# Patient Record
Sex: FEMALE | Race: WHITE | NOT HISPANIC OR LATINO
[De-identification: names, ages, dates, MRNs, and addresses within clinical notes are randomized per-mention and may not be internally consistent; named-entity substitution may affect disease eponyms.]

---

## 2016-08-09 RX ORDER — ACETAMINOPHEN 500 MG
2 TABLET ORAL
Qty: 0 | Refills: 0 | COMMUNITY
Start: 2016-08-09

## 2016-08-21 RX ORDER — DOCUSATE SODIUM 100 MG
1 CAPSULE ORAL
Qty: 0 | Refills: 0 | COMMUNITY
Start: 2016-08-21

## 2017-01-19 ENCOUNTER — APPOINTMENT (OUTPATIENT)
Dept: UROLOGY | Facility: CLINIC | Age: 82
End: 2017-01-19

## 2017-01-19 VITALS — DIASTOLIC BLOOD PRESSURE: 72 MMHG | SYSTOLIC BLOOD PRESSURE: 140 MMHG

## 2017-01-22 LAB
APPEARANCE: CLEAR
BACTERIA UR CULT: NORMAL
BACTERIA: NEGATIVE
BILIRUBIN URINE: NEGATIVE
BLOOD URINE: NEGATIVE
COLOR: YELLOW
GLUCOSE QUALITATIVE U: NORMAL MG/DL
HYALINE CASTS: 0 /LPF
KETONES URINE: NEGATIVE
LEUKOCYTE ESTERASE URINE: NEGATIVE
MICROSCOPIC-UA: NORMAL
NITRITE URINE: NEGATIVE
PH URINE: 6
PROTEIN URINE: NEGATIVE MG/DL
RED BLOOD CELLS URINE: 4 /HPF
SPECIFIC GRAVITY URINE: 1.01
SQUAMOUS EPITHELIAL CELLS: 2 /HPF
UROBILINOGEN URINE: NORMAL MG/DL
WHITE BLOOD CELLS URINE: 1 /HPF

## 2017-01-24 LAB — CORE LAB FLUID CYTOLOGY: NORMAL

## 2017-02-16 ENCOUNTER — OTHER (OUTPATIENT)
Age: 82
End: 2017-02-16

## 2017-02-17 ENCOUNTER — APPOINTMENT (OUTPATIENT)
Dept: OBGYN | Facility: CLINIC | Age: 82
End: 2017-02-17

## 2017-02-17 VITALS
DIASTOLIC BLOOD PRESSURE: 70 MMHG | BODY MASS INDEX: 21.99 KG/M2 | HEIGHT: 65 IN | WEIGHT: 132 LBS | SYSTOLIC BLOOD PRESSURE: 110 MMHG

## 2017-02-21 LAB
APPEARANCE: CLEAR
BACTERIA UR CULT: ABNORMAL
BILIRUBIN URINE: NEGATIVE
BLOOD URINE: NEGATIVE
CANDIDA VAG CYTO: NOT DETECTED
COLOR: YELLOW
CORE LAB FLUID CYTOLOGY: NORMAL
G VAGINALIS+PREV SP MTYP VAG QL MICRO: NOT DETECTED
GLUCOSE QUALITATIVE U: NORMAL MG/DL
KETONES URINE: NEGATIVE
LEUKOCYTE ESTERASE URINE: NEGATIVE
NITRITE URINE: NEGATIVE
PH URINE: 7.5
PROTEIN URINE: NEGATIVE MG/DL
SPECIFIC GRAVITY URINE: 1.01
T VAGINALIS VAG QL WET PREP: NOT DETECTED
UROBILINOGEN URINE: NORMAL MG/DL

## 2017-02-22 ENCOUNTER — RESULT REVIEW (OUTPATIENT)
Age: 82
End: 2017-02-22

## 2017-03-01 ENCOUNTER — APPOINTMENT (OUTPATIENT)
Dept: UROGYNECOLOGY | Facility: CLINIC | Age: 82
End: 2017-03-01

## 2017-03-23 ENCOUNTER — APPOINTMENT (OUTPATIENT)
Dept: UROLOGY | Facility: CLINIC | Age: 82
End: 2017-03-23

## 2017-03-23 VITALS — HEART RATE: 60 BPM | DIASTOLIC BLOOD PRESSURE: 91 MMHG | SYSTOLIC BLOOD PRESSURE: 180 MMHG | RESPIRATION RATE: 16 BRPM

## 2017-03-24 LAB
APPEARANCE: CLEAR
BACTERIA: NEGATIVE
BILIRUBIN URINE: NEGATIVE
BLOOD URINE: NEGATIVE
COLOR: YELLOW
GLUCOSE QUALITATIVE U: NORMAL MG/DL
KETONES URINE: NEGATIVE
LEUKOCYTE ESTERASE URINE: NEGATIVE
MICROSCOPIC-UA: NORMAL
NITRITE URINE: NEGATIVE
PH URINE: 7.5
PROTEIN URINE: NEGATIVE MG/DL
RED BLOOD CELLS URINE: 1 /HPF
SPECIFIC GRAVITY URINE: 1.01
SQUAMOUS EPITHELIAL CELLS: 0 /HPF
UROBILINOGEN URINE: NORMAL MG/DL
WHITE BLOOD CELLS URINE: 0 /HPF

## 2017-03-26 LAB — BACTERIA UR CULT: NORMAL

## 2017-03-28 LAB — CORE LAB FLUID CYTOLOGY: NORMAL

## 2017-04-09 ENCOUNTER — INPATIENT (INPATIENT)
Facility: HOSPITAL | Age: 82
LOS: 3 days | Discharge: HOME HEALTH | End: 2017-04-13
Attending: SURGERY | Admitting: SURGERY
Payer: MEDICARE

## 2017-04-09 VITALS — HEIGHT: 64 IN | WEIGHT: 125 LBS

## 2017-04-09 DIAGNOSIS — E89.0 POSTPROCEDURAL HYPOTHYROIDISM: Chronic | ICD-10-CM

## 2017-04-09 DIAGNOSIS — Z98.89 OTHER SPECIFIED POSTPROCEDURAL STATES: Chronic | ICD-10-CM

## 2017-04-09 DIAGNOSIS — K56.69 OTHER INTESTINAL OBSTRUCTION: ICD-10-CM

## 2017-04-09 LAB
ALBUMIN SERPL ELPH-MCNC: 4.3 G/DL — SIGNIFICANT CHANGE UP (ref 3.3–5)
ALP SERPL-CCNC: 61 U/L — SIGNIFICANT CHANGE UP (ref 40–120)
ALT FLD-CCNC: 19 U/L — SIGNIFICANT CHANGE UP (ref 12–78)
ANION GAP SERPL CALC-SCNC: 10 MMOL/L — SIGNIFICANT CHANGE UP (ref 5–17)
APPEARANCE UR: CLEAR — SIGNIFICANT CHANGE UP
APTT BLD: 38.3 SEC — HIGH (ref 27.5–37.4)
AST SERPL-CCNC: 25 U/L — SIGNIFICANT CHANGE UP (ref 15–37)
BACTERIA # UR AUTO: (no result)
BASOPHILS # BLD AUTO: 0 K/UL — SIGNIFICANT CHANGE UP (ref 0–0.2)
BASOPHILS NFR BLD AUTO: 0.5 % — SIGNIFICANT CHANGE UP (ref 0–2)
BILIRUB SERPL-MCNC: 0.7 MG/DL — SIGNIFICANT CHANGE UP (ref 0.2–1.2)
BILIRUB UR-MCNC: NEGATIVE — SIGNIFICANT CHANGE UP
BLD GP AB SCN SERPL QL: SIGNIFICANT CHANGE UP
BUN SERPL-MCNC: 15 MG/DL — SIGNIFICANT CHANGE UP (ref 7–23)
CALCIUM SERPL-MCNC: 9.7 MG/DL — SIGNIFICANT CHANGE UP (ref 8.5–10.1)
CHLORIDE SERPL-SCNC: 97 MMOL/L — SIGNIFICANT CHANGE UP (ref 96–108)
CO2 SERPL-SCNC: 27 MMOL/L — SIGNIFICANT CHANGE UP (ref 22–31)
COLOR SPEC: YELLOW — SIGNIFICANT CHANGE UP
CREAT SERPL-MCNC: 0.9 MG/DL — SIGNIFICANT CHANGE UP (ref 0.5–1.3)
DIFF PNL FLD: NEGATIVE — SIGNIFICANT CHANGE UP
EOSINOPHIL # BLD AUTO: 0 K/UL — SIGNIFICANT CHANGE UP (ref 0–0.5)
EOSINOPHIL NFR BLD AUTO: 0.1 % — SIGNIFICANT CHANGE UP (ref 0–6)
EPI CELLS # UR: SIGNIFICANT CHANGE UP
GLUCOSE SERPL-MCNC: 116 MG/DL — HIGH (ref 70–99)
GLUCOSE UR QL: NEGATIVE MG/DL — SIGNIFICANT CHANGE UP
HCT VFR BLD CALC: 45.6 % — HIGH (ref 34.5–45)
HGB BLD-MCNC: 15.1 G/DL — SIGNIFICANT CHANGE UP (ref 11.5–15.5)
INR BLD: 2.48 RATIO — HIGH (ref 0.88–1.16)
KETONES UR-MCNC: NEGATIVE — SIGNIFICANT CHANGE UP
LACTATE SERPL-SCNC: 1.6 MMOL/L — SIGNIFICANT CHANGE UP (ref 0.7–2)
LEUKOCYTE ESTERASE UR-ACNC: (no result)
LIDOCAIN IGE QN: 170 U/L — SIGNIFICANT CHANGE UP (ref 73–393)
LYMPHOCYTES # BLD AUTO: 0.6 K/UL — LOW (ref 1–3.3)
LYMPHOCYTES # BLD AUTO: 9.5 % — LOW (ref 13–44)
MANUAL DIF COMMENT BLD-IMP: SIGNIFICANT CHANGE UP
MCHC RBC-ENTMCNC: 31.7 PG — SIGNIFICANT CHANGE UP (ref 27–34)
MCHC RBC-ENTMCNC: 33.1 GM/DL — SIGNIFICANT CHANGE UP (ref 32–36)
MCV RBC AUTO: 95.9 FL — SIGNIFICANT CHANGE UP (ref 80–100)
MONOCYTES # BLD AUTO: 1.1 K/UL — HIGH (ref 0–0.9)
MONOCYTES NFR BLD AUTO: 17 % — HIGH (ref 2–14)
NEUTROPHILS # BLD AUTO: 4.7 K/UL — SIGNIFICANT CHANGE UP (ref 1.8–7.4)
NEUTROPHILS NFR BLD AUTO: 72.8 % — SIGNIFICANT CHANGE UP (ref 43–77)
NITRITE UR-MCNC: NEGATIVE — SIGNIFICANT CHANGE UP
PH UR: 7 — SIGNIFICANT CHANGE UP (ref 4.8–8)
PLAT MORPH BLD: NORMAL — SIGNIFICANT CHANGE UP
PLATELET # BLD AUTO: 116 K/UL — LOW (ref 150–400)
POTASSIUM SERPL-MCNC: 4.4 MMOL/L — SIGNIFICANT CHANGE UP (ref 3.5–5.3)
POTASSIUM SERPL-SCNC: 4.4 MMOL/L — SIGNIFICANT CHANGE UP (ref 3.5–5.3)
PROT SERPL-MCNC: 7.6 GM/DL — SIGNIFICANT CHANGE UP (ref 6–8.3)
PROT UR-MCNC: NEGATIVE MG/DL — SIGNIFICANT CHANGE UP
PROTHROM AB SERPL-ACNC: 27.3 SEC — HIGH (ref 9.8–12.7)
RBC # BLD: 4.76 M/UL — SIGNIFICANT CHANGE UP (ref 3.8–5.2)
RBC # FLD: 12.1 % — SIGNIFICANT CHANGE UP (ref 10.3–14.5)
RBC BLD AUTO: NORMAL — SIGNIFICANT CHANGE UP
RBC CASTS # UR COMP ASSIST: SIGNIFICANT CHANGE UP /HPF (ref 0–4)
SODIUM SERPL-SCNC: 134 MMOL/L — LOW (ref 135–145)
SP GR SPEC: 1 — LOW (ref 1.01–1.02)
TYPE + AB SCN PNL BLD: SIGNIFICANT CHANGE UP
UROBILINOGEN FLD QL: NEGATIVE MG/DL — SIGNIFICANT CHANGE UP
WBC # BLD: 6.4 K/UL — SIGNIFICANT CHANGE UP (ref 3.8–10.5)
WBC # FLD AUTO: 6.4 K/UL — SIGNIFICANT CHANGE UP (ref 3.8–10.5)
WBC UR QL: SIGNIFICANT CHANGE UP

## 2017-04-09 PROCEDURE — 99285 EMERGENCY DEPT VISIT HI MDM: CPT

## 2017-04-09 PROCEDURE — 93010 ELECTROCARDIOGRAM REPORT: CPT

## 2017-04-09 PROCEDURE — 74177 CT ABD & PELVIS W/CONTRAST: CPT | Mod: 26

## 2017-04-09 PROCEDURE — 74176 CT ABD & PELVIS W/O CONTRAST: CPT | Mod: 26,59

## 2017-04-09 RX ORDER — ONDANSETRON 8 MG/1
4 TABLET, FILM COATED ORAL ONCE
Qty: 0 | Refills: 0 | Status: COMPLETED | OUTPATIENT
Start: 2017-04-09 | End: 2017-04-09

## 2017-04-09 RX ORDER — ONDANSETRON 8 MG/1
4 TABLET, FILM COATED ORAL EVERY 6 HOURS
Qty: 0 | Refills: 0 | Status: DISCONTINUED | OUTPATIENT
Start: 2017-04-09 | End: 2017-04-10

## 2017-04-09 RX ORDER — MORPHINE SULFATE 50 MG/1
2 CAPSULE, EXTENDED RELEASE ORAL ONCE
Qty: 0 | Refills: 0 | Status: DISCONTINUED | OUTPATIENT
Start: 2017-04-09 | End: 2017-04-09

## 2017-04-09 RX ORDER — SODIUM CHLORIDE 9 MG/ML
1000 INJECTION INTRAMUSCULAR; INTRAVENOUS; SUBCUTANEOUS
Qty: 0 | Refills: 0 | Status: DISCONTINUED | OUTPATIENT
Start: 2017-04-09 | End: 2017-04-11

## 2017-04-09 RX ORDER — SPIRONOLACTONE 25 MG/1
25 TABLET, FILM COATED ORAL DAILY
Qty: 0 | Refills: 0 | Status: DISCONTINUED | OUTPATIENT
Start: 2017-04-09 | End: 2017-04-10

## 2017-04-09 RX ORDER — SODIUM CHLORIDE 9 MG/ML
3 INJECTION INTRAMUSCULAR; INTRAVENOUS; SUBCUTANEOUS ONCE
Qty: 0 | Refills: 0 | Status: COMPLETED | OUTPATIENT
Start: 2017-04-09 | End: 2017-04-09

## 2017-04-09 RX ORDER — MORPHINE SULFATE 50 MG/1
4 CAPSULE, EXTENDED RELEASE ORAL ONCE
Qty: 0 | Refills: 0 | Status: DISCONTINUED | OUTPATIENT
Start: 2017-04-09 | End: 2017-04-09

## 2017-04-09 RX ORDER — DOCUSATE SODIUM 100 MG
100 CAPSULE ORAL THREE TIMES A DAY
Qty: 0 | Refills: 0 | Status: DISCONTINUED | OUTPATIENT
Start: 2017-04-09 | End: 2017-04-10

## 2017-04-09 RX ORDER — SODIUM CHLORIDE 9 MG/ML
1000 INJECTION INTRAMUSCULAR; INTRAVENOUS; SUBCUTANEOUS ONCE
Qty: 0 | Refills: 0 | Status: COMPLETED | OUTPATIENT
Start: 2017-04-09 | End: 2017-04-09

## 2017-04-09 RX ORDER — ATENOLOL 25 MG/1
50 TABLET ORAL DAILY
Qty: 0 | Refills: 0 | Status: DISCONTINUED | OUTPATIENT
Start: 2017-04-09 | End: 2017-04-10

## 2017-04-09 RX ADMIN — SODIUM CHLORIDE 1000 MILLILITER(S): 9 INJECTION INTRAMUSCULAR; INTRAVENOUS; SUBCUTANEOUS at 16:48

## 2017-04-09 RX ADMIN — MORPHINE SULFATE 2 MILLIGRAM(S): 50 CAPSULE, EXTENDED RELEASE ORAL at 18:43

## 2017-04-09 RX ADMIN — ONDANSETRON 4 MILLIGRAM(S): 8 TABLET, FILM COATED ORAL at 16:50

## 2017-04-09 RX ADMIN — MORPHINE SULFATE 2 MILLIGRAM(S): 50 CAPSULE, EXTENDED RELEASE ORAL at 22:40

## 2017-04-09 RX ADMIN — SODIUM CHLORIDE 3 MILLILITER(S): 9 INJECTION INTRAMUSCULAR; INTRAVENOUS; SUBCUTANEOUS at 16:50

## 2017-04-09 NOTE — ED PROVIDER NOTE - CHPI ED SYMPTOMS NEG
no hematuria/no diarrhea/no burning urination/no fever/no chills/no blood in stool/no abdominal distension

## 2017-04-09 NOTE — ED PROVIDER NOTE - PMH
Acute cystitis without hematuria  septic  4/2016  Essential hypertension    [place] (hard of hearing), bilateral    Lymphedema of both lower extremities    Monoclonal gammopathies    Paroxysmal atrial fibrillation    Spinal stenosis    Spondyloarthritis    Vaginal prolapse    Venous insufficiency

## 2017-04-09 NOTE — ED STATDOCS - NS ED MD SCRIBE ATTENDING SCRIBE SECTIONS
HISTORY OF PRESENT ILLNESS/HIV/PROGRESS NOTE/DISPOSITION/REVIEW OF SYSTEMS/RESULTS/CONSULTATIONS/SHIFT CHANGE/INTAKE ASSESSMENT/SCREENINGS/VITAL SIGNS( Pullset)/PHYSICAL EXAM/PAST MEDICAL/SURGICAL/SOCIAL HISTORY

## 2017-04-09 NOTE — ED STATDOCS - GASTROINTESTINAL, MLM
abdomen soft, non-tender, and non-distended. Bowel sounds present. abdomen soft, epigastric and periumbilical tenderness, and non-distended. Bowel sounds present.

## 2017-04-09 NOTE — H&P ADULT - NSHPPHYSICALEXAM_GEN_ALL_CORE
CONSTITUTIONAL: NAD, well-groomed, well-developed  HEAD:  Atraumatic, Normocephalic  EYES: EOMI, PERRLA, conjunctiva and sclera clear  ENMT: No tonsillar erythema, Moist mucous membranes, +hearing aides  NECK: Supple, well healed thyroidectomy incision  NERVOUS SYSTEM:  Alert & Oriented X3,   CHEST/LUNG: CTA bilat  HEART: Irreg Irreg  ABDOMEN: Soft, non tender, no rebound or guarding, decreased BS  EXTREMITIES:  1+ peripheral edema bialt  LYMPH: No lymphadenopathy noted  SKIN: No rashes or lesions

## 2017-04-09 NOTE — ED STATDOCS - OBJECTIVE STATEMENT
86 y/o F PMHx HTN, Afib takes coumadin, presents to the ED c/o abd pain. The pt provides that she has cramping pain in the periumbilical region associated with 4 episodes of vomiting and nausea last night. The pt had more nausea today morning. No h/o diarrhea, constipation, fever, chills, dizziness, headache, cp, cough, sob, or urinary incontinence. Dr. Cottrell spinal sx. PMD Dr. Ramos

## 2017-04-09 NOTE — ED STATDOCS - CHPI ED SYMPTOM NEG
no diarrhea/no blood in stool/no dysuria/no palpitations/no burning urination/no fever/no abdominal distention/no hematuria

## 2017-04-09 NOTE — ED PROVIDER NOTE - OBJECTIVE STATEMENT
84 y/o F with PMHx of hernia, paroxysmal Afib, lymphedema, HTN, spinal stenosis, vaginal prolapse, venous insufficiency presents to ED for abd pain since and N/V since last night. Pt denies hemoptysis and BRBPR. No HA, CP, SOB, fever, diarrhea, chills, cough, dysuria.

## 2017-04-09 NOTE — ED STATDOCS - PROGRESS NOTE DETAILS
patient seen and evaluated, PSHx of repair of prolapsed bladder in 8/2016 by Dr. Valdez at [facility], N/V and abdominal pain, patient unable to sit in chair secondary to back pain and nausea, care to be transferred to main ED for management, case d/w Dr. Nguyen who will follow up on patient care -Yoan Mclaughlin PA-C

## 2017-04-09 NOTE — H&P ADULT - PROBLEM SELECTOR PLAN 1
-Admit to Dr. Agosto  -Follow up non contrast CT abd to evaluate for movement of contrast into colon.  Hold on ngt for now  -Anti-emetics  -Pain control  -Hold anticoagulation    Above D/W Dr. Agosto who viewed abdominal CT.

## 2017-04-09 NOTE — H&P ADULT - NSHPLABSRESULTS_GEN_ALL_CORE
134<L>  |  97  |  15  ----------------------------<  116<H>  4.4   |  27  |  0.90    Ca    9.7      2017 16:39    TPro  7.6  /  Alb  4.3  /  TBili  0.7  /  DBili  x   /  AST  25  /  ALT  19  /  AlkPhos  61                            15.1   6.4   )-----------( 116      ( 2017 16:39 )             45.6         LIVER FUNCTIONS - ( 2017 16:39 )  Alb: 4.3 g/dL / Pro: 7.6 gm/dL / ALK PHOS: 61 U/L / ALT: 19 U/L / AST: 25 U/L / GGT: x           PT/INR - ( 2017 16:39 )   PT: 27.3 sec;   INR: 2.48 ratio         PTT - ( 2017 16:39 )  PTT:38.3 sec  Urinalysis Basic - ( 2017 18:05 )    Color: Yellow / Appearance: Clear / S.005 / pH: x  Gluc: x / Ketone: Negative  / Bili: Negative / Urobili: Negative mg/dL   Blood: x / Protein: Negative mg/dL / Nitrite: Negative   Leuk Esterase: Trace / RBC: 0-2 /HPF / WBC 0-2   Sq Epi: x / Non Sq Epi: Few / Bacteria: Few

## 2017-04-09 NOTE — H&P ADULT - PMH
Acute cystitis without hematuria  septic  4/2016  Essential hypertension    [place] (hard of hearing), bilateral    Lymphedema of both lower extremities    Monoclonal gammopathies    Paroxysmal atrial fibrillation    Spinal stenosis    Spondyloarthritis    Vaginal prolapse    Venous insufficiency

## 2017-04-09 NOTE — ED STATDOCS - PMH
Acute cystitis without hematuria  septic  4/2016  Essential hypertension    [place] (hard of hearing), bilateral    Lymphedema of both lower extremities    Monoclonal gammopathies    Paroxysmal atrial fibrillation    Spinal stenosis    Spondyloarthritis    Vaginal prolapse    Venous insufficiency

## 2017-04-09 NOTE — ED STATDOCS - DETAILS:
I, Medhat Payton DO, performed the initial face to face bedside interview with this patient regarding history of present illness and determined that the patient should be seen in the main ED.  The history, was documented by the scribe in my presence and I attest to the accuracy of the documentation. I, Medhat Payton DO, performed the initial face to face bedside interview with this patient regarding history of present illness and determined that the patient should be seen in the main ED.  The history, was documented by the scribe in my presence and I attest to the accuracy of the documentation.  I personally saw the patient with the PA, and completed the key components of the history and physical exam. I then discussed the management plan with the PA.

## 2017-04-09 NOTE — ED PROVIDER NOTE - NS ED MD SCRIBE ATTENDING SCRIBE SECTIONS
HISTORY OF PRESENT ILLNESS/PAST MEDICAL/SURGICAL/SOCIAL HISTORY/PHYSICAL EXAM/DISPOSITION/REVIEW OF SYSTEMS

## 2017-04-09 NOTE — ED ADULT NURSE NOTE - PMH
Acute cystitis without hematuria  septic  4/2016  Essential hypertension    [place] (hard of hearing), bilateral    Lymphedema of both lower extremities    Monoclonal gammopathies    Paroxysmal atrial fibrillation    Spinal stenosis    Spondyloarthritis    Vaginal prolapse    Venous insufficiency

## 2017-04-09 NOTE — H&P ADULT - HISTORY OF PRESENT ILLNESS
Pt is an 86 y/o F with PMHx of hernia, paroxysmal Afib, lymphedema, HTN, spinal stenosis, vaginal prolapse, venous insufficiency presents to ED with abd pain since and N/V since last night. Pt states the pain is generalized.  Did have a small bowel movement this morning.  Not passing gas.  Pt denies hemoptysis and BRBPR. No HA, CP, SOB, fever, diarrhea, chills, cough, dysuria.

## 2017-04-09 NOTE — H&P ADULT - ASSESSMENT
86 yo female with SBO, ? closed loop or not.  No peritoneal signs on exam.  Will repeat non contrast CT in 2 hours and evaluate for contrast moving into the colon.

## 2017-04-09 NOTE — ED ADULT NURSE NOTE - CHPI ED SYMPTOMS NEG
no dysuria/no burning urination/no abdominal distension/no blood in stool/no hematuria/no chills/no fever/no diarrhea

## 2017-04-09 NOTE — ED ADULT NURSE REASSESSMENT NOTE - COMFORT CARE
assisted to bathroom/vitals done. no other assistance needed/darkened lights/side rails up/assisted to bedside commode

## 2017-04-10 ENCOUNTER — APPOINTMENT (OUTPATIENT)
Dept: OBGYN | Facility: CLINIC | Age: 82
End: 2017-04-10

## 2017-04-10 LAB
ANION GAP SERPL CALC-SCNC: 10 MMOL/L — SIGNIFICANT CHANGE UP (ref 5–17)
BUN SERPL-MCNC: 11 MG/DL — SIGNIFICANT CHANGE UP (ref 7–23)
CALCIUM SERPL-MCNC: 9 MG/DL — SIGNIFICANT CHANGE UP (ref 8.5–10.1)
CHLORIDE SERPL-SCNC: 104 MMOL/L — SIGNIFICANT CHANGE UP (ref 96–108)
CO2 SERPL-SCNC: 25 MMOL/L — SIGNIFICANT CHANGE UP (ref 22–31)
CREAT SERPL-MCNC: 0.78 MG/DL — SIGNIFICANT CHANGE UP (ref 0.5–1.3)
GLUCOSE SERPL-MCNC: 110 MG/DL — HIGH (ref 70–99)
HCT VFR BLD CALC: 42.5 % — SIGNIFICANT CHANGE UP (ref 34.5–45)
HGB BLD-MCNC: 14 G/DL — SIGNIFICANT CHANGE UP (ref 11.5–15.5)
MCHC RBC-ENTMCNC: 31.9 PG — SIGNIFICANT CHANGE UP (ref 27–34)
MCHC RBC-ENTMCNC: 32.9 GM/DL — SIGNIFICANT CHANGE UP (ref 32–36)
MCV RBC AUTO: 96.9 FL — SIGNIFICANT CHANGE UP (ref 80–100)
PLATELET # BLD AUTO: 114 K/UL — LOW (ref 150–400)
POTASSIUM SERPL-MCNC: 3.8 MMOL/L — SIGNIFICANT CHANGE UP (ref 3.5–5.3)
POTASSIUM SERPL-SCNC: 3.8 MMOL/L — SIGNIFICANT CHANGE UP (ref 3.5–5.3)
RBC # BLD: 4.39 M/UL — SIGNIFICANT CHANGE UP (ref 3.8–5.2)
RBC # FLD: 12 % — SIGNIFICANT CHANGE UP (ref 10.3–14.5)
SODIUM SERPL-SCNC: 139 MMOL/L — SIGNIFICANT CHANGE UP (ref 135–145)
WBC # BLD: 5.8 K/UL — SIGNIFICANT CHANGE UP (ref 3.8–10.5)
WBC # FLD AUTO: 5.8 K/UL — SIGNIFICANT CHANGE UP (ref 3.8–10.5)

## 2017-04-10 PROCEDURE — 74000: CPT | Mod: 26

## 2017-04-10 RX ORDER — ATENOLOL 25 MG/1
25 TABLET ORAL DAILY
Qty: 0 | Refills: 0 | Status: DISCONTINUED | OUTPATIENT
Start: 2017-04-10 | End: 2017-04-13

## 2017-04-10 RX ORDER — ALPRAZOLAM 0.25 MG
0.5 TABLET ORAL EVERY 12 HOURS
Qty: 0 | Refills: 0 | Status: DISCONTINUED | OUTPATIENT
Start: 2017-04-10 | End: 2017-04-10

## 2017-04-10 RX ORDER — PANTOPRAZOLE SODIUM 20 MG/1
40 TABLET, DELAYED RELEASE ORAL DAILY
Qty: 0 | Refills: 0 | Status: DISCONTINUED | OUTPATIENT
Start: 2017-04-10 | End: 2017-04-12

## 2017-04-10 RX ORDER — PROCHLORPERAZINE MALEATE 5 MG
10 TABLET ORAL EVERY 6 HOURS
Qty: 0 | Refills: 0 | Status: DISCONTINUED | OUTPATIENT
Start: 2017-04-10 | End: 2017-04-10

## 2017-04-10 RX ORDER — CHOLECALCIFEROL (VITAMIN D3) 125 MCG
2000 CAPSULE ORAL DAILY
Qty: 0 | Refills: 0 | Status: DISCONTINUED | OUTPATIENT
Start: 2017-04-10 | End: 2017-04-13

## 2017-04-10 RX ORDER — ALPRAZOLAM 0.25 MG
0.12 TABLET ORAL
Qty: 0 | Refills: 0 | Status: DISCONTINUED | OUTPATIENT
Start: 2017-04-10 | End: 2017-04-13

## 2017-04-10 RX ADMIN — ONDANSETRON 4 MILLIGRAM(S): 8 TABLET, FILM COATED ORAL at 09:02

## 2017-04-10 RX ADMIN — ATENOLOL 25 MILLIGRAM(S): 25 TABLET ORAL at 09:01

## 2017-04-10 RX ADMIN — Medication 100 MILLIGRAM(S): at 09:02

## 2017-04-10 RX ADMIN — Medication 100 MILLIGRAM(S): at 21:29

## 2017-04-10 RX ADMIN — SODIUM CHLORIDE 125 MILLILITER(S): 9 INJECTION INTRAMUSCULAR; INTRAVENOUS; SUBCUTANEOUS at 00:16

## 2017-04-10 RX ADMIN — SPIRONOLACTONE 25 MILLIGRAM(S): 25 TABLET, FILM COATED ORAL at 09:08

## 2017-04-10 RX ADMIN — PANTOPRAZOLE SODIUM 40 MILLIGRAM(S): 20 TABLET, DELAYED RELEASE ORAL at 15:38

## 2017-04-10 RX ADMIN — Medication 0.12 MILLIGRAM(S): at 09:01

## 2017-04-10 RX ADMIN — Medication 10 MILLIGRAM(S): at 16:56

## 2017-04-10 RX ADMIN — Medication 100 MILLIGRAM(S): at 15:39

## 2017-04-10 NOTE — PATIENT PROFILE ADULT. - ABILITY TO HEAR (WITH HEARING AID OR HEARING APPLIANCE IF NORMALLY USED):
Mildly to Moderately Impaired: difficulty hearing in some environments or speaker may need to increase volume or speak distinctly/Sam[employer] hearing aids

## 2017-04-10 NOTE — CONSULT NOTE ADULT - SUBJECTIVE AND OBJECTIVE BOX
CC- Abdominal pain, nausea, vomitting  HPI- 84 y/o F with PMHx Atrial fibrillation on coumadin, Mitral valve prolapse, mitral regurgitation, HTN, chronic venous insufficiency, chronic LE edema, ostoporosis, vertebral fractures, spinal stenosis,  kyphoplasty, Hyponatermia, uterine prolapse, vasovagal syncope, hypothyroidism, ESBL uti, anxiety, glaucoma, right inguinal hernia, left rotator cuff tear, monoclonal gammoopathy, chronic anemia, who presented to ED with abd pain, nausea vomiting since saturday evening. In ED, underwent CT a/p which was concerning for closed loop small bowel obstruction. She was admitted to surgical service. Kept NPO and given ivf. Hospitalist service consulted for medical comanagement. Patient seen and examined with daughter at bedside and second daughter on the telephone. Mild mid abdominal pain, no radiation. no n/v at present. Had some dry heaving this am. No chest pain/sob. No recent stress test. No h/o CAD. Had prior surgery with anesthesia and reports no complications other than confusion/agitation post procedure.   PMH- HTN, Afib, osteoporosis, mitral valve prolapse    PSH- lung biopsy  knee replacement  lap santo  thyroglossal cyst removal   Transvaginal procedure for prolapse    Home meds:   coumadin 3mg 6 days a week, 4mg on mondays  xanax 0.125mg bid prn  atenolol 25mg daily  aldactone 25mg 5 days a week (not on wed/sat)  omeprazole 20mg prn  vitamin d 2000 daily    ROS- no chest pain,SOB. No fever.  PE-   Vital Signs Last 24 Hrs  T(C): 36.7, Max: 36.8 (04-10 @ 05:57)  T(F): 98, Max: 98.2 (04-10 @ 05:57)  HR: 87 (61 - 87)  BP: 143/59 (143/59 - 167/77)  RR: 18 (16 - 18)  SpO2: 98% (97% - 99%)      PHYSICAL EXAM:    Constitutional: NAD, awake and alert, well-developed. Appears comfortable  HEENT: PERRLA, EOMI,   Neck: Soft and supple, No LAD, No JVD  Respiratory: Breath sounds are clear bilaterally  Cardiovascular: S1 and S2 +, irregularly irregular.   Gastrointestinal:Soft, mildly distended, non tender, no rebound/guarding or rigidity, hypoactive bowel sounds  Extremities: b/l LE edema  Neurological: A/O x 3, no focal deficits. A bit confused at times  Musculoskeletal: 5/5 strength b/l upper and lower extremities  Skin: No rashes

## 2017-04-10 NOTE — CONSULT NOTE ADULT - SUBJECTIVE AND OBJECTIVE BOX
Chief Complaint: Abdominal pain, nausea, vomiting.     HPI:     86 y/o F with PMHx Atrial fibrillation on coumadin, Mitral valve prolapse, mitral regurgitation, HTN, chronic venous insufficiency, chronic LE edema, ostoporosis, vertebral fractures, spinal stenosis,  kyphoplasty, Hyponatermia, uterine prolapse, vasovagal syncope, hypothyroidism, ESBL uti, anxiety, glaucoma, right inguinal hernia, left rotator cuff tear, monoclonal gammoopathy, chronic anemia, who presented to ED with abd pain, nausea vomiting since saturday evening. In ED, underwent CT a/p which was concerning for closed loop small bowel obstruction. She was admitted to surgical service. Kept NPO and given ivf. Hospitalist service consulted for medical comanagement. Patient seen and examined with daughter at bedside and second daughter on the telephone. Mild mid abdominal pain, no radiation. no n/v at present. Had some dry heaving this am. No chest pain/sob. No recent stress test. No h/o CAD. Had prior surgery with anesthesia and reports no complications other than confusion/agitation post procedure.     REVIEW OF SYSTEMS: All 10 systems reviewed and is as per hpi otherwise negative.     Vital Signs Last 24 Hrs  T(C): 36.7, Max: 36.8 (04-10 @ 05:57)  T(F): 98, Max: 98.2 (04-10 @ 05:57)  HR: 87 (61 - 87)  BP: 143/59 (143/59 - 167/77)  RR: 18 (16 - 18)  SpO2: 98% (97% - 99%)    I&O's Summary      PHYSICAL EXAM:    Constitutional: NAD, awake and alert, well-developed  HEENT: PERRLA, EOMI,   Neck: Soft and supple, No LAD, No JVD  Respiratory: Breath sounds are clear bilaterally  Cardiovascular: S1 and S2 +, irregularly irregular.   Gastrointestinal:Soft, mildly distended, tenderness around umbilical area, no rebound/guarding or rigidity, hypoactive bowel sounds  Extremities: b/l LE edema  Neurological: A/O x 3, no focal deficits  Musculoskeletal: 5/5 strength b/l upper and lower extremities  Skin: No rashes    Medications:  MEDICATIONS  (STANDING):  sodium chloride 0.9%. 1000milliLiter(s) IV Continuous <Continuous>  spironolactone 25milliGRAM(s) Oral daily  docusate sodium 100milliGRAM(s) Oral three times a day  ATENolol  Tablet 25milliGRAM(s) Oral daily  pantoprazole  Injectable 40milliGRAM(s) IV Push daily      Labs: All Labs Reviewed:                        14.0   5.8   )-----------( 114      ( 10 Apr 2017 07:34 )             42.5     04-10    139  |  104  |  11  ----------------------------<  110<H>  3.8   |  25  |  0.78    Ca    9.0      10 Apr 2017 07:34    TPro  7.6  /  Alb  4.3  /  TBili  0.7  /  DBili  x   /  AST  25  /  ALT  19  /  AlkPhos  61  04-09    PT/INR - ( 09 Apr 2017 16:39 )   PT: 27.3 sec;   INR: 2.48 ratio         PTT - ( 09 Apr 2017 16:39 )  PTT:38.3 sec Chief Complaint: Abdominal pain, nausea, vomiting.     HPI:     84 y/o F with PMHx Atrial fibrillation on coumadin, Mitral valve prolapse, mitral regurgitation, HTN, chronic venous insufficiency, chronic LE edema, ostoporosis, vertebral fractures, spinal stenosis,  kyphoplasty, Hyponatermia, uterine prolapse, vasovagal syncope, hypothyroidism, ESBL uti, anxiety, glaucoma, right inguinal hernia, left rotator cuff tear, monoclonal gammoopathy, chronic anemia, who presented to ED with abd pain, nausea vomiting since saturday evening. In ED, underwent CT a/p which was concerning for closed loop small bowel obstruction. She was admitted to surgical service. Kept NPO and given ivf. Hospitalist service consulted for medical comanagement. Patient seen and examined with daughter at bedside and second daughter on the telephone. Mild mid abdominal pain, no radiation. no n/v at present. Had some dry heaving this am. No chest pain/sob. No recent stress test. No h/o CAD. Had prior surgery with anesthesia and reports no complications other than confusion/agitation post procedure.     REVIEW OF SYSTEMS: All 10 systems reviewed and is as per hpi otherwise negative.     PMH: as above    PSH:lung biopsy  knee replacement  lap santo  thyroglossal cyst removal     Family history: Father had HTN, mother lived till 100    Social: no tobacco use, no significant ETOH    Vital Signs Last 24 Hrs  T(C): 36.7, Max: 36.8 (04-10 @ 05:57)  T(F): 98, Max: 98.2 (04-10 @ 05:57)  HR: 87 (61 - 87)  BP: 143/59 (143/59 - 167/77)  RR: 18 (16 - 18)  SpO2: 98% (97% - 99%)    I&O's Summary      PHYSICAL EXAM:    Constitutional: NAD, awake and alert, well-developed  HEENT: PERRLA, EOMI,   Neck: Soft and supple, No LAD, No JVD  Respiratory: Breath sounds are clear bilaterally  Cardiovascular: S1 and S2 +, irregularly irregular.   Gastrointestinal:Soft, mildly distended, tenderness around umbilical area, no rebound/guarding or rigidity, hypoactive bowel sounds  Extremities: b/l LE edema  Neurological: A/O x 3, no focal deficits  Musculoskeletal: 5/5 strength b/l upper and lower extremities  Skin: No rashes    Medications:  MEDICATIONS  (STANDING):  sodium chloride 0.9%. 1000milliLiter(s) IV Continuous <Continuous>  spironolactone 25milliGRAM(s) Oral daily  docusate sodium 100milliGRAM(s) Oral three times a day  ATENolol  Tablet 25milliGRAM(s) Oral daily  pantoprazole  Injectable 40milliGRAM(s) IV Push daily      Labs: All Labs Reviewed:                        14.0   5.8   )-----------( 114      ( 10 Apr 2017 07:34 )             42.5     04-10    139  |  104  |  11  ----------------------------<  110<H>  3.8   |  25  |  0.78    Ca    9.0      10 Apr 2017 07:34    TPro  7.6  /  Alb  4.3  /  TBili  0.7  /  DBili  x   /  AST  25  /  ALT  19  /  AlkPhos  61  04-09    PT/INR - ( 09 Apr 2017 16:39 )   PT: 27.3 sec;   INR: 2.48 ratio         PTT - ( 09 Apr 2017 16:39 )  PTT:38.3 sec Chief Complaint: Abdominal pain, nausea, vomiting.     HPI:     84 y/o F with PMHx Atrial fibrillation on coumadin, Mitral valve prolapse, mitral regurgitation, HTN, chronic venous insufficiency, chronic LE edema, ostoporosis, vertebral fractures, spinal stenosis,  kyphoplasty, Hyponatermia, uterine prolapse, vasovagal syncope, hypothyroidism, ESBL uti, anxiety, glaucoma, right inguinal hernia, left rotator cuff tear, monoclonal gammoopathy, chronic anemia, who presented to ED with abd pain, nausea vomiting since saturday evening. In ED, underwent CT a/p which was concerning for closed loop small bowel obstruction. She was admitted to surgical service. Kept NPO and given ivf. Hospitalist service consulted for medical comanagement. Patient seen and examined with daughter at bedside and second daughter on the telephone. Mild mid abdominal pain, no radiation. no n/v at present. Had some dry heaving this am. No chest pain/sob. No recent stress test. No h/o CAD. Had prior surgery with anesthesia and reports no complications other than confusion/agitation post procedure.     REVIEW OF SYSTEMS: All 10 systems reviewed and is as per hpi otherwise negative.     PMH: as above    PSH:lung biopsy  knee replacement  lap santo  thyroglossal cyst removal     Family history: Father had HTN, mother lived till [facility] [place]    Social: no tobacco use, no significant ETOH    Home meds:   coumadin 3mg 6 days a week, 4mg on mondays  xanax 0.125mg bid prn  atenolol 25mg daily  aldactone 25mg 5 days a week (not on wed/sat)  omeprazole 20mg prn  vitamin d 2000 daily    Vital Signs Last 24 Hrs  T(C): 36.7, Max: 36.8 (04-10 @ 05:57)  T(F): 98, Max: 98.2 (04-10 @ 05:57)  HR: 87 (61 - 87)  BP: 143/59 (143/59 - 167/77)  RR: 18 (16 - 18)  SpO2: 98% (97% - 99%)    I&O's Summary      PHYSICAL EXAM:    Constitutional: NAD, awake and alert, well-developed  HEENT: PERRLA, EOMI,   Neck: Soft and supple, No LAD, No JVD  Respiratory: Breath sounds are clear bilaterally  Cardiovascular: S1 and S2 +, irregularly irregular.   Gastrointestinal:Soft, mildly distended, tenderness around umbilical area, no rebound/guarding or rigidity, hypoactive bowel sounds  Extremities: b/l LE edema  Neurological: A/O x 3, no focal deficits  Musculoskeletal: 5/5 strength b/l upper and lower extremities  Skin: No rashes    Medications:  MEDICATIONS  (STANDING):  sodium chloride 0.9%. 1000milliLiter(s) IV Continuous <Continuous>  spironolactone 25milliGRAM(s) Oral daily  docusate sodium 100milliGRAM(s) Oral three times a day  ATENolol  Tablet 25milliGRAM(s) Oral daily  pantoprazole  Injectable 40milliGRAM(s) IV Push daily      Labs: All Labs Reviewed:                        14.0   5.8   )-----------( 114      ( 10 Apr 2017 07:34 )             42.5     04-10    139  |  104  |  11  ----------------------------<  110<H>  3.8   |  25  |  0.78    Ca    9.0      10 Apr 2017 07:34    TPro  7.6  /  Alb  4.3  /  TBili  0.7  /  DBili  x   /  AST  25  /  ALT  19  /  AlkPhos  61  04-09    PT/INR - ( 09 Apr 2017 16:39 )   PT: 27.3 sec;   INR: 2.48 ratio         PTT - ( 09 Apr 2017 16:39 )  PTT:38.3 sec

## 2017-04-10 NOTE — CONSULT NOTE ADULT - ASSESSMENT
Partial SBO. On X Ray today contrast appears to have progressed into distal colon. Abdominal exam otherwise benign. Plan- continue NPO. Serial abdominal exams. Repeat X Rays in am.

## 2017-04-10 NOTE — CONSULT NOTE ADULT - ASSESSMENT
86 y/o F with PMHx Atrial fibrillation on coumadin, Mitral valve prolapse, mitral regurgitation, HTN, chronic venous insufficiency, chronic LE edema, ostoporosis, vertebral fractures, spinal stenosis,  kyphoplasty, Hyponatremia, uterine prolapse, vasovagal syncope, hypothyroidism, ESBL uti, anxiety, glaucoma, right inguinal hernia, left rotator cuff tear, monoclonal gammopathy, chronic anemia, who presented to ED with abd pain, nausea vomiting since saturday evening. In ED, underwent CT a/p which was concerning for closed loop small bowel obstruction.    1. SBO:  -NPO, IVF  -Pain control  -mx per surgical service.    2. Atrial fib/ flutter:  -continue atenolol  -coumadin on hold for possible surgery    3. HTN:  -relatively stable.  -continue bb  -hold aldactone while on ivf    4. Chronic LE edema:  -hold aldactone while npo and on ivf    5. Hypothyroidism:  -continue synthroid    6. Anxiety:  -continue xanax    7. Vitamin D deficiency:  -resume vit d    8. DVT px    thank you for this consultation, will follow with you.

## 2017-04-11 LAB
ANION GAP SERPL CALC-SCNC: 8 MMOL/L — SIGNIFICANT CHANGE UP (ref 5–17)
APTT BLD: 39.3 SEC — HIGH (ref 27.5–37.4)
BASOPHILS # BLD AUTO: 0.1 K/UL — SIGNIFICANT CHANGE UP (ref 0–0.2)
BASOPHILS NFR BLD AUTO: 1.1 % — SIGNIFICANT CHANGE UP (ref 0–2)
BUN SERPL-MCNC: 18 MG/DL — SIGNIFICANT CHANGE UP (ref 7–23)
CALCIUM SERPL-MCNC: 8.5 MG/DL — SIGNIFICANT CHANGE UP (ref 8.5–10.1)
CHLORIDE SERPL-SCNC: 109 MMOL/L — HIGH (ref 96–108)
CO2 SERPL-SCNC: 22 MMOL/L — SIGNIFICANT CHANGE UP (ref 22–31)
CREAT SERPL-MCNC: 0.7 MG/DL — SIGNIFICANT CHANGE UP (ref 0.5–1.3)
EOSINOPHIL # BLD AUTO: 0 K/UL — SIGNIFICANT CHANGE UP (ref 0–0.5)
EOSINOPHIL NFR BLD AUTO: 0.9 % — SIGNIFICANT CHANGE UP (ref 0–6)
GLUCOSE SERPL-MCNC: 84 MG/DL — SIGNIFICANT CHANGE UP (ref 70–99)
HCT VFR BLD CALC: 37.2 % — SIGNIFICANT CHANGE UP (ref 34.5–45)
HGB BLD-MCNC: 12.7 G/DL — SIGNIFICANT CHANGE UP (ref 11.5–15.5)
INR BLD: 3.69 RATIO — HIGH (ref 0.88–1.16)
LYMPHOCYTES # BLD AUTO: 0.6 K/UL — LOW (ref 1–3.3)
LYMPHOCYTES # BLD AUTO: 11.6 % — LOW (ref 13–44)
MAGNESIUM SERPL-MCNC: 2.1 MG/DL — SIGNIFICANT CHANGE UP (ref 1.8–2.4)
MCHC RBC-ENTMCNC: 32.9 PG — SIGNIFICANT CHANGE UP (ref 27–34)
MCHC RBC-ENTMCNC: 34.2 GM/DL — SIGNIFICANT CHANGE UP (ref 32–36)
MCV RBC AUTO: 96.2 FL — SIGNIFICANT CHANGE UP (ref 80–100)
MONOCYTES # BLD AUTO: 1.3 K/UL — HIGH (ref 0–0.9)
MONOCYTES NFR BLD AUTO: 24.9 % — HIGH (ref 2–14)
NEUTROPHILS # BLD AUTO: 3.2 K/UL — SIGNIFICANT CHANGE UP (ref 1.8–7.4)
NEUTROPHILS NFR BLD AUTO: 61.5 % — SIGNIFICANT CHANGE UP (ref 43–77)
PHOSPHATE SERPL-MCNC: 2.7 MG/DL — SIGNIFICANT CHANGE UP (ref 2.5–4.5)
PLATELET # BLD AUTO: 108 K/UL — LOW (ref 150–400)
POTASSIUM SERPL-MCNC: 3.9 MMOL/L — SIGNIFICANT CHANGE UP (ref 3.5–5.3)
POTASSIUM SERPL-SCNC: 3.9 MMOL/L — SIGNIFICANT CHANGE UP (ref 3.5–5.3)
PROTHROM AB SERPL-ACNC: 40.9 SEC — HIGH (ref 9.8–12.7)
RBC # BLD: 3.87 M/UL — SIGNIFICANT CHANGE UP (ref 3.8–5.2)
RBC # FLD: 12 % — SIGNIFICANT CHANGE UP (ref 10.3–14.5)
SODIUM SERPL-SCNC: 139 MMOL/L — SIGNIFICANT CHANGE UP (ref 135–145)
WBC # BLD: 5.2 K/UL — SIGNIFICANT CHANGE UP (ref 3.8–10.5)
WBC # FLD AUTO: 5.2 K/UL — SIGNIFICANT CHANGE UP (ref 3.8–10.5)

## 2017-04-11 PROCEDURE — 74000: CPT | Mod: 26

## 2017-04-11 RX ORDER — SODIUM CHLORIDE 9 MG/ML
1000 INJECTION INTRAMUSCULAR; INTRAVENOUS; SUBCUTANEOUS
Qty: 0 | Refills: 0 | Status: DISCONTINUED | OUTPATIENT
Start: 2017-04-11 | End: 2017-04-12

## 2017-04-11 RX ORDER — WARFARIN SODIUM 2.5 MG/1
4 TABLET ORAL
Qty: 0 | Refills: 0 | COMMUNITY

## 2017-04-11 RX ADMIN — SODIUM CHLORIDE 125 MILLILITER(S): 9 INJECTION INTRAMUSCULAR; INTRAVENOUS; SUBCUTANEOUS at 09:49

## 2017-04-11 RX ADMIN — SODIUM CHLORIDE 125 MILLILITER(S): 9 INJECTION INTRAMUSCULAR; INTRAVENOUS; SUBCUTANEOUS at 17:56

## 2017-04-11 RX ADMIN — ATENOLOL 25 MILLIGRAM(S): 25 TABLET ORAL at 07:00

## 2017-04-11 RX ADMIN — PANTOPRAZOLE SODIUM 40 MILLIGRAM(S): 20 TABLET, DELAYED RELEASE ORAL at 13:59

## 2017-04-11 NOTE — CONSULT NOTE ADULT - SUBJECTIVE AND OBJECTIVE BOX
Patient is a 85y old  Female who presents with a chief complaint of abdominal pain (2017 22:35)      HPI:  Pt is an 84 y/o F with PMHx of hernia, paroxysmal Afib, lymphedema, HTN, spinal stenosis, vaginal prolapse, venous insufficiency presents to ED with abd pain since and N/V since last night. Pt states the pain is generalized.  Did have a small bowel movement this morning.  Not passing gas.  Pt denies hemoptysis and BRBPR. No HA, CP, SOB, fever, diarrhea, chills, cough, dysuria. (2017 22:35) In the hospital she has been NPO with CT scanson admissiion were  consistent with small bowel obstruction.  Today  the patient did pass gas and the abdominal pain has resolved and the abdominal xray does not show SBO.      PAST MEDICAL & SURGICAL HISTORY:  [place] (hard of hearing), bilateral  Vaginal prolapse  Spondyloarthritis  Spinal stenosis  Essential hypertension  Acute cystitis without hematuria: septic  2016  Paroxysmal atrial fibrillation  Monoclonal gammopathies  Venous insufficiency  Lymphedema of both lower extremities  History of vaginal surgery  [place] (hard of hearing)  S/P thyroidectomy: partial     S/P kyphoplasty:         MEDICATIONS  (STANDING):  sodium chloride 0.9%. 1000milliLiter(s) IV Continuous <Continuous>  ATENolol  Tablet 25milliGRAM(s) Oral daily  pantoprazole  Injectable 40milliGRAM(s) IV Push daily  cholecalciferol 2000Unit(s) Oral daily    MEDICATIONS  (PRN):  ALPRAZolam 0.125milliGRAM(s) Oral two times a day PRN anxiety      FAMILY HISTORY:  No pertinent family history in first degree relatives      SOCIAL HISTORY:  Tobacco-   none        Alcohol-      no        Illicit drugs-      no        Occupation-    retired          Marital  status-  REVIEW OF SYSTEM:  Pertinent items are noted in HPI.    Vital Signs Last 24 Hrs  T(C): 36.8, Max: 37.1 (- @ 06:57)  T(F): 98.2, Max: 98.7 ( @ 06:57)  HR: 67 (67 - 70)  BP: 152/64 (149/56 - 152/64)  BP(mean): --  RR: 17 (17 - 18)  SpO2: 98% (97% - 98%)    I&O's Summary    I & Os for current day (as of 2017 17:55)  =============================================  IN: 0 ml / OUT: 1 ml / NET: -1 ml    PHYSICAL EXAM  General Appearance: comfortable  HEENT: PERRL, conjunctiva clear, EOM's intact, non injected pharynx, no exudate, TM   normal  Neck: Supple, , no adenopathy, thyroid: not enlarged, no carotid bruit or JVD  Back: Symmetric, no  tenderness.  Lungs: Clear to auscultation bilaterally  Heart: Regular rate and rhythm, S1, S2 normal,2/6 FARZANEH LSB, 1/6 diastolic blow LSB  Abdomen: Soft, non-tender, bowel sounds hypoactive , no hepatosplenomegaly. Soft ventral hernia.  Extremities: tracer edema both legs  Skin: Skin color, texture normal, no rashes   Neurologic: Alert and oriented X3 , cranial nerves intact, sensory and motor normal,        INTERPRETATION OF TELEMETRY:    ECG:atrial fibrillation, VR 61 BPM, LVH.        LABS:                          12.7   5.2   )-----------( 108      ( 2017 08:50 )             37.2     04-11    139  |  109<H>  |  18  ----------------------------<  84  3.9   |  22  |  0.70    Ca    8.5      2017 08:50  Phos  2.7     04-11  Mg     2.1     04-11              PT/INR - ( 2017 08:50 )   PT: 40.9 sec;   INR: 3.69 ratio         PTT - ( 2017 08:50 )  PTT:39.3 sec  Urinalysis Basic - ( 2017 18:05 )    Color: Yellow / Appearance: Clear / S.005 / pH: x  Gluc: x / Ketone: Negative  / Bili: Negative / Urobili: Negative mg/dL   Blood: x / Protein: Negative mg/dL / Nitrite: Negative   Leuk Esterase: Trace / RBC: 0-2 /HPF / WBC 0-2   Sq Epi: x / Non Sq Epi: Few / Bacteria: Few            RADIOLOGY & ADDITIONAL STUDIES:    IMPRESSION:  1.SBO. Clinically improved.  2.Atrial fibrillation, persistent. Stable.  3.Hypertension. Stable.  4.Moderate MR and TR and mild AR. Stable.   5.Chronic thrombocytopenia.    PLAN:  Agree with conservative management at present she  appears to be improving. If she worsens then surgery would be indicated and there would be no cardiac contraindication.  Warfarin on hold in case surgery is necessary. Continue atenolol for rate control. Will follow.

## 2017-04-11 NOTE — PROGRESS NOTE ADULT - ASSESSMENT
Partial SBO. Today's X Ray shows no evidence of obstruction, with contrast in colon. Continue conservative management. Trial clears. Dr Agosto to continue care as I will be away remainder of week.

## 2017-04-11 NOTE — PROGRESS NOTE ADULT - ASSESSMENT
84 y/o F with PMHx Atrial fibrillation on coumadin, Mitral valve prolapse, mitral regurgitation, HTN, chronic venous insufficiency, chronic LE edema, ostoporosis, vertebral fractures, spinal stenosis,  kyphoplasty, Hyponatremia, uterine prolapse, vasovagal syncope, hypothyroidism, ESBL uti, anxiety, glaucoma, right inguinal hernia, left rotator cuff tear, monoclonal gammopathy, chronic anemia, who presented to ED with abd pain, nausea vomiting since saturday evening. In ED, underwent CT a/p which was concerning for closed loop small bowel obstruction.    1. SBO:  -seems to have improved  -on clears  -decrease ivf  -mx per surgical service.    2. Atrial fib/ flutter:  -continue atenolol  -coumadin on hold for now, inr supratherapeutic today    3. HTN:  -relatively stable.  -continue bb  -hold aldactone while on ivf    4. Chronic LE edema:  -hold aldactone while npo and on ivf    5. Hypothyroidism:  -continue synthroid    6. Anxiety:  -continue xanax    7. Vitamin D deficiency:  -continue supplementation    8. DVT px

## 2017-04-11 NOTE — PROGRESS NOTE ADULT - SUBJECTIVE AND OBJECTIVE BOX
Pt seen and examined. Tolerating clears. +flatus. No bm yet.     Review of system- All 10 systems reviewed and is as per HPI otherwise negative.           T(C): 36.8, Max: 37.1 (- @ 06:57)  HR: 67 (67 - 70)  BP: 152/64 (149/56 - 152/64)  RR: 17 (17 - 18)  SpO2: 98% (97% - 98%)      PHYSICAL EXAM:    GENERAL: Comfortable, no acute distress  HEAD:  Atraumatic, Normocephalic  EYES: EOMI, PERRLA  HEENT: Moist mucous membranes  NECK: Supple, No JVD  NERVOUS SYSTEM:  Alert & Oriented X3, Motor Strength 5/5 B/L upper and lower extremities  CHEST/LUNG: Clear to auscultation bilaterally  HEART: S1, S2+, Regular rate and rhythm;   ABDOMEN: Soft, distended, non tender, hypoactive bowel sounds  GENITOURINARY- Voiding, no palpable bladder  EXTREMITIES:  No clubbing, cyanosis, or edema  MUSCULOSKELTAL- No muscle tenderness, No joint tenderness  SKIN-no rash        LABS:                        12.7   5.2   )-----------( 108      ( 2017 08:50 )             37.2     04-11    139  |  109<H>  |  18  ----------------------------<  84  3.9   |  22  |  0.70    Ca    8.5      2017 08:50  Phos  2.7     04-11  Mg     2.1     04-11      PT/INR - ( 2017 08:50 )   PT: 40.9 sec;   INR: 3.69 ratio         PTT - ( 2017 08:50 )  PTT:39.3 sec  Urinalysis Basic - ( 2017 18:05 )    Color: Yellow / Appearance: Clear / S.005 / pH: x  Gluc: x / Ketone: Negative  / Bili: Negative / Urobili: Negative mg/dL   Blood: x / Protein: Negative mg/dL / Nitrite: Negative   Leuk Esterase: Trace / RBC: 0-2 /HPF / WBC 0-2   Sq Epi: x / Non Sq Epi: Few / Bacteria: Few          MEDS  sodium chloride 0.9%. 1000milliLiter(s) IV Continuous <Continuous>  ATENolol  Tablet 25milliGRAM(s) Oral daily  ALPRAZolam 0.125milliGRAM(s) Oral two times a day PRN  pantoprazole  Injectable 40milliGRAM(s) IV Push daily  cholecalciferol 2000Unit(s) Oral daily

## 2017-04-11 NOTE — PROGRESS NOTE ADULT - SUBJECTIVE AND OBJECTIVE BOX
CLAUDIO [id number]      sodium chloride 0.9%. 1000milliLiter(s) IV Continuous <Continuous>  ATENolol  Tablet 25milliGRAM(s) Oral daily  ALPRAZolam 0.125milliGRAM(s) Oral two times a day PRN  pantoprazole  Injectable 40milliGRAM(s) IV Push daily  cholecalciferol 2000Unit(s) Oral daily        Physical Exam  T(C): 37.1, Max: 37.1 (04-10 @ 17:06)  HR: 70 (64 - 70)  BP: 149/56 (147/69 - 149/56)  RR: 18 (18 - 18)  SpO2: 97% (95% - 97%)  Wt(kg): --  Constitutional  Feels well, passed some flatus  Lungs-  clear  Cor-RRR    Abd-distended but soft, +BS non tender      Ext-  no edema

## 2017-04-12 DIAGNOSIS — K56.60 UNSPECIFIED INTESTINAL OBSTRUCTION: ICD-10-CM

## 2017-04-12 LAB
ANION GAP SERPL CALC-SCNC: 8 MMOL/L — SIGNIFICANT CHANGE UP (ref 5–17)
BASOPHILS # BLD AUTO: 0.1 K/UL — SIGNIFICANT CHANGE UP (ref 0–0.2)
BUN SERPL-MCNC: 13 MG/DL — SIGNIFICANT CHANGE UP (ref 7–23)
CALCIUM SERPL-MCNC: 8 MG/DL — LOW (ref 8.5–10.1)
CHLORIDE SERPL-SCNC: 108 MMOL/L — SIGNIFICANT CHANGE UP (ref 96–108)
CO2 SERPL-SCNC: 24 MMOL/L — SIGNIFICANT CHANGE UP (ref 22–31)
CREAT SERPL-MCNC: 0.67 MG/DL — SIGNIFICANT CHANGE UP (ref 0.5–1.3)
EOSINOPHIL # BLD AUTO: 0 K/UL — SIGNIFICANT CHANGE UP (ref 0–0.5)
GLUCOSE SERPL-MCNC: 87 MG/DL — SIGNIFICANT CHANGE UP (ref 70–99)
HCT VFR BLD CALC: 34.6 % — SIGNIFICANT CHANGE UP (ref 34.5–45)
HGB BLD-MCNC: 11.4 G/DL — LOW (ref 11.5–15.5)
INR BLD: 4.19 RATIO — HIGH (ref 0.88–1.16)
LYMPHOCYTES # BLD AUTO: 0.5 K/UL — LOW (ref 1–3.3)
LYMPHOCYTES # BLD AUTO: 17 % — SIGNIFICANT CHANGE UP (ref 13–44)
MAGNESIUM SERPL-MCNC: 1.9 MG/DL — SIGNIFICANT CHANGE UP (ref 1.8–2.4)
MANUAL DIF COMMENT BLD-IMP: SIGNIFICANT CHANGE UP
MCHC RBC-ENTMCNC: 32.2 PG — SIGNIFICANT CHANGE UP (ref 27–34)
MCHC RBC-ENTMCNC: 32.9 GM/DL — SIGNIFICANT CHANGE UP (ref 32–36)
MCV RBC AUTO: 97.8 FL — SIGNIFICANT CHANGE UP (ref 80–100)
MONOCYTES # BLD AUTO: 1.3 K/UL — HIGH (ref 0–0.9)
MONOCYTES NFR BLD AUTO: 26 % — HIGH (ref 2–14)
NEUTROPHILS # BLD AUTO: 1.8 K/UL — SIGNIFICANT CHANGE UP (ref 1.8–7.4)
NEUTROPHILS NFR BLD AUTO: 57 % — SIGNIFICANT CHANGE UP (ref 43–77)
PHOSPHATE SERPL-MCNC: 1.9 MG/DL — LOW (ref 2.5–4.5)
PLAT MORPH BLD: NORMAL — SIGNIFICANT CHANGE UP
PLATELET # BLD AUTO: 91 K/UL — LOW (ref 150–400)
POTASSIUM SERPL-MCNC: 3.4 MMOL/L — LOW (ref 3.5–5.3)
POTASSIUM SERPL-SCNC: 3.4 MMOL/L — LOW (ref 3.5–5.3)
PROTHROM AB SERPL-ACNC: 46.6 SEC — HIGH (ref 9.8–12.7)
RBC # BLD: 3.54 M/UL — LOW (ref 3.8–5.2)
RBC # FLD: 12.1 % — SIGNIFICANT CHANGE UP (ref 10.3–14.5)
RBC BLD AUTO: NORMAL — SIGNIFICANT CHANGE UP
SODIUM SERPL-SCNC: 140 MMOL/L — SIGNIFICANT CHANGE UP (ref 135–145)
VIT D25+D1,25 OH+D1,25 PNL SERPL-MCNC: 68 PG/ML — SIGNIFICANT CHANGE UP (ref 19.9–79.3)
WBC # BLD: 3.7 K/UL — LOW (ref 3.8–10.5)
WBC # FLD AUTO: 3.7 K/UL — LOW (ref 3.8–10.5)

## 2017-04-12 RX ORDER — POTASSIUM PHOSPHATE, MONOBASIC POTASSIUM PHOSPHATE, DIBASIC 236; 224 MG/ML; MG/ML
15 INJECTION, SOLUTION INTRAVENOUS ONCE
Qty: 0 | Refills: 0 | Status: COMPLETED | OUTPATIENT
Start: 2017-04-12 | End: 2017-04-12

## 2017-04-12 RX ORDER — PANTOPRAZOLE SODIUM 20 MG/1
40 TABLET, DELAYED RELEASE ORAL
Qty: 0 | Refills: 0 | Status: DISCONTINUED | OUTPATIENT
Start: 2017-04-12 | End: 2017-04-13

## 2017-04-12 RX ORDER — POTASSIUM CHLORIDE 20 MEQ
20 PACKET (EA) ORAL ONCE
Qty: 0 | Refills: 0 | Status: COMPLETED | OUTPATIENT
Start: 2017-04-12 | End: 2017-04-12

## 2017-04-12 RX ORDER — ACETAMINOPHEN 500 MG
500 TABLET ORAL ONCE
Qty: 0 | Refills: 0 | Status: COMPLETED | OUTPATIENT
Start: 2017-04-12 | End: 2017-04-12

## 2017-04-12 RX ADMIN — Medication 2000 UNIT(S): at 12:57

## 2017-04-12 RX ADMIN — Medication 20 MILLIEQUIVALENT(S): at 18:03

## 2017-04-12 RX ADMIN — ATENOLOL 25 MILLIGRAM(S): 25 TABLET ORAL at 05:49

## 2017-04-12 RX ADMIN — POTASSIUM PHOSPHATE, MONOBASIC POTASSIUM PHOSPHATE, DIBASIC 62.5 MILLIMOLE(S): 236; 224 INJECTION, SOLUTION INTRAVENOUS at 18:04

## 2017-04-12 RX ADMIN — PANTOPRAZOLE SODIUM 40 MILLIGRAM(S): 20 TABLET, DELAYED RELEASE ORAL at 12:57

## 2017-04-12 RX ADMIN — Medication 0.12 MILLIGRAM(S): at 00:31

## 2017-04-12 RX ADMIN — Medication 0.12 MILLIGRAM(S): at 23:01

## 2017-04-12 RX ADMIN — SODIUM CHLORIDE 100 MILLILITER(S): 9 INJECTION INTRAMUSCULAR; INTRAVENOUS; SUBCUTANEOUS at 05:49

## 2017-04-12 RX ADMIN — Medication 500 MILLIGRAM(S): at 23:11

## 2017-04-12 NOTE — PROGRESS NOTE ADULT - ASSESSMENT
84 y/o F with PMHx Atrial fibrillation on coumadin, Mitral valve prolapse, mitral regurgitation, HTN, chronic venous insufficiency, chronic LE edema, ostoporosis, vertebral fractures, spinal stenosis,  kyphoplasty, Hyponatremia, uterine prolapse, vasovagal syncope, hypothyroidism, ESBL uti, anxiety, glaucoma, right inguinal hernia, left rotator cuff tear, monoclonal gammopathy, chronic anemia, who presented to ED with abd pain, nausea vomiting since saturday evening. In ED, underwent CT a/p which was concerning for closed loop small bowel obstruction.    1. SBO:  -improving  -diet being advanced  -ivf dc'd.  -mx per surgical service.    2. Atrial fib/ flutter:  -continue atenolol  -coumadin on hold for now, inr supratherapeutic today  -no s/s of bleeding    3. HTN:  -relatively stable.  -continue bb  -hold aldactone for today    4. Chronic LE edema:  -hold aldactone today    5. Hypothyroidism:  -continue synthroid    6. Anxiety:  -continue xanax    7. Vitamin D deficiency:  -continue supplementation    8. DVT px

## 2017-04-12 NOTE — PROGRESS NOTE ADULT - SUBJECTIVE AND OBJECTIVE BOX
The patient is doing well without complaints.  Tolerating clears    Vital Signs Last 24 Hrs  T(C): 36.3, Max: 36.8 (04-11 @ 17:50)  T(F): 97.4, Max: 98.2 (04-11 @ 17:50)  HR: 63 (63 - 67)  BP: 156/73 (152/64 - 156/73)  BP(mean): --  RR: 17 (17 - 17)  SpO2: 98% (98% - 98%)    PHYSICAL EXAM:  General: NAD.  HEENT: no JVD, no jaundice.  LUNGS: CTAB.  Heart: S1 S2 RRR  Abd: soft nt/nd   Wounds: clean dry and intact                          11.4   3.7   )-----------( 91       ( 12 Apr 2017 06:37 )             34.6       04-12    140  |  108  |  13  ----------------------------<  87  3.4<L>   |  24  |  0.67    Ca    8.0<L>      12 Apr 2017 06:37  Phos  1.9     04-12  Mg     1.9     04-12        PT/INR - ( 12 Apr 2017 06:37 )   PT: 46.6 sec;   INR: 4.19 ratio         PTT - ( 11 Apr 2017 08:50 )  PTT:39.3 sec

## 2017-04-12 NOTE — PROGRESS NOTE ADULT - SUBJECTIVE AND OBJECTIVE BOX
Patient is a 85y old  Female who presents with a chief complaint of abdominal pain (09 Apr 2017 22:35)      Followup HPI:   4/11 the patient did pass gas and the abdominal pain has resolved and the abdominal xray does not show SBO.    4/12- Tolerating liquid diet. Had small bowel movement this morning. No abdominal pain.    PAST MEDICAL & SURGICAL HISTORY:  [place] (hard of hearing), bilateral  Vaginal prolapse  Spondyloarthritis  Spinal stenosis  Essential hypertension  Acute cystitis without hematuria: septic  4/2016  Paroxysmal atrial fibrillation  Monoclonal gammopathies  Venous insufficiency  Lymphedema of both lower extremities  History of vaginal surgery  [place] (hard of hearing)  S/P thyroidectomy: partial   1975  S/P kyphoplasty: 2014      Review of symptoms:  Negative except for as noted in today's HPI.      MEDICATIONS  (STANDING):  ATENolol  Tablet 25milliGRAM(s) Oral daily  pantoprazole  Injectable 40milliGRAM(s) IV Push daily  cholecalciferol 2000Unit(s) Oral daily  sodium chloride 0.9%. 1000milliLiter(s) IV Continuous <Continuous>    MEDICATIONS  (PRN):  ALPRAZolam 0.125milliGRAM(s) Oral two times a day PRN anxiety          Vital Signs Last 24 Hrs  T(C): 36.3, Max: 36.8 (04-11 @ 17:50)  T(F): 97.4, Max: 98.2 (04-11 @ 17:50)  HR: 63 (63 - 67)  BP: 156/73 (152/64 - 156/73)  BP(mean): --  RR: 17 (17 - 17)  SpO2: 98% (98% - 98%)    I&O's Summary    I & Os for current day (as of 12 Apr 2017 09:30)  =============================================  IN: 253 ml / OUT: 1 ml / NET: 252 ml      PHYSICAL EXAM  General Appearance:comfortable  HEENT:   Neck:   Lungs: clear  Heart: 2/6 FARZANEH LSB, 1/6 diastolic blow LSB  Abdomen: soft and nontender  Extremities: trace edema both legs  Neurologic: normal      INTERPRETATION OF TELEMETRY:    ECG:    LABS:                          11.4   3.7   )-----------( x        ( 12 Apr 2017 06:37 )             34.6     04-12    140  |  108  |  13  ----------------------------<  87  3.4<L>   |  24  |  0.67    Ca    8.0<L>      12 Apr 2017 06:37  Phos  1.9     04-12  Mg     1.9     04-12              PT/INR - ( 12 Apr 2017 06:37 )   PT: 46.6 sec;   INR: 4.19 ratio         PTT - ( 11 Apr 2017 08:50 )  PTT:39.3 sec          RADIOLOGY & ADDITIONAL STUDIES:    IMPRESSION:  1.SBO. Clinically improved.  2.Atrial fibrillation, persistent. Stable. Over anticoagulated due to nutritional depletion from NPO status.  3.Hypertension. Stable.  4.Moderate MR and TR and mild AR. Stable.   5.Chronic thrombocytopenia.    PLAN:  Agree with conservative management at present she  appears to be improving. If she worsens then surgery would be indicated and there would be no cardiac contraindication.  Warfarin on hold , daily INR is being monitored.  Continue atenolol for rate control. Will follow.

## 2017-04-12 NOTE — PHYSICAL THERAPY INITIAL EVALUATION ADULT - PERTINENT HX OF CURRENT PROBLEM, REHAB EVAL
Pt admitted to  secondary to abd pain. CT abd/pelvis: findings concerning for closed loop bowel obstruction.

## 2017-04-12 NOTE — PROGRESS NOTE ADULT - SUBJECTIVE AND OBJECTIVE BOX
Pt seen and examined earlier today. Was doing better. Tolerating clears. Was about to start advanced diet as she had bm overnight.   No abdominal pain, n/v.     Review of system- All 10 systems reviewed and is as per HPI otherwise negative.           T(C): 36.3, Max: 36.8 (04-11 @ 17:50)  HR: 63 (63 - 67)  BP: 156/73 (152/64 - 156/73)  RR: 17 (17 - 17)  SpO2: 98% (98% - 98%)    PHYSICAL EXAM:    GENERAL: Comfortable, no acute distress  HEAD:  Atraumatic, Normocephalic  EYES: EOMI, PERRLA  HEENT: Moist mucous membranes  NECK: Supple, No JVD  NERVOUS SYSTEM:  Alert & Oriented X3, Motor Strength 5/5 B/L upper and lower extremities  CHEST/LUNG: Clear to auscultation bilaterally  HEART: S1, S2+, Regular rate and rhythm  ABDOMEN: Soft, mildly distended, bs+  GENITOURINARY- Voiding, no palpable bladder  EXTREMITIES:  b/l lower extremity edema  MUSCULOSKELTAL- No muscle tenderness, No joint tenderness  SKIN-no rash        LABS:                        11.4   3.7   )-----------( 91       ( 12 Apr 2017 06:37 )             34.6     04-12    140  |  108  |  13  ----------------------------<  87  3.4<L>   |  24  |  0.67    Ca    8.0<L>      12 Apr 2017 06:37  Phos  1.9     04-12  Mg     1.9     04-12      PT/INR - ( 12 Apr 2017 06:37 )   PT: 46.6 sec;   INR: 4.19 ratio         PTT - ( 11 Apr 2017 08:50 )  PTT:39.3 sec        MEDS  ATENolol  Tablet 25milliGRAM(s) Oral daily  ALPRAZolam 0.125milliGRAM(s) Oral two times a day PRN  pantoprazole  Injectable 40milliGRAM(s) IV Push daily  cholecalciferol 2000Unit(s) Oral daily  potassium phosphate IVPB 15milliMole(s) IV Intermittent once  potassium chloride    Tablet ER 20milliEquivalent(s) Oral once

## 2017-04-13 ENCOUNTER — TRANSCRIPTION ENCOUNTER (OUTPATIENT)
Age: 82
End: 2017-04-13

## 2017-04-13 VITALS
OXYGEN SATURATION: 99 % | HEART RATE: 52 BPM | DIASTOLIC BLOOD PRESSURE: 67 MMHG | SYSTOLIC BLOOD PRESSURE: 150 MMHG | TEMPERATURE: 99 F | RESPIRATION RATE: 16 BRPM

## 2017-04-13 LAB
ANION GAP SERPL CALC-SCNC: 9 MMOL/L — SIGNIFICANT CHANGE UP (ref 5–17)
BUN SERPL-MCNC: 8 MG/DL — SIGNIFICANT CHANGE UP (ref 7–23)
CALCIUM SERPL-MCNC: 8.5 MG/DL — SIGNIFICANT CHANGE UP (ref 8.5–10.1)
CHLORIDE SERPL-SCNC: 107 MMOL/L — SIGNIFICANT CHANGE UP (ref 96–108)
CO2 SERPL-SCNC: 25 MMOL/L — SIGNIFICANT CHANGE UP (ref 22–31)
CREAT SERPL-MCNC: 0.64 MG/DL — SIGNIFICANT CHANGE UP (ref 0.5–1.3)
GLUCOSE SERPL-MCNC: 84 MG/DL — SIGNIFICANT CHANGE UP (ref 70–99)
INR BLD: 2.37 RATIO — HIGH (ref 0.88–1.16)
MAGNESIUM SERPL-MCNC: 1.9 MG/DL — SIGNIFICANT CHANGE UP (ref 1.8–2.4)
PHOSPHATE SERPL-MCNC: 2.5 MG/DL — SIGNIFICANT CHANGE UP (ref 2.5–4.5)
POTASSIUM SERPL-MCNC: 3.5 MMOL/L — SIGNIFICANT CHANGE UP (ref 3.5–5.3)
POTASSIUM SERPL-SCNC: 3.5 MMOL/L — SIGNIFICANT CHANGE UP (ref 3.5–5.3)
PROTHROM AB SERPL-ACNC: 26 SEC — HIGH (ref 9.8–12.7)
SODIUM SERPL-SCNC: 141 MMOL/L — SIGNIFICANT CHANGE UP (ref 135–145)

## 2017-04-13 RX ORDER — WARFARIN SODIUM 2.5 MG/1
3 TABLET ORAL
Qty: 0 | Refills: 0 | COMMUNITY

## 2017-04-13 RX ORDER — WARFARIN SODIUM 2.5 MG/1
4 TABLET ORAL
Qty: 0 | Refills: 0 | COMMUNITY

## 2017-04-13 RX ADMIN — ATENOLOL 25 MILLIGRAM(S): 25 TABLET ORAL at 06:09

## 2017-04-13 RX ADMIN — PANTOPRAZOLE SODIUM 40 MILLIGRAM(S): 20 TABLET, DELAYED RELEASE ORAL at 06:09

## 2017-04-13 RX ADMIN — Medication 2000 UNIT(S): at 11:53

## 2017-04-13 NOTE — DISCHARGE NOTE ADULT - CARE PLAN
Principal Discharge DX:	Intestinal obstruction, unspecified type  Goal:	recover from SBO  Instructions for follow-up, activity and diet:	follow up with primary care physician, and urogynocologist

## 2017-04-13 NOTE — DISCHARGE NOTE ADULT - ABILITY TO HEAR (WITH HEARING AID OR HEARING APPLIANCE IF NORMALLY USED):
Sammy hearing aids/Mildly to Moderately Impaired: difficulty hearing in some environments or speaker may need to increase volume or speak distinctly

## 2017-04-13 NOTE — PROGRESS NOTE ADULT - SUBJECTIVE AND OBJECTIVE BOX
The patient is doing well without complaints.  Tolerating regular diet.  Having BMs    Vital Signs Last 24 Hrs  T(C): 37.2, Max: 37.2 (04-13 @ 12:26)  T(F): 98.9, Max: 98.9 (04-13 @ 12:26)  HR: 52 (52 - 69)  BP: 150/67 (150/67 - 169/75)  BP(mean): --  RR: 16 (16 - 18)  SpO2: 99% (99% - 100%)    PHYSICAL EXAM:  General: NAD.  HEENT: no JVD, no jaundice.  LUNGS: CTAB.  Heart: S1 S2 RRR  Abd: soft nt/nd   Wounds: clean dry and intact                          11.4   3.7   )-----------( 91       ( 12 Apr 2017 06:37 )             34.6       04-13    141  |  107  |  8   ----------------------------<  84  3.5   |  25  |  0.64    Ca    8.5      13 Apr 2017 06:37  Phos  2.5     04-13  Mg     1.9     04-13        PT/INR - ( 13 Apr 2017 06:37 )   PT: 26.0 sec;   INR: 2.37 ratio

## 2017-04-13 NOTE — PROGRESS NOTE ADULT - SUBJECTIVE AND OBJECTIVE BOX
Patient is a 85y old  Female who presents with a chief complaint of abdominal pain (09 Apr 2017 22:35)      Followup HPI:   4/11 the patient did pass gas and the abdominal pain has resolved and the abdominal xray does not show SBO.    4/12- Tolerating liquid diet. Had small bowel movement this morning. No abdominal pain.  4/13- No complaints. Tolerating solid foods. Having normal bowel movements.    PAST MEDICAL & SURGICAL HISTORY:  [place] (hard of hearing), bilateral  Vaginal prolapse  Spondyloarthritis  Spinal stenosis  Essential hypertension  Acute cystitis without hematuria: septic  4/2016  Paroxysmal atrial fibrillation  Monoclonal gammopathies  Venous insufficiency  Lymphedema of both lower extremities  History of vaginal surgery  [place] (hard of hearing)  S/P thyroidectomy: partial   1975  S/P kyphoplasty: 2014      Review of symptoms:  Negative except for as noted in today's HPI.      MEDICATIONS  (STANDING):  ATENolol  Tablet 25milliGRAM(s) Oral daily  cholecalciferol 2000Unit(s) Oral daily  pantoprazole    Tablet 40milliGRAM(s) Oral before breakfast    MEDICATIONS  (PRN):  ALPRAZolam 0.125milliGRAM(s) Oral two times a day PRN anxiety          Vital Signs Last 24 Hrs  T(C): 36.7, Max: 36.7 (04-12 @ 22:27)  T(F): 98.1, Max: 98.1 (04-12 @ 22:27)  HR: 63 (63 - 69)  BP: 157/63 (157/63 - 169/75)  BP(mean): --  RR: 16 (16 - 18)  SpO2: 100% (100% - 100%)    I&O's Summary    I & Os for current day (as of 13 Apr 2017 09:43)  =============================================  IN: 722 ml / OUT: 0 ml / NET: 722 ml      PHYSICAL EXAM  General Appearance:  HEENT:   Neck:   Lungs: clear  Heart: 2/6 FARZANEH and 1/6 diastolic blow LSB  Abdomen: soft and nontender  Extremities: trace edema both legs  Neurologic: normal      INTERPRETATION OF TELEMETRY:    ECG:    LABS:                          11.4   3.7   )-----------( 91       ( 12 Apr 2017 06:37 )             34.6     04-13    141  |  107  |  8   ----------------------------<  84  3.5   |  25  |  0.64    Ca    8.5      13 Apr 2017 06:37  Phos  2.5     04-13  Mg     1.9     04-13              PT/INR - ( 13 Apr 2017 06:37 )   PT: 26.0 sec;   INR: 2.37 ratio                   RADIOLOGY & ADDITIONAL STUDIES:    IMPRESSION:  1.SBO. Clinically resolved.   2.Atrial fibrillation, persistent. Stable. INR now therapeutic.  3.Hypertension. Stable.  4.Moderate MR and TR and mild AR. Stable.   5.Chronic thrombocytopenia.    PLAN: Agree with discharge    Continue atenolol for rate control. Suggest resume warfarin at 1 mg qd and spironolactone 25 mg every Monday, Wed and Friday at discharge. I will followup INR as an outpatient in 1 week.

## 2017-04-13 NOTE — DISCHARGE NOTE ADULT - MEDICATION SUMMARY - MEDICATIONS TO TAKE
I will START or STAY ON the medications listed below when I get home from the hospital:    spironolactone 25 mg oral tablet  -- 1 tab(s) by mouth once a day  -- Indication: For INTESTINAL OBSTRUCTION    acetaminophen 500 mg oral tablet  -- 2 tab(s) by mouth 2 times a day, As Needed -Mild Pain (1 - 3)  -- Indication: For INTESTINAL OBSTRUCTION    calcium carbonate 500 mg (200 mg elemental calcium) oral tablet, chewable  -- 1 tab(s) by mouth 2 times a day (with meals)  -- Indication: For INTESTINAL OBSTRUCTION    Xanax 0.25 mg oral tablet  -- 1 tab(s) by mouth 3 times a day, As Needed  -- Indication: For INTESTINAL OBSTRUCTION    atenolol 25 mg oral tablet  -- 1 tab(s) by mouth once a day  -- Indication: For INTESTINAL OBSTRUCTION    docusate sodium 100 mg oral capsule  -- 1 cap(s) by mouth once a day (at bedtime), As Needed  -- Indication: For INTESTINAL OBSTRUCTION    Bacid (LAC) oral tablet  -- 1 tab(s) by mouth once a day  -- Indication: For INTESTINAL OBSTRUCTION    PriLOSEC 20 mg oral delayed release capsule  -- 1 cap(s) by mouth once a day, As Needed  -- for indigestion  -- Indication: For INTESTINAL OBSTRUCTION    Estrace Vaginal 0.1 mg/g vaginal cream  -- Apply 2 to 3 times per week  -- Indication: For INTESTINAL OBSTRUCTION    multivitamin  -- 1 tab(s) by mouth once a day  -- Indication: For INTESTINAL OBSTRUCTION    Vitamin D3 2000 intl units oral tablet  -- 1 tab(s) by mouth once a day  -- Indication: For INTESTINAL OBSTRUCTION

## 2017-04-13 NOTE — DISCHARGE NOTE ADULT - PATIENT PORTAL LINK FT
“You can access the [facility] Patient Portal, offered by [facility], by registering with the following website: [email or web address]”

## 2017-04-18 DIAGNOSIS — I10 ESSENTIAL (PRIMARY) HYPERTENSION: ICD-10-CM

## 2017-04-18 DIAGNOSIS — I48.91 UNSPECIFIED ATRIAL FIBRILLATION: ICD-10-CM

## 2017-04-18 DIAGNOSIS — D69.6 THROMBOCYTOPENIA, UNSPECIFIED: ICD-10-CM

## 2017-04-18 DIAGNOSIS — K56.60 UNSPECIFIED INTESTINAL OBSTRUCTION: ICD-10-CM

## 2017-04-18 DIAGNOSIS — R10.9 UNSPECIFIED ABDOMINAL PAIN: ICD-10-CM

## 2017-04-18 DIAGNOSIS — F41.9 ANXIETY DISORDER, UNSPECIFIED: ICD-10-CM

## 2017-04-18 DIAGNOSIS — I87.2 VENOUS INSUFFICIENCY (CHRONIC) (PERIPHERAL): ICD-10-CM

## 2017-04-18 DIAGNOSIS — E55.9 VITAMIN D DEFICIENCY, UNSPECIFIED: ICD-10-CM

## 2017-04-18 DIAGNOSIS — E03.9 HYPOTHYROIDISM, UNSPECIFIED: ICD-10-CM

## 2017-04-18 DIAGNOSIS — I34.0 NONRHEUMATIC MITRAL (VALVE) INSUFFICIENCY: ICD-10-CM

## 2017-04-19 DIAGNOSIS — I48.1 PERSISTENT ATRIAL FIBRILLATION: ICD-10-CM

## 2017-05-01 ENCOUNTER — APPOINTMENT (OUTPATIENT)
Dept: OBGYN | Facility: CLINIC | Age: 82
End: 2017-05-01

## 2017-05-10 ENCOUNTER — APPOINTMENT (OUTPATIENT)
Dept: UROGYNECOLOGY | Facility: CLINIC | Age: 82
End: 2017-05-10

## 2017-05-10 DIAGNOSIS — Z87.42 PERSONAL HISTORY OF OTHER DISEASES OF THE FEMALE GENITAL TRACT: ICD-10-CM

## 2017-05-10 DIAGNOSIS — Z87.19 PERSONAL HISTORY OF OTHER DISEASES OF THE DIGESTIVE SYSTEM: ICD-10-CM

## 2017-05-10 DIAGNOSIS — Z98.890 OTHER SPECIFIED POSTPROCEDURAL STATES: ICD-10-CM

## 2017-05-10 DIAGNOSIS — Z87.440 PERSONAL HISTORY OF URINARY (TRACT) INFECTIONS: ICD-10-CM

## 2017-05-10 DIAGNOSIS — N81.10 CYSTOCELE, UNSPECIFIED: ICD-10-CM

## 2017-05-10 DIAGNOSIS — N89.8 OTHER SPECIFIED NONINFLAMMATORY DISORDERS OF VAGINA: ICD-10-CM

## 2017-05-10 DIAGNOSIS — N36.8 OTHER SPECIFIED DISORDERS OF URETHRA: ICD-10-CM

## 2017-05-10 DIAGNOSIS — R30.0 DYSURIA: ICD-10-CM

## 2017-05-10 DIAGNOSIS — N81.9 FEMALE GENITAL PROLAPSE, UNSPECIFIED: ICD-10-CM

## 2017-05-10 DIAGNOSIS — R39.198 OTHER DIFFICULTIES WITH MICTURITION: ICD-10-CM

## 2017-05-10 DIAGNOSIS — B37.3 CANDIDIASIS OF VULVA AND VAGINA: ICD-10-CM

## 2017-05-10 DIAGNOSIS — Z12.4 ENCOUNTER FOR SCREENING FOR MALIGNANT NEOPLASM OF CERVIX: ICD-10-CM

## 2017-05-10 DIAGNOSIS — N81.4 UTEROVAGINAL PROLAPSE, UNSPECIFIED: ICD-10-CM

## 2017-05-10 DIAGNOSIS — T83.9XXA UNSPECIFIED COMPLICATION OF GENITOURINARY PROSTHETIC DEVICE, IMPLANT AND GRAFT, INITIAL ENCOUNTER: ICD-10-CM

## 2017-05-26 ENCOUNTER — APPOINTMENT (OUTPATIENT)
Dept: ENDOCRINOLOGY | Facility: CLINIC | Age: 82
End: 2017-05-26

## 2017-05-30 ENCOUNTER — APPOINTMENT (OUTPATIENT)
Dept: UROLOGY | Facility: CLINIC | Age: 82
End: 2017-05-30

## 2017-06-02 LAB
APPEARANCE: CLEAR
BACTERIA UR CULT: NORMAL
BACTERIA: NEGATIVE
BILIRUBIN URINE: NEGATIVE
BLOOD URINE: NEGATIVE
COLOR: YELLOW
CORE LAB FLUID CYTOLOGY: NORMAL
GLUCOSE QUALITATIVE U: NORMAL MG/DL
KETONES URINE: NEGATIVE
LEUKOCYTE ESTERASE URINE: NEGATIVE
MICROSCOPIC-UA: NORMAL
NITRITE URINE: NEGATIVE
PH URINE: 6.5
PROTEIN URINE: NEGATIVE MG/DL
RED BLOOD CELLS URINE: 2 /HPF
SPECIFIC GRAVITY URINE: 1.01
SQUAMOUS EPITHELIAL CELLS: 0 /HPF
UROBILINOGEN URINE: NORMAL MG/DL
WHITE BLOOD CELLS URINE: 1 /HPF

## 2017-08-14 ENCOUNTER — APPOINTMENT (OUTPATIENT)
Dept: UROLOGY | Facility: CLINIC | Age: 82
End: 2017-08-14

## 2017-08-14 ENCOUNTER — APPOINTMENT (OUTPATIENT)
Dept: OBGYN | Facility: CLINIC | Age: 82
End: 2017-08-14
Payer: MEDICARE

## 2017-08-14 VITALS
HEIGHT: 65 IN | DIASTOLIC BLOOD PRESSURE: 77 MMHG | HEART RATE: 62 BPM | WEIGHT: 128 LBS | BODY MASS INDEX: 21.33 KG/M2 | SYSTOLIC BLOOD PRESSURE: 161 MMHG

## 2017-08-14 DIAGNOSIS — Z85.6 PERSONAL HISTORY OF LEUKEMIA: ICD-10-CM

## 2017-08-14 DIAGNOSIS — N36.2 URETHRAL CARUNCLE: ICD-10-CM

## 2017-08-14 PROCEDURE — 99213 OFFICE O/P EST LOW 20 MIN: CPT

## 2017-08-16 LAB — BACTERIA UR CULT: NORMAL

## 2017-08-17 ENCOUNTER — OTHER (OUTPATIENT)
Age: 82
End: 2017-08-17

## 2017-08-17 LAB — CORE LAB FLUID CYTOLOGY: NORMAL

## 2017-08-30 ENCOUNTER — APPOINTMENT (OUTPATIENT)
Dept: UROLOGY | Facility: CLINIC | Age: 82
End: 2017-08-30
Payer: MEDICARE

## 2017-08-30 VITALS
HEART RATE: 67 BPM | SYSTOLIC BLOOD PRESSURE: 169 MMHG | DIASTOLIC BLOOD PRESSURE: 79 MMHG | TEMPERATURE: 97.7 F | RESPIRATION RATE: 16 BRPM

## 2017-08-30 PROCEDURE — 99214 OFFICE O/P EST MOD 30 MIN: CPT

## 2017-09-01 ENCOUNTER — APPOINTMENT (OUTPATIENT)
Dept: OBGYN | Facility: CLINIC | Age: 82
End: 2017-09-01

## 2017-09-03 LAB
APPEARANCE: CLEAR
BACTERIA UR CULT: NORMAL
BACTERIA: NEGATIVE
BILIRUBIN URINE: NEGATIVE
BLOOD URINE: NEGATIVE
COLOR: YELLOW
GLUCOSE QUALITATIVE U: NORMAL MG/DL
KETONES URINE: NEGATIVE
LEUKOCYTE ESTERASE URINE: NEGATIVE
MICROSCOPIC-UA: NORMAL
NITRITE URINE: NEGATIVE
PH URINE: 7
PROTEIN URINE: NEGATIVE MG/DL
RED BLOOD CELLS URINE: 2 /HPF
SPECIFIC GRAVITY URINE: 1.01
SQUAMOUS EPITHELIAL CELLS: 0 /HPF
UROBILINOGEN URINE: NORMAL MG/DL
WHITE BLOOD CELLS URINE: 0 /HPF

## 2017-09-05 LAB — CORE LAB FLUID CYTOLOGY: NORMAL

## 2017-12-06 ENCOUNTER — APPOINTMENT (OUTPATIENT)
Dept: UROLOGY | Facility: CLINIC | Age: 82
End: 2017-12-06
Payer: MEDICARE

## 2017-12-06 PROCEDURE — 99214 OFFICE O/P EST MOD 30 MIN: CPT

## 2017-12-07 LAB
APPEARANCE: ABNORMAL
BACTERIA: ABNORMAL
BILIRUBIN URINE: NEGATIVE
BLOOD URINE: NEGATIVE
COLOR: YELLOW
GLUCOSE QUALITATIVE U: NEGATIVE MG/DL
HYALINE CASTS: 0 /LPF
KETONES URINE: NEGATIVE
LEUKOCYTE ESTERASE URINE: ABNORMAL
MICROSCOPIC-UA: NORMAL
NITRITE URINE: POSITIVE
PH URINE: 7.5
PROTEIN URINE: ABNORMAL MG/DL
RED BLOOD CELLS URINE: 1 /HPF
SPECIFIC GRAVITY URINE: 1.02
SQUAMOUS EPITHELIAL CELLS: 4 /HPF
UROBILINOGEN URINE: NEGATIVE MG/DL
WHITE BLOOD CELLS URINE: 9 /HPF

## 2017-12-08 LAB — CORE LAB FLUID CYTOLOGY: NORMAL

## 2017-12-09 ENCOUNTER — EMERGENCY (EMERGENCY)
Facility: HOSPITAL | Age: 82
LOS: 1 days | Discharge: HOME | End: 2017-12-09
Attending: EMERGENCY MEDICINE | Admitting: EMERGENCY MEDICINE
Payer: MEDICARE

## 2017-12-09 VITALS
HEIGHT: 64 IN | OXYGEN SATURATION: 95 % | HEART RATE: 74 BPM | SYSTOLIC BLOOD PRESSURE: 183 MMHG | DIASTOLIC BLOOD PRESSURE: 105 MMHG | TEMPERATURE: 98 F | WEIGHT: 128.09 LBS | RESPIRATION RATE: 16 BRPM

## 2017-12-09 DIAGNOSIS — E89.0 POSTPROCEDURAL HYPOTHYROIDISM: Chronic | ICD-10-CM

## 2017-12-09 DIAGNOSIS — Z98.89 OTHER SPECIFIED POSTPROCEDURAL STATES: Chronic | ICD-10-CM

## 2017-12-09 PROCEDURE — 99284 EMERGENCY DEPT VISIT MOD MDM: CPT

## 2017-12-09 NOTE — ED ADULT NURSE NOTE - PMH
Acute cystitis without hematuria  septic  4/2016  Essential hypertension    [place] (hard of hearing), bilateral    Lymphedema of both lower extremities    Monoclonal gammopathies    Paroxysmal atrial fibrillation    Spinal stenosis    Spondyloarthritis    Vaginal prolapse    Venous insufficiency

## 2017-12-09 NOTE — ED ADULT NURSE NOTE - OBJECTIVE STATEMENT
mechanical fall at home, missed the chair trying to sit back into it and fell to floor onto buttocks. did not hit head. c/o back and bilateral leg pain.

## 2017-12-09 NOTE — ED ADULT TRIAGE NOTE - CHIEF COMPLAINT QUOTE
patient missed the chair and fell to the ground, c/o pain to both legs, and lower back. denies hitting head

## 2017-12-10 ENCOUNTER — INPATIENT (INPATIENT)
Facility: HOSPITAL | Age: 82
LOS: 4 days | Discharge: NURSING, REHAB OR LONG-TERM CARE | End: 2017-12-15
Payer: MEDICARE

## 2017-12-10 VITALS
HEART RATE: 55 BPM | RESPIRATION RATE: 18 BRPM | SYSTOLIC BLOOD PRESSURE: 162 MMHG | DIASTOLIC BLOOD PRESSURE: 64 MMHG | WEIGHT: 160.06 LBS | OXYGEN SATURATION: 98 % | HEIGHT: 68 IN

## 2017-12-10 DIAGNOSIS — E89.0 POSTPROCEDURAL HYPOTHYROIDISM: Chronic | ICD-10-CM

## 2017-12-10 DIAGNOSIS — Z98.89 OTHER SPECIFIED POSTPROCEDURAL STATES: Chronic | ICD-10-CM

## 2017-12-10 LAB
ALBUMIN SERPL ELPH-MCNC: 3.9 G/DL — SIGNIFICANT CHANGE UP (ref 3.3–5)
ALP SERPL-CCNC: 64 U/L — SIGNIFICANT CHANGE UP (ref 40–120)
ALT FLD-CCNC: 30 U/L — SIGNIFICANT CHANGE UP (ref 12–78)
ANION GAP SERPL CALC-SCNC: 8 MMOL/L — SIGNIFICANT CHANGE UP (ref 5–17)
APTT BLD: 30.1 SEC — SIGNIFICANT CHANGE UP (ref 27.5–37.4)
AST SERPL-CCNC: 48 U/L — HIGH (ref 15–37)
BASOPHILS # BLD AUTO: 0 K/UL — SIGNIFICANT CHANGE UP (ref 0–0.2)
BILIRUB SERPL-MCNC: 0.7 MG/DL — SIGNIFICANT CHANGE UP (ref 0.2–1.2)
BUN SERPL-MCNC: 20 MG/DL — SIGNIFICANT CHANGE UP (ref 7–23)
CALCIUM SERPL-MCNC: 9.2 MG/DL — SIGNIFICANT CHANGE UP (ref 8.5–10.1)
CHLORIDE SERPL-SCNC: 102 MMOL/L — SIGNIFICANT CHANGE UP (ref 96–108)
CO2 SERPL-SCNC: 27 MMOL/L — SIGNIFICANT CHANGE UP (ref 22–31)
CREAT SERPL-MCNC: 1.03 MG/DL — SIGNIFICANT CHANGE UP (ref 0.5–1.3)
EOSINOPHIL # BLD AUTO: 0 K/UL — SIGNIFICANT CHANGE UP (ref 0–0.5)
GLUCOSE SERPL-MCNC: 153 MG/DL — HIGH (ref 70–99)
HCT VFR BLD CALC: 37.3 % — SIGNIFICANT CHANGE UP (ref 34.5–45)
HGB BLD-MCNC: 12.2 G/DL — SIGNIFICANT CHANGE UP (ref 11.5–15.5)
INR BLD: 2.61 RATIO — HIGH (ref 0.88–1.16)
LYMPHOCYTES # BLD AUTO: 0.5 K/UL — LOW (ref 1–3.3)
LYMPHOCYTES # BLD AUTO: 8 % — LOW (ref 13–44)
MCHC RBC-ENTMCNC: 31.4 PG — SIGNIFICANT CHANGE UP (ref 27–34)
MCHC RBC-ENTMCNC: 32.7 GM/DL — SIGNIFICANT CHANGE UP (ref 32–36)
MCV RBC AUTO: 96 FL — SIGNIFICANT CHANGE UP (ref 80–100)
MONOCYTES # BLD AUTO: 2.1 K/UL — HIGH (ref 0–0.9)
MONOCYTES NFR BLD AUTO: 29 % — HIGH (ref 2–14)
NEUTROPHILS # BLD AUTO: 5.8 K/UL — SIGNIFICANT CHANGE UP (ref 1.8–7.4)
NEUTROPHILS NFR BLD AUTO: 63 % — SIGNIFICANT CHANGE UP (ref 43–77)
NT-PROBNP SERPL-SCNC: 6510 PG/ML — HIGH (ref 0–450)
PLATELET # BLD AUTO: 77 K/UL — LOW (ref 150–400)
POTASSIUM SERPL-MCNC: 4.4 MMOL/L — SIGNIFICANT CHANGE UP (ref 3.5–5.3)
POTASSIUM SERPL-SCNC: 4.4 MMOL/L — SIGNIFICANT CHANGE UP (ref 3.5–5.3)
PROT SERPL-MCNC: 7.3 GM/DL — SIGNIFICANT CHANGE UP (ref 6–8.3)
PROTHROM AB SERPL-ACNC: 28.8 SEC — HIGH (ref 9.8–12.7)
RBC # BLD: 3.88 M/UL — SIGNIFICANT CHANGE UP (ref 3.8–5.2)
RBC # FLD: 12.6 % — SIGNIFICANT CHANGE UP (ref 10.3–14.5)
SODIUM SERPL-SCNC: 137 MMOL/L — SIGNIFICANT CHANGE UP (ref 135–145)
TROPONIN I SERPL-MCNC: <0.015 NG/ML — SIGNIFICANT CHANGE UP (ref 0.01–0.04)
WBC # BLD: 8.4 K/UL — SIGNIFICANT CHANGE UP (ref 3.8–10.5)
WBC # FLD AUTO: 8.4 K/UL — SIGNIFICANT CHANGE UP (ref 3.8–10.5)

## 2017-12-10 PROCEDURE — 73130 X-RAY EXAM OF HAND: CPT | Mod: 26,LT

## 2017-12-10 PROCEDURE — 72125 CT NECK SPINE W/O DYE: CPT | Mod: 26

## 2017-12-10 PROCEDURE — 99285 EMERGENCY DEPT VISIT HI MDM: CPT

## 2017-12-10 PROCEDURE — 72220 X-RAY EXAM SACRUM TAILBONE: CPT | Mod: 26

## 2017-12-10 PROCEDURE — 73562 X-RAY EXAM OF KNEE 3: CPT | Mod: 26,50

## 2017-12-10 PROCEDURE — 73562 X-RAY EXAM OF KNEE 3: CPT

## 2017-12-10 PROCEDURE — 72220 X-RAY EXAM SACRUM TAILBONE: CPT

## 2017-12-10 PROCEDURE — 99284 EMERGENCY DEPT VISIT MOD MDM: CPT | Mod: 25

## 2017-12-10 PROCEDURE — 73562 X-RAY EXAM OF KNEE 3: CPT | Mod: 26,77,LT

## 2017-12-10 PROCEDURE — 73502 X-RAY EXAM HIP UNI 2-3 VIEWS: CPT | Mod: 26,LT

## 2017-12-10 PROCEDURE — 74176 CT ABD & PELVIS W/O CONTRAST: CPT | Mod: 26

## 2017-12-10 PROCEDURE — 73502 X-RAY EXAM HIP UNI 2-3 VIEWS: CPT

## 2017-12-10 PROCEDURE — 70450 CT HEAD/BRAIN W/O DYE: CPT | Mod: 26

## 2017-12-10 PROCEDURE — 71250 CT THORAX DX C-: CPT | Mod: 26

## 2017-12-10 RX ORDER — CALCIUM CARBONATE 500(1250)
1 TABLET ORAL
Qty: 0 | Refills: 0 | Status: DISCONTINUED | OUTPATIENT
Start: 2017-12-10 | End: 2017-12-15

## 2017-12-10 RX ORDER — ATENOLOL 25 MG/1
25 TABLET ORAL DAILY
Qty: 0 | Refills: 0 | Status: DISCONTINUED | OUTPATIENT
Start: 2017-12-10 | End: 2017-12-10

## 2017-12-10 RX ORDER — LACTOBACILLUS ACIDOPHILUS 100MM CELL
1 CAPSULE ORAL DAILY
Qty: 0 | Refills: 0 | Status: DISCONTINUED | OUTPATIENT
Start: 2017-12-10 | End: 2017-12-15

## 2017-12-10 RX ORDER — ACETAMINOPHEN 500 MG
975 TABLET ORAL ONCE
Qty: 0 | Refills: 0 | Status: COMPLETED | OUTPATIENT
Start: 2017-12-10 | End: 2017-12-10

## 2017-12-10 RX ORDER — ACETAMINOPHEN 500 MG
650 TABLET ORAL ONCE
Qty: 0 | Refills: 0 | Status: DISCONTINUED | OUTPATIENT
Start: 2017-12-10 | End: 2017-12-13

## 2017-12-10 RX ORDER — WARFARIN SODIUM 2.5 MG/1
3 TABLET ORAL DAILY
Qty: 0 | Refills: 0 | Status: COMPLETED | OUTPATIENT
Start: 2017-12-10 | End: 2017-12-13

## 2017-12-10 RX ORDER — SERTRALINE 25 MG/1
50 TABLET, FILM COATED ORAL DAILY
Qty: 0 | Refills: 0 | Status: DISCONTINUED | OUTPATIENT
Start: 2017-12-10 | End: 2017-12-15

## 2017-12-10 RX ORDER — DOCUSATE SODIUM 100 MG
100 CAPSULE ORAL THREE TIMES A DAY
Qty: 0 | Refills: 0 | Status: DISCONTINUED | OUTPATIENT
Start: 2017-12-10 | End: 2017-12-13

## 2017-12-10 RX ORDER — ACETAMINOPHEN 500 MG
650 TABLET ORAL EVERY 6 HOURS
Qty: 0 | Refills: 0 | Status: DISCONTINUED | OUTPATIENT
Start: 2017-12-10 | End: 2017-12-13

## 2017-12-10 RX ORDER — CHOLECALCIFEROL (VITAMIN D3) 125 MCG
1000 CAPSULE ORAL DAILY
Qty: 0 | Refills: 0 | Status: DISCONTINUED | OUTPATIENT
Start: 2017-12-10 | End: 2017-12-15

## 2017-12-10 RX ORDER — CEPHALEXIN 500 MG
500 CAPSULE ORAL EVERY 6 HOURS
Qty: 0 | Refills: 0 | Status: DISCONTINUED | OUTPATIENT
Start: 2017-12-10 | End: 2017-12-10

## 2017-12-10 RX ORDER — WARFARIN SODIUM 2.5 MG/1
0 TABLET ORAL
Qty: 0 | Refills: 0 | COMMUNITY

## 2017-12-10 RX ORDER — TRAMADOL HYDROCHLORIDE 50 MG/1
25 TABLET ORAL EVERY 8 HOURS
Qty: 0 | Refills: 0 | Status: DISCONTINUED | OUTPATIENT
Start: 2017-12-10 | End: 2017-12-15

## 2017-12-10 RX ORDER — TRAMADOL HYDROCHLORIDE 50 MG/1
0.5 TABLET ORAL
Qty: 15 | Refills: 0 | OUTPATIENT
Start: 2017-12-10

## 2017-12-10 RX ORDER — ATENOLOL 25 MG/1
12.5 TABLET ORAL DAILY
Qty: 0 | Refills: 0 | Status: DISCONTINUED | OUTPATIENT
Start: 2017-12-10 | End: 2017-12-15

## 2017-12-10 RX ORDER — LIDOCAINE 4 G/100G
1 CREAM TOPICAL DAILY
Qty: 0 | Refills: 0 | Status: DISCONTINUED | OUTPATIENT
Start: 2017-12-10 | End: 2017-12-15

## 2017-12-10 RX ORDER — SPIRONOLACTONE 25 MG/1
25 TABLET, FILM COATED ORAL DAILY
Qty: 0 | Refills: 0 | Status: DISCONTINUED | OUTPATIENT
Start: 2017-12-10 | End: 2017-12-15

## 2017-12-10 RX ORDER — PANTOPRAZOLE SODIUM 20 MG/1
40 TABLET, DELAYED RELEASE ORAL
Qty: 0 | Refills: 0 | Status: DISCONTINUED | OUTPATIENT
Start: 2017-12-10 | End: 2017-12-15

## 2017-12-10 RX ADMIN — WARFARIN SODIUM 3 MILLIGRAM(S): 2.5 TABLET ORAL at 20:52

## 2017-12-10 RX ADMIN — Medication 1 TABLET(S): at 16:57

## 2017-12-10 RX ADMIN — Medication 650 MILLIGRAM(S): at 20:54

## 2017-12-10 RX ADMIN — ATENOLOL 12.5 MILLIGRAM(S): 25 TABLET ORAL at 16:54

## 2017-12-10 RX ADMIN — Medication 975 MILLIGRAM(S): at 11:03

## 2017-12-10 RX ADMIN — Medication 650 MILLIGRAM(S): at 21:00

## 2017-12-10 NOTE — ED PROVIDER NOTE - MUSCULOSKELETAL, MLM
Spine appears kyphotic range of motion is limited at the left hip and the knees are very arthritic the lle has a contusion below the knee not tender

## 2017-12-10 NOTE — ED PROVIDER NOTE - OBJECTIVE STATEMENT
86F with pmh of afib on coumadin, spinal stenosis, HTN, seen at [facility] overnight s/p fall yesterday evening, with xray hip, L-spine, L knee performed, dx with compression fracture of L-spine, presents s/p episode of near syncope. Pt was urinating and having bowel movement, felt nauseous, dizzy, diaphoretic, felt close to losing consciousness. No fall. Daughter is unsure whether she lost consciousness. complaining of continued L knee pain. denies cp, sob. palpitations. 86F with pmh of afib on coumadin, spinal stenosis, HTN, CLL, seen at [facility] overnight s/p fall yesterday evening, with xray hip, L-spine, L knee performed, dx with compression fracture of L-spine, presents s/p episode of near syncope. Pt was urinating and having bowel movement, felt nauseous, dizzy, diaphoretic, felt close to losing consciousness. No fall. Daughter is unsure whether she lost consciousness. complaining of continued L knee pain. denies cp, sob. palpitations. 86F with pmh of afib on coumadin, spinal stenosis, HTN, CLL, seen at [facility] overnight s/p fall yesterday evening, with xray hip, L-spine, L knee performed, dx with compression fracture of L-spine, presents s/p episode of near syncope. Pt was urinating and having bowel movement, felt nauseous, dizzy, diaphoretic, felt close to losing consciousness. No fall. Daughter is unsure whether she lost consciousness. complaining of continued L knee pain. denies cp, sob. palpitations.    cards/pmd: Rachel 86F with pmh of afib on coumadin, spinal stenosis, HTN, CLL, seen at [facility] overnight s/p fall yesterday evening, with xray hip, L-spine, L knee performed, dx with compression fracture of L-spine, presents s/p episode of near syncope. Pt was urinating and having bowel movement, felt nauseous, dizzy, diaphoretic, felt close to losing consciousness. No fall. Daughter is unsure whether she lost consciousness. complaining of continued L knee pain. denies cp, sob. palpitations.    cards/pmd: DAVID Vides

## 2017-12-10 NOTE — ED PROVIDER NOTE - PMH
Acute cystitis without hematuria  septic  4/2016  Essential hypertension    [place] (hard of hearing), bilateral    Lymphedema of both lower extremities    Monoclonal gammopathies    Paroxysmal atrial fibrillation    Spinal stenosis    Spondyloarthritis    Vaginal prolapse    Venous insufficiency

## 2017-12-10 NOTE — H&P ADULT - PMH
Acute cystitis without hematuria  septic  4/2016  Essential hypertension    [place] (hard of hearing), bilateral    Lymphedema of both lower extremities    Monoclonal gammopathies    Paroxysmal atrial fibrillation    Spinal stenosis    Spondyloarthritis    Vaginal prolapse    Venous insufficiency

## 2017-12-10 NOTE — ED PROVIDER NOTE - MUSCULOSKELETAL, MLM
No midline C, T, L ttp, no stepoff or deformity. 5/5 strength bilat UE, with no bony or joint ttp. 5/5 strength dorsiflexion/plantarflexion bilat LE. +Edema bilat LE. 5/5 hip flexion RLE, 4/5 hip flexion LLE limited 2/2 pain, with bilat diffuse knee ttp, without crepitus or deformity, with full ROM

## 2017-12-10 NOTE — H&P ADULT - NSHPLABSRESULTS_GEN_ALL_CORE
T(C): 36.9 (12-10-17 @ 06:58), Max: 36.9 (12-10-17 @ 06:58)  HR: 72 (12-10-17 @ 06:58) (55 - 74)  BP: 177/89 (12-10-17 @ 06:58) (162/64 - 183/105)  RR: 19 (12-10-17 @ 06:58) (16 - 19)  SpO2: 100% (12-10-17 @ 06:58) (95% - 100%)  Wt(kg): --    CARDIAC MARKERS ( 10 Dec 2017 06:08 )  <0.015 ng/mL / x     / x     / x     / x                                12.2   8.4   )-----------( 77       ( 10 Dec 2017 06:08 )             37.3     10 Dec 2017 06:08    137    |  102    |  20     ----------------------------<  153    4.4     |  27     |  1.03     Ca    9.2        10 Dec 2017 06:08    TPro  7.3    /  Alb  3.9    /  TBili  0.7    /  DBili  x      /  AST  48     /  ALT  30     /  AlkPhos  64     10 Dec 2017 06:08    PT/INR - ( 10 Dec 2017 06:08 )   PT: 28.8 sec;   INR: 2.61 ratio         PTT - ( 10 Dec 2017 06:08 )  PTT:30.1 sec  CAPILLARY BLOOD GLUCOSE        LIVER FUNCTIONS - ( 10 Dec 2017 06:08 )  Alb: 3.9 g/dL / Pro: 7.3 gm/dL / ALK PHOS: 64 U/L / ALT: 30 U/L / AST: 48 U/L / GGT: x             EKG: AFib w/PVCs, HR 63bpm

## 2017-12-10 NOTE — ED PROVIDER NOTE - CARE PLAN
Principal Discharge DX:	Fall Principal Discharge DX:	Fall  Secondary Diagnosis:	Compression fracture of lumbar spine, non-traumatic, initial encounter

## 2017-12-10 NOTE — H&P ADULT - NSHPREVIEWOFSYSTEMS_GEN_ALL_CORE
REVIEW OF SYSTEMS:    CONSTITUTIONAL: No weakness, fevers or chills  EYES/ENT: No visual changes;  No vertigo or throat pain   NECK: No pain or stiffness  RESPIRATORY: No cough, wheezing, hemoptysis; No shortness of breath  CARDIOVASCULAR: No chest pain or palpitations  GASTROINTESTINAL: No abdominal or epigastric pain. No nausea, vomiting, or hematemesis; No diarrhea or constipation. No melena or hematochezia.  GENITOURINARY: No dysuria, frequency or hematuria  NEUROLOGICAL: No numbness or weakness  MUSCULOSKELETAL: back pain and left knee/shin pain  SKIN: (+) bruising to left shin and right forearm. No itching, burning, rashes, or lesions   All other review of systems is negative unless indicated above

## 2017-12-10 NOTE — ED PROVIDER NOTE - NEUROLOGICAL, MLM
CN II-XII intact. Visual fields intact. EOMI, PERRLA. Facial sensation equal bilat. Smile and eye closure equal bilat. Hearing intact bilat. Palate elevation equal, tongue protrusion midline. Lateral head rotation equal bilat. Sensation intact bilat LE.

## 2017-12-10 NOTE — ED PROVIDER NOTE - PROGRESS NOTE DETAILS
on review of results and re eval of pt pt is more comfortable but had difficulty getting on the commode with co knee pain and the knee is arthritic will xray to ro acute fx knee no Fx

## 2017-12-10 NOTE — ED ADULT NURSE NOTE - OBJECTIVE STATEMENT
pt c/o syncope on toilet. pt did not fall. pt did not hit head. Pt d/c'd one hour ago from [place] s/p fall eariler last night. pt denies head trauma at that time as well. pt c/o hip and back pain.

## 2017-12-10 NOTE — ED PROVIDER NOTE - CONSTITUTIONAL, MLM
normal... Well appearing, well nourished, awake, alert, oriented to person, place, time/situation and in no apparent distress. tends to ramble in history and has a little difficulty with word finding no acute per daughter

## 2017-12-10 NOTE — ED PROVIDER NOTE - NEUROLOGICAL, MLM
Alert and oriented, no focal deficits, no motor or sensory deficits. [place] otherwise not focal

## 2017-12-10 NOTE — ED PROVIDER NOTE - CARDIAC, MLM
Irreg irreg  Heart sounds S1, S2.  No murmurs, rubs or gallops. 2+ rad pulses bilat, 1+ DP pulses bilat LE

## 2017-12-10 NOTE — H&P ADULT - ASSESSMENT
86F with near-syncope secondary to pain, likely vasovagal after fall earlier in the night.      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 due to give until 12/19 per records      *DVT Px:  -on coumadin with therapeutic INR 86F with near-syncope secondary to pain, likely vasovagal after fall earlier in the night.      *Vasovagal near syncope secondary to pain  -admit  -orthostatic vs  -cardiology eval  -pain control with tylenol and low dose ultram for more severe pain  -trops (-)  -EKG nml      *Fall  -fall px  -pain meds prn  -PT eval      *CLL:  -stable, outpt f/u      *Back pain, Spinal Stenosis and Chronic Compression fx of T &L spines  -prn pain meds  -PT eval  -fall px      *AFib on Coumadin  -rate controlled, c/w atenolol  -c/w coumadin, INR therapeutic      *Chronic Lymphedema:  -c/w diuretics      *UTI  -pt on ceftin, changed to keflex as inpatient. due to give until 12/19 per records      *DVT Px:  -on coumadin with therapeutic INR 86F with near-syncope secondary to pain, likely vasovagal after fall earlier in the night.      *Vasovagal near syncope secondary to pain  -admit  -orthostatic vs  -cardiology eval  -pain control with tylenol and low dose ultram for more severe pain  -trops (-)  -EKG nml      *Fall  -fall px  -pain meds prn  -PT eval      *CLL:  -stable, outpt f/u      *Back pain, Spinal Stenosis and Chronic Compression fx of T &L spines  -prn pain meds  -PT eval  -fall px      *AFib on Coumadin  -rate controlled, c/w atenolol  -c/w coumadin, INR therapeutic      *Chronic Lymphedema:  -c/w diuretics        *DVT Px:  -on coumadin with therapeutic INR

## 2017-12-10 NOTE — ED PROVIDER NOTE - OBJECTIVE STATEMENT
Pt is a 85 yo f who has hx of afib htn lymphedema of legs, venous insufficiency, djd of spine and arthritis of joints sp kyphoplasty sp bladder lift no allergies she also has anxiety and dementia. tonight was being helped to the table for some dessert about 3 hours ago. her new home aide pulled chair for her to sit at, rather than move the table which she is used to.  pt fell to ground in seated position co pain in coccyx area  she also has bruises to r wrist and left leg  no head injury no loc no cp no sob no other complaints  bib ems for eval, data per daughter and patient.

## 2017-12-10 NOTE — PATIENT PROFILE ADULT. - PMH
Acute cystitis without hematuria  septic  4/2016  Essential hypertension    [place] (hard of hearing), bilateral    Lymphedema of both lower extremities    Monoclonal gammopathies    Paroxysmal atrial fibrillation    Spinal stenosis    Spondyloarthritis    Vaginal prolapse    Venous insufficiency

## 2017-12-10 NOTE — ED ADULT NURSE REASSESSMENT NOTE - NS ED NURSE REASSESS COMMENT FT1
pt received awake alert and oriented x3. pt assisted to commode w/ 2 assist w/o complications. pt c/o pain to lower leg upon weight bearing. pt awaiting xray. will cont to monitor.

## 2017-12-10 NOTE — H&P ADULT - HISTORY OF PRESENT ILLNESS
86F with hx of spinal stenosis, HTN, Chronic compression fx of spine, CLL, presents after near syncope at home. patient had fallen out of a chair at the dinner table. No Head strike, No LOC. Aide assisted her up, patient was take to [facility], treated and dc'd home. Upon walking in the door patient had been complaining of back pain and some mild dizziness. Family took her to the bathroom upon entering the house and while on the toilet, felt more dizzy and the became pale and started to feel faint. Daughter called EMS and patient brought to ED. Patient has had vasovagal episodes in the past- this felt similar to patient/appeared similar to family. Currently patient is c/o LLE pain, right forearm pain.

## 2017-12-10 NOTE — ED ADULT NURSE REASSESSMENT NOTE - NS ED NURSE REASSESS COMMENT FT1
pt c/o pain to leg and head. pt medicated per emar. pt to be admitted for further eval. pt family updated on POC. pending dispo. will cont to monitor.

## 2017-12-10 NOTE — H&P ADULT - NSHPPHYSICALEXAM_GEN_ALL_CORE
PHYSICAL EXAM:    Constitutional: Frail, NAD, awake and alert, well-developed  HEENT: PERR, EOMI, Normal Hearing, MMM  Neck: Soft and supple, No LAD, No JVD  Respiratory: Breath sounds are clear bilaterally, No wheezing, rales or rhonchi  Cardiovascular: Irregularly irregular, regular rate and rhythm, no Murmurs, gallops or rubs  Gastrointestinal: Bowel Sounds present, soft, nontender, nondistended, no guarding, no rebound  Extremities: No peripheral edema  Vascular: 2+ peripheral pulses  Neurological: A/O x 3, no focal deficits  Musculoskeletal: 5/5 strength b/l upper and lower extremities. Pain to LLE and right forearm  Skin: No rashes. (+) ecchymosis and swelling to left shin and right forearm.

## 2017-12-10 NOTE — ED PROVIDER NOTE - MEDICAL DECISION MAKING DETAILS
86F with CLL, a-fib on coumadin, presents with near syncopal episode, s/p fall yesterday. seen at [facility], with compression fractures noted to L spine. pt does have hx of vasovagal syncope, possible vasovagal episode, also consider cardiac etiology. without neuro deficit or headache, lower suspicion of bleed, but is on coumadin with recent fall, check ct head. ct c-spine eval for fracture, ct chest, a/p eval for fracture, eval L-spine. xray bilat knee, L hand eval for fracture. labs eval for anemia, electrolyte abnormality. check Vonnie. EKG shows a-fib with rate in 60's. plan for admission for near syncopal episode. - Frederick Perez MD

## 2017-12-11 LAB — BACTERIA UR CULT: ABNORMAL

## 2017-12-11 PROCEDURE — 73590 X-RAY EXAM OF LOWER LEG: CPT | Mod: 26,LT

## 2017-12-11 PROCEDURE — 93010 ELECTROCARDIOGRAM REPORT: CPT

## 2017-12-11 RX ORDER — CEFUROXIME AXETIL 250 MG
250 TABLET ORAL EVERY 12 HOURS
Qty: 0 | Refills: 0 | Status: DISCONTINUED | OUTPATIENT
Start: 2017-12-11 | End: 2017-12-15

## 2017-12-11 RX ADMIN — Medication 1 TABLET(S): at 10:40

## 2017-12-11 RX ADMIN — Medication 1 TABLET(S): at 10:41

## 2017-12-11 RX ADMIN — LIDOCAINE 1 PATCH: 4 CREAM TOPICAL at 10:42

## 2017-12-11 RX ADMIN — LIDOCAINE 1 PATCH: 4 CREAM TOPICAL at 22:28

## 2017-12-11 RX ADMIN — Medication 1000 UNIT(S): at 10:40

## 2017-12-11 RX ADMIN — SERTRALINE 50 MILLIGRAM(S): 25 TABLET, FILM COATED ORAL at 10:01

## 2017-12-11 RX ADMIN — SPIRONOLACTONE 25 MILLIGRAM(S): 25 TABLET, FILM COATED ORAL at 05:40

## 2017-12-11 RX ADMIN — Medication 1 TABLET(S): at 05:40

## 2017-12-11 RX ADMIN — Medication 650 MILLIGRAM(S): at 18:58

## 2017-12-11 RX ADMIN — Medication 1 TABLET(S): at 18:59

## 2017-12-11 RX ADMIN — ATENOLOL 12.5 MILLIGRAM(S): 25 TABLET ORAL at 05:39

## 2017-12-11 RX ADMIN — PANTOPRAZOLE SODIUM 40 MILLIGRAM(S): 20 TABLET, DELAYED RELEASE ORAL at 05:40

## 2017-12-11 RX ADMIN — Medication 250 MILLIGRAM(S): at 18:59

## 2017-12-11 NOTE — PROGRESS NOTE ADULT - SUBJECTIVE AND OBJECTIVE BOX
c/c: near syncope      HPI:  86F with hx of spinal stenosis, HTN, Chronic compression fx of spine, CLL, presents after near syncope at home. patient had fallen out of a chair at the dinner table. No Head strike, No LOC. Aide assisted her up, patient was take to [facility], treated and dc'd home. Upon walking in the door patient had been complaining of back pain and some mild dizziness. Family took her to the bathroom upon entering the house and while on the toilet, felt more dizzy and the became pale and started to feel faint. Daughter called EMS and patient brought to ED. Patient has had vasovagal episodes in the past- this felt similar to patient/appeared similar to family. In ED, pt c/o LLE pain, right forearm pain.    12/11; pt seen and examined this am with aide at bedside. Was somewhat confused but c/o lle pain. Developing hematoma+    Review of system- All 10 systems reviewed and is as per HPI otherwise negative.       Vital Signs Last 24 Hrs  T(C): 37.1 (11 Dec 2017 11:47), Max: 37.2 (11 Dec 2017 05:42)  T(F): 98.7 (11 Dec 2017 11:47), Max: 99 (11 Dec 2017 05:42)  HR: 70 (11 Dec 2017 11:47) (70 - 83)  BP: 102/52 (11 Dec 2017 11:47) (102/52 - 158/67)  BP(mean): 70 (11 Dec 2017 11:47) (70 - 70)  RR: 16 (11 Dec 2017 11:47) (16 - 16)  SpO2: 98% (11 Dec 2017 11:47) (96% - 98%)  PHYSICAL EXAM:    GENERAL: Comfortable, no acute distress  HEAD:  Atraumatic, Normocephalic  EYES: EOMI, PERRLA  HEENT: Moist mucous membranes  NECK: Supple, No JVD  NERVOUS SYSTEM:  confused, non focal.   CHEST/LUNG: Clear to auscultation bilaterally  HEART: Regular rate and rhythm  ABDOMEN: Soft, Nontender, Nondistended; Bowel sounds present  GENITOURINARY- Voiding, no palpable bladder  EXTREMITIES:  No clubbing, cyanosis. +LLE edema  +lle hematoma over shin  MUSCULOSKELETAL-tenderness LLE anterior tibial area.   SKIN-no rash        LABS:                        10.9   7.8   )-----------( 74       ( 11 Dec 2017 05:38 )             33.6     12-11    137  |  104  |  15  ----------------------------<  94  3.5   |  26  |  0.69    Ca    8.8      11 Dec 2017 05:38    TPro  7.3  /  Alb  3.9  /  TBili  0.7  /  DBili  x   /  AST  48<H>  /  ALT  30  /  AlkPhos  64  12-10    PT/INR - ( 11 Dec 2017 05:38 )   PT: 33.8 sec;   INR: 3.06 ratio         PTT - ( 10 Dec 2017 06:08 )  PTT:30.1 sec      MEDS  acetaminophen   Tablet. 650 milliGRAM(s) Oral every 6 hours PRN  ATENolol  Tablet 12.5 milliGRAM(s) Oral daily  calcium carbonate 500 mG (Tums) Chewable 1 Tablet(s) Chew two times a day  cholecalciferol 1000 Unit(s) Oral daily  docusate sodium 100 milliGRAM(s) Oral three times a day PRN  lactobacillus acidophilus 1 Tablet(s) Oral daily  lidocaine   Patch 1 Patch Transdermal daily  multivitamin 1 Tablet(s) Oral daily  pantoprazole    Tablet 40 milliGRAM(s) Oral before breakfast  sertraline 50 milliGRAM(s) Oral daily  spironolactone 25 milliGRAM(s) Oral daily  traMADol 25 milliGRAM(s) Oral every 8 hours PRN  warfarin 3 milliGRAM(s) Oral daily        ASSESSMENT AND PLAN:  86F, PMH AS ABOVE A/W:    1. Likely vasovagal episode:  -cardio eval appreciated  -pain control    2. Fall/LLE pain/brusing/small hematoma:  -check xrays to r/o fx.    3. CLL:  -stable, outpt f/u      4. Back pain, Spinal Stenosis and Chronic Compression fx of T &L spines  -prn pain meds  -PT eval  -fall px      5. AFib on Coumadin  -rate controlled, c/w atenolol  -dose coumadin based on inr    6. Chronic LE edema:  -on diuretics    7. DVT px

## 2017-12-11 NOTE — PHYSICAL THERAPY INITIAL EVALUATION ADULT - GENERAL OBSERVATIONS, REHAB EVAL
Patient received out of bed in chair on 2N, HA at bedside.  Lidocaine patch on L knee. +OA changes in L>R knees, + lymphedema in L>R LEs.  +raised, ecchymotic area over volar L 4,5 MTPs.

## 2017-12-11 NOTE — PHYSICAL THERAPY INITIAL EVALUATION ADULT - PERTINENT HX OF CURRENT PROBLEM, REHAB EVAL
85 yo F after near syncope at home. patient had fallen out of a chair at the dinner table. Aide assisted her up, patient was take to [facility], treated and dc'd home. Upon walking in the door patient had been complaining of back pain and some mild dizziness. Family took her to the bathroom upon entering the house and while on the toilet, felt more dizzy and the became pale and started to feel faint. XRays (-) for fx at L hand, knee. CT Abd (-)

## 2017-12-11 NOTE — PHYSICAL THERAPY INITIAL EVALUATION ADULT - MODALITIES TREATMENT COMMENTS
Patient returned to chair with HHA at bedside.  B LEs elevated on stool with pillow.  call bell in reach.

## 2017-12-11 NOTE — PHYSICAL THERAPY INITIAL EVALUATION ADULT - ACTIVE RANGE OF MOTION EXAMINATION, REHAB EVAL
bilateral upper extremity Active ROM was WFL (within functional limits)/Right LE Active ROM was WFL (within functional limits)

## 2017-12-12 LAB
ANION GAP SERPL CALC-SCNC: 5 MMOL/L — SIGNIFICANT CHANGE UP (ref 5–17)
BASOPHILS # BLD AUTO: 0.1 K/UL — SIGNIFICANT CHANGE UP (ref 0–0.2)
BASOPHILS NFR BLD AUTO: 1.3 % — SIGNIFICANT CHANGE UP (ref 0–2)
BUN SERPL-MCNC: 16 MG/DL — SIGNIFICANT CHANGE UP (ref 7–23)
CALCIUM SERPL-MCNC: 8.5 MG/DL — SIGNIFICANT CHANGE UP (ref 8.5–10.1)
CHLORIDE SERPL-SCNC: 103 MMOL/L — SIGNIFICANT CHANGE UP (ref 96–108)
CO2 SERPL-SCNC: 28 MMOL/L — SIGNIFICANT CHANGE UP (ref 22–31)
CREAT SERPL-MCNC: 0.77 MG/DL — SIGNIFICANT CHANGE UP (ref 0.5–1.3)
EOSINOPHIL # BLD AUTO: 0.1 K/UL — SIGNIFICANT CHANGE UP (ref 0–0.5)
EOSINOPHIL NFR BLD AUTO: 0.9 % — SIGNIFICANT CHANGE UP (ref 0–6)
GLUCOSE SERPL-MCNC: 94 MG/DL — SIGNIFICANT CHANGE UP (ref 70–99)
HCT VFR BLD CALC: 28 % — LOW (ref 34.5–45)
HCT VFR BLD CALC: 29.4 % — LOW (ref 34.5–45)
HGB BLD-MCNC: 9.4 G/DL — LOW (ref 11.5–15.5)
HGB BLD-MCNC: 9.7 G/DL — LOW (ref 11.5–15.5)
INR BLD: 2.34 RATIO — HIGH (ref 0.88–1.16)
LYMPHOCYTES # BLD AUTO: 0.6 K/UL — LOW (ref 1–3.3)
LYMPHOCYTES # BLD AUTO: 9.8 % — LOW (ref 13–44)
MAGNESIUM SERPL-MCNC: 2.1 MG/DL — SIGNIFICANT CHANGE UP (ref 1.6–2.6)
MCHC RBC-ENTMCNC: 32.1 PG — SIGNIFICANT CHANGE UP (ref 27–34)
MCHC RBC-ENTMCNC: 33.5 GM/DL — SIGNIFICANT CHANGE UP (ref 32–36)
MCV RBC AUTO: 95.9 FL — SIGNIFICANT CHANGE UP (ref 80–100)
MONOCYTES # BLD AUTO: 2.6 K/UL — HIGH (ref 0–0.9)
MONOCYTES NFR BLD AUTO: 40.1 % — HIGH (ref 2–14)
NEUTROPHILS # BLD AUTO: 3.1 K/UL — SIGNIFICANT CHANGE UP (ref 1.8–7.4)
NEUTROPHILS NFR BLD AUTO: 47.8 % — SIGNIFICANT CHANGE UP (ref 43–77)
PHOSPHATE SERPL-MCNC: 2.2 MG/DL — LOW (ref 2.5–4.5)
PLATELET # BLD AUTO: 67 K/UL — LOW (ref 150–400)
POTASSIUM SERPL-MCNC: 3.7 MMOL/L — SIGNIFICANT CHANGE UP (ref 3.5–5.3)
POTASSIUM SERPL-SCNC: 3.7 MMOL/L — SIGNIFICANT CHANGE UP (ref 3.5–5.3)
PROTHROM AB SERPL-ACNC: 25.7 SEC — HIGH (ref 9.8–12.7)
RBC # BLD: 2.92 M/UL — LOW (ref 3.8–5.2)
RBC # FLD: 13.3 % — SIGNIFICANT CHANGE UP (ref 10.3–14.5)
SODIUM SERPL-SCNC: 136 MMOL/L — SIGNIFICANT CHANGE UP (ref 135–145)
WBC # BLD: 6.5 K/UL — SIGNIFICANT CHANGE UP (ref 3.8–10.5)
WBC # FLD AUTO: 6.5 K/UL — SIGNIFICANT CHANGE UP (ref 3.8–10.5)

## 2017-12-12 PROCEDURE — 93971 EXTREMITY STUDY: CPT | Mod: 26,LT

## 2017-12-12 RX ORDER — LACTOBACILLUS ACIDOPHILUS 100MM CELL
1 CAPSULE ORAL
Qty: 0 | Refills: 0 | Status: DISCONTINUED | OUTPATIENT
Start: 2017-12-12 | End: 2017-12-15

## 2017-12-12 RX ORDER — SODIUM,POTASSIUM PHOSPHATES 278-250MG
1 POWDER IN PACKET (EA) ORAL ONCE
Qty: 0 | Refills: 0 | Status: COMPLETED | OUTPATIENT
Start: 2017-12-12 | End: 2017-12-12

## 2017-12-12 RX ADMIN — Medication 650 MILLIGRAM(S): at 23:53

## 2017-12-12 RX ADMIN — Medication 1 TABLET(S): at 13:00

## 2017-12-12 RX ADMIN — Medication 1 TABLET(S): at 18:16

## 2017-12-12 RX ADMIN — LIDOCAINE 1 PATCH: 4 CREAM TOPICAL at 23:28

## 2017-12-12 RX ADMIN — Medication 250 MILLIGRAM(S): at 18:16

## 2017-12-12 RX ADMIN — Medication 250 MILLIGRAM(S): at 06:37

## 2017-12-12 RX ADMIN — SERTRALINE 50 MILLIGRAM(S): 25 TABLET, FILM COATED ORAL at 13:00

## 2017-12-12 RX ADMIN — Medication 1 TABLET(S): at 06:39

## 2017-12-12 RX ADMIN — Medication 1000 UNIT(S): at 13:00

## 2017-12-12 RX ADMIN — SPIRONOLACTONE 25 MILLIGRAM(S): 25 TABLET, FILM COATED ORAL at 06:37

## 2017-12-12 RX ADMIN — Medication 1 TABLET(S): at 18:17

## 2017-12-12 RX ADMIN — ATENOLOL 12.5 MILLIGRAM(S): 25 TABLET ORAL at 06:37

## 2017-12-12 RX ADMIN — Medication 650 MILLIGRAM(S): at 06:40

## 2017-12-12 RX ADMIN — PANTOPRAZOLE SODIUM 40 MILLIGRAM(S): 20 TABLET, DELAYED RELEASE ORAL at 06:37

## 2017-12-12 RX ADMIN — LIDOCAINE 1 PATCH: 4 CREAM TOPICAL at 13:00

## 2017-12-12 RX ADMIN — Medication 1 PACKET(S): at 18:20

## 2017-12-12 NOTE — PROGRESS NOTE ADULT - SUBJECTIVE AND OBJECTIVE BOX
Patient is a 86y old  Female who presents with a chief complaint of vasovagal near syncope (10 Dec 2017 12:25)      HPI:  86F with hx of spinal stenosis, HTN, Chronic compression fx of spine, CLL, presents after near syncope at home. patient had fallen out of a chair at the dinner table. No Head strike, No LOC. Aide assisted her up, patient was take to [facility], treated and dc'd home. Upon walking in the door patient had been complaining of back pain and some mild dizziness. Family took her to the bathroom upon entering the house and while on the toilet, felt more dizzy and the became pale and started to feel faint. Daughter called EMS and patient brought to ED. Patient has had vasovagal episodes in the past- this felt similar to patient/appeared similar to family. Currently patient is c/o LLE pain, right forearm pain. (10 Dec 2017 12:25)      PAST MEDICAL & SURGICAL HISTORY:  [place] (hard of hearing), bilateral  Vaginal prolapse  Spondyloarthritis  Spinal stenosis  Essential hypertension  Acute cystitis without hematuria: septic  4/2016  Paroxysmal atrial fibrillation  Monoclonal gammopathies  Venous insufficiency  Lymphedema of both lower extremities  History of vaginal surgery  S/P thyroidectomy: partial   1975  S/P kyphoplasty: 2014        MEDICATIONS  (STANDING):  ATENolol  Tablet 12.5 milliGRAM(s) Oral daily  calcium carbonate 500 mG (Tums) Chewable 1 Tablet(s) Chew two times a day  cefuroxime   Tablet 250 milliGRAM(s) Oral every 12 hours  cholecalciferol 1000 Unit(s) Oral daily  lactobacillus acidophilus 1 Tablet(s) Oral daily  lidocaine   Patch 1 Patch Transdermal daily  multivitamin 1 Tablet(s) Oral daily  pantoprazole    Tablet 40 milliGRAM(s) Oral before breakfast  sertraline 50 milliGRAM(s) Oral daily  spironolactone 25 milliGRAM(s) Oral daily  warfarin 3 milliGRAM(s) Oral daily    MEDICATIONS  (PRN):  acetaminophen   Tablet. 650 milliGRAM(s) Oral every 6 hours PRN Mild Pain (1 - 3)  docusate sodium 100 milliGRAM(s) Oral three times a day PRN Constipation  traMADol 25 milliGRAM(s) Oral every 8 hours PRN Moderate Pain (4 - 6)          Vital Signs Last 24 Hrs  T(C): 36.3 (12 Dec 2017 05:55), Max: 37.1 (11 Dec 2017 11:47)  T(F): 97.3 (12 Dec 2017 05:55), Max: 98.7 (11 Dec 2017 11:47)  HR: 73 (12 Dec 2017 05:55) (70 - 73)  BP: 132/48 (12 Dec 2017 05:55) (102/52 - 132/48)  BP(mean): 70 (11 Dec 2017 11:47) (70 - 70)  RR: 16 (12 Dec 2017 05:55) (16 - 16)  SpO2: 99% (12 Dec 2017 05:55) (98% - 99%)    I&O's Summary      PHYSICAL EXAM  General Appearance:  HEENT:   Neck:   Back:   Lungs: clear  Heart: 2/6 FARZANEH base  Abdomen: soft  Extremities: 2-3+ edema LL ext with ecchymosis  Skin:   Neurologic:       INTERPRETATION OF TELEMETRY:    ECG:        LABS:                          9.4    6.5   )-----------( 67       ( 12 Dec 2017 05:50 )             28.0     12-12    136  |  103  |  16  ----------------------------<  94  3.7   |  28  |  0.77    Ca    8.5      12 Dec 2017 05:50  Phos  2.2     12-12  Mg     2.1     12-12            Pro BNP  6510 12-10 @ 06:08  D Dimer  -- 12-10 @ 06:08    PT/INR - ( 11 Dec 2017 05:38 )   PT: 33.8 sec;   INR: 3.06 ratio                 IMPRESSION:  1.SBO. Clinically improved.  2.Atrial fibrillation, persistent. Stable.  3.Hypertension. Stable.  4.Moderate MR and TR and mild AR. Stable.   5.Chronic thrombocytopenia.  6. anemia- new since admission  7.  Increasing edema LL ext - r/o DVT  PLAN: suggest repeat CBC 12/13.  venous duplex Doppler of LL ext to r/o DVT  Agree with conservative management at present she  appears to be improving. If she worsens then surgery would be indicated and there would be no cardiac contraindication.  Warfarin on hold in case surgery is necessary. Continue atenolol for rate control.

## 2017-12-12 NOTE — CONSULT NOTE ADULT - SUBJECTIVE AND OBJECTIVE BOX
Patient is a 86y old  Female who presents with a chief complaint of vasovagal near syncope (10 Dec 2017 12:25)      HPI:  86F with hx of spinal stenosis, HTN, Chronic compression fx of spine, CLL, presents after near syncope at home. patient had fallen out of a chair at the dinner table. No Head strike, No LOC. Aide assisted her up, patient was take to [facility], treated and dc'd home. Upon walking in the door patient had been complaining of back pain and some mild dizziness. Family took her to the bathroom upon entering the house and while on the toilet, felt more dizzy and the became pale and started to feel faint. Daughter called EMS and patient brought to ED. Patient has had vasovagal episodes in the past- this felt similar to patient/appeared similar to family. Currently patient is c/o LLE pain, right forearm pain. (10 Dec 2017 12:25)    12/11 Seen with aide at bedside. no definite loc. patient offers no complaints today except pain/ brusing of legs and unsteady on feet.  PAST MEDICAL & SURGICAL HISTORY:  [place] (hard of hearing), bilateral  Vaginal prolapse  Spondyloarthritis  Spinal stenosis  Essential hypertension  Acute cystitis without hematuria: septic  4/2016  Paroxysmal atrial fibrillation  Monoclonal gammopathies  Venous insufficiency  Lymphedema of both lower extremities  History of vaginal surgery  S/P thyroidectomy: partial   1975  S/P kyphoplasty: 2014      PREVIOUS DIAGNOSTIC TESTING:      ECHO  FINDINGS:    STRESS  FINDINGS:    CATHETERIZATION  FINDINGS:    MEDICATIONS  (STANDING):  ATENolol  Tablet 12.5 milliGRAM(s) Oral daily  calcium carbonate 500 mG (Tums) Chewable 1 Tablet(s) Chew two times a day  cholecalciferol 1000 Unit(s) Oral daily  lactobacillus acidophilus 1 Tablet(s) Oral daily  lidocaine   Patch 1 Patch Transdermal daily  multivitamin 1 Tablet(s) Oral daily  pantoprazole    Tablet 40 milliGRAM(s) Oral before breakfast  sertraline 50 milliGRAM(s) Oral daily  spironolactone 25 milliGRAM(s) Oral daily  warfarin 3 milliGRAM(s) Oral daily    MEDICATIONS  (PRN):  acetaminophen   Tablet. 650 milliGRAM(s) Oral every 6 hours PRN Mild Pain (1 - 3)  docusate sodium 100 milliGRAM(s) Oral three times a day PRN Constipation  traMADol 25 milliGRAM(s) Oral every 8 hours PRN Moderate Pain (4 - 6)      FAMILY HISTORY:  No pertinent family history in first degree relatives      SOCIAL HISTORY:  ***    REVIEW OF SYSTEM:  Pertinent items are noted in HPI.  Constitutional  Eyes:    Ears, nose, mouth,   throat, and face:    Neck:   Respiratory:   and wheezing  Cardiovascular:    Gastrointestinal:  Genitourinary:     Hematologic/lymphatic:   Musculoskeletal:   Neurological:   Behavioral/Psych:        Vital Signs Last 24 Hrs  T(C): 37.2 (11 Dec 2017 05:42), Max: 37.2 (11 Dec 2017 05:42)  T(F): 99 (11 Dec 2017 05:42), Max: 99 (11 Dec 2017 05:42)  HR: 83 (11 Dec 2017 05:42) (71 - 83)  BP: 144/61 (11 Dec 2017 05:42) (121/53 - 170/81)  BP(mean): --  RR: 16 (11 Dec 2017 05:42) (16 - 18)  SpO2: 98% (11 Dec 2017 05:42) (96% - 100%)    I&O's Summary    10 Dec 2017 07:01  -  11 Dec 2017 07:00  --------------------------------------------------------  IN: 0 mL / OUT: 1 mL / NET: -1 mL      PHYSICAL EXAM  General Appearance: cooperative, no acute distress,   HEENT: PERRL, conjunctiva clear, EOM's intact    Neck: Supple, , no adenopathy, thyroid: not enlarged, no carotid bruit or JVD  Back: Symmetric, no  tenderness,no soft tissue tenderness  Lungs: Clear to auscultation bilaterally,no adventitious breath sounds, normal   expiratory phase  Heart: IRegular rate and rhythm, S1, S2 normal,  murmur, 2/6 LLSB  Abdomen: Soft, non-tender, bowel sounds active , no hepatosplenomegaly  Extremities: no cyanosis or edema, ecchymoses right wrist and le right more than left  Skin: Skin color, texture normal, no rashes   Neurologic: Alert.         INTERPRETATION OF TELEMETRY:    ECG:        LABS:                          10.9   7.8   )-----------( 74       ( 11 Dec 2017 05:38 )             33.6     12-11    137  |  104  |  15  ----------------------------<  94  3.5   |  26  |  0.69    Ca    8.8      11 Dec 2017 05:38    TPro  7.3  /  Alb  3.9  /  TBili  0.7  /  DBili  x   /  AST  48<H>  /  ALT  30  /  AlkPhos  64  12-10    CARDIAC MARKERS ( 10 Dec 2017 06:08 )  <0.015 ng/mL / x     / x     / x     / x            Pro BNP  6510 12-10 @ 06:08  D Dimer  -- 12-10 @ 06:08    PT/INR - ( 11 Dec 2017 05:38 )   PT: 33.8 sec;   INR: 3.06 ratio         PTT - ( 10 Dec 2017 06:08 )  PTT:30.1 sec          RADIOLOGY & ADDITIONAL STUDIES:    IMPRESSION:    PLAN:
asked to evaluate 86 F with left calf pain and swelling s/p fall on 12/10/17. Pt states she was home with her health aid and fell off chair. was taken to [facility] and sent home. Family brought patient to [place] ED for feelings of dizziness and back pain/ LLE pain. X rays Left knee, tibia: + OA, neg for fracture. Ambulates with walker at baseline. denies head strike or LOC. orthopedic consult requested for left calf hematoma found during doppler of LLE, per daughter request.      PMH: afib on coumadin, HTN, chronic Lumbar compression Fx, spinal stenosis, CLL, hearing loss , OA   NKDA  Doppler LLE: neg for DVT, 6.9cm calf hematoma    INR 2.34    PE Left lower extremity   skin intact  + edema and swelling   compartments soft and compressible   ROM knee 0-90  FROM ankle and toes  mild tenderness along calf  + ecchymosis   + EHL FHL GS TA   SILT   DP intact   no pain with log roll or axial load

## 2017-12-12 NOTE — PROVIDER CONTACT NOTE (CHANGE IN STATUS NOTIFICATION) - ASSESSMENT
Pt and daughter both anxious, but pt refuses tylenol at this time and states no pain to this or any other area.

## 2017-12-12 NOTE — PROVIDER CONTACT NOTE (CHANGE IN STATUS NOTIFICATION) - SITUATION
Pt's daughter called this RN in to see a change in Pt's skin. Raised, dark area on pt's Right shin noted not there on previous assessment.

## 2017-12-12 NOTE — PROGRESS NOTE ADULT - SUBJECTIVE AND OBJECTIVE BOX
Called by RN as pt with new hematoma RLE. Pt seen and examined. Daughter at bedside. Aide at bedside as well.   Pt states she was being assisted out of bed, and though her leg was held too tightly and subsequently she developed a new hematoma RLE.   She has some pain to the area. No dizziness/lightheadeness.   LLE examined, pt with new hematoma over Left shin.   Advised elevation, avoid contact to affected area.  Pain control  D/w Daughter at length.   Will repeat h/h in am.

## 2017-12-12 NOTE — PROGRESS NOTE ADULT - SUBJECTIVE AND OBJECTIVE BOX
c/c: near syncope      HPI:  86F with hx of spinal stenosis, HTN, Chronic compression fx of spine, CLL, presents after near syncope at home. patient had fallen out of a chair at the dinner table. No Head strike, No LOC. Aide assisted her up, patient was take to [facility], treated and dc'd home. Upon walking in the door patient had been complaining of back pain and some mild dizziness. Family took her to the bathroom upon entering the house and while on the toilet, felt more dizzy and the became pale and started to feel faint. Daughter called EMS and patient brought to ED. Patient has had vasovagal episodes in the past- this felt similar to patient/appeared similar to family. In ED, pt c/o LLE pain, right forearm pain.  Imaging negative for fractures. Hospital course notable for worsening swelling of LLE. Doppler neg for DVT. +left calf hematoma.    12/12: pt seen and examined this am. [place] ok. c/o left Lower extremity pain. Spoke with daughter over telephone while at bedside.    Review of system- All 10 systems reviewed and is as per HPI otherwise negative.     Vital Signs Last 24 Hrs  T(C): 36.4 (12 Dec 2017 11:34), Max: 36.4 (12 Dec 2017 11:34)  T(F): 97.6 (12 Dec 2017 11:34), Max: 97.6 (12 Dec 2017 11:34)  HR: 66 (12 Dec 2017 11:34) (66 - 73)  BP: 110/52 (12 Dec 2017 11:34) (110/52 - 132/48)  BP(mean): 64 (12 Dec 2017 11:34) (64 - 64)  RR: 18 (12 Dec 2017 11:34) (16 - 18)  SpO2: 100% (12 Dec 2017 11:34) (99% - 100%)    PHYSICAL EXAM:    GENERAL: Comfortable, no acute distress  HEAD:  Atraumatic, Normocephalic  EYES: EOMI, PERRLA  HEENT: Moist mucous membranes  NECK: Supple, No JVD  NERVOUS SYSTEM:  confused, non focal.   CHEST/LUNG: Clear to auscultation bilaterally  HEART: Regular rate and rhythm  ABDOMEN: Soft, Nontender, Nondistended; Bowel sounds present  GENITOURINARY- Voiding, no palpable bladder  EXTREMITIES:  No clubbing, cyanosis. +LLE edema  +lle hematoma  MUSCULOSKELETAL-tenderness over distal LLE  SKIN-no rash    LABS:                        9.7    x     )-----------( x        ( 12 Dec 2017 13:53 )             29.4     12-12    136  |  103  |  16  ----------------------------<  94  3.7   |  28  |  0.77    Ca    8.5      12 Dec 2017 05:50  Phos  2.2     12-12  Mg     2.1     12-12      PT/INR - ( 12 Dec 2017 13:53 )   PT: 25.7 sec;   INR: 2.34 ratio      MEDS  acetaminophen   Tablet. 650 milliGRAM(s) Oral every 6 hours PRN  ATENolol  Tablet 12.5 milliGRAM(s) Oral daily  calcium carbonate 500 mG (Tums) Chewable 1 Tablet(s) Chew two times a day  cholecalciferol 1000 Unit(s) Oral daily  docusate sodium 100 milliGRAM(s) Oral three times a day PRN  lactobacillus acidophilus 1 Tablet(s) Oral daily  lidocaine   Patch 1 Patch Transdermal daily  multivitamin 1 Tablet(s) Oral daily  pantoprazole    Tablet 40 milliGRAM(s) Oral before breakfast  sertraline 50 milliGRAM(s) Oral daily  spironolactone 25 milliGRAM(s) Oral daily  traMADol 25 milliGRAM(s) Oral every 8 hours PRN  warfarin 3 milliGRAM(s) Oral daily        ASSESSMENT AND PLAN:  86F, PMH AS ABOVE A/W:    1. Likely vasovagal episode:  -cardio eval appreciated  -pain control    2. Fall/LLE pain/brusing/hematoma:  -h/h stable from this am.   -tib/fib xrays neg for fracture.  -physical therapy  -ortho consult per daughter's request.     3. CLL:  -stable, outpt f/u      4. Back pain, Spinal Stenosis and Chronic Compression fx of T &L spines  -prn pain meds  -physical th erapy  -fall px      5. AFib on Coumadin  -rate controlled, c/w atenolol  -dose coumadin based on inr    6. Chronic LE edema:  -on diuretics    7. DVT px

## 2017-12-13 LAB
ANION GAP SERPL CALC-SCNC: 6 MMOL/L — SIGNIFICANT CHANGE UP (ref 5–17)
ANISOCYTOSIS BLD QL: SLIGHT — SIGNIFICANT CHANGE UP
BASO STIPL BLD QL SMEAR: SLIGHT — SIGNIFICANT CHANGE UP
BASOPHILS # BLD AUTO: 0.1 K/UL — SIGNIFICANT CHANGE UP (ref 0–0.2)
BASOPHILS NFR BLD AUTO: 1.1 % — SIGNIFICANT CHANGE UP (ref 0–2)
BUN SERPL-MCNC: 16 MG/DL — SIGNIFICANT CHANGE UP (ref 7–23)
CALCIUM SERPL-MCNC: 8.1 MG/DL — LOW (ref 8.5–10.1)
CHLORIDE SERPL-SCNC: 105 MMOL/L — SIGNIFICANT CHANGE UP (ref 96–108)
CO2 SERPL-SCNC: 27 MMOL/L — SIGNIFICANT CHANGE UP (ref 22–31)
CREAT SERPL-MCNC: 0.67 MG/DL — SIGNIFICANT CHANGE UP (ref 0.5–1.3)
DACRYOCYTES BLD QL SMEAR: SLIGHT — SIGNIFICANT CHANGE UP
ELLIPTOCYTES BLD QL SMEAR: SLIGHT — SIGNIFICANT CHANGE UP
EOSINOPHIL # BLD AUTO: 0 K/UL — SIGNIFICANT CHANGE UP (ref 0–0.5)
EOSINOPHIL NFR BLD AUTO: 0.8 % — SIGNIFICANT CHANGE UP (ref 0–6)
GIANT PLATELETS BLD QL SMEAR: PRESENT — SIGNIFICANT CHANGE UP
GLUCOSE SERPL-MCNC: 92 MG/DL — SIGNIFICANT CHANGE UP (ref 70–99)
HCT VFR BLD CALC: 26.3 % — LOW (ref 34.5–45)
HCT VFR BLD CALC: 28.5 % — LOW (ref 34.5–45)
HGB BLD-MCNC: 8.9 G/DL — LOW (ref 11.5–15.5)
HGB BLD-MCNC: 9.4 G/DL — LOW (ref 11.5–15.5)
INR BLD: 1.88 RATIO — HIGH (ref 0.88–1.16)
INR BLD: 2.04 RATIO — HIGH (ref 0.88–1.16)
LG PLATELETS BLD QL AUTO: SLIGHT — SIGNIFICANT CHANGE UP
LYMPHOCYTES # BLD AUTO: 0.6 K/UL — LOW (ref 1–3.3)
LYMPHOCYTES # BLD AUTO: 9.6 % — LOW (ref 13–44)
MAGNESIUM SERPL-MCNC: 2 MG/DL — SIGNIFICANT CHANGE UP (ref 1.6–2.6)
MANUAL DIF COMMENT BLD-IMP: SIGNIFICANT CHANGE UP
MCHC RBC-ENTMCNC: 32.3 PG — SIGNIFICANT CHANGE UP (ref 27–34)
MCHC RBC-ENTMCNC: 33.7 GM/DL — SIGNIFICANT CHANGE UP (ref 32–36)
MCV RBC AUTO: 95.7 FL — SIGNIFICANT CHANGE UP (ref 80–100)
MONOCYTES # BLD AUTO: 2 K/UL — HIGH (ref 0–0.9)
MONOCYTES NFR BLD AUTO: 33.2 % — HIGH (ref 2–14)
NEUTROPHILS # BLD AUTO: 3.3 K/UL — SIGNIFICANT CHANGE UP (ref 1.8–7.4)
NEUTROPHILS NFR BLD AUTO: 55.4 % — SIGNIFICANT CHANGE UP (ref 43–77)
OVALOCYTES BLD QL SMEAR: SLIGHT — SIGNIFICANT CHANGE UP
PHOSPHATE SERPL-MCNC: 2.1 MG/DL — LOW (ref 2.5–4.5)
PLAT MORPH BLD: NORMAL — SIGNIFICANT CHANGE UP
PLATELET # BLD AUTO: 72 K/UL — LOW (ref 150–400)
POIKILOCYTOSIS BLD QL AUTO: SLIGHT — SIGNIFICANT CHANGE UP
POLYCHROMASIA BLD QL SMEAR: SLIGHT — SIGNIFICANT CHANGE UP
POTASSIUM SERPL-MCNC: 3.7 MMOL/L — SIGNIFICANT CHANGE UP (ref 3.5–5.3)
POTASSIUM SERPL-SCNC: 3.7 MMOL/L — SIGNIFICANT CHANGE UP (ref 3.5–5.3)
PROTHROM AB SERPL-ACNC: 20.6 SEC — HIGH (ref 9.8–12.7)
PROTHROM AB SERPL-ACNC: 22.4 SEC — HIGH (ref 9.8–12.7)
RBC # BLD: 2.74 M/UL — LOW (ref 3.8–5.2)
RBC # FLD: 12.9 % — SIGNIFICANT CHANGE UP (ref 10.3–14.5)
RBC BLD AUTO: (no result)
SODIUM SERPL-SCNC: 138 MMOL/L — SIGNIFICANT CHANGE UP (ref 135–145)
WBC # BLD: 5.9 K/UL — SIGNIFICANT CHANGE UP (ref 3.8–10.5)
WBC # FLD AUTO: 5.9 K/UL — SIGNIFICANT CHANGE UP (ref 3.8–10.5)

## 2017-12-13 RX ORDER — DOCUSATE SODIUM 100 MG
100 CAPSULE ORAL THREE TIMES A DAY
Qty: 0 | Refills: 0 | Status: DISCONTINUED | OUTPATIENT
Start: 2017-12-13 | End: 2017-12-15

## 2017-12-13 RX ORDER — POLYETHYLENE GLYCOL 3350 17 G/17G
17 POWDER, FOR SOLUTION ORAL DAILY
Qty: 0 | Refills: 0 | Status: DISCONTINUED | OUTPATIENT
Start: 2017-12-13 | End: 2017-12-15

## 2017-12-13 RX ORDER — WARFARIN SODIUM 2.5 MG/1
3 TABLET ORAL ONCE
Qty: 0 | Refills: 0 | Status: COMPLETED | OUTPATIENT
Start: 2017-12-13 | End: 2017-12-13

## 2017-12-13 RX ORDER — ACETAMINOPHEN 500 MG
1000 TABLET ORAL
Qty: 0 | Refills: 0 | Status: DISCONTINUED | OUTPATIENT
Start: 2017-12-13 | End: 2017-12-15

## 2017-12-13 RX ADMIN — Medication 1 TABLET(S): at 06:50

## 2017-12-13 RX ADMIN — Medication 1 TABLET(S): at 13:37

## 2017-12-13 RX ADMIN — Medication 1 TABLET(S): at 09:25

## 2017-12-13 RX ADMIN — Medication 100 MILLIGRAM(S): at 21:04

## 2017-12-13 RX ADMIN — LIDOCAINE 1 PATCH: 4 CREAM TOPICAL at 13:38

## 2017-12-13 RX ADMIN — ATENOLOL 12.5 MILLIGRAM(S): 25 TABLET ORAL at 06:49

## 2017-12-13 RX ADMIN — Medication 1 TABLET(S): at 19:34

## 2017-12-13 RX ADMIN — Medication 100 MILLIGRAM(S): at 13:40

## 2017-12-13 RX ADMIN — PANTOPRAZOLE SODIUM 40 MILLIGRAM(S): 20 TABLET, DELAYED RELEASE ORAL at 06:49

## 2017-12-13 RX ADMIN — SPIRONOLACTONE 25 MILLIGRAM(S): 25 TABLET, FILM COATED ORAL at 06:49

## 2017-12-13 RX ADMIN — SERTRALINE 50 MILLIGRAM(S): 25 TABLET, FILM COATED ORAL at 13:39

## 2017-12-13 RX ADMIN — Medication 1000 MILLIGRAM(S): at 21:07

## 2017-12-13 RX ADMIN — POLYETHYLENE GLYCOL 3350 17 GRAM(S): 17 POWDER, FOR SOLUTION ORAL at 13:38

## 2017-12-13 RX ADMIN — WARFARIN SODIUM 3 MILLIGRAM(S): 2.5 TABLET ORAL at 01:03

## 2017-12-13 RX ADMIN — Medication 1000 UNIT(S): at 13:37

## 2017-12-13 RX ADMIN — Medication 250 MILLIGRAM(S): at 06:49

## 2017-12-13 RX ADMIN — Medication 250 MILLIGRAM(S): at 19:34

## 2017-12-13 NOTE — PROVIDER CONTACT NOTE (CHANGE IN STATUS NOTIFICATION) - ACTION/TREATMENT ORDERED:
ok to give coumadin 3mg as ordered
Dr Lozano in to see pt, and recommends frequent monitoring, but suggests no ice or further treatment unless the site changes in appearance. Nursing supervision in to see pt as well.

## 2017-12-13 NOTE — PROGRESS NOTE ADULT - ASSESSMENT
1. near syncope, likely vaso vagal.  2. Afib. permanent, rate acceptable  3. Cll    Plan: cont warfarin, atenolol, aldactone.  cardiac status is stable

## 2017-12-13 NOTE — PROGRESS NOTE ADULT - SUBJECTIVE AND OBJECTIVE BOX
Patient is a 86y old  Female who presents with a chief complaint of vasovagal near syncope (10 Dec 2017 12:25)      HPI:  86F with hx of spinal stenosis, HTN, Chronic compression fx of spine, CLL, presents after near syncope at home. patient had fallen out of a chair at the dinner table. No Head strike, No LOC. Aide assisted her up, patient was take to [facility], treated and dc'd home. Upon walking in the door patient had been complaining of back pain and some mild dizziness. Family took her to the bathroom upon entering the house and while on the toilet, felt more dizzy and the became pale and started to feel faint. Daughter called EMS and patient brought to ED. Patient has had vasovagal episodes in the past- this felt similar to patient/appeared similar to family. Currently patient is c/o LLE pain, right forearm pain. (10 Dec 2017 12:25)  12/11 Seen with aide at bedside. no definite loc. patient offers no complaints today except pain/  12/13 no new complaints today  PAST MEDICAL & SURGICAL HISTORY:  [place] (hard of hearing), bilateral  Vaginal prolapse  Spondyloarthritis  Spinal stenosis  Essential hypertension  Acute cystitis without hematuria: septic  4/2016  Paroxysmal atrial fibrillation  Monoclonal gammopathies  Venous insufficiency  Lymphedema of both lower extremities  History of vaginal surgery  S/P thyroidectomy: partial   1975  S/P kyphoplasty: 2014        MEDICATIONS  (STANDING):  ATENolol  Tablet 12.5 milliGRAM(s) Oral daily  calcium carbonate 500 mG (Tums) Chewable 1 Tablet(s) Chew two times a day  cefuroxime   Tablet 250 milliGRAM(s) Oral every 12 hours  cholecalciferol 1000 Unit(s) Oral daily  lactobacillus acidophilus 1 Tablet(s) Oral daily  lactobacillus acidophilus 1 Tablet(s) Oral two times a day with meals  lidocaine   Patch 1 Patch Transdermal daily  multivitamin 1 Tablet(s) Oral daily  pantoprazole    Tablet 40 milliGRAM(s) Oral before breakfast  sertraline 50 milliGRAM(s) Oral daily  spironolactone 25 milliGRAM(s) Oral daily    MEDICATIONS  (PRN):  acetaminophen   Tablet. 650 milliGRAM(s) Oral every 6 hours PRN Mild Pain (1 - 3)  docusate sodium 100 milliGRAM(s) Oral three times a day PRN Constipation  traMADol 25 milliGRAM(s) Oral every 8 hours PRN Moderate Pain (4 - 6)          Vital Signs Last 24 Hrs  T(C): 36.9 (13 Dec 2017 05:48), Max: 37.1 (12 Dec 2017 17:56)  T(F): 98.5 (13 Dec 2017 05:48), Max: 98.8 (12 Dec 2017 17:56)  HR: 81 (13 Dec 2017 05:48) (66 - 81)  BP: 133/56 (13 Dec 2017 05:48) (110/52 - 133/56)  BP(mean): 64 (12 Dec 2017 11:34) (64 - 64)  RR: 18 (13 Dec 2017 05:48) (18 - 18)  SpO2: 97% (13 Dec 2017 05:48) (97% - 100%)    I&O's Summary    12 Dec 2017 07:01  -  13 Dec 2017 07:00  --------------------------------------------------------  IN: 0 mL / OUT: 150 mL / NET: -150 mL        PHYSICAL EXAM  General Appearance: NAD  HEENT: NCAT  Neck:   Back:   Lungs: clear  Heart: IRR S1s2 syst m  Abdomen:   Extremities: ecchymoses bilat with swelling of lle  Skin:   Neurologic: alert and oriented      INTERPRETATION OF TELEMETRY:    ECG:        LABS:                          8.9    5.9   )-----------( 72       ( 13 Dec 2017 06:48 )             26.3     12-13    138  |  105  |  16  ----------------------------<  92  3.7   |  27  |  0.67    Ca    8.1<L>      13 Dec 2017 06:48  Phos  2.1     12-13  Mg     2.0     12-13            Pro BNP  6510 12-10 @ 06:08  D Dimer  -- 12-10 @ 06:08    PT/INR - ( 13 Dec 2017 06:48 )   PT: 20.6 sec;   INR: 1.88 ratio                   RADIOLOGY & ADDITIONAL STUDIES:

## 2017-12-13 NOTE — PROGRESS NOTE ADULT - SUBJECTIVE AND OBJECTIVE BOX
c/c: near syncope      HPI:  86F with hx of spinal stenosis, HTN, Chronic compression fx of spine, CLL, presents after near syncope at home. patient had fallen out of a chair at the dinner table. No Head strike, No LOC. Aide assisted her up, patient was take to [facility], treated and dc'd home. Upon walking in the door patient had been complaining of back pain and some mild dizziness. Family took her to the bathroom upon entering the house and while on the toilet, felt more dizzy and the became pale and started to feel faint. Daughter called EMS and patient brought to ED. Patient has had vasovagal episodes in the past- this felt similar to patient/appeared similar to family. In ED, pt c/o LLE pain, right forearm pain.  Imaging negative for fractures. Hospital course notable for worsening swelling of LLE. Doppler neg for DVT. +left calf hematoma. Also subsequently developed Right LE hematoma/blood blister.    12/13: pt seen and examined this am. Was feeling ok. No pain at rest. Hadn't gotten up to ambulate yet. No sob/chest pain.     Review of system- All 10 systems reviewed and is as per HPI otherwise negative.   Vital Signs Last 24 Hrs  T(C): 36.7 (13 Dec 2017 11:19), Max: 37.1 (12 Dec 2017 17:56)  T(F): 98.1 (13 Dec 2017 11:19), Max: 98.8 (12 Dec 2017 17:56)  HR: 72 (13 Dec 2017 11:19) (72 - 81)  BP: 126/60 (13 Dec 2017 11:19) (124/55 - 133/56)  RR: 16 (13 Dec 2017 11:19) (16 - 18)  SpO2: 98% (13 Dec 2017 11:19) (97% - 98%)  PHYSICAL EXAM:    GENERAL: Comfortable, no acute distress  HEAD:  Atraumatic, Normocephalic  EYES: EOMI, PERRLA  HEENT: Moist mucous membranes  NECK: Supple, No JVD  NERVOUS SYSTEM:  confused, non focal.   CHEST/LUNG: Clear to auscultation bilaterally  HEART: Regular rate and rhythm  ABDOMEN: Soft, Nontender, Nondistended; Bowel sounds present  GENITOURINARY- Voiding, no palpable bladder  EXTREMITIES:  No clubbing, cyanosis. +LLE edema  +lle hematoma, Small area over right anterior tibial area with hematoma/overlying blood blister with improvement from yesterday  MUSCULOSKELETAL-b/l LE tenderness.   SKIN-thin/fragile skin    LABS:                        8.9    5.9   )-----------( 72       ( 13 Dec 2017 06:48 )             26.3     12-13    138  |  105  |  16  ----------------------------<  92  3.7   |  27  |  0.67    Ca    8.1<L>      13 Dec 2017 06:48  Phos  2.1     12-13  Mg     2.0     12-13      PT/INR - ( 13 Dec 2017 06:48 )   PT: 20.6 sec;   INR: 1.88 ratio       MEDS  acetaminophen   Tablet. 650 milliGRAM(s) Oral every 6 hours PRN  ATENolol  Tablet 12.5 milliGRAM(s) Oral daily  calcium carbonate 500 mG (Tums) Chewable 1 Tablet(s) Chew two times a day  cholecalciferol 1000 Unit(s) Oral daily  docusate sodium 100 milliGRAM(s) Oral three times a day PRN  lactobacillus acidophilus 1 Tablet(s) Oral daily  lidocaine   Patch 1 Patch Transdermal daily  multivitamin 1 Tablet(s) Oral daily  pantoprazole    Tablet 40 milliGRAM(s) Oral before breakfast  sertraline 50 milliGRAM(s) Oral daily  spironolactone 25 milliGRAM(s) Oral daily  traMADol 25 milliGRAM(s) Oral every 8 hours PRN  warfarin 3 milliGRAM(s) Oral daily        ASSESSMENT AND PLAN:  86F, PMH AS ABOVE A/W:    1. Likely vasovagal episode:  -cardio eval appreciated  -pain control    2. Fall/LLE pain/hematoma subsequent RLE hematoma:  -repeat h/h today  -monitor  -if significant drop, hold coumadin  -seen by ortho  -daughter insisting on vascular consultation with Dr. Topete despite reassurance.  -tib/fib xrays neg for fracture.  -physical therapy      3. CLL:  -stable, outpt f/u      4. Back pain, Spinal Stenosis and Chronic Compression fx of T &L spines  -prn pain meds  -physical therapy  -fall px    5. AFib on Coumadin  -rate controlled, c/w atenolol  -dose coumadin based on inr    6. Chronic LE edema:  -on diuretics    7. DVT px

## 2017-12-13 NOTE — PROVIDER CONTACT NOTE (MEDICATION) - SITUATION
Pt on coumadin for afib, Dr. Lozano states in note pt to take coumadin daily, No order scheduled at this time. INR=1.88 last dose given 12/13/2017 at approx 0100.

## 2017-12-14 LAB
ANION GAP SERPL CALC-SCNC: 4 MMOL/L — LOW (ref 5–17)
BASOPHILS # BLD AUTO: 0.1 K/UL — SIGNIFICANT CHANGE UP (ref 0–0.2)
BUN SERPL-MCNC: 15 MG/DL — SIGNIFICANT CHANGE UP (ref 7–23)
CALCIUM SERPL-MCNC: 8.5 MG/DL — SIGNIFICANT CHANGE UP (ref 8.5–10.1)
CHLORIDE SERPL-SCNC: 104 MMOL/L — SIGNIFICANT CHANGE UP (ref 96–108)
CO2 SERPL-SCNC: 30 MMOL/L — SIGNIFICANT CHANGE UP (ref 22–31)
CREAT SERPL-MCNC: 0.73 MG/DL — SIGNIFICANT CHANGE UP (ref 0.5–1.3)
EOSINOPHIL # BLD AUTO: 0.1 K/UL — SIGNIFICANT CHANGE UP (ref 0–0.5)
EOSINOPHIL NFR BLD AUTO: 1 % — SIGNIFICANT CHANGE UP (ref 0–6)
GIANT PLATELETS BLD QL SMEAR: PRESENT — SIGNIFICANT CHANGE UP
GLUCOSE SERPL-MCNC: 91 MG/DL — SIGNIFICANT CHANGE UP (ref 70–99)
HCT VFR BLD CALC: 27.4 % — LOW (ref 34.5–45)
HGB BLD-MCNC: 9.1 G/DL — LOW (ref 11.5–15.5)
INR BLD: 1.73 RATIO — HIGH (ref 0.88–1.16)
LYMPHOCYTES # BLD AUTO: 0.6 K/UL — LOW (ref 1–3.3)
LYMPHOCYTES # BLD AUTO: 3 % — LOW (ref 13–44)
MAGNESIUM SERPL-MCNC: 2 MG/DL — SIGNIFICANT CHANGE UP (ref 1.6–2.6)
MANUAL DIF COMMENT BLD-IMP: SIGNIFICANT CHANGE UP
MCHC RBC-ENTMCNC: 31.8 PG — SIGNIFICANT CHANGE UP (ref 27–34)
MCHC RBC-ENTMCNC: 33 GM/DL — SIGNIFICANT CHANGE UP (ref 32–36)
MCV RBC AUTO: 96.3 FL — SIGNIFICANT CHANGE UP (ref 80–100)
MONOCYTES # BLD AUTO: 2.7 K/UL — HIGH (ref 0–0.9)
MONOCYTES NFR BLD AUTO: 43 % — HIGH (ref 2–14)
NEUTROPHILS # BLD AUTO: 4 K/UL — SIGNIFICANT CHANGE UP (ref 1.8–7.4)
NEUTROPHILS NFR BLD AUTO: 53 % — SIGNIFICANT CHANGE UP (ref 43–77)
PHOSPHATE SERPL-MCNC: 1.9 MG/DL — LOW (ref 2.5–4.5)
PLAT MORPH BLD: NORMAL — SIGNIFICANT CHANGE UP
PLATELET # BLD AUTO: 74 K/UL — LOW (ref 150–400)
POTASSIUM SERPL-MCNC: 4.4 MMOL/L — SIGNIFICANT CHANGE UP (ref 3.5–5.3)
POTASSIUM SERPL-SCNC: 4.4 MMOL/L — SIGNIFICANT CHANGE UP (ref 3.5–5.3)
PROTHROM AB SERPL-ACNC: 18.9 SEC — HIGH (ref 9.8–12.7)
RBC # BLD: 2.84 M/UL — LOW (ref 3.8–5.2)
RBC # FLD: 13 % — SIGNIFICANT CHANGE UP (ref 10.3–14.5)
RBC BLD AUTO: SIGNIFICANT CHANGE UP
SODIUM SERPL-SCNC: 138 MMOL/L — SIGNIFICANT CHANGE UP (ref 135–145)
WBC # BLD: 7.4 K/UL — SIGNIFICANT CHANGE UP (ref 3.8–10.5)
WBC # FLD AUTO: 7.4 K/UL — SIGNIFICANT CHANGE UP (ref 3.8–10.5)

## 2017-12-14 RX ORDER — SODIUM,POTASSIUM PHOSPHATES 278-250MG
2 POWDER IN PACKET (EA) ORAL ONCE
Qty: 0 | Refills: 0 | Status: COMPLETED | OUTPATIENT
Start: 2017-12-14 | End: 2017-12-14

## 2017-12-14 RX ORDER — LACTULOSE 10 G/15ML
20 SOLUTION ORAL
Qty: 0 | Refills: 0 | Status: DISCONTINUED | OUTPATIENT
Start: 2017-12-14 | End: 2017-12-15

## 2017-12-14 RX ORDER — WARFARIN SODIUM 2.5 MG/1
3 TABLET ORAL DAILY
Qty: 0 | Refills: 0 | Status: DISCONTINUED | OUTPATIENT
Start: 2017-12-14 | End: 2017-12-15

## 2017-12-14 RX ORDER — SENNA PLUS 8.6 MG/1
2 TABLET ORAL AT BEDTIME
Qty: 0 | Refills: 0 | Status: DISCONTINUED | OUTPATIENT
Start: 2017-12-14 | End: 2017-12-15

## 2017-12-14 RX ADMIN — Medication 1000 MILLIGRAM(S): at 06:19

## 2017-12-14 RX ADMIN — POLYETHYLENE GLYCOL 3350 17 GRAM(S): 17 POWDER, FOR SOLUTION ORAL at 11:51

## 2017-12-14 RX ADMIN — PANTOPRAZOLE SODIUM 40 MILLIGRAM(S): 20 TABLET, DELAYED RELEASE ORAL at 06:18

## 2017-12-14 RX ADMIN — WARFARIN SODIUM 3 MILLIGRAM(S): 2.5 TABLET ORAL at 21:18

## 2017-12-14 RX ADMIN — Medication 1 TABLET(S): at 17:33

## 2017-12-14 RX ADMIN — Medication 1 TABLET(S): at 11:51

## 2017-12-14 RX ADMIN — LIDOCAINE 1 PATCH: 4 CREAM TOPICAL at 00:55

## 2017-12-14 RX ADMIN — Medication 100 MILLIGRAM(S): at 21:18

## 2017-12-14 RX ADMIN — Medication 250 MILLIGRAM(S): at 06:18

## 2017-12-14 RX ADMIN — ATENOLOL 12.5 MILLIGRAM(S): 25 TABLET ORAL at 06:18

## 2017-12-14 RX ADMIN — LIDOCAINE 1 PATCH: 4 CREAM TOPICAL at 23:43

## 2017-12-14 RX ADMIN — Medication 1000 UNIT(S): at 11:51

## 2017-12-14 RX ADMIN — WARFARIN SODIUM 3 MILLIGRAM(S): 2.5 TABLET ORAL at 00:23

## 2017-12-14 RX ADMIN — Medication 1000 MILLIGRAM(S): at 23:43

## 2017-12-14 RX ADMIN — Medication 2 PACKET(S): at 11:51

## 2017-12-14 RX ADMIN — Medication 1 TABLET(S): at 06:18

## 2017-12-14 RX ADMIN — Medication 100 MILLIGRAM(S): at 13:16

## 2017-12-14 RX ADMIN — SENNA PLUS 2 TABLET(S): 8.6 TABLET ORAL at 21:18

## 2017-12-14 RX ADMIN — Medication 250 MILLIGRAM(S): at 17:33

## 2017-12-14 RX ADMIN — Medication 1000 MILLIGRAM(S): at 23:04

## 2017-12-14 RX ADMIN — Medication 1 TABLET(S): at 08:48

## 2017-12-14 RX ADMIN — SERTRALINE 50 MILLIGRAM(S): 25 TABLET, FILM COATED ORAL at 11:51

## 2017-12-14 RX ADMIN — SPIRONOLACTONE 25 MILLIGRAM(S): 25 TABLET, FILM COATED ORAL at 06:18

## 2017-12-14 RX ADMIN — LIDOCAINE 1 PATCH: 4 CREAM TOPICAL at 11:51

## 2017-12-14 RX ADMIN — Medication 100 MILLIGRAM(S): at 06:18

## 2017-12-14 NOTE — PROGRESS NOTE ADULT - SUBJECTIVE AND OBJECTIVE BOX
c/c: near syncope      HPI:  86F with hx of spinal stenosis, HTN, Chronic compression fx of spine, CLL, presents after near syncope at home. patient had fallen out of a chair at the dinner table. No Head strike, No LOC. Aide assisted her up, patient was take to [facility], treated and dc'd home. Upon walking in the door patient had been complaining of back pain and some mild dizziness. Family took her to the bathroom upon entering the house and while on the toilet, felt more dizzy and the became pale and started to feel faint. Daughter called EMS and patient brought to ED. Patient has had vasovagal episodes in the past- this felt similar to patient/appeared similar to family. In ED, pt c/o LLE pain, right forearm pain.  Imaging negative for fractures. Hospital course notable for worsening swelling of LLE. Doppler neg for DVT. +left calf hematoma. Also subsequently developed Right LE hematoma/blood blister.    12/14: pt seen and examined. Doing well. Able to ambulate a few steps with physical therapy. NO sob/chest pain.     Review of system- All 10 systems reviewed and is as per HPI otherwise negative.     Vital Signs Last 24 Hrs  T(C): 36.6 (14 Dec 2017 05:30), Max: 37.2 (13 Dec 2017 17:10)  T(F): 97.9 (14 Dec 2017 05:30), Max: 98.9 (13 Dec 2017 17:10)  HR: 73 (14 Dec 2017 11:20) (67 - 75)  BP: 105/57 (14 Dec 2017 11:20) (105/57 - 136/53)  RR: 16 (14 Dec 2017 11:20) (16 - 16)  SpO2: 98% (14 Dec 2017 11:20) (98% - 100%)  PHYSICAL EXAM:    GENERAL: Comfortable, no acute distress  HEAD:  Atraumatic, Normocephalic  EYES: EOMI, PERRLA  HEENT: Moist mucous membranes  NECK: Supple, No JVD  NERVOUS SYSTEM:  confused, non focal.   CHEST/LUNG: Clear to auscultation bilaterally  HEART: Regular rate and rhythm  ABDOMEN: Soft, Nontender, Nondistended; Bowel sounds present  GENITOURINARY- Voiding, no palpable bladder  EXTREMITIES:  No clubbing, cyanosis. +LLE edema  +lle hematoma, Small area over right anterior tibial area with hematoma/overlying blood blister with improvement   MUSCULOSKELETAL-b/l LE tenderness.   SKIN-thin/fragile skin  LABS:                        9.1    7.4   )-----------( 74       ( 14 Dec 2017 06:12 )             27.4     12-14    138  |  104  |  15  ----------------------------<  91  4.4   |  30  |  0.73    Ca    8.5      14 Dec 2017 06:12  Phos  1.9     12-14  Mg     2.0     12-14      PT/INR - ( 14 Dec 2017 06:12 )   PT: 18.9 sec;   INR: 1.73 ratio       MEDICATIONS  (STANDING):  ATENolol  Tablet 12.5 milliGRAM(s) Oral daily  calcium carbonate 500 mG (Tums) Chewable 1 Tablet(s) Chew two times a day  cefuroxime   Tablet 250 milliGRAM(s) Oral every 12 hours  cholecalciferol 1000 Unit(s) Oral daily  docusate sodium 100 milliGRAM(s) Oral three times a day  lactobacillus acidophilus 1 Tablet(s) Oral daily  lactobacillus acidophilus 1 Tablet(s) Oral two times a day with meals  lidocaine   Patch 1 Patch Transdermal daily  multivitamin 1 Tablet(s) Oral daily  pantoprazole    Tablet 40 milliGRAM(s) Oral before breakfast  polyethylene glycol 3350 17 Gram(s) Oral daily  sertraline 50 milliGRAM(s) Oral daily  spironolactone 25 milliGRAM(s) Oral daily  warfarin 3 milliGRAM(s) Oral daily    MEDICATIONS  (PRN):  acetaminophen   Tablet. 1000 milliGRAM(s) Oral two times a day PRN Moderate Pain (4 - 6)  traMADol 25 milliGRAM(s) Oral every 8 hours PRN Moderate Pain (4 - 6)  ASSESSMENT AND PLAN:  86F, PMH AS ABOVE A/W:    1. Likely vasovagal episode:  -cardio eval appreciated  -pain control    2. Fall/LLE pain/hematoma subsequent RLE hematoma:  -no s/o active bleeding, h/h relatively stable.   -seen by ortho  -daughter insisting on vascular consultation with Dr. Topete despite reassurance.  -tib/fib xrays neg for fracture.  -physical therapy      3. CLL:  -stable, outpt f/u      4. Back pain, Spinal Stenosis and Chronic Compression fx of T &L spines  -prn pain meds  -physical therapy  -fall px    5. AFib on Coumadin  -rate controlled, c/w atenolol  -dose coumadin based on inr    6. Chronic LE edema:  -on diuretics    7. UTI:  -outpt cx with proteus  -continue ceftin    8. DVT px    dispo: dc planning to rehab tomorrow if h/h stable.  will d/w daughter

## 2017-12-14 NOTE — PROGRESS NOTE ADULT - SUBJECTIVE AND OBJECTIVE BOX
Patient is a 86y old  Female who presents with a chief complaint of vasovagal near syncope (10 Dec 2017 12:25)  86F with hx of spinal stenosis, HTN, Chronic compression fx of spine, CLL, presents after near syncope at home. patient had fallen out of a chair at the dinner table. No Head strike, No LOC. Aide assisted her up, patient was take to [facility], treated and dc'd home. Upon walking in the door patient had been complaining of back pain and some mild dizziness. Family took her to the bathroom upon entering the house and while on the toilet, felt more dizzy and the became pale and started to feel faint. Daughter called EMS and patient brought to ED. Patient has had vasovagal episodes in the past- this felt similar to patient/appeared similar to family. Currently patient is c/o LLE pain, right forearm pain. (10 Dec 2017 12:25)    Followup HPI:  12/11 Seen with aide at bedside. no definite loc. patient offers no complaints today except pain/  12/13 no new complaints today  12/14- No new complaints. Continued pain left knee and calf.      PAST MEDICAL & SURGICAL HISTORY:  [place] (hard of hearing), bilateral  Vaginal prolapse  Spondyloarthritis  Spinal stenosis  Essential hypertension  Acute cystitis without hematuria: septic  4/2016  Paroxysmal atrial fibrillation  Monoclonal gammopathies  Venous insufficiency  Lymphedema of both lower extremities  History of vaginal surgery  S/P thyroidectomy: partial   1975  S/P kyphoplasty: 2014      Review of symptoms:  Negative except for as noted in today's HPI.      MEDICATIONS  (STANDING):  ATENolol  Tablet 12.5 milliGRAM(s) Oral daily  calcium carbonate 500 mG (Tums) Chewable 1 Tablet(s) Chew two times a day  cefuroxime   Tablet 250 milliGRAM(s) Oral every 12 hours  cholecalciferol 1000 Unit(s) Oral daily  docusate sodium 100 milliGRAM(s) Oral three times a day  lactobacillus acidophilus 1 Tablet(s) Oral daily  lactobacillus acidophilus 1 Tablet(s) Oral two times a day with meals  lidocaine   Patch 1 Patch Transdermal daily  multivitamin 1 Tablet(s) Oral daily  pantoprazole    Tablet 40 milliGRAM(s) Oral before breakfast  polyethylene glycol 3350 17 Gram(s) Oral daily  sertraline 50 milliGRAM(s) Oral daily  spironolactone 25 milliGRAM(s) Oral daily    MEDICATIONS  (PRN):  acetaminophen   Tablet. 1000 milliGRAM(s) Oral two times a day PRN Moderate Pain (4 - 6)  traMADol 25 milliGRAM(s) Oral every 8 hours PRN Moderate Pain (4 - 6)          Vital Signs Last 24 Hrs  T(C): 36.6 (14 Dec 2017 05:30), Max: 37.2 (13 Dec 2017 17:10)  T(F): 97.9 (14 Dec 2017 05:30), Max: 98.9 (13 Dec 2017 17:10)  HR: 74 (14 Dec 2017 05:30) (67 - 75)  BP: 136/53 (14 Dec 2017 05:30) (126/48 - 136/53)  BP(mean): --  RR: 16 (14 Dec 2017 05:30) (16 - 16)  SpO2: 99% (14 Dec 2017 05:30) (98% - 100%)    I&O's Summary      PHYSICAL EXAM  General Appearance: alert and no distress  HEENT:   Neck:   Lungs: clear  Heart: S1 and S2 normal. 1/6 FARZANEH LSB, 1/6 diastolic blow LSB  Abdomen: soft and nontender  Extremities: left knee and calf swollen with ecchymotic calf and pretibial area. Right leg 1+  edema with localized ecchymotic area pretibially.  Neurologic: no focal abnormality      INTERPRETATION OF TELEMETRY:    ECG:    LABS:                          9.1    7.4   )-----------( 74       ( 14 Dec 2017 06:12 )             27.4     12-14    138  |  104  |  15  ----------------------------<  91  4.4   |  30  |  0.73    Ca    8.5      14 Dec 2017 06:12  Phos  1.9     12-14  Mg     2.0     12-14            Pro BNP  6510 12-10 @ 06:08  D Dimer  -- 12-10 @ 06:08    PT/INR - ( 14 Dec 2017 06:12 )   PT: 18.9 sec;   INR: 1.73 ratio                   RADIOLOGY & ADDITIONAL STUDIES:    IMPRESSION:  1. s/p vasovagal event.  2. Afib. permanent, rate acceptable  3. Cll  4. Chronic venous insufficiency.  5.Mild aortic regurgitation, chronic and stable.   6.Hematoma left knee and pretibial area due to trauma while on  warfarin.  7. Proteus UTI  8.Hypertensiion. Stable.   9. Depressive disorder. Stable.   PLAN:  Continue atenolol, spironolactone, sertraline, antibiotics. Suggest warfarin to continue with goal INR 1.7-2.0 because of the hematoma.

## 2017-12-15 ENCOUNTER — TRANSCRIPTION ENCOUNTER (OUTPATIENT)
Age: 82
End: 2017-12-15

## 2017-12-15 VITALS
HEART RATE: 68 BPM | DIASTOLIC BLOOD PRESSURE: 46 MMHG | TEMPERATURE: 98 F | RESPIRATION RATE: 16 BRPM | OXYGEN SATURATION: 99 % | SYSTOLIC BLOOD PRESSURE: 101 MMHG

## 2017-12-15 LAB
BASOPHILS # BLD AUTO: 0.1 K/UL — SIGNIFICANT CHANGE UP (ref 0–0.2)
EOSINOPHIL # BLD AUTO: 0 K/UL — SIGNIFICANT CHANGE UP (ref 0–0.5)
HCT VFR BLD CALC: 26.7 % — LOW (ref 34.5–45)
HGB BLD-MCNC: 8.8 G/DL — LOW (ref 11.5–15.5)
INR BLD: 1.75 RATIO — HIGH (ref 0.88–1.16)
LYMPHOCYTES # BLD AUTO: 0.6 K/UL — LOW (ref 1–3.3)
LYMPHOCYTES # BLD AUTO: 6 % — LOW (ref 13–44)
MANUAL DIF COMMENT BLD-IMP: SIGNIFICANT CHANGE UP
MCHC RBC-ENTMCNC: 31.8 PG — SIGNIFICANT CHANGE UP (ref 27–34)
MCHC RBC-ENTMCNC: 33 GM/DL — SIGNIFICANT CHANGE UP (ref 32–36)
MCV RBC AUTO: 96.3 FL — SIGNIFICANT CHANGE UP (ref 80–100)
MONOCYTES # BLD AUTO: 2.6 K/UL — HIGH (ref 0–0.9)
MONOCYTES NFR BLD AUTO: 25 % — HIGH (ref 2–14)
NEUTROPHILS # BLD AUTO: 3.8 K/UL — SIGNIFICANT CHANGE UP (ref 1.8–7.4)
NEUTROPHILS NFR BLD AUTO: 69 % — SIGNIFICANT CHANGE UP (ref 43–77)
PLAT MORPH BLD: NORMAL — SIGNIFICANT CHANGE UP
PLATELET # BLD AUTO: 84 K/UL — LOW (ref 150–400)
PROTHROM AB SERPL-ACNC: 19.1 SEC — HIGH (ref 9.8–12.7)
RBC # BLD: 2.77 M/UL — LOW (ref 3.8–5.2)
RBC # FLD: 12.7 % — SIGNIFICANT CHANGE UP (ref 10.3–14.5)
RBC BLD AUTO: SIGNIFICANT CHANGE UP
WBC # BLD: 7 K/UL — SIGNIFICANT CHANGE UP (ref 3.8–10.5)
WBC # FLD AUTO: 7 K/UL — SIGNIFICANT CHANGE UP (ref 3.8–10.5)

## 2017-12-15 RX ORDER — POLYETHYLENE GLYCOL 3350 17 G/17G
17 POWDER, FOR SOLUTION ORAL
Qty: 0 | Refills: 0 | DISCHARGE
Start: 2017-12-15

## 2017-12-15 RX ORDER — TRAMADOL HYDROCHLORIDE 50 MG/1
0.5 TABLET ORAL
Qty: 0 | Refills: 0 | COMMUNITY
Start: 2017-12-15

## 2017-12-15 RX ORDER — DOCUSATE SODIUM 100 MG
1 CAPSULE ORAL
Qty: 0 | Refills: 0 | COMMUNITY
Start: 2017-12-15

## 2017-12-15 RX ORDER — SENNA PLUS 8.6 MG/1
2 TABLET ORAL
Qty: 0 | Refills: 0 | DISCHARGE
Start: 2017-12-15

## 2017-12-15 RX ORDER — SENNA PLUS 8.6 MG/1
2 TABLET ORAL
Qty: 0 | Refills: 0 | COMMUNITY
Start: 2017-12-15

## 2017-12-15 RX ORDER — ATENOLOL 25 MG/1
12.5 TABLET ORAL
Qty: 0 | Refills: 0 | DISCHARGE
Start: 2017-12-15

## 2017-12-15 RX ORDER — LACTULOSE 10 G/15ML
30 SOLUTION ORAL
Qty: 0 | Refills: 0 | DISCHARGE
Start: 2017-12-15

## 2017-12-15 RX ORDER — LACTOBACILLUS ACIDOPHILUS 100MM CELL
1 CAPSULE ORAL
Qty: 0 | Refills: 0 | COMMUNITY

## 2017-12-15 RX ORDER — CEFUROXIME AXETIL 250 MG
1 TABLET ORAL
Qty: 0 | Refills: 0 | COMMUNITY
Start: 2017-12-15

## 2017-12-15 RX ORDER — ATENOLOL 25 MG/1
1 TABLET ORAL
Qty: 0 | Refills: 0 | COMMUNITY

## 2017-12-15 RX ORDER — ACETAMINOPHEN 500 MG
2 TABLET ORAL
Qty: 0 | Refills: 0 | COMMUNITY
Start: 2017-12-15

## 2017-12-15 RX ORDER — LACTOBACILLUS ACIDOPHILUS 100MM CELL
1 CAPSULE ORAL
Qty: 0 | Refills: 0 | DISCHARGE
Start: 2017-12-15

## 2017-12-15 RX ORDER — LIDOCAINE 4 G/100G
1 CREAM TOPICAL
Qty: 0 | Refills: 0 | COMMUNITY
Start: 2017-12-15

## 2017-12-15 RX ORDER — WARFARIN SODIUM 2.5 MG/1
1 TABLET ORAL
Qty: 0 | Refills: 0 | COMMUNITY

## 2017-12-15 RX ADMIN — Medication 1 TABLET(S): at 12:10

## 2017-12-15 RX ADMIN — Medication 1000 UNIT(S): at 12:10

## 2017-12-15 RX ADMIN — Medication 250 MILLIGRAM(S): at 05:22

## 2017-12-15 RX ADMIN — ATENOLOL 12.5 MILLIGRAM(S): 25 TABLET ORAL at 05:22

## 2017-12-15 RX ADMIN — Medication 1 TABLET(S): at 05:22

## 2017-12-15 RX ADMIN — Medication 1 TABLET(S): at 08:22

## 2017-12-15 RX ADMIN — Medication 1000 MILLIGRAM(S): at 09:48

## 2017-12-15 RX ADMIN — SPIRONOLACTONE 25 MILLIGRAM(S): 25 TABLET, FILM COATED ORAL at 05:23

## 2017-12-15 RX ADMIN — SERTRALINE 50 MILLIGRAM(S): 25 TABLET, FILM COATED ORAL at 12:10

## 2017-12-15 RX ADMIN — LIDOCAINE 1 PATCH: 4 CREAM TOPICAL at 12:10

## 2017-12-15 RX ADMIN — PANTOPRAZOLE SODIUM 40 MILLIGRAM(S): 20 TABLET, DELAYED RELEASE ORAL at 05:22

## 2017-12-15 NOTE — DISCHARGE NOTE ADULT - ADDITIONAL INSTRUCTIONS
1. Check PT, INR in 2 days, adjust coumadin dose to keep INR 1.7-2 given hematoma L calf.   2. Once LE hematomas improve, can resume use of compression stockings.   3. Please be extremely careful with helping patient move given skin fragility

## 2017-12-15 NOTE — DISCHARGE NOTE ADULT - PATIENT PORTAL LINK FT
“You can access the [facility] Patient Portal, offered by [facility], by registering with the following website: [email or web address]”

## 2017-12-15 NOTE — DISCHARGE NOTE ADULT - CARE PROVIDER_API CALL
Tony Vides), Cardiovascular Disease; Internal Medicine  [address] [place] [address]  Phone: [phone]  Fax: [phone]

## 2017-12-15 NOTE — DISCHARGE NOTE ADULT - MEDICATION SUMMARY - MEDICATIONS TO TAKE
I will START or STAY ON the medications listed below when I get home from the hospital:    spironolactone 25 mg oral tablet  -- 1 tab(s) by mouth once a day  -- Indication: For home med    acetaminophen 500 mg oral tablet  -- 2 tab(s) by mouth 2 times a day, As needed, Moderate Pain (4 - 6)  -- Indication: For home med    traMADol 50 mg oral tablet  -- 0.5 tab(s) by mouth every 8 hours, As needed, Moderate Pain (4 - 6)  -- Indication: For For pain    calcium carbonate 500 mg (200 mg elemental calcium) oral tablet, chewable  -- 1 tab(s) by mouth 2 times a day (with meals)  -- Indication: For home med    Coumadin 3 mg oral tablet  -- 1 tab(s) by mouth once a day. Keep INR 1.7-2 until hematoma improved  -- Indication: For home med    sertraline 50 mg oral tablet  -- 1 tab(s) by mouth once a day  -- Indication: For home med    atenolol  -- 12.5 milligram(s) by mouth once a day  -- Indication: For home med    cefuroxime 250 mg oral tablet  -- 1 tab(s) by mouth every 12 hours. Stop after 12/21/17   -- Indication: For UTI    lidocaine 5% topical film  -- Apply on skin to affected area once a day to lower back at site of pain as needed for pain  -- Indication: For pain    Slow Fe (as elemental iron) 45 mg oral tablet, extended release  -- 1 tab(s) by mouth once a day  -- Indication: For anemia    senna oral tablet  -- 2 tab(s) by mouth once a day (at bedtime)  -- Indication: For constipation    docusate sodium 100 mg oral capsule  -- 1 cap(s) by mouth 3 times a day  -- Indication: For constipation    lactulose 10 g/15 mL oral syrup  -- 30 milliliter(s) by mouth 2 times a day, As needed, constipation  -- Indication: For constipation    polyethylene glycol 3350 oral powder for reconstitution  -- 17 gram(s) by mouth once a day  -- Indication: For constipation    lactobacillus acidophilus oral capsule  -- 1 cap(s) by mouth 2 times a day  -- Indication: For home med    PriLOSEC 20 mg oral delayed release capsule  -- 1 cap(s) by mouth once a day, As Needed  -- for indigestion  -- Indication: For home med    Estrace Vaginal 0.1 mg/g vaginal cream  -- Apply 2 to 3 times per week  -- Indication: For home med    multivitamin  -- 1 tab(s) by mouth once a day  -- Indication: For home med    Vitamin D3 2000 intl units oral tablet  -- 1 tab(s) by mouth once a day  -- Indication: For home med

## 2017-12-15 NOTE — PROGRESS NOTE ADULT - SUBJECTIVE AND OBJECTIVE BOX
Patient is a 86y old  Female who presents with a chief complaint of vasovagal near syncope (10 Dec 2017 12:25)      HPI:  86F with hx of spinal stenosis, HTN, Chronic compression fx of spine, CLL, presents after near syncope at home. patient had fallen out of a chair at the dinner table. No Head strike, No LOC. Aide assisted her up, patient was take to [facility], treated and dc'd home. Upon walking in the door patient had been complaining of back pain and some mild dizziness. Family took her to the bathroom upon entering the house and while on the toilet, felt more dizzy and the became pale and started to feel faint. Daughter called EMS and patient brought to ED. Patient has had vasovagal episodes in the past- this felt similar to patient/appeared similar to family. Currently patient is c/o LLE pain, right forearm pain. (10 Dec 2017 12:25)  12/15- did not ambulate on 12/14    PAST MEDICAL & SURGICAL HISTORY:  [place] (hard of hearing), bilateral  Vaginal prolapse  Spondyloarthritis  Spinal stenosis  Essential hypertension  Acute cystitis without hematuria: septic  4/2016  Paroxysmal atrial fibrillation  Monoclonal gammopathies  Venous insufficiency  Lymphedema of both lower extremities  History of vaginal surgery  S/P thyroidectomy: partial   1975  S/P kyphoplasty: 2014        MEDICATIONS  (STANDING):  ATENolol  Tablet 12.5 milliGRAM(s) Oral daily  calcium carbonate 500 mG (Tums) Chewable 1 Tablet(s) Chew two times a day  cefuroxime   Tablet 250 milliGRAM(s) Oral every 12 hours  cholecalciferol 1000 Unit(s) Oral daily  docusate sodium 100 milliGRAM(s) Oral three times a day  lactobacillus acidophilus 1 Tablet(s) Oral two times a day with meals  lidocaine   Patch 1 Patch Transdermal daily  multivitamin 1 Tablet(s) Oral daily  pantoprazole    Tablet 40 milliGRAM(s) Oral before breakfast  polyethylene glycol 3350 17 Gram(s) Oral daily  senna 2 Tablet(s) Oral at bedtime  sertraline 50 milliGRAM(s) Oral daily  spironolactone 25 milliGRAM(s) Oral daily  warfarin 3 milliGRAM(s) Oral daily    MEDICATIONS  (PRN):  acetaminophen   Tablet. 1000 milliGRAM(s) Oral two times a day PRN Moderate Pain (4 - 6)  lactulose Syrup 20 Gram(s) Oral two times a day PRN constipation  traMADol 25 milliGRAM(s) Oral every 8 hours PRN Moderate Pain (4 - 6)          Vital Signs Last 24 Hrs  T(C): 36.3 (15 Dec 2017 05:10), Max: 36.7 (14 Dec 2017 17:43)  T(F): 97.3 (15 Dec 2017 05:10), Max: 98.1 (14 Dec 2017 17:43)  HR: 65 (15 Dec 2017 05:10) (65 - 77)  BP: 137/62 (15 Dec 2017 05:10) (105/57 - 151/67)  BP(mean): --  RR: 16 (15 Dec 2017 05:10) (16 - 16)  SpO2: 99% (15 Dec 2017 05:10) (98% - 100%)    I&O's Summary      PHYSICAL EXAM  General Appearance: in no distress  HEENT:   Neck:   Back:   Lungs: clear  Heart: 2/6 syst murmur LLSB  Abdomen:   Extremities: 2= left leg edema with extensive hematoma 1+ right leg edema  Skin:   Neurologic:       INTERPRETATION OF TELEMETRY:    ECG:        LABS:                          8.8    7.0   )-----------( x        ( 15 Dec 2017 05:56 )             26.7     12-14    138  |  104  |  15  ----------------------------<  91  4.4   |  30  |  0.73    Ca    8.5      14 Dec 2017 06:12  Phos  1.9     12-14  Mg     2.0     12-14            Pro BNP  6510 12-10 @ 06:08  D Dimer  -- 12-10 @ 06:08    PT/INR - ( 15 Dec 2017 05:56 )   PT: 19.1 sec;   INR: 1.75 ratio         IMPRESSION:  1. s/p vasovagal event.  2. Afib. permanent, rate acceptable  3. Cll  4. Chronic venous insufficiency.  5.Mild aortic regurgitation, chronic and stable.   6.Hematoma left knee and pretibial area due to trauma while on  warfarin. with stbale anemia  7. Proteus UTI  8.Hypertensiion. Stable.   9. Depressive disorder. Stable.   PLAN:  Continue atenolol, spironolactone, sertraline, antibiotics, . Continue warfarin with goal INR 1.7-2.0 because of the hematoma.

## 2017-12-15 NOTE — DISCHARGE NOTE ADULT - HOSPITAL COURSE
86F with hx of spinal stenosis, HTN, Chronic compression fx of spine, CLL, presents after near syncope at home. patient had fallen out of a chair at the dinner table. No Head strike, No LOC. Aide assisted her up, patient was take to [facility], treated and dc'd home. Upon walking in the door patient had been complaining of back pain and some mild dizziness. Family took her to the bathroom upon entering the house and while on the toilet, felt more dizzy and the became pale and started to feel faint. Daughter called EMS and patient brought to ED. Patient has had vasovagal episodes in the past- this felt similar to patient/appeared similar to family. In ED, pt c/o LLE pain, right forearm pain.  Imaging negative for fractures. It was felt she likely had a vasovagal episode related to pain. She was seen by cardiology/her pcp while here and it was noted she had outpt urine cx + for proteus and she was put on ceftin.  Hospital course notable for worsening swelling of LLE. Doppler neg for DVT. +left calf hema. Also subsequently developed Right LE hematoma/blood blister. Her h/h remained relatively stable thereafter. She did not require prbcs. She was seen by physical therapy and is recommended dc to rehab.   Of note it is recommended her goal INR be 1.7-2 at this time until her hematoma has improved.     Vital Signs Last 24 Hrs  T(C): 36.6 (15 Dec 2017 11:18), Max: 36.7 (14 Dec 2017 17:43)  T(F): 97.8 (15 Dec 2017 11:18), Max: 98.1 (14 Dec 2017 17:43)  HR: 68 (15 Dec 2017 11:18) (65 - 77)  BP: 101/46 (15 Dec 2017 11:18) (101/46 - 151/67)  RR: 16 (15 Dec 2017 11:18) (16 - 16)  SpO2: 99% (15 Dec 2017 11:18) (99% - 100%)    PHYSICAL EXAM:    GENERAL:Elderly female , sitting in chair, comfortable, no acute distress   HEAD:  Normocephalic, atraumatic  EYES: EOMI, PERRLA  HEENT: Moist mucous membranes  NECK: Supple, No JVD  NERVOUS SYSTEM:  Alert & Oriented X3, non focal.   CHEST/LUNG: Clear to auscultation bilaterally  HEART: s1, s2+ Regular rate and rhythm  ABDOMEN: Soft, Nontender, Nondistended, Bowel sounds present  GENITOURINARY: Voiding, no palpable bladder  EXTREMITIES:   No clubbing, cyanosis. ++LLE edema/ecchymosis. RLE with smaller hematoma with blood blister over anterior tibial area.   MUSCULOSKELETAL- b/l LE tenderness.   SKIN-no rash, multiple scattered ecchymosis    LABS:                        8.8    7.0   )-----------( 84       ( 15 Dec 2017 05:56 )             26.7     12-14    138  |  104  |  15  ----------------------------<  91  4.4   |  30  |  0.73    Ca    8.5      14 Dec 2017 06:12  Phos  1.9     12-14  Mg     2.0     12-14  PT/INR - ( 15 Dec 2017 05:56 )   PT: 19.1 sec;   INR: 1.75 ratio       Xray Tibia + Fibula 2 Views, Left (12.11.17 @ 15:07)   IMPRESSION :   No fracture or dislocation. Moderate soft tissue swelling.    Degenerative changes of the knee. Small joint effusion.     Xray Hand 3 Views, Left (12.10.17 @ 08:25) >  Impression:  No fracture    Xray Knee 3 Views, Bilateral (12.10.17 @ 08:25) >  Impression:  No fracture    US Duplex Venous Lower Ext Ltd, Left (12.12.17 @ 09:14) >    IMPRESSION: Normal lower extremity venous Doppler of the femoral and   popliteal veins of the left leg to the level of the proximal posterior   tibial vein.  6.9 cm left calf hematoma.    CT Abdomen and Pelvis No Cont (12.10.17 @ 08:11)       1.   Chest:   interstitial scarring in the RIGHT lower lobe.       Cardiomegaly with an enlarged LEFT atrium.      2.   Abdomen and pelvis: No organ injury.   No acute fracture.      FINAL DIAGNOSIS:    1. Likely vasovagal episode:    2. Fall/LLE pain/hematoma subsequent RLE hematoma:  3. CLL  4. Back pain, Spinal Stenosis and Chronic Compression fx of T &L spines  5. AFib on Coumadin  6. Chronic LE edema:  7. UTI    time taken in preparation for dc 75 min  plan d/w patient/daughter constantin at length/Dr. Vides-PCP.

## 2017-12-15 NOTE — PROGRESS NOTE ADULT - SUBJECTIVE AND OBJECTIVE BOX
Dx: LEFT LE hematoma  S: Still has pain over bruising site. Not worse, maybe a little better  O: LLE Ecchymosis appears to be resolving somewhat as turning more yellow proximally. Compartment remain soft. Palpable pulse DP.                          8.8    7.0   )-----------( 84       ( 15 Dec 2017 05:56 )             26.7     14 Dec 2017 06:12    138    |  104    |  15     ----------------------------<  91     4.4     |  30     |  0.73     Ca    8.5        14 Dec 2017 06:12  Phos  1.9       14 Dec 2017 06:12  Mg     2.0       14 Dec 2017 06:12      PT/INR - ( 15 Dec 2017 05:56 )   PT: 19.1 sec;   INR: 1.75 ratio       Vital Signs Last 24 Hrs  T(C): 36.6 (12-15-17 @ 11:18), Max: 36.7 (12-14-17 @ 17:43)  T(F): 97.8 (12-15-17 @ 11:18), Max: 98.1 (12-14-17 @ 17:43)  HR: 68 (12-15-17 @ 11:18) (65 - 77)  BP: 101/46 (12-15-17 @ 11:18) (101/46 - 151/67)  BP(mean): --  RR: 16 (12-15-17 @ 11:18) (16 - 16)  SpO2: 99% (12-15-17 @ 11:18) (99% - 100%)      A/P: 86y Female with LEft LE hematoma  Resolving, will take time. Pt reassured. WBAT with PT assist.  Elevation LLE above heart when in bed  encouraged ankle exercises AROM  DVT PE ppx  Can FU with Dr. Kitchen PRN as outpt  Orthopedically stable for DC  Discussed with Dr. Kitchen Dx: LEFT LE hematoma  S: Still has pain over bruising site. Not worse, maybe a little better  O: LLE Ecchymosis appears to be resolving somewhat as turning more yellow proximally. Compartment remain soft. Palpable pulse DP. No calf TTP.                          8.8    7.0   )-----------( 84       ( 15 Dec 2017 05:56 )             26.7     14 Dec 2017 06:12    138    |  104    |  15     ----------------------------<  91     4.4     |  30     |  0.73     Ca    8.5        14 Dec 2017 06:12  Phos  1.9       14 Dec 2017 06:12  Mg     2.0       14 Dec 2017 06:12      PT/INR - ( 15 Dec 2017 05:56 )   PT: 19.1 sec;   INR: 1.75 ratio       Vital Signs Last 24 Hrs  T(C): 36.6 (12-15-17 @ 11:18), Max: 36.7 (12-14-17 @ 17:43)  T(F): 97.8 (12-15-17 @ 11:18), Max: 98.1 (12-14-17 @ 17:43)  HR: 68 (12-15-17 @ 11:18) (65 - 77)  BP: 101/46 (12-15-17 @ 11:18) (101/46 - 151/67)  BP(mean): --  RR: 16 (12-15-17 @ 11:18) (16 - 16)  SpO2: 99% (12-15-17 @ 11:18) (99% - 100%)      A/P: 86y Female with LEft LE hematoma  Resolving, will take time. Pt reassured. WBAT with PT assist.  Elevation LLE above heart when in bed  encouraged ankle exercises AROM  DVT PE ppx  Can FU with Dr. Kitchen PRN as outpt  Orthopedically stable for DC  Discussed with Dr. Kitchen

## 2017-12-15 NOTE — PROGRESS NOTE ADULT - PROVIDER SPECIALTY LIST ADULT
Cardiology
Hospitalist
Orthopedics
Hospitalist

## 2017-12-15 NOTE — DISCHARGE NOTE ADULT - CARE PLAN
Principal Discharge DX:	Near syncope  Goal:	prevent recurrence  Instructions for follow-up, activity and diet:	stay hydrated/pain control.

## 2017-12-20 DIAGNOSIS — S80.12XA CONTUSION OF LEFT LOWER LEG, INITIAL ENCOUNTER: ICD-10-CM

## 2017-12-20 DIAGNOSIS — C91.10 CHRONIC LYMPHOCYTIC LEUKEMIA OF B-CELL TYPE NOT HAVING ACHIEVED REMISSION: ICD-10-CM

## 2017-12-20 DIAGNOSIS — S50.11XA CONTUSION OF RIGHT FOREARM, INITIAL ENCOUNTER: ICD-10-CM

## 2017-12-20 DIAGNOSIS — I48.0 PAROXYSMAL ATRIAL FIBRILLATION: ICD-10-CM

## 2017-12-20 DIAGNOSIS — I10 ESSENTIAL (PRIMARY) HYPERTENSION: ICD-10-CM

## 2017-12-20 DIAGNOSIS — B96.4 PROTEUS (MIRABILIS) (MORGANII) AS THE CAUSE OF DISEASES CLASSIFIED ELSEWHERE: ICD-10-CM
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
## 2017-12-20 DIAGNOSIS — M84.40XA PATHOLOGICAL FRACTURE, UNSPECIFIED SITE, INITIAL ENCOUNTER FOR FRACTURE: ICD-10-CM
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
## 2017-12-20 DIAGNOSIS — M48.00 SPINAL STENOSIS, SITE UNSPECIFIED: ICD-10-CM
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
## 2017-12-20 DIAGNOSIS — R55 SYNCOPE AND COLLAPSE: ICD-10-CM
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
## 2017-12-20 DIAGNOSIS — H91.93 UNSPECIFIED HEARING LOSS, BILATERAL: ICD-10-CM

## 2018-01-12 ENCOUNTER — INPATIENT (INPATIENT)
Facility: HOSPITAL | Age: 83
LOS: 12 days | Discharge: NURSING, REHAB OR LONG-TERM CARE | End: 2018-01-25
Attending: INTERNAL MEDICINE | Admitting: INTERNAL MEDICINE
Payer: MEDICARE

## 2018-01-12 VITALS
OXYGEN SATURATION: 100 % | HEIGHT: 64 IN | TEMPERATURE: 99 F | RESPIRATION RATE: 16 BRPM | SYSTOLIC BLOOD PRESSURE: 191 MMHG | WEIGHT: 145.06 LBS | DIASTOLIC BLOOD PRESSURE: 76 MMHG | HEART RATE: 73 BPM

## 2018-01-12 DIAGNOSIS — E89.0 POSTPROCEDURAL HYPOTHYROIDISM: Chronic | ICD-10-CM

## 2018-01-12 DIAGNOSIS — Z98.89 OTHER SPECIFIED POSTPROCEDURAL STATES: Chronic | ICD-10-CM

## 2018-01-12 PROCEDURE — 99285 EMERGENCY DEPT VISIT HI MDM: CPT

## 2018-01-12 RX ORDER — SODIUM CHLORIDE 9 MG/ML
3 INJECTION INTRAMUSCULAR; INTRAVENOUS; SUBCUTANEOUS ONCE
Qty: 0 | Refills: 0 | Status: COMPLETED | OUTPATIENT
Start: 2018-01-12 | End: 2018-01-12

## 2018-01-12 RX ORDER — MORPHINE SULFATE 50 MG/1
2 CAPSULE, EXTENDED RELEASE ORAL ONCE
Qty: 0 | Refills: 0 | Status: DISCONTINUED | OUTPATIENT
Start: 2018-01-12 | End: 2018-01-12

## 2018-01-12 RX ADMIN — MORPHINE SULFATE 2 MILLIGRAM(S): 50 CAPSULE, EXTENDED RELEASE ORAL at 23:31

## 2018-01-12 RX ADMIN — SODIUM CHLORIDE 3 MILLILITER(S): 9 INJECTION INTRAMUSCULAR; INTRAVENOUS; SUBCUTANEOUS at 23:31

## 2018-01-12 NOTE — ED PROVIDER NOTE - OBJECTIVE STATEMENT
85 y/o female in ED c/o LLE pain x 1 hr s/p slip and fall at home.  as per family, pt just returned from rehab today.  family states tonight was attempting to help pt from bed to the commode when her leg gave way and she twisted herself onto the floor.  family denies any LOC, HA, head trauma, neck pain, cp, sob, n/v/d/abd pain.  no meds taken for pain

## 2018-01-12 NOTE — ED PROVIDER NOTE - PMH
Acute cystitis without hematuria  septic  4/2016  Essential hypertension    [place] (hard of hearing), bilateral    Lymphedema of both lower extremities    Monoclonal gammopathies    Paroxysmal atrial fibrillation    Spinal stenosis    Spondyloarthritis    Vaginal prolapse    Venous insufficiency

## 2018-01-12 NOTE — ED ADULT TRIAGE NOTE - CHIEF COMPLAINT QUOTE
Pt BIBEMS after falling out of bed while attempting to use commode. C/o right femur pain possible dislocation

## 2018-01-12 NOTE — ED PROVIDER NOTE - PROGRESS NOTE DETAILS
case d/w ortho resident and will evaluate pt pt with left mid femur fx evaluated by ortho and recommend admit to hospitalist and they will consult.  case d/w [place] and will admit.  request CT head secondary to pt with fall on anticoagulants.

## 2018-01-13 LAB
ALBUMIN SERPL ELPH-MCNC: 3.2 G/DL — LOW (ref 3.3–5)
ALBUMIN SERPL ELPH-MCNC: 3.6 G/DL — SIGNIFICANT CHANGE UP (ref 3.3–5)
ALP SERPL-CCNC: 119 U/L — SIGNIFICANT CHANGE UP (ref 40–120)
ALP SERPL-CCNC: 96 U/L — SIGNIFICANT CHANGE UP (ref 40–120)
ALT FLD-CCNC: 17 U/L — SIGNIFICANT CHANGE UP (ref 12–78)
ALT FLD-CCNC: 21 U/L — SIGNIFICANT CHANGE UP (ref 12–78)
ANION GAP SERPL CALC-SCNC: 6 MMOL/L — SIGNIFICANT CHANGE UP (ref 5–17)
ANION GAP SERPL CALC-SCNC: 6 MMOL/L — SIGNIFICANT CHANGE UP (ref 5–17)
APTT BLD: 39.9 SEC — HIGH (ref 27.5–37.4)
AST SERPL-CCNC: 17 U/L — SIGNIFICANT CHANGE UP (ref 15–37)
AST SERPL-CCNC: 21 U/L — SIGNIFICANT CHANGE UP (ref 15–37)
BASOPHILS # BLD AUTO: 0 K/UL — SIGNIFICANT CHANGE UP (ref 0–0.2)
BASOPHILS # BLD AUTO: 0.1 K/UL — SIGNIFICANT CHANGE UP (ref 0–0.2)
BASOPHILS NFR BLD AUTO: 0.7 % — SIGNIFICANT CHANGE UP (ref 0–2)
BASOPHILS NFR BLD AUTO: 1.1 % — SIGNIFICANT CHANGE UP (ref 0–2)
BILIRUB SERPL-MCNC: 0.5 MG/DL — SIGNIFICANT CHANGE UP (ref 0.2–1.2)
BILIRUB SERPL-MCNC: 0.6 MG/DL — SIGNIFICANT CHANGE UP (ref 0.2–1.2)
BLD GP AB SCN SERPL QL: SIGNIFICANT CHANGE UP
BUN SERPL-MCNC: 10 MG/DL — SIGNIFICANT CHANGE UP (ref 7–23)
BUN SERPL-MCNC: 13 MG/DL — SIGNIFICANT CHANGE UP (ref 7–23)
CALCIUM SERPL-MCNC: 8.3 MG/DL — LOW (ref 8.5–10.1)
CALCIUM SERPL-MCNC: 9.2 MG/DL — SIGNIFICANT CHANGE UP (ref 8.5–10.1)
CHLORIDE SERPL-SCNC: 101 MMOL/L — SIGNIFICANT CHANGE UP (ref 96–108)
CHLORIDE SERPL-SCNC: 98 MMOL/L — SIGNIFICANT CHANGE UP (ref 96–108)
CO2 SERPL-SCNC: 28 MMOL/L — SIGNIFICANT CHANGE UP (ref 22–31)
CO2 SERPL-SCNC: 28 MMOL/L — SIGNIFICANT CHANGE UP (ref 22–31)
CREAT SERPL-MCNC: 0.56 MG/DL — SIGNIFICANT CHANGE UP (ref 0.5–1.3)
CREAT SERPL-MCNC: 0.65 MG/DL — SIGNIFICANT CHANGE UP (ref 0.5–1.3)
EOSINOPHIL # BLD AUTO: 0 K/UL — SIGNIFICANT CHANGE UP (ref 0–0.5)
EOSINOPHIL # BLD AUTO: 0 K/UL — SIGNIFICANT CHANGE UP (ref 0–0.5)
EOSINOPHIL NFR BLD AUTO: 0.1 % — SIGNIFICANT CHANGE UP (ref 0–6)
EOSINOPHIL NFR BLD AUTO: 0.6 % — SIGNIFICANT CHANGE UP (ref 0–6)
GLUCOSE SERPL-MCNC: 115 MG/DL — HIGH (ref 70–99)
GLUCOSE SERPL-MCNC: 94 MG/DL — SIGNIFICANT CHANGE UP (ref 70–99)
HCT VFR BLD CALC: 29.2 % — LOW (ref 34.5–45)
HCT VFR BLD CALC: 29.6 % — LOW (ref 34.5–45)
HCT VFR BLD CALC: 30.8 % — LOW (ref 34.5–45)
HCT VFR BLD CALC: 35 % — SIGNIFICANT CHANGE UP (ref 34.5–45)
HGB BLD-MCNC: 11.2 G/DL — LOW (ref 11.5–15.5)
HGB BLD-MCNC: 9.4 G/DL — LOW (ref 11.5–15.5)
HGB BLD-MCNC: 9.5 G/DL — LOW (ref 11.5–15.5)
HGB BLD-MCNC: 9.5 G/DL — LOW (ref 11.5–15.5)
INR BLD: 3.34 RATIO — HIGH (ref 0.88–1.16)
INR BLD: 3.55 RATIO — HIGH (ref 0.88–1.16)
LACTATE SERPL-SCNC: 0.8 MMOL/L — SIGNIFICANT CHANGE UP (ref 0.7–2)
LYMPHOCYTES # BLD AUTO: 0.4 K/UL — LOW (ref 1–3.3)
LYMPHOCYTES # BLD AUTO: 0.8 K/UL — LOW (ref 1–3.3)
LYMPHOCYTES # BLD AUTO: 11.8 % — LOW (ref 13–44)
LYMPHOCYTES # BLD AUTO: 6 % — LOW (ref 13–44)
MAGNESIUM SERPL-MCNC: 2 MG/DL — SIGNIFICANT CHANGE UP (ref 1.6–2.6)
MANUAL DIF COMMENT BLD-IMP: SIGNIFICANT CHANGE UP
MCHC RBC-ENTMCNC: 30.8 PG — SIGNIFICANT CHANGE UP (ref 27–34)
MCHC RBC-ENTMCNC: 31.1 PG — SIGNIFICANT CHANGE UP (ref 27–34)
MCHC RBC-ENTMCNC: 32 GM/DL — SIGNIFICANT CHANGE UP (ref 32–36)
MCHC RBC-ENTMCNC: 32.1 GM/DL — SIGNIFICANT CHANGE UP (ref 32–36)
MCV RBC AUTO: 96 FL — SIGNIFICANT CHANGE UP (ref 80–100)
MCV RBC AUTO: 97 FL — SIGNIFICANT CHANGE UP (ref 80–100)
MONOCYTES # BLD AUTO: 1.4 K/UL — HIGH (ref 0–0.9)
MONOCYTES # BLD AUTO: 1.4 K/UL — HIGH (ref 0–0.9)
MONOCYTES NFR BLD AUTO: 20.4 % — HIGH (ref 2–14)
MONOCYTES NFR BLD AUTO: 21.7 % — HIGH (ref 2–14)
NEUTROPHILS # BLD AUTO: 4.2 K/UL — SIGNIFICANT CHANGE UP (ref 1.8–7.4)
NEUTROPHILS # BLD AUTO: 5.2 K/UL — SIGNIFICANT CHANGE UP (ref 1.8–7.4)
NEUTROPHILS NFR BLD AUTO: 64.9 % — SIGNIFICANT CHANGE UP (ref 43–77)
NEUTROPHILS NFR BLD AUTO: 72.8 % — SIGNIFICANT CHANGE UP (ref 43–77)
PHOSPHATE SERPL-MCNC: 2.4 MG/DL — LOW (ref 2.5–4.5)
PLAT MORPH BLD: NORMAL — SIGNIFICANT CHANGE UP
PLATELET # BLD AUTO: 166 K/UL — SIGNIFICANT CHANGE UP (ref 150–400)
PLATELET # BLD AUTO: 215 K/UL — SIGNIFICANT CHANGE UP (ref 150–400)
POTASSIUM SERPL-MCNC: 4.2 MMOL/L — SIGNIFICANT CHANGE UP (ref 3.5–5.3)
POTASSIUM SERPL-MCNC: 4.4 MMOL/L — SIGNIFICANT CHANGE UP (ref 3.5–5.3)
POTASSIUM SERPL-SCNC: 4.2 MMOL/L — SIGNIFICANT CHANGE UP (ref 3.5–5.3)
POTASSIUM SERPL-SCNC: 4.4 MMOL/L — SIGNIFICANT CHANGE UP (ref 3.5–5.3)
PROT SERPL-MCNC: 6 GM/DL — SIGNIFICANT CHANGE UP (ref 6–8.3)
PROT SERPL-MCNC: 7.2 GM/DL — SIGNIFICANT CHANGE UP (ref 6–8.3)
PROTHROM AB SERPL-ACNC: 37 SEC — HIGH (ref 9.8–12.7)
PROTHROM AB SERPL-ACNC: 39.4 SEC — HIGH (ref 9.8–12.7)
RBC # BLD: 3.05 M/UL — LOW (ref 3.8–5.2)
RBC # BLD: 3.64 M/UL — LOW (ref 3.8–5.2)
RBC # FLD: 13.5 % — SIGNIFICANT CHANGE UP (ref 10.3–14.5)
RBC # FLD: 13.9 % — SIGNIFICANT CHANGE UP (ref 10.3–14.5)
RBC BLD AUTO: SIGNIFICANT CHANGE UP
SODIUM SERPL-SCNC: 132 MMOL/L — LOW (ref 135–145)
SODIUM SERPL-SCNC: 135 MMOL/L — SIGNIFICANT CHANGE UP (ref 135–145)
TYPE + AB SCN PNL BLD: SIGNIFICANT CHANGE UP
WBC # BLD: 6.5 K/UL — SIGNIFICANT CHANGE UP (ref 3.8–10.5)
WBC # BLD: 7.1 K/UL — SIGNIFICANT CHANGE UP (ref 3.8–10.5)
WBC # FLD AUTO: 6.5 K/UL — SIGNIFICANT CHANGE UP (ref 3.8–10.5)
WBC # FLD AUTO: 7.1 K/UL — SIGNIFICANT CHANGE UP (ref 3.8–10.5)

## 2018-01-13 PROCEDURE — 73560 X-RAY EXAM OF KNEE 1 OR 2: CPT | Mod: 26,LT

## 2018-01-13 PROCEDURE — 71045 X-RAY EXAM CHEST 1 VIEW: CPT | Mod: 26

## 2018-01-13 PROCEDURE — 93010 ELECTROCARDIOGRAM REPORT: CPT

## 2018-01-13 PROCEDURE — 73590 X-RAY EXAM OF LOWER LEG: CPT | Mod: 26,LT

## 2018-01-13 PROCEDURE — 73552 X-RAY EXAM OF FEMUR 2/>: CPT | Mod: 26

## 2018-01-13 PROCEDURE — 93970 EXTREMITY STUDY: CPT | Mod: 26

## 2018-01-13 PROCEDURE — 73502 X-RAY EXAM HIP UNI 2-3 VIEWS: CPT | Mod: 26

## 2018-01-13 PROCEDURE — 70450 CT HEAD/BRAIN W/O DYE: CPT | Mod: 26

## 2018-01-13 RX ORDER — PHYTONADIONE (VIT K1) 5 MG
5 TABLET ORAL ONCE
Qty: 0 | Refills: 0 | Status: DISCONTINUED | OUTPATIENT
Start: 2018-01-13 | End: 2018-01-13

## 2018-01-13 RX ORDER — MORPHINE SULFATE 50 MG/1
4 CAPSULE, EXTENDED RELEASE ORAL ONCE
Qty: 0 | Refills: 0 | Status: DISCONTINUED | OUTPATIENT
Start: 2018-01-13 | End: 2018-01-13

## 2018-01-13 RX ORDER — CALCIUM CARBONATE 500(1250)
1 TABLET ORAL DAILY
Qty: 0 | Refills: 0 | Status: DISCONTINUED | OUTPATIENT
Start: 2018-01-13 | End: 2018-01-14

## 2018-01-13 RX ORDER — DOCUSATE SODIUM 100 MG
100 CAPSULE ORAL THREE TIMES A DAY
Qty: 0 | Refills: 0 | Status: DISCONTINUED | OUTPATIENT
Start: 2018-01-13 | End: 2018-01-15

## 2018-01-13 RX ORDER — PANTOPRAZOLE SODIUM 20 MG/1
40 TABLET, DELAYED RELEASE ORAL
Qty: 0 | Refills: 0 | Status: DISCONTINUED | OUTPATIENT
Start: 2018-01-13 | End: 2018-01-15

## 2018-01-13 RX ORDER — CHOLECALCIFEROL (VITAMIN D3) 125 MCG
2000 CAPSULE ORAL DAILY
Qty: 0 | Refills: 0 | Status: DISCONTINUED | OUTPATIENT
Start: 2018-01-13 | End: 2018-01-15

## 2018-01-13 RX ORDER — PHYTONADIONE (VIT K1) 5 MG
5 TABLET ORAL ONCE
Qty: 0 | Refills: 0 | Status: COMPLETED | OUTPATIENT
Start: 2018-01-13 | End: 2018-01-13

## 2018-01-13 RX ORDER — MORPHINE SULFATE 50 MG/1
2 CAPSULE, EXTENDED RELEASE ORAL EVERY 4 HOURS
Qty: 0 | Refills: 0 | Status: DISCONTINUED | OUTPATIENT
Start: 2018-01-13 | End: 2018-01-15

## 2018-01-13 RX ORDER — SERTRALINE 25 MG/1
50 TABLET, FILM COATED ORAL DAILY
Qty: 0 | Refills: 0 | Status: DISCONTINUED | OUTPATIENT
Start: 2018-01-13 | End: 2018-01-15

## 2018-01-13 RX ORDER — FERROUS SULFATE 325(65) MG
325 TABLET ORAL DAILY
Qty: 0 | Refills: 0 | Status: DISCONTINUED | OUTPATIENT
Start: 2018-01-13 | End: 2018-01-15

## 2018-01-13 RX ORDER — SODIUM CHLORIDE 9 MG/ML
1000 INJECTION INTRAMUSCULAR; INTRAVENOUS; SUBCUTANEOUS
Qty: 0 | Refills: 0 | Status: DISCONTINUED | OUTPATIENT
Start: 2018-01-13 | End: 2018-01-14

## 2018-01-13 RX ORDER — DOCUSATE SODIUM 100 MG
100 CAPSULE ORAL THREE TIMES A DAY
Qty: 0 | Refills: 0 | Status: DISCONTINUED | OUTPATIENT
Start: 2018-01-13 | End: 2018-01-13

## 2018-01-13 RX ORDER — ATENOLOL 25 MG/1
25 TABLET ORAL DAILY
Qty: 0 | Refills: 0 | Status: DISCONTINUED | OUTPATIENT
Start: 2018-01-13 | End: 2018-01-15

## 2018-01-13 RX ORDER — METOPROLOL TARTRATE 50 MG
5 TABLET ORAL ONCE
Qty: 0 | Refills: 0 | Status: COMPLETED | OUTPATIENT
Start: 2018-01-13 | End: 2018-01-13

## 2018-01-13 RX ADMIN — Medication 100 MILLIGRAM(S): at 21:26

## 2018-01-13 RX ADMIN — Medication 1 TABLET(S): at 11:44

## 2018-01-13 RX ADMIN — Medication 325 MILLIGRAM(S): at 11:44

## 2018-01-13 RX ADMIN — SERTRALINE 50 MILLIGRAM(S): 25 TABLET, FILM COATED ORAL at 11:44

## 2018-01-13 RX ADMIN — SODIUM CHLORIDE 50 MILLILITER(S): 9 INJECTION INTRAMUSCULAR; INTRAVENOUS; SUBCUTANEOUS at 09:29

## 2018-01-13 RX ADMIN — Medication 5 MILLIGRAM(S): at 01:18

## 2018-01-13 RX ADMIN — MORPHINE SULFATE 4 MILLIGRAM(S): 50 CAPSULE, EXTENDED RELEASE ORAL at 01:30

## 2018-01-13 RX ADMIN — MORPHINE SULFATE 2 MILLIGRAM(S): 50 CAPSULE, EXTENDED RELEASE ORAL at 12:25

## 2018-01-13 RX ADMIN — Medication 2000 UNIT(S): at 11:44

## 2018-01-13 RX ADMIN — ATENOLOL 25 MILLIGRAM(S): 25 TABLET ORAL at 07:00

## 2018-01-13 RX ADMIN — MORPHINE SULFATE 2 MILLIGRAM(S): 50 CAPSULE, EXTENDED RELEASE ORAL at 00:15

## 2018-01-13 RX ADMIN — MORPHINE SULFATE 2 MILLIGRAM(S): 50 CAPSULE, EXTENDED RELEASE ORAL at 12:04

## 2018-01-13 RX ADMIN — MORPHINE SULFATE 2 MILLIGRAM(S): 50 CAPSULE, EXTENDED RELEASE ORAL at 21:26

## 2018-01-13 RX ADMIN — MORPHINE SULFATE 4 MILLIGRAM(S): 50 CAPSULE, EXTENDED RELEASE ORAL at 00:42

## 2018-01-13 RX ADMIN — PANTOPRAZOLE SODIUM 40 MILLIGRAM(S): 20 TABLET, DELAYED RELEASE ORAL at 07:00

## 2018-01-13 RX ADMIN — Medication 5 MILLIGRAM(S): at 06:59

## 2018-01-13 RX ADMIN — MORPHINE SULFATE 2 MILLIGRAM(S): 50 CAPSULE, EXTENDED RELEASE ORAL at 06:22

## 2018-01-13 NOTE — ED ADULT NURSE REASSESSMENT NOTE - NS ED NURSE REASSESS COMMENT FT1
Patient care received from LULU GARZA. Patient A&Ox3, resting comfortably in bed. Denies pain/discomfort at this time. LLE shorter than RLE, externally rotated. Swelling to L foot, distal pulses weak, palpable. RLE wound dressing CDI. IVF infusing as prescribed, NPO. Safety & comfort measures in place, will continue to monitor.
Patient remains as previously assessed. Resting comfortably in bed, denies pain/discomfort at rest, pain to LLE with movement; refusing pain meds at this time. Safety & comfort measures in place, daughter at bedside. Will continue to monitor.
pt medicated as per MAR. awaiting ortho consult. pt and family updated on plan of care. safety maintained, will continue to monitor.
VSS, no change. awaiting CT results, bed/orders. daughter at bedside. safety maintained, will continue to monitor.
family and pt updated on plan of care. VSS. answered questions and concerns to family. medicated as per MAR. safety maintained, will continue to monitor.

## 2018-01-13 NOTE — ED ADULT NURSE NOTE - OBJECTIVE STATEMENT
pt was discharged from rehab earlier today. today as per daughter, pt's leg gave out while going to the bathroom and guided her to the floor. denies hitting head, LOC. deformity noted to L leg.

## 2018-01-13 NOTE — CONSULT NOTE ADULT - SUBJECTIVE AND OBJECTIVE BOX
HPI    86F complains of left thigh pain for 1 day status post mechanical fall. Patient denies numbness, paresthesias, headstrike, loss of consciousness, or any other signs/symptoms at this time. Patient is a community ambulator with a walker at baseline.    Allergies: NKDA  PMH: CLL, HTN, Cystitis, Hard of hearing, BL lymphedema LE, Paroxysmal afib (coumadin), Spinal stenosis / spondylolisthesis, vaginal prolapse  PSH: vaginal prolapse surgery, kyphoplasty, thyroidectomy  Social: Denies tobacco use  FH: Noncontributory  Imaging: XR left femur - femoral shaft fracture    ROS: Negative for all systems except as noted above in HPI    Physical exam  VS: Afebrile, vital signs stable  Gen: NAD  left LE: Skin intact. No erythema/ecchymosis/warmth. No TTP bony prominences at ankle/foot.+EHL/FHL/TA/GS. SILT L3-S1, +DP pulse, capillary refill brisk. Compartments soft and nontender. Diffuse lyphedema lower extremity. dysvascular changes distal LE.  Secondary survey: No TTP bony prominences with full painless AROM at baseline per patient. SILT. Capillary refill brisk. Able to SLR with contralateral leg. No TTP axial spine. Sanguinous blister right shin.      86F with left femoral shaft fracture    Pending medical optimization, plan for OR - IMN vs ORIF  Vitamin K if tolerated  Appreciate recommendations regarding possible Kcentra  NWB affected extremity  Pain control  Family reports wishes to discuss attending that they will request  Family wishes to decide possible surgical options after discussion with PCP  Will discuss with attending and advise if change HPI    86F complains of left thigh pain for 1 day status post mechanical fall. Patient denies numbness, paresthesias, headstrike, loss of consciousness, or any other signs/symptoms at this time. Patient is a community ambulator with a walker at baseline.    Allergies: NKDA  PMH: CLL, HTN, Cystitis, Hard of hearing, BL lymphedema LE, Paroxysmal afib (coumadin), Spinal stenosis / spondylolisthesis, vaginal prolapse  PSH: vaginal prolapse surgery, kyphoplasty, thyroidectomy  Social: Denies tobacco use  FH: Noncontributory  Imaging: XR left femur - femoral shaft fracture    ROS: Negative for all systems except as noted above in HPI    Physical exam  VS: Afebrile, vital signs stable  Gen: NAD  left LE: Skin intact. No erythema/ecchymosis/warmth. No TTP bony prominences at ankle/foot.+EHL/FHL/TA/GS. SILT L3-S1, +DP pulse, capillary refill brisk. Compartments soft and nontender. Diffuse lyphedema lower extremity. dysvascular changes distal LE.  Secondary survey: No TTP bony prominences with full painless AROM at baseline per patient. SILT. Capillary refill brisk. Able to SLR with contralateral leg. No TTP axial spine. Sanguinous blister right shin.      86F with left femoral shaft fracture    Pending medical optimization, plan for OR on 1/16/18 - IMN  Vitamin K if tolerated  Appreciate recommendations regarding possible Kcentra  NWB affected extremity  Pain control  Discussed with Dr Kitchen, who agrees with above

## 2018-01-13 NOTE — PATIENT PROFILE ADULT. - PMH
Acute cystitis without hematuria  septic  4/2016  Essential hypertension    [place] (hard of hearing), bilateral    Lymphedema of both lower extremities    Monoclonal gammopathies    Paroxysmal atrial fibrillation    Spinal stenosis    Spondyloarthritis    Vaginal prolapse    Venous insufficiency

## 2018-01-13 NOTE — H&P ADULT - ASSESSMENT
87 yo F with PMH of spinal stenosis, HTN, chronic compression fracture of spine, CLL, HTN, lymphadema, monoclonal gammopathy, paroxysmal a-fib, vaginal prolapse, venous insufficiency, p/w mechanical fall    *L femoral shaft fracture s/p mechanical fall  -Patient has supratherapeutic INR with a-fib, will give vitamin K to initiate reversal of INR. Will need to repeat INR after vitamin K.  -Family is requesting evaluation by patient's cardiology prior to surgery  -Ortho consult  -NPO  -IVF  -Will hold diuretics while NPO  -Pain control    *B/L LE edema   -U/S to r/o DVT  -Patient is on aldactone  -Also has a RLE wound - wound consult     *Anemia  -Trend H/H -> if stable during hospitalization -> outpatient work up    *Hyponatremia  -IVF and trend Na for improvement     *Spinal stenosis / HTN / monoclonal gammopathy  -Outpatient management     *DVT ppx  -Supratherapeutic INR 85 yo F with PMH of spinal stenosis, HTN, chronic compression fracture of spine, CLL, HTN, lymphadema, monoclonal gammopathy, paroxysmal a-fib, vaginal prolapse, venous insufficiency, p/w mechanical fall    *L femoral shaft fracture s/p mechanical fall  -Patient has supratherapeutic INR with a-fib, will give vitamin K to initiate reversal of INR. Will need to repeat INR after vitamin K.  -Family is requesting evaluation by patient's cardiologist prior to surgery  -Ortho consult  -NPO  -IVF  -Will hold diuretics while NPO  -Pain control    *LE edema + ulcers  -U/S to r/o DVT  -Patient is on aldactone for possible edema?  -Wound consult    *Anemia  -Trend H/H -> if stable during hospitalization -> outpatient work up    *Hyponatremia  -IVF and trend Na for improvement     *Spinal stenosis / HTN / monoclonal gammopathy  -Outpatient management     *DVT ppx  -Supratherapeutic INR

## 2018-01-13 NOTE — CONSULT NOTE ADULT - SUBJECTIVE AND OBJECTIVE BOX
Patient is a 86y old  Female who presents with a chief complaint of fall (13 Jan 2018 05:14)      HPI:  1/13/2018- The patient is seen in consultation for preop cardiac evaluation. She sustained a left mid femur fracture after being discharged from rehab yesterday. 1 month ago she was admitted with vasovagal syncope and had a left knee hematoma.  The patient has no other complaints at present. She denies chest pain, dyspnea, palpitation. She is treated for HBP, long term persistent atrial fibrillation, chronic venous insufficiency. She has mild aortic regurgitation and normal LV systolic function. There is no history of heart failure.    85 yo F with PMH of spinal stenosis, HTN, chronic compression fracture of spine, CLL, HTN, lymphedema monoclonal gammopathy, paroxysmal a-fib, vaginal prolapse, venous insufficiency, p/w mechanical fall. Patient was discharged from rehab yesterday, and was at home for 2 hours when she fell. She had a mechanical fall when transferring from bed to commode. Found to have L femoral shaft fracture. Denies h/o prolonged intubation or complications with anesthesia previously.     PSH: History of vaginal surgery, S/P kyphoplasty  2014, S/P thyroidectomy  partial   1975    Family Hx: sister - cva / cancer, father - cva    Social hx: former smoker, denies etoh / drugs, lives at home (13 Jan 2018 05:14)    Allergies and Intolerances:        Allergies:  	No Known Allergies: Home Medications:   * Patient Currently Takes Medications as of 13-Jan-2018 00:36 documented in Structured Notes  · 	lactobacillus acidophilus oral capsule: 1 cap(s) orally 2 times a day  · 	polyethylene glycol 3350 oral powder for reconstitution: 17 gram(s) orally once a day  · 	lactulose 10 g/15 mL oral syrup: 30 milliliter(s) orally 2 times a day, As needed, constipation  · 	docusate sodium 100 mg oral capsule: 1 cap(s) orally 3 times a day  · 	senna oral tablet: 2 tab(s) orally once a day (at bedtime)  · 	lidocaine 5% topical film: Apply topically to affected area once a day to lower back at site of pain as needed for pain    · 	atenolol: 12.5 milligram(s) orally once a day  · 	traMADol 50 mg oral tablet: 0.5 tab(s) orally every 8 hours, As needed, Moderate Pain (4 - 6)  · 	acetaminophen 500 mg oral tablet: 2 tab(s) orally 2 times a day, As needed, Moderate Pain (4 - 6)  · 	spironolactone 25 mg oral tablet: 1 tab(s) orally once a day  · 	Vitamin D3 2000 intl units oral tablet: 1 tab(s) orally once a day  · 	calcium carbonate 500 mg (200 mg elemental calcium) oral tablet, chewable: 1 tab(s) orally 2 times a day (with meals)  · 	multivitamin: 1 tab(s) orally once a day  · 	Estrace Vaginal 0.1 mg/g vaginal cream: Apply 2 to 3 times per week  · 	PriLOSEC 20 mg oral delayed release capsule: 1 cap(s) orally once a day, As Needed  · 	sertraline 50 mg oral tablet: 1 tab(s) orally once a day  · 	Coumadin 3 mg oral tablet: 1 tab(s) orally once a day. Keep INR 1.7-2 until hematoma improved  · 	Slow Fe (as elemental iron) 45 mg oral tablet, extended release: 1 tab(s) orally once a day      PAST MEDICAL & SURGICAL HISTORY:  [place] (hard of hearing), bilateral  Vaginal prolapse  Spondyloarthritis  Spinal stenosis  Essential hypertension  vasovagal syncope  CLL  Acute cystitis without hematuria: septic  4/2016  Paroxysmal atrial fibrillation- long term persistent  Monoclonal gammopathies  Venous insufficiency  Lymphedema of both lower extremities  depressive disorder, chronic anxiety  mild dementia    History of vaginal surgery  S/P thyroidectomy: partial   1977  S/P kyphoplasty: 2013  repair of uterovaginal prolapse 8/2016        MEDICATIONS  (STANDING):  ATENolol  Tablet 25 milliGRAM(s) Oral daily  calcium carbonate 500 mG (Tums) Chewable 1 Tablet(s) Chew daily  cholecalciferol Oral Tab/Cap - Peds 2000 Unit(s) Oral daily  ferrous    sulfate 325 milliGRAM(s) Oral daily  multivitamin Oral Tab/Cap - Peds 1 Tablet(s) Oral daily  pantoprazole    Tablet 40 milliGRAM(s) Oral before breakfast  sertraline 50 milliGRAM(s) Oral daily  sodium chloride 0.9%. 1000 milliLiter(s) (50 mL/Hr) IV Continuous <Continuous>    MEDICATIONS  (PRN):  docusate sodium 100 milliGRAM(s) Oral three times a day PRN Constipation  morphine  - Injectable 2 milliGRAM(s) IV Push every 4 hours PRN Moderate Pain (4 - 6)      FAMILY HISTORY:  No pertinent family history in first degree relatives        REVIEW OF SYSTEM:  Pertinent items are noted in HPI.    Vital Signs Last 24 Hrs  T(C): 36.8 (13 Jan 2018 12:02), Max: 37.2 (12 Jan 2018 23:10)  T(F): 98.2 (13 Jan 2018 12:02), Max: 99 (12 Jan 2018 23:10)  HR: 72 (13 Jan 2018 12:02) (61 - 82)  BP: 131/64 (13 Jan 2018 12:02) (116/91 - 214/82)  BP(mean): --  RR: 16 (13 Jan 2018 12:02) (14 - 16)  SpO2: 98% (13 Jan 2018 12:02) (98% - 100%)    I&O's Summary    PHYSICAL EXAM  General Appearance:  chronically ill, alert and no distress  HEENT: PERRL, conjunctiva clear, EOM's intact, non injected pharynx, no exudate, TM   normal  Neck: Supple, , no adenopathy, thyroid: not enlarged, no carotid bruit or JVD  Back: Symmetric, no  tenderness,no soft tissue tenderness  Lungs: Clear to auscultation bilaterally,no adventitious breath sounds, normal   expiratory phase  Heart: Regular rate and rhythm, S1, S2 normal,1/6 systolic murmur LSB  Abdomen: Soft, non-tender, bowel sounds active , no hepatosplenomegaly  Extremities: left leg in immobilizer .Left left short and externally rotated. Right leg 3 cm diameter blood blister pretibially with venous stasis changes and trace edema.  Skin: Skin color, texture normal, no rashes   Neurologic: Alert and oriented X3 , cranial nerves intact, sensory and motor normal,        INTERPRETATION OF TELEMETRY:    ECG: atrial fibrillation,  BPM, possible anterior infarct age undetermined, LVH with repolarization abnormality , no change.        LABS:                          9.5    x     )-----------( x        ( 13 Jan 2018 13:06 )             29.2     01-13    135  |  101  |  10  ----------------------------<  94  4.4   |  28  |  0.56    Ca    8.3<L>      13 Jan 2018 10:03  Phos  2.4     01-13  Mg     2.0     01-13    TPro  6.0  /  Alb  3.2<L>  /  TBili  0.6  /  DBili  x   /  AST  17  /  ALT  17  /  AlkPhos  96  01-13            PT/INR - ( 13 Jan 2018 10:03 )   PT: 39.4 sec;   INR: 3.55 ratio         PTT - ( 12 Jan 2018 23:24 )  PTT:39.9 sec          RADIOLOGY & ADDITIONAL STUDIES:    IMPRESSION:  1.Left mid femur fracture.  2.Atrial fibrillation, long term persistent. Stable.   3.Hypertension, stable.   4.Depressive disorder.  5.Chronic venous insufficiency  6.Anemia  7.History of CLL.    PLAN:  The patient is clinically stable for surgery when the INR is acceptable to the surgeon. Continue atenolol. Hold spironolactone and observe BP. Hold warfarin pending surgery. Continue sertraline.

## 2018-01-14 LAB
ANION GAP SERPL CALC-SCNC: 4 MMOL/L — LOW (ref 5–17)
BUN SERPL-MCNC: 11 MG/DL — SIGNIFICANT CHANGE UP (ref 7–23)
CALCIUM SERPL-MCNC: 8.2 MG/DL — LOW (ref 8.5–10.1)
CHLORIDE SERPL-SCNC: 99 MMOL/L — SIGNIFICANT CHANGE UP (ref 96–108)
CO2 SERPL-SCNC: 28 MMOL/L — SIGNIFICANT CHANGE UP (ref 22–31)
CREAT SERPL-MCNC: 0.58 MG/DL — SIGNIFICANT CHANGE UP (ref 0.5–1.3)
GLUCOSE SERPL-MCNC: 89 MG/DL — SIGNIFICANT CHANGE UP (ref 70–99)
HCT VFR BLD CALC: 30.3 % — LOW (ref 34.5–45)
HGB BLD-MCNC: 9.3 G/DL — LOW (ref 11.5–15.5)
INR BLD: 1.46 RATIO — HIGH (ref 0.88–1.16)
MCHC RBC-ENTMCNC: 30.2 PG — SIGNIFICANT CHANGE UP (ref 27–34)
MCHC RBC-ENTMCNC: 30.7 GM/DL — LOW (ref 32–36)
MCV RBC AUTO: 98.4 FL — SIGNIFICANT CHANGE UP (ref 80–100)
PLATELET # BLD AUTO: 175 K/UL — SIGNIFICANT CHANGE UP (ref 150–400)
POTASSIUM SERPL-MCNC: 4.2 MMOL/L — SIGNIFICANT CHANGE UP (ref 3.5–5.3)
POTASSIUM SERPL-SCNC: 4.2 MMOL/L — SIGNIFICANT CHANGE UP (ref 3.5–5.3)
PROTHROM AB SERPL-ACNC: 15.9 SEC — HIGH (ref 9.8–12.7)
RBC # BLD: 3.08 M/UL — LOW (ref 3.8–5.2)
RBC # FLD: 13.6 % — SIGNIFICANT CHANGE UP (ref 10.3–14.5)
SODIUM SERPL-SCNC: 131 MMOL/L — LOW (ref 135–145)
WBC # BLD: 6.5 K/UL — SIGNIFICANT CHANGE UP (ref 3.8–10.5)
WBC # FLD AUTO: 6.5 K/UL — SIGNIFICANT CHANGE UP (ref 3.8–10.5)

## 2018-01-14 PROCEDURE — 93010 ELECTROCARDIOGRAM REPORT: CPT

## 2018-01-14 RX ORDER — SODIUM CHLORIDE 9 MG/ML
1000 INJECTION INTRAMUSCULAR; INTRAVENOUS; SUBCUTANEOUS
Qty: 0 | Refills: 0 | Status: DISCONTINUED | OUTPATIENT
Start: 2018-01-14 | End: 2018-01-14

## 2018-01-14 RX ORDER — OXYCODONE HYDROCHLORIDE 5 MG/1
5 TABLET ORAL EVERY 6 HOURS
Qty: 0 | Refills: 0 | Status: DISCONTINUED | OUTPATIENT
Start: 2018-01-14 | End: 2018-01-15

## 2018-01-14 RX ORDER — SODIUM CHLORIDE 9 MG/ML
1000 INJECTION INTRAMUSCULAR; INTRAVENOUS; SUBCUTANEOUS
Qty: 0 | Refills: 0 | Status: DISCONTINUED | OUTPATIENT
Start: 2018-01-14 | End: 2018-01-15

## 2018-01-14 RX ORDER — SENNA PLUS 8.6 MG/1
1 TABLET ORAL AT BEDTIME
Qty: 0 | Refills: 0 | Status: DISCONTINUED | OUTPATIENT
Start: 2018-01-14 | End: 2018-01-15

## 2018-01-14 RX ORDER — CALCIUM CARBONATE 500(1250)
1 TABLET ORAL DAILY
Qty: 0 | Refills: 0 | Status: DISCONTINUED | OUTPATIENT
Start: 2018-01-14 | End: 2018-01-15

## 2018-01-14 RX ORDER — SODIUM CHLORIDE 9 MG/ML
1000 INJECTION INTRAMUSCULAR; INTRAVENOUS; SUBCUTANEOUS
Qty: 0 | Refills: 0 | Status: DISCONTINUED | OUTPATIENT
Start: 2018-01-15 | End: 2018-01-15

## 2018-01-14 RX ORDER — HEPARIN SODIUM 5000 [USP'U]/ML
5000 INJECTION INTRAVENOUS; SUBCUTANEOUS EVERY 8 HOURS
Qty: 0 | Refills: 0 | Status: COMPLETED | OUTPATIENT
Start: 2018-01-14 | End: 2018-01-14

## 2018-01-14 RX ORDER — LIDOCAINE 4 G/100G
1 CREAM TOPICAL DAILY
Qty: 0 | Refills: 0 | Status: DISCONTINUED | OUTPATIENT
Start: 2018-01-14 | End: 2018-01-15

## 2018-01-14 RX ORDER — POLYETHYLENE GLYCOL 3350 17 G/17G
17 POWDER, FOR SOLUTION ORAL DAILY
Qty: 0 | Refills: 0 | Status: DISCONTINUED | OUTPATIENT
Start: 2018-01-14 | End: 2018-01-15

## 2018-01-14 RX ADMIN — Medication 1 TABLET(S): at 11:24

## 2018-01-14 RX ADMIN — Medication 2000 UNIT(S): at 11:24

## 2018-01-14 RX ADMIN — ATENOLOL 25 MILLIGRAM(S): 25 TABLET ORAL at 05:25

## 2018-01-14 RX ADMIN — OXYCODONE HYDROCHLORIDE 5 MILLIGRAM(S): 5 TABLET ORAL at 21:17

## 2018-01-14 RX ADMIN — MORPHINE SULFATE 2 MILLIGRAM(S): 50 CAPSULE, EXTENDED RELEASE ORAL at 03:44

## 2018-01-14 RX ADMIN — MORPHINE SULFATE 2 MILLIGRAM(S): 50 CAPSULE, EXTENDED RELEASE ORAL at 16:19

## 2018-01-14 RX ADMIN — HEPARIN SODIUM 5000 UNIT(S): 5000 INJECTION INTRAVENOUS; SUBCUTANEOUS at 21:17

## 2018-01-14 RX ADMIN — PANTOPRAZOLE SODIUM 40 MILLIGRAM(S): 20 TABLET, DELAYED RELEASE ORAL at 06:08

## 2018-01-14 RX ADMIN — LIDOCAINE 1 PATCH: 4 CREAM TOPICAL at 21:17

## 2018-01-14 RX ADMIN — MORPHINE SULFATE 2 MILLIGRAM(S): 50 CAPSULE, EXTENDED RELEASE ORAL at 11:18

## 2018-01-14 RX ADMIN — SERTRALINE 50 MILLIGRAM(S): 25 TABLET, FILM COATED ORAL at 11:24

## 2018-01-14 RX ADMIN — Medication 1 TABLET(S): at 21:16

## 2018-01-14 RX ADMIN — POLYETHYLENE GLYCOL 3350 17 GRAM(S): 17 POWDER, FOR SOLUTION ORAL at 21:17

## 2018-01-14 RX ADMIN — Medication 100 MILLIGRAM(S): at 21:17

## 2018-01-14 NOTE — PROGRESS NOTE ADULT - SUBJECTIVE AND OBJECTIVE BOX
Patient is a 86y old  Female who presents with a chief complaint of fall (13 Jan 2018 05:14)    HPI:  1/13/2018- The patient is seen in consultation for preop cardiac evaluation. She sustained a left mid femur fracture after being discharged from rehab yesterday. 1 month ago she was admitted with vasovagal syncope and had a left knee hematoma.  The patient has no other complaints at present. She denies chest pain, dyspnea, palpitation. She is treated for HBP, long term persistent atrial fibrillation, chronic venous insufficiency. She has mild aortic regurgitation and normal LV systolic function. There is no history of heart failure.    Followup HPI:  1/14- No complaints. Denies chest pain or dyspnea. Left leg pain controlled.     PAST MEDICAL & SURGICAL HISTORY:    [place] (hard of hearing), bilateral  Vaginal prolapse  Spondyloarthritis  Spinal stenosis  Essential hypertension  vasovagal syncope  CLL  Acute cystitis without hematuria: septic  4/2016  Paroxysmal atrial fibrillation- long term persistent  Monoclonal gammopathies  Venous insufficiency  Lymphedema of both lower extremities  depressive disorder, chronic anxiety  mild dementia      S/P thyroidectomy: partial   1977  S/P kyphoplasty: 2013  repair of uterovaginal prolapse 8/2016    Review of symptoms:  Negative except for as noted in today's HPI.      MEDICATIONS  (STANDING):  ATENolol  Tablet 25 milliGRAM(s) Oral daily  calcium carbonate 500 mG (Tums) Chewable 1 Tablet(s) Chew daily  cholecalciferol Oral Tab/Cap - Peds 2000 Unit(s) Oral daily  ferrous    sulfate 325 milliGRAM(s) Oral daily  multivitamin Oral Tab/Cap - Peds 1 Tablet(s) Oral daily  pantoprazole    Tablet 40 milliGRAM(s) Oral before breakfast  sertraline 50 milliGRAM(s) Oral daily  sodium chloride 0.9%. 1000 milliLiter(s) (50 mL/Hr) IV Continuous <Continuous>    MEDICATIONS  (PRN):  docusate sodium 100 milliGRAM(s) Oral three times a day PRN Constipation  morphine  - Injectable 2 milliGRAM(s) IV Push every 4 hours PRN Moderate Pain (4 - 6)          Vital Signs Last 24 Hrs  T(C): 36.2 (14 Jan 2018 05:24), Max: 37.4 (13 Jan 2018 22:06)  T(F): 97.2 (14 Jan 2018 05:24), Max: 99.4 (13 Jan 2018 22:06)  HR: 71 (14 Jan 2018 05:24) (62 - 72)  BP: 141/64 (14 Jan 2018 05:24) (120/47 - 141/64)  BP(mean): --  RR: 16 (14 Jan 2018 05:24) (16 - 17)  SpO2: 98% (14 Jan 2018 05:24) (97% - 100%)    I&O's Summary      PHYSICAL EXAM  General Appearance: comfortable.  HEENT:   Neck:   Lungs: clear  Heart: 1/6 systolic murmur LSB  Abdomen: soft and nontender  Extremities: immobilizer left leg, trace edema right leg. Bandaged right pretibial area.   Neurologic:       INTERPRETATION OF TELEMETRY:    ECG:    LABS:                          9.3    6.5   )-----------( 175      ( 14 Jan 2018 07:56 )             30.3     01-14    131<L>  |  99  |  11  ----------------------------<  89  4.2   |  28  |  0.58    Ca    8.2<L>      14 Jan 2018 07:56  Phos  2.4     01-13  Mg     2.0     01-13    TPro  6.0  /  Alb  3.2<L>  /  TBili  0.6  /  DBili  x   /  AST  17  /  ALT  17  /  AlkPhos  96  01-13            PT/INR - ( 14 Jan 2018 07:56 )   PT: 15.9 sec;   INR: 1.46 ratio         PTT - ( 12 Jan 2018 23:24 )  PTT:39.9 sec          RADIOLOGY & ADDITIONAL STUDIES:    IMPRESSION:  1.Left mid femur fracture.  2.Atrial fibrillation, long term persistent. Stable.   3.Hypertension, stable.   4.Depressive disorder.  5.Chronic venous insufficiency  6.Anemia  7.History of CLL.    PLAN:  The patient is clinically stable for surgery when the INR is acceptable to the surgeon. Continue atenolol. Hold spironolactone and observe BP. Hold warfarin pending surgery. Continue sertraline.

## 2018-01-14 NOTE — PROGRESS NOTE ADULT - SUBJECTIVE AND OBJECTIVE BOX
CHIEF COMPLAINT: fall, femur fracture    SUBJECTIVE: L leg pain, OR tomorrow. No other issues.    REVIEW OF SYSTEMS:  All other review of systems is negative unless indicated above    Vital Signs Last 24 Hrs  T(C): 36.8 (14 Jan 2018 11:09), Max: 37.4 (13 Jan 2018 22:06)  T(F): 98.2 (14 Jan 2018 11:09), Max: 99.4 (13 Jan 2018 22:06)  HR: 64 (14 Jan 2018 17:00) (64 - 73)  BP: 127/48 (14 Jan 2018 17:00) (117/46 - 145/54)  BP(mean): --  RR: 16 (14 Jan 2018 05:24) (16 - 17)  SpO2: 98% (14 Jan 2018 05:24) (97% - 98%)     Physical Exam:  · Constitutional	Well-developed, well nourished	  · Eyes	EOMI; PERRL; no drainage or redness	  · ENMT	No oral lesions; no gross abnormalities	  · Respiratory	Breath Sounds equal & clear to percussion & auscultation, no accessory muscle use	  · Cardiovascular	Regular rate & rhythm, normal S1, S2; no murmurs, gallops or rubs; no S3, S4	  · Gastrointestinal	Soft, non-tender, no hepatosplenomegaly, normal bowel sounds	  · Extremities	detailed exam	  · Extremities Details	no clubbing; no cyanosis; pedal edema	  · Pedal Edema Severity	3+	  · Extremities Comments	+RLE wound	  · Neurological	Alert & oriented; no sensory, motor or coordination deficits, normal reflexes	  · Psychiatric	Affect and characteristics of appearance, verbalizations, behaviors are appropriate	    MEDICATIONS:  MEDICATIONS  (STANDING):  ATENolol  Tablet 25 milliGRAM(s) Oral daily  calcium carbonate 1250 mG (OsCal) 1 Tablet(s) Oral daily  cholecalciferol Oral Tab/Cap - Peds 2000 Unit(s) Oral daily  ferrous    sulfate 325 milliGRAM(s) Oral daily  heparin  Injectable 5000 Unit(s) SubCutaneous every 8 hours  lidocaine   Patch 1 Patch Transdermal daily  multivitamin Oral Tab/Cap - Peds 1 Tablet(s) Oral daily  pantoprazole    Tablet 40 milliGRAM(s) Oral before breakfast  polyethylene glycol 3350 17 Gram(s) Oral daily  sertraline 50 milliGRAM(s) Oral daily  sodium chloride 0.9%. 1000 milliLiter(s) (75 mL/Hr) IV Continuous <Continuous>    LABS: All Labs Reviewed:                        9.3    6.5   )-----------( 175      ( 14 Jan 2018 07:56 )             30.3     131<L>  |  99  |  11  ----------------------------<  89  4.2   |  28  |  0.58    Ca    8.2<L>      14 Jan 2018 07:56  Phos  2.4     01-13  Mg     2.0     01-13    TPro  6.0  /  Alb  3.2<L>  /  TBili  0.6  /  DBili  x   /  AST  17  /  ALT  17  /  AlkPhos  96  01-13    PT/INR - ( 14 Jan 2018 07:56 )   PT: 15.9 sec;   INR: 1.46 ratio    PTT - ( 12 Jan 2018 23:24 )  PTT:39.9 sec

## 2018-01-14 NOTE — PROGRESS NOTE ADULT - SUBJECTIVE AND OBJECTIVE BOX
Patient seen and examined. Pain controlled. No acute events overnight. Denies cp/sob.    HEALTH ISSUES - PROBLEM Dx:        MEDICATIONS  (STANDING):  ATENolol  Tablet 25 milliGRAM(s) Oral daily  calcium carbonate 500 mG (Tums) Chewable 1 Tablet(s) Chew daily  cholecalciferol Oral Tab/Cap - Peds 2000 Unit(s) Oral daily  ferrous    sulfate 325 milliGRAM(s) Oral daily  multivitamin Oral Tab/Cap - Peds 1 Tablet(s) Oral daily  pantoprazole    Tablet 40 milliGRAM(s) Oral before breakfast  sertraline 50 milliGRAM(s) Oral daily  sodium chloride 0.9%. 1000 milliLiter(s) IV Continuous <Continuous>    Allergies    No Known Allergies    Intolerances                            9.4    x     )-----------( x        ( 13 Jan 2018 22:19 )             30.8     13 Jan 2018 10:03    135    |  101    |  10     ----------------------------<  94     4.4     |  28     |  0.56     Ca    8.3        13 Jan 2018 10:03  Phos  2.4       13 Jan 2018 10:03  Mg     2.0       13 Jan 2018 10:03    TPro  6.0    /  Alb  3.2    /  TBili  0.6    /  DBili  x      /  AST  17     /  ALT  17     /  AlkPhos  96     13 Jan 2018 10:03    PT/INR - ( 13 Jan 2018 10:03 )   PT: 39.4 sec;   INR: 3.55 ratio         PTT - ( 12 Jan 2018 23:24 )  PTT:39.9 sec  Vital Signs Last 24 Hrs  T(C): 36.2 (01-14-18 @ 05:24), Max: 37.4 (01-13-18 @ 22:06)  T(F): 97.2 (01-14-18 @ 05:24), Max: 99.4 (01-13-18 @ 22:06)  HR: 71 (01-14-18 @ 05:24) (61 - 72)  BP: 141/64 (01-14-18 @ 05:24) (120/47 - 141/64)  BP(mean): --  RR: 16 (01-14-18 @ 05:24) (14 - 17)  SpO2: 98% (01-14-18 @ 05:24) (97% - 100%)    Physical Exam  Gen: NAD  LLE:   KI in place  short, ER  +ehl/fhl/ta/gs function  L3-S1 silt  extremity warm well perfused  No calf ttp  Compartments soft  BLLE edema present    A/P: 86y Female w L Femoral Shaft Fracture  Pain control  hold coumadin  NWB LLE, bedrest, KI  FU labs  Ice/elevate  Medical management/clearance appreciated  Incentive spirometry  replete pRBCS as needed  ivf  plan for OR tomorrow 1/15  npo except meds after midnight  hold all anticoagulation  monitor INR, will give vitamin K/FFP or KCentra prior to OR, will discuss options with medical team

## 2018-01-14 NOTE — PROGRESS NOTE ADULT - ASSESSMENT
87 yo F with PMH of spinal stenosis, HTN, chronic compression fracture of spine, CLL, HTN, lymphadema, monoclonal gammopathy, paroxysmal a-fib, vaginal prolapse, venous insufficiency, p/w mechanical fall with L fem shaft fracture planned for OR 1/15.    *L femoral shaft fracture s/p mechanical fall  -Got vit K with reversal of INR in anticipation of orthopedic intervention  -Pt has been cleared for sugery by cardiology. No medical contraindication to proceed with planned orthopedic intervention.  -Ortho consult appreciated  -NPO after MN except meds  -IVF  -Will hold diuretics while NPO  -Pain control    *LE edema + ulcers  -U/S neg for DVT  -aldactone held  -Cover wound with dry dressing     *Anemia- stable  - outpatient work up    *Hyponatremia  -IVF and trend Na for improvement     *Spinal stenosis / HTN / monoclonal gammopathy  -Outpatient management     *DVT ppx- resume after surgery

## 2018-01-15 LAB
ANION GAP SERPL CALC-SCNC: 6 MMOL/L — SIGNIFICANT CHANGE UP (ref 5–17)
APPEARANCE UR: CLEAR — SIGNIFICANT CHANGE UP
APTT BLD: 27.6 SEC — SIGNIFICANT CHANGE UP (ref 27.5–37.4)
BACTERIA # UR AUTO: (no result)
BILIRUB UR-MCNC: NEGATIVE — SIGNIFICANT CHANGE UP
BLD GP AB SCN SERPL QL: SIGNIFICANT CHANGE UP
BUN SERPL-MCNC: 10 MG/DL — SIGNIFICANT CHANGE UP (ref 7–23)
CALCIUM SERPL-MCNC: 8.6 MG/DL — SIGNIFICANT CHANGE UP (ref 8.5–10.1)
CHLORIDE SERPL-SCNC: 98 MMOL/L — SIGNIFICANT CHANGE UP (ref 96–108)
CO2 SERPL-SCNC: 28 MMOL/L — SIGNIFICANT CHANGE UP (ref 22–31)
COLOR SPEC: YELLOW — SIGNIFICANT CHANGE UP
CREAT SERPL-MCNC: 0.53 MG/DL — SIGNIFICANT CHANGE UP (ref 0.5–1.3)
DIFF PNL FLD: NEGATIVE — SIGNIFICANT CHANGE UP
EPI CELLS # UR: (no result)
GLUCOSE SERPL-MCNC: 92 MG/DL — SIGNIFICANT CHANGE UP (ref 70–99)
GLUCOSE UR QL: NEGATIVE MG/DL — SIGNIFICANT CHANGE UP
HCT VFR BLD CALC: 31.2 % — LOW (ref 34.5–45)
HGB BLD-MCNC: 9.8 G/DL — LOW (ref 11.5–15.5)
INR BLD: 1.24 RATIO — HIGH (ref 0.88–1.16)
KETONES UR-MCNC: NEGATIVE — SIGNIFICANT CHANGE UP
LEUKOCYTE ESTERASE UR-ACNC: (no result)
MCHC RBC-ENTMCNC: 31.1 PG — SIGNIFICANT CHANGE UP (ref 27–34)
MCHC RBC-ENTMCNC: 31.5 GM/DL — LOW (ref 32–36)
MCV RBC AUTO: 98.5 FL — SIGNIFICANT CHANGE UP (ref 80–100)
NITRITE UR-MCNC: NEGATIVE — SIGNIFICANT CHANGE UP
PH UR: 7 — SIGNIFICANT CHANGE UP (ref 5–8)
PLATELET # BLD AUTO: 154 K/UL — SIGNIFICANT CHANGE UP (ref 150–400)
POTASSIUM SERPL-MCNC: 4.2 MMOL/L — SIGNIFICANT CHANGE UP (ref 3.5–5.3)
POTASSIUM SERPL-SCNC: 4.2 MMOL/L — SIGNIFICANT CHANGE UP (ref 3.5–5.3)
PROT UR-MCNC: 15 MG/DL
PROTHROM AB SERPL-ACNC: 13.5 SEC — HIGH (ref 9.8–12.7)
RBC # BLD: 3.17 M/UL — LOW (ref 3.8–5.2)
RBC # FLD: 13.6 % — SIGNIFICANT CHANGE UP (ref 10.3–14.5)
RBC CASTS # UR COMP ASSIST: SIGNIFICANT CHANGE UP /HPF (ref 0–4)
SODIUM SERPL-SCNC: 132 MMOL/L — LOW (ref 135–145)
SP GR SPEC: 1.01 — SIGNIFICANT CHANGE UP (ref 1.01–1.02)
TYPE + AB SCN PNL BLD: SIGNIFICANT CHANGE UP
UROBILINOGEN FLD QL: 1 MG/DL
WBC # BLD: 8.8 K/UL — SIGNIFICANT CHANGE UP (ref 3.8–10.5)
WBC # FLD AUTO: 8.8 K/UL — SIGNIFICANT CHANGE UP (ref 3.8–10.5)
WBC UR QL: SIGNIFICANT CHANGE UP

## 2018-01-15 RX ORDER — OXYCODONE HYDROCHLORIDE 5 MG/1
10 TABLET ORAL EVERY 4 HOURS
Qty: 0 | Refills: 0 | Status: DISCONTINUED | OUTPATIENT
Start: 2018-01-16 | End: 2018-01-18

## 2018-01-15 RX ORDER — FENTANYL CITRATE 50 UG/ML
50 INJECTION INTRAVENOUS
Qty: 0 | Refills: 0 | Status: DISCONTINUED | OUTPATIENT
Start: 2018-01-15 | End: 2018-01-16

## 2018-01-15 RX ORDER — ENOXAPARIN SODIUM 100 MG/ML
40 INJECTION SUBCUTANEOUS EVERY 24 HOURS
Qty: 0 | Refills: 0 | Status: DISCONTINUED | OUTPATIENT
Start: 2018-01-16 | End: 2018-01-22

## 2018-01-15 RX ORDER — HYDROMORPHONE HYDROCHLORIDE 2 MG/ML
1 INJECTION INTRAMUSCULAR; INTRAVENOUS; SUBCUTANEOUS EVERY 4 HOURS
Qty: 0 | Refills: 0 | Status: DISCONTINUED | OUTPATIENT
Start: 2018-01-16 | End: 2018-01-21

## 2018-01-15 RX ORDER — FENTANYL CITRATE 50 UG/ML
25 INJECTION INTRAVENOUS
Qty: 0 | Refills: 0 | Status: DISCONTINUED | OUTPATIENT
Start: 2018-01-15 | End: 2018-01-16

## 2018-01-15 RX ORDER — ACETAMINOPHEN 500 MG
1000 TABLET ORAL ONCE
Qty: 0 | Refills: 0 | Status: COMPLETED | OUTPATIENT
Start: 2018-01-15 | End: 2018-01-15

## 2018-01-15 RX ORDER — ONDANSETRON 8 MG/1
4 TABLET, FILM COATED ORAL EVERY 4 HOURS
Qty: 0 | Refills: 0 | Status: DISCONTINUED | OUTPATIENT
Start: 2018-01-15 | End: 2018-01-16

## 2018-01-15 RX ORDER — SODIUM CHLORIDE 9 MG/ML
1000 INJECTION, SOLUTION INTRAVENOUS
Qty: 0 | Refills: 0 | Status: DISCONTINUED | OUTPATIENT
Start: 2018-01-15 | End: 2018-01-16

## 2018-01-15 RX ORDER — PROCHLORPERAZINE MALEATE 5 MG
5 TABLET ORAL ONCE
Qty: 0 | Refills: 0 | Status: COMPLETED | OUTPATIENT
Start: 2018-01-15 | End: 2018-01-15

## 2018-01-15 RX ORDER — ACETAMINOPHEN 500 MG
650 TABLET ORAL EVERY 6 HOURS
Qty: 0 | Refills: 0 | Status: DISCONTINUED | OUTPATIENT
Start: 2018-01-16 | End: 2018-01-25

## 2018-01-15 RX ORDER — SODIUM CHLORIDE 9 MG/ML
1000 INJECTION, SOLUTION INTRAVENOUS
Qty: 0 | Refills: 0 | Status: DISCONTINUED | OUTPATIENT
Start: 2018-01-16 | End: 2018-01-17

## 2018-01-15 RX ORDER — CEFAZOLIN SODIUM 1 G
2000 VIAL (EA) INJECTION EVERY 8 HOURS
Qty: 0 | Refills: 0 | Status: COMPLETED | OUTPATIENT
Start: 2018-01-16 | End: 2018-01-16

## 2018-01-15 RX ORDER — DIPHENHYDRAMINE HCL 50 MG
25 CAPSULE ORAL AT BEDTIME
Qty: 0 | Refills: 0 | Status: DISCONTINUED | OUTPATIENT
Start: 2018-01-16 | End: 2018-01-25

## 2018-01-15 RX ORDER — OXYCODONE HYDROCHLORIDE 5 MG/1
5 TABLET ORAL EVERY 4 HOURS
Qty: 0 | Refills: 0 | Status: DISCONTINUED | OUTPATIENT
Start: 2018-01-16 | End: 2018-01-18

## 2018-01-15 RX ORDER — DOCUSATE SODIUM 100 MG
100 CAPSULE ORAL THREE TIMES A DAY
Qty: 0 | Refills: 0 | Status: DISCONTINUED | OUTPATIENT
Start: 2018-01-16 | End: 2018-01-25

## 2018-01-15 RX ADMIN — LIDOCAINE 1 PATCH: 4 CREAM TOPICAL at 11:50

## 2018-01-15 RX ADMIN — OXYCODONE HYDROCHLORIDE 5 MILLIGRAM(S): 5 TABLET ORAL at 06:02

## 2018-01-15 RX ADMIN — SODIUM CHLORIDE 75 MILLILITER(S): 9 INJECTION INTRAMUSCULAR; INTRAVENOUS; SUBCUTANEOUS at 07:17

## 2018-01-15 RX ADMIN — PANTOPRAZOLE SODIUM 40 MILLIGRAM(S): 20 TABLET, DELAYED RELEASE ORAL at 06:02

## 2018-01-15 RX ADMIN — ATENOLOL 25 MILLIGRAM(S): 25 TABLET ORAL at 05:01

## 2018-01-15 RX ADMIN — LIDOCAINE 1 PATCH: 4 CREAM TOPICAL at 11:49

## 2018-01-15 RX ADMIN — ONDANSETRON 4 MILLIGRAM(S): 8 TABLET, FILM COATED ORAL at 23:03

## 2018-01-15 RX ADMIN — FENTANYL CITRATE 50 MICROGRAM(S): 50 INJECTION INTRAVENOUS at 22:45

## 2018-01-15 RX ADMIN — Medication 400 MILLIGRAM(S): at 23:02

## 2018-01-15 RX ADMIN — Medication 5 MILLIGRAM(S): at 23:51

## 2018-01-15 NOTE — PROGRESS NOTE ADULT - SUBJECTIVE AND OBJECTIVE BOX
CHIEF COMPLAINT: fall, femur fracture    SUBJECTIVE: L leg pain, OR tonight. No other medical issues.    REVIEW OF SYSTEMS: All other review of systems is negative unless indicated above    Vital Signs Last 24 Hrs  T(C): 37.2 (15 Pancho 2018 16:45), Max: 37.4 (14 Jan 2018 20:55)  T(F): 99 (15 Pancho 2018 16:45), Max: 99.3 (14 Jan 2018 20:55)  HR: 76 (15 Pancho 2018 16:45) (66 - 76)  BP: 127/50 (15 Pancho 2018 16:45) (119/56 - 142/62)  BP(mean): --  RR: 18 (15 Pancho 2018 16:45) (18 - 19)  SpO2: 98% (15 Pancho 2018 16:45) (98% - 99%)     Physical Exam:  · Constitutional	Well-developed, well nourished	  · Eyes	EOMI; PERRL; no drainage or redness	  · ENMT	No oral lesions; no gross abnormalities	  · Respiratory	Breath Sounds equal & clear to percussion & auscultation, no accessory muscle use	  · Cardiovascular	Regular rate & rhythm, normal S1, S2; no murmurs, gallops or rubs; no S3, S4	  · Gastrointestinal	Soft, non-tender, no hepatosplenomegaly, normal bowel sounds	  · Extremities	detailed exam	  · Extremities Details	no clubbing; no cyanosis; pedal edema	  · Pedal Edema Severity	3+	  · Extremities Comments	+RLE wound	  · Neurological	Alert & oriented; no sensory, motor or coordination deficits, normal reflexes	  · Psychiatric	Affect and characteristics of appearance, verbalizations, behaviors are appropriate	    MEDICATIONS:  MEDICATIONS  (STANDING):  ATENolol  Tablet 25 milliGRAM(s) Oral daily  calcium carbonate 1250 mG (OsCal) 1 Tablet(s) Oral daily  cholecalciferol Oral Tab/Cap - Peds 2000 Unit(s) Oral daily  ferrous    sulfate 325 milliGRAM(s) Oral daily  lidocaine   Patch 1 Patch Transdermal daily  multivitamin Oral Tab/Cap - Peds 1 Tablet(s) Oral daily  pantoprazole    Tablet 40 milliGRAM(s) Oral before breakfast  polyethylene glycol 3350 17 Gram(s) Oral daily  sertraline 50 milliGRAM(s) Oral daily  sodium chloride 0.9%. 1000 milliLiter(s) (75 mL/Hr) IV Continuous <Continuous>  sodium chloride 0.9%. 1000 milliLiter(s) (75 mL/Hr) IV Continuous <Continuous>    LABS: All Labs Reviewed:                        9.8    8.8   )-----------( 154      ( 15 Pancho 2018 04:45 )             31.2     132<L>  |  98  |  10  ----------------------------<  92  4.2   |  28  |  0.53    Ca    8.6      15 Pancho 2018 04:45    PT/INR - ( 15 Pancho 2018 04:45 )   PT: 13.5 sec;   INR: 1.24 ratio    PTT - ( 15 Pancho 2018 04:45 )  PTT:27.6 sec

## 2018-01-15 NOTE — PROGRESS NOTE ADULT - SUBJECTIVE AND OBJECTIVE BOX
Patient is a 86y old  Female who presents with a chief complaint of fall (13 Jan 2018 05:14)    HPI:  1/13/2018- The patient is seen in consultation for preop cardiac evaluation. She sustained a left mid femur fracture after being discharged from rehab yesterday. 1 month ago she was admitted with vasovagal syncope and had a left knee hematoma.  The patient has no other complaints at present. She denies chest pain, dyspnea, palpitation. She is treated for HBP, long term persistent atrial fibrillation, chronic venous insufficiency. She has mild aortic regurgitation and normal LV systolic function. There is no history of heart failure.      Followup HPI:  1/14- No complaints. Denies chest pain or dyspnea. Left leg pain controlled.  1/15- no complaints      PAST MEDICAL & SURGICAL HISTORY:  [place] ([place] of hearing), bilateral  Vaginal prolapse  Spondyloarthritis  Spinal stenosis  Essential hypertension  vasovagal syncope  CLL  Acute cystitis without hematuria: septic  4/2016  Paroxysmal atrial fibrillation- long term persistent  Monoclonal gammopathies  Venous insufficiency  Lymphedema of both lower extremities  depressive disorder, chronic anxiety  mild dementia      S/P thyroidectomy: partial   1977  S/P kyphoplasty: 2013  repair of uterovaginal prolapse 8/2016  Review of symptoms:  Negative except for as noted in today's HPI.      MEDICATIONS  (STANDING):  ATENolol  Tablet 25 milliGRAM(s) Oral daily  calcium carbonate 1250 mG (OsCal) 1 Tablet(s) Oral daily  cholecalciferol Oral Tab/Cap - Peds 2000 Unit(s) Oral daily  ferrous    sulfate 325 milliGRAM(s) Oral daily  lidocaine   Patch 1 Patch Transdermal daily  multivitamin Oral Tab/Cap - Peds 1 Tablet(s) Oral daily  pantoprazole    Tablet 40 milliGRAM(s) Oral before breakfast  polyethylene glycol 3350 17 Gram(s) Oral daily  sertraline 50 milliGRAM(s) Oral daily  sodium chloride 0.9%. 1000 milliLiter(s) (75 mL/Hr) IV Continuous <Continuous>  sodium chloride 0.9%. 1000 milliLiter(s) (75 mL/Hr) IV Continuous <Continuous>    MEDICATIONS  (PRN):  docusate sodium 100 milliGRAM(s) Oral three times a day PRN Constipation  morphine  - Injectable 2 milliGRAM(s) IV Push every 4 hours PRN Moderate Pain (4 - 6)  oxyCODONE    IR 5 milliGRAM(s) Oral every 6 hours PRN Moderate Pain (4 - 6)  senna 1 Tablet(s) Oral at bedtime PRN Constipation          Vital Signs Last 24 Hrs  T(C): 37.1 (15 Pancho 2018 04:51), Max: 37.4 (14 Jan 2018 20:55)  T(F): 98.7 (15 Pancho 2018 04:51), Max: 99.3 (14 Jan 2018 20:55)  HR: 72 (15 Pancho 2018 04:51) (64 - 73)  BP: 135/56 (15 Pancho 2018 04:51) (117/46 - 145/54)  BP(mean): --  RR: 19 (15 Pancho 2018 04:51) (18 - 19)  SpO2: 99% (15 Pancho 2018 04:51) (98% - 99%)    I&O's Summary      PHYSICAL EXAM  General Appearance: comfortable  HEENT:   Neck:   Lungs: clear  Heart: 1/6 systolic murmur LSB  Abdomen: nontender  Extremities: 1+ edema both feet. Immobilizer left leg.  Neurologic: no focal abnormality      INTERPRETATION OF TELEMETRY:    ECG:    LABS:                          9.8    8.8   )-----------( 154      ( 15 Pancho 2018 04:45 )             31.2     01-15    132<L>  |  98  |  10  ----------------------------<  92  4.2   |  28  |  0.53    Ca    8.6      15 Pancho 2018 04:45  Phos  2.4     01-13  Mg     2.0     01-13    TPro  6.0  /  Alb  3.2<L>  /  TBili  0.6  /  DBili  x   /  AST  17  /  ALT  17  /  AlkPhos  96  01-13            PT/INR - ( 15 Pancho 2018 04:45 )   PT: 13.5 sec;   INR: 1.24 ratio         PTT - ( 15 Pancho 2018 04:45 )  PTT:27.6 sec          RADIOLOGY & ADDITIONAL STUDIES:    IMPRESSION:  1.Left mid femur fracture.  2.Atrial fibrillation, long term persistent. Stable.   3.Hypertension, stable.   4.Depressive disorder.  5.Chronic venous insufficiency  6.Anemia  7.History of CLL  PLAN:  Stable for the OR today. Continue atenolol. Restart anticoagulation post operatively. Continue sertraline.

## 2018-01-15 NOTE — PROGRESS NOTE ADULT - SUBJECTIVE AND OBJECTIVE BOX
Patient seen and examined. Pain controlled.    Physical exam  VS: Afebrile, vital signs stable  Gen: NAD  Left LE: Knee immobilizer intact. +EHL/FHL/TA/GSC. SILT L3-S1. +Dorsalis pedis, capillary refill brisk. Compartments soft and nontender.        86F with left femoral shaft fracture    OR today - left femur IMN - scheduled for after 1600  NWB affected extremity  Pain control  NPO except meds  Hold chemical anticoagulation  IV fluids while NPO  Will discuss with attending and advise if change

## 2018-01-15 NOTE — BRIEF OPERATIVE NOTE - OPERATION/FINDINGS
closed left femur fracture requiring fixation with cerclage cables and antegrade intramedullary nail. no complications.

## 2018-01-15 NOTE — PROGRESS NOTE ADULT - ASSESSMENT
85 yo F with PMH of spinal stenosis, HTN, chronic compression fracture of spine, CLL, HTN, lymphedema, monoclonal gammopathy, paroxysmal a-fib, vaginal prolapse, venous insufficiency, p/w mechanical fall with L fem shaft fracture planned for OR tonight    *L femoral shaft fracture s/p mechanical fall  -Got vit K with reversal of INR in anticipation of orthopedic intervention  -Pt has been cleared for sugery by cardiology. No medical contraindication to proceed with planned orthopedic intervention.  -Ortho consult appreciated  -IVF  -Will hold diuretics while NPO  -Pain control    *LE edema + ulcers  -U/S neg for DVT  -aldactone held  -Cover wound with dry dressing     *Anemia- stable  - outpatient work up    *Hyponatremia  -IVF and trend Na for improvement     *Spinal stenosis / HTN / monoclonal gammopathy  -Outpatient management     *DVT ppx- resume after surgery

## 2018-01-15 NOTE — BRIEF OPERATIVE NOTE - PROCEDURE
<<-----Click on this checkbox to enter Procedure Antegrade intramedullary nailing for fracture of femoral shaft  01/15/2018    Active  [id number]

## 2018-01-16 LAB
ANION GAP SERPL CALC-SCNC: 7 MMOL/L — SIGNIFICANT CHANGE UP (ref 5–17)
ANION GAP SERPL CALC-SCNC: 7 MMOL/L — SIGNIFICANT CHANGE UP (ref 5–17)
ANISOCYTOSIS BLD QL: SLIGHT — SIGNIFICANT CHANGE UP
BASOPHILS # BLD AUTO: 0.2 K/UL — SIGNIFICANT CHANGE UP (ref 0–0.2)
BASOPHILS NFR BLD AUTO: 1.3 % — SIGNIFICANT CHANGE UP (ref 0–2)
BUN SERPL-MCNC: 13 MG/DL — SIGNIFICANT CHANGE UP (ref 7–23)
BUN SERPL-MCNC: 17 MG/DL — SIGNIFICANT CHANGE UP (ref 7–23)
CALCIUM SERPL-MCNC: 7.2 MG/DL — LOW (ref 8.5–10.1)
CALCIUM SERPL-MCNC: 7.3 MG/DL — LOW (ref 8.5–10.1)
CHLORIDE SERPL-SCNC: 102 MMOL/L — SIGNIFICANT CHANGE UP (ref 96–108)
CHLORIDE SERPL-SCNC: 103 MMOL/L — SIGNIFICANT CHANGE UP (ref 96–108)
CO2 SERPL-SCNC: 24 MMOL/L — SIGNIFICANT CHANGE UP (ref 22–31)
CO2 SERPL-SCNC: 24 MMOL/L — SIGNIFICANT CHANGE UP (ref 22–31)
CREAT SERPL-MCNC: 0.5 MG/DL — SIGNIFICANT CHANGE UP (ref 0.5–1.3)
CREAT SERPL-MCNC: 0.7 MG/DL — SIGNIFICANT CHANGE UP (ref 0.5–1.3)
EOSINOPHIL # BLD AUTO: 0 K/UL — SIGNIFICANT CHANGE UP (ref 0–0.5)
EOSINOPHIL NFR BLD AUTO: 0 % — SIGNIFICANT CHANGE UP (ref 0–6)
GLUCOSE SERPL-MCNC: 142 MG/DL — HIGH (ref 70–99)
GLUCOSE SERPL-MCNC: 145 MG/DL — HIGH (ref 70–99)
HCT VFR BLD CALC: 26.6 % — LOW (ref 34.5–45)
HCT VFR BLD CALC: 29.8 % — LOW (ref 34.5–45)
HGB BLD-MCNC: 8.7 G/DL — LOW (ref 11.5–15.5)
HGB BLD-MCNC: 9.5 G/DL — LOW (ref 11.5–15.5)
INR BLD: 1.49 RATIO — HIGH (ref 0.88–1.16)
LYMPHOCYTES # BLD AUTO: 0.3 K/UL — LOW (ref 1–3.3)
LYMPHOCYTES # BLD AUTO: 1.9 % — LOW (ref 13–44)
MANUAL DIF COMMENT BLD-IMP: SIGNIFICANT CHANGE UP
MCHC RBC-ENTMCNC: 30.5 PG — SIGNIFICANT CHANGE UP (ref 27–34)
MCHC RBC-ENTMCNC: 31 PG — SIGNIFICANT CHANGE UP (ref 27–34)
MCHC RBC-ENTMCNC: 32 GM/DL — SIGNIFICANT CHANGE UP (ref 32–36)
MCHC RBC-ENTMCNC: 32.6 GM/DL — SIGNIFICANT CHANGE UP (ref 32–36)
MCV RBC AUTO: 94.9 FL — SIGNIFICANT CHANGE UP (ref 80–100)
MCV RBC AUTO: 95.5 FL — SIGNIFICANT CHANGE UP (ref 80–100)
MONOCYTES # BLD AUTO: 3.4 K/UL — HIGH (ref 0–0.9)
MONOCYTES NFR BLD AUTO: 23 % — HIGH (ref 2–14)
NEUTROPHILS # BLD AUTO: 10.8 K/UL — HIGH (ref 1.8–7.4)
NEUTROPHILS NFR BLD AUTO: 73.8 % — SIGNIFICANT CHANGE UP (ref 43–77)
PLAT MORPH BLD: NORMAL — SIGNIFICANT CHANGE UP
PLATELET # BLD AUTO: 129 K/UL — LOW (ref 150–400)
PLATELET # BLD AUTO: 131 K/UL — LOW (ref 150–400)
POIKILOCYTOSIS BLD QL AUTO: SLIGHT — SIGNIFICANT CHANGE UP
POTASSIUM SERPL-MCNC: 4.4 MMOL/L — SIGNIFICANT CHANGE UP (ref 3.5–5.3)
POTASSIUM SERPL-MCNC: 4.5 MMOL/L — SIGNIFICANT CHANGE UP (ref 3.5–5.3)
POTASSIUM SERPL-SCNC: 4.4 MMOL/L — SIGNIFICANT CHANGE UP (ref 3.5–5.3)
POTASSIUM SERPL-SCNC: 4.5 MMOL/L — SIGNIFICANT CHANGE UP (ref 3.5–5.3)
PROTHROM AB SERPL-ACNC: 16.2 SEC — HIGH (ref 9.8–12.7)
RBC # BLD: 2.81 M/UL — LOW (ref 3.8–5.2)
RBC # BLD: 3.13 M/UL — LOW (ref 3.8–5.2)
RBC # FLD: 14.2 % — SIGNIFICANT CHANGE UP (ref 10.3–14.5)
RBC # FLD: 14.9 % — HIGH (ref 10.3–14.5)
RBC BLD AUTO: SIGNIFICANT CHANGE UP
SCHISTOCYTES BLD QL AUTO: SLIGHT — SIGNIFICANT CHANGE UP
SODIUM SERPL-SCNC: 133 MMOL/L — LOW (ref 135–145)
SODIUM SERPL-SCNC: 134 MMOL/L — LOW (ref 135–145)
WBC # BLD: 14.7 K/UL — HIGH (ref 3.8–10.5)
WBC # BLD: 16 K/UL — HIGH (ref 3.8–10.5)
WBC # FLD AUTO: 14.7 K/UL — HIGH (ref 3.8–10.5)
WBC # FLD AUTO: 16 K/UL — HIGH (ref 3.8–10.5)

## 2018-01-16 PROCEDURE — 99223 1ST HOSP IP/OBS HIGH 75: CPT

## 2018-01-16 RX ORDER — ACETAMINOPHEN 500 MG
1000 TABLET ORAL ONCE
Qty: 0 | Refills: 0 | Status: COMPLETED | OUTPATIENT
Start: 2018-01-16 | End: 2018-01-17

## 2018-01-16 RX ORDER — KETOROLAC TROMETHAMINE 30 MG/ML
15 SYRINGE (ML) INJECTION ONCE
Qty: 0 | Refills: 0 | Status: DISCONTINUED | OUTPATIENT
Start: 2018-01-16 | End: 2018-01-16

## 2018-01-16 RX ORDER — SODIUM CHLORIDE 9 MG/ML
500 INJECTION INTRAMUSCULAR; INTRAVENOUS; SUBCUTANEOUS ONCE
Qty: 0 | Refills: 0 | Status: COMPLETED | OUTPATIENT
Start: 2018-01-16 | End: 2018-01-16

## 2018-01-16 RX ORDER — POLYETHYLENE GLYCOL 3350 17 G/17G
17 POWDER, FOR SOLUTION ORAL DAILY
Qty: 0 | Refills: 0 | Status: DISCONTINUED | OUTPATIENT
Start: 2018-01-16 | End: 2018-01-25

## 2018-01-16 RX ORDER — ATENOLOL 25 MG/1
12.5 TABLET ORAL
Qty: 0 | Refills: 0 | Status: DISCONTINUED | OUTPATIENT
Start: 2018-01-17 | End: 2018-01-25

## 2018-01-16 RX ORDER — LIDOCAINE 4 G/100G
1 CREAM TOPICAL DAILY
Qty: 0 | Refills: 0 | Status: DISCONTINUED | OUTPATIENT
Start: 2018-01-16 | End: 2018-01-25

## 2018-01-16 RX ORDER — WARFARIN SODIUM 2.5 MG/1
3 TABLET ORAL DAILY
Qty: 0 | Refills: 0 | Status: DISCONTINUED | OUTPATIENT
Start: 2018-01-16 | End: 2018-01-18

## 2018-01-16 RX ORDER — METOCLOPRAMIDE HCL 10 MG
5 TABLET ORAL ONCE
Qty: 0 | Refills: 0 | Status: COMPLETED | OUTPATIENT
Start: 2018-01-16 | End: 2018-01-16

## 2018-01-16 RX ORDER — ATENOLOL 25 MG/1
25 TABLET ORAL
Qty: 0 | Refills: 0 | Status: DISCONTINUED | OUTPATIENT
Start: 2018-01-16 | End: 2018-01-16

## 2018-01-16 RX ORDER — DIAZEPAM 5 MG
5 TABLET ORAL ONCE
Qty: 0 | Refills: 0 | Status: DISCONTINUED | OUTPATIENT
Start: 2018-01-16 | End: 2018-01-16

## 2018-01-16 RX ORDER — SODIUM CHLORIDE 9 MG/ML
1000 INJECTION, SOLUTION INTRAVENOUS ONCE
Qty: 0 | Refills: 0 | Status: COMPLETED | OUTPATIENT
Start: 2018-01-16 | End: 2018-01-16

## 2018-01-16 RX ORDER — SERTRALINE 25 MG/1
50 TABLET, FILM COATED ORAL DAILY
Qty: 0 | Refills: 0 | Status: DISCONTINUED | OUTPATIENT
Start: 2018-01-16 | End: 2018-01-25

## 2018-01-16 RX ADMIN — Medication 5 MILLIGRAM(S): at 03:46

## 2018-01-16 RX ADMIN — SODIUM CHLORIDE 75 MILLILITER(S): 9 INJECTION, SOLUTION INTRAVENOUS at 04:07

## 2018-01-16 RX ADMIN — SODIUM CHLORIDE 75 MILLILITER(S): 9 INJECTION, SOLUTION INTRAVENOUS at 12:22

## 2018-01-16 RX ADMIN — LIDOCAINE 1 PATCH: 4 CREAM TOPICAL at 17:51

## 2018-01-16 RX ADMIN — SODIUM CHLORIDE 1000 MILLILITER(S): 9 INJECTION, SOLUTION INTRAVENOUS at 03:33

## 2018-01-16 RX ADMIN — Medication 650 MILLIGRAM(S): at 06:30

## 2018-01-16 RX ADMIN — ENOXAPARIN SODIUM 40 MILLIGRAM(S): 100 INJECTION SUBCUTANEOUS at 06:30

## 2018-01-16 RX ADMIN — SODIUM CHLORIDE 500 MILLILITER(S): 9 INJECTION INTRAMUSCULAR; INTRAVENOUS; SUBCUTANEOUS at 12:02

## 2018-01-16 RX ADMIN — Medication 200 MILLIGRAM(S): at 06:38

## 2018-01-16 RX ADMIN — Medication 200 MILLIGRAM(S): at 13:15

## 2018-01-16 RX ADMIN — SODIUM CHLORIDE 75 MILLILITER(S): 9 INJECTION, SOLUTION INTRAVENOUS at 00:18

## 2018-01-16 RX ADMIN — Medication 15 MILLIGRAM(S): at 13:15

## 2018-01-16 RX ADMIN — Medication 100 MILLIGRAM(S): at 22:57

## 2018-01-16 RX ADMIN — Medication 5 MILLIGRAM(S): at 22:57

## 2018-01-16 RX ADMIN — SERTRALINE 50 MILLIGRAM(S): 25 TABLET, FILM COATED ORAL at 12:22

## 2018-01-16 RX ADMIN — WARFARIN SODIUM 3 MILLIGRAM(S): 2.5 TABLET ORAL at 22:57

## 2018-01-16 RX ADMIN — Medication 15 MILLIGRAM(S): at 03:46

## 2018-01-16 RX ADMIN — Medication 650 MILLIGRAM(S): at 19:02

## 2018-01-16 RX ADMIN — Medication 100 MILLIGRAM(S): at 13:16

## 2018-01-16 RX ADMIN — POLYETHYLENE GLYCOL 3350 17 GRAM(S): 17 POWDER, FOR SOLUTION ORAL at 17:47

## 2018-01-16 NOTE — PHYSICAL THERAPY INITIAL EVALUATION ADULT - ACTIVE RANGE OF MOTION EXAMINATION, REHAB EVAL
bilateral lower extremity Active ROM was WNL (within normal limits)/Right LE Active ROM was WFL (within functional limits)/LLE ankle tested actively WFL. Left LLE in KI

## 2018-01-16 NOTE — CONSULT NOTE ADULT - SUBJECTIVE AND OBJECTIVE BOX
HPI:  87 yo F with PMH of spinal stenosis, HTN, chronic compression fracture of spine, CLL, HTN, lymphedema monoclonal gammopathy, paroxysmal a-fib, vaginal prolapse, venous insufficiency, p/w mechanical fall. Patient was discharged from rehab yesterday, and was at home for 2 hours when she fell. She had a mechanical fall when transferring from bed to commode. Found to have L femoral shaft fracture. Denies h/o prolonged intubation or complications with anesthesia previously.     PSH: History of vaginal surgery, S/P kyphoplasty  2014, S/P thyroidectomy  partial   1975    Family Hx: sister - cva / cancer, father - cva    Social hx: former smoker, denies etoh / drugs, lives at home (13 Jan 2018 05:14)      Patient is a 86y old  Female who presents with a chief complaint of fall (13 Jan 2018 05:14)  s/p fx left Femur  s/p IMN on 1-16-18    Consulted by Dr. Magno Kitchen for VTE prophylaxis, risk stratification, and anticoagulation management.    PAST MEDICAL & SURGICAL HISTORY:  [place] (hard of hearing), bilateral  Vaginal prolapse  Spondyloarthritis  Spinal stenosis  Essential hypertension  Acute cystitis without hematuria: septic  4/2016  Paroxysmal atrial fibrillation  Monoclonal gammopathies  Venous insufficiency  Lymphedema of both lower extremities  History of vaginal surgery  S/P thyroidectomy: partial   1975  S/P kyphoplasty: 2014      [place] VTE Risk Score:10    [id number] Score: 5    IMPROVE Bleeding Risk Score: 3.5    Falls Risk:   High (x )  Mod (  )  Low (  )    EBL:  250 ml  crcl: 59.93  1-16-18 Pt seen at bedside wit daughter present. concerned about her mother's low B/P, RN called hospitalised to speak with her.  Discussed her anticoagulation coumadin over lapping with Lovenox and this daughter called her sister in order to get pt's normal coumadin dose.  The daughter informed me she used to take 3mg 4 times a week and 4 mg the other days before she became ill and was in a rehab facility.  Questions answered Benefits and risks discussed will reinforce as needed.     Vital Signs Last 24 Hrs  T(C): 36.2 (16 Jan 2018 11:37), Max: 37.2 (15 Pancho 2018 16:45)  T(F): 97.2 (16 Jan 2018 11:37), Max: 99 (15 Pancho 2018 16:45)  HR: 79 (16 Jan 2018 11:37) (68 - 110)  BP: 85/39 (16 Jan 2018 11:37) (82/40 - 142/62)  BP(mean): --  RR: 17 (16 Jan 2018 11:37) (13 - 18)  SpO2: 100% (16 Jan 2018 11:37) (98% - 100%)  FAMILY HISTORY:  No pertinent family history in first degree relatives    Denies any personal or familial history of clotting or bleeding disorders.    Allergies    No Known Allergies    Intolerances        REVIEW OF SYSTEMS    (  )Fever	     (  )Constipation	(  )SOB				(  )Headache	(  )Dysuria  (  )Chills	     (  )Melena	(  )Dyspnea present on exertion	                    (  )Dizziness                    (  )Polyuria  (  )Nausea	     (  )Hematochezia	(  )Cough			                    (  )Syncope   	(  )Hematuria  (  )Vomiting    (  )Chest Pain	(  )Wheezing			(  )Weakness  (  )Diarrhea     (  )Palpitations	(  )Anorexia			(  )Myalgia    All other review of systems negative: Yes      PHYSICAL EXAM:    Constitutional: Appears Well    Neurological: A& O x 3    Skin: Warm    Respiratory and Thorax: normal effort; Breath sounds: normal; No rales/wheezing/rhonchi  	  Cardiovascular: S1, S2, regular, NMBR	    Gastrointestinal: BS + x 4Q, nontender	    Genitourinary:  Bladder nondistended, nontender    Musculoskeletal:   General Right:   no muscle/joint tenderness,   normal tone, no joint swelling,   ROM: limited/full	    General Left:   no muscle/joint tenderness,   normal tone, no joint swelling,   ROM: limited/full    Hip: Left: Dressing CDI; long leg immobilizer noted      Lower extrems:   Right: no calf tenderness              negative christopher's sign               + pedal pulses    Left:   no calf tenderness              negative christopher's sign               + pedal pulses              + edema noted 2+ with ecchymosis noted to distal le.                          8.7    16.0  )-----------( 129      ( 16 Jan 2018 10:14 )             26.6       01-16    133<L>  |  102  |  17  ----------------------------<  142<H>  4.4   |  24  |  0.70    Ca    7.3<L>      16 Jan 2018 07:03        PT/INR - ( 15 Pancho 2018 04:45 )   PT: 13.5 sec;   INR: 1.24 ratio         PTT - ( 15 Pancho 2018 04:45 )  PTT:27.6 sec				    MEDICATIONS  (STANDING):  ceFAZolin   IVPB 2000 milliGRAM(s) IV Intermittent every 8 hours  docusate sodium 100 milliGRAM(s) Oral three times a day  enoxaparin Injectable 40 milliGRAM(s) SubCutaneous every 24 hours  ketorolac   Injectable 15 milliGRAM(s) IV Push once  lactated ringers. 1000 milliLiter(s) IV Continuous <Continuous>  sertraline 50 milliGRAM(s) Oral daily  sodium chloride 0.9% Bolus 500 milliLiter(s) IV Bolus once  warfarin 3 milliGRAM(s) Oral daily          DVT Prophylaxis:  LMWH                   ( x )  Heparin SQ           (  )  Coumadin             ( x )  Xarelto                  (  )  Eliquis                   (  )  Venodynes           (x )  Ambulation          ( x )  UFH                       (  )  Contraindicated  (  )

## 2018-01-16 NOTE — CONSULT NOTE ADULT - ASSESSMENT
This is a 86 year old female s/p mechanical fall when transferring from bed to commode. Found to have L femoral shaft fracture. Pt s/p Antegrade intramedullary nailing for fracture of femoral shaft on 1-15018.  Pt has hx of A. Fib on coumadin.  [id number] Score: 5 Pt has high thrombosis risk and require anticoagulation treatment dose.    Plan discussed with pt's daughter's and pt.     Plan:  Coumadin 3 mg PO daily  adjust dose per INR  Lovenox 40mg sq daily til inr is 2.0 or greater.  Daily PT/INR (pending)  Daily CBC/BMP (h/h noted 6.7/26.2)  Enc ambulation  Venodynes  :thanks for consult will f/u

## 2018-01-16 NOTE — PHYSICAL THERAPY INITIAL EVALUATION ADULT - GENERAL OBSERVATIONS, REHAB EVAL
Pt. received in bed w/ KI on LLE, lethargic. Pt.'s daughter in room. Pt. received pain medication prior to session.

## 2018-01-16 NOTE — PROGRESS NOTE ADULT - SUBJECTIVE AND OBJECTIVE BOX
Patient is a 86y old  Female who presents with a chief complaint of fall (2018 05:14)  2018- The patient is seen in consultation for preop cardiac evaluation. She sustained a left mid femur fracture after being discharged from rehab yesterday. 1 month ago she was admitted with vasovagal syncope and had a left knee hematoma.  The patient has no other complaints at present. She denies chest pain, dyspnea, palpitation. She is treated for HBP, long term persistent atrial fibrillation, chronic venous insufficiency. She has mild aortic regurgitation and normal LV systolic function. There is no history of heart failure.    Followup HPI:  - No complaints. Denies chest pain or dyspnea. Left leg pain controlled.  1/15- no complaints  - s/p lM nail left femur shaft yesterday. Received 2 units PRBCs. Received IV fluid bolus for mild hypotension post op. Pain control is satisfactory. No chest pain or dyspnea.      PAST MEDICAL & SURGICAL HISTORY:  [place] (hard of hearing), bilateral  Vaginal prolapse  Spondyloarthritis  Spinal stenosis  Essential hypertension  vasovagal syncope  CLL  Acute cystitis without hematuria: septic  2016  Paroxysmal atrial fibrillation- long term persistent  Monoclonal gammopathies  Venous insufficiency  Lymphedema of both lower extremities  depressive disorder, chronic anxiety  mild dementia      S/P thyroidectomy: partial     S/P kyphoplasty:   repair of uterovaginal prolapse 2016  Review of symptoms:  Negative except for as noted in today's HPI.    Review of symptoms:  Negative except for as noted in today's HPI.      MEDICATIONS  (STANDING):  ceFAZolin   IVPB 2000 milliGRAM(s) IV Intermittent every 8 hours  docusate sodium 100 milliGRAM(s) Oral three times a day  enoxaparin Injectable 40 milliGRAM(s) SubCutaneous every 24 hours  lactated ringers. 1000 milliLiter(s) (75 mL/Hr) IV Continuous <Continuous>    MEDICATIONS  (PRN):  acetaminophen   Tablet 650 milliGRAM(s) Oral every 6 hours PRN For Temp greater than 38 C (100.4 F)  diphenhydrAMINE   Capsule 25 milliGRAM(s) Oral at bedtime PRN Insomnia  HYDROmorphone  Injectable 1 milliGRAM(s) IV Push every 4 hours PRN Severe Pain (7 - 10)  oxyCODONE    IR 10 milliGRAM(s) Oral every 4 hours PRN Moderate Pain  oxyCODONE    IR 5 milliGRAM(s) Oral every 4 hours PRN Mild Pain          Vital Signs Last 24 Hrs  T(C): 36.3 (2018 04:49), Max: 37.2 (15 Pancho 2018 11:38)  T(F): 97.3 (2018 04:49), Max: 99 (15 Pancho 2018 11:38)  HR: 86 (2018 06:20) (68 - 110)  BP: 98/49 (2018 06:20) (82/40 - 142/62)  BP(mean): --  RR: 17 (2018 06:20) (13 - 18)  SpO2: 100% (2018 06:20) (98% - 100%)    I&O's Summary    15 Pancho 2018 07:01  -  2018 07:00  --------------------------------------------------------  IN: 5217 mL / OUT: 402 mL / NET: 4815 mL        PHYSICAL EXAM  General Appearance: alert  HEENT:   Neck:   Lungs: clear  Heart: 1/6 systolic murmur LSB  Abdomen: nontender  Extremities: no edema right leg. 1+ edema left foot. Immobilizer left leg.  Neurologic: no focal abnormality      INTERPRETATION OF TELEMETRY:    ECG:    LABS:                          9.5    14.7  )-----------( 131      ( 15 Pancho 2018 23:32 )             29.8     01-16    133<L>  |  102  |  17  ----------------------------<  142<H>  4.4   |  24  |  0.70    Ca    7.3<L>      2018 07:03              PT/INR - ( 15 Pancho 2018 04:45 )   PT: 13.5 sec;   INR: 1.24 ratio         PTT - ( 15 Pancho 2018 04:45 )  PTT:27.6 sec  Urinalysis Basic - ( 15 Pancho 2018 17:45 )    Color: Yellow / Appearance: Clear / S.010 / pH: x  Gluc: x / Ketone: Negative  / Bili: Negative / Urobili: 1 mg/dL   Blood: x / Protein: 15 mg/dL / Nitrite: Negative   Leuk Esterase: Trace / RBC: 0-2 /HPF / WBC 3-5   Sq Epi: x / Non Sq Epi: Many / Bacteria: Moderate            RADIOLOGY & ADDITIONAL STUDIES:    IMPRESSION:  1s/p IM nail for left midshaft  femur fracture.  2.Atrial fibrillation, long term persistent. Stable.   3.Hypertension. BP has been mildly low but currently stable after fluid bolus and PRBC transfusions.   4.Depressive disorder.  5.Chronic venous insufficiency  6.Anemia    PLAN:  Anticoagulation consultation to manage the post op anticoagulation. Follow H and H and transfuse as needed. Continue atenolol for heart rate control at 12.5 mg bid. Continue sertraline.

## 2018-01-16 NOTE — PHYSICAL THERAPY INITIAL EVALUATION ADULT - PERTINENT HX OF CURRENT PROBLEM, REHAB EVAL
Pt. presents to  s/p mechanical fall. Pt. was d/c from rehab and was home for 2 hours when pt. fell while transferring from bed to commode. In ED, pt. found to have L femoral shaft fracture.

## 2018-01-16 NOTE — PROGRESS NOTE ADULT - SUBJECTIVE AND OBJECTIVE BOX
CHIEF COMPLAINT: fall, femur fracture    SUBJECTIVE: POD 1. Pt lethargic. BP slightly low. Was unable to void.  + constipation.    REVIEW OF SYSTEMS: All other review of systems is negative unless indicated above    Vital Signs Last 24 Hrs  T(C): 36.6 (16 Jan 2018 16:55), Max: 36.6 (15 Pancho 2018 23:45)  T(F): 97.9 (16 Jan 2018 16:55), Max: 97.9 (15 Pancho 2018 23:45)  HR: 81 (16 Jan 2018 16:55) (68 - 110)  BP: 109/70 (16 Jan 2018 16:55) (82/40 - 142/52)  BP(mean): --  RR: 17 (16 Jan 2018 16:55) (13 - 17)  SpO2: 100% (16 Jan 2018 16:55) (100% - 100%)    PHYSICAL EXAM:  Constitutional: frail, lethargic appearing elderly female in NAD.  HEENT: EOMI, Normal Hearing, MMM  Neck: Soft and supple, No JVD  Respiratory: Breath sounds are clear bilaterally, No wheezing, rales or rhonchi  Cardiovascular: S1 and S2, regular rate and rhythm, no Murmurs, gallops or rubs  Gastrointestinal: +BS, nd, nt  Extremities: LLE dressing CDI. Left foot edemtous.   Vascular: 2+ peripheral pulses  Neurological: A/O x 3, no focal deficits  Musculoskeletal: 5/5 strength b/l upper and lower extremities  Skin: No rashes    MEDICATIONS:  MEDICATIONS  (STANDING):  docusate sodium 100 milliGRAM(s) Oral three times a day  enoxaparin Injectable 40 milliGRAM(s) SubCutaneous every 24 hours  lactated ringers. 1000 milliLiter(s) (75 mL/Hr) IV Continuous <Continuous>  lidocaine   Patch 1 Patch Transdermal daily  polyethylene glycol 3350 17 Gram(s) Oral daily  sertraline 50 milliGRAM(s) Oral daily  warfarin 3 milliGRAM(s) Oral daily    LABS: All Labs Reviewed:                        8.7    16.0  )-----------( 129      ( 16 Jan 2018 10:14 )             26.6     133<L>  |  102  |  17  ----------------------------<  142<H>  4.4   |  24  |  0.70    Ca    7.3<L>      16 Jan 2018 07:03    PT/INR - ( 16 Jan 2018 12:12 )   PT: 16.2 sec;   INR: 1.49 ratio    PTT - ( 15 Pancho 2018 04:45 )  PTT:27.6 sec

## 2018-01-16 NOTE — PHYSICAL THERAPY INITIAL EVALUATION ADULT - ADDITIONAL COMMENTS
Pt. reports residing in one level house w/ one "little" step to enter house. Pt. reports has 24/7 supervision from daughter and HHA. At baseline pt. ambulates w/ RW/rollator? Pt. reports pt. was using rollator when transferring to commode.

## 2018-01-16 NOTE — PROGRESS NOTE ADULT - ASSESSMENT
87 yo F with PMH of spinal stenosis, HTN, chronic compression fracture of spine, CLL, HTN, lymphedema, monoclonal gammopathy, paroxysmal a-fib, vaginal prolapse, venous insufficiency, p/w mechanical fall with L fem shaft fracture planned for OR tonight    *L femoral shaft fracture s/p mechanical fall s/p L Femur IMN 1/15.   - Ortho consult appreciated  - Pain control  - NWB LLE  - pain control  - PT  - ACS for DVT ppx appreicated    # Urinary retention  - straight cath prn. if persistent will sales.  - pain control    # Hypotension  - BB w parameter  - s/p IVF boluses with minimal increase in BP  - monitor  - getting PRBC    # LE edema + ulcers  -U/S neg for DVT  -aldactone held  -Cover wound with dry dressing     # Chronic Anemia  # Post op anemia  - transfuse prn    # Hyponatremia  -IVF and trend Na for improvement     # Spinal stenosis / HTN / monoclonal gammopathy  -Outpatient management     *DVT ppx

## 2018-01-16 NOTE — PROGRESS NOTE ADULT - SUBJECTIVE AND OBJECTIVE BOX
Pt S/E at bedside, tolerated procedure well, pain controlled - given 1L LR bolus overnight with toradol and reglan    Vital Signs Last 24 Hrs  T(C): 36.3 (16 Jan 2018 04:49), Max: 37.2 (15 Pancho 2018 11:38)  T(F): 97.3 (16 Jan 2018 04:49), Max: 99 (15 Pancho 2018 11:38)  HR: 72 (16 Jan 2018 04:49) (68 - 110)  BP: 102/48 (16 Jan 2018 04:49) (82/40 - 142/62)  BP(mean): --  RR: 16 (16 Jan 2018 04:49) (13 - 18)  SpO2: 100% (16 Jan 2018 04:49) (98% - 100%)    Gen: NAD    Left Lower Extremity:  Dressing clean dry intact  +EHL/FHL/TA/GS  SILT L3-S1  +DP/PT Pulses  Compartments soft  No calf TTP B/L

## 2018-01-16 NOTE — PROVIDER CONTACT NOTE (OTHER) - ACTION/TREATMENT ORDERED:
Informed Dr Mickey Piña pt's bp 82/40.  Received orders for 1L LR, 5mg Reglan IVP, 15 mg Toradol.  Will continue to monitor pt.

## 2018-01-16 NOTE — PROGRESS NOTE ADULT - ASSESSMENT
A/P: 86F s/p L femur IMN POD 1  Analgesia  DVT ppx - per AC team  NWB LLE  PT  FU Labs  Continue medical management  DC planning

## 2018-01-17 ENCOUNTER — TRANSCRIPTION ENCOUNTER (OUTPATIENT)
Age: 83
End: 2018-01-17

## 2018-01-17 LAB
ANION GAP SERPL CALC-SCNC: 7 MMOL/L — SIGNIFICANT CHANGE UP (ref 5–17)
BUN SERPL-MCNC: 15 MG/DL — SIGNIFICANT CHANGE UP (ref 7–23)
CALCIUM SERPL-MCNC: 7.4 MG/DL — LOW (ref 8.5–10.1)
CHLORIDE SERPL-SCNC: 102 MMOL/L — SIGNIFICANT CHANGE UP (ref 96–108)
CO2 SERPL-SCNC: 26 MMOL/L — SIGNIFICANT CHANGE UP (ref 22–31)
CREAT SERPL-MCNC: 0.5 MG/DL — SIGNIFICANT CHANGE UP (ref 0.5–1.3)
FERRITIN SERPL-MCNC: 276 NG/ML — HIGH (ref 15–150)
GLUCOSE SERPL-MCNC: 97 MG/DL — SIGNIFICANT CHANGE UP (ref 70–99)
HCT VFR BLD CALC: 24.2 % — LOW (ref 34.5–45)
HGB BLD-MCNC: 8.1 G/DL — LOW (ref 11.5–15.5)
INR BLD: 1.51 RATIO — HIGH (ref 0.88–1.16)
IRON SATN MFR SERPL: 16 UG/DL — LOW (ref 30–160)
IRON SATN MFR SERPL: 8 % — LOW (ref 14–50)
MCHC RBC-ENTMCNC: 31.4 PG — SIGNIFICANT CHANGE UP (ref 27–34)
MCHC RBC-ENTMCNC: 33.4 GM/DL — SIGNIFICANT CHANGE UP (ref 32–36)
MCV RBC AUTO: 94.2 FL — SIGNIFICANT CHANGE UP (ref 80–100)
PLATELET # BLD AUTO: 115 K/UL — LOW (ref 150–400)
POTASSIUM SERPL-MCNC: 4.3 MMOL/L — SIGNIFICANT CHANGE UP (ref 3.5–5.3)
POTASSIUM SERPL-SCNC: 4.3 MMOL/L — SIGNIFICANT CHANGE UP (ref 3.5–5.3)
PROTHROM AB SERPL-ACNC: 16.5 SEC — HIGH (ref 9.8–12.7)
RBC # BLD: 2.57 M/UL — LOW (ref 3.8–5.2)
RBC # FLD: 14.8 % — HIGH (ref 10.3–14.5)
SODIUM SERPL-SCNC: 135 MMOL/L — SIGNIFICANT CHANGE UP (ref 135–145)
TIBC SERPL-MCNC: 192 UG/DL — LOW (ref 220–430)
UIBC SERPL-MCNC: 176 UG/DL — SIGNIFICANT CHANGE UP (ref 110–370)
WBC # BLD: 12 K/UL — HIGH (ref 3.8–10.5)
WBC # FLD AUTO: 12 K/UL — HIGH (ref 3.8–10.5)

## 2018-01-17 PROCEDURE — 99233 SBSQ HOSP IP/OBS HIGH 50: CPT

## 2018-01-17 RX ORDER — LACTULOSE 10 G/15ML
10 SOLUTION ORAL EVERY 12 HOURS
Qty: 0 | Refills: 0 | Status: DISCONTINUED | OUTPATIENT
Start: 2018-01-17 | End: 2018-01-18

## 2018-01-17 RX ADMIN — Medication 400 MILLIGRAM(S): at 05:47

## 2018-01-17 RX ADMIN — POLYETHYLENE GLYCOL 3350 17 GRAM(S): 17 POWDER, FOR SOLUTION ORAL at 11:11

## 2018-01-17 RX ADMIN — ENOXAPARIN SODIUM 40 MILLIGRAM(S): 100 INJECTION SUBCUTANEOUS at 05:48

## 2018-01-17 RX ADMIN — LIDOCAINE 1 PATCH: 4 CREAM TOPICAL at 05:51

## 2018-01-17 RX ADMIN — Medication 100 MILLIGRAM(S): at 14:11

## 2018-01-17 RX ADMIN — ATENOLOL 12.5 MILLIGRAM(S): 25 TABLET ORAL at 17:56

## 2018-01-17 RX ADMIN — Medication 100 MILLIGRAM(S): at 05:48

## 2018-01-17 RX ADMIN — WARFARIN SODIUM 3 MILLIGRAM(S): 2.5 TABLET ORAL at 21:31

## 2018-01-17 RX ADMIN — SERTRALINE 50 MILLIGRAM(S): 25 TABLET, FILM COATED ORAL at 11:12

## 2018-01-17 RX ADMIN — LACTULOSE 10 GRAM(S): 10 SOLUTION ORAL at 21:43

## 2018-01-17 RX ADMIN — Medication 100 MILLIGRAM(S): at 21:31

## 2018-01-17 RX ADMIN — OXYCODONE HYDROCHLORIDE 5 MILLIGRAM(S): 5 TABLET ORAL at 14:10

## 2018-01-17 RX ADMIN — LIDOCAINE 1 PATCH: 4 CREAM TOPICAL at 00:01

## 2018-01-17 RX ADMIN — SODIUM CHLORIDE 75 MILLILITER(S): 9 INJECTION, SOLUTION INTRAVENOUS at 05:49

## 2018-01-17 RX ADMIN — LIDOCAINE 1 PATCH: 4 CREAM TOPICAL at 11:12

## 2018-01-17 NOTE — PROGRESS NOTE ADULT - SUBJECTIVE AND OBJECTIVE BOX
Pt S/E at bedside, no acute events overnight, pain controlled    Vital Signs Last 24 Hrs  T(C): 36.8 (17 Jan 2018 04:56), Max: 37.3 (16 Jan 2018 20:20)  T(F): 98.3 (17 Jan 2018 04:56), Max: 99.1 (16 Jan 2018 20:20)  HR: 85 (17 Jan 2018 04:56) (71 - 86)  BP: 120/50 (17 Jan 2018 04:56) (85/39 - 120/50)  BP(mean): --  RR: 16 (17 Jan 2018 04:56) (16 - 17)  SpO2: 96% (17 Jan 2018 04:56) (96% - 100%)    Gen: NAD    Left Lower Extremity:  Dressing clean dry intact  +EHL/FHL/TA/GS  SILT L3-S1  +DP/PT Pulses  Compartments soft  No calf TTP B/L

## 2018-01-17 NOTE — ADVANCED PRACTICE NURSE CONSULT - RECOMMEDATIONS
1) Turn and position every 2 hours  2) Elevate heels off mattress  3) Change dressing to RLE every 5 days with foam dressing

## 2018-01-17 NOTE — DISCHARGE NOTE ADULT - CARE PROVIDER_API CALL
Magno Kitchen (MD), Orthopaedic Surgery  [address] [place] [address]  Phone: [phone]  Fax: [phone] Magno Kitchen), Orthopaedic Surgery  [address] [place] [address]  Phone: [phone]  Fax: [phone]    Tony Vides), Cardiovascular Disease; Internal Medicine  [address] [place] [address]  Phone: [phone]  Fax: [phone]

## 2018-01-17 NOTE — DISCHARGE NOTE ADULT - CARE PLAN
Principal Discharge DX:	Closed displaced spiral fracture of shaft of left femur, initial encounter  Goal:	Return to baseline ADLs  Assessment and plan of treatment:	1.	Pain Control  2.	Non-weight bearing left lower extremity, with assistive devices as needed  3.	DVT Prophylaxis  4.	PT as needed  5.	Follow up with Dr. Kitchen as Outpatient in 10-14 Days after Discharge from the Hospital or Rehab. Call Office For Appointment.  6.	Staples/Sutures to be removed Post-Op Day 14, and repeat x-rays  7.	Ice/Elevate affected area as needed  8.	Keep Dressing/Splint clean and dry Principal Discharge DX:	Closed displaced spiral fracture of shaft of left femur, initial encounter  Goal:	Return to baseline ADLs  Assessment and plan of treatment:	1.	Pain Control  2.	Non-weight bearing left lower extremity, with assistive devices as needed. No range of motion at knee, with knee immobilizer.  3.	DVT Prophylaxis  4.	PT as needed  5.	Follow up with Dr. Kitchen as Outpatient in 10-14 Days after Discharge from the Hospital or Rehab. Call Office For Appointment.  6.	Staples/Sutures to be removed Post-Op Day 14, and repeat x-rays  7.	Ice/Elevate affected area as needed  8.	Keep Dressing/Splint clean and dry Principal Discharge DX:	Closed displaced spiral fracture of shaft of left femur, initial encounter  Goal:	Return to baseline ADLs  Assessment and plan of treatment:	IM Nail DC Instructions:    1.	Resume previous diet, regular or diabetic as appropriate  2.	Non weight bearing LEFT Lower extremity, can be OOB to chair with assistance  3.	Continue DVT/PE Prophylaxis. Pt on Coumadin. See Med Rec for Duration and dose.  4.	PT daily OOB to chair  5.	Follow up with Orthopedic Surgeon Dr. DAVISON in about 14 Days. Call Office For Appointment.  6.	Staples to be removed by RN Post-Op Day 14 (1/29/18), provided wound is healed, no open areas or copious drainage.  7.	Ice the hip as much as possible  8.	Keep Aquacel bandage on. Change only if wet or soaked underneath or soiled, using Tegaderm/Paper tape and dry gauze.  9.     OK to shower with Aquacel beige bandage, otherwise sponge bathe only. Will need assistance to shower.

## 2018-01-17 NOTE — PROGRESS NOTE ADULT - ASSESSMENT
87 yo F with PMH of spinal stenosis, HTN, chronic compression fracture of spine, CLL, HTN, lymphedema, monoclonal gammopathy, paroxysmal a-fib, vaginal prolapse, venous insufficiency, p/w mechanical fall with L fem shaft fracture s/p IMN     *L femoral shaft fracture s/p mechanical fall s/p L Femur IMN 1/15.   - Ortho consult appreciated  - Pain control  - NWB LLE  - pain control  - PT  - ACS for DVT ppx appreicated    # Acute on chronic anemia. Acute blood loss anemia 2/2 post op. hematoma?  - monitor HH  - transfuse prn    # Urinary retention  - straight cath prn.   - pain control    # Hypotension- resolved  - BB w parameter  - monitor    # LE edema + ulcers  -U/S neg for DVT  -aldactone held  -wound care recs aopprecioated    # Hyponatremia  -IVF and trend Na for improvement     # Spinal stenosis / HTN / monoclonal gammopathy  -Outpatient management     *DVT ppx

## 2018-01-17 NOTE — ADVANCED PRACTICE NURSE CONSULT - ASSESSMENT
This is an 86 year old female that was admitted to the hospital on 1/12/2018 for fall.  PMH- spinal stenosis, HTN, chronic compression fracture of spine, CLL, HTN, lymphedema, monoclonal gammopathy, paroxysmal a-fib, vaginal prolapse, venous insufficiency.    Requested by MD to assess patient's right shin blood blister.    Patient presents resting in bed on her backside with both heels elevated off mattress.  Right shin noted to have a dry, intact, blood blister. Unable to express any drainage. Periwound intact. No odor. Extremity warm to touch. Skin prep applied to wound for protection. Foam dressing placed as padding.    Patient to work with PT after assessment completed.

## 2018-01-17 NOTE — PROGRESS NOTE ADULT - ASSESSMENT
A/P: 86F s/p L femur IMN POD 2  Analgesia  DVT ppx - per AC team  NWB LLE  PT  FU Labs  Continue medical management  DC planning

## 2018-01-17 NOTE — DISCHARGE NOTE ADULT - HOSPITAL COURSE
PCP- DR Vides  CC- Patient is a 86y old  Female who presents with a chief complaint of fall (17 Jan 2018 06:41)  HPI:  85 yo F with PMH of spinal stenosis, HTN, chronic compression fracture of spine, CLL, HTN, lymphedema monoclonal gammopathy, paroxysmal a-fib, vaginal prolapse, venous insufficiency, p/w mechanical fall. Patient was discharged from rehab yesterday, and was at home for 2 hours when she fell. She had a mechanical fall when transferring from bed to [facility]. Found to have L femoral shaft fracture. Denies h/o prolonged intubation or complications with anesthesia previously.   PSH: History of vaginal surgery, S/P kyphoplasty  2014, S/P thyroidectomy  partial   1975  Family Hx: sister - cva / cancer, father - cva  Social hx: former smoker, denies etoh / drugs, lives at home (13 Jan 2018 05:14)    Hospital course- underwent LT femur IMN, postop got PRBC transfusion. Stable for transfer to [facility]  Review of system- All 10 systems reviewed and is as per HPI otherwise negative.     T(C): 36.7 (01-21-18 @ 21:00), Max: 36.7 (01-21-18 @ 21:00)  HR: 63 (01-21-18 @ 21:00) (57 - 63)  BP: 119/46 (01-21-18 @ 21:00) (105/49 - 119/46)  RR: 16 (01-21-18 @ 21:00) (16 - 16)  SpO2: 98% (01-21-18 @ 21:00) (98% - 99%)  Wt(kg): --  LABS:                        9.5    5.6   )-----------( 176      ( 22 Jan 2018 07:20 )             30.2     137  |  104  |  11  ----------------------------<  85  4.0   |  26  |  0.41<L>  Ca    7.9<L>      22 Jan 2018 07:20  PT/INR - ( 22 Jan 2018 07:20 )   PT: 25.1 sec;   INR: 2.29 ratio      PHYSICAL EXAM:  GENERAL: NAD, well-groomed, well-developed  HEAD:  Atraumatic, Normocephalic  EYES: EOMI, PERRLA, conjunctiva and sclera clear  HEENT: Moist mucous membranes  NECK: Supple, No JVD  NERVOUS SYSTEM:  Alert & Oriented X3, Motor Strength 5/5 B/L upper and lower extremities; DTRs 2+ intact and symmetric  CHEST/LUNG: Clear to auscultation bilaterally; No rales, rhonchi, wheezing, or rubs  HEART: Regular rate and rhythm; No murmurs, rubs, or gallops  ABDOMEN: Soft, Nontender, Nondistended; Bowel sounds present  GENITOURINARY- Voiding, no palpable bladder  EXTREMITIES:  2+ Peripheral Pulses, No clubbing, cyanosis, or edema  MUSCULOSKELTAL- LLE in immobilizer  SKIN-no rash, no lesion  CNS- alert, oriented X3, non focal     Assessment/Plan  #S/p mechanical fall with LT femur fracture s/p IMN  Weight bearing per ortho. Stable for ADRIÁN  #Anemia acute blood loss from fracture-  stable for discharge  #Hypotension resolved  #Dispo- ADRIÁN today PCP- DR Vides  CC- Patient is a 86y old  Female who presents with a chief complaint of fall (17 Jan 2018 06:41)  HPI:  85 yo F with PMH of spinal stenosis, HTN, chronic compression fracture of spine, CLL, HTN, lymphedema monoclonal gammopathy, paroxysmal a-fib, vaginal prolapse, venous insufficiency, p/w mechanical fall. Patient was discharged from rehab yesterday, and was at home for 2 hours when she fell. She had a mechanical fall when transferring from bed to [facility]. Found to have L femoral shaft fracture. Denies h/o prolonged intubation or complications with anesthesia previously.   PSH: History of vaginal surgery, S/P kyphoplasty  2014, S/P thyroidectomy  partial   1975  Family Hx: sister - cva / cancer, father - cva  Social hx: former smoker, denies etoh / drugs, lives at home (13 Jan 2018 05:14)    Hospital course- underwent LT femur IMN, postop got PRBC transfusion. Stable for transfer to [facility]  Review of system- All 10 systems reviewed and is as per HPI otherwise negative.     T(C): 36.7 (01-21-18 @ 21:00), Max: 36.7 (01-21-18 @ 21:00)  HR: 63 (01-21-18 @ 21:00) (57 - 63)  BP: 119/46 (01-21-18 @ 21:00) (105/49 - 119/46)  RR: 16 (01-21-18 @ 21:00) (16 - 16)  SpO2: 98% (01-21-18 @ 21:00) (98% - 99%)  Wt(kg): --  LABS:                        9.5    5.6   )-----------( 176      ( 22 Jan 2018 07:20 )             30.2     137  |  104  |  11  ----------------------------<  85  4.0   |  26  |  0.41<L>  Ca    7.9<L>      22 Jan 2018 07:20  PT/INR - ( 22 Jan 2018 07:20 )   PT: 25.1 sec;   INR: 2.29 ratio      PHYSICAL EXAM:  GENERAL: NAD, well-groomed, well-developed  HEAD:  Atraumatic, Normocephalic  EYES: EOMI, PERRLA, conjunctiva and sclera clear  HEENT: Moist mucous membranes  NECK: Supple, No JVD  NERVOUS SYSTEM:  Alert & Oriented X3, Motor Strength 5/5 B/L upper and lower extremities; DTRs 2+ intact and symmetric  CHEST/LUNG: Clear to auscultation bilaterally; No rales, rhonchi, wheezing, or rubs  HEART: Regular rate and rhythm; No murmurs, rubs, or gallops  ABDOMEN: Soft, Nontender, Nondistended; Bowel sounds present  GENITOURINARY- Voiding, no palpable bladder  EXTREMITIES:  2+ Peripheral Pulses, No clubbing, cyanosis, or edema  MUSCULOSKELTAL- LLE in immobilizer  SKIN-no rash, no lesion  CNS- alert, oriented X3, non focal     Assessment/Plan  #S/p mechanical fall with LT femur fracture s/p IMN  Weight bearing per ortho. Stable for ADRIÁN  #Anemia acute blood loss from fracture- HH stable for discharge  #Hypotension resolved  #Dispo- ADRIÁN today    Orthopedic Summary  H&P:  Pt is a 86y Female PAST MEDICAL & SURGICAL HISTORY:  [place] (hard of hearing), bilateral  Vaginal prolapse  Spondyloarthritis  Spinal stenosis  Essential hypertension  Acute cystitis without hematuria: septic  4/2016  Paroxysmal atrial fibrillation  Monoclonal gammopathies  Venous insufficiency  Lymphedema of both lower extremities  History of vaginal surgery  S/P thyroidectomy: partial   1975  S/P kyphoplasty: 2014        Now s/p Left femur IM Nail for distal 3rd fracture. Pt is afebrile with stable vital signs. Pain is controlled. Exam reveals intact EHL FHL TA GS, +DP. Dressing is clean and dry with a New Aquacel bandage and Knee immobilizer on.    PT/INR - ( 25 Jan 2018 06:57 )   PT: 27.6 sec;   INR: 2.51 ratio      Hospital Course:  Patient presented to [facility] ED after a fall, found to have an midshaft femur fracture. Pt was medically cleared prior to surgery. Prophylactic antibiotics were started before the procedure and continued for 24 hours. They were admitted after surgery to the orthopedic floor.  Post op required 4unit PRBC transfusion for acute blood loss anemia.  There were no complications during the hospital stay. All home medications were continued.    Routine consults were obtained from the Anticoagulation Team for DVT/PE prophylaxis, from Physical Therapy for daily PT, and followed by Medicine for Co-management. Patient was placed on Coumadin  for anticoagulation.  Pertinent home medications were continued.  Daily labs were followed.      A new Aquacel dressing was applied POD3 and prior to discharge POD10. The pt will likely need DC to Rehab for ongoing PT once evaluated and ok per Medicine.   The orthopedic Attending is aware and agrees. See medical DC summary above.

## 2018-01-17 NOTE — DISCHARGE NOTE ADULT - MEDICATION SUMMARY - MEDICATIONS TO TAKE
I will START or STAY ON the medications listed below when I get home from the hospital:    acetaminophen 325 mg oral tablet  -- 2 tab(s) by mouth every 6 hours, As needed, For Temp greater than 38 C (100.4 F) and/or mild pain  -- Indication: For prn for pain    oxyCODONE 5 mg oral tablet  -- 1 tab(s) by mouth every 4 hours, As needed, Severe Pain (7 - 10)  -- Indication: For prn for pain    oxyCODONE  -- 2.5 milligram(s) by mouth every 4 hours, As Needed for moderate pain  -- Indication: For prn for pain    calcium carbonate 500 mg (200 mg elemental calcium) oral tablet, chewable  -- 1 tab(s) by mouth 2 times a day (with meals)  -- Indication: For supplement    warfarin 4 mg oral tablet  -- 1 tab(s) by mouth once a day (at bedtime). Adjust the dose to keep INR 2.0-3.0  -- Indication: For anticoagulation    sertraline 50 mg oral tablet  -- 1 tab(s) by mouth once a day  -- Indication: For depression    ALPRAZolam  -- 0.125 milligram(s) by mouth every 8 hours, As Needed for anxiety  -- Indication: For prn for anxiety    atenolol  -- 12.5 milligram(s) by mouth once a day  -- Indication: For HTN    lidocaine 5% topical film  -- Apply on skin to affected area once a day  -- Indication: For pain    Slow Fe (as elemental iron) 45 mg oral tablet, extended release  -- 1 tab(s) by mouth once a day  -- Indication: For supplement    lactulose 10 g/15 mL oral syrup  -- 30 milliliter(s) by mouth 2 times a day, As needed, constipation  -- Indication: For bowel regimen    polyethylene glycol 3350 oral powder for reconstitution  -- 17 gram(s) by mouth once a day  -- Indication: For bowel regimen    bisacodyl 10 mg rectal suppository  -- 1 suppository(ies) rectally once a day, As needed, Constipation  -- Indication: For bowel regimen    docusate sodium 100 mg oral capsule  -- 1 cap(s) by mouth 3 times a day  -- Indication: For bowel regimen    senna oral tablet  -- 2 tab(s) by mouth once a day (at bedtime), As Needed for constipation  -- Indication: For bowel regimen    lactobacillus acidophilus oral capsule  -- 1 cap(s) by mouth 2 times a day  -- Indication: For probiotic    PriLOSEC 20 mg oral delayed release capsule  -- 1 cap(s) by mouth once a day, As Needed  -- for indigestion  -- Indication: For GI proph    Multiple Vitamins oral tablet  -- 1 tab(s) by mouth once a day  -- Indication: For vitamin    cholecalciferol oral tablet  -- 2000 unit(s) by mouth once a day  -- Indication: For vitamin I will START or STAY ON the medications listed below when I get home from the hospital:    acetaminophen 325 mg oral tablet  -- 2 tab(s) by mouth every 6 hours, As needed, For Temp greater than 38 C (100.4 F) and/or mild pain  -- Indication: For prn for pain    oxyCODONE 5 mg oral tablet  -- 1 tab(s) by mouth every 4 hours, As needed, Severe Pain (7 - 10)  -- Indication: For prn for pain    oxyCODONE  -- 2.5 milligram(s) by mouth every 4 hours, As Needed for moderate pain  -- Indication: For prn for pain    calcium carbonate 500 mg (200 mg elemental calcium) oral tablet, chewable  -- 1 tab(s) by mouth 2 times a day (with meals)  -- Indication: For supplement    warfarin 3 mg oral tablet  -- 1 tab(s) by mouth once a day; Keep INR between 2-3  -- Indication: For AFIB    sertraline 50 mg oral tablet  -- 1 tab(s) by mouth once a day  -- Indication: For depression    ALPRAZolam  -- 0.125 milligram(s) by mouth every 8 hours, As Needed for anxiety  -- Indication: For prn for anxiety    atenolol  -- 12.5 milligram(s) by mouth once a day  -- Indication: For HTN    lidocaine 5% topical film  -- Apply on skin to affected area once a day  -- Indication: For pain    Slow Fe (as elemental iron) 45 mg oral tablet, extended release  -- 1 tab(s) by mouth once a day  -- Indication: For supplement    lactulose 10 g/15 mL oral syrup  -- 30 milliliter(s) by mouth 2 times a day, As needed, constipation  -- Indication: For bowel regimen    polyethylene glycol 3350 oral powder for reconstitution  -- 17 gram(s) by mouth once a day  -- Indication: For bowel regimen    bisacodyl 10 mg rectal suppository  -- 1 suppository(ies) rectally once a day, As needed, Constipation  -- Indication: For bowel regimen    docusate sodium 100 mg oral capsule  -- 1 cap(s) by mouth 3 times a day  -- Indication: For bowel regimen    senna oral tablet  -- 2 tab(s) by mouth once a day (at bedtime), As Needed for constipation  -- Indication: For bowel regimen    lactobacillus acidophilus oral capsule  -- 1 cap(s) by mouth 2 times a day  -- Indication: For probiotic    PriLOSEC 20 mg oral delayed release capsule  -- 1 cap(s) by mouth once a day, As Needed  -- for indigestion  -- Indication: For GI proph    Multiple Vitamins oral tablet  -- 1 tab(s) by mouth once a day  -- Indication: For vitamin    cholecalciferol oral tablet  -- 2000 unit(s) by mouth once a day  -- Indication: For vitamin

## 2018-01-17 NOTE — PROGRESS NOTE ADULT - SUBJECTIVE AND OBJECTIVE BOX
Patient is a 86y old  Female who presents with a chief complaint of fall (2018 06:41)    2018- The patient is seen in consultation for preop cardiac evaluation. She sustained a left mid femur fracture after being discharged from rehab yesterday. 1 month ago she was admitted with vasovagal syncope and had a left knee hematoma.  The patient has no other complaints at present. She denies chest pain, dyspnea, palpitation. She is treated for HBP, long term persistent atrial fibrillation, chronic venous insufficiency. She has mild aortic regurgitation and normal LV systolic function. There is no history of heart failure.    Followup HPI:  - No complaints. Denies chest pain or dyspnea. Left leg pain controlled.  1/15- no complaints  - s/p lM nail left femur shaft yesterday. Received 2 units PRBCs. Received IV fluid bolus for mild hypotension post op. Pain control is satisfactory. No chest pain or dyspnea.  - No complaints. Received a 3rd unit PRBCs on . Has been straight cathed x 2 for urinary retention. Denies chest pain or dyspnea.       PAST MEDICAL & SURGICAL HISTORY:  [place] (hard of hearing), bilateral  Vaginal prolapse  Spondyloarthritis  Spinal stenosis  Essential hypertension  vasovagal syncope  CLL  Acute cystitis without hematuria: septic  2016  Paroxysmal atrial fibrillation- long term persistent  Monoclonal gammopathies  Venous insufficiency  Lymphedema of both lower extremities  depressive disorder, chronic anxiety  mild dementia    History of vaginal surgery  S/P thyroidectomy: partial   1977  S/P kyphoplasty:   repair of uterovaginal prolapse 2016    Review of symptoms:  Negative except for as noted in today's HPI.      MEDICATIONS  (STANDING):  ATENolol  Tablet 12.5 milliGRAM(s) Oral two times a day  docusate sodium 100 milliGRAM(s) Oral three times a day  enoxaparin Injectable 40 milliGRAM(s) SubCutaneous every 24 hours  lactated ringers. 1000 milliLiter(s) (75 mL/Hr) IV Continuous <Continuous>  lidocaine   Patch 1 Patch Transdermal daily  polyethylene glycol 3350 17 Gram(s) Oral daily  sertraline 50 milliGRAM(s) Oral daily  warfarin 3 milliGRAM(s) Oral daily    MEDICATIONS  (PRN):  acetaminophen   Tablet 650 milliGRAM(s) Oral every 6 hours PRN For Temp greater than 38 C (100.4 F)  diphenhydrAMINE   Capsule 25 milliGRAM(s) Oral at bedtime PRN Insomnia  HYDROmorphone  Injectable 1 milliGRAM(s) IV Push every 4 hours PRN Severe Pain (7 - 10)  oxyCODONE    IR 10 milliGRAM(s) Oral every 4 hours PRN Moderate Pain  oxyCODONE    IR 5 milliGRAM(s) Oral every 4 hours PRN Mild Pain          Vital Signs Last 24 Hrs  T(C): 36.8 (2018 04:56), Max: 37.3 (2018 20:20)  T(F): 98.3 (2018 04:56), Max: 99.1 (2018 20:20)  HR: 85 (2018 04:56) (71 - 86)  BP: 120/50 (2018 04:56) (85/39 - 120/50)  BP(mean): --  RR: 16 (2018 04:56) (16 - 17)  SpO2: 96% (2018 04:56) (96% - 100%)    I&O's Summary    2018 07:01  -  2018 07:00  --------------------------------------------------------  IN: 1880 mL / OUT: 1050 mL / NET: 830 mL        PHYSICAL EXAM  General Appearance: alert  HEENT:   Neck:   Lungs: clear  Heart: 1/6 systolic murmur LSB  Abdomen: soft and nontender. No bladder distension.  Extremities: 1+ edema left foot.  Neurologic: no focal abnormality.      INTERPRETATION OF TELEMETRY:    ECG:    LABS:                          8.7    16.0  )-----------( 129      ( 2018 10:14 )             26.6     01-16    133<L>  |  102  |  17  ----------------------------<  142<H>  4.4   |  24  |  0.70    Ca    7.3<L>      2018 07:03              PT/INR - ( 2018 12:12 )   PT: 16.2 sec;   INR: 1.49 ratio           Urinalysis Basic - ( 15 Pancho 2018 17:45 )    Color: Yellow / Appearance: Clear / S.010 / pH: x  Gluc: x / Ketone: Negative  / Bili: Negative / Urobili: 1 mg/dL   Blood: x / Protein: 15 mg/dL / Nitrite: Negative   Leuk Esterase: Trace / RBC: 0-2 /HPF / WBC 3-5   Sq Epi: x / Non Sq Epi: Many / Bacteria: Moderate            RADIOLOGY & ADDITIONAL STUDIES:    IMPRESSION:  1. s/p IM nail for left midshaft  femur fracture.  2.Atrial fibrillation, long term persistent. Stable.   3.Hypertension. BP is stable. .   4.Depressive disorder.  5.Chronic venous insufficiency  6.Anemia  7.Post op urinary retention  PLAN:  Agree with Lovenox with transition to warfarin. Continue atenolol, sertraline. Follow hemoglobin and hematocrit and transfuse as needed. Straight cath as needed. Suggest urology consultation before inserting a Reaves since this patient has a prior history of recurrent UTIs prior to repair of uterine-vaginal prolapse.

## 2018-01-17 NOTE — DISCHARGE NOTE ADULT - PATIENT PORTAL LINK FT
“You can access the [facility] Patient Portal, offered by [facility], by registering with the following website: [email or web address]”

## 2018-01-17 NOTE — DISCHARGE NOTE ADULT - PLAN OF CARE
Return to baseline ADLs 1.	Pain Control  2.	Non-weight bearing left lower extremity, with assistive devices as needed  3.	DVT Prophylaxis  4.	PT as needed  5.	Follow up with Dr. Kitchen as Outpatient in 10-14 Days after Discharge from the Hospital or Rehab. Call Office For Appointment.  6.	Staples/Sutures to be removed Post-Op Day 14, and repeat x-rays  7.	Ice/Elevate affected area as needed  8.	Keep Dressing/Splint clean and dry 1.	Pain Control  2.	Non-weight bearing left lower extremity, with assistive devices as needed. No range of motion at knee, with knee immobilizer.  3.	DVT Prophylaxis  4.	PT as needed  5.	Follow up with Dr. Kitchen as Outpatient in 10-14 Days after Discharge from the Hospital or Rehab. Call Office For Appointment.  6.	Staples/Sutures to be removed Post-Op Day 14, and repeat x-rays  7.	Ice/Elevate affected area as needed  8.	Keep Dressing/Splint clean and dry IM Nail DC Instructions:    1.	Resume previous diet, regular or diabetic as appropriate  2.	Non weight bearing LEFT Lower extremity, can be OOB to chair with assistance  3.	Continue DVT/PE Prophylaxis. Pt on Coumadin. See Med Rec for Duration and dose.  4.	PT daily OOB to chair  5.	Follow up with Orthopedic Surgeon Dr. DAVISON in about 14 Days. Call Office For Appointment.  6.	Staples to be removed by RN Post-Op Day 14 (1/29/18), provided wound is healed, no open areas or copious drainage.  7.	Ice the hip as much as possible  8.	Keep Aquacel bandage on. Change only if wet or soaked underneath or soiled, using Tegaderm/Paper tape and dry gauze.  9.     OK to shower with Aquacel beige bandage, otherwise sponge bathe only. Will need assistance to shower.

## 2018-01-17 NOTE — PROGRESS NOTE ADULT - SUBJECTIVE AND OBJECTIVE BOX
HPI:  85 yo F with PMH of spinal stenosis, HTN, chronic compression fracture of spine, CLL, HTN, lymphedema monoclonal gammopathy, paroxysmal a-fib, vaginal prolapse, venous insufficiency, p/w mechanical fall. Patient was discharged from rehab yesterday, and was at home for 2 hours when she fell. She had a mechanical fall when transferring from bed to commode. Found to have L femoral shaft fracture. Denies h/o prolonged intubation or complications with anesthesia previously.     PSH: History of vaginal surgery, S/P kyphoplasty  2014, S/P thyroidectomy  partial   1975    Family Hx: sister - cva / cancer, father - cva    Social hx: former smoker, denies etoh / drugs, lives at home (13 Jan 2018 05:14)      Patient is a 86y old  Female who presents with a chief complaint of fall (13 Jan 2018 05:14)  s/p fx left Femur  s/p IMN on 1-16-18    Consulted by Dr. Magno Kitchen for VTE prophylaxis, risk stratification, and anticoagulation management.    PAST MEDICAL & SURGICAL HISTORY:  [place] (hard of hearing), bilateral  Vaginal prolapse  Spondyloarthritis  Spinal stenosis  Essential hypertension  Acute cystitis without hematuria: septic  4/2016  Paroxysmal atrial fibrillation  Monoclonal gammopathies  Venous insufficiency  Lymphedema of both lower extremities  History of vaginal surgery  S/P thyroidectomy: partial   1975  S/P kyphoplasty: 2014      [place] VTE Risk Score:10    [id number] Score: 5    IMPROVE Bleeding Risk Score: 3.5    Falls Risk:   High (x )  Mod (  )  Low (  )    EBL:  250 ml  crcl: 59.93  1-16-18 Pt seen at bedside wit daughter present. concerned about her mother's low B/P, RN called hospitalised to speak with her.  Discussed her anticoagulation coumadin over lapping with Lovenox and this daughter called her sister in order to get pt's normal coumadin dose.  The daughter informed me she used to take 3mg 4 times a week and 4 mg the other days before she became ill and was in a rehab facility.  Questions answered Benefits and risks discussed will reinforce as needed.   1/17: seen at bedside with dtr present, questions answered                     FAMILY HISTORY:  No pertinent family history in first degree relatives    Denies any personal or familial history of clotting or bleeding disorders.    Allergies    No Known Allergies    Intolerances        REVIEW OF SYSTEMS    (  )Fever	     (  )Constipation	(  )SOB				(  )Headache	(  )Dysuria  (  )Chills	     (  )Melena	(  )Dyspnea present on exertion	                    (  )Dizziness                    (  )Polyuria  (  )Nausea	     (  )Hematochezia	(  )Cough			                    (  )Syncope   	(  )Hematuria  (  )Vomiting    (  )Chest Pain	(  )Wheezing			(  )Weakness  (  )Diarrhea     (  )Palpitations	(  )Anorexia			(  )Myalgia    All other review of systems negative: Yes      PHYSICAL EXAM:    Constitutional: Appears Well    Neurological: A& O x 3    Skin: Warm    Respiratory and Thorax: normal effort; Breath sounds: normal; No rales/wheezing/rhonchi  	  Cardiovascular: S1, S2, regular, NMBR	    Gastrointestinal: BS + x 4Q, nontender	    Genitourinary:  Bladder nondistended, nontender    Musculoskeletal:   General Right:   no muscle/joint tenderness,   normal tone, no joint swelling,   ROM: limited	    General Left:   + muscle/joint tenderness,   normal tone, no joint swelling,   ROM: limited    Hip: Left: Dressing CDI; long leg immobilizer noted      Lower extrems:   Right: no calf tenderness              negative christopher's sign               + pedal pulses    Left:   no calf tenderness              negative christopher's sign               + pedal pulses              + edema noted 2+ with ecchymosis noted to distal le.                          8.1    12.0  )-----------( 115      ( 17 Jan 2018 07:22 )             24.2       01-17    135  |  102  |  15  ----------------------------<  97  4.3   |  26  |  0.50    Ca    7.4<L>      17 Jan 2018 07:22                                       8.7    16.0  )-----------( 129      ( 16 Jan 2018 10:14 )             26.6       01-16    133<L>  |  102  |  17  ----------------------------<  142<H>  4.4   |  24  |  0.70    Ca    7.3<L>      16 Jan 2018 07:03        PT/INR - ( 17 Jan 2018 07:22 )   PT: 16.5 sec;   INR: 1.51 ratio    PT/INR - ( 15 Pancho 2018 04:45 )   PT: 13.5 sec;   INR: 1.24 ratio         PTT - ( 15 Pancho 2018 04:45 )  PTT:27.6 sec				    MEDICATIONS  (STANDING):  ceFAZolin   IVPB 2000 milliGRAM(s) IV Intermittent every 8 hours  docusate sodium 100 milliGRAM(s) Oral three times a day  enoxaparin Injectable 40 milliGRAM(s) SubCutaneous every 24 hours  ketorolac   Injectable 15 milliGRAM(s) IV Push once  lactated ringers. 1000 milliLiter(s) IV Continuous <Continuous>  sertraline 50 milliGRAM(s) Oral daily  sodium chloride 0.9% Bolus 500 milliLiter(s) IV Bolus once  warfarin 3 milliGRAM(s) Oral daily      Vital Signs Last 24 Hrs  T(C): 37.4 (01-17-18 @ 14:08), Max: 37.4 (01-17-18 @ 14:08)  T(F): 99.3 (01-17-18 @ 14:08), Max: 99.3 (01-17-18 @ 14:08)  HR: 81 (01-17-18 @ 14:08) (78 - 92)  BP: 113/58 (01-17-18 @ 14:08) (93/39 - 124/53)  BP(mean): --  RR: 18 (01-17-18 @ 14:08) (16 - 18)  SpO2: 100% (01-17-18 @ 14:08) (96% - 100%)        DVT Prophylaxis:  LMWH                   ( x )  Heparin SQ           (  )  Coumadin             ( x )  Xarelto                  (  )  Eliquis                   (  )  Venodynes           (x )  Ambulation          ( x )  UFH                       (  )  Contraindicated  (  )

## 2018-01-17 NOTE — DISCHARGE NOTE ADULT - MEDICATION SUMMARY - MEDICATIONS TO CHANGE
I will SWITCH the dose or number of times a day I take the medications listed below when I get home from the hospital:    Coumadin 3 mg oral tablet  -- 1 tab(s) by mouth once a day. Keep INR 1.7-2 until hematoma improved

## 2018-01-17 NOTE — PROGRESS NOTE ADULT - ASSESSMENT
This is a 86 year old female s/p [facility] Found to have L femoral shaft fracture. Pt s/p Antegrade intramedullary nailing for fracture of femoral shaft on 1-15-18.  Pt has hx of A. Fib on coumadin.  [id number] Score: 5 Pt has high thrombosis risk and require anticoagulation treatment dose.    Plan discussed with pt's daughter's and pt.     Plan:  cont Coumadin 3 mg PO daily  adjust dose per INR  cont Lovenox 40mg sq daily til inr is 2.0 or greater.  Daily PT/INR (pending)  Daily CBC/BMP (h/h noted 6.7/26.2)  Enc ambulation  Venodynes

## 2018-01-17 NOTE — DISCHARGE NOTE ADULT - MEDICATION SUMMARY - MEDICATIONS TO STOP TAKING
I will STOP taking the medications listed below when I get home from the hospital:    spironolactone 25 mg oral tablet  -- 1 tab(s) by mouth once a day    Estrace Vaginal 0.1 mg/g vaginal cream  -- Apply 2 to 3 times per week    traMADol 50 mg oral tablet  -- 0.5 tab(s) by mouth every 8 hours, As needed, Moderate Pain (4 - 6)    cefuroxime 250 mg oral tablet  -- 1 tab(s) by mouth every 12 hours. Stop after 12/21/17
impairments found

## 2018-01-17 NOTE — PROGRESS NOTE ADULT - SUBJECTIVE AND OBJECTIVE BOX
CHIEF COMPLAINT: fall, femur fracture    SUBJECTIVE: POD 2. Pt awake alert. still no BM. no void, getting straigt cathed.    REVIEW OF SYSTEMS: All other review of systems is negative unless indicated above    Vital Signs Last 24 Hrs  T(C): 37.4 (17 Jan 2018 14:08), Max: 37.4 (17 Jan 2018 14:08)  T(F): 99.3 (17 Jan 2018 14:08), Max: 99.3 (17 Jan 2018 14:08)  HR: 81 (17 Jan 2018 14:08) (78 - 92)  BP: 113/58 (17 Jan 2018 14:08) (112/49 - 124/53)  BP(mean): --  RR: 18 (17 Jan 2018 14:08) (16 - 18)  SpO2: 100% (17 Jan 2018 14:08) (96% - 100%)    PHYSICAL EXAM:  Constitutional: frail, lethargic appearing elderly female in NAD.  HEENT: EOMI, Normal Hearing, MMM  Neck: Soft and supple, No JVD  Respiratory: Breath sounds are clear bilaterally, No wheezing, rales or rhonchi  Cardiovascular: S1 and S2, regular rate and rhythm, no Murmurs, gallops or rubs  Gastrointestinal: +BS, nd, nt  Extremities: LLE dressing CDI. Left foot edemtous.   Vascular: 2+ peripheral pulses  Neurological: A/O x 3, no focal deficits  Musculoskeletal: 5/5 strength b/l upper and lower extremities  Skin: No rashes    MEDICATIONS:  MEDICATIONS  (STANDING):  ATENolol  Tablet 12.5 milliGRAM(s) Oral two times a day  docusate sodium 100 milliGRAM(s) Oral three times a day  enoxaparin Injectable 40 milliGRAM(s) SubCutaneous every 24 hours  lidocaine   Patch 1 Patch Transdermal daily  polyethylene glycol 3350 17 Gram(s) Oral daily  sertraline 50 milliGRAM(s) Oral daily  warfarin 3 milliGRAM(s) Oral daily    LABS: All Labs Reviewed:                        8.1    12.0  )-----------( 115      ( 17 Jan 2018 07:22 )             24.2     135  |  102  |  15  ----------------------------<  97  4.3   |  26  |  0.50    Ca    7.4<L>      17 Jan 2018 07:22    PT/INR - ( 17 Jan 2018 07:22 )   PT: 16.5 sec;   INR: 1.51 ratio

## 2018-01-18 LAB
ANION GAP SERPL CALC-SCNC: 6 MMOL/L — SIGNIFICANT CHANGE UP (ref 5–17)
BUN SERPL-MCNC: 12 MG/DL — SIGNIFICANT CHANGE UP (ref 7–23)
CALCIUM SERPL-MCNC: 7.9 MG/DL — LOW (ref 8.5–10.1)
CHLORIDE SERPL-SCNC: 100 MMOL/L — SIGNIFICANT CHANGE UP (ref 96–108)
CO2 SERPL-SCNC: 26 MMOL/L — SIGNIFICANT CHANGE UP (ref 22–31)
CREAT SERPL-MCNC: 0.41 MG/DL — LOW (ref 0.5–1.3)
GLUCOSE SERPL-MCNC: 104 MG/DL — HIGH (ref 70–99)
HCT VFR BLD CALC: 27.3 % — LOW (ref 34.5–45)
HGB BLD-MCNC: 9.2 G/DL — LOW (ref 11.5–15.5)
INR BLD: 1.33 RATIO — HIGH (ref 0.88–1.16)
MCHC RBC-ENTMCNC: 31.9 PG — SIGNIFICANT CHANGE UP (ref 27–34)
MCHC RBC-ENTMCNC: 33.6 GM/DL — SIGNIFICANT CHANGE UP (ref 32–36)
MCV RBC AUTO: 94.8 FL — SIGNIFICANT CHANGE UP (ref 80–100)
PLATELET # BLD AUTO: 108 K/UL — LOW (ref 150–400)
POTASSIUM SERPL-MCNC: 4.4 MMOL/L — SIGNIFICANT CHANGE UP (ref 3.5–5.3)
POTASSIUM SERPL-SCNC: 4.4 MMOL/L — SIGNIFICANT CHANGE UP (ref 3.5–5.3)
PROTHROM AB SERPL-ACNC: 14.4 SEC — HIGH (ref 9.8–12.7)
RBC # BLD: 2.88 M/UL — LOW (ref 3.8–5.2)
RBC # FLD: 14.6 % — HIGH (ref 10.3–14.5)
SODIUM SERPL-SCNC: 132 MMOL/L — LOW (ref 135–145)
WBC # BLD: 13.7 K/UL — HIGH (ref 3.8–10.5)
WBC # FLD AUTO: 13.7 K/UL — HIGH (ref 3.8–10.5)

## 2018-01-18 PROCEDURE — 99233 SBSQ HOSP IP/OBS HIGH 50: CPT

## 2018-01-18 RX ORDER — WARFARIN SODIUM 2.5 MG/1
5 TABLET ORAL DAILY
Qty: 0 | Refills: 0 | Status: DISCONTINUED | OUTPATIENT
Start: 2018-01-18 | End: 2018-01-20

## 2018-01-18 RX ORDER — ONDANSETRON 8 MG/1
4 TABLET, FILM COATED ORAL EVERY 8 HOURS
Qty: 0 | Refills: 0 | Status: COMPLETED | OUTPATIENT
Start: 2018-01-18 | End: 2018-01-18

## 2018-01-18 RX ORDER — OXYCODONE HYDROCHLORIDE 5 MG/1
5 TABLET ORAL EVERY 6 HOURS
Qty: 0 | Refills: 0 | Status: DISCONTINUED | OUTPATIENT
Start: 2018-01-18 | End: 2018-01-21

## 2018-01-18 RX ADMIN — SERTRALINE 50 MILLIGRAM(S): 25 TABLET, FILM COATED ORAL at 12:44

## 2018-01-18 RX ADMIN — POLYETHYLENE GLYCOL 3350 17 GRAM(S): 17 POWDER, FOR SOLUTION ORAL at 12:44

## 2018-01-18 RX ADMIN — ONDANSETRON 4 MILLIGRAM(S): 8 TABLET, FILM COATED ORAL at 11:50

## 2018-01-18 RX ADMIN — OXYCODONE HYDROCHLORIDE 5 MILLIGRAM(S): 5 TABLET ORAL at 22:09

## 2018-01-18 RX ADMIN — ATENOLOL 12.5 MILLIGRAM(S): 25 TABLET ORAL at 19:43

## 2018-01-18 RX ADMIN — Medication 10 MILLIGRAM(S): at 19:58

## 2018-01-18 RX ADMIN — Medication 100 MILLIGRAM(S): at 21:40

## 2018-01-18 RX ADMIN — Medication 100 MILLIGRAM(S): at 06:16

## 2018-01-18 RX ADMIN — OXYCODONE HYDROCHLORIDE 5 MILLIGRAM(S): 5 TABLET ORAL at 21:39

## 2018-01-18 RX ADMIN — ATENOLOL 12.5 MILLIGRAM(S): 25 TABLET ORAL at 06:16

## 2018-01-18 RX ADMIN — ENOXAPARIN SODIUM 40 MILLIGRAM(S): 100 INJECTION SUBCUTANEOUS at 06:16

## 2018-01-18 RX ADMIN — WARFARIN SODIUM 5 MILLIGRAM(S): 2.5 TABLET ORAL at 21:40

## 2018-01-18 RX ADMIN — Medication 100 MILLIGRAM(S): at 16:27

## 2018-01-18 RX ADMIN — LIDOCAINE 1 PATCH: 4 CREAM TOPICAL at 16:09

## 2018-01-18 NOTE — PROGRESS NOTE ADULT - SUBJECTIVE AND OBJECTIVE BOX
CHIEF COMPLAINT: fall, femur fracture    SUBJECTIVE: voided. still no BM. +flatus.    REVIEW OF SYSTEMS: All other review of systems is negative unless indicated above    Vital Signs Last 24 Hrs  T(C): 36.7 (18 Jan 2018 11:23), Max: 37.6 (17 Jan 2018 21:24)  T(F): 98 (18 Jan 2018 11:23), Max: 99.7 (17 Jan 2018 21:24)  HR: 89 (18 Jan 2018 11:23) (72 - 89)  BP: 140/58 (18 Jan 2018 11:23) (113/46 - 140/58)  BP(mean): --  RR: 16 (18 Jan 2018 11:23) (16 - 17)  SpO2: 99% (18 Jan 2018 11:23) (98% - 99%)    PHYSICAL EXAM:  Constitutional: frail, lethargic appearing elderly female in NAD.  HEENT: EOMI, Normal Hearing, MMM  Neck: Soft and supple, No JVD  Respiratory: Breath sounds are clear bilaterally, No wheezing, rales or rhonchi  Cardiovascular: S1 and S2, regular rate and rhythm, no Murmurs, gallops or rubs  Gastrointestinal: +BS, nd, nt  Extremities: LLE dressing CDI. Left foot edemtous.   Vascular: 2+ peripheral pulses  Neurological: A/O x 3, no focal deficits  Musculoskeletal: 5/5 strength b/l upper and lower extremities  Skin: No rashes    MEDICATIONS:  MEDICATIONS  (STANDING):  ATENolol  Tablet 12.5 milliGRAM(s) Oral two times a day  docusate sodium 100 milliGRAM(s) Oral three times a day  enoxaparin Injectable 40 milliGRAM(s) SubCutaneous every 24 hours  lidocaine   Patch 1 Patch Transdermal daily  polyethylene glycol 3350 17 Gram(s) Oral daily  sertraline 50 milliGRAM(s) Oral daily  warfarin 5 milliGRAM(s) Oral daily    LABS: All Labs Reviewed:                        9.2    13.7  )-----------( 108      ( 18 Jan 2018 06:31 )             27.3     132<L>  |  100  |  12  ----------------------------<  104<H>  4.4   |  26  |  0.41<L>    Ca    7.9<L>      18 Jan 2018 06:31    PT/INR - ( 18 Jan 2018 06:31 )   PT: 14.4 sec;   INR: 1.33 ratio

## 2018-01-18 NOTE — PROGRESS NOTE ADULT - ASSESSMENT
85 yo F with PMH of spinal stenosis, HTN, chronic compression fracture of spine, CLL, HTN, lymphedema, monoclonal gammopathy, paroxysmal a-fib, vaginal prolapse, venous insufficiency, p/w mechanical fall with L fem shaft fracture s/p IMN     *L femoral shaft fracture s/p mechanical fall s/p L Femur IMN 1/15.   - Ortho consult appreciated  - Pain control  - NWB LLE  - pain control  - PT  - ACS for DVT ppx appreicated    # Acute on chronic anemia. Acute blood loss anemia 2/2 post op.   - HH stable today. Repeat in AM.  - transfuse prn    # Urinary retention- resolved  - straight cath prn.   - pain control    # Constipation  - colace, senna, miralax. suppository. if no BM tomorrow will try enema.  - XR abd in AM.    # Hypotension- resolved  - BB w parameter  - monitor    # LE edema + ulcers  -U/S neg for DVT  -aldactone held  -wound care recs aopprecioated    # Hyponatremia  - monitor    # Spinal stenosis / HTN / monoclonal gammopathy  -Outpatient management     *DVT ppx

## 2018-01-18 NOTE — PROGRESS NOTE ADULT - SUBJECTIVE AND OBJECTIVE BOX
Patient is a 86y old  Female who presents with a chief complaint of fall (17 Jan 2018 06:41)    1/13/2018- The patient is seen in consultation for preop cardiac evaluation. She sustained a left mid femur fracture after being discharged from rehab yesterday. 1 month ago she was admitted with vasovagal syncope and had a left knee hematoma.  The patient has no other complaints at present. She denies chest pain, dyspnea, palpitation. She is treated for HBP, long term persistent atrial fibrillation, chronic venous insufficiency. She has mild aortic regurgitation and normal LV systolic function. There is no history of heart failure.  Followup HPI:  1/14- No complaints. Denies chest pain or dyspnea. Left leg pain controlled.  1/15- no complaints  1/16- s/p lM nail left femur shaft yesterday. Received 2 units PRBCs. Received IV fluid bolus for mild hypotension post op. Pain control is satisfactory. No chest pain or dyspnea.  1/17- No complaints. Received a 3rd unit PRBCs on 1/16. Has been straight cathed x 2 for urinary retention. Denies chest pain or dyspnea.   1/18- No complaints. Is voiding.    PAST MEDICAL & SURGICAL HISTORY:  [place] (hard of hearing), bilateral  Vaginal prolapse  Spondyloarthritis  Spinal stenosis  Essential hypertension  vasovagal syncope  CLL  Acute cystitis without hematuria: septic  4/2016  Paroxysmal atrial fibrillation- long term persistent  Monoclonal gammopathies  Venous insufficiency  Lymphedema of both lower extremities  depressive disorder, chronic anxiety  mild dementia    History of vaginal surgery  S/P thyroidectomy: partial   1977  S/P kyphoplasty: 2013  repair of uterovaginal prolapse 8/2016    Review of symptoms:  Negative except for as noted in today's HPI.      MEDICATIONS  (STANDING):  ATENolol  Tablet 12.5 milliGRAM(s) Oral two times a day  docusate sodium 100 milliGRAM(s) Oral three times a day  enoxaparin Injectable 40 milliGRAM(s) SubCutaneous every 24 hours  lidocaine   Patch 1 Patch Transdermal daily  polyethylene glycol 3350 17 Gram(s) Oral daily  sertraline 50 milliGRAM(s) Oral daily  warfarin 3 milliGRAM(s) Oral daily    MEDICATIONS  (PRN):  acetaminophen   Tablet 650 milliGRAM(s) Oral every 6 hours PRN For Temp greater than 38 C (100.4 F)  diphenhydrAMINE   Capsule 25 milliGRAM(s) Oral at bedtime PRN Insomnia  HYDROmorphone  Injectable 1 milliGRAM(s) IV Push every 4 hours PRN Severe Pain (7 - 10)  lactulose Syrup 10 Gram(s) Oral every 12 hours PRN constipation  oxyCODONE    IR 10 milliGRAM(s) Oral every 4 hours PRN Moderate Pain  oxyCODONE    IR 5 milliGRAM(s) Oral every 4 hours PRN Mild Pain          Vital Signs Last 24 Hrs  T(C): 36.8 (18 Jan 2018 05:35), Max: 37.6 (17 Jan 2018 21:24)  T(F): 98.3 (18 Jan 2018 05:35), Max: 99.7 (17 Jan 2018 21:24)  HR: 72 (18 Jan 2018 05:35) (72 - 95)  BP: 125/49 (18 Jan 2018 05:35) (112/49 - 141/55)  BP(mean): --  RR: 16 (18 Jan 2018 05:35) (16 - 18)  SpO2: 98% (18 Jan 2018 05:35) (97% - 100%)    I&O's Summary    17 Jan 2018 07:01  -  18 Jan 2018 07:00  --------------------------------------------------------  IN: 250 mL / OUT: 0 mL / NET: 250 mL        PHYSICAL EXAM  General Appearance: comfortable  HEENT:   Neck:   Lungs: clear  Heart: 1/6 systolic murmur LSB  Abdomen: soft and nontender  Extremities: 1+ edema right leg. immobilizer left leg  Neurologic: no focal abnormality      INTERPRETATION OF TELEMETRY:    ECG:    LABS:                          8.1    12.0  )-----------( 115      ( 17 Jan 2018 07:22 )             24.2     01-17    135  |  102  |  15  ----------------------------<  97  4.3   |  26  |  0.50    Ca    7.4<L>      17 Jan 2018 07:22              PT/INR - ( 17 Jan 2018 07:22 )   PT: 16.5 sec;   INR: 1.51 ratio                   RADIOLOGY & ADDITIONAL STUDIES:    IMPRESSION:  1. s/p IM nail for left midshaft  femur fracture.  2.Atrial fibrillation, long term persistent. Stable.   3.Hypertension. BP is stable. .   4.Depressive disorder.  5.Chronic venous insufficiency  6.Anemia    PLAN:  Agree with Lovenox with transition to warfarin. Continue atenolol, sertraline. Follow hemoglobin and hematocrit and transfuse as needed. Hold spironolactone until she becomes hypertensive.

## 2018-01-18 NOTE — PROGRESS NOTE ADULT - ASSESSMENT
This is a 86 year old female [facility] Found to have L femoral shaft fracture. Pt s/p Antegrade intramedullary nailing for fracture of femoral shaft on 1-15-18.  Pt has hx of A. Fib on coumadin.  [id number] Score: 5 Pt has high thrombosis risk and require anticoagulation treatment dose.    Plan discussed with pt's daughter's and pt.     Plan:  INc Coumadin 5 mg PO daily  adjust dose per INR  cont Lovenox 40mg sq daily til inr is 2.0 or greater.  Daily PT/INR (pending)  Daily CBC/BMP (h/h noted 6.7/26.2)  Enc ambulation  Venodynes

## 2018-01-18 NOTE — PROGRESS NOTE ADULT - SUBJECTIVE AND OBJECTIVE BOX
HPI:  87 yo F with PMH of spinal stenosis, HTN, chronic compression fracture of spine, CLL, HTN, lymphedema monoclonal gammopathy, paroxysmal a-fib, vaginal prolapse, venous insufficiency, p/w mechanical fall. Patient was discharged from rehab yesterday, and was at home for 2 hours when she fell. She had a mechanical fall when transferring from bed to commode. Found to have L femoral shaft fracture. Denies h/o prolonged intubation or complications with anesthesia previously.     PSH: History of vaginal surgery, S/P kyphoplasty  2014, S/P thyroidectomy  partial   1975    Family Hx: sister - cva / cancer, father - cva    Social hx: former smoker, denies etoh / drugs, lives at home (13 Jan 2018 05:14)      Patient is a 86y old  Female who presents with a chief complaint of fall (13 Jan 2018 05:14)  s/p fx left Femur  s/p IMN on 1-16-18    Consulted by Dr. Magno Kitchen for VTE prophylaxis, risk stratification, and anticoagulation management.    PAST MEDICAL & SURGICAL HISTORY:  [place] (hard of hearing), bilateral  Vaginal prolapse  Spondyloarthritis  Spinal stenosis  Essential hypertension  Acute cystitis without hematuria: septic  4/2016  Paroxysmal atrial fibrillation  Monoclonal gammopathies  Venous insufficiency  Lymphedema of both lower extremities  History of vaginal surgery  S/P thyroidectomy: partial   1975  S/P kyphoplasty: 2014      [place] VTE Risk Score:10    [id number] Score: 5    IMPROVE Bleeding Risk Score: 3.5    Falls Risk:   High (x )  Mod (  )  Low (  )    EBL:  250 ml  crcl: 59.93  1-16-18 Pt seen at bedside wit daughter present. concerned about her mother's low B/P, RN called hospitalised to speak with her.  Discussed her anticoagulation coumadin over lapping with Lovenox and this daughter called her sister in order to get pt's normal coumadin dose.  The daughter informed me she used to take 3mg 4 times a week and 4 mg the other days before she became ill and was in a rehab facility.  Questions answered Benefits and risks discussed will reinforce as needed.   1/17: seen at bedside with dtr present, questions answered  1/18/19: seen at bedside, discussed with dtr anticoagulation, dtr is very anxious, concerned about INR being so low, reassured dtr that pt is on Lovenox as well                     FAMILY HISTORY:  No pertinent family history in first degree relatives    Denies any personal or familial history of clotting or bleeding disorders.    Allergies    No Known Allergies    Intolerances        REVIEW OF SYSTEMS    (  )Fever	     (  )Constipation	(  )SOB				(  )Headache	(  )Dysuria  (  )Chills	     (  )Melena	(  )Dyspnea present on exertion	                    (  )Dizziness                    (  )Polyuria  (  )Nausea	     (  )Hematochezia	(  )Cough			                    (  )Syncope   	(  )Hematuria  (  )Vomiting    (  )Chest Pain	(  )Wheezing			(  )Weakness  (  )Diarrhea     (  )Palpitations	(  )Anorexia			(  )Myalgia    All other review of systems negative: Yes      PHYSICAL EXAM:    Constitutional: Appears Well    Neurological: A& O x 3    Skin: Warm    Respiratory and Thorax: normal effort; Breath sounds: normal; No rales/wheezing/rhonchi  	  Cardiovascular: S1, S2, regular, NMBR	    Gastrointestinal: BS + x 4Q, nontender	    Genitourinary:  Bladder nondistended, nontender    Musculoskeletal:   General Right:   no muscle/joint tenderness,   normal tone, no joint swelling,   ROM: limited	    General Left:   + muscle/joint tenderness,   normal tone, no joint swelling,   ROM: limited    Hip: Left: Dressing CDI; long leg immobilizer noted      Lower extrems:   Right: no calf tenderness              negative christopher's sign               + pedal pulses    Left:   no calf tenderness              negative christopher's sign               + pedal pulses              + edema noted 2+ with ecchymosis noted to distal le.                        9.2    13.7  )-----------( 108      ( 18 Jan 2018 06:31 )             27.3       01-18    132<L>  |  100  |  12  ----------------------------<  104<H>  4.4   |  26  |  0.41<L>    Ca    7.9<L>      18 Jan 2018 06:31                              8.1    12.0  )-----------( 115      ( 17 Jan 2018 07:22 )             24.2       01-17    135  |  102  |  15  ----------------------------<  97  4.3   |  26  |  0.50    Ca    7.4<L>      17 Jan 2018 07:22                                       8.7    16.0  )-----------( 129      ( 16 Jan 2018 10:14 )             26.6       01-16    133<L>  |  102  |  17  ----------------------------<  142<H>  4.4   |  24  |  0.70    Ca    7.3<L>      16 Jan 2018 07:03        PT/INR - ( 18 Jan 2018 06:31 )   PT: 14.4 sec;   INR: 1.33 ratio           PT/INR - ( 17 Jan 2018 07:22 )   PT: 16.5 sec;   INR: 1.51 ratio    PT/INR - ( 15 Pancho 2018 04:45 )   PT: 13.5 sec;   INR: 1.24 ratio         PTT - ( 15 Pancho 2018 04:45 )  PTT:27.6 sec				    MEDICATIONS  (STANDING):  ATENolol  Tablet 12.5 milliGRAM(s) Oral two times a day  docusate sodium 100 milliGRAM(s) Oral three times a day  enoxaparin Injectable 40 milliGRAM(s) SubCutaneous every 24 hours  lidocaine   Patch 1 Patch Transdermal daily  polyethylene glycol 3350 17 Gram(s) Oral daily  sertraline 50 milliGRAM(s) Oral daily  warfarin 5 milliGRAM(s) Oral daily    Vital Signs Last 24 Hrs  T(C): 36.7 (01-18-18 @ 11:23), Max: 37.6 (01-17-18 @ 21:24)  T(F): 98 (01-18-18 @ 11:23), Max: 99.7 (01-17-18 @ 21:24)  HR: 89 (01-18-18 @ 11:23) (72 - 95)  BP: 140/58 (01-18-18 @ 11:23) (113/46 - 141/55)  BP(mean): --  RR: 16 (01-18-18 @ 11:23) (16 - 17)  SpO2: 99% (01-18-18 @ 11:23) (98% - 99%)      DVT Prophylaxis:  LMWH                   ( x )  Heparin SQ           (  )  Coumadin             ( x )  Xarelto                  (  )  Eliquis                   (  )  Venodynes           (x )  Ambulation          ( x )  UFH                       (  )  Contraindicated  (  )

## 2018-01-19 LAB
HCT VFR BLD CALC: 27.9 % — LOW (ref 34.5–45)
HGB BLD-MCNC: 9 G/DL — LOW (ref 11.5–15.5)
INR BLD: 1.45 RATIO — HIGH (ref 0.88–1.16)
MCHC RBC-ENTMCNC: 30.8 PG — SIGNIFICANT CHANGE UP (ref 27–34)
MCHC RBC-ENTMCNC: 32.1 GM/DL — SIGNIFICANT CHANGE UP (ref 32–36)
MCV RBC AUTO: 96 FL — SIGNIFICANT CHANGE UP (ref 80–100)
PLATELET # BLD AUTO: 140 K/UL — LOW (ref 150–400)
PROTHROM AB SERPL-ACNC: 15.8 SEC — HIGH (ref 9.8–12.7)
RBC # BLD: 2.9 M/UL — LOW (ref 3.8–5.2)
RBC # FLD: 14.7 % — HIGH (ref 10.3–14.5)
WBC # BLD: 9.6 K/UL — SIGNIFICANT CHANGE UP (ref 3.8–10.5)
WBC # FLD AUTO: 9.6 K/UL — SIGNIFICANT CHANGE UP (ref 3.8–10.5)

## 2018-01-19 PROCEDURE — 99233 SBSQ HOSP IP/OBS HIGH 50: CPT

## 2018-01-19 PROCEDURE — 74019 RADEX ABDOMEN 2 VIEWS: CPT | Mod: 26

## 2018-01-19 RX ORDER — CHOLECALCIFEROL (VITAMIN D3) 125 MCG
2000 CAPSULE ORAL DAILY
Qty: 0 | Refills: 0 | Status: DISCONTINUED | OUTPATIENT
Start: 2018-01-19 | End: 2018-01-25

## 2018-01-19 RX ADMIN — POLYETHYLENE GLYCOL 3350 17 GRAM(S): 17 POWDER, FOR SOLUTION ORAL at 11:15

## 2018-01-19 RX ADMIN — WARFARIN SODIUM 5 MILLIGRAM(S): 2.5 TABLET ORAL at 21:16

## 2018-01-19 RX ADMIN — LIDOCAINE 1 PATCH: 4 CREAM TOPICAL at 04:27

## 2018-01-19 RX ADMIN — ENOXAPARIN SODIUM 40 MILLIGRAM(S): 100 INJECTION SUBCUTANEOUS at 05:57

## 2018-01-19 RX ADMIN — ATENOLOL 12.5 MILLIGRAM(S): 25 TABLET ORAL at 18:12

## 2018-01-19 RX ADMIN — ATENOLOL 12.5 MILLIGRAM(S): 25 TABLET ORAL at 05:57

## 2018-01-19 RX ADMIN — SERTRALINE 50 MILLIGRAM(S): 25 TABLET, FILM COATED ORAL at 11:15

## 2018-01-19 RX ADMIN — LIDOCAINE 1 PATCH: 4 CREAM TOPICAL at 11:14

## 2018-01-19 RX ADMIN — Medication 100 MILLIGRAM(S): at 21:16

## 2018-01-19 RX ADMIN — LIDOCAINE 1 PATCH: 4 CREAM TOPICAL at 23:00

## 2018-01-19 RX ADMIN — Medication 100 MILLIGRAM(S): at 05:57

## 2018-01-19 NOTE — PROGRESS NOTE ADULT - ASSESSMENT
This is a 86 year old female [facility] Found to have L femoral shaft fracture. Pt s/p Antegrade intramedullary nailing for fracture of femoral shaft on 1-15-18.  Pt has hx of A. Fib on coumadin.  [id number] Score: 5 Pt has high thrombosis risk and require anticoagulation treatment dose.    Plan discussed with pt's daughter's and pt.     Plan:  cont Coumadin 5 mg PO daily  adjust dose per INR  cont Lovenox 40mg sq daily til inr is 2.0 or greater.  Daily PT/INR (pending)  Daily CBC/BMP  Enc ambulation  Venodynes

## 2018-01-19 NOTE — PROGRESS NOTE ADULT - ASSESSMENT
85 yo F with PMH of spinal stenosis, HTN, chronic compression fracture of spine, CLL, HTN, lymphedema, monoclonal gammopathy, paroxysmal a-fib, vaginal prolapse, venous insufficiency, p/w mechanical fall with L fem shaft fracture s/p IMN     *L femoral shaft fracture s/p mechanical fall s/p L Femur IMN 1/15.   - Ortho consult appreciated  - Pain control  - NWB LLE  - pain control  - PT  - ACS for DVT ppx appreicated    # Acute on chronic anemia. Acute blood loss anemia 2/2 post op.   - HH stable. Repeat in AM.  - transfuse prn    # Urinary retention- resolved  - straight cath prn.   - pain control    # Constipation  - colace, senna, miralax. suppository.  - XR abd no obstruction    # Hypotension- resolved  - BB w parameter  - monitor    # LE edema + ulcers  -U/S neg for DVT  -aldactone held  -wound care recs aopprecioated    # Hyponatremia  - monitor    # Spinal stenosis / HTN / monoclonal gammopathy  -Outpatient management     *DVT ppx    Dc planning 85 yo F with PMH of spinal stenosis, HTN, chronic compression fracture of spine, CLL, HTN, lymphedema, monoclonal gammopathy, paroxysmal a-fib, vaginal prolapse, venous insufficiency, p/w mechanical fall with L fem shaft fracture s/p IMN     *L femoral shaft fracture s/p mechanical fall s/p L Femur IMN 1/15.   - Ortho consult appreciated  - Pain control  - NWB LLE  - pain control  - Non-weight bearing left lower extremity, with assistive devices as needed. No range of motion at knee, with knee immobilizer.  - Follow up with Dr. Kitchen as Outpatient in 10-14 Days after Discharge from the Hospital or Rehab. Call Office For Appointment.  - Staples/Sutures to be removed Post-Op Day 14, and repeat x-rays  - Keep Dressing/Splint clean and dry.  - PT  - ACS for DVT ppx appreicated    # Acute on chronic anemia. Acute blood loss anemia 2/2 post op.   - HH stable. Repeat in AM.  - transfuse prn    # Urinary retention- resolved  - straight cath prn.   - pain control    # Constipation  - colace, senna, miralax. suppository.  - XR abd no obstruction    # Hypotension- resolved  - BB w parameter  - monitor    # LE edema + ulcers  -U/S neg for DVT  -aldactone held  -wound care recs aopprecioated    # Hyponatremia  - monitor    # Spinal stenosis / HTN / monoclonal gammopathy  -Outpatient management     *DVT ppx    Dc planning

## 2018-01-19 NOTE — PROGRESS NOTE ADULT - SUBJECTIVE AND OBJECTIVE BOX
HPI:  85 yo F with PMH of spinal stenosis, HTN, chronic compression fracture of spine, CLL, HTN, lymphedema monoclonal gammopathy, paroxysmal a-fib, vaginal prolapse, venous insufficiency, p/w mechanical fall. Patient was discharged from rehab yesterday, and was at home for 2 hours when she fell. She had a mechanical fall when transferring from bed to commode. Found to have L femoral shaft fracture. Denies h/o prolonged intubation or complications with anesthesia previously.     PSH: History of vaginal surgery, S/P kyphoplasty  2014, S/P thyroidectomy  partial   1975    Family Hx: sister - cva / cancer, father - cva    Social hx: former smoker, denies etoh / drugs, lives at home (13 Jan 2018 05:14)      Patient is a 86y old  Female who presents with a chief complaint of fall (13 Jan 2018 05:14)  s/p fx left Femur  s/p IMN on 1-16-18    Consulted by Dr. Magno Kitchen for VTE prophylaxis, risk stratification, and anticoagulation management.    PAST MEDICAL & SURGICAL HISTORY:  [place] (hard of hearing), bilateral  Vaginal prolapse  Spondyloarthritis  Spinal stenosis  Essential hypertension  Acute cystitis without hematuria: septic  4/2016  Paroxysmal atrial fibrillation  Monoclonal gammopathies  Venous insufficiency  Lymphedema of both lower extremities  History of vaginal surgery  S/P thyroidectomy: partial   1975  S/P kyphoplasty: 2014      [place]ose VTE Risk Score:10    [id number] Score: 5    IMPROVE Bleeding Risk Score: 3.5    Falls Risk:   High (x )  Mod (  )  Low (  )    EBL:  250 ml  crcl: 59.93  1-16-18 Pt seen at bedside wit daughter present. concerned about her mother's low B/P, RN called hospitalised to speak with her.  Discussed her anticoagulation coumadin over lapping with Lovenox and this daughter called her sister in order to get pt's normal coumadin dose.  The daughter informed me she used to take 3mg 4 times a week and 4 mg the other days before she became ill and was in a rehab facility.  Questions answered Benefits and risks discussed will reinforce as needed.   1/17: seen at bedside with dtr present, questions answered  1/18/19: seen at bedside, discussed with dtr anticoagulation, dtr is very anxious, concerned about INR being so low, reassured dtr that pt is on Lovenox as well  1/19/19; seen at bedside, awaiting rehab                     FAMILY HISTORY:  No pertinent family history in first degree relatives    Denies any personal or familial history of clotting or bleeding disorders.    Allergies    No Known Allergies    Intolerances        REVIEW OF SYSTEMS    (  )Fever	     (  )Constipation	(  )SOB				(  )Headache	(  )Dysuria  (  )Chills	     (  )Melena	(  )Dyspnea present on exertion	                    (  )Dizziness                    (  )Polyuria  (  )Nausea	     (  )Hematochezia	(  )Cough			                    (  )Syncope   	(  )Hematuria  (  )Vomiting    (  )Chest Pain	(  )Wheezing			(  )Weakness  (  )Diarrhea     (  )Palpitations	(  )Anorexia			(  )Myalgia    All other review of systems negative: Yes      PHYSICAL EXAM:    Constitutional: Appears Well    Neurological: A& O x 3    Skin: Warm    Respiratory and Thorax: normal effort; Breath sounds: normal; No rales/wheezing/rhonchi  	  Cardiovascular: S1, S2, regular, NMBR	    Gastrointestinal: BS + x 4Q, nontender	    Genitourinary:  Bladder nondistended, nontender    Musculoskeletal:   General Right:   no muscle/joint tenderness,   normal tone, no joint swelling,   ROM: limited	    General Left:   + muscle/joint tenderness,   normal tone, no joint swelling,   ROM: limited    Hip: Left: Dressing CDI; long leg immobilizer noted      Lower extrems:   Right: no calf tenderness              negative christopher's sign               + pedal pulses    Left:   no calf tenderness              negative christopher's sign               + pedal pulses              + edema noted 2+ with ecchymosis noted to distal le.                          9.0    9.6   )-----------( 140      ( 19 Jan 2018 07:45 )             27.9       01-18    132<L>  |  100  |  12  ----------------------------<  104<H>  4.4   |  26  |  0.41<L>    Ca    7.9<L>      18 Jan 2018 06:31                              9.2    13.7  )-----------( 108      ( 18 Jan 2018 06:31 )             27.3       01-18    132<L>  |  100  |  12  ----------------------------<  104<H>  4.4   |  26  |  0.41<L>    Ca    7.9<L>      18 Jan 2018 06:31                              8.1    12.0  )-----------( 115      ( 17 Jan 2018 07:22 )             24.2       01-17    135  |  102  |  15  ----------------------------<  97  4.3   |  26  |  0.50    Ca    7.4<L>      17 Jan 2018 07:22                                       8.7    16.0  )-----------( 129      ( 16 Jan 2018 10:14 )             26.6       01-16    133<L>  |  102  |  17  ----------------------------<  142<H>  4.4   |  24  |  0.70    Ca    7.3<L>      16 Jan 2018 07:03        P  PT/INR - ( 19 Jan 2018 07:45 )   PT: 15.8 sec;   INR: 1.45 ratio       T/INR - ( 18 Jan 2018 06:31 )   PT: 14.4 sec;   INR: 1.33 ratio       PT/INR - ( 17 Jan 2018 07:22 )   PT: 16.5 sec;   INR: 1.51 ratio    PT/INR - ( 15 Pancho 2018 04:45 )   PT: 13.5 sec;   INR: 1.24 ratio         PTT - ( 15 Pancho 2018 04:45 )  PTT:27.6 sec				  MEDICATIONS  (STANDING):  ATENolol  Tablet 12.5 milliGRAM(s) Oral two times a day  docusate sodium 100 milliGRAM(s) Oral three times a day  enoxaparin Injectable 40 milliGRAM(s) SubCutaneous every 24 hours  lidocaine   Patch 1 Patch Transdermal daily  polyethylene glycol 3350 17 Gram(s) Oral daily  sertraline 50 milliGRAM(s) Oral daily  warfarin 5 milliGRAM(s) Oral daily      Vital Signs Last 24 Hrs  T(C): 37.1 (01-19-18 @ 11:36), Max: 37.5 (01-18-18 @ 20:36)  T(F): 98.8 (01-19-18 @ 11:36), Max: 99.5 (01-18-18 @ 20:36)  HR: 66 (01-19-18 @ 11:36) (66 - 91)  BP: 132/57 (01-19-18 @ 11:36) (110/42 - 139/56)  BP(mean): --  RR: 16 (01-19-18 @ 11:36) (16 - 19)  SpO2: 99% (01-19-18 @ 11:36) (96% - 99%)    DVT Prophylaxis:  LMWH                   ( x )  Heparin SQ           (  )  Coumadin             ( x )  Xarelto                  (  )  Eliquis                   (  )  Venodynes           (x )  Ambulation          ( x )  UFH                       (  )  Contraindicated  (  )

## 2018-01-19 NOTE — PROGRESS NOTE ADULT - SUBJECTIVE AND OBJECTIVE BOX
Patient is a 86y old  Female who presents with a chief complaint of fall (17 Jan 2018 06:41)      HPI:  85 yo F with PMH of spinal stenosis, HTN, chronic compression fracture of spine, CLL, HTN, lymphedema monoclonal gammopathy, paroxysmal a-fib, vaginal prolapse, venous insufficiency, p/w mechanical fall. Patient was discharged from rehab yesterday, and was at home for 2 hours when she fell. She had a mechanical fall when transferring from bed to commode. Found to have L femoral shaft fracture. Denies h/o prolonged intubation or complications with anesthesia previously.     PSH: History of vaginal surgery, S/P kyphoplasty  2014, S/P thyroidectomy  partial   1975    Family Hx: sister - cva / cancer, father - cva    Social hx: former smoker, denies etoh / drugs, lives at home (13 Jan 2018 05:14)  1/19- no new complaints    PAST MEDICAL & SURGICAL HISTORY:  [place] (hard of hearing), bilateral  Vaginal prolapse  Spondyloarthritis  Spinal stenosis  Essential hypertension  Acute cystitis without hematuria: septic  4/2016  Paroxysmal atrial fibrillation  Monoclonal gammopathies  Venous insufficiency  Lymphedema of both lower extremities  History of vaginal surgery  S/P thyroidectomy: partial   1975  S/P kyphoplasty: 2014        MEDICATIONS  (STANDING):  ATENolol  Tablet 12.5 milliGRAM(s) Oral two times a day  docusate sodium 100 milliGRAM(s) Oral three times a day  enoxaparin Injectable 40 milliGRAM(s) SubCutaneous every 24 hours  lidocaine   Patch 1 Patch Transdermal daily  polyethylene glycol 3350 17 Gram(s) Oral daily  sertraline 50 milliGRAM(s) Oral daily  warfarin 5 milliGRAM(s) Oral daily    MEDICATIONS  (PRN):  acetaminophen   Tablet 650 milliGRAM(s) Oral every 6 hours PRN For Temp greater than 38 C (100.4 F)  bisacodyl Suppository 10 milliGRAM(s) Rectal daily PRN Constipation  diphenhydrAMINE   Capsule 25 milliGRAM(s) Oral at bedtime PRN Insomnia  HYDROmorphone  Injectable 1 milliGRAM(s) IV Push every 4 hours PRN Severe Pain (7 - 10)  oxyCODONE    IR 5 milliGRAM(s) Oral every 6 hours PRN Moderate Pain (4 - 6)          Vital Signs Last 24 Hrs  T(C): 36.8 (19 Jan 2018 05:54), Max: 37.5 (18 Jan 2018 20:36)  T(F): 98.2 (19 Jan 2018 05:54), Max: 99.5 (18 Jan 2018 20:36)  HR: 70 (19 Jan 2018 05:54) (70 - 91)  BP: 110/42 (19 Jan 2018 05:54) (110/42 - 140/58)  BP(mean): --  RR: 16 (19 Jan 2018 05:54) (16 - 19)  SpO2: 99% (19 Jan 2018 05:54) (96% - 99%)    I&O's Summary        PHYSICAL EXAM  General Appearance: comfortable  HEENT:   Neck:   Lungs: clear  Heart: 1/6 systolic murmur LSB  Abdomen: soft and nontender  Extremities: 1+ edema right leg. immobilizer left leg  Neurologic: no focal abnormality    18 Jan 2018 07:01  -  19 Jan 2018 07:00  --------------------------------------------------------  IN: 60 mL / OUT: 200 mL / NET: -140 mL      INTERPRETATION OF TELEMETRY:    ECG:        LABS:                          9.2    13.7  )-----------( 108      ( 18 Jan 2018 06:31 )             27.3     01-18    132<L>  |  100  |  12  ----------------------------<  104<H>  4.4   |  26  |  0.41<L>    Ca    7.9<L>      18 Jan 2018 06:31              PT/INR - ( 18 Jan 2018 06:31 )   PT: 14.4 sec;   INR: 1.33 ratio             IMPRESSION:  1. s/p IM nail for left midshaft  femur fracture.  2.Atrial fibrillation, long term persistent. Stable.   3.Hypertension. BP is stable. .   4.Depressive disorder.  5.Chronic venous insufficiency  6.Anemia  7. constipation  PLAN:dayAgree with Lovenox with transition to warfarin. Continue atenolol, sertraline. Follow hemoglobin and hematocrit and transfuse as needed. Hold spironolactone until she becomes hypertensive.  For abdomial film tot

## 2018-01-19 NOTE — PROGRESS NOTE ADULT - SUBJECTIVE AND OBJECTIVE BOX
CHIEF COMPLAINT: fall, femur fracture    SUBJECTIVE: pts daughters very concerned about multiple issues, nervous about pt being discharge due to numerous concerns.    REVIEW OF SYSTEMS: All other review of systems is negative unless indicated above    Vital Signs Last 24 Hrs  T(C): 37.1 (19 Jan 2018 11:36), Max: 37.5 (18 Jan 2018 20:36)  T(F): 98.8 (19 Jan 2018 11:36), Max: 99.5 (18 Jan 2018 20:36)  HR: 66 (19 Jan 2018 11:36) (66 - 91)  BP: 132/57 (19 Jan 2018 11:36) (110/42 - 139/56)  BP(mean): --  RR: 16 (19 Jan 2018 11:36) (16 - 19)  SpO2: 99% (19 Jan 2018 11:36) (96% - 99%)    PHYSICAL EXAM:  Constitutional: frail, lethargic appearing elderly female in NAD.  HEENT: EOMI, Normal Hearing, MMM  Neck: Soft and supple, No JVD  Respiratory: Breath sounds are clear bilaterally, No wheezing, rales or rhonchi  Cardiovascular: S1 and S2, regular rate and rhythm, no Murmurs, gallops or rubs  Gastrointestinal: +BS, nd, nt  Extremities: LLE dressing CDI. Left foot edemtous.   Vascular: 2+ peripheral pulses  Neurological: A/O x 3, no focal deficits  Musculoskeletal: 5/5 strength b/l upper and lower extremities  Skin: No rashes    MEDICATIONS:  MEDICATIONS  (STANDING):  ATENolol  Tablet 12.5 milliGRAM(s) Oral two times a day  cholecalciferol 2000 Unit(s) Oral daily  docusate sodium 100 milliGRAM(s) Oral three times a day  enoxaparin Injectable 40 milliGRAM(s) SubCutaneous every 24 hours  lidocaine   Patch 1 Patch Transdermal daily  multivitamin 1 Tablet(s) Oral daily  polyethylene glycol 3350 17 Gram(s) Oral daily  sertraline 50 milliGRAM(s) Oral daily  warfarin 5 milliGRAM(s) Oral daily    LABS: All Labs Reviewed:                        9.0    9.6   )-----------( 140      ( 19 Jan 2018 07:45 )             27.9     132<L>  |  100  |  12  ----------------------------<  104<H>  4.4   |  26  |  0.41<L>    Ca    7.9<L>      18 Jan 2018 06:31    PT/INR - ( 19 Jan 2018 07:45 )   PT: 15.8 sec;   INR: 1.45 ratio

## 2018-01-19 NOTE — PROGRESS NOTE ADULT - ASSESSMENT
A/P: 86F s/p L femur IMN POD 3  Analgesia  DVT ppx - per AC team  NWB LLE  PT  FU Labs  Continue medical management  DC planning

## 2018-01-19 NOTE — PROGRESS NOTE ADULT - SUBJECTIVE AND OBJECTIVE BOX
Patient seen and examined. Pain controlled.    Physical exam  VS: Afebrile, vital signs stable  Gen: NAD  Left LE: Dressing clean, dry, and intact. +EHL/FHL/TA/GSC. SILT L3-S1. +Capillary refill brisk. Compartments soft and nontender.    86F s/p left femur IMN POD# 4    NWB left LE in knee immobilizer  Pain control  DVT prophylaxis  PT  Ortho stable for discharge

## 2018-01-20 LAB
HCT VFR BLD CALC: 28.6 % — LOW (ref 34.5–45)
HGB BLD-MCNC: 9.2 G/DL — LOW (ref 11.5–15.5)
INR BLD: 1.92 RATIO — HIGH (ref 0.88–1.16)
MCHC RBC-ENTMCNC: 31.1 PG — SIGNIFICANT CHANGE UP (ref 27–34)
MCHC RBC-ENTMCNC: 32.2 GM/DL — SIGNIFICANT CHANGE UP (ref 32–36)
MCV RBC AUTO: 96.5 FL — SIGNIFICANT CHANGE UP (ref 80–100)
PLATELET # BLD AUTO: 135 K/UL — LOW (ref 150–400)
PROTHROM AB SERPL-ACNC: 21 SEC — HIGH (ref 9.8–12.7)
RBC # BLD: 2.96 M/UL — LOW (ref 3.8–5.2)
RBC # FLD: 14.2 % — SIGNIFICANT CHANGE UP (ref 10.3–14.5)
WBC # BLD: 7 K/UL — SIGNIFICANT CHANGE UP (ref 3.8–10.5)
WBC # FLD AUTO: 7 K/UL — SIGNIFICANT CHANGE UP (ref 3.8–10.5)

## 2018-01-20 PROCEDURE — 99233 SBSQ HOSP IP/OBS HIGH 50: CPT

## 2018-01-20 RX ORDER — WARFARIN SODIUM 2.5 MG/1
3 TABLET ORAL DAILY
Qty: 0 | Refills: 0 | Status: DISCONTINUED | OUTPATIENT
Start: 2018-01-20 | End: 2018-01-21

## 2018-01-20 RX ADMIN — OXYCODONE HYDROCHLORIDE 5 MILLIGRAM(S): 5 TABLET ORAL at 21:39

## 2018-01-20 RX ADMIN — Medication 2000 UNIT(S): at 11:55

## 2018-01-20 RX ADMIN — OXYCODONE HYDROCHLORIDE 5 MILLIGRAM(S): 5 TABLET ORAL at 22:30

## 2018-01-20 RX ADMIN — ENOXAPARIN SODIUM 40 MILLIGRAM(S): 100 INJECTION SUBCUTANEOUS at 05:43

## 2018-01-20 RX ADMIN — Medication 1 TABLET(S): at 11:55

## 2018-01-20 RX ADMIN — Medication 100 MILLIGRAM(S): at 05:42

## 2018-01-20 RX ADMIN — WARFARIN SODIUM 3 MILLIGRAM(S): 2.5 TABLET ORAL at 21:39

## 2018-01-20 RX ADMIN — LIDOCAINE 1 PATCH: 4 CREAM TOPICAL at 21:38

## 2018-01-20 RX ADMIN — ATENOLOL 12.5 MILLIGRAM(S): 25 TABLET ORAL at 05:43

## 2018-01-20 RX ADMIN — SERTRALINE 50 MILLIGRAM(S): 25 TABLET, FILM COATED ORAL at 11:54

## 2018-01-20 RX ADMIN — LIDOCAINE 1 PATCH: 4 CREAM TOPICAL at 11:55

## 2018-01-20 RX ADMIN — ATENOLOL 12.5 MILLIGRAM(S): 25 TABLET ORAL at 18:39

## 2018-01-20 NOTE — PROGRESS NOTE ADULT - SUBJECTIVE AND OBJECTIVE BOX
HPI:  85 yo F with PMH of spinal stenosis, HTN, chronic compression fracture of spine, CLL, HTN, lymphedema monoclonal gammopathy, paroxysmal a-fib, vaginal prolapse, venous insufficiency, p/w mechanical fall. Patient was discharged from rehab yesterday, and was at home for 2 hours when she fell. She had a mechanical fall when transferring from bed to commode. Found to have L femoral shaft fracture. Denies h/o prolonged intubation or complications with anesthesia previously.     PSH: History of vaginal surgery, S/P kyphoplasty  2014, S/P thyroidectomy  partial   1975    Family Hx: sister - cva / cancer, father - cva    Social hx: former smoker, denies etoh / drugs, lives at home (13 Jan 2018 05:14)      Patient is a 86y old  Female who presents with a chief complaint of fall (13 Jan 2018 05:14)  s/p fx left Femur  s/p IMN on 1-16-18    Consulted by Dr. Magno Kitchen for VTE prophylaxis, risk stratification, and anticoagulation management.    PAST MEDICAL & SURGICAL HISTORY:  [place] (hard of hearing), bilateral  Vaginal prolapse  Spondyloarthritis  Spinal stenosis  Essential hypertension  Acute cystitis without hematuria: septic  4/2016  Paroxysmal atrial fibrillation  Monoclonal gammopathies  Venous insufficiency  Lymphedema of both lower extremities  History of vaginal surgery  S/P thyroidectomy: partial   1975  S/P kyphoplasty: 2014      [place]ose VTE Risk Score:10    [id number] Score: 5    IMPROVE Bleeding Risk Score: 3.5    Falls Risk:   High (x )  Mod (  )  Low (  )    EBL:  250 ml  crcl: 59.93  1-16-18 Pt seen at bedside wit daughter present. concerned about her mother's low B/P, RN called hospitalised to speak with her.  Discussed her anticoagulation coumadin over lapping with Lovenox and this daughter called her sister in order to get pt's normal coumadin dose.  The daughter informed me she used to take 3mg 4 times a week and 4 mg the other days before she became ill and was in a rehab facility.  Questions answered Benefits and risks discussed will reinforce as needed.   1/17: seen at bedside with dtr present, questions answered  1/18/19: seen at bedside, discussed with dtr anticoagulation, dtr is very anxious, concerned about INR being so low, reassured dtr that pt is on Lovenox as well  1/19/19; seen at bedside, awaiting rehab  1/20/19: seen at bedside no concerns                     FAMILY HISTORY:  No pertinent family history in first degree relatives    Denies any personal or familial history of clotting or bleeding disorders.    Allergies    No Known Allergies    Intolerances        REVIEW OF SYSTEMS    (  )Fever	     (  )Constipation	(  )SOB				(  )Headache	(  )Dysuria  (  )Chills	     (  )Melena	(  )Dyspnea present on exertion	                    (  )Dizziness                    (  )Polyuria  (  )Nausea	     (  )Hematochezia	(  )Cough			                    (  )Syncope   	(  )Hematuria  (  )Vomiting    (  )Chest Pain	(  )Wheezing			(  )Weakness  (  )Diarrhea     (  )Palpitations	(  )Anorexia			(  )Myalgia    All other review of systems negative: Yes      PHYSICAL EXAM:    Constitutional: Appears Well    Neurological: A& O x 3    Skin: Warm    Respiratory and Thorax: normal effort; Breath sounds: normal; No rales/wheezing/rhonchi  	  Cardiovascular: S1, S2, regular, NMBR	    Gastrointestinal: BS + x 4Q, nontender	    Genitourinary:  Bladder nondistended, nontender    Musculoskeletal:   General Right:   no muscle/joint tenderness,   normal tone, no joint swelling,   ROM: limited	    General Left:   + muscle/joint tenderness,   normal tone, no joint swelling,   ROM: limited    Hip: Left: Dressing CDI; long leg immobilizer noted      Lower extrems:   Right: no calf tenderness              negative christopher's sign               + pedal pulses    Left:   no calf tenderness              negative christopher's sign               + pedal pulses              + edema noted 2+ with ecchymosis noted to distal le.                        9.2    7.0   )-----------( 135      ( 20 Jan 2018 06:16 )             28.6                                   9.0    9.6   )-----------( 140      ( 19 Jan 2018 07:45 )             27.9       01-18    132<L>  |  100  |  12  ----------------------------<  104<H>  4.4   |  26  |  0.41<L>    Ca    7.9<L>      18 Jan 2018 06:31                              9.2    13.7  )-----------( 108      ( 18 Jan 2018 06:31 )             27.3       01-18    132<L>  |  100  |  12  ----------------------------<  104<H>  4.4   |  26  |  0.41<L>    Ca    7.9<L>      18 Jan 2018 06:31                              8.1    12.0  )-----------( 115      ( 17 Jan 2018 07:22 )             24.2       01-17    135  |  102  |  15  ----------------------------<  97  4.3   |  26  |  0.50    Ca    7.4<L>      17 Jan 2018 07:22                                       8.7    16.0  )-----------( 129      ( 16 Jan 2018 10:14 )             26.6       01-16    133<L>  |  102  |  17  ----------------------------<  142<H>  4.4   |  24  |  0.70    Ca    7.3<L>      16 Jan 2018 07:03              PT/INR - ( 20 Jan 2018 06:16 )   PT: 21.0 sec;   INR: 1.92 ratio           PT/INR - ( 19 Jan 2018 07:45 )   PT: 15.8 sec;   INR: 1.45 ratio       T/INR - ( 18 Jan 2018 06:31 )   PT: 14.4 sec;   INR: 1.33 ratio       PT/INR - ( 17 Jan 2018 07:22 )   PT: 16.5 sec;   INR: 1.51 ratio    PT/INR - ( 15 Pancho 2018 04:45 )   PT: 13.5 sec;   INR: 1.24 ratio         MEDICATIONS  (STANDING):  ATENolol  Tablet 12.5 milliGRAM(s) Oral two times a day  cholecalciferol 2000 Unit(s) Oral daily  docusate sodium 100 milliGRAM(s) Oral three times a day  enoxaparin Injectable 40 milliGRAM(s) SubCutaneous every 24 hours  lidocaine   Patch 1 Patch Transdermal daily  multivitamin 1 Tablet(s) Oral daily  polyethylene glycol 3350 17 Gram(s) Oral daily  sertraline 50 milliGRAM(s) Oral daily  warfarin 3 milliGRAM(s) Oral daily    Vital Signs Last 24 Hrs  T(C): 37 (01-20-18 @ 11:52), Max: 37 (01-20-18 @ 11:52)  T(F): 98.6 (01-20-18 @ 11:52), Max: 98.6 (01-20-18 @ 11:52)  HR: 69 (01-20-18 @ 11:52) (68 - 77)  BP: 109/59 (01-20-18 @ 11:52) (109/59 - 124/68)  BP(mean): --  RR: 17 (01-20-18 @ 11:52) (17 - 18)  SpO2: 99% (01-20-18 @ 11:52) (98% - 99%)      DVT Prophylaxis:  LMWH                   ( x )  Heparin SQ           (  )  Coumadin             ( x )  Xarelto                  (  )  Eliquis                   (  )  Venodynes           (x )  Ambulation          ( x )  UFH                       (  )  Contraindicated  (  )

## 2018-01-20 NOTE — PROGRESS NOTE ADULT - SUBJECTIVE AND OBJECTIVE BOX
CHIEF COMPLAINT: fall, femur fracture    SUBJECTIVE: pts is in the chair, talking on the phone, comfortable    REVIEW OF SYSTEMS: All other review of systems is negative unless indicated above    Vital Signs Last 24 Hrs  T(C): 37 (20 Jan 2018 11:52), Max: 37 (20 Jan 2018 11:52)  T(F): 98.6 (20 Jan 2018 11:52), Max: 98.6 (20 Jan 2018 11:52)  HR: 69 (20 Jan 2018 11:52) (68 - 77)  BP: 109/59 (20 Jan 2018 11:52) (109/59 - 124/68)  BP(mean): --  RR: 17 (20 Jan 2018 11:52) (17 - 18)  SpO2: 99% (20 Jan 2018 11:52) (98% - 99%)    PHYSICAL EXAM:  Constitutional: frail, lethargic appearing elderly female in NAD.  HEENT: EOMI, Normal Hearing, MMM  Neck: Soft and supple, No JVD  Respiratory: Breath sounds are clear bilaterally, No wheezing, rales or rhonchi  Cardiovascular: S1 and S2, regular rate and rhythm, no Murmurs, gallops or rubs  Gastrointestinal: +BS, nd, nt  Extremities: LLE in immobilizer, Left foot edematous.   Vascular: 2+ peripheral pulses  Neurological: A/O x 3, no focal deficits  Musculoskeletal: 5/5 strength b/l upper and lower extremities  Skin: No rashes    MEDICATIONS:  MEDICATIONS  (STANDING):  ATENolol  Tablet 12.5 milliGRAM(s) Oral two times a day  cholecalciferol 2000 Unit(s) Oral daily  docusate sodium 100 milliGRAM(s) Oral three times a day  enoxaparin Injectable 40 milliGRAM(s) SubCutaneous every 24 hours  lidocaine   Patch 1 Patch Transdermal daily  multivitamin 1 Tablet(s) Oral daily  polyethylene glycol 3350 17 Gram(s) Oral daily  sertraline 50 milliGRAM(s) Oral daily  warfarin 5 milliGRAM(s) Oral daily    LABS:                        9.0    9.6   )-----------( 140      ( 19 Jan 2018 07:45 )             27.9     132<L>  |  100  |  12  ----------------------------<  104<H>  4.4   |  26  |  0.41<L>    Ca    7.9<L>      18 Jan 2018 06:31    PT/INR - ( 19 Jan 2018 07:45 )   PT: 15.8 sec;   INR: 1.45 ratio

## 2018-01-20 NOTE — PROGRESS NOTE ADULT - ASSESSMENT
87 yo F with PMH of spinal stenosis, HTN, chronic compression fracture of spine, CLL, HTN, lymphedema, monoclonal gammopathy, paroxysmal a-fib, vaginal prolapse, venous insufficiency, p/w mechanical fall with L fem shaft fracture s/p IMN     *L femoral shaft fracture s/p mechanical fall s/p L Femur IMN 1/15.   - Ortho consult appreciated  - Pain control  - PT/NWB LLE  - pain control  - for [facility] likely on Monday    # Acute on chronic anemia. Acute blood loss anemia 2/2 post op.   - HH stable.    # Urinary retention- resolved  - straight cath prn.   - pain control    # Constipation  - colace, senna, miralax. suppository.  - XR abd no obstruction    # Hypotension- resolved  - BB w parameter  - monitor    # LE edema + ulcers  -U/S neg for DVT  -aldactone held  -wound care recs aopprecioated    # Hyponatremia  - monitor    # Spinal stenosis / HTN / monoclonal gammopathy  -Outpatient management     *Dispo- to [facility] on Monday if bed is available

## 2018-01-20 NOTE — PROGRESS NOTE ADULT - SUBJECTIVE AND OBJECTIVE BOX
Patient is a 86y old  Female who presents with a chief complaint of fall (17 Jan 2018 06:41)      HPI:  85 yo F with PMH of spinal stenosis, HTN, chronic compression fracture of spine, CLL, HTN, lymphedema monoclonal gammopathy, paroxysmal a-fib, vaginal prolapse, venous insufficiency, p/w mechanical fall. Patient was discharged from rehab yesterday, and was at home for 2 hours when she fell. She had a mechanical fall when transferring from bed to commode. Found to have L femoral shaft fracture. Denies h/o prolonged intubation or complications with anesthesia previously.     PSH: History of vaginal surgery, S/P kyphoplasty  2014, S/P thyroidectomy  partial   1975    Family Hx: sister - cva / cancer, father - cva    Social hx: former smoker, denies etoh / drugs, lives at home (13 Jan 2018 05:14)      PAST MEDICAL & SURGICAL HISTORY:  [place] (hard of hearing), bilateral  Vaginal prolapse  Spondyloarthritis  Spinal stenosis  Essential hypertension  Acute cystitis without hematuria: septic  4/2016  Paroxysmal atrial fibrillation  Monoclonal gammopathies  Venous insufficiency  Lymphedema of both lower extremities  History of vaginal surgery  S/P thyroidectomy: partial   1975  S/P kyphoplasty: 2014        MEDICATIONS  (STANDING):  ATENolol  Tablet 12.5 milliGRAM(s) Oral two times a day  cholecalciferol 2000 Unit(s) Oral daily  docusate sodium 100 milliGRAM(s) Oral three times a day  enoxaparin Injectable 40 milliGRAM(s) SubCutaneous every 24 hours  lidocaine   Patch 1 Patch Transdermal daily  multivitamin 1 Tablet(s) Oral daily  polyethylene glycol 3350 17 Gram(s) Oral daily  sertraline 50 milliGRAM(s) Oral daily  warfarin 5 milliGRAM(s) Oral daily    MEDICATIONS  (PRN):  acetaminophen   Tablet 650 milliGRAM(s) Oral every 6 hours PRN For Temp greater than 38 C (100.4 F)  bisacodyl Suppository 10 milliGRAM(s) Rectal daily PRN Constipation  diphenhydrAMINE   Capsule 25 milliGRAM(s) Oral at bedtime PRN Insomnia  HYDROmorphone  Injectable 1 milliGRAM(s) IV Push every 4 hours PRN Severe Pain (7 - 10)  oxyCODONE    IR 5 milliGRAM(s) Oral every 6 hours PRN Moderate Pain (4 - 6)          Vital Signs Last 24 Hrs  T(C): 36.3 (20 Jan 2018 05:41), Max: 37.1 (19 Jan 2018 11:36)  T(F): 97.4 (20 Jan 2018 05:41), Max: 98.8 (19 Jan 2018 11:36)  HR: 68 (20 Jan 2018 05:41) (66 - 77)  BP: 115/62 (20 Jan 2018 05:41) (115/62 - 132/57)  BP(mean): --  RR: 18 (20 Jan 2018 05:41) (16 - 18)  SpO2: 98% (20 Jan 2018 05:41) (98% - 99%)    I&O's Summary    18 Jan 2018 07:01  -  19 Jan 2018 07:00  --------------------------------------------------------  IN: 60 mL / OUT: 200 mL / NET: -140 mL    19 Jan 2018 07:01  -  20 Jan 2018 06:51  --------------------------------------------------------  IN: 300 mL / OUT: 250 mL / NET: 50 mL        PHYSICAL EXAM  General Appearance:comfortable  HEENT:   Neck:   Back:   Lungs: clear   Heart: 2/6 syst murmur LLSB  Abdomen:   Extremities: 1+ edema right leg  Skin:   Neurologic:       INTERPRETATION OF TELEMETRY:    ECG:        LABS:                          9.0    9.6   )-----------( 140      ( 19 Jan 2018 07:45 )             27.9                   PT/INR - ( 19 Jan 2018 07:45 )   PT: 15.8 sec;   INR: 1.45 ratio                 IMPRESSION:  1. s/p IM nail for left midshaft  femur fracture.  2.Atrial fibrillation, long term persistent. Stable.   3.Hypertension. BP is stable. .   4.Depressive disorder.  5.Chronic venous insufficiency  6.Anemia  7. constipation  PLAN:dayAgree with Lovenox with transition to warfarin. Continue atenolol, sertraline. Follow hemoglobin and hematocrit and transfuse as needed. Hold spironolactone until she becomes hypertensive.   [place] with d/c planning

## 2018-01-20 NOTE — PROGRESS NOTE ADULT - ASSESSMENT
This is a 86 year old female [facility] Found to have [facility] femoral shaft fracture. Pt s/p Antegrade intramedullary nailing for fracture of femoral shaft on 1-15-18.  Pt has hx of A. Fib on coumadin.  [id number] Score: 5 Pt has high thrombosis risk and require anticoagulation treatment dose.    Plan discussed with pt's daughter's and pt.     Plan:  INR  1.45----->1.92m dec Coumadin to 3mg PO daily  adjust dose per INR  cont Lovenox 40mg sq daily til inr is 2.0 or greater.  Daily PT/INR (pending)  Daily CBC/BMP  Enc ambulation  Venodynes

## 2018-01-21 LAB
INR BLD: 1.94 RATIO — HIGH (ref 0.88–1.16)
PROTHROM AB SERPL-ACNC: 21.2 SEC — HIGH (ref 9.8–12.7)

## 2018-01-21 RX ORDER — OXYCODONE HYDROCHLORIDE 5 MG/1
5 TABLET ORAL EVERY 4 HOURS
Qty: 0 | Refills: 0 | Status: DISCONTINUED | OUTPATIENT
Start: 2018-01-21 | End: 2018-01-25

## 2018-01-21 RX ORDER — WARFARIN SODIUM 2.5 MG/1
4 TABLET ORAL DAILY
Qty: 0 | Refills: 0 | Status: COMPLETED | OUTPATIENT
Start: 2018-01-21 | End: 2018-01-23

## 2018-01-21 RX ORDER — ALPRAZOLAM 0.25 MG
0.12 TABLET ORAL EVERY 8 HOURS
Qty: 0 | Refills: 0 | Status: DISCONTINUED | OUTPATIENT
Start: 2018-01-21 | End: 2018-01-25

## 2018-01-21 RX ORDER — OXYCODONE HYDROCHLORIDE 5 MG/1
2.5 TABLET ORAL EVERY 4 HOURS
Qty: 0 | Refills: 0 | Status: DISCONTINUED | OUTPATIENT
Start: 2018-01-21 | End: 2018-01-25

## 2018-01-21 RX ADMIN — Medication 1 TABLET(S): at 15:33

## 2018-01-21 RX ADMIN — ATENOLOL 12.5 MILLIGRAM(S): 25 TABLET ORAL at 05:31

## 2018-01-21 RX ADMIN — SERTRALINE 50 MILLIGRAM(S): 25 TABLET, FILM COATED ORAL at 15:34

## 2018-01-21 RX ADMIN — Medication 2000 UNIT(S): at 15:34

## 2018-01-21 RX ADMIN — WARFARIN SODIUM 4 MILLIGRAM(S): 2.5 TABLET ORAL at 21:11

## 2018-01-21 RX ADMIN — OXYCODONE HYDROCHLORIDE 5 MILLIGRAM(S): 5 TABLET ORAL at 21:11

## 2018-01-21 RX ADMIN — ATENOLOL 12.5 MILLIGRAM(S): 25 TABLET ORAL at 18:53

## 2018-01-21 RX ADMIN — ENOXAPARIN SODIUM 40 MILLIGRAM(S): 100 INJECTION SUBCUTANEOUS at 05:31

## 2018-01-21 RX ADMIN — LIDOCAINE 1 PATCH: 4 CREAM TOPICAL at 12:07

## 2018-01-21 NOTE — PROGRESS NOTE ADULT - SUBJECTIVE AND OBJECTIVE BOX
Patient is a 86y old  Female who presents with a chief complaint of fall (17 Jan 2018 06:41)      HPI:  87 yo F with PMH of spinal stenosis, HTN, chronic compression fracture of spine, CLL, HTN, lymphedema monoclonal gammopathy, paroxysmal a-fib, vaginal prolapse, venous insufficiency, p/w mechanical fall. Patient was discharged from rehab yesterday, and was at home for 2 hours when she fell. She had a mechanical fall when transferring from bed to commode. Found to have L femoral shaft fracture. Denies h/o prolonged intubation or complications with anesthesia previously.     PSH: History of vaginal surgery, S/P kyphoplasty  2014, S/P thyroidectomy  partial   1975    Family Hx: sister - cva / cancer, father - cva    Social hx: former smoker, denies etoh / drugs, lives at home (13 Jan 2018 05:14)      PAST MEDICAL & SURGICAL HISTORY:  [place] (hard of hearing), bilateral  Vaginal prolapse  Spondyloarthritis  Spinal stenosis  Essential hypertension  Acute cystitis without hematuria: septic  4/2016  Paroxysmal atrial fibrillation  Monoclonal gammopathies  Venous insufficiency  Lymphedema of both lower extremities  History of vaginal surgery  S/P thyroidectomy: partial   1975  S/P kyphoplasty: 2014        MEDICATIONS  (STANDING):  ATENolol  Tablet 12.5 milliGRAM(s) Oral two times a day  cholecalciferol 2000 Unit(s) Oral daily  docusate sodium 100 milliGRAM(s) Oral three times a day  enoxaparin Injectable 40 milliGRAM(s) SubCutaneous every 24 hours  lidocaine   Patch 1 Patch Transdermal daily  multivitamin 1 Tablet(s) Oral daily  polyethylene glycol 3350 17 Gram(s) Oral daily  sertraline 50 milliGRAM(s) Oral daily  warfarin 3 milliGRAM(s) Oral daily    MEDICATIONS  (PRN):  acetaminophen   Tablet 650 milliGRAM(s) Oral every 6 hours PRN For Temp greater than 38 C (100.4 F)  bisacodyl Suppository 10 milliGRAM(s) Rectal daily PRN Constipation  diphenhydrAMINE   Capsule 25 milliGRAM(s) Oral at bedtime PRN Insomnia  HYDROmorphone  Injectable 1 milliGRAM(s) IV Push every 4 hours PRN Severe Pain (7 - 10)  oxyCODONE    IR 5 milliGRAM(s) Oral every 6 hours PRN Moderate Pain (4 - 6)          Vital Signs Last 24 Hrs  T(C): 36.4 (21 Jan 2018 05:13), Max: 37 (20 Jan 2018 11:52)  T(F): 97.5 (21 Jan 2018 05:13), Max: 98.6 (20 Jan 2018 11:52)  HR: 64 (21 Jan 2018 05:13) (64 - 72)  BP: 120/65 (21 Jan 2018 05:13) (109/59 - 120/65)  BP(mean): --  RR: 16 (21 Jan 2018 05:13) (16 - 17)  SpO2: 100% (21 Jan 2018 05:13) (99% - 100%)    I&O's Summary      PHYSICAL EXAM  General Appearance:comfortable  HEENT:   Neck:   Back:   Lungs: clear  Heart: 2/6 syst mumur LLSB  Abdomen: soft   Extremities: 2+ edema  Skin:   Neurologic:       INTERPRETATION OF TELEMETRY:        IMPRESSION:  1. s/p IM nail for left midshaft  femur fracture.  2.Atrial fibrillation, long term persistent. Stable.   3.Hypertension. BP is stable. .   4.Depressive disorder.  5.Chronic venous insufficiency  6.Anemia  7. constipation  PLAN:dayAgree with Lovenox with transition to warfarin. Continue atenolol, sertraline. Follow hemoglobin and hematocrit and transfuse as needed. Hold spironolactone until she becomes hypertensive.   Agre with d/c planning

## 2018-01-21 NOTE — PROGRESS NOTE ADULT - ASSESSMENT
85 yo F with PMH of spinal stenosis, HTN, chronic compression fracture of spine, CLL, HTN, lymphedema, monoclonal gammopathy, paroxysmal a-fib, vaginal prolapse, venous insufficiency, p/w mechanical fall with L fem shaft fracture s/p IMN     *L femoral shaft fracture s/p mechanical fall s/p L Femur IMN 1/15.   - Ortho consult appreciated  - Pain control  - PT/NWB LLE  - pain control  - for [facility] likely tomorrow    # Acute on chronic anemia. Acute blood loss anemia 2/2 post op.   - HH stable.    # Urinary retention- resolved  - straight cath prn.   - pain control    # Constipation  - colace, senna, miralax. suppository.  - XR abd no obstruction    #Hypotension- resolved  - BB w parameter  - monitor    #LE edema + ulcers  -U/S neg for DVT  -wound care recs aopprecioated    #Hyponatremia  - monitor    #Spinal stenosis / HTN / monoclonal gammopathy  -Outpatient management     #Dispo- to [facility] in am

## 2018-01-21 NOTE — PROGRESS NOTE ADULT - SUBJECTIVE AND OBJECTIVE BOX
CHIEF COMPLAINT: fall, femur fracture    SUBJECTIVE: pts is in the chair, talking on the phone, comfortable    REVIEW OF SYSTEMS: All other review of systems is negative unless indicated above    Vital Signs Last 24 Hrs  T(C): 36.4 (21 Jan 2018 12:00), Max: 36.4 (21 Jan 2018 05:13)  T(F): 97.5 (21 Jan 2018 12:00), Max: 97.5 (21 Jan 2018 05:13)  HR: 57 (21 Jan 2018 12:00) (57 - 72)  BP: 105/49 (21 Jan 2018 12:00) (105/49 - 120/65)  BP(mean): --  RR: 16 (21 Jan 2018 12:00) (16 - 16)  SpO2: 99% (21 Jan 2018 12:00) (99% - 100%)    PHYSICAL EXAM:  Constitutional: frail, lethargic appearing elderly female in NAD.  HEENT: EOMI, Normal Hearing, MMM  Neck: Soft and supple, No JVD  Respiratory: Breath sounds are clear bilaterally, No wheezing, rales or rhonchi  Cardiovascular: S1 and S2, regular rate and rhythm, no Murmurs, gallops or rubs  Gastrointestinal: +BS, nd, nt  Extremities: LLE in immobilizer, Left foot edematous.   Vascular: 2+ peripheral pulses  Neurological: A/O x 3, no focal deficits  Musculoskeletal: 5/5 strength b/l upper and lower extremities  Skin: No rashes    MEDICATIONS  (STANDING):  ATENolol  Tablet 12.5 milliGRAM(s) Oral two times a day  cholecalciferol 2000 Unit(s) Oral daily  docusate sodium 100 milliGRAM(s) Oral three times a day  enoxaparin Injectable 40 milliGRAM(s) SubCutaneous every 24 hours  lidocaine   Patch 1 Patch Transdermal daily  multivitamin 1 Tablet(s) Oral daily  polyethylene glycol 3350 17 Gram(s) Oral daily  sertraline 50 milliGRAM(s) Oral daily  warfarin 5 milliGRAM(s) Oral daily    LABS:                        9.0    9.6   )-----------( 140      ( 19 Jan 2018 07:45 )             27.9     132<L>  |  100  |  12  ----------------------------<  104<H>  4.4   |  26  |  0.41<L>    Ca    7.9<L>      18 Jan 2018 06:31    PT/INR - ( 19 Jan 2018 07:45 )   PT: 15.8 sec;   INR: 1.45 ratio

## 2018-01-22 LAB
ANION GAP SERPL CALC-SCNC: 7 MMOL/L — SIGNIFICANT CHANGE UP (ref 5–17)
BUN SERPL-MCNC: 11 MG/DL — SIGNIFICANT CHANGE UP (ref 7–23)
CALCIUM SERPL-MCNC: 7.9 MG/DL — LOW (ref 8.5–10.1)
CHLORIDE SERPL-SCNC: 104 MMOL/L — SIGNIFICANT CHANGE UP (ref 96–108)
CO2 SERPL-SCNC: 26 MMOL/L — SIGNIFICANT CHANGE UP (ref 22–31)
CREAT SERPL-MCNC: 0.41 MG/DL — LOW (ref 0.5–1.3)
GLUCOSE SERPL-MCNC: 85 MG/DL — SIGNIFICANT CHANGE UP (ref 70–99)
HCT VFR BLD CALC: 30.2 % — LOW (ref 34.5–45)
HGB BLD-MCNC: 9.5 G/DL — LOW (ref 11.5–15.5)
INR BLD: 2.29 RATIO — HIGH (ref 0.88–1.16)
MCHC RBC-ENTMCNC: 30.4 PG — SIGNIFICANT CHANGE UP (ref 27–34)
MCHC RBC-ENTMCNC: 31.4 GM/DL — LOW (ref 32–36)
MCV RBC AUTO: 97 FL — SIGNIFICANT CHANGE UP (ref 80–100)
PLATELET # BLD AUTO: 176 K/UL — SIGNIFICANT CHANGE UP (ref 150–400)
POTASSIUM SERPL-MCNC: 4 MMOL/L — SIGNIFICANT CHANGE UP (ref 3.5–5.3)
POTASSIUM SERPL-SCNC: 4 MMOL/L — SIGNIFICANT CHANGE UP (ref 3.5–5.3)
PROTHROM AB SERPL-ACNC: 25.1 SEC — HIGH (ref 9.8–12.7)
RBC # BLD: 3.11 M/UL — LOW (ref 3.8–5.2)
RBC # FLD: 14.4 % — SIGNIFICANT CHANGE UP (ref 10.3–14.5)
SODIUM SERPL-SCNC: 137 MMOL/L — SIGNIFICANT CHANGE UP (ref 135–145)
WBC # BLD: 5.6 K/UL — SIGNIFICANT CHANGE UP (ref 3.8–10.5)
WBC # FLD AUTO: 5.6 K/UL — SIGNIFICANT CHANGE UP (ref 3.8–10.5)

## 2018-01-22 PROCEDURE — 99233 SBSQ HOSP IP/OBS HIGH 50: CPT

## 2018-01-22 RX ORDER — SERTRALINE 25 MG/1
1 TABLET, FILM COATED ORAL
Qty: 0 | Refills: 0 | DISCHARGE
Start: 2018-01-22

## 2018-01-22 RX ORDER — OXYCODONE HYDROCHLORIDE 5 MG/1
1 TABLET ORAL
Qty: 0 | Refills: 0 | DISCHARGE
Start: 2018-01-22

## 2018-01-22 RX ORDER — ESTRADIOL 4 UG/1
0 INSERT VAGINAL
Qty: 0 | Refills: 0 | COMMUNITY

## 2018-01-22 RX ORDER — CHOLECALCIFEROL (VITAMIN D3) 125 MCG
1 CAPSULE ORAL
Qty: 0 | Refills: 0 | COMMUNITY

## 2018-01-22 RX ORDER — SERTRALINE 25 MG/1
1 TABLET, FILM COATED ORAL
Qty: 0 | Refills: 0 | COMMUNITY

## 2018-01-22 RX ORDER — WARFARIN SODIUM 2.5 MG/1
1 TABLET ORAL
Qty: 0 | Refills: 0 | COMMUNITY

## 2018-01-22 RX ORDER — DOCUSATE SODIUM 100 MG
1 CAPSULE ORAL
Qty: 0 | Refills: 0 | DISCHARGE
Start: 2018-01-22

## 2018-01-22 RX ORDER — WARFARIN SODIUM 2.5 MG/1
1 TABLET ORAL
Qty: 0 | Refills: 0 | COMMUNITY
Start: 2018-01-22

## 2018-01-22 RX ORDER — LIDOCAINE 4 G/100G
1 CREAM TOPICAL
Qty: 0 | Refills: 0 | DISCHARGE
Start: 2018-01-22

## 2018-01-22 RX ORDER — ALPRAZOLAM 0.25 MG
0.12 TABLET ORAL
Qty: 0 | Refills: 0 | DISCHARGE
Start: 2018-01-22

## 2018-01-22 RX ORDER — ACETAMINOPHEN 500 MG
2 TABLET ORAL
Qty: 0 | Refills: 0 | DISCHARGE
Start: 2018-01-22

## 2018-01-22 RX ORDER — CHOLECALCIFEROL (VITAMIN D3) 125 MCG
2000 CAPSULE ORAL
Qty: 0 | Refills: 0 | DISCHARGE
Start: 2018-01-22

## 2018-01-22 RX ORDER — OXYCODONE HYDROCHLORIDE 5 MG/1
2.5 TABLET ORAL
Qty: 0 | Refills: 0 | DISCHARGE
Start: 2018-01-22

## 2018-01-22 RX ADMIN — ATENOLOL 12.5 MILLIGRAM(S): 25 TABLET ORAL at 17:57

## 2018-01-22 RX ADMIN — Medication 100 MILLIGRAM(S): at 13:58

## 2018-01-22 RX ADMIN — LIDOCAINE 1 PATCH: 4 CREAM TOPICAL at 01:08

## 2018-01-22 RX ADMIN — OXYCODONE HYDROCHLORIDE 5 MILLIGRAM(S): 5 TABLET ORAL at 21:45

## 2018-01-22 RX ADMIN — LIDOCAINE 1 PATCH: 4 CREAM TOPICAL at 11:22

## 2018-01-22 RX ADMIN — ATENOLOL 12.5 MILLIGRAM(S): 25 TABLET ORAL at 05:00

## 2018-01-22 RX ADMIN — ENOXAPARIN SODIUM 40 MILLIGRAM(S): 100 INJECTION SUBCUTANEOUS at 06:22

## 2018-01-22 RX ADMIN — Medication 2000 UNIT(S): at 11:22

## 2018-01-22 RX ADMIN — WARFARIN SODIUM 4 MILLIGRAM(S): 2.5 TABLET ORAL at 21:45

## 2018-01-22 RX ADMIN — Medication 30 MILLILITER(S): at 17:57

## 2018-01-22 RX ADMIN — Medication 1 TABLET(S): at 11:23

## 2018-01-22 RX ADMIN — Medication 100 MILLIGRAM(S): at 21:45

## 2018-01-22 RX ADMIN — OXYCODONE HYDROCHLORIDE 5 MILLIGRAM(S): 5 TABLET ORAL at 06:07

## 2018-01-22 RX ADMIN — SERTRALINE 50 MILLIGRAM(S): 25 TABLET, FILM COATED ORAL at 11:23

## 2018-01-22 NOTE — PROGRESS NOTE ADULT - SUBJECTIVE AND OBJECTIVE BOX
HPI:  85 yo F with PMH of spinal stenosis, HTN, chronic compression fracture of spine, CLL, HTN, lymphedema monoclonal gammopathy, paroxysmal a-fib, vaginal prolapse, venous insufficiency, p/w mechanical fall. Patient was discharged from rehab yesterday, and was at home for 2 hours when she fell. She had a mechanical fall when transferring from bed to commode. Found to have L femoral shaft fracture. Denies h/o prolonged intubation or complications with anesthesia previously.     PSH: History of vaginal surgery, S/P kyphoplasty  2014, S/P thyroidectomy  partial   1975    Family Hx: sister - cva / cancer, father - cva    Social hx: former smoker, denies etoh / drugs, lives at home (13 Jan 2018 05:14)      Patient is a 86y old  Female who presents with a chief complaint of fall (13 Jan 2018 05:14)  s/p fx left Femur  s/p IMN on 1-16-18    Consulted by Dr. Magno Kitchen for VTE prophylaxis, risk stratification, and anticoagulation management.    PAST MEDICAL & SURGICAL HISTORY:  [place] (hard of hearing), bilateral  Vaginal prolapse  Spondyloarthritis  Spinal stenosis  Essential hypertension  Acute cystitis without hematuria: septic  4/2016  Paroxysmal atrial fibrillation  Monoclonal gammopathies  Venous insufficiency  Lymphedema of both lower extremities  History of vaginal surgery  S/P thyroidectomy: partial   1975  S/P kyphoplasty: 2014      [place] VTE Risk Score:10    [id number] Score: 5    IMPROVE Bleeding Risk Score: 3.5    Falls Risk:   High (x )  Mod (  )  Low (  )    EBL:  250 ml  crcl: 59.93  1-16-18 Pt seen at bedside wit daughter present. concerned about her mother's low B/P, RN called hospitalised to speak with her.  Discussed her anticoagulation coumadin over lapping with Lovenox and this daughter called her sister in order to get pt's normal coumadin dose.  The daughter informed me she used to take 3mg 4 times a week and 4 mg the other days before she became ill and was in a rehab facility.  Questions answered Benefits and risks discussed will reinforce as needed.   1/17: seen at bedside with dtr present, questions answered  1/18/19: seen at bedside, discussed with dtr anticoagulation, dtr is very anxious, concerned about INR being so low, reassured dtr that pt is on Lovenox as well  1/19/19; seen at bedside, awaiting rehab  1/20/19: seen at bedside no concerns  1-22-18 pt seen at bedside on 5S physical therapy with pt getting pt oob.  Discussed her anticoagulation with coumadin.  Pt awaiting transfer to rehab.                      FAMILY HISTORY:  No pertinent family history in first degree relatives    Denies any personal or familial history of clotting or bleeding disorders.    Allergies    No Known Allergies    Intolerances        REVIEW OF SYSTEMS    (  )Fever	     (  )Constipation	(  )SOB				(  )Headache	(  )Dysuria  (  )Chills	     (  )Melena	(  )Dyspnea present on exertion	                    (  )Dizziness                    (  )Polyuria  (  )Nausea	     (  )Hematochezia	(  )Cough			                    (  )Syncope   	(  )Hematuria  (  )Vomiting    (  )Chest Pain	(  )Wheezing			(  )Weakness  (  )Diarrhea     (  )Palpitations	(  )Anorexia			(  )Myalgia    All other review of systems negative: Yes      PHYSICAL EXAM:    Constitutional: Appears Well    Neurological: A& O x 3    Skin: Warm    Respiratory and Thorax: normal effort; Breath sounds: normal; No rales/wheezing/rhonchi  	  Cardiovascular: S1, S2, regular, NMBR	    Gastrointestinal: BS + x 4Q, nontender	    Genitourinary:  Bladder nondistended, nontender    Musculoskeletal:   General Right:   no muscle/joint tenderness,   normal tone, no joint swelling,   ROM: limited	    General Left:   + muscle/joint tenderness,   normal tone, no joint swelling,   ROM: limited    Hip: Left: Dressing CDI; long leg immobilizer noted                            9.5    5.6   )-----------( 176      ( 22 Jan 2018 07:20 )             30.2       01-22    137  |  104  |  11  ----------------------------<  85  4.0   |  26  |  0.41<L>    Ca    7.9<L>      22 Jan 2018 07:20         Lower extrems:   Right: no calf tenderness              negative christopher's sign               + pedal pulses    Left:   no calf tenderness              negative christopher's sign               + pedal pulses              + edema noted 2+ with ecchymosis noted to distal le.                        9.2    7.0   )-----------( 135      ( 20 Jan 2018 06:16 )             28.6                               9.0    9.6   )-----------( 140      ( 19 Jan 2018 07:45 )             27.9       01-18    132<L>  |  100  |  12  ----------------------------<  104<H>  4.4   |  26  |  0.41<L>    Ca    7.9<L>      18 Jan 2018 06:31                              9.2    13.7  )-----------( 108      ( 18 Jan 2018 06:31 )             27.3       01-18    132<L>  |  100  |  12  ----------------------------<  104<H>  4.4   |  26  |  0.41<L>    Ca    7.9<L>      18 Jan 2018 06:31                              8.1    12.0  )-----------( 115      ( 17 Jan 2018 07:22 )             24.2       01-17    135  |  102  |  15  ----------------------------<  97  4.3   |  26  |  0.50    Ca    7.4<L>      17 Jan 2018 07:22    PT/INR - ( 22 Jan 2018 07:20 )   PT: 25.1 sec;   INR: 2.29 ratio     PT/INR - ( 20 Jan 2018 06:16 )   PT: 21.0 sec;   INR: 1.92 ratio      PT/INR - ( 19 Jan 2018 07:45 )   PT: 15.8 sec;   INR: 1.45 ratio       T/INR - ( 18 Jan 2018 06:31 )   PT: 14.4 sec;   INR: 1.33 ratio       PT/INR - ( 17 Jan 2018 07:22 )   PT: 16.5 sec;   INR: 1.51 ratio    PT/INR - ( 15 Pancho 2018 04:45 )   PT: 13.5 sec;   INR: 1.24 ratio        Vital Signs Last 24 Hrs  T(C): 36.7 (01-21-18 @ 21:00), Max: 36.7 (01-21-18 @ 21:00)  T(F): 98 (01-21-18 @ 21:00), Max: 98 (01-21-18 @ 21:00)  HR: 63 (01-21-18 @ 21:00) (57 - 63)  BP: 119/46 (01-21-18 @ 21:00) (105/49 - 119/46)  BP(mean): --  RR: 16 (01-21-18 @ 21:00) (16 - 16)  SpO2: 98% (01-21-18 @ 21:00) (98% - 99%)    DVT Prophylaxis:  LMWH                   ( x )  Heparin SQ           (  )  Coumadin             ( x )  Xarelto                  (  )  Eliquis                   (  )  Venodynes           (x )  Ambulation          ( x )  UFH                       (  )  Contraindicated  (  )

## 2018-01-22 NOTE — PROGRESS NOTE ADULT - SUBJECTIVE AND OBJECTIVE BOX
Patient is a 86y old  Female who presents with a chief complaint of fall (17 Jan 2018 06:41)      HPI:  85 yo F with PMH of spinal stenosis, HTN, chronic compression fracture of spine, CLL, HTN, lymphedema monoclonal gammopathy, paroxysmal a-fib, vaginal prolapse, venous insufficiency, p/w mechanical fall. Patient was discharged from rehab yesterday, and was at home for 2 hours when she fell. She had a mechanical fall when transferring from bed to commode. Found to have L femoral shaft fracture. Denies h/o prolonged intubation or complications with anesthesia previously.   1/22- no new complaints  PSH: History of vaginal surgery, S/P kyphoplasty  2014, S/P thyroidectomy  partial   1975    Family Hx: sister - cva / cancer, father - cva    Social hx: former smoker, denies etoh / drugs, lives at home (13 Jan 2018 05:14)      PAST MEDICAL & SURGICAL HISTORY:  [place] (hard of hearing), bilateral  Vaginal prolapse  Spondyloarthritis  Spinal stenosis  Essential hypertension  Acute cystitis without hematuria: septic  4/2016  Paroxysmal atrial fibrillation  Monoclonal gammopathies  Venous insufficiency  Lymphedema of both lower extremities  History of vaginal surgery  S/P thyroidectomy: partial   1975  S/P kyphoplasty: 2014        MEDICATIONS  (STANDING):  ATENolol  Tablet 12.5 milliGRAM(s) Oral two times a day  cholecalciferol 2000 Unit(s) Oral daily  docusate sodium 100 milliGRAM(s) Oral three times a day  enoxaparin Injectable 40 milliGRAM(s) SubCutaneous every 24 hours  lidocaine   Patch 1 Patch Transdermal daily  multivitamin 1 Tablet(s) Oral daily  polyethylene glycol 3350 17 Gram(s) Oral daily  sertraline 50 milliGRAM(s) Oral daily  warfarin 4 milliGRAM(s) Oral daily    MEDICATIONS  (PRN):  acetaminophen   Tablet 650 milliGRAM(s) Oral every 6 hours PRN For Temp greater than 38 C (100.4 F)  ALPRAZolam 0.125 milliGRAM(s) Oral every 8 hours PRN anxiety  bisacodyl Suppository 10 milliGRAM(s) Rectal daily PRN Constipation  diphenhydrAMINE   Capsule 25 milliGRAM(s) Oral at bedtime PRN Insomnia  oxyCODONE    IR 2.5 milliGRAM(s) Oral every 4 hours PRN Moderate Pain (4 - 6)  oxyCODONE    IR 5 milliGRAM(s) Oral every 4 hours PRN Severe Pain (7 - 10)          Vital Signs Last 24 Hrs  T(C): 36.7 (21 Jan 2018 21:00), Max: 36.7 (21 Jan 2018 21:00)  T(F): 98 (21 Jan 2018 21:00), Max: 98 (21 Jan 2018 21:00)  HR: 63 (21 Jan 2018 21:00) (57 - 63)  BP: 119/46 (21 Jan 2018 21:00) (105/49 - 119/46)  BP(mean): --  RR: 16 (21 Jan 2018 21:00) (16 - 16)  SpO2: 98% (21 Jan 2018 21:00) (98% - 99%)    I&O's Summary      PHYSICAL EXAM  General Appearance:comfortable  HEENT:   Neck:   Back:   Lungs: clear  Heart: 2/6 syst mumur LLSB  Abdomen: soft   Extremities: 2+ edema  Skin:   Neurologic:     INTERPRETATION OF TELEMETRY:    ECG:        LABS:                    PT/INR - ( 21 Jan 2018 07:37 )   PT: 21.2 sec;   INR: 1.94 ratio               IMPRESSION:  1. s/p IM nail for left midshaft  femur fracture.  2.Atrial fibrillation, long term persistent. Stable.   3.Hypertension. BP is stable. .   4.Depressive disorder.  5.Chronic venous insufficiency  6.Anemia  7. constipation  PLAN:dayAgree with Lovenox with transition to warfarin. Continue atenolol, sertraline. Follow hemoglobin and hematocrit and transfuse as needed. Hold spironolactone until she becomes hypertensive.   Agre with d/c planning

## 2018-01-22 NOTE — PROGRESS NOTE ADULT - SUBJECTIVE AND OBJECTIVE BOX
CHIEF COMPLAINT: fall, femur fracture    1/22/18-     REVIEW OF SYSTEMS: All other review of systems is negative unless indicated above    Vital Signs Last 24 Hrs  T(C): 36.7 (21 Jan 2018 21:00), Max: 36.7 (21 Jan 2018 21:00)  T(F): 98 (21 Jan 2018 21:00), Max: 98 (21 Jan 2018 21:00)  HR: 63 (21 Jan 2018 21:00) (57 - 63)  BP: 119/46 (21 Jan 2018 21:00) (105/49 - 119/46)  BP(mean): --  RR: 16 (21 Jan 2018 21:00) (16 - 16)  SpO2: 98% (21 Jan 2018 21:00) (98% - 99%)    PHYSICAL EXAM:  Constitutional: frail, lethargic appearing elderly female in NAD.  HEENT: EOMI, Normal Hearing, MMM  Neck: Soft and supple, No JVD  Respiratory: Breath sounds are clear bilaterally, No wheezing, rales or rhonchi  Cardiovascular: S1 and S2, regular rate and rhythm, no Murmurs, gallops or rubs  Gastrointestinal: +BS, nd, nt  Extremities: LLE in immobilizer, Left foot edematous.   Vascular: 2+ peripheral pulses  Neurological: A/O x 3, no focal deficits  Skin: No rashes    MEDICATIONS  (STANDING):  ATENolol  Tablet 12.5 milliGRAM(s) Oral two times a day  cholecalciferol 2000 Unit(s) Oral daily  docusate sodium 100 milliGRAM(s) Oral three times a day  enoxaparin Injectable 40 milliGRAM(s) SubCutaneous every 24 hours  lidocaine   Patch 1 Patch Transdermal daily  multivitamin 1 Tablet(s) Oral daily  polyethylene glycol 3350 17 Gram(s) Oral daily  sertraline 50 milliGRAM(s) Oral daily  warfarin 5 milliGRAM(s) Oral daily    LABS:                        9.5    5.6   )-----------( 176      ( 22 Jan 2018 07:20 )             30.2     22 Jan 2018 07:20    137    |  104    |  11     ----------------------------<  85     4.0     |  26     |  0.41     Ca    7.9        22 Jan 2018 07:20  PT/INR - ( 22 Jan 2018 07:20 )   PT: 25.1 sec;   INR: 2.29 ratio      Assessment/Plan:  87 yo F with PMH of spinal stenosis, HTN, chronic compression fracture of spine, CLL, HTN, lymphedema, monoclonal gammopathy, paroxysmal a-fib, vaginal prolapse, venous insufficiency, p/w mechanical fall with L fem shaft fracture s/p IMN     *L femoral shaft fracture s/p mechanical fall s/p L Femur IMN 1/15.   - Ortho f/u appreciated  - Pain control  - PT/NWB LLE  - pain control  - stable for discharge to [facility]    # Acute on chronic anemia. Acute blood loss anemia 2/2 post op.   - HH stable for discharge    # Urinary retention- resolved  - straight cath prn.   - pain control    # Constipation  - colace, senna, miralax. suppository.  - XR abd no obstruction    #Hypotension- resolved  - BB w parameter  - monitor    #LE edema + ulcers  -U/S neg for DVT  -wound care recs appreciated    #Hyponatremia  - monitor    #Spinal stenosis / HTN / monoclonal gammopathy  -Outpatient management     #Dispo- for [facility] today CHIEF COMPLAINT: fall, femur fracture    1/22/18- doing good, no new events overnight    REVIEW OF SYSTEMS: All other review of systems is negative unless indicated above    Vital Signs Last 24 Hrs  T(C): 36.7 (21 Jan 2018 21:00), Max: 36.7 (21 Jan 2018 21:00)  T(F): 98 (21 Jan 2018 21:00), Max: 98 (21 Jan 2018 21:00)  HR: 63 (21 Jan 2018 21:00) (57 - 63)  BP: 119/46 (21 Jan 2018 21:00) (105/49 - 119/46)  BP(mean): --  RR: 16 (21 Jan 2018 21:00) (16 - 16)  SpO2: 98% (21 Jan 2018 21:00) (98% - 99%)    PHYSICAL EXAM:  Constitutional: frail, lethargic appearing elderly female in NAD.  HEENT: EOMI, Normal Hearing, MMM  Neck: Soft and supple, No JVD  Respiratory: Breath sounds are clear bilaterally, No wheezing, rales or rhonchi  Cardiovascular: S1 and S2, regular rate and rhythm, no Murmurs, gallops or rubs  Gastrointestinal: +BS, nd, nt  Extremities: LLE in immobilizer, Left foot edematous.   Vascular: 2+ peripheral pulses  Neurological: A/O x 3, no focal deficits  Skin: No rashes    MEDICATIONS  (STANDING):  ATENolol  Tablet 12.5 milliGRAM(s) Oral two times a day  cholecalciferol 2000 Unit(s) Oral daily  docusate sodium 100 milliGRAM(s) Oral three times a day  enoxaparin Injectable 40 milliGRAM(s) SubCutaneous every 24 hours  lidocaine   Patch 1 Patch Transdermal daily  multivitamin 1 Tablet(s) Oral daily  polyethylene glycol 3350 17 Gram(s) Oral daily  sertraline 50 milliGRAM(s) Oral daily  warfarin 5 milliGRAM(s) Oral daily    LABS:                        9.5    5.6   )-----------( 176      ( 22 Jan 2018 07:20 )             30.2     22 Jan 2018 07:20    137    |  104    |  11     ----------------------------<  85     4.0     |  26     |  0.41     Ca    7.9        22 Jan 2018 07:20  PT/INR - ( 22 Jan 2018 07:20 )   PT: 25.1 sec;   INR: 2.29 ratio      Assessment/Plan:  87 yo F with PMH of spinal stenosis, HTN, chronic compression fracture of spine, CLL, HTN, lymphedema, monoclonal gammopathy, paroxysmal a-fib, vaginal prolapse, venous insufficiency, p/w mechanical fall with L fem shaft fracture s/p IMN     *L femoral shaft fracture s/p mechanical fall s/p L Femur IMN 1/15.   - Ortho f/u appreciated  - Pain control  - PT/NWB LLE  - pain control  - stable for discharge to [facility]    # Acute on chronic anemia. Acute blood loss anemia 2/2 post op.   - HH stable for discharge    # Urinary retention- resolved  - straight cath prn.   - pain control    # Constipation  - colace, senna, miralax. suppository.  - XR abd no obstruction    #Hypotension- resolved  - BB w parameter  - monitor    #LE edema + ulcers  -U/S neg for DVT  -wound care recs appreciated    #Hyponatremia  - monitor    #Spinal stenosis / HTN / monoclonal gammopathy  -Outpatient management     #Dispo- for [facility] today. Medically stable

## 2018-01-22 NOTE — PROGRESS NOTE ADULT - ASSESSMENT
This is a 86 year old female s/p [facility] Found to have [facility] femoral [facility]. Pt s/p Antegrade intramedullary nailing for fracture of femoral shaft on 1-15-18.  Pt has hx of A. Fib on coumadin.  [id number] Score: 5 Pt has high thrombosis risk and require anticoagulation treatment dose.    Plan discussed with pt's daughter's and pt.     Plan:  INR  2.29 CONT  Coumadin to 4mg PO daily  adjust dose per INR  DC Lovenox .  Daily PT/INR  Daily CBC/BMP  Enc ambulation  Venodynes

## 2018-01-23 LAB
INR BLD: 2.42 RATIO — HIGH (ref 0.88–1.16)
PROTHROM AB SERPL-ACNC: 26.6 SEC — HIGH (ref 9.8–12.7)

## 2018-01-23 PROCEDURE — 99233 SBSQ HOSP IP/OBS HIGH 50: CPT

## 2018-01-23 RX ADMIN — Medication 100 MILLIGRAM(S): at 21:37

## 2018-01-23 RX ADMIN — OXYCODONE HYDROCHLORIDE 2.5 MILLIGRAM(S): 5 TABLET ORAL at 21:37

## 2018-01-23 RX ADMIN — SERTRALINE 50 MILLIGRAM(S): 25 TABLET, FILM COATED ORAL at 11:42

## 2018-01-23 RX ADMIN — Medication 2000 UNIT(S): at 11:40

## 2018-01-23 RX ADMIN — ATENOLOL 12.5 MILLIGRAM(S): 25 TABLET ORAL at 17:47

## 2018-01-23 RX ADMIN — WARFARIN SODIUM 4 MILLIGRAM(S): 2.5 TABLET ORAL at 21:37

## 2018-01-23 RX ADMIN — ATENOLOL 12.5 MILLIGRAM(S): 25 TABLET ORAL at 05:56

## 2018-01-23 RX ADMIN — Medication 100 MILLIGRAM(S): at 13:06

## 2018-01-23 RX ADMIN — LIDOCAINE 1 PATCH: 4 CREAM TOPICAL at 11:40

## 2018-01-23 RX ADMIN — OXYCODONE HYDROCHLORIDE 5 MILLIGRAM(S): 5 TABLET ORAL at 11:43

## 2018-01-23 RX ADMIN — Medication 100 MILLIGRAM(S): at 05:56

## 2018-01-23 RX ADMIN — POLYETHYLENE GLYCOL 3350 17 GRAM(S): 17 POWDER, FOR SOLUTION ORAL at 11:40

## 2018-01-23 RX ADMIN — Medication 1 TABLET(S): at 11:40

## 2018-01-23 NOTE — PROGRESS NOTE ADULT - SUBJECTIVE AND OBJECTIVE BOX
Patient is a 86y old  Female who presents with a chief complaint of fall (17 Jan 2018 06:41)      HPI:  87 yo F with PMH of spinal stenosis, HTN, chronic compression fracture of spine, CLL, HTN, lymphedema monoclonal gammopathy, paroxysmal a-fib, vaginal prolapse, venous insufficiency, p/w mechanical fall. Patient was discharged from rehab yesterday, and was at home for 2 hours when she fell. She had a mechanical fall when transferring from bed to commode. Found to have L femoral shaft fracture. Denies h/o prolonged intubation or complications with anesthesia previously.   1/23- no new complaints  PSH: History of vaginal surgery, S/P kyphoplasty  2014, S/P thyroidectomy  partial   1975    Family Hx: sister - cva / cancer, father - cva    Social hx: former smoker, denies etoh / drugs, lives at home (13 Jan 2018 05:14)      PAST MEDICAL & SURGICAL HISTORY:  [place] [employer] (hard of hearing), bilateral  Vaginal prolapse  Spondyloarthritis  Spinal stenosis  Essential hypertension  Acute cystitis without hematuria: septic  4/2016  Paroxysmal atrial fibrillation  Monoclonal gammopathies  Venous insufficiency  Lymphedema of both lower extremities  History of vaginal surgery  S/P thyroidectomy: partial   1975  S/P kyphoplasty: 2014        MEDICATIONS  (STANDING):  ATENolol  Tablet 12.5 milliGRAM(s) Oral two times a day  cholecalciferol 2000 Unit(s) Oral daily  docusate sodium 100 milliGRAM(s) Oral three times a day  lidocaine   Patch 1 Patch Transdermal daily  multivitamin 1 Tablet(s) Oral daily  polyethylene glycol 3350 17 Gram(s) Oral daily  sertraline 50 milliGRAM(s) Oral daily  warfarin 4 milliGRAM(s) Oral daily    MEDICATIONS  (PRN):  acetaminophen   Tablet 650 milliGRAM(s) Oral every 6 hours PRN For Temp greater than 38 C (100.4 F)  ALPRAZolam 0.125 milliGRAM(s) Oral every 8 hours PRN anxiety  bisacodyl Suppository 10 milliGRAM(s) Rectal daily PRN Constipation  diphenhydrAMINE   Capsule 25 milliGRAM(s) Oral at bedtime PRN Insomnia  oxyCODONE    IR 2.5 milliGRAM(s) Oral every 4 hours PRN Moderate Pain (4 - 6)  oxyCODONE    IR 5 milliGRAM(s) Oral every 4 hours PRN Severe Pain (7 - 10)          Vital Signs Last 24 Hrs  T(C): 36.4 (23 Jan 2018 05:52), Max: 37.2 (22 Jan 2018 21:34)  T(F): 97.6 (23 Jan 2018 05:52), Max: 98.9 (22 Jan 2018 21:34)  HR: 65 (23 Jan 2018 05:52) (58 - 71)  BP: 143/55 (23 Jan 2018 05:52) (127/64 - 143/55)  BP(mean): --  RR: 18 (23 Jan 2018 05:52) (17 - 18)  SpO2: 100% (23 Jan 2018 05:52) (58% - 100%)    I&O's Summary    22 Jan 2018 07:01  -  23 Jan 2018 07:00  --------------------------------------------------------  IN: 340 mL / OUT: 400 mL / NET: -60 mL      PHYSICAL EXAM  General Appearance:comfortable  HEENT:   Neck:   Back:   Lungs: clear  Heart: 2/6 syst mumur LLSB  Abdomen: soft   Extremities: 2+ edema  Skin:   Neurologic:     INTERPRETATION OF TELEMETRY:    ECG:        LABS:                          9.5    5.6   )-----------( 176      ( 22 Jan 2018 07:20 )             30.2     01-22    137  |  104  |  11  ----------------------------<  85  4.0   |  26  |  0.41<L>    Ca    7.9<L>      22 Jan 2018 07:20              PT/INR - ( 22 Jan 2018 07:20 )   PT: 25.1 sec;   INR: 2.29 ratio             IMPRESSION:  1. s/p IM nail for left midshaft  femur fracture.  2.Atrial fibrillation, long term persistent. Stable.   3.Hypertension. BP is stable. .   4.Depressive disorder.  5.Chronic venous insufficiency  6.Anemia  7. constipation  PLAN: Continue  warfarin ,atenolol, sertraline. Follow hemoglobin and hematocrit and transfuse as needed  Agree  with d/c planning          Electronic Signatures:

## 2018-01-23 NOTE — PROGRESS NOTE ADULT - SUBJECTIVE AND OBJECTIVE BOX
HPI:  87 yo F with PMH of spinal stenosis, HTN, chronic compression fracture of spine, CLL, HTN, lymphedema monoclonal gammopathy, paroxysmal a-fib, vaginal prolapse, venous insufficiency, p/w mechanical fall. Patient was discharged from rehab yesterday, and was at home for 2 hours when she fell. She had a mechanical fall when transferring from bed to commode. Found to have L femoral shaft fracture. Denies h/o prolonged intubation or complications with anesthesia previously.     PSH: History of vaginal surgery, S/P kyphoplasty  2014, S/P thyroidectomy  partial   1975    Family Hx: sister - cva / cancer, father - cva    Social hx: former smoker, denies etoh / drugs, lives at home (13 Jan 2018 05:14)      Patient is a 86y old  Female who presents with a chief complaint of fall (13 Jan 2018 05:14)  s/p fx left Femur  s/p IMN on 1-16-18    Consulted by Dr. Magno Kitchen for VTE prophylaxis, risk stratification, and anticoagulation management.    PAST MEDICAL & SURGICAL HISTORY:  [place] (hard of hearing), bilateral  Vaginal prolapse  Spondyloarthritis  Spinal stenosis  Essential hypertension  Acute cystitis without hematuria: septic  4/2016  Paroxysmal atrial fibrillation  Monoclonal gammopathies  Venous insufficiency  Lymphedema of both lower extremities  History of vaginal surgery  S/P thyroidectomy: partial   1975  S/P kyphoplasty: 2014      [place] VTE Risk Score:10    [id number] Score: 5    IMPROVE Bleeding Risk Score: 3.5    Falls Risk:   High (x )  Mod (  )  Low (  )    EBL:  250 ml  crcl: 59.93  1-16-18 Pt seen at bedside wit daughter present. concerned about her mother's low B/P, RN called hospitalised to speak with her.  Discussed her anticoagulation coumadin over lapping with Lovenox and this daughter called her sister in order to get pt's normal coumadin dose.  The daughter informed me she used to take 3mg 4 times a week and 4 mg the other days before she became ill and was in a rehab facility.  Questions answered Benefits and risks discussed will reinforce as needed.   1/17: seen at bedside with dtr present, questions answered  1/18/19: seen at bedside, discussed with dtr anticoagulation, dtr is very anxious, concerned about INR being so low, reassured dtr that pt is on Lovenox as well  1/19/19; seen at bedside, awaiting rehab  1/20/19: seen at bedside no concerns  1-22-18 pt seen at bedside on 5S physical therapy with pt getting pt oob.  Discussed her anticoagulation with coumadin.  Pt awaiting transfer to rehab.   1-23-18 pt seen at bedside oob in chair.  No changes.                   FAMILY HISTORY:  No pertinent family history in first degree relatives    Denies any personal or familial history of clotting or bleeding disorders.    Allergies    No Known Allergies    Intolerances        REVIEW OF SYSTEMS    (  )Fever	     (  )Constipation	(  )SOB				(  )Headache	(  )Dysuria  (  )Chills	     (  )Melena	(  )Dyspnea present on exertion	                    (  )Dizziness                    (  )Polyuria  (  )Nausea	     (  )Hematochezia	(  )Cough			                    (  )Syncope   	(  )Hematuria  (  )Vomiting    (  )Chest Pain	(  )Wheezing			(  )Weakness  (  )Diarrhea     (  )Palpitations	(  )Anorexia			(  )Myalgia    All other review of systems negative: Yes      PHYSICAL EXAM:    Constitutional: Appears Well    Neurological: A& O x 3    Skin: Warm    Respiratory and Thorax: normal effort; Breath sounds: normal; No rales/wheezing/rhonchi  	  Cardiovascular: S1, S2, regular, NMBR	    Gastrointestinal: BS + x 4Q, nontender	    Genitourinary:  Bladder nondistended, nontender    Musculoskeletal:   General Right:   no muscle/joint tenderness,   normal tone, no joint swelling,   ROM: limited	    General Left:   + muscle/joint tenderness,   normal tone, no joint swelling,   ROM: limited    Hip: Left: Dressing CDI; long leg immobilizer noted  Lower extrems:   Right: no calf tenderness              negative christopher's sign               + pedal pulses    Left:   no calf tenderness              negative christopher's sign               + pedal pulses              + edema noted 2+ with ecchymosis noted to distal le.                          9.5    5.6   )-----------( 176      ( 22 Jan 2018 07:20 )             30.2       01-22    137  |  104  |  11  ----------------------------<  85  4.0   |  26  |  0.41<L>    Ca    7.9<L>      22 Jan 2018 07:20                        9.5    5.6   )-----------( 176      ( 22 Jan 2018 07:20 )             30.2       01-22    137  |  104  |  11  ----------------------------<  85  4.0   |  26  |  0.41<L>    Ca    7.9<L>      22 Jan 2018 07:20                               9.2    7.0   )-----------( 135      ( 20 Jan 2018 06:16 )             28.6                PT/INR - ( 23 Jan 2018 09:16 )   PT: 26.6 sec;   INR: 2.42 ratio   PT/INR - ( 22 Jan 2018 07:20 )   PT: 25.1 sec;   INR: 2.29 ratio     PT/INR - ( 20 Jan 2018 06:16 )   PT: 21.0 sec;   INR: 1.92 ratio      PT/INR - ( 19 Jan 2018 07:45 )   PT: 15.8 sec;   INR: 1.45 ratio       T/INR - ( 18 Jan 2018 06:31 )   PT: 14.4 sec;   INR: 1.33 ratio       PT/INR - ( 17 Jan 2018 07:22 )   PT: 16.5 sec;   INR: 1.51 ratio    PT/INR - ( 15 Pancho 2018 04:45 )   PT: 13.5 sec;   INR: 1.24 ratio        Vital Signs Last 24 Hrs  T(C): 36.4 (01-23-18 @ 05:52), Max: 37.2 (01-22-18 @ 21:34)  T(F): 97.6 (01-23-18 @ 05:52), Max: 98.9 (01-22-18 @ 21:34)  HR: 65 (01-23-18 @ 05:52) (58 - 71)  BP: 143/55 (01-23-18 @ 05:52) (127/64 - 143/55)  BP(mean): --  RR: 18 (01-23-18 @ 05:52) (17 - 18)  SpO2: 100% (01-23-18 @ 05:52) (58% - 100%)    DVT Prophylaxis:  LMWH                   (  )  Heparin SQ           (  )  Coumadin             ( x )  Xarelto                  (  )  Eliquis                   (  )  Venodynes           (x )  Ambulation          ( x )  UFH                       (  )  Contraindicated  (  )

## 2018-01-23 NOTE — PROGRESS NOTE ADULT - SUBJECTIVE AND OBJECTIVE BOX
CHIEF COMPLAINT: fall, femur fracture    1/22/18- doing good, no new events overnight  1/23/18- family appealed the discharge and waiting for decision    REVIEW OF SYSTEMS: All other review of systems is negative unless indicated above    Vital Signs Last 24 Hrs  T(C): 36.6 (23 Jan 2018 11:43), Max: 37.2 (22 Jan 2018 21:34)  T(F): 97.8 (23 Jan 2018 11:43), Max: 98.9 (22 Jan 2018 21:34)  HR: 66 (23 Jan 2018 11:43) (63 - 71)  BP: 129/59 (23 Jan 2018 11:43) (127/64 - 143/55)  BP(mean): --  RR: 18 (23 Jan 2018 11:43) (17 - 18)  SpO2: 100% (23 Jan 2018 11:43) (98% - 100%)    PHYSICAL EXAM:  Constitutional: frail, lethargic appearing elderly female in NAD.  HEENT: EOMI, Normal Hearing, MMM  Neck: Soft and supple, No JVD  Respiratory: Breath sounds are clear bilaterally, No wheezing, rales or rhonchi  Cardiovascular: S1 and S2, regular rate and rhythm, no Murmurs, gallops or rubs  Gastrointestinal: +BS, nd, nt  Extremities: LLE in immobilizer, Left foot edematous.   Vascular: 2+ peripheral pulses  Neurological: A/O x 3, no focal deficits  Skin: No rashes    MEDICATIONS  (STANDING):  ATENolol  Tablet 12.5 milliGRAM(s) Oral two times a day  cholecalciferol 2000 Unit(s) Oral daily  docusate sodium 100 milliGRAM(s) Oral three times a day  enoxaparin Injectable 40 milliGRAM(s) SubCutaneous every 24 hours  lidocaine   Patch 1 Patch Transdermal daily  multivitamin 1 Tablet(s) Oral daily  polyethylene glycol 3350 17 Gram(s) Oral daily  sertraline 50 milliGRAM(s) Oral daily  warfarin 5 milliGRAM(s) Oral daily    LABS:                                9.5    5.6   )-----------( 176      ( 22 Jan 2018 07:20 )             30.2     22 Jan 2018 07:20    137    |  104    |  11     ----------------------------<  85     4.0     |  26     |  0.41   Ca    7.9        22 Jan 2018 07:20  PT/INR - ( 23 Jan 2018 09:16 )   PT: 26.6 sec;   INR: 2.42 ratio      Assessment/Plan:  87 yo F with PMH of spinal stenosis, HTN, chronic compression fracture of spine, CLL, HTN, lymphedema, monoclonal gammopathy, paroxysmal a-fib, vaginal prolapse, venous insufficiency, p/w mechanical fall with L fem shaft fracture s/p IMN     *L femoral shaft fracture s/p mechanical fall s/p L Femur IMN 1/15.   - Ortho f/u appreciated  - Pain control  - PT/NWB LLE  - pain control  - stable for discharge to [facility]    # Acute on chronic anemia. Acute blood loss anemia 2/2 post op.   - HH stable for discharge    # Urinary retention- resolved  - straight cath prn.   - pain control    # Constipation  - colace, senna, miralax. suppository.  - XR abd no obstruction    #Hypotension- resolved  - BB w parameter  - monitor    #LE edema + ulcers  -U/S neg for DVT  -wound care recs appreciated    #Hyponatremia  - monitor    #Spinal stenosis / HTN / monoclonal gammopathy  -Outpatient management     #Dispo- medically stable for discharge. Waiting for appeal decision. Otherwise can go to [facility]

## 2018-01-23 NOTE — PROGRESS NOTE ADULT - ASSESSMENT
This is a 86 year old female s[facility]p [facility] Found to have L femoral shaft fracture. Pt s/p Antegrade intramedullary nailing for fracture of femoral shaft on 1-15-18.  Pt has hx of A. Fib on coumadin.  [id number] Score: 5 Pt has high thrombosis risk and require anticoagulation treatment dose.    Plan discussed with pt's daughter's and pt.     Plan:  cont  Coumadin to 4mg PO daily  adjust dose per INR  Daily PT/INR  Daily CBC/BMP  Enc ambulation  Venodynes  will cont to f/u

## 2018-01-24 LAB
INR BLD: 2.63 RATIO — HIGH (ref 0.88–1.16)
PROTHROM AB SERPL-ACNC: 29 SEC — HIGH (ref 9.8–12.7)

## 2018-01-24 PROCEDURE — 99233 SBSQ HOSP IP/OBS HIGH 50: CPT

## 2018-01-24 RX ORDER — WARFARIN SODIUM 2.5 MG/1
3 TABLET ORAL DAILY
Qty: 0 | Refills: 0 | Status: DISCONTINUED | OUTPATIENT
Start: 2018-01-24 | End: 2018-01-25

## 2018-01-24 RX ADMIN — ATENOLOL 12.5 MILLIGRAM(S): 25 TABLET ORAL at 05:16

## 2018-01-24 RX ADMIN — Medication 100 MILLIGRAM(S): at 05:16

## 2018-01-24 RX ADMIN — ATENOLOL 12.5 MILLIGRAM(S): 25 TABLET ORAL at 20:25

## 2018-01-24 RX ADMIN — LIDOCAINE 1 PATCH: 4 CREAM TOPICAL at 15:36

## 2018-01-24 RX ADMIN — WARFARIN SODIUM 3 MILLIGRAM(S): 2.5 TABLET ORAL at 21:24

## 2018-01-24 RX ADMIN — Medication 100 MILLIGRAM(S): at 15:40

## 2018-01-24 RX ADMIN — POLYETHYLENE GLYCOL 3350 17 GRAM(S): 17 POWDER, FOR SOLUTION ORAL at 15:39

## 2018-01-24 RX ADMIN — SERTRALINE 50 MILLIGRAM(S): 25 TABLET, FILM COATED ORAL at 15:36

## 2018-01-24 RX ADMIN — Medication 1 TABLET(S): at 15:40

## 2018-01-24 RX ADMIN — Medication 2000 UNIT(S): at 15:40

## 2018-01-24 NOTE — PROGRESS NOTE ADULT - SUBJECTIVE AND OBJECTIVE BOX
HPI:  87 yo F with PMH of spinal stenosis, HTN, chronic compression fracture of spine, CLL, HTN, lymphedema monoclonal gammopathy, paroxysmal a-fib, vaginal prolapse, venous insufficiency, p/w mechanical fall. Patient was discharged from rehab yesterday, and was at home for 2 hours when she fell. She had a mechanical fall when transferring from bed to commode. Found to have L femoral shaft fracture. Denies h/o prolonged intubation or complications with anesthesia previously.     PSH: History of vaginal surgery, S/P kyphoplasty  2014, S/P thyroidectomy  partial   1975    Family Hx: sister - cva / cancer, father - cva    Social hx: former smoker, denies etoh / drugs, lives at home (13 Jan 2018 05:14)      Patient is a 86y old  Female who presents with a chief complaint of fall (13 Jan 2018 05:14)  s/p fx left Femur  s/p IMN on 1-16-18    Consulted by Dr. Magno Kitchen for VTE prophylaxis, risk stratification, and anticoagulation management.    PAST MEDICAL & SURGICAL HISTORY:  [place] (hard of hearing), bilateral  Vaginal prolapse  Spondyloarthritis  Spinal stenosis  Essential hypertension  Acute cystitis without hematuria: septic  4/2016  Paroxysmal atrial fibrillation  Monoclonal gammopathies  Venous insufficiency  Lymphedema of both lower extremities  History of vaginal surgery  S/P thyroidectomy: partial   1975  S/P kyphoplasty: 2014      [place] VTE Risk Score:10    [id number] Score: 5    IMPROVE Bleeding Risk Score: 3.5    Falls Risk:   High (x )  Mod (  )  Low (  )    EBL:  250 ml  crcl: 59.93  1-16-18 Pt seen at bedside wit daughter present. concerned about her mother's low B/P, RN called hospitalised to speak with her.  Discussed her anticoagulation coumadin over lapping with Lovenox and this daughter called her sister in order to get pt's normal coumadin dose.  The daughter informed me she used to take 3mg 4 times a week and 4 mg the other days before she became ill and was in a rehab facility.  Questions answered Benefits and risks discussed will reinforce as needed.   1/17: seen at bedside with dtr present, questions answered  1/18/19: seen at bedside, discussed with dtr anticoagulation, dtr is very anxious, concerned about INR being so low, reassured dtr that pt is on Lovenox as well  1/19/19; seen at bedside, awaiting rehab  1/20/19: seen at bedside no concerns  1-22-18 pt seen at bedside on 5S physical therapy with pt getting pt oob.  Discussed her anticoagulation with coumadin.  Pt awaiting transfer to rehab.   1-23-18 pt seen at bedside oob in chair.  No changes.  1-24-18 Pt seen at bedsides.  no changes awaiting transfer to rehab.    FAMILY HISTORY:  No pertinent family history in first degree relatives    Denies any personal or familial history of clotting or bleeding disorders.    Allergies    No Known Allergies    Intolerances        REVIEW OF SYSTEMS    (  )Fever	     (  )Constipation	(  )SOB				(  )Headache	(  )Dysuria  (  )Chills	     (  )Melena	(  )Dyspnea present on exertion	                    (  )Dizziness                    (  )Polyuria  (  )Nausea	     (  )Hematochezia	(  )Cough			                    (  )Syncope   	(  )Hematuria  (  )Vomiting    (  )Chest Pain	(  )Wheezing			(  )Weakness  (  )Diarrhea     (  )Palpitations	(  )Anorexia			(  )Myalgia    All other review of systems negative: Yes      PHYSICAL EXAM:    Constitutional: Appears Well    Neurological: A& O x 3    Skin: Warm    Respiratory and Thorax: normal effort; Breath sounds: normal; No rales/wheezing/rhonchi  	  Cardiovascular: S1, S2, regular, NMBR	    Gastrointestinal: BS + x 4Q, nontender	    Genitourinary:  Bladder nondistended, nontender    Musculoskeletal:   General Right:   no muscle/joint tenderness,   normal tone, no joint swelling,   ROM: limited	    General Left:   + muscle/joint tenderness,   normal tone, no joint swelling,   ROM: limited    Hip: Left: Dressing CDI; long leg immobilizer noted  Lower extrems:   Right: no calf tenderness              negative christopher's sign               + pedal pulses    Left:   no calf tenderness              negative christopher's sign               + pedal pulses              + edema noted 2+ with ecchymosis noted to distal le.                          9.5    5.6   )-----------( 176      ( 22 Jan 2018 07:20 )             30.2       01-22    137  |  104  |  11  ----------------------------<  85  4.0   |  26  |  0.41<L>    Ca    7.9<L>      22 Jan 2018 07:20                        9.5    5.6   )-----------( 176      ( 22 Jan 2018 07:20 )             30.2       01-22    137  |  104  |  11  ----------------------------<  85  4.0   |  26  |  0.41<L>    Ca    7.9<L>      22 Jan 2018 07:20                               9.2    7.0   )-----------( 135      ( 20 Jan 2018 06:16 )             28.6        PT/INR - ( 24 Jan 2018 07:30 )   PT: 29.0 sec;   INR: 2.63 ratio       PT/INR - ( 23 Jan 2018 09:16 )   PT: 26.6 sec;   INR: 2.42 ratio   PT/INR - ( 22 Jan 2018 07:20 )   PT: 25.1 sec;   INR: 2.29 ratio     PT/INR - ( 20 Jan 2018 06:16 )   PT: 21.0 sec;   INR: 1.92 ratio      PT/INR - ( 19 Jan 2018 07:45 )   PT: 15.8 sec;   INR: 1.45 ratio       T/INR - ( 18 Jan 2018 06:31 )   PT: 14.4 sec;   INR: 1.33 ratio       PT/INR - ( 17 Jan 2018 07:22 )   PT: 16.5 sec;   INR: 1.51 ratio    PT/INR - ( 15 Pancho 2018 04:45 )   PT: 13.5 sec;   INR: 1.24 ratio        Vital Signs Last 24 Hrs  T(C): 36.4 (01-24-18 @ 05:24), Max: 36.9 (01-23-18 @ 21:32)  T(F): 97.5 (01-24-18 @ 05:24), Max: 98.5 (01-23-18 @ 21:32)  HR: 62 (01-24-18 @ 05:24) (62 - 63)  BP: 129/62 (01-24-18 @ 05:24) (129/62 - 130/73)  BP(mean): --  RR: 17 (01-24-18 @ 05:24) (17 - 18)  SpO2: 99% (01-24-18 @ 05:24) (99% - 99%)  DVT Prophylaxis:  LMWH                   (  )  Heparin SQ           (  )  Coumadin             ( x )  Xarelto                  (  )  Eliquis                   (  )  Venodynes           (x )  Ambulation          ( x )  UFH                       (  )  Contraindicated  (  )

## 2018-01-24 NOTE — PROGRESS NOTE ADULT - ASSESSMENT
This is a 86 year old female [facility]. [facility] to [facility]. Pt s/p Antegrade intramedullary nailing for fracture of femoral shaft on 1-15-18.  Pt has hx of A. Fib on coumadin.  [id number] Score: 5 Pt has high thrombosis risk and require anticoagulation treatment dose.    Plan discussed with pt's daughter's and pt.     Plan:  decrease Coumadin to3mg PO daily  adjust dose per INR  Daily PT/INR  Daily CBC/BMP  Enc ambulation  Venodynes  will cont to f/u

## 2018-01-24 NOTE — PROGRESS NOTE ADULT - SUBJECTIVE AND OBJECTIVE BOX
Patient is a 86y old  Female who presents with a chief complaint of fall (17 Jan 2018 06:41)      HPI:  85 yo F with PMH of spinal stenosis, HTN, chronic compression fracture of spine, CLL, HTN, lymphedema monoclonal gammopathy, paroxysmal a-fib, vaginal prolapse, venous insufficiency, p/w mechanical fall. Patient was discharged from rehab yesterday, and was at home for 2 hours when she fell. She had a mechanical fall when transferring from bed to commode. Found to have L femoral shaft fracture. Denies h/o prolonged intubation or complications with anesthesia previously.   1/24 oob tochair  PSH: History of vaginal surgery, S/P kyphoplasty  2014, S/P thyroidectomy  partial   1975    Family Hx: sister - cva / cancer, father - cva    Social hx: former smoker, denies etoh / drugs, lives at home (13 Jan 2018 05:14)      PAST MEDICAL & SURGICAL HISTORY:  [place] (hard of hearing), bilateral  Vaginal prolapse  Spondyloarthritis  Spinal stenosis  Essential hypertension  Acute cystitis without hematuria: septic  4/2016  Paroxysmal atrial fibrillation  Monoclonal gammopathies  Venous insufficiency  Lymphedema of both lower extremities  History of vaginal surgery  S/P thyroidectomy: partial   1975  S/P kyphoplasty: 2014        MEDICATIONS  (STANDING):  ATENolol  Tablet 12.5 milliGRAM(s) Oral two times a day  cholecalciferol 2000 Unit(s) Oral daily  docusate sodium 100 milliGRAM(s) Oral three times a day  lidocaine   Patch 1 Patch Transdermal daily  multivitamin 1 Tablet(s) Oral daily  polyethylene glycol 3350 17 Gram(s) Oral daily  sertraline 50 milliGRAM(s) Oral daily  warfarin 3 milliGRAM(s) Oral daily    MEDICATIONS  (PRN):  acetaminophen   Tablet 650 milliGRAM(s) Oral every 6 hours PRN For Temp greater than 38 C (100.4 F)  ALPRAZolam 0.125 milliGRAM(s) Oral every 8 hours PRN anxiety  bisacodyl Suppository 10 milliGRAM(s) Rectal daily PRN Constipation  diphenhydrAMINE   Capsule 25 milliGRAM(s) Oral at bedtime PRN Insomnia  oxyCODONE    IR 2.5 milliGRAM(s) Oral every 4 hours PRN Moderate Pain (4 - 6)  oxyCODONE    IR 5 milliGRAM(s) Oral every 4 hours PRN Severe Pain (7 - 10)          Vital Signs Last 24 Hrs  T(C): 36.4 (24 Jan 2018 05:24), Max: 36.9 (23 Jan 2018 21:32)  T(F): 97.5 (24 Jan 2018 05:24), Max: 98.5 (23 Jan 2018 21:32)  HR: 62 (24 Jan 2018 05:24) (62 - 66)  BP: 129/62 (24 Jan 2018 05:24) (129/59 - 130/73)  BP(mean): --  RR: 17 (24 Jan 2018 05:24) (17 - 18)  SpO2: 99% (24 Jan 2018 05:24) (99% - 100%)    I&O's Summary    23 Jan 2018 07:01  -  24 Jan 2018 07:00  --------------------------------------------------------  IN: 450 mL / OUT: 0 mL / NET: 450 mL        PHYSICAL EXAM  General Appearance:comfortable  HEENT:   Neck:   Back:   Lungs: clear  Heart: 2/6 syst mumur LLSB  Abdomen: soft   Extremities: 2+ edema  Skin:   Neurologic:         INTERPRETATION OF TELEMETRY:    ECG:        LABS:                    PT/INR - ( 24 Jan 2018 07:30 )   PT: 29.0 sec;   INR: 2.63 ratio                   RADIOLOGY & ADDITIONAL STUDIES:

## 2018-01-24 NOTE — PROGRESS NOTE ADULT - ASSESSMENT
1. s/p IM nail for left midshaft  femur fracture.  2.Atrial fibrillation, long term persistent. Stable.   3.Hypertension. BP is stable. .   4.Depressive disorder.  5.Chronic venous insufficiency  6.Anemia  7. constipation  PLAN: Continue  warfarin ,atenolol, sertraline. Follow hemoglobin and hematocrit and transfuse as needed  Agree  with d/c planning

## 2018-01-24 NOTE — PROGRESS NOTE ADULT - SUBJECTIVE AND OBJECTIVE BOX
CHIEF COMPLAINT: fall, femur fracture    1/22/18- doing good, no new events overnight  1/23/18- family appealed the discharge and waiting for decision  1/24/18- no new events, awaiting for DC disposition    REVIEW OF SYSTEMS: All other review of systems is negative unless indicated above    Vital Signs Last 24 Hrs  T(C): 36.7 (24 Jan 2018 11:15), Max: 36.9 (23 Jan 2018 21:32)  T(F): 98 (24 Jan 2018 11:15), Max: 98.5 (23 Jan 2018 21:32)  HR: 72 (24 Jan 2018 11:15) (62 - 72)  BP: 107/55 (24 Jan 2018 11:15) (107/55 - 130/73)  BP(mean): --  RR: 16 (24 Jan 2018 11:15) (16 - 18)  SpO2: 99% (24 Jan 2018 11:15) (99% - 99%)    PHYSICAL EXAM:  Constitutional: frail appearing elderly female in NAD.  HEENT: EOMI, Normal Hearing, MMM  Neck: Soft and supple, No JVD  Respiratory: Breath sounds are clear bilaterally, No wheezing, rales or rhonchi  Cardiovascular: S1 and S2, regular rate and rhythm, no Murmurs, gallops or rubs  Gastrointestinal: +BS, nd, nt  Extremities: LLE in immobilizer, Left foot edematous.   Vascular: 2+ peripheral pulses  Neurological: A/O x 3, no focal deficits  Skin: No rashes    MEDICATIONS  (STANDING):  ATENolol  Tablet 12.5 milliGRAM(s) Oral two times a day  cholecalciferol 2000 Unit(s) Oral daily  docusate sodium 100 milliGRAM(s) Oral three times a day  enoxaparin Injectable 40 milliGRAM(s) SubCutaneous every 24 hours  lidocaine   Patch 1 Patch Transdermal daily  multivitamin 1 Tablet(s) Oral daily  polyethylene glycol 3350 17 Gram(s) Oral daily  sertraline 50 milliGRAM(s) Oral daily  warfarin 5 milliGRAM(s) Oral daily    LABS:    PT/INR - ( 24 Jan 2018 07:30 )   PT: 29.0 sec;   INR: 2.63 ratio      Assessment/Plan:  85 yo F with PMH of spinal stenosis, HTN, chronic compression fracture of spine, CLL, HTN, lymphedema, monoclonal gammopathy, paroxysmal a-fib, vaginal prolapse, venous insufficiency, p/w mechanical fall with L fem shaft fracture s/p IMN     *L femoral shaft fracture s/p mechanical fall s/p L Femur IMN 1/15.   - Ortho f/u appreciated  - Pain control  - PT/NWB LLE  - pain control  - stable for discharge to [facility]    # Acute on chronic anemia. Acute blood loss anemia 2/2 post op.   - HH stable for discharge    # Urinary retention- resolved  - straight cath prn.   - pain control    # Constipation  - colace, senna, miralax. suppository.  - XR abd no obstruction    #Hypotension- resolved  - BB w parameter  - monitor    #LE edema + ulcers  -U/S neg for DVT  -wound care recs appreciated    #Hyponatremia  - monitor    #Spinal stenosis / HTN / monoclonal gammopathy  -Outpatient management     #Dispo- medically stable for discharge. Daughter lost her appeal, but now not responding to phone calls to cooperate with discharge transition/disposition.  Medically stable for [facility]

## 2018-01-25 VITALS
RESPIRATION RATE: 17 BRPM | SYSTOLIC BLOOD PRESSURE: 131 MMHG | DIASTOLIC BLOOD PRESSURE: 62 MMHG | HEART RATE: 57 BPM | TEMPERATURE: 98 F | OXYGEN SATURATION: 99 %

## 2018-01-25 LAB
INR BLD: 2.51 RATIO — HIGH (ref 0.88–1.16)
PROTHROM AB SERPL-ACNC: 27.6 SEC — HIGH (ref 9.8–12.7)

## 2018-01-25 PROCEDURE — 99233 SBSQ HOSP IP/OBS HIGH 50: CPT

## 2018-01-25 PROCEDURE — 73551 X-RAY EXAM OF FEMUR 1: CPT | Mod: 26

## 2018-01-25 RX ORDER — WARFARIN SODIUM 2.5 MG/1
1 TABLET ORAL
Qty: 0 | Refills: 0 | DISCHARGE
Start: 2018-01-25

## 2018-01-25 RX ADMIN — ATENOLOL 12.5 MILLIGRAM(S): 25 TABLET ORAL at 05:44

## 2018-01-25 RX ADMIN — Medication 1 TABLET(S): at 12:24

## 2018-01-25 RX ADMIN — Medication 2000 UNIT(S): at 12:24

## 2018-01-25 RX ADMIN — SERTRALINE 50 MILLIGRAM(S): 25 TABLET, FILM COATED ORAL at 12:24

## 2018-01-25 RX ADMIN — Medication 100 MILLIGRAM(S): at 05:44

## 2018-01-25 RX ADMIN — LIDOCAINE 1 PATCH: 4 CREAM TOPICAL at 12:24

## 2018-01-25 RX ADMIN — LIDOCAINE 1 PATCH: 4 CREAM TOPICAL at 03:00

## 2018-01-25 NOTE — PROGRESS NOTE ADULT - ASSESSMENT
This is a 86 year old female [facility]. [facility]. Pt s/p Antegrade intramedullary nailing for fracture of femoral shaft on 1-15-18.  Pt has hx of A. Fib on coumadin.  [id number] Score: 5 Pt has high thrombosis risk and require anticoagulation treatment dose.    Plan discussed with pt's daughter's and pt.     Plan:   Coumadin to3mg PO daily  adjust dose per INR  Daily PT/INR  Daily CBC/BMP  Enc ambulation  Venodynes  will cont to f/u  for transfer to rehab today

## 2018-01-25 NOTE — PROGRESS NOTE ADULT - SUBJECTIVE AND OBJECTIVE BOX
Patient is a 86y old  Female who presents with a chief complaint of fall (17 Jan 2018 06:41)  HPI:  85 yo F with PMH of spinal stenosis, HTN, chronic compression fracture of spine, CLL, HTN, lymphedema monoclonal gammopathy, paroxysmal a-fib, vaginal prolapse, venous insufficiency, p/w mechanical fall. Patient was discharged from rehab yesterday, and was at home for 2 hours when she fell. She had a mechanical fall when transferring from bed to commode. Found to have L femoral shaft fracture. Denies h/o prolonged intubation or complications with anesthesia previously.     Followup HPI:    1/24 oob to chair  1/26- no complaints    PAST MEDICAL & SURGICAL HISTORY:  [place] (hard of hearing), bilateral  Vaginal prolapse  Spondyloarthritis  Spinal stenosis  Essential hypertension  Acute cystitis without hematuria: septic  4/2016  Paroxysmal atrial fibrillation  Monoclonal gammopathies  Venous insufficiency  Lymphedema of both lower extremities  History of vaginal surgery  S/P thyroidectomy: partial   1975  S/P kyphoplasty: 2014      Review of symptoms:  Negative except for as noted in today's HPI.      MEDICATIONS  (STANDING):  ATENolol  Tablet 12.5 milliGRAM(s) Oral two times a day  cholecalciferol 2000 Unit(s) Oral daily  docusate sodium 100 milliGRAM(s) Oral three times a day  lidocaine   Patch 1 Patch Transdermal daily  multivitamin 1 Tablet(s) Oral daily  polyethylene glycol 3350 17 Gram(s) Oral daily  sertraline 50 milliGRAM(s) Oral daily  warfarin 3 milliGRAM(s) Oral daily    MEDICATIONS  (PRN):  acetaminophen   Tablet 650 milliGRAM(s) Oral every 6 hours PRN For Temp greater than 38 C (100.4 F)  ALPRAZolam 0.125 milliGRAM(s) Oral every 8 hours PRN anxiety  bisacodyl Suppository 10 milliGRAM(s) Rectal daily PRN Constipation  diphenhydrAMINE   Capsule 25 milliGRAM(s) Oral at bedtime PRN Insomnia  oxyCODONE    IR 2.5 milliGRAM(s) Oral every 4 hours PRN Moderate Pain (4 - 6)  oxyCODONE    IR 5 milliGRAM(s) Oral every 4 hours PRN Severe Pain (7 - 10)          Vital Signs Last 24 Hrs  T(C): 36.8 (25 Jan 2018 05:57), Max: 37.1 (24 Jan 2018 21:24)  T(F): 98.2 (25 Jan 2018 05:57), Max: 98.7 (24 Jan 2018 21:24)  HR: 60 (25 Jan 2018 05:57) (60 - 72)  BP: 129/56 (25 Jan 2018 05:57) (107/55 - 136/63)  BP(mean): --  RR: 18 (25 Jan 2018 05:57) (16 - 18)  SpO2: 99% (25 Jan 2018 05:57) (99% - 99%)    I&O's Summary      PHYSICAL EXAM  General Appearance: comfortable  HEENT:   Neck:   Lungs: clear  Heart: 2/6 systolic murmur LLSB  Abdomen: nontender  Extremities: immobilizer left leg. No edema.  Neurologic:       INTERPRETATION OF TELEMETRY:    ECG:    LABS:                    PT/INR - ( 25 Jan 2018 06:57 )   PT: 27.6 sec;   INR: 2.51 ratio                   RADIOLOGY & ADDITIONAL STUDIES:    IMPRESSION:  1. s/p IM nail for left midshaft  femur fracture.  2.Atrial fibrillation, long term persistent. Stable.   3.Hypertension. BP is stable. .   4.Depressive disorder.  5.Chronic venous insufficiency  6.Anemia. Stable.  7. Constipation  PLAN:  Continue  warfarin ,atenolol, Miralax, sertraline.  Agree  with d/c planning

## 2018-01-25 NOTE — PROGRESS NOTE ADULT - SUBJECTIVE AND OBJECTIVE BOX
HOSPITALIST PROGRESS NOTE:  SUBJECTIVE:    PCP: Peewee	    Chief Complaint: Patient is a 86y old  Female who presents with a chief complaint of fall (17 Jan 2018 06:41)      HPI:  87 yo F with PMH of spinal stenosis, HTN, chronic compression fracture of spine, CLL, HTN, lymphedema monoclonal gammopathy, paroxysmal a-fib, vaginal prolapse, venous insufficiency, p/w mechanical fall. Patient was discharged from rehab yesterday, and was at home for 2 hours when she fell. She had a mechanical fall when transferring from bed to commode. Found to have L femoral shaft fracture. Denies h/o prolonged intubation or complications with anesthesia previously.   PSH: History of vaginal surgery, S/P kyphoplasty  2014, S/P thyroidectomy  partial   1975  Family Hx: sister - cva / cancer, father - cva  Social hx: former smoker, denies etoh / drugs, lives at home (13 Jan 2018 05:14)  1/22/18- doing good, no new events overnight  1/23/18- family appealed the discharge and waiting for decision  1/24/18- no new events, awaiting for DC disposition    1/25:     Allergies:  No Known Allergies    REVIEW OF SYSTEMS:  See HPI. All other review of systems is negative unless indicated above.     OBJECTIVE  Physical Exam:  Vital Signs:    Vital Signs Last 24 Hrs  T(C): 36.8 (25 Jan 2018 05:57), Max: 37.1 (24 Jan 2018 21:24)  T(F): 98.2 (25 Jan 2018 05:57), Max: 98.7 (24 Jan 2018 21:24)  HR: 60 (25 Jan 2018 05:57) (60 - 72)  BP: 129/56 (25 Jan 2018 05:57) (107/55 - 136/63)  BP(mean): --  RR: 18 (25 Jan 2018 05:57) (16 - 18)  SpO2: 99% (25 Jan 2018 05:57) (99% - 99%)  I&O's Summary    Constitutional: frail appearing elderly female in NAD.  HEENT: EOMI, Normal Hearing, MMM  Neck: Soft and supple, No JVD  Respiratory: Breath sounds are clear bilaterally, No wheezing, rales or rhonchi  Cardiovascular: S1 and S2, regular rate and rhythm, no Murmurs, gallops or rubs  Gastrointestinal: +BS, nd, nt  Extremities: LLE in immobilizer, Left foot edematous.   Vascular: 2+ peripheral pulses  Neurological: A/O x 3, no focal deficits  Skin: No rashes    MEDICATIONS  (STANDING):  ATENolol  Tablet 12.5 milliGRAM(s) Oral two times a day  cholecalciferol 2000 Unit(s) Oral daily  docusate sodium 100 milliGRAM(s) Oral three times a day  lidocaine   Patch 1 Patch Transdermal daily  multivitamin 1 Tablet(s) Oral daily  polyethylene glycol 3350 17 Gram(s) Oral daily  sertraline 50 milliGRAM(s) Oral daily  warfarin 3 milliGRAM(s) Oral daily    LABS: All Labs Reviewed:  PT/INR - ( 25 Jan 2018 06:57 )   PT: 27.6 sec;   INR: 2.51 ratio      RADIOLOGY/EKG:      < from: Xray Abdomen 2 Views (01.19.18 @ 09:49) >    IMPRESSION: No bowel obstruction    < end of copied text >    < from: Xray Knee 1 or 2 Views, Left (01.13.18 @ 09:22) >    Impression:    Persistent displaced overlapping mid left femur fracture with placement   of an external fixation device    < end of copied text >    < from: Xray Chest 1 View AP- PORTABLE-Urgent (01.13.18 @ 01:49) >    Impression:    Cardiomegaly    No significant interval change as compared to  4/25/2016    < end of copied text > HOSPITALIST PROGRESS NOTE:  SUBJECTIVE:    PCP: Peewee	    Chief Complaint: Patient is a 86y old  Female who presents with a chief complaint of fall (17 Jan 2018 06:41)      HPI:  87 yo F with PMH of spinal stenosis, HTN, chronic compression fracture of spine, CLL, HTN, lymphedema monoclonal gammopathy, paroxysmal a-fib, vaginal prolapse, venous insufficiency, p/w mechanical fall. Patient was discharged from rehab yesterday, and was at home for 2 hours when she fell. She had a mechanical fall when transferring from bed to commode. Found to have L femoral shaft fracture. Denies h/o prolonged intubation or complications with anesthesia previously.   PSH: History of vaginal surgery, S/P kyphoplasty  2014, S/P thyroidectomy  partial   1975  Family Hx: sister - cva / cancer, father - cva  Social hx: former smoker, denies etoh / drugs, lives at home (13 Jan 2018 05:14)  1/22/18- doing good, no new events overnight  1/23/18- family appealed the discharge and waiting for decision  1/24/18- no new events, awaiting for DC disposition    1/25: Above Reviewed. Patient seen by ortho this morning.  Xray was done; Discussed with ortho resident who reviewed the Xrays and patient is cleared to go to rehab. There are no new changes.  patient has no complaints.     Allergies:  No Known Allergies    REVIEW OF SYSTEMS:  See HPI. All other review of systems is negative unless indicated above.     OBJECTIVE  Physical Exam:  Vital Signs Last 24 Hrs  T(C): 36.8 (25 Jan 2018 05:57), Max: 37.1 (24 Jan 2018 21:24)  T(F): 98.2 (25 Jan 2018 05:57), Max: 98.7 (24 Jan 2018 21:24)  HR: 60 (25 Jan 2018 05:57) (60 - 71)  BP: 129/56 (25 Jan 2018 05:57) (126/60 - 136/63)  BP(mean): --  RR: 18 (25 Jan 2018 05:57) (18 - 18)  SpO2: 99% (25 Jan 2018 05:57) (99% - 99%)    I&O's Summary    Constitutional: frail appearing elderly female in NAD.  HEENT: EOMI, Normal Hearing, MMM  Neck: Soft and supple, No JVD  Respiratory: Breath sounds are clear bilaterally, No wheezing, rales or rhonchi  Cardiovascular: S1 and S2, regular rate and rhythm, no Murmurs, gallops or rubs  Gastrointestinal: +BS, nd, nt  Extremities: LLE in immobilizer, Left foot edematous.   Vascular: 2+ peripheral pulses  Neurological: A/O x 3, no focal deficits  Skin: No rashes    MEDICATIONS  (STANDING):  ATENolol  Tablet 12.5 milliGRAM(s) Oral two times a day  cholecalciferol 2000 Unit(s) Oral daily  docusate sodium 100 milliGRAM(s) Oral three times a day  lidocaine   Patch 1 Patch Transdermal daily  multivitamin 1 Tablet(s) Oral daily  polyethylene glycol 3350 17 Gram(s) Oral daily  sertraline 50 milliGRAM(s) Oral daily  warfarin 3 milliGRAM(s) Oral daily    LABS: All Labs Reviewed:  PT/INR - ( 25 Jan 2018 06:57 )   PT: 27.6 sec;   INR: 2.51 ratio      RADIOLOGY/EKG:      < from: Xray Abdomen 2 Views (01.19.18 @ 09:49) >    IMPRESSION: No bowel obstruction    < end of copied text >    < from: Xray Knee 1 or 2 Views, Left (01.13.18 @ 09:22) >    Impression:    Persistent displaced overlapping mid left femur fracture with placement   of an external fixation device    < end of copied text >    < from: Xray Chest 1 View AP- PORTABLE-Urgent (01.13.18 @ 01:49) >    Impression:    Cardiomegaly    No significant interval change as compared to  4/25/2016    < end of copied text >

## 2018-01-25 NOTE — PROGRESS NOTE ADULT - SUBJECTIVE AND OBJECTIVE BOX
HPI:  87 yo F with PMH of spinal stenosis, HTN, chronic compression fracture of spine, CLL, HTN, lymphedema monoclonal gammopathy, paroxysmal a-fib, vaginal prolapse, venous insufficiency, p/w mechanical fall. Patient was discharged from rehab yesterday, and was at home for 2 hours when she fell. She had a mechanical fall when transferring from bed to commode. Found to have L femoral shaft fracture. Denies h/o prolonged intubation or complications with anesthesia previously.     PSH: History of vaginal surgery, S/P kyphoplasty  2014, S/P thyroidectomy  partial   1975    Family Hx: sister - cva / cancer, father - cva    Social hx: former smoker, denies etoh / drugs, lives at home (13 Jan 2018 05:14)      Patient is a 86y old  Female who presents with a chief complaint of fall (13 Jan 2018 05:14)  s/p fx left Femur  s/p IMN on 1-16-18    Consulted by Dr. Magno Kitchen for VTE prophylaxis, risk stratification, and anticoagulation management.    PAST MEDICAL & SURGICAL HISTORY:  [place] ([place] of hearing), bilateral  Vaginal prolapse  Spondyloarthritis  Spinal stenosis  Essential hypertension  Acute cystitis without hematuria: septic  4/2016  Paroxysmal atrial fibrillation  Monoclonal gammopathies  Venous insufficiency  Lymphedema of both lower extremities  History of vaginal surgery  S/P thyroidectomy: partial   1975  S/P kyphoplasty: 2014      [place] VTE Risk Score:10    [id number] Score: 5    IMPROVE Bleeding Risk Score: 3.5    Falls Risk:   High (x )  Mod (  )  Low (  )    EBL:  250 ml  crcl: 59.93  1-16-18 Pt seen at bedside wit daughter present. concerned about her mother's low B/P, RN called hospitalised to speak with her.  Discussed her anticoagulation coumadin over lapping with Lovenox and this daughter called her sister in order to get pt's normal coumadin dose.  The daughter informed me she used to take 3mg 4 times a week and 4 mg the other days before she became ill and was in a rehab facility.  Questions answered Benefits and risks discussed will reinforce as needed.   1/17: seen at bedside with dtr present, questions answered  1/18/19: seen at bedside, discussed with dtr anticoagulation, dtr is very anxious, concerned about INR being so low, reassured dtr that pt is on Lovenox as well  1/19/19; seen at bedside, awaiting rehab  1/20/19: seen at bedside no concerns  1-22-18 pt seen at bedside on 5S physical therapy with pt getting pt oob.  Discussed her anticoagulation with coumadin.  Pt awaiting transfer to rehab.   1-23-18 pt seen at bedside oob in chair.  No changes.  1-24-18 Pt seen at bedsides.  no changes awaiting transfer to rehab.  1-25-18 Pt seen at bedside no changes going to rehab today.    FAMILY HISTORY:  No pertinent family history in first degree relatives    Denies any personal or familial history of clotting or bleeding disorders.    Allergies    No Known Allergies    Intolerances        REVIEW OF SYSTEMS    (  )Fever	     (  )Constipation	(  )SOB				(  )Headache	(  )Dysuria  (  )Chills	     (  )Melena	(  )Dyspnea present on exertion	                    (  )Dizziness                    (  )Polyuria  (  )Nausea	     (  )Hematochezia	(  )Cough			                    (  )Syncope   	(  )Hematuria  (  )Vomiting    (  )Chest Pain	(  )Wheezing			(  )Weakness  (  )Diarrhea     (  )Palpitations	(  )Anorexia			(  )Myalgia    All other review of systems negative: Yes      PHYSICAL EXAM:    Constitutional: Appears Well    Neurological: A& O x 3    Skin: Warm    Respiratory and Thorax: normal effort; Breath sounds: normal; No rales/wheezing/rhonchi  	  Cardiovascular: S1, S2, regular, NMBR	    Gastrointestinal: BS + x 4Q, nontender	    Genitourinary:  Bladder nondistended, nontender    Musculoskeletal:   General Right:   no muscle/joint tenderness,   normal tone, no joint swelling,   ROM: limited	    General Left:   + muscle/joint tenderness,   normal tone, no joint swelling,   ROM: limited    Hip: Left: Dressing CDI; long leg immobilizer noted  Lower extrems:   Right: no calf tenderness              negative christopher's sign               + pedal pulses    Left:   no calf tenderness              negative christopher's sign               + pedal pulses              + edema noted 2+ with ecchymosis noted to distal le.                          9.5    5.6   )-----------( 176      ( 22 Jan 2018 07:20 )             30.2       01-22    137  |  104  |  11  ----------------------------<  85  4.0   |  26  |  0.41<L>    Ca    7.9<L>      22 Jan 2018 07:20                        9.5    5.6   )-----------( 176      ( 22 Jan 2018 07:20 )             30.2       01-22    137  |  104  |  11  ----------------------------<  85  4.0   |  26  |  0.41<L>    Ca    7.9<L>      22 Jan 2018 07:20                               9.2    7.0   )-----------( 135      ( 20 Jan 2018 06:16 )             28.6      PT/INR - ( 25 Jan 2018 06:57 )   PT: 27.6 sec;   INR: 2.51 ratio    PT/INR - ( 24 Jan 2018 07:30 )   PT: 29.0 sec;   INR: 2.63 ratio       PT/INR - ( 23 Jan 2018 09:16 )   PT: 26.6 sec;   INR: 2.42 ratio   PT/INR - ( 22 Jan 2018 07:20 )   PT: 25.1 sec;   INR: 2.29 ratio     PT/INR - ( 20 Jan 2018 06:16 )   PT: 21.0 sec;   INR: 1.92 ratio      PT/INR - ( 19 Jan 2018 07:45 )   PT: 15.8 sec;   INR: 1.45 ratio       T/INR - ( 18 Jan 2018 06:31 )   PT: 14.4 sec;   INR: 1.33 ratio       PT/INR - ( 17 Jan 2018 07:22 )   PT: 16.5 sec;   INR: 1.51 ratio    PT/INR - ( 15 Pancho 2018 04:45 )   PT: 13.5 sec;   INR: 1.24 ratio        Vital Signs Last 24 Hrs  T(C): 36.7 (01-25-18 @ 11:11), Max: 37.1 (01-24-18 @ 21:24)  T(F): 98.1 (01-25-18 @ 11:11), Max: 98.7 (01-24-18 @ 21:24)  HR: 57 (01-25-18 @ 11:11) (57 - 71)  BP: 131/62 (01-25-18 @ 11:11) (126/60 - 136/63)  BP(mean): --  RR: 17 (01-25-18 @ 11:11) (17 - 18)  SpO2: 99% (01-25-18 @ 11:11) (99% - 99%)DVT Prophylaxis:  LMWH                   (  )  Heparin SQ           (  )  Coumadin             ( x )  Xarelto                  (  )  Eliquis                   (  )  Venodynes           (x )  Ambulation          ( x )  UFH                       (  )  Contraindicated  (  )

## 2018-01-25 NOTE — PROGRESS NOTE ADULT - PROVIDER SPECIALTY LIST ADULT
Anticoag Management
Cardiology
Hospitalist
Orthopedics
Anticoag Management
Cardiology
Cardiology
Hospitalist
Anticoag Management
Anticoag Management
Hospitalist

## 2018-01-25 NOTE — PROGRESS NOTE ADULT - SUBJECTIVE AND OBJECTIVE BOX
Pt seen and examined by Dr. Kitchen at bedside this AM, note on behalf of Dr. Kitchen who also reviewed her new xrays from this AM.    Orthopedics     POD 10 s/p Left femur IM Nail w cerclage cables x2 for distal 3rd fracture      Vital Signs Last 24 Hrs  T(C): 36.8 (01-25-18 @ 05:57), Max: 37.1 (01-24-18 @ 21:24)  T(F): 98.2 (01-25-18 @ 05:57), Max: 98.7 (01-24-18 @ 21:24)  HR: 60 (01-25-18 @ 05:57) (60 - 71)  BP: 129/56 (01-25-18 @ 05:57) (126/60 - 136/63)  BP(mean): --  RR: 18 (01-25-18 @ 05:57) (18 - 18)  SpO2: 99% (01-25-18 @ 05:57) (99% - 99%)    PT/INR - ( 25 Jan 2018 06:57 )   PT: 27.6 sec;   INR: 2.51 ratio        Exam:  NAD   wound without erythema dressing changed.   +EHL FHL TA GS  SILT toes 1-5  +DP  Calf Soft NT    A/P:  POD 10 s/p Left femur IM Nail w cerclage cables x2 for distal 3rd fracture  -Analgesia  -DVT PE ppx  -OOB PT   -NWB LLE with KI in place. OK to remove for daily skin check and sponge bath   -Dressing changed with sterile gloves to new Aquacel this AM by resident with Dr. Kitchen  -DC planning, ok from ortho perspective, as discussed with Dr. Kitchen

## 2018-01-30 DIAGNOSIS — S72.342A DISPLACED SPIRAL FRACTURE OF SHAFT OF LEFT FEMUR, INITIAL ENCOUNTER FOR CLOSED FRACTURE: ICD-10-CM

## 2018-01-30 DIAGNOSIS — E87.1 HYPO-OSMOLALITY AND HYPONATREMIA: ICD-10-CM
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
## 2018-01-30 DIAGNOSIS — K59.00 CONSTIPATION, UNSPECIFIED: ICD-10-CM

## 2018-01-30 DIAGNOSIS — Z79.01 LONG TERM (CURRENT) USE OF ANTICOAGULANTS: ICD-10-CM

## 2018-01-30 DIAGNOSIS — R33.9 RETENTION OF URINE, UNSPECIFIED: ICD-10-CM

## 2018-01-30 DIAGNOSIS — I10 ESSENTIAL (PRIMARY) HYPERTENSION: ICD-10-CM

## 2018-01-30 DIAGNOSIS — D62 ACUTE POSTHEMORRHAGIC ANEMIA: ICD-10-CM

## 2018-01-30 DIAGNOSIS — I95.9 HYPOTENSION, UNSPECIFIED: ICD-10-CM

## 2018-01-30 DIAGNOSIS — I87.2 VENOUS INSUFFICIENCY (CHRONIC) (PERIPHERAL): ICD-10-CM

## 2018-01-30 DIAGNOSIS — D47.2 MONOCLONAL GAMMOPATHY: ICD-10-CM

## 2018-01-30 DIAGNOSIS — Z87.891 PERSONAL HISTORY OF NICOTINE DEPENDENCE: ICD-10-CM

## 2018-01-30 DIAGNOSIS — C91.10 CHRONIC LYMPHOCYTIC LEUKEMIA OF B-CELL TYPE NOT HAVING ACHIEVED REMISSION: ICD-10-CM

## 2018-01-30 DIAGNOSIS — F32.9 MAJOR DEPRESSIVE DISORDER, SINGLE EPISODE, UNSPECIFIED: ICD-10-CM

## 2018-01-30 DIAGNOSIS — Z79.899 OTHER LONG TERM (CURRENT) DRUG THERAPY: ICD-10-CM

## 2018-01-30 DIAGNOSIS — M48.00 SPINAL STENOSIS, SITE UNSPECIFIED: ICD-10-CM

## 2018-01-30 DIAGNOSIS — R60.0 LOCALIZED EDEMA: ICD-10-CM
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
## 2018-01-30 DIAGNOSIS — I48.0 PAROXYSMAL ATRIAL FIBRILLATION: ICD-10-CM

## 2018-04-03 ENCOUNTER — OUTPATIENT (OUTPATIENT)
Dept: OUTPATIENT SERVICES | Facility: HOSPITAL | Age: 83
LOS: 1 days | End: 2018-04-03
Payer: MEDICARE

## 2018-04-03 DIAGNOSIS — Z98.89 OTHER SPECIFIED POSTPROCEDURAL STATES: Chronic | ICD-10-CM

## 2018-04-03 DIAGNOSIS — M79.662 PAIN IN LEFT LOWER LEG: ICD-10-CM

## 2018-04-03 DIAGNOSIS — E89.0 POSTPROCEDURAL HYPOTHYROIDISM: Chronic | ICD-10-CM

## 2018-04-03 PROCEDURE — 73700 CT LOWER EXTREMITY W/O DYE: CPT | Mod: 26,LT

## 2018-04-03 PROCEDURE — 73700 CT LOWER EXTREMITY W/O DYE: CPT

## 2018-06-01 ENCOUNTER — CLINICAL ADVICE (OUTPATIENT)
Age: 83
End: 2018-06-01

## 2018-06-18 ENCOUNTER — CHART COPY (OUTPATIENT)
Age: 83
End: 2018-06-18

## 2018-06-25 ENCOUNTER — APPOINTMENT (OUTPATIENT)
Dept: UROLOGY | Facility: CLINIC | Age: 83
End: 2018-06-25

## 2018-07-03 ENCOUNTER — OUTPATIENT (OUTPATIENT)
Dept: OUTPATIENT SERVICES | Facility: HOSPITAL | Age: 83
LOS: 1 days | End: 2018-07-03
Payer: MEDICARE

## 2018-07-03 ENCOUNTER — APPOINTMENT (OUTPATIENT)
Dept: UROLOGY | Facility: CLINIC | Age: 83
End: 2018-07-03
Payer: MEDICARE

## 2018-07-03 DIAGNOSIS — Z98.89 OTHER SPECIFIED POSTPROCEDURAL STATES: Chronic | ICD-10-CM

## 2018-07-03 DIAGNOSIS — E89.0 POSTPROCEDURAL HYPOTHYROIDISM: Chronic | ICD-10-CM

## 2018-07-03 PROCEDURE — 51701 INSERT BLADDER CATHETER: CPT

## 2018-07-03 PROCEDURE — 99214 OFFICE O/P EST MOD 30 MIN: CPT | Mod: 25

## 2018-07-04 LAB
APPEARANCE: CLEAR
BACTERIA: NEGATIVE
BILIRUBIN URINE: NEGATIVE
BLOOD URINE: NEGATIVE
COLOR: YELLOW
GLUCOSE QUALITATIVE U: NEGATIVE MG/DL
HYALINE CASTS: 1 /LPF
KETONES URINE: NEGATIVE
LEUKOCYTE ESTERASE URINE: NEGATIVE
MICROSCOPIC-UA: NORMAL
NITRITE URINE: NEGATIVE
PH URINE: 7.5
PROTEIN URINE: NEGATIVE MG/DL
RED BLOOD CELLS URINE: 3 /HPF
SPECIFIC GRAVITY URINE: 1.01
SQUAMOUS EPITHELIAL CELLS: 0 /HPF
UROBILINOGEN URINE: NEGATIVE MG/DL
WHITE BLOOD CELLS URINE: 0 /HPF

## 2018-07-06 LAB
BACTERIA UR CULT: NORMAL
CORE LAB FLUID CYTOLOGY: NORMAL

## 2018-07-09 DIAGNOSIS — R35.0 FREQUENCY OF MICTURITION: ICD-10-CM

## 2018-07-09 DIAGNOSIS — R33.9 RETENTION OF URINE, UNSPECIFIED: ICD-10-CM

## 2018-07-09 DIAGNOSIS — N95.2 POSTMENOPAUSAL ATROPHIC VAGINITIS: ICD-10-CM

## 2018-07-09 DIAGNOSIS — R31.29 OTHER MICROSCOPIC HEMATURIA: ICD-10-CM

## 2018-07-09 DIAGNOSIS — K58.9 IRRITABLE BOWEL SYNDROME WITHOUT DIARRHEA: ICD-10-CM

## 2018-07-09 DIAGNOSIS — D47.2 MONOCLONAL GAMMOPATHY: ICD-10-CM

## 2018-07-09 DIAGNOSIS — N90.9 NONINFLAMMATORY DISORDER OF VULVA AND PERINEUM, UNSPECIFIED: ICD-10-CM

## 2018-07-09 DIAGNOSIS — N39.0 URINARY TRACT INFECTION, SITE NOT SPECIFIED: ICD-10-CM

## 2018-07-09 DIAGNOSIS — B36.9 SUPERFICIAL MYCOSIS, UNSPECIFIED: ICD-10-CM

## 2018-07-16 ENCOUNTER — OUTPATIENT (OUTPATIENT)
Dept: OUTPATIENT SERVICES | Facility: HOSPITAL | Age: 83
LOS: 1 days | End: 2018-07-16
Payer: MEDICARE

## 2018-07-16 ENCOUNTER — APPOINTMENT (OUTPATIENT)
Dept: UROLOGY | Facility: CLINIC | Age: 83
End: 2018-07-16
Payer: MEDICARE

## 2018-07-16 DIAGNOSIS — Z98.89 OTHER SPECIFIED POSTPROCEDURAL STATES: Chronic | ICD-10-CM

## 2018-07-16 DIAGNOSIS — E89.0 POSTPROCEDURAL HYPOTHYROIDISM: Chronic | ICD-10-CM

## 2018-07-16 PROCEDURE — 51701 INSERT BLADDER CATHETER: CPT

## 2018-07-16 PROCEDURE — 99214 OFFICE O/P EST MOD 30 MIN: CPT | Mod: 25

## 2018-07-17 LAB
APPEARANCE: CLEAR
BACTERIA: NEGATIVE
BILIRUBIN URINE: NEGATIVE
BLOOD URINE: NEGATIVE
COLOR: YELLOW
CORE LAB FLUID CYTOLOGY: NORMAL
GLUCOSE QUALITATIVE U: NEGATIVE MG/DL
KETONES URINE: NEGATIVE
LEUKOCYTE ESTERASE URINE: NEGATIVE
MICROSCOPIC-UA: NORMAL
NITRITE URINE: NEGATIVE
PH URINE: 6.5
PROTEIN URINE: NEGATIVE MG/DL
RED BLOOD CELLS URINE: 3 /HPF
SPECIFIC GRAVITY URINE: 1.01
SQUAMOUS EPITHELIAL CELLS: 0 /HPF
UROBILINOGEN URINE: NEGATIVE MG/DL
WHITE BLOOD CELLS URINE: 0 /HPF

## 2018-07-20 LAB — BACTERIA UR CULT: NORMAL

## 2018-07-21 DIAGNOSIS — D47.2 MONOCLONAL GAMMOPATHY: ICD-10-CM

## 2018-07-21 DIAGNOSIS — R35.0 FREQUENCY OF MICTURITION: ICD-10-CM

## 2018-07-21 DIAGNOSIS — R33.9 RETENTION OF URINE, UNSPECIFIED: ICD-10-CM

## 2018-07-21 DIAGNOSIS — R31.29 OTHER MICROSCOPIC HEMATURIA: ICD-10-CM

## 2018-07-21 DIAGNOSIS — R35.1 NOCTURIA: ICD-10-CM

## 2018-08-30 ENCOUNTER — OUTPATIENT (OUTPATIENT)
Dept: OUTPATIENT SERVICES | Facility: HOSPITAL | Age: 83
LOS: 1 days | End: 2018-08-30
Payer: MEDICARE

## 2018-08-30 ENCOUNTER — APPOINTMENT (OUTPATIENT)
Dept: UROLOGY | Facility: CLINIC | Age: 83
End: 2018-08-30
Payer: MEDICARE

## 2018-08-30 VITALS
HEART RATE: 56 BPM | DIASTOLIC BLOOD PRESSURE: 74 MMHG | TEMPERATURE: 98.1 F | RESPIRATION RATE: 16 BRPM | SYSTOLIC BLOOD PRESSURE: 145 MMHG

## 2018-08-30 DIAGNOSIS — E89.0 POSTPROCEDURAL HYPOTHYROIDISM: Chronic | ICD-10-CM

## 2018-08-30 DIAGNOSIS — Z98.89 OTHER SPECIFIED POSTPROCEDURAL STATES: Chronic | ICD-10-CM

## 2018-08-30 PROCEDURE — 51701 INSERT BLADDER CATHETER: CPT

## 2018-08-30 PROCEDURE — 99214 OFFICE O/P EST MOD 30 MIN: CPT | Mod: 25

## 2018-08-30 PROCEDURE — 88112 CYTOPATH CELL ENHANCE TECH: CPT | Mod: 26

## 2018-09-04 DIAGNOSIS — N39.0 URINARY TRACT INFECTION, SITE NOT SPECIFIED: ICD-10-CM

## 2018-09-04 DIAGNOSIS — G62.9 POLYNEUROPATHY, UNSPECIFIED: ICD-10-CM

## 2018-09-04 DIAGNOSIS — R31.29 OTHER MICROSCOPIC HEMATURIA: ICD-10-CM

## 2018-09-08 LAB
APPEARANCE: CLEAR
BACTERIA UR CULT: NORMAL
BACTERIA: NEGATIVE
BILIRUBIN URINE: NEGATIVE
BLOOD URINE: NEGATIVE
COLOR: YELLOW
CORE LAB FLUID CYTOLOGY: NORMAL
GLUCOSE QUALITATIVE U: NEGATIVE MG/DL
HYALINE CASTS: 2 /LPF
KETONES URINE: NEGATIVE
LEUKOCYTE ESTERASE URINE: NEGATIVE
MICROSCOPIC-UA: NORMAL
NITRITE URINE: NEGATIVE
PH URINE: 6.5
PROTEIN URINE: 100 MG/DL
RED BLOOD CELLS URINE: 4 /HPF
SPECIFIC GRAVITY URINE: 1.02
SQUAMOUS EPITHELIAL CELLS: 2 /HPF
UROBILINOGEN URINE: NEGATIVE MG/DL
WHITE BLOOD CELLS URINE: 2 /HPF

## 2018-09-12 NOTE — DISCHARGE NOTE ADULT - NS MD DC FALL RISK RISK
Plan: Pt declines treatment at this time.
Detail Level: Generalized
For information on Fall & Injury Prevention, visit [email or web address]

## 2018-09-17 ENCOUNTER — APPOINTMENT (OUTPATIENT)
Dept: OBGYN | Facility: CLINIC | Age: 83
End: 2018-09-17
Payer: MEDICARE

## 2018-09-17 VITALS
WEIGHT: 125 LBS | BODY MASS INDEX: 20.83 KG/M2 | HEIGHT: 65 IN | HEART RATE: 58 BPM | SYSTOLIC BLOOD PRESSURE: 166 MMHG | DIASTOLIC BLOOD PRESSURE: 80 MMHG

## 2018-09-17 PROCEDURE — 99214 OFFICE O/P EST MOD 30 MIN: CPT | Mod: 25

## 2018-09-17 PROCEDURE — 51701 INSERT BLADDER CATHETER: CPT

## 2018-09-18 LAB
APPEARANCE: CLEAR
BACTERIA: NEGATIVE
BILIRUBIN URINE: NEGATIVE
BLOOD URINE: NEGATIVE
COLOR: YELLOW
GLUCOSE QUALITATIVE U: NEGATIVE MG/DL
HYALINE CASTS: 7 /LPF
KETONES URINE: NEGATIVE
LEUKOCYTE ESTERASE URINE: NEGATIVE
MICROSCOPIC-UA: NORMAL
NITRITE URINE: NEGATIVE
PH URINE: 6.5
PROTEIN URINE: 100 MG/DL
RED BLOOD CELLS URINE: 1 /HPF
SPECIFIC GRAVITY URINE: 1.02
SQUAMOUS EPITHELIAL CELLS: 2 /HPF
UROBILINOGEN URINE: NEGATIVE MG/DL
WHITE BLOOD CELLS URINE: 1 /HPF

## 2018-09-20 LAB
BACTERIA UR CULT: NORMAL
CORE LAB FLUID CYTOLOGY: NORMAL

## 2018-09-25 ENCOUNTER — OTHER (OUTPATIENT)
Age: 83
End: 2018-09-25

## 2019-02-13 ENCOUNTER — APPOINTMENT (OUTPATIENT)
Dept: COLORECTAL SURGERY | Facility: CLINIC | Age: 84
End: 2019-02-13
Payer: MEDICARE

## 2019-02-13 VITALS
SYSTOLIC BLOOD PRESSURE: 179 MMHG | OXYGEN SATURATION: 99 % | DIASTOLIC BLOOD PRESSURE: 77 MMHG | WEIGHT: 125 LBS | HEART RATE: 67 BPM | BODY MASS INDEX: 20.83 KG/M2 | RESPIRATION RATE: 16 BRPM | HEIGHT: 65 IN

## 2019-02-13 DIAGNOSIS — K64.4 RESIDUAL HEMORRHOIDAL SKIN TAGS: ICD-10-CM

## 2019-02-13 PROCEDURE — 99203 OFFICE O/P NEW LOW 30 MIN: CPT | Mod: 25

## 2019-02-13 PROCEDURE — 46600 DIAGNOSTIC ANOSCOPY SPX: CPT

## 2019-02-13 RX ORDER — SERTRALINE HYDROCHLORIDE 50 MG/1
50 TABLET, FILM COATED ORAL
Qty: 30 | Refills: 0 | Status: DISCONTINUED | COMMUNITY
Start: 2017-06-12 | End: 2019-02-13

## 2019-02-13 RX ORDER — SERTRALINE 25 MG/1
25 TABLET, FILM COATED ORAL
Qty: 30 | Refills: 0 | Status: DISCONTINUED | COMMUNITY
Start: 2017-05-22 | End: 2019-02-13

## 2019-02-13 RX ORDER — CHLORHEXIDINE GLUCONATE, 0.12% ORAL RINSE 1.2 MG/ML
0.12 SOLUTION DENTAL
Qty: 473 | Refills: 0 | Status: DISCONTINUED | COMMUNITY
Start: 2017-08-03 | End: 2019-02-13

## 2019-02-13 RX ORDER — CEFADROXIL 500 MG/1
500 CAPSULE ORAL TWICE DAILY
Qty: 4 | Refills: 0 | Status: DISCONTINUED | COMMUNITY
Start: 2018-09-17 | End: 2019-02-13

## 2019-02-13 RX ORDER — ESCITALOPRAM OXALATE 5 MG/1
5 TABLET ORAL
Qty: 30 | Refills: 0 | Status: DISCONTINUED | COMMUNITY
Start: 2017-02-24 | End: 2019-02-13

## 2019-02-13 RX ORDER — NYSTATIN 100000 [USP'U]/G
100000 CREAM TOPICAL
Qty: 120 | Refills: 0 | Status: DISCONTINUED | COMMUNITY
Start: 2017-02-13 | End: 2019-02-13

## 2019-02-13 RX ORDER — CEFADROXIL 500 MG/1
500 CAPSULE ORAL TWICE DAILY
Qty: 4 | Refills: 0 | Status: DISCONTINUED | COMMUNITY
Start: 2017-08-14 | End: 2019-02-13

## 2019-02-13 RX ORDER — OMEPRAZOLE 20 MG/1
20 CAPSULE, DELAYED RELEASE ORAL
Qty: 90 | Refills: 0 | Status: DISCONTINUED | COMMUNITY
Start: 2016-07-25 | End: 2019-02-13

## 2019-02-13 RX ORDER — ESCITALOPRAM OXALATE 10 MG/1
10 TABLET ORAL
Qty: 30 | Refills: 0 | Status: DISCONTINUED | COMMUNITY
Start: 2017-03-21 | End: 2019-02-13

## 2019-02-13 RX ORDER — AMOXICILLIN 500 MG/1
500 CAPSULE ORAL
Qty: 24 | Refills: 0 | Status: DISCONTINUED | COMMUNITY
Start: 2017-07-17 | End: 2019-02-13

## 2019-02-13 RX ORDER — CEFADROXIL 500 MG/1
500 CAPSULE ORAL TWICE DAILY
Qty: 20 | Refills: 0 | Status: DISCONTINUED | COMMUNITY
Start: 2017-12-09 | End: 2019-02-13

## 2019-02-13 NOTE — REASON FOR VISIT
[Consultation] : a consultation visit [Other: _____] : [unfilled] [FreeTextEntry1] : patient intake forms reviewed

## 2019-02-13 NOTE — REVIEW OF SYSTEMS
[As Noted in HPI] : as noted in HPI [Joint Pain] : joint pain [Anxiety] : anxiety [Depression] : depression [Negative] : Heme/Lymph [FreeTextEntry3] : wears glasses

## 2019-02-13 NOTE — ASSESSMENT
[FreeTextEntry1] : skin lesion possibly eczema or psoriasis vs fungal\par \par will start with calmoseptine and hydrocortisone\par daily fiber supplement\par stool for O&P and cdiff\par f/u if not improved

## 2019-02-13 NOTE — CONSULT LETTER
[Dear  ___] : Dear  [unfilled], [Consult Letter:] : I had the pleasure of evaluating your patient, [unfilled]. [Please see my note below.] : Please see my note below. [Consult Closing:] : Thank you very much for allowing me to participate in the care of this patient.  If you have any questions, please do not hesitate to contact me. [Sincerely,] : Sincerely, [FreeTextEntry3] : Gonzalo Salazar MD, FACS, FASCRS\par Colorectal Surgery\par [facility]\par Assistant Professor of Surgery [employer] at [facility]\par [address] [place] [address], [address]\[place] [address]\par Tel: [phone] \par Cell: [phone] \par Email: [email or web address]\par

## 2019-02-13 NOTE — HISTORY OF PRESENT ILLNESS
[FreeTextEntry1] : 86yo female presents with c/o burning irritation with BM 2/2 a difficult BM.  Pt daughter sees a reddened area/ring at the anal verge that has increased in size. She also reports that the patient's hairdresser has told her that she might have eczema or some skin condition because she has been noticing red patches on her scalp.  Pt has a BM almost daily, occasional episodes of constipation, which patient will use Miralax.  Pt most recent colonoscopy >5 years ago.  (?hx of polyps).

## 2019-02-13 NOTE — PHYSICAL EXAM
[Normal Breath Sounds] : Normal breath sounds [Normal Heart Sounds] : normal heart sounds [Normal Rate and Rhythm] : normal rate and rhythm [Alert] : alert [Oriented to Person] : oriented to person [Oriented to Place] : oriented to place [Oriented to Time] : oriented to time [Calm] : calm [Normal] : was normal [None] : there was no rectal mass  [Fistula] : no fistulas [Wart] : no warts [Ulcer ___ cm] : no ulcers [de-identified] : soft, NT/ND/ +BS [de-identified] : circumferential erythematous plaquelike lesion at the anal verge extending out about 2cm, small external hemorrhoids and several tiny skin tags [de-identified] : anoscopy reveals small internal hemorrhoids with a small amount of internal prolapse [de-identified] : elderly female [de-identified] : NC/AT [de-identified] : +ROM/SAGAR, uses wheelchair and walker for ambulation [de-identified] : intact

## 2019-04-24 ENCOUNTER — OTHER (OUTPATIENT)
Age: 84
End: 2019-04-24

## 2019-05-22 NOTE — CONSULT NOTE ADULT - ASSESSMENT
1. near syncope, likely vaso vagal.  2. Afib. permanent, rate acceptable  3. Cll    Plan: cont warfarin, atenolol, aldactone  PT eval
86F with left calf hematoma s/p fall on coumadin    P;  no acute orthopedic intervention   observation   pain control   cold compress  elevation   WBAT   discussed with DR Kitchen who is aware and agrees with plan
All other review of systems negative, except as noted in HPI

## 2019-06-04 ENCOUNTER — APPOINTMENT (OUTPATIENT)
Dept: UROLOGY | Facility: CLINIC | Age: 84
End: 2019-06-04
Payer: MEDICARE

## 2019-06-04 ENCOUNTER — OUTPATIENT (OUTPATIENT)
Dept: OUTPATIENT SERVICES | Facility: HOSPITAL | Age: 84
LOS: 1 days | End: 2019-06-04
Payer: MEDICARE

## 2019-06-04 DIAGNOSIS — Z86.19 PERSONAL HISTORY OF OTHER INFECTIOUS AND PARASITIC DISEASES: ICD-10-CM

## 2019-06-04 DIAGNOSIS — Z98.89 OTHER SPECIFIED POSTPROCEDURAL STATES: Chronic | ICD-10-CM

## 2019-06-04 DIAGNOSIS — E89.0 POSTPROCEDURAL HYPOTHYROIDISM: Chronic | ICD-10-CM

## 2019-06-04 PROCEDURE — 51701 INSERT BLADDER CATHETER: CPT

## 2019-06-04 PROCEDURE — 99214 OFFICE O/P EST MOD 30 MIN: CPT | Mod: 25

## 2019-06-04 NOTE — ASSESSMENT
[FreeTextEntry1] : Ms. Venegas is a 87 year old woman here for a follow up of nocturia, microscopic hematuria, urethral caruncle.Patient believes she makes less urine in 24-hour period than previously.  Her age says that one given a diuretic.  Shea good quantity of urine.  The patient says that she generally makes it to the bathroom in time, though she must tarry which takes the diuretic. The patient complained about a foul order from the urine. The patient does not have dysuria.  There is no gross hematuria.  The patient does not push or strain to urinate so sometimes she has to increase her abdominal pressure to defecate. Patient uses a walker.  The patient also stated that she is having right lower abdominal pain and discomfort.  He corresponds to a bulge of increasing size in the right lower quadrant.  The patient has bilateral hearing aids and wears eye glasses.\par 7/3/18: The patient returned today and reported she has nocturia and wakes up every 2 hours to urinate. She broke her femur and a tibial fracture. \par 7/16/18: The patient returned and reported she has the urge to urinate but ends up having a bowel movement instead. Daughter noted bowels are large. Patient noted she has a hard time urinating. Wakes up 3-4 times during the night to urinate, noted it is sometimes just "drops of urine." Patient is more disorientated and confused, I advised her to consult with a neurologist. \par 8/30/18: The patient returned today and reported she is feeling adequate. Patient denies dysuria, gross hematuria, urgency, or incontinence. Wakes up 2-3 times during the night to urinate. \par \par 6/4/19: Patient presents today for a follow up. Today she notes that her urination is "okay". She does not leak during the day but still wears depends since she is still concerned about incontinence. At night, she wakes up when she has to urinate but due to mobility issues is not always able to make it to bathroom. Diarrhea is reported on occasion. Mirtazapine has been initiated by another provider and it is reported that is has decreased her nocturia from 4x a night to once a night. \par \par The patient produced a catheterized urine sample which will be sent for urinalysis, urine cytology, and urine culture.\par \par Upon completion of a physical exam it is determined that vaginitis is present. Therefore Fluconazole will be prescribed at this time. I will provide her with 1 500 mg tablet that will be taken one time only due to her use of Warfarin. \par \par It is advised that she switch to cotton underwear during the day and if leakage is still reported then she can switch back to wearing the depends.  \par \par The patient is to follow up in 3 weeks or sooner if clinically indicated.

## 2019-06-04 NOTE — HISTORY OF PRESENT ILLNESS
[FreeTextEntry1] : Ms. Venegas is a 87 year old woman here for a follow up of nocturia, microscopic hematuria, urethral caruncle.Patient believes she makes less urine in 24-hour period than previously.  Her age says that one given a diuretic.  Shea good quantity of urine.  The patient says that she generally makes it to the bathroom in time, though she must tarry which takes the diuretic. The patient complained about a foul order from the urine. The patient does not have dysuria.  There is no gross hematuria.  The patient does not push or strain to urinate so sometimes she has to increase her abdominal pressure to defecate. Patient uses a walker.  The patient also stated that she is having right lower abdominal pain and discomfort.  He corresponds to a bulge of increasing size in the right lower quadrant.  The patient has bilateral hearing aids and wears eye glasses.\par 7/3/18: The patient returned today and reported she has nocturia and wakes up every 2 hours to urinate. She broke her femur and a tibial fracture. \par 7/16/18: The patient returned and reported she has the urge to urinate but ends up having a bowel movement instead. Daughter noted bowels are large. Patient noted she has a hard time urinating. Wakes up 3-4 times during the night to urinate, noted it is sometimes just "drops of urine." Patient is more disorientated and confused, I advised her to consult with a neurologist. \par 8/30/18: The patient returned today and reported she is feeling adequate. Patient denies dysuria, gross hematuria, urgency, or incontinence. Wakes up 2-3 times during the night to urinate. \par \par 6/4/19: Patient presents today for a follow up. Today she notes that her urination is "okay". She does not leak during the day but still wears depends since she is still concerned about incontinence. At night, she wakes up when she has to urinate but due to mobility issues is not always able to make it to bathroom. Diarrhea is reported on occasion. Mirtazapine has been initiated by another provider and it is reported that is has decreased her nocturia from 4x a night to once a night.

## 2019-06-04 NOTE — PHYSICAL EXAM
[General Appearance - Well Developed] : well developed [General Appearance - Well Nourished] : well nourished [Normal Appearance] : normal appearance [Well Groomed] : well groomed [General Appearance - In No Acute Distress] : no acute distress [Abdomen Soft] : soft [Abdomen Tenderness] : non-tender [Costovertebral Angle Tenderness] : no ~M costovertebral angle tenderness [Skin Color & Pigmentation] : normal skin color and pigmentation [] : no respiratory distress [Exaggerated Use Of Accessory Muscles For Inspiration] : no accessory muscle use [Respiration, Rhythm And Depth] : normal respiratory rhythm and effort [Oriented To Time, Place, And Person] : oriented to person, place, and time [Affect] : the affect was normal [Mood] : the mood was normal [Not Anxious] : not anxious [No Palpable Adenopathy] : no palpable adenopathy [Supraclavicular Lymph Nodes Enlarged Bilaterally] : supraclavicular [Cervical Lymph Nodes Enlarged Posterior Bilaterally] : posterior cervical [Cervical Lymph Nodes Enlarged Anterior Bilaterally] : anterior cervical [FreeTextEntry1] : No submandibular gland tenderness.\par

## 2019-06-04 NOTE — REVIEW OF SYSTEMS
[Loss Of Hearing] : hearing loss [Eyesight Problems] : eyesight problems [Chest Pain] : chest pain [Joint Pain] : joint pain [Constipation] : constipation [Anxiety] : anxiety [Easy Bleeding] : a tendency for easy bleeding [Feeling Poorly] : not feeling poorly [Palpitations] : no palpitations [Cough] : no cough [Dysuria] : no dysuria [Convulsions] : no convulsions [Hot Flashes] : no hot flashes

## 2019-06-06 LAB — BACTERIA UR CULT: NORMAL

## 2019-06-09 LAB
APPEARANCE: CLEAR
BACTERIA: NEGATIVE
BILIRUBIN URINE: NEGATIVE
BLOOD URINE: NEGATIVE
COLOR: COLORLESS
GLUCOSE QUALITATIVE U: NEGATIVE
HYALINE CASTS: 1 /LPF
KETONES URINE: NEGATIVE
LEUKOCYTE ESTERASE URINE: NEGATIVE
MICROSCOPIC-UA: NORMAL
NITRITE URINE: NEGATIVE
PH URINE: 7
PROTEIN URINE: NEGATIVE
RED BLOOD CELLS URINE: 0 /HPF
SPECIFIC GRAVITY URINE: 1.03
SQUAMOUS EPITHELIAL CELLS: 0 /HPF
URINE CYTOLOGY: NORMAL
UROBILINOGEN URINE: NORMAL
WHITE BLOOD CELLS URINE: 0 /HPF

## 2019-06-10 DIAGNOSIS — N39.0 URINARY TRACT INFECTION, SITE NOT SPECIFIED: ICD-10-CM

## 2019-06-10 DIAGNOSIS — B37.3 CANDIDIASIS OF VULVA AND VAGINA: ICD-10-CM

## 2019-06-18 ENCOUNTER — CHART COPY (OUTPATIENT)
Age: 84
End: 2019-06-18

## 2019-06-28 ENCOUNTER — APPOINTMENT (OUTPATIENT)
Dept: OBGYN | Facility: CLINIC | Age: 84
End: 2019-06-28

## 2019-08-02 ENCOUNTER — APPOINTMENT (OUTPATIENT)
Dept: OBGYN | Facility: CLINIC | Age: 84
End: 2019-08-02
Payer: MEDICARE

## 2019-08-02 ENCOUNTER — ASOB RESULT (OUTPATIENT)
Age: 84
End: 2019-08-02

## 2019-08-02 PROCEDURE — 76856 US EXAM PELVIC COMPLETE: CPT

## 2019-08-02 PROCEDURE — 99214 OFFICE O/P EST MOD 30 MIN: CPT

## 2019-08-02 NOTE — PHYSICAL EXAM
[No Acute Distress] : in no acute distress [Well Nourished] : ~L well nourished [Well developed] : well developed [Good Hygeine] : demonstrates good hygeine [Normal Memory] : ~T memory was ~L unimpaired [Oriented x3] : oriented to person, place, and time [Normal Mood/Affect] : mood and affect are normal [Normal Lung Sounds] : the lungs were clear to auscultation [Rate & Rhythm Regular] : ~T heart rate and rhythm were normal [Respirations regular] : ~T respiratory rate was regular [Supple] : ~T the neck demonstrated no ~M decrease in suppleness [No Edema] : ~T edema was not present [Thyroid Normal] : the thyroid ~T showed no abnormalities [Symmetrical] : the neck was ~L symmetrical [Warm and Dry] : was warm and dry to touch [Turgor Normal] : skin turgor ~T was normal [Normal Gait] : gait was normal [Normal Strength/Tone] : muscle strength and tone were normal [Vulvar Atrophy] : vulvar atrophy [Labia Majora] : were normal [Labia Minora] : were normal [Bartholin's Gland] : both Bartholin's glands were normal  [Normal Appearance] : general appearance was normal [Atrophy] : atrophy [3] : 3 [] : I [Uterine Adnexae] : were not tender and not enlarged [None] : no [Normal] : was normal [Normal rectal exam] : was normal [Mass] : no breast mass [Tender] : no tenderness [Nipple Discharge] : no nipple discharge [Mass (___ Cm)] : no ~M [unfilled] abdominal mass was palpated [Tenderness] : ~T no ~M abdominal tenderness observed [H/Smegaly] : no hepatosplenomegaly [Axillary LAD] : no adenopathy was noted in axillary nodes [Supraclavicular LAD] : no adenopathy noted in supraclavicular lymph nodes [Inguinal LAD] : no adenopathy was noted in the inguinal lymph nodes [Rash/Lesion] : no rash or lesion was noted [No Joint Swelling] : there was swelling of the joints [No Clubbing, Cyanosis] : there was clubbing and cyanosis of the fingernails [Estrogen Effect] : no estrogen effect was observed

## 2019-08-02 NOTE — PROCEDURE
[Urinary Tract Infection] : a urinary tract infection [Straight Catheterization] : insertion of a straight catheter [Patient] : the patient [Other: ___] : ~LAKESHIA mg [None] : there were no complications with the catheter insertion [Clear] : clear [Culture] : culture [Cytology] : cytology [Urinalysis] : urinalysis [No Complications] : no complications [Tolerated Well] : the patient tolerated the procedure well [Post procedure instructions and information given] : Post procedure instructions and information were given and reviewed with patient. [0] : 0 [FreeTextEntry1] : Patient tolerated the catheterization well and was immediately given a dose of ampicillin she reports no allergies

## 2019-08-02 NOTE — REVIEW OF SYSTEMS
[WT Loss ___] : recent [unfilled] ~Ulb weight loss [Eyesight Problems] : eyesight problems [Abdominal Pain] : abdominal pain [Shortness Of Breath] : shortness of breath [Loss Of Hearing] : hearing loss [Joint Stiffness] : joint stiffness [Limb Pain] : limb pain [All Other ROS] : all other reviewed systems are negative [Easy Bruising] : a tendency for easy bruising

## 2019-08-02 NOTE — CHIEF COMPLAINT
[Questionnaire Received] : Patient questionnaire received [Recurrent Urinary Infections] : recurrent urinary infections [Other ___] : [unfilled]

## 2019-08-02 NOTE — COUNSELING
[FreeTextEntry1] : 1-The patient will see me back again In 3-4 mos or as clinically indicated-sees dr sexton prn-for uti sx-pvr 162 cc's; timed void and sit longer when voids; may offer PTNS if nocturia bothers her\par 2-Urethral caruncle much improved and Dr. Sexton follows her for this\par 3-vulvar irritation: No evidence of a yeast infection at this point over the skin is fairly thin she will use triple paste bid and lotrisone to intertrigonal folds qhs until I see her next to rx the candidal vulvitis.\par 4-all other health maintenance is up to date Repeat mammogram and breast sono due to mastodynia 2018 and repeat pelvic sonogram transabdominally-normal today-no mass felt on exam (ovaries nv but no masses and uterus normal)\par 5-All questions answered to pt and daughter's apparent satisfaction\par 6-unable to collect urine by msu; may try cath at next visit or w/Dr. Sexton; no ssx uti right now\par 7-bone density every 2 years or so\par 8-referral to a neurologist made at the daughter is request for cognitive testing; she is still seeking neurologist whom pt na daughter likes-Dr. Leandro Lu geriatric neurologist-may follow w/him\par 9- regarding her back pain she was referred to the chief of PM and R. Dr. Branden Pizarro10-pap na due to lefort procedure-healed well \par 10-mammo ordered (daughter will consider) rx given\par \par

## 2019-08-02 NOTE — DISCUSSION/SUMMARY
[Reviewed Radiology Report(s)] : Radiology reports were reviewed. [Reviewed Clinical Lab Test(s)] : Results of clinical tests were reviewed. [Discuss Tests w/Referring Providers] : Results of labs/radiology studies and the treatment recommendations were discussed with performing/referring physician.

## 2019-08-02 NOTE — HISTORY OF PRESENT ILLNESS
[Cystocele (Obstetric)] : frequent [Uterine Prolapse] : none [Vaginal Wall Prolapse] : rare [Rectal Prolapse] : rare [Urge Incontinence Of Urine] : none [Stress Incontinence] : none [Unable To Restrain Bowel Movement] : none [Urinary Frequency More Than Twice At Night (Nocturia)] : none [Urinary Stream Starts And Stops] : none [Feelings Of Urinary Urgency] : none [Incomplete Emptying Of Bladder] : none [Urinary Frequency] : none [Pain During Urination (Dysuria)] : none [Hematuria] : none [Urinary Tract Infection] : none [Constipation Obstructed Defecation] : none [Incomplete Emptying Of Stool] : none [Stool Visible Blood] : none [Pelvic Pain] : none [Diarrhea] : none [Vaginal Pain] : none [Rectal Pain] : none [Bladder Pain] : none [Vulvar Pain] : none [Back Pain] : none [Sexual Dysfunction, NOS] : none [] : none [FreeTextEntry1] : has seen Dr. Sexton and latest exam nl\par Dr. Sanchez did urodynamics yesterday in the did not reveal any stress or urge incontinence\par All options were discussed with the patient now by tiesha Arriaga/brianne garibay and deepa (and harsha)\par Post void residual was 300 and she was catheterized today the urine appeared cloudy and she's been placed on Levaquin for 10 days and then will follow with trimethoprim 100 mg at night and she will think about whether or not she wants to consider surgery the hide a way garment was demonstrated to the patient to be worn either over under the depends-\par Renal and pelvic sonogram are normal and Pap was done today the pelvic sono was done today did not reveal any pelvic masses\par

## 2019-09-26 ENCOUNTER — INPATIENT (INPATIENT)
Facility: HOSPITAL | Age: 84
LOS: 0 days | Discharge: HOME | DRG: 312 | End: 2019-09-27
Attending: HOSPITALIST | Admitting: HOSPITALIST
Payer: COMMERCIAL

## 2019-09-26 VITALS
SYSTOLIC BLOOD PRESSURE: 133 MMHG | DIASTOLIC BLOOD PRESSURE: 85 MMHG | RESPIRATION RATE: 18 BRPM | HEIGHT: 67 IN | WEIGHT: 145.06 LBS | OXYGEN SATURATION: 96 % | HEART RATE: 66 BPM

## 2019-09-26 DIAGNOSIS — Z98.89 OTHER SPECIFIED POSTPROCEDURAL STATES: Chronic | ICD-10-CM

## 2019-09-26 DIAGNOSIS — R55 SYNCOPE AND COLLAPSE: ICD-10-CM

## 2019-09-26 DIAGNOSIS — Z29.9 ENCOUNTER FOR PROPHYLACTIC MEASURES, UNSPECIFIED: ICD-10-CM

## 2019-09-26 DIAGNOSIS — E89.0 POSTPROCEDURAL HYPOTHYROIDISM: Chronic | ICD-10-CM

## 2019-09-26 DIAGNOSIS — K62.89 OTHER SPECIFIED DISEASES OF ANUS AND RECTUM: ICD-10-CM

## 2019-09-26 DIAGNOSIS — M48.00 SPINAL STENOSIS, SITE UNSPECIFIED: ICD-10-CM

## 2019-09-26 DIAGNOSIS — I10 ESSENTIAL (PRIMARY) HYPERTENSION: ICD-10-CM

## 2019-09-26 DIAGNOSIS — N30.00 ACUTE CYSTITIS WITHOUT HEMATURIA: ICD-10-CM

## 2019-09-26 DIAGNOSIS — L08.9 LOCAL INFECTION OF THE SKIN AND SUBCUTANEOUS TISSUE, UNSPECIFIED: ICD-10-CM

## 2019-09-26 DIAGNOSIS — I48.0 PAROXYSMAL ATRIAL FIBRILLATION: ICD-10-CM

## 2019-09-26 DIAGNOSIS — C91.90 LYMPHOID LEUKEMIA, UNSPECIFIED NOT HAVING ACHIEVED REMISSION: ICD-10-CM

## 2019-09-26 LAB
ALBUMIN SERPL ELPH-MCNC: 4.2 G/DL — SIGNIFICANT CHANGE UP (ref 3.3–5)
ALP SERPL-CCNC: 71 U/L — SIGNIFICANT CHANGE UP (ref 40–120)
ALT FLD-CCNC: 22 U/L — SIGNIFICANT CHANGE UP (ref 12–78)
ANION GAP SERPL CALC-SCNC: 11 MMOL/L — SIGNIFICANT CHANGE UP (ref 5–17)
APPEARANCE UR: CLEAR — SIGNIFICANT CHANGE UP
APTT BLD: 28.8 SEC — SIGNIFICANT CHANGE UP (ref 28.5–37)
AST SERPL-CCNC: 24 U/L — SIGNIFICANT CHANGE UP (ref 15–37)
BASOPHILS # BLD AUTO: 0.19 K/UL — SIGNIFICANT CHANGE UP (ref 0–0.2)
BASOPHILS NFR BLD AUTO: 1 % — SIGNIFICANT CHANGE UP (ref 0–2)
BILIRUB SERPL-MCNC: 0.4 MG/DL — SIGNIFICANT CHANGE UP (ref 0.2–1.2)
BILIRUB UR-MCNC: NEGATIVE — SIGNIFICANT CHANGE UP
BUN SERPL-MCNC: 26 MG/DL — HIGH (ref 7–23)
CALCIUM SERPL-MCNC: 9.4 MG/DL — SIGNIFICANT CHANGE UP (ref 8.5–10.1)
CHLORIDE SERPL-SCNC: 99 MMOL/L — SIGNIFICANT CHANGE UP (ref 96–108)
CO2 SERPL-SCNC: 23 MMOL/L — SIGNIFICANT CHANGE UP (ref 22–31)
COLOR SPEC: YELLOW — SIGNIFICANT CHANGE UP
CREAT SERPL-MCNC: 0.96 MG/DL — SIGNIFICANT CHANGE UP (ref 0.5–1.3)
CULTURE RESULTS: NO GROWTH — SIGNIFICANT CHANGE UP
DIFF PNL FLD: NEGATIVE — SIGNIFICANT CHANGE UP
EOSINOPHIL # BLD AUTO: 0 K/UL — SIGNIFICANT CHANGE UP (ref 0–0.5)
EOSINOPHIL NFR BLD AUTO: 0 % — SIGNIFICANT CHANGE UP (ref 0–6)
GLUCOSE SERPL-MCNC: 138 MG/DL — HIGH (ref 70–99)
GLUCOSE UR QL: NEGATIVE — SIGNIFICANT CHANGE UP
HCT VFR BLD CALC: 43.8 % — SIGNIFICANT CHANGE UP (ref 34.5–45)
HGB BLD-MCNC: 14.6 G/DL — SIGNIFICANT CHANGE UP (ref 11.5–15.5)
INR BLD: 2.25 RATIO — HIGH (ref 0.88–1.16)
KETONES UR-MCNC: NEGATIVE — SIGNIFICANT CHANGE UP
LACTATE SERPL-SCNC: 2.1 MMOL/L — HIGH (ref 0.7–2)
LEUKOCYTE ESTERASE UR-ACNC: ABNORMAL
LIDOCAIN IGE QN: 235 U/L — SIGNIFICANT CHANGE UP (ref 73–393)
LYMPHOCYTES # BLD AUTO: 1.53 K/UL — SIGNIFICANT CHANGE UP (ref 1–3.3)
LYMPHOCYTES # BLD AUTO: 8 % — LOW (ref 13–44)
MAGNESIUM SERPL-MCNC: 2 MG/DL — SIGNIFICANT CHANGE UP (ref 1.6–2.6)
MCHC RBC-ENTMCNC: 31.8 PG — SIGNIFICANT CHANGE UP (ref 27–34)
MCHC RBC-ENTMCNC: 33.3 GM/DL — SIGNIFICANT CHANGE UP (ref 32–36)
MCV RBC AUTO: 95.4 FL — SIGNIFICANT CHANGE UP (ref 80–100)
MONOCYTES # BLD AUTO: 4.6 K/UL — HIGH (ref 0–0.9)
MONOCYTES NFR BLD AUTO: 24 % — HIGH (ref 2–14)
NEUTROPHILS # BLD AUTO: 12.85 K/UL — HIGH (ref 1.8–7.4)
NEUTROPHILS NFR BLD AUTO: 61 % — SIGNIFICANT CHANGE UP (ref 43–77)
NITRITE UR-MCNC: NEGATIVE — SIGNIFICANT CHANGE UP
NRBC # BLD: SIGNIFICANT CHANGE UP /100 WBCS (ref 0–0)
PH UR: 7 — SIGNIFICANT CHANGE UP (ref 5–8)
PLATELET # BLD AUTO: 96 K/UL — LOW (ref 150–400)
POTASSIUM SERPL-MCNC: 4.3 MMOL/L — SIGNIFICANT CHANGE UP (ref 3.5–5.3)
POTASSIUM SERPL-SCNC: 4.3 MMOL/L — SIGNIFICANT CHANGE UP (ref 3.5–5.3)
PROT SERPL-MCNC: 7.4 G/DL — SIGNIFICANT CHANGE UP (ref 6–8.3)
PROT UR-MCNC: 25 MG/DL
PROTHROM AB SERPL-ACNC: 26.3 SEC — HIGH (ref 10–12.9)
RBC # BLD: 4.59 M/UL — SIGNIFICANT CHANGE UP (ref 3.8–5.2)
RBC # FLD: 12.7 % — SIGNIFICANT CHANGE UP (ref 10.3–14.5)
SODIUM SERPL-SCNC: 133 MMOL/L — LOW (ref 135–145)
SP GR SPEC: 1 — LOW (ref 1.01–1.02)
SPECIMEN SOURCE: SIGNIFICANT CHANGE UP
TROPONIN I SERPL-MCNC: <.015 NG/ML — SIGNIFICANT CHANGE UP (ref 0.01–0.04)
UROBILINOGEN FLD QL: NEGATIVE — SIGNIFICANT CHANGE UP
WBC # BLD: 19.18 K/UL — HIGH (ref 3.8–10.5)
WBC # FLD AUTO: 19.18 K/UL — HIGH (ref 3.8–10.5)

## 2019-09-26 PROCEDURE — 74177 CT ABD & PELVIS W/CONTRAST: CPT | Mod: 26

## 2019-09-26 PROCEDURE — 93010 ELECTROCARDIOGRAM REPORT: CPT

## 2019-09-26 PROCEDURE — 70450 CT HEAD/BRAIN W/O DYE: CPT | Mod: 26

## 2019-09-26 PROCEDURE — 71045 X-RAY EXAM CHEST 1 VIEW: CPT | Mod: 26

## 2019-09-26 PROCEDURE — 99223 1ST HOSP IP/OBS HIGH 75: CPT

## 2019-09-26 PROCEDURE — 99223 1ST HOSP IP/OBS HIGH 75: CPT | Mod: AI

## 2019-09-26 PROCEDURE — 99281 EMR DPT VST MAYX REQ PHY/QHP: CPT

## 2019-09-26 RX ORDER — MESALAMINE 400 MG
1000 TABLET, DELAYED RELEASE (ENTERIC COATED) ORAL AT BEDTIME
Refills: 0 | Status: DISCONTINUED | OUTPATIENT
Start: 2019-09-26 | End: 2019-09-27

## 2019-09-26 RX ORDER — WARFARIN SODIUM 2.5 MG/1
3 TABLET ORAL ONCE
Refills: 0 | Status: DISCONTINUED | OUTPATIENT
Start: 2019-09-26 | End: 2019-09-26

## 2019-09-26 RX ORDER — LOSARTAN POTASSIUM 100 MG/1
25 TABLET, FILM COATED ORAL DAILY
Refills: 0 | Status: DISCONTINUED | OUTPATIENT
Start: 2019-09-26 | End: 2019-09-27

## 2019-09-26 RX ORDER — CIPROFLOXACIN LACTATE 400MG/40ML
200 VIAL (ML) INTRAVENOUS EVERY 12 HOURS
Refills: 0 | Status: DISCONTINUED | OUTPATIENT
Start: 2019-09-26 | End: 2019-09-26

## 2019-09-26 RX ORDER — FERROUS SULFATE 325(65) MG
1 TABLET ORAL
Qty: 0 | Refills: 0 | DISCHARGE

## 2019-09-26 RX ORDER — CALCIUM CARBONATE 500(1250)
1 TABLET ORAL
Qty: 0 | Refills: 0 | DISCHARGE

## 2019-09-26 RX ORDER — CIPROFLOXACIN LACTATE 400MG/40ML
400 VIAL (ML) INTRAVENOUS ONCE
Refills: 0 | Status: COMPLETED | OUTPATIENT
Start: 2019-09-26 | End: 2019-09-26

## 2019-09-26 RX ORDER — METRONIDAZOLE 500 MG
500 TABLET ORAL ONCE
Refills: 0 | Status: COMPLETED | OUTPATIENT
Start: 2019-09-26 | End: 2019-09-26

## 2019-09-26 RX ORDER — SODIUM CHLORIDE 9 MG/ML
1000 INJECTION INTRAMUSCULAR; INTRAVENOUS; SUBCUTANEOUS
Refills: 0 | Status: DISCONTINUED | OUTPATIENT
Start: 2019-09-26 | End: 2019-09-26

## 2019-09-26 RX ORDER — MIRTAZAPINE 45 MG/1
15 TABLET, ORALLY DISINTEGRATING ORAL AT BEDTIME
Refills: 0 | Status: DISCONTINUED | OUTPATIENT
Start: 2019-09-26 | End: 2019-09-27

## 2019-09-26 RX ORDER — METOPROLOL TARTRATE 50 MG
25 TABLET ORAL DAILY
Refills: 0 | Status: DISCONTINUED | OUTPATIENT
Start: 2019-09-26 | End: 2019-09-27

## 2019-09-26 RX ORDER — ONDANSETRON 8 MG/1
4 TABLET, FILM COATED ORAL ONCE
Refills: 0 | Status: COMPLETED | OUTPATIENT
Start: 2019-09-26 | End: 2019-09-26

## 2019-09-26 RX ORDER — METRONIDAZOLE 500 MG
500 TABLET ORAL EVERY 8 HOURS
Refills: 0 | Status: DISCONTINUED | OUTPATIENT
Start: 2019-09-26 | End: 2019-09-26

## 2019-09-26 RX ORDER — ACETAMINOPHEN 500 MG
650 TABLET ORAL EVERY 6 HOURS
Refills: 0 | Status: DISCONTINUED | OUTPATIENT
Start: 2019-09-26 | End: 2019-09-27

## 2019-09-26 RX ORDER — OMEPRAZOLE 10 MG/1
1 CAPSULE, DELAYED RELEASE ORAL
Qty: 0 | Refills: 0 | DISCHARGE

## 2019-09-26 RX ORDER — WARFARIN SODIUM 2.5 MG/1
2.5 TABLET ORAL ONCE
Refills: 0 | Status: COMPLETED | OUTPATIENT
Start: 2019-09-26 | End: 2019-09-26

## 2019-09-26 RX ORDER — CIPROFLOXACIN LACTATE 400MG/40ML
250 VIAL (ML) INTRAVENOUS
Refills: 0 | Status: DISCONTINUED | OUTPATIENT
Start: 2019-09-26 | End: 2019-09-26

## 2019-09-26 RX ORDER — SPIRONOLACTONE 25 MG/1
25 TABLET, FILM COATED ORAL DAILY
Refills: 0 | Status: DISCONTINUED | OUTPATIENT
Start: 2019-09-26 | End: 2019-09-26

## 2019-09-26 RX ADMIN — Medication 200 MILLIGRAM(S): at 08:41

## 2019-09-26 RX ADMIN — SODIUM CHLORIDE 100 MILLILITER(S): 9 INJECTION INTRAMUSCULAR; INTRAVENOUS; SUBCUTANEOUS at 05:55

## 2019-09-26 RX ADMIN — Medication 25 MILLIGRAM(S): at 09:54

## 2019-09-26 RX ADMIN — LOSARTAN POTASSIUM 25 MILLIGRAM(S): 100 TABLET, FILM COATED ORAL at 09:54

## 2019-09-26 RX ADMIN — MIRTAZAPINE 15 MILLIGRAM(S): 45 TABLET, ORALLY DISINTEGRATING ORAL at 21:17

## 2019-09-26 RX ADMIN — Medication 100 MILLIGRAM(S): at 09:41

## 2019-09-26 RX ADMIN — WARFARIN SODIUM 2.5 MILLIGRAM(S): 2.5 TABLET ORAL at 21:17

## 2019-09-26 RX ADMIN — ONDANSETRON 4 MILLIGRAM(S): 8 TABLET, FILM COATED ORAL at 05:56

## 2019-09-26 NOTE — H&P ADULT - PROBLEM SELECTOR PLAN 4
Continue Metoprolol, Losartan, Aldactone, home dose and frequency Continue Metoprolol, Losartan,  home dose and frequency  Hold Aldactone for now on setting of syncopal episode

## 2019-09-26 NOTE — CONSULT NOTE ADULT - ASSESSMENT
88 y.o. female with multiple comorbidities and chronic venous htn. is s/p removal of her left 1st toe nail. She has no significant PAD in either leg. She has adequate arterial flow in both legs to allow regrowth of her toe nail. No vascular interventions are needed. I ENCOURAGE HER TO ELEVATE HER LEGS WHEN IN BED AND USE BOOTS ON BOTH FEET TO PREVENT DECUBITI. The patient and her daughter were given informed consent.

## 2019-09-26 NOTE — ED ADULT NURSE NOTE - PMH
Acute cystitis without hematuria  septic  4/2016  Essential hypertension    [place] (hard of hearing), bilateral    Hypertension    Lymphedema of both lower extremities    Monoclonal gammopathies    Paroxysmal atrial fibrillation    Spinal stenosis    Spondyloarthritis    Vaginal prolapse    Venous insufficiency

## 2019-09-26 NOTE — CONSULT NOTE ADULT - PROBLEM SELECTOR RECOMMENDATION 9
suspect stercoral ulceration  doubt infectious given normal bm  doubt mass given no rectal bleed and normal caliber stool  would advise 2 week course of mesalamine supp for sterocoral ulceration and then re-evaluate  colonoscopy discussed to r/o malignancy; however would favor conservative management first given cardiac co-morbidities   d/w daughter at bedside and second daughter over phone  diet as tolerated

## 2019-09-26 NOTE — ED ADULT NURSE REASSESSMENT NOTE - NS ED NURSE REASSESS COMMENT FT1
pt   placed in hospital bed  and positioned to offload pressure from effected site.  Pt tolerated cl liguids well daughter at bedside

## 2019-09-26 NOTE — PHYSICAL THERAPY INITIAL EVALUATION ADULT - ACTIVE RANGE OF MOTION EXAMINATION, REHAB EVAL
Right UE Active ROM was WFL (within functional limits)/Left UE Active ROM was WFL (within functional limits)/Right LE Active ROM was WFL (within functional limits)/Left LE Active ROM was WFL (within functional limits) well nourished

## 2019-09-26 NOTE — PATIENT PROFILE ADULT - ABILITY TO HEAR (WITH HEARING AID OR HEARING APPLIANCE IF NORMALLY USED):
wearing hearing aids bilateral/Mildly to Moderately Impaired: difficulty hearing in some environments or speaker may need to increase volume or speak distinctly

## 2019-09-26 NOTE — H&P ADULT - HISTORY OF PRESENT ILLNESS
89 y/o F PMH of Afib ( On Coumadin), HTN, Depression, IBS, CLL, brought in s/p syncopal episode. Per daughter at bedside, patient has h/o vasovagal syncopal episodes after bowel movement. Per HHA , who was with the patient, patient used bedside commode early this am around 4:30 am and during that slummed over and passed out, no fall or head trauma or tongue biting. Per daughter Tabatha , patient has h/o IBS so she has normal bowel movement alternating with diarrhea. Patient back to her baseline in ER, had normal solid BM per ER doctor. Denies chest pain, palpitation, dysuria, frequency, palpitation, sob. Patient states that she feels fine and not happy to stay in the hospital. 87 y/o F PMH of Afib ( On Coumadin), HTN, Depression, IBS, CLL, brought in s/p syncopal episode. Per daughter at bedside, patient has h/o vasovagal syncopal episodes after bowel movement. Per HHA , who was with the patient, patient used bedside commode early this am around 4:30 am and during that slumped over and passed out, no fall or head trauma or tongue biting. Per daughter Tabatha , patient has h/o IBS so she has normal bowel movement alternating with diarrhea. Patient back to her baseline in ER, had normal solid BM per ER doctor. Denies chest pain, palpitation, dysuria, frequency, palpitation, sob. Denies nausea at present.  Patient states that she feels fine and not happy to stay in the hospital. 87 y/o F PMH of Afib ( On Coumadin), HTN, Depression, IBS, CLL, brought in s/p syncopal episode. Per daughter at bedside, patient has h/o vasovagal syncopal episodes after bowel movement. Per HHA , who was with the patient, patient used bedside commode early this am around 4:30 am and during that patient looked like she  passed out, no fall or head trauma or tongue biting. Per daughter Tabatha , patient has h/o IBS so she has normal bowel movement alternating with diarrhea. Patient back to her baseline in ER, had normal solid BM per ER doctor. Denies chest pain, palpitation, dysuria, frequency, palpitation, sob. Denies nausea at present.  Patient states that she feels fine and not happy to stay in the hospital. 89 y/o F PMH of Afib ( On Coumadin), HTN, Depression, IBS, CLL,, PVD, vasovagal syncope,  brought in s/p syncopal episode. Per daughter at bedside, patient has h/o vasovagal syncopal episodes after bowel movement. Per HHA , who was with the patient, patient used bedside commode early this am around 4:30 am and during that patient looked like she  passed out, no fall or head trauma or tongue biting. Per daughter Tabatha , patient has h/o IBS so she has normal bowel movement alternating with diarrhea. Patient back to her baseline in ER, had normal solid BM per ER doctor. Denies chest pain, palpitation, dysuria, frequency, palpitation, sob. Denies nausea at present.  Patient states that she feels fine and not happy to stay in the hospital.

## 2019-09-26 NOTE — CONSULT NOTE ADULT - SUBJECTIVE AND OBJECTIVE BOX
CHARTING IN PROGRESS    Patient is a 88y old  Female who presents with a chief complaint of Syncopal episode (26 Sep 2019 09:23)      HPI:  87 y/o F PMH of Afib ( On Coumadin), HTN, Depression, IBS, CLL, brought in s/p syncopal episode. Per daughter at bedside, patient has h/o vasovagal syncopal episodes after bowel movement. Per HHA , who was with the patient, patient used bedside commode early this am around 4:30 am and during that patient looked like she  passed out, no fall or head trauma or tongue biting. Per daughter Tabatha , patient has h/o IBS so she has normal bowel movement alternating with diarrhea. Patient back to her baseline in ER, had normal solid BM per ER doctor. Denies chest pain, palpitation, dysuria, frequency, palpitation, sob. Denies nausea at present.  Patient states that she feels fine and not happy to stay in the hospital. (26 Sep 2019 09:23)    Interval History: Patient seen and examined at bedside with multiple family members present. Patient denies any chest pain or SOB at this time. Patient is currently in Afib with HR in the 70s. However, family is concerned as patient's HR increases to 100-200 every few minutes for 10-15sec before improving back to baseline. Patient reports she does not feel any palpitations, chest pain, or SOB during these episodes. Patient follows with cardiologist Dr. Vides who she last saw 1 month prior for a routine visit. Patient has had echocardiograms in the past but not recently. At home, patient ambulates with a walker but is mostly sedentary.      PAST MEDICAL & SURGICAL HISTORY:  Hypertension  [place] (hard of hearing), bilateral  Vaginal prolapse  Spondyloarthritis  Spinal stenosis  Essential hypertension  Acute cystitis without hematuria: septic  2016  Paroxysmal atrial fibrillation  Monoclonal gammopathies  Venous insufficiency  Lymphedema of both lower extremities  History of vaginal surgery  S/P thyroidectomy: partial     S/P kyphoplasty:        MEDICATIONS  (STANDING):  ciprofloxacin   IVPB 200 milliGRAM(s) IV Intermittent every 12 hours  losartan 25 milliGRAM(s) Oral daily  metoprolol succinate ER 25 milliGRAM(s) Oral daily  metroNIDAZOLE  IVPB 500 milliGRAM(s) IV Intermittent every 8 hours  mirtazapine 15 milliGRAM(s) Oral at bedtime  spironolactone 25 milliGRAM(s) Oral daily  warfarin 2.5 milliGRAM(s) Oral once    MEDICATIONS  (PRN):  acetaminophen   Tablet .. 650 milliGRAM(s) Oral every 6 hours PRN Temp greater or equal to 38C (100.4F), Mild Pain (1 - 3)      FAMILY HISTORY:  No pertinent family history in first degree relatives    Denies Family history of CAD or early MI      Constitutional: denies fever, chills  HEENT: denies blurry vision, difficulty hearing  Respiratory: denies SOB, KLEIN, cough  Cardiovascular: denies CP, palpitations, orthopnea, PND, LE edema  Gastrointestinal: denies nausea, vomiting, abdominal pain  Genitourinary: denies urinary changes  Skin: Denies rashes, itching  Neurologic: denies headache, weakness, dizziness  Hematology/Oncology: denies bleeding, easy bruising  ROS negative except as noted above      SOCIAL HISTORY:  No tobacco, Alcohol or Ddrug use. Lives at home. Ambulates with walker. 24HHA    Vital Signs Last 24 Hrs  T(C): 36.7 (26 Sep 2019 08:18), Max: 36.7 (26 Sep 2019 08:18)  T(F): 98 (26 Sep 2019 08:18), Max: 98 (26 Sep 2019 08:18)  HR: 77 (26 Sep 2019 08:18) (66 - 77)  BP: 142/70 (26 Sep 2019 08:18) (133/85 - 142/70)  BP(mean): --  RR: 14 (26 Sep 2019 08:18) (14 - 18)  SpO2: 98% (26 Sep 2019 08:18) (96% - 98%)    Physical Exam:  General: Well developed, well nourished, NAD  HEENT: NCAT, PERRLA, EOMI bl, moist mucous membranes   Neck: Supple, nontender, no mass  Neurology: A&Ox3, nonfocal, sensation intact   Respiratory: CTA B/L, No W/R/R  CV: RRR, +S1/S2, no murmurs, rubs or gallops  Abdominal: Soft, NT, ND +BSx4, no palpable masses  Extremities: No C/C/E, + peripheral pulses  MSK: Normal ROM, no joint erythema or warmth, no joint swelling   Heme: No obvious ecchymosis or petechiae   Skin: warm, dry, normal color      ECG:    I&O's Detail      LABS:                        14.6   19.18 )-----------( 96       ( 26 Sep 2019 05:27 )             43.8         133<L>  |  99  |  26<H>  ----------------------------<  138<H>  4.3   |  23  |  0.96    Ca    9.4      26 Sep 2019 05:27  Mg     2.0         TPro  7.4  /  Alb  4.2  /  TBili  0.4  /  DBili  x   /  AST  24  /  ALT  22  /  AlkPhos  71      CARDIAC MARKERS ( 26 Sep 2019 05:27 )  <.015 ng/mL / x     / x     / x     / x          PT/INR - ( 26 Sep 2019 06:26 )   PT: 26.3 sec;   INR: 2.25 ratio         PTT - ( 26 Sep 2019 06:26 )  PTT:28.8 sec  Urinalysis Basic - ( 26 Sep 2019 07:32 )    Color: Yellow / Appearance: Clear / S.005 / pH: x  Gluc: x / Ketone: Negative  / Bili: Negative / Urobili: Negative   Blood: x / Protein: 25 mg/dL / Nitrite: Negative   Leuk Esterase: Moderate / RBC: 0-2 /HPF / WBC 0-2   Sq Epi: x / Non Sq Epi: Occasional / Bacteria: x      I&O's Summary    BNP  RADIOLOGY & ADDITIONAL STUDIES: CHARTING IN PROGRESS    Patient is a 88y old  Female who presents with a chief complaint of Syncopal episode (26 Sep 2019 09:23)      HPI:  87 y/o F PMH of Afib ( On Coumadin), HTN, Depression, IBS, CLL, brought in s/p syncopal episode. Per daughter at bedside, patient has h/o vasovagal syncopal episodes after bowel movement. Per HHA , who was with the patient, patient used bedside commode early this am around 4:30 am and during that patient looked like she  passed out, no fall or head trauma or tongue biting. Per daughter Tabatha , patient has h/o IBS so she has normal bowel movement alternating with diarrhea. Patient back to her baseline in ER, had normal solid BM per ER doctor. Denies chest pain, palpitation, dysuria, frequency, palpitation, sob. Denies nausea at present.  Patient states that she feels fine and not happy to stay in the hospital. (26 Sep 2019 09:23)    Interval History: Patient seen and examined at bedside with multiple family members present. Patient denies any chest pain or SOB at this time. Patient is currently in Afib with HR in the 70s. However, family is concerned as patient's HR increases to 100-200 every few minutes for 10-15sec before improving back to baseline. Patient reports she does not feel any palpitations, chest pain, or SOB during these episodes. Patient follows with cardiologist Dr. Vides who she last saw 1 month prior for a routine visit. Patient has had echocardiograms in the past but not recently. At home, patient ambulates with a walker but is mostly sedentary.      PAST MEDICAL & SURGICAL HISTORY:  Hypertension  [place] (hard of hearing), bilateral  Vaginal prolapse  Spondyloarthritis  Spinal stenosis  Essential hypertension  Acute cystitis without hematuria: septic  2016  Paroxysmal atrial fibrillation  Monoclonal gammopathies  Venous insufficiency  Lymphedema of both lower extremities  History of vaginal surgery  S/P thyroidectomy: partial     S/P kyphoplasty:        MEDICATIONS  (STANDING):  ciprofloxacin   IVPB 200 milliGRAM(s) IV Intermittent every 12 hours  losartan 25 milliGRAM(s) Oral daily  metoprolol succinate ER 25 milliGRAM(s) Oral daily  metroNIDAZOLE  IVPB 500 milliGRAM(s) IV Intermittent every 8 hours  mirtazapine 15 milliGRAM(s) Oral at bedtime  spironolactone 25 milliGRAM(s) Oral daily  warfarin 2.5 milliGRAM(s) Oral once    MEDICATIONS  (PRN):  acetaminophen   Tablet .. 650 milliGRAM(s) Oral every 6 hours PRN Temp greater or equal to 38C (100.4F), Mild Pain (1 - 3)      FAMILY HISTORY:  No pertinent family history in first degree relatives    Denies Family history of CAD or early MI      Constitutional: denies fever, chills  HEENT: denies blurry vision, difficulty hearing  Respiratory: denies SOB, KLEIN, cough  Cardiovascular: denies CP, palpitations, orthopnea, PND, LE edema  Gastrointestinal: denies nausea, vomiting, abdominal pain  Genitourinary: denies urinary changes  Skin: Denies rashes, itching  Neurologic: denies headache, weakness, dizziness  Hematology/Oncology: denies bleeding, easy bruising  ROS negative except as noted above      SOCIAL HISTORY:  No tobacco, Alcohol or Ddrug use. Lives at home. Ambulates with walker. 24HHA    Vital Signs Last 24 Hrs  T(C): 36.7 (26 Sep 2019 08:18), Max: 36.7 (26 Sep 2019 08:18)  T(F): 98 (26 Sep 2019 08:18), Max: 98 (26 Sep 2019 08:18)  HR: 77 (26 Sep 2019 08:18) (66 - 77)  BP: 142/70 (26 Sep 2019 08:18) (133/85 - 142/70)  BP(mean): --  RR: 14 (26 Sep 2019 08:18) (14 - 18)  SpO2: 98% (26 Sep 2019 08:18) (96% - 98%)    Physical Exam:  General: Well developed, well nourished, NAD  HEENT: NCAT, EOMI bl, moist mucous membranes   Neurology: A&Ox3, nonfocal, sensation intact   Respiratory: CTA B/L, No W/R/R  CV: RRR, +S1/S2, + systolic murmur at LSB  Abdominal: Soft, NT, ND +BSx4, no palpable masses  Extremities: No C/C/E, + peripheral pulses        ECG:    I&O's Detail      LABS:                        14.6   19.18 )-----------( 96       ( 26 Sep 2019 05:27 )             43.8     09-26    133<L>  |  99  |  26<H>  ----------------------------<  138<H>  4.3   |  23  |  0.96    Ca    9.4      26 Sep 2019 05:27  Mg     2.0         TPro  7.4  /  Alb  4.2  /  TBili  0.4  /  DBili  x   /  AST  24  /  ALT  22  /  AlkPhos  71      CARDIAC MARKERS ( 26 Sep 2019 05:27 )  <.015 ng/mL / x     / x     / x     / x          PT/INR - ( 26 Sep 2019 06:26 )   PT: 26.3 sec;   INR: 2.25 ratio         PTT - ( 26 Sep 2019 06:26 )  PTT:28.8 sec  Urinalysis Basic - ( 26 Sep 2019 07:32 )    Color: Yellow / Appearance: Clear / S.005 / pH: x  Gluc: x / Ketone: Negative  / Bili: Negative / Urobili: Negative   Blood: x / Protein: 25 mg/dL / Nitrite: Negative   Leuk Esterase: Moderate / RBC: 0-2 /HPF / WBC 0-2   Sq Epi: x / Non Sq Epi: Occasional / Bacteria: x      I&O's Summary    BNP  RADIOLOGY & ADDITIONAL STUDIES: Patient is a 88y old  Female who presents with a chief complaint of Syncopal episode (26 Sep 2019 09:23)      HPI:  89 y/o F PMH of Afib ( On Coumadin), HTN, Depression, IBS, CLL, brought in s/p syncopal episode. Per daughter at bedside, patient has h/o vasovagal syncopal episodes after bowel movement. Per HHA , who was with the patient, patient used bedside commode early this am around 4:30 am and during that patient looked like she  passed out, no fall or head trauma or tongue biting. Per daughter Tabatha , patient has h/o IBS so she has normal bowel movement alternating with diarrhea. Patient back to her baseline in ER, had normal solid BM per ER doctor. Denies chest pain, palpitation, dysuria, frequency, palpitation, sob. Denies nausea at present.  Patient states that she feels fine and not happy to stay in the hospital. (26 Sep 2019 09:23)    Interval History: Patient seen and examined at bedside with multiple family members present. Patient denies any chest pain or SOB at this time. Patient is currently in Afib with HR in the 70s. However, family is concerned as patient's HR increases to 100-200 every few minutes for 10-15sec before improving back to baseline. Patient reports she does not feel any palpitations, chest pain, or SOB during these episodes. Patient follows with cardiologist Dr. Vides who she last saw 1 month prior for a routine visit. Patient has had echocardiograms in the past but not recently. At home, patient ambulates with a walker but is mostly sedentary.      PAST MEDICAL & SURGICAL HISTORY:  Hypertension  [place] (hard of hearing), bilateral  Vaginal prolapse  Spondyloarthritis  Spinal stenosis  Essential hypertension  Acute cystitis without hematuria: septic  2016  Paroxysmal atrial fibrillation  Monoclonal gammopathies  Venous insufficiency  Lymphedema of both lower extremities  History of vaginal surgery  S/P thyroidectomy: partial     S/P kyphoplasty:        MEDICATIONS  (STANDING):  ciprofloxacin   IVPB 200 milliGRAM(s) IV Intermittent every 12 hours  losartan 25 milliGRAM(s) Oral daily  metoprolol succinate ER 25 milliGRAM(s) Oral daily  metroNIDAZOLE  IVPB 500 milliGRAM(s) IV Intermittent every 8 hours  mirtazapine 15 milliGRAM(s) Oral at bedtime  spironolactone 25 milliGRAM(s) Oral daily  warfarin 2.5 milliGRAM(s) Oral once    MEDICATIONS  (PRN):  acetaminophen   Tablet .. 650 milliGRAM(s) Oral every 6 hours PRN Temp greater or equal to 38C (100.4F), Mild Pain (1 - 3)      FAMILY HISTORY:  No pertinent family history in first degree relatives    Denies Family history of CAD or early MI      Constitutional: denies fever, chills  HEENT: denies blurry vision, difficulty hearing  Respiratory: denies SOB, KLEIN, cough  Cardiovascular: denies CP, palpitations, orthopnea, PND, LE edema  Gastrointestinal: denies nausea, vomiting, abdominal pain  ROS negative except as noted above      SOCIAL HISTORY:  No tobacco, Alcohol or Ddrug use. Lives at home. Ambulates with walker. 24HHA    Vital Signs Last 24 Hrs  T(C): 36.7 (26 Sep 2019 08:18), Max: 36.7 (26 Sep 2019 08:18)  T(F): 98 (26 Sep 2019 08:18), Max: 98 (26 Sep 2019 08:18)  HR: 77 (26 Sep 2019 08:18) (66 - 77)  BP: 142/70 (26 Sep 2019 08:18) (133/85 - 142/70)  BP(mean): --  RR: 14 (26 Sep 2019 08:18) (14 - 18)  SpO2: 98% (26 Sep 2019 08:18) (96% - 98%)    Physical Exam:  General: Well developed, well nourished, NAD  HEENT: NCAT, EOMI bl, moist mucous membranes   Neurology: A&Ox3, nonfocal, sensation intact   Respiratory: CTA B/L, No W/R/R  CV: RRR, +S1/S2, no murmurs, rubs, gallops  Abdominal: Soft, NT, ND, no palpable masses  Extremities: No C/C/E, + peripheral pulses    ECG: Afib at 74bpm    I&O's Detail      LABS:                        14.6   19.18 )-----------( 96       ( 26 Sep 2019 05:27 )             43.8     09-    133<L>  |  99  |  26<H>  ----------------------------<  138<H>  4.3   |  23  |  0.96    Ca    9.4      26 Sep 2019 05:27  Mg     2.0         TPro  7.4  /  Alb  4.2  /  TBili  0.4  /  DBili  x   /  AST  24  /  ALT  22  /  AlkPhos  71      CARDIAC MARKERS ( 26 Sep 2019 05:27 )  <.015 ng/mL / x     / x     / x     / x          PT/INR - ( 26 Sep 2019 06:26 )   PT: 26.3 sec;   INR: 2.25 ratio         PTT - ( 26 Sep 2019 06:26 )  PTT:28.8 sec  Urinalysis Basic - ( 26 Sep 2019 07:32 )    Color: Yellow / Appearance: Clear / S.005 / pH: x  Gluc: x / Ketone: Negative  / Bili: Negative / Urobili: Negative   Blood: x / Protein: 25 mg/dL / Nitrite: Negative   Leuk Esterase: Moderate / RBC: 0-2 /HPF / WBC 0-2   Sq Epi: x / Non Sq Epi: Occasional / Bacteria: x      I&O's Summary    BNP  RADIOLOGY & ADDITIONAL STUDIES:

## 2019-09-26 NOTE — ED ADULT NURSE REASSESSMENT NOTE - NS ED NURSE REASSESS COMMENT FT1
ED MD Esparza at the bedside speaking with daughter regarding plan of care.  Daughter made aware of patient's elevated WBC and lactate level; terms were explained in layman terms and daughter verbalizes understanding.

## 2019-09-26 NOTE — CONSULT NOTE ADULT - ASSESSMENT
87 y/o F PMH of Afib ( On Coumadin), HTN, Depression, IBS, CLL, brought in s/p syncopal episode. Per daughter at bedside, patient has h/o vasovagal syncopal episodes after bowel movement. Cardio consulted for Afib with possible RVR on tele strip.    Afib  - Rate controlled in the 70s  - On coumadin for AC, last INR was 2.25  - Consulted for HR of 100-200 on tele monitor. However, tele monitor appears to be picking up artifact and showing falsely elevated HR  - Continue to monitor on tele monitor   - Continue home metoprolol ER 25mg QD for rate control  - Continue coumadin and monitor INR daily    HTN  - BPs elevated with SBPs 140-170s, which is acceptable in the setting of infection, stress, and hospitalization  - Continue home metoprolol, losartan, aldactone    Syncope  - Likely vasovagal as patient was having a BM at the time of the episode.   - Patient also with history of vasovagal syncope.    - Other cardiovascular workup will depend on clinical course.  - All other workup per primary team.  - Will continue to follow. 87 y/o F PMH of Afib ( On Coumadin), HTN, Depression, IBS, CLL, brought in s/p syncopal episode. Per daughter at bedside, patient has h/o vasovagal syncopal episodes after bowel movement. Cardio consulted for Afib with possible RVR on tele strip.    Afib  - Rate controlled in the 70s  - On coumadin for AC, last INR was 2.25  - Consulted for HR of 100-200 on tele monitor. However, tele monitor appears to be picking up artifact and showing falsely elevated HR  - Continue to monitor on tele monitor   - Continue home metoprolol ER 25mg QD for rate control  - Continue coumadin and monitor INR daily    HTN  - BPs elevated with SBPs 140-170s, which is acceptable in the setting of infection, stress, and hospitalization  - Continue home metoprolol, losartan, aldactone    Syncope  - Likely vasovagal as patient was having a BM at the time of the episode.   - Patient also with history of vasovagal syncope.      - Called outpt cardiologist Dr. Tony Vides for recent BW done in January 2019 and echo April 2019. Awaiting results by fax.   - Other cardiovascular workup will depend on clinical course.  - All other workup per primary team.  - Will continue to follow. 87 y/o F PMH of Afib ( On Coumadin), HTN, Depression, IBS, CLL, brought in s/p syncopal episode. Per daughter at bedside, patient has h/o vasovagal syncopal episodes after bowel movement. Cardio consulted for Afib with possible RVR on tele strip.    Afib  - Rate controlled in the 70s  - On coumadin for AC, last INR was 2.25  - Consulted for HR of 100-200 on tele monitor. However, tele monitor appears to be picking up artifact and showing falsely elevated HR  - Continue to monitor on tele monitor   - Continue home metoprolol ER 25mg QD for rate control  - Continue coumadin and monitor INR daily    HTN  - BPs elevated with SBPs 140-170s, which is acceptable in the setting of infection, stress, and hospitalization  - Continue home metoprolol, losartan, aldactone    Syncope  - Likely vasovagal as patient was having a BM at the time of the episode.   - Patient also with history of vasovagal syncope.      - Called outpt cardiologist Dr. Tony Vides for recent BW done in 9/5/19. WBC was 3.91. Lab results placed in paper chart.   - Still awaiting Echo results from April 2019.   - Other cardiovascular workup will depend on clinical course.  - All other workup per primary team.  - Will continue to follow. 87 y/o F PMH of Afib ( On Coumadin), HTN, Depression, IBS, CLL, brought in s/p syncopal episode. Per daughter at bedside, patient has h/o vasovagal syncopal episodes after bowel movement. Cardio consulted for Afib with possible RVR on tele strip.    Afib  - Rate controlled in the 70s  - On coumadin for AC, last INR was 2.25  - Consulted for HR of 100-200 on tele monitor. However, tele monitor appears to be picking up artifact and showing falsely elevated HR  - Continue to monitor on tele monitor   - Continue home metoprolol ER 25mg QD for rate control  - Continue coumadin and monitor INR daily    HTN  - BPs elevated with SBPs 140-170s, which is acceptable in the setting of infection, stress, and hospitalization  - Continue home metoprolol, losartan  - can hold diuretics temporarily    Syncope  - Likely vasovagal as patient was having a BM at the time of the episode.   - Patient also with history of vasovagal syncope.  - check orthostatics  - monitor on telemetry      - Called outpt cardiologist Dr. Tony Vides for recent BW done in 9/5/19. WBC was 3.91. Lab results placed in paper chart.   - Still awaiting Echo results from April 2019.   - Other cardiovascular workup will depend on clinical course.  - All other workup per primary team.  - Will continue to follow.

## 2019-09-26 NOTE — CONSULT NOTE ADULT - SUBJECTIVE AND OBJECTIVE BOX
[facility]  Tone Guerrero PA-C  [address] [place] [address] [phone]      Chief Complaint:  Patient is a 88y old  Female who presents with a chief complaint of Syncopal episode (26 Sep 2019 10:42)      HPI: 88F with history as listed who presents with possible vasovagal syncope during a BM  she lost control of her bowels, aide at bedside states she had liquid stool, however thereafter in ED had a formed brown stool  denies any rectal bleeding recently  having normal caliber stool  does endorse sense of incomplete evacuation  no recent colonoscopy     Allergies:  No Known Allergies      Medications:  acetaminophen   Tablet .. 650 milliGRAM(s) Oral every 6 hours PRN  ciprofloxacin   IVPB 200 milliGRAM(s) IV Intermittent every 12 hours  losartan 25 milliGRAM(s) Oral daily  metoprolol succinate ER 25 milliGRAM(s) Oral daily  metroNIDAZOLE  IVPB 500 milliGRAM(s) IV Intermittent every 8 hours  mirtazapine 15 milliGRAM(s) Oral at bedtime  spironolactone 25 milliGRAM(s) Oral daily  warfarin 2.5 milliGRAM(s) Oral once      PMHX/PSHX:  Hypertension  [place] (hard of hearing), bilateral  Vaginal prolapse  Spondyloarthritis  Spinal stenosis  Essential hypertension  Acute cystitis without hematuria  Paroxysmal atrial fibrillation  Monoclonal gammopathies  Venous insufficiency  Lymphedema of both lower extremities  History of vaginal surgery  [place] (hard of hearing)  S/P thyroidectomy  S/P kyphoplasty      Family history:  No pertinent family history in first degree relatives      Social History: no toxic habits     ROS:     General:  No wt loss, fevers, chills, night sweats, fatigue,   Eyes:  Good vision, no reported pain  ENT:  No sore throat, pain, runny nose, dysphagia  CV:  No pain, palpitations, hypo/hypertension  Resp:  No dyspnea, cough, tachypnea, wheezing  GI:  No pain, No nausea, No vomiting, + diarrhea, No constipation, No weight loss, No fever, No pruritis, No rectal bleeding, No tarry stools, No dysphagia,  :  No pain, bleeding, incontinence, nocturia  Muscle:  No pain, weakness  Neuro:  No weakness, tingling, memory problems  Psych:  No fatigue, insomnia, mood problems, depression  Endocrine:  No polyuria, polydipsia, cold/heat intolerance  Heme:  No petechiae, ecchymosis, easy bruisability  Skin:  No rash, tattoos, scars, edema      PHYSICAL EXAM:   Vital Signs:  Vital Signs Last 24 Hrs  T(C): 36.7 (26 Sep 2019 08:18), Max: 36.7 (26 Sep 2019 08:18)  T(F): 98 (26 Sep 2019 08:18), Max: 98 (26 Sep 2019 08:18)  HR: 77 (26 Sep 2019 08:18) (66 - 77)  BP: 142/70 (26 Sep 2019 08:18) (133/85 - 142/70)  BP(mean): --  RR: 14 (26 Sep 2019 08:18) (14 - 18)  SpO2: 98% (26 Sep 2019 08:18) (96% - 98%)  Daily Height in cm: 170.18 (26 Sep 2019 04:59)    Daily     GENERAL:  Appears stated age, well-groomed, well-nourished, no distress  HEENT:  NC/AT,  conjunctivae clear and pink, no thyromegaly, nodules, adenopathy, no JVD, sclera -anicteric  CHEST:  Full & symmetric excursion, no increased effort, breath sounds clear  HEART:  Regular rhythm, S1, S2, no murmur/rub/S3/S4, no abdominal bruit, no edema  ABDOMEN:  Soft, non-tender, non-distended, normoactive bowel sounds,  no masses ,no hepato-splenomegaly, no signs of chronic liver disease  EXTEREMITIES:  no cyanosis,clubbing or edema  SKIN:  No rash/erythema/ecchymoses/petechiae/wounds/abscess/warm/dry  NEURO:  Alert, oriented, no asterixis, no tremor, no encephalopathy    LABS:                        14.6   19.18 )-----------( 96       ( 26 Sep 2019 05:27 )             43.8     -    133<L>  |  99  |  26<H>  ----------------------------<  138<H>  4.3   |  23  |  0.96    Ca    9.4      26 Sep 2019 05:27  Mg     2.0         TPro  7.4  /  Alb  4.2  /  TBili  0.4  /  DBili  x   /  AST  24  /  ALT  22  /  AlkPhos  71      LIVER FUNCTIONS - ( 26 Sep 2019 05:27 )  Alb: 4.2 g/dL / Pro: 7.4 g/dL / ALK PHOS: 71 U/L / ALT: 22 U/L / AST: 24 U/L / GGT: x           PT/INR - ( 26 Sep 2019 06:26 )   PT: 26.3 sec;   INR: 2.25 ratio         PTT - ( 26 Sep 2019 06:26 )  PTT:28.8 sec  Urinalysis Basic - ( 26 Sep 2019 07:32 )    Color: Yellow / Appearance: Clear / S.005 / pH: x  Gluc: x / Ketone: Negative  / Bili: Negative / Urobili: Negative   Blood: x / Protein: 25 mg/dL / Nitrite: Negative   Leuk Esterase: Moderate / RBC: 0-2 /HPF / WBC 0-2   Sq Epi: x / Non Sq Epi: Occasional / Bacteria: x      Amylase Serum--      Lipase [id number]       Ammonia--      Imaging:

## 2019-09-26 NOTE — ED PROVIDER NOTE - PMH
Acute cystitis without hematuria  septic  4/2016  Essential hypertension    [place] (hard of hearing), bilateral    Hypertension    Lymphedema of both lower extremities    Monoclonal gammopathies    Paroxysmal atrial fibrillation    Spinal stenosis    Spondyloarthritis    Vaginal prolapse    Venous insufficiency

## 2019-09-26 NOTE — H&P ADULT - NSICDXPASTSURGICALHX_GEN_ALL_CORE_FT
PAST SURGICAL HISTORY:  History of vaginal surgery     S/P kyphoplasty 2014    S/P thyroidectomy partial   1975

## 2019-09-26 NOTE — H&P ADULT - NSHPPHYSICALEXAM_GEN_ALL_CORE
GEN: NAD, Awake, Alert   HEENT: PERRLA, NCAT  Heart: S1S2+, Irregular   Lungs: Clear bilat, no wheezing   Abdomen: Soft, +bs, non tender   Extremities: No clubbing, cyanosis, + venous stasis changes bilat LE   Neuro: Awake, alert, no gross focal motor, sensory deficits   Skin: Warm, dry GEN: NAD, Awake, Alert   HEENT: PERRLA, NCAT  Heart: S1S2+, Irregular   Lungs: Clear bilat, no wheezing   Abdomen: Soft, +bs, non tender   Extremities: No clubbing, cyanosis, + venous stasis changes bilat LE , refusing to have sock removed since feels cold, so unable to examined maryanne feet   Neuro: Awake, alert, no gross focal motor, sensory deficits   Skin: Warm, dry

## 2019-09-26 NOTE — ED ADULT NURSE NOTE - ED STAT RN HANDOFF DETAILS
Report given to LULU April in [place].  Patient is pending CT read and urine specimen sent to the lab as ordered.

## 2019-09-26 NOTE — H&P ADULT - ATTENDING COMMENTS
Plan d/w both daughter, one at bedside and other on phone. All questions answered at length and plan discussed with patient and her daughters with her permission. Plan d/w both daughter, one at bedside and other on phone. All questions answered at length and plan discussed with patient and her daughters with her permission. Palliative care consult for FRANCIE.

## 2019-09-26 NOTE — H&P ADULT - NSICDXPASTMEDICALHX_GEN_ALL_CORE_FT
PAST MEDICAL HISTORY:  Acute cystitis without hematuria septic  4/2016    Essential hypertension     [place] (hard of hearing), bilateral     Hypertension     Lymphedema of both lower extremities     Monoclonal gammopathies     Paroxysmal atrial fibrillation     Spinal stenosis     Spondyloarthritis     Vaginal prolapse     Venous insufficiency

## 2019-09-26 NOTE — H&P ADULT - PROBLEM SELECTOR PLAN 5
Continue Metoprolol ER 25 mg po daily with hold parameters  Coumadin 2.5 mg X1  Check INR  D/w Cardio Dr. Disla, since daughter at bedside concerned that heart rate at times high here in the hospital. Heart rate at present 74. No indication for IV Metoprolol at present. Will monitor and patient to receive her Metoprolol 25mg ER.   Tele Monitor

## 2019-09-26 NOTE — CONSULT NOTE ADULT - ASSESSMENT
Total nail avulsion of the left hallux    Plan  Pt seen and evaluated  All findings discussed with Dr. Lai  Band aid removed and left hallux nail examined  Left hallux nail bed applied betadine, DSD to the toe  Pt can apply some silver alginate and band aid to be changed daily at home when DC  Rx z-flex boot to offload heels while in house  No need for Abx treatment from podiatry standpoint  Rec vascular consult for Non palpable pulses  Pod will f/u while the pt is in house

## 2019-09-26 NOTE — ED ADULT NURSE NOTE - NSIMPLEMENTINTERV_GEN_ALL_ED
Implemented All Fall with Harm Risk Interventions:  [place] to call system. Call bell, personal items and telephone within reach. Instruct patient to call for assistance. Room bathroom lighting operational. Non-slip footwear when patient is off stretcher. Physically safe environment: no spills, clutter or unnecessary equipment. Stretcher in lowest position, wheels locked, appropriate side rails in place. Provide visual cue, wrist band, yellow gown, etc. Monitor gait and stability. Monitor for mental status changes and reorient to person, place, and time. Review medications for side effects contributing to fall risk. Reinforce activity limits and safety measures with patient and family. Provide visual clues: red socks.

## 2019-09-26 NOTE — CONSULT NOTE ADULT - SUBJECTIVE AND OBJECTIVE BOX
History of Present Illness:  88y.o.  Female with a history of venous htn was admitted today with diarrhea and a syncopal episode. The patient had her left 1st. toe nail removed about 2 weeks ago by her podiatrist . She has no pedal rest pain or claudication. She ambulates short distances with a walker. I was requested to evaluate her LE circulation. She has chronic edema of both ankles and chronic stasis skin changes of both ankles.    PAST MEDICAL & SURGICAL HISTORY:  Hypertension  [place] (hard of hearing), bilateral  Vaginal prolapse  Spondyloarthritis  Spinal stenosis  Essential hypertension  Acute cystitis without hematuria: septic  4/2016  Paroxysmal atrial fibrillation  Monoclonal gammopathies  Venous insufficiency  Lymphedema of both lower extremities  History of vaginal surgery  S/P thyroidectomy: partial   1975  S/P kyphoplasty: 2014      Allergies    No Known Allergies    Intolerances        MEDICATIONS  (STANDING):  losartan 25 milliGRAM(s) Oral daily  mesalamine Suppository 1000 milliGRAM(s) Rectal at bedtime  metoprolol succinate ER 25 milliGRAM(s) Oral daily  mirtazapine 15 milliGRAM(s) Oral at bedtime  warfarin 2.5 milliGRAM(s) Oral once    MEDICATIONS  (PRN):  acetaminophen   Tablet .. 650 milliGRAM(s) Oral every 6 hours PRN Temp greater or equal to 38C (100.4F), Mild Pain (1 - 3)      Social History:  Smoking History:denies  Alcohol Use: denies    REVIEW OF SYSTEMS:  CONSTITUTIONAL: No fevers or chills  EYES/ENT: No visual changes;  No vertigo or throat pain   NECK: No pain or stiffness  RESPIRATORY: No cough, wheezing, hemoptysis; No shortness of breath  CARDIOVASCULAR: No chest pain or palpitations.++ syncopal episode  GASTROINTESTINAL: No abdominal or epigastric pain. No vomiting, or hematemesis; ++ diarrhea.  no  constipation. No melena or hematochezia.  GENITOURINARY: No dysuria, frequency or hematuria  NEUROLOGICAL: No numbness or weakness  SKIN: No itching, burning, rashes, or lesions   Vascular:  No lower extremity claudication, pedal rest pain or digital ulcers.++ s/p removal of left 1st toe nail  All other review of systems is negative unless indicated above.    PHYSICAL EXAM:  General:  On exam, the patient is alert nontoxic appearing Female in no acute distress.  Vital Signs Last 24 Hrs  T(C): 36.7 (26 Sep 2019 12:03), Max: 36.7 (26 Sep 2019 08:18)  T(F): 98 (26 Sep 2019 12:03), Max: 98 (26 Sep 2019 08:18)  HR: 74 (26 Sep 2019 12:03) (66 - 77)  BP: 136/71 (26 Sep 2019 12:03) (133/85 - 142/70)  BP(mean): --  RR: 13 (26 Sep 2019 12:03) (13 - 18)  SpO2: 98% (26 Sep 2019 12:03) (96% - 98%)    Neck:  4+/4+ bilateral carotid pulses; no carotid bruit, no palpable cervical masses.  Heart:  irregular, no murmurs, rubs or gallops.    Lungs:  decreased BS at both bases   Chest:  No chest wall deformities. Symmetrical chest expansion.   Abdomen: Soft and nontender.  No palpable masses.  No rebound, guarding or rigidity.  Extremities: Both  feet are warm, pink with normal capillary refill times.  There are no digital ulcers or heel decubiti.  The calf and thigh muscles are soft and nontender.  There are no palpable cords or limb cellulitis.  Maxine's sign  is negative bilaterally.  There is no lower extremity  cyanosis, or rubor. There is 1+ edema of both ankles and chronic hyperpigmentation changes with incompetent bilateral ankle  veins c/w chronic venous htn.  On examination of the peripheral pulses:  Left leg femoral pulse is  4/4  , popliteal pulse is 4/4   ,PT Pulse is 4/4   , DP Pulse is 4/4   Right leg femoral pulse is 4/4    ,popliteal pulse is  4/4  , PT Pulse is 4/4   , DP Pulse is 4/4   Neurological:  There are no motor or sensory deficits in either lower extremity.                          14.6   19.18 )-----------( 96       ( 26 Sep 2019 05:27 )             43.8     09-26    133<L>  |  99  |  26<H>  ----------------------------<  138<H>  4.3   |  23  |  0.96    Ca    9.4      26 Sep 2019 05:27  Mg     2.0     09-26    TPro  7.4  /  Alb  4.2  /  TBili  0.4  /  DBili  x   /  AST  24  /  ALT  22  /  AlkPhos  71  09-26    CARDIAC MARKERS ( 26 Sep 2019 05:27 )  <.015 ng/mL / x     / x     / x     / x          PT/INR - ( 26 Sep 2019 06:26 )   PT: 26.3 sec;   INR: 2.25 ratio         PTT - ( 26 Sep 2019 06:26 )  PTT:28.8 sec    Radiology:

## 2019-09-26 NOTE — H&P ADULT - PROBLEM SELECTOR PLAN 9
IMPROVE VTE Individual Risk Assessment          RISK                                                          Points  [  ] Previous VTE                                                3  [  ] Thrombophilia                                             2  [  ] Lower limb paralysis                                   2        (unable to hold up >15 seconds)    [  ] Current Cancer                                             2         (within 6 months)  [ x ] Immobilization > 24 hrs                              1  [  ] ICU/CCU stay > 24 hours                             1  [x  ] Age > 60                                                         1    IMPROVE VTE Score: 2    On Coumadin.

## 2019-09-26 NOTE — GOALS OF CARE CONVERSATION - ADVANCED CARE PLANNING - CONVERSATION DETAILS
Spoke with pt she states her dtrs are her proxies.Informed pt what issues to discuss with her dtrs regarding resuscitation.Pt doesn't know what she wants.Will follow

## 2019-09-26 NOTE — PATIENT PROFILE ADULT - BRADEN FRICTION AND SHEAR
(2) potential problem
decreased ability to use arms for pushing/pulling/decreased ability to use legs for bridging/pushing

## 2019-09-26 NOTE — ED PROVIDER NOTE - CARE PLAN
Principal Discharge DX:	Vasovagal near syncope  Secondary Diagnosis:	Proctitis  Secondary Diagnosis:	UTI (urinary tract infection)

## 2019-09-26 NOTE — PROGRESS NOTE ADULT - SUBJECTIVE AND OBJECTIVE BOX
For episode of loss of consciousness and the prodrome of feeling lightheaded, looking pale sweaty, this is most likely vasovagal syncopal event. episode of stiffening could be from syncope with stiffening,  There is no clear history to suggest underlying seizure activity and there are no signs on examination to suggest a new cerebrovascular accident has ensued.  tele eval   monitor sbp   spoke to family

## 2019-09-26 NOTE — H&P ADULT - PROBLEM SELECTOR PLAN 1
Admit to Tele  CT head negative for acute findings  Avoid narcotics  Neuro checks  Fall precautions  Neuro and cardio consults.  TTE if not done before. Admit to Tele  CT head negative for acute findings  Avoid narcotics  Neuro checks  Fall precautions  Neuro and cardio consults.  TTE if not done before.  Check Orthostatic vitals

## 2019-09-26 NOTE — CONSULT NOTE ADULT - CONSULT REQUESTED DATE/TIME
We want to give the best care possible. If you receive a [employer] survey from our office, please take the time to fill out the survey and return it in the envelope provided. Your feedback helps us know how we are doing and we really appreciate it.Thank you.    
26-Sep-2019
26-Sep-2019 16:26
26-Sep-2019 10:33
26-Sep-2019 11:01

## 2019-09-26 NOTE — H&P ADULT - PROBLEM SELECTOR PLAN 2
On CT abdomen/ pelvis  Had normal bowel movement in ER  Received Cipro  and Flagyl in ER  Has h/o IBS, so gets intermittent diarrhea   GI consult Dr. Fontaine called. On CT abdomen/ pelvis  Had normal bowel movement in ER  Received Cipro  and Flagyl in ER  Has h/o IBS, so gets intermittent diarrhea   GI consult Dr. Fontaine called.  Monitor Off antibiotics  Procalcitonin is negative

## 2019-09-26 NOTE — ED ADULT NURSE REASSESSMENT NOTE - NS ED NURSE REASSESS COMMENT FT1
Comfort care measures performed, patient cleaned and dried and boosted up in bed.  Patient rectal temperature performed, heath hugger placed on patient.  Patient states nausea has improved, IV fluids still in progress as ordered.  Patient is pending CT. Comfort care measures performed, patient cleaned and dried and boosted up in bed.  Dried formed stool noted to rectal area.  Patient rectal temperature performed, heath hugger placed on patient.  Patient states nausea has improved, IV fluids still in progress as ordered.  Patient is pending CT.

## 2019-09-26 NOTE — ED PROVIDER NOTE - NS ED ROS FT
no weight change, no fever or chills  no recent travel, no recent abox, no sick contacts  no recent change in medications  no rash, no bruises  no visual changes no eye discharge  no cough cold or congestion,   no sob, no chest pain  follows with cardiology  no stress test, no cath  no orthopnea, no pnd  no abd pain, no n/v/d  no endoscopy, no colonoscopy  no hematuria, no change in urinary habits  no joint pain, no deformity  no headache, no paresthesia no weight change, no fever or chills  no recent travel, no recent abox, no sick contacts  no recent change in medications  no rash, no bruises  no visual changes no eye discharge  no cough cold or congestion,   no sob, no chest pain  follows with cardiology  h/o cath  no abd pain, no n/v/d, +c  no endoscopy, + colonoscopy  no hematuria, no change in urinary habits  no joint pain, no deformity  no headache, no paresthesia

## 2019-09-26 NOTE — CONSULT NOTE ADULT - SUBJECTIVE AND OBJECTIVE BOX
87 y/o F PMH of Afib ( On Coumadin), HTN, Depression, IBS, CLL, brought in s/p syncopal episode. Per daughter at bedside, patient has h/o vasovagal syncopal episodes after bowel movement. Per HHA , who was with the patient, patient used bedside commode early this am around 4:30 am and during that patient looked like she  passed out, no fall or head trauma or tongue biting. Per daughter Tabatha , patient has h/o IBS so she has normal bowel movement alternating with diarrhea. Patient back to her baseline in ER, had normal solid BM per ER doctor. Pt was consulted to podiatry for left hallux wound that upon inspection was the result of a total nail avulsion 2 weeks ago by outside podiatrist who regularly visits her home.  He is in no pain to the toe today,  Denies chest pain, palpitation, dysuria, frequency, palpitation, sob, n/v/fever and appears to be in NAD at this time      PAST MEDICAL & SURGICAL HISTORY:  Hypertension  [place] (hard of hearing), bilateral  Vaginal prolapse  Spondyloarthritis  Spinal stenosis  Essential hypertension  Acute cystitis without hematuria: septic  4/2016  Paroxysmal atrial fibrillation  Monoclonal gammopathies  Venous insufficiency  Lymphedema of both lower extremities  History of vaginal surgery  S/P thyroidectomy: partial   1975  S/P kyphoplasty: 2014      REVIEW OF SYSTEMS      General:	AAO x 3    Skin/Breast:  Skin is well hydrated and supple    Respiratory and Thorax:  No labored breathing noted, breath sounds symmetric  	  Cardiovascular:	Non palpable cool LE b/l    Musculoskeletal:	  No pain to the LE at this time    Neurological:	Reduced protective sensation, pt neuropathic    Psychiatric:	Normal affect, hard of hearing    Allergic/Immunologic:	NKDA    ICU Vital Signs Last 24 Hrs  T(C): 36.7 (26 Sep 2019 08:18), Max: 36.7 (26 Sep 2019 08:18)  T(F): 98 (26 Sep 2019 08:18), Max: 98 (26 Sep 2019 08:18)  HR: 77 (26 Sep 2019 08:18) (66 - 77)  BP: 142/70 (26 Sep 2019 08:18) (133/85 - 142/70)  BP(mean): --  ABP: --  ABP(mean): --  RR: 14 (26 Sep 2019 08:18) (14 - 18)  SpO2: 98% (26 Sep 2019 08:18) (96% - 98%)                          14.6   19.18 )-----------( 96       ( 26 Sep 2019 05:27 )             43.8   09-26    133<L>  |  99  |  26<H>  ----------------------------<  138<H>  4.3   |  23  |  0.96    Ca    9.4      26 Sep 2019 05:27  Mg     2.0     09-26    TPro  7.4  /  Alb  4.2  /  TBili  0.4  /  DBili  x   /  AST  24  /  ALT  22  /  AlkPhos  71  09-26    Objective   Vasc: DP and PT pulses are non palpable.  CFT is greater than 3 seconds.  TG is warm to cool at the tips of the digits.  Hair growth noted on the feet.  No edema noted on the LE  Neuro: Epicritic and protective sensation is redcued at the level of the digits  Derm: Nails are hypertrophic and distrophic, noted left hallux nail removed from previous procedure, no active signs of infection with nail bed, left hallux. No drainage, no erythema, no malodor, no proximal streaking noted at this time. Skin is well hydrated.    M/s: ROM for AJ, STJ, MTJ are normal and without pain or crepitus noted.  Muscle strength is 5/5 for all the same muscle groups.  No monse prominences noted.  Hammer toe deformity, reducable noted to digits 2-4 b/l. No PTP noted on any region of the foot.

## 2019-09-26 NOTE — ED PROVIDER NOTE - OBJECTIVE STATEMENT
88y odl with complaits of being unresponsive , was sitting on toilet bowel at that time, has a nursing aid, history from aid and daughter they do not klet pateint talk, pateint recalls vents, speaks softly, and is [place]  daughter is very anxious, pateint with previosu h/o ibs, vasovagal response, no complaints of nausea,   no chest pain

## 2019-09-26 NOTE — H&P ADULT - PROBLEM SELECTOR PLAN 3
On CT Shows Cystitis  ON cipro  Check Procalcitonin  F/u cultures  Asymptomatic On CT Shows Cystitis  Asymptomatic  F/u cultures  Monitor off antibiotics

## 2019-09-26 NOTE — ED ADULT NURSE NOTE - OBJECTIVE STATEMENT
Patient with history of CLL (no current tx), atrial fibrillation   BIBA from home with c/o AMS and diarrhea per aid who is also at bedside.  Patient Patient with history of CLL (no current tx), atrial fibrillation (on warfarin), vasal vagal and [place] BIBA from home with c/o AMS and diarrhea per aid who is also at bedside.  Patient's aid states patient was on the toilet where she "started acting weird" and confused, had episode of vomiting and diarrhea.  Patient arrives A&O x3, no facial droop or slurring of words noted.  Daughter at bedside states patient is dehydrated, however, aid states there has been no decrease in PO intake in the past few days; +med compliance.  Last INR checked last week.  No fevers, chills, weakness, sick contacts, recent travel, numbness, tingling, CP, SOB, HA, lightheadedness or dizziness.  Triage RN unable to obtain oral temperature.

## 2019-09-27 ENCOUNTER — TRANSCRIPTION ENCOUNTER (OUTPATIENT)
Age: 84
End: 2019-09-27

## 2019-09-27 VITALS
OXYGEN SATURATION: 95 % | HEART RATE: 67 BPM | DIASTOLIC BLOOD PRESSURE: 77 MMHG | SYSTOLIC BLOOD PRESSURE: 133 MMHG | RESPIRATION RATE: 17 BRPM | TEMPERATURE: 98 F

## 2019-09-27 LAB
ANION GAP SERPL CALC-SCNC: 5 MMOL/L — SIGNIFICANT CHANGE UP (ref 5–17)
APTT BLD: 35.4 SEC — SIGNIFICANT CHANGE UP (ref 28.5–37)
BUN SERPL-MCNC: 12 MG/DL — SIGNIFICANT CHANGE UP (ref 7–23)
CALCIUM SERPL-MCNC: 8.5 MG/DL — SIGNIFICANT CHANGE UP (ref 8.5–10.1)
CHLORIDE SERPL-SCNC: 105 MMOL/L — SIGNIFICANT CHANGE UP (ref 96–108)
CO2 SERPL-SCNC: 28 MMOL/L — SIGNIFICANT CHANGE UP (ref 22–31)
CREAT SERPL-MCNC: 0.74 MG/DL — SIGNIFICANT CHANGE UP (ref 0.5–1.3)
GLUCOSE SERPL-MCNC: 89 MG/DL — SIGNIFICANT CHANGE UP (ref 70–99)
HCT VFR BLD CALC: 39.4 % — SIGNIFICANT CHANGE UP (ref 34.5–45)
HGB BLD-MCNC: 13.1 G/DL — SIGNIFICANT CHANGE UP (ref 11.5–15.5)
INR BLD: 2.74 RATIO — HIGH (ref 0.88–1.16)
MCHC RBC-ENTMCNC: 32 PG — SIGNIFICANT CHANGE UP (ref 27–34)
MCHC RBC-ENTMCNC: 33.2 GM/DL — SIGNIFICANT CHANGE UP (ref 32–36)
MCV RBC AUTO: 96.3 FL — SIGNIFICANT CHANGE UP (ref 80–100)
NRBC # BLD: 0 /100 WBCS — SIGNIFICANT CHANGE UP (ref 0–0)
PLATELET # BLD AUTO: 89 K/UL — LOW (ref 150–400)
POTASSIUM SERPL-MCNC: 4.1 MMOL/L — SIGNIFICANT CHANGE UP (ref 3.5–5.3)
POTASSIUM SERPL-SCNC: 4.1 MMOL/L — SIGNIFICANT CHANGE UP (ref 3.5–5.3)
PROTHROM AB SERPL-ACNC: 32 SEC — HIGH (ref 10–12.9)
RBC # BLD: 4.09 M/UL — SIGNIFICANT CHANGE UP (ref 3.8–5.2)
RBC # FLD: 12.8 % — SIGNIFICANT CHANGE UP (ref 10.3–14.5)
SODIUM SERPL-SCNC: 138 MMOL/L — SIGNIFICANT CHANGE UP (ref 135–145)
WBC # BLD: 4.17 K/UL — SIGNIFICANT CHANGE UP (ref 3.8–10.5)
WBC # FLD AUTO: 4.17 K/UL — SIGNIFICANT CHANGE UP (ref 3.8–10.5)

## 2019-09-27 PROCEDURE — 80053 COMPREHEN METABOLIC PANEL: CPT

## 2019-09-27 PROCEDURE — 87040 BLOOD CULTURE FOR BACTERIA: CPT

## 2019-09-27 PROCEDURE — 93005 ELECTROCARDIOGRAM TRACING: CPT

## 2019-09-27 PROCEDURE — 83605 ASSAY OF LACTIC ACID: CPT

## 2019-09-27 PROCEDURE — 87086 URINE CULTURE/COLONY COUNT: CPT

## 2019-09-27 PROCEDURE — 85027 COMPLETE CBC AUTOMATED: CPT

## 2019-09-27 PROCEDURE — 97162 PT EVAL MOD COMPLEX 30 MIN: CPT

## 2019-09-27 PROCEDURE — 83690 ASSAY OF LIPASE: CPT

## 2019-09-27 PROCEDURE — 85610 PROTHROMBIN TIME: CPT

## 2019-09-27 PROCEDURE — 99285 EMERGENCY DEPT VISIT HI MDM: CPT | Mod: 25

## 2019-09-27 PROCEDURE — 85730 THROMBOPLASTIN TIME PARTIAL: CPT

## 2019-09-27 PROCEDURE — 96374 THER/PROPH/DIAG INJ IV PUSH: CPT

## 2019-09-27 PROCEDURE — 99232 SBSQ HOSP IP/OBS MODERATE 35: CPT

## 2019-09-27 PROCEDURE — 80048 BASIC METABOLIC PNL TOTAL CA: CPT

## 2019-09-27 PROCEDURE — 84145 PROCALCITONIN (PCT): CPT

## 2019-09-27 PROCEDURE — 74177 CT ABD & PELVIS W/CONTRAST: CPT

## 2019-09-27 PROCEDURE — 84484 ASSAY OF TROPONIN QUANT: CPT

## 2019-09-27 PROCEDURE — 81001 URINALYSIS AUTO W/SCOPE: CPT

## 2019-09-27 PROCEDURE — 36415 COLL VENOUS BLD VENIPUNCTURE: CPT

## 2019-09-27 PROCEDURE — 99239 HOSP IP/OBS DSCHRG MGMT >30: CPT

## 2019-09-27 PROCEDURE — 71045 X-RAY EXAM CHEST 1 VIEW: CPT

## 2019-09-27 PROCEDURE — 83735 ASSAY OF MAGNESIUM: CPT

## 2019-09-27 PROCEDURE — 70450 CT HEAD/BRAIN W/O DYE: CPT

## 2019-09-27 RX ADMIN — LOSARTAN POTASSIUM 25 MILLIGRAM(S): 100 TABLET, FILM COATED ORAL at 05:56

## 2019-09-27 RX ADMIN — Medication 25 MILLIGRAM(S): at 05:56

## 2019-09-27 NOTE — PROGRESS NOTE ADULT - ATTENDING COMMENTS
Advanced care planning was discussed with patient and family.  Advanced care planning forms were reviewed and discussed.  Risks, benefits and alternatives of gastroenterologic procedures were discussed in detail and all questions were answered.    30 minutes spent.
Chart reviewed    Patient seen and examined    Agree with plan as outlined above

## 2019-09-27 NOTE — PHYSICAL THERAPY INITIAL EVALUATION ADULT - ORIENTATION, REHAB EVAL
06/06/18 1241   Rehab Time/Intention   Evaluation Not Performed other (see comments)  (Pt continues to have low and dropping platelets (28). Defer OT at this time. Will f/u as appropriate. )   Rehab Treatment   Discipline occupational therapist      oriented to person, place, time and situation

## 2019-09-27 NOTE — DIETITIAN INITIAL EVALUATION ADULT. - PROBLEM SELECTOR PLAN 2
On CT abdomen/ pelvis  Had normal bowel movement in ER  Received Cipro  and Flagyl in ER  Has h/o IBS, so gets intermittent diarrhea   GI consult Dr. Fontaine called.  Monitor Off antibiotics  Procalcitonin is negative

## 2019-09-27 NOTE — DISCHARGE NOTE NURSING/CASE MANAGEMENT/SOCIAL WORK - PATIENT PORTAL LINK FT
You can access the FollowMyHealth Patient Portal offered by [facility] by registering at the following website: [email or web address]. By joining [employer]’s FollowMyHealth portal, you will also be able to view your health information using other applications (apps) compatible with our system.

## 2019-09-27 NOTE — PHYSICAL THERAPY INITIAL EVALUATION ADULT - PERTINENT HX OF CURRENT PROBLEM, REHAB EVAL
89 y/o F brought in s/p syncopal episode. Per HHA patient on commode when pt appeared to pass out, no fall or head trauma or tongue biting. CT head negative for acute findings. Had normal bowel movement in ER. CT Shows Cystitis

## 2019-09-27 NOTE — PROGRESS NOTE ADULT - SUBJECTIVE AND OBJECTIVE BOX
Neurology follow up note    CLAUDIO [id number]      Interval History:    Patient feels ok no new complaints seen with AID    MEDICATIONS    acetaminophen   Tablet .. 650 milliGRAM(s) Oral every 6 hours PRN  losartan 25 milliGRAM(s) Oral daily  mesalamine Suppository 1000 milliGRAM(s) Rectal at bedtime  metoprolol succinate ER 25 milliGRAM(s) Oral daily  mirtazapine 15 milliGRAM(s) Oral at bedtime      Allergies    No Known Allergies    Intolerances            Vital Signs Last 24 Hrs  T(C): 36.6 (27 Sep 2019 11:25), Max: 37 (27 Sep 2019 04:40)  T(F): 97.8 (27 Sep 2019 11:25), Max: 98.6 (27 Sep 2019 04:40)  HR: 54 (27 Sep 2019 08:31) (54 - 97)  BP: 129/78 (27 Sep 2019 08:31) (94/60 - 146/74)  BP(mean): --  RR: 18 (27 Sep 2019 08:31) (14 - 18)  SpO2: 98% (27 Sep 2019 08:31) (95% - 99%)    REVIEW OF SYSTEMS:  Constitutional:  No fever, chills, or night sweats.  Head:  No headaches.  Eyes:  No double vision or blurry vision.  Ears:  Hard of hearing.  Neck:  No neck pain.  Respiratory:  No shortness of breath.  Cardiovascular:  No chest pain.  Abdomen:  No nausea, vomiting, or abdominal pain.  Extremities/Neurological:  Occasional numbness and tingling in her legs, but does have a history of spinal disc disease.  Musculoskeletal:  Occasional joint pain.  General:  Positive history of seeing flashing lights in the past, but has a history of ocular migraine.  No history upon awakening of tongue bite marks or loss of urine.  No weird smell or abnormal taste in her mouth.    PHYSICAL EXAMINATION:  HEENT:  Normocephalic, atraumatic.  Eyes:  No scleral icterus.  Ears:  Hearing bilaterally intact.  NECK:  Supple.  RESPIRATORY:  Good air entry bilaterally.  CARDIOVASCULAR:  S1 and S2 heard.  ABDOMEN:  Soft and nontender.  EXTREMITIES:  No clubbing or cyanosis were noted.      NEUROLOGIC:  The patient is awake, alert, and oriented x3.  Extraocular movements were intact.  Pupils were equal, round, and reactive bilaterally 3 mm to 2 mm.  Speech was fluent.  Smile was symmetric.  Motor:  Bilateral upper and lower were 4+/5.  Sensory:  Bilateral upper and lower intact to light touch.  No drift.  Finger-to-nose within normal limits.  Gait was able to with walker       LABS:  CBC Full  -  ( 27 Sep 2019 07:03 )  WBC Count : 4.17 K/uL  RBC Count : 4.09 M/uL  Hemoglobin : 13.1 g/dL  Hematocrit : 39.4 %  Platelet Count - Automated : 89 K/uL  Mean Cell Volume : 96.3 fl  Mean Cell Hemoglobin : 32.0 pg  Mean Cell Hemoglobin Concentration : 33.2 gm/dL  Auto Neutrophil # : x  Auto Lymphocyte # : x  Auto Monocyte # : x  Auto Eosinophil # : x  Auto Basophil # : x  Auto Neutrophil % : x  Auto Lymphocyte % : x  Auto Monocyte % : x  Auto Eosinophil % : x  Auto Basophil % : x    Urinalysis Basic - ( 26 Sep 2019 07:32 )    Color: Yellow / Appearance: Clear / S.005 / pH: x  Gluc: x / Ketone: Negative  / Bili: Negative / Urobili: Negative   Blood: x / Protein: 25 mg/dL / Nitrite: Negative   Leuk Esterase: Moderate / RBC: 0-2 /HPF / WBC 0-2   Sq Epi: x / Non Sq Epi: Occasional / Bacteria: x          138  |  105  |  12  ----------------------------<  89  4.1   |  28  |  0.74    Ca    8.5      27 Sep 2019 07:03  Mg     2.0     -    TPro  7.4  /  Alb  4.2  /  TBili  0.4  /  DBili  x   /  AST  24  /  ALT  22  /  AlkPhos  71  -    Hemoglobin A1C:     LIVER FUNCTIONS - ( 26 Sep 2019 05:27 )  Alb: 4.2 g/dL / Pro: 7.4 g/dL / ALK PHOS: 71 U/L / ALT: 22 U/L / AST: 24 U/L / GGT: x           Vitamin B12   PT/INR - ( 27 Sep 2019 07:03 )   PT: 32.0 sec;   INR: 2.74 ratio         PTT - ( 27 Sep 2019 07:03 )  PTT:35.4 sec      RADIOLOGY      ANALYSIS AND PLAN:  This is an 88-year-old with an episode of unresponsiveness and history of atrial fibrillation.  1.	For episode of unresponsiveness with shaking, suspect most likely this was a syncopal event secondary to cerebral hypoperfusion, lack of blood flow can cause tremors.  Doubt that this was a true seizure event.  2.	I would recommend telemetry evaluation.  3.	Monitor systolic blood pressure.  4.	For atrial fibrillation, continue anticoagulation with a goal INR between 2 and 3.  5.	For history of neuropathy, most likely secondary to spinal disc disease  6.	For history of CLL, the patient does have elevated white blood cell upon initial presentation, workup for infectious type process as needed.  7.	Fall precautions.  8.	Physical Therapy evaluation.  9.	Spoke with the daughter at the bedside.  Her name is Louann, her telephone number is [phone]. [phone]  10.	Greater than 45 minutes of time was spent with the patient, plan of care reviewing data, speaking to the family and multidisciplinary healthcare team.

## 2019-09-27 NOTE — DISCHARGE NOTE PROVIDER - PROVIDER TOKENS
PROVIDER:[TOKEN:[[id number]]] PROVIDER:[TOKEN:[[id number]]],PROVIDER:[TOKEN:[[id number]]],PROVIDER:[TOKEN:[[id number]]]

## 2019-09-27 NOTE — DIETITIAN INITIAL EVALUATION ADULT. - PROBLEM SELECTOR PLAN 4
Continue Metoprolol, Losartan,  home dose and frequency  Hold Aldactone for now on setting of syncopal episode

## 2019-09-27 NOTE — DISCHARGE NOTE PROVIDER - NSDCFUSCHEDAPPT_GEN_ALL_CORE_FT
CLAUDIO MCDOWELL ; 11/04/2019 ; [facility] [facility] CLAUDIO MCDOWELL ; 11/04/2019 ; [facility] CLAUDIO MCDOWELL ; 11/04/2019 ; [facility] [address] [facility] CLAUDIO MCDOWELL ; 11/04/2019 ; [facility] [address] [facility]

## 2019-09-27 NOTE — DISCHARGE NOTE PROVIDER - NSDCCPCAREPLAN_GEN_ALL_CORE_FT
PRINCIPAL DISCHARGE DIAGNOSIS  Diagnosis: Vasovagal near syncope  Assessment and Plan of Treatment: You had an episode of dizziness likely caused by decreased blood pressure after having a bowel movement which is common.  Please take precaution when getting up from a seated or lying down position before standing up to allow adeuqate blood flow to brain.  Follow up with your PCP/ cardiologist within 1 week of discharge.   -Please follow up with Dr. Rousseau, neurologist, for further evaluation      SECONDARY DISCHARGE DIAGNOSES  Diagnosis: Proctitis  Assessment and Plan of Treatment: Some inflammation of the colon was seen on imaging.  Please follow up with Dr. Noriega outpatient for a colonoscopy.

## 2019-09-27 NOTE — DIETITIAN INITIAL EVALUATION ADULT. - OTHER INFO
patient and family state patient for discharge today. states patient with fairly good PO intake . states turkey salty for lunch ordered something else. no problems chewing swallowing . low Na diet followed at home. history of IBS with proctitis and vasovagal episode. no food allergies. drinks ensure at home.

## 2019-09-27 NOTE — DISCHARGE NOTE PROVIDER - CARE PROVIDERS DIRECT ADDRESSES
[email or web address],DirectAddress_Unknown,DirectAddress_Unknown

## 2019-09-27 NOTE — PROGRESS NOTE ADULT - SUBJECTIVE AND OBJECTIVE BOX
[facility] -- Brandi Zuniga, Maite, Laila, Blake Kent Savella  Office # [phone]    Follow Up:  Syncopal episode/ ? Afib with RVR      HPI:  89 y/o F PMH of Afib ( On Coumadin), HTN, Depression, IBS, CLL,, PVD, vasovagal syncope,  brought in s/p syncopal episode. Per daughter at bedside, patient has h/o vasovagal syncopal episodes after bowel movement. Per HHA , who was with the patient, patient used bedside commode early this am around 4:30 am and during that patient looked like she  passed out, no fall or head trauma or tongue biting. Per daughter Tabatha , patient has h/o IBS so she has normal bowel movement alternating with diarrhea. Patient back to her baseline in ER, had normal solid BM per ER doctor. Denies chest pain, palpitation, dysuria, frequency, palpitation, sob. Denies nausea at present.  Patient states that she feels fine and not happy to stay in the hospital. (26 Sep 2019 09:23)      Subjective/Observations:     Pt. seen examined and evaluated. Pt. resting comfortably in bed in NAD, with no respiratory distress, no chest pain, dyspnea, palpitations, PND or orthopnea     REVIEW OF SYSTEMS: All other review of systems is negative unless indicated above    PAST MEDICAL & SURGICAL HISTORY:  Hypertension  [place] (hard of hearing), bilateral  Vaginal prolapse  Spondyloarthritis  Spinal stenosis  Essential hypertension  Acute cystitis without hematuria: septic  4/2016  Paroxysmal atrial fibrillation  Monoclonal gammopathies  Venous insufficiency  Lymphedema of both lower extremities  History of vaginal surgery  S/P thyroidectomy: partial   1975  S/P kyphoplasty: 2014      MEDICATIONS  (STANDING):  losartan 25 milliGRAM(s) Oral daily  mesalamine Suppository 1000 milliGRAM(s) Rectal at bedtime  metoprolol succinate ER 25 milliGRAM(s) Oral daily  mirtazapine 15 milliGRAM(s) Oral at bedtime    MEDICATIONS  (PRN):  acetaminophen   Tablet .. 650 milliGRAM(s) Oral every 6 hours PRN Temp greater or equal to 38C (100.4F), Mild Pain (1 - 3)      Allergies    No Known Allergies    Intolerances        Vital Signs Last 24 Hrs  T(C): 36.5 (27 Sep 2019 08:31), Max: 37 (27 Sep 2019 04:40)  T(F): 97.7 (27 Sep 2019 08:31), Max: 98.6 (27 Sep 2019 04:40)  HR: 54 (27 Sep 2019 08:31) (54 - 97)  BP: 129/78 (27 Sep 2019 08:31) (94/60 - 146/74)  BP(mean): --  RR: 18 (27 Sep 2019 08:31) (13 - 18)  SpO2: 98% (27 Sep 2019 08:31) (95% - 99%)    I&O's Summary    26 Sep 2019 07:01  -  27 Sep 2019 07:00  --------------------------------------------------------  IN: 0 mL / OUT: 750 mL / NET: -750 mL          PHYSICAL EXAM:    Constitutional: NAD, awake and alert, well-developed  HEENT: Moist Mucous Membranes, Anicteric  Pulmonary: Non-labored, breath sounds are clear bilaterally, No wheezing, rales or rhonchi  Cardiovascular: Regular, S1 and S2, No murmurs, rubs, gallops or clicks  Gastrointestinal: Bowel Sounds present, soft, nontender.   Lymph: No peripheral edema. No lymphadenopathy.  Skin: No visible rashes or ulcers.  Psych:  Mood & affect appropriate    LABS: All Labs Reviewed:                        13.1   4.17  )-----------( 89       ( 27 Sep 2019 07:03 )             39.4                    27 Sep 2019 07:03    138    |  105    |  12     ----------------------------<  89     4.1     |  28     |  0.74   Ca    9.4        26 Sep 2019 05:27  Mg     2.0       26 Sep 2019 05:27    TPro  7.4    /  Alb  4.2    /  TBili  0.4    /  DBili  x      /  AST  24     /  ALT  22     /  AlkPhos  71     26 Sep 2019 05:27         PTT - ( 27 Sep 2019 07:03 )  PTT:35.4 sec  CARDIAC MARKERS ( 26 Sep 2019 05:27 )  <.015 ng/mL / x     / x     / x     / x            Interpretation of Telemetry: Overnight on telemetry afib 60's        ECHO:         Imaging: [facility] -- Brandi Zuniga, Maite, Laila, Blake Kent Savella  Office # [phone]    Follow Up:  Syncopal episode/ ? Afib with RVR      HPI:  89 y/o F PMH of Afib ( On Coumadin), HTN, Depression, IBS, CLL,, PVD, vasovagal syncope,  brought in s/p syncopal episode. Per daughter at bedside, patient has h/o vasovagal syncopal episodes after bowel movement. Per HHA , who was with the patient, patient used bedside commode early this am around 4:30 am and during that patient looked like she  passed out, no fall or head trauma or tongue biting. Per daughter Tabatha , patient has h/o IBS so she has normal bowel movement alternating with diarrhea. Patient back to her baseline in ER, had normal solid BM per ER doctor. Denies chest pain, palpitation, dysuria, frequency, palpitation, sob. Denies nausea at present.  Patient states that she feels fine and not happy to stay in the hospital. (26 Sep 2019 09:23)      Subjective/Observations:     Pt. seen examined and evaluated. Pt. resting comfortably in bed in NAD, with no respiratory distress, no chest pain, dyspnea, palpitations, PND or orthopnea     REVIEW OF SYSTEMS: All other review of systems is negative unless indicated above    PAST MEDICAL & SURGICAL HISTORY:  Hypertension  [place] (hard of hearing), bilateral  Vaginal prolapse  Spondyloarthritis  Spinal stenosis  Essential hypertension  Acute cystitis without hematuria: septic  4/2016  Paroxysmal atrial fibrillation  Monoclonal gammopathies  Venous insufficiency  Lymphedema of both lower extremities  History of vaginal surgery  S/P thyroidectomy: partial   1975  S/P kyphoplasty: 2014      MEDICATIONS  (STANDING):  losartan 25 milliGRAM(s) Oral daily  mesalamine Suppository 1000 milliGRAM(s) Rectal at bedtime  metoprolol succinate ER 25 milliGRAM(s) Oral daily  mirtazapine 15 milliGRAM(s) Oral at bedtime    MEDICATIONS  (PRN):  acetaminophen   Tablet .. 650 milliGRAM(s) Oral every 6 hours PRN Temp greater or equal to 38C (100.4F), Mild Pain (1 - 3)      Allergies: No Known Allergies    Intolerances        Vital Signs Last 24 Hrs  T(C): 36.5 (27 Sep 2019 08:31), Max: 37 (27 Sep 2019 04:40)  T(F): 97.7 (27 Sep 2019 08:31), Max: 98.6 (27 Sep 2019 04:40)  HR: 54 (27 Sep 2019 08:31) (54 - 97)  BP: 129/78 (27 Sep 2019 08:31) (94/60 - 146/74)  BP(mean): --  RR: 18 (27 Sep 2019 08:31) (13 - 18)  SpO2: 98% (27 Sep 2019 08:31) (95% - 99%)    I&O's Summary    26 Sep 2019 07:01  -  27 Sep 2019 07:00  --------------------------------------------------------  IN: 0 mL / OUT: 750 mL / NET: -750 mL          PHYSICAL EXAM:    Constitutional: NAD, awake and alert, well-developed  HEENT: Moist Mucous Membranes, Anicteric  Pulmonary: Non-labored, breath sounds are clear bilaterally, No wheezing, rales or rhonchi  Cardiovascular: Regular, S1 and S2, No murmurs, rubs, gallops or clicks  Gastrointestinal: Bowel Sounds present, soft, nontender.   Lymph: No peripheral edema. No lymphadenopathy.  Skin: No visible rashes or ulcers.  Psych:  Mood & affect appropriate    LABS: All Labs Reviewed:                        13.1   4.17  )-----------( 89       ( 27 Sep 2019 07:03 )             39.4                    27 Sep 2019 07:03    138    |  105    |  12     ----------------------------<  89     4.1     |  28     |  0.74   Ca    9.4        26 Sep 2019 05:27  Mg     2.0       26 Sep 2019 05:27    TPro  7.4    /  Alb  4.2    /  TBili  0.4    /  DBili  x      /  AST  24     /  ALT  22     /  AlkPhos  71     26 Sep 2019 05:27         PTT - ( 27 Sep 2019 07:03 )  PTT:35.4 sec  CARDIAC MARKERS ( 26 Sep 2019 05:27 )  <.015 ng/mL / x     / x     / x     / x            Interpretation of Telemetry: Overnight on telemetry afib 60's        ECHO:         Imaging:

## 2019-09-27 NOTE — PROGRESS NOTE ADULT - ASSESSMENT
87 y/o F PMH of Afib ( On Coumadin), HTN, Depression, IBS, CLL, brought in s/p syncopal episode. Per daughter at bedside, patient has h/o vasovagal syncopal episodes after bowel movement. Cardio consulted for Afib with possible RVR on tele strip. Consulted for HR of 100-200 on tele monitor. However, tele monitor appears to be picking up artifact and showing falsely elevated HR      Afib  - Rate controlled in the 70s  - On coumadin for AC, last INR was 2.25  - Continue to monitor on tele monitor   - Continue home metoprolol ER 25mg QD for rate control  - Continue coumadin and monitor INR daily    HTN  - SBP  DBP 60-78   - Continue home metoprolol, losartan  - Can hold diuretics temporarily    Syncope  - Likely vasovagal as patient was having a BM at the time of the episode.   - Patient also with history of vasovagal syncope.  - check orthostatics  - monitor on telemetry      - Called outpt cardiologist Dr. Tony Vides for recent BW done in 9/5/19. WBC was 3.91. Lab results placed in paper chart.   - Still awaiting Echo results from April 2019.   - Other cardiovascular workup will depend on clinical course.  - All other workup per primary team.  - Will continue to follow.    Rosalva Farias DNP, ANP-c  Cardiology NP

## 2019-09-27 NOTE — PHYSICAL THERAPY INITIAL EVALUATION ADULT - ADDITIONAL COMMENTS
pt lives in a condo w/ no stairs to negotiate.  Pt is a community ambulator.  Pt has a home health aide and daughter to assist her at all times.

## 2019-09-27 NOTE — PROGRESS NOTE ADULT - SUBJECTIVE AND OBJECTIVE BOX
INTERVAL HPI/OVERNIGHT EVENTS:  pt seen and examined, daughters present  denies n/v/abd pain  no bms  tolerating po  leukocytosis resolved  no acute gi issues per overnight rn, refusing canasa, had 'bad experience w enemas' in past    MEDICATIONS  (STANDING):  losartan 25 milliGRAM(s) Oral daily  mesalamine Suppository 1000 milliGRAM(s) Rectal at bedtime  metoprolol succinate ER 25 milliGRAM(s) Oral daily  mirtazapine 15 milliGRAM(s) Oral at bedtime    MEDICATIONS  (PRN):  acetaminophen   Tablet .. 650 milliGRAM(s) Oral every 6 hours PRN Temp greater or equal to 38C (100.4F), Mild Pain (1 - 3)      Allergies    No Known Allergies    Intolerances        Review of Systems:    General:  No wt loss, fevers, chills, night sweats, fatigue   Eyes:  Good vision, no reported pain  ENT:  No sore throat, pain, runny nose, dysphagia  CV:  No pain, palpitations, hypo/hypertension  Resp:  No dyspnea, cough, tachypnea, wheezing  GI:  No pain, No nausea, No vomiting, No diarrhea, No constipation, No weight loss, No fever, No pruritis, No rectal bleeding, No melena, No dysphagia  :  No pain, bleeding, incontinence, nocturia  Muscle:  No pain, weakness  Neuro:  No weakness, tingling, memory problems  Psych:  No fatigue, insomnia, mood problems, depression  Endocrine:  No polyuria, polydypsia, cold/heat intolerance  Heme:  No petechiae, ecchymosis, easy bruisability  Skin:  No rash, tattoos, scars, edema      Vital Signs Last 24 Hrs  T(C): 36.5 (27 Sep 2019 08:31), Max: 37 (27 Sep 2019 04:40)  T(F): 97.7 (27 Sep 2019 08:31), Max: 98.6 (27 Sep 2019 04:40)  HR: 54 (27 Sep 2019 08:31) (54 - 97)  BP: 129/78 (27 Sep 2019 08:31) (94/60 - 146/74)  BP(mean): --  RR: 18 (27 Sep 2019 08:31) (13 - 18)  SpO2: 98% (27 Sep 2019 08:31) (95% - 99%)    PHYSICAL EXAM:    Constitutional: NAD  HEENT: ncat  Neck: No LAD  Respiratory: dec bs  Cardiovascular: S1 and S2, RRR  Gastrointestinal: soft nt mild dt  Extremities: No peripheral edema  Vascular: 2+ peripheral pulses  Neurological: awake alert responds appropriately  Skin: No rashes      LABS:                        13.1   4.17  )-----------( 89       ( 27 Sep 2019 07:03 )             39.4         138  |  105  |  12  ----------------------------<  89  4.1   |  28  |  0.74    Ca    8.5      27 Sep 2019 07:03  Mg     2.0         TPro  7.4  /  Alb  4.2  /  TBili  0.4  /  DBili  x   /  AST  24  /  ALT  22  /  AlkPhos  71      PT/INR - ( 27 Sep 2019 07:03 )   PT: 32.0 sec;   INR: 2.74 ratio         PTT - ( 27 Sep 2019 07:03 )  PTT:35.4 sec  Urinalysis Basic - ( 26 Sep 2019 07:32 )    Color: Yellow / Appearance: Clear / S.005 / pH: x  Gluc: x / Ketone: Negative  / Bili: Negative / Urobili: Negative   Blood: x / Protein: 25 mg/dL / Nitrite: Negative   Leuk Esterase: Moderate / RBC: 0-2 /HPF / WBC 0-2   Sq Epi: x / Non Sq Epi: Occasional / Bacteria: x        RADIOLOGY & ADDITIONAL TESTS:

## 2019-09-27 NOTE — DISCHARGE NOTE PROVIDER - HOSPITAL COURSE
FROM ADMISSION H+P:     HPI:    87 y/o F PMH of Afib ( On Coumadin), HTN, Depression, IBS, CLL,, PVD, vasovagal syncope,  brought in s/p syncopal episode. Per daughter at bedside, patient has h/o vasovagal syncopal episodes after bowel movement. Per HHA , who was with the patient, patient used bedside commode early this am around 4:30 am and during that patient looked like she  passed out, no fall or head trauma or tongue biting. Per daughter Tabatha , patient has h/o IBS so she has normal bowel movement alternating with diarrhea. Patient back to her baseline in ER, had normal solid BM per ER doctor. Denies chest pain, palpitation, dysuria, frequency, palpitation, sob. Denies nausea at present.  Patient states that she feels fine and not happy to stay in the hospital. (26 Sep 2019 09:23)        ---    HOSPITAL COURSE:     Patient admitted for syncope likely vasovagal as occurred during BM, and proctitis.  For vasovagal syncope, patient was seen by cardio (Dr. Disla), orthostatic negative.  Also seen by neuro, and likely had vasovagal episode from cerebral hypoperfusion.  Patient dizziness resolved during the hospitalization.  For proctitis, GI (Dr. Noriega) was consulted and recommended patient take mesalamine suppository for 2 weeks, however patient refuses suppository, but patient to have outpatient colonoscopy.  Patient was also seen by podiatry for L toe wound, which was removed and vascular (Dr. Munoz) for evaluation of LE circulation, no vascular intervention needed, just needs to elevated legs.  Patient seen and examined on day of discharge, medically optimized to be discharged home with follow up outpatient, especially for outpatient colonoscopy.        ---    CONSULTANTS:     Cardio- Dr. Disla    Neuro- Dr. Aguilar    Podiatry- Dr. Lai    Vascular-Dr. Munoz    GI- Dr. Noriega        ---    Vital Signs Last 24 Hrs    T(C): 36.6 (27 Sep 2019 11:25), Max: 37 (27 Sep 2019 04:40)    T(F): 97.8 (27 Sep 2019 11:25), Max: 98.6 (27 Sep 2019 04:40)    HR: 74 (27 Sep 2019 13:37) (54 - 97)    BP: 141/81 (27 Sep 2019 13:37) (94/60 - 146/74)    RR: 18 (27 Sep 2019 08:31) (14 - 18)    SpO2: 98% (27 Sep 2019 08:31) (95% - 99%)        Physical Examination:    GEN: NAD, Awake, Alert     HEENT: PERRLA, NCAT    Heart: S1S2+, Irregular     Lungs: Clear bilat, no wheezing     Abdomen: Soft, +bs, non tender     Extremities: No clubbing, cyanosis, + venous stasis changes bilat LE, refusing to have sock removed since feels cold, so unable to examined her feet     Neuro: Awake, alert, no gross focal motor, sensory deficits     Skin: Warm, dry FROM ADMISSION H+P:     HPI:    87 y/o F PMH of Afib ( On Coumadin), HTN, Depression, IBS, CLL,, PVD, vasovagal syncope,  brought in s/p syncopal episode. Per daughter at bedside, patient has h/o vasovagal syncopal episodes after bowel movement. Per HHA , who was with the patient, patient used bedside commode early this am around 4:30 am and during that patient looked like she  passed out, no fall or head trauma or tongue biting. Per daughter Tabatha , patient has h/o IBS so she has normal bowel movement alternating with diarrhea. Patient back to her baseline in ER, had normal solid BM per ER doctor. Denies chest pain, palpitation, dysuria, frequency, palpitation, sob. Denies nausea at present.  Patient states that she feels fine and not happy to stay in the hospital. (26 Sep 2019 09:23)        ---    HOSPITAL COURSE:     Patient admitted for syncope likely vasovagal as occurred during BM, and proctitis.  For vasovagal syncope, patient was seen by cardio (Dr. Disla), orthostatic negative.  Also seen by neuro, and likely had vasovagal episode from cerebral hypoperfusion.  Patient dizziness resolved during the hospitalization.  Patient seen by PT and required no PT needs.  For proctitis, GI (Dr. Noriega) was consulted and recommended patient take mesalamine suppository for 2 weeks, however patient refuses suppository, but patient to have outpatient colonoscopy.  Patient was also seen by podiatry for L toe wound, which was removed and vascular (Dr. Munoz) for evaluation of LE circulation, no vascular intervention needed, just needs to elevated legs.  Patient seen and examined on day of discharge, medically optimized to be discharged home with follow up outpatient, especially for outpatient colonoscopy.        ---    CONSULTANTS:     Cardio- Dr. Disla    Neuro- Dr. Aguilar    Podiatry- Dr. Lai    Vascular-Dr. Munoz    GI- Dr. Noriega        ---    Vital Signs Last 24 Hrs    T(C): 36.6 (27 Sep 2019 11:25), Max: 37 (27 Sep 2019 04:40)    T(F): 97.8 (27 Sep 2019 11:25), Max: 98.6 (27 Sep 2019 04:40)    HR: 74 (27 Sep 2019 13:37) (54 - 97)    BP: 141/81 (27 Sep 2019 13:37) (94/60 - 146/74)    RR: 18 (27 Sep 2019 08:31) (14 - 18)    SpO2: 98% (27 Sep 2019 08:31) (95% - 99%)        Physical Examination:    GEN: NAD, Awake, Alert     HEENT: PERRLA, NCAT    Heart: S1S2+, Irregular     Lungs: Clear bilat, no wheezing     Abdomen: Soft, +bs, non tender     Extremities: No clubbing, cyanosis, + venous stasis changes bilat LE, refusing to have sock removed since feels cold, so unable to examined her feet     Neuro: Awake, alert, no gross focal motor, sensory deficits     Skin: Warm, dry FROM ADMISSION H+P:     HPI:    89 y/o F PMH of Afib ( On Coumadin), HTN, Depression, IBS, CLL,, PVD, vasovagal syncope,  brought in s/p syncopal episode. Per daughter at bedside, patient has h/o vasovagal syncopal episodes after bowel movement. Per HHA , who was with the patient, patient used bedside commode early this am around 4:30 am and during that patient looked like she  passed out, no fall or head trauma or tongue biting. Per daughter Tabatha , patient has h/o IBS so she has normal bowel movement alternating with diarrhea. Patient back to her baseline in ER, had normal solid BM per ER doctor. Denies chest pain, palpitation, dysuria, frequency, palpitation, sob. Denies nausea at present.  Patient states that she feels fine and not happy to stay in the hospital. (26 Sep 2019 09:23)        ---    HOSPITAL COURSE:     Patient admitted for syncope likely vasovagal as occurred during BM, and proctitis.  For vasovagal syncope, patient was seen by cardio (Dr. Disla), orthostatic negative.  Also seen by neuro, and likely had vasovagal episode from cerebral hypoperfusion.  Patient dizziness resolved during the hospitalization.  Patient seen by PT and required no PT needs.  For proctitis, GI (Dr. Noriega) was consulted and recommended patient take mesalamine suppository for 2 weeks, however patient refuses suppository, but patient to have outpatient colonoscopy.  Patient was also seen by podiatry for L toe wound, which was removed and vascular (Dr. Munoz) for evaluation of LE circulation, no vascular intervention needed, just needs to elevated legs.  Patient seen and examined on day of discharge, medically optimized to be discharged home with follow up outpatient, especially for outpatient colonoscopy.        ---    CONSULTANTS:     Cardio- Dr. Disla    Neuro- Dr. Aguilar    Podiatry- Dr. Lai    Vascular-Dr. Munoz    GI- Dr. Noriega        ---    Vital Signs Last 24 Hrs    T(C): 36.6 (27 Sep 2019 11:25), Max: 37 (27 Sep 2019 04:40)    T(F): 97.8 (27 Sep 2019 11:25), Max: 98.6 (27 Sep 2019 04:40)    HR: 74 (27 Sep 2019 13:37) (54 - 97)    BP: 141/81 (27 Sep 2019 13:37) (94/60 - 146/74)    RR: 18 (27 Sep 2019 08:31) (14 - 18)    SpO2: 98% (27 Sep 2019 08:31) (95% - 99%)        Physical Examination:    GEN: NAD, Awake, Alert     HEENT: PERRLA, NCAT    Heart: S1S2+, Irregular     Lungs: Clear bilat, no wheezing     Abdomen: Soft, +bs, non tender     Extremities: No clubbing, cyanosis, + venous stasis changes bilat LE, refusing to have sock removed since feels cold, so unable to examined her feet     Neuro: Awake, alert, no gross focal motor, sensory deficits     Skin: Warm, dry        Time spent: 40 minutes

## 2019-09-27 NOTE — PROGRESS NOTE ADULT - PROBLEM SELECTOR PLAN 1
suspect stercoral ulceration  doubt infectious given normal bm; resolved leukocytosis  doubt mass given no rectal bleed and normal caliber stool  diet as tolerated  monitor gi function  rec 2 week course of mesalamine supp for stercoral ulceration and then re-evaluate; however pt defers use of suppository, discussed at bedside at length w pt/dtrs, questions answered  colonoscopy discussed to r/o malignancy; however would favor conservative management given above and cardiac co-morbidities, can f/u w gi as op for further management

## 2019-09-27 NOTE — DIETITIAN INITIAL EVALUATION ADULT. - PROBLEM SELECTOR PLAN 1
Admit to Tele  CT head negative for acute findings  Avoid narcotics  Neuro checks  Fall precautions  Neuro and cardio consults.  TTE if not done before.  Check Orthostatic vitals

## 2019-09-27 NOTE — DISCHARGE NOTE PROVIDER - CARE PROVIDER_API CALL
Guadalupe Rousseau)  Neurology  [address] [place] [address]  Phone: [phone]  Fax: [phone]  Follow Up Time: Guadalupe Rousseau)  Neurology  [address] [place] [address]  Phone: [phone]  Fax: [phone]  Follow Up Time:     Tony Vides)  Cardiovascular Disease; Internal Medicine  [address] [place] [address]  Phone: [phone]  Fax: [phone]  Follow Up Time:     Willie Noriega ()  Gastroenterology; Internal Medicine  [address] [place] [address]  Phone: [phone]  Fax: [phone]  Follow Up Time:

## 2019-10-01 LAB
CULTURE RESULTS: SIGNIFICANT CHANGE UP
CULTURE RESULTS: SIGNIFICANT CHANGE UP
SPECIMEN SOURCE: SIGNIFICANT CHANGE UP
SPECIMEN SOURCE: SIGNIFICANT CHANGE UP

## 2019-11-11 ENCOUNTER — APPOINTMENT (OUTPATIENT)
Dept: OBGYN | Facility: CLINIC | Age: 84
End: 2019-11-11
Payer: MEDICARE

## 2019-11-11 VITALS — DIASTOLIC BLOOD PRESSURE: 100 MMHG | HEIGHT: 65 IN | SYSTOLIC BLOOD PRESSURE: 157 MMHG | HEART RATE: 80 BPM

## 2019-11-11 VITALS — SYSTOLIC BLOOD PRESSURE: 134 MMHG | DIASTOLIC BLOOD PRESSURE: 76 MMHG

## 2019-11-11 PROCEDURE — 99214 OFFICE O/P EST MOD 30 MIN: CPT

## 2019-11-11 NOTE — PHYSICAL EXAM
[No Acute Distress] : in no acute distress [Well developed] : well developed [Well Nourished] : ~L well nourished [Good Hygeine] : demonstrates good hygeine [Oriented x3] : oriented to person, place, and time [Normal Memory] : ~T memory was ~L unimpaired [Normal Mood/Affect] : mood and affect are normal [Respirations regular] : ~T respiratory rate was regular [Normal Lung Sounds] : the lungs were clear to auscultation [Rate & Rhythm Regular] : ~T heart rate and rhythm were normal [No Edema] : ~T edema was not present [Thyroid Normal] : the thyroid ~T showed no abnormalities [Supple] : ~T the neck demonstrated no ~M decrease in suppleness [Symmetrical] : the neck was ~L symmetrical [Warm and Dry] : was warm and dry to touch [Turgor Normal] : skin turgor ~T was normal [Normal Gait] : gait was normal [Normal Strength/Tone] : muscle strength and tone were normal [Vulvar Atrophy] : vulvar atrophy [Labia Minora] : were normal [Labia Majora] : were normal [Bartholin's Gland] : both Bartholin's glands were normal  [Normal Appearance] : general appearance was normal [Atrophy] : atrophy [3] : 3 [] : I [Uterine Adnexae] : were not tender and not enlarged [Normal rectal exam] : was normal [None] : no [Normal] : was normal [Mass] : no breast mass [Tender] : no tenderness [Nipple Discharge] : no nipple discharge [Mass (___ Cm)] : no ~M [unfilled] abdominal mass was palpated [Tenderness] : ~T no ~M abdominal tenderness observed [H/Smegaly] : no hepatosplenomegaly [Supraclavicular LAD] : no adenopathy noted in supraclavicular lymph nodes [Axillary LAD] : no adenopathy was noted in axillary nodes [Inguinal LAD] : no adenopathy was noted in the inguinal lymph nodes [Rash/Lesion] : no rash or lesion was noted [No Joint Swelling] : there was swelling of the joints [No Clubbing, Cyanosis] : there was clubbing and cyanosis of the fingernails [Vulvitis] : vulvitis [Labia Majora Erythema] : erythema [Estrogen Effect] : no estrogen effect was observed

## 2019-11-11 NOTE — PROCEDURE
[Straight Catheterization] : insertion of a straight catheter [Urinary Tract Infection] : a urinary tract infection [Patient] : the patient [Other: ___] : ~LAKESHIA mg [None] : there were no complications with the catheter insertion [Clear] : clear [Cytology] : cytology [Culture] : culture [No Complications] : no complications [Tolerated Well] : the patient tolerated the procedure well [Urinalysis] : urinalysis [0] : 0 [Post procedure instructions and information given] : Post procedure instructions and information were given and reviewed with patient. [FreeTextEntry1] : Patient tolerated the catheterization well and was immediately given a dose of ampicillin she reports no allergies

## 2019-11-11 NOTE — HISTORY OF PRESENT ILLNESS
[Uterine Prolapse] : none [Cystocele (Obstetric)] : frequent [Vaginal Wall Prolapse] : rare [Rectal Prolapse] : rare [Urge Incontinence Of Urine] : none [Stress Incontinence] : none [Unable To Restrain Bowel Movement] : none [Incomplete Emptying Of Bladder] : none [Urinary Stream Starts And Stops] : none [Urinary Frequency More Than Twice At Night (Nocturia)] : none [Urinary Frequency] : none [Feelings Of Urinary Urgency] : none [Urinary Tract Infection] : none [Hematuria] : none [Pain During Urination (Dysuria)] : none [Constipation Obstructed Defecation] : none [Incomplete Emptying Of Stool] : none [Stool Visible Blood] : none [Diarrhea] : none [Pelvic Pain] : none [Vaginal Pain] : none [Vulvar Pain] : none [Bladder Pain] : none [Back Pain] : none [Rectal Pain] : none [Sexual Dysfunction, NOS] : none [] : none [FreeTextEntry1] : has seen Dr. Sxeton and latest exam nl\par Dr. Sanchez did urodynamics yesterday in the did not reveal any stress or urge incontinence\par All options were discussed with the patient now by tiesha Arriaga/brianne garibay and deepa (and harsha)\par Post void residual was 300 and she was catheterized today the urine appeared cloudy and she's been placed on Levaquin for 10 days and then will follow with trimethoprim 100 mg at night and she will think about whether or not she wants to consider surgery the hide a way garment was demonstrated to the patient to be worn either over under the depends-\par Renal and pelvic sonogram are normal and Pap was done today the pelvic sono was done today did not reveal any pelvic masses\par

## 2019-11-11 NOTE — REVIEW OF SYSTEMS
[WT Loss ___] : recent [unfilled] ~Ulb weight loss [Eyesight Problems] : eyesight problems [Loss Of Hearing] : hearing loss [Shortness Of Breath] : shortness of breath [Abdominal Pain] : abdominal pain [Joint Stiffness] : joint stiffness [Limb Pain] : limb pain [All Other ROS] : all other reviewed systems are negative [Easy Bruising] : a tendency for easy bruising

## 2019-11-11 NOTE — DISCUSSION/SUMMARY
[Reviewed Clinical Lab Test(s)] : Results of clinical tests were reviewed. [Reviewed Radiology Report(s)] : Radiology reports were reviewed. [Discuss Tests w/Referring Providers] : Results of labs/radiology studies and the treatment recommendations were discussed with performing/referring physician.

## 2019-11-12 PROBLEM — I10 ESSENTIAL (PRIMARY) HYPERTENSION: Chronic | Status: ACTIVE | Noted: 2019-09-26

## 2019-12-02 NOTE — ED ADULT NURSE NOTE - EENT WDL
. Eyes with no visual disturbances.  Ears clean and dry and no hearing difficulties. Nose with pink mucosa and no drainage.  Mouth mucous membranes moist and pink.  No tenderness or swelling to throat or neck.

## 2020-03-09 ENCOUNTER — APPOINTMENT (OUTPATIENT)
Dept: OBGYN | Facility: CLINIC | Age: 85
End: 2020-03-09
Payer: MEDICARE

## 2020-03-09 PROCEDURE — 51703 INSERT BLADDER CATH COMPLEX: CPT

## 2020-03-09 PROCEDURE — 99214 OFFICE O/P EST MOD 30 MIN: CPT | Mod: 25

## 2020-03-09 NOTE — REVIEW OF SYSTEMS
[WT Loss ___] : recent [unfilled] ~Ulb weight loss [Eyesight Problems] : eyesight problems [Loss Of Hearing] : hearing loss [Shortness Of Breath] : shortness of breath [Abdominal Pain] : abdominal pain [Joint Stiffness] : joint stiffness [Limb Pain] : limb pain [Easy Bruising] : a tendency for easy bruising [All Other ROS] : all other reviewed systems are negative

## 2020-03-09 NOTE — HISTORY OF PRESENT ILLNESS
[Cystocele (Obstetric)] : frequent [Uterine Prolapse] : none [Vaginal Wall Prolapse] : rare [Rectal Prolapse] : rare [Stress Incontinence] : none [Urge Incontinence Of Urine] : none [Unable To Restrain Bowel Movement] : none [Urinary Frequency More Than Twice At Night (Nocturia)] : none [Urinary Stream Starts And Stops] : none [Incomplete Emptying Of Bladder] : none [Urinary Frequency] : none [Feelings Of Urinary Urgency] : none [Pain During Urination (Dysuria)] : none [Urinary Tract Infection] : none [Hematuria] : none [Constipation Obstructed Defecation] : none [Incomplete Emptying Of Stool] : none [Stool Visible Blood] : none [Diarrhea] : none [Pelvic Pain] : none [Vaginal Pain] : none [Vulvar Pain] : none [Bladder Pain] : none [Rectal Pain] : none [Back Pain] : none [Sexual Dysfunction, NOS] : none [] : none [FreeTextEntry1] : has seen Dr. Sexton and latest exam nl\par Dr. Sanchez did urodynamics yesterday in the did not reveal any stress or urge incontinence\par All options were discussed with the patient now by tiesha Arriaga/brianne garibay and deepa (and harsah)\par Post void residual was 300 and she was catheterized today the urine appeared cloudy and she's been placed on Levaquin for 10 days and then will follow with trimethoprim 100 mg at night and she will think about whether or not she wants to consider surgery the hide a way garment was demonstrated to the patient to be worn either over under the depends-\par Renal and pelvic sonogram are normal and Pap was done today the pelvic sono was done today did not reveal any pelvic masses\par

## 2020-03-09 NOTE — PHYSICAL EXAM
[No Acute Distress] : in no acute distress [Well developed] : well developed [Well Nourished] : ~L well nourished [Good Hygeine] : demonstrates good hygeine [Oriented x3] : oriented to person, place, and time [Normal Memory] : ~T memory was ~L unimpaired [Normal Mood/Affect] : mood and affect are normal [Normal Lung Sounds] : the lungs were clear to auscultation [Respirations regular] : ~T respiratory rate was regular [Rate & Rhythm Regular] : ~T heart rate and rhythm were normal [No Edema] : ~T edema was not present [Supple] : ~T the neck demonstrated no ~M decrease in suppleness [Thyroid Normal] : the thyroid ~T showed no abnormalities [Symmetrical] : the neck was ~L symmetrical [Warm and Dry] : was warm and dry to touch [Turgor Normal] : skin turgor ~T was normal [Normal Gait] : gait was normal [Normal Strength/Tone] : muscle strength and tone were normal [Vulvitis] : vulvitis [Vulvar Atrophy] : vulvar atrophy [Labia Majora] : were normal [Labia Majora Erythema] : erythema [Labia Minora] : were normal [Bartholin's Gland] : both Bartholin's glands were normal  [Normal Appearance] : general appearance was normal [Atrophy] : atrophy [3] : 3 [] : I [Uterine Adnexae] : were not tender and not enlarged [Normal rectal exam] : was normal [Normal] : was normal [None] : no [Mass] : no breast mass [Tender] : no tenderness [Nipple Discharge] : no nipple discharge [Mass (___ Cm)] : no ~M [unfilled] abdominal mass was palpated [Tenderness] : ~T no ~M abdominal tenderness observed [H/Smegaly] : no hepatosplenomegaly [Supraclavicular LAD] : no adenopathy noted in supraclavicular lymph nodes [Axillary LAD] : no adenopathy was noted in axillary nodes [Inguinal LAD] : no adenopathy was noted in the inguinal lymph nodes [Rash/Lesion] : no rash or lesion was noted [No Joint Swelling] : there was swelling of the joints [No Clubbing, Cyanosis] : there was clubbing and cyanosis of the fingernails [Estrogen Effect] : no estrogen effect was observed

## 2020-03-09 NOTE — COUNSELING
[FreeTextEntry1] : 1-The patient will see me back again In 6 mos or as clinically indicated-sees dr mcleod prn-for uti sx-pvr normal  timed void and sit longer when voids; may offer PTNS if nocturia bothers her-URGED TO USE TRIPLE PASTE AT LEAST NIGHTLY IF NOT BID; RESERVE LOTRISONE ONLY IF ITCH/DISCHARGE\par 2-Urethral caruncle much improved and Dr. Mcleod follows her for this\par 3-vulvar irritation: No evidence of a yeast infection at this point over the skin is fairly thin she will use triple paste bid and lotrisone to intertrigonal folds qhs until I see her next to rx the candidal vulvitis.\par 4-all other health maintenance is up to date Repeat mammogram and breast sono due to mastodynia 2018 and repeat pelvic sonogram transabdominally-normal 3 MOS AGO-no mass felt on exam (ovaries nv but no masses and uterus normal)-mammo/breast sono B1 2020 rpt only as clinically indicated; \par ta pelvic sono rpt ordered to ck ovaries-no masses felt (will ck in our office next visit)\par 5-All questions answered to pt and daughter's apparent satisfaction\par 6-unable to collect urine by msu; cathed today for 150cc's-emptying bladder fairly well-ua uc [id number] rx duricef 500 mgs po bid x 7d and d/c if uc neg; pt to drink more water to try and ameliorate utis\par 7-bone density every 2 years or so rx given\par 8-referral to a neurologist made at the daughter is request for cognitive testing; she is still seeking neurologist whom pt na daughter likes-Dr. Leandro Lu geriatric neurologist-may follow w/him\par 9- regarding her back pain she was referred to the chief of PM and R. Dr. Branden Pizarro10-pap na due to lefort procedure-healed well \par 10-rtc Dr. Carlie rios and to see me 6/8/2020 sono/uc and visit\par JR\par \par

## 2020-03-09 NOTE — PROCEDURE
[Straight Catheterization] : insertion of a straight catheter [Urinary Tract Infection] : a urinary tract infection [Patient] : the patient [Other: ___] : ~LAKESHIA mg [None] : there were no complications with the catheter insertion [Culture] : culture [Cytology] : cytology [Urinalysis] : urinalysis [No Complications] : no complications [Tolerated Well] : the patient tolerated the procedure well [Post procedure instructions and information given] : Post procedure instructions and information were given and reviewed with patient. [0] : 0 [Cloudy] : cloudy [FreeTextEntry9] : urine cloudy ua uc sent [FreeTextEntry1] : Patient tolerated the catheterization well and was rxd duricef bid x7d and  she reports no allergies

## 2020-03-10 LAB
APPEARANCE: ABNORMAL
BACTERIA: ABNORMAL
BILIRUBIN URINE: NEGATIVE
BLOOD URINE: ABNORMAL
COLOR: YELLOW
GLUCOSE QUALITATIVE U: NEGATIVE
HYALINE CASTS: 0 /LPF
KETONES URINE: NEGATIVE
LEUKOCYTE ESTERASE URINE: ABNORMAL
MICROSCOPIC-UA: NORMAL
NITRITE URINE: NEGATIVE
PH URINE: 6.5
PROTEIN URINE: ABNORMAL
RED BLOOD CELLS URINE: 10 /HPF
SPECIFIC GRAVITY URINE: 1.02
SQUAMOUS EPITHELIAL CELLS: 1 /HPF
URINE CYTOLOGY: NORMAL
UROBILINOGEN URINE: NORMAL
WHITE BLOOD CELLS URINE: 452 /HPF

## 2020-03-12 LAB — BACTERIA UR CULT: ABNORMAL

## 2020-03-12 RX ORDER — CEFADROXIL 500 MG/1
500 CAPSULE ORAL
Qty: 14 | Refills: 0 | Status: DISCONTINUED | COMMUNITY
Start: 2020-03-09 | End: 2020-03-12

## 2020-03-12 RX ORDER — AMPICILLIN 500 MG/1
500 CAPSULE ORAL 3 TIMES DAILY
Qty: 21 | Refills: 1 | Status: DISCONTINUED | COMMUNITY
Start: 2020-03-12 | End: 2020-03-12

## 2020-04-13 ENCOUNTER — APPOINTMENT (OUTPATIENT)
Dept: UROLOGY | Facility: CLINIC | Age: 85
End: 2020-04-13

## 2020-05-19 ENCOUNTER — APPOINTMENT (OUTPATIENT)
Dept: UROLOGY | Facility: CLINIC | Age: 85
End: 2020-05-19

## 2020-05-26 ENCOUNTER — APPOINTMENT (OUTPATIENT)
Dept: UROLOGY | Facility: CLINIC | Age: 85
End: 2020-05-26
Payer: MEDICARE

## 2020-05-26 ENCOUNTER — OUTPATIENT (OUTPATIENT)
Dept: OUTPATIENT SERVICES | Facility: HOSPITAL | Age: 85
LOS: 1 days | End: 2020-05-26
Payer: MEDICARE

## 2020-05-26 DIAGNOSIS — E89.0 POSTPROCEDURAL HYPOTHYROIDISM: Chronic | ICD-10-CM

## 2020-05-26 DIAGNOSIS — Z98.89 OTHER SPECIFIED POSTPROCEDURAL STATES: Chronic | ICD-10-CM

## 2020-05-26 PROCEDURE — 51702 INSERT TEMP BLADDER CATH: CPT

## 2020-05-26 PROCEDURE — 99214 OFFICE O/P EST MOD 30 MIN: CPT | Mod: 25

## 2020-05-27 NOTE — HISTORY OF PRESENT ILLNESS
[FreeTextEntry1] : Ms. Venegas is a 88 year old woman here for a follow up of nocturia, microscopic hematuria, urethral caruncle.Patient believes she makes less urine in 24-hour period than previously.  Her age says that one given a diuretic.  Shea good quantity of urine.  The patient says that she generally makes it to the bathroom in time, though she must tarry which takes the diuretic. The patient complained about a foul order from the urine. The patient does not have dysuria.  There is no gross hematuria.  The patient does not push or strain to urinate so sometimes she has to increase her abdominal pressure to defecate. Patient uses a walker.  The patient also stated that she is having right lower abdominal pain and discomfort.  He corresponds to a bulge of increasing size in the right lower quadrant.  The patient has bilateral hearing aids and wears eye glasses.\par 7/3/18: The patient returned today and reported she has nocturia and wakes up every 2 hours to urinate. She broke her femur and a tibial fracture. \par 7/16/18: The patient returned and reported she has the urge to urinate but ends up having a bowel movement instead. Daughter noted bowels are large. Patient noted she has a hard time urinating. Wakes up 3-4 times during the night to urinate, noted it is sometimes just "drops of urine." Patient is more disorientated and confused, I advised her to consult with a neurologist. \par 8/30/18: The patient returned today and reported she is feeling adequate. Patient denies dysuria, gross hematuria, urgency, or incontinence. Wakes up 2-3 times during the night to urinate. \par 6/4/19: Patient presents today for a follow up. Today she notes that her urination is "okay". She does not leak during the day but still wears depends since she is still concerned about incontinence. At night, she wakes up when she has to urinate but due to mobility issues is not always able to make it to bathroom. Diarrhea is reported on occasion. Mirtazapine has been initiated by another provider and it is reported that is has decreased her nocturia from 4x a night to once a night. \par \par 05/26/2020: Patient presents today for a follow up. Pt ambulates in a wheelchair and is able to go short distances with a walker. Nocturia x1-2 usually, but voided unusually frequently last night. Denies hematuria,or burning. No symptoms in March. Reports issues with short term, but not long term memory. Has been consulting with PCP and testing treatments regarding memory. Reports occasional stomach pains.\par \par

## 2020-05-27 NOTE — PHYSICAL EXAM
[General Appearance - Well Developed] : well developed [General Appearance - Well Nourished] : well nourished [Normal Appearance] : normal appearance [Well Groomed] : well groomed [General Appearance - In No Acute Distress] : no acute distress [Abdomen Soft] : soft [Abdomen Tenderness] : non-tender [Costovertebral Angle Tenderness] : no ~M costovertebral angle tenderness [Skin Color & Pigmentation] : normal skin color and pigmentation [] : no respiratory distress [Respiration, Rhythm And Depth] : normal respiratory rhythm and effort [Exaggerated Use Of Accessory Muscles For Inspiration] : no accessory muscle use [Oriented To Time, Place, And Person] : oriented to person, place, and time [Affect] : the affect was normal [Mood] : the mood was normal [Not Anxious] : not anxious [No Palpable Adenopathy] : no palpable adenopathy [Cervical Lymph Nodes Enlarged Posterior Bilaterally] : posterior cervical [Cervical Lymph Nodes Enlarged Anterior Bilaterally] : anterior cervical [Supraclavicular Lymph Nodes Enlarged Bilaterally] : supraclavicular [FreeTextEntry1] : No submandibular gland tenderness.\par

## 2020-05-27 NOTE — REVIEW OF SYSTEMS
[Eyesight Problems] : eyesight problems [Loss Of Hearing] : hearing loss [Chest Pain] : chest pain [Constipation] : constipation [Joint Pain] : joint pain [Anxiety] : anxiety [Easy Bleeding] : a tendency for easy bleeding [Cough] : no cough [Palpitations] : no palpitations [Feeling Poorly] : not feeling poorly [Convulsions] : no convulsions [Hot Flashes] : no hot flashes [Dysuria] : no dysuria

## 2020-05-27 NOTE — ADDENDUM
[FreeTextEntry1] : This note was authored by Hazel Dumas working as scribe for Dr. Indio Sexton. I, Dr. Indio Sexton, have reviewed the content of this note and confirm it is true and accurate. I personally performed the history and physical examination and made all the decisions.\par 05/26/2020

## 2020-05-27 NOTE — ASSESSMENT
[FreeTextEntry1] : Ms. Venegas is a 88 year old woman here for a follow up of nocturia, microscopic hematuria, urethral caruncle.Patient believes she makes less urine in 24-hour period than previously.  Her age says that one given a diuretic.  Shea good quantity of urine.  The patient says that she generally makes it to the bathroom in time, though she must tarry which takes the diuretic. The patient complained about a foul order from the urine. The patient does not have dysuria.  There is no gross hematuria.  The patient does not push or strain to urinate so sometimes she has to increase her abdominal pressure to defecate. Patient uses a walker.  The patient also stated that she is having right lower abdominal pain and discomfort.  He corresponds to a bulge of increasing size in the right lower quadrant.  The patient has bilateral hearing aids and wears eye glasses.\par 7/3/18: The patient returned today and reported she has nocturia and wakes up every 2 hours to urinate. She broke her femur and a tibial fracture. \par 7/16/18: The patient returned and reported she has the urge to urinate but ends up having a bowel movement instead. Daughter noted bowels are large. Patient noted she has a hard time urinating. Wakes up 3-4 times during the night to urinate, noted it is sometimes just "drops of urine." Patient is more disorientated and confused, I advised her to consult with a neurologist. \par 8/30/18: The patient returned today and reported she is feeling adequate. Patient denies dysuria, gross hematuria, urgency, or incontinence. Wakes up 2-3 times during the night to urinate. \par \par 6/4/19: Patient presents today for a follow up. Today she notes that her urination is "okay". She does not leak during the day but still wears depends since she is still concerned about incontinence. At night, she wakes up when she has to urinate but due to mobility issues is not always able to make it to bathroom. Diarrhea is reported on occasion. Mirtazapine has been initiated by another provider and it is reported that is has decreased her nocturia from 4x a night to once a night. The patient produced a catheterized urine sample which will be sent for urinalysis, urine cytology, and urine culture.Upon completion of a physical exam it is determined that vaginitis is present. Therefore Fluconazole will be prescribed at this time. I will provide her with 1 500 mg tablet that will be taken one time only due to her use of Warfarin. It is advised that she switch to cotton underwear during the day and if leakage is still reported then she can switch back to wearing the depends.  The patient is to follow up in 3 weeks or sooner if clinically indicated. \par \par 05/26/2020: Patient presents today for a follow up. Pt ambulates in a wheelchair and is able to go short distances with a walker. Nocturia x1-2 usually, but voided unusually frequently last night. Denies hematuria,or burning. No symptoms in March. Reports issues with short term, but not long term memory. Has been consulting with PCP and testing treatments regarding memory. Reports occasional stomach pains.\par On physical exam patient had no erythema of vaginal mucosa, no vaginal discharge and no prolapse.\par \par Pt will provide a urine sample today for culture and analysis. \par \par Pt received a catheterization of urine today for total volume of 180mL. PVR not needed despite pt concerns.

## 2020-05-28 DIAGNOSIS — R31.29 OTHER MICROSCOPIC HEMATURIA: ICD-10-CM

## 2020-05-28 DIAGNOSIS — R35.0 FREQUENCY OF MICTURITION: ICD-10-CM

## 2020-05-28 DIAGNOSIS — M54.5 LOW BACK PAIN: ICD-10-CM

## 2020-05-28 DIAGNOSIS — D47.2 MONOCLONAL GAMMOPATHY: ICD-10-CM

## 2020-05-28 DIAGNOSIS — R33.9 RETENTION OF URINE, UNSPECIFIED: ICD-10-CM

## 2020-05-31 LAB
APPEARANCE: CLEAR
BACTERIA UR CULT: NORMAL
BACTERIA: NEGATIVE
BILIRUBIN URINE: NEGATIVE
BLOOD URINE: NEGATIVE
COLOR: COLORLESS
GLUCOSE QUALITATIVE U: NEGATIVE
HYALINE CASTS: 0 /LPF
KETONES URINE: NEGATIVE
LEUKOCYTE ESTERASE URINE: NEGATIVE
MICROSCOPIC-UA: NORMAL
NITRITE URINE: NEGATIVE
PH URINE: 7
PROTEIN URINE: NEGATIVE
RED BLOOD CELLS URINE: 0 /HPF
SPECIFIC GRAVITY URINE: 1
SQUAMOUS EPITHELIAL CELLS: 0 /HPF
UROBILINOGEN URINE: NORMAL
WHITE BLOOD CELLS URINE: 0 /HPF

## 2020-06-01 LAB — URINE CYTOLOGY: NORMAL

## 2020-06-04 ENCOUNTER — APPOINTMENT (OUTPATIENT)
Dept: UROLOGY | Facility: CLINIC | Age: 85
End: 2020-06-04

## 2020-06-04 NOTE — PHYSICAL THERAPY INITIAL EVALUATION ADULT - BALANCE DISTURBANCE, SYSTEM IMPAIRMENT CONTRIBUTE, REHAB EVAL
The patient is seen in consultation at the request of Dr. Arnold Douglas.     Chief complaint:   No chief complaint on file.      Development of severe left buttock pain in January 2020    Vitals:  There were no vitals taken for this visit.    HISTORY OF PRESENT ILLNESS     HPI  Kayy is a very pleasant 75-year-old who developed severe left-sided low back pain with radiation towards the buttock in January 2020. Occasionally the symptoms will radiate into the thigh and leg.  She denies right-sided symptoms.  She is most symptomatic with standing and walking.  She has less pain with sitting or lying down.    A recent left greater trochanteric bursa injection was mildly effective.    Treatment:  Current medical management:  Lyrica.  Past medical management:  N/A  Physical therapy:  None.  Chiropractic care:  None.  Spinal injections:  None. Left greater trochanteric bursa injection March, 2020.  Other:  None.       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     Priority: Low   • Acute UTI 12/05/2014     Priority: Low   • HTN (hypertension) 12/05/2014     Priority: Low       PAST MEDICAL, FAMILY AND SOCIAL HISTORY     Medications:  Current Outpatient Medications   Medication   • Omega-3 Fatty Acids (OMEGA 3 PO)   • naproxen (NAPROSYN) 250 MG tablet   • labetalol (NORMODYNE) 300 MG tablet   • aspirin 325 MG tablet   • sitaGLIPtin (JANUVIA) 50 MG tablet   • amLODIPine (NORVASC) 5 MG tablet   • famotidine (PEPCID) 20 MG tablet   • simvastatin (ZOCOR) 20 MG tablet   • Multiple Vitamin tablet     No current facility-administered medications for this visit.        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 Negative for fatigue and fever.   HENT: Negative for congestion, ear pain and postnasal drip.    Eyes: Negative for pain and visual disturbance.   Respiratory: Negative for cough and shortness of breath.    Cardiovascular: Negative for leg swelling.   Gastrointestinal: Negative for abdominal pain.   Genitourinary: Negative for difficulty urinating.   Musculoskeletal: Positive for back pain. Negative for arthralgias and joint swelling.   Skin: Negative for wound.   Neurological: Negative for facial asymmetry and speech difficulty.   Hematological: Negative for adenopathy.       PHYSICAL EXAM     Physical Exam  Constitutional:       General: She is not in acute distress.     Appearance: She is well-developed.   HENT:      Head: Normocephalic and atraumatic.   Neck:      Trachea: No tracheal deviation.   Cardiovascular:      Pulses:           Dorsalis pedis pulses are 2+ on the right side and 2+ on the left side.        Posterior tibial pulses are 2+ on the right side and 2+ on the left side.   Pulmonary:      Effort: Pulmonary effort is normal. No respiratory distress.   Musculoskeletal:      Lumbar back: She exhibits decreased range of motion. She exhibits no tenderness.   Skin:     General: Skin is warm and dry.      Nails: There is no clubbing.     Neurological:      Mental Status: She is alert and oriented to person, place, and time.      Sensory: No sensory deficit.      Deep Tendon Reflexes:      Reflex Scores:       Patellar reflexes  are 2+ on the right side and 2+ on the left side.       Achilles reflexes are 2+ on the right side and 2+ on the left side.     Comments: Straight leg raise negative  No clonus  Strength: 5/5 in all muscle groups tested, bilateral lower extremities    Psychiatric:         Behavior: Behavior normal.         Imaging:  A limited CT scan of the abdomen/pelvis from October 2019 was reviewed.  This captures the last 3 interspaces of the spine.  At L4-5 there is anterolisthesis.  There appears to be severe central and lateral recess stenosis.      ASSESSMENT/PLAN     Assessment:  L4-5 degenerative spondylolisthesis  Lumbar stenosis with radiculopathy    Discussion:  I discussed the clinical and radiographic findings.  I discussed the natural history of the above disorders.  I discussed treatment options.  Kayy has symptoms consistent with lumbar stenosis causing radiculopathy.  I recommend an MRI of the lumbar spine for further evaluation.  Based on MRI findings, spinal injections and/or physical therapy would be considered.    Plan:  MRI lumbar spine  Consider epidural steroid injections based on MRI findings  Consider formal physical therapy based on MRI findings  Return following MRI for further evaluation      Greater than 50% of this time was spent counseling the patient and coordinating care.       A carbon copy of this report will be sent to Dr. Arnold Douglas.     musculoskeletal

## 2020-06-09 ENCOUNTER — APPOINTMENT (OUTPATIENT)
Dept: UROLOGY | Facility: CLINIC | Age: 85
End: 2020-06-09

## 2020-06-12 ENCOUNTER — APPOINTMENT (OUTPATIENT)
Dept: UROLOGY | Facility: CLINIC | Age: 85
End: 2020-06-12
Payer: MEDICARE

## 2020-06-12 PROCEDURE — 99443: CPT | Mod: 95

## 2020-06-12 NOTE — ASSESSMENT
Start lisinopril 10 mg daily  Go for labs before next visit in 1 month to monitor kidney function on new mediation  Continue insulin at 30units per night   Continue to check BS  Continue to think about options for therapy vs psychiatrist or antidepression medication [FreeTextEntry1] : Pt contacted at [phone]. Gave permission to conduct a Telephone visit. \par \par Ms. Venegas is a 88 year old woman here for a follow up of nocturia, microscopic hematuria, urethral caruncle.Patient believes she makes less urine in 24-hour period than previously.  Her age says that one given a diuretic.  Shea good quantity of urine.  The patient says that she generally makes it to the bathroom in time, though she must tarry which takes the diuretic. The patient complained about a foul order from the urine. The patient does not have dysuria.  There is no gross hematuria.  The patient does not push or strain to urinate so sometimes she has to increase her abdominal pressure to defecate. Patient uses a walker.  The patient also stated that she is having right lower abdominal pain and discomfort.  He corresponds to a bulge of increasing size in the right lower quadrant.  The patient has bilateral hearing aids and wears eye glasses.\par 7/3/18: The patient returned today and reported she has nocturia and wakes up every 2 hours to urinate. She broke her femur and a tibial fracture. \par 7/16/18: The patient returned and reported she has the urge to urinate but ends up having a bowel movement instead. Daughter noted bowels are large. Patient noted she has a hard time urinating. Wakes up 3-4 times during the night to urinate, noted it is sometimes just "drops of urine." Patient is more disorientated and confused, I advised her to consult with a neurologist. \par 8/30/18: The patient returned today and reported she is feeling adequate. Patient denies dysuria, gross hematuria, urgency, or incontinence. Wakes up 2-3 times during the night to urinate. \par 6/4/19: Patient presents today for a follow up. Today she notes that her urination is "okay". She does not leak during the day but still wears depends since she is still concerned about incontinence. At night, she wakes up when she has to urinate but due to mobility issues is not always able to make it to bathroom. Diarrhea is reported on occasion. Mirtazapine has been initiated by another provider and it is reported that is has decreased her nocturia from 4x a night to once a night. The patient produced a catheterized urine sample which will be sent for urinalysis, urine cytology, and urine culture.Upon completion of a physical exam it is determined that vaginitis is present. Therefore Fluconazole will be prescribed at this time. I will provide her with 1 500 mg tablet that will be taken one time only due to her use of Warfarin. It is advised that she switch to cotton underwear during the day and if leakage is still reported then she can switch back to wearing the depends.  The patient is to follow up in 3 weeks or sooner if clinically indicated. \par 05/26/2020: Patient presents today for a follow up. Pt ambulates in a wheelchair and is able to go short distances with a walker. Nocturia x1-2 usually, but voided unusually frequently last night. Denies hematuria,or burning. No symptoms in March. Reports issues with short term, but not long term memory. Has been consulting with PCP and testing treatments regarding memory. Reports occasional stomach pains. On physical exam patient had no erythema of vaginal mucosa, no vaginal discharge and no prolapse. Pt will provide a urine sample today for culture and analysis. Pt received a catheterization of urine today for total volume of 180mL. PVR not needed despite pt concerns. \par \par 06/12/2020: Pt contacted at [phone]. Gave permission to conduct a Telephone visit. Urine culture through the [facility] from 06/01/2020 grew 75 colony forming units per mL of Pseudomonas Aeruginosa. 1-5 Epithelial cells per field. Anything greater than 3 is abnormal. There were 3-10 WBC per high power field. Anything greater than 3 is abnormal. Bacteria were heavy. Pt had frequency and nocturia. She complained it was difficulty to sleep at night. Pt report she is feeling urgency when waking up to use the bathroom. She has not been leaking as much, or wetting herself. Her output is also not as great. Nocturia x3, or about every 2-3 hours. Pt is still on Cefpodoxime, but she will run out by 06/16/2020. Urine has become slightly cloudy, whereas it had been clear previously. Had not been using Melatonin at night. Has been taking Mirtazapine. \par \par Advised taking 8 hour Tynenol two at once. \par \par We will renew Cefpodoxime on 06/17/2020. \par \par Advised pt to go to PCP with her concerns. \par \par Advised pt to take Melatonin 2mg to sleep better. Continue Mirtazapine. \par \par Pt will leave urine for  on 06/17/2020. \par \par preparation, visit and coordination of care took 24 minutes.

## 2020-06-12 NOTE — HISTORY OF PRESENT ILLNESS
[FreeTextEntry1] : Pt contacted at [phone]. Gave permission to conduct a Telephone visit. \par \par Ms. Venegas is a 88 year old woman here for a follow up of nocturia, microscopic hematuria, urethral caruncle.Patient believes she makes less urine in 24-hour period than previously.  Her age says that one given a diuretic.  Shea good quantity of urine.  The patient says that she generally makes it to the bathroom in time, though she must tarry which takes the diuretic. The patient complained about a foul order from the urine. The patient does not have dysuria.  There is no gross hematuria.  The patient does not push or strain to urinate so sometimes she has to increase her abdominal pressure to defecate. Patient uses a walker.  The patient also stated that she is having right lower abdominal pain and discomfort.  He corresponds to a bulge of increasing size in the right lower quadrant.  The patient has bilateral hearing aids and wears eye glasses.\par 7/3/18: The patient returned today and reported she has nocturia and wakes up every 2 hours to urinate. She broke her femur and a tibial fracture. \par 7/16/18: The patient returned and reported she has the urge to urinate but ends up having a bowel movement instead. Daughter noted bowels are large. Patient noted she has a hard time urinating. Wakes up 3-4 times during the night to urinate, noted it is sometimes just "drops of urine." Patient is more disorientated and confused, I advised her to consult with a neurologist. \par 8/30/18: The patient returned today and reported she is feeling adequate. Patient denies dysuria, gross hematuria, urgency, or incontinence. Wakes up 2-3 times during the night to urinate. \par 6/4/19: Patient presents today for a follow up. Today she notes that her urination is "okay". She does not leak during the day but still wears depends since she is still concerned about incontinence. At night, she wakes up when she has to urinate but due to mobility issues is not always able to make it to bathroom. Diarrhea is reported on occasion. Mirtazapine has been initiated by another provider and it is reported that is has decreased her nocturia from 4x a night to once a night. \par 05/26/2020: Patient presents today for a follow up. Pt ambulates in a wheelchair and is able to go short distances with a walker. Nocturia x1-2 usually, but voided unusually frequently last night. Denies hematuria,or burning. No symptoms in March. Reports issues with short term, but not long term memory. Has been consulting with PCP and testing treatments regarding memory. Reports occasional stomach pains.\par \par 06/12/2020: Pt contacted at [phone]. Gave permission to conduct a Telephone visit. Urine culture through the [facility] from 06/01/2020 grew 75 colony forming units per mL of Pseudomonas Aeruginosa. 1-5 Epithelial cells per field. Anything greater than 3 is abnormal. There were 3-10 WBC per high power field. Anything greater than 3 is abnormal. Bacteria were heavy. Pt had frequency and nocturia. She complained it was difficulty to sleep at night. Pt report she is feeling urgency when waking up to use the bathroom. She has not been leaking as much, or wetting herself. Her output is also not as great. Nocturia x3, or about every 2-3 hours. Pt is still on Cefpodoxime, but she will run out by 06/16/2020. Urine has become slightly cloudy, whereas it had been clear previously. Had not been using Melatonin at night. Has been taking Mirtazapine. \par

## 2020-06-12 NOTE — PHYSICAL EXAM
[] : no respiratory distress [Exaggerated Use Of Accessory Muscles For Inspiration] : no accessory muscle use [Respiration, Rhythm And Depth] : normal respiratory rhythm and effort [Oriented To Time, Place, And Person] : oriented to person, place, and time [Affect] : the affect was normal [Mood] : the mood was normal [Not Anxious] : not anxious [No Palpable Adenopathy] : no palpable adenopathy [Cervical Lymph Nodes Enlarged Posterior Bilaterally] : posterior cervical [Supraclavicular Lymph Nodes Enlarged Bilaterally] : supraclavicular [Cervical Lymph Nodes Enlarged Anterior Bilaterally] : anterior cervical [FreeTextEntry1] : No submandibular gland tenderness.\par

## 2020-06-12 NOTE — ADDENDUM
[FreeTextEntry1] : This note was authored by Hazel Dumas working as scribe for Dr. Indio Sexton. I, Dr. Indio Sexton, have reviewed the content of this note and confirm it is true and accurate. I personally performed the history and physical examination and made all the decisions.\par 06/12/2020

## 2020-06-12 NOTE — REVIEW OF SYSTEMS
[Eyesight Problems] : eyesight problems [Chest Pain] : chest pain [Loss Of Hearing] : hearing loss [Constipation] : constipation [Joint Pain] : joint pain [Anxiety] : anxiety [Easy Bleeding] : a tendency for easy bleeding [Feeling Poorly] : not feeling poorly [Palpitations] : no palpitations [Cough] : no cough [Convulsions] : no convulsions [Dysuria] : no dysuria [Hot Flashes] : no hot flashes

## 2020-06-16 PROCEDURE — 88112 CYTOPATH CELL ENHANCE TECH: CPT | Mod: 26

## 2020-06-18 LAB
APPEARANCE: ABNORMAL
BACTERIA: ABNORMAL
BILIRUBIN URINE: NEGATIVE
BLOOD URINE: ABNORMAL
COLOR: COLORLESS
GLUCOSE QUALITATIVE U: NEGATIVE
HYALINE CASTS: 0 /LPF
KETONES URINE: NEGATIVE
LEUKOCYTE ESTERASE URINE: ABNORMAL
MICROSCOPIC-UA: NORMAL
NITRITE URINE: NEGATIVE
PH URINE: 7.5
PROTEIN URINE: NORMAL
RED BLOOD CELLS URINE: 1 /HPF
SPECIFIC GRAVITY URINE: 1
SQUAMOUS EPITHELIAL CELLS: 4 /HPF
URINE COMMENTS: NORMAL
UROBILINOGEN URINE: NORMAL
WHITE BLOOD CELLS URINE: 506 /HPF

## 2020-06-22 LAB — BACTERIA UR CULT: ABNORMAL

## 2020-06-22 RX ORDER — FLUCONAZOLE 200 MG/1
200 TABLET ORAL
Qty: 1 | Refills: 0 | Status: COMPLETED | COMMUNITY
Start: 2019-06-04 | End: 2020-06-22

## 2020-06-22 RX ORDER — CEFPODOXIME PROXETIL 200 MG/1
200 TABLET, FILM COATED ORAL
Qty: 28 | Refills: 0 | Status: COMPLETED | COMMUNITY
Start: 2020-06-03 | End: 2020-06-22

## 2020-06-22 RX ORDER — CEFADROXIL 500 MG/1
500 CAPSULE ORAL TWICE DAILY
Qty: 20 | Refills: 0 | Status: COMPLETED | COMMUNITY
Start: 2020-05-31 | End: 2020-06-22

## 2020-06-27 LAB — URINE CYTOLOGY: NORMAL

## 2020-07-11 ENCOUNTER — LABORATORY RESULT (OUTPATIENT)
Age: 85
End: 2020-07-11

## 2020-08-09 LAB
APPEARANCE: CLEAR
BACTERIA UR CULT: NORMAL
BACTERIA: NEGATIVE
BILIRUBIN URINE: NEGATIVE
BLOOD URINE: NEGATIVE
COLOR: COLORLESS
GLUCOSE QUALITATIVE U: NEGATIVE
HYALINE CASTS: 0 /LPF
KETONES URINE: NEGATIVE
LEUKOCYTE ESTERASE URINE: NEGATIVE
MICROSCOPIC-UA: NORMAL
NITRITE URINE: NEGATIVE
PH URINE: 7.5
PROTEIN URINE: NEGATIVE
RED BLOOD CELLS URINE: 1 /HPF
SPECIFIC GRAVITY URINE: 1.01
SQUAMOUS EPITHELIAL CELLS: 1 /HPF
UROBILINOGEN URINE: NORMAL
WHITE BLOOD CELLS URINE: 0 /HPF

## 2020-11-23 ENCOUNTER — NON-APPOINTMENT (OUTPATIENT)
Age: 85
End: 2020-11-23

## 2020-12-21 PROBLEM — Z86.19 HISTORY OF CANDIDIASIS OF VAGINA: Status: RESOLVED | Noted: 2019-06-04 | Resolved: 2020-12-21

## 2021-01-13 NOTE — ED PROVIDER NOTE - PHYSICAL EXAMINATION
2021       Shirin Swain MD  [address]  [address]  [facility] [address]  Via In Basket      Patient: Ceci Davies   YOB: 2009   Date of Visit: 2021       Dear Dr. Swain:    Thank you for referring Ceci Davies to me for evaluation. Below are my notes for this visit with her.    If you have questions, please do not hesitate to call me. I look forward to following your patient along with you.      Sincerely,        Bree Mendiola MD        CC: No Recipients  Bree Mendiola MD  2021  4:40 PM  Signed    PEDIATRIC NEUROLOGY       Patient: Ceci Davies Date of Service: 2021   : 2009 MRN: [id number]     Reason for Visit  Ceci Davies is a 11 year old female who presents for a telehealth visit via live interactive video with a secure connection.    Consent for telehealth visit was verified including risk of electronic data, possibility of equipment failure or need for in person visit if condition cannot be fully assessed by telehealth.     Family/patient were informed that their consent to treat includes permission to submit claims to their insurance on their behalf for the services received and visit is not to be recorded or stored.     Clinic Location: [address]    Patient Location: home  Accompanied by mom, dad. Chris in another room.  Reason for use of telehealth: Minimize risk of potential exposure to viral respiratory illness during COVID-19 pandemic. A comprehensive physical exam could not be performed due to the limitations of telehealth.  Results should be interpreted accordingly.      Prior History  At approx 6-9mths:   first time they began to note possible seizure-like episodes. At this time episodes only occued during the nighttime hours, she would have a concurrent illness/fever, she would be sleeping wakes up, screen, eyes will be open, she will not be responding, stiffening of the extremities is noted,  the episode lasts \"a couple of minutes\" and then resolve. After the episode she may cry or go immediately back to sleep. It was felt that these were related to night terrors.     April of 2012:   her episodes began to increase in frequency and again were associated with illness and described in a similar fashion. In June 2012 she was admitted to the hospital after having approximately 3-5 seizures. The parents state that she did experience a fever, was eating dinner, was noted to vomit by her sister. When her parents walked in they noticed that her eyes were open, focal deviation to one side was noted, she was not responsive, stiffening of the extremities similar to the nighttime episodes was noted, the episode lasted 5 minutes and then resolved on its own. On the way to the emergency department the parents noted that she had difficulty breathing and therefore they had stopped at a fire house. While being evaluated by the paramedics she had one additional possible seizure. She was then taken to an outside hospital, [facility], where she was noted to have a third episode with her eyes were open, focal deviation to one side was noted, there was generalized shaking of the upper and lower extremities, she was not responding and the episode last approximately 5-10 minutes. She was given Valium at that time. Workup at the outside hospital consisted of a head CT which showed a sinus infection as well as a lumbar puncture which was unremarkable. She was subsequently diagnosed with a pneumonia as well as a sinus infection treated with antibiotics. Diastat was given for acute seizure management    October 2012:   when her parents had woke her up, she was noted to have an episode of generalized shaking/tremoring, lasted 30 seconds and then resolved on its own. It was somewhat different than previous episodes in the respect that there had been no focal deviation of her eyes, no stiffening.      October 2012 when the mother noticed  that she had occasional staring episodes, ice to be open, she will not be responding, they last anywhere from 4-10 seconds and then resolve on their own    February 2013:   At this time she been had a pneumonia, fever was noted, was watching TV, eyes were open she appear to be \"zones out\", mother was unable to visually and to rupture, stiffening of the upper and lower extremities was noted, episode lasted 40 seconds and then resolved on its own    July of 2014:    At this time her sister had noted that she was sleeping and vomited. When her parents found her she had vomited, she was not responding to verbal commands and appeared to be \"stiff\". Family then put her in the shower at which point she woke up and had been doing well. She was subsequently taken to the emergency department and diagnosed with a gastroenteritis. She is currently on Keppra and tolerating this without side effects. No behavior issues are noted    October 2017:  family chose to begin to slowly taper off given that she had remained seizure-free since July 2014.Last dose of medicine was given in December 2017.    Per family last episode Of intermittent pupillary dilation was prior to her visit in January 2018. Per the father, she has been evaluated by ophthalmology but he does not know specifics, last visit was approximately one year ago, this has been a long-standing issue Despite being on seizure medications. At previous visits father was unable to give specifics but states that this will occur in light or dark situations, total of approximately 3-4 Episodes between June and December 2017. She is awake, alert, interactive and these do not resemble previously seizure-like episodes.    January 31, 2018:   they have noticed the following: Bowel incontinence, every day, no particular time of the day, will occur at school and on the weekends at home, consists of formed stool as well as liquidy diarrhea-type stool. She states that she is unaware that  she has had a bowel movement in her underwear and parents state she will come home with dried stool which they feel she had been sitting in the better part of the day. Upon questioning of her she states she Does not realize that her underwear is dirty with dried tool. She states she does not smell the stool because she has a stuffy nose and congestion.Family is uncertain whether there has been any difficulty in sensation with bowel or urine movements, no focal weakness or numbness, no other changes that they're aware of. At school she is at her baseline doing well and interacting appropriately. No regression.    February 2018:  1 episode noted by the father as follows: She been sitting at the table doing homework, stopped, paused for 3-5 seconds, with stuttering and farted. Father then asked her if she had a bowel movement she had said no. He then asked her to go to the bathroom at which time they did note that she had a bowel movement. Levetiracetam was restarted and episodes completely resolved.    September 2018:  No further seizures. Tolerating Keppra without side effects. No significant mood changes different from her baseline.    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 See EMR for his recs    March 2019: No seizuree, no keppra side effects. Noted by dad, not by mom.    September 2019: No seizures. Has had epsiodes of fall usually with bike or scooter    May 2002 :Follow up visit - no seizures, on line covid learning, still below grade level    Interval History  September 2020:  Follow-up visit -no seizures, video EEG completed results discussed with family, otherwise as below    Interval History  November 2020:  Follow-up visit -no seizures, video EEG completed results discussed with family, otherwise as below    Interval History  January 2021:  Follow-up visit - last dose of keppra 11/20 - thanksgiving, otherwise as below      Problem List   Seizure  · No seizure  · No  previosuly noted starring, bowl incontinence has not returned    Sleep  · Occassionaly will toss/turn. No repetitive or rhythmical movements are noted  · occasionally hear noises but when they go to check on her no seizure-like activity is noted.    Headache  · Resolved.   · Previously - Rarely which father states is less than once a month. Previously these had been occurring as follows: approximately 1/1, 5/10 on the pain scale. She first began to experience headaches in October of 2015. In the past these have been occurring approximately 2/month, located in the mid forehead, no blurry vision or diplopia, no focal numbness or weakness, no nausea or vomiting, no light or noise sensitivity, no positional quality to her symptoms, no nighttime awakening with emesis, no loss of vision, no scotomas, no ataxia, no dizziness, 5/10 on the pain scale and do require over-the-counter medication. No particular triggers have been noted. She does have a history of sinus disease    Developmental Progress/Selective Mutisim   · Mainstreamed class, IEP, some modified class, pulled for tests, tests are modified (1/2 compared to reg classes), reading is only contained class, takes longer to complete tests  · Remote on line - still does does not engage as much in class, cont to have difficulty due to lack of in person one/one engagement  · no permanent regression   · No independent: finish homework, joined clubs on her own, will ask questions via email/inclass, lack of response, answeres if called on      Review of Systems   Constitutional: Negative for activity change, appetite change, fatigue, fever and irritability.   HENT: Negative for congestion, rhinorrhea, tinnitus and trouble swallowing.    Eyes: Negative for photophobia and visual disturbance.   Respiratory: Negative for cough, choking and shortness of breath.    Cardiovascular: Negative for chest pain and palpitations.   Gastrointestinal: Negative for diarrhea, nausea and  vomiting.   Endocrine: Negative for polydipsia and polyuria.   Genitourinary: Negative for decreased urine volume, difficulty urinating and enuresis.   Musculoskeletal: Negative for gait problem, joint swelling, myalgias, neck pain and neck stiffness.   Skin: Negative for rash.   Neurological: Positive for seizures. Negative for dizziness, tremors, syncope, facial asymmetry, speech difficulty, weakness, light-headedness, numbness and headaches.   Hematological: Does not bruise/bleed easily.   Psychiatric/Behavioral: Negative for agitation, behavioral problems and sleep disturbance.     Birth History  C-sect due to prior, mom with pre-eclamsia/gest diabetes, mom on bed rest/no contraction/no bleeding, 36 wks, BW- 5/10, home @ DOl # 3.    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     9/15/2020: weight - approx 114 lbs    Exam via Live Video of Patient   General:  Awake, alert and at baseline per family  Neurological Exam:  Mental Status - Awake, alert, interactive   Cranial nerve - No nystagmus, attempts to track and follow, no obvious facial asymmetry, unable to assess fundus and remaining cranial nerves  Motor/sensory - Withdrawal of both extremities upper and lower extremities spontaneously and in response to command, unable to perform robin muscle strength exam or assess tone/bulk  Reflexes - unable to asses  Coordination - Reaches appropriately for age, no truncal or appendicular ataxia, attempts FTN and RAELIGH's  Gait - Able to ambulate independently, not wide based, able to toe/heal  walk/hop/tandem gait    Prior Exam In Office  Physical Exam   Constitutional: well developed, well nourished and in no acute distress . Answered questions with short phrases.   Head and Face: atraumatic, no deformities, but no dysmorphic features.   Eyes: no discharge, normal conjunctiva, no eyelid swelling and no ptosis. the sclerae were normal, conjugate gaze and extraocular movements were intact . pupils are Slightly asymmetric which appears to be unchanged from previous visits. Left may be slightly greater than the right. No other abnormalities.   ENT: normal appearing outer ear, normal appearing nose. nasal mucosa moist and pink no nasal discharge oral mucosa pink and moist.   Musculoskeletal:no clubbing or cyanosis of the fingernails. no joint swelling seen and no joint tenderness was elicited. normal ROM of all extremities.   Skin: normal skin color and pigmentation, no bruises and no rash. no skin lesions. normal skin turgor.   Psychiatric: oriented to person, place and time. alert and awake, interactive and mood/affect were appropriate for age .    Neurological Exam   cranial nerves normal for age and no facial asymmetry . Pupils slightly asymmetric left may be greater than the right. No cranial nerve abnormalities. normal DTRs. no sensory deficits noted. no coordination deficits. muscle strength and tone were normal.normal gait.    Neuro Diagnostic Studies  Results Reviewed and Diagnostic Data Reviewed    Impression  1. Complex partial seizures with consciousness impaired (CMS/HCC)         Ceci is a pleasant 11 year old female with a history of what appears to be partial complex seizures with or without secondary generalization and some features of primary generalized epilepsy and Headaches. Family states that previous history of headaches were not associated with seizures.      I did Previously review with the family potential etiologies such as recurrence in her seizures,structural abnormalities  involving the spine or behavioral changes.     I have reviewed the risks benefits and side effects of monitoring clinically without antiepileptic drug therapy. No  bowel incontinence,  ausing in activity stuttering have all resolved. Family feels comfortable with the below recommendations    Recomendations  Midazolam (Nayzilam) 5 MG/0.1 ML nasal spray to be administered for seizure greater than 3 mins as follows: Administer one spray (5 mg dose) into one nostril. One additional spray (5 mg dose) into the opposite nostril may be administered after 5-10 minutes if the patient has not responded to the initial dose. Family to check with insurance for coverage. Do not use with rectal diastat/diazepam  · Monitor for regression. Should the family noticed such they're to contact the clinic  · F/U with neuropsychology,  as per his recs  · Abortive therapy: Motrin or Tylenol, one or the other but not both, once/week for headache and pain. 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 Mom has noted noted this as frequently as dad.  · Additional diagnostic and therapeutic options will be considered in the future.       Return in about 26 weeks (around 7/14/2021) for Video Visit follow up with  @ 3pm - appointment confirmed with family at visit.       Time Spent: Approximately 22  minutes, greater than 50% spent on counseling and coordination of care    This visit was conducted via telehealth as a precaution due to the Covid-19 pandemic. In person complete physical exam was not possible and this report is based on my conversation with the family.  Results should be interpreted accordingly.    Starting time of visit: 4:1 7pm  Ending time of visit: 4:40 pm        Bree Mendiola MD    Pediatric  Neurology     [facility]               Constitutional : Appears comfortably, talking in full sentences  Head :NC AT , no swelling  Eyes :eomi, no swelling  Mouth :mm moist,  Neck : supple, trachea in midline  Chest :Sammy air entry, symm chest expansion, no distress  Heart :S1 S2 distant  Abdomen :abd soft, non tender  Musc/Skel :ext no swelling, no deformity, no spine tenderness, distal pulses present  Neuro  :AAO 3 no focal deficits Constitutional : Appears anxious talking in softly in few words  Head :NC AT , no swelling  Eyes :eomi, no swelling  Mouth :mm dr  Neck : supple, trachea in midline  Chest :Sammy air entry, symm chest expansion, no distress  Heart :S1 S2 distant  Abdomen :abd soft, non tender  Musc/Skel :ext no swelling, no deformity,   no spine tenderness, distal pulses present  Neuro  :AAO 3,  gen weakness, moves ext slowly

## 2021-04-29 NOTE — PROGRESS NOTE ADULT - SUBJECTIVE AND OBJECTIVE BOX
Pt S/E at bedside, no acute events overnight, pain controlled    Vital Signs Last 24 Hrs  T(C): 36.8 (18 Jan 2018 05:35), Max: 37.6 (17 Jan 2018 21:24)  T(F): 98.3 (18 Jan 2018 05:35), Max: 99.7 (17 Jan 2018 21:24)  HR: 72 (18 Jan 2018 05:35) (72 - 95)  BP: 125/49 (18 Jan 2018 05:35) (112/49 - 141/55)  BP(mean): --  RR: 16 (18 Jan 2018 05:35) (16 - 18)  SpO2: 98% (18 Jan 2018 05:35) (97% - 100%)    Gen: NAD    Left Lower Extremity:  Dressing clean dry intact, +swelling of thigh, soft/compressible  +EHL/FHL/TA/GS  SILT L3-S1  +DP/PT Pulses  Compartments soft  No calf TTP B/L Wartpeel Counseling:  I discussed with the patient the risks of Wartpeel including but not limited to erythema, scaling, itching, weeping, crusting, and pain.

## 2021-05-07 ENCOUNTER — NON-APPOINTMENT (OUTPATIENT)
Age: 86
End: 2021-05-07

## 2021-05-07 LAB
APPEARANCE: CLEAR
BACTERIA UR CULT: NORMAL
BACTERIA: NEGATIVE
BILIRUBIN URINE: NEGATIVE
BLOOD URINE: NEGATIVE
COLOR: COLORLESS
GLUCOSE QUALITATIVE U: NEGATIVE
HYALINE CASTS: 0 /LPF
KETONES URINE: NEGATIVE
LEUKOCYTE ESTERASE URINE: NEGATIVE
MICROSCOPIC-UA: NORMAL
NITRITE URINE: NEGATIVE
PH URINE: 7
PROTEIN URINE: NEGATIVE
RED BLOOD CELLS URINE: 2 /HPF
SPECIFIC GRAVITY URINE: 1.01
SQUAMOUS EPITHELIAL CELLS: 1 /HPF
URINE CYTOLOGY: NORMAL
UROBILINOGEN URINE: NORMAL
WHITE BLOOD CELLS URINE: 1 /HPF

## 2021-06-04 ENCOUNTER — INPATIENT (INPATIENT)
Facility: HOSPITAL | Age: 86
LOS: 7 days | Discharge: NURSING, REHAB OR LONG-TERM CARE | DRG: 563 | End: 2021-06-12
Attending: HOSPITALIST | Admitting: INTERNAL MEDICINE
Payer: MEDICARE

## 2021-06-04 VITALS
HEIGHT: 67 IN | TEMPERATURE: 98 F | RESPIRATION RATE: 17 BRPM | WEIGHT: 139.99 LBS | SYSTOLIC BLOOD PRESSURE: 158 MMHG | OXYGEN SATURATION: 97 % | DIASTOLIC BLOOD PRESSURE: 78 MMHG | HEART RATE: 106 BPM

## 2021-06-04 DIAGNOSIS — Z87.81 PERSONAL HISTORY OF (HEALED) TRAUMATIC FRACTURE: Chronic | ICD-10-CM

## 2021-06-04 DIAGNOSIS — S82.199A: ICD-10-CM

## 2021-06-04 DIAGNOSIS — Z98.89 OTHER SPECIFIED POSTPROCEDURAL STATES: Chronic | ICD-10-CM

## 2021-06-04 DIAGNOSIS — S72.001A FRACTURE OF UNSPECIFIED PART OF NECK OF RIGHT FEMUR, INITIAL ENCOUNTER FOR CLOSED FRACTURE: Chronic | ICD-10-CM

## 2021-06-04 DIAGNOSIS — Z98.890 OTHER SPECIFIED POSTPROCEDURAL STATES: Chronic | ICD-10-CM

## 2021-06-04 DIAGNOSIS — E89.0 POSTPROCEDURAL HYPOTHYROIDISM: Chronic | ICD-10-CM

## 2021-06-04 LAB
24R-OH-CALCIDIOL SERPL-MCNC: 63 NG/ML — SIGNIFICANT CHANGE UP (ref 30–80)
ALBUMIN SERPL ELPH-MCNC: 3.2 G/DL — LOW (ref 3.3–5)
ANION GAP SERPL CALC-SCNC: 7 MMOL/L — SIGNIFICANT CHANGE UP (ref 5–17)
APTT BLD: 22.6 SEC — LOW (ref 27.5–35.5)
APTT BLD: 28.9 SEC — SIGNIFICANT CHANGE UP (ref 27.5–35.5)
APTT BLD: 33.2 SEC — SIGNIFICANT CHANGE UP (ref 27.5–35.5)
BASOPHILS # BLD AUTO: 0 K/UL — SIGNIFICANT CHANGE UP (ref 0–0.2)
BASOPHILS # BLD AUTO: 0 K/UL — SIGNIFICANT CHANGE UP (ref 0–0.2)
BASOPHILS NFR BLD AUTO: 0 % — SIGNIFICANT CHANGE UP (ref 0–2)
BASOPHILS NFR BLD AUTO: 0 % — SIGNIFICANT CHANGE UP (ref 0–2)
BUN SERPL-MCNC: 18 MG/DL — SIGNIFICANT CHANGE UP (ref 7–23)
CALCIUM SERPL-MCNC: 8.3 MG/DL — LOW (ref 8.5–10.1)
CHLORIDE SERPL-SCNC: 105 MMOL/L — SIGNIFICANT CHANGE UP (ref 96–108)
CO2 SERPL-SCNC: 21 MMOL/L — LOW (ref 22–31)
COVID-19 SPIKE DOMAIN AB INTERP: POSITIVE
COVID-19 SPIKE DOMAIN ANTIBODY RESULT: 18.3 U/ML — HIGH
CREAT SERPL-MCNC: 0.82 MG/DL — SIGNIFICANT CHANGE UP (ref 0.5–1.3)
D DIMER BLD IA.RAPID-MCNC: HIGH NG/ML DDU
EOSINOPHIL # BLD AUTO: 0 K/UL — SIGNIFICANT CHANGE UP (ref 0–0.5)
EOSINOPHIL # BLD AUTO: 0.07 K/UL — SIGNIFICANT CHANGE UP (ref 0–0.5)
EOSINOPHIL NFR BLD AUTO: 0 % — SIGNIFICANT CHANGE UP (ref 0–6)
EOSINOPHIL NFR BLD AUTO: 1 % — SIGNIFICANT CHANGE UP (ref 0–6)
FIBRINOGEN PPP-MCNC: 385 MG/DL — SIGNIFICANT CHANGE UP (ref 290–520)
GLUCOSE SERPL-MCNC: 138 MG/DL — HIGH (ref 70–99)
HCT VFR BLD CALC: 22.3 % — LOW (ref 34.5–45)
HCT VFR BLD CALC: 36.9 % — SIGNIFICANT CHANGE UP (ref 34.5–45)
HGB BLD-MCNC: 12.4 G/DL — SIGNIFICANT CHANGE UP (ref 11.5–15.5)
HGB BLD-MCNC: 7.4 G/DL — LOW (ref 11.5–15.5)
INR BLD: 1.29 RATIO — HIGH (ref 0.88–1.16)
INR BLD: 2.79 RATIO — HIGH (ref 0.88–1.16)
INR BLD: 3.58 RATIO — HIGH (ref 0.88–1.16)
LACTATE SERPL-SCNC: 2.1 MMOL/L — HIGH (ref 0.7–2)
LACTATE SERPL-SCNC: 2.5 MMOL/L — HIGH (ref 0.7–2)
LYMPHOCYTES # BLD AUTO: 0.17 K/UL — LOW (ref 1–3.3)
LYMPHOCYTES # BLD AUTO: 0.93 K/UL — LOW (ref 1–3.3)
LYMPHOCYTES # BLD AUTO: 14 % — SIGNIFICANT CHANGE UP (ref 13–44)
LYMPHOCYTES # BLD AUTO: 2 % — LOW (ref 13–44)
MCHC RBC-ENTMCNC: 30.6 PG — SIGNIFICANT CHANGE UP (ref 27–34)
MCHC RBC-ENTMCNC: 31.2 PG — SIGNIFICANT CHANGE UP (ref 27–34)
MCHC RBC-ENTMCNC: 33.2 GM/DL — SIGNIFICANT CHANGE UP (ref 32–36)
MCHC RBC-ENTMCNC: 33.6 GM/DL — SIGNIFICANT CHANGE UP (ref 32–36)
MCV RBC AUTO: 91.1 FL — SIGNIFICANT CHANGE UP (ref 80–100)
MCV RBC AUTO: 94.1 FL — SIGNIFICANT CHANGE UP (ref 80–100)
MONOCYTES # BLD AUTO: 1.18 K/UL — HIGH (ref 0–0.9)
MONOCYTES # BLD AUTO: 1.93 K/UL — HIGH (ref 0–0.9)
MONOCYTES NFR BLD AUTO: 14 % — SIGNIFICANT CHANGE UP (ref 2–14)
MONOCYTES NFR BLD AUTO: 29 % — HIGH (ref 2–14)
NEUTROPHILS # BLD AUTO: 3.72 K/UL — SIGNIFICANT CHANGE UP (ref 1.8–7.4)
NEUTROPHILS # BLD AUTO: 7.06 K/UL — SIGNIFICANT CHANGE UP (ref 1.8–7.4)
NEUTROPHILS NFR BLD AUTO: 56 % — SIGNIFICANT CHANGE UP (ref 43–77)
NEUTROPHILS NFR BLD AUTO: 84 % — HIGH (ref 43–77)
NRBC # BLD: SIGNIFICANT CHANGE UP /100 WBCS (ref 0–0)
NRBC # BLD: SIGNIFICANT CHANGE UP /100 WBCS (ref 0–0)
PLATELET # BLD AUTO: 66 K/UL — LOW (ref 150–400)
PLATELET # BLD AUTO: 93 K/UL — LOW (ref 150–400)
POTASSIUM SERPL-MCNC: 5 MMOL/L — SIGNIFICANT CHANGE UP (ref 3.5–5.3)
POTASSIUM SERPL-SCNC: 5 MMOL/L — SIGNIFICANT CHANGE UP (ref 3.5–5.3)
PROTHROM AB SERPL-ACNC: 14.9 SEC — HIGH (ref 10.6–13.6)
PROTHROM AB SERPL-ACNC: 30.8 SEC — HIGH (ref 10.6–13.6)
PROTHROM AB SERPL-ACNC: 39.4 SEC — HIGH (ref 10.6–13.6)
RAPID RVP RESULT: SIGNIFICANT CHANGE UP
RBC # BLD: 2.37 M/UL — LOW (ref 3.8–5.2)
RBC # BLD: 4.05 M/UL — SIGNIFICANT CHANGE UP (ref 3.8–5.2)
RBC # FLD: 13.7 % — SIGNIFICANT CHANGE UP (ref 10.3–14.5)
RBC # FLD: 14 % — SIGNIFICANT CHANGE UP (ref 10.3–14.5)
SARS-COV-2 IGG+IGM SERPL QL IA: 18.3 U/ML — HIGH
SARS-COV-2 IGG+IGM SERPL QL IA: POSITIVE
SARS-COV-2 RNA SPEC QL NAA+PROBE: SIGNIFICANT CHANGE UP
SODIUM SERPL-SCNC: 133 MMOL/L — LOW (ref 135–145)
WBC # BLD: 6.64 K/UL — SIGNIFICANT CHANGE UP (ref 3.8–10.5)
WBC # BLD: 8.4 K/UL — SIGNIFICANT CHANGE UP (ref 3.8–10.5)
WBC # FLD AUTO: 6.64 K/UL — SIGNIFICANT CHANGE UP (ref 3.8–10.5)
WBC # FLD AUTO: 8.4 K/UL — SIGNIFICANT CHANGE UP (ref 3.8–10.5)

## 2021-06-04 PROCEDURE — 99222 1ST HOSP IP/OBS MODERATE 55: CPT

## 2021-06-04 PROCEDURE — 71045 X-RAY EXAM CHEST 1 VIEW: CPT | Mod: 26

## 2021-06-04 PROCEDURE — 82040 ASSAY OF SERUM ALBUMIN: CPT

## 2021-06-04 PROCEDURE — U0003: CPT

## 2021-06-04 PROCEDURE — 86900 BLOOD TYPING SEROLOGIC ABO: CPT

## 2021-06-04 PROCEDURE — 73700 CT LOWER EXTREMITY W/O DYE: CPT | Mod: LT

## 2021-06-04 PROCEDURE — 82746 ASSAY OF FOLIC ACID SERUM: CPT

## 2021-06-04 PROCEDURE — 72170 X-RAY EXAM OF PELVIS: CPT

## 2021-06-04 PROCEDURE — 72125 CT NECK SPINE W/O DYE: CPT | Mod: 26,ME

## 2021-06-04 PROCEDURE — 82306 VITAMIN D 25 HYDROXY: CPT

## 2021-06-04 PROCEDURE — 97164 PT RE-EVAL EST PLAN CARE: CPT | Mod: GP

## 2021-06-04 PROCEDURE — 84484 ASSAY OF TROPONIN QUANT: CPT

## 2021-06-04 PROCEDURE — 73590 X-RAY EXAM OF LOWER LEG: CPT | Mod: 26,50,77

## 2021-06-04 PROCEDURE — 82140 ASSAY OF AMMONIA: CPT

## 2021-06-04 PROCEDURE — 99285 EMERGENCY DEPT VISIT HI MDM: CPT

## 2021-06-04 PROCEDURE — C9132: CPT

## 2021-06-04 PROCEDURE — 97116 GAIT TRAINING THERAPY: CPT | Mod: GP

## 2021-06-04 PROCEDURE — 76376 3D RENDER W/INTRP POSTPROCES: CPT | Mod: 26

## 2021-06-04 PROCEDURE — 70450 CT HEAD/BRAIN W/O DYE: CPT | Mod: 26,ME

## 2021-06-04 PROCEDURE — 84443 ASSAY THYROID STIM HORMONE: CPT

## 2021-06-04 PROCEDURE — 85018 HEMOGLOBIN: CPT

## 2021-06-04 PROCEDURE — 76376 3D RENDER W/INTRP POSTPROCES: CPT

## 2021-06-04 PROCEDURE — 85014 HEMATOCRIT: CPT

## 2021-06-04 PROCEDURE — 93010 ELECTROCARDIOGRAM REPORT: CPT

## 2021-06-04 PROCEDURE — 99024 POSTOP FOLLOW-UP VISIT: CPT

## 2021-06-04 PROCEDURE — 85027 COMPLETE CBC AUTOMATED: CPT

## 2021-06-04 PROCEDURE — U0005: CPT

## 2021-06-04 PROCEDURE — 36430 TRANSFUSION BLD/BLD COMPNT: CPT

## 2021-06-04 PROCEDURE — 86769 SARS-COV-2 COVID-19 ANTIBODY: CPT

## 2021-06-04 PROCEDURE — 73562 X-RAY EXAM OF KNEE 3: CPT | Mod: 50

## 2021-06-04 PROCEDURE — P9016: CPT

## 2021-06-04 PROCEDURE — 76376 3D RENDER W/INTRP POSTPROCES: CPT | Mod: 26,77

## 2021-06-04 PROCEDURE — G1004: CPT

## 2021-06-04 PROCEDURE — 86850 RBC ANTIBODY SCREEN: CPT

## 2021-06-04 PROCEDURE — 73700 CT LOWER EXTREMITY W/O DYE: CPT | Mod: 26,RT

## 2021-06-04 PROCEDURE — 85730 THROMBOPLASTIN TIME PARTIAL: CPT

## 2021-06-04 PROCEDURE — 97163 PT EVAL HIGH COMPLEX 45 MIN: CPT | Mod: GP

## 2021-06-04 PROCEDURE — 99223 1ST HOSP IP/OBS HIGH 75: CPT

## 2021-06-04 PROCEDURE — 73590 X-RAY EXAM OF LOWER LEG: CPT | Mod: 50

## 2021-06-04 PROCEDURE — 85384 FIBRINOGEN ACTIVITY: CPT

## 2021-06-04 PROCEDURE — 73562 X-RAY EXAM OF KNEE 3: CPT | Mod: 26,50

## 2021-06-04 PROCEDURE — 36415 COLL VENOUS BLD VENIPUNCTURE: CPT

## 2021-06-04 PROCEDURE — 85025 COMPLETE CBC W/AUTO DIFF WBC: CPT

## 2021-06-04 PROCEDURE — 0225U NFCT DS DNA&RNA 21 SARSCOV2: CPT

## 2021-06-04 PROCEDURE — 73590 X-RAY EXAM OF LOWER LEG: CPT | Mod: 26,50

## 2021-06-04 PROCEDURE — 74176 CT ABD & PELVIS W/O CONTRAST: CPT | Mod: 26

## 2021-06-04 PROCEDURE — 93306 TTE W/DOPPLER COMPLETE: CPT

## 2021-06-04 PROCEDURE — 97530 THERAPEUTIC ACTIVITIES: CPT | Mod: GP

## 2021-06-04 PROCEDURE — 74176 CT ABD & PELVIS W/O CONTRAST: CPT

## 2021-06-04 PROCEDURE — 72170 X-RAY EXAM OF PELVIS: CPT | Mod: 26

## 2021-06-04 PROCEDURE — 82272 OCCULT BLD FECES 1-3 TESTS: CPT

## 2021-06-04 PROCEDURE — 80048 BASIC METABOLIC PNL TOTAL CA: CPT

## 2021-06-04 PROCEDURE — 83605 ASSAY OF LACTIC ACID: CPT

## 2021-06-04 PROCEDURE — 85379 FIBRIN DEGRADATION QUANT: CPT

## 2021-06-04 PROCEDURE — 86901 BLOOD TYPING SEROLOGIC RH(D): CPT

## 2021-06-04 PROCEDURE — 71045 X-RAY EXAM CHEST 1 VIEW: CPT

## 2021-06-04 PROCEDURE — 86923 COMPATIBILITY TEST ELECTRIC: CPT

## 2021-06-04 PROCEDURE — 85610 PROTHROMBIN TIME: CPT

## 2021-06-04 PROCEDURE — 73700 CT LOWER EXTREMITY W/O DYE: CPT | Mod: 26,LT,77

## 2021-06-04 PROCEDURE — 80053 COMPREHEN METABOLIC PANEL: CPT

## 2021-06-04 PROCEDURE — 82607 VITAMIN B-12: CPT

## 2021-06-04 RX ORDER — METOPROLOL TARTRATE 50 MG
0 TABLET ORAL
Qty: 0 | Refills: 0 | DISCHARGE

## 2021-06-04 RX ORDER — ACETAMINOPHEN 500 MG
1000 TABLET ORAL ONCE
Refills: 0 | Status: COMPLETED | OUTPATIENT
Start: 2021-06-04 | End: 2021-06-04

## 2021-06-04 RX ORDER — MORPHINE SULFATE 50 MG/1
2 CAPSULE, EXTENDED RELEASE ORAL ONCE
Refills: 0 | Status: DISCONTINUED | OUTPATIENT
Start: 2021-06-04 | End: 2021-06-04

## 2021-06-04 RX ORDER — PHYTONADIONE (VIT K1) 5 MG
5 TABLET ORAL ONCE
Refills: 0 | Status: DISCONTINUED | OUTPATIENT
Start: 2021-06-04 | End: 2021-06-04

## 2021-06-04 RX ORDER — SENNA PLUS 8.6 MG/1
2 TABLET ORAL AT BEDTIME
Refills: 0 | Status: DISCONTINUED | OUTPATIENT
Start: 2021-06-04 | End: 2021-06-12

## 2021-06-04 RX ORDER — PROTHROMBIN COMPLEX CONCENTRATE (HUMAN) 25.5; 16.5; 24; 22; 22; 26 [IU]/ML; [IU]/ML; [IU]/ML; [IU]/ML; [IU]/ML; [IU]/ML
1500 POWDER, FOR SOLUTION INTRAVENOUS ONCE
Refills: 0 | Status: COMPLETED | OUTPATIENT
Start: 2021-06-04 | End: 2021-06-04

## 2021-06-04 RX ORDER — HALOPERIDOL DECANOATE 100 MG/ML
0.5 INJECTION INTRAMUSCULAR ONCE
Refills: 0 | Status: DISCONTINUED | OUTPATIENT
Start: 2021-06-04 | End: 2021-06-06

## 2021-06-04 RX ORDER — SODIUM CHLORIDE 9 MG/ML
1000 INJECTION INTRAMUSCULAR; INTRAVENOUS; SUBCUTANEOUS ONCE
Refills: 0 | Status: COMPLETED | OUTPATIENT
Start: 2021-06-04 | End: 2021-06-04

## 2021-06-04 RX ORDER — OXYCODONE HYDROCHLORIDE 5 MG/1
2.5 TABLET ORAL EVERY 4 HOURS
Refills: 0 | Status: DISCONTINUED | OUTPATIENT
Start: 2021-06-04 | End: 2021-06-05

## 2021-06-04 RX ORDER — MORPHINE SULFATE 50 MG/1
2 CAPSULE, EXTENDED RELEASE ORAL EVERY 4 HOURS
Refills: 0 | Status: DISCONTINUED | OUTPATIENT
Start: 2021-06-04 | End: 2021-06-04

## 2021-06-04 RX ORDER — SODIUM CHLORIDE 9 MG/ML
1000 INJECTION INTRAMUSCULAR; INTRAVENOUS; SUBCUTANEOUS
Refills: 0 | Status: DISCONTINUED | OUTPATIENT
Start: 2021-06-04 | End: 2021-06-04

## 2021-06-04 RX ORDER — ONDANSETRON 8 MG/1
4 TABLET, FILM COATED ORAL EVERY 6 HOURS
Refills: 0 | Status: DISCONTINUED | OUTPATIENT
Start: 2021-06-04 | End: 2021-06-12

## 2021-06-04 RX ORDER — MIRTAZAPINE 45 MG/1
0 TABLET, ORALLY DISINTEGRATING ORAL
Qty: 0 | Refills: 0 | DISCHARGE

## 2021-06-04 RX ORDER — PHYTONADIONE (VIT K1) 5 MG
5 TABLET ORAL ONCE
Refills: 0 | Status: COMPLETED | OUTPATIENT
Start: 2021-06-04 | End: 2021-06-04

## 2021-06-04 RX ORDER — MIRTAZAPINE 45 MG/1
1 TABLET, ORALLY DISINTEGRATING ORAL
Qty: 0 | Refills: 0 | DISCHARGE

## 2021-06-04 RX ORDER — LANOLIN ALCOHOL/MO/W.PET/CERES
3 CREAM (GRAM) TOPICAL AT BEDTIME
Refills: 0 | Status: DISCONTINUED | OUTPATIENT
Start: 2021-06-04 | End: 2021-06-12

## 2021-06-04 RX ORDER — ACETAMINOPHEN 500 MG
650 TABLET ORAL EVERY 6 HOURS
Refills: 0 | Status: DISCONTINUED | OUTPATIENT
Start: 2021-06-04 | End: 2021-06-12

## 2021-06-04 RX ORDER — LOSARTAN POTASSIUM 100 MG/1
0 TABLET, FILM COATED ORAL
Qty: 0 | Refills: 0 | DISCHARGE

## 2021-06-04 RX ADMIN — Medication 1000 MILLIGRAM(S): at 02:11

## 2021-06-04 RX ADMIN — Medication 650 MILLIGRAM(S): at 22:00

## 2021-06-04 RX ADMIN — SODIUM CHLORIDE 80 MILLILITER(S): 9 INJECTION INTRAMUSCULAR; INTRAVENOUS; SUBCUTANEOUS at 02:24

## 2021-06-04 RX ADMIN — SODIUM CHLORIDE 1000 MILLILITER(S): 9 INJECTION INTRAMUSCULAR; INTRAVENOUS; SUBCUTANEOUS at 04:54

## 2021-06-04 RX ADMIN — Medication 400 MILLIGRAM(S): at 04:54

## 2021-06-04 RX ADMIN — MORPHINE SULFATE 2 MILLIGRAM(S): 50 CAPSULE, EXTENDED RELEASE ORAL at 03:56

## 2021-06-04 RX ADMIN — SODIUM CHLORIDE 1000 MILLILITER(S): 9 INJECTION INTRAMUSCULAR; INTRAVENOUS; SUBCUTANEOUS at 08:34

## 2021-06-04 RX ADMIN — Medication 650 MILLIGRAM(S): at 21:18

## 2021-06-04 RX ADMIN — MORPHINE SULFATE 2 MILLIGRAM(S): 50 CAPSULE, EXTENDED RELEASE ORAL at 02:11

## 2021-06-04 RX ADMIN — PROTHROMBIN COMPLEX CONCENTRATE (HUMAN) 400 INTERNATIONAL UNIT(S): 25.5; 16.5; 24; 22; 22; 26 POWDER, FOR SOLUTION INTRAVENOUS at 16:28

## 2021-06-04 RX ADMIN — MORPHINE SULFATE 2 MILLIGRAM(S): 50 CAPSULE, EXTENDED RELEASE ORAL at 12:40

## 2021-06-04 RX ADMIN — Medication 5 MILLIGRAM(S): at 17:37

## 2021-06-04 RX ADMIN — Medication 1000 MILLIGRAM(S): at 03:56

## 2021-06-04 RX ADMIN — ONDANSETRON 4 MILLIGRAM(S): 8 TABLET, FILM COATED ORAL at 04:39

## 2021-06-04 RX ADMIN — SENNA PLUS 2 TABLET(S): 8.6 TABLET ORAL at 21:18

## 2021-06-04 RX ADMIN — SODIUM CHLORIDE 75 MILLILITER(S): 9 INJECTION INTRAMUSCULAR; INTRAVENOUS; SUBCUTANEOUS at 06:39

## 2021-06-04 NOTE — ED PROVIDER NOTE - SKIN, MLM
+ecchymosis of bilateral knees with large soft hematomas on anterior knees and proximal tibias bilaterally

## 2021-06-04 NOTE — H&P ADULT - NSICDXPASTSURGICALHX_GEN_ALL_CORE_FT
PAST SURGICAL HISTORY:  History of vaginal surgery     S/P kyphoplasty 2014    S/P thyroidectomy partial   1975     PAST SURGICAL HISTORY:  History of fracture of femur hx of proximal femur fracture in January 2021    History of repair of left hip joint Hx L hip long IMN with Dr. Kitchen 2017    History of vaginal surgery     S/P kyphoplasty 2014    S/P thyroidectomy partial   1975

## 2021-06-04 NOTE — ED PROVIDER NOTE - CONSTITUTIONAL, MLM
normal... Elderly female; hard of hearing, but following commands; AAOX3.  Speaking full clear sentences; HEAD: no palpable hematoma on scalp

## 2021-06-04 NOTE — CONSULT NOTE ADULT - SUBJECTIVE AND OBJECTIVE BOX
89y Female hx afib on coumadin presents to  ED s/p fall c/o severe bilateral leg pain and inability to ambulate.  Patient denies headstrike or LOC. History obtained from daughter at bedside (Tabatha). States she was helping her mother get up and standing behind her when the patient fell to both knees with her legs bent. Patient denies numbness/tingling/burning in the BLLE. No other bone/joint complaints.   Hx L hip long IMN with Dr. Kitchen 2017  Walks minimally at home with the use of a walker and assist. OCcasional use of wheelchair  Does occasionally go on short walks outside her home with assist and walker.   Daughter reports a decline in her overall ambulatory status over the past year.     PAST MEDICAL & SURGICAL HISTORY:  Lymphedema of both lower extremities    Venous insufficiency    Monoclonal gammopathies    Paroxysmal atrial fibrillation    Acute cystitis without hematuria  septic  4/2016    Essential hypertension    Spinal stenosis    Spondyloarthritis    Vaginal prolapse    [place] (hard of hearing), bilateral    Hypertension    S/P kyphoplasty  2014    S/P thyroidectomy  partial   1975    History of vaginal surgery      MEDICATIONS  (STANDING):  sodium chloride 0.9%. 1000 milliLiter(s) (80 mL/Hr) IV Continuous <Continuous>    MEDICATIONS  (PRN):  acetaminophen   Tablet .. 650 milliGRAM(s) Oral every 6 hours PRN Mild Pain (1 - 3)  morphine  - Injectable 2 milliGRAM(s) IV Push every 4 hours PRN Severe Pain (7 - 10)  ondansetron Injectable 4 milliGRAM(s) IV Push every 6 hours PRN Nausea and/or Vomiting    Allergies    No Known Allergies    Intolerances        T(C): 36.4 (06-04-21 @ 00:27), Max: 36.4 (06-04-21 @ 00:27)  HR: 106 (06-04-21 @ 00:27) (106 - 106)  BP: 158/78 (06-04-21 @ 00:27) (158/78 - 158/78)  RR: 17 (06-04-21 @ 00:27) (17 - 17)  SpO2: 97% (06-04-21 @ 00:27) (97% - 97%)  Wt(kg): --    PE   RLE:  Skin intact with any open areas  Diffuse venous stasis changes and ecchymotic areas  Soft compartments  TTP diffusely about the proximal tibial region.  Compartments soft; No TTP to hip/ankle/foot   ROM limited 2/2 pain   Motor intact GS/TA/FHL/EHL  SILT L2-S1  DP/PT pulses 1+  Foot well perfused    LLE:  Skin intact with any open areas  Diffuse venous stasis changes and ecchymotic areas  Focal soft swelling about the anterior compartment consistent with likely hematoma.  Soft compartments  TTP diffusely about the proximal tibial region.  Compartments soft; No TTP to hip/ankle/foot   ROM limited 2/2 pain   Motor intact GS/TA/FHL/EHL  SILT L2-S1  DP/PT pulses 1+  Foot well perfused    BUE:   No bony TTP; Good ROM w/o pain; Exam Unremarkable    Secondary Survey: No TTP over bony prominences, SILT, palpable pulses, full/painless range of motion, compartments soft      Imaging:  XR demonstrating bilateral proximal 1/3 tibia fractures with displacement. Possible right patella fracture. Previous L long IMN noted.     89F with bilateral proximal tibia/fibula fractures, possible Right patella fracture.    - Patient with above injuries. Long discussion with daughter at bedside.   - Pt is a minimal ambulator at baseline with significant osteoporosis. Will need discussion with Dr. Kitchen regarding treatment recommendations.   - Currently without any significant compartmnt swelling. Neurovascular intact. Good distal perfusion and pulses. Will need close compartment/NV monitoring given the significant fractures and high INR.  - Admit to medical team, keep NPO, vit K if appropriate from medical standpoint (give asap)  - Obtain bilateral LE CT scans of the knee to evaluate for intraarticular extension.  - Will review case with Dr. Kitchen in AM.  - Pain control  - NPO/IVF  - Reaves catheter  - CBC/BMP/Coags/UA/T+S x2  - EKG/CXR  - Medical clearance  - As above. 89y Female hx afib on coumadin presents to  ED s/p fall c/o severe bilateral leg pain and inability to ambulate.  Patient denies headstrike or LOC. History obtained from daughter at bedside (Tabatha). States she was helping her mother get up and standing behind her when the patient fell to both knees with her legs bent. Patient denies numbness/tingling/burning in the BLLE. No other bone/joint complaints.   Hx L hip long IMN with Dr. Kitchen 2017  Walks minimally at home with the use of a walker and assist. OCcasional use of wheelchair  Does occasionally go on short walks outside her home with assist and walker.   Daughter reports a decline in her overall ambulatory status over the past year.     PAST MEDICAL & SURGICAL HISTORY:  Lymphedema of both lower extremities    Venous insufficiency    Monoclonal gammopathies    Paroxysmal atrial fibrillation    Acute cystitis without hematuria  septic  4/2016    Essential hypertension    Spinal stenosis    Spondyloarthritis    Vaginal prolapse    [place] (hard of hearing), bilateral    Hypertension    S/P kyphoplasty  2014    S/P thyroidectomy  partial   1975    History of vaginal surgery      MEDICATIONS  (STANDING):  sodium chloride 0.9%. 1000 milliLiter(s) (80 mL/Hr) IV Continuous <Continuous>    MEDICATIONS  (PRN):  acetaminophen   Tablet .. 650 milliGRAM(s) Oral every 6 hours PRN Mild Pain (1 - 3)  morphine  - Injectable 2 milliGRAM(s) IV Push every 4 hours PRN Severe Pain (7 - 10)  ondansetron Injectable 4 milliGRAM(s) IV Push every 6 hours PRN Nausea and/or Vomiting    Allergies    No Known Allergies    Intolerances        T(C): 36.4 (06-04-21 @ 00:27), Max: 36.4 (06-04-21 @ 00:27)  HR: 106 (06-04-21 @ 00:27) (106 - 106)  BP: 158/78 (06-04-21 @ 00:27) (158/78 - 158/78)  RR: 17 (06-04-21 @ 00:27) (17 - 17)  SpO2: 97% (06-04-21 @ 00:27) (97% - 97%)  Wt(kg): --    PE   RLE:  Skin intact with any open areas  Diffuse venous stasis changes and ecchymotic areas  Soft compartments  TTP diffusely about the proximal tibial region.  Compartments soft; No TTP to hip/ankle/foot   ROM limited 2/2 pain   Motor intact GS/TA/FHL/EHL  SILT L2-S1  DP/PT pulses 1+  Foot well perfused    LLE:  Skin intact with any open areas  Diffuse venous stasis changes and ecchymotic areas  Focal soft swelling about the anterior compartment consistent with likely hematoma.  Soft compartments  TTP diffusely about the proximal tibial region.  Compartments soft; No TTP to hip/ankle/foot   ROM limited 2/2 pain   Motor intact GS/TA/FHL/EHL  SILT L2-S1  DP/PT pulses 1+  Foot well perfused    BUE:   No bony TTP; Good ROM w/o pain; Exam Unremarkable    Secondary Survey: No TTP over bony prominences, SILT, palpable pulses, full/painless range of motion, compartments soft      Imaging:  XR demonstrating bilateral proximal 1/3 tibia fractures with displacement. Possible left patella fracture. Previous L long IMN noted.     89F with bilateral proximal tibia/fibula fractures, possible left patella fracture.    - Patient with above injuries. Long discussion with daughter at bedside.   - Pt is a minimal ambulator at baseline with significant osteoporosis. Will need discussion with Dr. Kitchen regarding treatment recommendations.   - Currently without any significant compartmnt swelling. Neurovascular intact. Good distal perfusion and pulses. Will need close compartment/NV monitoring given the significant fractures and high INR.  - Admit to medical team, keep NPO, vit K if appropriate from medical standpoint (give asap)  - Obtain bilateral LE CT scans of the knee to evaluate for intraarticular extension.  - Will review case with Dr. Kitchen in AM.  - Pain control  - NPO/IVF  - Reaves catheter  - CBC/BMP/Coags/UA/T+S x2  - EKG/CXR  - Medical clearance  - As above.

## 2021-06-04 NOTE — ED PROVIDER NOTE - CARE PLAN
Principal Discharge DX:	Other closed fracture of proximal end of tibia, unspecified laterality, initial encounter   Principal Discharge DX:	Other closed fracture of proximal end of tibia, unspecified laterality, initial encounter  Secondary Diagnosis:	Near syncope  Secondary Diagnosis:	Hyponatremia

## 2021-06-04 NOTE — ED PROVIDER NOTE - CPE EDP HEME LYMPH NORM
Bill 52422 For Specimen Handling/Conveyance To Laboratory?: no Dressing: bandage Anesthesia Volume In Cc (Will Not Render If 0): 0.4 Post-Care Instructions: I reviewed with the patient in detail post-care instructions. Patient is to keep the biopsy site dry overnight, and then apply bacitracin twice daily until healed. Patient may apply hydrogen peroxide soaks to remove any crusting. Punch Size In Mm: 3 Wound Care: Bacitracin Notification Instructions: Patient will be notified of biopsy results. However, patient instructed to call the office if not contacted within 2 weeks. Size Of Lesion In Cm (Optional): 0 Was A Bandage Applied: Yes Hemostasis: None Home Suture Removal Text: Patient was provided a home suture removal kit and will remove their sutures at home.  If they have any questions or difficulties they will call the office. Suture Removal: 14 days Epidermal Sutures: 5-0 Nylon Biopsy Type: H and E Consent: Written consent was obtained and risks were reviewed including but not limited to scarring, infection, bleeding, scabbing, incomplete removal, nerve damage and allergy to anesthesia. Anesthesia Type: 2% lidocaine with epinephrine Billing Type: Third-Party Bill Detail Level: Detailed normal...

## 2021-06-04 NOTE — ED PROVIDER NOTE - CLINICAL SUMMARY MEDICAL DECISION MAKING FREE TEXT BOX
Trauma Alert; fall on coumadin onto knees; near syncope.    EKG showing atrial flutter which patient has hx of .   XRs, CTs to be done in ED; patient is a trauma alert.    Patient likely to need admission for her near syncope,hyponatremia, bruised knees, and generalized weakness.

## 2021-06-04 NOTE — ED PROVIDER NOTE - MUSCULOSKELETAL, MLM
Spine appears normal without midline tenderness, range of motion is limited in lower legs due to pain, +tenderness of knees and proximal tibia bilaterally with hematomas; no bleeding

## 2021-06-04 NOTE — ED PROVIDER NOTE - ENMT, MLM
Airway patent, Nasal mucosa clear. Mouth with normal mucosa. Throat has no vesicles, no oropharyngeal exudates and uvula is midline. no facial injury

## 2021-06-04 NOTE — PROGRESS NOTE ADULT - SUBJECTIVE AND OBJECTIVE BOX
Orthopedics     Pt seen and examined at the bedside. Pain is well controlled at this time. Pt reports feeling well,  no other concerns at this time.     Vital Signs Last 24 Hrs  T(C): 36.1 (06-04-21 @ 08:02), Max: 36.4 (06-04-21 @ 00:27)  T(F): 96.9 (06-04-21 @ 08:02), Max: 97.6 (06-04-21 @ 00:27)  HR: 67 (06-04-21 @ 08:02) (65 - 106)  BP: 80/45 (06-04-21 @ 08:14) (80/45 - 158/78)  BP(mean): 82 (06-04-21 @ 06:22) (64 - 82)  RR: 17 (06-04-21 @ 08:02) (16 - 18)  SpO2: 96% (06-04-21 @ 08:02) (95% - 100%)                        12.4   6.64  )-----------( 93       ( 04 Jun 2021 00:41 )             36.9     04 Jun 2021 02:02    128    |  97     |  20     ----------------------------<  129    5.2     |  27     |  1.00     Ca    9.5        04 Jun 2021 02:02    TPro  x      /  Alb  3.2    /  TBili  x      /  DBili  x      /  AST  x      /  ALT  x      /  AlkPhos  x      04 Jun 2021 07:34    PT/INR - ( 04 Jun 2021 00:41 )   PT: 30.8 sec;   INR: 2.79 ratio         PTT - ( 04 Jun 2021 00:41 )  PTT:22.6 sec    Exam:  GEN: NAD, awake and alert, cooperative.   BLE:  Knee immobilizers in place  Diffuse ecchymoses and swelling of lower legs to the ankle  +EHL FHL TA GS  SILT L2-S1  +DP palpable on right, Dopplerable DP on Left  Calf soft/nontender, compartments soft and compressible.

## 2021-06-04 NOTE — ED PROVIDER NOTE - OBJECTIVE STATEMENT
Pt. is an 89 yo F with a hx of early dementia, lymphedema of lower extremities, atrial fibrillation on coumadin (last INR2.6), hx of proximal femur fracture in January 2021, hearing loss, cystitis, hx of HTN, spinal stenosis, anxiety presents BIB ambulance s/p fall at home.  Daughter states patient taking wellbutrin for depression and anxiety and has been constipated.  She gave her a dose of miralax as she has not had a bowel movement in 4 days.  She tried to assist patient to the bathroom at night with walker and patient had episode of near syncope and became weak on her feet.  Daughter assisted patient who was dead weight down to her knees.  She denies any head or torso injury.  Patient has large bruises on bilateral knees and lower legs.  Daughter states she also was told recently that patient has low sodium. PMD: [place]

## 2021-06-04 NOTE — PROGRESS NOTE ADULT - SUBJECTIVE AND OBJECTIVE BOX
Reason for Admission: s/p fall c/o severe bilateral leg pain and inability to ambulate  History of Present Illness:     Patient is a 89 y/o/f PMHx significant for early dementia, lymphedema of lower extremities, atrial fibrillation on Coumadin (last INR2.6), hx of proximal femur fracture in January 2021, Hx L hip long IMN with Dr. Kitchen 2017, hearing loss, cystitis, hx of HTN, spinal stenosis, anxiety was BIBA s/p witnessed fall at home with resultant severe bilateral leg pain and inability to ambulate. Of note the patient's daughter states that the patient has not had a bowel movement in 4 days therefore decided to administer a dose of Miralax at home. She subsequently attempted to assist patient to the bathroom at night with the assistance the patient's walker. Despite all the daughter's efforts the patient was reportedly too weak on her feet resulting in the patient's fall onto her knees. The patient's daughter states that the patient did not strike her head, or lose consciousness, but did note considerable ecchymoses overlying her bilateral knees and lower legs. The patient denies any numbness/tingling/burning in the BLLE. No other bone/joint complaints. At baseline the patient walks minimally at home with the use of a walker and assist. Occasionally uses her wheelchair.   Daughter reports a decline in her overall ambulatory status over the past year. Imaging in the ED revealed bilateral proximal 1/3 tibia fractures with displacement and possible left patella fracture. In the ED Orthopedics was consulted.      Medical progress: Very deconditioned, frail, comfortable, limited historian (forgetful)  Complaints: no new complaints  Pending ortho eval and plan    REVIEW OF SYSTEMS:  poor historian.     Physical Exam:   GENERAL APPEARANCE:  Very deconditioned, frail  T(C): 36.1 (06-04-21 @ 08:02), Max: 36.4 (06-04-21 @ 00:27)  HR: 67 (06-04-21 @ 08:02) (65 - 106)  BP: 80/45 (06-04-21 @ 08:14) (80/45 - 158/78)  RR: 17 (06-04-21 @ 08:02) (16 - 18)  SpO2: 96% (06-04-21 @ 08:02) (95% - 100%)  HEENT:  Head is normocephalic    Skin:  ecchymosis  NECK:  Supple without lymphadenopathy.   HEART:  Regular rate and rhythm   LUNGS:  Good ins/exp effort, no W/R/R/C  ABDOMEN:  Soft, nontender, nondistended with good bowel sounds heard  EXTREMITIES:  LE b/l foot ecchymosis around the foot region  NEUROLOGICAL: dementia    Labs:  CBC Full  -  ( 04 Jun 2021 00:41 )  WBC Count : 6.64 K/uL  RBC Count : 4.05 M/uL  Hemoglobin : 12.4 g/dL  Hematocrit : 36.9 %  Platelet Count - Automated : 93 K/uL    PT/INR - ( 04 Jun 2021 00:41 )   PT: 30.8 sec;   INR: 2.79 ratio         PTT - ( 04 Jun 2021 00:41 )  PTT:22.6 sec    06-04    128<L>  |  97  |  20  ----------------------------<  129<H>  5.2   |  27  |  1.00    Ca    9.5      04 Jun 2021 02:02    TPro  x   /  Alb  3.2<L>  /  TBili  x   /  DBili  x   /  AST  x   /  ALT  x   /  AlkPhos  x   06-04    89 y/o F PMHx significant for early dementia, lymphedema of lower extremities, atrial fibrillation on Coumadin (last INR2.6), hx of proximal femur fracture in January 2021, Hx L hip long IMN with Dr. Kitchen 2017, hearing loss, cystitis, hx of HTN, spinal stenosis, anxiety was BIBA s/p witnessed fall at home with resultant severe bilateral leg pain and inability to ambulate found to have  bilateral proximal 1/3 tibia fractures with displacement and possible left patella fracture.    # Bilateral proximal tibia/fibula fractures, possible left patella fracture  - very deconditioned and weak - fall appears to be mechanical in nature due to weakness. as per ortho patient with significant osteoporosis / minimal ambulation  - call made out to ortho - awaiting plan from Dr. Kitchen  - in a setting of bleeding, close monitoring of compartment syndrome. Patient has a good pulses in her lower extremities  - NPO  - IV hydration  - Reaves catheter  - strict I/Os  - at this time the patient's estimated perioperative risk of MACE based on combined clinical/surgical risk is low or < 3.9% given number of risk factors (0). At this time no further presurgical testing is warranted.    # Atrial Fibrillation (good rate control)  - takes Coumadin (on hold for now; please re-evaluate accordingly)  - please reconcile this patient's home medication list further in the am (as per Dr. Hillman was taking Metoprolol at one point? Unknown dosing for Coumadin?)    # Hyponatremia  - this is hypovolumic in nature  - IV hydration    #Hypertension, but this am hypotensive  - IV hydration  - patient reportedly takes Losartan 25mg po daily and Spironolactone 25mg po daily (although both have been known to cause hyponatremia; therefore will hold am dosing)    #Depression/Anxiety  ~per Dr. Hillman takes Mirtazepine 37.5mg po qhs  ~and Bupropion XL 150mg po daily  ~please reconfirm with Pharmacy in the am        Reason for Admission: s/p fall c/o severe bilateral leg pain and inability to ambulate  History of Present Illness:     Patient is a 89 y/o/f PMHx significant for early dementia, lymphedema of lower extremities, atrial fibrillation on Coumadin (last INR2.6), hx of proximal femur fracture in January 2021, Hx L hip long IMN with Dr. Kitchen 2017, hearing loss, cystitis, hx of HTN, spinal stenosis, anxiety was BIBA s/p witnessed fall at home with resultant severe bilateral leg pain and inability to ambulate. Of note the patient's daughter states that the patient has not had a bowel movement in 4 days therefore decided to administer a dose of Miralax at home. She subsequently attempted to assist patient to the bathroom at night with the assistance the patient's walker. Despite all the daughter's efforts the patient was reportedly too weak on her feet resulting in the patient's fall onto her knees. The patient's daughter states that the patient did not strike her head, or lose consciousness, but did note considerable ecchymoses overlying her bilateral knees and lower legs. The patient denies any numbness/tingling/burning in the BLLE. No other bone/joint complaints. At baseline the patient walks minimally at home with the use of a walker and assist. Occasionally uses her wheelchair.   Daughter reports a decline in her overall ambulatory status over the past year. Imaging in the ED revealed bilateral proximal 1/3 tibia fractures with displacement and possible left patella fracture. In the ED Orthopedics was consulted.    Medical progress: Very deconditioned, frail, comfortable, limited historian (forgetful). I was informed by Nurse as patient was noted to be hypotensive. initially I gave patient 1 L of fluids, but patient continued to have low BPs. I addressed this with Ortho -  repeated CBC -  with trending down HH. I have a low suspicion for this amount of HH trending down has to do with simply lossing bleeding in the leg. CT of the abd/pelvis to r.o retroperitoneal bleeding.   Complaints: no new complaints    Plan to repeat HH. 2 18 gages of IV. type and and screen. will repeat INR    REVIEW OF SYSTEMS:  poor historian.     Physical Exam:   GENERAL APPEARANCE:  Very deconditioned, frail  T(C): 36.1 (06-04-21 @ 08:02), Max: 36.4 (06-04-21 @ 00:27)  HR: 67 (06-04-21 @ 08:02) (65 - 106)  BP: 80/45 (06-04-21 @ 08:14) (80/45 - 158/78)  RR: 17 (06-04-21 @ 08:02) (16 - 18)  SpO2: 96% (06-04-21 @ 08:02) (95% - 100%)  HEENT:  Head is normocephalic    Skin:  ecchymosis  NECK:  Supple without lymphadenopathy.   HEART:  Regular rate and rhythm   LUNGS:  Good ins/exp effort, no W/R/R/C  ABDOMEN:  Soft, nontender, nondistended with good bowel sounds heard  EXTREMITIES:  LE b/l foot ecchymosis around the foot region  NEUROLOGICAL: dementia    Labs:  CBC Full  -  ( 04 Jun 2021 00:41 )  WBC Count : 6.64 K/uL  RBC Count : 4.05 M/uL  Hemoglobin : 12.4 g/dL  Hematocrit : 36.9 %  Platelet Count - Automated : 93 K/uL    PT/INR - ( 04 Jun 2021 00:41 )   PT: 30.8 sec;   INR: 2.79 ratio         PTT - ( 04 Jun 2021 00:41 )  PTT:22.6 sec    06-04    128<L>  |  97  |  20  ----------------------------<  129<H>  5.2   |  27  |  1.00    Ca    9.5      04 Jun 2021 02:02    TPro  x   /  Alb  3.2<L>  /  TBili  x   /  DBili  x   /  AST  x   /  ALT  x   /  AlkPhos  x   06-04    89 y/o F PMHx significant for early dementia, lymphedema of lower extremities, atrial fibrillation on Coumadin (last INR2.6), hx of proximal femur fracture in January 2021, Hx L hip long IMN with Dr. Kitchen 2017, hearing loss, cystitis, hx of HTN, spinal stenosis, anxiety was BIBA s/p witnessed fall at home with resultant severe bilateral leg pain and inability to ambulate found to have  bilateral proximal 1/3 tibia fractures with displacement and possible left patella fracture.    # Bilateral proximal tibia/fibula fractures, possible left patella fracture  - very deconditioned and weak - fall appears to be mechanical in nature due to weakness. as per ortho patient with significant osteoporosis / minimal ambulation  - call made out to ortho - awaiting plan from Dr. Kitchen  - in a setting of bleeding, close monitoring of compartment syndrome. Patient has a good pulses in her lower extremities  - NPO  - IV hydration  - Reaves catheter  - strict I/Os  - at this time the patient's estimated perioperative risk of MACE based on combined clinical/surgical risk is low or < 3.9% given number of risk factors (0). At this time no further presurgical testing is warranted.      # Atrial Fibrillation (good rate control)  - takes Coumadin (on hold for now; please re-evaluate accordingly)  - please reconcile this patient's home medication list further in the am (as per Dr. Hillman was taking Metoprolol at one point? Unknown dosing for Coumadin?)    # Hyponatremia  - this is hypovolumic in nature  - IV hydration    #Hypertension, but this am hypotensive  - IV hydration  - patient reportedly takes Losartan 25mg po daily and Spironolactone 25mg po daily (although both have been known to cause hyponatremia; therefore will hold am dosing)  - repeat CBC -  significantly lower in the setting of high INR  - CT of the abd /  pelvis to r/o retroperitoneal bleed  - ortho has been notified.     #Depression/Anxiety  ~per Dr. Hillman takes Mirtazepine 37.5mg po qhs  ~and Bupropion XL 150mg po daily  ~please reconfirm with Pharmacy in the am        Reason for Admission: s/p fall c/o severe bilateral leg pain and inability to ambulate  History of Present Illness:     Patient is a 88 y/o/f PMHx significant for early dementia, lymphedema of lower extremities, atrial fibrillation on Coumadin (last INR2.6), hx of proximal femur fracture in January 2021, Hx L hip long IMN with Dr. Kitchen 2017, hearing loss, cystitis, hx of HTN, spinal stenosis, anxiety was BIBA s/p witnessed fall at home with resultant severe bilateral leg pain and inability to ambulate. Of note the patient's daughter states that the patient has not had a bowel movement in 4 days therefore decided to administer a dose of Miralax at home. She subsequently attempted to assist patient to the bathroom at night with the assistance the patient's walker. Despite all the daughter's efforts the patient was reportedly too weak on her feet resulting in the patient's fall onto her knees. The patient's daughter states that the patient did not strike her head, or lose consciousness, but did note considerable ecchymoses overlying her bilateral knees and lower legs. The patient denies any numbness/tingling/burning in the BLLE. No other bone/joint complaints. At baseline the patient walks minimally at home with the use of a walker and assist. Occasionally uses her wheelchair.   Daughter reports a decline in her overall ambulatory status over the past year. Imaging in the ED revealed bilateral proximal 1/3 tibia fractures with displacement and possible left patella fracture. In the ED Orthopedics was consulted.    Medical progress: Very deconditioned, frail, comfortable, limited historian (forgetful). I was informed by Nurse as patient was noted to be hypotensive. initially I gave patient 1 L of fluids, but patient continued to have low BPs. I addressed this with Ortho -  repeated CBC -  with trending down HH. I have a low suspicion for this amount of HH trending down has to do with simply lossing bleeding in the leg. CT of the abd/pelvis to r.o retroperitoneal bleeding.   Complaints: no new complaints    Plan to repeat HH. 2 18 gages of IV. type and and screen. will repeat INR, Ct fo the abdomen r/o retroperitoneal bleed,  care discussd with ortho -  b/l legs are soft /  no signs of compartment syndrome. Family updated.     REVIEW OF SYSTEMS:  poor historian.     Physical Exam:   GENERAL APPEARANCE:  Very deconditioned, frail  T(C): 36.1 (06-04-21 @ 08:02), Max: 36.4 (06-04-21 @ 00:27)  HR: 67 (06-04-21 @ 08:02) (65 - 106)  BP: 80/45 (06-04-21 @ 08:14) (80/45 - 158/78)  RR: 17 (06-04-21 @ 08:02) (16 - 18)  SpO2: 96% (06-04-21 @ 08:02) (95% - 100%)  HEENT:  Head is normocephalic    Skin:  ecchymosis  NECK:  Supple without lymphadenopathy.   HEART:  Regular rate and rhythm   LUNGS:  Good ins/exp effort, no W/R/R/C  ABDOMEN:  Soft, nontender, nondistended with good bowel sounds heard  EXTREMITIES:  LE b/l foot ecchymosis around the foot region  NEUROLOGICAL: dementia    Labs:  CBC Full  -  ( 04 Jun 2021 00:41 )  WBC Count : 6.64 K/uL  RBC Count : 4.05 M/uL  Hemoglobin : 12.4 g/dL  Hematocrit : 36.9 %  Platelet Count - Automated : 93 K/uL    PT/INR - ( 04 Jun 2021 00:41 )   PT: 30.8 sec;   INR: 2.79 ratio         PTT - ( 04 Jun 2021 00:41 )  PTT:22.6 sec    06-04    128<L>  |  97  |  20  ----------------------------<  129<H>  5.2   |  27  |  1.00    Ca    9.5      04 Jun 2021 02:02    TPro  x   /  Alb  3.2<L>  /  TBili  x   /  DBili  x   /  AST  x   /  ALT  x   /  AlkPhos  x   06-04    88 y/o F PMHx significant for early dementia, lymphedema of lower extremities, atrial fibrillation on Coumadin (last INR2.6), hx of proximal femur fracture in January 2021, Hx L hip long IMN with Dr. Kitchen 2017, hearing loss, cystitis, hx of HTN, spinal stenosis, anxiety was BIBA s/p witnessed fall at home with resultant severe bilateral leg pain and inability to ambulate found to have  bilateral proximal 1/3 tibia fractures with displacement and possible left patella fracture.    # Bilateral proximal tibia/fibula fractures, possible left patella fracture  - very deconditioned and weak - fall appears to be mechanical in nature due to weakness. as per ortho patient with significant osteoporosis / minimal ambulation  - call made out to ortho - awaiting plan from Dr. Kitchen  - in a setting of bleeding, close monitoring of compartment syndrome. Patient has a good pulses in her lower extremities  - NPO  - IV hydration  - Reaves catheter  - strict I/Os  - at this time the patient's estimated perioperative risk of MACE based on combined clinical/surgical risk is low or < 3.9% given number of risk factors (0). At this time no further presurgical testing is warranted.      # Atrial Fibrillation (good rate control)  - takes Coumadin (on hold for now; please re-evaluate accordingly)  - please reconcile this patient's home medication list further in the am (as per Dr. Hillman was taking Metoprolol at one point? Unknown dosing for Coumadin?)    # Hyponatremia  - this is hypovolumic in nature  - IV hydration    #Hypertension, but this am hypotensive  - IV hydration  - patient reportedly takes Losartan 25mg po daily and Spironolactone 25mg po daily (although both have been known to cause hyponatremia; therefore will hold am dosing)  - repeat CBC -  significantly lower in the setting of high INR  - CT of the abd /  pelvis to r/o retroperitoneal bleed  - ortho has been notified.     #Depression/Anxiety  ~per Dr. Hillman takes Mirtazepine 37.5mg po qhs  ~and Bupropion XL 150mg po daily  ~please reconfirm with Pharmacy in the am        Reason for Admission: s/p fall c/o severe bilateral leg pain and inability to ambulate  History of Present Illness:     Patient is a 89 y/o/f PMHx significant for early dementia, lymphedema of lower extremities, atrial fibrillation on Coumadin (last INR2.6), hx of proximal femur fracture in January 2021, Hx L hip long IMN with Dr. Kitchen 2017, hearing loss, cystitis, hx of HTN, spinal stenosis, anxiety was BIBA s/p witnessed fall at home with resultant severe bilateral leg pain and inability to ambulate. Of note the patient's daughter states that the patient has not had a bowel movement in 4 days therefore decided to administer a dose of Miralax at home. She subsequently attempted to assist patient to the bathroom at night with the assistance the patient's walker. Despite all the daughter's efforts the patient was reportedly too weak on her feet resulting in the patient's fall onto her knees. The patient's daughter states that the patient did not strike her head, or lose consciousness, but did note considerable ecchymoses overlying her bilateral knees and lower legs. The patient denies any numbness/tingling/burning in the BLLE. No other bone/joint complaints. At baseline the patient walks minimally at home with the use of a walker and assist. Occasionally uses her wheelchair.   Daughter reports a decline in her overall ambulatory status over the past year. Imaging in the ED revealed bilateral proximal 1/3 tibia fractures with displacement and possible left patella fracture. In the ED Orthopedics was consulted.    Medical progress: Very deconditioned, frail, comfortable, limited historian (forgetful). I was informed by Nurse as patient was noted to be hypotensive. initially I gave patient 1 L of fluids, but patient continued to have low BPs. I addressed this with Ortho -  repeated CBC -  with trending down HH. I have a low suspicion for this amount of HH trending down has to do with simply lossing bleeding in the leg. CT of the abd/pelvis to r.o retroperitoneal bleeding.   Complaints: no new complaints    Plan to repeat HH. 2 18 gages of IV. type and and screen. will repeat INR, Ct fo the abdomen r/o retroperitoneal bleed,  care discussd with ortho -  b/l legs are soft /  no signs of compartment syndrome. Family updated.     REVIEW OF SYSTEMS:  poor historian.     Physical Exam:   GENERAL APPEARANCE:  Very deconditioned, frail  T(C): 36.1 (06-04-21 @ 08:02), Max: 36.4 (06-04-21 @ 00:27)  HR: 67 (06-04-21 @ 08:02) (65 - 106)  BP: 80/45 (06-04-21 @ 08:14) (80/45 - 158/78)  RR: 17 (06-04-21 @ 08:02) (16 - 18)  SpO2: 96% (06-04-21 @ 08:02) (95% - 100%)  HEENT:  Head is normocephalic    Skin:  ecchymosis  NECK:  Supple without lymphadenopathy.   HEART:  Regular rate and rhythm   LUNGS:  Good ins/exp effort, no W/R/R/C  ABDOMEN:  Soft, nontender, nondistended with good bowel sounds heard  EXTREMITIES:  LE b/l foot ecchymosis around the foot region  NEUROLOGICAL: dementia    Labs:  CBC Full  -  ( 04 Jun 2021 00:41 )  WBC Count : 6.64 K/uL  RBC Count : 4.05 M/uL  Hemoglobin : 12.4 g/dL  Hematocrit : 36.9 %  Platelet Count - Automated : 93 K/uL    PT/INR - ( 04 Jun 2021 00:41 )   PT: 30.8 sec;   INR: 2.79 ratio         PTT - ( 04 Jun 2021 00:41 )  PTT:22.6 sec    06-04    128<L>  |  97  |  20  ----------------------------<  129<H>  5.2   |  27  |  1.00    Ca    9.5      04 Jun 2021 02:02    TPro  x   /  Alb  3.2<L>  /  TBili  x   /  DBili  x   /  AST  x   /  ALT  x   /  AlkPhos  x   06-04    89 y/o F PMHx significant for early dementia, lymphedema of lower extremities, atrial fibrillation on Coumadin (last INR2.6), hx of proximal femur fracture in January 2021, Hx L hip long IMN with Dr. Kitchen 2017, hearing loss, cystitis, hx of HTN, spinal stenosis, anxiety was BIBA s/p witnessed fall at home with resultant severe bilateral leg pain and inability to ambulate found to have  bilateral proximal 1/3 tibia fractures with displacement and possible left patella fracture.    # Bilateral proximal tibia/fibula fractures, possible left patella fracture  - very deconditioned and weak - fall appears to be mechanical in nature due to weakness. as per ortho patient with significant osteoporosis / minimal ambulation  - call made out to ortho - awaiting plan from Dr. Kitchen  - in a setting of bleeding, close monitoring of compartment syndrome. Patient has a good pulses in her lower extremities  - NPO  - IV hydration  - Reaves catheter  - strict I/Os  - at this time the patient's estimated perioperative risk of MACE based on combined clinical/surgical risk is low or < 3.9% given number of risk factors (0). At this time no further presurgical testing is warranted.    # hypotensive in a setting of bleeding  # low HH secondary to acute blood loss anemia  # Supratheurapeutic INR  # elevated lactate  - IV hydration  - patient reportedly takes Losartan 25mg po daily and Spironolactone 25mg po daily (although both have been known to cause hyponatremia; therefore will hold am dosing)  - repeat CBC -  significantly lower in the setting of high INR  - CT of the abd /  pelvis to r/o retroperitoneal bleed -  speak   - ortho has been notified.     # Atrial Fibrillation (good rate control)  - takes Coumadin (on hold for now; please re-evaluate accordingly)  - please reconcile this patient's home medication list further in the am (as per Dr. Hillman was taking Metoprolol at one point? Unknown dosing for Coumadin?)    # Hyponatremia  - this is hypovolumic in nature  - IV hydration    #Depression/Anxiety  ~per Dr. Hillman takes Mirtazepine 37.5mg po qhs  ~and Bupropion XL 150mg po daily  ~please reconfirm with Pharmacy in the am

## 2021-06-04 NOTE — H&P ADULT - HISTORY OF PRESENT ILLNESS
89 y/o F PMHx significant for early dementia, lymphedema of lower extremities, atrial fibrillation on Coumadin (last INR2.6), hx of proximal femur fracture in January 2021, Hx L hip long IMN with Dr. Kitchen 2017, hearing loss, cystitis, hx of HTN, spinal stenosis, anxiety was BIBA s/p witnessed fall at home with resultant severe bilateral leg pain and inability to ambulate. Of note the patient's daughter states that the patient has not had a bowel movement in 4 days therefore decided to administer a dose of Miralax at home. She subsequently attempted to assist patient to the bathroom at night with the assistance the patient's walker. Despite all the daughter's efforts the patient was reportedly too weak on her feet resulting in the patient's fall onto her knees. The patient's daughter states that the patient did not strike her head, or lose consciousness, but did note considerable ecchymoses overlying her bilateral knees and lower legs. The patient denies any numbness/tingling/burning in the BLLE. No other bone/joint complaints. At baseline the patient walks minimally at home with the use of a walker and assist. Occasionally uses her wheelchair.   Daughter reports a decline in her overall ambulatory status over the past year. Imaging in the ED revealed bilateral proximal 1/3 tibia fractures with displacement and possible left patella fracture. In the ED Orthopedics was consulted.

## 2021-06-04 NOTE — H&P ADULT - NSICDXPASTMEDICALHX_GEN_ALL_CORE_FT
PAST MEDICAL HISTORY:  Acute cystitis without hematuria septic  4/2016    Essential hypertension     [place] (hard of hearing), bilateral     Hypertension     Lymphedema of both lower extremities     Monoclonal gammopathies     Paroxysmal atrial fibrillation     Spinal stenosis     Spondyloarthritis     Vaginal prolapse     Venous insufficiency

## 2021-06-04 NOTE — ED ADULT TRIAGE NOTE - CHIEF COMPLAINT QUOTE
patient fell from standing height while being assisted up from commode. patient landed onto her left side, denies hitting her head.  pt c/o lower left leg pain + swelling and bruising to lower leg.  pt has hx of demential GCS 14 at baseline.  pt is on coumadin.  +trauma alert, Dr. De Leon notified.

## 2021-06-04 NOTE — CHART NOTE - NSCHARTNOTEFT_GEN_A_CORE
Orthopedics    Dr Kitchen is electing to proceed with conservative management at this time.   Therefore the patient was taken out of the [facility] and placed in B/l long leg trilam splints.   Pt will stay in these splints until FU outpatient with Dr Kitchen  NWB BLE in long leg trilam splints.   DVT ppx per AC/ primary team.  Discussed with Dr Kitchen.

## 2021-06-04 NOTE — CONSULT NOTE ADULT - ASSESSMENT
IMP:    88 y/o female with Chronic AFib on warfarin, chronic LE edema and dementia admitted with B/L Tib/Fib Fx and L patellar Fx.  Acute blood loss anemia into B/L LE edema.  No signs of compartment syndrome.    Coagulopathy in setting of bleeding  Thrombocytopenia     High risk for acute decompensation and deterioration including death    Suggest:    PCC to fully reverse INR  Tx 2u PC  Plats OK wo tx at this time  Serial H/H  Observe for evidence of compartment syndrome  Ortho follow up   DVT prophy    Agree with SDU level monitoring.  Above d/w Dr Sarkar and family at bedside

## 2021-06-04 NOTE — CONSULT NOTE ADULT - SUBJECTIVE AND OBJECTIVE BOX
Patient is a 89y old  Female who presents with a chief complaint of s/p fall c/o severe bilateral leg pain and inability to ambulate (04 Jun 2021 02:34)      HPI:  88 y/o F PMHx significant for early dementia, lymphedema of lower extremities, atrial fibrillation on Coumadin (last INR2.6), hx of proximal femur fracture in January 2021, Hx L hip long IMN with Dr. Kitchen 2017, hearing loss, cystitis, hx of HTN, spinal stenosis, anxiety was BIBA s/p witnessed fall at home with resultant severe bilateral leg pain and inability to ambulate. Of note the patient's daughter states that the patient has not had a bowel movement in 4 days therefore decided to administer a dose of Miralax at home. She subsequently attempted to assist patient to the bathroom at night with the assistance the patient's walker. Despite all the daughter's efforts the patient was reportedly too weak on her feet resulting in the patient's fall onto her knees. The patient's daughter states that the patient did not strike her head, or lose consciousness, but did note considerable ecchymoses overlying her bilateral knees and lower legs. The patient denies any numbness/tingling/burning in the BLLE. No other bone/joint complaints. At baseline the patient walks minimally at home with the use of a walker and assist. Occasionally uses her wheelchair.   Daughter reports a decline in her overall ambulatory status over the past year. Imaging in the ED revealed bilateral proximal 1/3 tibia fractures with displacement and possible left patella fracture. In the ED Orthopedics was consulted.   (04 Jun 2021 02:34)    6/4 Cardiology. 89 year old female admitted with near syncope. felt weak ambulating to BR, fell back and onto knees. denies LOC. denies chest pain or dyspnea. Patient noted to have bilat tib fib fractures. Patient has past history of afib, permanent and htn.  PAST MEDICAL & SURGICAL HISTORY:  Lymphedema of both lower extremities    Venous insufficiency    Monoclonal gammopathies    Paroxysmal atrial fibrillation    Acute cystitis without hematuria  septic  4/2016    Essential hypertension    Spinal stenosis    Spondyloarthritis    Vaginal prolapse    [place] (hard of hearing), bilateral    Hypertension    S/P kyphoplasty  2014    S/P thyroidectomy  partial   1975    History of vaginal surgery    History of fracture of femur  hx of proximal femur fracture in January 2021    History of repair of left hip joint  Hx L hip long IMN with Dr. Kitchen 2017        PREVIOUS DIAGNOSTIC TESTING:      ECHO  FINDINGS:    STRESS  FINDINGS:    CATHETERIZATION  FINDINGS:    MEDICATIONS  (STANDING):  senna 2 Tablet(s) Oral at bedtime  sodium chloride 0.9%. 1000 milliLiter(s) (80 mL/Hr) IV Continuous <Continuous>  sodium chloride 0.9%. 1000 milliLiter(s) (75 mL/Hr) IV Continuous <Continuous>    MEDICATIONS  (PRN):  acetaminophen   Tablet .. 650 milliGRAM(s) Oral every 6 hours PRN Mild Pain (1 - 3)  haloperidol    Injectable 0.5 milliGRAM(s) IV Push once PRN severe agitation  melatonin 3 milliGRAM(s) Oral at bedtime PRN Insomnia  morphine  - Injectable 2 milliGRAM(s) IV Push every 4 hours PRN Severe Pain (7 - 10)  ondansetron Injectable 4 milliGRAM(s) IV Push every 6 hours PRN Nausea and/or Vomiting  oxyCODONE    IR 2.5 milliGRAM(s) Oral every 4 hours PRN Moderate Pain (4 - 6)      FAMILY HISTORY:  No pertinent family history in first degree relatives        SOCIAL HISTORY:  ***    REVIEW OF SYSTEM:  Pertinent items are noted in HPI.  Constitutional  Eyes:    Ears, nose, mouth,   throat, and face:    Neck:   Respiratory:   and wheezing  Cardiovascular:    Gastrointestinal:  Genitourinary:     Hematologic/lymphatic:   Musculoskeletal:   Neurological:   Behavioral/Psych:        Vital Signs Last 24 Hrs  T(C): 36.3 (04 Jun 2021 06:22), Max: 36.4 (04 Jun 2021 00:27)  T(F): 97.3 (04 Jun 2021 06:22), Max: 97.6 (04 Jun 2021 00:27)  HR: 65 (04 Jun 2021 06:22) (65 - 106)  BP: 80/45 (04 Jun 2021 08:14) (80/45 - 158/78)  BP(mean): 82 (04 Jun 2021 06:22) (64 - 82)  RR: 18 (04 Jun 2021 06:22) (16 - 18)  SpO2: 95% (04 Jun 2021 06:22) (95% - 100%)    I&O's Summary    PHYSICAL EXAM  General Appearance: cooperative, no acute distress,   HEENT: PERRL, conjunctiva clear, EOM's intact, non injected pharynx, no exudate    Neck: Supple, , no adenopathy, thyroid: not enlarged, no carotid bruit or JVD  Back: Symmetric, no  tenderness,no soft tissue tenderness  Lungs: Clear to auscultation bilaterally,no adventitious breath sounds, normal   expiratory phase  Heart: Regular rate and rhythm, S1, S2 normal, no murmur,   Abdomen: Soft, non-tender, bowel sounds active , no hepatosplenomegaly  Extremities: no cyanosis or edema, no joint swelling  Skin: Skin color, texture normal, no rashes   Neurologic: Alert and oriented X3 , cranial nerves intact, sensory and motor normal,        INTERPRETATION OF TELEMETRY:    ECG:        LABS:                          12.4   6.64  )-----------( 93       ( 04 Jun 2021 00:41 )             36.9     06-04    128<L>  |  97  |  20  ----------------------------<  129<H>  5.2   |  27  |  1.00    Ca    9.5      04 Jun 2021 02:02    TPro  x   /  Alb  3.2<L>  /  TBili  x   /  DBili  x   /  AST  x   /  ALT  x   /  AlkPhos  x   06-04    CARDIAC MARKERS ( 04 Jun 2021 02:02 )  <0.015 ng/mL / x     / x     / x     / x              PT/INR - ( 04 Jun 2021 00:41 )   PT: 30.8 sec;   INR: 2.79 ratio         PTT - ( 04 Jun 2021 00:41 )  PTT:22.6 sec          RADIOLOGY & ADDITIONAL STUDIES:    IMPRESSION:    PLAN:

## 2021-06-04 NOTE — PROGRESS NOTE ADULT - SUBJECTIVE AND OBJECTIVE BOX
Orthopedics Post-op Check:    This is a 88y/o Female admitted to medical service with bilateral proximal tibia fractures and left patella fracture s/p fall late last night.   Dr. Sarkar called with concern for hypotension and drop in H/H from 12.4/36.9 to 7.3/22.0.  Pt seen at bedside resting comfortably. IV nurse at bedside placing line. Pts 2 daughters at bedside.  Pain is controlled. No nausea or vomiting. Pt and daughters report baseline peripheral neuropathy with scattered numbness B/L LEs.    Vital Signs Last 24 Hrs  T(C): 36.1 (06-04-21 @ 08:02), Max: 36.4 (06-04-21 @ 00:27)  T(F): 96.9 (06-04-21 @ 08:02), Max: 97.6 (06-04-21 @ 00:27)  HR: 77 (06-04-21 @ 14:53) (65 - 106)  BP: 107/54 (06-04-21 @ 14:53) (80/45 - 158/78)  BP(mean): 82 (06-04-21 @ 06:22) (64 - 82)  RR: 17 (06-04-21 @ 08:02) (16 - 18)  SpO2: 95% (06-04-21 @ 14:53) (95% - 100%)                        7.3    8.17  )-----------( 77       ( 04 Jun 2021 14:56 )             22.0     04 Jun 2021 14:05    133    |  105    |  18     ----------------------------<  138    5.0     |  21     |  0.82     Ca    8.3        04 Jun 2021 14:05    TPro  x      /  Alb  3.2    /  TBili  x      /  DBili  x      /  AST  x      /  ALT  x      /  AlkPhos  x      04 Jun 2021 07:34    PT/INR - ( 04 Jun 2021 14:56 )   PT: 39.4 sec;   INR: 3.58 ratio         PTT - ( 04 Jun 2021 14:56 )  PTT:33.2 sec    Exam:  PE B/L LEs.  Bulky [place] Knee Immobilizers in place B/L LEs.  +moderate-large swelling and edema with ecchymosis distal to immobilizers.  Knee Immobilizers unstrapped and bulky [place] dressings windowed over proximal tibia fracture sites bilaterally.  Left proximal tibia with ecchymosis and large hematoma laterally ; hematoma feels superficial. LLE diffusely swollen and edematous; with compartments soft and compressible.  Right proximal tibia with ecchymosis and mild hematoma laterally. RLE diffusely swollen and edematous; compartments soft and compressible.  Calves are soft and nontender.  Moving all toes and ankle, +EHL/FHL/TA/GS.  SILT distally; medial ankles with absent sensation which patient reports is baseline.  DP and PT pulses 2+.    A/P:  88y/o female with bilateral proximal tibia fractures and left patella fracture s/p fall last night with large hematoma left proximal tibia and mild to moderate hematoma right proximal tibia; no evidence of compartment syndrome  -Analgesia  -NWB B/L LEs with bulky [place] knee immobilizer  -Ice application  -B/L LE elevation.  -CT abd/pelvis per med to r/o GI bleed; pt reported to have pink vomit per covering nurse.  -DVT PE prophylaxis; coumadin on hold.  -Incentive spirometry  -Dr. Kitchen will speak with patients daughters to further discuss treatment options.    Case discussed with Dr. Kitchen.       Orthopedics Post-op Check:    This is a 88y/o Female admitted to medical service with bilateral proximal tibia fractures and left patella fracture s/p fall late last night.   Dr. Sarkar called with concern for hypotension and drop in H/H from 12.4/36.9 to 7.3/22.0.  Pt seen at bedside resting comfortably. IV nurse at bedside placing line. Pts 2 daughters at bedside.  Pain is controlled. No nausea. Pt and daughters report baseline peripheral neuropathy with scattered numbness B/L LEs.    Vital Signs Last 24 Hrs  T(C): 36.1 (06-04-21 @ 08:02), Max: 36.4 (06-04-21 @ 00:27)  T(F): 96.9 (06-04-21 @ 08:02), Max: 97.6 (06-04-21 @ 00:27)  HR: 77 (06-04-21 @ 14:53) (65 - 106)  BP: 107/54 (06-04-21 @ 14:53) (80/45 - 158/78)  BP(mean): 82 (06-04-21 @ 06:22) (64 - 82)  RR: 17 (06-04-21 @ 08:02) (16 - 18)  SpO2: 95% (06-04-21 @ 14:53) (95% - 100%)                        7.3    8.17  )-----------( 77       ( 04 Jun 2021 14:56 )             22.0     04 Jun 2021 14:05    133    |  105    |  18     ----------------------------<  138    5.0     |  21     |  0.82     Ca    8.3        04 Jun 2021 14:05    TPro  x      /  Alb  3.2    /  TBili  x      /  DBili  x      /  AST  x      /  ALT  x      /  AlkPhos  x      04 Jun 2021 07:34    PT/INR - ( 04 Jun 2021 14:56 )   PT: 39.4 sec;   INR: 3.58 ratio         PTT - ( 04 Jun 2021 14:56 )  PTT:33.2 sec    Exam:  PE B/L LEs.  Bulky [place] Knee Immobilizers in place B/L LEs.  +moderate-large swelling and edema with ecchymosis distal to immobilizers.  Knee Immobilizers unstrapped and bulky [place] dressings windowed over proximal tibia fracture sites bilaterally.  Left proximal tibia with ecchymosis and large hematoma laterally ; hematoma feels superficial. LLE diffusely swollen and edematous; with compartments soft and compressible.  Right proximal tibia with ecchymosis and mild hematoma laterally. RLE diffusely swollen and edematous; compartments soft and compressible.  Calves are soft and nontender.  Moving all toes and ankle, +EHL/FHL/TA/GS.  SILT distally; medial ankles with absent sensation which patient reports is baseline.  DP and PT pulses 2+.    A/P:  88y/o female with bilateral proximal tibia fractures and left patella fracture s/p fall last night with large hematoma left proximal tibia and mild to moderate hematoma right proximal tibia; no evidence of compartment syndrome  -Analgesia  -NWB B/L LEs with bulky [place] knee immobilizer  -Ice application  -B/L LE elevation.  -CT abd/pelvis per med to r/o GI bleed; pt reported to have pink vomit per covering nurse.  -DVT PE prophylaxis; coumadin on hold.  -Incentive spirometry  -Dr. Kitchen will speak with patients daughters to further discuss treatment options.    Case discussed with Dr. Kitchen.

## 2021-06-04 NOTE — H&P ADULT - EXTREMITIES COMMENTS
As per Ortho; RLE: Diffuse venous stasis changes and ecchymotic areas, TTP proximal tibial region. LLE: Diffuse venous stasis changes and ecchymotic areas  Focal soft swelling about the anterior compartment consistent with likely hematoma.  TTP proximal tibial region. ROM limited 2/2 pain.

## 2021-06-04 NOTE — CONSULT NOTE ADULT - SUBJECTIVE AND OBJECTIVE BOX
Hospital D # 1    CC:  Anemia     HPI:    89 y/o female with dementia, lymphedema of lower extremities, atrial fibrillation on Coumadin (last INR2.6), hx of proximal L femur fracture in January 2021, Hx L hip long IMN with Dr. Kitchen 2017, hearing loss, cystitis, hx of HTN, spinal stenosis, anxiety was BIBA s/p witnessed fall at home with resultant severe bilateral leg pain and inability to ambulate. Despite all the daughter's efforts the patient was reportedly too weak on her feet resulting in the patient's fall onto her knees. The patient's daughter states that the patient did not strike her head, or lose consciousness, but did note considerable ecchymoses overlying her bilateral knees and lower legs.     I am called to see pt for Hb 7 from 12 and increasing INR.  Thrombocytopenia.  CT A/P-- my read--no RP bleed.    On my exam, daughters at bedside, LE soft.  Pt is awake and NAD.  Non toxic appearing  Hemodynamically stable No fevers    PMH:  As above.     PSH:  As above.     FH: Non Contributory other than those listed in HPI    Social History:  No smoking or ETOH    MEDICATIONS  (STANDING):  phytonadione  IVPB 5 milliGRAM(s) IV Intermittent once  prothrombin complex concentrate IVPB (KCENTRA) 1500 International Unit(s) IV Intermittent once  senna 2 Tablet(s) Oral at bedtime  sodium chloride 0.9%. 1000 milliLiter(s) (80 mL/Hr) IV Continuous <Continuous>  sodium chloride 0.9%. 1000 milliLiter(s) (75 mL/Hr) IV Continuous <Continuous>    MEDICATIONS  (PRN):  acetaminophen   Tablet .. 650 milliGRAM(s) Oral every 6 hours PRN Mild Pain (1 - 3)  haloperidol    Injectable 0.5 milliGRAM(s) IV Push once PRN severe agitation  melatonin 3 milliGRAM(s) Oral at bedtime PRN Insomnia  morphine  - Injectable 2 milliGRAM(s) IV Push every 4 hours PRN Severe Pain (7 - 10)  ondansetron Injectable 4 milliGRAM(s) IV Push every 6 hours PRN Nausea and/or Vomiting  oxyCODONE    IR 2.5 milliGRAM(s) Oral every 4 hours PRN Moderate Pain (4 - 6)      Allergies: NKDA    ROS:  SEE BELOW    Height (cm): 170.2 (06-04 @ 00:27)  Weight (kg): 67 (06-04 @ 06:22)  BMI (kg/m2): 23.1 (06-04 @ 06:22)    ICU Vital Signs Last 24 Hrs  T(C): 36.1 (04 Jun 2021 08:02), Max: 36.4 (04 Jun 2021 00:27)  T(F): 96.9 (04 Jun 2021 08:02), Max: 97.6 (04 Jun 2021 00:27)  HR: 77 (04 Jun 2021 14:53) (65 - 106)  BP: 107/54 (04 Jun 2021 14:53) (80/45 - 158/78)  BP(mean): 82 (04 Jun 2021 06:22) (64 - 82)  ABP: --  ABP(mean): --  RR: 17 (04 Jun 2021 08:02) (16 - 18)  SpO2: 95% (04 Jun 2021 14:53) (95% - 100%)          I&O's Summary      Physical Exam:  SEE BELOW                          7.3    8.17  )-----------( 77       ( 04 Jun 2021 14:56 )             22.0       06-04    133<L>  |  105  |  18  ----------------------------<  138<H>  5.0   |  21<L>  |  0.82    Ca    8.3<L>      04 Jun 2021 14:05    TPro  x   /  Alb  3.2<L>  /  TBili  x   /  DBili  x   /  AST  x   /  ALT  x   /  AlkPhos  x   06-04      CARDIAC MARKERS ( 04 Jun 2021 02:02 )  <0.015 ng/mL / x     / x     / x     / x                    DVT Prophylaxis:                                                            Contraindication:     Advanced Directives:    Discussed with:    Visit Information:  Time spent excluding procedure:      ** Time is exclusive of billed procedures and/or teaching and/or routine family updates.

## 2021-06-04 NOTE — ED PROVIDER NOTE - PMH
Acute cystitis without hematuria  septic  4/2016  Essential hypertension    [place] (hard of hearing), bilateral    Hypertension    Lymphedema of both lower extremities    Monoclonal gammopathies    Paroxysmal atrial fibrillation    Spinal stenosis    Spondyloarthritis    Vaginal prolapse    Venous insufficiency

## 2021-06-04 NOTE — ED ADULT NURSE REASSESSMENT NOTE - NS ED NURSE REASSESS COMMENT FT1
pts family members needed constant reassurance and prompting of pts care. I told daughters to please let mom rest and they kept waking her through out night. I had to get NICOLE Canales and Charge RN Rachael involved with the daughters because they were not satisfied with there moms care throughout her stay in the ER. Her daughter kept asking me what is going on? why is she still down here?  I let her know multiple that we are waiting for the covid result to result, she was very unhappy with that answer and kept asking why does my mom need her mask on, pt elaine appears very jittery, walking back and forth in room and very ontop of her mother, I told her please let your mom rest so she can heal, but eyad karina mother fell asleep they were on top of her once again.

## 2021-06-04 NOTE — PROGRESS NOTE ADULT - ASSESSMENT
INCOMPLETE  A/P:   89yFemale w BL Proximal tibia/fibula fractures, and L patella fracture    -Due to elevated INR and diffuse ecchymoses surgical intervention should delayed  -Dr Kitchen currently weighing the pros and cons  -Pain control prn  -DVT/PE ppx  - WBAT/PT/OOB as tolerated   -Med Mgmt, continue home meds.  - FU am Labs  - D/C Planning if stable and progressing well with PT.

## 2021-06-04 NOTE — H&P ADULT - ASSESSMENT
88 y/o F PMHx significant for early dementia, lymphedema of lower extremities, atrial fibrillation on Coumadin (last INR2.6), hx of proximal femur fracture in January 2021, Hx L hip long IMN with Dr. Kitchen 2017, hearing loss, cystitis, hx of HTN, spinal stenosis, anxiety was BIBA s/p witnessed fall at home with resultant severe bilateral leg pain and inability to ambulate found to have  bilateral proximal 1/3 tibia fractures with displacement and possible left patella fracture.    #Bilateral proximal tibia/fibula fractures, possible left patella fracture.  ~admit to Medicine  ~as per Orthopedics the at baseline the patient is a minimal ambulator due to her known hx for significant osteoporosis.  ~f/u w/ formal Orthopedic discussion regarding treatment recommendations.   ~per Orthopedics patient is currently without any significant signs of compartment syndrome  ~will keep NPO for now   ~Vitamin K if patient to be scheduled for operative repari  ~f/u w/ Bilateral LE CT scans of the knee to evaluate for intraarticular extension.  ~cont. pain management   ~IV fluids while NPO  ~Reaves catheter  ~strict I/Os  ~f/u am labs   ~f/u w/ Cardiology in the am  ~at this time the patient's estimated perioperative risk of MACE based on combined clinical/surgical risk is low or < 3.9% given number of risk factors (0). At this time no further presurgical testing is warranted.    #Atrial Fibrillation  ~takes Coumadin (on hold for now; please re-evaluate accordingly)  ~please reconcile this patient's home medication list further in the am (as per Dr. Hillman was taking Metoprolol at one point? Unknown dosing for Coumadin?)    #Hypertension  ~patient reportedly takes Losartan 25mg po daily and Spironolactone 25mg po daily (although both have been known to cause hyponatremia; therefore will hold am dosing)  ~cont. to monitor BP/vital signs q8h  ~f/u repeat am labs    #Depression/Anxiety  ~per Dr. Hillman takes Mirtazepine 37.5mg po qhs  ~and Bupropion XL 150mg po daily  ~please reconfirm with Pharmacy in the am    #Vte ppx  ~will hold VKA therapy for now

## 2021-06-04 NOTE — H&P ADULT - NSHPPHYSICALEXAM_GEN_ALL_CORE
Vital Signs Last 24 Hrs  T(C): 36.4 (04 Jun 2021 00:27), Max: 36.4 (04 Jun 2021 00:27)  T(F): 97.6 (04 Jun 2021 00:27), Max: 97.6 (04 Jun 2021 00:27)  HR: 106 (04 Jun 2021 00:27) (106 - 106)  BP: 158/78 (04 Jun 2021 00:27) (158/78 - 158/78)  RR: 17 (04 Jun 2021 00:27) (17 - 17)  SpO2: 97% (04 Jun 2021 00:27) (97% - 97%)

## 2021-06-04 NOTE — ED PROVIDER NOTE - PROGRESS NOTE DETAILS
Ortho consulted; resident coming to bedside to apply bilateral long leg splints. Patient and daughter updated on diagnosis.

## 2021-06-04 NOTE — ED ADULT NURSE REASSESSMENT NOTE - NS ED NURSE REASSESS COMMENT FT1
Patients 2 daughters updated on plan of care. Patient ready to go upstairs to 3N. Daughters extremely upset regarding visitor policy upstairs. Requested to speak with management. I spoke with daughters, all questions answered, assured family that patient is resting comfortably at this time, able to make needs known, denies any complaints at this time, reassured family that report was given to floor including that patient is hard of hearing, hearing aids at beside, patient property form filled out. Spoke with Socorro from nursing supervision who confirmed no visitors until visiting hours, daughters made aware and requesting to speak with someone from  regarding making an exception. Provided daughter with phone number for 3N. Patient transported to 3N with Kanwal LAWSON and transport. Family verbalized to me again their fears of not going up with patient, reassured both daughters and again answered all additional questions. Daughters plan to go home at this time.

## 2021-06-04 NOTE — CONSULT NOTE ADULT - ASSESSMENT
IMP:  1. near syncope, no evidence of bradyarrlythmia on tele  2. afib, stable  3. htn bp is lowish  4. bilateral tib/fib fractures    Plan:  hold antihypertenisves, cont cardiac monitorig. hold coumadin for now for possible surgery  no contraindication to surgery if indicated.  will follow

## 2021-06-05 LAB
ANION GAP SERPL CALC-SCNC: 6 MMOL/L — SIGNIFICANT CHANGE UP (ref 5–17)
APTT BLD: 26.3 SEC — LOW (ref 27.5–35.5)
BUN SERPL-MCNC: 15 MG/DL — SIGNIFICANT CHANGE UP (ref 7–23)
CALCIUM SERPL-MCNC: 8.9 MG/DL — SIGNIFICANT CHANGE UP (ref 8.5–10.1)
CHLORIDE SERPL-SCNC: 105 MMOL/L — SIGNIFICANT CHANGE UP (ref 96–108)
CO2 SERPL-SCNC: 23 MMOL/L — SIGNIFICANT CHANGE UP (ref 22–31)
CREAT SERPL-MCNC: 0.76 MG/DL — SIGNIFICANT CHANGE UP (ref 0.5–1.3)
GLUCOSE SERPL-MCNC: 108 MG/DL — HIGH (ref 70–99)
HCT VFR BLD CALC: 30.4 % — LOW (ref 34.5–45)
HGB BLD-MCNC: 9.8 G/DL — LOW (ref 11.5–15.5)
INR BLD: 1.57 RATIO — HIGH (ref 0.88–1.16)
MCHC RBC-ENTMCNC: 29.7 PG — SIGNIFICANT CHANGE UP (ref 27–34)
MCHC RBC-ENTMCNC: 32.2 GM/DL — SIGNIFICANT CHANGE UP (ref 32–36)
MCV RBC AUTO: 92.1 FL — SIGNIFICANT CHANGE UP (ref 80–100)
PLATELET # BLD AUTO: 55 K/UL — LOW (ref 150–400)
POTASSIUM SERPL-MCNC: 4.8 MMOL/L — SIGNIFICANT CHANGE UP (ref 3.5–5.3)
POTASSIUM SERPL-SCNC: 4.8 MMOL/L — SIGNIFICANT CHANGE UP (ref 3.5–5.3)
PROTHROM AB SERPL-ACNC: 17.8 SEC — HIGH (ref 10.6–13.6)
RBC # BLD: 3.3 M/UL — LOW (ref 3.8–5.2)
RBC # FLD: 14.2 % — SIGNIFICANT CHANGE UP (ref 10.3–14.5)
SODIUM SERPL-SCNC: 134 MMOL/L — LOW (ref 135–145)
WBC # BLD: 8.15 K/UL — SIGNIFICANT CHANGE UP (ref 3.8–10.5)
WBC # FLD AUTO: 8.15 K/UL — SIGNIFICANT CHANGE UP (ref 3.8–10.5)

## 2021-06-05 PROCEDURE — 93306 TTE W/DOPPLER COMPLETE: CPT | Mod: 26

## 2021-06-05 PROCEDURE — 99233 SBSQ HOSP IP/OBS HIGH 50: CPT

## 2021-06-05 RX ORDER — POLYETHYLENE GLYCOL 3350 17 G/17G
17 POWDER, FOR SOLUTION ORAL DAILY
Refills: 0 | Status: DISCONTINUED | OUTPATIENT
Start: 2021-06-05 | End: 2021-06-12

## 2021-06-05 RX ORDER — WARFARIN SODIUM 2.5 MG/1
2 TABLET ORAL ONCE
Refills: 0 | Status: COMPLETED | OUTPATIENT
Start: 2021-06-05 | End: 2021-06-05

## 2021-06-05 RX ORDER — LOSARTAN POTASSIUM 100 MG/1
25 TABLET, FILM COATED ORAL DAILY
Refills: 0 | Status: DISCONTINUED | OUTPATIENT
Start: 2021-06-05 | End: 2021-06-06

## 2021-06-05 RX ORDER — MIRTAZAPINE 45 MG/1
30 TABLET, ORALLY DISINTEGRATING ORAL AT BEDTIME
Refills: 0 | Status: DISCONTINUED | OUTPATIENT
Start: 2021-06-05 | End: 2021-06-06

## 2021-06-05 RX ADMIN — OXYCODONE HYDROCHLORIDE 2.5 MILLIGRAM(S): 5 TABLET ORAL at 21:38

## 2021-06-05 RX ADMIN — Medication 650 MILLIGRAM(S): at 15:36

## 2021-06-05 RX ADMIN — MIRTAZAPINE 30 MILLIGRAM(S): 45 TABLET, ORALLY DISINTEGRATING ORAL at 21:33

## 2021-06-05 RX ADMIN — WARFARIN SODIUM 2 MILLIGRAM(S): 2.5 TABLET ORAL at 21:33

## 2021-06-05 RX ADMIN — POLYETHYLENE GLYCOL 3350 17 GRAM(S): 17 POWDER, FOR SOLUTION ORAL at 12:47

## 2021-06-05 RX ADMIN — SENNA PLUS 2 TABLET(S): 8.6 TABLET ORAL at 21:33

## 2021-06-05 NOTE — PROGRESS NOTE ADULT - ASSESSMENT
1. near syncope, no evidence of bradyarrlythmia on tele  2. afib, stable  3. htn stable  4. bilateral tib/fib fractures    Plan:  hold coumadin  sugg resume losartan

## 2021-06-05 NOTE — PROGRESS NOTE ADULT - SUBJECTIVE AND OBJECTIVE BOX
CC: s/p fall c/o severe bilateral leg pain and inability to ambulate (05 Jun 2021 08:15)    HPI: 89 y/o F PMHx significant for early dementia, lymphedema of lower extremities, atrial fibrillation on Coumadin (last INR2.6), hx of proximal femur fracture in January 2021, Hx L hip long IMN with Dr. Kitchen 2017, hearing loss, cystitis, hx of HTN, spinal stenosis, anxiety was BIBA s/p witnessed fall at home with resultant severe bilateral leg pain and inability to ambulate. Of note the patient's daughter states that the patient has not had a bowel movement in 4 days therefore decided to administer a dose of Miralax at home. She subsequently attempted to assist patient to the bathroom at night with the assistance the patient's walker. Despite all the daughter's efforts the patient was reportedly too weak on her feet resulting in the patient's fall onto her knees. The patient's daughter states that the patient did not strike her head, or lose consciousness, but did note considerable ecchymoses overlying her bilateral knees and lower legs. The patient denies any numbness/tingling/burning in the BLLE. No other bone/joint complaints. At baseline the patient walks minimally at home with the use of a walker and assist. Occasionally uses her wheelchair.   Daughter reports a decline in her overall ambulatory status over the past year. Imaging in the ED revealed bilateral proximal 1/3 tibia fractures with displacement and possible left patella fracture. In the ED Orthopedics was consulted.   (04 Jun 2021 02:34)    INTERVAL HPI/OVERNIGHT EVENTS: pt comfortable in the bed, [place], FQ to gravity, BL LE in splints, pain is controlled  Other ROS reviewed and neg     Vital Signs Last 24 Hrs  T(C): 36.1 (05 Jun 2021 10:35), Max: 36.7 (04 Jun 2021 16:48)  T(F): 96.9 (05 Jun 2021 10:35), Max: 98 (04 Jun 2021 16:48)  HR: 79 (05 Jun 2021 13:00) (67 - 90)  BP: 116/47 (05 Jun 2021 13:00) (97/43 - 146/53)  BP(mean): 63 (05 Jun 2021 13:00) (46 - 98)  RR: 15 (05 Jun 2021 13:00) (14 - 26)  SpO2: 95% (05 Jun 2021 06:00) (91% - 97%)  I&O's Detail    04 Jun 2021 07:01  -  05 Jun 2021 07:00  --------------------------------------------------------  IN:    PRBCs (Packed Red Blood Cells): 601 mL  Total IN: 601 mL    OUT:    Indwelling Catheter - Urethral (mL): 950 mL  Total OUT: 950 mL    Total NET: -349 mL    CARDIAC MARKERS ( 04 Jun 2021 02:02 )  <0.015 ng/mL / x     / x     / x     / x                           9.8    8.15  )-----------( 55       ( 05 Jun 2021 06:33 )             30.4     05 Jun 2021 06:33    134    |  105    |  15     ----------------------------<  108    4.8     |  23     |  0.76     Ca    8.9        05 Jun 2021 06:33    TPro  x      /  Alb  3.2    /  TBili  x      /  DBili  x      /  AST  x      /  ALT  x      /  AlkPhos  x      04 Jun 2021 07:34    PT/INR - ( 05 Jun 2021 06:33 )   PT: 17.8 sec;   INR: 1.57 ratio       PTT - ( 05 Jun 2021 06:33 )  PTT:26.3 sec    LIVER FUNCTIONS - ( 04 Jun 2021 07:34 )  Alb: 3.2 g/dL / Pro: x     / ALK PHOS: x     / ALT: x     / AST: x     / GGT: x           MEDICATIONS  (STANDING):  polyethylene glycol 3350 17 Gram(s) Oral daily  senna 2 Tablet(s) Oral at bedtime    MEDICATIONS  (PRN):  acetaminophen   Tablet .. 650 milliGRAM(s) Oral every 6 hours PRN Mild Pain (1 - 3)  haloperidol    Injectable 0.5 milliGRAM(s) IV Push once PRN severe agitation  melatonin 3 milliGRAM(s) Oral at bedtime PRN Insomnia  morphine  - Injectable 2 milliGRAM(s) IV Push every 4 hours PRN Severe Pain (7 - 10)  ondansetron Injectable 4 milliGRAM(s) IV Push every 6 hours PRN Nausea and/or Vomiting  oxyCODONE    IR 2.5 milliGRAM(s) Oral every 4 hours PRN Moderate Pain (4 - 6)    RADIOLOGY & ADDITIONAL TESTS: personally visualized    PHYSICAL EXAM:    General: female in no acute distress  Eyes: PERRLA, EOMI; conjunctiva and sclera clear  Head: Normocephalic; atraumatic  ENMT: No nasal discharge; airway clear  Neck: Supple; non tender; no masses  Respiratory: No wheezes, rales or rhonchi  Cardiovascular: S1, S2 irreg with II/VI FARZANEH  Gastrointestinal: Soft non-tender non-distended; Normal bowel sounds  Genitourinary: No costovertebral angle tenderness, FQ to gravity  Extremities: BL LE in splints, able to move toes  Vascular: Peripheral pulses palpable 2+ bilaterally  Neurological: Alert, confused  Skin: Warm and dry. Pale.  Musculoskeletal: Normal tone  Psychiatric: Cooperative

## 2021-06-05 NOTE — PROGRESS NOTE ADULT - ASSESSMENT
88 y/o F PMHx significant for early dementia, lymphedema of lower extremities, atrial fibrillation on Coumadin (last INR2.6), hx of proximal femur fracture in January 2021, Hx L hip long IMN with Dr. Kitchen 2017, hearing loss, cystitis, hx of HTN, spinal stenosis, anxiety was BIBA s/p witnessed fall at home with resultant severe bilateral leg pain and inability to ambulate    1. BL knee Fx with tibia/fibula Fx and patellas - conservative management, pain control, splint    2. Right knee hematoma on coumadin with anemia of ABL - resolved, INR reversed - stable, HH improved with transfusion    3. Afib on VKA s/p reversal - ortho to clear to resume VKA    4. Constipation - bowel regimen    5. Hx of HTN - BP stable now, no meds, but if SBP>140 will resume ARB, would avoid diuretics    Medically stable to transfer to telemetry  Time spent 52 min

## 2021-06-05 NOTE — PROGRESS NOTE ADULT - SUBJECTIVE AND OBJECTIVE BOX
Orthopedics     Patient seen and examined at bedside, resting comfortably. No acute events overnight. Pain adequately controlled. Patient feeling well. Denies CP/SOB. No nausea or vomiting. No other acute complaints at this time    Vital Signs Last 24 Hrs  T(C): 36.3 (06-05-21 @ 05:10), Max: 36.7 (06-04-21 @ 16:48)  T(F): 97.4 (06-05-21 @ 05:10), Max: 98 (06-04-21 @ 16:48)  HR: 72 (06-05-21 @ 07:00) (67 - 90)  BP: 120/42 (06-05-21 @ 07:00) (80/45 - 123/46)  BP(mean): 59 (06-05-21 @ 07:00) (46 - 89)  RR: 20 (06-05-21 @ 07:00) (14 - 26)  SpO2: 95% (06-05-21 @ 06:00) (91% - 97%)                        9.8    8.15  )-----------( 55       ( 05 Jun 2021 06:33 )             30.4     05 Jun 2021 06:33    134    |  105    |  15     ----------------------------<  108    4.8     |  23     |  0.76     Ca    8.9        05 Jun 2021 06:33    TPro  x      /  Alb  3.2    /  TBili  x      /  DBili  x      /  AST  x      /  ALT  x      /  AlkPhos  x      04 Jun 2021 07:34  PT/INR - ( 05 Jun 2021 06:33 )   PT: 17.8 sec;   INR: 1.57 ratio    PTT - ( 05 Jun 2021 06:33 )  PTT:26.3 sec    Exam:  Gen: NAD, Awake and alert  BLLE  Long leg trilam splints in place BL - window'd anteriorly for skin/compartment checks  +EHL/FHL/TA/GS  SILT L2-S1  +DP  Calf Soft NT  Compartments soft and compressible

## 2021-06-05 NOTE — PROGRESS NOTE ADULT - ASSESSMENT
A/P:  Patient is a 89y Female w/ BL prox tibia fxs sp closed reduction and splinting Stable       -Medical management per primary team  -FU am labs  -Ice/Elevate  -Incentive Spirometry  -Multimodal Analgesia  -DVT PE ppx - per primary team  -SCDs  -PT/OT OOB  -NWB BLLE in long leg trilam  -Discharge planning - pending PT eval  -Will discuss w/ attending and advise if plan changes

## 2021-06-06 LAB
ANION GAP SERPL CALC-SCNC: 7 MMOL/L — SIGNIFICANT CHANGE UP (ref 5–17)
BUN SERPL-MCNC: 13 MG/DL — SIGNIFICANT CHANGE UP (ref 7–23)
CALCIUM SERPL-MCNC: 8.8 MG/DL — SIGNIFICANT CHANGE UP (ref 8.5–10.1)
CHLORIDE SERPL-SCNC: 102 MMOL/L — SIGNIFICANT CHANGE UP (ref 96–108)
CO2 SERPL-SCNC: 25 MMOL/L — SIGNIFICANT CHANGE UP (ref 22–31)
CREAT SERPL-MCNC: 0.66 MG/DL — SIGNIFICANT CHANGE UP (ref 0.5–1.3)
GLUCOSE SERPL-MCNC: 99 MG/DL — SIGNIFICANT CHANGE UP (ref 70–99)
HCT VFR BLD CALC: 26.2 % — LOW (ref 34.5–45)
HCT VFR BLD CALC: 26.5 % — LOW (ref 34.5–45)
HGB BLD-MCNC: 8.7 G/DL — LOW (ref 11.5–15.5)
HGB BLD-MCNC: 8.9 G/DL — LOW (ref 11.5–15.5)
INR BLD: 1.13 RATIO — SIGNIFICANT CHANGE UP (ref 0.88–1.16)
MCHC RBC-ENTMCNC: 30.6 PG — SIGNIFICANT CHANGE UP (ref 27–34)
MCHC RBC-ENTMCNC: 33.6 GM/DL — SIGNIFICANT CHANGE UP (ref 32–36)
MCV RBC AUTO: 91.1 FL — SIGNIFICANT CHANGE UP (ref 80–100)
PLATELET # BLD AUTO: 64 K/UL — LOW (ref 150–400)
POTASSIUM SERPL-MCNC: 4.2 MMOL/L — SIGNIFICANT CHANGE UP (ref 3.5–5.3)
POTASSIUM SERPL-SCNC: 4.2 MMOL/L — SIGNIFICANT CHANGE UP (ref 3.5–5.3)
PROTHROM AB SERPL-ACNC: 13.1 SEC — SIGNIFICANT CHANGE UP (ref 10.6–13.6)
RBC # BLD: 2.91 M/UL — LOW (ref 3.8–5.2)
RBC # FLD: 14.3 % — SIGNIFICANT CHANGE UP (ref 10.3–14.5)
SODIUM SERPL-SCNC: 134 MMOL/L — LOW (ref 135–145)
WBC # BLD: 10.39 K/UL — SIGNIFICANT CHANGE UP (ref 3.8–10.5)
WBC # FLD AUTO: 10.39 K/UL — SIGNIFICANT CHANGE UP (ref 3.8–10.5)

## 2021-06-06 PROCEDURE — 99223 1ST HOSP IP/OBS HIGH 75: CPT

## 2021-06-06 PROCEDURE — 99232 SBSQ HOSP IP/OBS MODERATE 35: CPT

## 2021-06-06 RX ORDER — WARFARIN SODIUM 2.5 MG/1
0 TABLET ORAL
Qty: 0 | Refills: 0 | DISCHARGE

## 2021-06-06 RX ORDER — QUETIAPINE FUMARATE 200 MG/1
12.5 TABLET, FILM COATED ORAL EVERY 8 HOURS
Refills: 0 | Status: DISCONTINUED | OUTPATIENT
Start: 2021-06-06 | End: 2021-06-12

## 2021-06-06 RX ORDER — LACTULOSE 10 G/15ML
10 SOLUTION ORAL ONCE
Refills: 0 | Status: COMPLETED | OUTPATIENT
Start: 2021-06-06 | End: 2021-06-06

## 2021-06-06 RX ORDER — METOPROLOL TARTRATE 50 MG
25 TABLET ORAL DAILY
Refills: 0 | Status: DISCONTINUED | OUTPATIENT
Start: 2021-06-06 | End: 2021-06-12

## 2021-06-06 RX ORDER — MIRTAZAPINE 45 MG/1
15 TABLET, ORALLY DISINTEGRATING ORAL AT BEDTIME
Refills: 0 | Status: DISCONTINUED | OUTPATIENT
Start: 2021-06-06 | End: 2021-06-12

## 2021-06-06 RX ORDER — LOSARTAN POTASSIUM 100 MG/1
25 TABLET, FILM COATED ORAL DAILY
Refills: 0 | Status: DISCONTINUED | OUTPATIENT
Start: 2021-06-07 | End: 2021-06-12

## 2021-06-06 RX ORDER — ACETAMINOPHEN 500 MG
650 TABLET ORAL EVERY 8 HOURS
Refills: 0 | Status: DISCONTINUED | OUTPATIENT
Start: 2021-06-06 | End: 2021-06-12

## 2021-06-06 RX ORDER — MIRTAZAPINE 45 MG/1
5 TABLET, ORALLY DISINTEGRATING ORAL
Qty: 0 | Refills: 0 | DISCHARGE

## 2021-06-06 RX ADMIN — Medication 650 MILLIGRAM(S): at 12:57

## 2021-06-06 RX ADMIN — POLYETHYLENE GLYCOL 3350 17 GRAM(S): 17 POWDER, FOR SOLUTION ORAL at 12:58

## 2021-06-06 RX ADMIN — Medication 650 MILLIGRAM(S): at 21:30

## 2021-06-06 RX ADMIN — LOSARTAN POTASSIUM 25 MILLIGRAM(S): 100 TABLET, FILM COATED ORAL at 12:57

## 2021-06-06 RX ADMIN — SENNA PLUS 2 TABLET(S): 8.6 TABLET ORAL at 21:14

## 2021-06-06 RX ADMIN — Medication 650 MILLIGRAM(S): at 21:15

## 2021-06-06 RX ADMIN — LACTULOSE 10 GRAM(S): 10 SOLUTION ORAL at 18:00

## 2021-06-06 RX ADMIN — MIRTAZAPINE 15 MILLIGRAM(S): 45 TABLET, ORALLY DISINTEGRATING ORAL at 21:14

## 2021-06-06 NOTE — CONSULT NOTE ADULT - ASSESSMENT
This is a 89 year old female with pmh of atrial fibrillation on Coumadin, [id number] Score: 5, s/p fall on 6-4-2021, causing fx of bl tib/fib and left patella.  s/p -closded reduction and splinting by ortho.  Pt's INR on admission was 3.54  given 5mg vit k po and k-centra, noted INR today 1.13, plts 64,000 and h/h HAS BEEN UNSTABLE TODAY 8.9/ 26.5 .  Pt has both high thrombosis and bleed risk.  Requires anticoagulation treatment dose but will hold today and revaluate pt tomorrow.    6-6-2021: Made Dr. De La Torre aware of plan and she agrees.    Plan:  Hold pharmacologic ac today and re evaluate tomorrow.  :daily cbc/ bmp, pt/inr  : mobilty as per ortho  :thanks for consult will f/u

## 2021-06-06 NOTE — CONSULT NOTE ADULT - SUBJECTIVE AND OBJECTIVE BOX
HPI:  88 y/o F PMHx significant for early dementia, lymphedema of lower extremities, atrial fibrillation on Coumadin (last INR2.6), hx of proximal femur fracture in January 2021, Hx L hip long IMN with Dr. Kitchen 2017, hearing loss, cystitis, hx of HTN, spinal stenosis, anxiety was BIBA s/p witnessed fall at home with resultant severe bilateral leg pain and inability to ambulate. Of note the patient's daughter states that the patient has not had a bowel movement in 4 days therefore decided to administer a dose of Miralax at home. She subsequently attempted to assist patient to the bathroom at night with the assistance the patient's walker. Despite all the daughter's efforts the patient was reportedly too weak on her feet resulting in the patient's fall onto her knees. The patient's daughter states that the patient did not strike her head, or lose consciousness, but did note considerable ecchymoses overlying her bilateral knees and lower legs. The patient denies any numbness/tingling/burning in the BLLE. No other bone/joint complaints. At baseline the patient walks minimally at home with the use of a walker and assist. Occasionally uses her wheelchair.   Daughter reports a decline in her overall ambulatory status over the past year. Imaging in the ED revealed bilateral proximal 1/3 tibia fractures with displacement and possible left patella fracture. In the ED Orthopedics was consulted.   (04 Jun 2021 02:34)      Patient is a 89y old  Female who presents with a chief complaint of s/p fall c/o severe bilateral leg pain and inability to ambulate (06 Jun 2021 09:45) Pt found to have  Bilateral tib-fib fractures. Comminuted fractures of the proximal tibia and fibular shafts. Comminuted left patellar fracture.  Comminuted, mildly displaced fracture at the proximal left  tibia. Comminuted, impacted fracture at the left fibular neck.  Partiallyincluded anterior hematoma, Partially included hardware fixation of the left femur with the most distal interlocking screw backed out 0.4 cm laterally.  Anterior subcutaneous hemorrhage.      Consulted by Dr. Deepali De La Torre  for VTE prophylaxis, risk stratification, and anticoagulation management.    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 Kitchen 2017        FAMILY HISTORY:  No pertinent family history in first degree relatives        Interval Note:   6-6-2021 Pt seen at bedside on [address], talking toher daughter Jazmine.  I spoke with daughter on phone and she states her mother normally take 3mg daily of coumadin but recently it was increased to 4.5mg once a week.  States her mother's plts are normally low about 100,000.  Mad her aware of her mothers labs and that today we will hold her ac and resume it tomorrow as long as her mother is stable. Pt is alert and oriented to person and place,  but not able to understand the information given to her.          IMPROVE VTE Individual Risk Assessment      [  ] Previous VTE                                                  3    [  ] Thrombophilia                                               2    [  ] Lower limb paralysis                                      2        (unable to hold up >15 seconds)      [  ] Current Cancer                                              2         (within 6 months)    [x  ] Immobilization > 24 hrs                                1    [  ] ICU/CCU stay > 24 hours                              1    [ x ] Age > 60                                                      1    IMPROVE VTE Score ___2______    IMPROVE Score 0-1: Low Risk, No VTE prophylaxis required for most patients, encourage ambulation.   IMPROVE Score 2-3: At risk, pharmacologic VTE prophylaxis is indicated for most patients (in the absence of a contraindication)  IMPROVE Score > or = 4: High Risk, pharmacologic VTE prophylaxis is indicated for most patients (in the absence of a contraindication)      [id number] Score: 5    IMPROVE Bleeding Risk Score:3.5    Falls Risk:   High (x  )  Mod (  )  Low (  )  crcl: 60        cr:   .66       BMI   23.1                Denies any personal or familial history of clotting or bleeding disorders.    Allergies    No Known Allergies    Intolerances        REVIEW OF SYSTEMS    (  )Fever	     (  )Constipation	(  )SOB				(  )Headache	(  )Dysuria  (  )Chills	     (  )Melena	(  )Dyspnea present on exertion	                    (  )Dizziness                    (  )Polyuria  (  )Nausea	     (  )Hematochezia	(  )Cough			                    (  )Syncope   	(  )Hematuria  (  )Vomiting    (  )Chest Pain	(  )Wheezing			(  )Weakness  (x) bl leg pain  (  )Diarrhea     (  )Palpitations	(  )Anorexia			( x )Myalgia    Pertinent positives in HPI and daily subjective.  All other ROS negative.    Vital Signs Last 24 Hrs  T(C): 36.3 (06 Jun 2021 08:25), Max: 36.9 (05 Jun 2021 16:17)  T(F): 97.3 (06 Jun 2021 08:25), Max: 98.4 (05 Jun 2021 16:17)  HR: 98 (06 Jun 2021 08:25) (88 - 98)  BP: 135/82 (06 Jun 2021 08:25) (123/51 - 135/82)  BP(mean): 71 (05 Jun 2021 16:17) (71 - 82)  RR: 18 (06 Jun 2021 08:25) (18 - 19)  SpO2: 99% (06 Jun 2021 08:25) (99% - 99%)    PHYSICAL EXAM:    Constitutional: Appears Well    Neurological: A& O x 2 oerson and place.    Skin: Warm    Respiratory and Thorax: normal effort; Breath sounds: normal; No rales/wheezing/rhonchi  	  Cardiovascular: S1, S2, regular, NMBR	    Gastrointestinal: BS + x 4Q, nontender	    Genitourinary:  Bladder nondistended, nontender    Musculoskeletal:   General Right:   no muscle/joint tenderness,   normal tone, no joint swelling,   ROM: limited	    General Left:   no muscle/joint tenderness,   normal tone, no joint swelling,   ROM: limited    Hip:  Right: Dressing CDI; Drain: hemovac ; Type of drng.: serosang.; Amt. of drng: small            Left: Dressing CDI; Drain: hemovac ; Type of drng.: serosang.; Amt. of drng: small      Knee:  Right and left : Dressing CDI ace wrap with splinting, moving all toes, good capillary refill, +sensation   Lower extrems:   Right: no calf tenderness              negative christopher's sign               + pedal pulses    Left:   no calf tenderness              negative christopher's sign               + pedal pulses                          8.9    10.39 )-----------( 64       ( 06 Jun 2021 07:19 )             26.5       06-06    134<L>  |  102  |  13  ----------------------------<  99  4.2   |  25  |  0.66    Ca    8.8      06 Jun 2021 07:19        PT/INR - ( 06 Jun 2021 07:19 )   PT: 13.1 sec;   INR: 1.13 ratio         PTT - ( 05 Jun 2021 06:33 )  PTT:26.3 sec				    MEDICATIONS  (STANDING):  losartan 25 milliGRAM(s) Oral daily  mirtazapine 30 milliGRAM(s) Oral at bedtime  polyethylene glycol 3350 17 Gram(s) Oral daily  senna 2 Tablet(s) Oral at bedtime    < from: CT Knee No Cont, Right (06.04.21 @ 03:44) >  IMPRESSION:    Comminuted fractures of the proximal tibia and fibular shafts.  Anterior subcutaneous hemorrhage.    < end of copied text >  < from: CT Knee No Cont, Left (06.04.21 @ 03:41) >  Impression:  Comminuted patellar fracture.  Comminuted, mildly displaced fracture at the proximal tibia.  Comminuted, impacted fracture at the fibular neck.  Partiallyincluded anterior hematoma.  Partially included hardware fixation of the left femur with the most distal interlocking screw backed out 0.4 cm laterally.    < end of copied text >        DVT Prophylaxis:  LMWH                   (  )  Heparin SQ           (  )  Coumadin             (hold  )  Xarelto                  (  )  Eliquis                   (  )  Venodynes           (x  )  Ambulation          (  )  UFH                       (  )  Contraindicated  (  )  EC Aspirin             (  )

## 2021-06-06 NOTE — PROGRESS NOTE ADULT - SUBJECTIVE AND OBJECTIVE BOX
Orthopedics     Patient seen and examined at bedside, resting comfortably. Reports no acute complaints at this time. Pain is well controlled. No acute events overnight.    Vital Signs Last 24 Hrs  T(C): 36.9 (05 Jun 2021 16:17), Max: 36.9 (05 Jun 2021 16:17)  T(F): 98.4 (05 Jun 2021 16:17), Max: 98.4 (05 Jun 2021 16:17)  HR: 96 (05 Jun 2021 16:17) (71 - 96)  BP: 123/51 (05 Jun 2021 16:17) (113/60 - 146/53)  BP(mean): 71 (05 Jun 2021 16:17) (63 - 98)  RR: 19 (05 Jun 2021 15:05) (15 - 19)  SpO2: --                 Exam:  Gen: NAD, Awake and alert  BLLE  Long leg trilam splints in place BL - window'd anteriorly for skin/compartment checks  +EHL/FHL/TA/GS  SILT L2-S1  +DP  Calf Soft NT  Compartments soft and compressible

## 2021-06-06 NOTE — PROGRESS NOTE ADULT - ASSESSMENT
1. near syncope, no evidence of bradyarrlythmia on tele  2. afib, stable  3. htn stable  4. bilateral tib/fib fractures    Plan:  hold coumadin  cont  losartan  pt

## 2021-06-06 NOTE — PROGRESS NOTE ADULT - ASSESSMENT
88 y/o F PMHx significant for early dementia, lymphedema of lower extremities, atrial fibrillation on Coumadin (last INR2.6), hx of proximal femur fracture in January 2021, Hx L hip long IMN with Dr. Kitchen 2017, hearing loss, cystitis, hx of HTN, spinal stenosis, anxiety was BIBA s/p witnessed fall at home with resultant severe bilateral leg pain and inability to ambulate    BL knee Fx with tibia/fibula Fx and patellas - conservative management, pain control, splint , PT , d/c planning  standing tylenol added, oxycodone and morphine prn, reposition, skin ccare    Right knee hematoma on coumadin with anemia of ABL - resolved, INR reversed - stable, Hb 8.9 today, will repeat Hb later today if stable will restart low dose coumadin     Afib on VKA , h/o CVA s/p reversal - ortho cleared pt  to resume VKA, high CHADVASC score    Constipation - bowel regimen - miralax, senna, lactulose today, monitor BM     HTN - BP stable now, restart toprol xl 25 , losartan 25     Dementia with depression and anxiety - wellbutrin causes constipation  - will hold, c/w remeron lower dose 15 ( pt also getting opioids) , seroquel 12.5 q8h prn as needed    Medically stable   dispo - pain management, PT,  restart coumadin if Hb stable  updated over the phone daughter Jazmine [phone] - all questions answered

## 2021-06-06 NOTE — PROGRESS NOTE ADULT - SUBJECTIVE AND OBJECTIVE BOX
CC: s/p fall c/o severe bilateral leg pain and inability to ambulate     HPI: 89 y/o F PMHx significant for early dementia, lymphedema of lower extremities, atrial fibrillation on Coumadin (last INR2.6), hx of proximal femur fracture in January 2021, Hx L hip long IMN with Dr. Kitchen 2017, hearing loss, cystitis, hx of HTN, spinal stenosis, anxiety admitted on 6/4/21  s/p witnessed fall at home with resultant severe bilateral leg pain and inability to ambulate. Of note the patient's daughter states that the patient has not had a bowel movement in 4 days therefore decided to administer a dose of Miralax at home. She subsequently attempted to assist patient to the bathroom at night with the assistance the patient's walker. Despite all the daughter's efforts the patient was reportedly too weak on her feet resulting in the patient's fall onto her knees. The patient's daughter states that the patient did not strike her head, or lose consciousness, but did note considerable ecchymoses overlying her bilateral knees and lower legs.  At baseline the patient walks minimally at home with the use of a walker and assist. Occasionally uses her wheelchair.   Daughter reports a decline in her overall ambulatory status over the past year. Imaging in the ED revealed bilateral proximal 1/3 tibia fractures with displacement and possible left patella fracture. In the ED Orthopedics was consulted.    6/6 - pt seen and examined, denies cp, dyspnea, [place], afebrile, [place] in place, both legs dressing in place with splint   Review of system- Rest of the review of system are negative except mentioned in HPI      Vital Signs Last 24 Hrs  T(C): 36.3 (06-06-21 @ 08:25), Max: 36.9 (06-05-21 @ 16:17)  T(F): 97.3 (06-06-21 @ 08:25), Max: 98.4 (06-05-21 @ 16:17)  HR: 98 (06-06-21 @ 08:25) (96 - 98)  BP: 135/82 (06-06-21 @ 08:25) (123/51 - 135/82)  RR: 18 (06-06-21 @ 08:25) (18 - 18)  SpO2: 99% (06-06-21 @ 08:25) (99% - 99%)  Wt(kg): --    PHYSICAL EXAM:    General: female in no acute distress  Eyes: PERRLA, EOMI; conjunctiva and sclera clear  Head: Normocephalic; atraumatic  ENMT: No nasal discharge; airway clear  Neck: Supple; non tender; no masses  Respiratory: No wheezes, rales or rhonchi  Cardiovascular: S1, S2 irreg with II/VI FARZANEH  Gastrointestinal: Soft non-tender non-distended; Normal bowel sounds  Genitourinary: No costovertebral angle tenderness, FQ to gravity  Extremities: BL LE in splints, able to move toes  Vascular: Peripheral pulses palpable 2+ bilaterally  Neurological: Alert, confused  Skin: Warm and dry. Pale.  Musculoskeletal: Normal tone  Psychiatric: Cooperative    RADIOLOGY & ADDITIONAL TESTS: personally visualized  06-06    134<L>  |  102  |  13  ----------------------------<  99  4.2   |  25  |  0.66    Ca    8.8      06 Jun 2021 07:19                       8.9    10.39 )-----------( 64       ( 06 Jun 2021 07:19 )             26.5     PT/INR - ( 06 Jun 2021 07:19 )   PT: 13.1 sec;   INR: 1.13 ratio         PTT - ( 05 Jun 2021 06:33 )  PTT:26.3 sec    < from: Xray Tibia + Fibula Post Reduction 2 Views, Bilateral (06.04.21 @ 20:23) >  TECHNIQUE: AP and lateral views of the LEFT tibia fibula  were obtained.    FINDINGS: Following reduction fracture segments are stabilized with applied cast..    IMPRESSION: Reduction of fracture segments as described.    < end of copied text >  < from: CT Abdomen and Pelvis No Cont (06.04.21 @ 15:11) >    FINDINGS:  LOWER CHEST: Cardiomegaly with marked dilatation of both the right atrium and right ventricle. Coronary artery calcifications. Subpleural reticular opacities compatible with fibrosis.    LIVER: Within normal limits.  BILE DUCTS: Normal caliber.  GALLBLADDER: Within normal limits.  SPLEEN: Within normal limits.  PANCREAS: Within normal limits.  ADRENALS: Within normal limits.  KIDNEYS/URETERS: 9 mm hyperdense cyst in the upper pole of the right kidney. Simple cyst in the left kidney.    BLADDER: Within normal limits.  REPRODUCTIVE ORGANS: Uterus and adnexa within normal limits.    BOWEL: No bowel obstruction. Appendix not identified. Colonic diverticulosis.  PERITONEUM: No ascites or hemoperitoneum.  VESSELS: Atherosclerotic changes.  RETROPERITONEUM/LYMPH NODES: No lymphadenopathy and no retroperitoneal or extraperitoneal hematoma.  ABDOMINAL WALL: Right groin hernia containing nonobstructed small intestine.  BONES: Multilevel compression deformities, most severe at T11, L1 and L4 with mild loss ofheight at L3. Status post T11 vertebroplasty. Left hip open reduction internal fixation. Old left rib fractures.    IMPRESSION:  No abdominal/pelvic hematoma.    < end of copied text >  < from: Xray Knee 3 Views, Bilateral (06.04.21 @ 06:15) >     Two views of the right knee and two views of the left knee show no evidence of fracture nor destructive change of the knee joints. The joint spaces show chronic arthritic changes of both lateral compartments. Visualized left femoral hardware is intact.    IMPRESSION: No acute bony pathology of the knee joints.    Further information may be obtained from the patient's subsequent CT scans of the knees.      Thank you for this referral.    < end of copied text >  < from: Xray Chest 1 View-PORTABLE IMMEDIATE (Xray Chest 1 View-PORTABLE IMMEDIATE .) (06.04.21 @ 06:13) >  INTERPRETATION:  AP chest on June 4, 2021 at 1:19 AM. Patient had a fall and has dementia.    Heart enlargement and lower thoracic vertebroplasty density again noted.    No gross infiltrate seen. No gross fracture.    Chest is similar to September 26, 2019.    IMPRESSION: No acute finding or change.    < end of copied text >  < from: Xray Tibia + Fibula 2 Views, Bilateral (06.04.21 @ 06:06) >   Two views of the right leg and two views of the left leg show angulated buckle fractureof the proximal right and left tibias. There are also buckle fractures of both fibular necks. The distal joint spaces are maintained.    IMPRESSION: Bilateral tib-fib fractures.    < end of copied text >  < from: CT Knee No Cont, Right (06.04.21 @ 03:44) >    There is an obliquely oriented comminuted fractureof the proximal tibial shaft extending distally from lateral to medial. No significant displacement. There is a comminuted fracture of the fibular neck. There is edema/hemorrhage within the marrow about the fracture margins. There is mild to moderatetricompartmental knee arthrosis. Small knee joint effusion. Quadriceps and patellar tendons are intact. Hemorrhage is present within the anterior subcutaneous tissues extending from the level of the patella distally to the proximal femoral shaft. There is high grade fatty atrophy of the medial gastrocnemius musculature. There are vascular calcifications.    IMPRESSION:    Comminuted fractures of the proximal tibia and fibular shafts.  Anterior subcutaneous hemorrhage.    < end of copied text >    MEDICATIONS  (STANDING):  polyethylene glycol 3350 17 Gram(s) Oral daily  senna 2 Tablet(s) Oral at bedtime    MEDICATIONS  (PRN):  acetaminophen   Tablet .. 650 milliGRAM(s) Oral every 6 hours PRN Mild Pain (1 - 3)  haloperidol    Injectable 0.5 milliGRAM(s) IV Push once PRN severe agitation  melatonin 3 milliGRAM(s) Oral at bedtime PRN Insomnia  morphine  - Injectable 2 milliGRAM(s) IV Push every 4 hours PRN Severe Pain (7 - 10)  ondansetron Injectable 4 milliGRAM(s) IV Push every 6 hours PRN Nausea and/or Vomiting  oxyCODONE    IR 2.5 milliGRAM(s) Oral every 4 hours PRN Moderate Pain (4 - 6)

## 2021-06-06 NOTE — PROGRESS NOTE ADULT - SUBJECTIVE AND OBJECTIVE BOX
Patient is a 89y old  Female who presents with a chief complaint of s/p fall c/o severe bilateral leg pain and inability to ambulate (06 Jun 2021 07:17)      HPI:  88 y/o F PMHx significant for early dementia, lymphedema of lower extremities, atrial fibrillation on Coumadin (last INR2.6), hx of proximal femur fracture in January 2021, Hx L hip long IMN with Dr. Kitchen 2017, hearing loss, cystitis, hx of HTN, spinal stenosis, anxiety was BIBA s/p witnessed fall at home with resultant severe bilateral leg pain and inability to ambulate. Of note the patient's daughter states that the patient has not had a bowel movement in 4 days therefore decided to administer a dose of Miralax at home. She subsequently attempted to assist patient to the bathroom at night with the assistance the patient's walker. Despite all the daughter's efforts the patient was reportedly too weak on her feet resulting in the patient's fall onto her knees. The patient's daughter states that the patient did not strike her head, or lose consciousness, but did note considerable ecchymoses overlying her bilateral knees and lower legs. The patient denies any numbness/tingling/burning in the BLLE. No other bone/joint complaints. At baseline the patient walks minimally at home with the use of a walker and assist. Occasionally uses her wheelchair.   Daughter reports a decline in her overall ambulatory status over the past year. Imaging in the ED revealed bilateral proximal 1/3 tibia fractures with displacement and possible left patella fracture. In the ED Orthopedics was consulted.   (04 Jun 2021 02:34)    6/4 Cardiology. 89 year old female admitted with near syncope. felt weak ambulating to BR, fell back and onto knees. denies LOC. denies chest pain or dyspnea. Patient noted to have bilat tib fib fractures. Patient has past history of afib, permanent and htn.  6/5 events noted. severe anemia likely due to bilat thigh hematoma. alert, no complaints  6/6 alert, no new complaints    PAST MEDICAL & SURGICAL HISTORY:  Lymphedema of both lower extremities    Venous insufficiency    Monoclonal gammopathies    Paroxysmal atrial fibrillation    Acute cystitis without hematuria  septic  4/2016    Essential hypertension    Spinal stenosis    Spondyloarthritis    Vaginal prolapse    [place] (hard of hearing), bilateral    Hypertension    S/P kyphoplasty  2014    S/P thyroidectomy  partial   1975    History of vaginal surgery    History of fracture of femur  hx of proximal femur fracture in January 2021    History of repair of left hip joint  Hx L hip long IMN with Dr. Kitchen 2017          MEDICATIONS  (STANDING):  losartan 25 milliGRAM(s) Oral daily  mirtazapine 30 milliGRAM(s) Oral at bedtime  polyethylene glycol 3350 17 Gram(s) Oral daily  senna 2 Tablet(s) Oral at bedtime    MEDICATIONS  (PRN):  acetaminophen   Tablet .. 650 milliGRAM(s) Oral every 6 hours PRN Mild Pain (1 - 3)  bisacodyl Suppository 10 milliGRAM(s) Rectal daily PRN If no bowel movement by POD#2  haloperidol    Injectable 0.5 milliGRAM(s) IV Push once PRN severe agitation  melatonin 3 milliGRAM(s) Oral at bedtime PRN Insomnia  morphine  - Injectable 2 milliGRAM(s) IV Push every 4 hours PRN Severe Pain (7 - 10)  ondansetron Injectable 4 milliGRAM(s) IV Push every 6 hours PRN Nausea and/or Vomiting  oxyCODONE    IR 2.5 milliGRAM(s) Oral every 4 hours PRN Moderate Pain (4 - 6)          Vital Signs Last 24 Hrs  T(C): 36.3 (06 Jun 2021 08:25), Max: 36.9 (05 Jun 2021 16:17)  T(F): 97.3 (06 Jun 2021 08:25), Max: 98.4 (05 Jun 2021 16:17)  HR: 98 (06 Jun 2021 08:25) (72 - 98)  BP: 135/82 (06 Jun 2021 08:25) (113/60 - 135/82)  BP(mean): 71 (05 Jun 2021 16:17) (63 - 88)  RR: 18 (06 Jun 2021 08:25) (15 - 19)  SpO2: 99% (06 Jun 2021 08:25) (99% - 99%)    I&O's Summary    05 Jun 2021 07:01  -  06 Jun 2021 07:00  --------------------------------------------------------  IN: 0 mL / OUT: 625 mL / NET: -625 mL        PHYSICAL EXAM  General Appearance: NAD  HEENT:   Neck:   Back:   Lungs: clr  Heart: IRR s1s2  Abdomen:   Extremities: wrapped         INTERPRETATION OF TELEMETRY:    ECG:        LABS:                          8.9    10.39 )-----------( 64       ( 06 Jun 2021 07:19 )             26.5     06-06    134<L>  |  102  |  13  ----------------------------<  99  4.2   |  25  |  0.66    Ca    8.8      06 Jun 2021 07:19            Pro BNP  -- 06-04 @ 16:56  D Dimer  [id number] 06-04 @ 16:56    PT/INR - ( 06 Jun 2021 07:19 )   PT: 13.1 sec;   INR: 1.13 ratio         PTT - ( 05 Jun 2021 06:33 )  PTT:26.3 sec          RADIOLOGY & ADDITIONAL STUDIES:

## 2021-06-07 DIAGNOSIS — F03.91 UNSPECIFIED DEMENTIA WITH BEHAVIORAL DISTURBANCE: ICD-10-CM

## 2021-06-07 DIAGNOSIS — F41.9 ANXIETY DISORDER, UNSPECIFIED: ICD-10-CM

## 2021-06-07 DIAGNOSIS — F32.9 MAJOR DEPRESSIVE DISORDER, SINGLE EPISODE, UNSPECIFIED: ICD-10-CM

## 2021-06-07 LAB
ALBUMIN SERPL ELPH-MCNC: 2.6 G/DL — LOW (ref 3.3–5)
ALP SERPL-CCNC: 84 U/L — SIGNIFICANT CHANGE UP (ref 40–120)
ALT FLD-CCNC: 37 U/L — SIGNIFICANT CHANGE UP (ref 12–78)
AMMONIA BLD-MCNC: 38 UMOL/L — HIGH (ref 11–32)
ANION GAP SERPL CALC-SCNC: 6 MMOL/L — SIGNIFICANT CHANGE UP (ref 5–17)
AST SERPL-CCNC: 37 U/L — SIGNIFICANT CHANGE UP (ref 15–37)
BASOPHILS # BLD AUTO: 0 K/UL — SIGNIFICANT CHANGE UP (ref 0–0.2)
BASOPHILS NFR BLD AUTO: 0 % — SIGNIFICANT CHANGE UP (ref 0–2)
BILIRUB SERPL-MCNC: 0.9 MG/DL — SIGNIFICANT CHANGE UP (ref 0.2–1.2)
BLD GP AB SCN SERPL QL: SIGNIFICANT CHANGE UP
BUN SERPL-MCNC: 12 MG/DL — SIGNIFICANT CHANGE UP (ref 7–23)
CALCIUM SERPL-MCNC: 8.4 MG/DL — LOW (ref 8.5–10.1)
CHLORIDE SERPL-SCNC: 102 MMOL/L — SIGNIFICANT CHANGE UP (ref 96–108)
CO2 SERPL-SCNC: 24 MMOL/L — SIGNIFICANT CHANGE UP (ref 22–31)
CREAT SERPL-MCNC: 0.67 MG/DL — SIGNIFICANT CHANGE UP (ref 0.5–1.3)
EOSINOPHIL # BLD AUTO: 0 K/UL — SIGNIFICANT CHANGE UP (ref 0–0.5)
EOSINOPHIL NFR BLD AUTO: 0 % — SIGNIFICANT CHANGE UP (ref 0–6)
FOLATE SERPL-MCNC: 15.7 NG/ML — SIGNIFICANT CHANGE UP
GLUCOSE SERPL-MCNC: 94 MG/DL — SIGNIFICANT CHANGE UP (ref 70–99)
HCT VFR BLD CALC: 24.6 % — LOW (ref 34.5–45)
HCT VFR BLD CALC: 28 % — LOW (ref 34.5–45)
HGB BLD-MCNC: 8 G/DL — LOW (ref 11.5–15.5)
HGB BLD-MCNC: 9.3 G/DL — LOW (ref 11.5–15.5)
INR BLD: 1.24 RATIO — HIGH (ref 0.88–1.16)
LACTATE SERPL-SCNC: 0.9 MMOL/L — SIGNIFICANT CHANGE UP (ref 0.7–2)
LYMPHOCYTES # BLD AUTO: 0.76 K/UL — LOW (ref 1–3.3)
LYMPHOCYTES # BLD AUTO: 7 % — LOW (ref 13–44)
MCHC RBC-ENTMCNC: 30.1 PG — SIGNIFICANT CHANGE UP (ref 27–34)
MCHC RBC-ENTMCNC: 32.5 GM/DL — SIGNIFICANT CHANGE UP (ref 32–36)
MCV RBC AUTO: 92.5 FL — SIGNIFICANT CHANGE UP (ref 80–100)
MONOCYTES # BLD AUTO: 4.36 K/UL — HIGH (ref 0–0.9)
MONOCYTES NFR BLD AUTO: 40 % — HIGH (ref 2–14)
NEUTROPHILS # BLD AUTO: 5.77 K/UL — SIGNIFICANT CHANGE UP (ref 1.8–7.4)
NEUTROPHILS NFR BLD AUTO: 53 % — SIGNIFICANT CHANGE UP (ref 43–77)
NRBC # BLD: SIGNIFICANT CHANGE UP /100 WBCS (ref 0–0)
PLATELET # BLD AUTO: 73 K/UL — LOW (ref 150–400)
POTASSIUM SERPL-MCNC: 4.2 MMOL/L — SIGNIFICANT CHANGE UP (ref 3.5–5.3)
POTASSIUM SERPL-SCNC: 4.2 MMOL/L — SIGNIFICANT CHANGE UP (ref 3.5–5.3)
PROT SERPL-MCNC: 5.2 GM/DL — LOW (ref 6–8.3)
PROTHROM AB SERPL-ACNC: 14.2 SEC — HIGH (ref 10.6–13.6)
RBC # BLD: 2.66 M/UL — LOW (ref 3.8–5.2)
RBC # FLD: 14.5 % — SIGNIFICANT CHANGE UP (ref 10.3–14.5)
SODIUM SERPL-SCNC: 132 MMOL/L — LOW (ref 135–145)
TSH SERPL-MCNC: 1.31 UU/ML — SIGNIFICANT CHANGE UP (ref 0.34–4.82)
VIT B12 SERPL-MCNC: 726 PG/ML — SIGNIFICANT CHANGE UP (ref 232–1245)
WBC # BLD: 10.89 K/UL — HIGH (ref 3.8–10.5)
WBC # FLD AUTO: 10.89 K/UL — HIGH (ref 3.8–10.5)

## 2021-06-07 PROCEDURE — 99223 1ST HOSP IP/OBS HIGH 75: CPT

## 2021-06-07 PROCEDURE — 99233 SBSQ HOSP IP/OBS HIGH 50: CPT

## 2021-06-07 PROCEDURE — 99231 SBSQ HOSP IP/OBS SF/LOW 25: CPT

## 2021-06-07 PROCEDURE — 90792 PSYCH DIAG EVAL W/MED SRVCS: CPT

## 2021-06-07 RX ORDER — WARFARIN SODIUM 2.5 MG/1
3 TABLET ORAL ONCE
Refills: 0 | Status: COMPLETED | OUTPATIENT
Start: 2021-06-07 | End: 2021-06-07

## 2021-06-07 RX ORDER — HEPARIN SODIUM 5000 [USP'U]/ML
5000 INJECTION INTRAVENOUS; SUBCUTANEOUS EVERY 12 HOURS
Refills: 0 | Status: DISCONTINUED | OUTPATIENT
Start: 2021-06-07 | End: 2021-06-07

## 2021-06-07 RX ADMIN — SENNA PLUS 2 TABLET(S): 8.6 TABLET ORAL at 22:23

## 2021-06-07 RX ADMIN — POLYETHYLENE GLYCOL 3350 17 GRAM(S): 17 POWDER, FOR SOLUTION ORAL at 11:15

## 2021-06-07 RX ADMIN — Medication 25 MILLIGRAM(S): at 11:15

## 2021-06-07 RX ADMIN — Medication 650 MILLIGRAM(S): at 22:23

## 2021-06-07 RX ADMIN — WARFARIN SODIUM 3 MILLIGRAM(S): 2.5 TABLET ORAL at 22:23

## 2021-06-07 RX ADMIN — Medication 650 MILLIGRAM(S): at 14:27

## 2021-06-07 RX ADMIN — Medication 650 MILLIGRAM(S): at 23:11

## 2021-06-07 RX ADMIN — Medication 650 MILLIGRAM(S): at 06:29

## 2021-06-07 RX ADMIN — Medication 650 MILLIGRAM(S): at 05:58

## 2021-06-07 RX ADMIN — MIRTAZAPINE 15 MILLIGRAM(S): 45 TABLET, ORALLY DISINTEGRATING ORAL at 22:23

## 2021-06-07 RX ADMIN — LOSARTAN POTASSIUM 25 MILLIGRAM(S): 100 TABLET, FILM COATED ORAL at 11:15

## 2021-06-07 NOTE — CONSULT NOTE ADULT - REASON FOR ADMISSION
s/p fall c/o severe bilateral leg pain and inability to ambulate

## 2021-06-07 NOTE — PROGRESS NOTE ADULT - ASSESSMENT
89 y/o F PMHx significant for early dementia, lymphedema of lower extremities, atrial fibrillation on Coumadin (last INR2.6), hx of proximal femur fracture in January 2021, Hx L hip long IMN with Dr. Kitchen 2017, hearing loss, cystitis, hx of HTN, spinal stenosis, anxiety was BIBA s/p witnessed fall at home with resultant severe bilateral leg pain and inability to ambulate    BL knee Fx with tibia/fibula Fx and patellas  -  conservative management, pain control, splint , PT , d/c planning  - pt. with drop in H/H today 8.0 from 8.7 - unlikely ortho cause - D/W ortho   standing tylenol added, oxycodone and morphine prn, reposition, skin care    Right knee hematoma on coumadin with anemia of ABL -   resolved, INR reversed  H dropped to  8.0 from 8.7 - will not [id number] coumadin  Drop in H unlikely due to ortho cause  - will check stool guaiac and transfuse 1 unit prbc  - recheck H/H post tansfusion at 8 pm     Afib on VKA , h/o CVA s/p reversal - ortho cleared pt  to resume VKA, high CHADVASC score  a present H/H dropping - unable to start coumadin,   will check for stool guaiac   will give low dose VTE heparin sq 5000 q 12 hrs for now     #Unstable gait/Hands tremor   multifactorial, at present exam is limited as legs are splinted  - Pt will need MRI brain - can have open MRI as OP once D/C  Tremors of hands; most likely benign- at present clinically not C/W Parkinsons ds, however exam is limited  Neurology consult appreciated: - f/u after 2-3 months once knees heal     Constipation - bowel regimen - miralax, senna, lactulose today, monitor BM     HTN - BP stable now, restart toprol xl 25 , losartan 25     Dementia with depression and anxiety   - wellbutrin causes constipation  - will hold, c/w remeron lower dose 15 ( pt also getting opioids) , seroquel 12.5 q8h prn as needed  Psych consult to adjust anxiety meds         89 y/o F PMHx significant for early dementia, lymphedema of lower extremities, atrial fibrillation on Coumadin (last INR2.6), hx of proximal femur fracture in January 2021, Hx L hip long IMN with Dr. Kitchen 2017, hearing loss, cystitis, hx of HTN, spinal stenosis, anxiety was BIBA s/p witnessed fall at home with resultant severe bilateral leg pain and inability to ambulate    BL knee Fx with tibia/fibula Fx and patellas  -  conservative management, pain control, splint , PT , d/c planning  - pt. with drop in H/H today 8.0 from 8.7 - unlikely ortho cause - D/W ortho   standing tylenol added, oxycodone and morphine prn, reposition, skin care    Right knee hematoma on coumadin with anemia of ABL -   resolved, INR reversed  H dropped to  8.0 from 8.7 - will not [id number] coumadin  Drop in H unlikely due to ortho cause  - will check stool guaiac and transfuse 1 unit prbc  - recheck H/H post tansfusion at 8 pm     Afib on VKA , h/o CVA s/p reversal - ortho cleared pt  to resume VKA, high CHADVASC score  a present H/H dropping   will check for stool guaiac   will start low dose coumadin tonight - 3 mg without bridging to heparin due to active bleeding   will check H/H post transfusion and in AM     #Unstable gait/Hands tremor   multifactorial, at present exam is limited as legs are splinted  - Pt will need MRI brain - can have open MRI as OP once D/C  Tremors of hands; most likely benign- at present clinically not C/W Parkinsons ds, however exam is limited  Neurology consult appreciated: - f/u after 2-3 months once knees heal     Constipation - bowel regimen - miralax, senna, lactulose today, monitor BM     HTN - BP stable now, restart toprol xl 25 , losartan 25     Dementia with depression and anxiety   - wellbutrin causes constipation  - will hold, c/w remeron lower dose 15 ( pt also getting opioids) , seroquel 12.5 q8h prn as needed  Psych consult to adjust anxiety meds         89 y/o F PMHx significant for early dementia, lymphedema of lower extremities, atrial fibrillation on Coumadin (last INR2.6), hx of proximal femur fracture in January 2021, Hx L hip long IMN with Dr. Kitchen 2017, hearing loss, cystitis, hx of HTN, spinal stenosis, anxiety was BIBA s/p witnessed fall at home with resultant severe bilateral leg pain and inability to ambulate    BL knee Fx with tibia/fibula Fx and patellas  -  conservative management, pain control, splint , PT , d/c planning  - pt. with drop in H/H today 8.0 from 8.7 - unlikely ortho cause - D/W ortho   standing tylenol added, oxycodone and morphine prn, reposition, skin care    Right knee hematoma on coumadin with anemia of ABL -   resolved, INR reversed  H dropped to  8.0 from 8.7 - will not [id number] coumadin  Drop in H unlikely due to ortho cause  - will check stool guaiac and transfuse 1 unit prbc  - recheck H/H post tansfusion at 6 pm     Afib on VKA , h/o CVA s/p reversal - ortho cleared pt  to resume VKA, high CHADVASC score  a present H/H dropping   will check for stool guaiac   will start low dose coumadin tonight - 3 mg without bridging to heparin due to active bleeding   will check H/H post transfusion and in AM     #Unstable gait/Hands tremor   multifactorial, at present exam is limited as legs are splinted  - Pt will need MRI brain - can have open MRI as OP once D/C  Tremors of hands; most likely benign- at present clinically not C/W Parkinsons ds, however exam is limited  Neurology consult appreciated: - f/u after 2-3 months once knees heal     Constipation - bowel regimen - miralax, senna, lactulose today, monitor BM     HTN - BP stable now, restart toprol xl 25 , losartan 25     Dementia with depression and anxiety   - wellbutrin causes constipation  - will hold, c/w remeron lower dose 15 ( pt also getting opioids) , seroquel 12.5 q8h prn as needed  Psych consult to adjust anxiety meds

## 2021-06-07 NOTE — PROGRESS NOTE ADULT - ASSESSMENT
This is a 89 year old female with pmh of atrial fibrillation on Coumadin, [id number] Score: 5, s/p fall on 6-4-2021, causing fx of bl tib/fib and left patella.  s/p -closded reduction and splinting by ortho.  Pt's INR on admission was 3.54  given 5mg vit k po and k-centra, noted  Pt has both high thrombosis and bleed risk.  Requires anticoagulation treatment dose but will hold today and revaluate pt tomorrow  6/7: INR today 1.24, plts  slightly improved from yesterday 73,000 , patient is high risk for bleeding and clotting, not recommended for bridging will resume Coumadin if ok with medicine  .    6-6-2021: Made Dr. De La Torre aware of plan and she agrees.    Plan:  : Coumadin 3mg today   :daily cbc/ bmp, pt/inr  : mobilty as per ortho  : will f/u  This is a 89 year old female with pmh of atrial fibrillation on Coumadin, [id number] Score: 5, s/p fall on 6-4-2021, causing fx of bl tib/fib and left patella.  s/p -closded reduction and splinting by ortho.  Pt's INR on admission was 3.54  given 5mg vit k po and k-centra, noted  Pt has both high thrombosis and bleed risk.  Requires anticoagulation treatment dose but will hold today and revaluate pt tomorrow  6/7: INR today 1.24, plts  slightly improved from yesterday 73,000 , patient is high risk for bleeding and clotting, not recommended for bridging, spoke with Hospitalist NP will start Heparin now and will reevaluate for coumadin tomorrow if H&H is stable   .    6-6-2021: Made Dr. De La Torre aware of plan and she agrees.    Plan:  : Heparin 5000units SQ every 12 hours   : Monitor for bleeding   :daily cbc/ bmp, pt/inr  : mobilty as per ortho  : will f/u  This is a 89 year old female with pmh of atrial fibrillation on Coumadin, [id number] Score: 5, s/p fall on 6-4-2021, causing fx of bl tib/fib and left patella.  s/p -closded reduction and splinting by ortho.  Pt's INR on admission was 3.54  given 5mg vit k po and k-centra, noted  Pt has both high thrombosis and bleed risk.  Requires anticoagulation treatment dose but will hold today and revaluate pt tomorrow    6-6-2021: Made Dr. De La Torre aware of plan and she agrees.    6/7: INR today 1.24, plts  slightly improved from yesterday 73,000 , patient is high risk for bleeding and clotting, not recommended for bridging, spoke with Hospitalist NP will start coumadin today  .      Plan:  : Coumadin 3mg tonight will recheck INR tomorrow  : Monitor for bleeding   :daily cbc/ bmp, pt/inr  : mobilty as per ortho  : will f/u

## 2021-06-07 NOTE — PROGRESS NOTE ADULT - SUBJECTIVE AND OBJECTIVE BOX
CC: s/p fall c/o severe bilateral leg pain and inability to ambulate     HPI: 88 y/o F PMHx significant for early dementia, lymphedema of lower extremities, atrial fibrillation on Coumadin (last INR2.6), hx of proximal femur fracture in January 2021, Hx L hip long IMN with Dr. Kitchen 2017, hearing loss, cystitis, hx of HTN, spinal stenosis, anxiety admitted on 6/4/21  s/p witnessed fall at home with resultant severe bilateral leg pain and inability to ambulate. Of note the patient's daughter states that the patient has not had a bowel movement in 4 days therefore decided to administer a dose of Miralax at home. She subsequently attempted to assist patient to the bathroom at night with the assistance the patient's walker. Despite all the daughter's efforts the patient was reportedly too weak on her feet resulting in the patient's fall onto her knees. The patient's daughter states that the patient did not strike her head, or lose consciousness, but did note considerable ecchymoses overlying her bilateral knees and lower legs.  At baseline the patient walks minimally at home with the use of a walker and assist. Occasionally uses her wheelchair.   Daughter reports a decline in her overall ambulatory status over the past year. Imaging in the ED revealed bilateral proximal 1/3 tibia fractures with displacement and possible left patella fracture. In the ED Orthopedics was consulted.    REVIEW OF SYSTEMS:  All other review of systems is negative unless indicated above    Vital Signs Last 24 Hrs  T(C): 36.8 (07 Jun 2021 13:37), Max: 37.1 (06 Jun 2021 21:30)  T(F): 98.2 (07 Jun 2021 13:37), Max: 98.8 (06 Jun 2021 21:30)  HR: 84 (07 Jun 2021 13:37) (84 - 97)  BP: 127/54 (07 Jun 2021 13:37) (100/53 - 133/67)  BP(mean): --  RR: 18 (07 Jun 2021 13:37) (18 - 18)  SpO2: 96% (07 Jun 2021 13:37) (93% - 98%)    I&O's Summary    06 Jun 2021 07:01  -  07 Jun 2021 07:00  --------------------------------------------------------  IN: 100 mL / OUT: 800 mL / NET: -700 mL    PHYSICAL EXAM:    General: female in no acute distress  Eyes: PERRLA, EOMI; conjunctiva and sclera clear  Head: Normocephalic; atraumatic  ENMT: No nasal discharge; airway clear  Neck: Supple; non tender; no masses  Respiratory: No wheezes, rales or rhonchi  Cardiovascular: S1, S2 irreg with II/VI FARZANEH  Gastrointestinal: Soft non-tender non-distended; Normal bowel sounds  Genitourinary: No costovertebral angle tenderness, FQ to gravity  Extremities: BL LE in splints, able to move toes  Vascular: Peripheral pulses palpable 2+ bilaterally  Neurological: Alert, confused  Skin: Warm and dry. Pale.  Musculoskeletal: Normal tone  Psychiatric: Cooperative      Medications:  MEDICATIONS  (STANDING):  acetaminophen   Tablet .. 650 milliGRAM(s) Oral every 8 hours  losartan 25 milliGRAM(s) Oral daily  metoprolol succinate ER 25 milliGRAM(s) Oral daily  mirtazapine 15 milliGRAM(s) Oral at bedtime  polyethylene glycol 3350 17 Gram(s) Oral daily  senna 2 Tablet(s) Oral at bedtime      Labs: All Labs Reviewed:                        8.0    10.89 )-----------( 73       ( 07 Jun 2021 07:50 )             24.6     06-07    132<L>  |  102  |  12  ----------------------------<  94  4.2   |  24  |  0.67    Ca    8.4<L>      07 Jun 2021 07:50    TPro  5.2<L>  /  Alb  2.6<L>  /  TBili  0.9  /  DBili  x   /  AST  37  /  ALT  37  /  AlkPhos  84  06-07    PT/INR - ( 07 Jun 2021 07:50 )   PT: 14.2 sec;   INR: 1.24 ratio         PTT - ( 07 Jun 2021 07:50 )  PTT:19.5 sec

## 2021-06-07 NOTE — BEHAVIORAL HEALTH ASSESSMENT NOTE - NSBHCHARTREVIEWLAB_PSY_A_CORE FT
8.0    10.89 )-----------( 73       ( 07 Jun 2021 07:50 )             24.6   06-07    132<L>  |  102  |  12  ----------------------------<  94  4.2   |  24  |  0.67    Ca    8.4<L>      07 Jun 2021 07:50    TPro  5.2<L>  /  Alb  2.6<L>  /  TBili  0.9  /  DBili  x   /  AST  37  /  ALT  37  /  AlkPhos  84  06-07

## 2021-06-07 NOTE — BEHAVIORAL HEALTH ASSESSMENT NOTE - RISK ASSESSMENT
Low Acute Suicide Risk LOW acute risk: no SI, no current agitation, has chr anxiety and depression, on meds , no insomnia.   LOW long term risk: no psych hx , no ha of SI or SA, no family hx.   protective factors: Supportive family.

## 2021-06-07 NOTE — CONSULT NOTE ADULT - SUBJECTIVE AND OBJECTIVE BOX
CC: 89 y old  Female who presents with a chief complaint of s/p fall c/o severe bilateral leg pain and inability to ambulate (07 Jun 2021 08:30)      HPI:  88 y/o F PMHx significant for early dementia, lymphedema of lower extremities, atrial fibrillation on Coumadin (last INR2.6), PMHx of proximal femur fracture in January 2021, Hx L hip long IMN with Dr. Kitchen 2017, hearing loss, cystitis, hx of HTN, spinal stenosis, anxiety was BIBA s/p witnessed fall at home with resultant severe bilateral leg pain and inability to ambulate. Of note the patient's daughter states that the patient has not had a bowel movement in 4 days therefore decided to administer a dose of Miralax at home. She subsequently attempted to assist patient to the bathroom at night with the assistance the patient's walker. Despite all the daughter's efforts the patient was reportedly too weak on her feet resulting in the patient's fall onto her knees. The patient's daughter states that the patient did not strike her head, or lose consciousness, but did note considerable ecchymoses overlying her bilateral knees and lower legs. The patient denies any numbness/tingling/burning in the BLLE. No other bone/joint complaints. At baseline the patient walks minimally at home with the use of a walker and assist. Occasionally uses her wheelchair.   Daughter reports a decline in her overall ambulatory status over the past year. Imaging in the ED revealed bilateral proximal 1/3 tibia fractures with displacement and possible left patella fracture. In the ED Orthopedics was consulted.   (04 Jun 2021 02:34)      PAST MEDICAL & SURGICAL HISTORY:  Lymphedema of both lower extremities  Venous insufficiency  Monoclonal gammopathies  Paroxysmal atrial fibrillation  Acute cystitis without hematuria septic  4/2016  Essential hypertension  Spinal stenosis/Spondyloarthritis  Vaginal prolapse  S/P kyphoplasty 2014  S/P thyroidectomy partial   1975  History of vaginal surgery  History of fracture of femur  hx of proximal femur fracture in January 2021  History of repair of left hip joint  Hx L hip long IMN with Dr. Kitchen 2017        FAMILY HISTORY:  UNABLE TO OBTAIN      Social Hx:  Nonsmoker, no drug or alcohol use      MEDICATIONS  (STANDING):  acetaminophen   Tablet .. 650 milliGRAM(s) Oral every 8 hours  losartan 25 milliGRAM(s) Oral daily  metoprolol succinate ER 25 milliGRAM(s) Oral daily  mirtazapine 15 milliGRAM(s) Oral at bedtime  polyethylene glycol 3350 17 Gram(s) Oral daily  senna 2 Tablet(s) Oral at bedtime       Allergies    No Known Allergies    Intolerances        ROS: Pertinent positives in HPI, all other ROS were reviewed and are negative.        Vital Signs Last 24 Hrs  T(C): 36.7 (07 Jun 2021 08:28), Max: 37.1 (06 Jun 2021 21:30)  T(F): 98 (07 Jun 2021 08:28), Max: 98.8 (06 Jun 2021 21:30)  HR: 92 (07 Jun 2021 08:28) (85 - 97)  BP: 100/53 (07 Jun 2021 08:28) (100/53 - 133/67)  BP(mean): --  RR: 18 (07 Jun 2021 05:59) (18 - 18)  SpO2: 93% (07 Jun 2021 08:28) (93% - 98%)      Gen exam:  Normocephalic, in no distress, awake and alert.  HEENT: PERRLA, EOMI,   Neck: Supple.  Respiratory: Breath sounds are clear bilaterally  Cardiovascular: S1 and S2, regular / irregular rhythm  Gastrointestinal: soft, nontender  Extremities:  no edema  Vascular: Caritid Bruit - no  Musculoskeletal: no joint swelling/tenderness, no abnormal movements  Skin: No rashes    Neurological exam:  HF: A x O x 3. Appropriately interactive, normal affect. Speech fluent, No Aphasia or paraphasic errors. Naming /repetition intact   CN: NATALIIA, EOMI, VFF, facial sensation normal, no NLFD, tongue midline, Palate moves equally, SCM equal bilaterally  Motor: No pronator drift, Strength 5/5 in all 4 ext, normal bulk and tone, no tremor, rigidity or bradykinesia.    Sens: Intact to light touch / PP/ VS/ JS    Reflexes: Symmetric and normal . BJ 2+, BR 2+, KJ 2+, AJ 2+, downgoing toes b/l  Coord:  No FNFA, dysmetria, STEVE intact   Gait/Balance: Normal/Cannot test    NIHSS:      Labs:   06-07    132<L>  |  102  |  12  ----------------------------<  94  4.2   |  24  |  0.67    Ca    8.4<L>      07 Jun 2021 07:50    TPro  5.2<L>  /  Alb  2.6<L>  /  TBili  0.9  /  DBili  x   /  AST  37  /  ALT  37  /  AlkPhos  84  06-0                        8.0    10.89 )-----------( 73       ( 07 Jun 2021 07:50 )             24.6       Radiology:  < from: CT Head No Cont (06.04.21 @ 01:05) >  Head CT: No displaced calvarial fracture or acute intracranial hemorrhage.    Cervical spine CT: No acute fracture. Cervical spondylosis.            CC: 89 y old  Female who presents with a chief complaint of s/p fall and severe bilateral leg pain-inability to ambulate      HPI:  89 y/o F PMHx of early dementia, lymphedema lower extremities, atrial fibrillation (on Coumadin - last INR2.6), proximal femur fracture in January 2021, Hx L hip long IMN with Dr. Kitchen 2017, HTN, spinal stenosis, anxiety was BIBA s/p witnessed fall at home with resultant severe bilateral leg pain and inability to ambulate, imaging has revealed bilateral proximal 1/3 tibia fractures with displacement and possible left patella fracture. Patient seen by ortho - both knees are splinted.    Neuro consulted to evaluate for tremors of legs. Patient unable to provide much history, history obtained for daughter Tabatha who pateint lives with. Daughter reports the patient has poor balance, is walking with a walker, has been seen by 2 neurologists; Dr. Lu and Dr. Rousseau recently - was recommended MRI (Pt unable to tolerate hence not done), pat hjas also had arthritis, spinal stenosis related issues - sees Simba Roberto, was seen by Neuro psyche for anxiety- mood and cognitive cahnges, is on Mitrazipine, daughter very concerned as patient is having coping up issues.    Pt has had mild tremors but daughter reports it is not much of issue At baseline the patient walks minimally at home with the use of a walker and assist, occasionally uses her wheelchair.     Patient is alert and co-operative, seems very anxious. Denies dysphagia, dysarthria, dyspnea, change in voice.          PAST MEDICAL & SURGICAL HISTORY:  Lymphedema of both lower extremities  Venous insufficiency  Monoclonal gammopathies  Paroxysmal atrial fibrillation  Acute cystitis without hematuria septic  4/2016  Essential hypertension  Spinal stenosis/Spondyloarthritis  Vaginal prolapse  S/P kyphoplasty 2014  S/P thyroidectomy partial   1975  History of vaginal surgery  History of fracture of femur  hx of proximal femur fracture in January 2021  History of repair of left hip joint  Hx L hip long IMN with Dr. Kitchen 2017        FAMILY HISTORY:  Unable to obtain      Social Hx:  Nonsmoker, no drug or alcohol use, lives at home with daughter      MEDICATIONS  (STANDING):  acetaminophen   Tablet .. 650 milliGRAM(s) Oral every 8 hours  losartan 25 milliGRAM(s) Oral daily  metoprolol succinate ER 25 milliGRAM(s) Oral daily  mirtazapine 15 milliGRAM(s) Oral at bedtime  polyethylene glycol 3350 17 Gram(s) Oral daily  senna 2 Tablet(s) Oral at bedtime       Allergies  No Known Allergies      ROS: Pertinent positives in HPI, all other ROS were reviewed and are negative.        Vital Signs Last 24 Hrs  T(C): 36.7 (07 Jun 2021 08:28), Max: 37.1 (06 Jun 2021 21:30)  T(F): 98 (07 Jun 2021 08:28), Max: 98.8 (06 Jun 2021 21:30)  HR: 92 (07 Jun 2021 08:28) (85 - 97)  BP: 100/53 (07 Jun 2021 08:28) (100/53 - 133/67)  BP(mean): --  RR: 18 (07 Jun 2021 05:59) (18 - 18)  SpO2: 93% (07 Jun 2021 08:28) (93% - 98%)      Gen exam:  Normocephalic, in no distress, awake and alert.  HEENT: PERRLA, EOMI,   Neck: Supple.  Respiratory: Breath sounds are clear bilaterally  Cardiovascular: S1 and S2, regular  Extremities: Both LE in splints  Vascular: Caritid Bruit - no  Musculoskeletal: Tremors left > right hand  Skin: No rashes      Neurological exam:  HF: A x O x self, hospital and month. Very anxious and irritable. Speech fluent, No Aphasia or paraphasic errors. Naming /repetition intact. No hypophonia  CN: NATALIIA, EOMI, VFF, facial sensation normal, no NLFD, tongue midline, Palate moves equally  Motor: No pronator drift, Strength 5/5 in UE, tremors of both hands with extension posture, left > right, no rigidity or bradykinesia.  LE in splints - unable to raie antigravity  Sens: Intact to light touch UE    Reflexes: Symmetric BJ 2+, BR 2+, KJ / AJ Unable, downgoing toes b/l  Coord:  No FNFA, dysmetria, STEVE intact   Gait/Balance: cannot test        Labs:   06-07    132<L>  |  102  |  12  ----------------------------<  94  4.2   |  24  |  0.67    Ca    8.4<L>      07 Jun 2021 07:50    TPro  5.2<L>  /  Alb  2.6<L>  /  TBili  0.9  /  DBili  x   /  AST  37  /  ALT  37  /  AlkPhos  84  06-0                        8.0    10.89 )-----------( 73       ( 07 Jun 2021 07:50 )             24.6       Radiology:  < from: CT Head No Cont (06.04.21 @ 01:05) >  Head CT: No displaced calvarial fracture or acute intracranial hemorrhage.    Cervical spine CT: No acute fracture. Cervical spondylosis.

## 2021-06-07 NOTE — BEHAVIORAL HEALTH ASSESSMENT NOTE - SUMMARY
Pt is an 89 YOWW with hx od cognitive decline and anxiety, on Remeron 45 mg at HS, and Seroquel 12.5 mg po q8 h anxiety.   Pt presents anxious, having difficulties concentrating, states that is in the light of being hospitalised. Pt si poor historian , frequently states that she  does not know. Denies any SI ever, not sure if she is seeing psychiatrist. Defers questions to family.  Met daughter Tabatha at the bedside. She reports mild dementia and pt is in treatment with Dr Thang Lu. Pt was on Lexapro, zoloft, Wellbutrin. had Hyponatremia on SSSRI-s.  She is depressed and anxious in treatment with psychiatrist for last 3 years. Prior to that saw Dr Oh for dementia.  No other psych but per daughter pt was anxious all her  life. NO SI, NO SA, NO HI, NO aggression per daughter pt has moving spots in visual fields but was told by neuroophthalmologist it is not in the eyes. however, pt knows that those spots are not real. Sometimes pt hears a music.     Plan:   Meds discussed with daughter.   Agree with reduction or Remeron to 15 mg at [facility]. discussed with daughter. She agrees with a plan.   Discussed PRN alternatives like atarax instead of Seroquel daughter would like to do her own research.

## 2021-06-07 NOTE — PROGRESS NOTE ADULT - SUBJECTIVE AND OBJECTIVE BOX
Patient is a 89y old  Female who presents with a chief complaint of s/p fall c/o severe bilateral leg pain and inability to ambulate (07 Jun 2021 07:45)      HPI:  89 y/o F PMHx significant for early dementia, lymphedema of lower extremities, atrial fibrillation on Coumadin (last INR2.6), hx of proximal femur fracture in January 2021, Hx L hip long IMN with Dr. Kitchen 2017, hearing loss, cystitis, hx of HTN, spinal stenosis, anxiety was BIBA s/p witnessed fall at home with resultant severe bilateral leg pain and inability to ambulate. Of note the patient's daughter states that the patient has not had a bowel movement in 4 days therefore decided to administer a dose of Miralax at home. She subsequently attempted to assist patient to the bathroom at night with the assistance the patient's walker. Despite all the daughter's efforts the patient was reportedly too weak on her feet resulting in the patient's fall onto her knees. The patient's daughter states that the patient did not strike her head, or lose consciousness, but did note considerable ecchymoses overlying her bilateral knees and lower legs. The patient denies any numbness/tingling/burning in the BLLE. No other bone/joint complaints. At baseline the patient walks minimally at home with the use of a walker and assist. Occasionally uses her wheelchair.   Daughter reports a decline in her overall ambulatory status over the past year. Imaging in the ED revealed bilateral proximal 1/3 tibia fractures with displacement and possible left patella fracture. In the ED Orthopedics was consulted.   (04 Jun 2021 02:34)  6/4 Cardiology. 89 year old female admitted with near syncope. felt weak ambulating to BR, fell back and onto knees. denies LOC. denies chest pain or dyspnea. Patient noted to have bilat tib fib fractures. Patient has past history of afib, permanent and htn.  6/5 events noted. severe anemia likely due to bilat thigh hematoma. alert, no complaints  6/6 alert, no new complaints  6/7 alert, no new complaints    PAST MEDICAL & SURGICAL HISTORY:  Lymphedema of both lower extremities    Venous insufficiency    Monoclonal gammopathies    Paroxysmal atrial fibrillation    Acute cystitis without hematuria  septic  4/2016    Essential hypertension    Spinal stenosis    Spondyloarthritis    Vaginal prolapse    [place] (hard of hearing), bilateral    Hypertension    S/P kyphoplasty  2014    S/P thyroidectomy  partial   1975    History of vaginal surgery    History of fracture of femur  hx of proximal femur fracture in January 2021    History of repair of left hip joint  Hx L hip long IMN with Dr. Kitchen 2017          MEDICATIONS  (STANDING):  acetaminophen   Tablet .. 650 milliGRAM(s) Oral every 8 hours  losartan 25 milliGRAM(s) Oral daily  metoprolol succinate ER 25 milliGRAM(s) Oral daily  mirtazapine 15 milliGRAM(s) Oral at bedtime  polyethylene glycol 3350 17 Gram(s) Oral daily  senna 2 Tablet(s) Oral at bedtime    MEDICATIONS  (PRN):  acetaminophen   Tablet .. 650 milliGRAM(s) Oral every 6 hours PRN Mild Pain (1 - 3)  melatonin 3 milliGRAM(s) Oral at bedtime PRN Insomnia  morphine  - Injectable 2 milliGRAM(s) IV Push every 4 hours PRN Severe Pain (7 - 10)  ondansetron Injectable 4 milliGRAM(s) IV Push every 6 hours PRN Nausea and/or Vomiting  oxyCODONE    IR 2.5 milliGRAM(s) Oral every 4 hours PRN Moderate Pain (4 - 6)  QUEtiapine 12.5 milliGRAM(s) Oral every 8 hours PRN anxiety, agitation          Vital Signs Last 24 Hrs  T(C): 36.7 (07 Jun 2021 08:28), Max: 37.1 (06 Jun 2021 21:30)  T(F): 98 (07 Jun 2021 08:28), Max: 98.8 (06 Jun 2021 21:30)  HR: 92 (07 Jun 2021 08:28) (85 - 97)  BP: 100/53 (07 Jun 2021 08:28) (100/53 - 133/67)  BP(mean): --  RR: 18 (07 Jun 2021 05:59) (18 - 18)  SpO2: 93% (07 Jun 2021 08:28) (93% - 98%)    I&O's Summary    06 Jun 2021 07:01  -  07 Jun 2021 07:00  --------------------------------------------------------  IN: 100 mL / OUT: 800 mL / NET: -700 mL        PHYSICAL EXAM  General Appearance: NAD  HEENT:   Neck:   Back:   Lungs: clr  Heart: IRR s1s2  Abdomen:   Extremities: wrapped         INTERPRETATION OF TELEMETRY:    ECG:        LABS:                          8.0    10.89 )-----------( 73       ( 07 Jun 2021 07:50 )             24.6     06-06    134<L>  |  102  |  13  ----------------------------<  99  4.2   |  25  |  0.66    Ca    8.8      06 Jun 2021 07:19            Pro BNP  -- 06-04 @ 16:56  D Dimer  [id number] 06-04 @ 16:56    PT/INR - ( 06 Jun 2021 07:19 )   PT: 13.1 sec;   INR: 1.13 ratio                   RADIOLOGY & ADDITIONAL STUDIES:

## 2021-06-07 NOTE — CONSULT NOTE ADULT - ASSESSMENT
88 y/o F PMHx significant for early dementia, lymphedema of lower extremities, atrial fibrillation on Coumadin (last INR2.6), hx of proximal femur fracture in January 2021, Hx L hip long IMN with Dr. Kitchen 2017, hearing loss, cystitis, hx of HTN, spinal stenosis, anxiety was BIBA s/p witnessed fall at home with resultant severe bilateral leg pain and inability to ambulate found to have  bilateral proximal 1/3 tibia fractures with displacement and possible left patella fracture.    Bilateral proximal tibia/fibula fractures, possible left patella fracture. 88 y/o F PMHx of early dementia, lymphedema lower extremities, atrial fibrillation (on Coumadin - last INR2.6), proximal femur fracture in January 2021, Hx L hip long IMN with Dr. Kitchen 2017, HTN, spinal stenosis, anxiety was BIBA s/p witnessed fall at home with resultant severe bilateral leg pain and inability to ambulate, imaging has revealed bilateral proximal 1/3 tibia fractures with displacement and possible left patella fracture. Patient seen by ortho - both knees are splinted.    Pt has had poor balance for years, is walking with a walker, has been seen by 2 neurologists; Dr. Lu and Dr. Rousseau recently - was recommended MRI (Pt unable to tolerate hence not done), pt also has arthritis, spinal stenosis related issues - sees Simba Roberto, was seen by Neuropsyche for anxiety- mood and cognitive cahnges      # Unstable gait : multifactorial, at present exam is limited as legs are splinted    - Pt will need MRI brain - can have open MRI as OP once D/C    # Tremors of hands; most likely benign- at present clinically not C/W Parkinsons ds, however exam is limited    - f/u after 2-3 months once knees heal    # Mild cognitive impairment / mild mood changes and anxiety    - Pt on Mitrazipine, daughter very concerned as patient is having coping up issues, would recommend get psyche input.  - Evaluation of cognitive dysfunction as OP with Dr. Bowles    Above management D/W pt, her daughter Tabatha and [place] Michaels, NP    Call neuro if needed henceforth.

## 2021-06-07 NOTE — PROGRESS NOTE ADULT - ASSESSMENT
1. near syncope, no evidence of bradyarrlythmia on tele  2. afib, stable  3. htn stable  4. bilateral tib/fib fractures    Plan:  resume coumadin when hgb stable  [facility]

## 2021-06-07 NOTE — PROGRESS NOTE ADULT - ASSESSMENT
A/P:  Patient is a 89y Female w/ BLLE proximal tib/fib fxs sp closed reduction and splint     -Medical management per primary team  -NWB BLLE in long leg trilam splints  -Keep splints c/d/i  -FU am labs  -Ice/Elevate  -Incentive Spirometry  -Multimodal Analgesia  -DVT PE ppx  -SCDs  -PT/OT OOBTC  -Ortho stable  -FU outpatient 1-2 weeks after discharge. call office to schedule appointment.  -Discharge planning - pending PT eval  -Will discuss w/ attending and advise if plan changes A/P:  Patient is a 89y Female w/ L Patella fx & BLLE proximal tib/fib fxs sp closed reduction and splint     -Medical management per primary team  -NWB BLLE in long leg trilam splints  -Keep splints c/d/i  -FU am labs  -Ice/Elevate  -Incentive Spirometry  -Multimodal Analgesia  -DVT PE ppx  -SCDs  -PT/OT OOBTC  -Ortho stable  -FU outpatient 1-2 weeks after discharge. call office to schedule appointment.  -Discharge planning - pending PT eval  -Will discuss w/ attending and advise if plan changes

## 2021-06-07 NOTE — BEHAVIORAL HEALTH ASSESSMENT NOTE - NSBHCONSULTFOLLOWAFTERCARE_PSY_A_CORE FT
Pt is psychiatrically cleared.  If d/c to rehab she can continue seeing psychiatric in that facility or she can return to Dr Lu as an outpatient.

## 2021-06-07 NOTE — BEHAVIORAL HEALTH ASSESSMENT NOTE - HPI (INCLUDE ILLNESS QUALITY, SEVERITY, DURATION, TIMING, CONTEXT, MODIFYING FACTORS, ASSOCIATED SIGNS AND SYMPTOMS)
Pt is an 89 YOWW with hx od cognitive decline and anxiety, on Remeron 45 mg at HS, and Seroquel 12.5 mg po q8 h anxiety.   Pt presents anxious, having difficulties concentrating, states that is in the light of being hospitalised. Pt si poor historian , frequently states that she  does not know. Denies any SI ever, not sure if she is seeing psychiatrist. Defers questions to family. Pt is an 89 YOWW with hx od cognitive decline and anxiety, on Remeron 45 mg at HS, and Seroquel 12.5 mg po q8 h anxiety.   Pt presents anxious, having difficulties concentrating, states that is in the light of being hospitalised. Pt si poor historian , frequently states that she  does not know. Denies any SI ever, not sure if she is seeing psychiatrist. Defers questions to family.  met daughter Tabatha at the bedside. She reports mild dementia and pt is in treatment with Dr Thang Lu. Pt was on Lexapro, zoloft, Wellbutrin. had Hyponatremia on SSSRI-s.  She is depressed and anxious in treatment with psychiatrist for last 3 years. Prior to that saw Dr Oh for dementia.  No other psych but per daughter pt was anxious all her  life. NO SI, NO SA, NO HI, NO aggression per daughter pt has moving spots in visual fields but was told by neuroophthalmologist it is not in the eyes. however, pt knows that those spots are not real. Sometimes pt hears a music.   Pt has memory problems and that is slowly getting worse with most worsening in baljinder last few months.  Initially pt could not recall her birthday but eventually remembered. She remembered that current month is a month when she has a birthday.

## 2021-06-07 NOTE — BEHAVIORAL HEALTH ASSESSMENT NOTE - NSBHCHARTREVIEWVS_PSY_A_CORE FT
Vital Signs Last 24 Hrs  T(C): 36.7 (07 Jun 2021 14:01), Max: 37.1 (06 Jun 2021 21:30)  T(F): 98 (07 Jun 2021 14:01), Max: 98.8 (06 Jun 2021 21:30)  HR: 86 (07 Jun 2021 14:01) (84 - 97)  BP: 100/68 (07 Jun 2021 14:01) (100/53 - 133/67)  BP(mean): --  RR: 18 (07 Jun 2021 14:01) (18 - 18)  SpO2: 96% (07 Jun 2021 14:01) (93% - 98%)

## 2021-06-07 NOTE — PROGRESS NOTE ADULT - SUBJECTIVE AND OBJECTIVE BOX
HPI:  88 y/o F PMHx significant for early dementia, lymphedema of lower extremities, atrial fibrillation on Coumadin (last INR2.6), hx of proximal femur fracture in January 2021, Hx L hip long IMN with Dr. Kitchen 2017, hearing loss, cystitis, hx of HTN, spinal stenosis, anxiety was BIBA s/p witnessed fall at home with resultant severe bilateral leg pain and inability to ambulate. Of note the patient's daughter states that the patient has not had a bowel movement in 4 days therefore decided to administer a dose of Miralax at home. She subsequently attempted to assist patient to the bathroom at night with the assistance the patient's walker. Despite all the daughter's efforts the patient was reportedly too weak on her feet resulting in the patient's fall onto her knees. The patient's daughter states that the patient did not strike her head, or lose consciousness, but did note considerable ecchymoses overlying her bilateral knees and lower legs. The patient denies any numbness/tingling/burning in the BLLE. No other bone/joint complaints. At baseline the patient walks minimally at home with the use of a walker and assist. Occasionally uses her wheelchair.   Daughter reports a decline in her overall ambulatory status over the past year. Imaging in the ED revealed bilateral proximal 1/3 tibia fractures with displacement and possible left patella fracture. In the ED Orthopedics was consulted.   (04 Jun 2021 02:34)      Patient is a 89y old  Female who presents with a chief complaint of s/p fall c/o severe bilateral leg pain and inability to ambulate (06 Jun 2021 09:45) Pt found to have  Bilateral tib-fib fractures. Comminuted fractures of the proximal tibia and fibular shafts. Comminuted left patellar fracture.  Comminuted, mildly displaced fracture at the proximal left  tibia. Comminuted, impacted fracture at the left fibular neck.  Partiallyincluded anterior hematoma, Partially included hardware fixation of the left femur with the most distal interlocking screw backed out 0.4 cm laterally.  Anterior subcutaneous hemorrhage.      Consulted by Dr. Deepali De La Torre  for VTE prophylaxis, risk stratification, and anticoagulation management.    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 Kitchen 2017        FAMILY HISTORY:  No pertinent family history in first degree relatives        Interval Note:   6-6-2021 Pt seen at bedside on [address] [place], talking toher daughter Jazmine.  I spoke with daughter on phone and she states her mother normally take 3mg daily of coumadin but recently it was increased to 4.5mg once a week.  States her mother's plts are normally low about 100,000.  Mad her aware of her mothers labs and that today we will hold her ac and resume it tomorrow as long as her mother is stable. Pt is alert and oriented to person and place,  but not able to understand the information given to her.    6-7-2021 Pt seen at bedside on [address] [place], talking toher daughter Jazmine.  I spoke with daughter on phone and she states her mother normally take 3mg daily of coumadin but recently it was increased to 4.5mg once a week.  States her mother's plts are normally low about 100,000.  Mad her aware of her mothers labs and that today we will hold her ac and resume it tomorrow as long as her mother is stable. Pt is alert and oriented to person and place,  but not able to understand the information given to her.            IMPROVE VTE Individual Risk Assessment      [  ] Previous VTE                                                  3    [  ] Thrombophilia                                               2    [  ] Lower limb paralysis                                      2        (unable to hold up >15 seconds)      [  ] Current Cancer                                              2         (within 6 months)    [x  ] Immobilization > 24 hrs                                1    [  ] ICU/CCU stay > 24 hours                              1    [ x ] Age > 60                                                      1    IMPROVE VTE Score ___2______    IMPROVE Score 0-1: Low Risk, No VTE prophylaxis required for most patients, encourage ambulation.   IMPROVE Score 2-3: At risk, pharmacologic VTE prophylaxis is indicated for most patients (in the absence of a contraindication)  IMPROVE Score > or = 4: High Risk, pharmacologic VTE prophylaxis is indicated for most patients (in the absence of a contraindication)      [id number] Score: 5    IMPROVE Bleeding Risk Score:3.5    Falls Risk:   High (x  )  Mod (  )  Low (  )  crcl: 60        cr:   .66       BMI   23.1                Denies any personal or familial history of clotting or bleeding disorders.    Allergies    No Known Allergies    Intolerances        REVIEW OF SYSTEMS    (  )Fever	     (  )Constipation	(  )SOB				(  )Headache	(  )Dysuria  (  )Chills	     (  )Melena	(  )Dyspnea present on exertion	                    (  )Dizziness                    (  )Polyuria  (  )Nausea	     (  )Hematochezia	(  )Cough			                    (  )Syncope   	(  )Hematuria  (  )Vomiting    (  )Chest Pain	(  )Wheezing			(  )Weakness  (x) bl leg pain  (  )Diarrhea     (  )Palpitations	(  )Anorexia			( x )Myalgia    Pertinent positives in HPI and daily subjective.  All other ROS negative.    Vital Signs Last 24 Hrs  T(C): 36.7 (07 Jun 2021 08:28), Max: 37.1 (06 Jun 2021 21:30)  T(F): 98 (07 Jun 2021 08:28), Max: 98.8 (06 Jun 2021 21:30)  HR: 88 (07 Jun 2021 11:13) (85 - 97)  BP: 111/54 (07 Jun 2021 11:13) (100/53 - 133/67)  BP(mean): --  RR: 18 (07 Jun 2021 05:59) (18 - 18)  SpO2: 93% (07 Jun 2021 08:28) (93% - 98%)  PHYSICAL EXAM:    Constitutional: Appears Well    Neurological: A& O x 2 oerson and place.    Skin: Warm    Respiratory and Thorax: normal effort; Breath sounds: normal; No rales/wheezing/rhonchi  	  Cardiovascular: S1, S2, regular, NMBR	    Gastrointestinal: BS + x 4Q, nontender	    Genitourinary:  Bladder nondistended, nontender    Musculoskeletal:   General Right:   no muscle/joint tenderness,   normal tone, no joint swelling,   ROM: limited	    General Left:   no muscle/joint tenderness,   normal tone, no joint swelling,   ROM: limited    Hip:  Right: Dressing CDI; Drain: hemovac ; Type of drng.: serosang.; Amt. of drng: small            Left: Dressing CDI; Drain: hemovac ; Type of drng.: serosang.; Amt. of drng: small      Knee:  Right and left : Dressing CDI ace wrap with splinting, moving all toes, good capillary refill, +sensation   Lower extrems:   Right: no calf tenderness              negative christopher's sign               + pedal pulses    Left:   no calf tenderness              negative christopher's sign               + pedal pulses                            8.0    10.89 )-----------( 73       ( 07 Jun 2021 07:50 )             24.6       06-07    132<L>  |  102  |  12  ----------------------------<  94  4.2   |  24  |  0.67    Ca    8.4<L>      07 Jun 2021 07:50    TPro  5.2<L>  /  Alb  2.6<L>  /  TBili  0.9  /  DBili  x   /  AST  37  /  ALT  37  /  AlkPhos  84  06-07      PT/INR - ( 07 Jun 2021 07:50 )   PT: 14.2 sec;   INR: 1.24 ratio         PTT - ( 07 Jun 2021 07:50 )  PTT:19.5 sec                    8.9    10.39 )-----------( 64       ( 06 Jun 2021 07:19 )             26.5       06-06    134<L>  |  102  |  13  ----------------------------<  99  4.2   |  25  |  0.66    Ca    8.8      06 Jun 2021 07:19        PT/INR - ( 06 Jun 2021 07:19 )   PT: 13.1 sec;   INR: 1.13 ratio         PTT - ( 05 Jun 2021 06:33 )  PTT:26.3 sec				    MEDICATIONS  (STANDING):  losartan 25 milliGRAM(s) Oral daily  mirtazapine 30 milliGRAM(s) Oral at bedtime  polyethylene glycol 3350 17 Gram(s) Oral daily  senna 2 Tablet(s) Oral at bedtime    < from: CT Knee No Cont, Right (06.04.21 @ 03:44) >  IMPRESSION:    Comminuted fractures of the proximal tibia and fibular shafts.  Anterior subcutaneous hemorrhage.    < end of copied text >  < from: CT Knee No Cont, Left (06.04.21 @ 03:41) >  Impression:  Comminuted patellar fracture.  Comminuted, mildly displaced fracture at the proximal tibia.  Comminuted, impacted fracture at the fibular neck.  Partiallyincluded anterior hematoma.  Partially included hardware fixation of the left femur with the most distal interlocking screw backed out 0.4 cm laterally.    < end of copied text >        DVT Prophylaxis:  LMWH                   (  )  Heparin SQ           (  )  Coumadin             (hold  )  Xarelto                  (  )  Eliquis                   (  )  Venodynes           (x  )  Ambulation          (  )  UFH                       (  )  Contraindicated  (  )  EC Aspirin             (  )         HPI:  88 y/o F PMHx significant for early dementia, lymphedema of lower extremities, atrial fibrillation on Coumadin (last INR2.6), hx of proximal femur fracture in January 2021, Hx L hip long IMN with Dr. Kitchen 2017, hearing loss, cystitis, hx of HTN, spinal stenosis, anxiety was BIBA s/p witnessed fall at home with resultant severe bilateral leg pain and inability to ambulate. Of note the patient's daughter states that the patient has not had a bowel movement in 4 days therefore decided to administer a dose of Miralax at home. She subsequently attempted to assist patient to the bathroom at night with the assistance the patient's walker. Despite all the daughter's efforts the patient was reportedly too weak on her feet resulting in the patient's fall onto her knees. The patient's daughter states that the patient did not strike her head, or lose consciousness, but did note considerable ecchymoses overlying her bilateral knees and lower legs. The patient denies any numbness/tingling/burning in the BLLE. No other bone/joint complaints. At baseline the patient walks minimally at home with the use of a walker and assist. Occasionally uses her wheelchair.   Daughter reports a decline in her overall ambulatory status over the past year. Imaging in the ED revealed bilateral proximal 1/3 tibia fractures with displacement and possible left patella fracture. In the ED Orthopedics was consulted.   (04 Jun 2021 02:34)      Patient is a 89y old  Female who presents with a chief complaint of s/p fall c/o severe bilateral leg pain and inability to ambulate (06 Jun 2021 09:45) Pt found to have  Bilateral tib-fib fractures. Comminuted fractures of the proximal tibia and fibular shafts. Comminuted left patellar fracture.  Comminuted, mildly displaced fracture at the proximal left  tibia. Comminuted, impacted fracture at the left fibular neck.  Partiallyincluded anterior hematoma, Partially included hardware fixation of the left femur with the most distal interlocking screw backed out 0.4 cm laterally.  Anterior subcutaneous hemorrhage.      Consulted by Dr. Deepali De La Torre  for VTE prophylaxis, risk stratification, and anticoagulation management.    PAST MEDICAL & SURGICAL HISTORY:  Lymphedema of both lower extremities    Venous insufficiency    Monoclonal gammopathies    Paroxysmal atrial fibrillation on coumaidn    Acute cystitis without hematuria  septic  4/2016    Essential hypertension    Spinal stenosis    Spondyloarthritis    Vaginal prolapse    [place] (hard of hearing), bilateral    Hypertension    S/P kyphoplasty  2014    S/P thyroidectomy  partial   1975    History of vaginal surgery    History of fracture of femur  hx of proximal femur fracture in January 2021    History of repair of left hip joint  Hx L hip long IMN with Dr. Kitchen 2017        FAMILY HISTORY:  No pertinent family history in first degree relatives        Interval Note:   6-6-2021 [place] [address] [place], talking toher daughter Jazmine.  I spoke with daughter on phone and she states her mother normally take 3mg daily of coumadin but recently it was increased to 4.5mg once a week.  States her mother's plts are normally low about 100,000.  Mad her aware of her mothers labs and that today we will hold her ac and resume it tomorrow as long as her mother is stable. Pt is alert and oriented to person and place,  but not able to understand the information given to her.    6-7-2021 Pt [place] [address] [place], awake H&H still low no overt signs of bleeding will start Coumadin if ok with medicine         IMPROVE VTE Individual Risk Assessment      [  ] Previous VTE                                                  3    [  ] Thrombophilia                                               2    [  ] Lower limb paralysis                                      2        (unable to hold up >15 seconds)      [  ] Current Cancer                                              2         (within 6 months)    [x  ] Immobilization > 24 hrs                                1    [  ] ICU/CCU stay > 24 hours                              1    [ x ] Age > 60                                                      1    IMPROVE VTE Score ___2______    IMPROVE Score 0-1: Low Risk, No VTE prophylaxis required for most patients, encourage ambulation.   IMPROVE Score 2-3: At risk, pharmacologic VTE prophylaxis is indicated for most patients (in the absence of a contraindication)  IMPROVE Score > or = 4: High Risk, pharmacologic VTE prophylaxis is indicated for most patients (in the absence of a contraindication)      [id number] Score: 5    IMPROVE Bleeding Risk Score:3.5    Falls Risk:   High (x  )  Mod (  )  Low (  )  crcl: 60        cr:   .66       BMI   23.1                Denies any personal or familial history of clotting or bleeding disorders.    Allergies    No Known Allergies    Intolerances        REVIEW OF SYSTEMS    (  )Fever	     (  )Constipation	(  )SOB				(  )Headache	(  )Dysuria  (  )Chills	     (  )Melena	(  )Dyspnea present on exertion	                    (  )Dizziness                    (  )Polyuria  (  )Nausea	     (  )Hematochezia	(  )Cough			                    (  )Syncope   	(  )Hematuria  (  )Vomiting    (  )Chest Pain	(  )Wheezing			(  )Weakness  (x) bl leg pain  (  )Diarrhea     (  )Palpitations	(  )Anorexia			( x )Myalgia    Pertinent positives in HPI and daily subjective.  All other ROS negative.    Vital Signs Last 24 Hrs  T(C): 36.7 (07 Jun 2021 08:28), Max: 37.1 (06 Jun 2021 21:30)  T(F): 98 (07 Jun 2021 08:28), Max: 98.8 (06 Jun 2021 21:30)  HR: 88 (07 Jun 2021 11:13) (85 - 97)  BP: 111/54 (07 Jun 2021 11:13) (100/53 - 133/67)  BP(mean): --  RR: 18 (07 Jun 2021 05:59) (18 - 18)  SpO2: 93% (07 Jun 2021 08:28) (93% - 98%)  PHYSICAL EXAM:    Constitutional: Appears Well    Neurological: A& O x 2 oerson and place.    Skin: Warm    Respiratory and Thorax: normal effort; Breath sounds: normal; No rales/wheezing/rhonchi  	  Cardiovascular: S1, S2, regular, NMBR	    Gastrointestinal: BS + x 4Q, nontender	    Genitourinary:  Bladder nondistended, nontender    Musculoskeletal:   General Right:   no muscle/joint tenderness,   normal tone, no joint swelling,   ROM: limited	    General Left:   no muscle/joint tenderness,   normal tone, no joint swelling,   ROM: limited    Hip:  Right: Dressing CDI; Drain: hemovac ; Type of drng.: serosang.; Amt. of drng: small            Left: Dressing CDI; Drain: hemovac ; Type of drng.: serosang.; Amt. of drng: small      Knee:  Right and left : Dressing CDI ace wrap with splinting, moving all toes, good capillary refill, +sensation   Lower extrems:   Right: no calf tenderness              negative christopher's sign               + pedal pulses    Left:   no calf tenderness              negative christopher's sign               + pedal pulses                            8.0    10.89 )-----------( 73       ( 07 Jun 2021 07:50 )             24.6       06-07    132<L>  |  102  |  12  ----------------------------<  94  4.2   |  24  |  0.67    Ca    8.4<L>      07 Jun 2021 07:50    TPro  5.2<L>  /  Alb  2.6<L>  /  TBili  0.9  /  DBili  x   /  AST  37  /  ALT  37  /  AlkPhos  84  06-07      PT/INR - ( 07 Jun 2021 07:50 )   PT: 14.2 sec;   INR: 1.24 ratio         PTT - ( 07 Jun 2021 07:50 )  PTT:19.5 sec                    8.9    10.39 )-----------( 64       ( 06 Jun 2021 07:19 )             26.5       06-06    134<L>  |  102  |  13  ----------------------------<  99  4.2   |  25  |  0.66    Ca    8.8      06 Jun 2021 07:19        PT/INR - ( 06 Jun 2021 07:19 )   PT: 13.1 sec;   INR: 1.13 ratio         PTT - ( 05 Jun 2021 06:33 )  PTT:26.3 sec				    MEDICATIONS  (STANDING):  losartan 25 milliGRAM(s) Oral daily  mirtazapine 30 milliGRAM(s) Oral at bedtime  polyethylene glycol 3350 17 Gram(s) Oral daily  senna 2 Tablet(s) Oral at bedtime    < from: CT Knee No Cont, Right (06.04.21 @ 03:44) >  IMPRESSION:    Comminuted fractures of the proximal tibia and fibular shafts.  Anterior subcutaneous hemorrhage.    < end of copied text >  < from: CT Knee No Cont, Left (06.04.21 @ 03:41) >  Impression:  Comminuted patellar fracture.  Comminuted, mildly displaced fracture at the proximal tibia.  Comminuted, impacted fracture at the fibular neck.  Partiallyincluded anterior hematoma.  Partially included hardware fixation of the left femur with the most distal interlocking screw backed out 0.4 cm laterally.    < end of copied text >        DVT Prophylaxis:  LMWH                   (  )  Heparin SQ           (  )  Coumadin             (hold  )  Xarelto                  (  )  Eliquis                   (  )  Venodynes           (x  )  Ambulation          (  )  UFH                       (  )  Contraindicated  (  )  EC Aspirin             (  )         HPI:  88 y/o F PMHx significant for early dementia, lymphedema of lower extremities, atrial fibrillation on Coumadin (last INR2.6), hx of proximal femur fracture in January 2021, Hx L hip long IMN with Dr. Kitchen 2017, hearing loss, cystitis, hx of HTN, spinal stenosis, anxiety was BIBA s/p witnessed fall at home with resultant severe bilateral leg pain and inability to ambulate. Of note the patient's daughter states that the patient has not had a bowel movement in 4 days therefore decided to administer a dose of Miralax at home. She subsequently attempted to assist patient to the bathroom at night with the assistance the patient's walker. Despite all the daughter's efforts the patient was reportedly too weak on her feet resulting in the patient's fall onto her knees. The patient's daughter states that the patient did not strike her head, or lose consciousness, but did note considerable ecchymoses overlying her bilateral knees and lower legs. The patient denies any numbness/tingling/burning in the BLLE. No other bone/joint complaints. At baseline the patient walks minimally at home with the use of a walker and assist. Occasionally uses her wheelchair.   Daughter reports a decline in her overall ambulatory status over the past year. Imaging in the ED revealed bilateral proximal 1/3 tibia fractures with displacement and possible left patella fracture. In the ED Orthopedics was consulted.   (04 Jun 2021 02:34)      Patient is a 89y old  Female who presents with a chief complaint of s/p fall c/o severe bilateral leg pain and inability to ambulate (06 Jun 2021 09:45) Pt found to have  Bilateral tib-fib fractures. Comminuted fractures of the proximal tibia and fibular shafts. Comminuted left patellar fracture.  Comminuted, mildly displaced fracture at the proximal left  tibia. Comminuted, impacted fracture at the left fibular neck.  Partiallyincluded anterior hematoma, Partially included hardware fixation of the left femur with the most distal interlocking screw backed out 0.4 cm laterally.  Anterior subcutaneous hemorrhage.      Consulted by Dr. Deepali De La Torre  for VTE prophylaxis, risk stratification, and anticoagulation management.    PAST MEDICAL & SURGICAL HISTORY:  Lymphedema of both lower extremities    Venous insufficiency    Monoclonal gammopathies    Paroxysmal atrial fibrillation on coumaidn    Acute cystitis without hematuria  septic  4/2016    Essential hypertension    Spinal stenosis    Spondyloarthritis    Vaginal prolapse    [place] (hard of hearing), bilateral    Hypertension    S/P kyphoplasty  2014    S/P thyroidectomy  partial   1975    History of vaginal surgery    History of fracture of femur  hx of proximal femur fracture in January 2021    History of repair of left hip joint  Hx L hip long IMN with Dr. Kicthen 2017        FAMILY HISTORY:  No pertinent family history in first degree relatives        Interval Note:   6-6-2021 [place] [address] [place], talking toher daughter Jazmine.  I spoke with daughter on phone and she states her mother normally take 3mg daily of coumadin but recently it was increased to 4.5mg once a week.  States her mother's plts are normally low about 100,000.  Mad her aware of her mothers labs and that today we will hold her ac and resume it tomorrow as long as her mother is stable. Pt is alert and oriented to person and place,  but not able to understand the information given to her.    6-7-2021 Pt seen at bedside on [address] [place], awake H&H still low no overt signs of bleeding, pending transfusion        IMPROVE VTE Individual Risk Assessment      [  ] Previous VTE                                                  3    [  ] Thrombophilia                                               2    [  ] Lower limb paralysis                                      2        (unable to hold up >15 seconds)      [  ] Current Cancer                                              2         (within 6 months)    [x  ] Immobilization > 24 hrs                                1    [  ] ICU/CCU stay > 24 hours                              1    [ x ] Age > 60                                                      1    IMPROVE VTE Score ___2______    IMPROVE Score 0-1: Low Risk, No VTE prophylaxis required for most patients, encourage ambulation.   IMPROVE Score 2-3: At risk, pharmacologic VTE prophylaxis is indicated for most patients (in the absence of a contraindication)  IMPROVE Score > or = 4: High Risk, pharmacologic VTE prophylaxis is indicated for most patients (in the absence of a contraindication)      [id number] Score: 5    IMPROVE Bleeding Risk Score:3.5    Falls Risk:   High (x  )  Mod (  )  Low (  )  crcl: 60        cr:   .66       BMI   23.1                Denies any personal or familial history of clotting or bleeding disorders.    Allergies    No Known Allergies    Intolerances        REVIEW OF SYSTEMS    (  )Fever	     (  )Constipation	(  )SOB				(  )Headache	(  )Dysuria  (  )Chills	     (  )Melena	(  )Dyspnea present on exertion	                    (  )Dizziness                    (  )Polyuria  (  )Nausea	     (  )Hematochezia	(  )Cough			                    (  )Syncope   	(  )Hematuria  (  )Vomiting    (  )Chest Pain	(  )Wheezing			(  )Weakness  (x) bl leg pain  (  )Diarrhea     (  )Palpitations	(  )Anorexia			( x )Myalgia    Pertinent positives in HPI and daily subjective.  All other ROS negative.    Vital Signs Last 24 Hrs  T(C): 36.7 (07 Jun 2021 08:28), Max: 37.1 (06 Jun 2021 21:30)  T(F): 98 (07 Jun 2021 08:28), Max: 98.8 (06 Jun 2021 21:30)  HR: 88 (07 Jun 2021 11:13) (85 - 97)  BP: 111/54 (07 Jun 2021 11:13) (100/53 - 133/67)  BP(mean): --  RR: 18 (07 Jun 2021 05:59) (18 - 18)  SpO2: 93% (07 Jun 2021 08:28) (93% - 98%)  PHYSICAL EXAM:    Constitutional: Appears Well    Neurological: A& O x 2 oerson and place.    Skin: Warm    Respiratory and Thorax: normal effort; Breath sounds: normal; No rales/wheezing/rhonchi  	  Cardiovascular: S1, S2, regular, NMBR	    Gastrointestinal: BS + x 4Q, nontender	    Genitourinary:  Bladder nondistended, nontender    Musculoskeletal:   General Right:   no muscle/joint tenderness,   normal tone, no joint swelling,   ROM: limited	    General Left:   no muscle/joint tenderness,   normal tone, no joint swelling,   ROM: limited    Hip:  Right: Dressing CDI; Drain: hemovac ; Type of drng.: serosang.; Amt. of drng: small            Left: Dressing CDI; Drain: hemovac ; Type of drng.: serosang.; Amt. of drng: small      Knee:  Right and left : Dressing CDI ace wrap with splinting, moving all toes, good capillary refill, +sensation   Lower extrems:   Right: no calf tenderness              negative christopher's sign               + pedal pulses    Left:   no calf tenderness              negative christopher's sign               + pedal pulses                            8.0    10.89 )-----------( 73       ( 07 Jun 2021 07:50 )             24.6       06-07    132<L>  |  102  |  12  ----------------------------<  94  4.2   |  24  |  0.67    Ca    8.4<L>      07 Jun 2021 07:50    TPro  5.2<L>  /  Alb  2.6<L>  /  TBili  0.9  /  DBili  x   /  AST  37  /  ALT  37  /  AlkPhos  84  06-07      PT/INR - ( 07 Jun 2021 07:50 )   PT: 14.2 sec;   INR: 1.24 ratio         PTT - ( 07 Jun 2021 07:50 )  PTT:19.5 sec                    8.9    10.39 )-----------( 64       ( 06 Jun 2021 07:19 )             26.5       06-06    134<L>  |  102  |  13  ----------------------------<  99  4.2   |  25  |  0.66    Ca    8.8      06 Jun 2021 07:19        PT/INR - ( 06 Jun 2021 07:19 )   PT: 13.1 sec;   INR: 1.13 ratio         PTT - ( 05 Jun 2021 06:33 )  PTT:26.3 sec				    MEDICATIONS  (STANDING):  losartan 25 milliGRAM(s) Oral daily  mirtazapine 30 milliGRAM(s) Oral at bedtime  polyethylene glycol 3350 17 Gram(s) Oral daily  senna 2 Tablet(s) Oral at bedtime    < from: CT Knee No Cont, Right (06.04.21 @ 03:44) >  IMPRESSION:    Comminuted fractures of the proximal tibia and fibular shafts.  Anterior subcutaneous hemorrhage.    < end of copied text >  < from: CT Knee No Cont, Left (06.04.21 @ 03:41) >  Impression:  Comminuted patellar fracture.  Comminuted, mildly displaced fracture at the proximal tibia.  Comminuted, impacted fracture at the fibular neck.  Partiallyincluded anterior hematoma.  Partially included hardware fixation of the left femur with the most distal interlocking screw backed out 0.4 cm laterally.    < end of copied text >        DVT Prophylaxis:  LMWH                   (  )  Heparin SQ           (  )  Coumadin             (hold  )  Xarelto                  (  )  Eliquis                   (  )  Venodynes           (x  )  Ambulation          (  )  UFH                       (  )  Contraindicated  (  )  EC Aspirin             (  )

## 2021-06-07 NOTE — PROGRESS NOTE ADULT - SUBJECTIVE AND OBJECTIVE BOX
Orthopedics    Patient seen and examined at bedside, resting comfortably. No acute events overnight. Pain adequately controlled. Patient feeling well. Denies CP/SOB. No nausea or vomiting. No other acute complaints at this time    Vital Signs Last 24 Hrs  T(C): 36.8 (06-07-21 @ 05:59), Max: 37.1 (06-06-21 @ 21:30)  T(F): 98.2 (06-07-21 @ 05:59), Max: 98.8 (06-06-21 @ 21:30)  HR: 87 (06-07-21 @ 06:56) (85 - 98)  BP: 118/60 (06-07-21 @ 05:59) (118/60 - 135/82)  BP(mean): --  RR: 18 (06-07-21 @ 05:59) (18 - 18)  SpO2: 98% (06-07-21 @ 05:59) (98% - 99%)                        8.7    x     )-----------( x        ( 06 Jun 2021 18:03 )             26.2     06 Jun 2021 07:19    134    |  102    |  13     ----------------------------<  99     4.2     |  25     |  0.66     Ca    8.8        06 Jun 2021 07:19  PT/INR - ( 06 Jun 2021 07:19 )   PT: 13.1 sec;   INR: 1.13 ratio        Exam:  Gen: NAD, Awake and alert, following commands  BLLE  Long leg trilam splints in place - Windowed anterior for skin/compartment checks, hematoma stable, swelling stable/improving  +EHL/FHL   SILT L2/3/5  Brisk cap refill, toes warm/wellperfused  Calf Soft NT  Palpable compartments soft and compressible

## 2021-06-07 NOTE — CONSULT NOTE ADULT - CONSULT REASON
bilateral tibia fractures
syncope
LE tremors
fall, Hx Afib, risk stratification and anticoagulation management
Anemia

## 2021-06-08 LAB
ANION GAP SERPL CALC-SCNC: 5 MMOL/L — SIGNIFICANT CHANGE UP (ref 5–17)
BUN SERPL-MCNC: 18 MG/DL — SIGNIFICANT CHANGE UP (ref 7–23)
CALCIUM SERPL-MCNC: 8.6 MG/DL — SIGNIFICANT CHANGE UP (ref 8.5–10.1)
CHLORIDE SERPL-SCNC: 101 MMOL/L — SIGNIFICANT CHANGE UP (ref 96–108)
CO2 SERPL-SCNC: 26 MMOL/L — SIGNIFICANT CHANGE UP (ref 22–31)
CREAT SERPL-MCNC: 0.6 MG/DL — SIGNIFICANT CHANGE UP (ref 0.5–1.3)
GLUCOSE SERPL-MCNC: 92 MG/DL — SIGNIFICANT CHANGE UP (ref 70–99)
HCT VFR BLD CALC: 27.3 % — LOW (ref 34.5–45)
HGB BLD-MCNC: 9 G/DL — LOW (ref 11.5–15.5)
INR BLD: 1.18 RATIO — HIGH (ref 0.88–1.16)
MCHC RBC-ENTMCNC: 30.6 PG — SIGNIFICANT CHANGE UP (ref 27–34)
MCHC RBC-ENTMCNC: 33 GM/DL — SIGNIFICANT CHANGE UP (ref 32–36)
MCV RBC AUTO: 92.9 FL — SIGNIFICANT CHANGE UP (ref 80–100)
OB PNL STL: NEGATIVE — SIGNIFICANT CHANGE UP
PLATELET # BLD AUTO: 104 K/UL — LOW (ref 150–400)
POTASSIUM SERPL-MCNC: 4.1 MMOL/L — SIGNIFICANT CHANGE UP (ref 3.5–5.3)
POTASSIUM SERPL-SCNC: 4.1 MMOL/L — SIGNIFICANT CHANGE UP (ref 3.5–5.3)
PROTHROM AB SERPL-ACNC: 13.7 SEC — HIGH (ref 10.6–13.6)
RBC # BLD: 2.94 M/UL — LOW (ref 3.8–5.2)
RBC # FLD: 14.3 % — SIGNIFICANT CHANGE UP (ref 10.3–14.5)
SODIUM SERPL-SCNC: 132 MMOL/L — LOW (ref 135–145)
WBC # BLD: 9.41 K/UL — SIGNIFICANT CHANGE UP (ref 3.8–10.5)
WBC # FLD AUTO: 9.41 K/UL — SIGNIFICANT CHANGE UP (ref 3.8–10.5)

## 2021-06-08 PROCEDURE — 99231 SBSQ HOSP IP/OBS SF/LOW 25: CPT

## 2021-06-08 PROCEDURE — 99233 SBSQ HOSP IP/OBS HIGH 50: CPT

## 2021-06-08 RX ORDER — WARFARIN SODIUM 2.5 MG/1
4 TABLET ORAL ONCE
Refills: 0 | Status: COMPLETED | OUTPATIENT
Start: 2021-06-08 | End: 2021-06-08

## 2021-06-08 RX ADMIN — Medication 650 MILLIGRAM(S): at 05:55

## 2021-06-08 RX ADMIN — Medication 650 MILLIGRAM(S): at 10:50

## 2021-06-08 RX ADMIN — WARFARIN SODIUM 4 MILLIGRAM(S): 2.5 TABLET ORAL at 22:05

## 2021-06-08 RX ADMIN — POLYETHYLENE GLYCOL 3350 17 GRAM(S): 17 POWDER, FOR SOLUTION ORAL at 09:56

## 2021-06-08 RX ADMIN — LOSARTAN POTASSIUM 25 MILLIGRAM(S): 100 TABLET, FILM COATED ORAL at 09:56

## 2021-06-08 RX ADMIN — QUETIAPINE FUMARATE 12.5 MILLIGRAM(S): 200 TABLET, FILM COATED ORAL at 20:42

## 2021-06-08 RX ADMIN — Medication 650 MILLIGRAM(S): at 22:06

## 2021-06-08 RX ADMIN — Medication 650 MILLIGRAM(S): at 09:56

## 2021-06-08 RX ADMIN — SENNA PLUS 2 TABLET(S): 8.6 TABLET ORAL at 22:05

## 2021-06-08 RX ADMIN — Medication 25 MILLIGRAM(S): at 09:56

## 2021-06-08 RX ADMIN — MIRTAZAPINE 15 MILLIGRAM(S): 45 TABLET, ORALLY DISINTEGRATING ORAL at 22:06

## 2021-06-08 NOTE — PROGRESS NOTE ADULT - ASSESSMENT
This is a 89 year old female with pmh of atrial fibrillation on Coumadin, [id number] Score: 5, s/p fall on 6-4-2021, causing fx of bl tib/fib and left patella.  s/p -closded reduction and splinting by ortho.  Pt's INR on admission was 3.54  given 5mg vit k po and k-centra, noted  Pt has both high thrombosis and bleed risk.  Requires anticoagulation treatment dose but will hold today and revaluate pt tomorrow    6-6-2021: Made Dr. De La Torre aware of plan and she agrees.    6/7: INR today 1.24, plts  slightly improved from yesterday 73,000 , patient is high risk for bleeding and clotting, not recommended for bridging, spoke with Hospitalist NP will start coumadin today  .      Plan:  : Continue Coumadin 3mg tonight adjust dosage as per INR  : Monitor for bleeding   :daily cbc/ bmp, pt/inr  : mobilty as per ortho  : will f/u

## 2021-06-08 NOTE — PROGRESS NOTE ADULT - SUBJECTIVE AND OBJECTIVE BOX
HPI:  89 y/o F PMHx significant for early dementia, lymphedema of lower extremities, atrial fibrillation on Coumadin (last INR2.6), hx of proximal femur fracture in January 2021, Hx L hip long IMN with Dr. Kitchen 2017, hearing loss, cystitis, hx of HTN, spinal stenosis, anxiety was BIBA s/p witnessed fall at home with resultant severe bilateral leg pain and inability to ambulate. Of note the patient's daughter states that the patient has not had a bowel movement in 4 days therefore decided to administer a dose of Miralax at home. She subsequently attempted to assist patient to the bathroom at night with the assistance the patient's walker. Despite all the daughter's efforts the patient was reportedly too weak on her feet resulting in the patient's fall onto her knees. The patient's daughter states that the patient did not strike her head, or lose consciousness, but did note considerable ecchymoses overlying her bilateral knees and lower legs. The patient denies any numbness/tingling/burning in the BLLE. No other bone/joint complaints. At baseline the patient walks minimally at home with the use of a walker and assist. Occasionally uses her wheelchair.   Daughter reports a decline in her overall ambulatory status over the past year. Imaging in the ED revealed bilateral proximal 1/3 tibia fractures with displacement and possible left patella fracture. In the ED Orthopedics was consulted.   (04 Jun 2021 02:34)      Patient is a 89y old  Female who presents with a chief complaint of s/p fall c/o severe bilateral leg pain and inability to ambulate (06 Jun 2021 09:45) Pt found to have  Bilateral tib-fib fractures. Comminuted fractures of the proximal tibia and fibular shafts. Comminuted left patellar fracture.  Comminuted, mildly displaced fracture at the proximal left  tibia. Comminuted, impacted fracture at the left fibular neck.  Partiallyincluded anterior hematoma, Partially included hardware fixation of the left femur with the most distal interlocking screw backed out 0.4 cm laterally.  Anterior subcutaneous hemorrhage.      Consulted by Dr. Deepali De La Torre  for VTE prophylaxis, risk stratification, and anticoagulation management.    PAST MEDICAL & SURGICAL HISTORY:  Lymphedema of both lower extremities    Venous insufficiency    Monoclonal gammopathies    Paroxysmal atrial fibrillation on coumaidn    Acute cystitis without hematuria  septic  4/2016    Essential hypertension    Spinal stenosis    Spondyloarthritis    Vaginal prolapse    [place] (hard of hearing), bilateral    Hypertension    S/P kyphoplasty  2014    S/P thyroidectomy  partial   1975    History of vaginal surgery    History of fracture of femur  hx of proximal femur fracture in January 2021    History of repair of left hip joint  Hx L hip long IMN with Dr. Kitchen 2017        FAMILY HISTORY:  No pertinent family history in first degree relatives        Interval Note:   6-6-2021 Pt seen at bedside on 3 [address], talking toher daughter Jazmine.  I spoke with daughter on phone and she states her mother normally take 3mg daily of coumadin but recently it was increased to 4.5mg once a week.  States her mother's plts are normally low about 100,000.  Mad her aware of her mothers labs and that today we will hold her ac and resume it tomorrow as long as her mother is stable. Pt is alert and oriented to person and place,  but not able to understand the information given to her.    6-7-2021 Pt seen at bedside on 3 [address], awake H&H still low no overt signs of bleeding, pending transfusion,  6-8-2021: pt seen at bedside, awake, pleasantly confused, H&H better after transfusion, INR still subtherapeutic will continue on Coumadin.        IMPROVE VTE Individual Risk Assessment      [  ] Previous VTE                                                  3    [  ] Thrombophilia                                               2    [  ] Lower limb paralysis                                      2        (unable to hold up >15 seconds)      [  ] Current Cancer                                              2         (within 6 months)    [x  ] Immobilization > 24 hrs                                1    [  ] ICU/CCU stay > 24 hours                              1    [ x ] Age > 60                                                      1    IMPROVE VTE Score ___2______    IMPROVE Score 0-1: Low Risk, No VTE prophylaxis required for most patients, encourage ambulation.   IMPROVE Score 2-3: At risk, pharmacologic VTE prophylaxis is indicated for most patients (in the absence of a contraindication)  IMPROVE Score > or = 4: High Risk, pharmacologic VTE prophylaxis is indicated for most patients (in the absence of a contraindication)      [id number] Score: 5    IMPROVE Bleeding Risk Score:3.5    Falls Risk:   High (x  )  Mod (  )  Low (  )  crcl: 60        cr:   .66       BMI   23.1                Denies any personal or familial history of clotting or bleeding disorders.    Allergies    No Known Allergies    Intolerances        REVIEW OF SYSTEMS    (  )Fever	     (  )Constipation	(  )SOB				(  )Headache	(  )Dysuria  (  )Chills	     (  )Melena	(  )Dyspnea present on exertion	                    (  )Dizziness                    (  )Polyuria  (  )Nausea	     (  )Hematochezia	(  )Cough			                    (  )Syncope   	(  )Hematuria  (  )Vomiting    (  )Chest Pain	(  )Wheezing			(  )Weakness  (x) bl leg pain  (  )Diarrhea     (  )Palpitations	(  )Anorexia			( x )Myalgia    Pertinent positives in HPI and daily subjective.  All other ROS negative.    Vital Signs Last 24 Hrs  T(C): 36.7 (08 Jun 2021 08:34), Max: 37.1 (07 Jun 2021 15:09)  T(F): 98.1 (08 Jun 2021 08:34), Max: 98.7 (07 Jun 2021 15:09)  HR: 79 (08 Jun 2021 08:34) (75 - 89)  BP: 121/63 (08 Jun 2021 08:34) (106/44 - 135/56)  BP(mean): --  RR: 18 (08 Jun 2021 08:34) (18 - 18)  SpO2: 94% (08 Jun 2021 08:34) (91% - 95%)  PHYSICAL EXAM:    Constitutional: Appears Well    Neurological: A& O x 2 oerson and place.    Skin: Warm    Respiratory and Thorax: normal effort; Breath sounds: normal; No rales/wheezing/rhonchi  	  Cardiovascular: S1, S2, regular, NMBR	    Gastrointestinal: BS + x 4Q, nontender	    Genitourinary:  Bladder nondistended, nontender    Musculoskeletal:   General Right:   no muscle/joint tenderness,   normal tone, no joint swelling,   ROM: limited	    General Left:   no muscle/joint tenderness,   normal tone, no joint swelling,   ROM: limited    Hip:  Right: Dressing CDI; Drain: hemovac ; Type of drng.: serosang.; Amt. of drng: small            Left: Dressing CDI; Drain: hemovac ; Type of drng.: serosang.; Amt. of drng: small      Knee:  Right and left : Dressing CDI ace wrap with splinting, moving all toes, good capillary refill, +sensation   Lower extrems:   Right: no calf tenderness              negative christopher's sign               + pedal pulses    Left:   no calf tenderness              negative christopher's sign               + pedal pulses                            9.0    9.41  )-----------( 104      ( 08 Jun 2021 07:52 )             27.3       06-08    132<L>  |  101  |  18  ----------------------------<  92  4.1   |  26  |  0.60    Ca    8.6      08 Jun 2021 07:52    TPro  5.2<L>  /  Alb  2.6<L>  /  TBili  0.9  /  DBili  x   /  AST  37  /  ALT  37  /  AlkPhos  84  06-07              PTT - ( 07 Jun 2021 07:50 )  PTT:19.5 sec                      8.0    10.89 )-----------( 73       ( 07 Jun 2021 07:50 )             24.6       06-07    132<L>  |  102  |  12  ----------------------------<  94  4.2   |  24  |  0.67    Ca    8.4<L>      07 Jun 2021 07:50    TPro  5.2<L>  /  Alb  2.6<L>  /  TBili  0.9  /  DBili  x   /  AST  37  /  ALT  37  /  AlkPhos  84  06-07    PT/INR - ( 08 Jun 2021 07:52 )   PT: 13.7 sec;   INR: 1.18 ratio   PT/INR - ( 07 Jun 2021 07:50 )   PT: 14.2 sec;   INR: 1.24 ratio         PTT - ( 07 Jun 2021 07:50 )  PTT:19.5 sec                    8.9    10.39 )-----------( 64       ( 06 Jun 2021 07:19 )             26.5       06-06    134<L>  |  102  |  13  ----------------------------<  99  4.2   |  25  |  0.66    Ca    8.8      06 Jun 2021 07:19        PT/INR - ( 06 Jun 2021 07:19 )   PT: 13.1 sec;   INR: 1.13 ratio         PTT - ( 05 Jun 2021 06:33 )  PTT:26.3 sec				    MEDICATIONS  (STANDING):  losartan 25 milliGRAM(s) Oral daily  mirtazapine 30 milliGRAM(s) Oral at bedtime  polyethylene glycol 3350 17 Gram(s) Oral daily  senna 2 Tablet(s) Oral at bedtime    < from: CT Knee No Cont, Right (06.04.21 @ 03:44) >  IMPRESSION:    Comminuted fractures of the proximal tibia and fibular shafts.  Anterior subcutaneous hemorrhage.    < end of copied text >  < from: CT Knee No Cont, Left (06.04.21 @ 03:41) >  Impression:  Comminuted patellar fracture.  Comminuted, mildly displaced fracture at the proximal tibia.  Comminuted, impacted fracture at the fibular neck.  Partiallyincluded anterior hematoma.  Partially included hardware fixation of the left femur with the most distal interlocking screw backed out 0.4 cm laterally.    < end of copied text >        DVT Prophylaxis:  LMWH                   (  )  Heparin SQ           (  )  Coumadin             (hold  )  Xarelto                  (  )  Eliquis                   (  )  Venodynes           (x  )  Ambulation          (  )  UFH                       (  )  Contraindicated  (  )  EC Aspirin             (  )

## 2021-06-08 NOTE — PROGRESS NOTE ADULT - ASSESSMENT
1. near syncope, no evidence of bradyarrlythmia on tele  2. afib, stable  3. htn stable  4. bilateral tib/fib fractures    Plan:  cont coumadin, losartan  stable for transfer to rehab from [facility]  will folow prn only

## 2021-06-08 NOTE — PROGRESS NOTE ADULT - SUBJECTIVE AND OBJECTIVE BOX
Patient is a 89y old  Female who presents with a chief complaint of s/p fall c/o severe bilateral leg pain and inability to ambulate (08 Jun 2021 06:33)      HPI:  88 y/o F PMHx significant for early dementia, lymphedema of lower extremities, atrial fibrillation on Coumadin (last INR2.6), hx of proximal femur fracture in January 2021, Hx L hip long IMN with Dr. Kitchen 2017, hearing loss, cystitis, hx of HTN, spinal stenosis, anxiety was BIBA s/p witnessed fall at home with resultant severe bilateral leg pain and inability to ambulate. Of note the patient's daughter states that the patient has not had a bowel movement in 4 days therefore decided to administer a dose of Miralax at home. She subsequently attempted to assist patient to the bathroom at night with the assistance the patient's walker. Despite all the daughter's efforts the patient was reportedly too weak on her feet resulting in the patient's fall onto her knees. The patient's daughter states that the patient did not strike her head, or lose consciousness, but did note considerable ecchymoses overlying her bilateral knees and lower legs. The patient denies any numbness/tingling/burning in the BLLE. No other bone/joint complaints. At baseline the patient walks minimally at home with the use of a walker and assist. Occasionally uses her wheelchair.   Daughter reports a decline in her overall ambulatory status over the past year. Imaging in the ED revealed bilateral proximal 1/3 tibia fractures with displacement and possible left patella fracture. In the ED Orthopedics was consulted.   (04 Jun 2021 02:34)  6/4 Cardiology. 89 year old female admitted with near syncope. felt weak ambulating to BR, fell back and onto knees. denies LOC. denies chest pain or dyspnea. Patient noted to have bilat tib fib fractures. Patient has past history of afib, permanent and htn.  6/5 events noted. severe anemia likely due to bilat thigh hematoma. alert, no complaints  6/6 alert, no new complaints  6/7 alert, no new complaints  6/8 no new complaints    PAST MEDICAL & SURGICAL HISTORY:  Lymphedema of both lower extremities    Venous insufficiency    Monoclonal gammopathies    Paroxysmal atrial fibrillation    Acute cystitis without hematuria  septic  4/2016    Essential hypertension    Spinal stenosis    Spondyloarthritis    Vaginal prolapse    [place] (hard of hearing), bilateral    Hypertension    S/P kyphoplasty  2014    S/P thyroidectomy  partial   1975    History of vaginal surgery    History of fracture of femur  hx of proximal femur fracture in January 2021    History of repair of left hip joint  Hx L hip long IMN with Dr. Kitchen 2017          MEDICATIONS  (STANDING):  acetaminophen   Tablet .. 650 milliGRAM(s) Oral every 8 hours  losartan 25 milliGRAM(s) Oral daily  metoprolol succinate ER 25 milliGRAM(s) Oral daily  mirtazapine 15 milliGRAM(s) Oral at bedtime  polyethylene glycol 3350 17 Gram(s) Oral daily  senna 2 Tablet(s) Oral at bedtime    MEDICATIONS  (PRN):  acetaminophen   Tablet .. 650 milliGRAM(s) Oral every 6 hours PRN Mild Pain (1 - 3)  melatonin 3 milliGRAM(s) Oral at bedtime PRN Insomnia  morphine  - Injectable 2 milliGRAM(s) IV Push every 4 hours PRN Severe Pain (7 - 10)  ondansetron Injectable 4 milliGRAM(s) IV Push every 6 hours PRN Nausea and/or Vomiting  oxyCODONE    IR 2.5 milliGRAM(s) Oral every 4 hours PRN Moderate Pain (4 - 6)  QUEtiapine 12.5 milliGRAM(s) Oral every 8 hours PRN anxiety, agitation          Vital Signs Last 24 Hrs  T(C): 36.8 (08 Jun 2021 05:52), Max: 37.1 (07 Jun 2021 15:09)  T(F): 98.2 (08 Jun 2021 05:52), Max: 98.7 (07 Jun 2021 15:09)  HR: 75 (08 Jun 2021 05:52) (75 - 92)  BP: 131/55 (08 Jun 2021 05:52) (100/53 - 135/56)  BP(mean): --  RR: 18 (08 Jun 2021 05:52) (18 - 18)  SpO2: 93% (08 Jun 2021 05:52) (91% - 96%)    I&O's Summary    07 Jun 2021 07:01  -  08 Jun 2021 07:00  --------------------------------------------------------  IN: 0 mL / OUT: 1220 mL / NET: -1220 mL        PHYSICAL EXAM  General Appearance: NAD  HEENT:   Neck:   Back:   Lungs: clr  Heart: IRR s1s2  Abdomen:   Extremities: wrapped         INTERPRETATION OF TELEMETRY:    ECG:        LABS:                          9.3    x     )-----------( x        ( 07 Jun 2021 18:44 )             28.0     06-07    132<L>  |  102  |  12  ----------------------------<  94  4.2   |  24  |  0.67    Ca    8.4<L>      07 Jun 2021 07:50    TPro  5.2<L>  /  Alb  2.6<L>  /  TBili  0.9  /  DBili  x   /  AST  37  /  ALT  37  /  AlkPhos  84  06-07          Pro BNP  -- 06-04 @ 16:56  D Dimer  [id number] 06-04 @ 16:56    PT/INR - ( 07 Jun 2021 07:50 )   PT: 14.2 sec;   INR: 1.24 ratio         PTT - ( 07 Jun 2021 07:50 )  PTT:19.5 sec          RADIOLOGY & ADDITIONAL STUDIES:

## 2021-06-08 NOTE — PROGRESS NOTE ADULT - ASSESSMENT
A/P:  Patient is a 89y Female w/ L Patella fx & BLLE proximal tib/fib fxs sp closed reduction and splint     -Medical management per primary team  -NWB BLLE in long leg trilam splints  -Keep splints c/d/i  -Ice/Elevate  -Incentive Spirometry  -Multimodal Analgesia  -DVT PE ppx - Coumadin  -SCDs  -PT/OT OOBTC  -Ortho stable  -FU outpatient 1-2 weeks after discharge. call office to schedule appointment.  -Discharge planning - Home  -Will discuss w/ attending and advise if plan changes

## 2021-06-08 NOTE — PROGRESS NOTE ADULT - SUBJECTIVE AND OBJECTIVE BOX
CC: s/p fall c/o severe bilateral leg pain and inability to ambulate     HPI: 89 y/o F PMHx significant for early dementia, lymphedema of lower extremities, atrial fibrillation on Coumadin (last INR2.6), hx of proximal femur fracture in January 2021, Hx L hip long IMN with Dr. Kitchen 2017, hearing loss, cystitis, hx of HTN, spinal stenosis, anxiety admitted on 6/4/21  s/p witnessed fall at home with resultant severe bilateral leg pain and inability to ambulate. Of note the patient's daughter states that the patient has not had a bowel movement in 4 days therefore decided to administer a dose of Miralax at home. She subsequently attempted to assist patient to the bathroom at night with the assistance the patient's walker. Despite all the daughter's efforts the patient was reportedly too weak on her feet resulting in the patient's fall onto her knees. The patient's daughter states that the patient did not strike her head, or lose consciousness, but did note considerable ecchymoses overlying her bilateral knees and lower legs.  At baseline the patient walks minimally at home with the use of a walker and assist. Occasionally uses her wheelchair.   Daughter reports a decline in her overall ambulatory status over the past year. Imaging in the ED revealed bilateral proximal 1/3 tibia fractures with displacement and possible left patella fracture. In the ED Orthopedics was consulted.      Medical progress: deconditioned, frail, patient hemodynamically stable. No HA, CP, SOB. Extensive time spent with patient and patient's family.   Complaints: no new complaints  State of mind: at times confused    REVIEW OF SYSTEMS:  All other review of systems is negative unless indicated above    PHYSICAL EXAM:  T(C): 36.7 (08 Jun 2021 08:34), Max: 37.1 (07 Jun 2021 16:18)  T(F): 98.1 (08 Jun 2021 08:34), Max: 98.7 (07 Jun 2021 16:18)  HR: 79 (08 Jun 2021 08:34) (75 - 89)  BP: 121/63 (08 Jun 2021 08:34) (110/43 - 135/56)  RR: 18 (08 Jun 2021 08:34) (18 - 18)  SpO2: 94% (08 Jun 2021 08:34) (91% - 94%)  General: female in no acute distress  Eyes: PERRLA, EOMI; conjunctiva and sclera clear  Head: Normocephalic; atraumatic  ENMT: No nasal discharge; airway clear  Neck: Supple; non tender; no masses  Respiratory: No wheezes, rales or rhonchi  Cardiovascular: S1, S2 irreg with II/VI FARZANEH  Gastrointestinal: Soft non-tender non-distended; Normal bowel sounds  Genitourinary: No costovertebral angle tenderness, FQ to gravity  Extremities: BL LE in splints, able to move toes  Vascular: Peripheral pulses palpable 2+ bilaterally  Neurological: Alert, confused  Skin: Warm and dry. Pale.  Musculoskeletal: Normal tone  Psychiatric: Cooperative           Labs:         CBC Full  -  ( 08 Jun 2021 07:52 )  WBC Count : 9.41 K/uL  RBC Count : 2.94 M/uL  Hemoglobin : 9.0 g/dL  Hematocrit : 27.3 %  Platelet Count - Automated : 104 K/uL  Mean Cell Volume : 92.9 fl  Mean Cell Hemoglobin : 30.6 pg  Mean Cell Hemoglobin Concentration : 33.0 gm/dL  Auto Neutrophil # : x  Auto Lymphocyte # : x  Auto Monocyte # : x  Auto Eosinophil # : x  Auto Basophil # : x  Auto Neutrophil % : x  Auto Lymphocyte % : x  Auto Monocyte % : x  Auto Eosinophil % : x  Auto Basophil % : x    PT/INR - ( 08 Jun 2021 07:52 )   PT: 13.7 sec;   INR: 1.18 ratio         PTT - ( 07 Jun 2021 07:50 )  PTT:19.5 sec    06-08    132<L>  |  101  |  18  ----------------------------<  92  4.1   |  26  |  0.60    Ca    8.6      08 Jun 2021 07:52    TPro  5.2<L>  /  Alb  2.6<L>  /  TBili  0.9  /  DBili  x   /  AST  37  /  ALT  37  /  AlkPhos  84  06-07    89 y/o F PMHx significant for early dementia, lymphedema of lower extremities, atrial fibrillation on Coumadin (last INR2.6), hx of proximal femur fracture in January 2021, Hx L hip long IMN with Dr. Kitchen 2017, hearing loss, cystitis, hx of HTN, spinal stenosis, anxiety was BIBA s/p witnessed fall at home with resultant severe bilateral leg pain and inability to ambulate    # BL knee Fx with tibia/fibula Fx and patellas  - w/ L Patella fx & BLLE proximal tib/fib fxs sp closed reduction and splint   - NWB BLLE in long leg trilam splints  - Keep splints c/d/i  - Ice/Elevate  - Incentive Spirometry  - Multimodal Analgesia  - FU outpatient 1-2 weeks after discharge. call office to schedule appointment.    # Acute blood loss anemia secondary to hematoma  # Right knee hematoma on coumadin with anemia of ABL   - no further bleeding  - INR 1.1  - total of 3 units of PRBCs    # Afib on VKA , h/o CVA s/p reversal - ortho cleared pt  to resume VKA, high CHADVASC score  - coumadin with currently subtheurapeutic INR  - monitor INR    # Unstable gait/Hands tremor  - multifactorial, at present exam is limited as legs are splinted  - Pt will need MRI brain - can have open MRI as OP once D/C  - Tremors of hands; most likely benign- at present clinically not C/W Parkinsons ds, however exam is limited  - Neurology consult appreciated: - f/u after 2-3 months once knees heal     # Constipation   - resolved    # HTN   - BP stable now, restart toprol xl 25 , losartan 25     # Dementia with depression and anxiety   - wellbutrin causes constipation  - will hold, c/w remeron lower dose 15 ( pt also getting opioids) , seroquel 12.5 q8h prn as needed  Psych consult to adjust anxiety meds

## 2021-06-08 NOTE — PROGRESS NOTE ADULT - SUBJECTIVE AND OBJECTIVE BOX
Patient seen and examined at bedside, resting comfortably. Pain well controlled. Denies chest pain, dyspnea, numbness/tingling. No complaints at this time. No overnight events.    LABS:                        9.3    x     )-----------( x        ( 07 Jun 2021 18:44 )             28.0     06-07    132<L>  |  102  |  12  ----------------------------<  94  4.2   |  24  |  0.67    Ca    8.4<L>      07 Jun 2021 07:50    TPro  5.2<L>  /  Alb  2.6<L>  /  TBili  0.9  /  DBili  x   /  AST  37  /  ALT  37  /  AlkPhos  84  06-07    PT/INR - ( 07 Jun 2021 07:50 )   PT: 14.2 sec;   INR: 1.24 ratio         PTT - ( 07 Jun 2021 07:50 )  PTT:19.5 sec      VITAL SIGNS:  T(C): 36.8 (06-08-21 @ 05:52), Max: 37.1 (06-07-21 @ 15:09)  HR: 75 (06-08-21 @ 05:52) (75 - 92)  BP: 131/55 (06-08-21 @ 05:52) (100/53 - 135/56)  RR: 18 (06-08-21 @ 05:52) (18 - 18)  SpO2: 93% (06-08-21 @ 05:52) (91% - 96%)      PHYSICAL EXAM:  General: No acute distress, AAOx3    BLLE Exam:  Long leg trilam splints in place - Windowed anterior for skin/compartment checks, hematoma stable, swelling stable/improving  +EHL/FHL   Wiggles Toes  SILT L2/3/5  Toes warm/well perfused  Calf Soft NT  Palpable compartments soft and compressible

## 2021-06-09 LAB
ANION GAP SERPL CALC-SCNC: 5 MMOL/L — SIGNIFICANT CHANGE UP (ref 5–17)
APTT BLD: 27 SEC — LOW (ref 27.5–35.5)
BUN SERPL-MCNC: 19 MG/DL — SIGNIFICANT CHANGE UP (ref 7–23)
CALCIUM SERPL-MCNC: 8.6 MG/DL — SIGNIFICANT CHANGE UP (ref 8.5–10.1)
CHLORIDE SERPL-SCNC: 101 MMOL/L — SIGNIFICANT CHANGE UP (ref 96–108)
CO2 SERPL-SCNC: 26 MMOL/L — SIGNIFICANT CHANGE UP (ref 22–31)
CREAT SERPL-MCNC: 0.66 MG/DL — SIGNIFICANT CHANGE UP (ref 0.5–1.3)
GLUCOSE SERPL-MCNC: 88 MG/DL — SIGNIFICANT CHANGE UP (ref 70–99)
HCT VFR BLD CALC: 28.3 % — LOW (ref 34.5–45)
HGB BLD-MCNC: 9.1 G/DL — LOW (ref 11.5–15.5)
INR BLD: 1.16 RATIO — SIGNIFICANT CHANGE UP (ref 0.88–1.16)
MCHC RBC-ENTMCNC: 30.5 PG — SIGNIFICANT CHANGE UP (ref 27–34)
MCHC RBC-ENTMCNC: 32.2 GM/DL — SIGNIFICANT CHANGE UP (ref 32–36)
MCV RBC AUTO: 95 FL — SIGNIFICANT CHANGE UP (ref 80–100)
PLATELET # BLD AUTO: 128 K/UL — LOW (ref 150–400)
POTASSIUM SERPL-MCNC: 4.4 MMOL/L — SIGNIFICANT CHANGE UP (ref 3.5–5.3)
POTASSIUM SERPL-SCNC: 4.4 MMOL/L — SIGNIFICANT CHANGE UP (ref 3.5–5.3)
PROTHROM AB SERPL-ACNC: 13.4 SEC — SIGNIFICANT CHANGE UP (ref 10.6–13.6)
RBC # BLD: 2.98 M/UL — LOW (ref 3.8–5.2)
RBC # FLD: 14.6 % — HIGH (ref 10.3–14.5)
SARS-COV-2 RNA SPEC QL NAA+PROBE: SIGNIFICANT CHANGE UP
SODIUM SERPL-SCNC: 132 MMOL/L — LOW (ref 135–145)
WBC # BLD: 9.04 K/UL — SIGNIFICANT CHANGE UP (ref 3.8–10.5)
WBC # FLD AUTO: 9.04 K/UL — SIGNIFICANT CHANGE UP (ref 3.8–10.5)

## 2021-06-09 PROCEDURE — 99233 SBSQ HOSP IP/OBS HIGH 50: CPT

## 2021-06-09 PROCEDURE — 99231 SBSQ HOSP IP/OBS SF/LOW 25: CPT

## 2021-06-09 RX ORDER — WARFARIN SODIUM 2.5 MG/1
6 TABLET ORAL DAILY
Refills: 0 | Status: COMPLETED | OUTPATIENT
Start: 2021-06-09 | End: 2021-06-09

## 2021-06-09 RX ADMIN — Medication 650 MILLIGRAM(S): at 12:49

## 2021-06-09 RX ADMIN — QUETIAPINE FUMARATE 12.5 MILLIGRAM(S): 200 TABLET, FILM COATED ORAL at 20:53

## 2021-06-09 RX ADMIN — Medication 650 MILLIGRAM(S): at 21:53

## 2021-06-09 RX ADMIN — Medication 25 MILLIGRAM(S): at 12:50

## 2021-06-09 RX ADMIN — LOSARTAN POTASSIUM 25 MILLIGRAM(S): 100 TABLET, FILM COATED ORAL at 12:50

## 2021-06-09 RX ADMIN — Medication 3 MILLIGRAM(S): at 20:53

## 2021-06-09 RX ADMIN — WARFARIN SODIUM 6 MILLIGRAM(S): 2.5 TABLET ORAL at 20:53

## 2021-06-09 RX ADMIN — MIRTAZAPINE 15 MILLIGRAM(S): 45 TABLET, ORALLY DISINTEGRATING ORAL at 20:53

## 2021-06-09 RX ADMIN — Medication 650 MILLIGRAM(S): at 20:53

## 2021-06-09 NOTE — PROGRESS NOTE ADULT - SUBJECTIVE AND OBJECTIVE BOX
HPI:  89 y/o F PMHx significant for early dementia, lymphedema of lower extremities, atrial fibrillation on Coumadin (last INR2.6), hx of proximal femur fracture in January 2021, Hx L hip long IMN with Dr. Kitchen 2017, hearing loss, cystitis, hx of HTN, spinal stenosis, anxiety was BIBA s/p witnessed fall at home with resultant severe bilateral leg pain and inability to ambulate. Of note the patient's daughter states that the patient has not had a bowel movement in 4 days therefore decided to administer a dose of Miralax at home. She subsequently attempted to assist patient to the bathroom at night with the assistance the patient's walker. Despite all the daughter's efforts the patient was reportedly too weak on her feet resulting in the patient's fall onto her knees. The patient's daughter states that the patient did not strike her head, or lose consciousness, but did note considerable ecchymoses overlying her bilateral knees and lower legs. The patient denies any numbness/tingling/burning in the BLLE. No other bone/joint complaints. At baseline the patient walks minimally at home with the use of a walker and assist. Occasionally uses her wheelchair.   Daughter reports a decline in her overall ambulatory status over the past year. Imaging in the ED revealed bilateral proximal 1/3 tibia fractures with displacement and possible left patella fracture. In the ED Orthopedics was consulted.   (04 Jun 2021 02:34)      Patient is a 89y old  Female who presents with a chief complaint of s/p fall c/o severe bilateral leg pain and inability to ambulate (06 Jun 2021 09:45) Pt found to have  Bilateral tib-fib fractures. Comminuted fractures of the proximal tibia and fibular shafts. Comminuted left patellar fracture.  Comminuted, mildly displaced fracture at the proximal left  tibia. Comminuted, impacted fracture at the left fibular neck.  Partiallyincluded anterior hematoma, Partially included hardware fixation of the left femur with the most distal interlocking screw backed out 0.4 cm laterally.  Anterior subcutaneous hemorrhage.      Consulted by Dr. Deepali De La Torre  for VTE prophylaxis, risk stratification, and anticoagulation management.    PAST MEDICAL & SURGICAL HISTORY:  Lymphedema of both lower extremities    Venous insufficiency    Monoclonal gammopathies    Paroxysmal atrial fibrillation on coumaidn    Acute cystitis without hematuria  septic  4/2016    Essential hypertension    Spinal stenosis    Spondyloarthritis    Vaginal prolapse    [place] (hard of hearing), bilateral    Hypertension    S/P kyphoplasty  2014    S/P thyroidectomy  partial   1975    History of vaginal surgery    History of fracture of femur  hx of proximal femur fracture in January 2021    History of repair of left hip joint  Hx L hip long IMN with Dr. Kitchen 2017        FAMILY HISTORY:  No pertinent family history in first degree relatives        Interval Note:   6-6-2021 Pt seen at bedside on [address], talking toher daughter Jazmine.  I spoke with daughter on phone and she states her mother normally take 3mg daily of coumadin but recently it was increased to 4.5mg once a week.  States her mother's plts are normally low about 100,000.  Mad her aware of her mothers labs and that today we will hold her ac and resume it tomorrow as long as her mother is stable. Pt is alert and oriented to person and place,  but not able to understand the information given to her.    6-7-2021 Pt seen at bedside on [address], awake H&H still low no overt signs of bleeding, pending transfusion,  6-8-2021: pt seen at bedside, awake, pleasantly confused, H&H better after transfusion, INR still subtherapeutic will continue on Coumadin.  6-9-2021: pt seen at bedside, was really confused overnight, stays near nurse's station, H&H is stable , INR still subtherapeutic will increase Coumadin        IMPROVE VTE Individual Risk Assessment      [  ] Previous VTE                                                  3    [  ] Thrombophilia                                               2    [  ] Lower limb paralysis                                      2        (unable to hold up >15 seconds)      [  ] Current Cancer                                              2         (within 6 months)    [x  ] Immobilization > 24 hrs                                1    [  ] ICU/CCU stay > 24 hours                              1    [ x ] Age > 60                                                      1    IMPROVE VTE Score ___2______    IMPROVE Score 0-1: Low Risk, No VTE prophylaxis required for most patients, encourage ambulation.   IMPROVE Score 2-3: At risk, pharmacologic VTE prophylaxis is indicated for most patients (in the absence of a contraindication)  IMPROVE Score > or = 4: High Risk, pharmacologic VTE prophylaxis is indicated for most patients (in the absence of a contraindication)      [id number] Score: 5    IMPROVE Bleeding Risk Score:3.5    Falls Risk:   High (x  )  Mod (  )  Low (  )  crcl: 60        cr:   .66       BMI   23.1                Denies any personal or familial history of clotting or bleeding disorders.    Allergies    No Known Allergies    Intolerances        REVIEW OF SYSTEMS    (  )Fever	     (  )Constipation	(  )SOB				(  )Headache	(  )Dysuria  (  )Chills	     (  )Melena	(  )Dyspnea present on exertion	                    (  )Dizziness                    (  )Polyuria  (  )Nausea	     (  )Hematochezia	(  )Cough			                    (  )Syncope   	(  )Hematuria  (  )Vomiting    (  )Chest Pain	(  )Wheezing			(  )Weakness  (x) bl leg pain  (  )Diarrhea     (  )Palpitations	(  )Anorexia			( x )Myalgia    Pertinent positives in HPI and daily subjective.  All other ROS negative.  Vital Signs Last 24 Hrs  T(C): 37.2 (09 Jun 2021 10:49), Max: 37.2 (09 Jun 2021 10:49)  T(F): 99 (09 Jun 2021 10:49), Max: 99 (09 Jun 2021 10:49)  HR: 99 (09 Jun 2021 10:49) (79 - 99)  BP: 136/61 (09 Jun 2021 10:49) (117/57 - 136/61)  BP(mean): --  RR: 16 (09 Jun 2021 10:49) (16 - 18)  SpO2: 95% (09 Jun 2021 10:49) (95% - 96%)    PHYSICAL EXAM:    Constitutional: Appears Well    Neurological: Awake, confused    Skin: Warm    Respiratory and Thorax: normal effort; Breath sounds: normal; No rales/wheezing/rhonchi  	  Cardiovascular: S1, S2, regular, NMBR	    Gastrointestinal: BS + x 4Q, nontender	    Genitourinary:  Bladder nondistended, nontender    Musculoskeletal:   General Right:   no muscle/joint tenderness,   normal tone, no joint swelling,   ROM: limited	    General Left:   no muscle/joint tenderness,   normal tone, no joint swelling,   ROM: limited    Hip:  Right: Dressing CDI; Drain: hemovac ; Type of drng.: serosang.; Amt. of drng: small            Left: Dressing CDI; Drain: hemovac ; Type of drng.: serosang.; Amt. of drng: small      Knee:  Right and left : Dressing CDI ace wrap with splinting, moving all toes, good capillary refill, +sensation   Lower extrems:   Right: no calf tenderness              negative christopher's sign               + pedal pulses    Left:   no calf tenderness              negative christopher's sign               + pedal pulses                              9.1    9.04  )-----------( 128      ( 09 Jun 2021 08:28 )             28.3       06-09    132<L>  |  101  |  19  ----------------------------<  88  4.4   |  26  |  0.66    Ca    8.6      09 Jun 2021 08:28         PTT - ( 09 Jun 2021 08:28 )  PTT:27.0 sec                    9.0    9.41  )-----------( 104      ( 08 Jun 2021 07:52 )             27.3       06-08    132<L>  |  101  |  18  ----------------------------<  92  4.1   |  26  |  0.60    Ca    8.6      08 Jun 2021 07:52    TPro  5.2<L>  /  Alb  2.6<L>  /  TBili  0.9  /  DBili  x   /  AST  37  /  ALT  37  /  AlkPhos  84  06-07              PTT - ( 07 Jun 2021 07:50 )  PTT:19.5 sec                      8.0    10.89 )-----------( 73       ( 07 Jun 2021 07:50 )             24.6       06-07    132<L>  |  102  |  12  ----------------------------<  94  4.2   |  24  |  0.67    Ca    8.4<L>      07 Jun 2021 07:50    TPro  5.2<L>  /  Alb  2.6<L>  /  TBili  0.9  /  DBili  x   /  AST  37  /  ALT  37  /  AlkPhos  84  06-07  PT/INR - ( 09 Jun 2021 08:28 )   PT: 13.4 sec;   INR: 1.16 ratio    PT/INR - ( 08 Jun 2021 07:52 )   PT: 13.7 sec;   INR: 1.18 ratio   PT/INR - ( 07 Jun 2021 07:50 )   PT: 14.2 sec;   INR: 1.24 ratio         PTT - ( 07 Jun 2021 07:50 )  PTT:19.5 sec                    8.9    10.39 )-----------( 64       ( 06 Jun 2021 07:19 )             26.5       06-06    134<L>  |  102  |  13  ----------------------------<  99  4.2   |  25  |  0.66    Ca    8.8      06 Jun 2021 07:19        PT/INR - ( 06 Jun 2021 07:19 )   PT: 13.1 sec;   INR: 1.13 ratio         PTT - ( 05 Jun 2021 06:33 )  PTT:26.3 sec				    MEDICATIONS  (STANDING):  losartan 25 milliGRAM(s) Oral daily  mirtazapine 30 milliGRAM(s) Oral at bedtime  polyethylene glycol 3350 17 Gram(s) Oral daily  senna 2 Tablet(s) Oral at bedtime    < from: CT Knee No Cont, Right (06.04.21 @ 03:44) >  IMPRESSION:    Comminuted fractures of the proximal tibia and fibular shafts.  Anterior subcutaneous hemorrhage.    < end of copied text >  < from: CT Knee No Cont, Left (06.04.21 @ 03:41) >  Impression:  Comminuted patellar fracture.  Comminuted, mildly displaced fracture at the proximal tibia.  Comminuted, impacted fracture at the fibular neck.  Partiallyincluded anterior hematoma.  Partially included hardware fixation of the left femur with the most distal interlocking screw backed out 0.4 cm laterally.    < end of copied text >        DVT Prophylaxis:  LMWH                   (  )  Heparin SQ           (  )  Coumadin             (x )  Xarelto                  (  )  Eliquis                   (  )  Venodynes           (x  )  Ambulation          (  )  UFH                       (  )  Contraindicated  (  )  EC Aspirin             (  )

## 2021-06-09 NOTE — PROGRESS NOTE ADULT - SUBJECTIVE AND OBJECTIVE BOX
Patient seen and examined at bedside, resting comfortably. Increased confusion this morning but pain appears well controlled. Denies chest pain, dyspnea, numbness/tingling. No complaints at this time. No overnight events.      Vital Signs Last 24 Hrs  T(C): 36.7 (09 Jun 2021 00:47), Max: 36.9 (08 Jun 2021 18:43)  T(F): 98 (09 Jun 2021 00:47), Max: 98.5 (08 Jun 2021 18:43)  HR: 79 (09 Jun 2021 00:47) (79 - 83)  BP: 117/57 (09 Jun 2021 00:47) (117/57 - 133/58)  BP(mean): --  RR: 16 (09 Jun 2021 00:47) (16 - 18)  SpO2: 96% (09 Jun 2021 00:47) (94% - 96%)                          9.0    9.41  )-----------( 104      ( 08 Jun 2021 07:52 )             27.3         PHYSICAL EXAM:  General: No acute distress, AAOx3  BLLE Exam:  Long leg trilam splints in place - Windowed anterior for skin/compartment checks, hematoma stable, swelling stable/improving  +EHL/FHL   Wiggles Toes  SILT L2/3/5  Toes warm/well perfused  Calf Soft NT  Palpable compartments soft and compressible     no

## 2021-06-09 NOTE — PROGRESS NOTE ADULT - SUBJECTIVE AND OBJECTIVE BOX
CC: s/p fall c/o severe bilateral leg pain and inability to ambulate     Pt is a 89 y/o F PMHx significant for early dementia, lymphedema of lower extremities, atrial fibrillation on Coumadin (last INR2.6), hx of proximal femur fracture in January 2021, Hx L hip long IMN with Dr. Kitchen 2017, hearing loss, cystitis, hx of HTN, spinal stenosis, anxiety admitted on 6/4/21  s/p witnessed fall at home with resultant severe bilateral leg pain and inability to ambulate. Of note the patient's daughter states that the patient has not had a bowel movement in 4 days therefore decided to administer a dose of Miralax at home. She subsequently attempted to assist patient to the bathroom at night with the assistance the patient's walker. Despite all the daughter's efforts the patient was reportedly too weak on her feet resulting in the patient's fall onto her knees. The patient's daughter states that the patient did not strike her head, or lose consciousness, but did note considerable ecchymoses overlying her bilateral knees and lower legs.  At baseline the patient walks minimally at home with the use of a walker and assist. Occasionally uses her wheelchair.   Daughter reports a decline in her overall ambulatory status over the past year. Imaging in the ED revealed bilateral proximal 1/3 tibia fractures with displacement and possible left patella fracture. In the ED Orthopedics was consulted.    Medical progress: Denies any HA, CP, SOB. Patient confused at times. Had a bad night at night -  as patient was awake all night. Extensive discussion with patient's daughter. We addressed patient's confusion as patient is sundowning /  social work evaluation and next steps of transitioning patient from hospital-rehab /  INR,  HH, plt count has been discussed with patient. Will re-order physical therapy  Complaints: no new complaints  State of mind: at times confused /  but is able to tell me where she is.     REVIEW OF SYSTEMS:  All other review of systems is negative unless indicated above    PHYSICAL EXAM:  T(C): 36.7 (08 Jun 2021 08:34), Max: 37.1 (07 Jun 2021 16:18)  T(F): 98.1 (08 Jun 2021 08:34), Max: 98.7 (07 Jun 2021 16:18)  HR: 79 (08 Jun 2021 08:34) (75 - 89)  BP: 121/63 (08 Jun 2021 08:34) (110/43 - 135/56)  RR: 18 (08 Jun 2021 08:34) (18 - 18)  SpO2: 94% (08 Jun 2021 08:34) (91% - 94%)  General: Deconditioned, frail, chronically sick  Eyes: PERRLA, EOMI; conjunctiva and sclera clear  Head: some temporal wasting  ENMT: No nasal discharge; airway clear  Neck: Supple; non tender; no masses  Respiratory: No wheezes, rales or rhonchi  Cardiovascular: S1, S2 irreg with II/VI FARZANEH  Gastrointestinal: Soft non-tender non-distended; Normal bowel sounds  Genitourinary: No costovertebral angle tenderness, FQ to gravity    CBC Full  -  ( 09 Jun 2021 08:28 )  WBC Count : 9.04 K/uL  RBC Count : 2.98 M/uL  Hemoglobin : 9.1 g/dL  Hematocrit : 28.3 %  Platelet Count - Automated : 128 K/uL  Mean Cell Volume : 95.0 fl  Mean Cell Hemoglobin : 30.5 pg  Mean Cell Hemoglobin Concentration : 32.2 gm/dL  Auto Neutrophil # : x  Auto Lymphocyte # : x  Auto Monocyte # : x  Auto Eosinophil # : x  Auto Basophil # : x  Auto Neutrophil % : x  Auto Lymphocyte % : x  Auto Monocyte % : x  Auto Eosinophil % : x  Auto Basophil % : x    PT/INR - ( 09 Jun 2021 08:28 )   PT: 13.4 sec;   INR: 1.16 ratio         PTT - ( 09 Jun 2021 08:28 )  PTT:27.0 sec    06-09    132<L>  |  101  |  19  ----------------------------<  88  4.4   |  26  |  0.66    Ca    8.6      09 Jun 2021 08:28    Extremities: BL LE in splints, able to move toes  Vascular: Peripheral pulses palpable 2+ bilaterally  Neurological: Alert, confused  Skin: pale  Musculoskeletal: no range of motion of LEs  Psychiatric: Cooperative /  confused       Labs:             89 y/o F PMHx significant for early dementia, lymphedema of lower extremities, atrial fibrillation on Coumadin (last INR2.6), hx of proximal femur fracture in January 2021, Hx L hip long IMN with Dr. Kitchen 2017, hearing loss, cystitis, hx of HTN, spinal stenosis, anxiety was BIBA s/p witnessed fall at home with resultant severe bilateral leg pain and inability to ambulate    # BL knee Fx with tibia/fibula Fx and patellas  - NWB BLLE in long leg trilam splints  - Keep splints c/d/i  - Ice/Elevate  - Incentive Spirometre  - SCDs  - PT/OT OOBTC  - Ortho stable for discharge. FU outpatient with LEXIS Kitchen in 1-2 weeks after discharge. Call office to schedule appointment.  - care discussed with social work -  will re-order PT    # Acute blood loss anemia secondary to hematoma  # Right knee hematoma on coumadin with anemia of ABL   - no further bleeding  - INR 1.1  - total of 3 units of PRBCs    # Afib on VKA , h/o CVA s/p reversal - ortho cleared pt  to resume VKA, high CHADVASC score  - coumadin with currently subtheurapeutic INR  - monitor INR    # Unstable gait/Hands tremor  - multifactorial, at present exam is limited as legs are splinted  - Pt will need MRI brain - can have open MRI as OP once D/C  - Tremors of hands; most likely benign- at present clinically not C/W Parkinsons ds, however exam is limited  - Neurology consult appreciated: - f/u after 2-3 months once knees heal     # Constipation   - resolved    # HTN   - BP stable now, restart toprol xl 25 , losartan 25     # Dementia with depression and anxiety   - wellbutrin causes constipation  - will hold, c/w remeron lower dose 15 ( pt also getting opioids) , seroquel 12.5 q8h prn as needed  Psych consult to adjust anxiety meds   CC: s/p fall c/o severe bilateral leg pain and inability to ambulate     Pt is a 88 y/o F PMHx significant for early dementia, lymphedema of lower extremities, atrial fibrillation on Coumadin (last INR2.6), hx of proximal femur fracture in January 2021, Hx L hip long IMN with Dr. Kitchen 2017, hearing loss, cystitis, hx of HTN, spinal stenosis, anxiety admitted on 6/4/21  s/p witnessed fall at home with resultant severe bilateral leg pain and inability to ambulate. Of note the patient's daughter states that the patient has not had a bowel movement in 4 days therefore decided to administer a dose of Miralax at home. She subsequently attempted to assist patient to the bathroom at night with the assistance the patient's walker. Despite all the daughter's efforts the patient was reportedly too weak on her feet resulting in the patient's fall onto her knees. The patient's daughter states that the patient did not strike her head, or lose consciousness, but did note considerable ecchymoses overlying her bilateral knees and lower legs.  At baseline the patient walks minimally at home with the use of a walker and assist. Occasionally uses her wheelchair.   Daughter reports a decline in her overall ambulatory status over the past year. Imaging in the ED revealed bilateral proximal 1/3 tibia fractures with displacement and possible left patella fracture. In the ED Orthopedics was consulted.    Medical progress: Denies any HA, CP, SOB. Patient confused at times. Had a bad night at night -  as patient was awake all night. Extensive discussion with patient's daughter. We addressed patient's confusion as patient is sundowning /  social work evaluation and next steps of transitioning patient from hospital-rehab /  INR,  HH, plt count has been discussed with patient. Will re-order physical therapy  Complaints: no new complaints  State of mind: at times confused /  but is able to tell me where she is.     REVIEW OF SYSTEMS:  All other review of systems is negative unless indicated above    PHYSICAL EXAM:  T(C): 36.7 (08 Jun 2021 08:34), Max: 37.1 (07 Jun 2021 16:18)  T(F): 98.1 (08 Jun 2021 08:34), Max: 98.7 (07 Jun 2021 16:18)  HR: 79 (08 Jun 2021 08:34) (75 - 89)  BP: 121/63 (08 Jun 2021 08:34) (110/43 - 135/56)  RR: 18 (08 Jun 2021 08:34) (18 - 18)  SpO2: 94% (08 Jun 2021 08:34) (91% - 94%)  General: Deconditioned, frail, chronically sick  Eyes: PERRLA, EOMI; conjunctiva and sclera clear  Head: some temporal wasting  ENMT: No nasal discharge; airway clear  Neck: Supple; non tender; no masses  Respiratory: No wheezes, rales or rhonchi  Cardiovascular: S1, S2 irreg with II/VI FARZANEH  Gastrointestinal: Soft non-tender non-distended; Normal bowel sounds  Genitourinary: No costovertebral angle tenderness, FQ to gravity    CBC Full  -  ( 09 Jun 2021 08:28 )  WBC Count : 9.04 K/uL  RBC Count : 2.98 M/uL  Hemoglobin : 9.1 g/dL  Hematocrit : 28.3 %  Platelet Count - Automated : 128 K/uL  Mean Cell Volume : 95.0 fl  Mean Cell Hemoglobin : 30.5 pg  Mean Cell Hemoglobin Concentration : 32.2 gm/dL  Auto Neutrophil # : x  Auto Lymphocyte # : x  Auto Monocyte # : x  Auto Eosinophil # : x  Auto Basophil # : x  Auto Neutrophil % : x  Auto Lymphocyte % : x  Auto Monocyte % : x  Auto Eosinophil % : x  Auto Basophil % : x    PT/INR - ( 09 Jun 2021 08:28 )   PT: 13.4 sec;   INR: 1.16 ratio         PTT - ( 09 Jun 2021 08:28 )  PTT:27.0 sec    06-09    132<L>  |  101  |  19  ----------------------------<  88  4.4   |  26  |  0.66    Ca    8.6      09 Jun 2021 08:28    Extremities: BL LE in splints, able to move toes  Vascular: Peripheral pulses palpable 2+ bilaterally  Neurological: Alert, confused  Skin: pale  Musculoskeletal: no range of motion of LEs  Psychiatric: Cooperative /  confused       Labs:           CBC Full  -  ( 09 Jun 2021 08:28 )  WBC Count : 9.04 K/uL  RBC Count : 2.98 M/uL  Hemoglobin : 9.1 g/dL  Hematocrit : 28.3 %  Platelet Count - Automated : 128 K/uL  Mean Cell Volume : 95.0 fl  Mean Cell Hemoglobin : 30.5 pg  Mean Cell Hemoglobin Concentration : 32.2 gm/dL  Auto Neutrophil # : x  Auto Lymphocyte # : x  Auto Monocyte # : x  Auto Eosinophil # : x  Auto Basophil # : x  Auto Neutrophil % : x  Auto Lymphocyte % : x  Auto Monocyte % : x  Auto Eosinophil % : x  Auto Basophil % : x    PT/INR - ( 09 Jun 2021 08:28 )   PT: 13.4 sec;   INR: 1.16 ratio         PTT - ( 09 Jun 2021 08:28 )  PTT:27.0 sec    06-09    132<L>  |  101  |  19  ----------------------------<  88  4.4   |  26  |  0.66    Ca    8.6      09 Jun 2021 08:28          88 y/o F PMHx significant for early dementia, lymphedema of lower extremities, atrial fibrillation on Coumadin (last INR2.6), hx of proximal femur fracture in January 2021, Hx L hip long IMN with Dr. Kitchen 2017, hearing loss, cystitis, hx of HTN, spinal stenosis, anxiety was BIBA s/p witnessed fall at home with resultant severe bilateral leg pain and inability to ambulate    # BL knee Fx with tibia/fibula Fx and patellas  - NWB BLLE in long leg trilam splints  - Keep splints c/d/i  - Ice/Elevate  - Incentive Spirometre  - SCDs  - PT/OT OOBTC  - Ortho stable for discharge. FU outpatient with LEXIS Kitchen in 1-2 weeks after discharge. Call office to schedule appointment.  - care discussed with social work -  will re-order PT    # Acute blood loss anemia secondary to hematoma  # Right knee hematoma on coumadin with anemia of ABL   - no further bleeding  - INR 1.16  - total of 3 units of PRBCs    # Afib on VKA , h/o CVA s/p reversal - ortho cleared pt  to resume VKA, high CHADVASC score  - coumadin with currently subtherapeutic INR  - coumadin has been increased to 6 mg po   - monitor INR    # Unstable gait/Hands tremor  - multifactorial, at present exam is limited as legs are splinted  - Pt will need MRI brain - can have open MRI as OP once D/C  - Tremors of hands; most likely benign- at present clinically not C/W Parkinsons ds, however exam is limited  - Neurology consult appreciated: - f/u after 2-3 months once knees heal     # Constipation   - resolved    # HTN   - BP stable now, restart toprol xl 25 , losartan 25     # Dementia with depression and anxiety   - wellbutrin causes constipation  - will hold, c/w remeron lower dose 15 ( pt also getting opioids) , seroquel 12.5 q8h prn as needed  - Psych consult to adjust anxiety meds - at this time pt's daughter refusing psych eval   CC: s/p fall c/o severe bilateral leg pain and inability to ambulate     Pt is a 88 y/o F PMHx significant for early dementia, lymphedema of lower extremities, atrial fibrillation on Coumadin (last INR2.6), hx of proximal femur fracture in January 2021, Hx L hip long IMN with Dr. Kitchen 2017, hearing loss, cystitis, hx of HTN, spinal stenosis, anxiety admitted on 6/4/21  s/p witnessed fall at home with resultant severe bilateral leg pain and inability to ambulate. Of note the patient's daughter states that the patient has not had a bowel movement in 4 days therefore decided to administer a dose of Miralax at home. She subsequently attempted to assist patient to the bathroom at night with the assistance the patient's walker. Despite all the daughter's efforts the patient was reportedly too weak on her feet resulting in the patient's fall onto her knees. The patient's daughter states that the patient did not strike her head, or lose consciousness, but did note considerable ecchymoses overlying her bilateral knees and lower legs.  At baseline the patient walks minimally at home with the use of a walker and assist. Occasionally uses her wheelchair.   Daughter reports a decline in her overall ambulatory status over the past year. Imaging in the ED revealed bilateral proximal 1/3 tibia fractures with displacement and possible left patella fracture. In the ED Orthopedics was consulted.    Medical progress: Denies any HA, CP, SOB. Patient confused at times. Had a bad night at night -  as patient was awake all night. Extensive discussion with patient's daughter. We addressed patient's confusion as patient is sundowning /  social work evaluation and next steps of transitioning patient from hospital-rehab /  INR,  HH, plt count has been discussed with patient. Will re-order physical therapy  Complaints: no new complaints  State of mind: at times confused /  but is able to tell me where she is.   GOC: at this time patient's HCP (daughter) wants everything to be done. Ideally, wants to take mom - home, but that might not be possible as patient requires extensive help.     REVIEW OF SYSTEMS:  All other review of systems is negative unless indicated above    PHYSICAL EXAM:  T(C): 36.7 (08 Jun 2021 08:34), Max: 37.1 (07 Jun 2021 16:18)  T(F): 98.1 (08 Jun 2021 08:34), Max: 98.7 (07 Jun 2021 16:18)  HR: 79 (08 Jun 2021 08:34) (75 - 89)  BP: 121/63 (08 Jun 2021 08:34) (110/43 - 135/56)  RR: 18 (08 Jun 2021 08:34) (18 - 18)  SpO2: 94% (08 Jun 2021 08:34) (91% - 94%)  General: Deconditioned, frail, chronically sick  Eyes: PERRLA, EOMI; conjunctiva and sclera clear  Head: some temporal wasting  ENMT: No nasal discharge; airway clear  Neck: Supple; non tender; no masses  Respiratory: No wheezes, rales or rhonchi  Cardiovascular: S1, S2 irreg with II/VI FARZANEH  Gastrointestinal: Soft non-tender non-distended; Normal bowel sounds  Genitourinary: No costovertebral angle tenderness, FQ to gravity    CBC Full  -  ( 09 Jun 2021 08:28 )  WBC Count : 9.04 K/uL  RBC Count : 2.98 M/uL  Hemoglobin : 9.1 g/dL  Hematocrit : 28.3 %  Platelet Count - Automated : 128 K/uL  Mean Cell Volume : 95.0 fl  Mean Cell Hemoglobin : 30.5 pg  Mean Cell Hemoglobin Concentration : 32.2 gm/dL  Auto Neutrophil # : x  Auto Lymphocyte # : x  Auto Monocyte # : x  Auto Eosinophil # : x  Auto Basophil # : x  Auto Neutrophil % : x  Auto Lymphocyte % : x  Auto Monocyte % : x  Auto Eosinophil % : x  Auto Basophil % : x    PT/INR - ( 09 Jun 2021 08:28 )   PT: 13.4 sec;   INR: 1.16 ratio         PTT - ( 09 Jun 2021 08:28 )  PTT:27.0 sec    06-09    132<L>  |  101  |  19  ----------------------------<  88  4.4   |  26  |  0.66    Ca    8.6      09 Jun 2021 08:28    Extremities: BL LE in splints, able to move toes  Vascular: Peripheral pulses palpable 2+ bilaterally  Neurological: Alert, confused  Skin: pale  Musculoskeletal: no range of motion of LEs  Psychiatric: Cooperative /  confused       Labs:           CBC Full  -  ( 09 Jun 2021 08:28 )  WBC Count : 9.04 K/uL  RBC Count : 2.98 M/uL  Hemoglobin : 9.1 g/dL  Hematocrit : 28.3 %  Platelet Count - Automated : 128 K/uL  Mean Cell Volume : 95.0 fl  Mean Cell Hemoglobin : 30.5 pg  Mean Cell Hemoglobin Concentration : 32.2 gm/dL  Auto Neutrophil # : x  Auto Lymphocyte # : x  Auto Monocyte # : x  Auto Eosinophil # : x  Auto Basophil # : x  Auto Neutrophil % : x  Auto Lymphocyte % : x  Auto Monocyte % : x  Auto Eosinophil % : x  Auto Basophil % : x    PT/INR - ( 09 Jun 2021 08:28 )   PT: 13.4 sec;   INR: 1.16 ratio         PTT - ( 09 Jun 2021 08:28 )  PTT:27.0 sec    06-09    132<L>  |  101  |  19  ----------------------------<  88  4.4   |  26  |  0.66    Ca    8.6      09 Jun 2021 08:28          88 y/o F PMHx significant for early dementia, lymphedema of lower extremities, atrial fibrillation on Coumadin (last INR2.6), hx of proximal femur fracture in January 2021, Hx L hip long IMN with Dr. Kitchen 2017, hearing loss, cystitis, hx of HTN, spinal stenosis, anxiety was BIBA s/p witnessed fall at home with resultant severe bilateral leg pain and inability to ambulate    # BL knee Fx with tibia/fibula Fx and patellas  - NWB BLLE in long leg trilam splints  - Keep splints c/d/i  - Ice/Elevate  - Incentive Spirometre  - SCDs  - PT/OT OOBTC  - Ortho stable for discharge. FU outpatient with LEXIS Kitchen in 1-2 weeks after discharge. Call office to schedule appointment.  - care discussed with social work -  will re-order PT    # Acute blood loss anemia secondary to hematoma  # Right knee hematoma on coumadin with anemia of ABL   - no further bleeding  - INR 1.16  - total of 3 units of PRBCs    # Afib on VKA , h/o CVA s/p reversal - ortho cleared pt  to resume VKA, high CHADVASC score  - coumadin with currently subtherapeutic INR  - coumadin has been increased to 6 mg po   - monitor INR    # Unstable gait/Hands tremor  - multifactorial, at present exam is limited as legs are splinted  - Pt will need MRI brain - can have open MRI as OP once D/C  - Tremors of hands; most likely benign- at present clinically not C/W Parkinsons ds, however exam is limited  - Neurology consult appreciated: - f/u after 2-3 months once knees heal     # Constipation   - resolved    # HTN   - BP stable now, restart toprol xl 25 , losartan 25     # Dementia with depression and anxiety   - wellbutrin causes constipation  - will hold, c/w remeron lower dose 15 ( pt also getting opioids) , seroquel 12.5 q8h prn as needed  - Psych consult to adjust anxiety meds - at this time pt's daughter refusing psych eval

## 2021-06-09 NOTE — PROGRESS NOTE ADULT - ASSESSMENT
A/P:  Patient is a 89y Female w/ L Patella fx & BLLE proximal tib/fib fxs sp closed reduction and splint     -Medical management per primary team  -NWB BLLE in long leg trilam splints  -Keep splints c/d/i  -Ice/Elevate  -Incentive Spirometry  -Multimodal Analgesia PRN  -DVT PE ppx - Coumadin  -SCDs  -PT/OT OOBTC  -Ortho stable for discharge. FU outpatient with LEXIS Kitchen in 1-2 weeks after discharge. Call office to schedule appointment.  -Discharge planning - Home  -Will discuss w/ attending Dr. Kitchen and advise if plan changes

## 2021-06-09 NOTE — PROGRESS NOTE ADULT - ASSESSMENT
This is a 89 year old female with pmh of atrial fibrillation on Coumadin, [id number] Score: 5, s/p fall on 6-4-2021, causing fx of bl tib/fib and left patella.  s/p -closded reduction and splinting by ortho.  Pt's INR on admission was 3.54  given 5mg vit k po and k-centra, noted  Pt has both high thrombosis and bleed risk.  Requires anticoagulation treatment dose but will hold today and revaluate pt tomorrow    6-6-2021: Made Dr. De La Torre aware of plan and she agrees.    6/7: INR today 1.24, plts  slightly improved from yesterday 73,000 , patient is high risk for bleeding and clotting, not recommended for bridging, spoke with Hospitalist NP will start coumadin today  .      Plan:  : INR 1.16 from 1.18 increase Coumadin to 6mg tonight adjust dosage as per INR  : Monitor for bleeding   :daily cbc/ bmp, pt/inr  : mobilty as per ortho  : will f/u

## 2021-06-10 LAB
APTT BLD: 28.3 SEC — SIGNIFICANT CHANGE UP (ref 27.5–35.5)
HCT VFR BLD CALC: 27 % — LOW (ref 34.5–45)
HGB BLD-MCNC: 8.9 G/DL — LOW (ref 11.5–15.5)
INR BLD: 1.32 RATIO — HIGH (ref 0.88–1.16)
MCHC RBC-ENTMCNC: 30.9 PG — SIGNIFICANT CHANGE UP (ref 27–34)
MCHC RBC-ENTMCNC: 33 GM/DL — SIGNIFICANT CHANGE UP (ref 32–36)
MCV RBC AUTO: 93.8 FL — SIGNIFICANT CHANGE UP (ref 80–100)
PLATELET # BLD AUTO: 148 K/UL — LOW (ref 150–400)
PROTHROM AB SERPL-ACNC: 15.2 SEC — HIGH (ref 10.6–13.6)
RBC # BLD: 2.88 M/UL — LOW (ref 3.8–5.2)
RBC # FLD: 14.6 % — HIGH (ref 10.3–14.5)
WBC # BLD: 9.27 K/UL — SIGNIFICANT CHANGE UP (ref 3.8–10.5)
WBC # FLD AUTO: 9.27 K/UL — SIGNIFICANT CHANGE UP (ref 3.8–10.5)

## 2021-06-10 PROCEDURE — 99221 1ST HOSP IP/OBS SF/LOW 40: CPT

## 2021-06-10 PROCEDURE — 99232 SBSQ HOSP IP/OBS MODERATE 35: CPT

## 2021-06-10 PROCEDURE — 99231 SBSQ HOSP IP/OBS SF/LOW 25: CPT

## 2021-06-10 RX ORDER — WARFARIN SODIUM 2.5 MG/1
6 TABLET ORAL ONCE
Refills: 0 | Status: COMPLETED | OUTPATIENT
Start: 2021-06-10 | End: 2021-06-10

## 2021-06-10 RX ADMIN — Medication 650 MILLIGRAM(S): at 13:31

## 2021-06-10 RX ADMIN — LOSARTAN POTASSIUM 25 MILLIGRAM(S): 100 TABLET, FILM COATED ORAL at 10:42

## 2021-06-10 RX ADMIN — POLYETHYLENE GLYCOL 3350 17 GRAM(S): 17 POWDER, FOR SOLUTION ORAL at 10:39

## 2021-06-10 RX ADMIN — Medication 25 MILLIGRAM(S): at 10:42

## 2021-06-10 RX ADMIN — SENNA PLUS 2 TABLET(S): 8.6 TABLET ORAL at 22:22

## 2021-06-10 RX ADMIN — MIRTAZAPINE 15 MILLIGRAM(S): 45 TABLET, ORALLY DISINTEGRATING ORAL at 22:23

## 2021-06-10 RX ADMIN — Medication 650 MILLIGRAM(S): at 05:15

## 2021-06-10 RX ADMIN — WARFARIN SODIUM 6 MILLIGRAM(S): 2.5 TABLET ORAL at 22:23

## 2021-06-10 RX ADMIN — Medication 650 MILLIGRAM(S): at 22:22

## 2021-06-10 RX ADMIN — Medication 650 MILLIGRAM(S): at 22:23

## 2021-06-10 RX ADMIN — Medication 650 MILLIGRAM(S): at 13:30

## 2021-06-10 NOTE — PHYSICAL THERAPY INITIAL EVALUATION ADULT - MODALITIES TREATMENT COMMENTS
pt left in bed supine post Eval; bed alarm on; HM in place; callbell in reach; cristi well; no c/o pain
The demonstrated limited tolerance for prolonged unsupported long sit with bilateral UE Weight bearing. The pt was limited by apprehension and generalized weakness and poor seated balance but demonstrates the ability to participate and to utilize bilateral UE to weight shift in bed. The pt transferred from bed to chair via max A x 2 hover mat/ slide sheet transfer tx. The pt was adjusted for comfort and was in NAD while sitting OOB and in the chair.

## 2021-06-10 NOTE — PHYSICAL THERAPY INITIAL EVALUATION ADULT - RANGE OF MOTION EXAMINATION, REHAB EVAL
A/AA/PROM Bilateral UE WFL/WNL, bilateral LE splinted and not able to test, the pt has active ROM to bilateral LE toes.

## 2021-06-10 NOTE — PROGRESS NOTE ADULT - ASSESSMENT
"Requested Prescriptions   Pending Prescriptions Disp Refills     metFORMIN (GLUCOPHAGE) 500 MG tablet  Last Written Prescription Date:  11/13/18  Last Fill Quantity: 180,  # refills: 0   Last office visit: 12/19/2018 with prescribing provider:  Chanda   Future Office Visit:     180 tablet 0     Sig: Take 1 tablet (500 mg) by mouth 2 times daily (with meals)    Biguanide Agents Passed - 2/5/2019  9:18 AM       Passed - Blood pressure less than 140/90 in past 6 months    BP Readings from Last 3 Encounters:   12/19/18 124/66   11/15/18 112/60   10/03/18 136/74                Passed - Patient has documented LDL within the past 12 mos.    Recent Labs   Lab Test 12/19/18  0814   LDL 52            Passed - Patient has had a Microalbumin in the past 15 mos.    Recent Labs   Lab Test 12/19/18  0815   MICROL 30   UMALCR 41.08*            Passed - Patient is age 10 or older       Passed - Patient has documented A1c within the specified period of time.    If HgbA1C is 8 or greater, it needs to be on file within the past 3 months.  If less than 8, must be on file within the past 6 months.     Recent Labs   Lab Test 12/19/18  0814   A1C 7.3*            Passed - Patient's CR is NOT>1.4 OR Patient's EGFR is NOT<45 within past 12 mos.    Recent Labs   Lab Test 12/19/18  0814   GFRESTIMATED 65   GFRESTBLACK 76       Recent Labs   Lab Test 12/19/18  0814   CR 1.03            Passed - Patient does NOT have a diagnosis of CHF.       Passed - Medication is active on med list       Passed - Recent (6 mo) or future (30 days) visit within the authorizing provider's specialty    Patient had office visit in the last 6 months or has a visit in the next 30 days with authorizing provider or within the authorizing provider's specialty.  See \"Patient Info\" tab in inbasket, or \"Choose Columns\" in Meds & Orders section of the refill encounter.              " This is a 89 year old female with pmh of atrial fibrillation on Coumadin, [id number] Score: 5, s/p fall on 6-4-2021, causing fx of bl tib/fib and left patella.  s/p -closded reduction and splinting by ortho.  Pt's INR on admission was 3.54  given 5mg vit k po and k-centra, noted  Pt has both high thrombosis and bleed risk.  Requires anticoagulation treatment dose but will hold today and revaluate pt tomorrow    6-6-2021: Made Dr. De La Torre aware of plan and she agrees.  6/7: INR today 1.24, plts  slightly improved from yesterday 73,000 , patient is high risk for bleeding and clotting, not recommended for bridging, spoke with Hospitalist NP will start coumadin today  .    Plan:  ::INR 1.3: Coumadin 6mg tonight adjust dosage as per INR  ::Monitor for bleeding   ::Daily cbc/ bmp, pt/inr  ::Mobilty as per ortho    Will continue to follow.  Dispo: [facility] vs [facility]

## 2021-06-10 NOTE — DIETITIAN INITIAL EVALUATION ADULT. - ORAL INTAKE PTA/DIET HISTORY
Pt is unable to provide much hx. RD contacted dtr Jazmine who lives w/ pt. As per dtr pt had a fair appetite PTA and she was having ~3 meals/day and supplements w/ ensure (220 kcal and 9 grams of protein). Dtr reports that pt has some difficulty chewing foods. Noted foods are being cut up to optimize intake. Dtr was distressed during phone call and concerned for pt being d/cd so detail of information was limited.

## 2021-06-10 NOTE — DIETITIAN INITIAL EVALUATION ADULT. - NS FNS WEIGHT CHANGE REASON
dtr and pt are unsure of UBW. Pt w/ prior nutrition assessment 9/2019 noting wt of 145# which is consistent w/ admission wt. As per nursing flow sheet pt w/ daily wt of 84.4kg 6/5, no updated wt since admission.

## 2021-06-10 NOTE — DIETITIAN INITIAL EVALUATION ADULT. - OTHER INFO
88 y/o F PMHx significant for early dementia, lymphedema of lower extremities, atrial fibrillation on Coumadin (last INR2.6), hx of proximal femur fracture in January 2021, Hx L hip long IMN with Dr. Kitchen 2017, hearing loss, cystitis, hx of HTN, spinal stenosis, anxiety was BIBA s/p witnessed fall at home with resultant severe bilateral leg pain and inability to ambulate. Of note the patient's daughter states that the patient has not had a bowel movement in 4 days therefore decided to administer a dose of Miralax at home. She subsequently attempted to assist patient to the bathroom at night with the assistance the patient's walker. Despite all the daughter's efforts the patient was reportedly too weak on her feet resulting in the patient's fall onto her knees. The patient's daughter states that the patient did not strike her head, or lose consciousness, but did note considerable ecchymoses overlying her bilateral knees and lower legs. The patient denies any numbness/tingling/burning in the BLLE. No other bone/joint complaints. At baseline the patient walks minimally at home with the use of a walker and assist. Occasionally uses her wheelchair.    RD consulted for stage 2 pressure ulcer or greater. noted pt w/ stage 2 pressure ulcer on sacral spine. Pt w/ increased protein needs to promote wound healing. D/w dtr the importance of adequate protein intake to promote wound healing. Pt is currently receiving ensure enlive TID. Pt is confused and as per dtr she has been ordering some meals along with nursing staff. D/w dtr regarding ordering pts meal preferences to optimize intake and choose protein option at each meal. Dtr verbalized understanding. Noted pt c/o constipation on admission, she is currently w/ bowel regimen of miralax and senna daily. Last BM 6/9. No knowledge of food allergies as per daughter.

## 2021-06-10 NOTE — DIETITIAN INITIAL EVALUATION ADULT. - MALNUTRITION
Pt meets criteria for moderate malnutrition in the context of chronic illness  AEB moderate muscle/fat wasting

## 2021-06-10 NOTE — PHYSICAL THERAPY INITIAL EVALUATION ADULT - ADDITIONAL COMMENTS
as per H&P: At baseline the patient walks minimally at home with the use of a walker and assist. Occasionally uses her wheelchair. Daughter reports a decline in her overall ambulatory status over the past year. as per H&P: At baseline the patient walks minimally at home with the use of a walker and assist. Occasionally uses her wheelchair. Daughter reports a decline in her overall ambulatory status over the past year.  The pt reports that she normally walks with assistance from her family as well as a RW.

## 2021-06-10 NOTE — PHYSICAL THERAPY INITIAL EVALUATION ADULT - PERTINENT HX OF CURRENT PROBLEM, REHAB EVAL
88 y/o F PMHx significant for early dementia, lymphedema of lower extremities, atrial fibrillation on Coumadin (last INR2.6), hx of proximal femur fracture in January 2021, Hx L hip long IMN with Dr. Kitchen 2017, hearing loss, cystitis, hx of HTN, spinal stenosis, anxiety admitted on 6/4/21  s/p witnessed fall at home with resultant severe bilateral leg pain and inability to ambulate.
fall

## 2021-06-10 NOTE — PROGRESS NOTE ADULT - SUBJECTIVE AND OBJECTIVE BOX
Patient is a 89y old  Female who presents with a chief complaint of s/p fall c/o severe bilateral leg pain and inability to ambulate (10 Danny 2021 06:08)    6/4 Cardiology. 89 year old female admitted with near syncope. felt weak ambulating to BR, fell back and onto knees. denies LOC. denies chest pain or dyspnea. Patient noted to have bilat tib fib fractures. Patient has past history of afib, permanent and htn.  6/5 events noted. severe anemia likely due to bilat thigh hematoma. alert, no complaints  6/6 alert, no new complaints  6/7 alert, no new complaints  6/8 no new complaints  Followup HPI:  6/10- no complaints. Nurses report a sacral decubitus    PAST MEDICAL & SURGICAL HISTORY:  Lymphedema of both lower extremities    Venous insufficiency    Monoclonal gammopathies    Paroxysmal atrial fibrillation    Acute cystitis without hematuria  septic  4/2016    Essential hypertension    Spinal stenosis    Spondyloarthritis    Vaginal prolapse    [place] (hard of hearing), bilateral    Hypertension    S/P kyphoplasty  2014    S/P thyroidectomy  partial   1975    History of vaginal surgery    History of fracture of femur  hx of proximal femur fracture in January 2021    History of repair of left hip joint  Hx L hip long IMN with Dr. Kitchen 2017        Review of symptoms:  Negative except for as noted in today's HPI.      MEDICATIONS  (STANDING):  acetaminophen   Tablet .. 650 milliGRAM(s) Oral every 8 hours  losartan 25 milliGRAM(s) Oral daily  metoprolol succinate ER 25 milliGRAM(s) Oral daily  mirtazapine 15 milliGRAM(s) Oral at bedtime  polyethylene glycol 3350 17 Gram(s) Oral daily  senna 2 Tablet(s) Oral at bedtime    MEDICATIONS  (PRN):  acetaminophen   Tablet .. 650 milliGRAM(s) Oral every 6 hours PRN Mild Pain (1 - 3)  melatonin 3 milliGRAM(s) Oral at bedtime PRN Insomnia  morphine  - Injectable 2 milliGRAM(s) IV Push every 4 hours PRN Severe Pain (7 - 10)  ondansetron Injectable 4 milliGRAM(s) IV Push every 6 hours PRN Nausea and/or Vomiting  oxyCODONE    IR 2.5 milliGRAM(s) Oral every 4 hours PRN Moderate Pain (4 - 6)  QUEtiapine 12.5 milliGRAM(s) Oral every 8 hours PRN anxiety, agitation          Vital Signs Last 24 Hrs  T(C): 37.1 (10 Danny 2021 05:00), Max: 37.2 (09 Jun 2021 10:49)  T(F): 98.8 (10 Danny 2021 05:00), Max: 99 (09 Jun 2021 10:49)  HR: 88 (10 Danny 2021 05:00) (81 - 100)  BP: 123/45 (10 Danny 2021 05:00) (121/64 - 136/79)  BP(mean): --  RR: 18 (10 Danny 2021 05:00) (16 - 18)  SpO2: 95% (10 Danny 2021 05:00) (95% - 97%)    I&O's Summary    09 Jun 2021 07:01  -  10 Danny 2021 07:00  --------------------------------------------------------  IN: 480 mL / OUT: 900 mL / NET: -420 mL        PHYSICAL EXAM  General Appearance: comfortable  HEENT:   Neck:   Lungs: clear  Heart: S1 and S2 normal , 2/6 systolic murmur LSB  Abdomen: soft and nontender. Indwelling Reaves  Extremities: both legs with casts.  Neurologic: alert and oriented      INTERPRETATION OF TELEMETRY:    ECG:    LABS:                          9.1    9.04  )-----------( 128      ( 09 Jun 2021 08:28 )             28.3     06-09    132<L>  |  101  |  19  ----------------------------<  88  4.4   |  26  |  0.66    Ca    8.6      09 Jun 2021 08:28            Pro BNP  -- 06-04 @ 16:56  D Dimer  [id number] 06-04 @ 16:56    PT/INR - ( 10 Danny 2021 06:31 )   PT: 15.2 sec;   INR: 1.32 ratio         PTT - ( 10 Danny 2021 06:31 )  PTT:28.3 sec          RADIOLOGY & ADDITIONAL STUDIES:    IMPRESSION:  1. Hypertension, controlled.  2. Long term persistent atrial fibrillation. Rate controlled. INR subtherapeutic.  3.Anemia due to bleeding from trauma.  4.Sacral decubitus.  5.Urinary retention. Reaves.  6.Bialteral tib-fib fracture, S/P closed reduction.  PLAN:  Continue losartan, metoprolol, warfarin with goal INR 2.0-3.0. Consider adding iron for anemia, wound care consult for decubitus,  consult for urinary retention.

## 2021-06-10 NOTE — DIETITIAN NUTRITION RISK NOTIFICATION - ADDITIONAL COMMENTS/DIETITIAN RECOMMENDATIONS
1. liberalize diet to regular 2. c/w ensure enlive TID and encourage between meals 3. encourage family to order meals and honor pts preferences to optimize intake 4. provide maximum assistance/encouragement w/ po intake and maintain aspiration precautions 5. weekly wt 6. add MVI w/minerals daily , 500 mg vitamin c BID, and 220 mg zinc sulfate BID x 10days to promote wound healing

## 2021-06-10 NOTE — PHYSICAL THERAPY INITIAL EVALUATION ADULT - DISCHARGE DISPOSITION, PT EVAL
The pt will benefit from OT for UE/ ADL training and PT for bed mobility/ transfer tx at present as the pt remain NWB bilateral LE. -- the pt will benefit from continued UE strengthening, balance/ coordination tx, ADL training and sliding board transfer tx as tolerated./rehabilitation facility The pt will benefit from OT for UE/ ADL training and PT for bed mobility/ transfer tx at present as the pt remain NWB bilateral LE. -- the pt will benefit from continued UE strengthening, balance/ coordination tx, ADL training and sliding board transfer tx as tolerated. The pt will need private aide support when she goes home and a total assist lift to get OOB./rehabilitation facility

## 2021-06-10 NOTE — PHYSICAL THERAPY INITIAL EVALUATION ADULT - GENERAL OBSERVATIONS, REHAB EVAL
HM; pt rec'd in bed supine; ; long leg / ankle splints Bilat; pt denied pain
The pt was received on 2N, pleasant and cooperative with PT but reportedly fearful and apprehensive about getting OOB today. The pt had bilateral LE splinted/ wrapped from proximal thigh down to distal feet.

## 2021-06-10 NOTE — PROGRESS NOTE ADULT - SUBJECTIVE AND OBJECTIVE BOX
CC: s/p fall c/o severe bilateral leg pain and inability to ambulate     Pt is a 89 y/o F PMHx significant for early dementia, lymphedema of lower extremities, atrial fibrillation on Coumadin (last INR2.6), hx of proximal femur fracture in January 2021, Hx L hip long IMN with Dr. Kitchen 2017, hearing loss, cystitis, hx of HTN, spinal stenosis, anxiety admitted on 6/4/21  s/p witnessed fall at home with resultant severe bilateral leg pain and inability to ambulate. Of note the patient's daughter states that the patient has not had a bowel movement in 4 days therefore decided to administer a dose of Miralax at home. She subsequently attempted to assist patient to the bathroom at night with the assistance the patient's walker. Despite all the daughter's efforts the patient was reportedly too weak on her feet resulting in the patient's fall onto her knees. The patient's daughter states that the patient did not strike her head, or lose consciousness, but did note considerable ecchymoses overlying her bilateral knees and lower legs.  At baseline the patient walks minimally at home with the use of a walker and assist. Occasionally uses her wheelchair.   Daughter reports a decline in her overall ambulatory status over the past year. Imaging in the ED revealed bilateral proximal 1/3 tibia fractures with displacement and possible left patella fracture. In the ED Orthopedics was consulted.    Medical progress: Denies any HA, CP, SOB. Patient confused at times. Had a bad night at night -  as patient was awake all night. Extensive discussion with patient's daughter. We addressed patient's confusion as patient is sundowning /  social work evaluation and next steps of transitioning patient from hospital-rehab /  INR,  HH, plt count has been discussed with patient. Will re-order physical therapy  Complaints: no new complaints  State of mind: at times confused /  but is able to tell me where she is.   GOC: at this time patient's HCP (daughter) wants everything to be done. Ideally, wants to take mom - home, but that might not be possible as patient requires extensive help.     REVIEW OF SYSTEMS:  All other review of systems is negative unless indicated above    PHYSICAL EXAM:  T(C): 36.7 (08 Jun 2021 08:34), Max: 37.1 (07 Jun 2021 16:18)  T(F): 98.1 (08 Jun 2021 08:34), Max: 98.7 (07 Jun 2021 16:18)  HR: 79 (08 Jun 2021 08:34) (75 - 89)  BP: 121/63 (08 Jun 2021 08:34) (110/43 - 135/56)  RR: 18 (08 Jun 2021 08:34) (18 - 18)  SpO2: 94% (08 Jun 2021 08:34) (91% - 94%)  General: Deconditioned, frail, chronically sick  Eyes: PERRLA, EOMI; conjunctiva and sclera clear  Head: some temporal wasting  ENMT: No nasal discharge; airway clear  Neck: Supple; non tender; no masses  Respiratory: No wheezes, rales or rhonchi  Cardiovascular: S1, S2 irreg with II/VI FARZANEH  Gastrointestinal: Soft non-tender non-distended; Normal bowel sounds  Genitourinary: No costovertebral angle tenderness, FQ to gravity    CBC Full  -  ( 09 Jun 2021 08:28 )  WBC Count : 9.04 K/uL  RBC Count : 2.98 M/uL  Hemoglobin : 9.1 g/dL  Hematocrit : 28.3 %  Platelet Count - Automated : 128 K/uL  Mean Cell Volume : 95.0 fl  Mean Cell Hemoglobin : 30.5 pg  Mean Cell Hemoglobin Concentration : 32.2 gm/dL  Auto Neutrophil # : x  Auto Lymphocyte # : x  Auto Monocyte # : x  Auto Eosinophil # : x  Auto Basophil # : x  Auto Neutrophil % : x  Auto Lymphocyte % : x  Auto Monocyte % : x  Auto Eosinophil % : x  Auto Basophil % : x    PT/INR - ( 09 Jun 2021 08:28 )   PT: 13.4 sec;   INR: 1.16 ratio         PTT - ( 09 Jun 2021 08:28 )  PTT:27.0 sec    06-09    132<L>  |  101  |  19  ----------------------------<  88  4.4   |  26  |  0.66    Ca    8.6      09 Jun 2021 08:28    Extremities: BL LE in splints, able to move toes  Vascular: Peripheral pulses palpable 2+ bilaterally  Neurological: Alert, confused  Skin: pale  Musculoskeletal: no range of motion of LEs  Psychiatric: Cooperative /  confused       Labs:           CBC Full  -  ( 09 Jun 2021 08:28 )  WBC Count : 9.04 K/uL  RBC Count : 2.98 M/uL  Hemoglobin : 9.1 g/dL  Hematocrit : 28.3 %  Platelet Count - Automated : 128 K/uL  Mean Cell Volume : 95.0 fl  Mean Cell Hemoglobin : 30.5 pg  Mean Cell Hemoglobin Concentration : 32.2 gm/dL  Auto Neutrophil # : x  Auto Lymphocyte # : x  Auto Monocyte # : x  Auto Eosinophil # : x  Auto Basophil # : x  Auto Neutrophil % : x  Auto Lymphocyte % : x  Auto Monocyte % : x  Auto Eosinophil % : x  Auto Basophil % : x    PT/INR - ( 09 Jun 2021 08:28 )   PT: 13.4 sec;   INR: 1.16 ratio         PTT - ( 09 Jun 2021 08:28 )  PTT:27.0 sec    06-09    132<L>  |  101  |  19  ----------------------------<  88  4.4   |  26  |  0.66    Ca    8.6      09 Jun 2021 08:28          89 y/o F PMHx significant for early dementia, lymphedema of lower extremities, atrial fibrillation on Coumadin (last INR2.6), hx of proximal femur fracture in January 2021, Hx L hip long IMN with Dr. Kitchen 2017, hearing loss, cystitis, hx of HTN, spinal stenosis, anxiety was BIBA s/p witnessed fall at home with resultant severe bilateral leg pain and inability to ambulate    # BL knee Fx with tibia/fibula Fx and patellas  - NWB BLLE in long leg trilam splints  - Keep splints c/d/i  - Ice/Elevate  - Incentive Spirometre  - SCDs  - PT/OT OOBTC  - Ortho stable for discharge. FU outpatient with LEXIS Kitchen in 1-2 weeks after discharge. Call office to schedule appointment.  - care discussed with social work -  will re-order PT    # Acute blood loss anemia secondary to hematoma  # Right knee hematoma on coumadin with anemia of ABL   - no further bleeding  - INR 1.16  - total of 3 units of PRBCs    # Afib on VKA , h/o CVA s/p reversal - ortho cleared pt  to resume VKA, high CHADVASC score  - coumadin with currently subtherapeutic INR  - coumadin has been increased to 6 mg po   - monitor INR    # Unstable gait/Hands tremor  - multifactorial, at present exam is limited as legs are splinted  - Pt will need MRI brain - can have open MRI as OP once D/C  - Tremors of hands; most likely benign- at present clinically not C/W Parkinsons ds, however exam is limited  - Neurology consult appreciated: - f/u after 2-3 months once knees heal     # Constipation   - resolved    # HTN   - BP stable now, restart toprol xl 25 , losartan 25     # Dementia with depression and anxiety   - wellbutrin causes constipation  - will hold, c/w remeron lower dose 15 ( pt also getting opioids) , seroquel 12.5 q8h prn as needed  - Psych consult to adjust anxiety meds - at this time pt's daughter refusing psych eval   CC: s/p fall c/o severe bilateral leg pain and inability to ambulate     Pt is a 88 y/o F PMHx significant for early dementia, lymphedema of lower extremities, atrial fibrillation on Coumadin (last INR2.6), hx of proximal femur fracture in January 2021, Hx L hip long IMN with Dr. Kitchen 2017, hearing loss, cystitis, hx of HTN, spinal stenosis, anxiety admitted on 6/4/21  s/p witnessed fall at home with resultant severe bilateral leg pain and inability to ambulate. Of note the patient's daughter states that the patient has not had a bowel movement in 4 days therefore decided to administer a dose of Miralax at home. She subsequently attempted to assist patient to the bathroom at night with the assistance the patient's walker. Despite all the daughter's efforts the patient was reportedly too weak on her feet resulting in the patient's fall onto her knees. The patient's daughter states that the patient did not strike her head, or lose consciousness, but did note considerable ecchymoses overlying her bilateral knees and lower legs.  At baseline the patient walks minimally at home with the use of a walker and assist. Occasionally uses her wheelchair.   Daughter reports a decline in her overall ambulatory status over the past year. Imaging in the ED revealed bilateral proximal 1/3 tibia fractures with displacement and possible left patella fracture. In the ED Orthopedics was consulted.    Medical progress: 6/9- Denies any HA, CP, SOB. Patient confused at times. Had a bad night at night -  as patient was awake all night. Extensive discussion with patient's daughter. We addressed patient's confusion as patient is sundowning /  social work evaluation and next steps of transitioning patient from hospital-rehab /  INR,  HH, plt count has been discussed with patient. Will re-order physical therapy  6/10- s/e, pt awake, eating her breakfast which I watched her do, she's slow but she's able to do it. d/w CM- pt accepted to [facility] but dtr is very anxious about having pt go to [facility] although she's not able to take care of her at home.  PT has recommended [facility] for pt. I s/w dtr thoroughly and addressed all her concerns regarding pt's medical condition and explained to her that pt does not have any acute medical condition that continues to necessitate hospitalization and she is medically cleared to c/w tx at [facility] from all consultants standpoint.  I also explained that prolonging hospitalization beyond what pt needs may actually put her at risk for nosocomial infections.  She verbalized understanding and wants another day to decide on which [facility] would be acceptable.      State of mind: at times confused /  but is able to tell me where she is.       PHYSICAL EXAM:  Vital Signs Last 24 Hrs  T(C): 36.3 (10 Danny 2021 08:57), Max: 37.1 (10 Danny 2021 05:00)  T(F): 97.3 (10 Danny 2021 08:57), Max: 98.8 (10 Danny 2021 05:00)  HR: 73 (10 Danny 2021 08:57) (73 - 88)  BP: 130/57 (10 Danny 2021 08:57) (123/45 - 136/79)  BP(mean): --  RR: 16 (10 Danny 2021 08:57) (16 - 18)  SpO2: 96% (10 Danny 2021 08:57) (95% - 96%)  General: Deconditioned, frail,   Eyes: PERRLA, EOMI; conjunctiva and sclera clear  Head: some temporal wasting  ENMT: No nasal discharge; airway clear  Neck: Supple; non tender; no masses  Respiratory: No wheezes, rales or rhonchi  Cardiovascular: S1, S2 irreg with II/VI FARZANEH  Gastrointestinal: Soft non-tender non-distended; Normal bowel sounds  Genitourinary: No costovertebral angle tenderness, FQ to gravity  Extremities: BL LE in splints, able to move toes  Vascular: Peripheral pulses palpable 2+ bilaterally  Neurological: Alert, confused  Skin: pale, no visible rash  Musculoskeletal: no range of motion of LEs  Psychiatric: Cooperative /  confused         Labs personally reviewed.                          8.9    9.27  )-----------( 148      ( 10 Danny 2021 06:31 )             27.0       06-09    132<L>  |  101  |  19  ----------------------------<  88  4.4   |  26  |  0.66    Ca    8.6      09 Jun 2021 08:28    PT/INR - ( 10 Danny 2021 06:31 )   PT: 15.2 sec;   INR: 1.32 ratio      PTT - ( 10 Danny 2021 06:31 )  PTT:28.3 sec    Lactate Trend  06-07 @ 07:50 Lactate:0.9             88 y/o F PMHx significant for early dementia, lymphedema of lower extremities, atrial fibrillation on Coumadin (last INR2.6), hx of proximal femur fracture in January 2021, Hx L hip long IMN with Dr. Kitchen 2017, hearing loss, cystitis, hx of HTN, spinal stenosis, anxiety was BIBA s/p witnessed fall at home with resultant severe bilateral leg pain and inability to ambulate    # BL knee Fx with tibia/fibula Fx and patellas  - NWB BLLE in long leg trilam splints  - Keep splints c/d/i  - Ice/Elevate  - Incentive Spirometre  - SCDs  - PT/OT OOBTC  - Ortho stable for discharge. FU outpatient with LEXIS Kitchen in 1-2 weeks after discharge. Call office to schedule appointment.  - care discussed with social work -      # Acute blood loss anemia secondary to hematoma- h/h now stable over past 4 days (s/p 3 units transfused during this hospitalization)  # Right knee hematoma on coumadin with anemia of ABL   - no further bleeding  - INR  subtherapeutic,      # Afib on VKA , h/o CVA s/p reversal - ortho cleared pt  to resume VKA, high CHADVASC score  - coumadin with currently subtherapeutic INR  - coumadin has been increased to 6 mg po   - monitor INR   appreciate anticoagulation team     # Unstable gait/Hands tremor  - multifactorial, at present exam is limited as legs are splinted  - Pt will need MRI brain - can have open MRI as OP once D/C  - Tremors of hands; most likely benign- at present clinically not C/W Parkinsons ds, however exam is limited  - Neurology consult appreciated: - f/u after 2-3 months once knees heal     # Constipation   - resolved    # HTN   - BP stable now, restart toprol xl 25 , losartan 25     # Dementia with depression and anxiety   - wellbutrin causes constipation  - will hold, c/w remeron lower dose 15 ( pt also getting opioids) , seroquel 12.5 q8h prn as needed  - Psych consult to adjust anxiety meds - at this time pt's daughter refusing psych eval      DISPO: MEDICALLY CLEARED TO D/C TO [facility] TODAY WHEN AVAILABLE.

## 2021-06-10 NOTE — PHYSICAL THERAPY INITIAL EVALUATION ADULT - ASR EQUIP NEEDS DISCH PT EVAL
Recommend Daily OOB to chair via Total Assist Lift Ree Lift vs Hover Mat sliding sheet transfer
bathing equipment/patient lift/toileting equipment

## 2021-06-10 NOTE — DIETITIAN NUTRITION RISK NOTIFICATION - TREATMENT: THE FOLLOWING DIET HAS BEEN RECOMMENDED
Diet, DASH/TLC:   Sodium & Cholesterol Restricted  Supplement Feeding Modality:  Oral  Ensure Enlive Cans or Servings Per Day:  1       Frequency:  Three Times a day (06-08-21 @ 16:44) [Active]

## 2021-06-10 NOTE — DIETITIAN INITIAL EVALUATION ADULT. - PERTINENT MEDS FT
MEDICATIONS  (STANDING):  acetaminophen   Tablet .. 650 milliGRAM(s) Oral every 8 hours  losartan 25 milliGRAM(s) Oral daily  metoprolol succinate ER 25 milliGRAM(s) Oral daily  mirtazapine 15 milliGRAM(s) Oral at bedtime  polyethylene glycol 3350 17 Gram(s) Oral daily  senna 2 Tablet(s) Oral at bedtime  warfarin 6 milliGRAM(s) Oral once    MEDICATIONS  (PRN):  acetaminophen   Tablet .. 650 milliGRAM(s) Oral every 6 hours PRN Mild Pain (1 - 3)  melatonin 3 milliGRAM(s) Oral at bedtime PRN Insomnia  morphine  - Injectable 2 milliGRAM(s) IV Push every 4 hours PRN Severe Pain (7 - 10)  ondansetron Injectable 4 milliGRAM(s) IV Push every 6 hours PRN Nausea and/or Vomiting  oxyCODONE    IR 2.5 milliGRAM(s) Oral every 4 hours PRN Moderate Pain (4 - 6)  QUEtiapine 12.5 milliGRAM(s) Oral every 8 hours PRN anxiety, agitation

## 2021-06-10 NOTE — DIETITIAN INITIAL EVALUATION ADULT. - ENERGY INTAKE
As per daughter pt has been eating < 50 % her normal intake during hospitalization. During visit pt w/ breakfast tray in front of her, she consumed ~25% of eggs during visit and was continuing to eat but the rest of the tray was untouched (cereal, milk, [place] toast) and as per RN pt drank ensure this AM. Noted pt w/ order for remeron which can stimulate appetite. Poor (<50%)

## 2021-06-10 NOTE — PHYSICAL THERAPY INITIAL EVALUATION ADULT - DIAGNOSIS, PT EVAL
89y Female w/ L Patella fx & BLLE proximal tib/fib fxs sp closed reduction and splint
Bilateral Tibia / Fibula Fx's; s/p closed reduction / splinting 6/4/21; L Patella Fx

## 2021-06-10 NOTE — PHYSICAL THERAPY INITIAL EVALUATION ADULT - MANUAL MUSCLE TESTING RESULTS, REHAB EVAL
except Bilat LE's n/a/no strength deficits were identified
Gross Bilateral LE MMT unable to be completed due to long leg splints, Bilateral Hip Abduction/Adduction grossly 2-/5, Bilateral UE grossly 3/5./grossly assessed due to

## 2021-06-10 NOTE — DIETITIAN INITIAL EVALUATION ADULT. - IDEAL BODY WEIGHT (KG)
61.3 Other (Free Text): Persistent h/o  treatment with ln2.  Likely BCC Note Text (......Xxx Chief Complaint.): This diagnosis correlates with the Detail Level: Simple

## 2021-06-10 NOTE — PROGRESS NOTE ADULT - NUTRITIONAL ASSESSMENT
This patient has been assessed with a concern for Malnutrition and has been determined to have a diagnosis/diagnoses of Moderate protein-calorie malnutrition.    This patient is being managed with:   Diet DASH/TLC-  Sodium & Cholesterol Restricted  Supplement Feeding Modality:  Oral  Ensure Enlive Cans or Servings Per Day:  1       Frequency:  Three Times a day  Entered: Jun 8 2021  4:43PM

## 2021-06-10 NOTE — PHYSICAL THERAPY INITIAL EVALUATION ADULT - CRITERIA FOR SKILLED THERAPEUTIC INTERVENTIONS
impairments found/functional limitations in following categories/risk reduction/prevention
PT not indicated @ this time; pt with Bilat LE Fx's with long leg splints / NWB Bilat LE's; pt will require Ree lift OOB; discussed with Dr. Sarkar verbally post Demarcus JOY in agreement

## 2021-06-10 NOTE — PROGRESS NOTE ADULT - SUBJECTIVE AND OBJECTIVE BOX
Patient seen and examined at bedside, resting comfortably.  Confusion this morning but pain appears well controlled. Denies chest pain, dyspnea, numbness/tingling. No complaints at this time. No overnight events.    Vital Signs Last 24 Hrs  T(C): 37.1 (10 Danny 2021 05:00), Max: 37.2 (09 Jun 2021 10:49)  T(F): 98.8 (10 Danny 2021 05:00), Max: 99 (09 Jun 2021 10:49)  HR: 88 (10 Danny 2021 05:00) (81 - 100)  BP: 123/45 (10 Danny 2021 05:00) (121/64 - 136/79)  BP(mean): --  RR: 18 (10 Danny 2021 05:00) (16 - 18)  SpO2: 95% (10 Danny 2021 05:00) (95% - 97%)                            9.1    9.04  )-----------( 128      ( 09 Jun 2021 08:28 )             28.3         PHYSICAL EXAM:  General: No acute distress, AAOx3  BLLE Exam:  Long leg trilam splints in place - Windowed anterior for skin/compartment checks, hematoma stable, swelling stable/improving  +EHL/FHL   Wiggles Toes  SILT L2/3/5  Toes warm/well perfused  Calf Soft NT  Palpable compartments soft and compressible

## 2021-06-10 NOTE — DIETITIAN INITIAL EVALUATION ADULT. - PERTINENT LABORATORY DATA
06-09    132<L>  |  101  |  19  ----------------------------<  88  4.4   |  26  |  0.66    Ca    8.6      09 Jun 2021 08:28    BMI: BMI (kg/m2): 23.1 (06-04-21 @ 06:22)  HbA1c:   Glucose:   BP: 130/57 (06-10-21 @ 08:57) (100/68 - 136/79)  Lipid Panel:

## 2021-06-11 ENCOUNTER — TRANSCRIPTION ENCOUNTER (OUTPATIENT)
Age: 86
End: 2021-06-11

## 2021-06-11 DIAGNOSIS — F03.90 UNSPECIFIED DEMENTIA WITHOUT BEHAVIORAL DISTURBANCE: ICD-10-CM

## 2021-06-11 LAB
HCT VFR BLD CALC: 28.4 % — LOW (ref 34.5–45)
HGB BLD-MCNC: 9.4 G/DL — LOW (ref 11.5–15.5)
INR BLD: 1.74 RATIO — HIGH (ref 0.88–1.16)
MCHC RBC-ENTMCNC: 30.6 PG — SIGNIFICANT CHANGE UP (ref 27–34)
MCHC RBC-ENTMCNC: 33.1 GM/DL — SIGNIFICANT CHANGE UP (ref 32–36)
MCV RBC AUTO: 92.5 FL — SIGNIFICANT CHANGE UP (ref 80–100)
PLATELET # BLD AUTO: 174 K/UL — SIGNIFICANT CHANGE UP (ref 150–400)
PROTHROM AB SERPL-ACNC: 19.7 SEC — HIGH (ref 10.6–13.6)
RBC # BLD: 3.07 M/UL — LOW (ref 3.8–5.2)
RBC # FLD: 14.6 % — HIGH (ref 10.3–14.5)
WBC # BLD: 10.78 K/UL — HIGH (ref 3.8–10.5)
WBC # FLD AUTO: 10.78 K/UL — HIGH (ref 3.8–10.5)

## 2021-06-11 PROCEDURE — 99239 HOSP IP/OBS DSCHRG MGMT >30: CPT

## 2021-06-11 PROCEDURE — 99231 SBSQ HOSP IP/OBS SF/LOW 25: CPT

## 2021-06-11 PROCEDURE — 99232 SBSQ HOSP IP/OBS MODERATE 35: CPT

## 2021-06-11 RX ORDER — POLYETHYLENE GLYCOL 3350 17 G/17G
17 POWDER, FOR SOLUTION ORAL
Qty: 0 | Refills: 0 | DISCHARGE
Start: 2021-06-11

## 2021-06-11 RX ORDER — SPIRONOLACTONE 25 MG/1
1 TABLET, FILM COATED ORAL
Qty: 0 | Refills: 0 | DISCHARGE

## 2021-06-11 RX ORDER — MIRTAZAPINE 45 MG/1
1 TABLET, ORALLY DISINTEGRATING ORAL
Qty: 0 | Refills: 0 | DISCHARGE

## 2021-06-11 RX ORDER — WARFARIN SODIUM 2.5 MG/1
3 TABLET ORAL DAILY
Refills: 0 | Status: DISCONTINUED | OUTPATIENT
Start: 2021-06-11 | End: 2021-06-12

## 2021-06-11 RX ORDER — WARFARIN SODIUM 2.5 MG/1
3 TABLET ORAL
Qty: 0 | Refills: 0 | DISCHARGE

## 2021-06-11 RX ORDER — BUPROPION HYDROCHLORIDE 150 MG/1
1 TABLET, EXTENDED RELEASE ORAL
Qty: 0 | Refills: 0 | DISCHARGE

## 2021-06-11 RX ORDER — OXYCODONE HYDROCHLORIDE 5 MG/1
0 TABLET ORAL
Qty: 0 | Refills: 0 | DISCHARGE
Start: 2021-06-11

## 2021-06-11 RX ORDER — LANOLIN ALCOHOL/MO/W.PET/CERES
1 CREAM (GRAM) TOPICAL
Qty: 0 | Refills: 0 | DISCHARGE
Start: 2021-06-11

## 2021-06-11 RX ORDER — WARFARIN SODIUM 2.5 MG/1
1 TABLET ORAL
Qty: 0 | Refills: 0 | DISCHARGE
Start: 2021-06-11

## 2021-06-11 RX ORDER — SENNA PLUS 8.6 MG/1
2 TABLET ORAL
Qty: 0 | Refills: 0 | DISCHARGE
Start: 2021-06-11

## 2021-06-11 RX ORDER — QUETIAPINE FUMARATE 200 MG/1
0 TABLET, FILM COATED ORAL
Qty: 0 | Refills: 0 | DISCHARGE
Start: 2021-06-11

## 2021-06-11 RX ADMIN — Medication 650 MILLIGRAM(S): at 05:33

## 2021-06-11 RX ADMIN — WARFARIN SODIUM 3 MILLIGRAM(S): 2.5 TABLET ORAL at 21:32

## 2021-06-11 RX ADMIN — Medication 650 MILLIGRAM(S): at 13:58

## 2021-06-11 RX ADMIN — Medication 650 MILLIGRAM(S): at 13:57

## 2021-06-11 RX ADMIN — MIRTAZAPINE 15 MILLIGRAM(S): 45 TABLET, ORALLY DISINTEGRATING ORAL at 21:32

## 2021-06-11 RX ADMIN — Medication 650 MILLIGRAM(S): at 21:33

## 2021-06-11 RX ADMIN — SENNA PLUS 2 TABLET(S): 8.6 TABLET ORAL at 21:32

## 2021-06-11 RX ADMIN — Medication 25 MILLIGRAM(S): at 10:34

## 2021-06-11 RX ADMIN — LOSARTAN POTASSIUM 25 MILLIGRAM(S): 100 TABLET, FILM COATED ORAL at 10:34

## 2021-06-11 RX ADMIN — QUETIAPINE FUMARATE 12.5 MILLIGRAM(S): 200 TABLET, FILM COATED ORAL at 21:32

## 2021-06-11 NOTE — DISCHARGE NOTE PROVIDER - HOSPITAL COURSE
FROM ADMISSION H+P:   HPI:  89 y/o F PMHx significant for early dementia, lymphedema of lower extremities, atrial fibrillation on Coumadin (last INR2.6), hx of proximal femur fracture in January 2021, Hx L hip long IMN with Dr. Kitchen 2017, hearing loss, cystitis, hx of HTN, spinal stenosis, anxiety was BIBA s/p witnessed fall at home with resultant severe bilateral leg pain and inability to ambulate. Of note the patient's daughter states that the patient has not had a bowel movement in 4 days therefore decided to administer a dose of Miralax at home. She subsequently attempted to assist patient to the bathroom at night with the assistance the patient's walker. Despite all the daughter's efforts the patient was reportedly too weak on her feet resulting in the patient's fall onto her knees. The patient's daughter states that the patient did not strike her head, or lose consciousness, but did note considerable ecchymoses overlying her bilateral knees and lower legs. The patient denies any numbness/tingling/burning in the BLLE. No other bone/joint complaints. At baseline the patient walks minimally at home with the use of a walker and assist. Occasionally uses her wheelchair.   Daughter reports a decline in her overall ambulatory status over the past year. Imaging in the ED revealed bilateral proximal 1/3 tibia fractures with displacement and possible left patella fracture. In the ED Orthopedics was consulted.   (04 Jun 2021 02:34)      ---  HOSPITAL COURSE:   Pt admitted on 6/4, evaluated by ortho for b/l tibial fractures. By later in the day, ICU called for Hb 7 from 12 and increasing INR.  Thrombocytopenia, w/ b/l thigh hematomas. Per ortho, decided for conservative management  and placed in b/l long leg trilam splints and recommended for NWB B/L LE until op f/u w/ Dr Kitchen. She required PRBC transfusions. H/h has been stable since 6/7 (last transfusion). Coumadin resumed and h/h remains stable.  She's been seen by psych and meds adjusted. She was seen by neuro for unsteady gait and tremors. Recommended to have op MRI brain and tremors are benign.   She's been deemed medically stable for d/c to [facility]    Vital Signs Last 24 Hrs  T(C): 36.9 (11 Jun 2021 09:34), Max: 36.9 (11 Jun 2021 09:34)  T(F): 98.5 (11 Jun 2021 09:34), Max: 98.5 (11 Jun 2021 09:34)  HR: 94 (11 Jun 2021 09:34) (92 - 94)  BP: 155/68 (11 Jun 2021 09:34) (151/64 - 155/68)  BP(mean): --  RR: 16 (11 Jun 2021 09:34) (16 - 16)  SpO2: 98% (11 Jun 2021 09:34) (97% - 98%)  General: Deconditioned, frail,   Eyes: PERRLA, EOMI; conjunctiva and sclera clear  Head: some temporal wasting  ENMT: No nasal discharge; airway clear  Neck: Supple; non tender; no masses  Respiratory: No wheezes, rales or rhonchi  Cardiovascular: S1, S2 irreg with II/VI FARZANEH  Gastrointestinal: Soft non-tender non-distended; Normal bowel sounds  Genitourinary: No costovertebral angle tenderness, FQ to gravity  Extremities: BL LE in splints, able to move toes  Vascular: Peripheral pulses palpable 2+ bilaterally  Neurological: Alert, confused  Skin: pale, no visible rash  Musculoskeletal: no range of motion of LEs  Psychiatric: Cooperative /  confused       89 y/o F PMHx significant for early dementia, lymphedema of lower extremities, atrial fibrillation on Coumadin (last INR2.6), hx of proximal femur fracture in January 2021, Hx L hip long IMN with Dr. Kitchen 2017, hearing loss, cystitis, hx of HTN, spinal stenosis, anxiety was BIBA s/p witnessed fall at home with resultant severe bilateral leg pain and inability to ambulate    # BL knee Fx with tibia/fibula Fx and patellas  - NWB BLLE in long leg trilam splints  - Keep splints c/d/i  - Ice/Elevate  - Incentive Spirometre  - SCDs  - PT/OT OOBTC  - Ortho stable for discharge. FU outpatient with LEXIS Kitchen in 1-2 weeks after discharge. Call office to schedule appointment.  - care discussed with social work -  Dtr made aware     # Acute blood loss anemia secondary to hematoma- h/h now stable over past 4 days (s/p 3 units transfused during this hospitalization)  # Right knee hematoma on coumadin with anemia of ABL   - no further bleeding  - INR  subtherapeutic,      # Afib on VKA , h/o CVA s/p reversal - ortho cleared pt  to resume VKA, high CHADVASC score  - coumadin with currently subtherapeutic INR  - monitor INR   appreciate anticoagulation team     # Unstable gait/Hands tremor  - multifactorial, at present exam is limited as legs are splinted  - Pt will need MRI brain - can have open MRI as OP once D/C  - Tremors of hands; most likely benign- at present clinically not C/W Parkinsons ds, however exam is limited  - Neurology consult appreciated: - f/u after 2-3 months once knees heal     # Constipation   - resolved    # HTN   - BP stable now, restart toprol xl 25 , losartan 25     # Dementia with depression and anxiety   - wellbutrin causes constipation  - will hold, c/w remeron lower dose 15 ( pt also getting opioids) , seroquel 12.5 q8h prn as needed  - Psych consult to adjust anxiety meds - at this time pt's daughter refusing psych eval    ---  CONSULTANTS:   cardio, ortho, psych, neuro   ---  TIME SPENT:  I, the attending physician, was physically present for the key portions of the evaluation and management (E/M) service provided. The total amount of time spent reviewing the hospital notes, laboratory values, imaging findings, assessing/counseling the patient, discussing with consultant physicians, social work, nursing staff was 90 minutes

## 2021-06-11 NOTE — PROGRESS NOTE ADULT - ASSESSMENT
This is a 89 year old female with pmh of atrial fibrillation on Coumadin, [id number] Score: 5, s/p fall on 6-4-2021, causing fx of bl tib/fib and left patella.  s/p -closded reduction and splinting by ortho.  Pt's INR on admission was 3.54  given 5mg vit k po and k-centra, noted  Pt has both high thrombosis and bleed risk.  Requires anticoagulation treatment dose but will hold today and revaluate pt tomorrow    6-6-2021: Made Dr. De La Torre aware of plan and she agrees.  6/7: INR today 1.24, plts  slightly improved from yesterday 73,000 , patient is high risk for bleeding and clotting, not recommended for bridging, spoke with Hospitalist NP will start coumadin today  .    Plan:  ::INR 1.7 from 1.3: reduce Coumadin 3mg tonight adjust dosage as per INR  ::Monitor for bleeding   ::Daily cbc/ bmp, pt/inr  ::Mobilty as per ortho    Will continue to follow.  Dispo: [facility] vs [facility]

## 2021-06-11 NOTE — DISCHARGE NOTE PROVIDER - CARE PROVIDER_API CALL
Tony Vides  CARDIOVASCULAR DISEASE  [address] [place] [address]  Phone: [phone]  Fax: [phone]  Follow Up Time: 2 weeks    Magno Kitchen)  Orthopaedic Surgery  [address] [place] [address], [place] [address]  Phone: [phone]  Fax: [phone]  Follow Up Time: 2 weeks    Melba Mccartney)  Neurology  [address] [place] [address], [address]  [place] [address]  Phone: [phone]  Fax: [phone]  Follow Up Time: 2 weeks

## 2021-06-11 NOTE — DISCHARGE NOTE PROVIDER - NSDCMRMEDTOKEN_GEN_ALL_CORE_FT
Fish Oil 1000 mg oral capsule: 1 cap(s) orally once a day  losartan 25 mg oral tablet: 1 tab(s) orally once a day  melatonin 3 mg oral tablet: 1 tab(s) orally once a day (at bedtime), As needed, Insomnia  oxyCODONE:   polyethylene glycol 3350 oral powder for reconstitution: 17 gram(s) orally once a day  QUEtiapine:   Remeron 15 mg oral tablet: 1 tab(s) orally once a day (at bedtime)  senna oral tablet: 2 tab(s) orally once a day (at bedtime)  Toprol-XL 25 mg oral tablet, extended release: 1 tab(s) orally once a day  Vitamin D3 1000 intl units (25 mcg) oral capsule: 3 cap(s) orally once a day  warfarin 3 mg oral tablet: 1 tab(s) orally once a day

## 2021-06-11 NOTE — DISCHARGE NOTE PROVIDER - NSDCFUSCHEDAPPT_GEN_ALL_CORE_FT
CLAUDIO MCDOWELL ; 07/19/2021 ; [facility] [address] [facility]  CLAUDIO MCDOWELL ; 07/19/2021 ; [facility] [address] [place] [address]

## 2021-06-11 NOTE — DISCHARGE NOTE PROVIDER - PROVIDER TOKENS
PROVIDER:[TOKEN:[[id number]],FOLLOWUP:[2 weeks]],PROVIDER:[TOKEN:[[id number]],FOLLOWUP:[2 weeks]],PROVIDER:[TOKEN:[[id number]],FOLLOWUP:[2 weeks]]

## 2021-06-11 NOTE — DISCHARGE NOTE PROVIDER - NSDCCPCAREPLAN_GEN_ALL_CORE_FT
PRINCIPAL DISCHARGE DIAGNOSIS  Diagnosis: Other closed fracture of proximal end of tibia, unspecified laterality, initial encounter  Assessment and Plan of Treatment:       SECONDARY DISCHARGE DIAGNOSES  Diagnosis: Dementia with behavioral disturbance, unspecified dementia type  Assessment and Plan of Treatment:

## 2021-06-11 NOTE — ADVANCED PRACTICE NURSE CONSULT - ASSESSMENT
HPI: This is an 89 year old female that was admitted to the hospital on 6/4/2021 for BL tibial fractures. PMH-  early dementia, lymphedema of lower extremities, atrial fibrillation on Coumadin (last INR2.6), hx of proximal femur fracture in January 2021, Hx L hip long IMN with Dr. Kitchen 2017, hearing loss, cystitis, hx of HTN, spinal stenosis, anxiety was BIBA s/p witnessed fall at home with resultant severe bilateral leg pain and inability to ambulate. Of note the patient's daughter states that the patient has not had a bowel movement in 4 days therefore decided to administer a dose of Miralax at home. She subsequently attempted to assist patient to the bathroom at night with the assistance the patient's walker. Despite all the daughter's efforts the patient was reportedly too weak on her feet resulting in the patient's fall onto her knees. The patient's daughter states that the patient did not strike her head, or lose consciousness, but did note considerable ecchymoses overlying her bilateral knees and lower legs. The patient denies any numbness/tingling/burning in the BLLE. No other bone/joint complaints. At baseline the patient walks minimally at home with the use of a walker and assist. Occasionally uses her wheelchair.   Daughter reports a decline in her overall ambulatory status over the past year. Imaging in the ED revealed bilateral proximal 1/3 tibia fractures with displacement and possible left patella fracture. In the ED Orthopedics was consulted.  Consulted to evaluate patient’s coccyx. Patient presents resting on an [employer] 9000 MRS on her right side with heels elevated off mattress. Patient turned to her side for assessment of skin. Upon assessment, patient noted to be incontinent of stool. Skin care rendered. Old dressing removed from coccyx. Two purple discolored areas noted measuring 0.5cmx0.5cmx0 and 0.5cmx0.4yvs7ym. Wounds intact, no open area or drainage noted. Wounds may be related to the trauma of falling at home and resurfacing now could be ecchymosis or DTI. Cavilon no sting skin prep applied for protection and covered with foam dressing. Patient placed on her left side with a pillow. Heels remain off mattress.   PAST MEDICAL & SURGICAL HISTORY:  Lymphedema of both lower extremities  Venous insufficiency  Monoclonal gammopathies  Paroxysmal atrial fibrillation  Acute cystitis without hematuria, septic  4/2016  Essential hypertension  Spinal stenosis, Spondyloarthritis  Vaginal prolapse  [place] (hard of hearing), bilateral  Hypertension, S/P kyphoplasty-2014  S/P thyroidectomy- partial   1975  History of vaginal surgery, History of fracture of femur  hx of proximal femur fracture in January 2021  History of repair of left hip joint  Hx L hip long IMN with Dr. Kitchen 2017  REVIEW OF SYSTEMS  General:	  Skin/Breast:  Ophthalmologic:  ENMT:	  Respiratory and Thorax: denies shortness of breath  Cardiovascular:	denies chest pain  Neurological:	  Psychiatric:	  Hematology/Lymphatics:	  Endocrine:	  Allergic/Immunologic:	  MEDICATIONS  (STANDING):  acetaminophen   Tablet .. 650 milliGRAM(s) Oral every 8 hours  losartan 25 milliGRAM(s) Oral daily  metoprolol succinate ER 25 milliGRAM(s) Oral daily  mirtazapine 15 milliGRAM(s) Oral at bedtime  polyethylene glycol 3350 17 Gram(s) Oral daily  senna 2 Tablet(s) Oral at bedtime  warfarin 3 milliGRAM(s) Oral daily  Allergies  No Known Allergies  Intolerances  SOCIAL HISTORY:  FAMILY HISTORY:  No pertinent family history in first degree relatives  Vital Signs Last 24 Hrs  T(C): 36.9 (11 Jun 2021 09:34), Max: 36.9 (11 Jun 2021 09:34)  T(F): 98.5 (11 Jun 2021 09:34), Max: 98.5 (11 Jun 2021 09:34)  HR: 94 (11 Jun 2021 09:34) (92 - 94)  BP: 155/68 (11 Jun 2021 09:34) (151/64 - 155/68)  RR: 16 (11 Jun 2021 09:34) (16 - 16)  SpO2: 98% (11 Jun 2021 09:34) (97% - 98%)  PHYSICAL EXAM:  Constitutional:  Eyes: conjunctiva and sclera clear  ENMT: Moist mucous membranes  Neck:  Breasts:  Back:  Respiratory: respirations even and unlabored  Cardiovascular:  Gastrointestinal:  Genitourinary: Reaves in place  Rectal:  Extremities:  Vascular:  Neurological: alert and orientedx2  Skin: wounds to coccyx  LABS:                        9.4    10.78 )-----------( 174      ( 11 Jun 2021 07:26 )             28.4

## 2021-06-11 NOTE — DISCHARGE NOTE PROVIDER - DETAILS OF MALNUTRITION DIAGNOSIS/DIAGNOSES
This patient has been assessed with a concern for Malnutrition and was treated during this hospitalization for the following Nutrition diagnosis/diagnoses:     -  06/10/2021: Moderate protein-calorie malnutrition

## 2021-06-11 NOTE — ADVANCED PRACTICE NURSE CONSULT - RECOMMEDATIONS
1) Continue to turn and position every 2 hours  2) Continue to elevate heels off mattress  3) Change dressing to coccyx every 2 days with foam dressing and Cavilon no sting skin prep  4) Albumin-2.6 on 6/7/2021. Registered dietician following patient with the current recommendations    1. liberalize diet to regular 2. c/w ensure enlive TID and encourage between meals 3. encourage family to order meals and honor pts preferences to optimize intake 4. provide maximum assistance/encouragement w/ po intake and maintain aspiration precautions 5. weekly wt 6. add MVI w/minerals daily , 500 mg vitamin c BID, and 220 mg zinc sulfate BID x 10days to promote wound healing

## 2021-06-11 NOTE — PROGRESS NOTE ADULT - SUBJECTIVE AND OBJECTIVE BOX
HPI:  89 y/o F PMHx significant for early dementia, lymphedema of lower extremities, atrial fibrillation on Coumadin (last INR2.6), hx of proximal femur fracture in January 2021, Hx L hip long IMN with Dr. Kitchen 2017, hearing loss, cystitis, hx of HTN, spinal stenosis, anxiety was BIBA s/p witnessed fall at home with resultant severe bilateral leg pain and inability to ambulate. Of note the patient's daughter states that the patient has not had a bowel movement in 4 days therefore decided to administer a dose of Miralax at home. She subsequently attempted to assist patient to the bathroom at night with the assistance the patient's walker. Despite all the daughter's efforts the patient was reportedly too weak on her feet resulting in the patient's fall onto her knees. The patient's daughter states that the patient did not strike her head, or lose consciousness, but did note considerable ecchymoses overlying her bilateral knees and lower legs. The patient denies any numbness/tingling/burning in the BLLE. No other bone/joint complaints. At baseline the patient walks minimally at home with the use of a walker and assist. Occasionally uses her wheelchair.   Daughter reports a decline in her overall ambulatory status over the past year. Imaging in the ED revealed bilateral proximal 1/3 tibia fractures with displacement and possible left patella fracture. In the ED Orthopedics was consulted.   (04 Jun 2021 02:34)      Patient is a 89y old  Female who presents with a chief complaint of s/p fall c/o severe bilateral leg pain and inability to ambulate (06 Jun 2021 09:45) Pt found to have  Bilateral tib-fib fractures. Comminuted fractures of the proximal tibia and fibular shafts. Comminuted left patellar fracture.  Comminuted, mildly displaced fracture at the proximal left  tibia. Comminuted, impacted fracture at the left fibular neck.  Partiallyincluded anterior hematoma, Partially included hardware fixation of the left femur with the most distal interlocking screw backed out 0.4 cm laterally.  Anterior subcutaneous hemorrhage.      Consulted by Dr. Deepali De La Torre  for VTE prophylaxis, risk stratification, and anticoagulation management.    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 Kitchen 2017        FAMILY HISTORY:  No pertinent family history in first degree relatives        Interval Note:   6-6-2021 Pt seen at bedside on 3 [address], talking toher daughter Jazmine.  I spoke with daughter on phone and she states her mother normally take 3mg daily of coumadin but recently it was increased to 4.5mg once a week.  States her mother's plts are normally low about 100,000.  Mad her aware of her mothers labs and that today we will hold her ac and resume it tomorrow as long as her mother is stable. Pt is alert and oriented to person and place,  but not able to understand the information given to her.    6-7-2021 Pt seen at bedside on 3 north, awake H&H still low no overt signs of bleeding, pending transfusion,  6-8-2021: pt seen at bedside, awake, pleasantly confused, H&H better after transfusion, INR still subtherapeutic will continue on Coumadin.  6-9-2021: pt seen at bedside, was really confused overnight, stays near nurse's station, H&H is stable , INR still subtherapeutic will increase Coumadin  6/10/21: Patient seen at bedside OOB to chair talking on phone to daughter Lina. Handed me the phone. Explained to both current INR and coumadin dosing. Daughter seems upset that her Mom may be dc'ed to rehab today and plans on appealing. She "Does not think she should go with her INR not in range."   6/11/21: Patient seen at bedside seemingly bothered that her "Son is not allowed to see me right now." Explained reasoning for visitors hours.     IMPROVE VTE Individual Risk Assessment      [  ] Previous VTE                                                  3    [  ] Thrombophilia                                               2    [  ] Lower limb paralysis                                      2        (unable to hold up >15 seconds)      [  ] Current Cancer                                              2         (within 6 months)    [x  ] Immobilization > 24 hrs                                1    [  ] ICU/CCU stay > 24 hours                              1    [ x ] Age > 60                                                      1    IMPROVE VTE Score ___2______    IMPROVE Score 0-1: Low Risk, No VTE prophylaxis required for most patients, encourage ambulation.   IMPROVE Score 2-3: At risk, pharmacologic VTE prophylaxis is indicated for most patients (in the absence of a contraindication)  IMPROVE Score > or = 4: High Risk, pharmacologic VTE prophylaxis is indicated for most patients (in the absence of a contraindication)      [id number] Score: 5    IMPROVE Bleeding Risk Score:3.5    Falls Risk:   High (x  )  Mod (  )  Low (  )  crcl: 60        cr:   .66       BMI   23.1                Denies any personal or familial history of clotting or bleeding disorders.    Allergies    No Known Allergies    Intolerances        REVIEW OF SYSTEMS    (  )Fever	     (  )Constipation	(  )SOB				(  )Headache	(  )Dysuria  (  )Chills	     (  )Melena	(  )Dyspnea present on exertion	                    (  )Dizziness                    (  )Polyuria  (  )Nausea	     (  )Hematochezia	(  )Cough			                    (  )Syncope   	(  )Hematuria  (  )Vomiting    (  )Chest Pain	(  )Wheezing			(  )Weakness  (x) bl leg pain  (  )Diarrhea     (  )Palpitations	(  )Anorexia			( x )Myalgia    Pertinent positives in HPI and daily subjective.  All other ROS negative.    Vital Signs Last 24 Hrs  T(C): 36.9 (06-11-21 @ 09:34), Max: 36.9 (06-11-21 @ 09:34)  T(F): 98.5 (06-11-21 @ 09:34), Max: 98.5 (06-11-21 @ 09:34)  HR: 94 (06-11-21 @ 09:34) (92 - 94)  BP: 155/68 (06-11-21 @ 09:34) (151/64 - 155/68)  BP(mean): --  RR: 16 (06-11-21 @ 09:34) (16 - 16)  SpO2: 98% (06-11-21 @ 09:34) (97% - 98%)    PHYSICAL EXAM:    Constitutional: Appears Well    Neurological: Awake, confused    Skin: Warm    Respiratory and Thorax: normal effort; Breath sounds: normal; No rales/wheezing/rhonchi  	  Cardiovascular: S1, S2, regular, NMBR	    Gastrointestinal: BS + x 4Q, nontender	    Genitourinary:  Bladder nondistended, nontender    Musculoskeletal:   General Right:   no muscle/joint tenderness,   normal tone, no joint swelling,   ROM: limited	    General Left:   no muscle/joint tenderness,   normal tone, no joint swelling,   ROM: limited    LE:  Right and left : Dressing CDI ace wrap with splinting, moving all toes, good capillary refill, +sensation     Lower extrems:   Right: no calf tenderness              negative christopher's sign               + pedal pulses    Left:   no calf tenderness              negative christopher's sign               + pedal pulses                                     9.4    10.78 )-----------( 174      ( 11 Jun 2021 07:26 )             28.4               PT/INR - ( 11 Jun 2021 07:26 )   PT: 19.7 sec;   INR: 1.74 ratio         PTT - ( 10 Danny 2021 06:31 )  PTT:28.3 sec             8.9    9.27  )-----------( 148      ( 10 Danny 2021 06:31 )             27.0       06-09    132<L>  |  101  |  19  ----------------------------<  88  4.4   |  26  |  0.66    Ca    8.6      09 Jun 2021 08:28        PT/INR - ( 10 Danny 2021 06:31 )   PT: 15.2 sec;   INR: 1.32 ratio         PTT - ( 10 Adnny 2021 06:31 )  PTT:28.3 sec                            9.1    9.04  )-----------( 128      ( 09 Jun 2021 08:28 )             28.3       06-09    132<L>  |  101  |  19  ----------------------------<  88  4.4   |  26  |  0.66    Ca    8.6      09 Jun 2021 08:28         PTT - ( 09 Jun 2021 08:28 )  PTT:27.0 sec                    9.0    9.41  )-----------( 104      ( 08 Jun 2021 07:52 )             27.3       06-08    132<L>  |  101  |  18  ----------------------------<  92  4.1   |  26  |  0.60    Ca    8.6      08 Jun 2021 07:52    TPro  5.2<L>  /  Alb  2.6<L>  /  TBili  0.9  /  DBili  x   /  AST  37  /  ALT  37  /  AlkPhos  84  06-07              PTT - ( 07 Jun 2021 07:50 )  PTT:19.5 sec                      8.0    10.89 )-----------( 73       ( 07 Jun 2021 07:50 )             24.6   PT/INR - ( 11 Jun 2021 07:26 )   PT: 19.7 sec;   INR: 1.74 ratio    PT/INR - ( 10 Danny 2021 06:31 )   PT: 15.2 sec;   INR: 1.32 ratio    PT/INR - ( 09 Jun 2021 08:28 )   PT: 13.4 sec;   INR: 1.16 ratio    PT/INR - ( 08 Jun 2021 07:52 )   PT: 13.7 sec;   INR: 1.18 ratio   PT/INR - ( 07 Jun 2021 07:50 )   PT: 14.2 sec;   INR: 1.24 ratio      MEDICATIONS  (STANDING):  acetaminophen   Tablet .. 650 milliGRAM(s) Oral every 8 hours  losartan 25 milliGRAM(s) Oral daily  metoprolol succinate ER 25 milliGRAM(s) Oral daily  mirtazapine 15 milliGRAM(s) Oral at bedtime  polyethylene glycol 3350 17 Gram(s) Oral daily  senna 2 Tablet(s) Oral at bedtime  warfarin 3 milliGRAM(s) Oral daily      < from: CT Knee No Cont, Right (06.04.21 @ 03:44) >  IMPRESSION:    Comminuted fractures of the proximal tibia and fibular shafts.  Anterior subcutaneous hemorrhage.    < end of copied text >  < from: CT Knee No Cont, Left (06.04.21 @ 03:41) >  Impression:  Comminuted patellar fracture.  Comminuted, mildly displaced fracture at the proximal tibia.  Comminuted, impacted fracture at the fibular neck.  Partiallyincluded anterior hematoma.  Partially included hardware fixation of the left femur with the most distal interlocking screw backed out 0.4 cm laterally.    < end of copied text >      DVT Prophylaxis:  LMWH                   (  )  Heparin SQ           (  )  Coumadin             (x )  Xarelto                  (  )  Eliquis                   (  )  Venodynes           (x )  Ambulation          (  )  UFH                       (  )  Contraindicated  (  )  EC Aspirin             (  )

## 2021-06-11 NOTE — PROGRESS NOTE ADULT - SUBJECTIVE AND OBJECTIVE BOX
Patient is a 89y old  Female who presents with a chief complaint of s/p fall c/o severe bilateral leg pain and inability to ambulate (10 Danny 2021 15:30)      6/4 Cardiology. 89 year old female admitted with near syncope. felt weak ambulating to BR, fell back and onto knees. denies LOC. denies chest pain or dyspnea. Patient noted to have bilat tib fib fractures. Patient has past history of afib, permanent and htn.  6/5 events noted. severe anemia likely due to bilat thigh hematoma. alert, no complaints  6/6 alert, no new complaints  6/7 alert, no new complaints  6/8 no new complaints  Followup HPI:  6/10- no complaints. Nurses report a sacral decubitus  6/11- paranoid ideation today. No physical complaints    PAST MEDICAL & SURGICAL HISTORY:  Lymphedema of both lower extremities    Venous insufficiency    Monoclonal gammopathies    Paroxysmal atrial fibrillation    Acute cystitis without hematuria  septic  4/2016    Essential hypertension    Spinal stenosis    Spondyloarthritis    Vaginal prolapse    [place] (hard of hearing), bilateral    Hypertension    S/P kyphoplasty  2014    S/P thyroidectomy  partial   1975    History of vaginal surgery    History of fracture of femur  hx of proximal femur fracture in January 2021    History of repair of left hip joint  Hx L hip long IMN with Dr. Kitchen 2017        Review of symptoms:  Negative except for as noted in today's HPI.      MEDICATIONS  (STANDING):  acetaminophen   Tablet .. 650 milliGRAM(s) Oral every 8 hours  losartan 25 milliGRAM(s) Oral daily  metoprolol succinate ER 25 milliGRAM(s) Oral daily  mirtazapine 15 milliGRAM(s) Oral at bedtime  polyethylene glycol 3350 17 Gram(s) Oral daily  senna 2 Tablet(s) Oral at bedtime    MEDICATIONS  (PRN):  acetaminophen   Tablet .. 650 milliGRAM(s) Oral every 6 hours PRN Mild Pain (1 - 3)  melatonin 3 milliGRAM(s) Oral at bedtime PRN Insomnia  morphine  - Injectable 2 milliGRAM(s) IV Push every 4 hours PRN Severe Pain (7 - 10)  ondansetron Injectable 4 milliGRAM(s) IV Push every 6 hours PRN Nausea and/or Vomiting  oxyCODONE    IR 2.5 milliGRAM(s) Oral every 4 hours PRN Moderate Pain (4 - 6)  QUEtiapine 12.5 milliGRAM(s) Oral every 8 hours PRN anxiety, agitation          Vital Signs Last 24 Hrs  T(C): 36.8 (10 Danny 2021 20:59), Max: 36.8 (10 Danny 2021 20:59)  T(F): 98.2 (10 Danny 2021 20:59), Max: 98.2 (10 Danny 2021 20:59)  HR: 92 (10 Danny 2021 20:59) (73 - 92)  BP: 151/64 (10 Danny 2021 20:59) (130/57 - 151/64)  BP(mean): --  RR: 16 (10 Danny 2021 20:59) (16 - 16)  SpO2: 97% (10 Danny 2021 20:59) (96% - 97%)    I&O's Summary    10 Danny 2021 07:01  -  11 Jun 2021 07:00  --------------------------------------------------------  IN: 720 mL / OUT: 955 mL / NET: -235 mL        PHYSICAL EXAM  General Appearance: anxious  HEENT:   Neck:   Lungs: clear  Heart: 2/6 systolic murmur LSB  Abdomen:   Extremities: legs casted  Neurologic:       INTERPRETATION OF TELEMETRY:    ECG:    LABS:                          8.9    9.27  )-----------( 148      ( 10 Danny 2021 06:31 )             27.0     06-09    132<L>  |  101  |  19  ----------------------------<  88  4.4   |  26  |  0.66    Ca    8.6      09 Jun 2021 08:28            Pro BNP  -- 06-04 @ 16:56  D Dimer  [id number] 06-04 @ 16:56    PT/INR - ( 10 Danny 2021 06:31 )   PT: 15.2 sec;   INR: 1.32 ratio         PTT - ( 10 Danny 2021 06:31 )  PTT:28.3 sec          RADIOLOGY & ADDITIONAL STUDIES:    IMPRESSION:  1. Hypertension, controlled.  2. Long term persistent atrial fibrillation. Rate controlled. INR subtherapeutic.  3.Anemia due to bleeding from trauma.  4.Sacral decubitus.  5.Urinary retention. [place].  6.Bialteral tib-fib fracture, S/P closed reduction.  7. Dementia  8. Chronic anxiety and depression. Management  per psych consultant.  PLAN:  Continue losartan, metoprolol, warfarin with goal INR 2.0-3.0. Consider adding iron for anemia, Agree with wound care.

## 2021-06-11 NOTE — DISCHARGE NOTE PROVIDER - CARE PROVIDERS DIRECT ADDRESSES
,DirectAddress_Unknown,DirectAddress_Unknown,[email or web address]

## 2021-06-12 ENCOUNTER — TRANSCRIPTION ENCOUNTER (OUTPATIENT)
Age: 86
End: 2021-06-12

## 2021-06-12 VITALS — SYSTOLIC BLOOD PRESSURE: 126 MMHG | RESPIRATION RATE: 18 BRPM | DIASTOLIC BLOOD PRESSURE: 54 MMHG

## 2021-06-12 LAB
HCT VFR BLD CALC: 28.8 % — LOW (ref 34.5–45)
HGB BLD-MCNC: 9.2 G/DL — LOW (ref 11.5–15.5)
INR BLD: 2.34 RATIO — HIGH (ref 0.88–1.16)
MCHC RBC-ENTMCNC: 30 PG — SIGNIFICANT CHANGE UP (ref 27–34)
MCHC RBC-ENTMCNC: 31.9 GM/DL — LOW (ref 32–36)
MCV RBC AUTO: 93.8 FL — SIGNIFICANT CHANGE UP (ref 80–100)
PLATELET # BLD AUTO: 192 K/UL — SIGNIFICANT CHANGE UP (ref 150–400)
PROTHROM AB SERPL-ACNC: 26.3 SEC — HIGH (ref 10.6–13.6)
RBC # BLD: 3.07 M/UL — LOW (ref 3.8–5.2)
RBC # FLD: 14.6 % — HIGH (ref 10.3–14.5)
WBC # BLD: 10.57 K/UL — HIGH (ref 3.8–10.5)
WBC # FLD AUTO: 10.57 K/UL — HIGH (ref 3.8–10.5)

## 2021-06-12 PROCEDURE — 99231 SBSQ HOSP IP/OBS SF/LOW 25: CPT

## 2021-06-12 PROCEDURE — 99232 SBSQ HOSP IP/OBS MODERATE 35: CPT

## 2021-06-12 RX ADMIN — LOSARTAN POTASSIUM 25 MILLIGRAM(S): 100 TABLET, FILM COATED ORAL at 12:39

## 2021-06-12 RX ADMIN — Medication 650 MILLIGRAM(S): at 12:40

## 2021-06-12 RX ADMIN — Medication 25 MILLIGRAM(S): at 12:40

## 2021-06-12 NOTE — DISCHARGE NOTE NURSING/CASE MANAGEMENT/SOCIAL WORK - PATIENT PORTAL LINK FT
You can access the FollowMyHealth Patient Portal offered by Health system by registering at the following website: [email or web address]. By joining [facility]’s FollowMyHealth portal, you will also be able to view your health information using other applications (apps) compatible with our system.

## 2021-06-12 NOTE — PROGRESS NOTE BEHAVIORAL HEALTH - PRIMARY DX
Dementia without behavioral disturbance, unspecified dementia type
Dementia with behavioral disturbance, unspecified dementia type
Dementia without behavioral disturbance, unspecified dementia type

## 2021-06-12 NOTE — PROGRESS NOTE ADULT - NSICDXPILOT_GEN_ALL_CORE
[place]

## 2021-06-12 NOTE — PROGRESS NOTE ADULT - REASON FOR ADMISSION
s/p fall c/o severe bilateral leg pain and inability to ambulate

## 2021-06-12 NOTE — PROGRESS NOTE BEHAVIORAL HEALTH - NSBHCONSULTMEDS_PSY_A_CORE FT
buPROPion 150 mg/24 hours (XL) oral tablet, extended release: 1 tab(s) orally every 24 hours    Remeron 30 mg oral tablet: 1 tab(s) orally once a day (at bedtime)
buPROPion 150 mg/24 hours (XL) oral tablet, extended release: 1 tab(s) orally every 24 hours    Remeron 30 mg oral tablet: 1 tab(s) orally once a day (at bedtime)

## 2021-06-12 NOTE — PROGRESS NOTE ADULT - SUBJECTIVE AND OBJECTIVE BOX
Patient is a 89y old  Female who presents with a chief complaint of s/p fall c/o severe bilateral leg pain and inability to ambulate (11 Jun 2021 19:37)    6/4 Cardiology. 89 year old female admitted with near syncope. felt weak ambulating to BR, fell back and onto knees. denies LOC. denies chest pain or dyspnea. Patient noted to have bilat tib fib fractures. Patient has past history of afib, permanent and htn.  6/5 events noted. severe anemia likely due to bilat thigh hematoma. alert, no complaints  6/6 alert, no new complaints  6/7 alert, no new complaints  6/8 no new complaints  Followup HPI:  6/10- no complaints. Nurses report a sacral decubitus  6/11- paranoid ideation today. No physical complaints  6/12- no complaints. Calm. No paranoia.    PAST MEDICAL & SURGICAL HISTORY:  Lymphedema of both lower extremities    Venous insufficiency    Monoclonal gammopathies    Paroxysmal atrial fibrillation    Acute cystitis without hematuria  septic  4/2016    Essential hypertension    Spinal stenosis    Spondyloarthritis    Vaginal prolapse    [place] (hard of hearing), bilateral    Hypertension    S/P kyphoplasty  2014    S/P thyroidectomy  partial   1975    History of vaginal surgery    History of fracture of femur  hx of proximal femur fracture in January 2021    History of repair of left hip joint  Hx L hip long IMN with Dr. Kitchen 2017        Review of symptoms:  Negative except for as noted in today's HPI.      MEDICATIONS  (STANDING):  acetaminophen   Tablet .. 650 milliGRAM(s) Oral every 8 hours  losartan 25 milliGRAM(s) Oral daily  metoprolol succinate ER 25 milliGRAM(s) Oral daily  mirtazapine 15 milliGRAM(s) Oral at bedtime  polyethylene glycol 3350 17 Gram(s) Oral daily  senna 2 Tablet(s) Oral at bedtime  warfarin 3 milliGRAM(s) Oral daily    MEDICATIONS  (PRN):  acetaminophen   Tablet .. 650 milliGRAM(s) Oral every 6 hours PRN Mild Pain (1 - 3)  melatonin 3 milliGRAM(s) Oral at bedtime PRN Insomnia  ondansetron Injectable 4 milliGRAM(s) IV Push every 6 hours PRN Nausea and/or Vomiting  QUEtiapine 12.5 milliGRAM(s) Oral every 8 hours PRN anxiety, agitation          Vital Signs Last 24 Hrs  T(C): 36.8 (11 Jun 2021 20:46), Max: 36.9 (11 Jun 2021 09:34)  T(F): 98.2 (11 Jun 2021 20:46), Max: 98.5 (11 Jun 2021 09:34)  HR: 95 (11 Jun 2021 20:46) (94 - 95)  BP: 126/65 (11 Jun 2021 20:46) (126/65 - 155/68)  BP(mean): --  RR: 16 (11 Jun 2021 20:46) (16 - 16)  SpO2: 95% (11 Jun 2021 20:46) (95% - 98%)    I&O's Summary    11 Jun 2021 07:01  -  12 Jun 2021 07:00  --------------------------------------------------------  IN: 1080 mL / OUT: 1250 mL / NET: -170 mL        PHYSICAL EXAM  General Appearance: oriented, comfortable  HEENT:   Neck:   Lungs: clear  Heart: 2/6 systolic murmur LSB  Abdomen:   Extremities: casts both legs  Neurologic:       INTERPRETATION OF TELEMETRY:    ECG:    LABS:                          9.4    10.78 )-----------( 174      ( 11 Jun 2021 07:26 )             28.4                   PT/INR - ( 11 Jun 2021 07:26 )   PT: 19.7 sec;   INR: 1.74 ratio                   RADIOLOGY & ADDITIONAL STUDIES:    IMPRESSION:    1. Hypertension, controlled.  2. Long term persistent atrial fibrillation. Rate controlled. INR subtherapeutic.  3.Anemia due to bleeding from trauma.  4.Sacral decubitus.  5.Urinary retention. [place].  6.Bialteral tib-fib fracture, S/P closed reduction.  7. Dementia  8. Chronic anxiety and depression. Management  per psych consultant.  PLAN:  Continue losartan, metoprolol, warfarin with goal INR 2.0-3.0. Consider adding iron for anemia, Agree with wound care. Cardiology status stable fo move to rehab facility.

## 2021-06-12 NOTE — PROGRESS NOTE BEHAVIORAL HEALTH - NSBHCHARTREVIEWLAB_PSY_A_CORE FT
8.0    10.89 )-----------( 73       ( 07 Jun 2021 07:50 )             24.6   06-07    132<L>  |  102  |  12  ----------------------------<  94  4.2   |  24  |  0.67    Ca    8.4<L>      07 Jun 2021 07:50    TPro  5.2<L>  /  Alb  2.6<L>  /  TBili  0.9  /  DBili  x   /  AST  37  /  ALT  37  /  AlkPhos  84  06-07
9.2    10.57 )-----------( 192      ( 12 Jun 2021 08:24 )             28.8                           PT/INR - ( 12 Jun 2021 08:24 )   PT: 26.3 sec;   INR: 2.34 ratio             Lactate Trend            CAPILLARY BLOOD GLUCOSE

## 2021-06-12 NOTE — PROGRESS NOTE ADULT - SUBJECTIVE AND OBJECTIVE BOX
HPI:  89 y/o F PMHx significant for early dementia, lymphedema of lower extremities, atrial fibrillation on Coumadin (last INR2.6), hx of proximal femur fracture in January 2021, Hx L hip long IMN with Dr. Kitchen 2017, hearing loss, cystitis, hx of HTN, spinal stenosis, anxiety was BIBA s/p witnessed fall at home with resultant severe bilateral leg pain and inability to ambulate. Of note the patient's daughter states that the patient has not had a bowel movement in 4 days therefore decided to administer a dose of Miralax at home. She subsequently attempted to assist patient to the bathroom at night with the assistance the patient's walker. Despite all the daughter's efforts the patient was reportedly too weak on her feet resulting in the patient's fall onto her knees. The patient's daughter states that the patient did not strike her head, or lose consciousness, but did note considerable ecchymoses overlying her bilateral knees and lower legs. The patient denies any numbness/tingling/burning in the BLLE. No other bone/joint complaints. At baseline the patient walks minimally at home with the use of a walker and assist. Occasionally uses her wheelchair.   Daughter reports a decline in her overall ambulatory status over the past year. Imaging in the ED revealed bilateral proximal 1/3 tibia fractures with displacement and possible left patella fracture. In the ED Orthopedics was consulted.   (04 Jun 2021 02:34)      Patient is a 89y old  Female who presents with a chief complaint of s/p fall c/o severe bilateral leg pain and inability to ambulate (06 Jun 2021 09:45) Pt found to have  Bilateral tib-fib fractures. Comminuted fractures of the proximal tibia and fibular shafts. Comminuted left patellar fracture.  Comminuted, mildly displaced fracture at the proximal left  tibia. Comminuted, impacted fracture at the left fibular neck.  Partiallyincluded anterior hematoma, Partially included hardware fixation of the left femur with the most distal interlocking screw backed out 0.4 cm laterally.  Anterior subcutaneous hemorrhage.      Consulted by Dr. Deepali De La Torre  for VTE prophylaxis, risk stratification, and anticoagulation management.    PAST MEDICAL & SURGICAL HISTORY:  Lymphedema of both lower extremities    Venous insufficiency    Monoclonal gammopathies    Paroxysmal atrial fibrillation on coumaidn    Acute cystitis without hematuria  septic  4/2016    Essential hypertension    Spinal stenosis    Spondyloarthritis    Vaginal prolapse    [place] (hard of hearing), bilateral    Hypertension    S/P kyphoplasty  2014    S/P thyroidectomy  partial   1975    History of vaginal surgery    History of fracture of femur  hx of proximal femur fracture in January 2021    History of repair of left hip joint  Hx L hip long IMN with Dr. Kitchen 2017        FAMILY HISTORY:  No pertinent family history in first degree relatives        Interval Note:   6-6-2021 Pt seen at bedside on 3 north, talking toher daughter Jazmine.  I spoke with daughter on phone and she states her mother normally take 3mg daily of coumadin but recently it was increased to 4.5mg once a week.  States her mother's plts are normally low about 100,000.  Mad her aware of her mothers labs and that today we will hold her ac and resume it tomorrow as long as her mother is stable. Pt is alert and oriented to person and place,  but not able to understand the information given to her.    6-7-2021 Pt seen at bedside on 3 north, awake H&H still low no overt signs of bleeding, pending transfusion,  6-8-2021: pt seen at bedside, awake, pleasantly confused, H&H better after transfusion, INR still subtherapeutic will continue on Coumadin.  6-9-2021: pt seen at bedside, was really confused overnight, stays near nurse's station, H&H is stable , INR still subtherapeutic will increase Coumadin  6/10/21: Patient seen at bedside OOB to chair talking on phone to daughter Lina. Handed me the phone. Explained to both current INR and coumadin dosing. Daughter seems upset that her Mom may be dc'ed to rehab today and plans on appealing. She "Does not think she should go with her INR not in range."   6/11/21: Patient seen at bedside seemingly bothered that her "Son is not allowed to see me right now." Explained reasoning for visitors hours.   6/12/21: Patient seen at bedside in hallway in no apparent distress. Still with + confusion and calling out overnight per RN. For Kerry today.    IMPROVE VTE Individual Risk Assessment      [  ] Previous VTE                                                  3    [  ] Thrombophilia                                               2    [  ] Lower limb paralysis                                      2        (unable to hold up >15 seconds)      [  ] Current Cancer                                              2         (within 6 months)    [x  ] Immobilization > 24 hrs                                1    [  ] ICU/CCU stay > 24 hours                              1    [ x ] Age > 60                                                      1    IMPROVE VTE Score ___2______    IMPROVE Score 0-1: Low Risk, No VTE prophylaxis required for most patients, encourage ambulation.   IMPROVE Score 2-3: At risk, pharmacologic VTE prophylaxis is indicated for most patients (in the absence of a contraindication)  IMPROVE Score > or = 4: High Risk, pharmacologic VTE prophylaxis is indicated for most patients (in the absence of a contraindication)      [id number] Score: 5    IMPROVE Bleeding Risk Score:3.5    Falls Risk:   High (x  )  Mod (  )  Low (  )  crcl: 60        cr:   .66       BMI   23.1                Denies any personal or familial history of clotting or bleeding disorders.    Allergies    No Known Allergies    Intolerances        REVIEW OF SYSTEMS    (  )Fever	     (  )Constipation	(  )SOB				(  )Headache	(  )Dysuria  (  )Chills	     (  )Melena	(  )Dyspnea present on exertion	                    (  )Dizziness                    (  )Polyuria  (  )Nausea	     (  )Hematochezia	(  )Cough			                    (  )Syncope   	(  )Hematuria  (  )Vomiting    (  )Chest Pain	(  )Wheezing			(  )Weakness  (x) bl leg pain  (  )Diarrhea     (  )Palpitations	(  )Anorexia			( x )Myalgia    Pertinent positives in HPI and daily subjective.  All other ROS negative.    Vital Signs Last 24 Hrs  T(C): 37 (06-12-21 @ 09:28), Max: 37 (06-12-21 @ 09:28)  T(F): 98.6 (06-12-21 @ 09:28), Max: 98.6 (06-12-21 @ 09:28)  HR: 88 (06-12-21 @ 09:28) (88 - 95)  BP: 138/62 (06-12-21 @ 09:28) (126/65 - 138/62)  BP(mean): 80 (06-12-21 @ 09:28) (80 - 80)  RR: 17 (06-12-21 @ 09:28) (16 - 17)  SpO2: 100% (06-12-21 @ 09:28) (95% - 100%)    PHYSICAL EXAM:    Constitutional: Appears Well    Neurological: Awake, confused    Skin: Warm    Respiratory and Thorax: normal effort; Breath sounds: normal; No rales/wheezing/rhonchi  	  Cardiovascular: S1, S2, regular, NMBR	    Gastrointestinal: BS + x 4Q, nontender	    Genitourinary:  Bladder nondistended, nontender    Musculoskeletal:   General Right:   no muscle/joint tenderness,   normal tone, no joint swelling,   ROM: limited	    General Left:   no muscle/joint tenderness,   normal tone, no joint swelling,   ROM: limited    LE:  Right and left : Dressing CDI ace wrap with splinting, moving all toes, good capillary refill, +sensation     Lower extrems:   Right: no calf tenderness              negative christopher's sign               + pedal pulses    Left:   no calf tenderness              negative christopher's sign               + pedal pulses                          9.2    10.57 )-----------( 192      ( 12 Jun 2021 08:24 )             28.8               PT/INR - ( 12 Jun 2021 08:24 )   PT: 26.3 sec;   INR: 2.34 ratio                                            9.4    10.78 )-----------( 174      ( 11 Jun 2021 07:26 )             28.4               PT/INR - ( 11 Jun 2021 07:26 )   PT: 19.7 sec;   INR: 1.74 ratio         PTT - ( 10 Danny 2021 06:31 )  PTT:28.3 sec             8.9    9.27  )-----------( 148      ( 10 Danny 2021 06:31 )             27.0       06-09    132<L>  |  101  |  19  ----------------------------<  88  4.4   |  26  |  0.66    Ca    8.6      09 Jun 2021 08:28        PT/INR - ( 10 Danny 2021 06:31 )   PT: 15.2 sec;   INR: 1.32 ratio         PTT - ( 10 Danny 2021 06:31 )  PTT:28.3 sec                            9.1    9.04  )-----------( 128      ( 09 Jun 2021 08:28 )             28.3       06-09    132<L>  |  101  |  19  ----------------------------<  88  4.4   |  26  |  0.66    Ca    8.6      09 Jun 2021 08:28         PTT - ( 09 Jun 2021 08:28 )  PTT:27.0 sec                    9.0    9.41  )-----------( 104      ( 08 Jun 2021 07:52 )             27.3       06-08    132<L>  |  101  |  18  ----------------------------<  92  4.1   |  26  |  0.60    Ca    8.6      08 Jun 2021 07:52    TPro  5.2<L>  /  Alb  2.6<L>  /  TBili  0.9  /  DBili  x   /  AST  37  /  ALT  37  /  AlkPhos  84  06-07              PTT - ( 07 Jun 2021 07:50 )  PTT:19.5 sec                      8.0    10.89 )-----------( 73       ( 07 Jun 2021 07:50 )             24.6   PT/INR - ( 12 Jun 2021 08:24 )   PT: 26.3 sec;   INR: 2.34 ratio    PT/INR - ( 11 Jun 2021 07:26 )   PT: 19.7 sec;   INR: 1.74 ratio    PT/INR - ( 10 Danny 2021 06:31 )   PT: 15.2 sec;   INR: 1.32 ratio    PT/INR - ( 09 Jun 2021 08:28 )   PT: 13.4 sec;   INR: 1.16 ratio    PT/INR - ( 08 Jun 2021 07:52 )   PT: 13.7 sec;   INR: 1.18 ratio   PT/INR - ( 07 Jun 2021 07:50 )   PT: 14.2 sec;   INR: 1.24 ratio      MEDICATIONS  (STANDING):  acetaminophen   Tablet .. 650 milliGRAM(s) Oral every 8 hours  losartan 25 milliGRAM(s) Oral daily  metoprolol succinate ER 25 milliGRAM(s) Oral daily  mirtazapine 15 milliGRAM(s) Oral at bedtime  polyethylene glycol 3350 17 Gram(s) Oral daily  senna 2 Tablet(s) Oral at bedtime  warfarin 3 milliGRAM(s) Oral daily      < from: CT Knee No Cont, Right (06.04.21 @ 03:44) >  IMPRESSION:    Comminuted fractures of the proximal tibia and fibular shafts.  Anterior subcutaneous hemorrhage.    < end of copied text >  < from: CT Knee No Cont, Left (06.04.21 @ 03:41) >  Impression:  Comminuted patellar fracture.  Comminuted, mildly displaced fracture at the proximal tibia.  Comminuted, impacted fracture at the fibular neck.  Partiallyincluded anterior hematoma.  Partially included hardware fixation of the left femur with the most distal interlocking screw backed out 0.4 cm laterally.    < end of copied text >      DVT Prophylaxis:  LMWH                   (  )  Heparin SQ           (  )  Coumadin             (x )  Xarelto                  (  )  Eliquis                   (  )  Venodynes           (x )  Ambulation          (  )  UFH                       (  )  Contraindicated  (  )  EC Aspirin             (  )

## 2021-06-12 NOTE — PROGRESS NOTE BEHAVIORAL HEALTH - NSBHCHARTREVIEWVS_PSY_A_CORE FT
Vital Signs Last 24 Hrs  T(C): 36.9 (11 Jun 2021 09:34), Max: 36.9 (11 Jun 2021 09:34)  T(F): 98.5 (11 Jun 2021 09:34), Max: 98.5 (11 Jun 2021 09:34)  HR: 94 (11 Jun 2021 09:34) (92 - 94)  BP: 155/68 (11 Jun 2021 09:34) (151/64 - 155/68)  BP(mean): --  RR: 16 (11 Jun 2021 09:34) (16 - 16)  SpO2: 98% (11 Jun 2021 09:34) (97% - 98%)
Vital Signs Last 24 Hrs  T(C): 36.3 (10 Danny 2021 08:57), Max: 37.1 (10 Danny 2021 05:00)  T(F): 97.3 (10 Danny 2021 08:57), Max: 98.8 (10 Danny 2021 05:00)  HR: 73 (10 Danny 2021 08:57) (73 - 88)  BP: 130/57 (10 Danny 2021 08:57) (123/45 - 136/79)  BP(mean): --  RR: 16 (10 Danny 2021 08:57) (16 - 18)  SpO2: 96% (10 Danny 2021 08:57) (95% - 96%)
Vital Signs Last 24 Hrs  T(C): 37 (12 Jun 2021 09:28), Max: 37 (12 Jun 2021 09:28)  T(F): 98.6 (12 Jun 2021 09:28), Max: 98.6 (12 Jun 2021 09:28)  HR: 88 (12 Jun 2021 09:28) (88 - 95)  BP: 126/54 (12 Jun 2021 12:38) (126/54 - 138/62)  BP(mean): 80 (12 Jun 2021 09:28) (80 - 80)  RR: 18 (12 Jun 2021 12:38) (16 - 18)  SpO2: 100% (12 Jun 2021 09:28) (95% - 100%)

## 2021-06-12 NOTE — PROGRESS NOTE ADULT - PROVIDER SPECIALTY LIST ADULT
Anticoag Management
Anticoag Management
Orthopedics
Orthopedics
Hospitalist
Orthopedics
Anticoag Management
Cardiology
Hospitalist
Orthopedics
Anticoag Management
Cardiology
Cardiology
Orthopedics
Orthopedics
Cardiology
Cardiology
Hospitalist
Hospitalist
Orthopedics
Orthopedics
Hospitalist

## 2021-06-12 NOTE — PROGRESS NOTE BEHAVIORAL HEALTH - NSBHFUPINTERVALHXFT_PSY_A_CORE
Patient laying in bed, comfortable, stating she needs her clothing before she goes to rehab. Patient is distracted. Patient has low concentration, poor attention. As per RN, patient has been confused. Otherwise patients has no behavioral disturbances at this time.
Patient is calm and cooperative, pleasant. Reports good sleep and appetite. Denies depressed mood or anhedonia, hopelessness or suicidal ideation. Denies manic / psychotic symptoms. No delusions elicited. Tolerating medications with no side effects; none observed. Patient reports mild anxiety over leaving hospital and going to rehabilitation.
Patient is calm and cooperative, pleasant. Patient has speech latency. Patient denying concerns / complaints. Denies anxiety or depressed mood. Patient has no manic / psychotic symptoms. Tolerating medications with no side effects; none observed. Patient not appearing to be in any acute distress.

## 2021-06-12 NOTE — PROGRESS NOTE ADULT - ASSESSMENT
This is a 89 year old female with pmh of atrial fibrillation on Coumadin, [id number] Score: 5, s/p fall on 6-4-2021, causing fx of bl tib/fib and left patella.  s/p -closded reduction and splinting by ortho.  Pt's INR on admission was 3.54  given 5mg vit k po and k-centra, noted  Pt has both high thrombosis and bleed risk.  Requires anticoagulation treatment dose but will hold today and revaluate pt tomorrow    6-6-2021: Made Dr. De La Torre aware of plan and she agrees.  6/7: INR today 1.24, plts  slightly improved from yesterday 73,000 , patient is high risk for bleeding and clotting, not recommended for bridging, spoke with Hospitalist NP will start coumadin today  .    Plan:  ::INR 2.3 from 1.7, maintain on usual warfarin 3mg daily  ::Monitor for bleeding   ::Daily cbc/ bmp, pt/inr  ::Mobilty as per ortho    Will continue to follow.  Dispo:  GIOVANA Walker

## 2021-06-14 ENCOUNTER — APPOINTMENT (OUTPATIENT)
Dept: NEUROLOGY | Facility: CLINIC | Age: 86
End: 2021-06-14

## 2021-06-17 DIAGNOSIS — Z79.01 LONG TERM (CURRENT) USE OF ANTICOAGULANTS: ICD-10-CM

## 2021-06-17 DIAGNOSIS — K59.00 CONSTIPATION, UNSPECIFIED: ICD-10-CM

## 2021-06-17 DIAGNOSIS — I95.9 HYPOTENSION, UNSPECIFIED: ICD-10-CM

## 2021-06-17 DIAGNOSIS — F03.90 UNSPECIFIED DEMENTIA WITHOUT BEHAVIORAL DISTURBANCE: ICD-10-CM

## 2021-06-17 DIAGNOSIS — S82.252A DISPLACED COMMINUTED FRACTURE OF SHAFT OF LEFT TIBIA, INITIAL ENCOUNTER FOR CLOSED FRACTURE: ICD-10-CM

## 2021-06-17 DIAGNOSIS — R25.1 TREMOR, UNSPECIFIED: ICD-10-CM

## 2021-06-17 DIAGNOSIS — Z98.890 OTHER SPECIFIED POSTPROCEDURAL STATES: ICD-10-CM

## 2021-06-17 DIAGNOSIS — M48.00 SPINAL STENOSIS, SITE UNSPECIFIED: ICD-10-CM

## 2021-06-17 DIAGNOSIS — S80.01XA CONTUSION OF RIGHT KNEE, INITIAL ENCOUNTER: ICD-10-CM
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
## 2021-06-17 DIAGNOSIS — S82.042A DISPLACED COMMINUTED FRACTURE OF LEFT PATELLA, INITIAL ENCOUNTER FOR CLOSED FRACTURE: ICD-10-CM

## 2021-06-17 DIAGNOSIS — S82.251A DISPLACED COMMINUTED FRACTURE OF SHAFT OF RIGHT TIBIA, INITIAL ENCOUNTER FOR CLOSED FRACTURE: ICD-10-CM
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
## 2021-06-17 DIAGNOSIS — F32.9 MAJOR DEPRESSIVE DISORDER, SINGLE EPISODE, UNSPECIFIED: ICD-10-CM

## 2021-06-17 DIAGNOSIS — M46.90 UNSPECIFIED INFLAMMATORY SPONDYLOPATHY, SITE UNSPECIFIED: ICD-10-CM

## 2021-06-17 DIAGNOSIS — E44.0 MODERATE PROTEIN-CALORIE MALNUTRITION: ICD-10-CM
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
## 2021-06-17 DIAGNOSIS — D69.6 THROMBOCYTOPENIA, UNSPECIFIED: ICD-10-CM

## 2021-06-17 DIAGNOSIS — S82.451A DISPLACED COMMINUTED FRACTURE OF SHAFT OF RIGHT FIBULA, INITIAL ENCOUNTER FOR CLOSED FRACTURE: ICD-10-CM

## 2021-06-17 DIAGNOSIS — I87.2 VENOUS INSUFFICIENCY (CHRONIC) (PERIPHERAL): ICD-10-CM

## 2021-06-17 DIAGNOSIS — L89.152 PRESSURE ULCER OF SACRAL REGION, STAGE 2: ICD-10-CM

## 2021-06-17 DIAGNOSIS — D47.2 MONOCLONAL GAMMOPATHY: ICD-10-CM

## 2021-06-17 DIAGNOSIS — H91.90 UNSPECIFIED HEARING LOSS, UNSPECIFIED EAR: ICD-10-CM

## 2021-06-17 DIAGNOSIS — I48.0 PAROXYSMAL ATRIAL FIBRILLATION: ICD-10-CM

## 2021-06-17 DIAGNOSIS — R33.9 RETENTION OF URINE, UNSPECIFIED: ICD-10-CM

## 2021-06-17 DIAGNOSIS — E87.1 HYPO-OSMOLALITY AND HYPONATREMIA: ICD-10-CM

## 2021-06-17 DIAGNOSIS — N81.9 FEMALE GENITAL PROLAPSE, UNSPECIFIED: ICD-10-CM

## 2021-06-17 DIAGNOSIS — D62 ACUTE POSTHEMORRHAGIC ANEMIA: ICD-10-CM

## 2021-06-17 DIAGNOSIS — S82.452A DISPLACED COMMINUTED FRACTURE OF SHAFT OF LEFT FIBULA, INITIAL ENCOUNTER FOR CLOSED FRACTURE: ICD-10-CM

## 2021-06-17 DIAGNOSIS — R26.81 UNSTEADINESS ON FEET: ICD-10-CM

## 2021-06-17 DIAGNOSIS — I10 ESSENTIAL (PRIMARY) HYPERTENSION: ICD-10-CM

## 2021-06-17 DIAGNOSIS — F41.9 ANXIETY DISORDER, UNSPECIFIED: ICD-10-CM

## 2021-06-25 ENCOUNTER — EMERGENCY (EMERGENCY)
Facility: HOSPITAL | Age: 86
LOS: 0 days | Discharge: HOME | End: 2021-06-25
Attending: EMERGENCY MEDICINE
Payer: MEDICARE

## 2021-06-25 VITALS
HEIGHT: 67 IN | WEIGHT: 97.66 LBS | RESPIRATION RATE: 16 BRPM | SYSTOLIC BLOOD PRESSURE: 112 MMHG | OXYGEN SATURATION: 98 % | HEART RATE: 77 BPM | TEMPERATURE: 98 F | DIASTOLIC BLOOD PRESSURE: 73 MMHG

## 2021-06-25 VITALS
TEMPERATURE: 98 F | DIASTOLIC BLOOD PRESSURE: 67 MMHG | SYSTOLIC BLOOD PRESSURE: 137 MMHG | HEART RATE: 74 BPM | RESPIRATION RATE: 17 BRPM | OXYGEN SATURATION: 98 %

## 2021-06-25 DIAGNOSIS — M79.604 PAIN IN RIGHT LEG: ICD-10-CM

## 2021-06-25 DIAGNOSIS — M79.605 PAIN IN LEFT LEG: ICD-10-CM

## 2021-06-25 DIAGNOSIS — Z87.448 PERSONAL HISTORY OF OTHER DISEASES OF URINARY SYSTEM: ICD-10-CM

## 2021-06-25 DIAGNOSIS — Z87.39 PERSONAL HISTORY OF OTHER DISEASES OF THE MUSCULOSKELETAL SYSTEM AND CONNECTIVE TISSUE: ICD-10-CM

## 2021-06-25 DIAGNOSIS — I10 ESSENTIAL (PRIMARY) HYPERTENSION: ICD-10-CM

## 2021-06-25 DIAGNOSIS — M48.00 SPINAL STENOSIS, SITE UNSPECIFIED: ICD-10-CM

## 2021-06-25 DIAGNOSIS — E89.0 POSTPROCEDURAL HYPOTHYROIDISM: Chronic | ICD-10-CM

## 2021-06-25 DIAGNOSIS — Z79.01 LONG TERM (CURRENT) USE OF ANTICOAGULANTS: ICD-10-CM

## 2021-06-25 DIAGNOSIS — Z98.89 OTHER SPECIFIED POSTPROCEDURAL STATES: Chronic | ICD-10-CM

## 2021-06-25 DIAGNOSIS — Z87.81 PERSONAL HISTORY OF (HEALED) TRAUMATIC FRACTURE: Chronic | ICD-10-CM

## 2021-06-25 DIAGNOSIS — Z98.890 OTHER SPECIFIED POSTPROCEDURAL STATES: Chronic | ICD-10-CM

## 2021-06-25 DIAGNOSIS — I48.0 PAROXYSMAL ATRIAL FIBRILLATION: ICD-10-CM

## 2021-06-25 LAB
ANION GAP SERPL CALC-SCNC: 6 MMOL/L — SIGNIFICANT CHANGE UP (ref 5–17)
APTT BLD: 34.3 SEC — SIGNIFICANT CHANGE UP (ref 27.5–35.5)
BUN SERPL-MCNC: 15 MG/DL — SIGNIFICANT CHANGE UP (ref 7–23)
CALCIUM SERPL-MCNC: 8.5 MG/DL — SIGNIFICANT CHANGE UP (ref 8.5–10.1)
CHLORIDE SERPL-SCNC: 103 MMOL/L — SIGNIFICANT CHANGE UP (ref 96–108)
CO2 SERPL-SCNC: 27 MMOL/L — SIGNIFICANT CHANGE UP (ref 22–31)
CREAT SERPL-MCNC: 0.53 MG/DL — SIGNIFICANT CHANGE UP (ref 0.5–1.3)
GLUCOSE SERPL-MCNC: 94 MG/DL — SIGNIFICANT CHANGE UP (ref 70–99)
HCT VFR BLD CALC: 29.8 % — LOW (ref 34.5–45)
HGB BLD-MCNC: 9.5 G/DL — LOW (ref 11.5–15.5)
INR BLD: 2.05 RATIO — HIGH (ref 0.88–1.16)
MCHC RBC-ENTMCNC: 30 PG — SIGNIFICANT CHANGE UP (ref 27–34)
MCHC RBC-ENTMCNC: 31.9 GM/DL — LOW (ref 32–36)
MCV RBC AUTO: 94 FL — SIGNIFICANT CHANGE UP (ref 80–100)
PLATELET # BLD AUTO: 232 K/UL — SIGNIFICANT CHANGE UP (ref 150–400)
POTASSIUM SERPL-MCNC: 4.1 MMOL/L — SIGNIFICANT CHANGE UP (ref 3.5–5.3)
POTASSIUM SERPL-SCNC: 4.1 MMOL/L — SIGNIFICANT CHANGE UP (ref 3.5–5.3)
PROTHROM AB SERPL-ACNC: 23.2 SEC — HIGH (ref 10.6–13.6)
RBC # BLD: 3.17 M/UL — LOW (ref 3.8–5.2)
RBC # FLD: 15.4 % — HIGH (ref 10.3–14.5)
SARS-COV-2 RNA SPEC QL NAA+PROBE: SIGNIFICANT CHANGE UP
SODIUM SERPL-SCNC: 136 MMOL/L — SIGNIFICANT CHANGE UP (ref 135–145)
WBC # BLD: 9.64 K/UL — SIGNIFICANT CHANGE UP (ref 3.8–10.5)
WBC # FLD AUTO: 9.64 K/UL — SIGNIFICANT CHANGE UP (ref 3.8–10.5)

## 2021-06-25 PROCEDURE — 86901 BLOOD TYPING SEROLOGIC RH(D): CPT

## 2021-06-25 PROCEDURE — 87635 SARS-COV-2 COVID-19 AMP PRB: CPT

## 2021-06-25 PROCEDURE — 85027 COMPLETE CBC AUTOMATED: CPT

## 2021-06-25 PROCEDURE — 73562 X-RAY EXAM OF KNEE 3: CPT | Mod: 26,LT

## 2021-06-25 PROCEDURE — 73562 X-RAY EXAM OF KNEE 3: CPT | Mod: LT

## 2021-06-25 PROCEDURE — 73590 X-RAY EXAM OF LOWER LEG: CPT | Mod: 26,50

## 2021-06-25 PROCEDURE — 73590 X-RAY EXAM OF LOWER LEG: CPT | Mod: 26,50,77

## 2021-06-25 PROCEDURE — 85610 PROTHROMBIN TIME: CPT

## 2021-06-25 PROCEDURE — 86850 RBC ANTIBODY SCREEN: CPT

## 2021-06-25 PROCEDURE — 80048 BASIC METABOLIC PNL TOTAL CA: CPT

## 2021-06-25 PROCEDURE — 73552 X-RAY EXAM OF FEMUR 2/>: CPT | Mod: XU,LT

## 2021-06-25 PROCEDURE — 73590 X-RAY EXAM OF LOWER LEG: CPT | Mod: XU,50

## 2021-06-25 PROCEDURE — 96374 THER/PROPH/DIAG INJ IV PUSH: CPT

## 2021-06-25 PROCEDURE — 99285 EMERGENCY DEPT VISIT HI MDM: CPT

## 2021-06-25 PROCEDURE — 71045 X-RAY EXAM CHEST 1 VIEW: CPT

## 2021-06-25 PROCEDURE — 71045 X-RAY EXAM CHEST 1 VIEW: CPT | Mod: 26

## 2021-06-25 PROCEDURE — 36415 COLL VENOUS BLD VENIPUNCTURE: CPT

## 2021-06-25 PROCEDURE — 86900 BLOOD TYPING SEROLOGIC ABO: CPT

## 2021-06-25 PROCEDURE — 73552 X-RAY EXAM OF FEMUR 2/>: CPT | Mod: 26,LT,76

## 2021-06-25 PROCEDURE — 85730 THROMBOPLASTIN TIME PARTIAL: CPT

## 2021-06-25 PROCEDURE — 99284 EMERGENCY DEPT VISIT MOD MDM: CPT | Mod: 25

## 2021-06-25 RX ORDER — SODIUM CHLORIDE 9 MG/ML
1000 INJECTION, SOLUTION INTRAVENOUS
Refills: 0 | Status: DISCONTINUED | OUTPATIENT
Start: 2021-06-25 | End: 2021-06-25

## 2021-06-25 RX ORDER — MORPHINE SULFATE 50 MG/1
4 CAPSULE, EXTENDED RELEASE ORAL ONCE
Refills: 0 | Status: DISCONTINUED | OUTPATIENT
Start: 2021-06-25 | End: 2021-06-25

## 2021-06-25 RX ADMIN — MORPHINE SULFATE 4 MILLIGRAM(S): 50 CAPSULE, EXTENDED RELEASE ORAL at 14:49

## 2021-06-25 NOTE — ED PROVIDER NOTE - PROGRESS NOTE DETAILS
Anastasia CORCORAN for ED attending, Dr. Shepard: Spoke with ortho. Will be down to see pt. States pt will need splints changed by them and potential OR but will asses that after they see pt. CC:  Signout received from Dr. Shepard.  Pt cleared by Ortho for D/C, outpt office f/u with Dr. Kitchen next week/ pt to call for appointment.  Awaiting SW consult. CC:  Pt evaluated by Case management: cleared to go back to A/L facility.

## 2021-06-25 NOTE — ED PROVIDER NOTE - NS_ ATTENDINGSCRIBEDETAILS _ED_A_ED_FT
I, Ge Shepard MD,  performed the initial face to face bedside interview with this patient regarding history of present illness, review of symptoms and relevant past medical, social and family history.  I completed an independent physical examination.  I was the initial provider who evaluated this patient.  The history, relevant review of systems, past medical and surgical history, medical decision making, and physical examination was documented by the scribe in my presence and I attest to the accuracy of the documentation.

## 2021-06-25 NOTE — ED ADULT NURSE NOTE - OBJECTIVE STATEMENT
Patient brought in by ambulance for replacement of cast bilat legs. Patient is a resident of [facility]. Patient has bikat leg casts. Able to move all toes. Pedal pulses positive bilat. Color pale.

## 2021-06-25 NOTE — ED PROVIDER NOTE - PHYSICAL EXAMINATION
Constitutional: NAD AAOx3  Eyes: PERRLA EOMI  Head: Normocephalic atraumatic  Mouth: MMM  Cardiac: regular rate,   Resp: Lungs CTAB  GI: Abd s/nt/nd  Neuro: CN2-12 intact  Skin: No rashes,   MSK: both legs in valve cast, compartments feel soft on top. Able to wiggle toes. Feet are warm well perfused.  Normal pulses. Constitutional: NAD AAOx3  Eyes: PERRLA EOMI  Head: Normocephalic atraumatic  Mouth: MMM  Cardiac: regular rate,   Resp: Lungs CTAB  GI: Abd s/nt/nd  Neuro: CN2-12 intact  Skin: No rashes,   MSK: both legs in valve splint, compartments feel soft on top. Able to wiggle toes. Feet are warm well perfused.  Normal pulses.

## 2021-06-25 NOTE — CONSULT NOTE ADULT - SUBJECTIVE AND OBJECTIVE BOX
89F hx HTN, bilateral hearing loss, spinal stenosis, Afib, monoclonal gammopathies, venous insuffiency/lymphedema who [facility]. Patient had fall on 6/4 and was evaluated/admitted to [facility] for evaluation and was diagnosed with bilateral tibial/fibula shaft fractures and left patella fracture. Patient was treated conservatively at that time and placed in long leg trilam splints, before subsequently being discharged to [facility]. Patient returns due to continued bilateral lower extremity pain, and for skin checks beneath the splints. Daughter reports that the ace wrap began to unravel and was concerned. On arrival patient complains of bilateral lower extremity pain. Otherwise no new falls/trauma, numbness/tingling, weakness, any other acute orthopedic complaints. Patient/daughter unsure of last Coumadin dose.     Vital Signs Last 24 Hrs  T(C): 36.9 (25 Jun 2021 15:08), Max: 36.9 (25 Jun 2021 15:08)  T(F): 98.4 (25 Jun 2021 15:08), Max: 98.4 (25 Jun 2021 15:08)  HR: 74 (25 Jun 2021 15:08) (74 - 77)  BP: 137/67 (25 Jun 2021 15:08) (112/73 - 137/67)  BP(mean): 81 (25 Jun 2021 15:08) (81 - 81)  RR: 17 (25 Jun 2021 15:08) (16 - 17)  SpO2: 98% (25 Jun 2021 15:08) (98% - 98%)                          9.5    9.64  )-----------( 232      ( 25 Jun 2021 13:00 )             29.8       Exam:  General: Awake, alert, mildly confused, no acute distress  BLLE:  Long leg trilam splints clean dry and intact prior to removal.   Left leg with extensive venous stasis changes, improving hematoma compared to prior; Moderate sized hematoma over posterior lateral aspect of proximal tib/fib. Small area of skin irritation/abrasion from splint at proximal aspect on anterior thigh.   Right leg with extensive venous stasis changes. Small 2x2cm blister over mid tibial shaft, but overall improving compared to prior; No hematomas appreciated. No open wounds appreciated. Small superficial abrasion over medial aspect of proximal thigh from splint irritation  Diffuse mild-moderate TTP throughout bilateral lower legs.  Pain with passive movement of bilateral legs.  Sensation intact. +Gsc/TA/EHL/FHL  DP pulses palpable  No calf tenderness bilaterally  Compartments soft and compressible    XR imaging reviewed with retained alignment of prior fractures with some callus formation

## 2021-06-25 NOTE — ED PROVIDER NOTE - OBJECTIVE STATEMENT
89 y/o F PMHx of essential HTN, [place] bilateral, vaginal prolapse, spondyloarthritis, spinal stenosis, acute cystitis w/o hematuria, PAF, monoclonal gammopathies, venous insuffiencey, lymphedema presents to ED BIBA from [facility] c/o worsening b/l LE pain. Pt was seen in ED s/p fall on 6/4 and found to have b/l tibial fractures. Pt was seen by ortho, had splints placed and was d/c to [facility]. Pt presents today for worsening LE pain. No falls today. Ortho: Dr. Kitchen. PCP: Tony Vides. 89 y/o F PMHx of essential HTN, [place] bilateral, vaginal prolapse, spondyloarthritis, spinal stenosis, acute cystitis w/o hematuria, PAF, monoclonal gammopathies, venous insuffiencey, lymphedema presents to ED BIBA from [facility] c/o worsening b/l LE pain. Pt was seen in ED s/p fall on 6/4 and found to have b/l tibial fractures. Pt was seen by ortho, had splints placed and was d/c to [facility]. Pt presents today for worsening LE pain. mild constant non-radiating. No falls today. Ortho: Dr. Kitchen. PCP: Tony Vides.

## 2021-06-25 NOTE — ED PROVIDER NOTE - CLINICAL SUMMARY MEDICAL DECISION MAKING FREE TEXT BOX
89 y/o F presents to ED for worsening leg pain in setting of b/l tibial fracture. Pt sent in by ortho. Will obtain ortho consult and reassess. 89 y/o F presents to ED for worsening leg pain in setting of b/l tibial fracture. at this time no signs of compartment syndrome. Pt sent in by ortho. Will obtain ortho consult and reassess.

## 2021-06-25 NOTE — CONSULT NOTE ADULT - ASSESSMENT
89F s/p bilateral tibial shaft fractures, fibula fractures, and left patella fracture which occured on 6/4/21    Plan:  XR imaging obtained and reviewed  Splints removed to assess skin which appears to be improving compared to prior.   New long leg trilam splints placed on bilateral lower extremities. Post reduction XR ordered and pending.   NWB BLLE Long Leg Trilam Splints  Recommend pain control PRN  Hold DVT ppx  for possible OR  NPO except medications for possible OR; IVF while NPO  FU Labs/T&S/COVID/EKG/CXR  Plan for definitive management with Dr. Kitchen pending discussion  Will discuss with Dr. Kitchen and advise if any changes to plan

## 2021-06-25 NOTE — ED PROVIDER NOTE - NS ED ROS FT
Constitutional: No fever or chills  Eyes: No visual changes  HEENT: No throat pain  CV: No chest pain  Resp: No SOB no cough  GI: No abd pain, nausea or vomiting  : No dysuria  MSK: +b/l LE pain   Skin: No rash  Neuro: No headache

## 2021-06-25 NOTE — ED ADULT NURSE NOTE - CHIEF COMPLAINT QUOTE
BIBA to ED from [facility] for worsening b/l leg pain after b/l tibia fracture. EMS reports PT had x1 bout of vomiting this morning. Splints on b/l LE, clean and dry. Denies fever and chills. PT with hx of dementia, a&ox3 in triage.

## 2021-06-25 NOTE — ED PROVIDER NOTE - CARE PROVIDER_API CALL
Magno Kitchen)  Orthopaedic Surgery  [address] [place] [address] [place] [address]  Phone: [phone]  Fax: [phone]  Follow Up Time:

## 2021-06-25 NOTE — ED PROVIDER NOTE - PATIENT PORTAL LINK FT
You can access the FollowMyHealth Patient Portal offered by [facility] by registering at the following website: [email or web address]. By joining [facility]’s FollowMyHealth portal, you will also be able to view your health information using other applications (apps) compatible with our system.

## 2021-06-25 NOTE — CHART NOTE - NSCHARTNOTEFT_GEN_A_CORE
SHAHIDA met with patients daughter Jazmine, role explained. She was requesting information on HHA. List of agencies provided. She is agreeable for pt to return to [facility]. SHAHIDA contacted [facility] Supervisor Lisa, daughter upset stating they are not cleaning her mother well enough. Lisa to discuss with patients care givers at the facility.

## 2021-06-25 NOTE — ED PROVIDER NOTE - PSH
History of fracture of femur  hx of proximal femur fracture in January 2021  History of repair of left hip joint  Hx L hip long IMN with Dr. Kitchen 2017  History of vaginal surgery    S/P kyphoplasty  2014  S/P thyroidectomy  partial   1975

## 2021-06-25 NOTE — ED PROVIDER NOTE - PMH
Acute cystitis without hematuria  septic  4/2016  Essential hypertension    [place] (hard of hearing), bilateral    Hypertension    Lymphedema of both lower extremities    Monoclonal gammopathies    Paroxysmal atrial fibrillation    Spinal stenosis    Spondyloarthritis    Vaginal prolapse    Venous insufficiency

## 2021-06-25 NOTE — ED PROVIDER NOTE - NSFOLLOWUPINSTRUCTIONS_ED_ALL_ED_FT
Keep splint replacements on, in place, clean & DRY.  Continue your regular medications as per routine.  Follow up Dr. Kitchen next week: call for appointment.      Splint Care    WHAT YOU NEED TO KNOW:    Splint care is important to help protect your splint until it comes off. Some splints are made of fiberglass or plaster that will need to dry and harden. Splint care will help the splint dry and harden correctly. Even after your splint hardens, it can be damaged.    DISCHARGE INSTRUCTIONS:    Return to the emergency department if:     You have increased pain.      Your fingers or toes are numb or tingling.      You feel burning or stinging around your injury.      Your nails, fingers, or toes turn pale, blue, or gray, and feel cold.      You have new or increased trouble moving your fingers or toes.      Your swelling gets worse.      The skin under your splint is bleeding or leaking pus.     Contact your healthcare provider if:     Your hard splint gets wet or is damaged.      You have a fever.      Your splint feels tighter.      You have itchy, dry skin under your splint that is getting worse.      The skin under your splint is red, or you have a new sore.      You notice a bad smell coming from your splint.       You have questions or concerns about your condition or care.    How to care for your splint:     Wait for your hard splint to harden completely. You may have to wait up to 3 days before you can walk on a plaster splint.      Check your splint and the skin around it each day. Check your splint for damage, such as cracks and breaks. Check your skin for redness, increased swelling, and sores. Loosen the elastic bandage around your splint if it feels too tight.      Keep your splint clean and dry. Keep dirt out of your splint. Before you bathe, wrap your hard splint with 2 layers of plastic. Then put a plastic bag over it. Keep the plastic bag tightly sealed. You can also ask your healthcare provider about waterproof shields. Do not put your hard splint in the water, even with a plastic bag over it. A wet splint can make your skin itchy, and may lead to infection.      Do not put powders or deodorants inside your splint. These can dry your skin and increase itching.       Do not try to scratch the skin inside your hard splint with sharp objects. Sharp objects can break off inside your splint or hurt your skin.       Do not pull the padding out of your splint. The padding inside your splint protects your skin. You may develop a sore on your skin if you take out the padding.    Follow up with your healthcare provider as directed within 1 to 2 weeks: Write down your questions so you remember to ask them during your visits.

## 2021-06-25 NOTE — ED ADULT TRIAGE NOTE - CHIEF COMPLAINT QUOTE
BIBA to ED from [facility] for worsening b/l leg pain after b/l tibia fracture. EMS reports PT had x1 bout of vomiting this morning. Splints on b/l LE, clean and dry. Denies fever and chills. PT with hx of dementia, a&ox3 in triage.

## 2021-07-19 ENCOUNTER — APPOINTMENT (OUTPATIENT)
Dept: OBGYN | Facility: CLINIC | Age: 86
End: 2021-07-19

## 2021-08-03 ENCOUNTER — EMERGENCY (EMERGENCY)
Facility: HOSPITAL | Age: 86
LOS: 0 days | Discharge: HOME | End: 2021-08-03
Attending: EMERGENCY MEDICINE
Payer: MEDICARE

## 2021-08-03 VITALS
SYSTOLIC BLOOD PRESSURE: 156 MMHG | RESPIRATION RATE: 17 BRPM | DIASTOLIC BLOOD PRESSURE: 100 MMHG | HEART RATE: 76 BPM | OXYGEN SATURATION: 96 % | TEMPERATURE: 98 F

## 2021-08-03 VITALS
TEMPERATURE: 98 F | SYSTOLIC BLOOD PRESSURE: 170 MMHG | DIASTOLIC BLOOD PRESSURE: 85 MMHG | HEART RATE: 78 BPM | HEIGHT: 67 IN | WEIGHT: 160.06 LBS | OXYGEN SATURATION: 94 %

## 2021-08-03 DIAGNOSIS — Z87.81 PERSONAL HISTORY OF (HEALED) TRAUMATIC FRACTURE: Chronic | ICD-10-CM

## 2021-08-03 DIAGNOSIS — E89.0 POSTPROCEDURAL HYPOTHYROIDISM: Chronic | ICD-10-CM

## 2021-08-03 DIAGNOSIS — M48.00 SPINAL STENOSIS, SITE UNSPECIFIED: ICD-10-CM

## 2021-08-03 DIAGNOSIS — M79.89 OTHER SPECIFIED SOFT TISSUE DISORDERS: ICD-10-CM

## 2021-08-03 DIAGNOSIS — I10 ESSENTIAL (PRIMARY) HYPERTENSION: ICD-10-CM

## 2021-08-03 DIAGNOSIS — Z90.89 ACQUIRED ABSENCE OF OTHER ORGANS: ICD-10-CM

## 2021-08-03 DIAGNOSIS — Z87.39 PERSONAL HISTORY OF OTHER DISEASES OF THE MUSCULOSKELETAL SYSTEM AND CONNECTIVE TISSUE: ICD-10-CM

## 2021-08-03 DIAGNOSIS — Z98.89 OTHER SPECIFIED POSTPROCEDURAL STATES: Chronic | ICD-10-CM

## 2021-08-03 DIAGNOSIS — Z98.890 OTHER SPECIFIED POSTPROCEDURAL STATES: ICD-10-CM

## 2021-08-03 DIAGNOSIS — Z79.899 OTHER LONG TERM (CURRENT) DRUG THERAPY: ICD-10-CM

## 2021-08-03 DIAGNOSIS — Z98.890 OTHER SPECIFIED POSTPROCEDURAL STATES: Chronic | ICD-10-CM

## 2021-08-03 DIAGNOSIS — Z87.81 PERSONAL HISTORY OF (HEALED) TRAUMATIC FRACTURE: ICD-10-CM

## 2021-08-03 DIAGNOSIS — Z79.01 LONG TERM (CURRENT) USE OF ANTICOAGULANTS: ICD-10-CM

## 2021-08-03 DIAGNOSIS — I48.0 PAROXYSMAL ATRIAL FIBRILLATION: ICD-10-CM

## 2021-08-03 DIAGNOSIS — H91.93 UNSPECIFIED HEARING LOSS, BILATERAL: ICD-10-CM

## 2021-08-03 PROCEDURE — 93970 EXTREMITY STUDY: CPT

## 2021-08-03 PROCEDURE — 99284 EMERGENCY DEPT VISIT MOD MDM: CPT | Mod: 25

## 2021-08-03 PROCEDURE — 73590 X-RAY EXAM OF LOWER LEG: CPT | Mod: 26,50

## 2021-08-03 PROCEDURE — 73564 X-RAY EXAM KNEE 4 OR MORE: CPT | Mod: 26,50

## 2021-08-03 PROCEDURE — 93970 EXTREMITY STUDY: CPT | Mod: 26

## 2021-08-03 PROCEDURE — 73590 X-RAY EXAM OF LOWER LEG: CPT | Mod: 50

## 2021-08-03 PROCEDURE — 73564 X-RAY EXAM KNEE 4 OR MORE: CPT | Mod: 50

## 2021-08-03 PROCEDURE — 99284 EMERGENCY DEPT VISIT MOD MDM: CPT

## 2021-08-03 NOTE — CONSULT NOTE ADULT - SUBJECTIVE AND OBJECTIVE BOX
Orthopedics    89F hx HTN, bilateral hearing loss, spinal stenosis, Afib, monoclonal gammopathies, venous insuffiency/lymphedema who presents to  ED from [facility] for [facility] of bilateral legs. Patient had fall on 6/4 and was evaluated/admitted to [facility] for evaluation and was diagnosed with bilateral tibial/fibula shaft fractures and left patella fracture. Patient was treated conservatively at that time and placed in long leg trilam splints, before subsequently being discharged to [facility]. Patient returns for skin checks beneath the splints. On arrival patient complains of bilateral lower extremity pain. Otherwise no new falls/trauma, numbness/tingling, weakness, any other acute orthopedic complaints.     Lymphedema of both lower extremities    Venous insufficiency    Monoclonal gammopathies    Paroxysmal atrial fibrillation    Acute cystitis without hematuria    Essential hypertension    Spinal stenosis    Spondyloarthritis    Vaginal prolapse    [place] (hard of hearing), bilateral    Hypertension        No Known Allergies      PHYSICAL EXAM:  T(C): 36.6 (08-03-21 @ 13:35), Max: 36.6 (08-03-21 @ 13:35)  HR: 78 (08-03-21 @ 13:35) (78 - 78)  BP: 170/85 (08-03-21 @ 13:35) (170/85 - 170/85)  RR: --  SpO2: 94% (08-03-21 @ 13:35) (94% - 94%)    Gen: NAD, Resting comfortably    RLE/LLE:  Skin intact, resolving hematomas bilateral lower extremity.   No bony tenderness to palpation  +EHL/FHL/TA/GSC  +SILT L3-S1  + DP  Compartments soft and compressible  No calf tenderness    Secondary Survey:   No TTP over bony prominences, SILT, palpable pulses, full/painless A/PROM, compartments soft. No TTP over spinous processes or paraspinal muscles at C/T/L spine. No palpable step off. No other injuries or complaints.

## 2021-08-03 NOTE — ED PROVIDER NOTE - PMH
Acute cystitis without hematuria  septic  4/2016  Essential hypertension    [place] (hard of hearing), bilateral    Hypertension    Lymphedema of both lower extremities    Monoclonal gammopathies    Paroxysmal atrial fibrillation    Spinal stenosis    Spondyloarthritis    Vaginal prolapse    Venous insufficiency

## 2021-08-03 NOTE — ED PROVIDER NOTE - NSFOLLOWUPINSTRUCTIONS_ED_ALL_ED_FT
A knee immobilizer limits knee movement. It is used after an injury or surgery to help your knee, muscles, or tendons heal.     DISCHARGE INSTRUCTIONS:    How to safely use a knee immobilizer:   •Have your knee immobilizer fitted by your healthcare provider. It is important that your knee immobilizer is the right size for you and that it fits properly.       •Wear your knee immobilizer as directed. It can be worn over your clothing. Check the fit of the knee immobilizer often. If it does not fit properly or slips out of place, it could cause further injury.       •Use crutches as directed. You may need to avoid putting weight on your injured leg. Your healthcare provider will tell you if you need crutches and for how long.       •Inspect your knee immobilizer often. Do not wear your knee immobilizer if it is damaged or broken. You may need to replace it if it becomes worn.       •Ask your healthcare provider how to care for your knee immobilizer. You may be able to hand wash the fabric with mild soap and water. Do not place it in the washer or dryer.       •Go to physical therapy as directed. A physical therapist can help you strengthen the muscles in your leg and help your knee heal.       Contact your healthcare provider if:   •Your knee pain becomes worse when you wear your knee immobilizer.       •Your skin is sore or raw after you wear your knee immobilizer.       •Your leg feels numb or swells while you wear your knee immobilizer.       •Your knee immobilizer is damaged.       •You have questions or concerns about your condition or care.       Seek care immediately or call 911 if:   •You have severe swelling or pain in your leg or knee.              © Copyright [employer] 2021           back to top                          © Copyright [employer] 2021

## 2021-08-03 NOTE — ED PROVIDER NOTE - CARE PROVIDER_API CALL
Magno Kitchen)  Orthopaedic Surgery  [address] [place] [address] [place] [address]  Phone: [phone]  Fax: [phone]  Follow Up Time:

## 2021-08-03 NOTE — CONSULT NOTE ADULT - ASSESSMENT
A/P: 89F w/ Chr Bilateral tibi fib fractures    Analgesia  NWB BLE  Splints removed for skin check   Plan to place in b/l knee immobilizers pending repeat xrays   DVT ppx  PT/OT  No plan for acute orthopedic surgical intervention at this time  Will discuss with Dr Kitchen and advise of changes in plan   A/P: 89F w/ Chr Bilateral tibi fib fractures    Analgesia  NWB BLE  Splints removed for skin check   Plan to place in b/l knee immobilizers pending repeat xrays   DVT ppx  PT/OT  No plan for acute orthopedic surgical intervention at this time  Will discuss with Dr Kitchen and advise of changes in plan    -Dr Kitchen has reviewed the xrays from today and advised:  -Bilateral Knee immobilizers (applied) while asleep and for transfers. Pt may open them up while in bed to allow skin sponge bathing / moisturizer to skin as needed.  -DR Kitchen will order xrays in 2 weeks and possibly consider advancing WB status at that time  -Dr Kitchen will discuss again with daughter in AM and will call her  -PT needs to FU in DR Kitchen office in 1month  -NWB Bilateral LE  -NO ROM to knees Bilaterally  -Ok to transfer to chair daily  -DVT PE ppx  -Encouraged Pt to wiggle ankle and toes  -We discussed all above with pt and her daughter at the bedside  -Above as directed by Dr Kitchen as discussed in detail with him

## 2021-08-03 NOTE — ED PROVIDER NOTE - PATIENT PORTAL LINK FT
You can access the FollowMyHealth Patient Portal offered by [facility] by registering at the following website: [email or web address]. By joining [facility]’s FollowMyHealth portal, you will also be able to view your health information using other applications (apps) compatible with our system.

## 2021-08-03 NOTE — ED ADULT NURSE NOTE - OBJECTIVE STATEMENT
Patient comes in for ortho follow up for bilateral leg casts. MD iKtchen requested transfer to ED. Denies any physical complaints.

## 2021-08-03 NOTE — ED PROVIDER NOTE - OBJECTIVE STATEMENT
89 y/o F PMHx of essential HTN, [place] bilateral, vaginal prolapse, spondyloarthritis, spinal stenosis, acute cystitis w/o hematuria, PAF, monoclonal gammopathies, venous insuffiencey, lymphedema presents to ED BIBA for ortho f/u for b/l leg casts. Transfer to ED was requested by Dr. Kitchen. Pt needs to remove hard splints and get XR. Pt endorses pain in b/l leg. Pt has Reaves cath in place. Pt has been in assisted living. Denies fever and chills. Last blood work was on Friday in normal range. Pt is on Coumadin.

## 2021-08-03 NOTE — ED PROVIDER NOTE - CONSTITUTIONAL, MLM
Well appearing, pleasantly confused, sitting up and eating. Daughter at bedside, not c/o pain. normal...

## 2021-08-03 NOTE — ED PROVIDER NOTE - SKIN, MLM
Skin normal color for race, warm, dry and intact. No evidence of rash. L leg bigger than right skin on whole leg dry and flakey

## 2021-08-03 NOTE — ED ADULT TRIAGE NOTE - CHIEF COMPLAINT QUOTE
Patient comes in for ortho follow up for bilateral leg casts. MD Kitchen requested transfer to ED. Denies any physical complaints.

## 2021-08-03 NOTE — ED PROVIDER NOTE - CLINICAL SUMMARY MEDICAL DECISION MAKING FREE TEXT BOX
89 y/o F sent in by Dr. Kitchen from assisted living to have splints removed and XR done. Pt on Coumadin offered I&R doest not want I&R. Plan is ortho removed splints, XR and ultrasound.

## 2021-08-03 NOTE — ED PROVIDER NOTE - PROGRESS NOTE DETAILS
Anastasia Erwin: ortho call message left Anastasia Erwin: spoke with orthopedic PA,  waiting for Dr. Kitchen to decide what to do ortho placed knee immobilizer and spoke to daughter at bedside. pt to be discharged home. LEONIDES Erwin DO

## 2021-08-03 NOTE — ED PROVIDER NOTE - PROGRESS NOTE ADDITIONAL1
Pt would like a call back from the nurse.   Neck and both shoulders feel like there is claws. She has blurry vision and dizziness. Ears get hot.   Please advise.   Additional Progress Note...

## 2021-08-16 ENCOUNTER — NON-APPOINTMENT (OUTPATIENT)
Age: 86
End: 2021-08-16

## 2021-08-31 ENCOUNTER — APPOINTMENT (OUTPATIENT)
Dept: UROLOGY | Facility: CLINIC | Age: 86
End: 2021-08-31
Payer: MEDICARE

## 2021-08-31 PROCEDURE — 99442: CPT | Mod: 95

## 2021-09-03 NOTE — REVIEW OF SYSTEMS
[Eyesight Problems] : eyesight problems [Loss Of Hearing] : hearing loss [Chest Pain] : chest pain [Constipation] : constipation [Joint Pain] : joint pain [Anxiety] : anxiety [Easy Bleeding] : a tendency for easy bleeding [Feeling Poorly] : not feeling poorly [Palpitations] : no palpitations [Cough] : no cough [Dysuria] : no dysuria [Convulsions] : no convulsions [Hot Flashes] : no hot flashes

## 2021-09-03 NOTE — HISTORY OF PRESENT ILLNESS
[FreeTextEntry1] : Pt contacted at [phone]. Gave permission to conduct a Telephone visit. \par \par Ms. Venegas is a 88 year old woman here for a follow up of nocturia, microscopic hematuria, urethral caruncle.Patient believes she makes less urine in 24-hour period than previously.  Her age says that one given a diuretic.  Shea good quantity of urine.  The patient says that she generally makes it to the bathroom in time, though she must tarry which takes the diuretic. The patient complained about a foul order from the urine. The patient does not have dysuria.  There is no gross hematuria.  The patient does not push or strain to urinate so sometimes she has to increase her abdominal pressure to defecate. Patient uses a walker.  The patient also stated that she is having right lower abdominal pain and discomfort.  He corresponds to a bulge of increasing size in the right lower quadrant.  The patient has bilateral hearing aids and wears eye glasses.\par 7/3/18: The patient returned today and reported she has nocturia and wakes up every 2 hours to urinate. She broke her femur and a tibial fracture. \par 7/16/18: The patient returned and reported she has the urge to urinate but ends up having a bowel movement instead. Daughter noted bowels are large. Patient noted she has a hard time urinating. Wakes up 3-4 times during the night to urinate, noted it is sometimes just "drops of urine." Patient is more disorientated and confused, I advised her to consult with a neurologist. \par 8/30/18: The patient returned today and reported she is feeling adequate. Patient denies dysuria, gross hematuria, urgency, or incontinence. Wakes up 2-3 times during the night to urinate. \par 6/4/19: Patient presents today for a follow up. Today she notes that her urination is "okay". She does not leak during the day but still wears depends since she is still concerned about incontinence. At night, she wakes up when she has to urinate but due to mobility issues is not always able to make it to bathroom. Diarrhea is reported on occasion. Mirtazapine has been initiated by another provider and it is reported that is has decreased her nocturia from 4x a night to once a night. \par 05/26/2020: Patient presents today for a follow up. Pt ambulates in a wheelchair and is able to go short distances with a walker. Nocturia x1-2 usually, but voided unusually frequently last night. Denies hematuria,or burning. No symptoms in March. Reports issues with short term, but not long term memory. Has been consulting with PCP and testing treatments regarding memory. Reports occasional stomach pains.\par \par 06/12/2020: Pt contacted at [phone]. Gave permission to conduct a Telephone visit. Urine culture through the [facility] lab from 06/01/2020 grew 75 colony forming units per mL of Pseudomonas Aeruginosa. 1-5 Epithelial cells per field. Anything greater than 3 is abnormal. There were 3-10 WBC per high power field. Anything greater than 3 is abnormal. Bacteria were heavy. Pt had frequency and nocturia. She complained it was difficulty to sleep at night. Pt report she is feeling urgency when waking up to use the bathroom. She has not been leaking as much, or wetting herself. Her output is also not as great. Nocturia x3, or about every 2-3 hours. Pt is still on Cefpodoxime, but she will run out by 06/16/2020. Urine has become slightly cloudy, whereas it had been clear previously. Had not been using Melatonin at night. Has been taking Mirtazapine. \par \par 08/31/2021: The patient gave permission for a telephone visit. I spoke to patient's daughter Tabatha. She reports that on 6/4/21, after patient got off commode, her legs gave out and she broke both her shins, and has been immobilized since then. A few weeks ago, she had orders to move legs slightly. Daughter concerned because she has had Reaves in her since she has not been standing which is changed once a month. Was told once she had a slight rash in the vulva and buttocks, but has not heard about a rash since then. Memory has been okay. Daughter will send over recent lab work for review. She states WBC was 5.57. Daughter states patient's urine is clear. Patient is receiving physical therapy twice a week and daughter will try to find more help.

## 2021-09-03 NOTE — ASSESSMENT
[FreeTextEntry1] : Pt contacted at [phone]. Gave permission to conduct a Telephone visit. \par \par Ms. Venegas is a 89 year old woman here for a follow up of nocturia, microscopic hematuria, urethral caruncle.Patient believes she makes less urine in 24-hour period than previously.  Her age says that one given a diuretic.  Shea good quantity of urine.  The patient says that she generally makes it to the bathroom in time, though she must tarry which takes the diuretic. The patient complained about a foul order from the urine. The patient does not have dysuria.  There is no gross hematuria.  The patient does not push or strain to urinate so sometimes she has to increase her abdominal pressure to defecate. Patient uses a walker.  The patient also stated that she is having right lower abdominal pain and discomfort.  He corresponds to a bulge of increasing size in the right lower quadrant.  The patient has bilateral hearing aids and wears eye glasses.\par 7/3/18: The patient returned today and reported she has nocturia and wakes up every 2 hours to urinate. She broke her femur and a tibial fracture. \par 7/16/18: The patient returned and reported she has the urge to urinate but ends up having a bowel movement instead. Daughter noted bowels are large. Patient noted she has a hard time urinating. Wakes up 3-4 times during the night to urinate, noted it is sometimes just "drops of urine." Patient is more disorientated and confused, I advised her to consult with a neurologist. \par 8/30/18: The patient returned today and reported she is feeling adequate. Patient denies dysuria, gross hematuria, urgency, or incontinence. Wakes up 2-3 times during the night to urinate. \par 6/4/19: Patient presents today for a follow up. Today she notes that her urination is "okay". She does not leak during the day but still wears depends since she is still concerned about incontinence. At night, she wakes up when she has to urinate but due to mobility issues is not always able to make it to bathroom. Diarrhea is reported on occasion. Mirtazapine has been initiated by another provider and it is reported that is has decreased her nocturia from 4x a night to once a night. The patient produced a catheterized urine sample which will be sent for urinalysis, urine cytology, and urine culture.Upon completion of a physical exam it is determined that vaginitis is present. Therefore Fluconazole will be prescribed at this time. I will provide her with 1 500 mg tablet that will be taken one time only due to her use of Warfarin. It is advised that she switch to cotton underwear during the day and if leakage is still reported then she can switch back to wearing the depends.  The patient is to follow up in 3 weeks or sooner if clinically indicated. \par 05/26/2020: Patient presents today for a follow up. Pt ambulates in a wheelchair and is able to go short distances with a walker. Nocturia x1-2 usually, but voided unusually frequently last night. Denies hematuria,or burning. No symptoms in March. Reports issues with short term, but not long term memory. Has been consulting with PCP and testing treatments regarding memory. Reports occasional stomach pains. On physical exam patient had no erythema of vaginal mucosa, no vaginal discharge and no prolapse. Pt will provide a urine sample today for culture and analysis. Pt received a catheterization of urine today for total volume of 180mL. PVR not needed despite pt concerns. \par \par 06/12/2020: Pt contacted at [phone]. Gave permission to conduct a Telephone visit. Urine culture through the [facility] lab from 06/01/2020 grew 75 colony forming units per mL of Pseudomonas Aeruginosa. 1-5 Epithelial cells per field. Anything greater than 3 is abnormal. There were 3-10 WBC per high power field. Anything greater than 3 is abnormal. Bacteria were heavy. Pt had frequency and nocturia. She complained it was difficulty to sleep at night. Pt report she is feeling urgency when waking up to use the bathroom. She has not been leaking as much, or wetting herself. Her output is also not as great. Nocturia x3, or about every 2-3 hours. Pt is still on Cefpodoxime, but she will run out by 06/16/2020. Urine has become slightly cloudy, whereas it had been clear previously. Had not been using Melatonin at night. Has been taking Mirtazapine. \par \par 08/31/2021: The patient gave permission for a telephone visit. I spoke to patient's daughter Tabatha. She reports that on 6/4/21, after patient got off commode, her legs gave out and she broke both her shins, and has been immobilized since then. A few weeks ago, she had orders to move legs slightly. Daughter concerned because she has had Reaves in her since she has not been standing which is changed once a month. Was told once she had a slight rash in the vulva and buttocks, but has not heard about a rash since then. Memory has been okay. Daughter will send over recent lab work for review. She states WBC was 5.57. Daughter states patient's urine is clear. Patient is receiving physical therapy twice a week and daughter will try to find more help. \par \par If patient is not getting UTI causing fevers, until she can walk even with assistance, I recommend she continue using a Reaves. \par \par Preparation, telehealth visit, and coordination of care took: 20 minutes

## 2021-09-03 NOTE — ADDENDUM
[FreeTextEntry1] : I, Isa Vincentin, acted solely as a scribe for Dr. Indio Sexton on this date 08/31/2021.\par \par All medical record entries made by the Scribe were at my, Dr. Indio Sexton, direction and personally dictated by me on 08/31/2021. I have reviewed the chart and agree that the record accurately reflects my personal performance of the history, physical exam, assessment and plan.  I have also personally directed, reviewed and agreed with the chart.

## 2021-09-13 NOTE — PROGRESS NOTE ADULT - PROBLEM/PLAN-1
Check or sign up for [facility]'s my Chart to view your results  We do not call patient's with negative results  Go directly home after today's visit, quarantine until you receive a negative result  If you have a positive result you need to quarantine at home for a minimum of 10 days  You may end your quarantine when you are symptoms free without medications to reduce fever (e g  acetaminophen/Tylenol) for 72 hours  Recommend over the counter antihistamines such as Claratin, Allegra, Zyrtec, or Benadryl for congestion  You may also use over the counter nasal sprays such as Flonase for this  Over the counter lozenges such as Cepacol, Ricola, Halls, or Chloroseptic can be used for sore throat symptoms  Recommend Vitamin C 1,000 mg twice daily, Vitamin D3 2000 IU daily, multivitamin and Zinc for immune support  If your symptoms worsen or you develop shortness of breath report to the nearest emergency room  Check cdc gov for most current guidelines as guidelines are subject to change as we learn more about the virus  [address]    Your healthcare provider and/or public health staff have evaluated you and have determined that you do not need to remain in the hospital at this time  At this time you can be isolated at home where you will be monitored by staff from your local or state health department  You should carefully follow the prevention and isolation steps below until a healthcare provider or local or state health department says that you can return to your normal activities  Stay home except to get medical care    People who are mildly ill with COVID-19 are able to isolate at home during their illness  You should restrict activities outside your home, except for getting medical care  Do not go to work, school, or public areas  Avoid using public transportation, ride-sharing, or taxis      Separate yourself from other people and animals in your home    People: As much as possible, you should stay in a specific room and away from other people in your home  Also, you should use a separate bathroom, if available  Animals: You should restrict contact with pets and other animals while you are sick with COVID-19, just like you would around other people  Although there have not been reports of pets or other animals becoming sick with COVID-19, it is still recommended that people sick with COVID-19 limit contact with animals until more information is known about the virus  When possible, have another member of your household care for your animals while you are sick  If you are sick with COVID-19, avoid contact with your pet, including petting, snuggling, being kissed or licked, and sharing food  If you must care for your pet or be around animals while you are sick, wash your hands before and after you interact with pets and wear a facemask  See COVID-19 and Animals for more information  Call ahead before visiting your doctor    If you have a medical appointment, call the healthcare provider and tell them that you have or may have COVID-19  This will help the healthcare providers office take steps to keep other people from getting infected or exposed  Wear a facemask    You should wear a facemask when you are around other people (e g , sharing a room or vehicle) or pets and before you enter a healthcare providers office  If you are not able to wear a facemask (for example, because it causes trouble breathing), then people who live with you should not stay in the same room with you, or they should wear a facemask if they enter your room  Cover your coughs and sneezes    Cover your mouth and nose with a tissue when you cough or sneeze  Throw used tissues in a lined trash can   Immediately wash your hands with soap and water for at least 20 seconds or, if soap and water are not available, clean your hands with an alcohol-based hand  that contains at least 60% DISPLAY PLAN FREE TEXT alcohol  Clean your hands often    Wash your hands often with soap and water for at least 20 seconds, especially after blowing your nose, coughing, or sneezing; going to the bathroom; and before eating or preparing food  If soap and water are not readily available, use an alcohol-based hand  with at least 60% alcohol, covering all surfaces of your hands and rubbing them together until they feel dry  Soap and water are the best option if hands are visibly dirty  Avoid touching your eyes, nose, and mouth with unwashed hands  Avoid sharing personal household items    You should not share dishes, drinking glasses, cups, eating utensils, towels, or bedding with other people or pets in your home  After using these items, they should be washed thoroughly with soap and water  Clean all high-touch surfaces everyday    High touch surfaces include counters, tabletops, doorknobs, bathroom fixtures, toilets, phones, keyboards, tablets, and bedside tables  Also, clean any surfaces that may have blood, stool, or body fluids on them  Use a household cleaning spray or wipe, according to the label instructions  Labels contain instructions for safe and effective use of the cleaning product including precautions you should take when applying the product, such as wearing gloves and making sure you have good ventilation during use of the product  Monitor your symptoms    Seek prompt medical attention if your illness is worsening (e g , difficulty breathing)  Before seeking care, call your healthcare provider and tell them that you have, or are being evaluated for, COVID-19  Put on a facemask before you enter the facility  These steps will help the healthcare providers office to keep other people in the office or waiting room from getting infected or exposed  Ask your healthcare provider to call the local or state health department   Persons who are placed under active monitoring or facilitated self-monitoring should follow instructions provided by their local health department or occupational health professionals, as appropriate  If you have a medical emergency and need to call 911, notify the dispatch personnel that you have, or are being evaluated for COVID-19  If possible, put on a facemask before emergency medical services arrive  Discontinuing home isolation    Patients with confirmed COVID-19 should remain under home isolation precautions until the following conditions are met:   - They have had no fever for at least 24 hours (that is one full day of no fever without the use medicine that reduces fevers)  AND  - other symptoms have improved (for example, when their cough or shortness of breath have improved)  AND  - If had mild or moderate illness, at least 10 days have passed since their symptoms first appeared or if severe illness (needed oxygen) or immunosuppressed, at least 20 days have passed since symptoms first appeared  Patients with confirmed COVID-19 should also notify close contacts (including their workplace) and ask that they self-quarantine  Currently, close contact is defined as being within 6 feet for 15 minutes or more from the period 24 hours starting 48 hours before symptom onset to the time at which the patient went into isolation  Close contacts of patients diagnosed with COVID-19 should be instructed by the patient to self-quarantine for 14 days from the last time of their last contact with the patient       Source: [employer]

## 2021-10-12 RX ORDER — CEFTRIAXONE 500 MG/1
1 INJECTION, POWDER, FOR SOLUTION INTRAMUSCULAR; INTRAVENOUS
Qty: 0 | Refills: 0 | DISCHARGE
Start: 2021-10-12 | End: 2021-10-17

## 2021-10-14 ENCOUNTER — INPATIENT (INPATIENT)
Facility: HOSPITAL | Age: 86
LOS: 13 days | Discharge: HOME HEALTH | DRG: 193 | End: 2021-10-28
Attending: INTERNAL MEDICINE | Admitting: HOSPITALIST
Payer: MEDICARE

## 2021-10-14 VITALS
OXYGEN SATURATION: 93 % | HEART RATE: 87 BPM | DIASTOLIC BLOOD PRESSURE: 97 MMHG | HEIGHT: 67 IN | SYSTOLIC BLOOD PRESSURE: 186 MMHG | RESPIRATION RATE: 22 BRPM | TEMPERATURE: 98 F | WEIGHT: 119.93 LBS

## 2021-10-14 DIAGNOSIS — S82.202D UNSPECIFIED FRACTURE OF SHAFT OF LEFT TIBIA, SUBSEQUENT ENCOUNTER FOR CLOSED FRACTURE WITH ROUTINE HEALING: ICD-10-CM
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
## 2021-10-14 DIAGNOSIS — Z79.01 LONG TERM (CURRENT) USE OF ANTICOAGULANTS: ICD-10-CM

## 2021-10-14 DIAGNOSIS — E89.0 POSTPROCEDURAL HYPOTHYROIDISM: Chronic | ICD-10-CM

## 2021-10-14 DIAGNOSIS — Z87.81 PERSONAL HISTORY OF (HEALED) TRAUMATIC FRACTURE: Chronic | ICD-10-CM

## 2021-10-14 DIAGNOSIS — L89.620 PRESSURE ULCER OF LEFT HEEL, UNSTAGEABLE: ICD-10-CM

## 2021-10-14 DIAGNOSIS — I50.33 ACUTE ON CHRONIC DIASTOLIC (CONGESTIVE) HEART FAILURE: ICD-10-CM

## 2021-10-14 DIAGNOSIS — Z98.890 OTHER SPECIFIED POSTPROCEDURAL STATES: Chronic | ICD-10-CM

## 2021-10-14 DIAGNOSIS — I50.9 HEART FAILURE, UNSPECIFIED: ICD-10-CM

## 2021-10-14 DIAGNOSIS — Z98.89 OTHER SPECIFIED POSTPROCEDURAL STATES: Chronic | ICD-10-CM

## 2021-10-14 DIAGNOSIS — J98.11 ATELECTASIS: ICD-10-CM

## 2021-10-14 DIAGNOSIS — Z87.81 PERSONAL HISTORY OF (HEALED) TRAUMATIC FRACTURE: ICD-10-CM

## 2021-10-14 DIAGNOSIS — J96.01 ACUTE RESPIRATORY FAILURE WITH HYPOXIA: ICD-10-CM

## 2021-10-14 DIAGNOSIS — Z51.5 ENCOUNTER FOR PALLIATIVE CARE: ICD-10-CM

## 2021-10-14 DIAGNOSIS — E43 UNSPECIFIED SEVERE PROTEIN-CALORIE MALNUTRITION: ICD-10-CM

## 2021-10-14 DIAGNOSIS — S82.201D UNSPECIFIED FRACTURE OF SHAFT OF RIGHT TIBIA, SUBSEQUENT ENCOUNTER FOR CLOSED FRACTURE WITH ROUTINE HEALING: ICD-10-CM

## 2021-10-14 DIAGNOSIS — J91.8 PLEURAL EFFUSION IN OTHER CONDITIONS CLASSIFIED ELSEWHERE: ICD-10-CM

## 2021-10-14 DIAGNOSIS — L89.610 PRESSURE ULCER OF RIGHT HEEL, UNSTAGEABLE: ICD-10-CM

## 2021-10-14 DIAGNOSIS — Z87.440 PERSONAL HISTORY OF URINARY (TRACT) INFECTIONS: ICD-10-CM

## 2021-10-14 DIAGNOSIS — G93.41 METABOLIC ENCEPHALOPATHY: ICD-10-CM

## 2021-10-14 DIAGNOSIS — I48.0 PAROXYSMAL ATRIAL FIBRILLATION: ICD-10-CM

## 2021-10-14 DIAGNOSIS — Z66 DO NOT RESUSCITATE: ICD-10-CM

## 2021-10-14 DIAGNOSIS — I89.0 LYMPHEDEMA, NOT ELSEWHERE CLASSIFIED: ICD-10-CM
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
## 2021-10-14 DIAGNOSIS — R54 AGE-RELATED PHYSICAL DEBILITY: ICD-10-CM

## 2021-10-14 DIAGNOSIS — Z87.01 PERSONAL HISTORY OF PNEUMONIA (RECURRENT): ICD-10-CM

## 2021-10-14 DIAGNOSIS — J18.9 PNEUMONIA, UNSPECIFIED ORGANISM: ICD-10-CM

## 2021-10-14 DIAGNOSIS — Z87.891 PERSONAL HISTORY OF NICOTINE DEPENDENCE: ICD-10-CM

## 2021-10-14 DIAGNOSIS — L89.152 PRESSURE ULCER OF SACRAL REGION, STAGE 2: ICD-10-CM

## 2021-10-14 DIAGNOSIS — F03.90 UNSPECIFIED DEMENTIA WITHOUT BEHAVIORAL DISTURBANCE: ICD-10-CM

## 2021-10-14 DIAGNOSIS — Z96.0 PRESENCE OF UROGENITAL IMPLANTS: ICD-10-CM

## 2021-10-14 DIAGNOSIS — D47.2 MONOCLONAL GAMMOPATHY: ICD-10-CM

## 2021-10-14 DIAGNOSIS — I87.2 VENOUS INSUFFICIENCY (CHRONIC) (PERIPHERAL): ICD-10-CM

## 2021-10-14 DIAGNOSIS — R62.7 ADULT FAILURE TO THRIVE: ICD-10-CM

## 2021-10-14 DIAGNOSIS — R82.81 PYURIA: ICD-10-CM

## 2021-10-14 DIAGNOSIS — M48.00 SPINAL STENOSIS, SITE UNSPECIFIED: ICD-10-CM

## 2021-10-14 DIAGNOSIS — J44.9 CHRONIC OBSTRUCTIVE PULMONARY DISEASE, UNSPECIFIED: ICD-10-CM

## 2021-10-14 DIAGNOSIS — H91.90 UNSPECIFIED HEARING LOSS, UNSPECIFIED EAR: ICD-10-CM

## 2021-10-14 DIAGNOSIS — I11.0 HYPERTENSIVE HEART DISEASE WITH HEART FAILURE: ICD-10-CM

## 2021-10-14 LAB
ACANTHOCYTES BLD QL SMEAR: SLIGHT — SIGNIFICANT CHANGE UP
ALBUMIN SERPL ELPH-MCNC: 3.5 G/DL — SIGNIFICANT CHANGE UP (ref 3.3–5)
ALP SERPL-CCNC: 87 U/L — SIGNIFICANT CHANGE UP (ref 40–120)
ALT FLD-CCNC: 19 U/L — SIGNIFICANT CHANGE UP (ref 12–78)
ANION GAP SERPL CALC-SCNC: 6 MMOL/L — SIGNIFICANT CHANGE UP (ref 5–17)
ANISOCYTOSIS BLD QL: SLIGHT — SIGNIFICANT CHANGE UP
APPEARANCE UR: ABNORMAL
AST SERPL-CCNC: 22 U/L — SIGNIFICANT CHANGE UP (ref 15–37)
BACTERIA # UR AUTO: ABNORMAL
BASE EXCESS BLDV CALC-SCNC: 5.7 MMOL/L — SIGNIFICANT CHANGE UP
BASOPHILS # BLD AUTO: 0 K/UL — SIGNIFICANT CHANGE UP (ref 0–0.2)
BASOPHILS NFR BLD AUTO: 0 % — SIGNIFICANT CHANGE UP (ref 0–2)
BILIRUB SERPL-MCNC: 0.7 MG/DL — SIGNIFICANT CHANGE UP (ref 0.2–1.2)
BILIRUB UR-MCNC: NEGATIVE — SIGNIFICANT CHANGE UP
BUN SERPL-MCNC: 21 MG/DL — SIGNIFICANT CHANGE UP (ref 7–23)
CALCIUM SERPL-MCNC: 10 MG/DL — SIGNIFICANT CHANGE UP (ref 8.5–10.1)
CHLORIDE SERPL-SCNC: 102 MMOL/L — SIGNIFICANT CHANGE UP (ref 96–108)
CO2 BLDV-SCNC: 34 MMOL/L — HIGH (ref 22–26)
CO2 SERPL-SCNC: 31 MMOL/L — SIGNIFICANT CHANGE UP (ref 22–31)
COD CRY URNS QL: ABNORMAL
COLOR SPEC: YELLOW — SIGNIFICANT CHANGE UP
COMMENT - URINE: SIGNIFICANT CHANGE UP
COMMENT - URINE: SIGNIFICANT CHANGE UP
CREAT SERPL-MCNC: 0.57 MG/DL — SIGNIFICANT CHANGE UP (ref 0.5–1.3)
DIFF PNL FLD: ABNORMAL
ELLIPTOCYTES BLD QL SMEAR: SLIGHT — SIGNIFICANT CHANGE UP
EOSINOPHIL # BLD AUTO: 0.06 K/UL — SIGNIFICANT CHANGE UP (ref 0–0.5)
EOSINOPHIL NFR BLD AUTO: 1 % — SIGNIFICANT CHANGE UP (ref 0–6)
EPI CELLS # UR: SIGNIFICANT CHANGE UP
GLUCOSE SERPL-MCNC: 99 MG/DL — SIGNIFICANT CHANGE UP (ref 70–99)
GLUCOSE UR QL: NEGATIVE MG/DL — SIGNIFICANT CHANGE UP
GRAN CASTS # UR COMP ASSIST: ABNORMAL /LPF
HCO3 BLDV-SCNC: 32 MMOL/L — HIGH (ref 22–29)
HCT VFR BLD CALC: 41 % — SIGNIFICANT CHANGE UP (ref 34.5–45)
HGB BLD-MCNC: 12.7 G/DL — SIGNIFICANT CHANGE UP (ref 11.5–15.5)
HYALINE CASTS # UR AUTO: ABNORMAL /LPF
HYPOCHROMIA BLD QL: SLIGHT — SIGNIFICANT CHANGE UP
INR BLD: 1.77 RATIO — HIGH (ref 0.88–1.16)
KETONES UR-MCNC: ABNORMAL
LACTATE SERPL-SCNC: 1 MMOL/L — SIGNIFICANT CHANGE UP (ref 0.7–2)
LEUKOCYTE ESTERASE UR-ACNC: ABNORMAL
LYMPHOCYTES # BLD AUTO: 0.52 K/UL — LOW (ref 1–3.3)
LYMPHOCYTES # BLD AUTO: 8 % — LOW (ref 13–44)
MANUAL SMEAR VERIFICATION: SIGNIFICANT CHANGE UP
MCHC RBC-ENTMCNC: 29.3 PG — SIGNIFICANT CHANGE UP (ref 27–34)
MCHC RBC-ENTMCNC: 31 GM/DL — LOW (ref 32–36)
MCV RBC AUTO: 94.5 FL — SIGNIFICANT CHANGE UP (ref 80–100)
MONOCYTES # BLD AUTO: 1.36 K/UL — HIGH (ref 0–0.9)
MONOCYTES NFR BLD AUTO: 21 % — HIGH (ref 2–14)
NEUTROPHILS # BLD AUTO: 4.46 K/UL — SIGNIFICANT CHANGE UP (ref 1.8–7.4)
NEUTROPHILS NFR BLD AUTO: 69 % — SIGNIFICANT CHANGE UP (ref 43–77)
NITRITE UR-MCNC: NEGATIVE — SIGNIFICANT CHANGE UP
NRBC # BLD: 0 /100 — SIGNIFICANT CHANGE UP (ref 0–0)
NRBC # BLD: SIGNIFICANT CHANGE UP /100 WBCS (ref 0–0)
NT-PROBNP SERPL-SCNC: HIGH PG/ML (ref 0–450)
OVALOCYTES BLD QL SMEAR: SLIGHT — SIGNIFICANT CHANGE UP
PCO2 BLDV: 53 MMHG — HIGH (ref 39–42)
PH BLDV: 7.39 — SIGNIFICANT CHANGE UP (ref 7.32–7.43)
PH UR: 6 — SIGNIFICANT CHANGE UP (ref 5–8)
PLAT MORPH BLD: NORMAL — SIGNIFICANT CHANGE UP
PLATELET # BLD AUTO: 180 K/UL — SIGNIFICANT CHANGE UP (ref 150–400)
PO2 BLDV: 52 MMHG — SIGNIFICANT CHANGE UP
POIKILOCYTOSIS BLD QL AUTO: SLIGHT — SIGNIFICANT CHANGE UP
POTASSIUM SERPL-MCNC: 3.9 MMOL/L — SIGNIFICANT CHANGE UP (ref 3.5–5.3)
POTASSIUM SERPL-SCNC: 3.9 MMOL/L — SIGNIFICANT CHANGE UP (ref 3.5–5.3)
PROT SERPL-MCNC: 6.9 GM/DL — SIGNIFICANT CHANGE UP (ref 6–8.3)
PROT UR-MCNC: 30 MG/DL
PROTHROM AB SERPL-ACNC: 20 SEC — HIGH (ref 10.6–13.6)
RAPID RVP RESULT: SIGNIFICANT CHANGE UP
RBC # BLD: 4.34 M/UL — SIGNIFICANT CHANGE UP (ref 3.8–5.2)
RBC # FLD: 15.6 % — HIGH (ref 10.3–14.5)
RBC BLD AUTO: ABNORMAL
RBC CASTS # UR COMP ASSIST: ABNORMAL /HPF (ref 0–4)
SAO2 % BLDV: 83 % — SIGNIFICANT CHANGE UP
SARS-COV-2 RNA SPEC QL NAA+PROBE: SIGNIFICANT CHANGE UP
SODIUM SERPL-SCNC: 139 MMOL/L — SIGNIFICANT CHANGE UP (ref 135–145)
SP GR SPEC: 1.02 — SIGNIFICANT CHANGE UP (ref 1.01–1.02)
TROPONIN I, HIGH SENSITIVITY RESULT: 49.55 NG/L — SIGNIFICANT CHANGE UP
UROBILINOGEN FLD QL: NEGATIVE MG/DL — SIGNIFICANT CHANGE UP
VARIANT LYMPHS # BLD: 1 % — SIGNIFICANT CHANGE UP (ref 0–6)
WBC # BLD: 6.47 K/UL — SIGNIFICANT CHANGE UP (ref 3.8–10.5)
WBC # FLD AUTO: 6.47 K/UL — SIGNIFICANT CHANGE UP (ref 3.8–10.5)
WBC UR QL: ABNORMAL

## 2021-10-14 PROCEDURE — 83735 ASSAY OF MAGNESIUM: CPT

## 2021-10-14 PROCEDURE — 99285 EMERGENCY DEPT VISIT HI MDM: CPT

## 2021-10-14 PROCEDURE — 88305 TISSUE EXAM BY PATHOLOGIST: CPT

## 2021-10-14 PROCEDURE — 97530 THERAPEUTIC ACTIVITIES: CPT | Mod: GP

## 2021-10-14 PROCEDURE — 84134 ASSAY OF PREALBUMIN: CPT

## 2021-10-14 PROCEDURE — 89051 BODY FLUID CELL COUNT: CPT

## 2021-10-14 PROCEDURE — 80048 BASIC METABOLIC PNL TOTAL CA: CPT

## 2021-10-14 PROCEDURE — 71250 CT THORAX DX C-: CPT

## 2021-10-14 PROCEDURE — 87635 SARS-COV-2 COVID-19 AMP PRB: CPT

## 2021-10-14 PROCEDURE — 85610 PROTHROMBIN TIME: CPT

## 2021-10-14 PROCEDURE — 93306 TTE W/DOPPLER COMPLETE: CPT

## 2021-10-14 PROCEDURE — 92526 ORAL FUNCTION THERAPY: CPT | Mod: GN

## 2021-10-14 PROCEDURE — U0003: CPT

## 2021-10-14 PROCEDURE — 80053 COMPREHEN METABOLIC PANEL: CPT

## 2021-10-14 PROCEDURE — 93010 ELECTROCARDIOGRAM REPORT: CPT

## 2021-10-14 PROCEDURE — 87070 CULTURE OTHR SPECIMN AEROBIC: CPT

## 2021-10-14 PROCEDURE — U0005: CPT

## 2021-10-14 PROCEDURE — 36415 COLL VENOUS BLD VENIPUNCTURE: CPT

## 2021-10-14 PROCEDURE — 86769 SARS-COV-2 COVID-19 ANTIBODY: CPT

## 2021-10-14 PROCEDURE — 84157 ASSAY OF PROTEIN OTHER: CPT

## 2021-10-14 PROCEDURE — 87075 CULTR BACTERIA EXCEPT BLOOD: CPT

## 2021-10-14 PROCEDURE — 83615 LACTATE (LD) (LDH) ENZYME: CPT

## 2021-10-14 PROCEDURE — 73590 X-RAY EXAM OF LOWER LEG: CPT | Mod: 50

## 2021-10-14 PROCEDURE — 88108 CYTOPATH CONCENTRATE TECH: CPT

## 2021-10-14 PROCEDURE — 92610 EVALUATE SWALLOWING FUNCTION: CPT | Mod: GN

## 2021-10-14 PROCEDURE — 97162 PT EVAL MOD COMPLEX 30 MIN: CPT | Mod: GP

## 2021-10-14 PROCEDURE — 83986 ASSAY PH BODY FLUID NOS: CPT

## 2021-10-14 PROCEDURE — 71250 CT THORAX DX C-: CPT | Mod: 26

## 2021-10-14 PROCEDURE — 85027 COMPLETE CBC AUTOMATED: CPT

## 2021-10-14 PROCEDURE — 93005 ELECTROCARDIOGRAM TRACING: CPT

## 2021-10-14 PROCEDURE — 71045 X-RAY EXAM CHEST 1 VIEW: CPT

## 2021-10-14 PROCEDURE — 71045 X-RAY EXAM CHEST 1 VIEW: CPT | Mod: 26

## 2021-10-14 PROCEDURE — 87102 FUNGUS ISOLATION CULTURE: CPT

## 2021-10-14 PROCEDURE — 82042 OTHER SOURCE ALBUMIN QUAN EA: CPT

## 2021-10-14 PROCEDURE — 99223 1ST HOSP IP/OBS HIGH 75: CPT

## 2021-10-14 PROCEDURE — 84443 ASSAY THYROID STIM HORMONE: CPT

## 2021-10-14 PROCEDURE — 82945 GLUCOSE OTHER FLUID: CPT

## 2021-10-14 RX ORDER — OMEGA-3 ACID ETHYL ESTERS 1 G
1 CAPSULE ORAL
Qty: 0 | Refills: 0 | DISCHARGE

## 2021-10-14 RX ORDER — AZITHROMYCIN 500 MG/1
500 TABLET, FILM COATED ORAL ONCE
Refills: 0 | Status: COMPLETED | OUTPATIENT
Start: 2021-10-14 | End: 2021-10-14

## 2021-10-14 RX ORDER — VANCOMYCIN HCL 1 G
1000 VIAL (EA) INTRAVENOUS ONCE
Refills: 0 | Status: DISCONTINUED | OUTPATIENT
Start: 2021-10-14 | End: 2021-10-14

## 2021-10-14 RX ORDER — ACETAMINOPHEN 500 MG
650 TABLET ORAL EVERY 6 HOURS
Refills: 0 | Status: DISCONTINUED | OUTPATIENT
Start: 2021-10-14 | End: 2021-10-28

## 2021-10-14 RX ORDER — VANCOMYCIN HCL 1 G
750 VIAL (EA) INTRAVENOUS ONCE
Refills: 0 | Status: DISCONTINUED | OUTPATIENT
Start: 2021-10-14 | End: 2021-10-15

## 2021-10-14 RX ORDER — METOPROLOL TARTRATE 50 MG
1 TABLET ORAL
Qty: 0 | Refills: 0 | DISCHARGE

## 2021-10-14 RX ORDER — CHOLECALCIFEROL (VITAMIN D3) 125 MCG
3 CAPSULE ORAL
Qty: 0 | Refills: 0 | DISCHARGE

## 2021-10-14 RX ORDER — CEFEPIME 1 G/1
1000 INJECTION, POWDER, FOR SOLUTION INTRAMUSCULAR; INTRAVENOUS EVERY 12 HOURS
Refills: 0 | Status: DISCONTINUED | OUTPATIENT
Start: 2021-10-14 | End: 2021-10-15

## 2021-10-14 RX ORDER — LOSARTAN POTASSIUM 100 MG/1
1 TABLET, FILM COATED ORAL
Qty: 0 | Refills: 0 | DISCHARGE

## 2021-10-14 RX ORDER — POLYETHYLENE GLYCOL 3350 17 G/17G
17 POWDER, FOR SOLUTION ORAL DAILY
Refills: 0 | Status: DISCONTINUED | OUTPATIENT
Start: 2021-10-14 | End: 2021-10-28

## 2021-10-14 RX ORDER — CEFEPIME 1 G/1
1000 INJECTION, POWDER, FOR SOLUTION INTRAMUSCULAR; INTRAVENOUS EVERY 12 HOURS
Refills: 0 | Status: DISCONTINUED | OUTPATIENT
Start: 2021-10-14 | End: 2021-10-14

## 2021-10-14 RX ORDER — MIRTAZAPINE 45 MG/1
1 TABLET, ORALLY DISINTEGRATING ORAL
Qty: 0 | Refills: 0 | DISCHARGE

## 2021-10-14 RX ORDER — LOSARTAN POTASSIUM 100 MG/1
25 TABLET, FILM COATED ORAL DAILY
Refills: 0 | Status: DISCONTINUED | OUTPATIENT
Start: 2021-10-14 | End: 2021-10-28

## 2021-10-14 RX ORDER — FUROSEMIDE 40 MG
40 TABLET ORAL ONCE
Refills: 0 | Status: COMPLETED | OUTPATIENT
Start: 2021-10-14 | End: 2021-10-14

## 2021-10-14 RX ORDER — METOPROLOL TARTRATE 50 MG
25 TABLET ORAL DAILY
Refills: 0 | Status: DISCONTINUED | OUTPATIENT
Start: 2021-10-14 | End: 2021-10-28

## 2021-10-14 RX ORDER — INFLUENZA VIRUS VACCINE 15; 15; 15; 15 UG/.5ML; UG/.5ML; UG/.5ML; UG/.5ML
0.5 SUSPENSION INTRAMUSCULAR ONCE
Refills: 0 | Status: DISCONTINUED | OUTPATIENT
Start: 2021-10-14 | End: 2021-10-28

## 2021-10-14 RX ORDER — SENNA PLUS 8.6 MG/1
2 TABLET ORAL AT BEDTIME
Refills: 0 | Status: DISCONTINUED | OUTPATIENT
Start: 2021-10-14 | End: 2021-10-28

## 2021-10-14 RX ORDER — ONDANSETRON 8 MG/1
4 TABLET, FILM COATED ORAL EVERY 8 HOURS
Refills: 0 | Status: DISCONTINUED | OUTPATIENT
Start: 2021-10-14 | End: 2021-10-28

## 2021-10-14 RX ORDER — LANOLIN ALCOHOL/MO/W.PET/CERES
3 CREAM (GRAM) TOPICAL AT BEDTIME
Refills: 0 | Status: DISCONTINUED | OUTPATIENT
Start: 2021-10-14 | End: 2021-10-28

## 2021-10-14 RX ORDER — ENOXAPARIN SODIUM 100 MG/ML
50 INJECTION SUBCUTANEOUS EVERY 12 HOURS
Refills: 0 | Status: DISCONTINUED | OUTPATIENT
Start: 2021-10-14 | End: 2021-10-20

## 2021-10-14 RX ORDER — CEFEPIME 1 G/1
2000 INJECTION, POWDER, FOR SOLUTION INTRAMUSCULAR; INTRAVENOUS ONCE
Refills: 0 | Status: COMPLETED | OUTPATIENT
Start: 2021-10-14 | End: 2021-10-14

## 2021-10-14 RX ORDER — MIRTAZAPINE 45 MG/1
15 TABLET, ORALLY DISINTEGRATING ORAL AT BEDTIME
Refills: 0 | Status: DISCONTINUED | OUTPATIENT
Start: 2021-10-14 | End: 2021-10-19

## 2021-10-14 RX ORDER — FUROSEMIDE 40 MG
40 TABLET ORAL
Refills: 0 | Status: DISCONTINUED | OUTPATIENT
Start: 2021-10-14 | End: 2021-10-20

## 2021-10-14 RX ADMIN — ENOXAPARIN SODIUM 50 MILLIGRAM(S): 100 INJECTION SUBCUTANEOUS at 21:19

## 2021-10-14 RX ADMIN — Medication 3 MILLIGRAM(S): at 21:18

## 2021-10-14 RX ADMIN — Medication 650 MILLIGRAM(S): at 21:19

## 2021-10-14 RX ADMIN — MIRTAZAPINE 15 MILLIGRAM(S): 45 TABLET, ORALLY DISINTEGRATING ORAL at 21:18

## 2021-10-14 RX ADMIN — Medication 40 MILLIGRAM(S): at 15:29

## 2021-10-14 RX ADMIN — CEFEPIME 1000 MILLIGRAM(S): 1 INJECTION, POWDER, FOR SOLUTION INTRAMUSCULAR; INTRAVENOUS at 21:18

## 2021-10-14 RX ADMIN — SENNA PLUS 2 TABLET(S): 8.6 TABLET ORAL at 21:18

## 2021-10-14 RX ADMIN — CEFEPIME 100 MILLIGRAM(S): 1 INJECTION, POWDER, FOR SOLUTION INTRAMUSCULAR; INTRAVENOUS at 16:56

## 2021-10-14 NOTE — H&P ADULT - HISTORY OF PRESENT ILLNESS
89yo/F with PMH afib on coumadin, HTN, mild dementia, chronic lymphedema without known h/o CHF, presented from SNF for evaluation of worsening SOB. Patient unable to provide ay history. Per daughter at bedside, she is not on oxygen at her baseline. 5 days ago at [facility] she noted to become more hypoxic and was placed on O2. Then patient had more requirement for O2 and CXR was done that showed opacification of both bases suspicious for pneumonia and patient initiated on IV antibiotics. At this point, daughter insisted on transfer to  for further diagnosis and treatment. Patient was at  in June for b/l tib/fib fractures for which she was NWB and was at [facility]. No fever reported.

## 2021-10-14 NOTE — ED ADULT TRIAGE NOTE - CHIEF COMPLAINT QUOTE
BIBA to ED from the [place] for  reports of SOB and low 02 sat. PT on home 02 2lnc at baseline. HX of COPD. PT dx recently and being treated for Pneumonia. 02 increased to 4LNC. Daughter insisted PT come to hospital for evaluation. HX Dementia.

## 2021-10-14 NOTE — ED PROVIDER NOTE - PROGRESS NOTE DETAILS
Pt with chf on cxr.  Dosed with lasix.  Discussed with hospitalist who has accepted for further w/u and management.  Further care per inpatient treamtent team.

## 2021-10-14 NOTE — ED PROVIDER NOTE - NSICDXPASTMEDICALHX_GEN_ALL_CORE_FT
PAST MEDICAL HISTORY:  Acute cystitis without hematuria septic  4/2016    Essential hypertension     [place] (hard of hearing), bilateral     Hypertension     Lymphedema of both lower extremities     Monoclonal gammopathies     Paroxysmal atrial fibrillation     Spinal stenosis     Spondyloarthritis     Vaginal prolapse     Venous insufficiency

## 2021-10-14 NOTE — ED ADULT NURSE NOTE - OBJECTIVE STATEMENT
Pt brought to the ED for shortness of breath and desaturation at nursing home. Pt O2 sat upon arrival in ED on 4L NC 93%. Pt with dementia, and no complaints

## 2021-10-14 NOTE — H&P ADULT - ASSESSMENT
#Acute hypoxic resp failure likely 2 to underlying b/l pleural effusions/pneumonia  Admit to telemetry  Maintain on O2  CT chest reviewed preliminary, moderate to large effusions/ possible consolidations (my reading)- follow official reading reports  Pulm eval DR Orozco group  May need IR to drain effusions if official reading confirms  Hold Coumadin for now and use therapeutic Lovenox BID  IV Cefepime+ Vanco  ID eval for further IV antibiotics  Will start on Iv Lasix 40mg BID  Recent ECHO 06/2021- EF 60-65%   Cardio eval DR Vides    #Afib on Coumadin  Hold coumadin as may need interventions  Lovenox BID    #Decubiti- wound care eval    #Dementia- supportive  Remeron. Swallow eval    #B/l ankle fractures- NWB.     #COVID neg    #MOLST in chart- FULL code    #DVT proph- Lovenox    #Dispo- inpatient admit. D/w daughter at bedside

## 2021-10-14 NOTE — ED PROVIDER NOTE - OBJECTIVE STATEMENT
89 y/o F with a PMHx of Dementia, essential HTN, [place] bilateral, vaginal prolapse, spondyloarthritis, spinal stenosis, acute cystitis w/o hematuria, PAF, monoclonal gammopathies, venous insuffiencey, lymphedema, presents to ED BIBA from the [facility] re[facility] for SOB and low O2 sat. Pt's on home O2 2LNC at baseline but needing to increase O2 supplementation. Pt was recently dx and treated for PNA. Pt was sent by daughter for further eval.

## 2021-10-14 NOTE — PHARMACOTHERAPY INTERVENTION NOTE - COMMENTS
Medication History Complete. Medication and allergies reviewed with the MAR provided by the [facility]

## 2021-10-14 NOTE — ED PROVIDER NOTE - CLINICAL SUMMARY MEDICAL DECISION MAKING FREE TEXT BOX
increase o2 requirement, no focal wheezing, will r/o covid and other respiratory illness, will treat cellulitis. Pt is a limited historian, likely admission for further w/u and management.

## 2021-10-14 NOTE — H&P ADULT - NSICDXPASTMEDICALHX_GEN_ALL_CORE_FT
PAST MEDICAL HISTORY:  Acute cystitis without hematuria septic  4/2016    Essential hypertension     [place] (hard of hearing), bilateral     Hypertension     Lymphedema of both lower extremities     Monoclonal gammopathies     Paroxysmal atrial fibrillation     Spinal stenosis     Spondyloarthritis     Vaginal prolapse     Venous insufficiency

## 2021-10-14 NOTE — H&P ADULT - NSICDXPASTSURGICALHX_GEN_ALL_CORE_FT
PAST SURGICAL HISTORY:  History of fracture of femur hx of proximal femur fracture in January 2021    History of repair of left hip joint Hx L hip long IMN with Dr. Kitchen 2017    History of vaginal surgery     S/P kyphoplasty 2014    S/P thyroidectomy partial   1975

## 2021-10-14 NOTE — H&P ADULT - NSHPLABSRESULTS_GEN_ALL_CORE
12.7   6.47  )-----------( 180      ( 14 Oct 2021 14:13 )             41.0     14 Oct 2021 14:13    139    |  102    |  21     ----------------------------<  99     3.9     |  31     |  0.57     Ca    10.0       14 Oct 2021 14:13    TPro  6.9    /  Alb  3.5    /  TBili  0.7    /  DBili  x      /  AST  22     /  ALT  19     /  AlkPhos  87     14 Oct 2021 14:13    LIVER FUNCTIONS - ( 14 Oct 2021 14:13 )  Alb: 3.5 g/dL / Pro: 6.9 gm/dL / ALK PHOS: 87 U/L / ALT: 19 U/L / AST: 22 U/L / GGT: x           Urinalysis Basic - ( 14 Oct 2021 14:13 )  Color: Yellow / Appearance: Slightly Turbid / S.020 / pH: x  Gluc: x / Ketone: Trace  / Bili: Negative / Urobili: Negative mg/dL   Blood: x / Protein: 30 mg/dL / Nitrite: Negative   Leuk Esterase: Moderate / RBC: 25-50 /HPF / WBC 11-25   Sq Epi: x / Non Sq Epi: Occasional / Bacteria: Few

## 2021-10-14 NOTE — H&P ADULT - NSHPPHYSICALEXAM_GEN_ALL_CORE
Vital Signs Last 24 Hrs  T(C): 36.6 (14 Oct 2021 14:15), Max: 36.6 (14 Oct 2021 14:15)  T(F): 97.8 (14 Oct 2021 14:15), Max: 97.8 (14 Oct 2021 14:15)  HR: 86 (14 Oct 2021 16:00) (78 - 95)  BP: 166/97 (14 Oct 2021 16:00) (152/97 - 186/97)  BP(mean): 116 (14 Oct 2021 16:00) (101 - 117)  RR: 26 (14 Oct 2021 16:00) (21 - 28)  SpO2: 100% (14 Oct 2021 16:00) (93% - 100%)

## 2021-10-14 NOTE — ED ADULT NURSE NOTE - CHIEF COMPLAINT QUOTE
BIBA to ED from the [place] for  reports of SOB and low 02 sat. PT on home 02 2lnc at baseline. HX of COPD. PT dx recently and being treated for Pneumonia. 02 increased to 4LNC. Daughter insisted PT come to hospital for evaluation. HX Dementia.

## 2021-10-14 NOTE — ED ADULT NURSE NOTE - NSICDXPASTMEDICALHX_GEN_ALL_CORE_FT
PAST MEDICAL HISTORY:  Acute cystitis without hematuria septic  4/2016    Essential hypertension     [place] (hard of hearing), bilateral     Hypertension     Lymphedema of both lower extremities     Monoclonal gammopathies     Paroxysmal atrial fibrillation     Spinal stenosis     Spondyloarthritis     Vaginal prolapse     Venous insufficiency

## 2021-10-15 ENCOUNTER — RESULT REVIEW (OUTPATIENT)
Age: 86
End: 2021-10-15

## 2021-10-15 DIAGNOSIS — I48.20 CHRONIC ATRIAL FIBRILLATION, UNSPECIFIED: ICD-10-CM

## 2021-10-15 DIAGNOSIS — E44.0 MODERATE PROTEIN-CALORIE MALNUTRITION: ICD-10-CM

## 2021-10-15 DIAGNOSIS — J90 PLEURAL EFFUSION, NOT ELSEWHERE CLASSIFIED: ICD-10-CM

## 2021-10-15 DIAGNOSIS — J96.01 ACUTE RESPIRATORY FAILURE WITH HYPOXIA: ICD-10-CM

## 2021-10-15 DIAGNOSIS — J18.9 PNEUMONIA, UNSPECIFIED ORGANISM: ICD-10-CM

## 2021-10-15 DIAGNOSIS — L98.429 NON-PRESSURE CHRONIC ULCER OF BACK WITH UNSPECIFIED SEVERITY: ICD-10-CM

## 2021-10-15 DIAGNOSIS — R06.02 SHORTNESS OF BREATH: ICD-10-CM

## 2021-10-15 DIAGNOSIS — J98.11 ATELECTASIS: ICD-10-CM

## 2021-10-15 DIAGNOSIS — I50.32 CHRONIC DIASTOLIC (CONGESTIVE) HEART FAILURE: ICD-10-CM

## 2021-10-15 DIAGNOSIS — G93.41 METABOLIC ENCEPHALOPATHY: ICD-10-CM

## 2021-10-15 DIAGNOSIS — Z71.89 OTHER SPECIFIED COUNSELING: ICD-10-CM

## 2021-10-15 DIAGNOSIS — R53.81 OTHER MALAISE: ICD-10-CM

## 2021-10-15 LAB
ALBUMIN FLD-MCNC: 2 G/DL — SIGNIFICANT CHANGE UP
ANION GAP SERPL CALC-SCNC: 6 MMOL/L — SIGNIFICANT CHANGE UP (ref 5–17)
B PERT IGG+IGM PNL SER: ABNORMAL
BUN SERPL-MCNC: 21 MG/DL — SIGNIFICANT CHANGE UP (ref 7–23)
CALCIUM SERPL-MCNC: 9.4 MG/DL — SIGNIFICANT CHANGE UP (ref 8.5–10.1)
CHLORIDE SERPL-SCNC: 103 MMOL/L — SIGNIFICANT CHANGE UP (ref 96–108)
CO2 SERPL-SCNC: 31 MMOL/L — SIGNIFICANT CHANGE UP (ref 22–31)
COLOR FLD: SIGNIFICANT CHANGE UP
COVID-19 SPIKE DOMAIN AB INTERP: POSITIVE
COVID-19 SPIKE DOMAIN ANTIBODY RESULT: 19 U/ML — HIGH
CREAT SERPL-MCNC: 0.48 MG/DL — LOW (ref 0.5–1.3)
CULTURE RESULTS: SIGNIFICANT CHANGE UP
FLUID INTAKE SUBSTANCE CLASS: SIGNIFICANT CHANGE UP
FLUID SEGMENTED GRANULOCYTES: 23 % — SIGNIFICANT CHANGE UP
GLUCOSE FLD-MCNC: 114 MG/DL — SIGNIFICANT CHANGE UP
GLUCOSE SERPL-MCNC: 100 MG/DL — HIGH (ref 70–99)
GRAM STN FLD: SIGNIFICANT CHANGE UP
HCT VFR BLD CALC: 35.4 % — SIGNIFICANT CHANGE UP (ref 34.5–45)
HGB BLD-MCNC: 11.1 G/DL — LOW (ref 11.5–15.5)
INR BLD: 1.84 RATIO — HIGH (ref 0.88–1.16)
LDH SERPL L TO P-CCNC: 214 U/L — SIGNIFICANT CHANGE UP
LYMPHOCYTES # FLD: 39 % — SIGNIFICANT CHANGE UP
MCHC RBC-ENTMCNC: 29.1 PG — SIGNIFICANT CHANGE UP (ref 27–34)
MCHC RBC-ENTMCNC: 31.4 GM/DL — LOW (ref 32–36)
MCV RBC AUTO: 92.7 FL — SIGNIFICANT CHANGE UP (ref 80–100)
MESOTHL CELL # FLD: 1 % — SIGNIFICANT CHANGE UP
MONOS+MACROS # FLD: 37 % — SIGNIFICANT CHANGE UP
PH FLD: 7.8 — SIGNIFICANT CHANGE UP
PLATELET # BLD AUTO: 146 K/UL — LOW (ref 150–400)
POTASSIUM SERPL-MCNC: 3.4 MMOL/L — LOW (ref 3.5–5.3)
POTASSIUM SERPL-SCNC: 3.4 MMOL/L — LOW (ref 3.5–5.3)
PROT FLD-MCNC: 3.1 G/DL — SIGNIFICANT CHANGE UP
PROTHROM AB SERPL-ACNC: 20.9 SEC — HIGH (ref 10.6–13.6)
RBC # BLD: 3.82 M/UL — SIGNIFICANT CHANGE UP (ref 3.8–5.2)
RBC # FLD: 15.8 % — HIGH (ref 10.3–14.5)
RCV VOL RI: HIGH /UL (ref 0–0)
SARS-COV-2 IGG+IGM SERPL QL IA: 19 U/ML — HIGH
SARS-COV-2 IGG+IGM SERPL QL IA: POSITIVE
SODIUM SERPL-SCNC: 140 MMOL/L — SIGNIFICANT CHANGE UP (ref 135–145)
SPECIMEN SOURCE: SIGNIFICANT CHANGE UP
SPECIMEN SOURCE: SIGNIFICANT CHANGE UP
TOTAL NUCLEATED CELL COUNT, BODY FLUID: 1108 /UL — SIGNIFICANT CHANGE UP
TUBE TYPE: SIGNIFICANT CHANGE UP
WBC # BLD: 5.82 K/UL — SIGNIFICANT CHANGE UP (ref 3.8–10.5)
WBC # FLD AUTO: 5.82 K/UL — SIGNIFICANT CHANGE UP (ref 3.8–10.5)

## 2021-10-15 PROCEDURE — 71045 X-RAY EXAM CHEST 1 VIEW: CPT | Mod: 26

## 2021-10-15 PROCEDURE — 99221 1ST HOSP IP/OBS SF/LOW 40: CPT | Mod: 25

## 2021-10-15 PROCEDURE — 99233 SBSQ HOSP IP/OBS HIGH 50: CPT

## 2021-10-15 PROCEDURE — 93306 TTE W/DOPPLER COMPLETE: CPT | Mod: 26

## 2021-10-15 PROCEDURE — 88108 CYTOPATH CONCENTRATE TECH: CPT | Mod: 26

## 2021-10-15 PROCEDURE — 32557 INSERT CATH PLEURA W/ IMAGE: CPT

## 2021-10-15 PROCEDURE — 93010 ELECTROCARDIOGRAM REPORT: CPT

## 2021-10-15 PROCEDURE — 88305 TISSUE EXAM BY PATHOLOGIST: CPT | Mod: 26

## 2021-10-15 RX ORDER — CEFTRIAXONE 500 MG/1
1000 INJECTION, POWDER, FOR SOLUTION INTRAMUSCULAR; INTRAVENOUS EVERY 24 HOURS
Refills: 0 | Status: COMPLETED | OUTPATIENT
Start: 2021-10-16 | End: 2021-10-17

## 2021-10-15 RX ORDER — COLLAGENASE CLOSTRIDIUM HIST. 250 UNIT/G
1 OINTMENT (GRAM) TOPICAL DAILY
Refills: 0 | Status: DISCONTINUED | OUTPATIENT
Start: 2021-10-15 | End: 2021-10-15

## 2021-10-15 RX ORDER — POTASSIUM CHLORIDE 20 MEQ
40 PACKET (EA) ORAL ONCE
Refills: 0 | Status: COMPLETED | OUTPATIENT
Start: 2021-10-15 | End: 2021-10-15

## 2021-10-15 RX ORDER — LIDOCAINE 4 G/100G
1 CREAM TOPICAL DAILY
Refills: 0 | Status: DISCONTINUED | OUTPATIENT
Start: 2021-10-15 | End: 2021-10-16

## 2021-10-15 RX ORDER — ACETAMINOPHEN 500 MG
1000 TABLET ORAL ONCE
Refills: 0 | Status: COMPLETED | OUTPATIENT
Start: 2021-10-15 | End: 2021-10-15

## 2021-10-15 RX ORDER — ASCORBIC ACID 60 MG
500 TABLET,CHEWABLE ORAL DAILY
Refills: 0 | Status: DISCONTINUED | OUTPATIENT
Start: 2021-10-15 | End: 2021-10-28

## 2021-10-15 RX ORDER — ZINC SULFATE TAB 220 MG (50 MG ZINC EQUIVALENT) 220 (50 ZN) MG
220 TAB ORAL DAILY
Refills: 0 | Status: DISCONTINUED | OUTPATIENT
Start: 2021-10-15 | End: 2021-10-28

## 2021-10-15 RX ADMIN — Medication 100 MILLIGRAM(S): at 21:10

## 2021-10-15 RX ADMIN — Medication 400 MILLIGRAM(S): at 21:04

## 2021-10-15 RX ADMIN — LIDOCAINE 1 PATCH: 4 CREAM TOPICAL at 19:21

## 2021-10-15 RX ADMIN — Medication 1 APPLICATION(S): at 17:56

## 2021-10-15 RX ADMIN — LIDOCAINE 1 PATCH: 4 CREAM TOPICAL at 17:10

## 2021-10-15 RX ADMIN — Medication 40 MILLIGRAM(S): at 21:05

## 2021-10-15 RX ADMIN — Medication 25 MILLIGRAM(S): at 10:39

## 2021-10-15 RX ADMIN — Medication 650 MILLIGRAM(S): at 10:49

## 2021-10-15 RX ADMIN — MIRTAZAPINE 15 MILLIGRAM(S): 45 TABLET, ORALLY DISINTEGRATING ORAL at 21:10

## 2021-10-15 RX ADMIN — Medication 3 MILLIGRAM(S): at 21:07

## 2021-10-15 RX ADMIN — LOSARTAN POTASSIUM 25 MILLIGRAM(S): 100 TABLET, FILM COATED ORAL at 10:39

## 2021-10-15 RX ADMIN — Medication 40 MILLIGRAM(S): at 10:43

## 2021-10-15 RX ADMIN — SENNA PLUS 2 TABLET(S): 8.6 TABLET ORAL at 21:07

## 2021-10-15 RX ADMIN — POLYETHYLENE GLYCOL 3350 17 GRAM(S): 17 POWDER, FOR SOLUTION ORAL at 17:09

## 2021-10-15 RX ADMIN — CEFEPIME 1000 MILLIGRAM(S): 1 INJECTION, POWDER, FOR SOLUTION INTRAMUSCULAR; INTRAVENOUS at 10:44

## 2021-10-15 RX ADMIN — ENOXAPARIN SODIUM 50 MILLIGRAM(S): 100 INJECTION SUBCUTANEOUS at 21:14

## 2021-10-15 RX ADMIN — Medication 650 MILLIGRAM(S): at 17:11

## 2021-10-15 NOTE — DIETITIAN NUTRITION RISK NOTIFICATION - TREATMENT: THE FOLLOWING DIET HAS BEEN RECOMMENDED
Diet, Mechanical Soft:   Supplement Feeding Modality:  Oral  Ensure Enlive Cans or Servings Per Day:  1       Frequency:  Three Times a day (10-15-21 @ 09:43) [Active]

## 2021-10-15 NOTE — PROGRESS NOTE ADULT - SUBJECTIVE AND OBJECTIVE BOX
CHIEF COMPLAINT/DIAGNOSIS: sob, hypoxia    HPI: 89yo/F with PMH afib on coumadin, HTN, mild dementia, chronic lymphedema without known h/o CHF, presented from [facility] for evaluation of worsening SOB. Patient unable to provide ay history. Per daughter at bedside, she is not on oxygen at her baseline. 5 days ago at [facility] she noted to become more hypoxic and was placed on O2. Then patient had more requirement for O2 and CXR was done that showed opacification of both bases suspicious for pneumonia and patient initiated on IV antibiotics. At this point, daughter insisted on transfer to  for further diagnosis and treatment. Patient was at  in June for b/l tib/fib fractures for which she was NWB and was at [facility]. No fever reported.     10/15/21:  All other review of systems is negative unless indicated above    PHYSICAL EXAM:  Constitutional: NAD, awake and alert  HEENT: PERR, EOMI, Normal Hearing, MMM  Neck: Soft and supple, No LAD, No JVD  Respiratory: Breath sounds are clear bilaterally, No wheezing, rales or rhonchi  Cardiovascular: S1 and S2, regular rate and rhythm, no Murmurs, gallops or rubs  Gastrointestinal: Bowel Sounds present, soft, nontender, nondistended, no guarding, no rebound  Extremities: No peripheral edema  Vascular: 2+ peripheral pulses  Neurological: A/O x 3, no focal deficits  Musculoskeletal: 5/5 strength b/l upper and lower extremities  Skin: No rashes    Vital Signs Last 24 Hrs  T(C): 36.6 (15 Oct 2021 08:10), Max: 36.6 (14 Oct 2021 14:15)  T(F): 97.8 (15 Oct 2021 08:10), Max: 97.8 (14 Oct 2021 14:15)  HR: 102 (15 Oct 2021 08:10) (78 - 102)  BP: 161/91 (15 Oct 2021 08:10) (152/97 - 186/97)  BP(mean): 116 (14 Oct 2021 16:00) (101 - 117)  RR: 18 (15 Oct 2021 08:10) (18 - 28)  SpO2: 96% (15 Oct 2021 08:10) (93% - 100%)    LABS: All Labs Reviewed:                        11.1   5.82  )-----------( 146      ( 15 Oct 2021 07:37 )             35.4     10-15    140  |  103  |  21  ----------------------------<  100<H>  3.4<L>   |  31  |  0.48<L>    Ca    9.4      15 Oct 2021 07:37    TPro  6.9  /  Alb  3.5  /  TBili  0.7  /  DBili  x   /  AST  22  /  ALT  19  /  AlkPhos  87  10-14    PT/INR - ( 15 Oct 2021 07:37 )   PT: 20.9 sec;   INR: 1.84 ratio      10/14: Blood Culture pending  10/14: Blood Culture pending   10/14: Urine Culture pending     RADIOLOGY:  < from: CT Chest No Cont (10.14.21 @ 17:59) >  IMPRESSION:  Cardiomegaly. Bilateral pleural effusions with bilateral lower lobe compressive atelectasis.  No evidence of pneumonia.  < end of copied text >    ECHOCARDIOGRAM / CARDIAC CATHETERIZATION / STRESS TEST:  < from: TTE Echo Complete w/o Contrast w/ Doppler (06.05.21 @ 12:23) >   Estimated left ventricular ejection fraction is 60-65 %.   The left ventricle is normal in size, wall motion and contractility as   seen in limited views.   Mild concentric left ventricular hypertrophy is present.   The left atrium is severely dilated.   The right atrium appears severely dilated.   Normal appearing right ventricle structure and function.   Mild pulmonary hypertension.   The aortic valve is well visualized, appears moderately calcified. Valve   opening seems to be restricted.   Mild to moderate aortic stenosis is present.   Mild to Moderate aortic regurgitation is present.   Fibrocalcific changes noted to the mitral valve leaflets with preserved   leaflet excursion.   Mild mitral annular calcification is present.   Mild to moderate mitral regurgitation is present.   Normal appearing tricuspid valve structure.   Mild to moderate Tricuspid regurgitation is present.   Pulmonic valve not well seen.   Mild pulmonic valvular regurgitation (1+) is present.   No evidence of pericardial effusion.  < end of copied text >    MEDICATIONS:  MEDICATIONS  (STANDING):  enoxaparin Injectable 50 milliGRAM(s) SubCutaneous every 12 hours  furosemide   Injectable 40 milliGRAM(s) IV Push two times a day  influenza   Vaccine 0.5 milliLiter(s) IntraMuscular once  losartan 25 milliGRAM(s) Oral daily  metoprolol succinate ER 25 milliGRAM(s) Oral daily  mirtazapine 15 milliGRAM(s) Oral at bedtime  polyethylene glycol 3350 17 Gram(s) Oral daily  senna 2 Tablet(s) Oral at bedtime    MEDICATIONS  (PRN):  acetaminophen   Tablet .. 650 milliGRAM(s) Oral every 6 hours PRN Temp greater or equal to 38C (100.4F), Mild Pain (1 - 3)  aluminum hydroxide/magnesium hydroxide/simethicone Suspension 30 milliLiter(s) Oral every 4 hours PRN Dyspepsia  melatonin 3 milliGRAM(s) Oral at bedtime PRN Insomnia  ondansetron Injectable 4 milliGRAM(s) IV Push every 8 hours PRN Nausea and/or Vomiting    Home Medications:  acetaminophen 650 mg oral tablet: 1 tab(s) orally every 6 hours, As Needed - for mild pain (14 Oct 2021 19:06)  Acidophilus oral capsule: 1 cap(s) orally 3 times a day (14 Oct 2021 19:06)  Aquaphor topical ointment: Apply topically to affected area 2 times a day  ****Bilateral Lower Extremeties** (14 Oct 2021 19:06)  Betadine 5% topical spray: Apply topically to affected area 2 times a day  *****Bilateral Heels**** (14 Oct 2021 19:06)  Betadine 5% topical spray: Apply topically to affected area once a day, As Needed - for wound care  ****Bilateral Heels**** (14 Oct 2021 19:06)  busPIRone 5 mg oral tablet: 1 tab(s) orally 2 times a day (14 Oct 2021 19:06)  cefTRIAXone 1 g intravenous injection: 1 gram(s) intravenous once a day  ****Course not Completed**** (14 Oct 2021 19:06)  cholecalciferol 25 mcg (1000 intl units) oral tablet: 3 tab(s) orally once a day (14 Oct 2021 19:06)  dermasyn gel: Apply topically to affected area once a day  ****Right posterior thigh**** (14 Oct 2021 19:06)  dermasyn gel: Apply topically to affected area once a day, As Needed - for wound care  ****Right posterior thigh*** (14 Oct 2021 19:06)  Eliquis 5 mg oral tablet: 1 tab(s) orally 2 times a day (14 Oct 2021 19:06)  ferrous sulfate 325 mg (65 mg elemental iron) oral tablet: 1 tab(s) orally once a day (14 Oct 2021 19:06)  furosemide 40 mg oral tablet: 1 tab(s) orally once a day (14 Oct 2021 19:06)  LORazepam 0.5 mg oral tablet: 1 tab(s) orally once a day (at bedtime), As Needed - for anxiety (14 Oct 2021 19:06)  losartan 50 mg oral tablet: 1 tab(s) orally once a day (14 Oct 2021 19:06)  Melatonin 3 mg oral tablet: 1 tab(s) orally once a day (at bedtime) (14 Oct 2021 19:06)  methocarbamol 500 mg oral tablet: 1 tab(s) orally once a day, As Needed - for muscle spasm (14 Oct 2021 19:06)  Metoprolol Tartrate 25 mg oral tablet: 0.5 tab(s) orally 2 times a day (14 Oct 2021 19:06)  mirtazapine 15 mg oral tablet: 1 tab(s) orally once a day (at bedtime) (14 Oct 2021 19:06)  Multiple Vitamins with Minerals oral tablet: 1 tab(s) orally once a day (14 Oct 2021 19:06)  nystatin 100,000 units/g topical cream: Apply topically to affected area 2 times a day, As Needed - for rash   *****groin**** (14 Oct 2021 19:06)  oxyCODONE 5 mg oral capsule: 1 cap(s) orally 2 times a day, As Needed - for moderate pain, for severe pain (14 Oct 2021 19:06)  polyethylene glycol 3350 oral powder for reconstitution: 17 gram(s) orally once a day (14 Oct 2021 19:06)  Potassium Chloride (Eqv-K-Tab) 20 mEq oral tablet, extended release: 0.5 tab(s) orally once a day (14 Oct 2021 19:06)  Senna 8.6 mg oral tablet: 2 tab(s) orally once a day (at bedtime) (14 Oct 2021 19:06)  silver sulfADIAZINE 1% topical cream: Apply topically to affected area once a day  ****Right buttocks*** (14 Oct 2021 19:06)  silver sulfADIAZINE 1% topical cream: Apply topically to affected area once a day, As Needed - for wound care  *****Right Buttocks**** (14 Oct 2021 19:06)  sodium hypochlorite 0.125% topical solution: Apply topically to affected area once a day  ****Left Posterior Calf**** (14 Oct 2021 19:06)  sodium hypochlorite 0.125% topical solution: Apply topically to affected area once a day, As Needed - for wound care  *****Left Posterior Calf**** (14 Oct 2021 19:06)  Stress Formula with Iron oral tablet: 1 tab(s) orally once a day (14 Oct 2021 19:06)    TELEMETRY REVIEW:  10/15/21:  CHIEF COMPLAINT/DIAGNOSIS: sob, hypoxia    HPI: 89yo/F with PMH afib on coumadin, HTN, mild dementia, chronic lymphedema without known h/o CHF, presented from [facility] for evaluation of worsening SOB. Patient unable to provide ay history. Per daughter at bedside, she is not on oxygen at her baseline. 5 days ago at [facility] she noted to become more hypoxic and was placed on O2. Then patient had more requirement for O2 and CXR was done that showed opacification of both bases suspicious for pneumonia and patient initiated on IV antibiotics. At this point, daughter insisted on transfer to  for further diagnosis and treatment. Patient was at  in June for b/l tib/fib fractures for which she was NWB and was at [facility]. No fever reported.     10/15/21: lethargic, no verbal response. unable to obtain ROS, no acute distress.    PHYSICAL EXAM:  Constitutional: Lethargic, thin frail, ill appearing  HEENT: unable to assess  Neck: Soft and supple  Respiratory: Breath sounds are diminished bilaterally.  Cardiovascular: S1 and S2, IR, IR  Gastrointestinal: Bowel Sounds present, soft, nontender, nondistended, no guarding, no rebound  Extremities: Samantha boots   Vascular: Samantha boots in place  Neurological: A/O - unable to assess, lethargic    Musculoskeletal: unable to follow commands  Skin: Sarcal ulcer, ble samantha boots.    Vital Signs Last 24 Hrs  T(C): 36.6 (15 Oct 2021 08:10), Max: 36.6 (14 Oct 2021 14:15)  T(F): 97.8 (15 Oct 2021 08:10), Max: 97.8 (14 Oct 2021 14:15)  HR: 102 (15 Oct 2021 08:10) (78 - 102)  BP: 161/91 (15 Oct 2021 08:10) (152/97 - 186/97)  BP(mean): 116 (14 Oct 2021 16:00) (101 - 117)  RR: 18 (15 Oct 2021 08:10) (18 - 28)  SpO2: 96% (15 Oct 2021 08:10) (93% - 100%)    LABS: All Labs Reviewed:                        11.1   5.82  )-----------( 146      ( 15 Oct 2021 07:37 )             35.4     10-15    140  |  103  |  21  ----------------------------<  100<H>  3.4<L>   |  31  |  0.48<L>    Ca    9.4      15 Oct 2021 07:37    TPro  6.9  /  Alb  3.5  /  TBili  0.7  /  DBili  x   /  AST  22  /  ALT  19  /  AlkPhos  87  10-14    PT/INR - ( 15 Oct 2021 07:37 )   PT: 20.9 sec;   INR: 1.84 ratio      10/14: Blood Culture pending  10/14: Blood Culture pending   10/14: Urine Culture pending     RADIOLOGY:  < from: CT Chest No Cont (10.14.21 @ 17:59) >  IMPRESSION:  Cardiomegaly. Bilateral pleural effusions with bilateral lower lobe compressive atelectasis.  No evidence of pneumonia.  < end of copied text >    ECHOCARDIOGRAM / CARDIAC CATHETERIZATION / STRESS TEST:  < from: TTE Echo Complete w/o Contrast w/ Doppler (06.05.21 @ 12:23) >   Estimated left ventricular ejection fraction is 60-65 %.   The left ventricle is normal in size, wall motion and contractility as   seen in limited views.   Mild concentric left ventricular hypertrophy is present.   The left atrium is severely dilated.   The right atrium appears severely dilated.   Normal appearing right ventricle structure and function.   Mild pulmonary hypertension.   The aortic valve is well visualized, appears moderately calcified. Valve   opening seems to be restricted.   Mild to moderate aortic stenosis is present.   Mild to Moderate aortic regurgitation is present.   Fibrocalcific changes noted to the mitral valve leaflets with preserved   leaflet excursion.   Mild mitral annular calcification is present.   Mild to moderate mitral regurgitation is present.   Normal appearing tricuspid valve structure.   Mild to moderate Tricuspid regurgitation is present.   Pulmonic valve not well seen.   Mild pulmonic valvular regurgitation (1+) is present.   No evidence of pericardial effusion.  < end of copied text >    MEDICATIONS:  MEDICATIONS  (STANDING):  enoxaparin Injectable 50 milliGRAM(s) SubCutaneous every 12 hours  furosemide   Injectable 40 milliGRAM(s) IV Push two times a day  influenza   Vaccine 0.5 milliLiter(s) IntraMuscular once  losartan 25 milliGRAM(s) Oral daily  metoprolol succinate ER 25 milliGRAM(s) Oral daily  mirtazapine 15 milliGRAM(s) Oral at bedtime  polyethylene glycol 3350 17 Gram(s) Oral daily  senna 2 Tablet(s) Oral at bedtime    MEDICATIONS  (PRN):  acetaminophen   Tablet .. 650 milliGRAM(s) Oral every 6 hours PRN Temp greater or equal to 38C (100.4F), Mild Pain (1 - 3)  aluminum hydroxide/magnesium hydroxide/simethicone Suspension 30 milliLiter(s) Oral every 4 hours PRN Dyspepsia  melatonin 3 milliGRAM(s) Oral at bedtime PRN Insomnia  ondansetron Injectable 4 milliGRAM(s) IV Push every 8 hours PRN Nausea and/or Vomiting    Home Medications:  acetaminophen 650 mg oral tablet: 1 tab(s) orally every 6 hours, As Needed - for mild pain (14 Oct 2021 19:06)  Acidophilus oral capsule: 1 cap(s) orally 3 times a day (14 Oct 2021 19:06)  Aquaphor topical ointment: Apply topically to affected area 2 times a day  ****Bilateral Lower Extremeties** (14 Oct 2021 19:06)  Betadine 5% topical spray: Apply topically to affected area 2 times a day  *****Bilateral Heels**** (14 Oct 2021 19:06)  Betadine 5% topical spray: Apply topically to affected area once a day, As Needed - for wound care  ****Bilateral Heels**** (14 Oct 2021 19:06)  busPIRone 5 mg oral tablet: 1 tab(s) orally 2 times a day (14 Oct 2021 19:06)  cefTRIAXone 1 g intravenous injection: 1 gram(s) intravenous once a day  ****Course not Completed**** (14 Oct 2021 19:06)  cholecalciferol 25 mcg (1000 intl units) oral tablet: 3 tab(s) orally once a day (14 Oct 2021 19:06)  dermasyn gel: Apply topically to affected area once a day  ****Right posterior thigh**** (14 Oct 2021 19:06)  dermasyn gel: Apply topically to affected area once a day, As Needed - for wound care  ****Right posterior thigh*** (14 Oct 2021 19:06)  Eliquis 5 mg oral tablet: 1 tab(s) orally 2 times a day (14 Oct 2021 19:06)  ferrous sulfate 325 mg (65 mg elemental iron) oral tablet: 1 tab(s) orally once a day (14 Oct 2021 19:06)  furosemide 40 mg oral tablet: 1 tab(s) orally once a day (14 Oct 2021 19:06)  LORazepam 0.5 mg oral tablet: 1 tab(s) orally once a day (at bedtime), As Needed - for anxiety (14 Oct 2021 19:06)  losartan 50 mg oral tablet: 1 tab(s) orally once a day (14 Oct 2021 19:06)  Melatonin 3 mg oral tablet: 1 tab(s) orally once a day (at bedtime) (14 Oct 2021 19:06)  methocarbamol 500 mg oral tablet: 1 tab(s) orally once a day, As Needed - for muscle spasm (14 Oct 2021 19:06)  Metoprolol Tartrate 25 mg oral tablet: 0.5 tab(s) orally 2 times a day (14 Oct 2021 19:06)  mirtazapine 15 mg oral tablet: 1 tab(s) orally once a day (at bedtime) (14 Oct 2021 19:06)  Multiple Vitamins with Minerals oral tablet: 1 tab(s) orally once a day (14 Oct 2021 19:06)  nystatin 100,000 units/g topical cream: Apply topically to affected area 2 times a day, As Needed - for rash   *****groin**** (14 Oct 2021 19:06)  oxyCODONE 5 mg oral capsule: 1 cap(s) orally 2 times a day, As Needed - for moderate pain, for severe pain (14 Oct 2021 19:06)  polyethylene glycol 3350 oral powder for reconstitution: 17 gram(s) orally once a day (14 Oct 2021 19:06)  Potassium Chloride (Eqv-K-Tab) 20 mEq oral tablet, extended release: 0.5 tab(s) orally once a day (14 Oct 2021 19:06)  Senna 8.6 mg oral tablet: 2 tab(s) orally once a day (at bedtime) (14 Oct 2021 19:06)  silver sulfADIAZINE 1% topical cream: Apply topically to affected area once a day  ****Right buttocks*** (14 Oct 2021 19:06)  silver sulfADIAZINE 1% topical cream: Apply topically to affected area once a day, As Needed - for wound care  *****Right Buttocks**** (14 Oct 2021 19:06)  sodium hypochlorite 0.125% topical solution: Apply topically to affected area once a day  ****Left Posterior Calf**** (14 Oct 2021 19:06)  sodium hypochlorite 0.125% topical solution: Apply topically to affected area once a day, As Needed - for wound care  *****Left Posterior Calf**** (14 Oct 2021 19:06)  Stress Formula with Iron oral tablet: 1 tab(s) orally once a day (14 Oct 2021 19:06)    TELEMETRY REVIEW:  10/15/21:  CHIEF COMPLAINT/DIAGNOSIS: sob, hypoxia    HPI: 89yo/F with PMH afib on coumadin, HTN, mild dementia, chronic lymphedema without known h/o CHF, presented from [facility] for evaluation of worsening SOB. Patient unable to provide ay history. Per daughter at bedside, she is not on oxygen at her baseline. 5 days ago at [facility] she noted to become more hypoxic and was placed on O2. Then patient had more requirement for O2 and CXR was done that showed opacification of both bases suspicious for pneumonia and patient initiated on IV antibiotics. At this point, daughter insisted on transfer to  for further diagnosis and treatment. Patient was at  in June for b/l tib/fib fractures for which she was NWB and was at SNF. No fever reported.     10/15/21: lethargic, no verbal response. unable to obtain ROS, no acute distress.    PHYSICAL EXAM:  Constitutional: Lethargic, thin frail, ill appearing  HEENT: unable to assess  Neck: Soft and supple  Respiratory: Breath sounds are diminished bilaterally.  Cardiovascular: S1 and S2, IR, IR  Gastrointestinal: Bowel Sounds present, soft, nontender, nondistended, no guarding, no rebound  Extremities: Samantha boots   Vascular: Samantha boots in place  Neurological: A/O - unable to assess, lethargic    Musculoskeletal: unable to follow commands  Skin: Sarcal ulcer, ble samantha boots.    Vital Signs Last 24 Hrs  T(C): 36.6 (15 Oct 2021 08:10), Max: 36.6 (14 Oct 2021 14:15)  T(F): 97.8 (15 Oct 2021 08:10), Max: 97.8 (14 Oct 2021 14:15)  HR: 102 (15 Oct 2021 08:10) (78 - 102)  BP: 161/91 (15 Oct 2021 08:10) (152/97 - 186/97)  BP(mean): 116 (14 Oct 2021 16:00) (101 - 117)  RR: 18 (15 Oct 2021 08:10) (18 - 28)  SpO2: 96% (15 Oct 2021 08:10) (93% - 100%) 3L NC    LABS: All Labs Reviewed:                        11.1   5.82  )-----------( 146      ( 15 Oct 2021 07:37 )             35.4     10-15    140  |  103  |  21  ----------------------------<  100<H>  3.4<L>   |  31  |  0.48<L>    Ca    9.4      15 Oct 2021 07:37    TPro  6.9  /  Alb  3.5  /  TBili  0.7  /  DBili  x   /  AST  22  /  ALT  19  /  AlkPhos  87  10-14    PT/INR - ( 15 Oct 2021 07:37 )   PT: 20.9 sec;   INR: 1.84 ratio      10/14: Blood Culture pending  10/14: Blood Culture pending   10/14: Urine Culture pending     RADIOLOGY:  < from: CT Chest No Cont (10.14.21 @ 17:59) >  IMPRESSION:  Cardiomegaly. Bilateral pleural effusions with bilateral lower lobe compressive atelectasis.  No evidence of pneumonia.  < end of copied text >    ECHOCARDIOGRAM / CARDIAC CATHETERIZATION / STRESS TEST:  < from: TTE Echo Complete w/o Contrast w/ Doppler (06.05.21 @ 12:23) >   Estimated left ventricular ejection fraction is 60-65 %.   The left ventricle is normal in size, wall motion and contractility as   seen in limited views.   Mild concentric left ventricular hypertrophy is present.   The left atrium is severely dilated.   The right atrium appears severely dilated.   Normal appearing right ventricle structure and function.   Mild pulmonary hypertension.   The aortic valve is well visualized, appears moderately calcified. Valve   opening seems to be restricted.   Mild to moderate aortic stenosis is present.   Mild to Moderate aortic regurgitation is present.   Fibrocalcific changes noted to the mitral valve leaflets with preserved   leaflet excursion.   Mild mitral annular calcification is present.   Mild to moderate mitral regurgitation is present.   Normal appearing tricuspid valve structure.   Mild to moderate Tricuspid regurgitation is present.   Pulmonic valve not well seen.   Mild pulmonic valvular regurgitation (1+) is present.   No evidence of pericardial effusion.  < end of copied text >    MEDICATIONS:  MEDICATIONS  (STANDING):  enoxaparin Injectable 50 milliGRAM(s) SubCutaneous every 12 hours  furosemide   Injectable 40 milliGRAM(s) IV Push two times a day  influenza   Vaccine 0.5 milliLiter(s) IntraMuscular once  losartan 25 milliGRAM(s) Oral daily  metoprolol succinate ER 25 milliGRAM(s) Oral daily  mirtazapine 15 milliGRAM(s) Oral at bedtime  polyethylene glycol 3350 17 Gram(s) Oral daily  senna 2 Tablet(s) Oral at bedtime    MEDICATIONS  (PRN):  acetaminophen   Tablet .. 650 milliGRAM(s) Oral every 6 hours PRN Temp greater or equal to 38C (100.4F), Mild Pain (1 - 3)  aluminum hydroxide/magnesium hydroxide/simethicone Suspension 30 milliLiter(s) Oral every 4 hours PRN Dyspepsia  melatonin 3 milliGRAM(s) Oral at bedtime PRN Insomnia  ondansetron Injectable 4 milliGRAM(s) IV Push every 8 hours PRN Nausea and/or Vomiting    Home Medications:  acetaminophen 650 mg oral tablet: 1 tab(s) orally every 6 hours, As Needed - for mild pain (14 Oct 2021 19:06)  Acidophilus oral capsule: 1 cap(s) orally 3 times a day (14 Oct 2021 19:06)  Aquaphor topical ointment: Apply topically to affected area 2 times a day  ****Bilateral Lower Extremeties** (14 Oct 2021 19:06)  Betadine 5% topical spray: Apply topically to affected area 2 times a day  *****Bilateral Heels**** (14 Oct 2021 19:06)  Betadine 5% topical spray: Apply topically to affected area once a day, As Needed - for wound care  ****Bilateral Heels**** (14 Oct 2021 19:06)  busPIRone 5 mg oral tablet: 1 tab(s) orally 2 times a day (14 Oct 2021 19:06)  cefTRIAXone 1 g intravenous injection: 1 gram(s) intravenous once a day  ****Course not Completed**** (14 Oct 2021 19:06)  cholecalciferol 25 mcg (1000 intl units) oral tablet: 3 tab(s) orally once a day (14 Oct 2021 19:06)  dermasyn gel: Apply topically to affected area once a day  ****Right posterior thigh**** (14 Oct 2021 19:06)  dermasyn gel: Apply topically to affected area once a day, As Needed - for wound care  ****Right posterior thigh*** (14 Oct 2021 19:06)  Eliquis 5 mg oral tablet: 1 tab(s) orally 2 times a day (14 Oct 2021 19:06)  ferrous sulfate 325 mg (65 mg elemental iron) oral tablet: 1 tab(s) orally once a day (14 Oct 2021 19:06)  furosemide 40 mg oral tablet: 1 tab(s) orally once a day (14 Oct 2021 19:06)  LORazepam 0.5 mg oral tablet: 1 tab(s) orally once a day (at bedtime), As Needed - for anxiety (14 Oct 2021 19:06)  losartan 50 mg oral tablet: 1 tab(s) orally once a day (14 Oct 2021 19:06)  Melatonin 3 mg oral tablet: 1 tab(s) orally once a day (at bedtime) (14 Oct 2021 19:06)  methocarbamol 500 mg oral tablet: 1 tab(s) orally once a day, As Needed - for muscle spasm (14 Oct 2021 19:06)  Metoprolol Tartrate 25 mg oral tablet: 0.5 tab(s) orally 2 times a day (14 Oct 2021 19:06)  mirtazapine 15 mg oral tablet: 1 tab(s) orally once a day (at bedtime) (14 Oct 2021 19:06)  Multiple Vitamins with Minerals oral tablet: 1 tab(s) orally once a day (14 Oct 2021 19:06)  nystatin 100,000 units/g topical cream: Apply topically to affected area 2 times a day, As Needed - for rash   *****groin**** (14 Oct 2021 19:06)  oxyCODONE 5 mg oral capsule: 1 cap(s) orally 2 times a day, As Needed - for moderate pain, for severe pain (14 Oct 2021 19:06)  polyethylene glycol 3350 oral powder for reconstitution: 17 gram(s) orally once a day (14 Oct 2021 19:06)  Potassium Chloride (Eqv-K-Tab) 20 mEq oral tablet, extended release: 0.5 tab(s) orally once a day (14 Oct 2021 19:06)  Senna 8.6 mg oral tablet: 2 tab(s) orally once a day (at bedtime) (14 Oct 2021 19:06)  silver sulfADIAZINE 1% topical cream: Apply topically to affected area once a day  ****Right buttocks*** (14 Oct 2021 19:06)  silver sulfADIAZINE 1% topical cream: Apply topically to affected area once a day, As Needed - for wound care  *****Right Buttocks**** (14 Oct 2021 19:06)  sodium hypochlorite 0.125% topical solution: Apply topically to affected area once a day  ****Left Posterior Calf**** (14 Oct 2021 19:06)  sodium hypochlorite 0.125% topical solution: Apply topically to affected area once a day, As Needed - for wound care  *****Left Posterior Calf**** (14 Oct 2021 19:06)  Stress Formula with Iron oral tablet: 1 tab(s) orally once a day (14 Oct 2021 19:06)    TELEMETRY REVIEW:  10/15/21: afib rate controlled

## 2021-10-15 NOTE — CONSULT NOTE ADULT - ASSESSMENT
89yo/F with PMH afib on coumadin, HTN, mild dementia, chronic lymphedema without known h/o CHF, admitted from snf on 10/14 for evaluation of worsening shortness of breath; each of the last few days requiring more and more oxygen; upon admission imaging revealed large bilateral pleural effusions. The patient underwent right sided thoracentesis on 10/15. History per medical record as patient is moaning and not able to provide history. Did have hospitalization in June for bilateral tib/fib fractures. Has been bed bound noted with multiple skin breakdowns.     1. Patient admitted with possibly pneumonia underlying the bilateral pleural effusions, unclear etiology of these effusions; had thoracentesis and fluid should be evaluated  - follow up cultures   - serial cbc and monitor temperature   - reviewed prior medical records to evaluate for resistant or atypical pathogens   - will continue ceftriaxone as ordered  - added doxycycline to cover skin and atypical pathogens, given her skin breakdown as well  - oxygen and nebs as needed   - consideration for wound care evaluation  - CTS evaluation in progress  2. other issues: per medicine

## 2021-10-15 NOTE — DIETITIAN INITIAL EVALUATION ADULT. - PERTINENT MEDS FT
MEDICATIONS  (STANDING):  collagenase Ointment 1 Application(s) Topical daily  enoxaparin Injectable 50 milliGRAM(s) SubCutaneous every 12 hours  furosemide   Injectable 40 milliGRAM(s) IV Push two times a day  influenza   Vaccine 0.5 milliLiter(s) IntraMuscular once  losartan 25 milliGRAM(s) Oral daily  metoprolol succinate ER 25 milliGRAM(s) Oral daily  mirtazapine 15 milliGRAM(s) Oral at bedtime  polyethylene glycol 3350 17 Gram(s) Oral daily  senna 2 Tablet(s) Oral at bedtime    MEDICATIONS  (PRN):  acetaminophen   Tablet .. 650 milliGRAM(s) Oral every 6 hours PRN Temp greater or equal to 38C (100.4F), Mild Pain (1 - 3)  aluminum hydroxide/magnesium hydroxide/simethicone Suspension 30 milliLiter(s) Oral every 4 hours PRN Dyspepsia  melatonin 3 milliGRAM(s) Oral at bedtime PRN Insomnia  ondansetron Injectable 4 milliGRAM(s) IV Push every 8 hours PRN Nausea and/or Vomiting

## 2021-10-15 NOTE — DIETITIAN INITIAL EVALUATION ADULT. - OTHER INFO
89yo/F with PMH afib on coumadin, HTN, mild dementia, chronic lymphedema without known h/o CHF, presented from SNF for evaluation of worsening SOB. Patient unable to provide ay history. Per daughter at bedside, she is not on oxygen at her baseline. 5 days ago at [facility] she noted to become more hypoxic and was placed on O2. Then patient had more requirement for O2 and CXR was done that showed opacification of both bases suspicious for pneumonia and patient initiated on IV antibiotics. At this point, daughter insisted on transfer to  for further diagnosis and treatment. Patient was at  in June for b/l tib/fib fractures for which she was NWB and was at SNF. No fever reported.     Pt seen for assessment. Pt unable to provide detailed diet history at this time. Per last assessment on 06/10/21: Pt is unable to provide much hx. RD contacted dtr Jazmine who lives w/ pt. As per dtr pt had a fair appetite PTA and she was having ~3 meals/day and supplements w/ ensure (220 kcal and 9 grams of protein). Dtr reports that pt has some difficulty chewing foods. Noted foods are being cut up to optimize intake. Dtr was distressed during phone call and concerned for pt being d/cd so detail of information was limited. As per daughter pt has been eating < 50 % her normal intake during hospitalization. During visit pt w/ breakfast tray in front of her, she consumed ~25% of eggs during visit and was continuing to eat but the rest of the tray was untouched (cereal, milk, Amharic toast) and as per RN pt drank ensure this AM. Noted pt w/ order for remeron which can stimulate appetite.     Pt's nutritional status appears similar to last assessment. Pt weight appears stable from last admission with no noted edema. Recommend c/w ensure enlive BID while in hospital. Will continue to monitor pt's nutritional status

## 2021-10-15 NOTE — SWALLOW BEDSIDE ASSESSMENT ADULT - SWALLOW EVAL: CURRENT DIET
pending SLP evaluation  presently managed  Dysphagia 2 with thin liqudis. pending SLP evaluation  presently managed  Dysphagia 2 with thin liquid.

## 2021-10-15 NOTE — CONSULT NOTE ADULT - ASSESSMENT
89yo/F with PMH afib on coumadin, HTN, mild dementia, chronic lymphedema without known h/o CHF, presented from SNF for evaluation of worsening SOB found to have b/l pleural effusions.        Plan   Rt pigtail placed this afternoon with 1100cc serosang fluid drained.  due to patients pain , pigtail was d/c later in the day   CXR pending post pull, will follow up   hold am lovenox for possible thoracentesis on left side   CXR in am   cont care as per primary team   DW Dr Costa

## 2021-10-15 NOTE — CONSULT NOTE ADULT - SUBJECTIVE AND OBJECTIVE BOX
HPI:  89yo/F with PMH afib on coumadin, HTN, mild dementia, chronic lymphedema without known h/o CHF, presented from SNF for evaluation of worsening SOB. Patient unable to provide ay history. Per daughter at bedside, she is not on oxygen at her baseline. 5 days ago at [facility] she noted to become more hypoxic and was placed on O2. Then patient had more requirement for O2 and CXR was done that showed opacification of both bases suspicious for pneumonia and patient initiated on IV antibiotics. At this point, daughter insisted on transfer to  for further diagnosis and treatment. Patient was at  in  for b/l tib/fib fractures for which she was NWB and was at [facility]. No fever reported.  (14 Oct 2021 18:21)    History as above. CT scan chest shows large bilateral pleural effussions. No previous history systolic heart failure. Patient with progressive dementia.    PAST MEDICAL & SURGICAL HISTORY:  Lymphedema of both lower extremities    Venous insufficiency    Monoclonal gammopathies    Paroxysmal atrial fibrillation    Acute cystitis without hematuria  septic  2016    Essential hypertension    Spinal stenosis    Spondyloarthritis    Vaginal prolapse    [place] (hard of hearing), bilateral    Hypertension    S/P kyphoplasty      S/P thyroidectomy  partial   1975    History of vaginal surgery    History of fracture of femur  hx of proximal femur fracture in 2021    History of repair of left hip joint  Hx L hip long IMN with Dr. Kitchen         MEDICATIONS  (STANDING):  cefepime  Injectable. 1000 milliGRAM(s) IV Push every 12 hours  enoxaparin Injectable 50 milliGRAM(s) SubCutaneous every 12 hours  furosemide   Injectable 40 milliGRAM(s) IV Push two times a day  influenza   Vaccine 0.5 milliLiter(s) IntraMuscular once  losartan 25 milliGRAM(s) Oral daily  metoprolol succinate ER 25 milliGRAM(s) Oral daily  mirtazapine 15 milliGRAM(s) Oral at bedtime  polyethylene glycol 3350 17 Gram(s) Oral daily  senna 2 Tablet(s) Oral at bedtime  vancomycin  IVPB 750 milliGRAM(s) IV Intermittent Once    MEDICATIONS  (PRN):  acetaminophen   Tablet .. 650 milliGRAM(s) Oral every 6 hours PRN Temp greater or equal to 38C (100.4F), Mild Pain (1 - 3)  aluminum hydroxide/magnesium hydroxide/simethicone Suspension 30 milliLiter(s) Oral every 4 hours PRN Dyspepsia  melatonin 3 milliGRAM(s) Oral at bedtime PRN Insomnia  ondansetron Injectable 4 milliGRAM(s) IV Push every 8 hours PRN Nausea and/or Vomiting      Allergies    No Known Allergies    Intolerances        SOCIAL HISTORY: Denies tobacco, etoh abuse or illicit drug use    FAMILY HISTORY:  No pertinent family history in first degree relatives        Vital Signs Last 24 Hrs  T(C): 36.3 (14 Oct 2021 19:44), Max: 36.6 (14 Oct 2021 14:15)  T(F): 97.3 (14 Oct 2021 19:44), Max: 97.8 (14 Oct 2021 14:15)  HR: 100 (14 Oct 2021 19:44) (78 - 100)  BP: 168/91 (14 Oct 2021 19:44) (152/97 - 186/97)  BP(mean): 116 (14 Oct 2021 16:00) (101 - 117)  RR: 18 (14 Oct 2021 19:44) (18 - 28)  SpO2: 97% (14 Oct 2021 19:44) (93% - 100%)    REVIEW OF SYSTEMS:    CONSTITUTIONAL:  As per HPI.  SKIN: no rashes  HEENT:  Eyes:  No diplopia or blurred vision. ENT:  No earache, sore throat or runny nose.  CARDIOVASCULAR:  No pressure, squeezing, tightness, heaviness or aching about the chest, neck, axilla or epigastrium.  RESPIRATORY:  No cough, shortness of breath, PND or orthopnea.  GASTROINTESTINAL:  No nausea, vomiting or diarrhea.  GENITOURINARY:  No dysuria, frequency or urgency.  MUSCULOSKELETAL:  As per HPI.  SKIN:  No change in skin, hair or nails.  NEUROLOGIC:  No paresthesias, fasciculations, seizures or weakness.  PSYCHIATRIC:  No disorder of thought or mood.  ENDOCRINE:  No heat or cold intolerance, polyuria or polydipsia.  HEMATOLOGICAL:  No easy bruising or bleedings:  .     PHYSICAL EXAMINATION:    GENERAL APPEARANCE:  Pt. is not currently dyspneic, in no distress. Pt. is alert, oriented, and pleasant.  HEENT:  Pupils are normal and react normally. No icterus. Mucous membranes well colored.  NECK:  Supple. No lymphadenopathy. Jugular venous pressure not elevated. Carotids equal.   HEART:  S1S2 reg 2/6 systolic murmur  CHEST: decreased BS bilat 1/2 up  ABDOMEN:  Soft and nontender.   EXTREMITIES:  lymphedema  SKIN:  No rash or significant lesions are noted.    LABS:                        12.7   6.47  )-----------( 180      ( 14 Oct 2021 14:13 )             41.0     10-14    139  |  102  |  21  ----------------------------<  99  3.9   |  31  |  0.57    Ca    10.0      14 Oct 2021 14:13    TPro  6.9  /  Alb  3.5  /  TBili  0.7  /  DBili  x   /  AST  22  /  ALT  19  /  AlkPhos  87  10-14    LIVER FUNCTIONS - ( 14 Oct 2021 14:13 )  Alb: 3.5 g/dL / Pro: 6.9 gm/dL / ALK PHOS: 87 U/L / ALT: 19 U/L / AST: 22 U/L / GGT: x           PT/INR - ( 14 Oct 2021 14:13 )   PT: 20.0 sec;   INR: 1.77 ratio               Urinalysis Basic - ( 14 Oct 2021 14:13 )    Color: Yellow / Appearance: Slightly Turbid / S.020 / pH: x  Gluc: x / Ketone: Trace  / Bili: Negative / Urobili: Negative mg/dL   Blood: x / Protein: 30 mg/dL / Nitrite: Negative   Leuk Esterase: Moderate / RBC: 25-50 /HPF / WBC 11-25   Sq Epi: x / Non Sq Epi: Occasional / Bacteria: Few          RADIOLOGY & ADDITIONAL STUDIES:

## 2021-10-15 NOTE — DIETITIAN INITIAL EVALUATION ADULT. - ORAL INTAKE PTA/DIET HISTORY
Poor PO intake in hospital. Pt lethargic and confused and completed minimal amount of intake this morning. Pt also with know h/x of malnutrition from past assessments.

## 2021-10-15 NOTE — PROGRESS NOTE ADULT - ASSESSMENT
89 y/o Female with PMHx Afib on coumadin, HTN, mild dementia, chronic lymphedema without known h/o CHF, presented from SNF for evaluation of worsening SOB. Admitted for:    #Acute hypoxic respiratory failure due to Mod-Large b/l pleural effusions - unclear etiology  Monitor on tele. tele review as above.   Supplemental 02 - 3L NC   CT as above. no evidence on PNA.   IV Lasix 40mg BID   Eliquis held. Cont IV Lovenox for now.   F/u lights criteria for diagnostic  Recent Echo 06/2021- EF 60-65%, repeat Echo   d/c Vancomycin, Cefepime   ID, Cardio, Pulm consult     #Chronic Afib  Daughter reports being on Eliquis not coumadin  Lovenox BID  Cont. BB  Cardio: [place]eewee    #Sacral Decubiti  #Dementia  Supportive, Wound care eval  Cont. Remeron.   Swallow eval for dysphagia     #B/l ankle fractures  NWB     #COVID neg    #MOLST in chart- FULL code    #DVT proph- Lovenox    #Dispo- inpatient admit. D/w daughter at bedside 89 y/o Female with PMHx Afib on coumadin, HTN, mild dementia, chronic lymphedema without known h/o CHF, presented from SNF for evaluation of worsening SOB. Admitted for:    #Acute hypoxic respiratory failure due to Mod-Large b/l pleural effusions - unclear etiology  Monitor on tele. tele review as above.   Supplemental 02 - 3L NC   CT as above. no evidence on PNA.   IV Lasix 40mg BID   Eliquis held. Cont IV Lovenox for now.   F/u lights criteria for diagnostic  Recent Echo 06/2021- EF 60-65%, repeat Echo   d/c Vancomycin, Cefepime   ID, Cardio, Pulm consult     #Chronic Afib  Daughter reports being on Eliquis not coumadin outpatient  Lovenox BID for stroke ppx for pig tail   Cont. BB  F/u echo  Cardio: [place]wee    #Pyuria  Ceftriaxone. f/u cultures.     #Sacral Decubiti, B/l leg wounds  #Dementia, Mild.  #Severe Protein Calori Malnutrition  #Debility. Frailty.   Supportive care, Wound care eval  Cont. Remeron.   Air matress. turn and position   Swallow eval for dysphagia     #B/l ankle fractures  NWB   Samantha Boots.   PT eval     #Advanced Directives / GOC:   FULL code. 10/15 - 40min conversation. Mother has been physically declining over past year. mind intact - forgetful, recently worsening due to current clinical condition. Poor appetite. Wounds are result of physical immobility after tibial fractures. at this time no limitations were set - Full Code in place. Agreeable to Pigtail at this time - aware of risks/benefits.     #DVT proph- Lovenox BID    10/15- spoke with daughter at length, all questions/concerns addressed.    Dispo. monitor on tele. Pigtail today. f/u pleural fluids studies/echo.  89 y/o Female with PMHx Afib on coumadin, HTN, mild dementia, chronic lymphedema without known h/o CHF, presented from SNF for evaluation of worsening SOB. Admitted for:    #Acute hypoxic respiratory failure due to Mod-Large b/l pleural effusions - unclear etiology  Monitor on tele. tele review as above.   Supplemental 02 - 3L NC   CT as above. no evidence on PNA.   IV Lasix 40mg BID   Eliquis held. Cont IV Lovenox for now.   F/u lights criteria for diagnostic  Recent Echo 06/2021- EF 60-65%, repeat Echo   d/c Vancomycin, Cefepime   ID, Cardio, Pulm consult     #Chronic Afib  Daughter reports being on Eliquis not coumadin outpatient  Lovenox BID for stroke ppx for pig tail   Cont. BB  F/u echo  Cardio: [place]wee    #Pyuria  Ceftriaxone. f/u cultures.     #Sacral Decubiti, B/l leg wounds  #Dementia, Mild.  #Moderate Protein Calori Malnutrition  #Debility. Frailty.   Supportive care, Wound care eval  Cont. Remeron.   Air matress. turn and position   Ensure w/ meals. Vitamin C and zinc for wound healing.  Swallow eval for dysphagia     #B/l ankle fractures  NWB? need to clarify WB status.  Samantha Boots, to be changed q7d?  PT eval     #Advanced Directives / GOC:   FULL code. 10/15 - 40min conversation. Mother has been physically declining over past year. mind intact - forgetful, recently worsening due to current clinical condition. Poor appetite. Wounds are result of physical immobility after tibial fractures. at this time no limitations were set - Full Code in place. Agreeable to Pigtail at this time - aware of risks/benefits.     #DVT proph- Lovenox BID    10/15- spoke with daughter at length, all questions/concerns addressed.    Dispo. monitor on tele. Pigtail today. f/u pleural fluids studies/echo.  89 y/o Female with PMHx Afib on coumadin, HTN, mild dementia, chronic lymphedema without known h/o CHF, presented from SNF for evaluation of worsening SOB. Admitted for:    #Acute hypoxic respiratory failure due to Mod-Large b/l pleural effusions - unclear etiology. Possible PNA  Monitor on tele. tele review as above.   Supplemental 02 - 3L NC   CT as above. no evidence on PNA.   IV Lasix 40mg BID   Eliquis held. Cont IV Lovenox for now.   F/u lights criteria for diagnostic  Recent Echo 06/2021- EF 60-65%, repeat Echo   d/c Vancomycin, Cefepime -- On Ceftriaxone, PO Doxy  ID, Cardio, Pulm consult     #Metabolic encephalopathy   underlying dementia. Cont. plan above.     #Chronic Afib  Daughter reports being on Eliquis not coumadin outpatient  Lovenox BID for stroke ppx for pig tail   Cont. BB  F/u echo  Cardio: [place]eewee    #Pyuria  Ceftriaxone. f/u cultures.     #Sacral Decubiti, B/l leg wounds  #Dementia, Mild.  #Moderate Protein Calori Malnutrition  #Debility. Frailty.   Supportive care, Wound care eval  Cont. Remeron.   Air matress. turn and position   Ensure w/ meals. Vitamin C and zinc for wound healing.  Swallow eval for dysphagia     #B/l ankle fractures  NWB? need to clarify WB status.  Samantha Boots, to be changed q7d?  PT eval     #Advanced Directives / GOC:   FULL code. 10/15 - 40min conversation. Mother has been physically declining over past year. mind intact - forgetful, recently worsening due to current clinical condition. Poor appetite. Wounds are result of physical immobility after tibial fractures. at this time no limitations were set - Full Code in place. Agreeable to Pigtail at this time - aware of risks/benefits.     #DVT proph- Lovenox BID    10/15- spoke with daughter at length, all questions/concerns addressed.    Dispo. monitor on tele. Pigtail today. f/u pleural fluids studies/echo. IV ABX for PNA.

## 2021-10-15 NOTE — SWALLOW BEDSIDE ASSESSMENT ADULT - ORAL PREPARATORY PHASE
oral acceptance and containment. immediate bolus formation and smooth transition of multiple boluses./Within functional limits

## 2021-10-15 NOTE — SWALLOW BEDSIDE ASSESSMENT ADULT - SWALLOW EVAL: RECOMMENDED FEEDING/EATING TECHNIQUES
allow for swallow between intakes/alternate food with liquid/check mouth frequently for oral residue/pocketing/crush medication (when feasible)/position upright (90 degrees)/small sips/bites

## 2021-10-15 NOTE — DIETITIAN INITIAL EVALUATION ADULT. - NUTRITIONGOAL OUTCOME1
[facility]  Oncology Clinic  Follow-up Visit Note    CC:  Stage IIA hormone receptor negative, [id number] positive right breast cancer            Stage IA hormone receptor positive, [id number] negative left breast cancer    HISTORY OF PRESENT ILLNESS:  Rashmi Portillo is a 77 year old female with a diagnosis of stage IIA hormone receptor negative breast cancer, who presents today in follow-up.  Her oncologic history is as follows:    Oncologic history:  --6/27/16 screening mammography with CAD, bilateral -- right breast asymmetry in the central mid depth. Additional diagnostic evaluation recommended  --6/28/16 diagnostic mammography with ultrasound, right -- spiculated mass with suspicious sonographic features, as well as suspicious lymph node  --7/8/16    (1) diagnostic mammography and ultrasound guided breast biopsy, single lesion-right:  Poorly differentiated invasive ductal carcinoma, with component of high-grade ductal carcinoma in situ. Grade 3/3, focal +LVI, ER/ME negative, HER-2 negative.    (2) right axillary lymph node, core biopsy: Positive for poorly differentiated carcinoma, consistent with metastasis from breast primary ER/ME negative,  Pal0Cmc equivocal (2+), tissue submitted for HER-2 amplification by FISH which has also resulted as equivocal (HER-2/CEP17 ratio 1.8 and HER2 copy number 4.1).   --7/29/16 CT chest/abdomen/pelvis with contrast - central mediastinal adenopathy and right axillary lymph nodes worrisome for metastatic disease  --8/2/16 PET/CT scan - mildly hypermetabolic 1.5 x 0.9 cm right axillary lymph nodes likely metastatic in nature, mildly hypermetabolic soft tissue associated with biopsy clip within the right breast likely representing biopsy-proven malignancy. Component of this activity could also be inflammatory, related to recent biopsy. Sclerotic lesion seen within the posterior leftward aspect of L2 vertebral body not FDG avid.  --8/4/16 Chromosome 17 probe,  alternate - amplified HER2 (limited biopsy sample) on lymph node biopsy  --9/8/16 MRI breast with CAD, bilateral - BI-RADS 6, known, biopsy-proven malignancy. Demonstration of biopsy-proven metastatic axillary lymph node, based on size determination both lesions appear to have responded somewhat to neoadjuvant therapy.  Left breast with suspicious lesion measuring approximately 1 cm maximum dimension.  --9/19/16 Left breast biopsy - invasive moderately differentiated ductal carcinoma with intermediate grade ductal carcinoma in situ, ER/ID positive (80%/10%), HER-2 equivocal by IHC, negative by FISH  -- 8/4- 10/4/16  4C AC  --10/25/16 Initiation of C1 THP chemotherapy   --11/15/16 C2 THP chemoimmunotherapy  --12/6/16 C3 THP chemoimmunotherapy  --12/27/16 C4 THP chemoimmunotherapy  --1/24/17 C5 maintenance herceptin, continue maintenance Herceptin every 3 weeks hereafter  --1/30/17 Right breast lumpectomy with right axillary dissection- pathology showing 2 small foci of residual DCIS, both low and high grade, but no evidence of residual invasive disease. 0/15 LN positive.                          Left lumpectomy with sentinel lymph node biopsy- pathology showing small foci of atypical ductal hyperplasia without any residual DCIS or invasive neoplasm. SLN 0/1 positive.  -- 3/21/17-5/2/17 Adjuvant radiation therapy                R breast: 6000 cGy in 33 fractions, breast/boost                              supraclvicular                Left breast: 5256 cGy in 21 fractions, breast/boost  --10/2017 Completed maintenance Herceptin  --7/20/18 ultrasound liver - bilateral innumerable hepatic lesions, concerning for metastatic disease. No cholelithiasis identified.  --8/3/18 PET/CT - now diffuse hypermetabolism throughout both hepatic lobes with little normal uptake remaining, consistent with diffuse hepatic metastases. Mildly hypermetabolic upper abdominal precaval lymph node, likely also metastatic.  --8/13/18 Liver core  biopsy: Consistent with metastatic breast cancer, ER/NE negative, HER-2/karile equivocal (2+) IHC. FISH amplified  --9/25/18 Start Xeloda 2000 mg p.o. b.i.d. with Tykerb, which was discontinued after cycle 1 in setting of treatment-associated diarrhea    Ms. Rashmi Portillo presents today for follow-up. In the interim since her previous visit with me, she has experienced increased abdominal distention, increased episodes of diarrhea, refractory to control with Imodium/Lomotil. Patient states that lower extremity edema, and GI/abdominal symptoms are reminiscent of initial presentation. She is concerned regarding progressive disease. She has otherwise continued Xeloda regularly, reports that she remains compliant with medication presently.    Patient Active Problem List    Diagnosis Date Noted   • Port-A-Cath in place 06/23/2017     Priority: Medium   • Follow-up examination after eye surgery 05/04/2018     Priority: Low   • Brow ptosis 03/02/2018     Priority: Low   • Bilateral dry eyes 03/02/2018     Priority: Low   • Myopia of both eyes with astigmatism and presbyopia 08/14/2017     Priority: Low   • Pseudophakia of both eyes 08/14/2017     Priority: Low   • Malignant neoplasm of left female breast (CMS/[facility]) 09/29/2016     Priority: Low   • Malignant neoplasm of central portion of right female breast (CMS/[facility]) 07/13/2016     Priority: Low   • Chorioretinal scar, unspecified 04/14/2014     Priority: Low   • Nonexudative senile macular degeneration of retina 04/14/2014     Priority: Low   • HTN (hypertension)      Priority: Low   • HLD (hyperlipidemia)      Priority: Low   • Vitreous detachment      Priority: Low   • History of tobacco use      Priority: Low     Quit in 2001         Past Medical History:   Diagnosis Date   • Breast cancer (CMS/[facility]) 07/11/2016    Right breast and axilla  CHEMO.  NO BP OR IV RIGHT ARM   • Breast cancer, left breast (CMS/[facility]) 09/2016    LEFT BREAST   • Cataract    • Disorder of bone and  cartilage, unspecified 03/02/2005   • HLD (hyperlipidemia)    • HTN (hypertension)    • Hypothyroidism    • Macular degeneration    • PONV (postoperative nausea and vomiting)      Past Surgical History:   Procedure Laterality Date   • Blepharoplasty upper bilateral 1.5 hrs      4/26/2018   • Breast lumpectomy Bilateral 01/31/2017    right axillary lymph node dissection; left axillary lymph node biopsy.  NO BP OR IV RIGHT ARM   • Cataract extraction, bilateral  1998   • Colonoscopy  04/20/2007   • Eye surgery Right 05/20/2013    scleral buckle removal   • Ir implantable port vein access Left 08/2016    HAS BEEN REMOVED   • Parathyroidectomy     • Retinal detachment surgery  1999,2003   • Tubal ligation     • Us guided breast core biopsy Right 7/11/2016    Breast and Axilla     Family History   Problem Relation Age of Onset   • Cancer Mother         thyroid dx 67   • Heart disease Father    • Cancer Father         lung   • Cataracts Father    • Cancer Sister         melanoma dx 73   • Retinal detachment Sister    • Cancer Brother         pancreatic dx 65   • High blood pressure Brother    • Macular degeneration Other    • Cancer Other         Uterine     ALLERGIES:  No Known Allergies    Social History     Social History Narrative    .  Works as [employer] in [place].  She works part-time.  Used to own a restaurant.  Exercises regularly.  She has 3 children.  9 grandsons and 1 granddaughter.  Occasional etoh.  Past smoker.         Current Outpatient Medications   Medication Sig Dispense Refill   • diphenoxylate-atropine (LOMOTIL) 2.5-0.025 MG tablet Take 2.5 mg by mouth 4 times daily as needed for Diarrhea. 30 tablet 0   • capecitabine (XELODA) 500 MG tablet Take 4 tablets by mouth 2 times daily for 14 days followed by 7 days off 112 tablet 0   • levothyroxine (SYNTHROID, LEVOTHROID) 75 MCG tablet Take 1 tablet by mouth daily 30 tablet 0   • lisinopril (ZESTRIL) 20 MG tablet Take 1 tablet by mouth 1 time daily 90  tablet 0   • LORazepam (ATIVAN) 0.5 MG tablet Take 1-2 tabs q6h prn: anxiety, nausea 45 tablet 0   • loperamide (IMODIUM) 2 MG capsule Take 4 mg (2 caps) by mouth after the first loose stool, then 2 mg (1 cap) after each subsequent loose stool, up to 16 mg (8 caps) daily 30 capsule 5   • prochlorperazine (COMPAZINE) 10 MG tablet Take 1 tablet by mouth every 6 hours as needed for Nausea. 30 tablet 2   • atorvastatin (LIPITOR) 10 MG tablet TAKE ONE TABLET BY MOUTH EVERY DAY 90 tablet 1   • naproxen (NAPROSYN) 250 MG tablet Take 500 mg by mouth as needed.      • cholecalciferol (VITAMIN D3) 1000 UNITS tablet Take 1,000 Units by mouth daily.     • Multiple Vitamins-Minerals (MULTIVITAL PO) Take 1 tablet by mouth daily.      • cholestyramine (QUESTRAN) 4 g packet Take 1 packet by mouth 3 times daily (with meals). 60 each 12   • furosemide (LASIX) 20 MG tablet 1 tablet po daily PRN edema. 30 tablet 0   • mirtazapine (REMERON) 15 MG tablet Take 1 tablet by mouth nightly. 30 tablet 3     No current facility-administered medications for this visit.        REVIEW OF SYSTEMS:  Reviewed from nurse’s notes and concur.      PHYSICAL EXAMINATION:   Oncology Encounter Vitals [03/13/19 1422]   ONC OP Encounter Vitals Group      /58      Pulse 76      Resp       Temp 97.8 °F (36.6 °C)      Temp src Temporal      SpO2 97 %      Weight 161 lb 2.5 oz (73.1 kg)      Height       Pain Score  0      Pain Location       Pain Education?       BSA (Calculated - m2) - [employer]       BMI (Calculated)    ECOG performance status:  0  General Appearance - Alert, well appearing, and in no distress.  Mental Status - Alert, oriented to person, place, and time.   Eyes - Pupils equal and reactive, extraocular eye movements intact. Small erythematous papule to right lower eyelid (present life-long)  Mouth - Mucous membranes moist, pharynx normal without lesions/thrush/mucositis.  Neck - Supple, no significant adenopathy.   Chest - Clear to  auscultation, no wheezes, rales or rhonchi, symmetric air entry. No rales  Heart - Normal rate, regular rhythm, normal S1, S2, no murmurs, rubs, clicks or gallops.   Abdomen - increased abdominal distention, with mild right upper quadrant abdominal tenderness to palpation, + fluid wave, no guarding/rebound/rigidity hyperactive bowel sounds throughout  Neurological - Alert, oriented, normal speech, no focal findings or movement disorder noted.  Musculoskeletal - No joint tenderness, deformity or swelling.  Extremities - 2+ edema bilateral lower extremities  Skin - Normal coloration and turgor, no rashes, no suspicious skin lesions noted. Ongoing erythrodysesthesia affecting bilateral hands.  Psych- No overt anxiety, depression. Good insight into emotions.      Labs reviewed and discussed with patient:  WBC (K/mcL)   Date Value   03/12/2019 5.7     RBC (mil/mcL)   Date Value   03/12/2019 3.53 (L)     HCT (%)   Date Value   03/12/2019 38.6     HGB (g/dL)   Date Value   03/12/2019 12.9     PLT (K/mcL)   Date Value   03/12/2019 136 (L)   05/10/2013 270      Sodium (mmol/L)   Date Value   03/12/2019 143     Potassium (mmol/L)   Date Value   03/12/2019 3.1 (L)     Chloride (mmol/L)   Date Value   03/12/2019 107     Glucose (mg/dL)   Date Value   03/12/2019 109 (H)     CALCIUM (mg/dL)   Date Value   03/12/2019 8.6     Carbon Dioxide (mmol/L)   Date Value   03/12/2019 29     BUN (mg/dL)   Date Value   03/12/2019 14     Creatinine (mg/dL)   Date Value   03/12/2019 0.59       AST/SGOT (Units/L)   Date Value   03/12/2019 52 (H)     ALT/SGPT (Units/L)   Date Value   03/12/2019 35     No results found for: GGTP  ALK PHOSPHATASE (Units/L)   Date Value   03/12/2019 105     TOTAL BILIRUBIN (mg/dL)   Date Value   03/12/2019 1.6 (H)       Imaging results reviewed and discussed with patient,as discussed above per HPI      ASSESSMENT AND PLAN:  In summary, Rashmi Portillo is a 77 year old female with a history of metastatic triple  negative breast cancer, presenting today in follow-up.     #Recurrent, metastatic ER negative, [id number] positive breast cancer  -- Patient currently C8 d15 Xeloda, tolerating well   --I'm concerned however she is now experiencing recurrent symptoms of abdominal distention, diarrhea, and lower extremity edema, concerning for disease relapse (i.e., acknowledging that these symptoms were noted at time of her initial diagnosis)  --I recommended that we proceed with restaging CT imaging acknowledging that she has concomitantly experienced a rise in her tumor markers, and I will meet with her in follow-up regarding results of scan  --We discussed briefly subsequent lines of therapy, including consideration for IV Taxol, revealing, anthracycline  --For now, she will withhold Xeloda pending results of the above CT study  --Discussed with her today considerations for next lines of therapy, including chemotherapy and immunotherapy  --Discussed oral chemotherapy adherence and side effects with patient.  Patient is taking medication as prescribed.  --Discussed with patient the natural history, course and prognosis of illness.    Therapeutic options discussed and explained.  Side effects, risks and benefits, and alternatives discussed.  Patient acknowledges understanding of disease and treatment plan.    #Abdominal distention  --Likely reflective of ascites  --Patient wishes to defer evaluation for paracentesis  --She understands we may perform abdominal ultrasound, paracentesis as needed, in the event of progression of symptoms    #Diarrhea  --Refractory to control with Lomotil/Imodium  --Recommended initiation of cholestyramine 4 g packet p.o. T.i.d.  --She will contact us in the event of refractory symptoms    At least 25 minutes spent face-to-face patient, >50% of which was spent in care coordination, counseling, and emotional support.    Mando Lofton MD      Pt will complete meals and take supplement

## 2021-10-15 NOTE — SWALLOW BEDSIDE ASSESSMENT ADULT - SWALLOW EVAL: DIAGNOSIS
Presbyphagia, heightened in this event to dysphagia, functional for modified textur diet. able to manage thin liquids

## 2021-10-15 NOTE — CONSULT NOTE ADULT - SUBJECTIVE AND OBJECTIVE BOX
Patient is a 89y old  Female who presents with a chief complaint of Worsening SOB/ hypoxia (15 Oct 2021 12:16)    HPI:  89yo/F with PMH afib on coumadin, HTN, mild dementia, chronic lymphedema without known h/o CHF, admitted from snf on 10/14 for evaluation of worsening shortness of breath; each of the last few days requiring more and more oxygen; upon admission imaging revealed large bilateral pleural effusions. The patient underwent right sided thoracentesis on 10/15. History per medical record as patient is moaning and not able to provide history. Did have hospitalization in  for bilateral tib/fib fractures. Has been bed bound noted with multiple skin breakdowns.       PMH: as above  PSH: as above  Meds: per reconciliation sheet, noted below  MEDICATIONS  (STANDING):  ascorbic acid 500 milliGRAM(s) Oral daily  doxycycline hyclate Capsule 100 milliGRAM(s) Oral every 12 hours  enoxaparin Injectable 50 milliGRAM(s) SubCutaneous every 12 hours  furosemide   Injectable 40 milliGRAM(s) IV Push two times a day  influenza   Vaccine 0.5 milliLiter(s) IntraMuscular once  lidocaine   4% Patch 1 Patch Transdermal daily  losartan 25 milliGRAM(s) Oral daily  metoprolol succinate ER 25 milliGRAM(s) Oral daily  mirtazapine 15 milliGRAM(s) Oral at bedtime  polyethylene glycol 3350 17 Gram(s) Oral daily  potassium chloride    Tablet ER 40 milliEquivalent(s) Oral once  senna 2 Tablet(s) Oral at bedtime  silver sulfADIAZINE 1% Cream 1 Application(s) Topical daily  zinc sulfate 220 milliGRAM(s) Oral daily    MEDICATIONS  (PRN):  acetaminophen   Tablet .. 650 milliGRAM(s) Oral every 6 hours PRN Temp greater or equal to 38C (100.4F), Mild Pain (1 - 3)  aluminum hydroxide/magnesium hydroxide/simethicone Suspension 30 milliLiter(s) Oral every 4 hours PRN Dyspepsia  melatonin 3 milliGRAM(s) Oral at bedtime PRN Insomnia  ondansetron Injectable 4 milliGRAM(s) IV Push every 8 hours PRN Nausea and/or Vomiting    Allergies    No Known Allergies    Intolerances      Social: no smoking, no alcohol, no illegal drugs; no recent travel, no exposure to TB  FAMILY HISTORY:  No pertinent family history in first degree relatives       ROS unable to obtain secondary to patient medical condition     Vital Signs Last 24 Hrs  T(C): 36.6 (15 Oct 2021 08:10), Max: 36.6 (15 Oct 2021 08:10)  T(F): 97.8 (15 Oct 2021 08:10), Max: 97.8 (15 Oct 2021 08:10)  HR: 102 (15 Oct 2021 08:10) (78 - 102)  BP: 161/91 (15 Oct 2021 08:10) (152/97 - 179/91)  BP(mean): 116 (14 Oct 2021 16:00) (101 - 117)  RR: 18 (15 Oct 2021 08:10) (18 - 28)  SpO2: 96% (15 Oct 2021 08:10) (96% - 100%)  Daily     Daily Weight in k.5 (15 Oct 2021 06:45)    PE:    Constitutional: frail looking  HEENT: NC/AT, EOMI, PERRLA, conjunctivae clear; ears and nose atraumatic; pharynx clear  Neck: supple; thyroid not palpable  Back: no tenderness  Respiratory: respiratory effort normal; diminished at bases  Cardiovascular: S1S2 regular, no murmurs  Abdomen: soft, not tender, not distended, positive BS; no liver or spleen organomegaly  Genitourinary: no suprapubic tenderness  Musculoskeletal: no muscle tenderness, no joint swelling or tenderness  Neurological/ Psychiatric:  moving all extremities  Skin: bilateral heels covered with bandages    Labs: all available labs reviewed                        11.1   5.82  )-----------( 146      ( 15 Oct 2021 07:37 )             35.4     10-15    140  |  103  |  21  ----------------------------<  100<H>  3.4<L>   |  31  |  0.48<L>    Ca    9.4      15 Oct 2021 07:37    TPro  6.9  /  Alb  3.5  /  TBili  0.7  /  DBili  x   /  AST  22  /  ALT  19  /  AlkPhos  87  10-14     LIVER FUNCTIONS - ( 14 Oct 2021 14:13 )  Alb: 3.5 g/dL / Pro: 6.9 gm/dL / ALK PHOS: 87 U/L / ALT: 19 U/L / AST: 22 U/L / GGT: x           Urinalysis Basic - ( 14 Oct 2021 14:13 )    Color: Yellow / Appearance: Slightly Turbid / S.020 / pH: x  Gluc: x / Ketone: Trace  / Bili: Negative / Urobili: Negative mg/dL   Blood: x / Protein: 30 mg/dL / Nitrite: Negative   Leuk Esterase: Moderate / RBC: 25-50 /HPF / WBC 11-25   Sq Epi: x / Non Sq Epi: Occasional / Bacteria: Few    < from: CT Chest No Cont (10.14. @ 17:59) >    EXAM:  CT CHEST                            PROCEDURE DATE:  10/14/2021          INTERPRETATION:  CLINICAL INFORMATION: Heart failure    COMPARISON: 12/10/2017    CONTRAST/COMPLICATIONS:  IV Contrast: NONE  Oral Contrast: NONE  Complications: None reported at time of study completion    PROCEDURE:  CT of the Chest was performed.  Sagittal and coronal reformats were performed.    FINDINGS:    LUNGS AND AIRWAYS: Patent central airways.  Bilateral lower lobe compressive atelectasis. No evidence of pulmonary infiltrate.  PLEURA: Moderate bilateral pleural effusions  MEDIASTINUM AND DESTINI: No lymphadenopathy.  VESSELS: Atherosclerotic changes of the thoracic aorta.  HEART: Cardiomegaly. No pericardial effusion.  CHEST WALL AND LOWER NECK: Within normal limits.  VISUALIZED UPPER ABDOMEN: Within normal limits.  BONES: Degenerative changes    IMPRESSION:  Cardiomegaly. Bilateral pleural effusions with bilateral lower lobe compressive atelectasis.    No evidence of pneumonia.    < end of copied text >        Radiology: all available radiological tests reviewed    Advanced directives addressed: full resuscitation

## 2021-10-15 NOTE — CONSULT NOTE ADULT - ASSESSMENT
- cont O2  - BNP > 10,000  - will call thoracic for sequential thoracentesis  - on eliquis - last dose yesterday morning  - hold lovenox prior to thoracentesis

## 2021-10-15 NOTE — SWALLOW BEDSIDE ASSESSMENT ADULT - SLP GENERAL OBSERVATIONS
pt seated in bed, eyes closed, lethargic and somnolent: able, however, to participate briefly in eating routines.

## 2021-10-15 NOTE — SWALLOW BEDSIDE ASSESSMENT ADULT - PHARYNGEAL PHASE
age-appropriate latency of pharyngeal swallow onset. No overt s/s aspiration n thin liquid./Within functional limits

## 2021-10-15 NOTE — SWALLOW BEDSIDE ASSESSMENT ADULT - COMMENTS
89 yo/F with PMH afib on coumadin, HTN, mild dementia, chronic lymphedema without known h/o CHF, presented from SNF for evaluation of worsening SOB. Patient unable to provide ay history. Per daughter at bedside, she is not on oxygen at her baseline. 5 days ago at [facility] she noted to become more hypoxic and was placed on O2. Then patient had more requirement for O2 and CXR was done that showed opacification of both bases suspicious for pneumonia and patient initiated on IV antibiotics. At this point, daughter insisted on transfer to  for further diagnosis and treatment. Patient was at  in June for b/l tib/fib fractures for which she was NWB and was at SNF. No fever reported.  (14 Oct 2021 18:21)  History as above. CT scan chest shows large bilateral pleural effusions. No previous history systolic heart failure. Patient with progressive dementia.  Service is asked to evaluate pt for po intake, and determine the consistency of diet.

## 2021-10-15 NOTE — SWALLOW BEDSIDE ASSESSMENT ADULT - NS SPL SWALLOW CLINIC TRIAL FT
Maintain Dysphagia 2 with thin liquids for now.  meds crushed in puree.   Disc with Nsg.  Service will follow for tolerance and upgrade.

## 2021-10-15 NOTE — CONSULT NOTE ADULT - SUBJECTIVE AND OBJECTIVE BOX
Surgeon: Igor     Consult requesting by: Alyssa     HISTORY OF PRESENT ILLNESS:  89yo/F with PMH afib on coumadin, HTN, mild dementia, chronic lymphedema without known h/o CHF, presented from [facility] for evaluation of worsening SOB. Patient unable to provide ay history. Per daughter at bedside, she is not on oxygen at her baseline. 5 days ago at [facility] she noted to become more hypoxic and was placed on O2. Then patient had more requirement for O2 and CXR was done that showed opacification of both bases suspicious for pneumonia and patient initiated on IV antibiotics. At this point, daughter insisted on transfer to  for further diagnosis and treatment. Patient was at  in  for b/l tib/fib fractures for which she was NWB and was at SNF. No fever reported.     Patient seen at bedside. Non verbal and lethargic .  No tachypnea noted.  Spoke with daughter on the phone who said that her mother was alert and awake until a few weeks ago.  noted b/l pleual fluids on CXR and daughter wants everything done.      PAST MEDICAL & SURGICAL HISTORY:  Lymphedema of both lower extremities    Venous insufficiency    Monoclonal gammopathies    Paroxysmal atrial fibrillation    Acute cystitis without hematuria  septic  2016    Essential hypertension    Spinal stenosis    Spondyloarthritis    Vaginal prolapse    [place] (hard of hearing), bilateral    Hypertension    S/P kyphoplasty      S/P thyroidectomy  partial       History of vaginal surgery    History of fracture of femur  hx of proximal femur fracture in 2021    History of repair of left hip joint  Hx L hip long IMN with Dr. Kitchen         MEDICATIONS  (STANDING):  ascorbic acid 500 milliGRAM(s) Oral daily  doxycycline hyclate Capsule 100 milliGRAM(s) Oral every 12 hours  enoxaparin Injectable 50 milliGRAM(s) SubCutaneous every 12 hours  furosemide   Injectable 40 milliGRAM(s) IV Push two times a day  influenza   Vaccine 0.5 milliLiter(s) IntraMuscular once  lidocaine   4% Patch 1 Patch Transdermal daily  losartan 25 milliGRAM(s) Oral daily  metoprolol succinate ER 25 milliGRAM(s) Oral daily  mirtazapine 15 milliGRAM(s) Oral at bedtime  polyethylene glycol 3350 17 Gram(s) Oral daily  potassium chloride    Tablet ER 40 milliEquivalent(s) Oral once  senna 2 Tablet(s) Oral at bedtime  silver sulfADIAZINE 1% Cream 1 Application(s) Topical daily  zinc sulfate 220 milliGRAM(s) Oral daily    MEDICATIONS  (PRN):  acetaminophen   Tablet .. 650 milliGRAM(s) Oral every 6 hours PRN Temp greater or equal to 38C (100.4F), Mild Pain (1 - 3)  aluminum hydroxide/magnesium hydroxide/simethicone Suspension 30 milliLiter(s) Oral every 4 hours PRN Dyspepsia  melatonin 3 milliGRAM(s) Oral at bedtime PRN Insomnia  ondansetron Injectable 4 milliGRAM(s) IV Push every 8 hours PRN Nausea and/or Vomiting    Antiplatelet therapy:  Lovenox                           Last dose/amt:    Allergies    No Known Allergies    Intolerances        SOCIAL HISTORY:  Smoker: [ ] Yes  [ x] No        PACK YEARS:                         WHEN QUIT?  ETOH use: [ ] Yes  [x ] No              FREQUENCY / QUANTITY:  Ilicit Drug use:  [ ] Yes  [x ] No  Occupation:  Live with: SNF   Assisted device use:    FAMILY HISTORY:  No pertinent family history in first degree relatives        Review of Systems unable to interview patient dt lethargy   CONSTITUTIONAL:  Fevers[ ] chills[ ] sweats[ ] fatigue[ ] weight loss[ ] weight gain [ ]                                     NEGATIVE [ ]   NEURO:  parathesias[ ] seizures [ ]  syncope [ ]  confusion [ ]                                                                                NEGATIVE[ ]   EYES: glasses[ ]  blurry vision[ ]  discharge[ ] pain[ ] glaucoma [ ]                                                                          NEGATIVE[ ]   ENMT:  difficulty hearing [ ]  vertigo[ ]  dysphagia[ ] epistaxis[ ] recent dental work [ ]                                    NEGATIVE[ ]   CV:  chest pain[ ] palpitations[ ] KLEIN [ ] diaphoresis [ ]                                                                                           NEGATIVE[ ]   RESPIRATORY:  wheezing[ ] SOB[ ] cough [ ] sputum[ ] hemoptysis[ ]                                                                  NEGATIVE[ ]   GI:  nausea[ ]  vommiting [ ]  diarrhea[ ] constipation [ ] melena [ ]                                                                      NEGATIVE[ ]   : hematuria[ ]  dysuria[ ] urgency[ ] incontinence[ ]                                                                                            NEGATIVE[ ]   MUSKULOSKELETAL:  arthritis[ ]  joint swelling [ ] muscle weakness [ ]                                                                NEGATIVE[ ]   SKIN/BREAST:  rash[ ] itching [ ]  hair loss[ ] masses[ ]                                                                                              NEGATIVE[ ]   PSYCH:  dementia [ ] depresion [ ] anxiety[ ]                                                                                                               NEGATIVE[ ]   HEME/LYMPH:  bruises easily[ ] enlarged lymph nodes[ ] tender lymph nodes[ ]                                               NEGATIVE[ ]   ENDOCRINE:  cold intolerance[ ] heat intolerance[ ] polydipsia[ ]                                                                          NEGATIVE[ ]     PHYSICAL EXAM  Vital Signs Last 24 Hrs  T(C): 36.6 (15 Oct 2021 08:10), Max: 36.6 (15 Oct 2021 08:10)  T(F): 97.8 (15 Oct 2021 08:10), Max: 97.8 (15 Oct 2021 08:10)  HR: 102 (15 Oct 2021 08:10) (100 - 102)  BP: 161/91 (15 Oct 2021 08:10) (161/91 - 168/91)  BP(mean): --  RR: 18 (15 Oct 2021 08:10) (18 - 18)  SpO2: 96% (15 Oct 2021 08:10) (96% - 97%)    CONSTITUTIONAL:                                                                          WNL[ ] cachectic   Neuro: WNL[ ] Normal exam oriented to person/place & time with no focal motor or sensory  deficits. Other lethargic                     Eyes: WNL[x ]   Normal exam of conjunctiva & lids, pupils equally reactive. Other     ENT: WNL[ x]    Normal exam of nasal/oral mucosa with absence of cyanosis. Other  Neck: WNL[x ]  Normal exam of jugular veins, trachea & thyroid. Other  Chest: WNL[ x] Normal lung exam with good air movement absence of wheezes, rales, or rhonchi: Other                                                                                CV:  Auscultation: normal [ ] S3[ ] S4[ ] Irregular [x ] Rub[ ] Clicks[ ]    Murmurs none:[ ]systolic [x ]  diastolic [ ] holosystolic [ ]  Carotids: No Bruitsx[ x] Other                 Abdominal Aorta: normal [ ] nonpalpable[ ]Other                                                                                      GI:           WNL[ x] Normal exam of abdomen, liver & spleen with no noted masses or tenderness. Other                                                                                                        Extremities: WNL[ ] Normal no evidence of cyanosis or deformity Edema: none[x ]trace[ ]1+[ ]2+[ ]3+[ ]4+[ ]  Lower Extremity Pulses: Right[2+ ] Left[ 2+]Varicosities[ ]  SKIN :WNL[ x] Normal exam to inspection & palation. Other:                                                          LABS:                        11.1   5.82  )-----------( 146      ( 15 Oct 2021 07:37 )             35.4     10-15    140  |  103  |  21  ----------------------------<  100<H>  3.4<L>   |  31  |  0.48<L>    Ca    9.4      15 Oct 2021 07:37    TPro  6.9  /  Alb  3.5  /  TBili  0.7  /  DBili  x   /  AST  22  /  ALT  19  /  AlkPhos  87  10-14    PT/INR - ( 15 Oct 2021 07:37 )   PT: 20.9 sec;   INR: 1.84 ratio           Urinalysis Basic - ( 14 Oct 2021 14:13 )    Color: Yellow / Appearance: Slightly Turbid / S.020 / pH: x  Gluc: x / Ketone: Trace  / Bili: Negative / Urobili: Negative mg/dL   Blood: x / Protein: 30 mg/dL / Nitrite: Negative   Leuk Esterase: Moderate / RBC: 25-50 /HPF / WBC 11-25   Sq Epi: x / Non Sq Epi: Occasional / Bacteria: Few        < from: CT Chest No Cont (10.14.21 @ 17:59) >  Oral Contrast: NONE  Complications: None reported at time of study completion    PROCEDURE:  CT of the Chest was performed.  Sagittal and coronal reformats were performed.    FINDINGS:    LUNGS AND AIRWAYS: Patent central airways.  Bilateral lower lobe compressive atelectasis. No evidence of pulmonary infiltrate.  PLEURA: Moderate bilateral pleural effusions  MEDIASTINUM AND DESTINI: No lymphadenopathy.  VESSELS: Atherosclerotic changes of the thoracic aorta.  HEART: Cardiomegaly. No pericardial effusion.  CHEST WALL AND LOWER NECK: Within normal limits.  VISUALIZED UPPER ABDOMEN: Within normal limits.  BONES: Degenerative changes    IMPRESSION:  Cardiomegaly. Bilateral pleural effusions with bilateral lower lobe compressive atelectasis.    No evidence of pneumonia.    < end of copied text >

## 2021-10-15 NOTE — CONSULT NOTE ADULT - SUBJECTIVE AND OBJECTIVE BOX
Patient is a 89y old  Female who presents with a chief complaint of Worsening SOB/ hypoxia (14 Oct 2021 18:21)  10/15/2021- Cardiology: 89 year old woman with HBP, long term persistent atrial fibrillation who is admitted with progressive shortness of breath and CT chest shows bilateral moderate to large pleural effusions. She has chronic venous insufficiency but no prior history of heart failure. Last admitted to [facility] with bilateral tib-fib fractures that were treated nonoperatively.Patient with mild slowly progressive dementia, monoclonal gammopathy, prior urinary tract infections, chronic Reaves catheter. Warfarin waschanged to Eliquis in 2021.    HPI:  89yo/F with PMH afib on coumadin, HTN, mild dementia, chronic lymphedema without known h/o CHF, presented from SNF for evaluation of worsening SOB. Patient unable to provide ay history. Per daughter at bedside, she is not on oxygen at her baseline. 5 days ago at SNF she noted to become more hypoxic and was placed on O2. Then patient had more requirement for O2 and CXR was done that showed opacification of both bases suspicious for pneumonia and patient initiated on IV antibiotics. At this point, daughter insisted on transfer to  for further diagnosis and treatment. Patient was at  in  for b/l tib/fib fractures for which she was NWB and was at SNF. No fever reported.  (14 Oct 2021 18:21)      PAST MEDICAL & SURGICAL HISTORY:  Lymphedema of both lower extremities    Venous insufficiency    Monoclonal gammopathies    Paroxysmal atrial fibrillation    Acute cystitis without hematuria  septic  2016    Essential hypertension    Spinal stenosis    Spondyloarthritis    Vaginal prolapse    [place] (hard of hearing), bilateral    Hypertension    S/P kyphoplasty      S/P thyroidectomy  partial   1975    History of vaginal surgery    History of fracture of femur  hx of proximal femur fracture in 2021    History of repair of left hip joint  Hx L hip long IMN with Dr. Kitchen           MEDICATIONS  (STANDING):  cefepime  Injectable. 1000 milliGRAM(s) IV Push every 12 hours  enoxaparin Injectable 50 milliGRAM(s) SubCutaneous every 12 hours  furosemide   Injectable 40 milliGRAM(s) IV Push two times a day  influenza   Vaccine 0.5 milliLiter(s) IntraMuscular once  losartan 25 milliGRAM(s) Oral daily  metoprolol succinate ER 25 milliGRAM(s) Oral daily  mirtazapine 15 milliGRAM(s) Oral at bedtime  polyethylene glycol 3350 17 Gram(s) Oral daily  senna 2 Tablet(s) Oral at bedtime  vancomycin  IVPB 750 milliGRAM(s) IV Intermittent Once    MEDICATIONS  (PRN):  acetaminophen   Tablet .. 650 milliGRAM(s) Oral every 6 hours PRN Temp greater or equal to 38C (100.4F), Mild Pain (1 - 3)  aluminum hydroxide/magnesium hydroxide/simethicone Suspension 30 milliLiter(s) Oral every 4 hours PRN Dyspepsia  melatonin 3 milliGRAM(s) Oral at bedtime PRN Insomnia  ondansetron Injectable 4 milliGRAM(s) IV Push every 8 hours PRN Nausea and/or Vomiting      FAMILY HISTORY:  No pertinent family history in first degree relatives        SOCIAL HISTORY:  Tobacco- no        Alcohol-   no         Illicit drugs-   no         Occupation-              Marital  status-   REVIEW OF SYSTEM:  Pertinent items are noted in HPI.    Vital Signs Last 24 Hrs  T(C): 36.3 (14 Oct 2021 19:44), Max: 36.6 (14 Oct 2021 14:15)  T(F): 97.3 (14 Oct 2021 19:44), Max: 97.8 (14 Oct 2021 14:15)  HR: 100 (14 Oct 2021 19:44) (78 - 100)  BP: 168/91 (14 Oct 2021 19:44) (152/97 - 186/97)  BP(mean): 116 (14 Oct 2021 16:00) (101 - 117)  RR: 18 (14 Oct 2021 19:44) (18 - 28)  SpO2: 97% (14 Oct 2021 19:44) (93% - 100%)    I&O's Summary    14 Oct 2021 07:01  -  15 Oct 2021 07:00  --------------------------------------------------------  IN: 0 mL / OUT: 1000 mL / NET: -1000 mL      PHYSICAL EXAM  General Appearance: FraiL, mildly breathless  Neck Supple, , no adenopathy, thyroid: not enlarged, no carotid bruit or JVD  Back: Symmetric, no  tenderness,no soft tissue tenderness  Lungs: decreased BS both bases  Heart: Regular rate and rhythm, S1, S2 normal, 1/6 systolic murmur LSB  Abdomen: Soft, non-tender, bowel sounds active , no hepatosplenomegaly  Extremities: no cyanosis or edema, no joint swelling  Skin: Skin color, texture normal, no rashes   Neurologic: Alert and oriented  to the hospital and person. Moves all extremities      INTERPRETATION OF TELEMETRY: Atrial fibrillation with rate control    ECG: not available        LABS:                          11.1   5.82  )-----------( 146      ( 15 Oct 2021 07:37 )             35.4     10-14    139  |  102  |  21  ----------------------------<  99  3.9   |  31  |  0.57    Ca    10.0      14 Oct 2021 14:13    TPro  6.9  /  Alb  3.5  /  TBili  0.7  /  DBili  x   /  AST  22  /  ALT  19  /  AlkPhos  87  10-14          Pro BNP  40908 10-14 @ 14:13  D Dimer  -- 10-14 @ 14:13    PT/INR - ( 14 Oct 2021 14:13 )   PT: 20.0 sec;   INR: 1.77 ratio           Urinalysis Basic - ( 14 Oct 2021 14:13 )    Color: Yellow / Appearance: Slightly Turbid / S.020 / pH: x  Gluc: x / Ketone: Trace  / Bili: Negative / Urobili: Negative mg/dL   Blood: x / Protein: 30 mg/dL / Nitrite: Negative   Leuk Esterase: Moderate / RBC: 25-50 /HPF / WBC 11-25   Sq Epi: x / Non Sq Epi: Occasional / Bacteria: Few            RADIOLOGY & ADDITIONAL STUDIES:  < from: CT Chest No Cont (10.14.21 @ 17:59) >  EXAM:  CT CHEST                            PROCEDURE DATE:  10/14/2021          INTERPRETATION:  CLINICAL INFORMATION: Heart failure      IMPRESSION:  Cardiomegaly. Bilateral pleural effusions with bilateral lower lobe compressive atelectasis.    No evidence of pneumonia.    --- End of Report ---      ABBE HO MD; Attending Radiologist  This document has been electronically signed. Oct 14 2021  6:24PM                IMPRESSION:  1. Large bilateral pleural effusions with respiratory compromise. Etiology likely heart failure given the elevated NT pro BNP  2.Long term persistent atrial fibrillation. Rate  controlled.  3.Hypertension.  4.Indwelling Reaves.  5.Dementia.   PLAN:  Discussed with Dr. Orozco. TTE for LV function.Thoracentesis is indicated for diagnostic and therapeutic reasons. Hold Eliquis for procedures  Continue metoprolol losartan, furosemide. Follow BMP while on Iv furosemide. Will follow.

## 2021-10-15 NOTE — DIETITIAN INITIAL EVALUATION ADULT. - PERTINENT LABORATORY DATA
10-15    140  |  103  |  21  ----------------------------<  100<H>  3.4<L>   |  31  |  0.48<L>    Ca    9.4      15 Oct 2021 07:37    TPro  6.9  /  Alb  3.5  /  TBili  0.7  /  DBili  x   /  AST  22  /  ALT  19  /  AlkPhos  87  10-14

## 2021-10-16 LAB
ANION GAP SERPL CALC-SCNC: 7 MMOL/L — SIGNIFICANT CHANGE UP (ref 5–17)
BUN SERPL-MCNC: 19 MG/DL — SIGNIFICANT CHANGE UP (ref 7–23)
CALCIUM SERPL-MCNC: 9.7 MG/DL — SIGNIFICANT CHANGE UP (ref 8.5–10.1)
CHLORIDE SERPL-SCNC: 101 MMOL/L — SIGNIFICANT CHANGE UP (ref 96–108)
CO2 SERPL-SCNC: 34 MMOL/L — HIGH (ref 22–31)
CREAT SERPL-MCNC: 0.62 MG/DL — SIGNIFICANT CHANGE UP (ref 0.5–1.3)
GLUCOSE SERPL-MCNC: 99 MG/DL — SIGNIFICANT CHANGE UP (ref 70–99)
HCT VFR BLD CALC: 35 % — SIGNIFICANT CHANGE UP (ref 34.5–45)
HGB BLD-MCNC: 11.1 G/DL — LOW (ref 11.5–15.5)
LDH SERPL L TO P-CCNC: 256 U/L — HIGH (ref 84–241)
MCHC RBC-ENTMCNC: 29.4 PG — SIGNIFICANT CHANGE UP (ref 27–34)
MCHC RBC-ENTMCNC: 31.7 GM/DL — LOW (ref 32–36)
MCV RBC AUTO: 92.6 FL — SIGNIFICANT CHANGE UP (ref 80–100)
PLATELET # BLD AUTO: 144 K/UL — LOW (ref 150–400)
POTASSIUM SERPL-MCNC: 2.8 MMOL/L — CRITICAL LOW (ref 3.5–5.3)
POTASSIUM SERPL-SCNC: 2.8 MMOL/L — CRITICAL LOW (ref 3.5–5.3)
RBC # BLD: 3.78 M/UL — LOW (ref 3.8–5.2)
RBC # FLD: 16.1 % — HIGH (ref 10.3–14.5)
SODIUM SERPL-SCNC: 142 MMOL/L — SIGNIFICANT CHANGE UP (ref 135–145)
T4 FREE+ TSH PNL SERPL: 2.51 UU/ML — SIGNIFICANT CHANGE UP (ref 0.34–4.82)
WBC # BLD: 6.4 K/UL — SIGNIFICANT CHANGE UP (ref 3.8–10.5)
WBC # FLD AUTO: 6.4 K/UL — SIGNIFICANT CHANGE UP (ref 3.8–10.5)

## 2021-10-16 PROCEDURE — 71045 X-RAY EXAM CHEST 1 VIEW: CPT | Mod: 26

## 2021-10-16 PROCEDURE — 99232 SBSQ HOSP IP/OBS MODERATE 35: CPT

## 2021-10-16 PROCEDURE — 99233 SBSQ HOSP IP/OBS HIGH 50: CPT

## 2021-10-16 RX ORDER — POTASSIUM CHLORIDE 20 MEQ
40 PACKET (EA) ORAL THREE TIMES A DAY
Refills: 0 | Status: COMPLETED | OUTPATIENT
Start: 2021-10-16 | End: 2021-10-17

## 2021-10-16 RX ORDER — POTASSIUM CHLORIDE 20 MEQ
10 PACKET (EA) ORAL
Refills: 0 | Status: COMPLETED | OUTPATIENT
Start: 2021-10-16 | End: 2021-10-16

## 2021-10-16 RX ADMIN — Medication 500 MILLIGRAM(S): at 10:02

## 2021-10-16 RX ADMIN — ONDANSETRON 4 MILLIGRAM(S): 8 TABLET, FILM COATED ORAL at 18:16

## 2021-10-16 RX ADMIN — LIDOCAINE 1 PATCH: 4 CREAM TOPICAL at 05:31

## 2021-10-16 RX ADMIN — Medication 40 MILLIGRAM(S): at 17:03

## 2021-10-16 RX ADMIN — Medication 100 MILLIGRAM(S): at 10:02

## 2021-10-16 RX ADMIN — CEFTRIAXONE 1000 MILLIGRAM(S): 500 INJECTION, POWDER, FOR SOLUTION INTRAMUSCULAR; INTRAVENOUS at 00:29

## 2021-10-16 RX ADMIN — Medication 100 MILLIGRAM(S): at 21:39

## 2021-10-16 RX ADMIN — Medication 40 MILLIGRAM(S): at 10:02

## 2021-10-16 RX ADMIN — POLYETHYLENE GLYCOL 3350 17 GRAM(S): 17 POWDER, FOR SOLUTION ORAL at 10:02

## 2021-10-16 RX ADMIN — Medication 25 MILLIGRAM(S): at 10:02

## 2021-10-16 RX ADMIN — Medication 100 MILLIEQUIVALENT(S): at 23:14

## 2021-10-16 RX ADMIN — LOSARTAN POTASSIUM 25 MILLIGRAM(S): 100 TABLET, FILM COATED ORAL at 10:02

## 2021-10-16 RX ADMIN — SENNA PLUS 2 TABLET(S): 8.6 TABLET ORAL at 21:39

## 2021-10-16 RX ADMIN — Medication 3 MILLIGRAM(S): at 21:40

## 2021-10-16 RX ADMIN — MIRTAZAPINE 15 MILLIGRAM(S): 45 TABLET, ORALLY DISINTEGRATING ORAL at 21:40

## 2021-10-16 RX ADMIN — ENOXAPARIN SODIUM 50 MILLIGRAM(S): 100 INJECTION SUBCUTANEOUS at 21:44

## 2021-10-16 RX ADMIN — Medication 100 MILLIEQUIVALENT(S): at 18:58

## 2021-10-16 RX ADMIN — Medication 40 MILLIEQUIVALENT(S): at 13:06

## 2021-10-16 RX ADMIN — Medication 100 MILLIEQUIVALENT(S): at 21:41

## 2021-10-16 RX ADMIN — Medication 650 MILLIGRAM(S): at 22:40

## 2021-10-16 RX ADMIN — ZINC SULFATE TAB 220 MG (50 MG ZINC EQUIVALENT) 220 MILLIGRAM(S): 220 (50 ZN) TAB at 10:02

## 2021-10-16 RX ADMIN — Medication 650 MILLIGRAM(S): at 21:40

## 2021-10-16 RX ADMIN — Medication 1 APPLICATION(S): at 11:29

## 2021-10-16 NOTE — PROGRESS NOTE ADULT - ASSESSMENT
PROBLEMS:    Bilateral pleural effusion  Atelectasis of both lungs/PNEUMONIA  Chronic diastolic congestive heart failure  Chronic atrial fibrillation    PLAN:    FIO2 TITRATE  Underwent a right PTC, drained over 1L - removed due to pain  CT SURGERY FU  IV RHOCEPHIN/DOXYCYLIN  DVT PROPHYLASIX

## 2021-10-16 NOTE — PROGRESS NOTE ADULT - SUBJECTIVE AND OBJECTIVE BOX
Subjective: Patient resting comfortably in bed.  Underwent a right pigtail catheter yesterday for pleural effusion - patient developed pain near insertion site and was removed.    Vital Signs:  Vital Signs Last 24 Hrs  T(C): 36.7 (10-15-21 @ 21:13), Max: 36.7 (10-15-21 @ 21:13)  T(F): 98.1 (10-15-21 @ 21:13), Max: 98.1 (10-15-21 @ 21:13)  HR: 107 (10-15-21 @ 21:13) (107 - 107)  BP: 165/79 (10-15-21 @ 21:13) (165/79 - 165/79)  RR: --  SpO2: 93% (10-15-21 @ 21:13) (93% - 93%) on 2L NC      Relevant labs, radiology and Medications reviewed                        11.1   6.40  )-----------( 144      ( 16 Oct 2021 07:59 )             35.0     10-15    140  |  103  |  21  ----------------------------<  100<H>  3.4<L>   |  31  |  0.48<L>    Ca    9.4      15 Oct 2021 07:37    TPro  6.9  /  Alb  3.5  /  TBili  0.7  /  DBili  x   /  AST  22  /  ALT  19  /  AlkPhos  87  10-14    PT/INR - ( 15 Oct 2021 07:37 )   PT: 20.9 sec;   INR: 1.84 ratio           MEDICATIONS  (STANDING):  ascorbic acid 500 milliGRAM(s) Oral daily  cefTRIAXone Injectable. 1000 milliGRAM(s) IV Push every 24 hours  doxycycline hyclate Capsule 100 milliGRAM(s) Oral every 12 hours  enoxaparin Injectable 50 milliGRAM(s) SubCutaneous every 12 hours  furosemide   Injectable 40 milliGRAM(s) IV Push two times a day  influenza   Vaccine 0.5 milliLiter(s) IntraMuscular once  lidocaine   4% Patch 1 Patch Transdermal daily  losartan 25 milliGRAM(s) Oral daily  metoprolol succinate ER 25 milliGRAM(s) Oral daily  mirtazapine 15 milliGRAM(s) Oral at bedtime  polyethylene glycol 3350 17 Gram(s) Oral daily  potassium chloride    Tablet ER 40 milliEquivalent(s) Oral once  senna 2 Tablet(s) Oral at bedtime  silver sulfADIAZINE 1% Cream 1 Application(s) Topical daily  zinc sulfate 220 milliGRAM(s) Oral daily    MEDICATIONS  (PRN):  acetaminophen   Tablet .. 650 milliGRAM(s) Oral every 6 hours PRN Temp greater or equal to 38C (100.4F), Mild Pain (1 - 3)  aluminum hydroxide/magnesium hydroxide/simethicone Suspension 30 milliLiter(s) Oral every 4 hours PRN Dyspepsia  melatonin 3 milliGRAM(s) Oral at bedtime PRN Insomnia  ondansetron Injectable 4 milliGRAM(s) IV Push every 8 hours PRN Nausea and/or Vomiting      Physical exam  Gen: NAD, somnolent  Neuro: AO x 1 when awake as per RN  Card: S1 S2  Pulm: decreased breaths sounds bilaterally  Abd: soft NT ND      Assessment  89y Female  w/ PAST MEDICAL & SURGICAL HISTORY:  Lymphedema of both lower extremities    Venous insufficiency    Monoclonal gammopathies    Paroxysmal atrial fibrillation    Acute cystitis without hematuria  septic  4/2016    Essential hypertension    Spinal stenosis    Spondyloarthritis    Vaginal prolapse    [place] (hard of hearing), bilateral    Hypertension    S/P kyphoplasty  2014    S/P thyroidectomy  partial   1975    History of vaginal surgery    History of fracture of femur  hx of proximal femur fracture in January 2021    History of repair of left hip joint  Hx L hip long IMN with Dr. Kitchen 2017    Patient is a 89y old  Female who presents with a chief complaint of Worsening SOB/ hypoxia (16 Oct 2021 07:30)  .  On 10/15/2021 - underwent a right PTC, drained over 1L - removed due to pain.    PLAN    Continue diuresis with IV lasix.  Medical management of HFpEF.  Mobilize as possible.      Spent over 30 minutes speaking with the daughter Tabatha regarding recent test results including the TTE, CXR.  Discussed the pros/cons of doing a thoracentesis versus conservative management.  After speaking with primary team, will likely continue conservative management as benefits of left thoracentesis do not outweigh the risks.    Discussed with Cardiothoracic Team at AM rounds.

## 2021-10-16 NOTE — PROGRESS NOTE ADULT - SUBJECTIVE AND OBJECTIVE BOX
Patient is a 89y old  Female who presents with a chief complaint of Worsening SOB/ hypoxia (15 Oct 2021 17:25)      HPI:  89yo/F with PMH afib on coumadin, HTN, mild dementia, chronic lymphedema without known h/o CHF, presented from  for evaluation of worsening SOB. Patient unable to provide ay history. Per daughter at bedside, she is not on oxygen at her baseline. 5 days ago at  she noted to become more hypoxic and was placed on O2. Then patient had more requirement for O2 and CXR was done that showed opacification of both bases suspicious for pneumonia and patient initiated on IV antibiotics. At this point, daughter insisted on transfer to  for further diagnosis and treatment. Patient was at  in  for b/l tib/fib fractures for which she was NWB and was at SNF. No fever reported.  (14 Oct 2021 18:21)  10/15/2021- Cardiology: 89 year old woman with HBP, long term persistent atrial fibrillation who is admitted with progressive shortness of breath and CT chest shows bilateral moderate to large pleural effusions. She has chronic venous insufficiency but no prior history of heart failure. Last admitted to [facility] with bilateral tib-fib fractures that were treated nonoperatively.Patient with mild slowly progressive dementia, monoclonal gammopathy, prior urinary tract infections, chronic Reaves catheter. Warfarin waschanged to Eliquis in 2021.  10/16 alert, lying flat, denies dyspnea    PAST MEDICAL & SURGICAL HISTORY:  Lymphedema of both lower extremities    Venous insufficiency    Monoclonal gammopathies    Paroxysmal atrial fibrillation    Acute cystitis without hematuria  septic  2016    Essential hypertension    Spinal stenosis    Spondyloarthritis    Vaginal prolapse    [place] (hard of hearing), bilateral    Hypertension    S/P kyphoplasty      S/P thyroidectomy  partial   1975    History of vaginal surgery    History of fracture of femur  hx of proximal femur fracture in 2021    History of repair of left hip joint  Hx L hip long IMN with Dr. Kitchen           MEDICATIONS  (STANDING):  ascorbic acid 500 milliGRAM(s) Oral daily  cefTRIAXone Injectable. 1000 milliGRAM(s) IV Push every 24 hours  doxycycline hyclate Capsule 100 milliGRAM(s) Oral every 12 hours  enoxaparin Injectable 50 milliGRAM(s) SubCutaneous every 12 hours  furosemide   Injectable 40 milliGRAM(s) IV Push two times a day  influenza   Vaccine 0.5 milliLiter(s) IntraMuscular once  lidocaine   4% Patch 1 Patch Transdermal daily  losartan 25 milliGRAM(s) Oral daily  metoprolol succinate ER 25 milliGRAM(s) Oral daily  mirtazapine 15 milliGRAM(s) Oral at bedtime  polyethylene glycol 3350 17 Gram(s) Oral daily  potassium chloride    Tablet ER 40 milliEquivalent(s) Oral once  senna 2 Tablet(s) Oral at bedtime  silver sulfADIAZINE 1% Cream 1 Application(s) Topical daily  zinc sulfate 220 milliGRAM(s) Oral daily    MEDICATIONS  (PRN):  acetaminophen   Tablet .. 650 milliGRAM(s) Oral every 6 hours PRN Temp greater or equal to 38C (100.4F), Mild Pain (1 - 3)  aluminum hydroxide/magnesium hydroxide/simethicone Suspension 30 milliLiter(s) Oral every 4 hours PRN Dyspepsia  melatonin 3 milliGRAM(s) Oral at bedtime PRN Insomnia  ondansetron Injectable 4 milliGRAM(s) IV Push every 8 hours PRN Nausea and/or Vomiting          Vital Signs Last 24 Hrs  T(C): 36.7 (15 Oct 2021 21:13), Max: 36.7 (15 Oct 2021 21:13)  T(F): 98.1 (15 Oct 2021 21:13), Max: 98.1 (15 Oct 2021 21:13)  HR: 107 (15 Oct 2021 21:13) (102 - 107)  BP: 165/79 (15 Oct 2021 21:13) (161/91 - 165/79)  BP(mean): --  RR: 18 (15 Oct 2021 08:10) (18 - 18)  SpO2: 93% (15 Oct 2021 21:13) (93% - 96%)    I&O's Summary      PHYSICAL EXAM  General Appearance: comfortable at rest  HEENT:   Neck:   Back:   Lungs: dec bs bilat bases  Heart: IRR s1s2 karri base 2/6  Abdomen:   Extremities: 1+ edema  Skin:   Neurologic:       INTERPRETATION OF TELEMETRY:    ECG:        LABS:                          11.1   5.82  )-----------( 146      ( 15 Oct 2021 07:37 )             35.4     10-15    140  |  103  |  21  ----------------------------<  100<H>  3.4<L>   |  31  |  0.48<L>    Ca    9.4      15 Oct 2021 07:37    TPro  6.9  /  Alb  3.5  /  TBili  0.7  /  DBili  x   /  AST  22  /  ALT  19  /  AlkPhos  87  10-14          Pro BNP  98929 10-14 @ 14:13  D Dimer  -- 10-14 @ 14:13    PT/INR - ( 15 Oct 2021 07:37 )   PT: 20.9 sec;   INR: 1.84 ratio           Urinalysis Basic - ( 14 Oct 2021 14:13 )    Color: Yellow / Appearance: Slightly Turbid / S.020 / pH: x  Gluc: x / Ketone: Trace  / Bili: Negative / Urobili: Negative mg/dL   Blood: x / Protein: 30 mg/dL / Nitrite: Negative   Leuk Esterase: Moderate / RBC: 25-50 /HPF / WBC 11-25   Sq Epi: x / Non Sq Epi: Occasional / Bacteria: Few            RADIOLOGY & ADDITIONAL STUDIES:

## 2021-10-16 NOTE — PROGRESS NOTE ADULT - ASSESSMENT
89yo/F with PMH afib on coumadin, HTN, mild dementia, chronic lymphedema without known h/o CHF, admitted from snf on 10/14 for evaluation of worsening shortness of breath; each of the last few days requiring more and more oxygen; upon admission imaging revealed large bilateral pleural effusions. The patient underwent right sided thoracentesis on 10/15. History per medical record as patient is moaning and not able to provide history. Did have hospitalization in June for bilateral tib/fib fractures. Has been bed bound noted with multiple skin breakdowns.     1. Patient admitted with possibly pneumonia underlying the bilateral pleural effusions, unclear etiology of these effusions; had thoracentesis and fluid should be evaluated  - follow up cultures   - serial cbc and monitor temperature   - reviewed prior medical records to evaluate for resistant or atypical pathogens   - day #2 ceftriaxone and doxycycline  - tolerating antibiotics without rashes or side effects   - oxygen and nebs as needed   - consideration for wound care evaluation  - CTS evaluation in progress  2. other issues: per medicine

## 2021-10-16 NOTE — PROGRESS NOTE ADULT - ASSESSMENT
1. Large bilateral pleural effusions with respiratory compromise. s/p right thoracentisis, most likely HFpEF, patient appears improved  2.Long term persistent atrial fibrillation. Rate  controlled.  3.Hypertension.  4.Indwelling Reaves.  5.Dementia.   PLAN:  echo reviewed. LVF is normal. left  thoracentisis per thoracic surgery  Continue metoprolol losartan, furosemide. Follow BMP while on Iv furosemide.   hold lovenox for procedures

## 2021-10-16 NOTE — PROGRESS NOTE ADULT - SUBJECTIVE AND OBJECTIVE BOX
Subjective:    pat lying in bed, no new complaint.    Home Medications:  acetaminophen 650 mg oral tablet: 1 tab(s) orally every 6 hours, As Needed - for mild pain (14 Oct 2021 19:06)  Acidophilus oral capsule: 1 cap(s) orally 3 times a day (14 Oct 2021 19:06)  Aquaphor topical ointment: Apply topically to affected area 2 times a day  ****Bilateral Lower Extremeties** (14 Oct 2021 19:06)  Betadine 5% topical spray: Apply topically to affected area 2 times a day  *****Bilateral Heels**** (14 Oct 2021 19:06)  Betadine 5% topical spray: Apply topically to affected area once a day, As Needed - for wound care  ****Bilateral Heels**** (14 Oct 2021 19:06)  busPIRone 5 mg oral tablet: 1 tab(s) orally 2 times a day (14 Oct 2021 19:06)  cefTRIAXone 1 g intravenous injection: 1 gram(s) intravenous once a day  ****Course not Completed**** (14 Oct 2021 19:06)  cholecalciferol 25 mcg (1000 intl units) oral tablet: 3 tab(s) orally once a day (14 Oct 2021 19:06)  dermasyn gel: Apply topically to affected area once a day  ****Right posterior thigh**** (14 Oct 2021 19:06)  dermasyn gel: Apply topically to affected area once a day, As Needed - for wound care  ****Right posterior thigh*** (14 Oct 2021 19:06)  Eliquis 5 mg oral tablet: 1 tab(s) orally 2 times a day (14 Oct 2021 19:06)  ferrous sulfate 325 mg (65 mg elemental iron) oral tablet: 1 tab(s) orally once a day (14 Oct 2021 19:06)  furosemide 40 mg oral tablet: 1 tab(s) orally once a day (14 Oct 2021 19:06)  LORazepam 0.5 mg oral tablet: 1 tab(s) orally once a day (at bedtime), As Needed - for anxiety (14 Oct 2021 19:06)  losartan 50 mg oral tablet: 1 tab(s) orally once a day (14 Oct 2021 19:06)  Melatonin 3 mg oral tablet: 1 tab(s) orally once a day (at bedtime) (14 Oct 2021 19:06)  methocarbamol 500 mg oral tablet: 1 tab(s) orally once a day, As Needed - for muscle spasm (14 Oct 2021 19:06)  Metoprolol Tartrate 25 mg oral tablet: 0.5 tab(s) orally 2 times a day (14 Oct 2021 19:06)  mirtazapine 15 mg oral tablet: 1 tab(s) orally once a day (at bedtime) (14 Oct 2021 19:06)  Multiple Vitamins with Minerals oral tablet: 1 tab(s) orally once a day (14 Oct 2021 19:06)  nystatin 100,000 units/g topical cream: Apply topically to affected area 2 times a day, As Needed - for rash   *****groin**** (14 Oct 2021 19:06)  oxyCODONE 5 mg oral capsule: 1 cap(s) orally 2 times a day, As Needed - for moderate pain, for severe pain (14 Oct 2021 19:06)  polyethylene glycol 3350 oral powder for reconstitution: 17 gram(s) orally once a day (14 Oct 2021 19:06)  Potassium Chloride (Eqv-K-Tab) 20 mEq oral tablet, extended release: 0.5 tab(s) orally once a day (14 Oct 2021 19:06)  Senna 8.6 mg oral tablet: 2 tab(s) orally once a day (at bedtime) (14 Oct 2021 19:06)  silver sulfADIAZINE 1% topical cream: Apply topically to affected area once a day  ****Right buttocks*** (14 Oct 2021 19:06)  silver sulfADIAZINE 1% topical cream: Apply topically to affected area once a day, As Needed - for wound care  *****Right Buttocks**** (14 Oct 2021 19:06)  sodium hypochlorite 0.125% topical solution: Apply topically to affected area once a day  ****Left Posterior Calf**** (14 Oct 2021 19:06)  sodium hypochlorite 0.125% topical solution: Apply topically to affected area once a day, As Needed - for wound care  *****Left Posterior Calf**** (14 Oct 2021 19:06)  Stress Formula with Iron oral tablet: 1 tab(s) orally once a day (14 Oct 2021 19:06)    MEDICATIONS  (STANDING):  ascorbic acid 500 milliGRAM(s) Oral daily  cefTRIAXone Injectable. 1000 milliGRAM(s) IV Push every 24 hours  doxycycline hyclate Capsule 100 milliGRAM(s) Oral every 12 hours  enoxaparin Injectable 50 milliGRAM(s) SubCutaneous every 12 hours  furosemide   Injectable 40 milliGRAM(s) IV Push two times a day  influenza   Vaccine 0.5 milliLiter(s) IntraMuscular once  losartan 25 milliGRAM(s) Oral daily  metoprolol succinate ER 25 milliGRAM(s) Oral daily  mirtazapine 15 milliGRAM(s) Oral at bedtime  polyethylene glycol 3350 17 Gram(s) Oral daily  potassium chloride    Tablet ER 40 milliEquivalent(s) Oral once  potassium chloride   Powder 40 milliEquivalent(s) Oral three times a day  senna 2 Tablet(s) Oral at bedtime  silver sulfADIAZINE 1% Cream 1 Application(s) Topical daily  zinc sulfate 220 milliGRAM(s) Oral daily    MEDICATIONS  (PRN):  acetaminophen   Tablet .. 650 milliGRAM(s) Oral every 6 hours PRN Temp greater or equal to 38C (100.4F), Mild Pain (1 - 3)  aluminum hydroxide/magnesium hydroxide/simethicone Suspension 30 milliLiter(s) Oral every 4 hours PRN Dyspepsia  melatonin 3 milliGRAM(s) Oral at bedtime PRN Insomnia  ondansetron Injectable 4 milliGRAM(s) IV Push every 8 hours PRN Nausea and/or Vomiting      Allergies    No Known Allergies    Intolerances        Vital Signs Last 24 Hrs  T(C): 36.8 (16 Oct 2021 07:18), Max: 36.8 (16 Oct 2021 07:18)  T(F): 98.2 (16 Oct 2021 07:18), Max: 98.2 (16 Oct 2021 07:18)  HR: 87 (16 Oct 2021 07:18) (87 - 107)  BP: 172/64 (16 Oct 2021 07:18) (165/79 - 172/64)  BP(mean): --  RR: 19 (16 Oct 2021 07:18) (19 - 19)  SpO2: 96% (16 Oct 2021 07:18) (93% - 96%)      PHYSICAL EXAMINATION:    NECK:  Supple. No lymphadenopathy. Jugular venous pressure not elevated. Carotids equal.   HEART:   The cardiac impulse has a normal quality. Reg., Nl S1 and S2.  There are no murmurs, rubs or gallops noted  CHEST:  Chest crackles to auscultation. Normal respiratory effort.  ABDOMEN:  Soft and nontender.   EXTREMITIES:  There is no edema.       LABS:                        11.1   6.40  )-----------( 144      ( 16 Oct 2021 07:59 )             35.0     10-16    142  |  101  |  19  ----------------------------<  99  2.8<LL>   |  34<H>  |  0.62    Ca    9.7      16 Oct 2021 07:59    TPro  6.9  /  Alb  3.5  /  TBili  0.7  /  DBili  x   /  AST  22  /  ALT  19  /  AlkPhos  87  10-14    PT/INR - ( 15 Oct 2021 07:37 )   PT: 20.9 sec;   INR: 1.84 ratio           Urinalysis Basic - ( 14 Oct 2021 14:13 )    Color: Yellow / Appearance: Slightly Turbid / S.020 / pH: x  Gluc: x / Ketone: Trace  / Bili: Negative / Urobili: Negative mg/dL   Blood: x / Protein: 30 mg/dL / Nitrite: Negative   Leuk Esterase: Moderate / RBC: 25-50 /HPF / WBC 11-25   Sq Epi: x / Non Sq Epi: Occasional / Bacteria: Few

## 2021-10-16 NOTE — PROGRESS NOTE ADULT - SUBJECTIVE AND OBJECTIVE BOX
Date of service: 10-16-21 @ 15:18      Patient lying in bed, sleeping but arousable, afebrile      ROS: unable to obtain secondary to patient medical condition     MEDICATIONS  (STANDING):  ascorbic acid 500 milliGRAM(s) Oral daily  cefTRIAXone Injectable. 1000 milliGRAM(s) IV Push every 24 hours  doxycycline hyclate Capsule 100 milliGRAM(s) Oral every 12 hours  enoxaparin Injectable 50 milliGRAM(s) SubCutaneous every 12 hours  furosemide   Injectable 40 milliGRAM(s) IV Push two times a day  influenza   Vaccine 0.5 milliLiter(s) IntraMuscular once  losartan 25 milliGRAM(s) Oral daily  metoprolol succinate ER 25 milliGRAM(s) Oral daily  mirtazapine 15 milliGRAM(s) Oral at bedtime  polyethylene glycol 3350 17 Gram(s) Oral daily  potassium chloride    Tablet ER 40 milliEquivalent(s) Oral once  potassium chloride   Powder 40 milliEquivalent(s) Oral three times a day  senna 2 Tablet(s) Oral at bedtime  silver sulfADIAZINE 1% Cream 1 Application(s) Topical daily  zinc sulfate 220 milliGRAM(s) Oral daily    MEDICATIONS  (PRN):  acetaminophen   Tablet .. 650 milliGRAM(s) Oral every 6 hours PRN Temp greater or equal to 38C (100.4F), Mild Pain (1 - 3)  aluminum hydroxide/magnesium hydroxide/simethicone Suspension 30 milliLiter(s) Oral every 4 hours PRN Dyspepsia  melatonin 3 milliGRAM(s) Oral at bedtime PRN Insomnia  ondansetron Injectable 4 milliGRAM(s) IV Push every 8 hours PRN Nausea and/or Vomiting      Vital Signs Last 24 Hrs  T(C): 36.8 (16 Oct 2021 07:18), Max: 36.8 (16 Oct 2021 07:18)  T(F): 98.2 (16 Oct 2021 07:18), Max: 98.2 (16 Oct 2021 07:18)  HR: 87 (16 Oct 2021 07:18) (87 - 107)  BP: 172/64 (16 Oct 2021 07:18) (165/79 - 172/64)  BP(mean): --  RR: 19 (16 Oct 2021 07:18) (19 - 19)  SpO2: 96% (16 Oct 2021 07:18) (93% - 96%)        Physical Exam:        Constitutional: frail looking  HEENT: NC/AT, EOMI, PERRLA, conjunctivae clear; ears and nose atraumatic; pharynx clear  Neck: supple; thyroid not palpable  Back: no tenderness  Respiratory: respiratory effort normal; diminished at bases  Cardiovascular: S1S2 regular, no murmurs  Abdomen: soft, not tender, not distended, positive BS; no liver or spleen organomegaly  Genitourinary: no suprapubic tenderness  Musculoskeletal: no muscle tenderness, no joint swelling or tenderness  Neurological/ Psychiatric:  moving all extremities  Skin: bilateral heels covered with bandages    Labs: all available labs reviewed                        Labs:                        11.1   6.40  )-----------( 144      ( 16 Oct 2021 07:59 )             35.0     10-16    142  |  101  |  19  ----------------------------<  99  2.8<LL>   |  34<H>  |  0.62    Ca    9.7      16 Oct 2021 07:59      pH, Fluid (10.15.21 @ 12:00)    pH, Fluid: 7.8: Reference Ranges have NOT been established for analytes in body fluids  because of variability in body fluid composition.  The  has not determined the efficacy of this test when  performed on fluid specimens. The performance characteristics of this  test were determined by [employer].      Cell Count, Body Fluid (10.15.21 @ 12:00)    Mesothelial Cells - Body Fluid: 1 %    Monocyte/Macrophage Count - Body Fluid: 37 %    Fluid Segmented Granulocytes: 23 %    Fluid Type: Pleural Fluid    Body Fluid Appearance: Slightly Cloudy    BF Lymphocytes: 39 %    Tube Type: Lavendar    Color - Body Fluid: Orange    Total Nucleated Cell Count, Body Fluid: 1108: Peritoneal/Pleural/ Pericardial  Body Fluid Types            Total Nucleated cells: <500/uL            Neutrophils: <25%          Synovial Body Fluid Type           Total Nucleated cells: <150/uL           Neutrophils: <25%           Lymphocytes: <75%           Moncytes/Macrophages: </=70% /uL    Total RBC Count: 50477 /uL                             Cultures:       Culture - Body Fluid with Gram Stain (collected 10-15-21 @ 12:00)  Source: .Body Fluid Pleural Fluid  Gram Stain (10-15-21 @ 21:50):    polymorphonuclear leukocytes seen    No organisms seen    by cytocentrifuge    Culture - Blood (collected 10-14-21 @ 15:29)  Source: .Blood None  Preliminary Report (10-15-21 @ 20:00):    No growth to date.    Culture - Blood (collected 10-14-21 @ 14:13)  Source: .Blood Blood-Peripheral  Preliminary Report (10-15-21 @ 19:01):    No growth to date.    Culture - Urine (collected 10-14-21 @ 14:13)  Source: Clean Catch Clean Catch (Midstream)  Final Report (10-15-21 @ 17:36):    <10,000 CFU/mL Normal Urogenital Khadijah            < from: CT Chest No Cont (10.14.21 @ 17:59) >    EXAM:  CT CHEST                            PROCEDURE DATE:  10/14/2021          INTERPRETATION:  CLINICAL INFORMATION: Heart failure    COMPARISON: 12/10/2017    CONTRAST/COMPLICATIONS:  IV Contrast: NONE  Oral Contrast: NONE  Complications: None reported at time of study completion    PROCEDURE:  CT of the Chest was performed.  Sagittal and coronal reformats were performed.    FINDINGS:    LUNGS AND AIRWAYS: Patent central airways.  Bilateral lower lobe compressive atelectasis. No evidence of pulmonary infiltrate.  PLEURA: Moderate bilateral pleural effusions  MEDIASTINUM AND DESTINI: No lymphadenopathy.  VESSELS: Atherosclerotic changes of the thoracic aorta.  HEART: Cardiomegaly. No pericardial effusion.  CHEST WALL AND LOWER NECK: Within normal limits.  VISUALIZED UPPER ABDOMEN: Within normal limits.  BONES: Degenerative changes    IMPRESSION:  Cardiomegaly. Bilateral pleural effusions with bilateral lower lobe compressive atelectasis.    No evidence of pneumonia.    < end of copied text >        Radiology: all available radiological tests reviewed    Advanced directives addressed: full resuscitation

## 2021-10-16 NOTE — PROGRESS NOTE ADULT - SUBJECTIVE AND OBJECTIVE BOX
Patient seen and examined:  With baseline confusion, answering some   questions appropriately. No fever   no acute events overnight. In no pain.    ROS: Difficult to assess    Vital Signs Last 24 Hrs  T(C): 36.8 (16 Oct 2021 07:18), Max: 36.8 (16 Oct 2021 07:18)  T(F): 98.2 (16 Oct 2021 07:18), Max: 98.2 (16 Oct 2021 07:18)  HR: 87 (16 Oct 2021 07:18) (87 - 107)  BP: 172/64 (16 Oct 2021 07:18) (165/79 - 172/64)  BP(mean): --  RR: 19 (16 Oct 2021 07:18) (19 - 19)  SpO2: 96% (16 Oct 2021 07:18) (93% - 96%)    PHYSICAL EXAM:  Constitutional: Lethargic, thin frail, ill appearing  HEENT: unable to assess  Neck: Soft and supple  Respiratory: Breath sounds are diminished bilaterally.  Cardiovascular: S1 and S2, IR, IR  Gastrointestinal: Bowel Sounds present, soft, nontender, nondistended, no guarding, no rebound  Extremities: Samantha boots   Vascular: Samantha boots in place  Neurological: A/O - unable to assess, lethargic    Musculoskeletal: unable to follow commands  Skin: Sarcal ulcer, ble samantha boots.      LABS: All Labs Reviewed:                        11.1   6.40  )-----------( 144      ( 16 Oct 2021 07:59 )             35.0     10-16    142  |  101  |  19  ----------------------------<  99  2.8<LL>   |  34<H>  |  0.62    Ca    9.7      16 Oct 2021 07:59    TPro  6.9  /  Alb  3.5  /  TBili  0.7  /  DBili  x   /  AST  22  /  ALT  19  /  AlkPhos  87  10-14    PT/INR - ( 15 Oct 2021 07:37 )   PT: 20.9 sec;   INR: 1.84 ratio

## 2021-10-16 NOTE — PROGRESS NOTE ADULT - ASSESSMENT
89 y/o Female with PMHx Afib on coumadin, HTN, mild dementia, chronic lymphedema without known h/o CHF, presented from SNF for evaluation of worsening SOB. Admitted for:    #Acute hypoxic respiratory failure due to Mod-Large b/l pleural effusions - unclear etiology. Possible PNA  Supplemental 02 - 3L NC   CT as above. no evidence on PNA.   IV Lasix 40mg BID   Eliquis held. Cont IV Lovenox for now.   F/u lights criteria for diagnostic  Echo EF 60-65%  d/c Vancomycin, Cefepime -- On Ceftriaxone, PO Doxy  ID, Cardio, Pulm consult     #Metabolic encephalopathy   underlying dementia. Cont. plan above.     #Chronic Afib  Daughter reports being on Eliquis not coumadin outpatient  Lovenox BID for stroke ppx for pig tail   Cont. BB  F/u echo  Cardio: [place]    #Pyuria  Ceftriaxone. f/u cultures.     #Sacral Decubiti, B/l leg wounds  #Dementia, Mild.  #Moderate Protein Calori Malnutrition  #Debility. Frailty.   Supportive care, Wound care eval  Cont. Remeron.   Air mattress,  turn and position   Ensure w/ meals. Vitamin C and zinc for wound healing.  Swallow eval for dysphagia     #B/l ankle fractures  NWB? need to clarify WB status.  Samantha Boots, to be changed q7d?  PT eval     #Advanced Directives / GOC:    Full Code in place. Agreeable to [facility] at this time - aware of risks/benefits.     #DVT proph- Lovenox BID     89 y/o Female with PMHx Afib on coumadin, HTN, mild dementia, chronic lymphedema without known h/o CHF, presented from SNF for evaluation of worsening SOB. Admitted for:    #Acute hypoxic respiratory failure due to Mod-Large b/l pleural effusions - unclear etiology. Possible PNA  Supplemental 02 - 3L NC   CT as above. no evidence on PNA.   IV Lasix 40mg BID   Eliquis held. Cont IV Lovenox for now.   F/u lights criteria for diagnostic  Echo EF 60-65%  d/c Vancomycin, Cefepime -- On Ceftriaxone, PO Doxy  ID, Cardio, Pulm consult     #Metabolic encephalopathy   underlying dementia. Cont. plan above.     #Chronic Afib  Daughter reports being on Eliquis not coumadin outpatient  Lovenox BID for stroke ppx for pig tail   Cont. BB  F/u echo  Cardio: [place]e    #Pyuria  Ceftriaxone. f/u cultures.     #Sacral Decubiti, B/l leg wounds  #Dementia, Mild.  #Moderate Protein Calori Malnutrition  #Debility. Frailty.   Supportive care, Wound care eval  Cont. Remeron.   Air mattress,  turn and position   Ensure w/ meals. Vitamin C and zinc for wound healing.  Recommend Palliative care, family on board.  Still not eating, recommend no aggressive measures  and discussed this with family/hcp.       #B/l ankle fractures  NWB? need to clarify WB status.  Samantha Boots, to be changed q7d?  PT eval     #Advanced Directives / GOC:    Full Code in place. Agreeable to [facility] at this time - aware of risks/benefits.     #DVT proph- Lovenox BID

## 2021-10-17 LAB
ANION GAP SERPL CALC-SCNC: 3 MMOL/L — LOW (ref 5–17)
BUN SERPL-MCNC: 20 MG/DL — SIGNIFICANT CHANGE UP (ref 7–23)
CALCIUM SERPL-MCNC: 9.2 MG/DL — SIGNIFICANT CHANGE UP (ref 8.5–10.1)
CHLORIDE SERPL-SCNC: 100 MMOL/L — SIGNIFICANT CHANGE UP (ref 96–108)
CO2 SERPL-SCNC: 35 MMOL/L — HIGH (ref 22–31)
CREAT SERPL-MCNC: 0.52 MG/DL — SIGNIFICANT CHANGE UP (ref 0.5–1.3)
GLUCOSE SERPL-MCNC: 96 MG/DL — SIGNIFICANT CHANGE UP (ref 70–99)
HCT VFR BLD CALC: 35.9 % — SIGNIFICANT CHANGE UP (ref 34.5–45)
HGB BLD-MCNC: 11.2 G/DL — LOW (ref 11.5–15.5)
MAGNESIUM SERPL-MCNC: 2.1 MG/DL — SIGNIFICANT CHANGE UP (ref 1.6–2.6)
MCHC RBC-ENTMCNC: 28.9 PG — SIGNIFICANT CHANGE UP (ref 27–34)
MCHC RBC-ENTMCNC: 31.2 GM/DL — LOW (ref 32–36)
MCV RBC AUTO: 92.5 FL — SIGNIFICANT CHANGE UP (ref 80–100)
PLATELET # BLD AUTO: 142 K/UL — LOW (ref 150–400)
POTASSIUM SERPL-MCNC: 3.2 MMOL/L — LOW (ref 3.5–5.3)
POTASSIUM SERPL-SCNC: 3.2 MMOL/L — LOW (ref 3.5–5.3)
RBC # BLD: 3.88 M/UL — SIGNIFICANT CHANGE UP (ref 3.8–5.2)
RBC # FLD: 16.1 % — HIGH (ref 10.3–14.5)
SODIUM SERPL-SCNC: 138 MMOL/L — SIGNIFICANT CHANGE UP (ref 135–145)
WBC # BLD: 7.06 K/UL — SIGNIFICANT CHANGE UP (ref 3.8–10.5)
WBC # FLD AUTO: 7.06 K/UL — SIGNIFICANT CHANGE UP (ref 3.8–10.5)

## 2021-10-17 PROCEDURE — 99233 SBSQ HOSP IP/OBS HIGH 50: CPT

## 2021-10-17 RX ORDER — POTASSIUM CHLORIDE 20 MEQ
10 PACKET (EA) ORAL
Refills: 0 | Status: COMPLETED | OUTPATIENT
Start: 2021-10-17 | End: 2021-10-17

## 2021-10-17 RX ADMIN — CEFTRIAXONE 1000 MILLIGRAM(S): 500 INJECTION, POWDER, FOR SOLUTION INTRAMUSCULAR; INTRAVENOUS at 23:07

## 2021-10-17 RX ADMIN — Medication 500 MILLIGRAM(S): at 10:06

## 2021-10-17 RX ADMIN — Medication 100 MILLIEQUIVALENT(S): at 10:56

## 2021-10-17 RX ADMIN — Medication 100 MILLIGRAM(S): at 21:01

## 2021-10-17 RX ADMIN — CEFTRIAXONE 1000 MILLIGRAM(S): 500 INJECTION, POWDER, FOR SOLUTION INTRAMUSCULAR; INTRAVENOUS at 00:10

## 2021-10-17 RX ADMIN — Medication 40 MILLIGRAM(S): at 17:19

## 2021-10-17 RX ADMIN — Medication 650 MILLIGRAM(S): at 16:48

## 2021-10-17 RX ADMIN — Medication 25 MILLIGRAM(S): at 10:06

## 2021-10-17 RX ADMIN — ZINC SULFATE TAB 220 MG (50 MG ZINC EQUIVALENT) 220 MILLIGRAM(S): 220 (50 ZN) TAB at 10:08

## 2021-10-17 RX ADMIN — POLYETHYLENE GLYCOL 3350 17 GRAM(S): 17 POWDER, FOR SOLUTION ORAL at 10:08

## 2021-10-17 RX ADMIN — Medication 100 MILLIEQUIVALENT(S): at 12:13

## 2021-10-17 RX ADMIN — ENOXAPARIN SODIUM 50 MILLIGRAM(S): 100 INJECTION SUBCUTANEOUS at 21:01

## 2021-10-17 RX ADMIN — MIRTAZAPINE 15 MILLIGRAM(S): 45 TABLET, ORALLY DISINTEGRATING ORAL at 21:01

## 2021-10-17 RX ADMIN — ENOXAPARIN SODIUM 50 MILLIGRAM(S): 100 INJECTION SUBCUTANEOUS at 10:06

## 2021-10-17 RX ADMIN — Medication 1 APPLICATION(S): at 11:46

## 2021-10-17 RX ADMIN — Medication 100 MILLIGRAM(S): at 10:55

## 2021-10-17 RX ADMIN — Medication 100 MILLIEQUIVALENT(S): at 09:59

## 2021-10-17 RX ADMIN — Medication 40 MILLIGRAM(S): at 10:06

## 2021-10-17 RX ADMIN — SENNA PLUS 2 TABLET(S): 8.6 TABLET ORAL at 21:01

## 2021-10-17 RX ADMIN — Medication 650 MILLIGRAM(S): at 16:14

## 2021-10-17 RX ADMIN — LOSARTAN POTASSIUM 25 MILLIGRAM(S): 100 TABLET, FILM COATED ORAL at 10:06

## 2021-10-17 RX ADMIN — Medication 3 MILLIGRAM(S): at 21:01

## 2021-10-17 NOTE — PROGRESS NOTE ADULT - SUBJECTIVE AND OBJECTIVE BOX
Patient seen and examined.  Lethargic today, no acute events overnight.  Minimal po intake.     ROS: Negative except for above      Vital Signs Last 24 Hrs  T(C): 36.2 (17 Oct 2021 06:59), Max: 36.4 (16 Oct 2021 19:55)  T(F): 97.2 (17 Oct 2021 06:59), Max: 97.6 (16 Oct 2021 19:55)  HR: 88 (17 Oct 2021 06:59) (88 - 91)  BP: 139/68 (17 Oct 2021 06:59) (132/76 - 139/68)  BP(mean): --  RR: 20 (17 Oct 2021 06:59) (20 - 20)  SpO2: 98% (17 Oct 2021 06:59) (98% - 98%)    PHYSICAL EXAM:  Constitutional: Lethargic, thin frail, ill appearing  HEENT: unable to assess  Neck: Soft and supple  Respiratory: Breath sounds are diminished bilaterally.  Cardiovascular: S1 and S2, IR, IR  Gastrointestinal: Bowel Sounds present, soft, nontender, nondistended, no guarding, no rebound  Extremities: Samantha boots   Vascular: Samantha boots in place  Neurological: A/O - unable to assess, lethargic    Musculoskeletal: unable to follow commands  Skin: Sarcal ulcer, ble samantha boots.      LABS: All Labs Reviewed:                        11.2   7.06  )-----------( 142      ( 17 Oct 2021 07:06 )             35.9     10-17    138  |  100  |  20  ----------------------------<  96  3.2<L>   |  35<H>  |  0.52    Ca    9.2      17 Oct 2021 07:06  Mg     2.1     10-17    < from: Xray Chest 1 View- PORTABLE-Routine (Xray Chest 1 View- PORTABLE-Routine in AM.) (10.16.21 @ 09:53) >  Impression: Patient is status post right-sided pigtail catheter placement and interval removal. Near complete resolution of previously seen right-sided pleural effusion. No evidence of pneumothorax. Small to moderate left pleural effusion. Mild right basilar airspace opacity.    < end of copied text >  < from: CT Chest No Cont (10.14.21 @ 17:59) >  IMPRESSION:  Cardiomegaly. Bilateral pleural effusions with bilateral lower lobe compressive atelectasis.    No evidence of pneumonia.    < end of copied text >

## 2021-10-17 NOTE — PROGRESS NOTE ADULT - SUBJECTIVE AND OBJECTIVE BOX
Subjective:    pat better, lying in bed, no new complaint.    Home Medications:  acetaminophen 650 mg oral tablet: 1 tab(s) orally every 6 hours, As Needed - for mild pain (14 Oct 2021 19:06)  Acidophilus oral capsule: 1 cap(s) orally 3 times a day (14 Oct 2021 19:06)  Aquaphor topical ointment: Apply topically to affected area 2 times a day  ****Bilateral Lower Extremeties** (14 Oct 2021 19:06)  Betadine 5% topical spray: Apply topically to affected area 2 times a day  *****Bilateral Heels**** (14 Oct 2021 19:06)  Betadine 5% topical spray: Apply topically to affected area once a day, As Needed - for wound care  ****Bilateral Heels**** (14 Oct 2021 19:06)  busPIRone 5 mg oral tablet: 1 tab(s) orally 2 times a day (14 Oct 2021 19:06)  cefTRIAXone 1 g intravenous injection: 1 gram(s) intravenous once a day  ****Course not Completed**** (14 Oct 2021 19:06)  cholecalciferol 25 mcg (1000 intl units) oral tablet: 3 tab(s) orally once a day (14 Oct 2021 19:06)  dermasyn gel: Apply topically to affected area once a day  ****Right posterior thigh**** (14 Oct 2021 19:06)  dermasyn gel: Apply topically to affected area once a day, As Needed - for wound care  ****Right posterior thigh*** (14 Oct 2021 19:06)  Eliquis 5 mg oral tablet: 1 tab(s) orally 2 times a day (14 Oct 2021 19:06)  ferrous sulfate 325 mg (65 mg elemental iron) oral tablet: 1 tab(s) orally once a day (14 Oct 2021 19:06)  furosemide 40 mg oral tablet: 1 tab(s) orally once a day (14 Oct 2021 19:06)  LORazepam 0.5 mg oral tablet: 1 tab(s) orally once a day (at bedtime), As Needed - for anxiety (14 Oct 2021 19:06)  losartan 50 mg oral tablet: 1 tab(s) orally once a day (14 Oct 2021 19:06)  Melatonin 3 mg oral tablet: 1 tab(s) orally once a day (at bedtime) (14 Oct 2021 19:06)  methocarbamol 500 mg oral tablet: 1 tab(s) orally once a day, As Needed - for muscle spasm (14 Oct 2021 19:06)  Metoprolol Tartrate 25 mg oral tablet: 0.5 tab(s) orally 2 times a day (14 Oct 2021 19:06)  mirtazapine 15 mg oral tablet: 1 tab(s) orally once a day (at bedtime) (14 Oct 2021 19:06)  Multiple Vitamins with Minerals oral tablet: 1 tab(s) orally once a day (14 Oct 2021 19:06)  nystatin 100,000 units/g topical cream: Apply topically to affected area 2 times a day, As Needed - for rash   *****groin**** (14 Oct 2021 19:06)  oxyCODONE 5 mg oral capsule: 1 cap(s) orally 2 times a day, As Needed - for moderate pain, for severe pain (14 Oct 2021 19:06)  polyethylene glycol 3350 oral powder for reconstitution: 17 gram(s) orally once a day (14 Oct 2021 19:06)  Potassium Chloride (Eqv-K-Tab) 20 mEq oral tablet, extended release: 0.5 tab(s) orally once a day (14 Oct 2021 19:06)  Senna 8.6 mg oral tablet: 2 tab(s) orally once a day (at bedtime) (14 Oct 2021 19:06)  silver sulfADIAZINE 1% topical cream: Apply topically to affected area once a day  ****Right buttocks*** (14 Oct 2021 19:06)  silver sulfADIAZINE 1% topical cream: Apply topically to affected area once a day, As Needed - for wound care  *****Right Buttocks**** (14 Oct 2021 19:06)  sodium hypochlorite 0.125% topical solution: Apply topically to affected area once a day  ****Left Posterior Calf**** (14 Oct 2021 19:06)  sodium hypochlorite 0.125% topical solution: Apply topically to affected area once a day, As Needed - for wound care  *****Left Posterior Calf**** (14 Oct 2021 19:06)  Stress Formula with Iron oral tablet: 1 tab(s) orally once a day (14 Oct 2021 19:06)    MEDICATIONS  (STANDING):  ascorbic acid 500 milliGRAM(s) Oral daily  cefTRIAXone Injectable. 1000 milliGRAM(s) IV Push every 24 hours  doxycycline hyclate Capsule 100 milliGRAM(s) Oral every 12 hours  enoxaparin Injectable 50 milliGRAM(s) SubCutaneous every 12 hours  furosemide   Injectable 40 milliGRAM(s) IV Push two times a day  influenza   Vaccine 0.5 milliLiter(s) IntraMuscular once  losartan 25 milliGRAM(s) Oral daily  metoprolol succinate ER 25 milliGRAM(s) Oral daily  mirtazapine 15 milliGRAM(s) Oral at bedtime  polyethylene glycol 3350 17 Gram(s) Oral daily  senna 2 Tablet(s) Oral at bedtime  silver sulfADIAZINE 1% Cream 1 Application(s) Topical daily  zinc sulfate 220 milliGRAM(s) Oral daily    MEDICATIONS  (PRN):  acetaminophen   Tablet .. 650 milliGRAM(s) Oral every 6 hours PRN Temp greater or equal to 38C (100.4F), Mild Pain (1 - 3)  aluminum hydroxide/magnesium hydroxide/simethicone Suspension 30 milliLiter(s) Oral every 4 hours PRN Dyspepsia  melatonin 3 milliGRAM(s) Oral at bedtime PRN Insomnia  ondansetron Injectable 4 milliGRAM(s) IV Push every 8 hours PRN Nausea and/or Vomiting      Allergies    No Known Allergies    Intolerances        Vital Signs Last 24 Hrs  T(C): 36.2 (17 Oct 2021 06:59), Max: 36.4 (16 Oct 2021 19:55)  T(F): 97.2 (17 Oct 2021 06:59), Max: 97.6 (16 Oct 2021 19:55)  HR: 88 (17 Oct 2021 06:59) (88 - 91)  BP: 139/68 (17 Oct 2021 06:59) (132/76 - 139/68)  BP(mean): --  RR: 20 (17 Oct 2021 06:59) (20 - 20)  SpO2: 98% (17 Oct 2021 06:59) (98% - 98%)      PHYSICAL EXAMINATION:    NECK:  Supple. No lymphadenopathy. Jugular venous pressure not elevated. Carotids equal.   HEART:   The cardiac impulse has a normal quality. Reg., Nl S1 and S2.  There are no murmurs, rubs or gallops noted  CHEST:  Chest crackles to auscultation. Normal respiratory effort.  ABDOMEN:  Soft and nontender.   EXTREMITIES:  There is no edema.       LABS:                        11.2   7.06  )-----------( 142      ( 17 Oct 2021 07:06 )             35.9     10-17    138  |  100  |  20  ----------------------------<  96  3.2<L>   |  35<H>  |  0.52    Ca    9.2      17 Oct 2021 07:06  Mg     2.1     10-17

## 2021-10-17 NOTE — PROGRESS NOTE ADULT - ASSESSMENT
PROBLEMS:    Bilateral pleural effusion  Atelectasis of both lungs/PNEUMONIA  Chronic diastolic congestive heart failure  Chronic atrial fibrillation    PLAN:    pulmonary better  FIO2 TITRATE  Underwent a right PTC, drained over 1L - removed due to pain  CT SURGERY FU  IV RHOCEPHIN/DOXYCYLIN  DVT PROPHYLASIX

## 2021-10-17 NOTE — PROGRESS NOTE ADULT - SUBJECTIVE AND OBJECTIVE BOX
Patient is a 89y old  Female who presents with a chief complaint of Worsening SOB/ hypoxia (16 Oct 2021 15:17)      HPI:  89yo/F with PMH afib on coumadin, HTN, mild dementia, chronic lymphedema without known h/o CHF, presented from [facility] for evaluation of worsening SOB. Patient unable to provide ay history. Per daughter at bedside, she is not on oxygen at her baseline. 5 days ago at [facility] she noted to become more hypoxic and was placed on O2. Then patient had more requirement for O2 and CXR was done that showed opacification of both bases suspicious for pneumonia and patient initiated on IV antibiotics. At this point, daughter insisted on transfer to  for further diagnosis and treatment. Patient was at  in June for b/l tib/fib fractures for which she was NWB and was at SNF. No fever reported.  (14 Oct 2021 18:21)    10/15/2021- Cardiology: 89 year old woman with HBP, long term persistent atrial fibrillation who is admitted with progressive shortness of breath and CT chest shows bilateral moderate to large pleural effusions. She has chronic venous insufficiency but no prior history of heart failure. Last admitted to [facility] with bilateral tib-fib fractures that were treated nonoperatively.Patient with mild slowly progressive dementia, monoclonal gammopathy, prior urinary tract infections, chronic Reaves catheter. Warfarin waschanged to Eliquis in August 2021.  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 Kitchen 2017          MEDICATIONS  (STANDING):  ascorbic acid 500 milliGRAM(s) Oral daily  cefTRIAXone Injectable. 1000 milliGRAM(s) IV Push every 24 hours  doxycycline hyclate Capsule 100 milliGRAM(s) Oral every 12 hours  enoxaparin Injectable 50 milliGRAM(s) SubCutaneous every 12 hours  furosemide   Injectable 40 milliGRAM(s) IV Push two times a day  influenza   Vaccine 0.5 milliLiter(s) IntraMuscular once  losartan 25 milliGRAM(s) Oral daily  metoprolol succinate ER 25 milliGRAM(s) Oral daily  mirtazapine 15 milliGRAM(s) Oral at bedtime  polyethylene glycol 3350 17 Gram(s) Oral daily  potassium chloride  10 mEq/100 mL IVPB 10 milliEquivalent(s) IV Intermittent every 1 hour  senna 2 Tablet(s) Oral at bedtime  silver sulfADIAZINE 1% Cream 1 Application(s) Topical daily  zinc sulfate 220 milliGRAM(s) Oral daily    MEDICATIONS  (PRN):  acetaminophen   Tablet .. 650 milliGRAM(s) Oral every 6 hours PRN Temp greater or equal to 38C (100.4F), Mild Pain (1 - 3)  aluminum hydroxide/magnesium hydroxide/simethicone Suspension 30 milliLiter(s) Oral every 4 hours PRN Dyspepsia  melatonin 3 milliGRAM(s) Oral at bedtime PRN Insomnia  ondansetron Injectable 4 milliGRAM(s) IV Push every 8 hours PRN Nausea and/or Vomiting          Vital Signs Last 24 Hrs  T(C): 36.4 (16 Oct 2021 19:55), Max: 36.4 (16 Oct 2021 19:55)  T(F): 97.6 (16 Oct 2021 19:55), Max: 97.6 (16 Oct 2021 19:55)  HR: 91 (16 Oct 2021 19:55) (91 - 91)  BP: 132/76 (16 Oct 2021 19:55) (132/76 - 132/76)  BP(mean): --  RR: --  SpO2: 98% (16 Oct 2021 19:55) (98% - 98%)    I&O's Summary    16 Oct 2021 07:01  -  17 Oct 2021 07:00  --------------------------------------------------------  IN: 0 mL / OUT: 1250 mL / NET: -1250 mL        PHYSICAL EXAM  General Appearance: comfortable at rest  HEENT:   Neck:   Back:   Lungs: clear anteriorly  Heart: IRR s1s2 karri base 2/6  Abdomen:   Extremities: 1+ edema  Skin:   Neurologic:         INTERPRETATION OF TELEMETRY:    ECG:        LABS:                          11.2   7.06  )-----------( 142      ( 17 Oct 2021 07:06 )             35.9     10-17    138  |  100  |  20  ----------------------------<  96  3.2<L>   |  35<H>  |  0.52    Ca    9.2      17 Oct 2021 07:06  Mg     2.1     10-17            Pro BNP  71544 10-14 @ 14:13  D Dimer  -- 10-14 @ 14:13              RADIOLOGY & ADDITIONAL STUDIES:

## 2021-10-17 NOTE — PROGRESS NOTE ADULT - ASSESSMENT
89 y/o Female with PMHx Afib on coumadin, HTN, mild dementia, chronic lymphedema without known h/o CHF, presented from SNF for evaluation of worsening SOB. Admitted for:    #Acute hypoxic respiratory failure due to Mod-Large b/l pleural effusions - unclear etiology. Possible PNA  Supplemental 02 - 3L NC   CT as above. no evidence on PNA.   IV Lasix 40mg BID   Eliquis held. Cont IV Lovenox for now.   F/u lights criteria for diagnostic  Echo EF 60-65%  d/c Vancomycin, Cefepime -- On Ceftriaxone, PO Doxy  ID, Cardio, Pulm consult     #Metabolic encephalopathy     underlying dementia    #Chronic Afib  Daughter reports being on Eliquis not coumadin outpatient  Lovenox BID for stroke ppx for pig tail   Cont. BB  F/u echo  Cardio: [place]    #Pyuria  Ceftriaxone. f/u cultures.     #Sacral Decubiti, B/l leg wounds  #Dementia, Mild.  #Moderate Protein Calori Malnutrition  #Debility. Frailty.   Supportive care, Wound care eval  Cont. Remeron.   Air mattress,  turn and position   Ensure w/ meals. Vitamin C and zinc for wound healing.  Recommend Palliative care, family on board.  Still not eating, recommend no aggressive measures  and discussed this with family/hcp.       #B/l ankle fractures  NWB? need to clarify WB status.  Samantha Boots, to be changed q7d?  PT eval     #Advanced Directives / GOC:    Full Code in place. Agreeable to Pigtail at this time - aware of risks/benefits.     #DVT proph- Lovenox BID

## 2021-10-18 LAB
ANION GAP SERPL CALC-SCNC: 7 MMOL/L — SIGNIFICANT CHANGE UP (ref 5–17)
BUN SERPL-MCNC: 21 MG/DL — SIGNIFICANT CHANGE UP (ref 7–23)
CALCIUM SERPL-MCNC: 9.6 MG/DL — SIGNIFICANT CHANGE UP (ref 8.5–10.1)
CHLORIDE SERPL-SCNC: 96 MMOL/L — SIGNIFICANT CHANGE UP (ref 96–108)
CO2 SERPL-SCNC: 35 MMOL/L — HIGH (ref 22–31)
CREAT SERPL-MCNC: 0.61 MG/DL — SIGNIFICANT CHANGE UP (ref 0.5–1.3)
GLUCOSE SERPL-MCNC: 131 MG/DL — HIGH (ref 70–99)
HCT VFR BLD CALC: 39.9 % — SIGNIFICANT CHANGE UP (ref 34.5–45)
HGB BLD-MCNC: 12.3 G/DL — SIGNIFICANT CHANGE UP (ref 11.5–15.5)
MCHC RBC-ENTMCNC: 29.2 PG — SIGNIFICANT CHANGE UP (ref 27–34)
MCHC RBC-ENTMCNC: 30.8 GM/DL — LOW (ref 32–36)
MCV RBC AUTO: 94.8 FL — SIGNIFICANT CHANGE UP (ref 80–100)
PLATELET # BLD AUTO: 153 K/UL — SIGNIFICANT CHANGE UP (ref 150–400)
POTASSIUM SERPL-MCNC: 3 MMOL/L — LOW (ref 3.5–5.3)
POTASSIUM SERPL-SCNC: 3 MMOL/L — LOW (ref 3.5–5.3)
RBC # BLD: 4.21 M/UL — SIGNIFICANT CHANGE UP (ref 3.8–5.2)
RBC # FLD: 15.9 % — HIGH (ref 10.3–14.5)
SODIUM SERPL-SCNC: 138 MMOL/L — SIGNIFICANT CHANGE UP (ref 135–145)
WBC # BLD: 6.39 K/UL — SIGNIFICANT CHANGE UP (ref 3.8–10.5)
WBC # FLD AUTO: 6.39 K/UL — SIGNIFICANT CHANGE UP (ref 3.8–10.5)

## 2021-10-18 PROCEDURE — 99223 1ST HOSP IP/OBS HIGH 75: CPT

## 2021-10-18 PROCEDURE — 99233 SBSQ HOSP IP/OBS HIGH 50: CPT

## 2021-10-18 PROCEDURE — 99231 SBSQ HOSP IP/OBS SF/LOW 25: CPT

## 2021-10-18 PROCEDURE — 99232 SBSQ HOSP IP/OBS MODERATE 35: CPT

## 2021-10-18 RX ADMIN — Medication 25 MILLIGRAM(S): at 10:21

## 2021-10-18 RX ADMIN — MIRTAZAPINE 15 MILLIGRAM(S): 45 TABLET, ORALLY DISINTEGRATING ORAL at 21:24

## 2021-10-18 RX ADMIN — POLYETHYLENE GLYCOL 3350 17 GRAM(S): 17 POWDER, FOR SOLUTION ORAL at 10:20

## 2021-10-18 RX ADMIN — ZINC SULFATE TAB 220 MG (50 MG ZINC EQUIVALENT) 220 MILLIGRAM(S): 220 (50 ZN) TAB at 10:20

## 2021-10-18 RX ADMIN — SENNA PLUS 2 TABLET(S): 8.6 TABLET ORAL at 21:24

## 2021-10-18 RX ADMIN — Medication 40 MILLIGRAM(S): at 18:06

## 2021-10-18 RX ADMIN — Medication 1 APPLICATION(S): at 10:22

## 2021-10-18 RX ADMIN — Medication 500 MILLIGRAM(S): at 10:20

## 2021-10-18 RX ADMIN — ENOXAPARIN SODIUM 50 MILLIGRAM(S): 100 INJECTION SUBCUTANEOUS at 21:24

## 2021-10-18 RX ADMIN — Medication 100 MILLIGRAM(S): at 21:23

## 2021-10-18 RX ADMIN — ENOXAPARIN SODIUM 50 MILLIGRAM(S): 100 INJECTION SUBCUTANEOUS at 10:16

## 2021-10-18 RX ADMIN — LOSARTAN POTASSIUM 25 MILLIGRAM(S): 100 TABLET, FILM COATED ORAL at 10:21

## 2021-10-18 RX ADMIN — Medication 100 MILLIGRAM(S): at 10:21

## 2021-10-18 RX ADMIN — Medication 40 MILLIGRAM(S): at 10:21

## 2021-10-18 NOTE — CONSULT NOTE ADULT - ASSESSMENT
Pt is a 89y old Female with hx of 89yo/F with PMH afib on coumadin, HTN, mild dementia, chronic lymphedema without known h/o CHF, presented from SNF for evaluation of worsening SOB.    1) Acute hypoxic respiratory failure   - Mod-Large b/l pleural effusions   - Supplemental 02 PRN   - CT - Cardiomegaly. Bilateral pleural effusions with bilateral lower lobe compressive atelectasis.  No evidence of pneumonia  - c/w IV Lasix 40mg BID   - Recent Echo 06/2021- EF 60-65%, repeat Echo   - c/w Ceftriaxone, PO Doxy  - cardiothoracic consult appreciated   - pulmonology consult appreciated   - cardiology consult appreciated   - s/p pigtail placement      2) Metabolic encephalopathy   - underlying dementia  - PPSV2: 20-30 %  - FAST: unsure of baseline   - supportive care     3) Sacral Decubiti, B/l leg wounds  - History of b/l wound fractures    - Moderate Protein Calorie Malnutrition  - Debility  - Supportive care  - Wound care eval  - c/w Remeron.   - Air mattress  - turn and position     4) Advance care planning   - patient lacks capacity at this time  - MOLST on chart - Full code   - NO HCP on file    Pt is a 89y old Female with hx of 89yo/F with PMH afib on coumadin, HTN, mild dementia, chronic lymphedema without known h/o CHF, presented from SNF for evaluation of worsening SOB.    1) Acute hypoxic respiratory failure   - Mod-Large b/l pleural effusions   - HFpEF with bilateral pleural effusions  - Supplemental 02 PRN   - CT - Cardiomegaly. Bilateral pleural effusions with bilateral lower lobe compressive atelectasis.  No evidence of pneumonia  - c/w IV Lasix 40mg BID   - Recent Echo 06/2021- EF 60-65%, repeat Echo   - c/w Ceftriaxone, PO Doxy  - cardiothoracic consult appreciated   - pulmonology consult appreciated   - cardiology consult appreciated   - s/p pigtail placement      2) Metabolic encephalopathy   - underlying dementia  - PPSV2: 20-30 %  - FAST: unsure of baseline   - supportive care     3) Sacral Decubiti, B/l leg wounds  - History of b/l wound fractures    - Moderate Protein Calorie Malnutrition  - Debility  - Supportive care  - Wound care eval  - c/w Remeron.   - Air mattress  - turn and position     4) Advance care planning   - patient lacks capacity at this time  - MOLST on chart - Full code   - NO HCP on file    Pt is a 89y old Female with hx of 89yo/F with PMH afib on coumadin, HTN, mild dementia, chronic lymphedema without known h/o CHF, presented from SNF for evaluation of worsening SOB.    1) Acute hypoxic respiratory failure   - Mod-Large b/l pleural effusions   - HFpEF with bilateral pleural effusions  - Supplemental 02 PRN   - CT - Cardiomegaly. Bilateral pleural effusions with bilateral lower lobe compressive atelectasis.  No evidence of pneumonia  - c/w IV Lasix 40mg BID   - Recent Echo 06/2021- EF 60-65%, repeat Echo   - c/w Ceftriaxone, PO Doxy  - cardiothoracic consult appreciated   - pulmonology consult appreciated   - cardiology consult appreciated   - s/p pigtail placement      2) Metabolic encephalopathy   - underlying dementia  - PPSV2: 20-30 %  - FAST: unsure of baseline   - supportive care     3) Sacral Decubiti, B/l leg wounds  - History of b/l wound fractures    - Moderate Protein Calorie Malnutrition  - Debility  - Supportive care  - Wound care eval  - c/w Remeron.   - Air mattress  - turn and position     4) Advance care planning   - patient lacks capacity at this time  - MOLST: DNR/I/ No feeding tube, determine use of antibitoics   - NO HCP on file

## 2021-10-18 NOTE — PROGRESS NOTE ADULT - SUBJECTIVE AND OBJECTIVE BOX
Subjective:  no distress  s/p right thoracentesis    MEDICATIONS  (STANDING):  ascorbic acid 500 milliGRAM(s) Oral daily  doxycycline hyclate Capsule 100 milliGRAM(s) Oral every 12 hours  enoxaparin Injectable 50 milliGRAM(s) SubCutaneous every 12 hours  furosemide   Injectable 40 milliGRAM(s) IV Push two times a day  influenza   Vaccine 0.5 milliLiter(s) IntraMuscular once  losartan 25 milliGRAM(s) Oral daily  metoprolol succinate ER 25 milliGRAM(s) Oral daily  mirtazapine 15 milliGRAM(s) Oral at bedtime  polyethylene glycol 3350 17 Gram(s) Oral daily  senna 2 Tablet(s) Oral at bedtime  silver sulfADIAZINE 1% Cream 1 Application(s) Topical daily  zinc sulfate 220 milliGRAM(s) Oral daily    MEDICATIONS  (PRN):  acetaminophen   Tablet .. 650 milliGRAM(s) Oral every 6 hours PRN Temp greater or equal to 38C (100.4F), Mild Pain (1 - 3)  aluminum hydroxide/magnesium hydroxide/simethicone Suspension 30 milliLiter(s) Oral every 4 hours PRN Dyspepsia  LORazepam   Injectable 0.25 milliGRAM(s) IV Push every 12 hours PRN Anxiety  melatonin 3 milliGRAM(s) Oral at bedtime PRN Insomnia  ondansetron Injectable 4 milliGRAM(s) IV Push every 8 hours PRN Nausea and/or Vomiting      Allergies    No Known Allergies    Intolerances        REVIEW OF SYSTEMS:    CONSTITUTIONAL:  As per HPI.  HEENT:  Eyes:  No diplopia or blurred vision. ENT:  No earache, sore throat or runny nose.  CARDIOVASCULAR:  No pressure, squeezing, tightness, heaviness or aching about the chest, neck, axilla or epigastrium.  RESPIRATORY:  No cough, shortness of breath, PND or orthopnea.  GASTROINTESTINAL:  No nausea, vomiting or diarrhea.  GENITOURINARY:  No dysuria, frequency or urgency.  MUSCULOSKELETAL:  no joint pain, deformity, tenderness  EXTREMITIES: no clubbing cyanosis,edema  SKIN:  No change in skin, hair or nails.  NEUROLOGIC:  No paresthesias, fasciculations, seizures or weakness.  PSYCHIATRIC:  No disorder of thought or mood.  ENDOCRINE:  No heat or cold intolerance, polyuria or polydipsia.  HEMATOLOGICAL:  No easy bruising or bleedings:    Vital Signs Last 24 Hrs  T(C): 36.1 (17 Oct 2021 21:04), Max: 36.1 (17 Oct 2021 21:04)  T(F): 97 (17 Oct 2021 21:04), Max: 97 (17 Oct 2021 21:04)  HR: 84 (17 Oct 2021 21:04) (84 - 84)  BP: 132/68 (17 Oct 2021 21:04) (132/68 - 132/68)  BP(mean): --  RR: --  SpO2: 99% (17 Oct 2021 21:04) (99% - 99%)    PHYSICAL EXAMINATION:  SKIN: no rashes  HEAD: NC/AT  EYES: PERRLA, EOMI  EARS: TM's intact  NOSE: no abnormalities  NECK:  Supple. No lymphadenopathy. Jugular venous pressure not elevated. Carotids equal.   HEART:   S1S2 irreg  CHEST: decreased BS left 1/3  ABDOMEN:  Soft and nontender.   EXTREMITIES:  no C/C/E  NEURO: no focal deficts       LABS:                        11.2   7.06  )-----------( 142      ( 17 Oct 2021 07:06 )             35.9     10-17    138  |  100  |  20  ----------------------------<  96  3.2<L>   |  35<H>  |  0.52    Ca    9.2      17 Oct 2021 07:06  Mg     2.1     10-17            RADIOLOGY & ADDITIONAL TESTS:

## 2021-10-18 NOTE — CONSULT NOTE ADULT - SUBJECTIVE AND OBJECTIVE BOX
HPI: Pt is a 89y old Female with hx of 89yo/F with PMH afib on coumadin, HTN, mild dementia, chronic lymphedema without known h/o CHF, presented from SNF for evaluation of worsening SOB. Patient unable to provide ay history. Per daughter at bedside, she is not on oxygen at her baseline. 5 days ago at SNF she noted to become more hypoxic and was placed on O2. Then patient had more requirement for O2 and CXR was done that showed opacification of both bases suspicious for pneumonia and patient initiated on IV antibiotics. At this point, daughter insisted on transfer to  for further diagnosis and treatment. Patient was at  in  for b/l tib/fib fractures for which she was NWB and was at SNF. Patient unable to provide any history, Palliative medicine consulted to help establish GOC and advance care planning     10/18/2021 patient seen and examined with no family at bedside, patient resting comfortable at this time, open eyes to tactile stimuli, but just moaned and then closed them. Will reach out to family to schedule GOC meeting       PAIN: (x )Yes   ( )No  unable to describe but as per RN staff at times patient moaning   DYSPNEA: (x ) Yes  ( ) No  Level: moderate at times - appears more comfortable at this time     PAST MEDICAL & SURGICAL HISTORY:  Lymphedema of both lower extremities  Venous insufficiency  Monoclonal gammopathies  Paroxysmal atrial fibrillation  Acute cystitis without hematuria septic  2016  Essential hypertension  Spinal stenosis  Spondyloarthritis  Vaginal prolapse  [place] (hard of hearing), bilateral  Hypertension  S/P kyphoplasty 2014  S/P thyroidectomy partial   1975  History of vaginal surgery  History of fracture of femur  hx of proximal femur fracture in 2021  History of repair of left hip joint  Hx L hip long IMN with Dr. Kitchen 2017    SOCIAL HX:    Hx opiate tolerance ( )YES  (x )NO    Baseline ADLs  (Prior to Admission)  ( ) Independent   (x )Dependent    FAMILY HISTORY:  No pertinent family history in first degree relatives    Review of Systems:    Unable to obtain/Limited due to: Depression     PHYSICAL EXAM:    Vital Signs Last 24 Hrs  T(C): 36.4 (18 Oct 2021 07:16), Max: 36.4 (18 Oct 2021 07:16)  T(F): 97.6 (18 Oct 2021 07:16), Max: 97.6 (18 Oct 2021 07:16)  HR: 89 (18 Oct 2021 07:16) (84 - 89)  BP: 130/82 (18 Oct 2021 07:16) (130/82 - 132/68)  RR: 19 (18 Oct 2021 07:16) (19 - 19)  SpO2: 100% (18 Oct 2021 07:16) (99% - 100%)   Daily Weight in k.3 (18 Oct 2021 06:40)    PPSV2:  20 %  FAST: unsure of baseline     General: lethargic, elderly female in bed   Mental Status: lethargic, did not respond to any questions or commands   HEENT: unable to assess   Lungs: diminished breath sounds   Cardiac: S1S2 +  GI: non tender, non distended + BS  : non tender, non distended, +BS   Ext: generalized edema  Neuro: lethargic  Skin: Sarcal ulcer, b/l samson boots    LABS:                        11.2   7.06  )-----------( 142      ( 17 Oct 2021 07:06 )             35.9     10-17    138  |  100  |  20  ----------------------------<  96  3.2<L>   |  35<H>  |  0.52    Ca    9.2      17 Oct 2021 07:06  Mg     2.1     10-17    Albumin: Albumin, Serum: 3.5 g/dL (10-14 @ 14:13)    Allergies- No Known Allergies    MEDICATIONS  (STANDING):  ascorbic acid 500 milliGRAM(s) Oral daily  doxycycline hyclate Capsule 100 milliGRAM(s) Oral every 12 hours  enoxaparin Injectable 50 milliGRAM(s) SubCutaneous every 12 hours  furosemide   Injectable 40 milliGRAM(s) IV Push two times a day  influenza   Vaccine 0.5 milliLiter(s) IntraMuscular once  losartan 25 milliGRAM(s) Oral daily  metoprolol succinate ER 25 milliGRAM(s) Oral daily  mirtazapine 15 milliGRAM(s) Oral at bedtime  polyethylene glycol 3350 17 Gram(s) Oral daily  senna 2 Tablet(s) Oral at bedtime  silver sulfADIAZINE 1% Cream 1 Application(s) Topical daily  zinc sulfate 220 milliGRAM(s) Oral daily    MEDICATIONS  (PRN):  acetaminophen   Tablet .. 650 milliGRAM(s) Oral every 6 hours PRN Temp greater or equal to 38C (100.4F), Mild Pain (1 - 3)  aluminum hydroxide/magnesium hydroxide/simethicone Suspension 30 milliLiter(s) Oral every 4 hours PRN Dyspepsia  LORazepam   Injectable 0.25 milliGRAM(s) IV Push every 12 hours PRN Anxiety  melatonin 3 milliGRAM(s) Oral at bedtime PRN Insomnia  ondansetron Injectable 4 milliGRAM(s) IV Push every 8 hours PRN Nausea and/or Vomiting      RADIOLOGY/ADDITIONAL STUDIES:    EXAM:  XR CHEST PORTABLE URGENT 1V                        EXAM:  XR CHEST PORTABLE ROUTINE 1V                        EXAM:  XR CHEST PORTABLE URGENT 1V                        PROCEDURE DATE:  10/15/2021    INTERPRETATION:  History: Pigtail catheter placement. Chest tube removal. Just heart failure.  Regressive the chest were performed on October 15, 2021 at 1301 hours, October 15, 2021 at 1808 hours, and 2021 at 0925 hours.  Comparison: 2021.  Findings:  October 15, 2021 at 1301 hours: Patient is status post interval placement of a right-sided pigtail chest tube. There is near complete resolution of the previously seen right-sided pleural effusion. There is no evidence of pneumothorax. There is a small to moderate left pleural effusion which appears mildly improved compared to the prior examination. Biapical pleural calcifications are noted. Heart size cannot be adequately evaluated. Patient is status post kyphoplasty.  October 15, 2021 [id number] hours: There is interval removal of a right-sided chest tube. There is interval development of airspace opacity within the right lower lung. Left-sided pleural effusion appears mildly enlarged. Other findings unchanged.  2021 at 0925 hours: Previously seen airspace opacity at the right lung base has mildly improved. Left-sided pleural effusion is mildly improved.    Impression: Patient is status post right-sided pigtail catheter placement and interval removal. Near complete resolution of previously seen right-sided pleural effusion. No evidence of pneumothorax. Small to moderate left pleural effusion. Mild right basilar airspace opacity.      EXAM:  CT CHEST                        PROCEDURE DATE:  10/14/2021    INTERPRETATION:  CLINICAL INFORMATION: Heart failure  COMPARISON: 12/10/2017  CONTRAST/COMPLICATIONS:  IV Contrast: NONE  Oral Contrast: NONE  Complications: None reported at time of study completion  PROCEDURE:  CT of the Chest was performed.  Sagittal and coronal reformats were performed.  FINDINGS:  LUNGS AND AIRWAYS: Patent central airways.  Bilateral lower lobe compressive atelectasis. No evidence of pulmonary infiltrate.  PLEURA: Moderate bilateral pleural effusions  MEDIASTINUM AND DESTINI: No lymphadenopathy.  VESSELS: Atherosclerotic changes of the thoracic aorta.  HEART: Cardiomegaly. No pericardial effusion.  CHEST WALL AND LOWER NECK: Within normal limits.  VISUALIZED UPPER ABDOMEN: Within normal limits.  BONES: Degenerative changes    IMPRESSION:  Cardiomegaly. Bilateral pleural effusions with bilateral lower lobe compressive atelectasis.  No evidence of pneumonia.         English

## 2021-10-18 NOTE — PROGRESS NOTE ADULT - SUBJECTIVE AND OBJECTIVE BOX
Patient seen and examined:  More lethargic this am, not opening eyes.  No acute events overnight.  Palliative meeting planned for today.    ROS: Difficult to assess.    Vital Signs Last 24 Hrs  T(C): 36.4 (18 Oct 2021 07:16), Max: 36.4 (18 Oct 2021 07:16)  T(F): 97.6 (18 Oct 2021 07:16), Max: 97.6 (18 Oct 2021 07:16)  HR: 89 (18 Oct 2021 07:16) (84 - 89)  BP: 130/82 (18 Oct 2021 07:16) (130/82 - 132/68)  BP(mean): --  RR: 19 (18 Oct 2021 07:16) (19 - 19)  SpO2: 100% (18 Oct 2021 07:16) (99% - 100%)    PHYSICAL EXAM:  Constitutional: Lethargic, thin frail, ill appearing  HEENT: unable to assess  Neck: Soft and supple  Respiratory: Breath sounds are diminished bilaterally.  Cardiovascular: S1 and S2, IR, IR  Gastrointestinal: Bowel Sounds present, soft, nontender, nondistended, no guarding, no rebound  Extremities: Samantha boots   Vascular: Samantha boots in place  Neurological: A/O - unable to assess, lethargic    Musculoskeletal: unable to follow commands  Skin: Sarcal ulcer, ble samantha boots.    LABS: All Labs Reviewed:                        11.2   7.06  )-----------( 142      ( 17 Oct 2021 07:06 )             35.9     10-17    138  |  100  |  20  ----------------------------<  96  3.2<L>   |  35<H>  |  0.52    Ca    9.2      17 Oct 2021 07:06  Mg     2.1     10-17

## 2021-10-18 NOTE — PROGRESS NOTE ADULT - SUBJECTIVE AND OBJECTIVE BOX
Date of service: 10-18-21 @ 14:04    Patient lying in bed; afebrile, intermittently moaning        ROS: unable to obtain secondary to patient medical condition     MEDICATIONS  (STANDING):  ascorbic acid 500 milliGRAM(s) Oral daily  doxycycline hyclate Capsule 100 milliGRAM(s) Oral every 12 hours  enoxaparin Injectable 50 milliGRAM(s) SubCutaneous every 12 hours  furosemide   Injectable 40 milliGRAM(s) IV Push two times a day  influenza   Vaccine 0.5 milliLiter(s) IntraMuscular once  losartan 25 milliGRAM(s) Oral daily  metoprolol succinate ER 25 milliGRAM(s) Oral daily  mirtazapine 15 milliGRAM(s) Oral at bedtime  polyethylene glycol 3350 17 Gram(s) Oral daily  senna 2 Tablet(s) Oral at bedtime  silver sulfADIAZINE 1% Cream 1 Application(s) Topical daily  zinc sulfate 220 milliGRAM(s) Oral daily    MEDICATIONS  (PRN):  acetaminophen   Tablet .. 650 milliGRAM(s) Oral every 6 hours PRN Temp greater or equal to 38C (100.4F), Mild Pain (1 - 3)  aluminum hydroxide/magnesium hydroxide/simethicone Suspension 30 milliLiter(s) Oral every 4 hours PRN Dyspepsia  LORazepam   Injectable 0.25 milliGRAM(s) IV Push every 12 hours PRN Anxiety  melatonin 3 milliGRAM(s) Oral at bedtime PRN Insomnia  ondansetron Injectable 4 milliGRAM(s) IV Push every 8 hours PRN Nausea and/or Vomiting      Vital Signs Last 24 Hrs  T(C): 36.4 (18 Oct 2021 07:16), Max: 36.4 (18 Oct 2021 07:16)  T(F): 97.6 (18 Oct 2021 07:16), Max: 97.6 (18 Oct 2021 07:16)  HR: 89 (18 Oct 2021 07:16) (84 - 89)  BP: 130/82 (18 Oct 2021 07:16) (130/82 - 132/68)  BP(mean): --  RR: 19 (18 Oct 2021 07:16) (19 - 19)  SpO2: 100% (18 Oct 2021 07:16) (99% - 100%)        Physical Exam:      Constitutional: frail looking  HEENT: NC/AT, EOMI, PERRLA, conjunctivae clear; ears and nose atraumatic; pharynx clear  Neck: supple; thyroid not palpable  Back: no tenderness  Respiratory: respiratory effort normal; diminished at bases  Cardiovascular: S1S2 regular, no murmurs  Abdomen: soft, not tender, not distended, positive BS; no liver or spleen organomegaly  Genitourinary: no suprapubic tenderness  Musculoskeletal: no muscle tenderness, no joint swelling or tenderness  Neurological/ Psychiatric:  moving all extremities  Skin: bilateral heels covered with bandages    Labs: all available labs reviewed                        Labs:                   Labs:                        12.3   6.39  )-----------( 153      ( 18 Oct 2021 12:01 )             39.9     10-18    138  |  96  |  21  ----------------------------<  131<H>  3.0<L>   |  35<H>  |  0.61    Ca    9.6      18 Oct 2021 12:01  Mg     2.1     10-17             Cultures:       Culture - Fungal, Body Fluid (collected 10-15-21 @ 12:00)  Source: .Body Fluid Pleural Fluid  Preliminary Report (10-18-21 @ 09:18):    Testing in progress    Culture - Body Fluid with Gram Stain (collected 10-15-21 @ 12:00)  Source: .Body Fluid Pleural Fluid  Gram Stain (10-15-21 @ 21:50):    polymorphonuclear leukocytes seen    No organisms seen    by cytocentrifuge  Preliminary Report (10-16-21 @ 17:31):    No growth    Culture - Blood (collected 10-14-21 @ 15:29)  Source: .Blood None  Preliminary Report (10-15-21 @ 20:00):    No growth to date.    Culture - Blood (collected 10-14-21 @ 14:13)  Source: .Blood Blood-Peripheral  Preliminary Report (10-15-21 @ 19:01):    No growth to date.    Culture - Urine (collected 10-14-21 @ 14:13)  Source: Clean Catch Clean Catch (Midstream)  Final Report (10-15-21 @ 17:36):    <10,000 CFU/mL Normal Urogenital Khadijah          pH, Fluid (10.15.21 @ 12:00)    pH, Fluid: 7.8: Reference Ranges have NOT been established for analytes in body fluids  because of variability in body fluid composition.  The  has not determined the efficacy of this test when  performed on fluid specimens. The performance characteristics of this  test were determined by [employer].      Cell Count, Body Fluid (10.15.21 @ 12:00)    Mesothelial Cells - Body Fluid: 1 %    Monocyte/Macrophage Count - Body Fluid: 37 %    Fluid Segmented Granulocytes: 23 %    Fluid Type: Pleural Fluid    Body Fluid Appearance: Slightly Cloudy    BF Lymphocytes: 39 %    Tube Type: Lavendar    Color - Body Fluid: Orange    Total Nucleated Cell Count, Body Fluid: 1108: Peritoneal/Pleural/ Pericardial  Body Fluid Types            Total Nucleated cells: <500/uL            Neutrophils: <25%          Synovial Body Fluid Type           Total Nucleated cells: <150/uL           Neutrophils: <25%           Lymphocytes: <75%           Moncytes/Macrophages: </=70% /uL    Total RBC Count: 24622 /uL                             Cultures:       Culture - Body Fluid with Gram Stain (collected 10-15-21 @ 12:00)  Source: .Body Fluid Pleural Fluid  Gram Stain (10-15-21 @ 21:50):    polymorphonuclear leukocytes seen    No organisms seen    by cytocentrifuge    Culture - Blood (collected 10-14-21 @ 15:29)  Source: .Blood None  Preliminary Report (10-15-21 @ 20:00):    No growth to date.    Culture - Blood (collected 10-14-21 @ 14:13)  Source: .Blood Blood-Peripheral  Preliminary Report (10-15-21 @ 19:01):    No growth to date.    Culture - Urine (collected 10-14-21 @ 14:13)  Source: Clean Catch Clean Catch (Midstream)  Final Report (10-15-21 @ 17:36):    <10,000 CFU/mL Normal Urogenital Khadijah            < from: CT Chest No Cont (10.14.21 @ 17:59) >    EXAM:  CT CHEST                            PROCEDURE DATE:  10/14/2021          INTERPRETATION:  CLINICAL INFORMATION: Heart failure    COMPARISON: 12/10/2017    CONTRAST/COMPLICATIONS:  IV Contrast: NONE  Oral Contrast: NONE  Complications: None reported at time of study completion    PROCEDURE:  CT of the Chest was performed.  Sagittal and coronal reformats were performed.    FINDINGS:    LUNGS AND AIRWAYS: Patent central airways.  Bilateral lower lobe compressive atelectasis. No evidence of pulmonary infiltrate.  PLEURA: Moderate bilateral pleural effusions  MEDIASTINUM AND DESTINI: No lymphadenopathy.  VESSELS: Atherosclerotic changes of the thoracic aorta.  HEART: Cardiomegaly. No pericardial effusion.  CHEST WALL AND LOWER NECK: Within normal limits.  VISUALIZED UPPER ABDOMEN: Within normal limits.  BONES: Degenerative changes    IMPRESSION:  Cardiomegaly. Bilateral pleural effusions with bilateral lower lobe compressive atelectasis.    No evidence of pneumonia.    < end of copied text >        Radiology: all available radiological tests reviewed    Advanced directives addressed: full resuscitation

## 2021-10-18 NOTE — PROGRESS NOTE ADULT - ASSESSMENT
89yo F with PMH of A fib, HTN, dementia, CHF admitted 10/14 with SOB and found to have b/l pleural effusions. S/P right pigtail placement with 1 L out serosanguineous, subsequently removed secondary to pain. Family deferring left thoracentesis. T lovenox resumed. Presumed to be secondary to CHF, however pleural fluid exudate. CX NGTD. Cytology pending.     PLAN  No further intervention at this time  Diuresis as tolerated  Palliative meeting pending  Please reconsult as needed    Discussed with Cardiothoracic Team at AM rounds.    Cynthia BAR  Thoracic Surgery   #[id number]

## 2021-10-18 NOTE — PROGRESS NOTE ADULT - ASSESSMENT
- s/p right thoracentesis  - cxr noted  - pigtail out due to pain  - although fluid technically exudate most likely from chf  - cont diuresis  - family does not want left tap

## 2021-10-18 NOTE — PROGRESS NOTE ADULT - SUBJECTIVE AND OBJECTIVE BOX
Patient is a 89y old  Female who presents with a chief complaint of Worsening SOB/ hypoxia (17 Oct 2021 13:50)  10/15/2021- Cardiology: 89 year old woman with HBP, long term persistent atrial fibrillation who is admitted with progressive shortness of breath and CT chest shows bilateral moderate to large pleural effusions. She has chronic venous insufficiency but no prior history of heart failure. Last admitted to [facility] with bilateral tib-fib fractures that were treated nonoperatively.Patient with mild slowly progressive dementia, monoclonal gammopathy, prior urinary tract infections, chronic Reaves catheter. Warfarin waschanged to Eliquis in August 2021.        Followup HPI:    10/16 alert, lying flat, denies dyspnea  10/17 patient is asleep   10/18- no complaints    PAST MEDICAL & SURGICAL HISTORY:  Lymphedema of both lower extremities    Venous insufficiency    Monoclonal gammopathies    Paroxysmal atrial fibrillation    Acute cystitis without hematuria  septic  4/2016    Essential hypertension    Spinal stenosis    Spondyloarthritis    Vaginal prolapse    [place] (hard of hearing), bilateral    Hypertension    S/P kyphoplasty  2014    S/P thyroidectomy  partial   1975    History of vaginal surgery    History of fracture of femur  hx of proximal femur fracture in January 2021    History of repair of left hip joint  Hx L hip long IMN with Dr. Kitchen 2017        Review of symptoms:  Negative except for as noted in today's HPI.      MEDICATIONS  (STANDING):  ascorbic acid 500 milliGRAM(s) Oral daily  doxycycline hyclate Capsule 100 milliGRAM(s) Oral every 12 hours  enoxaparin Injectable 50 milliGRAM(s) SubCutaneous every 12 hours  furosemide   Injectable 40 milliGRAM(s) IV Push two times a day  influenza   Vaccine 0.5 milliLiter(s) IntraMuscular once  losartan 25 milliGRAM(s) Oral daily  metoprolol succinate ER 25 milliGRAM(s) Oral daily  mirtazapine 15 milliGRAM(s) Oral at bedtime  polyethylene glycol 3350 17 Gram(s) Oral daily  senna 2 Tablet(s) Oral at bedtime  silver sulfADIAZINE 1% Cream 1 Application(s) Topical daily  zinc sulfate 220 milliGRAM(s) Oral daily    MEDICATIONS  (PRN):  acetaminophen   Tablet .. 650 milliGRAM(s) Oral every 6 hours PRN Temp greater or equal to 38C (100.4F), Mild Pain (1 - 3)  aluminum hydroxide/magnesium hydroxide/simethicone Suspension 30 milliLiter(s) Oral every 4 hours PRN Dyspepsia  LORazepam   Injectable 0.25 milliGRAM(s) IV Push every 12 hours PRN Anxiety  melatonin 3 milliGRAM(s) Oral at bedtime PRN Insomnia  ondansetron Injectable 4 milliGRAM(s) IV Push every 8 hours PRN Nausea and/or Vomiting          Vital Signs Last 24 Hrs  T(C): 36.1 (17 Oct 2021 21:04), Max: 36.1 (17 Oct 2021 21:04)  T(F): 97 (17 Oct 2021 21:04), Max: 97 (17 Oct 2021 21:04)  HR: 84 (17 Oct 2021 21:04) (84 - 84)  BP: 132/68 (17 Oct 2021 21:04) (132/68 - 132/68)  BP(mean): --  RR: --  SpO2: 99% (17 Oct 2021 21:04) (99% - 99%)    I&O's Summary    17 Oct 2021 07:01  -  18 Oct 2021 07:00  --------------------------------------------------------  IN: 0 mL / OUT: 600 mL / NET: -600 mL        PHYSICAL EXAM  General Appearance: lethargic, frail  HEENT:   Neck:   Lungs: decreased BS bases  Heart: 1/6 systolic murmur LSB  Abdomen: nontender  Extremities: venous stasis discoloration, no edema  Neurologic: generally weak      INTERPRETATION OF TELEMETRY: atrial fibrillation, VR mostly 80s    ECG:    LABS:                          11.2   7.06  )-----------( 142      ( 17 Oct 2021 07:06 )             35.9     10-17    138  |  100  |  20  ----------------------------<  96  3.2<L>   |  35<H>  |  0.52    Ca    9.2      17 Oct 2021 07:06  Mg     2.1     10-17            Pro BNP  18500 10-14 @ 14:13  D Dimer  -- 10-14 @ 14:13              RADIOLOGY & ADDITIONAL STUDIES:    IMPRESSION:  1.HFpEF with bilateral pleural effusions. Improved after right thoracentesis of more than 1000 cc. Left thoracentesis deferred due to pain caused by the right procedure.  2.Long term persistent atrial fibrillation. Rate  controlled.  3.Hypertension.  4.Indwelling Reaves.  5.Dementia.   PLAN:  Continue metoprolol,  losartan, furosemide. Follow BMP while on Iv furosemide. Replete serum potassium. Suggest resume oral anticoagulation if left thoracentesis is not contemplated.

## 2021-10-18 NOTE — PROGRESS NOTE ADULT - SUBJECTIVE AND OBJECTIVE BOX
Subjective: Patient lethargic. Answering questions but confused. Saturating 99% on 2L NC. No acute issues overnight as per RN. Awaiting palliative consult.     Vital Signs:  Vital Signs Last 24 Hrs  T(C): 36.4 (10-18-21 @ 07:16), Max: 36.4 (10-18-21 @ 07:16)  T(F): 97.6 (10-18-21 @ 07:16), Max: 97.6 (10-18-21 @ 07:16)  HR: 89 (10-18-21 @ 07:16) (84 - 89)  BP: 130/82 (10-18-21 @ 07:16) (130/82 - 132/68)  RR: 19 (10-18-21 @ 07:16) (19 - 19)  SpO2: 100% (10-18-21 @ 07:16) (99% - 100%) on (O2)  I&O's Detail    17 Oct 2021 07:01  -  18 Oct 2021 07:00  --------------------------------------------------------  IN:  Total IN: 0 mL    OUT:    Indwelling Catheter - Urethral (mL): 600 mL  Total OUT: 600 mL    Total NET: -600 mL      General: Lethargic  Neck: Neck supple, trachea midline, No JVD  Respiratory: B/L BS diminished due to poor inspiratory effort  CV: S1S2, no murmurs, rubs or gallops  Abdominal: Soft, ND  Extremities: No edema    Relevant labs, radiology and Medications reviewed                        11.2   7.06  )-----------( 142      ( 17 Oct 2021 07:06 )             35.9     10-17    138  |  100  |  20  ----------------------------<  96  3.2<L>   |  35<H>  |  0.52    Ca    9.2      17 Oct 2021 07:06  Mg     2.1     10-17        MEDICATIONS  (STANDING):  ascorbic acid 500 milliGRAM(s) Oral daily  doxycycline hyclate Capsule 100 milliGRAM(s) Oral every 12 hours  enoxaparin Injectable 50 milliGRAM(s) SubCutaneous every 12 hours  furosemide   Injectable 40 milliGRAM(s) IV Push two times a day  influenza   Vaccine 0.5 milliLiter(s) IntraMuscular once  losartan 25 milliGRAM(s) Oral daily  metoprolol succinate ER 25 milliGRAM(s) Oral daily  mirtazapine 15 milliGRAM(s) Oral at bedtime  polyethylene glycol 3350 17 Gram(s) Oral daily  senna 2 Tablet(s) Oral at bedtime  silver sulfADIAZINE 1% Cream 1 Application(s) Topical daily  zinc sulfate 220 milliGRAM(s) Oral daily    MEDICATIONS  (PRN):  acetaminophen   Tablet .. 650 milliGRAM(s) Oral every 6 hours PRN Temp greater or equal to 38C (100.4F), Mild Pain (1 - 3)  aluminum hydroxide/magnesium hydroxide/simethicone Suspension 30 milliLiter(s) Oral every 4 hours PRN Dyspepsia  LORazepam   Injectable 0.25 milliGRAM(s) IV Push every 12 hours PRN Anxiety  melatonin 3 milliGRAM(s) Oral at bedtime PRN Insomnia  ondansetron Injectable 4 milliGRAM(s) IV Push every 8 hours PRN Nausea and/or Vomiting        Assessment  89y Female  w/ PAST MEDICAL & SURGICAL HISTORY:  Lymphedema of both lower extremities    Venous insufficiency    Monoclonal gammopathies    Paroxysmal atrial fibrillation    Acute cystitis without hematuria  septic  4/2016    Essential hypertension    Spinal stenosis    Spondyloarthritis    Vaginal prolapse    [place] (hard of hearing), bilateral    Hypertension    S/P kyphoplasty  2014    S/P thyroidectomy  partial   1975    History of vaginal surgery    History of fracture of femur  hx of proximal femur fracture in January 2021    History of repair of left hip joint  Hx L hip long IMN with Dr. Kitchen 2017    admitted with complaints of Patient is a 89y old  Female who presents with a chief complaint of Worsening SOB/ hypoxia (18 Oct 2021 08:20)

## 2021-10-18 NOTE — PROGRESS NOTE ADULT - ASSESSMENT
89 y/o Female with PMHx Afib on coumadin, HTN, mild dementia, chronic lymphedema without known h/o CHF, presented from SNF for evaluation of worsening SOB. Admitted for:    #Acute hypoxic respiratory failure due to Mod-Large b/l pleural effusions - unclear etiology. Possible PNA  Supplemental 02 - 3L NC   CT as above. no evidence on PNA.   IV Lasix 40mg BID   Eliquis held. Cont IV Lovenox for now.   F/u lights criteria for diagnostic  Echo EF 60-65%  d/c Vancomycin, Cefepime -- On Ceftriaxone, PO Doxy  ID, Cardio, Pulm consult     #Metabolic encephalopathy     underlying dementia    #Chronic Afib  Daughter reports being on Eliquis not coumadin outpatient  Full dose AC lovenox   Cont. BB  Cardio: Peewee    #Pyuria  Urine culture negative    #Sacral Decubiti, B/l leg wounds  #Dementia, Mild.  #Moderate Protein Calori Malnutrition  #Debility. Frailty.   Supportive care, Wound care eval  Cont. Remeron.   Air mattress,  turn and position   Ensure w/ meals. Vitamin C and zinc for wound healing.  Recommend Palliative care, family on board.  Still not eating, recommend no aggressive measures  and discussed this with family/hcp.   Now DNR/DNI No Feeding tube  Overall prognosis poor      #B/l ankle fractures  NWB? need to clarify WB status.  Samantha Boots, to be changed q7d?  PT eval     #Advanced Directives / GOC:    DNR/DNI No feeding tube   Further measures to be discussed with Palliative    #DVT proph- Lovenox BID   89 y/o Female with PMHx Afib on coumadin, HTN, mild dementia, chronic lymphedema without known h/o CHF, presented from SNF for evaluation of worsening SOB. Admitted for:    #Acute hypoxic respiratory failure due to Mod-Large b/l pleural effusions - unclear etiology. Possible PNA  Supplemental 02 - 3L NC   CT as above. no evidence on PNA.   IV Lasix 40mg BID   Eliquis held. Cont IV Lovenox for now.   F/u lights criteria for diagnostic  Echo EF 60-65%  d/c Vancomycin, Cefepime: SP Ceftriaxone continue PO Doxy  ID, Cardio, Pulm consult     #Metabolic encephalopathy     underlying dementia    #Chronic Afib  Daughter reports being on Eliquis not coumadin outpatient  Full dose AC lovenox   Cont. BB  Cardio: Peewee    #Pyuria  Urine culture negative    #Sacral Decubiti, B/l leg wounds  #Dementia, Mild.  #Moderate Protein Calori Malnutrition  #Debility. Frailty.   Supportive care, Wound care eval  Cont. Remeron.   Air mattress,  turn and position   Ensure w/ meals. Vitamin C and zinc for wound healing.  Recommend Palliative care, family on board.  Still not eating, recommend no aggressive measures  and discussed this with family/hcp.   Now DNR/DNI No Feeding tube  Overall prognosis poor  Add Prosource  Wound Care eval      #B/l ankle fractures  NWB? need to clarify WB status.  Samantha Boots, to be changed q7d?  PT eval     #Advanced Directives / GOC:    DNR/DNI No feeding tube   Further measures to be discussed with Palliative    #DVT proph- Lovenox BID

## 2021-10-18 NOTE — PROGRESS NOTE ADULT - ASSESSMENT
89yo/F with PMH afib on coumadin, HTN, mild dementia, chronic lymphedema without known h/o CHF, admitted from snf on 10/14 for evaluation of worsening shortness of breath; each of the last few days requiring more and more oxygen; upon admission imaging revealed large bilateral pleural effusions. The patient underwent right sided thoracentesis on 10/15. History per medical record as patient is moaning and not able to provide history. Did have hospitalization in June for bilateral tib/fib fractures. Has been bed bound noted with multiple skin breakdowns.     1. Patient admitted with possibly pneumonia underlying the bilateral pleural effusions, unclear etiology of these effusions; had thoracentesis and fluid should be evaluated  - follow up cultures   - serial cbc and monitor temperature   - reviewed prior medical records to evaluate for resistant or atypical pathogens   - day #3 ceftriaxone and doxycycline; ceftriaxone is completed; will continue doxycycline probably another 48 hours  - tolerating antibiotics without rashes or side effects   - oxygen and nebs as needed   - consideration for wound care evaluation  - CTS evaluation in progress  2. other issues: per medicine

## 2021-10-19 PROCEDURE — 99232 SBSQ HOSP IP/OBS MODERATE 35: CPT

## 2021-10-19 PROCEDURE — 99233 SBSQ HOSP IP/OBS HIGH 50: CPT

## 2021-10-19 PROCEDURE — 99497 ADVNCD CARE PLAN 30 MIN: CPT

## 2021-10-19 PROCEDURE — 99498 ADVNCD CARE PLAN ADDL 30 MIN: CPT

## 2021-10-19 RX ORDER — COLLAGENASE CLOSTRIDIUM HIST. 250 UNIT/G
1 OINTMENT (GRAM) TOPICAL DAILY
Refills: 0 | Status: DISCONTINUED | OUTPATIENT
Start: 2021-10-19 | End: 2021-10-28

## 2021-10-19 RX ADMIN — SENNA PLUS 2 TABLET(S): 8.6 TABLET ORAL at 21:11

## 2021-10-19 RX ADMIN — Medication 100 MILLIGRAM(S): at 21:11

## 2021-10-19 RX ADMIN — Medication 25 MILLIGRAM(S): at 11:16

## 2021-10-19 RX ADMIN — Medication 500 MILLIGRAM(S): at 11:16

## 2021-10-19 RX ADMIN — ENOXAPARIN SODIUM 50 MILLIGRAM(S): 100 INJECTION SUBCUTANEOUS at 21:11

## 2021-10-19 RX ADMIN — POLYETHYLENE GLYCOL 3350 17 GRAM(S): 17 POWDER, FOR SOLUTION ORAL at 17:17

## 2021-10-19 RX ADMIN — ENOXAPARIN SODIUM 50 MILLIGRAM(S): 100 INJECTION SUBCUTANEOUS at 11:04

## 2021-10-19 RX ADMIN — Medication 40 MILLIGRAM(S): at 17:55

## 2021-10-19 RX ADMIN — LOSARTAN POTASSIUM 25 MILLIGRAM(S): 100 TABLET, FILM COATED ORAL at 11:06

## 2021-10-19 RX ADMIN — ZINC SULFATE TAB 220 MG (50 MG ZINC EQUIVALENT) 220 MILLIGRAM(S): 220 (50 ZN) TAB at 11:05

## 2021-10-19 RX ADMIN — Medication 40 MILLIGRAM(S): at 11:16

## 2021-10-19 RX ADMIN — Medication 3 MILLIGRAM(S): at 21:11

## 2021-10-19 RX ADMIN — Medication 100 MILLIGRAM(S): at 11:05

## 2021-10-19 RX ADMIN — Medication 1 APPLICATION(S): at 13:30

## 2021-10-19 RX ADMIN — Medication 1 DROP(S): at 17:59

## 2021-10-19 NOTE — CHART NOTE - NSCHARTNOTEFT_GEN_A_CORE
HPI:  89yo/F with PMH afib on coumadin, HTN, mild dementia, chronic lymphedema without known h/o CHF, presented from SNF for evaluation of worsening SOB. (14 Oct 2021 18:21)      PERTINENT PMH REVIEWED:  [ X ] YES [ ] NO           Primary Contact:      NO HCP on file. Daughter Surrogate: Jazmine Deluna [phone]    HCP [  ] Surrogate [ X  ] Guardian [   ]    Mental Status: [ X ] Alert  [  ] Oriented [  ] Confused [ X ] Lethargic [  ]  Concerns of Depression [  ]- unable to assess due to confusion   Anxiety [   ]- anxious at times   Baseline ADLs (prior to admission):  Independent [ ] moderately [ ] fully   Dependent   [ X ] moderately [ ]fully    Family Meeting: Took place yesterday and follow up today     Anticipated Grief: Patient [  ] Family [  X  ]    Caregiver [place] Assessed: Yes [   X  ] No [  ]    Restoration: Restorationist    Spiritual Concerns: None identified     Goals of Care: to be further determined     Previous Services: [facility]    ADVANCE DIRECTIVES: MOLST: DNR/I/ No feeding tube, determine use of antibiotics     Anticipated D/C Plan:                      Summary: HPI:  89yo/F with PMH afib on coumadin, HTN, mild dementia, chronic lymphedema without known h/o CHF, presented from SNF for evaluation of worsening SOB. (14 Oct 2021 18:21)      PERTINENT PMH REVIEWED:  [ X ] YES [ ] NO           Primary Contact:      NO HCP on file. Daughter Surrogate: Jazmine Deluna [phone]    HCP [  ] Surrogate [ X  ] Guardian [   ]    Mental Status: [ X ] Alert  [  ] Oriented [  ] Confused [ X ] Lethargic [  ]  Concerns of Depression [  ]- unable to assess due to confusion   Anxiety [   ]- anxious at times   Baseline ADLs (prior to admission):  Independent [ ] moderately [ ] fully   Dependent   [ X ] moderately [ ]fully    Family Meeting: Took place yesterday and follow up today     Anticipated Grief: Patient [  ] Family [  X  ]    Caregiver [place] Assessed: Yes [   X  ] No [  ]    Yarsanism: Roman Catholic    Spiritual Concerns: None identified     Goals of Care: to be further determined     Previous Services: [facility]    ADVANCE DIRECTIVES: MOLST: DNR/I/ No feeding tube, determine use of antibiotics     Anticipated D/C Plan: [facility] VS Home with home hospice and private hire                      Summary:    This SW and Mila Salguero Palliative NP met with pt. and pts daughter at bedside. Pt. is very confused and unable to engage in conversation. Pts daughter was very overwhelmed and was unable to focus on the conversation about the plan. Pts daughter is aware pts PO intake is poor and does not want PEG, which MOLST was completed to reflect. She expressed being frustrated that she is not allowed to come up before visitation to feed pt. She shared that she is taking things one day at time and has to see what direction pt. is going in before she knows exactly what the plan will be. Team had discussed options including [facility] (if pt. was eligible, PT to evaluate, pt. came from [facility]) VS home with home hospice and private hire. Pts daughter did not confirm a plan as she expressed that she thought she had time and if she did decide to take pt. home that it would take time for her to arrange. Team attempted to discuss in more detail a plan however, pts daughter wanted to spend time with her mother and was not up for a more detailed discussion at this time. Team explained it will be up to pts hospitalist to determine when pt. is ready for d/c. Emotional support provided. Our team will continue to follow.

## 2021-10-19 NOTE — GOALS OF CARE CONVERSATION - ADVANCED CARE PLANNING - CONVERSATION DETAILS
The role of Palliative Medicine was reviewed with the pt's daughter Tabatha as she had an extended conversation yesterday with   DAVID Starks NP regarding her advanced directives.  She placed a DNR/DNI /NFT on her MOLST. We followed up with a lengthy discussion regarding her mother's overall debilitated frail state complicated by immobility ( R/T BLE fx) confusion and significantly poor PO intake with severe protein jaydon malnutrition. This hospitalization she has been treated for a pleural effusion with thoracentesis drainage and completed IV abx for treatment of potential pneumonia. The medical team has spoken with Tabatha regarding the severity of her mother's current state and had recommended a Palliative vs hospice approach as her immobility, confusion and loss of appetite are likely to prevent her from a full recovery to return home. I reviewed Pall vs Hospice care at her [facility] facility or home  with the addition of private hire aide support vs return to [facility]. Her daughter is overwhelmed and extremely anxious when discussing any disch plan and can not formulate a plan now. Discussed with ANM and requested to have medical team contact her daughter to further discuss.  Due to the patient’s health status and restrictions on visitation during the Public Health Emergency, the Advance Care Planning service was performed via phone with patient’s daughter Tabatha.

## 2021-10-19 NOTE — PROGRESS NOTE ADULT - ASSESSMENT
89yo/F with PMH afib on coumadin, HTN, mild dementia, chronic lymphedema without known h/o CHF, admitted from snf on 10/14 for evaluation of worsening shortness of breath; each of the last few days requiring more and more oxygen; upon admission imaging revealed large bilateral pleural effusions. The patient underwent right sided thoracentesis on 10/15. History per medical record as patient is moaning and not able to provide history. Did have hospitalization in June for bilateral tib/fib fractures. Has been bed bound noted with multiple skin breakdowns.     1. Patient admitted with possibly pneumonia underlying the bilateral pleural effusions, unclear etiology of these effusions; had thoracentesis and fluid should be evaluated  - follow up cultures   - serial cbc and monitor temperature   - reviewed prior medical records to evaluate for resistant or atypical pathogens   - completed 3 days of ceftriaxone, day #4 doxycycline,  will continue doxycycline  another 24 hours  - tolerating antibiotics without rashes or side effects   - oxygen and nebs as needed   - consideration for wound care evaluation  - CTS evaluation in progress  2. other issues: per medicine

## 2021-10-19 NOTE — PROGRESS NOTE ADULT - ASSESSMENT
Pt is a 89y old Female with hx of 89yo/F with PMH afib on coumadin, HTN, mild dementia, chronic lymphedema without known h/o CHF, presented from SNF for evaluation of worsening SOB.    1) Acute hypoxic respiratory failure   - Mod-Large b/l pleural effusions   - HFpEF with bilateral pleural effusions  - Supplemental 02 PRN   - CT - Cardiomegaly. Bilateral pleural effusions with bilateral lower lobe compressive atelectasis.  No evidence of pneumonia  - c/w IV Lasix 40mg BID   - Recent Echo 06/2021- EF 60-65%, repeat Echo   - completed Ceftriaxone, cont PO Doxy  - cardiothoracic consult appreciated   - pulmonology consult appreciated   - cardiology consult appreciated   - s/p pigtail placement      2) Metabolic encephalopathy   - underlying dementia  - PPSV2: 20-30 %  - FAST: unsure of baseline   - supportive care     3) Sacral Decubiti, B/l leg wounds  - History of b/l ext fractures    - Moderate Protein Calorie Malnutrition  - Debility  - Supportive care  - Wound care eval  - D/C Remeron ? increased confusion  - Air mattress  - turn and position     4) Sammy LE fx  -8/4=Bilateral tibi fib fractures  -As per ortho [facility] at that time  -Was to F/U outpt?  -PT eval for upper body strength training/[facility] eval      5) Advance care planning   - patient lacks capacity at this time  - MOLST: DNR/I/ No feeding tube, determine use of antibiotics   - NO HCP on file

## 2021-10-19 NOTE — PROVIDER CONTACT NOTE (OTHER) - SITUATION
Dr Vides is already aware that the pt is in the hospital he is seeing the pt
notified Dr Hernandez's office of admission

## 2021-10-19 NOTE — PROGRESS NOTE ADULT - SUBJECTIVE AND OBJECTIVE BOX
Patient seen and examined: More lethargic this am, not opening eyes. No acute events overnight. Palliative meeting planned for today.    10/19: sitting up in bed, putting lipstick on, awake and conversive but confused.     ROS: limited due to dementia     Vital Signs Last 24 Hrs  T(C): 36.4 (18 Oct 2021 07:16), Max: 36.4 (18 Oct 2021 07:16)  T(F): 97.6 (18 Oct 2021 07:16), Max: 97.6 (18 Oct 2021 07:16)  HR: 89 (18 Oct 2021 07:16) (84 - 89)  BP: 130/82 (18 Oct 2021 07:16) (130/82 - 132/68)  BP(mean): --  RR: 19 (18 Oct 2021 07:16) (19 - 19)  SpO2: 100% (18 Oct 2021 07:16) (99% - 100%)    PHYSICAL EXAM:  Constitutional: elder female, alert, confused   HEENT: unable to assess  Neck: Soft and supple  Respiratory: Breath sounds are diminished bilat bases, clear in upper lobes   Cardiovascular: S1 and S2, IR, IR  Gastrointestinal: Bowel Sounds present, soft, nontender, nondistended, no guarding, no rebound  extremities: 5/5 strength in upper extremities, 4/5 in lower extremities. scant edema to ankles.   Neurological: A/O x 2   Musculoskeletal: unable to follow commands  Skin: Sarcal ulcer.     LABS: All Labs Reviewed:                            12.3   6.39  )-----------( 153      ( 18 Oct 2021 12:01 )             39.9     10-18    138  |  96  |  21  ----------------------------<  131<H>  3.0<L>   |  35<H>  |  0.61    Ca    9.6      18 Oct 2021 12:01     Patient seen and examined: More lethargic this am, not opening eyes. No acute events overnight. Palliative meeting planned for today.    10/19: sitting up in bed, putting lipstick on, awake and conversive but confused.     ROS: limited due to dementia     Vital Signs Last 24 Hrs  T(C): 36.4 (18 Oct 2021 07:16), Max: 36.4 (18 Oct 2021 07:16)  T(F): 97.6 (18 Oct 2021 07:16), Max: 97.6 (18 Oct 2021 07:16)  HR: 89 (18 Oct 2021 07:16) (84 - 89)  BP: 130/82 (18 Oct 2021 07:16) (130/82 - 132/68)  BP(mean): --  RR: 19 (18 Oct 2021 07:16) (19 - 19)  SpO2: 100% (18 Oct 2021 07:16) (99% - 100%)    PHYSICAL EXAM:  Constitutional: elder female, alert, confused   HEENT: unable to assess  Neck: Soft and supple  Respiratory: Breath sounds are diminished bilat bases, clear in upper lobes   Cardiovascular: S1 and S2, IR, IR  Gastrointestinal: Bowel Sounds present, soft, nontender, nondistended, no guarding, no rebound  extremities: 5/5 strength in upper extremities, 4/5 in lower extremities. scant edema to ankles.   Neurological: A/O x 2   Musculoskeletal: unable to follow commands  Skin: Sarcal ulcer unstageable     LABS: All Labs Reviewed:                            12.3   6.39  )-----------( 153      ( 18 Oct 2021 12:01 )             39.9     10-18    138  |  96  |  21  ----------------------------<  131<H>  3.0<L>   |  35<H>  |  0.61    Ca    9.6      18 Oct 2021 12:01

## 2021-10-19 NOTE — PROGRESS NOTE ADULT - ASSESSMENT
89 y/o Female with PMHx Afib on coumadin, HTN, mild dementia, chronic lymphedema without known h/o CHF, presented from SNF for evaluation of worsening SOB. Admitted for:    #Acute hypoxic respiratory failure due to Mod-Large b/l pleural effusions -  likley combination of CHF and PNA   Supplemental 02 - 3L NC ---resolved now on room air   CT as above. no evidence on PNA.   IV Lasix 40mg BID   Eliquis held. Cont IV Lovenox for now.   lights criteria - exudative   Echo EF 60-65%  d/c Vancomycin, Cefepime: SP Ceftriaxone continue PO Doxy day 5   ID, Cardio, Pulm consult     #Metabolic encephalopathy     underlying dementia    #Chronic Afib  Daughter reports being on Eliquis not coumadin outpatient  Full dose AC lovenox   Cont. BB  Cardio: Peewee    #Pyuria  Urine culture negative    #Sacral Decubiti, B/l leg wounds  #Dementia, Mild.  #Moderate Protein Calori Malnutrition  #Debility. Frailty.   Supportive care, Wound care eval  Cont. Remeron.   Air mattress,  turn and position   Ensure w/ meals. Vitamin C and zinc for wound healing.  Recommend Palliative care, family on board.  ate small amount of breakfast w/ help from aide.   and discussed this with family/hcp.   Now DNR/DNI No Feeding tube  Overall prognosis poor  Add Prosource  Wound Care eval      #B/l ankle fractures  NWB? need to clarify WB status.  Samantha Boots, to be changed q7d?  PT eval     #Advanced Directives / GOC:    DNR/DNI No feeding tube   Further measures to be discussed with Palliative    #DVT proph- Lovenox BID   89 y/o Female with PMHx Afib on coumadin, HTN, mild dementia, chronic lymphedema without known h/o CHF, presented from SNF for evaluation of worsening SOB. Admitted for:    #Acute hypoxic respiratory failure due to Mod-Large b/l pleural effusions -  likley combination of CHF and PNA   Supplemental 02 - 3L NC ---resolved now on room air   CT as above. no evidence on PNA.   IV Lasix 40mg BID   Eliquis held. Cont IV Lovenox for now.   lights criteria - exudative   Echo EF 60-65%  d/c Vancomycin, Cefepime: SP Ceftriaxone continue PO Doxy day 5   ID, Cardio, Pulm consult     #Metabolic encephalopathy     underlying dementia    #Chronic Afib  Daughter reports being on Eliquis not coumadin outpatient  Full dose AC lovenox   Cont. BB  Cardio: Peewee    #Pyuria  Urine culture negative    #Sacral Decubiti, B/l leg wounds  #Dementia, Mild.  #Moderate Protein Calori Malnutrition  #Debility. Frailty.   Supportive care, Wound care eval  Cont. Remeron.   Air mattress,  turn and position   Ensure w/ meals. Vitamin C and zinc for wound healing.  pt would benefit with assistance with feeding - SLP reconsulted for recommendations on diet consistency vs help with feeding.   Recommend Palliative care, family on board.  ate small amount of breakfast w/ help from aide.   and discussed this with family/hcp.   Now DNR/DNI No Feeding tube  Overall prognosis poor  Add Prosource  Wound Care eval      #B/l ankle fractures  NWB? need to clarify WB status.  Samantha Boots, to be changed q7d?  PT eval     #Advanced Directives / GOC:    DNR/DNI No feeding tube   Further measures to be discussed with Palliative    #DVT proph- Lovenox BID

## 2021-10-19 NOTE — PROGRESS NOTE ADULT - SUBJECTIVE AND OBJECTIVE BOX
Date of service: 10-19-21 @ 16:15    Patient awake, but confused, afebrile        ROS unable to obtain secondary to patient medical condition     MEDICATIONS  (STANDING):  ascorbic acid 500 milliGRAM(s) Oral daily  collagenase Ointment 1 Application(s) Topical daily  doxycycline hyclate Capsule 100 milliGRAM(s) Oral every 12 hours  enoxaparin Injectable 50 milliGRAM(s) SubCutaneous every 12 hours  furosemide   Injectable 40 milliGRAM(s) IV Push two times a day  influenza   Vaccine 0.5 milliLiter(s) IntraMuscular once  losartan 25 milliGRAM(s) Oral daily  metoprolol succinate ER 25 milliGRAM(s) Oral daily  polyethylene glycol 3350 17 Gram(s) Oral daily  senna 2 Tablet(s) Oral at bedtime  zinc sulfate 220 milliGRAM(s) Oral daily    MEDICATIONS  (PRN):  acetaminophen   Tablet .. 650 milliGRAM(s) Oral every 6 hours PRN Temp greater or equal to 38C (100.4F), Mild Pain (1 - 3)  aluminum hydroxide/magnesium hydroxide/simethicone Suspension 30 milliLiter(s) Oral every 4 hours PRN Dyspepsia  LORazepam   Injectable 0.25 milliGRAM(s) IV Push every 12 hours PRN Anxiety  melatonin 3 milliGRAM(s) Oral at bedtime PRN Insomnia  ondansetron Injectable 4 milliGRAM(s) IV Push every 8 hours PRN Nausea and/or Vomiting      Vital Signs Last 24 Hrs  T(C): 36.3 (19 Oct 2021 07:44), Max: 37.1 (19 Oct 2021 01:12)  T(F): 97.4 (19 Oct 2021 07:44), Max: 98.7 (19 Oct 2021 01:12)  HR: 88 (19 Oct 2021 07:44) (88 - 96)  BP: 145/65 (19 Oct 2021 07:44) (123/64 - 145/65)  BP(mean): --  RR: 18 (19 Oct 2021 07:44) (18 - 18)  SpO2: 95% (19 Oct 2021 07:44) (93% - 96%)        Physical Exam:      Constitutional: frail looking  HEENT: NC/AT, EOMI, PERRLA, conjunctivae clear; ears and nose atraumatic; pharynx clear  Neck: supple; thyroid not palpable  Back: no tenderness  Respiratory: respiratory effort normal; diminished at bases  Cardiovascular: S1S2 regular, no murmurs  Abdomen: soft, not tender, not distended, positive BS; no liver or spleen organomegaly  Genitourinary: no suprapubic tenderness  Musculoskeletal: no muscle tenderness, no joint swelling or tenderness  Neurological/ Psychiatric:  moving all extremities  Skin: bilateral heels covered with bandages    Labs: all available labs reviewed                      Labs:                        12.3   6.39  )-----------( 153      ( 18 Oct 2021 12:01 )             39.9     10-18    138  |  96  |  21  ----------------------------<  131<H>  3.0<L>   |  35<H>  |  0.61    Ca    9.6      18 Oct 2021 12:01             Cultures:       Culture - Fungal, Body Fluid (collected 10-15-21 @ 12:00)  Source: .Body Fluid Pleural Fluid  Preliminary Report (10-18-21 @ 09:18):    Testing in progress    Culture - Body Fluid with Gram Stain (collected 10-15-21 @ 12:00)  Source: .Body Fluid Pleural Fluid  Gram Stain (10-15-21 @ 21:50):    polymorphonuclear leukocytes seen    No organisms seen    by cytocentrifuge  Preliminary Report (10-16-21 @ 17:31):    No growth    Culture - Blood (collected 10-14-21 @ 15:29)  Source: .Blood None  Preliminary Report (10-15-21 @ 20:00):    No growth to date.    Culture - Blood (collected 10-14-21 @ 14:13)  Source: .Blood Blood-Peripheral  Preliminary Report (10-15-21 @ 19:01):    No growth to date.    Culture - Urine (collected 10-14-21 @ 14:13)  Source: Clean Catch Clean Catch (Midstream)  Final Report (10-15-21 @ 17:36):    <10,000 CFU/mL Normal Urogenital Khadijah            pH, Fluid (10.15.21 @ 12:00)    pH, Fluid: 7.8: Reference Ranges have NOT been established for analytes in body fluids  because of variability in body fluid composition.  The  has not determined the efficacy of this test when  performed on fluid specimens. The performance characteristics of this  test were determined by [facility].      Cell Count, Body Fluid (10.15.21 @ 12:00)    Mesothelial Cells - Body Fluid: 1 %    Monocyte/Macrophage Count - Body Fluid: 37 %    Fluid Segmented Granulocytes: 23 %    Fluid Type: Pleural Fluid    Body Fluid Appearance: Slightly Cloudy    BF Lymphocytes: 39 %    Tube Type: Lavendar    Color - Body Fluid: Orange    Total Nucleated Cell Count, Body Fluid: 1108: Peritoneal/Pleural/ Pericardial  Body Fluid Types            Total Nucleated cells: <500/uL            Neutrophils: <25%          Synovial Body Fluid Type           Total Nucleated cells: <150/uL           Neutrophils: <25%           Lymphocytes: <75%           Moncytes/Macrophages: </=70% /uL    Total RBC Count: 95593 /uL                             Cultures:       Culture - Body Fluid with Gram Stain (collected 10-15-21 @ 12:00)  Source: .Body Fluid Pleural Fluid  Gram Stain (10-15-21 @ 21:50):    polymorphonuclear leukocytes seen    No organisms seen    by cytocentrifuge    Culture - Blood (collected 10-14-21 @ 15:29)  Source: .Blood None  Preliminary Report (10-15-21 @ 20:00):    No growth to date.    Culture - Blood (collected 10-14-21 @ 14:13)  Source: .Blood Blood-Peripheral  Preliminary Report (10-15-21 @ 19:01):    No growth to date.    Culture - Urine (collected 10-14-21 @ 14:13)  Source: Clean Catch Clean Catch (Midstream)  Final Report (10-15-21 @ 17:36):    <10,000 CFU/mL Normal Urogenital Khadijah            < from: CT Chest No Cont (10.14.21 @ 17:59) >    EXAM:  CT CHEST                            PROCEDURE DATE:  10/14/2021          INTERPRETATION:  CLINICAL INFORMATION: Heart failure    COMPARISON: 12/10/2017    CONTRAST/COMPLICATIONS:  IV Contrast: NONE  Oral Contrast: NONE  Complications: None reported at time of study completion    PROCEDURE:  CT of the Chest was performed.  Sagittal and coronal reformats were performed.    FINDINGS:    LUNGS AND AIRWAYS: Patent central airways.  Bilateral lower lobe compressive atelectasis. No evidence of pulmonary infiltrate.  PLEURA: Moderate bilateral pleural effusions  MEDIASTINUM AND DESTINI: No lymphadenopathy.  VESSELS: Atherosclerotic changes of the thoracic aorta.  HEART: Cardiomegaly. No pericardial effusion.  CHEST WALL AND LOWER NECK: Within normal limits.  VISUALIZED UPPER ABDOMEN: Within normal limits.  BONES: Degenerative changes    IMPRESSION:  Cardiomegaly. Bilateral pleural effusions with bilateral lower lobe compressive atelectasis.    No evidence of pneumonia.    < end of copied text >        Radiology: all available radiological tests reviewed    Advanced directives addressed: full resuscitation

## 2021-10-19 NOTE — PROGRESS NOTE ADULT - SUBJECTIVE AND OBJECTIVE BOX
HPI: Pt is a 89y old Female with hx of 89yo/F with PMH afib on coumadin, HTN, mild dementia, chronic lymphedema without known h/o CHF, presented from SNF for evaluation of worsening SOB. Patient unable to provide ay history. Per daughter at bedside, she is not on oxygen at her baseline. 5 days ago at SNF she noted to become more hypoxic and was placed on O2. Then patient had more requirement for O2 and CXR was done that showed opacification of both bases suspicious for pneumonia and patient initiated on IV antibiotics. At this point, daughter insisted on transfer to  for further diagnosis and treatment. Patient was at  in June for b/l tib/fib fractures for which she was NWB and was at SNF. Patient unable to provide any history, Palliative medicine consulted to help establish GOC and advance care planning     10/18/2021 patient seen and examined with no family at bedside, patient resting comfortable at this time, open eyes to tactile stimuli, but just moaned and then closed them. Will reach out to family to schedule GOC meeting   10/19/21 Seen and examined at bedside Awake and alert. Confused and disoriented to place. Having delusional paranoid thoughts at times during our conversation. Taking only sips of fluids PO. Denies pain or dyspnea    PAIN: (x )Yes   ( )No  unable to describe but as per RN staff at times patient moaning   DYSPNEA: (x ) Yes  ( ) No  Level: moderate at times - appears more comfortable at this time     PAST MEDICAL & SURGICAL HISTORY:  Lymphedema of both lower extremities  Venous insufficiency  Monoclonal gammopathies  Paroxysmal atrial fibrillation  Acute cystitis without hematuria septic  4/2016  Essential hypertension  Spinal stenosis  Spondyloarthritis  Vaginal prolapse  [place] (hard of hearing), bilateral  Hypertension  S/P kyphoplasty 2014  S/P thyroidectomy partial   1975  History of vaginal surgery  History of fracture of femur  hx of proximal femur fracture in January 2021  History of repair of left hip joint  Hx L hip long IMN with Dr. Kitchen 2017    SOCIAL HX:    Hx opiate tolerance ( )YES  (x )NO    Baseline ADLs  (Prior to Admission)  ( ) Independent   (x )Dependent    FAMILY HISTORY:  No pertinent family history in first degree relatives    Review of Systems:    Unable to obtain/Limited due to: Depression     PHYSICAL EXAM:  ICU Vital Signs Last 24 Hrs  T(C): 36.3 (19 Oct 2021 07:44), Max: 37.1 (19 Oct 2021 01:12)  T(F): 97.4 (19 Oct 2021 07:44), Max: 98.7 (19 Oct 2021 01:12)  HR: 88 (19 Oct 2021 07:44) (88 - 96)  BP: 145/65 (19 Oct 2021 07:44) (123/64 - 145/65)  RR: 18 (19 Oct 2021 07:44) (18 - 18)  SpO2: 95% (19 Oct 2021 07:44) (93% - 96%)    PPSV2:  20 %  FAST: unsure of baseline     General: lethargic, elderly female in bed   Mental Status: awake confused   HEENT: unable to assess   Lungs: diminished breath sounds   Cardiac: S1S2 +  GI: non tender, non distended + BS  : non tender, non distended, +BS   Ext: generalized edema  Neuro: lethargic  Skin: Sarcal ulcer, b/l samson boots    LABS:                             12.3   6.39  )-----------( 153      ( 18 Oct 2021 12:01 )             39.9                10-18    138  |  96  |  21  ----------------------------<  131<H>  3.0<L>   |  35<H>  |  0.61    Ca    9.6      18 Oct 2021 12:01      Allergies- No Known Allergies  MEDICATIONS  (STANDING):  ascorbic acid 500 milliGRAM(s) Oral daily  collagenase Ointment 1 Application(s) Topical daily  doxycycline hyclate Capsule 100 milliGRAM(s) Oral every 12 hours  enoxaparin Injectable 50 milliGRAM(s) SubCutaneous every 12 hours  furosemide   Injectable 40 milliGRAM(s) IV Push two times a day  influenza   Vaccine 0.5 milliLiter(s) IntraMuscular once  losartan 25 milliGRAM(s) Oral daily  metoprolol succinate ER 25 milliGRAM(s) Oral daily  mirtazapine 15 milliGRAM(s) Oral at bedtime  polyethylene glycol 3350 17 Gram(s) Oral daily  senna 2 Tablet(s) Oral at bedtime  zinc sulfate 220 milliGRAM(s) Oral daily    MEDICATIONS  (PRN):  acetaminophen   Tablet .. 650 milliGRAM(s) Oral every 6 hours PRN Temp greater or equal to 38C (100.4F), Mild Pain (1 - 3)  aluminum hydroxide/magnesium hydroxide/simethicone Suspension 30 milliLiter(s) Oral every 4 hours PRN Dyspepsia  LORazepam   Injectable 0.25 milliGRAM(s) IV Push every 12 hours PRN Anxiety  melatonin 3 milliGRAM(s) Oral at bedtime PRN Insomnia  ondansetron Injectable 4 milliGRAM(s) IV Push every 8 hours PRN Nausea and/or Vomiting    RADIOLOGY/ADDITIONAL STUDIES:

## 2021-10-20 LAB
ALBUMIN SERPL ELPH-MCNC: 3.2 G/DL — LOW (ref 3.3–5)
ALP SERPL-CCNC: 83 U/L — SIGNIFICANT CHANGE UP (ref 40–120)
ALT FLD-CCNC: 17 U/L — SIGNIFICANT CHANGE UP (ref 12–78)
ANION GAP SERPL CALC-SCNC: 2 MMOL/L — LOW (ref 5–17)
AST SERPL-CCNC: 22 U/L — SIGNIFICANT CHANGE UP (ref 15–37)
BILIRUB SERPL-MCNC: 0.9 MG/DL — SIGNIFICANT CHANGE UP (ref 0.2–1.2)
BUN SERPL-MCNC: 21 MG/DL — SIGNIFICANT CHANGE UP (ref 7–23)
CALCIUM SERPL-MCNC: 9.8 MG/DL — SIGNIFICANT CHANGE UP (ref 8.5–10.1)
CHLORIDE SERPL-SCNC: 96 MMOL/L — SIGNIFICANT CHANGE UP (ref 96–108)
CO2 SERPL-SCNC: 39 MMOL/L — HIGH (ref 22–31)
CREAT SERPL-MCNC: 0.58 MG/DL — SIGNIFICANT CHANGE UP (ref 0.5–1.3)
CULTURE RESULTS: SIGNIFICANT CHANGE UP
GLUCOSE SERPL-MCNC: 100 MG/DL — HIGH (ref 70–99)
HCT VFR BLD CALC: 36.4 % — SIGNIFICANT CHANGE UP (ref 34.5–45)
HGB BLD-MCNC: 11.3 G/DL — LOW (ref 11.5–15.5)
MCHC RBC-ENTMCNC: 28.9 PG — SIGNIFICANT CHANGE UP (ref 27–34)
MCHC RBC-ENTMCNC: 31 GM/DL — LOW (ref 32–36)
MCV RBC AUTO: 93.1 FL — SIGNIFICANT CHANGE UP (ref 80–100)
PLATELET # BLD AUTO: 127 K/UL — LOW (ref 150–400)
POTASSIUM SERPL-MCNC: 2.9 MMOL/L — CRITICAL LOW (ref 3.5–5.3)
POTASSIUM SERPL-SCNC: 2.9 MMOL/L — CRITICAL LOW (ref 3.5–5.3)
PROT SERPL-MCNC: 6.2 GM/DL — SIGNIFICANT CHANGE UP (ref 6–8.3)
RBC # BLD: 3.91 M/UL — SIGNIFICANT CHANGE UP (ref 3.8–5.2)
RBC # FLD: 16.2 % — HIGH (ref 10.3–14.5)
SARS-COV-2 RNA SPEC QL NAA+PROBE: SIGNIFICANT CHANGE UP
SODIUM SERPL-SCNC: 137 MMOL/L — SIGNIFICANT CHANGE UP (ref 135–145)
SPECIMEN SOURCE: SIGNIFICANT CHANGE UP
WBC # BLD: 7.29 K/UL — SIGNIFICANT CHANGE UP (ref 3.8–10.5)
WBC # FLD AUTO: 7.29 K/UL — SIGNIFICANT CHANGE UP (ref 3.8–10.5)

## 2021-10-20 PROCEDURE — 99498 ADVNCD CARE PLAN ADDL 30 MIN: CPT

## 2021-10-20 PROCEDURE — 99233 SBSQ HOSP IP/OBS HIGH 50: CPT

## 2021-10-20 PROCEDURE — 73590 X-RAY EXAM OF LOWER LEG: CPT | Mod: 26,50

## 2021-10-20 PROCEDURE — 99233 SBSQ HOSP IP/OBS HIGH 50: CPT | Mod: 25

## 2021-10-20 PROCEDURE — 99497 ADVNCD CARE PLAN 30 MIN: CPT

## 2021-10-20 RX ORDER — APIXABAN 2.5 MG/1
2.5 TABLET, FILM COATED ORAL EVERY 12 HOURS
Refills: 0 | Status: DISCONTINUED | OUTPATIENT
Start: 2021-10-20 | End: 2021-10-28

## 2021-10-20 RX ORDER — POTASSIUM CHLORIDE 20 MEQ
10 PACKET (EA) ORAL
Refills: 0 | Status: COMPLETED | OUTPATIENT
Start: 2021-10-20 | End: 2021-10-20

## 2021-10-20 RX ORDER — FUROSEMIDE 40 MG
40 TABLET ORAL
Refills: 0 | Status: DISCONTINUED | OUTPATIENT
Start: 2021-10-20 | End: 2021-10-28

## 2021-10-20 RX ORDER — POTASSIUM CHLORIDE 20 MEQ
40 PACKET (EA) ORAL ONCE
Refills: 0 | Status: COMPLETED | OUTPATIENT
Start: 2021-10-20 | End: 2021-10-20

## 2021-10-20 RX ADMIN — Medication 100 MILLIGRAM(S): at 09:16

## 2021-10-20 RX ADMIN — ZINC SULFATE TAB 220 MG (50 MG ZINC EQUIVALENT) 220 MILLIGRAM(S): 220 (50 ZN) TAB at 09:16

## 2021-10-20 RX ADMIN — Medication 100 MILLIEQUIVALENT(S): at 12:49

## 2021-10-20 RX ADMIN — SENNA PLUS 2 TABLET(S): 8.6 TABLET ORAL at 21:53

## 2021-10-20 RX ADMIN — Medication 25 MILLIGRAM(S): at 09:16

## 2021-10-20 RX ADMIN — Medication 40 MILLIGRAM(S): at 09:16

## 2021-10-20 RX ADMIN — APIXABAN 2.5 MILLIGRAM(S): 2.5 TABLET, FILM COATED ORAL at 21:53

## 2021-10-20 RX ADMIN — Medication 500 MILLIGRAM(S): at 09:16

## 2021-10-20 RX ADMIN — Medication 40 MILLIEQUIVALENT(S): at 12:45

## 2021-10-20 RX ADMIN — POLYETHYLENE GLYCOL 3350 17 GRAM(S): 17 POWDER, FOR SOLUTION ORAL at 09:16

## 2021-10-20 RX ADMIN — Medication 100 MILLIGRAM(S): at 21:53

## 2021-10-20 RX ADMIN — Medication 100 MILLIEQUIVALENT(S): at 10:18

## 2021-10-20 RX ADMIN — LOSARTAN POTASSIUM 25 MILLIGRAM(S): 100 TABLET, FILM COATED ORAL at 09:16

## 2021-10-20 RX ADMIN — Medication 40 MILLIGRAM(S): at 18:18

## 2021-10-20 RX ADMIN — Medication 1 APPLICATION(S): at 09:17

## 2021-10-20 RX ADMIN — ENOXAPARIN SODIUM 50 MILLIGRAM(S): 100 INJECTION SUBCUTANEOUS at 09:17

## 2021-10-20 RX ADMIN — Medication 100 MILLIEQUIVALENT(S): at 15:24

## 2021-10-20 NOTE — CONSULT NOTE ADULT - CONSULT REQUESTED DATE/TIME
15-Oct-2021 14:21
15-Oct-2021 08:16
20-Oct-2021 15:37
15-Oct-2021 17:25
18-Oct-2021 09:51
15-Oct-2021 08:05

## 2021-10-20 NOTE — CONSULT NOTE ADULT - SUBJECTIVE AND OBJECTIVE BOX
89y Female presents to hospital for shortness of breath. Last seen by orthopedic service 11 weeks ago when she sustained b/l tib fib fractures in june when she fell from her wheel chair. Since that time she was managed conservatively with long leg trilam splints and then knee immobilizers. Since that time has been non weight bearing to allow fractures to heal without surgery. Denies numbness/tingling in the affected extremity. Denies head strike/LOC/other orthopedic injuries at this time.    PAST MEDICAL & SURGICAL HISTORY:  Lymphedema of both lower extremities    Venous insufficiency    Monoclonal gammopathies    Paroxysmal atrial fibrillation    Acute cystitis without hematuria  septic  4/2016    Essential hypertension    Spinal stenosis    Spondyloarthritis    Vaginal prolapse    [place] (hard of hearing), bilateral    Hypertension    S/P kyphoplasty  2014    S/P thyroidectomy  partial   1975    History of vaginal surgery    History of fracture of femur  hx of proximal femur fracture in January 2021    History of repair of left hip joint  Hx L hip long IMN with Dr. Kitchen 2017      Home Medications:  acetaminophen 650 mg oral tablet: 1 tab(s) orally every 6 hours, As Needed - for mild pain (14 Oct 2021 19:06)  Acidophilus oral capsule: 1 cap(s) orally 3 times a day (14 Oct 2021 19:06)  Aquaphor topical ointment: Apply topically to affected area 2 times a day  ****Bilateral Lower Extremeties** (14 Oct 2021 19:06)  Betadine 5% topical spray: Apply topically to affected area 2 times a day  *****Bilateral Heels**** (14 Oct 2021 19:06)  Betadine 5% topical spray: Apply topically to affected area once a day, As Needed - for wound care  ****Bilateral Heels**** (14 Oct 2021 19:06)  busPIRone 5 mg oral tablet: 1 tab(s) orally 2 times a day (14 Oct 2021 19:06)  cefTRIAXone 1 g intravenous injection: 1 gram(s) intravenous once a day  ****Course not Completed**** (14 Oct 2021 19:06)  cholecalciferol 25 mcg (1000 intl units) oral tablet: 3 tab(s) orally once a day (14 Oct 2021 19:06)  dermasyn gel: Apply topically to affected area once a day  ****Right posterior thigh**** (14 Oct 2021 19:06)  dermasyn gel: Apply topically to affected area once a day, As Needed - for wound care  ****Right posterior thigh*** (14 Oct 2021 19:06)  Eliquis 5 mg oral tablet: 1 tab(s) orally 2 times a day (14 Oct 2021 19:06)  ferrous sulfate 325 mg (65 mg elemental iron) oral tablet: 1 tab(s) orally once a day (14 Oct 2021 19:06)  furosemide 40 mg oral tablet: 1 tab(s) orally once a day (14 Oct 2021 19:06)  LORazepam 0.5 mg oral tablet: 1 tab(s) orally once a day (at bedtime), As Needed - for anxiety (14 Oct 2021 19:06)  losartan 50 mg oral tablet: 1 tab(s) orally once a day (14 Oct 2021 19:06)  Melatonin 3 mg oral tablet: 1 tab(s) orally once a day (at bedtime) (14 Oct 2021 19:06)  methocarbamol 500 mg oral tablet: 1 tab(s) orally once a day, As Needed - for muscle spasm (14 Oct 2021 19:06)  Metoprolol Tartrate 25 mg oral tablet: 0.5 tab(s) orally 2 times a day (14 Oct 2021 19:06)  mirtazapine 15 mg oral tablet: 1 tab(s) orally once a day (at bedtime) (14 Oct 2021 19:06)  Multiple Vitamins with Minerals oral tablet: 1 tab(s) orally once a day (14 Oct 2021 19:06)  nystatin 100,000 units/g topical cream: Apply topically to affected area 2 times a day, As Needed - for rash   *****groin**** (14 Oct 2021 19:06)  oxyCODONE 5 mg oral capsule: 1 cap(s) orally 2 times a day, As Needed - for moderate pain, for severe pain (14 Oct 2021 19:06)  polyethylene glycol 3350 oral powder for reconstitution: 17 gram(s) orally once a day (14 Oct 2021 19:06)  Potassium Chloride (Eqv-K-Tab) 20 mEq oral tablet, extended release: 0.5 tab(s) orally once a day (14 Oct 2021 19:06)  Senna 8.6 mg oral tablet: 2 tab(s) orally once a day (at bedtime) (14 Oct 2021 19:06)  silver sulfADIAZINE 1% topical cream: Apply topically to affected area once a day  ****Right buttocks*** (14 Oct 2021 19:06)  silver sulfADIAZINE 1% topical cream: Apply topically to affected area once a day, As Needed - for wound care  *****Right Buttocks**** (14 Oct 2021 19:06)  sodium hypochlorite 0.125% topical solution: Apply topically to affected area once a day  ****Left Posterior Calf**** (14 Oct 2021 19:06)  sodium hypochlorite 0.125% topical solution: Apply topically to affected area once a day, As Needed - for wound care  *****Left Posterior Calf**** (14 Oct 2021 19:06)  Stress Formula with Iron oral tablet: 1 tab(s) orally once a day (14 Oct 2021 19:06)    Allergies    No Known Allergies    Intolerances                              11.3   7.29  )-----------( 127      ( 20 Oct 2021 07:47 )             36.4     10-20    137  |  96  |  21  ----------------------------<  100<H>  2.9<LL>   |  39<H>  |  0.58    Ca    9.8      20 Oct 2021 07:47    TPro  6.2  /  Alb  3.2<L>  /  TBili  0.9  /  DBili  x   /  AST  22  /  ALT  17  /  AlkPhos  83  10-20            Vital Signs Last 24 Hrs  T(C): 36.4 (20 Oct 2021 08:21), Max: 37.1 (19 Oct 2021 20:37)  T(F): 97.5 (20 Oct 2021 08:21), Max: 98.7 (19 Oct 2021 20:37)  HR: 88 (20 Oct 2021 08:21) (88 - 103)  BP: 119/60 (20 Oct 2021 08:21) (113/73 - 124/77)  BP(mean): --  RR: 17 (20 Oct 2021 08:21) (17 - 18)  SpO2: 98% (20 Oct 2021 08:21) (93% - 98%)    PHYSICAL EXAM  General: NAD, Awake and Alert  BLLEs    Unstagable ischial pressure ulcer and sacral decub ulcer    RLE:  skin intact, gross deformity noted  unable to SLR due to deconditioning   +sensation L2-S1  +dorsiflexion/plantarflexion of ankle/hallux  +dorsalis pedis pulse  Soft compartments, - calf tenderness    LLE:  Small ulcer over medial/posterior aspect of lower leg, otherwise skin intact, gross deformity noted  unable to SLR due to deconditioning   +sensation L2-S1  +dorsiflexion/plantarflexion of ankle/hallux  +dorsalis pedis pulse  Soft compartments, - calf tenderness        Secondary Exam: Benign, Skin intact, NTTP along axial spine, SILT throughout, motor grossly intact throughout, no other orthopedic injuries at this time, compartments soft and compressible        IMAGING:  XR : FU      Assessment/Plan:  89y Female admitted with shortness of breath coincidentally with bilateral tib fib fractures 11 weeks old managed conservatively     -Will discuss with Dr. Kitchen regarding ambulatory status   -Pain control as needed  -DVT ppx per medicine  -likely will recommend WBAT  -FU B/L Tib Fib XRs  -Will discuss with attending, and advise if plan changes  -No acute orthopedic surgical intervention needed at this time  -Discussed with attending who agrees with plan  -Ortho stable for discharge     89y Female presents to hospital for shortness of breath. Last seen by orthopedic service 11 weeks ago when she sustained b/l tib fib fractures in june when she fell from her wheel chair. Since that time she was managed conservatively with long leg trilam splints and then knee immobilizers. Since that time has been non weight bearing to allow fractures to heal without surgery. Denies numbness/tingling in the affected extremity. Denies head strike/LOC/other orthopedic injuries at this time.    PAST MEDICAL & SURGICAL HISTORY:  Lymphedema of both lower extremities    Venous insufficiency    Monoclonal gammopathies    Paroxysmal atrial fibrillation    Acute cystitis without hematuria  septic  4/2016    Essential hypertension    Spinal stenosis    Spondyloarthritis    Vaginal prolapse    [place] (hard of hearing), bilateral    Hypertension    S/P kyphoplasty  2014    S/P thyroidectomy  partial   1975    History of vaginal surgery    History of fracture of femur  hx of proximal femur fracture in January 2021    History of repair of left hip joint  Hx L hip long IMN with Dr. Kitchen 2017      Home Medications:  acetaminophen 650 mg oral tablet: 1 tab(s) orally every 6 hours, As Needed - for mild pain (14 Oct 2021 19:06)  Acidophilus oral capsule: 1 cap(s) orally 3 times a day (14 Oct 2021 19:06)  Aquaphor topical ointment: Apply topically to affected area 2 times a day  ****Bilateral Lower Extremeties** (14 Oct 2021 19:06)  Betadine 5% topical spray: Apply topically to affected area 2 times a day  *****Bilateral Heels**** (14 Oct 2021 19:06)  Betadine 5% topical spray: Apply topically to affected area once a day, As Needed - for wound care  ****Bilateral Heels**** (14 Oct 2021 19:06)  busPIRone 5 mg oral tablet: 1 tab(s) orally 2 times a day (14 Oct 2021 19:06)  cefTRIAXone 1 g intravenous injection: 1 gram(s) intravenous once a day  ****Course not Completed**** (14 Oct 2021 19:06)  cholecalciferol 25 mcg (1000 intl units) oral tablet: 3 tab(s) orally once a day (14 Oct 2021 19:06)  dermasyn gel: Apply topically to affected area once a day  ****Right posterior thigh**** (14 Oct 2021 19:06)  dermasyn gel: Apply topically to affected area once a day, As Needed - for wound care  ****Right posterior thigh*** (14 Oct 2021 19:06)  Eliquis 5 mg oral tablet: 1 tab(s) orally 2 times a day (14 Oct 2021 19:06)  ferrous sulfate 325 mg (65 mg elemental iron) oral tablet: 1 tab(s) orally once a day (14 Oct 2021 19:06)  furosemide 40 mg oral tablet: 1 tab(s) orally once a day (14 Oct 2021 19:06)  LORazepam 0.5 mg oral tablet: 1 tab(s) orally once a day (at bedtime), As Needed - for anxiety (14 Oct 2021 19:06)  losartan 50 mg oral tablet: 1 tab(s) orally once a day (14 Oct 2021 19:06)  Melatonin 3 mg oral tablet: 1 tab(s) orally once a day (at bedtime) (14 Oct 2021 19:06)  methocarbamol 500 mg oral tablet: 1 tab(s) orally once a day, As Needed - for muscle spasm (14 Oct 2021 19:06)  Metoprolol Tartrate 25 mg oral tablet: 0.5 tab(s) orally 2 times a day (14 Oct 2021 19:06)  mirtazapine 15 mg oral tablet: 1 tab(s) orally once a day (at bedtime) (14 Oct 2021 19:06)  Multiple Vitamins with Minerals oral tablet: 1 tab(s) orally once a day (14 Oct 2021 19:06)  nystatin 100,000 units/g topical cream: Apply topically to affected area 2 times a day, As Needed - for rash   *****groin**** (14 Oct 2021 19:06)  oxyCODONE 5 mg oral capsule: 1 cap(s) orally 2 times a day, As Needed - for moderate pain, for severe pain (14 Oct 2021 19:06)  polyethylene glycol 3350 oral powder for reconstitution: 17 gram(s) orally once a day (14 Oct 2021 19:06)  Potassium Chloride (Eqv-K-Tab) 20 mEq oral tablet, extended release: 0.5 tab(s) orally once a day (14 Oct 2021 19:06)  Senna 8.6 mg oral tablet: 2 tab(s) orally once a day (at bedtime) (14 Oct 2021 19:06)  silver sulfADIAZINE 1% topical cream: Apply topically to affected area once a day  ****Right buttocks*** (14 Oct 2021 19:06)  silver sulfADIAZINE 1% topical cream: Apply topically to affected area once a day, As Needed - for wound care  *****Right Buttocks**** (14 Oct 2021 19:06)  sodium hypochlorite 0.125% topical solution: Apply topically to affected area once a day  ****Left Posterior Calf**** (14 Oct 2021 19:06)  sodium hypochlorite 0.125% topical solution: Apply topically to affected area once a day, As Needed - for wound care  *****Left Posterior Calf**** (14 Oct 2021 19:06)  Stress Formula with Iron oral tablet: 1 tab(s) orally once a day (14 Oct 2021 19:06)    Allergies    No Known Allergies    Intolerances                              11.3   7.29  )-----------( 127      ( 20 Oct 2021 07:47 )             36.4     10-20    137  |  96  |  21  ----------------------------<  100<H>  2.9<LL>   |  39<H>  |  0.58    Ca    9.8      20 Oct 2021 07:47    TPro  6.2  /  Alb  3.2<L>  /  TBili  0.9  /  DBili  x   /  AST  22  /  ALT  17  /  AlkPhos  83  10-20            Vital Signs Last 24 Hrs  T(C): 36.4 (20 Oct 2021 08:21), Max: 37.1 (19 Oct 2021 20:37)  T(F): 97.5 (20 Oct 2021 08:21), Max: 98.7 (19 Oct 2021 20:37)  HR: 88 (20 Oct 2021 08:21) (88 - 103)  BP: 119/60 (20 Oct 2021 08:21) (113/73 - 124/77)  BP(mean): --  RR: 17 (20 Oct 2021 08:21) (17 - 18)  SpO2: 98% (20 Oct 2021 08:21) (93% - 98%)    PHYSICAL EXAM  General: NAD, Awake and Alert  BLLEs    Unstagable ischial pressure ulcer and sacral decub ulcer    RLE:  skin intact, gross deformity noted  unable to SLR due to deconditioning   +sensation L2-S1  +dorsiflexion/plantarflexion of ankle/hallux  +dorsalis pedis pulse  Soft compartments, - calf tenderness    LLE:  Small ulcer over medial/posterior aspect of lower leg, otherwise skin intact, gross deformity noted  unable to SLR due to deconditioning   +sensation L2-S1  +dorsiflexion/plantarflexion of ankle/hallux  +dorsalis pedis pulse  Soft compartments, - calf tenderness        Secondary Exam: Benign, Skin intact, NTTP along axial spine, SILT throughout, motor grossly intact throughout, no other orthopedic injuries at this time, compartments soft and compressible        IMAGING:  XR : FU      Assessment/Plan:  89y Female admitted with shortness of breath coincidentally with bilateral tib fib fractures 11 weeks old managed conservatively     -D/w Dr. Kitchen - Patient may be weightbearing as tolerated with gentle range of motion exercises   -Pain control as needed  -DVT ppx per medicine  -Will advise if plan changes  -No acute orthopedic surgical intervention needed at this time  -Discussed with attending who agrees with plan  -Ortho stable for discharge

## 2021-10-20 NOTE — PHYSICAL THERAPY INITIAL EVALUATION ADULT - GENERAL OBSERVATIONS, REHAB EVAL
Pt was found lying in bed on Air mattress, on sales, BLE swelling+ with z flow boots on, Pt is [place], hypophonic and willing to participate in therex

## 2021-10-20 NOTE — PROGRESS NOTE ADULT - ASSESSMENT
89 y/o Female with PMHx Afib on coumadin, HTN, mild dementia, chronic lymphedema without known h/o CHF, presented from SNF for evaluation of worsening SOB. Admitted for:    #Acute hypoxic respiratory failure due to Mod-Large b/l pleural effusions -  likley combination of CHF and PNA   Supplemental 02 - 3L NC ---resolved now on room air   CT as above. no evidence on PNA.   PO Lasix 40mg BID   restarted on eliquis stop lovenox    lights criteria - exudative   Echo EF 60-65%  d/c Vancomycin, Cefepime: SP Ceftriaxone continue PO Doxy day 6   ID, Cardio, Pulm consult     #Metabolic encephalopathy     underlying dementia    #Chronic Afib  Daughter reports being on Eliquis not coumadin outpatient  Full dose AC lovenox   Cont. BB  Cardio: [place]    #Pyuria  Urine culture negative    #Sacral Decubiti, B/l leg wounds  #Dementia, Mild.  #Moderate Protein Calori Malnutrition  #Debility. Frailty.   Supportive care, Wound care eval  Cont. Remeron.   Air mattress,  turn and position   Ensure w/ meals. Vitamin C and zinc for wound healing.  pt would benefit with assistance with feeding - SLP reconsulted for recommendations on diet consistency vs help with feeding.   Recommend Palliative care, family on board.  ate small amount of breakfast w/ help from aide.   SLP re-eval - dysphagia 3 diet   Wound Care eval    #B/l ankle fractures  NWB? need to clarify WB status.  repeat tib/fib xray ordered   ortho consulted to determine weight bearing status   PT eval     #Advanced Directives / GOC:    DNR/DNI No feeding tube  s/p palliative consult  DNR/DNI No Feeding tube  Overall prognosis poor- won index 49-62% 6 month mortality index.     #DVT proph- Eliquis 5mg PO BID    89 y/o Female with PMHx Afib on coumadin, HTN, mild dementia, chronic lymphedema without known h/o CHF, presented from SNF for evaluation of worsening SOB. Admitted for:    #Acute hypoxic respiratory failure due to Mod-Large b/l pleural effusions -  likley combination of CHF and PNA   Supplemental 02 - 3L NC ---resolved now on room air   CT as above. no evidence on PNA.   PO Lasix 40mg BID   restarted on eliquis stop lovenox    lights criteria - exudative   Echo EF 60-65%  d/c Vancomycin, Cefepime: SP Ceftriaxone continue PO Doxy day 6   ID, Cardio, Pulm consult     #Metabolic encephalopathy     underlying dementia    #Chronic Afib  Daughter reports being on Eliquis not coumadin outpatient  Full dose AC lovenox   Cont. BB  Cardio: [place]    #Pyuria  Urine culture negative    #Sacral Decubiti, B/l leg wounds  #Dementia, Mild.  #Moderate Protein Calori Malnutrition  #Debility. Frailty.   Supportive care, Wound care eval  Cont. Remeron.   Air mattress,  turn and position   Ensure w/ meals. Vitamin C and zinc for wound healing.  pt would benefit with assistance with feeding - SLP reconsulted for recommendations on diet consistency vs help with feeding.   Recommend Palliative care, family on board.  ate small amount of breakfast w/ help from aide.   SLP re-eval - dysphagia 3 diet   Wound Care eval    #B/l ankle fractures  NWB? need to clarify WB status.  repeat tib/fib xray ordered   ortho consulted to determine weight bearing status   PT eval     #DVT proph- Eliquis 5mg PO BID     #Advanced Directives / GOC:    DNR/DNI No feeding tube  s/p palliative consult  DNR/DNI No Feeding tube  Overall prognosis poor- won index 49-62% 6 month mortality index.   spoke at length Jazmine >30mins - concerned about her deconditioning at rehab has had multiple bad experiences, concerned that she will continue to decline. i have discussed with daughter her mothers long term prognosis, advancing dementia and debility as well as her decreased PO intake will likely continue to progress and is unlikely to improve with any significance. she has requested that her mother be evaluated by ortho and PT so as to determine her functional status/ and planning for her discharge.

## 2021-10-20 NOTE — PROGRESS NOTE ADULT - SUBJECTIVE AND OBJECTIVE BOX
Patient is a 89y old  Female who presents with a chief complaint of Worsening SOB/ hypoxia (19 Oct 2021 16:14)  10/15/2021- Cardiology: 89 year old woman with HBP, long term persistent atrial fibrillation who is admitted with progressive shortness of breath and CT chest shows bilateral moderate to large pleural effusions. She has chronic venous insufficiency but no prior history of heart failure. Last admitted to [facility] with bilateral tib-fib fractures that were treated nonoperatively.Patient with mild slowly progressive dementia, monoclonal gammopathy, prior urinary tract infections, chronic Reaves catheter. Warfarin waschanged to Eliquis in August 2021.    Followup HPI:      10/16 alert, lying flat, denies dyspnea  10/17 patient is asleep   10/18- no complaints  10/20- Alert. States "I feel much better"    PAST MEDICAL & SURGICAL HISTORY:  Lymphedema of both lower extremities    Venous insufficiency    Monoclonal gammopathies    Paroxysmal atrial fibrillation    Acute cystitis without hematuria  septic  4/2016    Essential hypertension    Spinal stenosis    Spondyloarthritis    Vaginal prolapse    [place] (hard of hearing), bilateral    Hypertension    S/P kyphoplasty  2014    S/P thyroidectomy  partial   1975    History of vaginal surgery    History of fracture of femur  hx of proximal femur fracture in January 2021    History of repair of left hip joint  Hx L hip long IMN with Dr. Kitchen 2017        Review of symptoms:  Negative except for as noted in today's HPI.      MEDICATIONS  (STANDING):  ascorbic acid 500 milliGRAM(s) Oral daily  collagenase Ointment 1 Application(s) Topical daily  doxycycline hyclate Capsule 100 milliGRAM(s) Oral every 12 hours  enoxaparin Injectable 50 milliGRAM(s) SubCutaneous every 12 hours  furosemide   Injectable 40 milliGRAM(s) IV Push two times a day  influenza   Vaccine 0.5 milliLiter(s) IntraMuscular once  losartan 25 milliGRAM(s) Oral daily  metoprolol succinate ER 25 milliGRAM(s) Oral daily  polyethylene glycol 3350 17 Gram(s) Oral daily  senna 2 Tablet(s) Oral at bedtime  zinc sulfate 220 milliGRAM(s) Oral daily    MEDICATIONS  (PRN):  acetaminophen   Tablet .. 650 milliGRAM(s) Oral every 6 hours PRN Temp greater or equal to 38C (100.4F), Mild Pain (1 - 3)  aluminum hydroxide/magnesium hydroxide/simethicone Suspension 30 milliLiter(s) Oral every 4 hours PRN Dyspepsia  LORazepam   Injectable 0.25 milliGRAM(s) IV Push every 12 hours PRN Anxiety  melatonin 3 milliGRAM(s) Oral at bedtime PRN Insomnia  ondansetron Injectable 4 milliGRAM(s) IV Push every 8 hours PRN Nausea and/or Vomiting          Vital Signs Last 24 Hrs  T(C): 36.4 (20 Oct 2021 08:21), Max: 37.1 (19 Oct 2021 20:37)  T(F): 97.5 (20 Oct 2021 08:21), Max: 98.7 (19 Oct 2021 20:37)  HR: 88 (20 Oct 2021 08:21) (88 - 103)  BP: 119/60 (20 Oct 2021 08:21) (113/73 - 124/77)  BP(mean): --  RR: 17 (20 Oct 2021 08:21) (17 - 18)  SpO2: 98% (20 Oct 2021 08:21) (93% - 98%)    I&O's Summary    19 Oct 2021 07:01  -  20 Oct 2021 07:00  --------------------------------------------------------  IN: 0 mL / OUT: 1150 mL / NET: -1150 mL        PHYSICAL EXAM  General Appearance: alert, oriented to person  HEENT:   Neck:   Lungs: clear right, reduced BS left base  Heart: 1/6 systolic murmur LSB  Abdomen:   Extremities: no edema  Neurologic: moves all extremities      INTERPRETATION OF TELEMETRY:    ECG:    LABS:                          11.3   7.29  )-----------( 127      ( 20 Oct 2021 07:47 )             36.4     10-18    138  |  96  |  21  ----------------------------<  131<H>  3.0<L>   |  35<H>  |  0.61    Ca    9.6      18 Oct 2021 12:01            Pro BNP  [id number] 10-14 @ 14:13  D Dimer  -- 10-14 @ 14:13              RADIOLOGY & ADDITIONAL STUDIES:    IMPRESSION:  1.HFpEF with bilateral pleural effusions. Improved after right thoracentesis of more than 1000 cc. Left thoracentesis deferred due to pain caused by the right procedure.  2.Long term persistent atrial fibrillation. Rate  controlled.  3.Hypertension.  4.Indwelling Reaves.  5.Dementia.   PLAN:  Continue metoprolol,  losartan, furosemide. Follow BMP while on Iv furosemide. Replete serum potassium. Suggest resume oral anticoagulation if left thoracentesis is not contemplated.

## 2021-10-20 NOTE — CONSULT NOTE ADULT - REASON FOR ADMISSION
Worsening SOB/ hypoxia

## 2021-10-20 NOTE — CHART NOTE - NSCHARTNOTEFT_GEN_A_CORE
Clinical Nutrition Follow Up Note:  Consult for Pressure Injuries >stage 2    * 89 year old admitted for exacerbation of CHF. PMH afib on coumadin, HTN, mild dementia, chronic lymphedema without known h/o CHF, presented from SNF for evaluation of worsening SOB. Patient unable to provide ay history. Per daughter at bedside, she is not on oxygen at her baseline. 5 days ago at [facility] she noted to become more hypoxic and was placed on O2. Then patient had more requirement for O2 and CXR was done that showed opacification of both bases suspicious for pneumonia and patient initiated on IV antibiotics. At this point, daughter insisted on transfer to  for further diagnosis and treatment. Patient was at  in June for b/l tib/fib fractures for which she was NWB and was at [facility].     **Current status: Being followed by palliative care. DNR, DNI, NFT, no artificial nutrition. Medical team recommended a Palliative vs hospice approach as her immobility, confusion, and loss of appetite are likely to prevent her from a full recovery to return home.    *Labs Reviewed:  10-20    137  |  96  |  21  ----------------------------<  100<H>  2.9<LL>   |  39<H>  |  0.58    Ca    9.8      20 Oct 2021 07:47    TPro  6.2  /  Alb  3.2<L>  /  TBili  0.9  /  DBili  x   /  AST  22  /  ALT  17  /  AlkPhos  83  10-20      BMI: BMI (kg/m2): 18.8 (10-15-21 @ 14:20)  HbA1c:   Glucose:   BP: 119/60 (10-20-21 @ 08:21) (113/73 - 145/65)  Lipid Panel:     *pertinent meds: vitamin C, furosemide, losartan, KCl, senna, zinc sulfate, acetaminophen, melatonin, zofran      *I and O's:    10-19-21 @ 07:01  -  10-20-21 @ 07:00  --------------------------------------------------------  IN:  Total IN: 0 mL    OUT:    Indwelling Catheter - Urethral (mL): 1150 mL  Total OUT: 1150 mL    Total NET: -1150 mL    *Fluid balance Negative; monitor and adjust prn.     *(+) BM on 10/16 x 2.     *Vishnu score of 13: Multiple PU documented - R ischium unstageable, sacrum suspected DTI, R/L heel unstageable.   1+ R/L leg edema documented.    *PO intake meeting <50% of estimated nutr needs. Nursing aide states she ate 50% scrambled eggs this morning and 25% of vanilla Ensure. Pt prefers chocolate flavor, will switch. Pt with very poor appetite; Remeron was d/c.    *Malnutrition dx: Pt previously dx w/ moderate protein calorie malnutrition. Now currently meeting criteria for severe protein calorie malnutrition based on severe wasting of temples, orbital, and buccal. Moderate wasting of clavicle, shoulders, and fat ovrlaying rib cage. legs w/ 1+ edema skewing wt and appearance.     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      Diet, Dysphagia 2 Mechanical Soft-Thin Liquids:   Prosource Gelatein Plus     Qty per Day:  3  Supplement Feeding Modality:  Oral  Ensure Enlive Servings Per Day:  2       Frequency:  Three Times a day (10-19-21 @ 09:03) [Active]      Estimated Needs: Based on 54.4 Kg (admit wt)  Calories: 1904- 2176 Kcal (35- 40 Kcal/Kg)  Protein: 98- 136 g (1.8- 2.5 g/Kg)  Fluids: 1632- 1904 mL (30- 35 mL/Kg)    *Wt Hx:  10/20/21 bed scale wt: 64 kg (increased 2/2 fluid retention)  Height (cm): 170.2 (10-15-21 @ 14:20)  Weight (kg): 54.4 (10-15-21 @ 14:20) admit wt  BMI (kg/m2): 18.8 (10-15-21 @ 14:20)  BSA (m2): 1.63 (10-15-21 @ 14:20)    Recommendations:  1. Continue current diet order as per SLP recommendations with Ensure and gelatein supplements but switch Ensure Enlive to chocolate flavor since pt prefers.  2. Provide total feeding assistance and meal encouragement. Encourage protein-rich foods, maximize food preferences.  3. Pt MOLST states NFT - Nutrition support is not recommended due to overall declining medical status which evidenced based studies indicate EN is not effective in prolonging survival and improving quality of life. It can also increase risk of aspiration pneumonia as well as other related issues.   4. Recommending comfort care measures at this time.  5. Continue supplementation of vitamin C and zinc to optimize wound healing.    RD will continue to monitor PO intake, labs, hydration, and wt prn.     Silvana Blake, MS, RDN, [phone]

## 2021-10-20 NOTE — PROGRESS NOTE ADULT - TREATMENT GUIDELINE COMMENT
Due to the patient’s health status and restrictions on visitation during the Public Health Emergency, the Advance Care Planning service was performed via telephone with patient’s 3 children

## 2021-10-20 NOTE — SWALLOW BEDSIDE ASSESSMENT ADULT - COMMENTS
Order received for repeat consultation for above pt.  pt is already on program and is seen to day in Follow up. Please see my follow up note in Adult Assessment and Intervention: Flowsheets. Order received for repeat consultation for above pt.  pt is already on program and is seen to day in Follow up. Please see my follow up note in Adult Assessment and Intervention: Flowsheets, Therapeutic Interventions, Feeding. 10/20/21

## 2021-10-20 NOTE — PROGRESS NOTE ADULT - SUBJECTIVE AND OBJECTIVE BOX
Patient seen and examined: More lethargic this am, not opening eyes. No acute events overnight. Palliative meeting planned for today.    10/19: sitting up in bed, putting lipstick on, awake and conversive but confused.   10/20: seen and examined in bed, confused. no new issues.     ROS: limited due to dementia     Vital Signs Last 24 Hrs  T(C): 36.4 (20 Oct 2021 08:21), Max: 37.1 (19 Oct 2021 20:37)  T(F): 97.5 (20 Oct 2021 08:21), Max: 98.7 (19 Oct 2021 20:37)  HR: 88 (20 Oct 2021 08:21) (88 - 103)  BP: 119/60 (20 Oct 2021 08:21) (113/73 - 124/77)  BP(mean): --  RR: 17 (20 Oct 2021 08:21) (17 - 18)  SpO2: 98% (20 Oct 2021 08:21) (93% - 98%)    PHYSICAL EXAM:  Constitutional: elder female, alert, confused   HEENT: unable to assess  Neck: Soft and supple  Respiratory: Breath sounds are diminished bilat bases, clear in upper lobes   Cardiovascular: S1 and S2, IR, IR  Gastrointestinal: Bowel Sounds present, soft, nontender, nondistended, no guarding, no rebound  extremities: 5/5 strength in upper extremities, 4/5 in lower extremities. scant edema to ankles.   Neurological: A/O x 2   Musculoskeletal: unable to follow commands  Skin: Sarcal ulcer and gluteal fold ulcer unstageable     LABS: All Labs Reviewed:                  11.3   7.29  )-----------( 127      ( 20 Oct 2021 07:47 )             36.4     10-20    137  |  96  |  21  ----------------------------<  100<H>  2.9<LL>   |  39<H>  |  0.58    Ca    9.8      20 Oct 2021 07:47    TPro  6.2  /  Alb  3.2<L>  /  TBili  0.9  /  DBili  x   /  AST  22  /  ALT  17  /  AlkPhos  83  10-20    LIVER FUNCTIONS - ( 20 Oct 2021 07:47 )  Alb: 3.2 g/dL / Pro: 6.2 gm/dL / ALK PHOS: 83 U/L / ALT: 17 U/L / AST: 22 U/L / GGT: x

## 2021-10-20 NOTE — PHYSICAL THERAPY INITIAL EVALUATION ADULT - PERTINENT HX OF CURRENT PROBLEM, REHAB EVAL
Pt presented from [facility] for evaluation of worsening SOB, 5 days ago at [facility] she noted to become more hypoxic and was placed on O2. Then patient had more requirement for O2 and CXR was done that showed opacification of both bases suspicious for pneumonia ,Patient was at  in June for b/l tib/fib fractures for which she was NWB and was at [facility].

## 2021-10-20 NOTE — PROGRESS NOTE ADULT - SUBJECTIVE AND OBJECTIVE BOX
HPI: Pt is a 89y old Female with hx of 89yo/F with PMH afib on coumadin, HTN, mild dementia, chronic lymphedema without known h/o CHF, presented from SNF for evaluation of worsening SOB. Patient unable to provide ay history. Per daughter at bedside, she is not on oxygen at her baseline. 5 days ago at [facility] she noted to become more hypoxic and was placed on O2. Then patient had more requirement for O2 and CXR was done that showed opacification of both bases suspicious for pneumonia and patient initiated on IV antibiotics. At this point, daughter insisted on transfer to  for further diagnosis and treatment. Patient was at  in June for b/l tib/fib fractures for which she was NWB and was at SNF. Patient unable to provide any history, Palliative medicine consulted to help establish GOC and advance care planning     10/18/2021 patient seen and examined with no family at bedside, patient resting comfortable at this time, open eyes to tactile stimuli, but just moaned and then closed them. Will reach out to family to schedule GOC meeting   10/19/21 Seen and examined at bedside Awake and alert. Confused and disoriented to place. Having delusional paranoid thoughts at times during our conversation. Taking only sips of fluids PO. Denies pain or dyspnea  10/20/2021 patient seen and examined with no family at bedside, patient more alert this Am oriented to self and place and stated that she came to the hospital because she was sick and unable to further describe.  as per RN staff patient did take a few bites of breakfast. Patient was able to also recall that Dr. Vides just saw patient and is hopeful to get a flu shot, pt states is comfortable at this time.        PAIN: (x )Yes   ( )No  denies at this time     DYSPNEA: ( ) Yes  (x ) No  Level: on room air comfortable at this time     Review of Systems:  Unable to obtain/Limited due to: patient confused     PHYSICAL EXAM:    Vital Signs Last 24 Hrs  T(C): 36.4 (20 Oct 2021 08:21), Max: 37.1 (19 Oct 2021 20:37)  T(F): 97.5 (20 Oct 2021 08:21), Max: 98.7 (19 Oct 2021 20:37)  HR: 88 (20 Oct 2021 08:21) (88 - 103)  BP: 119/60 (20 Oct 2021 08:21) (113/73 - 124/77)  RR: 17 (20 Oct 2021 08:21) (17 - 18)  SpO2: 98% (20 Oct 2021 08:21) (93% - 98%)    PPSV2:  30 %  FAST: unsure of baseline     General: elderly female in bed, NAD   Mental Status: awake confused   HEENT: unable to assess   Lungs: diminished breath sounds   Cardiac: S1S2 +  GI: non tender, non distended + BS  : + voiding   Ext: generalized edema  Neuro: lethargic  Skin: Sarcal ulcer, b/l samson boots      LABS:                        11.3   7.29  )-----------( 127      ( 20 Oct 2021 07:47 )             36.4     10-20    137  |  96  |  21  ----------------------------<  100<H>  2.9<LL>   |  39<H>  |  0.58    Ca    9.8      20 Oct 2021 07:47    TPro  6.2  /  Alb  3.2<L>  /  TBili  0.9  /  DBili  x   /  AST  22  /  ALT  17  /  AlkPhos  83  10-20      Albumin: Albumin, Serum: 3.2 g/dL (10-20 @ 07:47)      Allergies    No Known Allergies    Intolerances      MEDICATIONS  (STANDING):  ascorbic acid 500 milliGRAM(s) Oral daily  collagenase Ointment 1 Application(s) Topical daily  doxycycline hyclate Capsule 100 milliGRAM(s) Oral every 12 hours  enoxaparin Injectable 50 milliGRAM(s) SubCutaneous every 12 hours  furosemide   Injectable 40 milliGRAM(s) IV Push two times a day  influenza   Vaccine 0.5 milliLiter(s) IntraMuscular once  losartan 25 milliGRAM(s) Oral daily  metoprolol succinate ER 25 milliGRAM(s) Oral daily  polyethylene glycol 3350 17 Gram(s) Oral daily  potassium chloride   Powder 40 milliEquivalent(s) Oral once  potassium chloride  10 mEq/100 mL IVPB 10 milliEquivalent(s) IV Intermittent every 1 hour  senna 2 Tablet(s) Oral at bedtime  zinc sulfate 220 milliGRAM(s) Oral daily    MEDICATIONS  (PRN):  acetaminophen   Tablet .. 650 milliGRAM(s) Oral every 6 hours PRN Temp greater or equal to 38C (100.4F), Mild Pain (1 - 3)  aluminum hydroxide/magnesium hydroxide/simethicone Suspension 30 milliLiter(s) Oral every 4 hours PRN Dyspepsia  LORazepam   Injectable 0.25 milliGRAM(s) IV Push every 12 hours PRN Anxiety  melatonin 3 milliGRAM(s) Oral at bedtime PRN Insomnia  ondansetron Injectable 4 milliGRAM(s) IV Push every 8 hours PRN Nausea and/or Vomiting      RADIOLOGY:

## 2021-10-20 NOTE — CONSULT NOTE ADULT - CONSULT REASON
b/l pleural effusions
Bilateral tib / fib fractures on 8/4 updated weight bearing status
shortness of breath
goc
pneumonia
sob; bilateral effussions

## 2021-10-20 NOTE — PROGRESS NOTE ADULT - CONVERSATION DETAILS
I spoke with patients' three children over the phone and reviewed the role of palliative medicine.  The patient's children shared that their mom has been in and out of the hospital then over this past weekend, they were called to say that their mother was not eating and concerned that something could happen to the patient at any time; since then, the patient has continued to perk up and is eating small amounts of food. The family is happy that she is starting to eat more but is concerned that a back and forth has been a lot on her.  I explained that the patient has been in and out of the hospital over the past few weeks and now with reoccurring infections and wounds. These are chronic issues. The patient only takes small amounts of PO and cannot walk due to fractures in the ankles that may be reoccurring infections, and the wound could worsen.   At this time, discussed the different levels of home care, the family does not feel hospice is appropriate if the patient is starting to eat more and would like her to be treated for further infections.  If the family is not ready for a comfort focus and wants to see how the patient is doing, I would recommend palliative home care with private hire aides. Patient daughter Tabatha was overwhelmed by hearing that as she thought the patient would be able to stay in the hospital for a much longer time, reached out to RUBI kaur, who explained that patient is getting ready for d/c by the end week.  The patient family was then stating that they think what is best for her is sending her to a [facility] to see if she can get stronger while getting aides in place.  Knowing that patient may develop another infection and wind up back in the hospital.   Family goals are clear at this time, and the patient appears comfortable will sign off. Please re-consult if needed.

## 2021-10-20 NOTE — PHYSICAL THERAPY INITIAL EVALUATION ADULT - DIAGNOSIS, PT EVAL
Worsening SOB/ hypoxia, Acute hypoxic respiratory failure , Mod-Large b/l pleural effusions, Sacral Decubiti, B/l leg wounds, Old Bilateral tibia fib fractures/NWB BLE

## 2021-10-20 NOTE — PROGRESS NOTE ADULT - ASSESSMENT
Pt is a 89y old Female with hx of 89yo/F with PMH afib on coumadin, HTN, mild dementia, chronic lymphedema without known h/o CHF, presented from SNF for evaluation of worsening SOB.    1) Acute hypoxic respiratory failure   - Mod-Large b/l pleural effusions   - HFpEF with bilateral pleural effusions  - Supplemental 02 PRN   - CT - Cardiomegaly. Bilateral pleural effusions with bilateral lower lobe compressive atelectasis.  No evidence of pneumonia  - c/w IV Lasix 40mg BID   - Recent Echo 06/2021- EF 60-65%, repeat Echo   - completed Ceftriaxone, cont PO Doxy  - cardiothoracic consult appreciated   - pulmonology consult appreciated   - cardiology consult appreciated   - s/p pigtail placement      2) Metabolic encephalopathy   - underlying dementia  - PPSV2: 20-30 %  - FAST: unsure of baseline   - supportive care     3) Sacral Decubiti, B/l leg wounds  - History of b/l ext fractures    - Moderate Protein Calorie Malnutrition  - Debility  - Supportive care  - Wound care eval  - D/C Remeron ? increased confusion  - Air mattress  - turn and position     4) Sammy LE fx  -8/4=Bilateral tibi fib fractures  -As per ortho NWB at that time  -Was to F/U outpt?  -PT eval for upper body strength training/[facility] eval      5) Advance care planning   - patient lacks capacity at this time  - MOLST: DNR/I/ No feeding tube, determine use of antibiotics   - HCP sent over naming Tabatha Deluna as HCP - placed on chart

## 2021-10-21 LAB
ANION GAP SERPL CALC-SCNC: 5 MMOL/L — SIGNIFICANT CHANGE UP (ref 5–17)
BUN SERPL-MCNC: 24 MG/DL — HIGH (ref 7–23)
CALCIUM SERPL-MCNC: 9.9 MG/DL — SIGNIFICANT CHANGE UP (ref 8.5–10.1)
CHLORIDE SERPL-SCNC: 99 MMOL/L — SIGNIFICANT CHANGE UP (ref 96–108)
CO2 SERPL-SCNC: 35 MMOL/L — HIGH (ref 22–31)
CREAT SERPL-MCNC: 0.54 MG/DL — SIGNIFICANT CHANGE UP (ref 0.5–1.3)
GLUCOSE SERPL-MCNC: 105 MG/DL — HIGH (ref 70–99)
HCT VFR BLD CALC: 34.9 % — SIGNIFICANT CHANGE UP (ref 34.5–45)
HGB BLD-MCNC: 11 G/DL — LOW (ref 11.5–15.5)
MCHC RBC-ENTMCNC: 29.3 PG — SIGNIFICANT CHANGE UP (ref 27–34)
MCHC RBC-ENTMCNC: 31.5 GM/DL — LOW (ref 32–36)
MCV RBC AUTO: 93.1 FL — SIGNIFICANT CHANGE UP (ref 80–100)
PLATELET # BLD AUTO: 112 K/UL — LOW (ref 150–400)
POTASSIUM SERPL-MCNC: 3.4 MMOL/L — LOW (ref 3.5–5.3)
POTASSIUM SERPL-SCNC: 3.4 MMOL/L — LOW (ref 3.5–5.3)
RBC # BLD: 3.75 M/UL — LOW (ref 3.8–5.2)
RBC # FLD: 16.1 % — HIGH (ref 10.3–14.5)
SARS-COV-2 RNA SPEC QL NAA+PROBE: SIGNIFICANT CHANGE UP
SODIUM SERPL-SCNC: 139 MMOL/L — SIGNIFICANT CHANGE UP (ref 135–145)
WBC # BLD: 6.57 K/UL — SIGNIFICANT CHANGE UP (ref 3.8–10.5)
WBC # FLD AUTO: 6.57 K/UL — SIGNIFICANT CHANGE UP (ref 3.8–10.5)

## 2021-10-21 PROCEDURE — 99232 SBSQ HOSP IP/OBS MODERATE 35: CPT

## 2021-10-21 RX ADMIN — ZINC SULFATE TAB 220 MG (50 MG ZINC EQUIVALENT) 220 MILLIGRAM(S): 220 (50 ZN) TAB at 09:18

## 2021-10-21 RX ADMIN — Medication 10 MILLIGRAM(S): at 12:11

## 2021-10-21 RX ADMIN — Medication 1 APPLICATION(S): at 12:11

## 2021-10-21 RX ADMIN — Medication 500 MILLIGRAM(S): at 09:17

## 2021-10-21 RX ADMIN — Medication 40 MILLIGRAM(S): at 18:22

## 2021-10-21 RX ADMIN — Medication 40 MILLIGRAM(S): at 09:18

## 2021-10-21 RX ADMIN — POLYETHYLENE GLYCOL 3350 17 GRAM(S): 17 POWDER, FOR SOLUTION ORAL at 09:18

## 2021-10-21 RX ADMIN — Medication 25 MILLIGRAM(S): at 09:17

## 2021-10-21 RX ADMIN — APIXABAN 2.5 MILLIGRAM(S): 2.5 TABLET, FILM COATED ORAL at 21:18

## 2021-10-21 RX ADMIN — APIXABAN 2.5 MILLIGRAM(S): 2.5 TABLET, FILM COATED ORAL at 09:17

## 2021-10-21 RX ADMIN — LOSARTAN POTASSIUM 25 MILLIGRAM(S): 100 TABLET, FILM COATED ORAL at 09:17

## 2021-10-21 NOTE — PROGRESS NOTE ADULT - ASSESSMENT
89 y/o Female with PMHx Afib on coumadin, HTN, mild dementia, chronic lymphedema without known h/o CHF, presented from SNF for evaluation of worsening SOB. Admitted for:    #Acute hypoxic respiratory failure due to Mod-Large b/l pleural effusions -  likley combination of CHF and PNA   Supplemental 02 - 3L NC ---resolved now on room air   CT as above. no evidence on PNA.   PO Lasix 40mg BID   restarted on eliquis stop lovenox    lights criteria - exudative   Echo EF 60-65%  d/c Vancomycin, Cefepime: SP Ceftriaxone continue PO Doxy day 7   ID, Cardio, Pulm consult     #Metabolic encephalopathy     underlying dementia    #Chronic Afib  Daughter reports being on Eliquis not coumadin outpatient  Full dose AC lovenox   Cont. BB  Cardio: Peewee    #Pyuria  Urine culture negative    #Sacral Decubiti, B/l leg wounds  #Dementia, Mild.  #Moderate Protein Calori Malnutrition  #Debility. Frailty.   Supportive care, Wound care eval  Cont. Remeron.   Air mattress,  turn and position   Ensure w/ meals. Vitamin C and zinc for wound healing.  pt would benefit with assistance with feeding - SLP reconsulted for recommendations on diet consistency vs help with feeding.   Recommend Palliative care, family on board.  ate small amount of breakfast w/ help from aide.   SLP re-eval - dysphagia 3 diet   Wound Care eval    #B/l ankle fractures  NWB? need to clarify WB status.  repeat tib/fib xray ordered   ortho consulted to determine weight bearing status   PT eval     #DVT proph- Eliquis 5mg PO BID     #Advanced Directives / GOC:    DNR/DNI No feeding tube  s/p palliative consult  Overall prognosis poor- won index 49-62% 6 month mortality index.   spoke at length Jazmine >30mins - concerned about her deconditioning at rehab has had multiple bad experiences, concerned that she will continue to decline. i have discussed with daughter her mothers long term prognosis, advancing dementia and debility as well as her decreased PO intake will likely continue to progress and is unlikely to improve with any significance. she has requested that her mother be evaluated by ortho and PT so as to determine her functional status/ and planning for her discharge.     dispo: placement pending, per CM - pt has been denied placement in multiple facilities citing she is poor rehab candidate, pt would likely need long term placement. per daughter she is working on medicaid application for placement.  89 y/o Female with PMHx Afib on coumadin, HTN, mild dementia, chronic lymphedema without known h/o CHF, presented from SNF for evaluation of worsening SOB. Admitted for:    #Acute hypoxic respiratory failure due to Mod-Large b/l pleural effusions -  likley combination of CHF and PNA   Supplemental 02 - 3L NC ---resolved now on room air   CT as above. no evidence on PNA.   PO Lasix 40mg BID   restarted on eliquis stop lovenox    lights criteria - exudative   Echo EF 60-65%  d/c Vancomycin, Cefepime: SP Ceftriaxone continue PO Doxy day 7   ID, Cardio, Pulm consult     #Metabolic encephalopathy     underlying dementia    #Chronic Afib  Daughter reports being on Eliquis not coumadin outpatient  Full dose AC lovenox   Cont. BB  Cardio: Peewee    #Pyuria  Urine culture negative    #Sacral Decubiti, B/l leg wounds  #Dementia, Mild.  #Moderate Protein Calori Malnutrition  #Debility. Frailty.   Supportive care, Wound care eval  Cont. Remeron.   Air mattress,  turn and position   Ensure w/ meals. Vitamin C and zinc for wound healing.  pt would benefit with assistance with feeding - SLP reconsulted for recommendations on diet consistency vs help with feeding.   Recommend Palliative care, family on board.  ate small amount of breakfast w/ help from aide.   SLP re-eval - dysphagia 3 diet   Wound Care eval    #hx of B/l ankle fractures   Patient may be weightbearing as tolerated with gentle range of motion exercises   repeat tib/fib xray ordered   ortho consulted   PT eval     #DVT proph- Eliquis 5mg PO BID     #Advanced Directives / GOC:    DNR/DNI No feeding tube  s/p palliative consult  Overall prognosis poor- won index 49-62% 6 month mortality index.   spoke at length Jazmine >30mins - concerned about her deconditioning at rehab has had multiple bad experiences, concerned that she will continue to decline. i have discussed with daughter her mothers long term prognosis, advancing dementia and debility as well as her decreased PO intake will likely continue to progress and is unlikely to improve with any significance. she has requested that her mother be evaluated by ortho and PT so as to determine her functional status/ and planning for her discharge.     dispo: placement pending, per CM - pt has been denied placement in multiple facilities citing she is poor rehab candidate, pt would likely need long term placement. per daughter she is working on medicaid application for placement.

## 2021-10-21 NOTE — PROGRESS NOTE ADULT - SUBJECTIVE AND OBJECTIVE BOX
Patient seen and examined: More lethargic this am, not opening eyes. No acute events overnight. Palliative meeting planned for today.    10/19: sitting up in bed, putting lipstick on, awake and conversive but confused.   10/20: seen and examined in bed, confused. no new issues.   10/21: sitting up in bed, no pain or discomfort. awaiting placement. cont to be on room air     ROS: limited due to dementia     Vital Signs Last 24 Hrs  T(C): 36.6 (21 Oct 2021 07:14), Max: 37 (20 Oct 2021 21:13)  T(F): 97.8 (21 Oct 2021 07:14), Max: 98.6 (20 Oct 2021 21:13)  HR: 92 (21 Oct 2021 14:10) (85 - 99)  BP: 117/62 (21 Oct 2021 09:15) (113/61 - 126/55)  BP(mean): 72 (21 Oct 2021 09:15) (72 - 72)  RR: 17 (21 Oct 2021 07:14) (17 - 17)  SpO2: 93% (21 Oct 2021 14:10) (93% - 98%)    PHYSICAL EXAM:  Constitutional: elder female, alert, confused   HEENT: unable to assess  Neck: Soft and supple  Respiratory: Breath sounds are diminished bilat bases, clear in upper lobes   Cardiovascular: S1 and S2, IR, IR  Gastrointestinal: Bowel Sounds present, soft, nontender, nondistended, no guarding, no rebound  extremities: 5/5 strength in upper extremities, 4/5 in lower extremities. scant edema to ankles.   Neurological: A/O x 2   Musculoskeletal: unable to follow commands  Skin: Sarcal ulcer and gluteal fold ulcer unstageable     LABS: All Labs Reviewed:                       11.0   6.57  )-----------( 112      ( 21 Oct 2021 08:35 )             34.9     10-21    139  |  99  |  24<H>  ----------------------------<  105<H>  3.4<L>   |  35<H>  |  0.54    Ca    9.9      21 Oct 2021 08:35    TPro  6.2  /  Alb  3.2<L>  /  TBili  0.9  /  DBili  x   /  AST  22  /  ALT  17  /  AlkPhos  83  10-20        LIVER FUNCTIONS - ( 20 Oct 2021 07:47 )  Alb: 3.2 g/dL / Pro: 6.2 gm/dL / ALK PHOS: 83 U/L / ALT: 17 U/L / AST: 22 U/L / GGT: x

## 2021-10-21 NOTE — PROGRESS NOTE ADULT - SUBJECTIVE AND OBJECTIVE BOX
Patient is a 89y old  Female who presents with a chief complaint of Worsening SOB/ hypoxia (20 Oct 2021 15:35)    10/15/2021- Cardiology: 89 year old woman with HBP, long term persistent atrial fibrillation who is admitted with progressive shortness of breath and CT chest shows bilateral moderate to large pleural effusions. She has chronic venous insufficiency but no prior history of heart failure. Last admitted to [facility] with bilateral tib-fib fractures that were treated nonoperatively.Patient with mild slowly progressive dementia, monoclonal gammopathy, prior urinary tract infections, chronic Raeves catheter. Warfarin waschanged to Eliquis in August 2021.    Followup HPI:    10/16 alert, lying flat, denies dyspnea  10/17 patient is asleep   10/18- no complaints  10/20- Alert. States "I feel much better"  10/21- no complaints.    PAST MEDICAL & SURGICAL HISTORY:  Lymphedema of both lower extremities    Venous insufficiency    Monoclonal gammopathies    Paroxysmal atrial fibrillation    Acute cystitis without hematuria  septic  4/2016    Essential hypertension    Spinal stenosis    Spondyloarthritis    Vaginal prolapse    [place] (hard of hearing), bilateral    Hypertension    S/P kyphoplasty  2014    S/P thyroidectomy  partial   1975    History of vaginal surgery    History of fracture of femur  hx of proximal femur fracture in January 2021    History of repair of left hip joint  Hx L hip long IMN with Dr. Kitchen 2017        Review of symptoms:  Negative except for as noted in today's HPI.      MEDICATIONS  (STANDING):  apixaban 2.5 milliGRAM(s) Oral every 12 hours  ascorbic acid 500 milliGRAM(s) Oral daily  collagenase Ointment 1 Application(s) Topical daily  furosemide    Tablet 40 milliGRAM(s) Oral two times a day  influenza   Vaccine 0.5 milliLiter(s) IntraMuscular once  losartan 25 milliGRAM(s) Oral daily  metoprolol succinate ER 25 milliGRAM(s) Oral daily  polyethylene glycol 3350 17 Gram(s) Oral daily  senna 2 Tablet(s) Oral at bedtime  zinc sulfate 220 milliGRAM(s) Oral daily    MEDICATIONS  (PRN):  acetaminophen   Tablet .. 650 milliGRAM(s) Oral every 6 hours PRN Temp greater or equal to 38C (100.4F), Mild Pain (1 - 3)  aluminum hydroxide/magnesium hydroxide/simethicone Suspension 30 milliLiter(s) Oral every 4 hours PRN Dyspepsia  LORazepam   Injectable 0.25 milliGRAM(s) IV Push every 12 hours PRN Anxiety  melatonin 3 milliGRAM(s) Oral at bedtime PRN Insomnia  ondansetron Injectable 4 milliGRAM(s) IV Push every 8 hours PRN Nausea and/or Vomiting          Vital Signs Last 24 Hrs  T(C): 36.6 (21 Oct 2021 07:14), Max: 37 (20 Oct 2021 21:13)  T(F): 97.8 (21 Oct 2021 07:14), Max: 98.6 (20 Oct 2021 21:13)  HR: 85 (21 Oct 2021 07:14) (85 - 99)  BP: 113/61 (21 Oct 2021 07:14) (113/61 - 129/63)  BP(mean): --  RR: 17 (21 Oct 2021 07:14) (17 - 18)  SpO2: 98% (21 Oct 2021 07:14) (97% - 98%)    I&O's Summary    20 Oct 2021 07:01  -  21 Oct 2021 07:00  --------------------------------------------------------  IN: 0 mL / OUT: 400 mL / NET: -400 mL        PHYSICAL EXAM  General Appearance: weak and frail  HEENT:   Neck:   Lungs: clear right lung, reduced BS left base  Heart: 1/6 systolic murmur LSB  Abdomen:   Extremities: no edema  Neurologic: alert and conversant      INTERPRETATION OF TELEMETRY:    ECG:    LABS:                          11.3   7.29  )-----------( 127      ( 20 Oct 2021 07:47 )             36.4     10-20    137  |  96  |  21  ----------------------------<  100<H>  2.9<LL>   |  39<H>  |  0.58    Ca    9.8      20 Oct 2021 07:47    TPro  6.2  /  Alb  3.2<L>  /  TBili  0.9  /  DBili  x   /  AST  22  /  ALT  17  /  AlkPhos  83  10-20          Pro BNP  [id number] 10-14 @ 14:13  D Dimer  -- 10-14 @ 14:13              RADIOLOGY & ADDITIONAL STUDIES:    IMPRESSION:  1.HFpEF with bilateral pleural effusions. Improved after right thoracentesis of more than 1000 cc. Left thoracentesis deferred due to pain caused by the right procedure. Symptomatically stable.   2.Long term persistent atrial fibrillation. Rate  controlled.  3.Hypertension.  4.Indwelling Reaves.  5.Dementia.   PLAN:  Continue metoprolol,  losartan, furosemide, Eliquis. Follow BMP . Replete serum potassium as needed. Consider physical therapy as tolerated.

## 2021-10-22 ENCOUNTER — TRANSCRIPTION ENCOUNTER (OUTPATIENT)
Age: 86
End: 2021-10-22

## 2021-10-22 PROCEDURE — 99232 SBSQ HOSP IP/OBS MODERATE 35: CPT

## 2021-10-22 RX ORDER — FUROSEMIDE 40 MG
1 TABLET ORAL
Qty: 0 | Refills: 0 | DISCHARGE

## 2021-10-22 RX ORDER — FUROSEMIDE 40 MG
1 TABLET ORAL
Qty: 0 | Refills: 0 | DISCHARGE
Start: 2021-10-22

## 2021-10-22 RX ORDER — ZINC SULFATE TAB 220 MG (50 MG ZINC EQUIVALENT) 220 (50 ZN) MG
1 TAB ORAL
Qty: 0 | Refills: 0 | DISCHARGE
Start: 2021-10-22

## 2021-10-22 RX ORDER — COLLAGENASE CLOSTRIDIUM HIST. 250 UNIT/G
1 OINTMENT (GRAM) TOPICAL
Qty: 0 | Refills: 0 | DISCHARGE
Start: 2021-10-22

## 2021-10-22 RX ORDER — OXYCODONE HYDROCHLORIDE 5 MG/1
1 TABLET ORAL
Qty: 0 | Refills: 0 | DISCHARGE

## 2021-10-22 RX ADMIN — Medication 40 MILLIGRAM(S): at 17:04

## 2021-10-22 RX ADMIN — ZINC SULFATE TAB 220 MG (50 MG ZINC EQUIVALENT) 220 MILLIGRAM(S): 220 (50 ZN) TAB at 10:25

## 2021-10-22 RX ADMIN — Medication 40 MILLIGRAM(S): at 10:25

## 2021-10-22 RX ADMIN — Medication 25 MILLIGRAM(S): at 10:29

## 2021-10-22 RX ADMIN — APIXABAN 2.5 MILLIGRAM(S): 2.5 TABLET, FILM COATED ORAL at 23:07

## 2021-10-22 RX ADMIN — LOSARTAN POTASSIUM 25 MILLIGRAM(S): 100 TABLET, FILM COATED ORAL at 10:25

## 2021-10-22 RX ADMIN — Medication 1 APPLICATION(S): at 10:29

## 2021-10-22 RX ADMIN — APIXABAN 2.5 MILLIGRAM(S): 2.5 TABLET, FILM COATED ORAL at 10:25

## 2021-10-22 RX ADMIN — Medication 500 MILLIGRAM(S): at 10:25

## 2021-10-22 NOTE — DISCHARGE NOTE PROVIDER - DETAILS OF MALNUTRITION DIAGNOSIS/DIAGNOSES
This patient has been assessed with a concern for Malnutrition and was treated during this hospitalization for the following Nutrition diagnosis/diagnoses:     -  10/15/2021: Moderate protein-calorie malnutrition

## 2021-10-22 NOTE — PROGRESS NOTE ADULT - ASSESSMENT
1.HFpEF with bilateral pleural effusions. Improved after right thoracentesis of more than 1000 cc. Left thoracentesis deferred due to pain caused by the right procedure. Symptomatically stable.   2.Long term persistent atrial fibrillation. Rate  controlled.  3.Hypertension.  4.Indwelling Reaves.  5.Dementia.   PLAN:  Continue metoprolol,  losartan, furosemide, Eliquis. Follow BMP . Replete serum potassium as needed. Consider physical therapy as tolerated.

## 2021-10-22 NOTE — DISCHARGE NOTE PROVIDER - HOSPITAL COURSE
HPI: 89yo/F with PMH afib on coumadin, HTN, mild dementia, chronic lymphedema without known h/o CHF, presented from [facility] for evaluation of worsening SOB. Patient unable to provide ay history. Per daughter at bedside, she is not on oxygen at her baseline. 5 days ago at [facility] she noted to become more hypoxic and was placed on O2. Then patient had more requirement for O2 and CXR was done that showed opacification of both bases suspicious for pneumonia and patient initiated on IV antibiotics. At this point, daughter insisted on transfer to  for further diagnosis and treatment. Patient was at  in June for b/l tib/fib fractures for which she was NWB and was at [facility]. No fever reported.  (14 Oct 2021 18:21)    hospital course: pt admitted for bilat pleural effusions due to heart failure questionable pneumonia. fluid was exudative but thought more to be due to heart failure. pt eavled by CT surgery, had RIGHT chest tube placed, drained >1000cc of serous fluid. Concerns for pleural effusion on left side, per CT surgery, pt was unable to tolerate chest tube, family also deferring placement.  pt evaled by ID - Dr Nunes - mahnaz #3 ceftriaxone and doxycycline; ceftriaxone is completed; and completed course of doxycycline. pt with unstageable sacral and gluteal decub ulcer - evaled by wound NP, collagenase and wound care daily. pt also w/ previous bilat tib/fib fractures from fall while being transferred (July 2021), evaled by ortho for weight bearing status, pt can weight bear as tolerated. pt daughter is HCP, pt has been made DNR/DNI/no feeding tube during C convo with Palliative team.     Vital Signs Last 24 Hrs  T(C): 36.1 (22 Oct 2021 07:18), Max: 36.5 (21 Oct 2021 21:01)  T(F): 96.9 (22 Oct 2021 07:18), Max: 97.7 (21 Oct 2021 21:01)  HR: 90 (22 Oct 2021 07:18) (87 - 92)  BP: 116/49 (22 Oct 2021 07:18) (116/49 - 139/61)  BP(mean): --  RR: 17 (22 Oct 2021 07:18) (17 - 18)  SpO2: 96% (22 Oct 2021 07:18) (93% - 96%)    LABS                      11.0   6.57  )-----------( 112      ( 21 Oct 2021 08:35 )             34.9     10-21    139  |  99  |  24<H>  ----------------------------<  105<H>  3.4<L>   |  35<H>  |  0.54    Ca    9.9      21 Oct 2021 08:35    PHYSICAL EXAM:  Constitutional: elder female, alert, confused   HEENT: unable to assess  Neck: Soft and supple  Respiratory: Breath sounds are diminished bilat bases, clear in upper lobes   Cardiovascular: S1 and S2, IR, IR  Gastrointestinal: Bowel Sounds present, soft, nontender, nondistended, no guarding, no rebound  extremities: 5/5 strength in upper extremities, 4/5 in lower extremities. scant edema to ankles.   Neurological: A/O x 2   Musculoskeletal: unable to follow commands  Skin: Sarcal ulcer and gluteal fold ulcer unstageable       #Acute hypoxic respiratory failure due to Mod-Large b/l pleural effusions -  likley combination of CHF and PNA   Supplemental 02 - 3L NC ---resolved now on room air   CT as above. no evidence on PNA.   PO Lasix 40mg BID upon DC   cont w/ eliquis BID   lights criteria - exudative   Echo EF 60-65%  d/c Vancomycin, Cefepime: SP Ceftriaxone s/p PO Doxy day 7   s/p ID, Cardio, Pulm consult     #Metabolic encephalopathy     underlying dementia supportive care     #Chronic Afib  Daughter reports being on Eliquis   s/p Full dose AC lovenox now on eliquis   Cont. BB  Cardio: [place]    #Pyuria  Urine culture negative    #Sacral Decubiti, B/l leg wounds  #Dementia, Mild.  #Moderate Protein Calori Malnutrition  #Debility. Frailty.   Supportive care, Wound care eval  Cont. Remeron.   Air mattress,  turn and position   Ensure w/ meals. Vitamin C and zinc for wound healing.  pt would benefit with assistance with feeding - SLP reconsulted for recommendations on diet consistency vs help with feeding.   Recommend Palliative care, family on board.  ate small amount of breakfast w/ help from aide.   SLP re-eval - dysphagia 3 diet   Wound Care eval    #hx of B/l ankle fractures   Patient may be weightbearing as tolerated with gentle range of motion exercises   repeat tib/fib xray ordered   ortho consulted   PT eval     #DVT proph- Eliquis 5mg PO BID     #Advanced Directives / GOC:    DNR/DNI No feeding tube  s/p palliative consult  Overall prognosis poor- won index 49-62% 6 month mortality index.   spoke at length Jazmine >30mins - concerned about her deconditioning at rehab has had multiple bad experiences, concerned that she will continue to decline. i have discussed with daughter her mothers long term prognosis, advancing dementia and debility as well as her decreased PO intake will likely continue to progress and is unlikely to improve with any significance. she has requested that patient be placed in rehab for short term so that she may prepare her house for when she is discharged.     time spent preparing dc >60 mins including dc note and discussion with daughter  above plan discussed with ptJazmine, bedside RN, and MD Payan

## 2021-10-22 NOTE — DISCHARGE NOTE PROVIDER - NSDCMRMEDTOKEN_GEN_ALL_CORE_FT
acetaminophen 650 mg oral tablet: 1 tab(s) orally every 6 hours, As Needed - for mild pain  Acidophilus oral capsule: 1 cap(s) orally 3 times a day  Aquaphor topical ointment: Apply topically to affected area 2 times a day  ****Bilateral Lower Extremeties**  Betadine 5% topical spray: Apply topically to affected area 2 times a day  *****Bilateral Heels****  Betadine 5% topical spray: Apply topically to affected area once a day, As Needed - for wound care  ****Bilateral Heels****  busPIRone 5 mg oral tablet: 1 tab(s) orally 2 times a day  cholecalciferol 25 mcg (1000 intl units) oral tablet: 3 tab(s) orally once a day  collagenase 250 units/g topical ointment: 1 application topically once a day  dermasyn gel: Apply topically to affected area once a day  ****Right posterior thigh****  dermasyn gel: Apply topically to affected area once a day, As Needed - for wound care  ****Right posterior thigh***  Eliquis 5 mg oral tablet: 1 tab(s) orally 2 times a day  ferrous sulfate 325 mg (65 mg elemental iron) oral tablet: 1 tab(s) orally once a day  furosemide 40 mg oral tablet: 1 tab(s) orally 2 times a day  LORazepam 0.5 mg oral tablet: 1 tab(s) orally once a day (at bedtime), As Needed - for anxiety  losartan 50 mg oral tablet: 1 tab(s) orally once a day  Melatonin 3 mg oral tablet: 1 tab(s) orally once a day (at bedtime)  methocarbamol 500 mg oral tablet: 1 tab(s) orally once a day, As Needed - for muscle spasm  Metoprolol Tartrate 25 mg oral tablet: 0.5 tab(s) orally 2 times a day  mirtazapine 15 mg oral tablet: 1 tab(s) orally once a day (at bedtime)  Multiple Vitamins with Minerals oral tablet: 1 tab(s) orally once a day  nystatin 100,000 units/g topical cream: Apply topically to affected area 2 times a day, As Needed - for rash   *****groin****  polyethylene glycol 3350 oral powder for reconstitution: 17 gram(s) orally once a day  Potassium Chloride (Eqv-K-Tab) 20 mEq oral tablet, extended release: 0.5 tab(s) orally once a day  Senna 8.6 mg oral tablet: 2 tab(s) orally once a day (at bedtime)  silver sulfADIAZINE 1% topical cream: Apply topically to affected area once a day  ****Right buttocks***  silver sulfADIAZINE 1% topical cream: Apply topically to affected area once a day, As Needed - for wound care  *****Right Buttocks****  sodium hypochlorite 0.125% topical solution: Apply topically to affected area once a day  ****Left Posterior Calf****  sodium hypochlorite 0.125% topical solution: Apply topically to affected area once a day, As Needed - for wound care  *****Left Posterior Calf****  Stress Formula with Iron oral tablet: 1 tab(s) orally once a day  zinc sulfate 220 mg oral capsule: 1 cap(s) orally once a day

## 2021-10-22 NOTE — DISCHARGE NOTE PROVIDER - NSDCCPCAREPLAN_GEN_ALL_CORE_FT
PRINCIPAL DISCHARGE DIAGNOSIS  Diagnosis: CHF exacerbation  Assessment and Plan of Treatment: You had an acute exacerbation of your heart failure. Continue to monitor your weights at home daily. Maintain a low sodium diet and a fluid restriction of _ per day. Continue to take LASIX _ Follow up with your cardiologist in 1 to 2 week after discharge for further management - Call to make an appointment. Continue to follow your CHF Zone Chart in your CHF packet accordingly. Your discharge weight is_. Monitor for symptoms: Unrelieved shortness of breath or shortness of breath at rest, unrelieved chest pain or tightness, wheezing, weight gain of 5 or more pounds, confusion, dizziness or lightheadedness. If you experice any of these symptoms please alert your primary care provider or return to the ED if your symptoms are severe.      SECONDARY DISCHARGE DIAGNOSES  Diagnosis: Pneumonia  Assessment and Plan of Treatment: You were found to have pneumonia which is an infection in one or both of the lungs. It causes the air sacs of the lungs to fill up with fluid or pus. It can range from mild to severe, depending on the type of germ causing the infection, your age, and your overall health. Lydia crawford completed 5 daus of IV antibiotics. Continue to stay hydrated. **Monitor for the following signs/symptoms: Fever > 101, chills, cough with phlegm, shortness of breath and nausea and/or vomiting. If you experience these signs/symptoms please alert your primary care provider or if your signs/symptoms are severe please return to the ED**

## 2021-10-22 NOTE — DISCHARGE NOTE PROVIDER - CARE PROVIDER_API CALL
Tony Vides  CARDIOVASCULAR DISEASE  [address] [place] [address]  Phone: [phone]  Fax: [phone]  Follow Up Time:

## 2021-10-22 NOTE — PROGRESS NOTE ADULT - SUBJECTIVE AND OBJECTIVE BOX
Patient is a 89y old  Female who presents with a chief complaint of Worsening SOB/ hypoxia (21 Oct 2021 16:07)      HPI:  89yo/F with PMH afib on coumadin, HTN, mild dementia, chronic lymphedema without known h/o CHF, presented from [facility] for evaluation of worsening SOB. Patient unable to provide ay history. Per daughter at bedside, she is not on oxygen at her baseline. 5 days ago at [facility] she noted to become more hypoxic and was placed on O2. Then patient had more requirement for O2 and CXR was done that showed opacification of both bases suspicious for pneumonia and patient initiated on IV antibiotics. At this point, daughter insisted on transfer to  for further diagnosis and treatment. Patient was at  in June for b/l tib/fib fractures for which she was NWB and was at [facility]. No fever reported.  (14 Oct 2021 18:21)  10/15/2021- Cardiology: 89 year old woman with HBP, long term persistent atrial fibrillation who is admitted with progressive shortness of breath and CT chest shows bilateral moderate to large pleural effusions. She has chronic venous insufficiency but no prior history of heart failure. Last admitted to [facility] with bilateral tib-fib fractures that were treated nonoperatively.Patient with mild slowly progressive dementia, monoclonal gammopathy, prior urinary tract infections, chronic Reaves catheter. Warfarin waschanged to Eliquis in August 2021.    Followup HPI:    10/16 alert, lying flat, denies dyspnea  10/17 patient is asleep   10/18- no complaints  10/20- Alert. States "I feel much better"  10/21- no complaints.  10/22  lethargic but arouseable. denies dyspnea    PAST MEDICAL & SURGICAL HISTORY:  Lymphedema of both lower extremities    Venous insufficiency    Monoclonal gammopathies    Paroxysmal atrial fibrillation    Acute cystitis without hematuria  septic  4/2016    Essential hypertension    Spinal stenosis    Spondyloarthritis    Vaginal prolapse    [place] (hard of hearing), bilateral    Hypertension    S/P kyphoplasty  2014    S/P thyroidectomy  partial   1975    History of vaginal surgery    History of fracture of femur  hx of proximal femur fracture in January 2021    History of repair of left hip joint  Hx L hip long IMN with Dr. Kitchen 2017          MEDICATIONS  (STANDING):  apixaban 2.5 milliGRAM(s) Oral every 12 hours  ascorbic acid 500 milliGRAM(s) Oral daily  collagenase Ointment 1 Application(s) Topical daily  furosemide    Tablet 40 milliGRAM(s) Oral two times a day  influenza   Vaccine 0.5 milliLiter(s) IntraMuscular once  losartan 25 milliGRAM(s) Oral daily  metoprolol succinate ER 25 milliGRAM(s) Oral daily  polyethylene glycol 3350 17 Gram(s) Oral daily  senna 2 Tablet(s) Oral at bedtime  zinc sulfate 220 milliGRAM(s) Oral daily    MEDICATIONS  (PRN):  acetaminophen   Tablet .. 650 milliGRAM(s) Oral every 6 hours PRN Temp greater or equal to 38C (100.4F), Mild Pain (1 - 3)  aluminum hydroxide/magnesium hydroxide/simethicone Suspension 30 milliLiter(s) Oral every 4 hours PRN Dyspepsia  LORazepam   Injectable 0.25 milliGRAM(s) IV Push every 12 hours PRN Anxiety  melatonin 3 milliGRAM(s) Oral at bedtime PRN Insomnia  ondansetron Injectable 4 milliGRAM(s) IV Push every 8 hours PRN Nausea and/or Vomiting          Vital Signs Last 24 Hrs  T(C): 36.1 (22 Oct 2021 07:18), Max: 36.5 (21 Oct 2021 21:01)  T(F): 96.9 (22 Oct 2021 07:18), Max: 97.7 (21 Oct 2021 21:01)  HR: 90 (22 Oct 2021 07:18) (87 - 92)  BP: 116/49 (22 Oct 2021 07:18) (116/49 - 139/61)  BP(mean): 72 (21 Oct 2021 09:15) (72 - 72)  RR: 17 (22 Oct 2021 07:18) (17 - 18)  SpO2: 96% (22 Oct 2021 07:18) (93% - 97%)    I&O's Summary    21 Oct 2021 07:01  -  22 Oct 2021 07:00  --------------------------------------------------------  IN: 360 mL / OUT: 525 mL / NET: -165 mL        PHYSICAL EXAM  General Appearance: weak and frail  HEENT:   Neck:   Lungs: clear right lung, reduced BS left base  Heart: 1/6 systolic murmur LSB  Abdomen:   Extremities: no edema  Neurologic: alert and conversant        INTERPRETATION OF TELEMETRY:    ECG:        LABS:                          11.0   6.57  )-----------( 112      ( 21 Oct 2021 08:35 )             34.9     10-21    139  |  99  |  24<H>  ----------------------------<  105<H>  3.4<L>   |  35<H>  |  0.54    Ca    9.9      21 Oct 2021 08:35                        RADIOLOGY & ADDITIONAL STUDIES:

## 2021-10-22 NOTE — DISCHARGE NOTE PROVIDER - NSDCCAREPROVSEEN_GEN_ALL_CORE_FT
Gonzalo Juarez - Cardiothoracic PA   Ervin Payan - Internal Medicine   Tony Vides - Cardiologist   Li Nunes - Infectious Disease   Jade Winn - Nurse Practitioner   Kyung Pena - Nurse Practitioner

## 2021-10-23 PROCEDURE — 99232 SBSQ HOSP IP/OBS MODERATE 35: CPT

## 2021-10-23 RX ORDER — AMPICILLIN SODIUM AND SULBACTAM SODIUM 250; 125 MG/ML; MG/ML
3 INJECTION, POWDER, FOR SUSPENSION INTRAMUSCULAR; INTRAVENOUS EVERY 6 HOURS
Refills: 0 | Status: DISCONTINUED | OUTPATIENT
Start: 2021-10-23 | End: 2021-10-23

## 2021-10-23 RX ADMIN — APIXABAN 2.5 MILLIGRAM(S): 2.5 TABLET, FILM COATED ORAL at 09:53

## 2021-10-23 RX ADMIN — Medication 40 MILLIGRAM(S): at 21:13

## 2021-10-23 RX ADMIN — Medication 500 MILLIGRAM(S): at 09:53

## 2021-10-23 RX ADMIN — SENNA PLUS 2 TABLET(S): 8.6 TABLET ORAL at 21:13

## 2021-10-23 RX ADMIN — ZINC SULFATE TAB 220 MG (50 MG ZINC EQUIVALENT) 220 MILLIGRAM(S): 220 (50 ZN) TAB at 09:56

## 2021-10-23 RX ADMIN — APIXABAN 2.5 MILLIGRAM(S): 2.5 TABLET, FILM COATED ORAL at 21:13

## 2021-10-23 RX ADMIN — LOSARTAN POTASSIUM 25 MILLIGRAM(S): 100 TABLET, FILM COATED ORAL at 09:56

## 2021-10-23 RX ADMIN — Medication 25 MILLIGRAM(S): at 09:53

## 2021-10-23 RX ADMIN — Medication 1 APPLICATION(S): at 09:55

## 2021-10-23 RX ADMIN — Medication 40 MILLIGRAM(S): at 09:53

## 2021-10-23 NOTE — PROGRESS NOTE ADULT - SUBJECTIVE AND OBJECTIVE BOX
Patient seen and examined: More lethargic this am, not opening eyes. No acute events overnight. Palliative meeting planned for today.    10/19: sitting up in bed, putting lipstick on, awake and conversive but confused.   10/20: seen and examined in bed, confused. no new issues.   10/21: sitting up in bed, no pain or discomfort. awaiting placement. cont to be on room air  10/23/21: Patient seen and examined. No new issues. Patient was discharged yesterday, daughter appealed.      ROS: limited due to dementia     Vital Signs Last 24 Hrs  T(C): 36.6 (23 Oct 2021 08:54), Max: 36.6 (22 Oct 2021 23:26)  T(F): 97.8 (23 Oct 2021 08:54), Max: 97.8 (22 Oct 2021 23:26)  HR: 91 (23 Oct 2021 08:54) (86 - 94)  BP: 133/79 (23 Oct 2021 08:54) (117/65 - 133/79)  BP(mean): --  RR: 18 (23 Oct 2021 08:54) (17 - 18)  SpO2: 99% (23 Oct 2021 08:54) (96% - 99%)        PHYSICAL EXAM:  Constitutional: elder female, alert, confused   HEENT: unable to assess  Neck: Soft and supple  Respiratory: Breath sounds are diminished bilat bases, clear in upper lobes   Cardiovascular: S1 and S2, IR, IR  Gastrointestinal: Bowel Sounds present, soft, nontender, nondistended, no guarding, no rebound  extremities: 5/5 strength in upper extremities, 4/5 in lower extremities. scant edema to ankles.   Neurological: A/O x 2   Musculoskeletal: unable to follow commands  Skin: Sarcal ulcer and gluteal fold ulcer unstageable     LABS: All Labs Reviewed:

## 2021-10-23 NOTE — PROGRESS NOTE ADULT - SUBJECTIVE AND OBJECTIVE BOX
Date of service: 10-23-21 @ 11:33    ROS: no fever or chills; denies dizziness, no HA, no SOB or cough, no abdominal pain, no diarrhea or constipation; no dysuria, no legs pain, no rashes    MEDICATIONS  (STANDING):  apixaban 2.5 milliGRAM(s) Oral every 12 hours  ascorbic acid 500 milliGRAM(s) Oral daily  collagenase Ointment 1 Application(s) Topical daily  furosemide    Tablet 40 milliGRAM(s) Oral two times a day  influenza   Vaccine 0.5 milliLiter(s) IntraMuscular once  losartan 25 milliGRAM(s) Oral daily  metoprolol succinate ER 25 milliGRAM(s) Oral daily  polyethylene glycol 3350 17 Gram(s) Oral daily  senna 2 Tablet(s) Oral at bedtime  zinc sulfate 220 milliGRAM(s) Oral daily    Vital Signs Last 24 Hrs  T(C): 36.6 (23 Oct 2021 08:54), Max: 36.6 (22 Oct 2021 23:26)  T(F): 97.8 (23 Oct 2021 08:54), Max: 97.8 (22 Oct 2021 23:26)  HR: 91 (23 Oct 2021 08:54) (86 - 94)  BP: 133/79 (23 Oct 2021 08:54) (117/65 - 133/79)  BP(mean): --  RR: 18 (23 Oct 2021 08:54) (17 - 18)  SpO2: 99% (23 Oct 2021 08:54) (96% - 99%)     Physical exam:    Constitutional: frail looking  HEENT: NC/AT, EOMI, PERRLA, conjunctivae clear; ears and nose atraumatic; pharynx clear  Neck: supple; thyroid not palpable  Back: no tenderness  Respiratory: respiratory effort normal; diminished at bases  Cardiovascular: S1S2 regular, no murmurs  Abdomen: soft, not tender, not distended, positive BS; no liver or spleen organomegaly  Genitourinary: no suprapubic tenderness  Musculoskeletal: no muscle tenderness, no joint swelling or tenderness  Neurological/ Psychiatric:  moving all extremities  Skin: bilateral heels covered with bandages  Ext: right ankle erythema, edema, warmth and tenderness; no skin break    Labs: reviewed                          12.3   6.39  )-----------( 153      ( 18 Oct 2021 12:01 )             39.9     10-18    138  |  96  |  21  ----------------------------<  131<H>  3.0<L>   |  35<H>  |  0.61    Ca    9.6      18 Oct 2021 12:01             Cultures:       Culture - Fungal, Body Fluid (collected 10-15-21 @ 12:00)  Source: .Body Fluid Pleural Fluid  Preliminary Report (10-18-21 @ 09:18):    Testing in progress    Culture - Body Fluid with Gram Stain (collected 10-15-21 @ 12:00)  Source: .Body Fluid Pleural Fluid  Gram Stain (10-15-21 @ 21:50):    polymorphonuclear leukocytes seen    No organisms seen    by cytocentrifuge  Preliminary Report (10-16-21 @ 17:31):    No growth    Culture - Blood (collected 10-14-21 @ 15:29)  Source: .Blood None  Preliminary Report (10-15-21 @ 20:00):    No growth to date.    Culture - Blood (collected 10-14-21 @ 14:13)  Source: .Blood Blood-Peripheral  Preliminary Report (10-15-21 @ 19:01):    No growth to date.    Culture - Urine (collected 10-14-21 @ 14:13)  Source: Clean Catch Clean Catch (Midstream)  Final Report (10-15-21 @ 17:36):    <10,000 CFU/mL Normal Urogenital Khadijah            pH, Fluid (10.15.21 @ 12:00)    pH, Fluid: 7.8: Reference Ranges have NOT been established for analytes in body fluids  because of variability in body fluid composition.  The  has not determined the efficacy of this test when  performed on fluid specimens. The performance characteristics of this  test were determined by [employer].      Cell Count, Body Fluid (10.15.21 @ 12:00)    Mesothelial Cells - Body Fluid: 1 %    Monocyte/Macrophage Count - Body Fluid: 37 %    Fluid Segmented Granulocytes: 23 %    Fluid Type: Pleural Fluid    Body Fluid Appearance: Slightly Cloudy    BF Lymphocytes: 39 %    Tube Type: Lavendar    Color - Body Fluid: Orange    Total Nucleated Cell Count, Body Fluid: 1108: Peritoneal/Pleural/ Pericardial  Body Fluid Types            Total Nucleated cells: <500/uL            Neutrophils: <25%          Synovial Body Fluid Type           Total Nucleated cells: <150/uL           Neutrophils: <25%           Lymphocytes: <75%           Moncytes/Macrophages: </=70% /uL    Total RBC Count: 91219 /uL                             Cultures:       Culture - Body Fluid with Gram Stain (collected 10-15-21 @ 12:00)  Source: .Body Fluid Pleural Fluid  Gram Stain (10-15-21 @ 21:50):    polymorphonuclear leukocytes seen    No organisms seen    by cytocentrifuge    Culture - Blood (collected 10-14-21 @ 15:29)  Source: .Blood None  Preliminary Report (10-15-21 @ 20:00):    No growth to date.    Culture - Blood (collected 10-14-21 @ 14:13)  Source: .Blood Blood-Peripheral  Preliminary Report (10-15-21 @ 19:01):    No growth to date.    Culture - Urine (collected 10-14-21 @ 14:13)  Source: Clean Catch Clean Catch (Midstream)  Final Report (10-15-21 @ 17:36):    <10,000 CFU/mL Normal Urogenital Khadijah            < from: CT Chest No Cont (10.14.21 @ 17:59) >    EXAM:  CT CHEST                            PROCEDURE DATE:  10/14/2021          INTERPRETATION:  CLINICAL INFORMATION: Heart failure    COMPARISON: 12/10/2017    CONTRAST/COMPLICATIONS:  IV Contrast: NONE  Oral Contrast: NONE  Complications: None reported at time of study completion    PROCEDURE:  CT of the Chest was performed.  Sagittal and coronal reformats were performed.    FINDINGS:    LUNGS AND AIRWAYS: Patent central airways.  Bilateral lower lobe compressive atelectasis. No evidence of pulmonary infiltrate.  PLEURA: Moderate bilateral pleural effusions  MEDIASTINUM AND DESTINI: No lymphadenopathy.  VESSELS: Atherosclerotic changes of the thoracic aorta.  HEART: Cardiomegaly. No pericardial effusion.  CHEST WALL AND LOWER NECK: Within normal limits.  VISUALIZED UPPER ABDOMEN: Within normal limits.  BONES: Degenerative changes    IMPRESSION:  Cardiomegaly. Bilateral pleural effusions with bilateral lower lobe compressive atelectasis.    No evidence of pneumonia.    < end of copied text >        Radiology: all available radiological tests reviewed    Advanced directives addressed: full resuscitation

## 2021-10-23 NOTE — PROGRESS NOTE ADULT - ASSESSMENT
89yo/F with PMH afib on coumadin, HTN, mild dementia, chronic lymphedema without known h/o CHF, admitted from snf on 10/14 for evaluation of worsening shortness of breath; each of the last few days requiring more and more oxygen; upon admission imaging revealed large bilateral pleural effusions. The patient underwent right sided thoracentesis on 10/15. History per medical record as patient is moaning and not able to provide history. Did have hospitalization in June for bilateral tib/fib fractures. Has been bed bound noted with multiple skin breakdowns.     1. Right LE cellulitis. bilateral pleural effusions, unclear etiology of these effusions  -right lower leg is still erythema  - follow up cultures   - serial cbc and monitor temperature   - completed 3 days of ceftriaxone, day #4 doxycycline,  will continue doxycycline  another 24 hours  - tolerating antibiotics without rashes or side effects   -change abx to unasyn 3 gm IV q6h  -reason for abx use and side effects reviewed with patient; monitor BMP   -elevate legs  -old chart reviewed to assess prior cultures  -monitor temps  -f/u CBC  -supportive care  2. Other issues:   -care per medicine   89yo/F with PMH afib on coumadin, HTN, mild dementia, chronic lymphedema without known h/o CHF, admitted from snf on 10/14 for evaluation of worsening shortness of breath; each of the last few days requiring more and more oxygen; upon admission imaging revealed large bilateral pleural effusions. The patient underwent right sided thoracentesis on 10/15. History per medical record as patient is moaning and not able to provide history. Did have hospitalization in June for bilateral tib/fib fractures. Has been bed bound noted with multiple skin breakdowns.     1. Right LE cellulitis. bilateral pleural effusions, unclear etiology of these effusions. CRF stage 3.   -right lower leg is still erythema  - follow up cultures   - serial cbc and monitor temperature   - completed 3 days of ceftriaxone, day #4 doxycycline,  will continue doxycycline  another 24 hours  - tolerating antibiotics without rashes or side effects   -change abx to unasyn 3 gm IV q6h  -reason for abx use and side effects reviewed with patient; monitor BMP   -elevate legs  -old chart reviewed to assess prior cultures  -monitor temps  -f/u CBC  -supportive care  2. Other issues:   -care per medicine

## 2021-10-23 NOTE — PROGRESS NOTE ADULT - ASSESSMENT
89 y/o Female with PMHx Afib on coumadin, HTN, mild dementia, chronic lymphedema without known h/o CHF, presented from SNF for evaluation of worsening SOB. Admitted for:    #Acute hypoxic respiratory failure due to Mod-Large b/l pleural effusions -  likley combination of CHF and PNA   Supplemental 02 - 3L NC ---resolved now on room air   CT as above. no evidence on PNA.   PO Lasix   restarted on eliquis stop lovenox    lights criteria - exudative   Echo EF 60-65%  d/c Vancomycin, Cefepime: SP Ceftriaxone continue PO Doxy day 7   ID, Cardio, Pulm consult appreciated    #Metabolic encephalopathy     underlying dementia    #Chronic Afib  Daughter reports being on Eliquis not coumadin outpatient  Full dose AC lovenox   Cont. BB  Cardio: [place]    #Pyuria  Urine culture negative    #Sacral Decubiti, B/l leg wounds  #Dementia, Mild.  #Moderate Protein Calori Malnutrition  #Debility. Frailty.   Supportive care, Wound care eval  Cont. Remeron.   Air mattress,  turn and position   Ensure w/ meals. Vitamin C and zinc for wound healing.  pt would benefit with assistance with feeding - SLP reconsulted for recommendations on diet consistency vs help with feeding.   Recommend Palliative care, family on board.  ate small amount of breakfast w/ help from aide.   SLP re-eval - dysphagia 3 diet   Wound Care eval    #hx of B/l ankle fractures   Patient may be weightbearing as tolerated with gentle range of motion exercises   repeat tib/fib xray ordered   ortho consulted   PT eval     #DVT proph- Eliquis 5mg PO BID     #Advanced Directives / GOC:    DNR/DNI No feeding tube  s/p palliative consult  Overall prognosis poor- won index 49-62% 6 month mortality index.   NP Pa spoke at length Jazmine >30mins - concerned about her deconditioning at rehab has had multiple bad experiences, concerned that she will continue to decline. she discussed with daughter her mothers long term prognosis, advancing dementia and debility as well as her decreased PO intake will likely continue to progress and is unlikely to improve with any significance. she has requested that her mother be evaluated by ortho and PT so as to determine her functional status/ and planning for her discharge.

## 2021-10-24 PROCEDURE — 99232 SBSQ HOSP IP/OBS MODERATE 35: CPT

## 2021-10-24 RX ADMIN — ZINC SULFATE TAB 220 MG (50 MG ZINC EQUIVALENT) 220 MILLIGRAM(S): 220 (50 ZN) TAB at 09:22

## 2021-10-24 RX ADMIN — Medication 40 MILLIGRAM(S): at 09:21

## 2021-10-24 RX ADMIN — LOSARTAN POTASSIUM 25 MILLIGRAM(S): 100 TABLET, FILM COATED ORAL at 09:22

## 2021-10-24 RX ADMIN — Medication 0.25 MILLIGRAM(S): at 21:33

## 2021-10-24 RX ADMIN — Medication 25 MILLIGRAM(S): at 09:21

## 2021-10-24 RX ADMIN — APIXABAN 2.5 MILLIGRAM(S): 2.5 TABLET, FILM COATED ORAL at 09:21

## 2021-10-24 RX ADMIN — APIXABAN 2.5 MILLIGRAM(S): 2.5 TABLET, FILM COATED ORAL at 21:32

## 2021-10-24 RX ADMIN — Medication 1 APPLICATION(S): at 09:24

## 2021-10-24 RX ADMIN — SENNA PLUS 2 TABLET(S): 8.6 TABLET ORAL at 21:32

## 2021-10-24 RX ADMIN — Medication 500 MILLIGRAM(S): at 09:21

## 2021-10-24 RX ADMIN — Medication 40 MILLIGRAM(S): at 21:32

## 2021-10-24 NOTE — PROGRESS NOTE ADULT - SUBJECTIVE AND OBJECTIVE BOX
CC: SOB  Subjective and Objective:   Patient seen and examined: More lethargic this am, not opening eyes. No acute events overnight. Palliative meeting planned for today.    10/19: sitting up in bed, putting lipstick on, awake and conversive but confused.   10/20: seen and examined in bed, confused. no new issues.   10/21: sitting up in bed, no pain or discomfort. awaiting placement. cont to be on room air  10/23/21: Patient seen and examined. No new issues. Patient was discharged yesterday, daughter appealed.    10/24: Lying in bed, minimally alert, nods to basic questions.  Pt appears comfortable.    ROS: limited due to dementia     Vital Signs Last 24 Hrs  T(C): 36.7 (24 Oct 2021 08:53), Max: 36.7 (23 Oct 2021 22:37)  T(F): 98 (24 Oct 2021 08:53), Max: 98.1 (23 Oct 2021 22:37)  HR: 82 (24 Oct 2021 08:53) (80 - 92)  BP: 135/60 (24 Oct 2021 08:53) (121/60 - 145/55)  BP(mean): 79 (23 Oct 2021 22:37) (75 - 79)  RR: 18 (24 Oct 2021 08:53) (18 - 18)  SpO2: 96% (24 Oct 2021 08:53) (95% - 98%)      PHYSICAL EXAM:  Constitutional: NAD, frail, elderly, lethargic appearing, cachetic   HEENT: unable to assess  Neck: Soft and supple  Respiratory: Breath sounds are diminished bilat bases, clear in upper lobes   Cardiovascular: S1 and S2, IR, IR  Gastrointestinal: Bowel Sounds present, soft, nontender, nondistended, no guarding, no rebound  extremities: no edema  Neurological: A/O x 1  Musculoskeletal: unable to follow commands  Skin: Sarcal ulcer and gluteal fold ulcer unstageable     MEDICATIONS  (STANDING):  apixaban 2.5 milliGRAM(s) Oral every 12 hours  ascorbic acid 500 milliGRAM(s) Oral daily  collagenase Ointment 1 Application(s) Topical daily  furosemide    Tablet 40 milliGRAM(s) Oral two times a day  influenza   Vaccine 0.5 milliLiter(s) IntraMuscular once  losartan 25 milliGRAM(s) Oral daily  metoprolol succinate ER 25 milliGRAM(s) Oral daily  polyethylene glycol 3350 17 Gram(s) Oral daily  senna 2 Tablet(s) Oral at bedtime  zinc sulfate 220 milliGRAM(s) Oral daily    MEDICATIONS  (PRN):  acetaminophen   Tablet .. 650 milliGRAM(s) Oral every 6 hours PRN Temp greater or equal to 38C (100.4F), Mild Pain (1 - 3)  aluminum hydroxide/magnesium hydroxide/simethicone Suspension 30 milliLiter(s) Oral every 4 hours PRN Dyspepsia  LORazepam   Injectable 0.25 milliGRAM(s) IV Push every 12 hours PRN Anxiety  melatonin 3 milliGRAM(s) Oral at bedtime PRN Insomnia  ondansetron Injectable 4 milliGRAM(s) IV Push every 8 hours PRN Nausea and/or Vomiting                         Assessment and Plan:  89 y/o Female with PMHx Afib on coumadin, HTN, mild dementia, chronic lymphedema without known h/o CHF, presented from SNF for evaluation of worsening SOB. Admitted for:    #Acute hypoxic respiratory failure due to Mod-Large b/l pleural effusions -  likley combination of acute on chronic diastolic CHF and PNA: STABLE  -acute respiratory failure resolved, satting well on room air.   -exudative fluid  -PO Lasix   Echo EF 60-65%  -s/p course of antibiotics   -ID, Cardio, Pulm consult appreciated    #Chronic Afib:  eliquis  Cont. BB    #Metabolic encephalopathy in setting of dementia / debility / sacral decubitus ulcers / severe protein calorie malnutrition / weakness:  -supportive care  -avoid sedating meds  -palliative care eval appreciated.  -Cont. Remeron.   -dysphagia diet, overall oral intake poor   -wound care  -Air mattress,  turn and position   -Ensure w/ meals. Vitamin C and zinc for wound healing.  -Overall prognosis poor- won index 49-62% 6 month mortality index.  pt would be good hospice candidate however family hesitant to pursue pure comfort measures.       #hx of B/l ankle fractures   Patient may be weightbearing as tolerated with gentle range of motion exercises   repeat tib/fib xray --> healing fracture   -Pt poor candidate for any sort of rehab due to above diagnoses.     #DVT proph- Eliquis 5mg PO BID     #Advanced Directives / GOC:   -DNR/DNI No feeding tube  -NP Pa spoke at length Jazmine >30mins - concerned about her deconditioning at rehab has had multiple bad experiences, concerned that she will continue to decline. she discussed with daughter her mothers long term prognosis, advancing dementia and debility as well as her decreased PO intake will likely continue to progress and is unlikely to improve with any significance. she has requested that her mother be evaluated by ortho and PT so as to determine her functional status/ and planning for her discharge.

## 2021-10-25 PROCEDURE — 99232 SBSQ HOSP IP/OBS MODERATE 35: CPT

## 2021-10-25 RX ADMIN — APIXABAN 2.5 MILLIGRAM(S): 2.5 TABLET, FILM COATED ORAL at 09:27

## 2021-10-25 RX ADMIN — Medication 40 MILLIGRAM(S): at 21:32

## 2021-10-25 RX ADMIN — Medication 1 APPLICATION(S): at 09:29

## 2021-10-25 RX ADMIN — Medication 40 MILLIGRAM(S): at 09:27

## 2021-10-25 RX ADMIN — APIXABAN 2.5 MILLIGRAM(S): 2.5 TABLET, FILM COATED ORAL at 21:32

## 2021-10-25 RX ADMIN — Medication 25 MILLIGRAM(S): at 09:28

## 2021-10-25 RX ADMIN — LOSARTAN POTASSIUM 25 MILLIGRAM(S): 100 TABLET, FILM COATED ORAL at 09:27

## 2021-10-25 RX ADMIN — Medication 650 MILLIGRAM(S): at 17:55

## 2021-10-25 RX ADMIN — ZINC SULFATE TAB 220 MG (50 MG ZINC EQUIVALENT) 220 MILLIGRAM(S): 220 (50 ZN) TAB at 09:27

## 2021-10-25 RX ADMIN — Medication 500 MILLIGRAM(S): at 09:29

## 2021-10-25 RX ADMIN — SENNA PLUS 2 TABLET(S): 8.6 TABLET ORAL at 21:32

## 2021-10-25 RX ADMIN — POLYETHYLENE GLYCOL 3350 17 GRAM(S): 17 POWDER, FOR SOLUTION ORAL at 09:27

## 2021-10-25 NOTE — PROGRESS NOTE ADULT - SUBJECTIVE AND OBJECTIVE BOX
CC: SOB  Subjective and Objective:   Patient seen and examined: More lethargic this am, not opening eyes. No acute events overnight. Palliative meeting planned for today.    10/19: sitting up in bed, putting lipstick on, awake and conversive but confused.   10/20: seen and examined in bed, confused. no new issues.   10/21: sitting up in bed, no pain or discomfort. awaiting placement. cont to be on room air  10/23/21: Patient seen and examined. No new issues. Patient was discharged yesterday, daughter appealed.    10/24: Lying in bed, minimally alert, nods to basic questions.  Pt appears comfortable.  10/25: sitting up in bed, alert baseline confusion. no new issues. daughter appeal process successful, dc delayed, rehabs not option per daughter plan for home with aides, hospital bed     ROS: limited due to dementia     Vital Signs Last 24 Hrs  T(C): 36.3 (25 Oct 2021 08:06), Max: 36.3 (25 Oct 2021 08:06)  T(F): 97.3 (25 Oct 2021 08:06), Max: 97.3 (25 Oct 2021 08:06)  HR: 90 (25 Oct 2021 08:06) (69 - 94)  BP: 150/86 (25 Oct 2021 08:06) (97/69 - 150/86)  BP(mean): --  RR: 17 (25 Oct 2021 08:06) (17 - 18)  SpO2: 95% (25 Oct 2021 08:06) (95% - 96%)    PHYSICAL EXAM:  Constitutional: NAD, frail, elderly, lethargic appearing, cachetic   HEENT: unable to assess  Neck: Soft and supple  Respiratory: Breath sounds are diminished bilat bases, clear in upper lobes   Cardiovascular: S1 and S2, IR, IR  Gastrointestinal: Bowel Sounds present, soft, nontender, nondistended, no guarding, no rebound  extremities: no edema  Neurological: A/O x 1  Musculoskeletal: unable to follow commands  Skin: Sarcal ulcer and gluteal fold ulcer unstageable     MEDICATIONS  (STANDING):  apixaban 2.5 milliGRAM(s) Oral every 12 hours  ascorbic acid 500 milliGRAM(s) Oral daily  collagenase Ointment 1 Application(s) Topical daily  furosemide    Tablet 40 milliGRAM(s) Oral two times a day  influenza   Vaccine 0.5 milliLiter(s) IntraMuscular once  losartan 25 milliGRAM(s) Oral daily  metoprolol succinate ER 25 milliGRAM(s) Oral daily  polyethylene glycol 3350 17 Gram(s) Oral daily  senna 2 Tablet(s) Oral at bedtime  zinc sulfate 220 milliGRAM(s) Oral daily    MEDICATIONS  (PRN):  acetaminophen   Tablet .. 650 milliGRAM(s) Oral every 6 hours PRN Temp greater or equal to 38C (100.4F), Mild Pain (1 - 3)  aluminum hydroxide/magnesium hydroxide/simethicone Suspension 30 milliLiter(s) Oral every 4 hours PRN Dyspepsia  LORazepam   Injectable 0.25 milliGRAM(s) IV Push every 12 hours PRN Anxiety  melatonin 3 milliGRAM(s) Oral at bedtime PRN Insomnia  ondansetron Injectable 4 milliGRAM(s) IV Push every 8 hours PRN Nausea and/or Vomiting                         Assessment and Plan:  89 y/o Female with PMHx Afib on coumadin, HTN, mild dementia, chronic lymphedema without known h/o CHF, presented from SNF for evaluation of worsening SOB. Admitted for:    #Acute hypoxic respiratory failure due to Mod-Large b/l pleural effusions -  likley combination of acute on chronic diastolic CHF and PNA: STABLE  -acute respiratory failure resolved, satting well on room air.   -exudative fluid  -PO Lasix   Echo EF 60-65%  -s/p course of antibiotics   -ID, Cardio, Pulm consult appreciated    #Chronic Afib:  eliquis  Cont. BB    #Metabolic encephalopathy in setting of dementia / debility / sacral decubitus ulcers / severe protein calorie malnutrition / weakness:  -supportive care  -avoid sedating meds  -palliative care eval appreciated.  -Cont. Remeron.   -dysphagia diet, overall oral intake poor   -wound care  -Air mattress,  turn and position   -Ensure w/ meals. Vitamin C and zinc for wound healing.  -Overall prognosis poor- won index 49-62% 6 month mortality index.  pt would be good hospice candidate however family hesitant to pursue pure comfort measures.       #hx of B/l ankle fractures   Patient may be weightbearing as tolerated with gentle range of motion exercises   repeat tib/fib xray --> healing fracture   -Pt poor candidate for any sort of rehab due to above diagnoses.     #DVT proph- Eliquis 5mg PO BID     #Advanced Directives / GOC:   -DNR/DNI No feeding tube  -NP Pa spoke at length Jazmine >30mins - concerned about her deconditioning at rehab has had multiple bad experiences, concerned that she will continue to decline. she discussed with daughter her mothers long term prognosis, advancing dementia and debility as well as her decreased PO intake will likely continue to progress and is unlikely to improve with any significance. she has requested that her mother be evaluated by ortho and PT so as to determine her functional status/ and planning for her discharge.        CC: SOB  Subjective and Objective:   Patient seen and examined: More lethargic this am, not opening eyes. No acute events overnight. Palliative meeting planned for today.    10/19: sitting up in bed, putting lipstick on, awake and conversive but confused.   10/20: seen and examined in bed, confused. no new issues.   10/21: sitting up in bed, no pain or discomfort. awaiting placement. cont to be on room air  10/23/21: Patient seen and examined. No new issues. Patient was discharged yesterday, daughter appealed.    10/24: Lying in bed, minimally alert, nods to basic questions.  Pt appears comfortable.  10/25: sitting up in bed, alert baseline confusion. no new issues. daughter appeal process successful, dc delayed, rehabs not option per daughter plan for home with aides, hospital bed     ROS: limited due to dementia     Vital Signs Last 24 Hrs  T(C): 36.3 (25 Oct 2021 08:06), Max: 36.3 (25 Oct 2021 08:06)  T(F): 97.3 (25 Oct 2021 08:06), Max: 97.3 (25 Oct 2021 08:06)  HR: 90 (25 Oct 2021 08:06) (69 - 94)  BP: 150/86 (25 Oct 2021 08:06) (97/69 - 150/86)  BP(mean): --  RR: 17 (25 Oct 2021 08:06) (17 - 18)  SpO2: 95% (25 Oct 2021 08:06) (95% - 96%)    PHYSICAL EXAM:  Constitutional: NAD, frail, elderly, lethargic appearing, cachetic   HEENT: unable to assess  Neck: Soft and supple  Respiratory: Breath sounds are diminished bilat bases, clear in upper lobes   Cardiovascular: S1 and S2, IR, IR  Gastrointestinal: Bowel Sounds present, soft, nontender, nondistended, no guarding, no rebound  extremities: no edema  Neurological: A/O x 1  Musculoskeletal: unable to follow commands  Skin: Sarcal ulcer and gluteal fold ulcer unstageable     MEDICATIONS  (STANDING):  apixaban 2.5 milliGRAM(s) Oral every 12 hours  ascorbic acid 500 milliGRAM(s) Oral daily  collagenase Ointment 1 Application(s) Topical daily  furosemide    Tablet 40 milliGRAM(s) Oral two times a day  influenza   Vaccine 0.5 milliLiter(s) IntraMuscular once  losartan 25 milliGRAM(s) Oral daily  metoprolol succinate ER 25 milliGRAM(s) Oral daily  polyethylene glycol 3350 17 Gram(s) Oral daily  senna 2 Tablet(s) Oral at bedtime  zinc sulfate 220 milliGRAM(s) Oral daily    MEDICATIONS  (PRN):  acetaminophen   Tablet .. 650 milliGRAM(s) Oral every 6 hours PRN Temp greater or equal to 38C (100.4F), Mild Pain (1 - 3)  aluminum hydroxide/magnesium hydroxide/simethicone Suspension 30 milliLiter(s) Oral every 4 hours PRN Dyspepsia  LORazepam   Injectable 0.25 milliGRAM(s) IV Push every 12 hours PRN Anxiety  melatonin 3 milliGRAM(s) Oral at bedtime PRN Insomnia  ondansetron Injectable 4 milliGRAM(s) IV Push every 8 hours PRN Nausea and/or Vomiting                         Assessment and Plan:  89 y/o Female with PMHx Afib on coumadin, HTN, mild dementia, chronic lymphedema without known h/o CHF, presented from SNF for evaluation of worsening SOB. Admitted for:    #Acute hypoxic respiratory failure due to Mod-Large b/l pleural effusions -  likley combination of acute on chronic diastolic CHF and PNA: STABLE  -acute respiratory failure resolved, satting well on room air.   -exudative fluid  -PO Lasix   Echo EF 60-65%  -s/p course of antibiotics   -ID, Cardio, Pulm consult appreciated    #Chronic Afib:  eliquis  Cont. BB    #Metabolic encephalopathy in setting of dementia / debility / sacral decubitus ulcers / severe protein calorie malnutrition / weakness:  -supportive care  -avoid sedating meds  -palliative care eval appreciated.  -Cont. Remeron.   -dysphagia diet, overall oral intake poor   -wound care  -Air mattress,  turn and position   -Ensure w/ meals. Vitamin C and zinc for wound healing.  -Overall prognosis poor- won index 49-62% 6 month mortality index.  pt would be good hospice candidate however family hesitant to pursue pure comfort measures.       #hx of B/l ankle fractures   Patient may be weightbearing as tolerated with gentle range of motion exercises   repeat tib/fib xray --> healing fracture   -Pt poor candidate for any sort of rehab due to above diagnoses.     #DVT proph- Eliquis 5mg PO BID     #Advanced Directives / GOC:   -DNR/DNI No feeding tube  -  dispo: appeal process successful daughter is not ready for hospice care, discharge delayed until daughter finds aides, and has hospital bed in house. no other acute/active issues at this time

## 2021-10-25 NOTE — PROGRESS NOTE ADULT - SUBJECTIVE AND OBJECTIVE BOX
Patient is a 89y old  Female who presents with a chief complaint of Worsening SOB/ hypoxia (24 Oct 2021 13:21)      HPI:  89yo/F with PMH afib on coumadin, HTN, mild dementia, chronic lymphedema without known h/o CHF, presented from [facility] for evaluation of worsening SOB. Patient unable to provide ay history. Per daughter at bedside, she is not on oxygen at her baseline. 5 days ago at [facility] she noted to become more hypoxic and was placed on O2. Then patient had more requirement for O2 and CXR was done that showed opacification of both bases suspicious for pneumonia and patient initiated on IV antibiotics. At this point, daughter insisted on transfer to  for further diagnosis and treatment. Patient was at  in June for b/l tib/fib fractures for which she was NWB and was at [facility]. No fever reported.  (14 Oct 2021 18:21)    10/15/2021- Cardiology: 89 year old woman with HBP, long term persistent atrial fibrillation who is admitted with progressive shortness of breath and CT chest shows bilateral moderate to large pleural effusions. She has chronic venous insufficiency but no prior history of heart failure. Last admitted to [facility] with bilateral tib-fib fractures that were treated nonoperatively.Patient with mild slowly progressive dementia, monoclonal gammopathy, prior urinary tract infections, chronic Reaves catheter. Warfarin waschanged to Eliquis in August 2021.    Followup HPI:    10/16 alert, lying flat, denies dyspnea  10/17 patient is asleep   10/18- no complaints  10/20- Alert. States "I feel much better"  10/21- no complaints.  10/22  lethargic but arouseable. denies dyspnea  10/25 alert, confused  PAST MEDICAL & SURGICAL HISTORY:  Lymphedema of both lower extremities    Venous insufficiency    Monoclonal gammopathies    Paroxysmal atrial fibrillation    Acute cystitis without hematuria  septic  4/2016    Essential hypertension    Spinal stenosis    Spondyloarthritis    Vaginal prolapse    [place] bilateral    Hypertension    S/P kyphoplasty  2014    S/P thyroidectomy  partial   1975    History of vaginal surgery    History of fracture of femur  hx of proximal femur fracture in January 2021    History of repair of left hip joint  Hx L hip long IMN with Dr. Kitchen 2017          MEDICATIONS  (STANDING):  apixaban 2.5 milliGRAM(s) Oral every 12 hours  ascorbic acid 500 milliGRAM(s) Oral daily  collagenase Ointment 1 Application(s) Topical daily  furosemide    Tablet 40 milliGRAM(s) Oral two times a day  influenza   Vaccine 0.5 milliLiter(s) IntraMuscular once  losartan 25 milliGRAM(s) Oral daily  metoprolol succinate ER 25 milliGRAM(s) Oral daily  polyethylene glycol 3350 17 Gram(s) Oral daily  senna 2 Tablet(s) Oral at bedtime  zinc sulfate 220 milliGRAM(s) Oral daily    MEDICATIONS  (PRN):  acetaminophen   Tablet .. 650 milliGRAM(s) Oral every 6 hours PRN Temp greater or equal to 38C (100.4F), Mild Pain (1 - 3)  aluminum hydroxide/magnesium hydroxide/simethicone Suspension 30 milliLiter(s) Oral every 4 hours PRN Dyspepsia  melatonin 3 milliGRAM(s) Oral at bedtime PRN Insomnia  ondansetron Injectable 4 milliGRAM(s) IV Push every 8 hours PRN Nausea and/or Vomiting          Vital Signs Last 24 Hrs  T(C): 36.3 (25 Oct 2021 08:06), Max: 36.7 (24 Oct 2021 08:53)  T(F): 97.3 (25 Oct 2021 08:06), Max: 98 (24 Oct 2021 08:53)  HR: 90 (25 Oct 2021 08:06) (69 - 94)  BP: 150/86 (25 Oct 2021 08:06) (97/69 - 150/86)  BP(mean): --  RR: 17 (25 Oct 2021 08:06) (17 - 18)  SpO2: 95% (25 Oct 2021 08:06) (95% - 96%)    I&O's Summary    24 Oct 2021 07:01  -  25 Oct 2021 07:00  --------------------------------------------------------  IN: 0 mL / OUT: 625 mL / NET: -625 mL        PHYSICAL EXAM  General Appearance: weak and frail  HEENT:   Neck:   Lungs: clear right lung, reduced BS left base  Heart: 1/6 systolic murmur LSB  Abdomen:   Extremities: no edema  Neurologic: alert and conversant        INTERPRETATION OF TELEMETRY:    ECG:        LABS:                              RADIOLOGY & ADDITIONAL STUDIES:

## 2021-10-25 NOTE — CHART NOTE - NSCHARTNOTEFT_GEN_A_CORE
It is medically necessary this patient receive a stephanie lift. This patient is a two person max assist and will need this stephanie lift for transfers from bed to chair.    This patient will require a hospital bed due to severe debility, dementia and severe malnutrition .  Patient requires the head of the bed to be elevated more than 30 degrees.  Pillows and wedges are not effective.  Patient requires positioning of the body in ways not feasible with an ordinary bed.  Patient will require a gel overlay to protect skin integrity and prevent skin breakdown.  Patient requires frequent repositioning in order to alleviate pain. It is medically necessary this patient receive a stephanie lift. This patient is a two person max assist and will need this stephanie lift for transfers from bed to chair.    This patient will require a hospital bed due to severe debility, dementia and severe malnutrition .  Patient requires the head of the bed to be elevated more than 30 degrees.  Pillows and wedges are not effective.  Patient requires positioning of the body in ways not feasible with an ordinary bed.  Patient will require a air loss mattress to promote ulceration healing and prevent further skin breakdown.  Patient requires frequent repositioning in order to alleviate pain.

## 2021-10-25 NOTE — PROGRESS NOTE ADULT - ASSESSMENT
1.HFpEF with bilateral pleural effusions. Improved after right thoracentesis of more than 1000 cc. Left thoracentesis deferred due to pain caused by the right procedure. Symptomatically stable.   2.Long term persistent atrial fibrillation. Rate  controlled.  3.Hypertension.  4.Indwelling Reaves.  5.Dementia.   PLAN:  Continue metoprolol,  losartan, furosemide, Eliquis. Follow BMP . Replete serum potassium as needed. Consider physical therapy as tolerated  will follow prn only  thank you

## 2021-10-26 ENCOUNTER — TRANSCRIPTION ENCOUNTER (OUTPATIENT)
Age: 86
End: 2021-10-26

## 2021-10-26 PROCEDURE — 99232 SBSQ HOSP IP/OBS MODERATE 35: CPT

## 2021-10-26 RX ADMIN — Medication 40 MILLIGRAM(S): at 21:01

## 2021-10-26 RX ADMIN — APIXABAN 2.5 MILLIGRAM(S): 2.5 TABLET, FILM COATED ORAL at 09:23

## 2021-10-26 RX ADMIN — SENNA PLUS 2 TABLET(S): 8.6 TABLET ORAL at 21:01

## 2021-10-26 RX ADMIN — Medication 40 MILLIGRAM(S): at 09:23

## 2021-10-26 RX ADMIN — ZINC SULFATE TAB 220 MG (50 MG ZINC EQUIVALENT) 220 MILLIGRAM(S): 220 (50 ZN) TAB at 09:23

## 2021-10-26 RX ADMIN — Medication 1 APPLICATION(S): at 09:23

## 2021-10-26 RX ADMIN — Medication 3 MILLIGRAM(S): at 21:01

## 2021-10-26 RX ADMIN — Medication 500 MILLIGRAM(S): at 09:23

## 2021-10-26 RX ADMIN — Medication 25 MILLIGRAM(S): at 09:23

## 2021-10-26 RX ADMIN — LOSARTAN POTASSIUM 25 MILLIGRAM(S): 100 TABLET, FILM COATED ORAL at 09:23

## 2021-10-26 RX ADMIN — APIXABAN 2.5 MILLIGRAM(S): 2.5 TABLET, FILM COATED ORAL at 21:01

## 2021-10-26 NOTE — DISCHARGE NOTE NURSING/CASE MANAGEMENT/SOCIAL WORK - PATIENT PORTAL LINK FT
You can access the FollowMyHealth Patient Portal offered by [facility] by registering at the following website: [email or web address]. By joining [facility]’s FollowMyHealth portal, you will also be able to view your health information using other applications (apps) compatible with our system.

## 2021-10-26 NOTE — PROGRESS NOTE ADULT - SUBJECTIVE AND OBJECTIVE BOX
CC: SOB  Subjective and Objective:   Patient seen and examined: More lethargic this am, not opening eyes. No acute events overnight. Palliative meeting planned for today.    10/19: sitting up in bed, putting lipstick on, awake and conversive but confused.   10/20: seen and examined in bed, confused. no new issues.   10/21: sitting up in bed, no pain or discomfort. awaiting placement. cont to be on room air  10/23/21: Patient seen and examined. No new issues. Patient was discharged yesterday, daughter appealed.    10/24: Lying in bed, minimally alert, nods to basic questions.  Pt appears comfortable.  10/25: sitting up in bed, alert baseline confusion. no new issues. daughter appeal process successful, dc delayed, rehabs not option per daughter plan for home with aides, hospital bed   10/26: seen and examined laying in bed. status quo. awaiting private aide for home placement     ROS: limited due to dementia     Vital Signs Last 24 Hrs  T(C): 36.8 (26 Oct 2021 07:48), Max: 36.8 (26 Oct 2021 07:48)  T(F): 98.3 (26 Oct 2021 07:48), Max: 98.3 (26 Oct 2021 07:48)  HR: 95 (26 Oct 2021 07:48) (73 - 95)  BP: 137/68 (26 Oct 2021 07:48) (134/64 - 137/68)  BP(mean): --  RR: 18 (26 Oct 2021 07:48) (16 - 18)  SpO2: 96% (26 Oct 2021 07:48) (96% - 96%)    PHYSICAL EXAM:  Constitutional: NAD, frail, elderly, lethargic appearing, cachetic   HEENT: unable to assess  Neck: Soft and supple  Respiratory: Breath sounds are diminished bilat bases, clear in upper lobes   Cardiovascular: S1 and S2, IR, IR  Gastrointestinal: Bowel Sounds present, soft, nontender, nondistended, no guarding, no rebound  extremities: no edema  Neurological: A/O x 1  Musculoskeletal: unable to follow commands  Skin: Sarcal ulcer and gluteal fold ulcer unstageable     MEDICATIONS  (STANDING):  apixaban 2.5 milliGRAM(s) Oral every 12 hours  ascorbic acid 500 milliGRAM(s) Oral daily  collagenase Ointment 1 Application(s) Topical daily  furosemide    Tablet 40 milliGRAM(s) Oral two times a day  influenza   Vaccine 0.5 milliLiter(s) IntraMuscular once  losartan 25 milliGRAM(s) Oral daily  metoprolol succinate ER 25 milliGRAM(s) Oral daily  polyethylene glycol 3350 17 Gram(s) Oral daily  senna 2 Tablet(s) Oral at bedtime  zinc sulfate 220 milliGRAM(s) Oral daily    MEDICATIONS  (PRN):  acetaminophen   Tablet .. 650 milliGRAM(s) Oral every 6 hours PRN Temp greater or equal to 38C (100.4F), Mild Pain (1 - 3)  aluminum hydroxide/magnesium hydroxide/simethicone Suspension 30 milliLiter(s) Oral every 4 hours PRN Dyspepsia  melatonin 3 milliGRAM(s) Oral at bedtime PRN Insomnia  ondansetron Injectable 4 milliGRAM(s) IV Push every 8 hours PRN Nausea and/or Vomiting                      Assessment and Plan:  89 y/o Female with PMHx Afib on coumadin, HTN, mild dementia, chronic lymphedema without known h/o CHF, presented from SNF for evaluation of worsening SOB. Admitted for:    #Acute hypoxic respiratory failure due to Mod-Large b/l pleural effusions -  likley combination of acute on chronic diastolic CHF and PNA: STABLE  -acute respiratory failure resolved, satting well on room air.   -exudative fluid  -PO Lasix   Echo EF 60-65%  -s/p course of antibiotics   -ID, Cardio, Pulm consult appreciated    #Chronic Afib:  eliquis  Cont. BB    #Metabolic encephalopathy in setting of dementia / debility / sacral decubitus ulcers / severe protein calorie malnutrition / weakness:  -supportive care  -avoid sedating meds  -palliative care eval appreciated.  -Cont. Remeron.   -dysphagia diet, overall oral intake poor   -wound care  -Air mattress,  turn and position   -Ensure w/ meals. Vitamin C and zinc for wound healing.  -Overall prognosis poor- won index 49-62% 6 month mortality index.  pt would be good hospice candidate however family hesitant to pursue pure comfort measures.       #hx of B/l ankle fractures   Patient may be weightbearing as tolerated with gentle range of motion exercises   repeat tib/fib xray --> healing fracture   -Pt poor candidate for any sort of rehab due to above diagnoses.     #DVT proph- Eliquis 5mg PO BID     #Advanced Directives / GOC:   -DNR/DNI No feeding tube  -  dispo: aide and equipment placement and dc likely in next 1-2 days    CC: SOB  Subjective and Objective:   Patient seen and examined: More lethargic this am, not opening eyes. No acute events overnight. Palliative meeting planned for today.    10/19: sitting up in bed, putting lipstick on, awake and conversive but confused.   10/20: seen and examined in bed, confused. no new issues.   10/21: sitting up in bed, no pain or discomfort. awaiting placement. cont to be on room air  10/23/21: Patient seen and examined. No new issues. Patient was discharged yesterday, daughter appealed.    10/24: Lying in bed, minimally alert, nods to basic questions.  Pt appears comfortable.  10/25: sitting up in bed, alert baseline confusion. no new issues. daughter appeal process successful, dc delayed, rehabs not option per daughter plan for home with aides, hospital bed   10/26: seen and examined laying in bed. status quo. awaiting private aide for home placement     ROS: limited due to dementia     Vital Signs Last 24 Hrs  T(C): 36.8 (26 Oct 2021 07:48), Max: 36.8 (26 Oct 2021 07:48)  T(F): 98.3 (26 Oct 2021 07:48), Max: 98.3 (26 Oct 2021 07:48)  HR: 95 (26 Oct 2021 07:48) (73 - 95)  BP: 137/68 (26 Oct 2021 07:48) (134/64 - 137/68)  BP(mean): --  RR: 18 (26 Oct 2021 07:48) (16 - 18)  SpO2: 96% (26 Oct 2021 07:48) (96% - 96%)    PHYSICAL EXAM:  Constitutional: NAD, frail, elderly, lethargic appearing, cachetic   HEENT: unable to assess  Neck: Soft and supple  Respiratory: Breath sounds are diminished bilat bases, clear in upper lobes   Cardiovascular: S1 and S2, IR, IR  Gastrointestinal: Bowel Sounds present, soft, nontender, nondistended, no guarding, no rebound  extremities: no edema  Neurological: A/O x 1  Musculoskeletal: unable to follow commands  Skin: Sarcal ulcer and gluteal fold ulcer unstageable     MEDICATIONS  (STANDING):  apixaban 2.5 milliGRAM(s) Oral every 12 hours  ascorbic acid 500 milliGRAM(s) Oral daily  collagenase Ointment 1 Application(s) Topical daily  furosemide    Tablet 40 milliGRAM(s) Oral two times a day  influenza   Vaccine 0.5 milliLiter(s) IntraMuscular once  losartan 25 milliGRAM(s) Oral daily  metoprolol succinate ER 25 milliGRAM(s) Oral daily  polyethylene glycol 3350 17 Gram(s) Oral daily  senna 2 Tablet(s) Oral at bedtime  zinc sulfate 220 milliGRAM(s) Oral daily    MEDICATIONS  (PRN):  acetaminophen   Tablet .. 650 milliGRAM(s) Oral every 6 hours PRN Temp greater or equal to 38C (100.4F), Mild Pain (1 - 3)  aluminum hydroxide/magnesium hydroxide/simethicone Suspension 30 milliLiter(s) Oral every 4 hours PRN Dyspepsia  melatonin 3 milliGRAM(s) Oral at bedtime PRN Insomnia  ondansetron Injectable 4 milliGRAM(s) IV Push every 8 hours PRN Nausea and/or Vomiting                      Assessment and Plan:  89 y/o Female with PMHx Afib on coumadin, HTN, mild dementia, chronic lymphedema without known h/o CHF, presented from SNF for evaluation of worsening SOB. Admitted for:    #Acute hypoxic respiratory failure due to Mod-Large b/l pleural effusions -  likley combination of acute on chronic diastolic CHF and PNA: STABLE  -acute respiratory failure resolved, satting well on room air.   -exudative fluid  -PO Lasix   Echo EF 60-65%  -s/p course of antibiotics   -ID, Cardio, Pulm consult appreciated    #Chronic Afib:  eliquis  Cont. BB    #Metabolic encephalopathy in setting of dementia / debility / sacral decubitus ulcers / severe protein calorie malnutrition / weakness / failure to thrive   -supportive care  -avoid sedating meds  -palliative care eval appreciated.  -Cont. Remeron.   -dysphagia diet, overall oral intake poor   -wound care  -Air mattress,  turn and position   -Ensure w/ meals. Vitamin C and zinc for wound healing.  -Overall prognosis poor- won index 49-62% 6 month mortality index.  pt would be good hospice candidate however family hesitant to pursue pure comfort measures.       #hx of B/l ankle fractures   Patient may be weightbearing as tolerated with gentle range of motion exercises   repeat tib/fib xray --> healing fracture   -Pt poor candidate for any sort of rehab due to above diagnoses.     #DVT proph- Eliquis 5mg PO BID     #Advanced Directives / GOC:   -DNR/DNI No feeding tube  -  dispo: aide and equipment placement and dc likely in next 1-2 days

## 2021-10-27 LAB — SARS-COV-2 RNA SPEC QL NAA+PROBE: SIGNIFICANT CHANGE UP

## 2021-10-27 PROCEDURE — 99232 SBSQ HOSP IP/OBS MODERATE 35: CPT

## 2021-10-27 RX ADMIN — LOSARTAN POTASSIUM 25 MILLIGRAM(S): 100 TABLET, FILM COATED ORAL at 09:08

## 2021-10-27 RX ADMIN — POLYETHYLENE GLYCOL 3350 17 GRAM(S): 17 POWDER, FOR SOLUTION ORAL at 10:40

## 2021-10-27 RX ADMIN — Medication 500 MILLIGRAM(S): at 09:08

## 2021-10-27 RX ADMIN — Medication 40 MILLIGRAM(S): at 21:30

## 2021-10-27 RX ADMIN — Medication 650 MILLIGRAM(S): at 17:54

## 2021-10-27 RX ADMIN — Medication 25 MILLIGRAM(S): at 09:08

## 2021-10-27 RX ADMIN — APIXABAN 2.5 MILLIGRAM(S): 2.5 TABLET, FILM COATED ORAL at 09:08

## 2021-10-27 RX ADMIN — APIXABAN 2.5 MILLIGRAM(S): 2.5 TABLET, FILM COATED ORAL at 21:30

## 2021-10-27 RX ADMIN — ZINC SULFATE TAB 220 MG (50 MG ZINC EQUIVALENT) 220 MILLIGRAM(S): 220 (50 ZN) TAB at 09:08

## 2021-10-27 RX ADMIN — Medication 40 MILLIGRAM(S): at 09:08

## 2021-10-27 RX ADMIN — Medication 1 APPLICATION(S): at 10:43

## 2021-10-27 RX ADMIN — SENNA PLUS 2 TABLET(S): 8.6 TABLET ORAL at 21:30

## 2021-10-27 NOTE — PROGRESS NOTE ADULT - SUBJECTIVE AND OBJECTIVE BOX
CC: SOB  Subjective and Objective:   Patient seen and examined: More lethargic this am, not opening eyes. No acute events overnight. Palliative meeting planned for today.    10/19: sitting up in bed, putting lipstick on, awake and conversive but confused.   10/20: seen and examined in bed, confused. no new issues.   10/21: sitting up in bed, no pain or discomfort. awaiting placement. cont to be on room air  10/23/21: Patient seen and examined. No new issues. Patient was discharged yesterday, daughter appealed.    10/24: Lying in bed, minimally alert, nods to basic questions.  Pt appears comfortable.  10/25: sitting up in bed, alert baseline confusion. no new issues. daughter appeal process successful, dc delayed, rehabs not option per daughter plan for home with aides, hospital bed   10/26: seen and examined laying in bed. status quo. awaiting private aide for home placement   10/27: laying in bed. appearing comfortable no new issues     ROS: limited due to dementia     Vital Signs Last 24 Hrs  T(C): 36.4 (27 Oct 2021 08:58), Max: 36.5 (26 Oct 2021 20:53)  T(F): 97.5 (27 Oct 2021 08:58), Max: 97.7 (26 Oct 2021 20:53)  HR: 76 (27 Oct 2021 08:58) (76 - 91)  BP: 146/71 (27 Oct 2021 08:58) (143/80 - 146/71)  BP(mean): --  RR: 16 (27 Oct 2021 08:58) (16 - 18)  SpO2: 99% (27 Oct 2021 08:58) (95% - 99%)    PHYSICAL EXAM:  Constitutional: NAD, frail, elderly, lethargic appearing, cachetic   HEENT: unable to assess  Neck: Soft and supple  Respiratory: Breath sounds are diminished bilat bases, clear in upper lobes   Cardiovascular: S1 and S2, IR, IR  Gastrointestinal: Bowel Sounds present, soft, nontender, nondistended, no guarding, no rebound  extremities: no edema  Neurological: A/O x 1  Musculoskeletal: unable to follow commands  Skin: Sarcal ulcer and gluteal fold ulcer unstageable     MEDICATIONS  (STANDING):  apixaban 2.5 milliGRAM(s) Oral every 12 hours  ascorbic acid 500 milliGRAM(s) Oral daily  collagenase Ointment 1 Application(s) Topical daily  furosemide    Tablet 40 milliGRAM(s) Oral two times a day  influenza   Vaccine 0.5 milliLiter(s) IntraMuscular once  losartan 25 milliGRAM(s) Oral daily  metoprolol succinate ER 25 milliGRAM(s) Oral daily  polyethylene glycol 3350 17 Gram(s) Oral daily  senna 2 Tablet(s) Oral at bedtime  zinc sulfate 220 milliGRAM(s) Oral daily    MEDICATIONS  (PRN):  acetaminophen   Tablet .. 650 milliGRAM(s) Oral every 6 hours PRN Temp greater or equal to 38C (100.4F), Mild Pain (1 - 3)  aluminum hydroxide/magnesium hydroxide/simethicone Suspension 30 milliLiter(s) Oral every 4 hours PRN Dyspepsia  melatonin 3 milliGRAM(s) Oral at bedtime PRN Insomnia  ondansetron Injectable 4 milliGRAM(s) IV Push every 8 hours PRN Nausea and/or Vomiting                      Assessment and Plan:  89 y/o Female with PMHx Afib on coumadin, HTN, mild dementia, chronic lymphedema without known h/o CHF, presented from SNF for evaluation of worsening SOB. Admitted for:    #Acute hypoxic respiratory failure due to Mod-Large b/l pleural effusions -  likley combination of acute on chronic diastolic CHF and PNA: STABLE  -acute respiratory failure resolved, satting well on room air.   -exudative fluid  -PO Lasix   Echo EF 60-65%  -s/p course of antibiotics   -ID, Cardio, Pulm consult appreciated    #Chronic Afib:  eliquis  Cont. BB    #Metabolic encephalopathy in setting of dementia / debility / sacral decubitus ulcers / severe protein calorie malnutrition / weakness / failure to thrive   -supportive care  -avoid sedating meds  -palliative care eval appreciated.  -Cont. Remeron.   -dysphagia diet, overall oral intake poor   -wound care  -Air mattress,  turn and position   -Ensure w/ meals. Vitamin C and zinc for wound healing.  -Overall prognosis poor- won index 49-62% 6 month mortality index.  pt would be good hospice candidate however family hesitant to pursue pure comfort measures.       #hx of B/l ankle fractures   Patient may be weightbearing as tolerated with gentle range of motion exercises   repeat tib/fib xray --> healing fracture   -Pt poor candidate for any sort of rehab due to above diagnoses.     #DVT proph- Eliquis 5mg PO BID     #Advanced Directives / GOC:   -DNR/DNI No feeding tube  -  dispo: aide and equipment placement and dc likely in next 1-2 days

## 2021-10-27 NOTE — PROGRESS NOTE ADULT - REASON FOR ADMISSION
Worsening SOB/ hypoxia

## 2021-10-27 NOTE — PROGRESS NOTE ADULT - NUTRITIONAL ASSESSMENT
This patient has been assessed with a concern for Malnutrition and has been determined to have a diagnosis/diagnoses of Moderate protein-calorie malnutrition.    This patient is being managed with:   Diet Dysphagia 2 Mechanical Soft-Thin Liquids-  Prosource Gelatein Plus     Qty per Day:  3  Supplement Feeding Modality:  Oral  Ensure Enlive Servings Per Day:  2       Frequency:  Three Times a day  Entered: Oct 19 2021  9:03AM    
This patient has been assessed with a concern for Malnutrition and has been determined to have a diagnosis/diagnoses of Moderate protein-calorie malnutrition.    This patient is being managed with:   Diet Dysphagia 3 Soft-Thin Liquids-  Prosource Gelatein Plus     Qty per Day:  3  Supplement Feeding Modality:  Oral  Ensure Enlive Servings Per Day:  2       Frequency:  Three Times a day  Entered: Oct 20 2021  6:10PM    
This patient has been assessed with a concern for Malnutrition and has been determined to have a diagnosis/diagnoses of Moderate protein-calorie malnutrition.    This patient is being managed with:   Diet Dysphagia 2 Mechanical Soft-Thin Liquids-  Prosource Gelatein Plus     Qty per Day:  3  Supplement Feeding Modality:  Oral  Ensure Enlive Servings Per Day:  2       Frequency:  Three Times a day  Entered: Oct 19 2021  9:03AM    
This patient has been assessed with a concern for Malnutrition and has been determined to have a diagnosis/diagnoses of Moderate protein-calorie malnutrition.    This patient is being managed with:   Diet Dysphagia 3 Soft-Thin Liquids-  Prosource Gelatein Plus     Qty per Day:  3  Supplement Feeding Modality:  Oral  Ensure Enlive Servings Per Day:  2       Frequency:  Three Times a day  Entered: Oct 20 2021  6:10PM    
This patient has been assessed with a concern for Malnutrition and has been determined to have a diagnosis/diagnoses of Moderate protein-calorie malnutrition.    This patient is being managed with:   Diet Dysphagia 2 Mechanical Soft-Thin Liquids-  Entered: Oct 15 2021  4:49PM    
This patient has been assessed with a concern for Malnutrition and has been determined to have a diagnosis/diagnoses of Moderate protein-calorie malnutrition.    This patient is being managed with:   Diet Dysphagia 2 Mechanical Soft-Thin Liquids-  Prosource Gelatein Plus     Qty per Day:  3  Supplement Feeding Modality:  Oral  Ensure Enlive Servings Per Day:  2       Frequency:  Three Times a day  Entered: Oct 19 2021  9:03AM    
This patient has been assessed with a concern for Malnutrition and has been determined to have a diagnosis/diagnoses of Moderate protein-calorie malnutrition.    This patient is being managed with:   Diet Dysphagia 2 Mechanical Soft-Thin Liquids-  Prosource Gelatein Plus     Qty per Day:  3  Supplement Feeding Modality:  Oral  Ensure Enlive Servings Per Day:  2       Frequency:  Three Times a day  Entered: Oct 19 2021  9:03AM    
This patient has been assessed with a concern for Malnutrition and has been determined to have a diagnosis/diagnoses of Moderate protein-calorie malnutrition.    This patient is being managed with:   Diet Dysphagia 3 Soft-Thin Liquids-  Prosource Gelatein Plus     Qty per Day:  3  Supplement Feeding Modality:  Oral  Ensure Enlive Servings Per Day:  2       Frequency:  Three Times a day  Entered: Oct 20 2021  6:10PM    
This patient has been assessed with a concern for Malnutrition and has been determined to have a diagnosis/diagnoses of Moderate protein-calorie malnutrition.    This patient is being managed with:   Diet Dysphagia 2 Mechanical Soft-Thin Liquids-  Entered: Oct 15 2021  4:49PM    
This patient has been assessed with a concern for Malnutrition and has been determined to have a diagnosis/diagnoses of Moderate protein-calorie malnutrition.    This patient is being managed with:   Diet Dysphagia 2 Mechanical Soft-Thin Liquids-  Entered: Oct 15 2021  4:49PM    
This patient has been assessed with a concern for Malnutrition and has been determined to have a diagnosis/diagnoses of Moderate protein-calorie malnutrition.    This patient is being managed with:   Diet Dysphagia 3 Soft-Thin Liquids-  Prosource Gelatein Plus     Qty per Day:  3  Supplement Feeding Modality:  Oral  Ensure Enlive Servings Per Day:  2       Frequency:  Three Times a day  Entered: Oct 20 2021  6:10PM

## 2021-10-28 VITALS
DIASTOLIC BLOOD PRESSURE: 65 MMHG | SYSTOLIC BLOOD PRESSURE: 151 MMHG | HEART RATE: 76 BPM | OXYGEN SATURATION: 98 % | TEMPERATURE: 97 F

## 2021-10-28 RX ORDER — SODIUM HYPOCHLORITE 0.125 %
1 SOLUTION, NON-ORAL MISCELLANEOUS
Qty: 0 | Refills: 0 | DISCHARGE

## 2021-10-28 RX ORDER — POVIDONE-IODINE 5 %
1 AEROSOL (ML) TOPICAL
Qty: 0 | Refills: 0 | DISCHARGE

## 2021-10-28 RX ORDER — COLLAGENASE CLOSTRIDIUM HIST. 250 UNIT/G
1 OINTMENT (GRAM) TOPICAL
Qty: 30 | Refills: 0
Start: 2021-10-28 | End: 2021-11-26

## 2021-10-28 RX ORDER — FUROSEMIDE 40 MG
1 TABLET ORAL
Qty: 30 | Refills: 0
Start: 2021-10-28 | End: 2021-11-11

## 2021-10-28 RX ORDER — LOSARTAN POTASSIUM 100 MG/1
1 TABLET, FILM COATED ORAL
Qty: 0 | Refills: 0 | DISCHARGE

## 2021-10-28 RX ORDER — LOSARTAN POTASSIUM 100 MG/1
1 TABLET, FILM COATED ORAL
Qty: 0 | Refills: 0 | DISCHARGE
Start: 2021-10-28

## 2021-10-28 RX ORDER — APIXABAN 2.5 MG/1
1 TABLET, FILM COATED ORAL
Qty: 0 | Refills: 0 | DISCHARGE

## 2021-10-28 RX ORDER — METOPROLOL TARTRATE 50 MG
0.5 TABLET ORAL
Qty: 0 | Refills: 0 | DISCHARGE

## 2021-10-28 RX ORDER — APIXABAN 2.5 MG/1
1 TABLET, FILM COATED ORAL
Qty: 0 | Refills: 0 | DISCHARGE
Start: 2021-10-28

## 2021-10-28 RX ORDER — FERROUS SULFATE 325(65) MG
1 TABLET ORAL
Qty: 0 | Refills: 0 | DISCHARGE

## 2021-10-28 RX ADMIN — POLYETHYLENE GLYCOL 3350 17 GRAM(S): 17 POWDER, FOR SOLUTION ORAL at 10:35

## 2021-10-28 RX ADMIN — Medication 1 APPLICATION(S): at 10:35

## 2021-10-28 RX ADMIN — LOSARTAN POTASSIUM 25 MILLIGRAM(S): 100 TABLET, FILM COATED ORAL at 10:36

## 2021-10-28 RX ADMIN — Medication 25 MILLIGRAM(S): at 10:34

## 2021-10-28 RX ADMIN — Medication 40 MILLIGRAM(S): at 10:35

## 2021-10-28 RX ADMIN — ZINC SULFATE TAB 220 MG (50 MG ZINC EQUIVALENT) 220 MILLIGRAM(S): 220 (50 ZN) TAB at 10:35

## 2021-10-28 RX ADMIN — APIXABAN 2.5 MILLIGRAM(S): 2.5 TABLET, FILM COATED ORAL at 10:34

## 2021-10-28 RX ADMIN — Medication 500 MILLIGRAM(S): at 10:34

## 2021-11-01 ENCOUNTER — APPOINTMENT (OUTPATIENT)
Dept: CARE COORDINATION | Facility: HOME HEALTH | Age: 86
End: 2021-11-01
Payer: MEDICARE

## 2021-11-01 VITALS
HEART RATE: 74 BPM | DIASTOLIC BLOOD PRESSURE: 68 MMHG | OXYGEN SATURATION: 95 % | RESPIRATION RATE: 18 BRPM | SYSTOLIC BLOOD PRESSURE: 128 MMHG

## 2021-11-01 PROCEDURE — 99496 TRANSJ CARE MGMT HIGH F2F 7D: CPT

## 2021-11-01 RX ORDER — METOPROLOL TARTRATE 75 MG/1
TABLET, FILM COATED ORAL
Refills: 0 | Status: DISCONTINUED | COMMUNITY
End: 2021-11-01

## 2021-11-01 RX ORDER — CLOTRIMAZOLE AND BETAMETHASONE DIPROPIONATE 10; .5 MG/G; MG/G
1-0.05 CREAM TOPICAL TWICE DAILY
Qty: 1 | Refills: 0 | Status: DISCONTINUED | COMMUNITY
Start: 2019-08-02 | End: 2021-11-01

## 2021-11-01 RX ORDER — HYDROCORTISONE 2.5% 25 MG/G
2.5 CREAM TOPICAL DAILY
Qty: 1 | Refills: 3 | Status: DISCONTINUED | COMMUNITY
Start: 2019-02-13 | End: 2021-11-01

## 2021-11-01 RX ORDER — LEVOFLOXACIN 500 MG/1
500 TABLET, FILM COATED ORAL DAILY
Qty: 10 | Refills: 0 | Status: DISCONTINUED | COMMUNITY
Start: 2020-06-22 | End: 2021-11-01

## 2021-11-01 RX ORDER — DENOSUMAB 60 MG/ML
60 INJECTION SUBCUTANEOUS
Refills: 0 | Status: DISCONTINUED | COMMUNITY
Start: 2017-12-06 | End: 2021-11-01

## 2021-11-01 RX ORDER — NYSTATIN 100000 [USP'U]/G
100000 CREAM TOPICAL TWICE DAILY
Qty: 1 | Refills: 2 | Status: DISCONTINUED | COMMUNITY
Start: 2020-07-30 | End: 2021-11-01

## 2021-11-01 RX ORDER — FUROSEMIDE 80 MG/1
TABLET ORAL
Refills: 0 | Status: DISCONTINUED | COMMUNITY
End: 2021-11-01

## 2021-11-01 NOTE — ED PROVIDER NOTE - PSH
History of vaginal surgery    S/P kyphoplasty  2014  S/P thyroidectomy  partial   1975
Benefits, risks, and possible complications of procedure explained to patient/caregiver who verbalized understanding and gave verbal consent.

## 2021-11-02 NOTE — PLAN
[FreeTextEntry1] : 1. Acute hypoxic respiratory failure due to Mod-Large b/l pleural effusions -  likely combination of CHF and PNA \par - Supplemental 02 - 3L NC ---resolved now on room air \par - PO Lasix 40mg BID upon DC \par - lights criteria - exudative \par - Echo EF 60-65%\par - enforced with patient need for daily weights. Advised to call for an increase of greater than 2 lbs in a day or 5 pounds in a week. (bedbound unable to weigh)\par - Adhere to low salt diet and educated patient on foods that should be  avoided such as processed or fast food.\par - Limit fluids to 1 liter a day which is 4-5 glasses.\par - Continue medications as ordered. \par - Follow up with Cardiologist Dr. Ramos (dtg has been in phone contact)\par - TCM program reinforced and 24/7 contact number of CN provided. Pt advised to call for any worsening symptoms, questions or concerns. Pt verbalized understanding.\par \par 2. Chronic Afib\par - rate controlled\par - con't metoprolol, eliquis (previously in coumadin)\par \par 3. Hypertension:\par - BP controlled on current meds\par - con't lasix, losartan, metoprolol\par \par 4. Advanced care planning:\par - MOLST DNR/I, no GT\par - discussed goals of care, disease trajectory, home hospice philosophy \par - time spent 20 minutes\par \par 5. Pressure Ulcers:\par - Air mattress,  turn and position \par - Ensure w/ meals. Vitamin C and zinc for wound healing.\par pt would benefit with assistance with feeding - SLP reconsulted for recommendations on diet consistency vs help with feeding. \par - local wound care, off load pressure\par \par 6. hx of B/l ankle fractures \par Patient may be weightbearing as tolerated with gentle range of motion exercises per ortho \par \par \par

## 2021-11-02 NOTE — REVIEW OF SYSTEMS
[Muscle Weakness] : muscle weakness [Anxiety] : anxiety [Negative] : Neurological [FreeTextEntry8] : sales cath [FreeTextEntry9] : debility [de-identified] : pressure ulcers

## 2021-11-02 NOTE — ASSESSMENT
[FreeTextEntry1] : Pt is being seen after discharge home from [facility]. She was admitted on 10/14/2021 for SOB and discharged home on 10/22/2021. Discharge medications were reviewed and reconciled with the current medication list and medications in home.\par \par CC:\par Pt is seen at home s/p recent admission for CHF exac. Pt is temporarily unable to leave home as it requires a considerable and taxing effort, is essentially bedbound, stephanie transfer\par HPI:\par Pt Is a 89 y/o female enrolled in the [facility] program seen at home s/p a recent admission for CHF exac.  \par \par  Hospital Course:\par Discharge Date	22-Oct-2021\par Admission Date	14-Oct-2021 16:12\par Reason for Admission	Worsening SOB/ hypoxia\par Hospital Course	\par HPI: 89 y/p female with PMH afib on eliquis, HTN, mild dementia, chronic lymphedema without known h/o CHF, presented from SNF for evaluation of worsening SOB. Patient unable to provide any history. Per daughter at bedside, she is not on oxygen at her baseline. 5 days ago at SNF she noted to become more hypoxic and was placed on O2. Then patient had more requirement for O2 and CXR was done that showed opacification of both bases suspicious for pneumonia and patient initiated on IV antibiotics. At this point, daughter insisted on transfer to  for further diagnosis and treatment. Patient was at  in June for b/l tib/fib fractures for which she was NWB and was at SNF. No fever reported.  (14 Oct 2021 18:21)\par \par Hospital course:\par Pt admitted for bilat pleural effusions due to heart failure questionable pneumonia. fluid was exudative but thought more to be due to heart failure. pt evaled by CT surgery, had RIGHT chest tube placed, drained >1000cc of serous fluid. Concerns for pleural effusion on left side, per CT surgery, pt was unable to tolerate chest tube, family also deferring placement.  pt evaled by ID - Dr Nunes - mahnaz #3 ceftriaxone and doxycycline; ceftriaxone is completed; and completed course of doxycycline. pt with unstageable sacral and gluteal decub ulcer - evaled by wound NP, collagenase and wound care daily. pt also w/ previous bilat tib/fib fractures from fall while being transferred (July 2021), evaled by ortho for weight bearing status, pt can weight \par \par s/p Palliative consult -Advanced Directives / GOC: \par  DNR/DNI No feeding tube\par Overall prognosis poor- won index 49-62% 6 month mortality index. \par spoke at length Jazmine >30mins - concerned about her deconditioning at rehab has had multiple bad experiences, concerned that she will continue to decline. i have discussed with daughter her mothers long term prognosis, advancing dementia and debility as well as her decreased PO intake will likely continue to progress and is unlikely to improve with any significance. she has requested that patient be placed in rehab for short term so that she may prepare her house for when she is discharged. \par \par Upon examination patient in bed awake and alert in NAD. Able to communicate simple needs, gets flustered at times with questions. She denies CP, SOB, headache, dizziness, abd discomfort or difficulty with bowel or bladder. She c/o some discomfort to her bottom. Dtg Tabatha and her  staying with patient at her home and have 24hr private hire HHA's in place. [facility] has met with an  and is in process of applying for community medicaid. 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 [facility] is visiting daily to perform dressing changes. Patient has hospital bed and is awaiting air mattress from [employer] which required prior authorization. TCM RN followed up yesterday and her case is flagged for review tomorrow. Reaves cath in place draining slight hazy yellow urine. \par \par This NP had discussion with patient privately & with [facility] Tabatha and her  Ac discussed goals of care and she stated twice she did not want to go back to the hospital. I discussed my concerns about her immobility, chronic medical issues and fear her wounds would not heal. FRANCIE completed in hospital with palliative consult DNR/I, no GT. Discussed home hospice philosophy and services provided. Tabatha would like to see how her mom does this week, awaiting call back from Dr. Olivarez a visiting MD to schedule home visit to become her PCP and oversee her care. Emotional support provided, agrees to NP visit next week for follow up. \par \par

## 2021-11-02 NOTE — PHYSICAL EXAM
[No Acute Distress] : no acute distress [Well Nourished] : well nourished [Well Developed] : well developed [Normal Sclera/Conjunctiva] : normal sclera/conjunctiva [Normal Outer Ear/Nose] : the outer ears and nose were normal in appearance [Normal Oropharynx] : the oropharynx was normal [Supple] : supple [No Respiratory Distress] : no respiratory distress  [Clear to Auscultation] : lungs were clear to auscultation bilaterally [No Accessory Muscle Use] : no accessory muscle use [Pedal Pulses Present] : the pedal pulses are present [No Edema] : there was no peripheral edema [No Extremity Clubbing/Cyanosis] : no extremity clubbing/cyanosis [Soft] : abdomen soft [Non Tender] : non-tender [Non-distended] : non-distended [Normal Bowel Sounds] : normal bowel sounds [No Spinal Tenderness] : no spinal tenderness [Grossly Normal Strength/Tone] : grossly normal strength/tone [Coordination Grossly Intact] : coordination grossly intact [No Focal Deficits] : no focal deficits [Normal Affect] : the affect was normal [Alert and Oriented x3] : oriented to person, place, and time [Normal Insight/Judgement] : insight and judgment were intact [de-identified] : No thrush [de-identified] : slightly irregular [de-identified] : + hemosiderin staining BLE [de-identified] : sales insitu: draining slightly hazy yellow urine [de-identified] : weakness BLE [de-identified] : unstagable PU to B heels, open purpura L posterior thigh, mepilex to L posterior calf, unstagable PU to sacrum, stage 4 PU to right ischium

## 2021-11-02 NOTE — HISTORY OF PRESENT ILLNESS
[Post-hospitalization from ___ Hospital] : Post-hospitalization from [unfilled] Hospital [Admitted on: ___] : The patient was admitted on [unfilled] [Discharged on ___] : discharged on [unfilled] [FreeTextEntry2] : FROM [facility] NOTE PROVIDER:\par  Discharge Note Provider [Charted Location: [facility] 531 01] [Authored: 22-Oct-2021 12:44]- for Visit: [id number], Complete, Revised, Signed in Full, General\par \par \par  Hospital Course:\par Discharge Date	22-Oct-2021\par Admission Date	14-Oct-2021 16:12\par Reason for Admission	Worsening SOB/ hypoxia\par Hospital Course	\par HPI: 89yo/F with PMH afib on coumadin, HTN, mild dementia, chronic lymphedema without known h/o CHF, presented from SNF for evaluation of worsening SOB. Patient unable to provide ay history. Per daughter at bedside, she is not on oxygen at her baseline. 5 days ago at [facility] she noted to become more hypoxic and was placed on O2. Then patient had more requirement for O2 and CXR was done that showed opacification of both bases suspicious for pneumonia and patient initiated on IV antibiotics. At this point, daughter insisted on transfer to  for further diagnosis and treatment. Patient was at  in June for b/l tib/fib fractures for which she was NWB and was at SNF. No fever reported.  (14 Oct 2021 18:21)\par \par hospital course: pt admitted for bilat pleural effusions due to heart failure questionable pneumonia. fluid was exudative but thought more to be due to heart failure. pt eavled by CT surgery, had RIGHT chest tube placed, drained >1000cc of serous fluid. Concerns for pleural effusion on left side, per CT surgery, pt was unable to tolerate chest tube, family also deferring placement.  pt evaled by ID - Dr Nunes - day #3 ceftriaxone and doxycycline; ceftriaxone is completed; and completed course of doxycycline. pt with unstageable sacral and gluteal decub ulcer - evaled by wound NP, collagenase and wound care daily. pt also w/ previous bilat tib/fib fractures from fall while being transferred (July 2021), evaled by ortho for weight bearing status, pt can weight bear as tolerated. pt daughter is HCP, pt has been made DNR/DNI/no feeding tube during GOC convo with Palliative team. \par \par Vital Signs Last 24 Hrs\par T(C): 36.1 (22 Oct 2021 07:18), Max: 36.5 (21 Oct 2021 21:01)\par T(F): 96.9 (22 Oct 2021 07:18), Max: 97.7 (21 Oct 2021 21:01)\par HR: 90 (22 Oct 2021 07:18) (87 - 92)\par BP: 116/49 (22 Oct 2021 07:18) (116/49 - 139/61)\par BP(mean): --\par RR: 17 (22 Oct 2021 07:18) (17 - 18)\par SpO2: 96% (22 Oct 2021 07:18) (93% - 96%)\par \par LABS         \par            11.0 \par 6.57  )-----------( 112      ( 21 Oct 2021 08:35 )\par            34.9 \par \par 10-21\par \par 139  |  99  |  24<H>\par ----------------------------<  105<H>\par 3.4<L>   |  35<H>  |  0.54\par \par Ca    9.9      21 Oct 2021 08:35\par \par PHYSICAL EXAM:\par Constitutional: elder female, alert, confused \par HEENT: unable to assess\par Neck: Soft and supple\par Respiratory: Breath sounds are diminished bilat bases, clear in upper lobes \par Cardiovascular: S1 and S2, IR, IR\par Gastrointestinal: Bowel Sounds present, soft, nontender, nondistended, no guarding, no rebound\par extremities: 5/5 strength in upper extremities, 4/5 in lower extremities. scant edema to ankles. \par Neurological: A/O x 2 \par Musculoskeletal: unable to follow commands\par Skin: Sarcal ulcer and gluteal fold ulcer unstageable \par \par \par #Acute hypoxic respiratory failure due to Mod-Large b/l pleural effusions -  likley combination of CHF and PNA \par Supplemental 02 - 3L NC ---resolved now on room air \par CT as above. no evidence on PNA. \par PO Lasix 40mg BID upon DC \par cont w/ eliquis BID \par lights criteria - exudative \par Echo EF 60-65%\par d/c Vancomycin, Cefepime: SP Ceftriaxone s/p PO Doxy day 7 \par s/p ID, Cardio, Pulm consult \par \par #Metabolic encephalopathy \par   underlying dementia supportive care \par \par #Chronic Afib\par Daughter reports being on Eliquis \par s/p Full dose AC lovenox now on eliquis \par Cont. BB\par Cardio: Peewee\par \par #Pyuria\par Urine culture negative\par \par #Sacral Decubiti, B/l leg wounds\par #Dementia, Mild.\par #Moderate Protein Calori Malnutrition\par #Debility. Frailty. \par Supportive care, Wound care eval\par Cont. Remeron. \par Air mattress,  turn and position \par Ensure w/ meals. Vitamin C and zinc for wound healing.\par pt would benefit with assistance with feeding - SLP reconsulted for recommendations on diet consistency vs help with feeding. \par Recommend Palliative care, family on board.\par ate small amount of breakfast w/ help from aide. \par SLP re-eval - dysphagia 3 diet \par Wound Care eval\par \par #hx of B/l ankle fractures \par Patient may be weightbearing as tolerated with gentle range of motion exercises \par repeat tib/fib xray ordered \par ortho consulted \par PT eval \par \par #DVT proph- Eliquis 5mg PO BID \par \par #Advanced Directives / GOC: \par  DNR/DNI No feeding tube\par s/p palliative consult\par Overall prognosis poor- won index 49-62% 6 month mortality index. \par spoke at length Jazmine >30mins - concerned about her deconditioning at rehab has had multiple bad experiences, concerned that she will continue to decline. i have discussed with daughter her mothers long term prognosis, advancing dementia and debility as well as her decreased PO intake will likely continue to progress and is unlikely to improve with any significance. she has requested that patient be placed in rehab for short term so that she may prepare her house for when she is discharged. \par \par time spent preparing dc >60 mins including dc note and discussion with daughter\par above plan discussed with pt, Jazmine, bedside RN, and MD Payan \par \par  Med Reconciliation:\par Medication Reconciliation Status	Admission Reconciliation is Not Complete\par Discharge Reconciliation is Completed\par Discharge Medications	acetaminophen 650 mg oral tablet: 1 tab(s) orally every 6 hours, As Needed - for mild pain\par Acidophilus oral capsule: 1 cap(s) orally 3 times a day\par Aquaphor topical ointment: Apply topically to affected area 2 times a day\par ****Bilateral Lower Extremeties**\par Betadine 5% topical spray: Apply topically to affected area 2 times a day\par *****Bilateral Heels****\par Betadine 5% topical spray: Apply topically to affected area once a day, As Needed - for wound care\par ****Bilateral Heels****\par busPIRone 5 mg oral tablet: 1 tab(s) orally 2 times a day\par cholecalciferol 25 mcg (1000 intl units) oral tablet: 3 tab(s) orally once a day\par collagenase 250 units/g topical ointment: 1 application topically once a day\par dermasyn gel: Apply topically to affected area once a day\par ****Right posterior thigh****\par dermasyn gel: Apply topically to affected area once a day, As Needed - for wound care\par ****Right posterior thigh***\par Eliquis 5 mg oral tablet: 1 tab(s) orally 2 times a day\par ferrous sulfate 325 mg (65 mg elemental iron) oral tablet: 1 tab(s) orally once a day\par furosemide 40 mg oral tablet: 1 tab(s) orally 2 times a day\par LORazepam 0.5 mg oral tablet: 1 tab(s) orally once a day (at bedtime), As Needed - for anxiety\par losartan 50 mg oral tablet: 1 tab(s) orally once a day\par Melatonin 3 mg oral tablet: 1 tab(s) orally once a day (at bedtime)\par methocarbamol 500 mg oral tablet: 1 tab(s) orally once a day, As Needed - for muscle spasm\par Metoprolol Tartrate 25 mg oral tablet: 0.5 tab(s) orally 2 times a day\par mirtazapine 15 mg oral tablet: 1 tab(s) orally once a day (at bedtime)\par Multiple Vitamins with Minerals oral tablet: 1 tab(s) orally once a day\par nystatin 100,000 units/g topical cream: Apply topically to affected area 2 times a day, As Needed - for rash \par *****groin****\par polyethylene glycol 3350 oral powder for reconstitution: 17 gram(s) orally once a day\par Potassium Chloride (Eqv-K-Tab) 20 mEq oral tablet, extended release: 0.5 tab(s) orally once a day\par Senna 8.6 mg oral tablet: 2 tab(s) orally once a day (at bedtime)\par silver sulfADIAZINE 1% topical cream: Apply topically to affected area once a day\par ****Right buttocks***\par silver sulfADIAZINE 1% topical cream: Apply topically to affected area once a day, As Needed - for wound care\par *****Right Buttocks****\par sodium hypochlorite 0.125% topical solution: Apply topically to affected area once a day\par ****Left Posterior Calf****\par sodium hypochlorite 0.125% topical solution: Apply topically to affected area once a day, As Needed - for wound care\par *****Left Posterior Calf****\par Stress Formula with Iron oral tablet: 1 tab(s) orally once a day\par zinc sulfate 220 mg oral capsule: 1 cap(s) orally once a day\par ,\par ,\par \par  Care Plan/Procedures:\par Discharge Diagnoses, Assessment and Plan of Treatment	PRINCIPAL DISCHARGE DIAGNOSIS\par Diagnosis: CHF exacerbation\par Assessment and Plan of Treatment: You had an acute exacerbation of your heart failure. Continue to monitor your weights at home daily. Maintain a low sodium diet and a fluid restriction of _ per day. Continue to take LASIX _ Follow up with your cardiologist in 1 to 2 week after discharge for further management - Call to make an appointment. Continue to follow your CHF Zone Chart in your CHF packet accordingly. Your discharge weight is_. Monitor for symptoms: Unrelieved shortness of breath or shortness of breath at rest, unrelieved chest pain or tightness, wheezing, weight gain of 5 or more pounds, confusion, dizziness or lightheadedness. If you experice any of these symptoms please alert your primary care provider or return to the ED if your symptoms are severe.\par \par \par SECONDARY DISCHARGE DIAGNOSES\par Diagnosis: Pneumonia\par Assessment and Plan of Treatment: You were found to have pneumonia which is an infection in one or both of the lungs. It causes the air sacs of the lungs to fill up with fluid or pus. It can range from mild to severe, depending on the type of germ causing the infection, your age, and your overall health. Lydia crawford completed 5 daus of IV antibiotics. Continue to stay hydrated. **Monitor for the following signs/symptoms: Fever > 101, chills, cough with phlegm, shortness of breath and nausea and/or vomiting. If you experience these signs/symptoms please alert your primary care provider or if your signs/symptoms are severe please return to the ED**\par Goal(s)	To get better and follow your care plan as instructed.\par \par  Follow Up:\par Care Providers for Follow up (PCP/Outpatient Provider)	Tony Vides\par CARDIOVASCULAR DISEASE\par [address]\[place] [address]\par Phone: [phone]\par Fax: [phone]\par Follow Up Time:\par Discharge Diet	Mechanical Soft Diet\par Activity	Showering allowed\par Additional Instructions	advance activity as tolerated\par \par  Quality Measures:\par Hospice Patient	No\par Tobacco Usage Within the Last Year	No\par Does the patient have difficulty running errands alone like visiting a doctor’s office or shopping?	Yes\par Does the patient have difficulty climbing stairs?	Yes\par Cognition: The patient has	Difficulty concentrating  Difficulty making decisions  Difficulty remembering\par Does the patient have a principal diagnosis of ischemic stroke, hemorrhagic stroke, or TIA?	No\par Does the patient have a principal diagnosis of Acute Myocardial Infarction?	No\par Has the patient had a Percutaneous Coronary Intervention?	No\par Did the Patient Present With or was Treated for Malnutrition During This Admission	Yes...\par Details of Malnutrition Diagnosis/Diagnoses	This patient has been assessed with a concern for Malnutrition and was treated during this hospitalization for the following Nutrition diagnosis/diagnoses:\par \par  -  10/15/2021: Moderate protein-calorie malnutrition\par \par  Home Health:\par Discharged with Home Health Care Services?	Yes \par Face-To-Face Contact	As certified below, I, or a nurse practitioner or physician assistant working with me, had a face-to-face encounter that meets the physician face-to-face encounter requirements.\par Need for Skilled Services	Medication teaching and assessment  Observation and assessment  Wound care and assessment \par Based on the above findings, the following intermittent skilled services are medically necessary home health services:	Nursing \par Home Bound Status	Bed bound  Stroke/TBI (neurological/cognitive impairment)  Fall risk \par Leaving Home is Contraindicated... \par The patient has a condition which confines the patient to the home or such that leaving his/her home is medically contraindicated:	Wound - risk of deterioration/infection  Other, specify... \par Other Patient Condition	debility \par Attending Certification	My signature below certifies that the above stated patient is homebound and upon completion of the Face-To-Face encounter, has the need for intermittent skilled nursing, physical therapy and/or speech or occupational therapy services in their home for their current diagnosis as outlined in their initial plan of care. These services will continue to be monitored by myself or another physician. \par Encounter Date	27-Oct-2021\par \par  Document Complete:\par Physician Section Complete	This document is complete and the patient is ready for discharge.\par For questions about your prescriptions, please call:	[phone]\par Care Provider Seen in Hospital	Gonzalo Juarez - Cardiothoracic PA \par Ervin Payan - Internal Medicine \par Tony Vides - Cardiologist \par Li Nunes - Infectious Disease \par Jade Winn - Nurse Practitioner \par Kyung Pena - Nurse Practitioner\par Is this contact telephone number correct?	Yes\par \par \par \par Electronic Signatures:\par Ervin Payan)  (Signed 22-Oct-2021 15:08)\par 	Co-Signer: Hospital Course, Med Reconciliation, Care Plan/Procedures, Follow Up, Quality Measures, Document Complete\par Jade Winn (RUBI)  (Signed 27-Oct-2021 11:34)\par 	Authored: Hospital Course, Med Reconciliation, Care Plan/Procedures, Follow Up, Quality Measures, Home Health, Document Complete\par \par \par Last Updated: 27-Oct-2021 11:34 by Jade Winn (NP)\par \par \par

## 2021-11-04 ENCOUNTER — APPOINTMENT (OUTPATIENT)
Dept: WOUND CARE | Facility: CLINIC | Age: 86
End: 2021-11-04
Payer: MEDICARE

## 2021-11-04 DIAGNOSIS — Z86.79 PERSONAL HISTORY OF OTHER DISEASES OF THE CIRCULATORY SYSTEM: ICD-10-CM

## 2021-11-04 PROCEDURE — 99203 OFFICE O/P NEW LOW 30 MIN: CPT | Mod: 95

## 2021-11-05 PROBLEM — Z86.79 HISTORY OF HEART FAILURE: Status: RESOLVED | Noted: 2021-11-05 | Resolved: 2021-11-05

## 2021-11-05 RX ORDER — COLLAGENASE SANTYL 250 [ARB'U]/G
250 OINTMENT TOPICAL DAILY
Qty: 1 | Refills: 2 | Status: DISCONTINUED | COMMUNITY
Start: 2021-11-05 | End: 2021-11-05

## 2021-11-05 NOTE — PHYSICAL EXAM
[Skin Ulcer] : ulcer [Alert] : alert [Oriented to Person] : oriented to person [Oriented to Place] : oriented to place [Calm] : calm [Please See PDF for [employer]] : Please See PDF for [employer]. [Oriented to Time] : disoriented to time [de-identified] : elderly female, pleasant, well groomed, lying in bed, NAD

## 2021-11-05 NOTE — ASSESSMENT
[FreeTextEntry1] : 89 y/o woman whom over past few months has gone from being ambulatory to bedbound, due to prolonged hospital, rehab. stay, which has left her completely deconditioned and with several wounds due to pressure. Pt. broke her leg in june 2021, was in [facility] - then rehab. leg was casted according to daughter, july transferred to assisted living for 6 weeks, with visiting nurse service coming in, was sitting for prolonged periods at that time, (prior to this pt. was walking), then after developing a gluteal wound was sent back to hospital for debridement, ([facility], then transferred to rehab. in [place], unable to do PT, eating poorly, worsening overall including SOB, which necessitated a thoracentesis, due to Afib, and heart failure, on supplemental O2. Wounds include bilateral heel DTI's,  left posterior leg wound,  sacral unstageable,   rt. ishium stage 4. Eating poorly, discussed supplements with daughter  has no offloading in place using offloading boots for feet

## 2021-11-05 NOTE — PLAN
[FreeTextEntry1] : 11/4/21\par Plan  order group 2 mattress\par juliano supplements 2x/day, will send samples and coupons\par TA pictures received\par wound care discussed with nurse, heels - betadine, left posterior leg - pack with alginate, left lateral knee skin tear - adaptic/cordelia, rt. ischium - packing with alginate - will order vac, sacral - santyl , alginate, foam dressing\par follow up 2 weeks

## 2021-11-05 NOTE — ASSESSMENT
[FreeTextEntry1] : 89 y/o woman whom over past few months has gone from being ambulatory to bedbound, due to prolonged hospital, rehab. stay, which has left her completely deconditioned and with several wounds due to pressure. Pt. broke her leg in june 2021, was in [facility] - then rehab. leg was casted according to daughter, july transferred to assisted living for 6 weeks, with visiting nurse service coming in, was sitting for prolonged periods at that time, (prior to this pt. was walking), then after developing a gluteal wound was sent back to hospital for debridement, ([facility], then transferred to rehab. in [place], unable to do PT, eating poorly, worsening overall including SOB, which necessitated a thoracentesis, due to Afib, and heart failure, on supplemental O2. Wounds include bilateral heel DTI's,  left posterior leg wound,  sacral unstageable,   rt. ishium stage 4. Eating poorly, discussed supplements with daughter  has no offloading in place using offloading boots for feet

## 2021-11-05 NOTE — PHYSICAL EXAM
[Skin Ulcer] : ulcer [Alert] : alert [Oriented to Person] : oriented to person [Oriented to Place] : oriented to place [Calm] : calm [Please See PDF for [employer]] : Please See PDF for [employer]. [Oriented to Time] : disoriented to time [de-identified] : elderly female, pleasant, well groomed, lying in bed, NAD

## 2021-11-05 NOTE — REVIEW OF SYSTEMS
[Feeling Poorly] : feeling poorly [Feeling Tired] : feeling tired [Arthralgias] : arthralgias [Joint Stiffness] : joint stiffness [Skin Wound] : skin wound [Negative] : Heme/Lymph

## 2021-11-07 LAB
APPEARANCE: ABNORMAL
BACTERIA UR CULT: NORMAL
BACTERIA: NEGATIVE
BILIRUBIN URINE: NEGATIVE
BLOOD URINE: NORMAL
COLOR: YELLOW
GLUCOSE QUALITATIVE U: NEGATIVE
HYALINE CASTS: 5 /LPF
KETONES URINE: NEGATIVE
LEUKOCYTE ESTERASE URINE: ABNORMAL
MICROSCOPIC-UA: NORMAL
NITRITE URINE: NEGATIVE
PH URINE: 8
PROTEIN URINE: ABNORMAL
RED BLOOD CELLS URINE: 4 /HPF
SPECIFIC GRAVITY URINE: 1.01
SQUAMOUS EPITHELIAL CELLS: 1 /HPF
UROBILINOGEN URINE: NORMAL
WHITE BLOOD CELLS URINE: 423 /HPF

## 2021-11-08 ENCOUNTER — NON-APPOINTMENT (OUTPATIENT)
Age: 86
End: 2021-11-08

## 2021-11-08 NOTE — HISTORY OF PRESENT ILLNESS
[Home] : at home, [unfilled] , at the time of the visit. [Medical Office: ([facility])___] : at the medical office located in  [Family Member] : family member [Formal Caregiver] : formal caregiver [Verbal consent obtained from patient] : the patient, [unfilled] [FreeTextEntry1] : 89 y/o woman whom over past few months has gone from being ambulatory to bedbound, due to prolonged hospital, rehab. stay, which has left her completely deconditioned and with several wounds due to pressure.\par Pt. broke her leg in june 2021, was in [facility] - then rehab. leg was casted according to daughter, july transferred to assisted living for 6 weeks, with visiting nurse service coming in, was sitting for prolonged periods at that time, (prior to this pt. was walking), then after developing a gluteal wound was sent back to hospital for debridement, ([facility], then transferred to rehab. in [place], unable to do PT, eating poorly, worsening overall including SOB, which necessitated a thoracentesis, due to Afib, and heart failure, on supplemental O2.\par Wounds include bilateral heel DTI's, \par left posterior leg wound, \par sacral unstageable, \par  rt. ishium stage 4.\par Eating poorly, discussed supplements with daughter \par has no offloading in place\par using offloading boots for feet [FreeTextEntry4] : Terrance NP

## 2021-11-08 NOTE — HISTORY OF PRESENT ILLNESS
[Home] : at home, [unfilled] , at the time of the visit. [Medical Office: ([facility])___] : at the medical office located in  [Family Member] : family member [Formal Caregiver] : formal caregiver [Verbal consent obtained from patient] : the patient, [unfilled] [FreeTextEntry1] : 89 y/o woman whom over past few months has gone from being ambulatory to bedbound, due to prolonged hospital, rehab. stay, which has left her completely deconditioned and with several wounds due to pressure.\par Pt. broke her leg in june 2021, was in [facility] - then rehab. leg was casted according to daughter, july transferred to assisted living for 6 weeks, with visiting nurse service coming in, was sitting for prolonged periods at that time, (prior to this pt. was walking), then after developing a gluteal wound was sent back to hospital for debridement, ([facility], then transferred to rehab. in [place], unable to do PT, eating poorly, worsening overall including SOB, which necessitated a thoracentesis, due to Afib, and heart failure, on supplemental O2.\par Wounds include bilateral heel DTI's, \par left posterior leg wound, \par sacral unstageable, \par  rt. ishium stage 4.\par Eating poorly, discussed supplements with daughter \par has no offloading in place\par using offloading boots for feet [FreeTextEntry4] : Terrance NP

## 2021-11-09 ENCOUNTER — APPOINTMENT (OUTPATIENT)
Dept: CARE COORDINATION | Facility: HOME HEALTH | Age: 86
End: 2021-11-09

## 2021-11-12 ENCOUNTER — APPOINTMENT (OUTPATIENT)
Dept: CARE COORDINATION | Facility: HOME HEALTH | Age: 86
End: 2021-11-12
Payer: MEDICARE

## 2021-11-12 ENCOUNTER — NON-APPOINTMENT (OUTPATIENT)
Age: 86
End: 2021-11-12

## 2021-11-12 ENCOUNTER — APPOINTMENT (OUTPATIENT)
Dept: UROLOGY | Facility: CLINIC | Age: 86
End: 2021-11-12

## 2021-11-12 VITALS
RESPIRATION RATE: 18 BRPM | SYSTOLIC BLOOD PRESSURE: 142 MMHG | OXYGEN SATURATION: 94 % | DIASTOLIC BLOOD PRESSURE: 60 MMHG | HEART RATE: 74 BPM

## 2021-11-12 PROCEDURE — 99349 HOME/RES VST EST MOD MDM 40: CPT

## 2021-11-12 NOTE — HISTORY OF PRESENT ILLNESS
[Post-hospitalization from ___ Hospital] : Post-hospitalization from [unfilled] Hospital [Admitted on: ___] : The patient was admitted on [unfilled] [Discharged on ___] : discharged on [unfilled] [FreeTextEntry2] : FROM [facility] PROVIDER:\par  Discharge Note Provider [Charted Location: HNT 5 [facility] 531 01] [Authored: 22-Oct-2021 12:44]- for Visit: [id number], Complete, Revised, Signed in Full, General\par \par \par  Hospital Course:\par Discharge Date	22-Oct-2021\par Admission Date	14-Oct-2021 16:12\par Reason for Admission	Worsening SOB/ hypoxia\par Hospital Course	\par HPI: 89yo/F with PMH afib on coumadin, HTN, mild dementia, chronic lymphedema without known h/o CHF, presented from SNF for evaluation of worsening SOB. Patient unable to provide ay history. Per daughter at bedside, she is not on oxygen at her baseline. 5 days ago at [facility] she noted to become more hypoxic and was placed on O2. Then patient had more requirement for O2 and CXR was done that showed opacification of both bases suspicious for pneumonia and patient initiated on IV antibiotics. At this point, daughter insisted on transfer to  for further diagnosis and treatment. Patient was at  in June for b/l tib/fib fractures for which she was NWB and was at SNF. No fever reported.  (14 Oct 2021 18:21)\par \par hospital course: pt admitted for bilat pleural effusions due to heart failure questionable pneumonia. fluid was exudative but thought more to be due to heart failure. pt eavled by CT surgery, had RIGHT chest tube placed, drained >1000cc of serous fluid. Concerns for pleural effusion on left side, per CT surgery, pt was unable to tolerate chest tube, family also deferring placement.  pt evaled by ID - Dr Nunes - day #3 ceftriaxone and doxycycline; ceftriaxone is completed; and completed course of doxycycline. pt with unstageable sacral and gluteal decub ulcer - evaled by wound NP, collagenase and wound care daily. pt also w/ previous bilat tib/fib fractures from fall while being transferred (July 2021), evaled by ortho for weight bearing status, pt can weight bear as tolerated. pt daughter is HCP, pt has been made DNR/DNI/no feeding tube during C convo with Palliative team. \par \par Vital Signs Last 24 Hrs\par T(C): 36.1 (22 Oct 2021 07:18), Max: 36.5 (21 Oct 2021 21:01)\par T(F): 96.9 (22 Oct 2021 07:18), Max: 97.7 (21 Oct 2021 21:01)\par HR: 90 (22 Oct 2021 07:18) (87 - 92)\par BP: 116/49 (22 Oct 2021 07:18) (116/49 - 139/61)\par BP(mean): --\par RR: 17 (22 Oct 2021 07:18) (17 - 18)\par SpO2: 96% (22 Oct 2021 07:18) (93% - 96%)\par \par LABS         \par            11.0 \par 6.57  )-----------( 112      ( 21 Oct 2021 08:35 )\par            34.9 \par \par 10-21\par \par 139  |  99  |  24<H>\par ----------------------------<  105<H>\par 3.4<L>   |  35<H>  |  0.54\par \par Ca    9.9      21 Oct 2021 08:35\par \par PHYSICAL EXAM:\par Constitutional: elder female, alert, confused \par HEENT: unable to assess\par Neck: Soft and supple\par Respiratory: Breath sounds are diminished bilat bases, clear in upper lobes \par Cardiovascular: S1 and S2, IR, IR\par Gastrointestinal: Bowel Sounds present, soft, nontender, nondistended, no guarding, no rebound\par extremities: 5/5 strength in upper extremities, 4/5 in lower extremities. scant edema to ankles. \par Neurological: A/O x 2 \par Musculoskeletal: unable to follow commands\par Skin: Sarcal ulcer and gluteal fold ulcer unstageable \par \par \par #Acute hypoxic respiratory failure due to Mod-Large b/l pleural effusions -  likley combination of CHF and PNA \par Supplemental 02 - 3L NC ---resolved now on room air \par CT as above. no evidence on PNA. \par PO Lasix 40mg BID upon DC \par cont w/ eliquis BID \par lights criteria - exudative \par Echo EF 60-65%\par d/c Vancomycin, Cefepime: SP Ceftriaxone s/p PO Doxy day 7 \par s/p ID, Cardio, Pulm consult \par \par #Metabolic encephalopathy \par   underlying dementia supportive care \par \par #Chronic Afib\par Daughter reports being on Eliquis \par s/p Full dose AC lovenox now on eliquis \par Cont. BB\par Cardio: [place]\par \par #Pyuria\par Urine culture negative\par \par #Sacral Decubiti, B/l leg wounds\par #Dementia, Mild.\par #Moderate Protein Calori Malnutrition\par #Debility. Frailty. \par Supportive care, Wound care eval\par Cont. Remeron. \par Air mattress,  turn and position \par Ensure w/ meals. Vitamin C and zinc for wound healing.\par pt would benefit with assistance with feeding - SLP reconsulted for recommendations on diet consistency vs help with feeding. \par Recommend Palliative care, family on board.\par ate small amount of breakfast w/ help from aide. \par SLP re-eval - dysphagia 3 diet \par Wound Care eval\par \par #hx of B/l ankle fractures \par Patient may be weightbearing as tolerated with gentle range of motion exercises \par repeat tib/fib xray ordered \par ortho consulted \par PT eval \par \par #DVT proph- Eliquis 5mg PO BID \par \par #Advanced Directives / GOC: \par  DNR/DNI No feeding tube\par s/p palliative consult\par Overall prognosis poor- won index 49-62% 6 month mortality index. \par spoke at length Jazmine >30mins - concerned about her deconditioning at rehab has had multiple bad experiences, concerned that she will continue to decline. i have discussed with daughter her mothers long term prognosis, advancing dementia and debility as well as her decreased PO intake will likely continue to progress and is unlikely to improve with any significance. she has requested that patient be placed in rehab for short term so that she may prepare her house for when she is discharged. \par \par time spent preparing dc >60 mins including dc note and discussion with daughter\par above plan discussed with pt, Jazmine, bedside RN, and MD Payan \par \par  Med Reconciliation:\par Medication Reconciliation Status	Admission Reconciliation is Not Complete\par Discharge Reconciliation is Completed\par Discharge Medications	acetaminophen 650 mg oral tablet: 1 tab(s) orally every 6 hours, As Needed - for mild pain\par Acidophilus oral capsule: 1 cap(s) orally 3 times a day\par Aquaphor topical ointment: Apply topically to affected area 2 times a day\par ****Bilateral Lower Extremeties**\par Betadine 5% topical spray: Apply topically to affected area 2 times a day\par *****Bilateral Heels****\par Betadine 5% topical spray: Apply topically to affected area once a day, As Needed - for wound care\par ****Bilateral Heels****\par busPIRone 5 mg oral tablet: 1 tab(s) orally 2 times a day\par cholecalciferol 25 mcg (1000 intl units) oral tablet: 3 tab(s) orally once a day\par collagenase 250 units/g topical ointment: 1 application topically once a day\par dermasyn gel: Apply topically to affected area once a day\par ****Right posterior thigh****\par dermasyn gel: Apply topically to affected area once a day, As Needed - for wound care\par ****Right posterior thigh***\par Eliquis 5 mg oral tablet: 1 tab(s) orally 2 times a day\par ferrous sulfate 325 mg (65 mg elemental iron) oral tablet: 1 tab(s) orally once a day\par furosemide 40 mg oral tablet: 1 tab(s) orally 2 times a day\par LORazepam 0.5 mg oral tablet: 1 tab(s) orally once a day (at bedtime), As Needed - for anxiety\par losartan 50 mg oral tablet: 1 tab(s) orally once a day\par Melatonin 3 mg oral tablet: 1 tab(s) orally once a day (at bedtime)\par methocarbamol 500 mg oral tablet: 1 tab(s) orally once a day, As Needed - for muscle spasm\par Metoprolol Tartrate 25 mg oral tablet: 0.5 tab(s) orally 2 times a day\par mirtazapine 15 mg oral tablet: 1 tab(s) orally once a day (at bedtime)\par Multiple Vitamins with Minerals oral tablet: 1 tab(s) orally once a day\par nystatin 100,000 units/g topical cream: Apply topically to affected area 2 times a day, As Needed - for rash \par *****groin****\par polyethylene glycol 3350 oral powder for reconstitution: 17 gram(s) orally once a day\par Potassium Chloride (Eqv-K-Tab) 20 mEq oral tablet, extended release: 0.5 tab(s) orally once a day\par Senna 8.6 mg oral tablet: 2 tab(s) orally once a day (at bedtime)\par silver sulfADIAZINE 1% topical cream: Apply topically to affected area once a day\par ****Right buttocks***\par silver sulfADIAZINE 1% topical cream: Apply topically to affected area once a day, As Needed - for wound care\par *****Right Buttocks****\par sodium hypochlorite 0.125% topical solution: Apply topically to affected area once a day\par ****Left Posterior Calf****\par sodium hypochlorite 0.125% topical solution: Apply topically to affected area once a day, As Needed - for wound care\par *****Left Posterior Calf****\par Stress Formula with Iron oral tablet: 1 tab(s) orally once a day\par zinc sulfate 220 mg oral capsule: 1 cap(s) orally once a day\par ,\par ,\par \par  Care Plan/Procedures:\par Discharge Diagnoses, Assessment and Plan of Treatment	PRINCIPAL DISCHARGE DIAGNOSIS\par Diagnosis: CHF exacerbation\par Assessment and Plan of Treatment: You had an acute exacerbation of your heart failure. Continue to monitor your weights at home daily. Maintain a low sodium diet and a fluid restriction of _ per day. Continue to take LASIX _ Follow up with your cardiologist in 1 to 2 week after discharge for further management - Call to make an appointment. Continue to follow your CHF Zone Chart in your CHF packet accordingly. Your discharge weight is_. Monitor for symptoms: Unrelieved shortness of breath or shortness of breath at rest, unrelieved chest pain or tightness, wheezing, weight gain of 5 or more pounds, confusion, dizziness or lightheadedness. If you experice any of these symptoms please alert your primary care provider or return to the ED if your symptoms are severe.\par \par \par SECONDARY DISCHARGE DIAGNOSES\par Diagnosis: Pneumonia\par Assessment and Plan of Treatment: You were found to have pneumonia which is an infection in one or both of the lungs. It causes the air sacs of the lungs to fill up with fluid or pus. It can range from mild to severe, depending on the type of germ causing the infection, your age, and your overall health. Lydia crawford completed 5 daus of IV antibiotics. Continue to stay hydrated. **Monitor for the following signs/symptoms: Fever > 101, chills, cough with phlegm, shortness of breath and nausea and/or vomiting. If you experience these signs/symptoms please alert your primary care provider or if your signs/symptoms are severe please return to the ED**\par Goal(s)	To get better and follow your care plan as instructed.\par \par  Follow Up:\par Care Providers for Follow up (PCP/Outpatient Provider)	Tony Vides\par CARDIOVASCULAR DISEASE\par [address]\[place] [address]\par Phone: [phone]\par Fax: [phone]\par Follow Up Time:\par Discharge Diet	Mechanical Soft Diet\par Activity	Showering allowed\par Additional Instructions	advance activity as tolerated\par \par  Quality Measures:\par Hospice Patient	No\par Tobacco Usage Within the Last Year	No\par Does the patient have difficulty running errands alone like visiting a doctor’s office or shopping?	Yes\par Does the patient have difficulty climbing stairs?	Yes\par Cognition: The patient has	Difficulty concentrating  Difficulty making decisions  Difficulty remembering\par Does the patient have a principal diagnosis of ischemic stroke, hemorrhagic stroke, or TIA?	No\par Does the patient have a principal diagnosis of Acute Myocardial Infarction?	No\par Has the patient had a Percutaneous Coronary Intervention?	No\par Did the Patient Present With or was Treated for Malnutrition During This Admission	Yes...\par Details of Malnutrition Diagnosis/Diagnoses	This patient has been assessed with a concern for Malnutrition and was treated during this hospitalization for the following Nutrition diagnosis/diagnoses:\par \par  -  10/15/2021: Moderate protein-calorie malnutrition\par \par  Home Health:\par Discharged with Home Health Care Services?	Yes \par Face-To-Face Contact	As certified below, I, or a nurse practitioner or physician assistant working with me, had a face-to-face encounter that meets the physician face-to-face encounter requirements.\par Need for Skilled Services	Medication teaching and assessment  Observation and assessment  Wound care and assessment \par Based on the above findings, the following intermittent skilled services are medically necessary home health services:	Nursing \par Home Bound Status	Bed bound  Stroke/TBI (neurological/cognitive impairment)  Fall risk \par Leaving Home is Contraindicated... \par The patient has a condition which confines the patient to the home or such that leaving his/her home is medically contraindicated:	Wound - risk of deterioration/infection  Other, specify... \par Other Patient Condition	debility \par Attending Certification	My signature below certifies that the above stated patient is homebound and upon completion of the Face-To-Face encounter, has the need for intermittent skilled nursing, physical therapy and/or speech or occupational therapy services in their home for their current diagnosis as outlined in their initial plan of care. These services will continue to be monitored by myself or another physician. \par Encounter Date	27-Oct-2021\par \par  Document Complete:\par Physician Section Complete	This document is complete and the patient is ready for discharge.\par For questions about your prescriptions, please call:	[phone]\par Care Provider Seen in Hospital	Gonzalo Juarez - Cardiothoracic PA \par Ervin Payan - Internal Medicine \par Tony Vides - Cardiologist \par Li Nunes - Infectious Disease \par Jade Winn - Nurse Practitioner \par Kyung Pena - Nurse Practitioner\par Is this contact telephone number correct?	Yes\par \par \par \par Electronic Signatures:\par Ervin Payan)  (Signed 22-Oct-2021 15:08)\par 	Co-Signer: Hospital Course, Med Reconciliation, Care Plan/Procedures, Follow Up, Quality Measures, Document Complete\par Jade Winn (RUBI)  (Signed 27-Oct-2021 11:34)\par 	Authored: Hospital Course, Med Reconciliation, Care Plan/Procedures, Follow Up, Quality Measures, Home Health, Document Complete\par \par \par Last Updated: 27-Oct-2021 11:34 by Jade Winn (NP)\par \par \par

## 2021-11-12 NOTE — ASSESSMENT
[FreeTextEntry1] : pt is being seen after discharge home from [facility]. She was admitted on 10/14/2021 for SOB and discharged home on 10/27/2021. Discharge medications were reviewed and reconciled with the current medication list and medications in home.\par \par CC:\par Pt is seen at home s/p recent admission for CHF. Pt is temporarily unable to leave home as it requires a considerable and taxing effort, non ambulatory\par HPI:\par Pt Is a 89 y/o male enrolled in the [facility] program seen at home s/p a recent admission for CHF \par \par \par Hospital Course:\par Discharge Date	22-Oct-2021\par Admission Date	14-Oct-2021 16:12\par Reason for Admission	Worsening SOB/ hypoxia\par Hospital Course	\par HPI: 89yo/F with PMH afib on coumadin, HTN, mild dementia, chronic lymphedema without known h/o CHF, presented from SNF for evaluation of worsening SOB. Patient unable to provide ay history. Per daughter at bedside, she is not on oxygen at her baseline. 5 days ago at SNF she noted to become more hypoxic and was placed on O2. Then patient had more requirement for O2 and CXR was done that showed opacification of both bases suspicious for pneumonia and patient initiated on IV antibiotics. At this point, daughter insisted on transfer to  for further diagnosis and treatment. Patient was at  in June for b/l tib/fib fractures for which she was NWB and was at SNF. No fever reported.  (14 Oct 2021 18:21)\par \par Pt admitted for bilat pleural effusions due to heart failure questionable pneumonia. fluid was exudative but thought more to be due to heart failure. pt evaled by CT surgery, had RIGHT chest tube placed, drained >1000cc of serous fluid. Concerns for pleural effusion on left side, per CT surgery, pt was unable to tolerate chest tube, family also deferring placement.  pt evaled by ID - Dr Nunes - mahnaz #3 ceftriaxone and doxycycline; ceftriaxone is completed; and completed course of doxycycline. Pt with unstageable sacral and gluteal decub ulcer - evaled by wound NP, collagenase and wound care daily. pt also w/ previous bilat tib/fib fractures from fall while being transferred (July 2021), evaled by ortho for weight bearing status, pt can weight bear as tolerated. pt daughter is HCP, pt has been made DNR/DNI/no feeding tube during GOC convo with Palliative \par \par Seen in follow up s/p hospitalization for CHF/pleural effusions. Upon examination in bed awake and alert NAD. Voices some frustration at times with her current situation. She has had major decline in her overall status since June and now is nonambulatory. Dtg Taabtha HCP is currently staying with her and has 24hr HHA. She has mutilple pressure ulcers and has been seen by the surgical wound care NP, level 2 air mattress ordered and wound vac for stage 4 R ischium PU. All other topicals per wound care NP. She is on eliquis for her afib, no reports of bleeding, per dtg UA C&S is pending, ordered by her urologist and collected by HC RN. Clear yellow urine draining from sales at present. No signs of volume overload at present. BP stable on current meds.\par Patient states she is happy to be home and does not want to go back to the hospital.  [facility] services in home\par

## 2021-11-12 NOTE — REVIEW OF SYSTEMS
[Negative] : Psychiatric [FreeTextEntry8] : DHILLON [FreeTextEntry9] : BED BOUND [de-identified] : PRESURE ULCERS

## 2021-11-12 NOTE — PHYSICAL EXAM
[No Acute Distress] : no acute distress [Well Nourished] : well nourished [Well Developed] : well developed [Normal Sclera/Conjunctiva] : normal sclera/conjunctiva [Normal Outer Ear/Nose] : the outer ears and nose were normal in appearance [Normal Oropharynx] : the oropharynx was normal [Supple] : supple [No Respiratory Distress] : no respiratory distress  [Clear to Auscultation] : lungs were clear to auscultation bilaterally [No Accessory Muscle Use] : no accessory muscle use [Pedal Pulses Present] : the pedal pulses are present [No Edema] : there was no peripheral edema [No Extremity Clubbing/Cyanosis] : no extremity clubbing/cyanosis [Soft] : abdomen soft [Non Tender] : non-tender [Non-distended] : non-distended [Normal Bowel Sounds] : normal bowel sounds [No Spinal Tenderness] : no spinal tenderness [Coordination Grossly Intact] : coordination grossly intact [No Focal Deficits] : no focal deficits [Normal Affect] : the affect was normal [Alert and Oriented x3] : oriented to person, place, and time [Normal Insight/Judgement] : insight and judgment were intact [de-identified] : lying in bed comfortable, NAD [de-identified] : no thrush [de-identified] : Grossly clear [de-identified] : irregualr 3/6 systolic murmur [de-identified] : debilitated [de-identified] : stage 4 R ishcium (wound vac) unstagable to sacrum, DTI B heals, ulcer L posterior calf

## 2021-11-13 LAB
CULTURE RESULTS: SIGNIFICANT CHANGE UP
SPECIMEN SOURCE: SIGNIFICANT CHANGE UP

## 2021-11-16 ENCOUNTER — APPOINTMENT (OUTPATIENT)
Dept: UROLOGY | Facility: CLINIC | Age: 86
End: 2021-11-16
Payer: MEDICARE

## 2021-11-16 PROCEDURE — 99443: CPT | Mod: 95

## 2021-11-16 NOTE — ASSESSMENT
[FreeTextEntry1] : Ms. Venegas is a 89 year old woman here for a follow up of nocturia, microscopic hematuria, urethral caruncle.Patient believes she makes less urine in 24-hour period than previously.  Her age says that one given a diuretic.  Shea good quantity of urine.  The patient says that she generally makes it to the bathroom in time, though she must tarry which takes the diuretic. The patient complained about a foul order from the urine. The patient does not have dysuria.  There is no gross hematuria.  The patient does not push or strain to urinate so sometimes she has to increase her abdominal pressure to defecate. Patient uses a walker.  The patient also stated that she is having right lower abdominal pain and discomfort.  He corresponds to a bulge of increasing size in the right lower quadrant.  The patient has bilateral hearing aids and wears eye glasses.\par 7/3/18: The patient returned today and reported she has nocturia and wakes up every 2 hours to urinate. She broke her femur and a tibial fracture. \par 7/16/18: The patient returned and reported she has the urge to urinate but ends up having a bowel movement instead. Daughter noted bowels are large. Patient noted she has a hard time urinating. Wakes up 3-4 times during the night to urinate, noted it is sometimes just "drops of urine." Patient is more disorientated and confused, I advised her to consult with a neurologist. \par 8/30/18: The patient returned today and reported she is feeling adequate. Patient denies dysuria, gross hematuria, urgency, or incontinence. Wakes up 2-3 times during the night to urinate. \par 6/4/19: Patient presents today for a follow up. Today she notes that her urination is "okay". She does not leak during the day but still wears depends since she is still concerned about incontinence. At night, she wakes up when she has to urinate but due to mobility issues is not always able to make it to bathroom. Diarrhea is reported on occasion. Mirtazapine has been initiated by another provider and it is reported that is has decreased her nocturia from 4x a night to once a night. The patient produced a catheterized urine sample which will be sent for urinalysis, urine cytology, and urine culture.Upon completion of a physical exam it is determined that vaginitis is present. Therefore Fluconazole will be prescribed at this time. I will provide her with 1 500 mg tablet that will be taken one time only due to her use of Warfarin. It is advised that she switch to cotton underwear during the day and if leakage is still reported then she can switch back to wearing the depends.  The patient is to follow up in 3 weeks or sooner if clinically indicated. \par 05/26/2020: Patient presents today for a follow up. Pt ambulates in a wheelchair and is able to go short distances with a walker. Nocturia x1-2 usually, but voided unusually frequently last night. Denies hematuria,or burning. No symptoms in March. Reports issues with short term, but not long term memory. Has been consulting with PCP and testing treatments regarding memory. Reports occasional stomach pains. On physical exam patient had no erythema of vaginal mucosa, no vaginal discharge and no prolapse. Pt will provide a urine sample today for culture and analysis. Pt received a catheterization of urine today for total volume of 180mL. PVR not needed despite pt concerns. \par \par 06/12/2020: Pt contacted at [phone]. Gave permission to conduct a Telephone visit. Urine culture through the [facility] from 06/01/2020 grew 75 colony forming units per mL of Pseudomonas Aeruginosa. 1-5 Epithelial cells per field. Anything greater than 3 is abnormal. There were 3-10 WBC per high power field. Anything greater than 3 is abnormal. Bacteria were heavy. Pt had frequency and nocturia. She complained it was difficulty to sleep at night. Pt report she is feeling urgency when waking up to use the bathroom. She has not been leaking as much, or wetting herself. Her output is also not as great. Nocturia x3, or about every 2-3 hours. Pt is still on Cefpodoxime, but she will run out by 06/16/2020. Urine has become slightly cloudy, whereas it had been clear previously. Had not been using Melatonin at night. Has been taking Mirtazapine. \par \par 08/31/2021: The patient gave permission for a telephone visit. I spoke to patient's daughter Tabatha. She reports that on 6/4/21, after patient got off commode, her legs gave out and she broke both her shins, and has been immobilized since then. A few weeks ago, she had orders to move legs slightly. Daughter concerned because she has had Reaves in her since she has not been standing which is changed once a month. Was told once she had a slight rash in the vulva and buttocks, but has not heard about a rash since then. Memory has been okay. Daughter will send over recent lab work for review. She states WBC was 5.57. Daughter states patient's urine is clear. Patient is receiving physical therapy twice a week and daughter will try to find more help. \par \par 11/16/2021: I attempted to call the patient at 3:18 PM. Patient was scheduled for 3:40 PM appointment. I left a voicemail stating I would try again at the appointed time. I informed the patient the tuberculosis test would take 12 weeks for results. UA showed 432 WBC/HPF and was a clean catch, however urine culture was likely contaminated and should have it repeated. Previous UA did not show as many WBC.  \par \par 11/16/2021: The patient gave permission for a telehealth visit. Daughter states patient has been home for the past 3 weeks. Was previously in the hospital for a bad wound and pulmonary congestion and had been on antibiotics. Reviewed 11/3/21 urine tests. UA showed 432 WBC/HPF. Previously on 5/5/21 showed only 1 WBC/HPF and 8/9/20 showed no WBC, all were clean catches. Urine culture showed >= 3 organisms. Daughter notes patient does have a cough. Reaves catheter is draining okay. Daughter notes there is occasionally clusters of cloudiness draining more than before. \par \par We will have patient repeat urine culture as there was probable contamination. 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 I informed the patient the tuberculosis test would take 12 weeks for results. UA showed 432 WBC/HPF and was a clean catch, however urine culture was likely contaminated and should have it repeated. Previous UA did not show as many WBC. \par \par I will prescribe the patient Cefadroxil 500mg BID x 10 days for possible UTI. After she is finished, instructed to provide urine sample at [facility] for UA, urine culture, and urine cytology. I explained it may be crushed for easier ingestion for the patient. \par \par I explained catheter would only need to be flushed if it does not drain well.\par \par Preparation, in-person office visit, and coordination of care took: 21 minutes

## 2021-11-16 NOTE — ADDENDUM
[FreeTextEntry1] : I, Isa Vincentin, acted solely as a scribe for Dr. Indio Sexton on this date 11/16/2021.\par \par All medical record entries made by the Scribe were at my, Dr. Indio Sexton, direction and personally dictated by me on 11/16/2021. I have reviewed the chart and agree that the record accurately reflects my personal performance of the history, physical exam, assessment and plan.  I have also personally directed, reviewed and agreed with the chart.

## 2021-11-16 NOTE — HISTORY OF PRESENT ILLNESS
[FreeTextEntry1] : Ms. Venegas is a 89 year old woman here for a follow up of nocturia, microscopic hematuria, urethral caruncle.Patient believes she makes less urine in 24-hour period than previously.  Her age says that one given a diuretic.  Shea good quantity of urine.  The patient says that she generally makes it to the bathroom in time, though she must tarry which takes the diuretic. The patient complained about a foul order from the urine. The patient does not have dysuria.  There is no gross hematuria.  The patient does not push or strain to urinate so sometimes she has to increase her abdominal pressure to defecate. Patient uses a walker.  The patient also stated that she is having right lower abdominal pain and discomfort.  He corresponds to a bulge of increasing size in the right lower quadrant.  The patient has bilateral hearing aids and wears eye glasses.\par 7/3/18: The patient returned today and reported she has nocturia and wakes up every 2 hours to urinate. She broke her femur and a tibial fracture. \par 7/16/18: The patient returned and reported she has the urge to urinate but ends up having a bowel movement instead. Daughter noted bowels are large. Patient noted she has a hard time urinating. Wakes up 3-4 times during the night to urinate, noted it is sometimes just "drops of urine." Patient is more disorientated and confused, I advised her to consult with a neurologist. \par 8/30/18: The patient returned today and reported she is feeling adequate. Patient denies dysuria, gross hematuria, urgency, or incontinence. Wakes up 2-3 times during the night to urinate. \par 6/4/19: Patient presents today for a follow up. Today she notes that her urination is "okay". She does not leak during the day but still wears depends since she is still concerned about incontinence. At night, she wakes up when she has to urinate but due to mobility issues is not always able to make it to bathroom. Diarrhea is reported on occasion. Mirtazapine has been initiated by another provider and it is reported that is has decreased her nocturia from 4x a night to once a night. \par 05/26/2020: Patient presents today for a follow up. Pt ambulates in a wheelchair and is able to go short distances with a walker. Nocturia x1-2 usually, but voided unusually frequently last night. Denies hematuria,or burning. No symptoms in March. Reports issues with short term, but not long term memory. Has been consulting with PCP and testing treatments regarding memory. Reports occasional stomach pains.\par \par 06/12/2020: Pt contacted at [phone]. Gave permission to conduct a Telephone visit. Urine culture through the [place] lab from 06/01/2020 grew 75 colony forming units per mL of Pseudomonas Aeruginosa. 1-5 Epithelial cells per field. Anything greater than 3 is abnormal. There were 3-10 WBC per high power field. Anything greater than 3 is abnormal. Bacteria were heavy. Pt had frequency and nocturia. She complained it was difficulty to sleep at night. Pt report she is feeling urgency when waking up to use the bathroom. She has not been leaking as much, or wetting herself. Her output is also not as great. Nocturia x3, or about every 2-3 hours. Pt is still on Cefpodoxime, but she will run out by 06/16/2020. Urine has become slightly cloudy, whereas it had been clear previously. Had not been using Melatonin at night. Has been taking Mirtazapine. \par \par 08/31/2021: The patient gave permission for a telephone visit. I spoke to patient's daughter Tabatha. She reports that on 6/4/21, after patient got off commode, her legs gave out and she broke both her shins, and has been immobilized since then. A few weeks ago, she had orders to move legs slightly. Daughter concerned because she has had Reaves in her since she has not been standing which is changed once a month. Was told once she had a slight rash in the vulva and buttocks, but has not heard about a rash since then. Memory has been okay. Daughter will send over recent lab work for review. She states WBC was 5.57. Daughter states patient's urine is clear. Patient is receiving physical therapy twice a week and daughter will try to find more help. \par \par 11/16/2021: The patient gave permission for a telehealth visit. Daughter states patient has been home for the past 3 weeks. Was previously in the hospital for a bad wound and pulmonary congestion and had been on antibiotics. Reviewed 11/3/21 urine tests. UA showed 432 WBC/HPF. Previously on 5/5/21 showed only 1 WBC/HPF and 8/9/20 showed no WBC, all were clean catches. Urine culture showed >= 3 organisms. Daughter notes patient does have a cough. Reaves catheter is draining okay. Daughter notes there is occasionally clusters of cloudiness draining more than before.

## 2021-11-17 ENCOUNTER — NON-APPOINTMENT (OUTPATIENT)
Age: 86
End: 2021-11-17

## 2021-11-23 ENCOUNTER — APPOINTMENT (OUTPATIENT)
Dept: WOUND CARE | Facility: CLINIC | Age: 86
End: 2021-11-23
Payer: MEDICARE

## 2021-11-23 ENCOUNTER — APPOINTMENT (OUTPATIENT)
Dept: UROLOGY | Facility: CLINIC | Age: 86
End: 2021-11-23
Payer: MEDICARE

## 2021-11-23 PROCEDURE — 99443: CPT | Mod: 95

## 2021-11-23 PROCEDURE — 99215 OFFICE O/P EST HI 40 MIN: CPT | Mod: 95

## 2021-11-23 NOTE — PHYSICAL EXAM
[Skin Ulcer] : ulcer [Alert] : alert [Oriented to Person] : oriented to person [Oriented to Place] : oriented to place [Calm] : calm [Please See PDF for [employer]] : Please See PDF for [employer]. [Oriented to Time] : disoriented to time [de-identified] : elderly female, pleasant, well groomed, lying in bed, NAD

## 2021-11-23 NOTE — HISTORY OF PRESENT ILLNESS
[Home] : at home, [unfilled] , at the time of the visit. [Medical Office: ([facility])___] : at the medical office located in  [Family Member] : family member [Other:____] : [unfilled] [FreeTextEntry1] : 89 y/o woman whom over past few months has gone from being ambulatory to bedbound, due to prolonged hospital, rehab. stay, which has left her completely deconditioned and with several wounds due to pressure.\par Pt. broke her leg in june 2021, was in [facility] - then rehab. leg was casted according to daughter, july transferred to assisted living for 6 weeks, with visiting nurse service coming in, was sitting for prolonged periods at that time, (prior to this pt. was walking), then after developing a gluteal wound was sent back to hospital for debridement, ([facility], then transferred to rehab. in [place], unable to do PT, eating poorly, worsening overall including SOB, which necessitated a thoracentesis, due to Afib, and heart failure, on supplemental O2.\par Wounds include bilateral heel DTI's, \par left posterior leg wound, \par sacral unstageable, \par  rt. ishium stage 4.\par Eating poorly, discussed supplements with daughter \par has no offloading in place\par using offloading boots for feet

## 2021-11-23 NOTE — ASSESSMENT
[FreeTextEntry1] : 89 y/o woman whom over past few months has gone from being ambulatory to bedbound, due to prolonged hospital, rehab. stay, which has left her completely deconditioned and with several wounds due to pressure. Pt. broke her leg in june 2021, was in [facility] - then rehab. leg was casted according to daughter, july transferred to assisted living for 6 weeks, with visiting nurse service coming in, was sitting for prolonged periods at that time, (prior to this pt. was walking), then after developing a gluteal wound was sent back to hospital for debridement, [facility] ([facility]), then transferred to rehab. in [place], unable to do PT, eating poorly, worsening overall including SOB, which necessitated a thoracentesis, due to Afib, and heart failure, on supplemental O2. Wounds include bilateral heel DTI's,  left posterior leg wound,  sacral unstageable,   rt. ishium stage 4. Eating poorly, discussed supplements with daughter  has no offloading in place using offloading boots for feet.\par \par 11/23/21\par Daughter reports mom (pt.) is lethargic, poor appetite, currently being treated for UTI, giving her juliano supplements, group 2 mattress had some issues, company serviced bed, and is functioning well now \par bilateral heels are dry, with stable eschar, offloading with soft boots\par sacral improved greatly, yellow slough and brown eschar are lifting - using santyl\par left ischium is being vac'd, wound clean and pink, improving\par left posterior calf wound has healed - apply cavilon and leave open to air

## 2021-11-23 NOTE — PLAN
[FreeTextEntry1] : 11/23/21\par Plan - c/w vac to ischium\par  offload and betadine to heels\par left lateral knee - c/w alginate\par C/W offloading\par protein, and supplements\par follow up 2 weeks TEB

## 2021-11-25 NOTE — HISTORY OF PRESENT ILLNESS
[FreeTextEntry1] : Ms. Venegas is a 89 year old woman here for a follow up of nocturia, microscopic hematuria, urethral caruncle.Patient believes she makes less urine in 24-hour period than previously.  Her age says that one given a diuretic.  Shea good quantity of urine.  The patient says that she generally makes it to the bathroom in time, though she must tarry which takes the diuretic. The patient complained about a foul order from the urine. The patient does not have dysuria.  There is no gross hematuria.  The patient does not push or strain to urinate so sometimes she has to increase her abdominal pressure to defecate. Patient uses a walker.  The patient also stated that she is having right lower abdominal pain and discomfort.  He corresponds to a bulge of increasing size in the right lower quadrant.  The patient has bilateral hearing aids and wears eye glasses.\par 7/3/18: The patient returned today and reported she has nocturia and wakes up every 2 hours to urinate. She broke her femur and a tibial fracture. \par 7/16/18: The patient returned and reported she has the urge to urinate but ends up having a bowel movement instead. Daughter noted bowels are large. Patient noted she has a hard time urinating. Wakes up 3-4 times during the night to urinate, noted it is sometimes just "drops of urine." Patient is more disorientated and confused, I advised her to consult with a neurologist. \par 8/30/18: The patient returned today and reported she is feeling adequate. Patient denies dysuria, gross hematuria, urgency, or incontinence. Wakes up 2-3 times during the night to urinate. \par 6/4/19: Patient presents today for a follow up. Today she notes that her urination is "okay". She does not leak during the day but still wears depends since she is still concerned about incontinence. At night, she wakes up when she has to urinate but due to mobility issues is not always able to make it to bathroom. Diarrhea is reported on occasion. Mirtazapine has been initiated by another provider and it is reported that is has decreased her nocturia from 4x a night to once a night. \par 05/26/2020: Patient presents today for a follow up. Pt ambulates in a wheelchair and is able to go short distances with a walker. Nocturia x1-2 usually, but voided unusually frequently last night. Denies hematuria,or burning. No symptoms in March. Reports issues with short term, but not long term memory. Has been consulting with PCP and testing treatments regarding memory. Reports occasional stomach pains.\par \par 06/12/2020: Pt contacted at [phone]. Gave permission to conduct a Telephone visit. Urine culture through the [place] lab from 06/01/2020 grew 75 colony forming units per mL of Pseudomonas Aeruginosa. 1-5 Epithelial cells per field. Anything greater than 3 is abnormal. There were 3-10 WBC per high power field. Anything greater than 3 is abnormal. Bacteria were heavy. Pt had frequency and nocturia. She complained it was difficulty to sleep at night. Pt report she is feeling urgency when waking up to use the bathroom. She has not been leaking as much, or wetting herself. Her output is also not as great. Nocturia x3, or about every 2-3 hours. Pt is still on Cefpodoxime, but she will run out by 06/16/2020. Urine has become slightly cloudy, whereas it had been clear previously. Had not been using Melatonin at night. Has been taking Mirtazapine. \par \par 08/31/2021: The patient gave permission for a telephone visit. I spoke to patient's daughter Tabatha. She reports that on 6/4/21, after patient got off commode, her legs gave out and she broke both her shins, and has been immobilized since then. A few weeks ago, she had orders to move legs slightly. Daughter concerned because she has had Reaves in her since she has not been standing which is changed once a month. Was told once she had a slight rash in the vulva and buttocks, but has not heard about a rash since then. Memory has been okay. Daughter will send over recent lab work for review. She states WBC was 5.57. Daughter states patient's urine is clear. Patient is receiving physical therapy twice a week and daughter will try to find more help. \par \par 11/16/2021: The patient gave permission for a telehealth visit. Daughter states patient has been home for the past 3 weeks. Was previously in the hospital for a bad wound and pulmonary congestion and had been on antibiotics. Reviewed 11/3/21 urine tests. UA showed 432 WBC/HPF. Previously on 5/5/21 showed only 1 WBC/HPF and 8/9/20 showed no WBC, all were clean catches. Urine culture showed >= 3 organisms. Daughter notes patient does have a cough. Reaves catheter is draining okay. Daughter notes there is occasionally clusters of cloudiness draining more than before. \par \par 11/23/2021: The patient gave permission for a telehealth visit. I spoke with the daughter. She states patient has had issues with heart rate and oxygen which has been better, but did have acute episode of frequent BMs. Has been taking course of Cefadroxil 500mg BID.

## 2021-11-25 NOTE — ASSESSMENT
[FreeTextEntry1] : Ms. Venegas is a 89 year old woman here for a follow up of nocturia, microscopic hematuria, urethral caruncle.Patient believes she makes less urine in 24-hour period than previously.  Her age says that one given a diuretic.  Shea good quantity of urine.  The patient says that she generally makes it to the bathroom in time, though she must tarry which takes the diuretic. The patient complained about a foul order from the urine. The patient does not have dysuria.  There is no gross hematuria.  The patient does not push or strain to urinate so sometimes she has to increase her abdominal pressure to defecate. Patient uses a walker.  The patient also stated that she is having right lower abdominal pain and discomfort.  He corresponds to a bulge of increasing size in the right lower quadrant.  The patient has bilateral hearing aids and wears eye glasses.\par 7/3/18: The patient returned today and reported she has nocturia and wakes up every 2 hours to urinate. She broke her femur and a tibial fracture. \par 7/16/18: The patient returned and reported she has the urge to urinate but ends up having a bowel movement instead. Daughter noted bowels are large. Patient noted she has a hard time urinating. Wakes up 3-4 times during the night to urinate, noted it is sometimes just "drops of urine." Patient is more disorientated and confused, I advised her to consult with a neurologist. \par 8/30/18: The patient returned today and reported she is feeling adequate. Patient denies dysuria, gross hematuria, urgency, or incontinence. Wakes up 2-3 times during the night to urinate. \par 6/4/19: Patient presents today for a follow up. Today she notes that her urination is "okay". She does not leak during the day but still wears depends since she is still concerned about incontinence. At night, she wakes up when she has to urinate but due to mobility issues is not always able to make it to bathroom. Diarrhea is reported on occasion. Mirtazapine has been initiated by another provider and it is reported that is has decreased her nocturia from 4x a night to once a night. The patient produced a catheterized urine sample which will be sent for urinalysis, urine cytology, and urine culture.Upon completion of a physical exam it is determined that vaginitis is present. Therefore Fluconazole will be prescribed at this time. I will provide her with 1 500 mg tablet that will be taken one time only due to her use of Warfarin. It is advised that she switch to cotton underwear during the day and if leakage is still reported then she can switch back to wearing the depends.  The patient is to follow up in 3 weeks or sooner if clinically indicated. \par 05/26/2020: Patient presents today for a follow up. Pt ambulates in a wheelchair and is able to go short distances with a walker. Nocturia x1-2 usually, but voided unusually frequently last night. Denies hematuria,or burning. No symptoms in March. Reports issues with short term, but not long term memory. Has been consulting with PCP and testing treatments regarding memory. Reports occasional stomach pains. On physical exam patient had no erythema of vaginal mucosa, no vaginal discharge and no prolapse. Pt will provide a urine sample today for culture and analysis. Pt received a catheterization of urine today for total volume of 180mL. PVR not needed despite pt concerns. \par \par 06/12/2020: Pt contacted at [phone]. Gave permission to conduct a Telephone visit. Urine culture through the [place] lab from 06/01/2020 grew 75 colony forming units per mL of Pseudomonas Aeruginosa. 1-5 Epithelial cells per field. Anything greater than 3 is abnormal. There were 3-10 WBC per high power field. Anything greater than 3 is abnormal. Bacteria were heavy. Pt had frequency and nocturia. She complained it was difficulty to sleep at night. Pt report she is feeling urgency when waking up to use the bathroom. She has not been leaking as much, or wetting herself. Her output is also not as great. Nocturia x3, or about every 2-3 hours. Pt is still on Cefpodoxime, but she will run out by 06/16/2020. Urine has become slightly cloudy, whereas it had been clear previously. Had not been using Melatonin at night. Has been taking Mirtazapine. \par \par 08/31/2021: The patient gave permission for a telephone visit. I spoke to patient's daughter Tabatha. She reports that on 6/4/21, after patient got off commode, her legs gave out and she broke both her shins, and has been immobilized since then. A few weeks ago, she had orders to move legs slightly. Daughter concerned because she has had Reaves in her since she has not been standing which is changed once a month. Was told once she had a slight rash in the vulva and buttocks, but has not heard about a rash since then. Memory has been okay. Daughter will send over recent lab work for review. She states WBC was 5.57. Daughter states patient's urine is clear. Patient is receiving physical therapy twice a week and daughter will try to find more help. \par \par 11/16/2021: I attempted to call the patient at 3:18 PM. Patient was scheduled for 3:40 PM appointment. I left a voicemail stating I would try again at the appointed time. I informed the patient the tuberculosis test would take 12 weeks for results. UA showed 432 WBC/HPF and was a clean catch, however urine culture was likely contaminated and should have it repeated. Previous UA did not show as many WBC.  \par \par 11/16/2021: The patient gave permission for a telehealth visit. Daughter states patient has been home for the past 3 weeks. Was previously in the hospital for a bad wound and pulmonary congestion and had been on antibiotics. Reviewed 11/3/21 urine tests. UA showed 432 WBC/HPF. Previously on 5/5/21 showed only 1 WBC/HPF and 8/9/20 showed no WBC, all were clean catches. Urine culture showed >= 3 organisms. Daughter notes patient does have a cough. Reaves catheter is draining okay. Daughter notes there is occasionally clusters of cloudiness draining more than before. \par \par 11/23/2021: The patient gave permission for a telehealth visit. I spoke with the daughter. She states patient has had issues with heart rate and oxygen which has been better, but did have acute episode of frequent BMs. Has been taking course of Cefadroxil 500mg BID. \par \par Reviewed urine tests of 11/8/21. Acid fast culture showed no growth at 1 week. UA showed 423 WBC/HPF and no bacteria. On 5/7/21, had normal UA. \par \par Finish course of Cefadroxil 500 mg BID. \par \par I have instructed the patient to provide a urine specimen at a [facility] site a couple of days after finishing Cefadroxil for UA, urine culture, and urine cytology. \par \par Schedule telehealth appointment to discuss urine culture next week. \par \par Preparation, telehealth visit, and coordination of care took: 24  minutes

## 2021-11-29 ENCOUNTER — FORM ENCOUNTER (OUTPATIENT)
Age: 86
End: 2021-11-29

## 2021-11-30 ENCOUNTER — NON-APPOINTMENT (OUTPATIENT)
Age: 86
End: 2021-11-30

## 2021-12-07 ENCOUNTER — NON-APPOINTMENT (OUTPATIENT)
Age: 86
End: 2021-12-07

## 2021-12-07 LAB
APPEARANCE: ABNORMAL
BACTERIA UR CULT: ABNORMAL
BACTERIA: NEGATIVE
BILIRUBIN URINE: NEGATIVE
BLOOD URINE: ABNORMAL
COLOR: NORMAL
GLUCOSE QUALITATIVE U: NEGATIVE
HYALINE CASTS: 5 /LPF
KETONES URINE: NEGATIVE
LEUKOCYTE ESTERASE URINE: ABNORMAL
MICROSCOPIC-UA: NORMAL
NITRITE URINE: NEGATIVE
PH URINE: 7
PROTEIN URINE: ABNORMAL
RED BLOOD CELLS URINE: 15 /HPF
SPECIFIC GRAVITY URINE: 1.01
SQUAMOUS EPITHELIAL CELLS: 3 /HPF
UROBILINOGEN URINE: NORMAL
WHITE BLOOD CELLS URINE: >720 /HPF

## 2021-12-08 ENCOUNTER — APPOINTMENT (OUTPATIENT)
Dept: UROLOGY | Facility: CLINIC | Age: 86
End: 2021-12-08

## 2021-12-08 ENCOUNTER — NON-APPOINTMENT (OUTPATIENT)
Age: 86
End: 2021-12-08

## 2021-12-21 ENCOUNTER — APPOINTMENT (OUTPATIENT)
Dept: UROLOGY | Facility: CLINIC | Age: 86
End: 2021-12-21

## 2021-12-22 ENCOUNTER — APPOINTMENT (OUTPATIENT)
Dept: UROLOGY | Facility: CLINIC | Age: 86
End: 2021-12-22
Payer: MEDICARE

## 2021-12-22 PROCEDURE — 99443: CPT | Mod: 95

## 2021-12-25 NOTE — ADDENDUM
[FreeTextEntry1] : I, Isa Vincentin, acted solely as a scribe for Dr. Indio Sexton on this date 12/22/2021.\par \par All medical record entries made by the Scribe were at my, Dr. Indio Sexton, direction and personally dictated by me on 12/22/2021. I have reviewed the chart and agree that the record accurately reflects my personal performance of the history, physical exam, assessment and plan.  I have also personally directed, reviewed and agreed with the chart.

## 2021-12-25 NOTE — HISTORY OF PRESENT ILLNESS
[FreeTextEntry1] : Ms. Venegas is a 89 year old woman here for a follow up of nocturia, microscopic hematuria, urethral caruncle.Patient believes she makes less urine in 24-hour period than previously.  Her age says that one given a diuretic.  Shea good quantity of urine.  The patient says that she generally makes it to the bathroom in time, though she must tarry which takes the diuretic. The patient complained about a foul order from the urine. The patient does not have dysuria.  There is no gross hematuria.  The patient does not push or strain to urinate so sometimes she has to increase her abdominal pressure to defecate. Patient uses a walker.  The patient also stated that she is having right lower abdominal pain and discomfort.  He corresponds to a bulge of increasing size in the right lower quadrant.  The patient has bilateral hearing aids and wears eye glasses.\par 7/3/18: The patient returned today and reported she has nocturia and wakes up every 2 hours to urinate. She broke her femur and a tibial fracture. \par 7/16/18: The patient returned and reported she has the urge to urinate but ends up having a bowel movement instead. Daughter noted bowels are large. Patient noted she has a hard time urinating. Wakes up 3-4 times during the night to urinate, noted it is sometimes just "drops of urine." Patient is more disorientated and confused, I advised her to consult with a neurologist. \par 8/30/18: The patient returned today and reported she is feeling adequate. Patient denies dysuria, gross hematuria, urgency, or incontinence. Wakes up 2-3 times during the night to urinate. \par 6/4/19: Patient presents today for a follow up. Today she notes that her urination is "okay". She does not leak during the day but still wears depends since she is still concerned about incontinence. At night, she wakes up when she has to urinate but due to mobility issues is not always able to make it to bathroom. Diarrhea is reported on occasion. Mirtazapine has been initiated by another provider and it is reported that is has decreased her nocturia from 4x a night to once a night. \par 05/26/2020: Patient presents today for a follow up. Pt ambulates in a wheelchair and is able to go short distances with a walker. Nocturia x1-2 usually, but voided unusually frequently last night. Denies hematuria,or burning. No symptoms in March. Reports issues with short term, but not long term memory. Has been consulting with PCP and testing treatments regarding memory. Reports occasional stomach pains.\par \par 06/12/2020: Pt contacted at [phone]. Gave permission to conduct a Telephone visit. Urine culture through the [facility] lab from 06/01/2020 grew 75 colony forming units per mL of Pseudomonas Aeruginosa. 1-5 Epithelial cells per field. Anything greater than 3 is abnormal. There were 3-10 WBC per high power field. Anything greater than 3 is abnormal. Bacteria were heavy. Pt had frequency and nocturia. She complained it was difficulty to sleep at night. Pt report she is feeling urgency when waking up to use the bathroom. She has not been leaking as much, or wetting herself. Her output is also not as great. Nocturia x3, or about every 2-3 hours. Pt is still on Cefpodoxime, but she will run out by 06/16/2020. Urine has become slightly cloudy, whereas it had been clear previously. Had not been using Melatonin at night. Has been taking Mirtazapine. \par \par 08/31/2021: The patient gave permission for a telephone visit. I spoke to patient's daughter Tabatha. She reports that on 6/4/21, after patient got off commode, her legs gave out and she broke both her shins, and has been immobilized since then. A few weeks ago, she had orders to move legs slightly. Daughter concerned because she has had Reaves in her since she has not been standing which is changed once a month. Was told once she had a slight rash in the vulva and buttocks, but has not heard about a rash since then. Memory has been okay. Daughter will send over recent lab work for review. She states WBC was 5.57. Daughter states patient's urine is clear. Patient is receiving physical therapy twice a week and daughter will try to find more help. \par \par 11/16/2021: The patient gave permission for a telehealth visit. Daughter states patient has been home for the past 3 weeks. Was previously in the hospital for a bad wound and pulmonary congestion and had been on antibiotics. Reviewed 11/3/21 urine tests. UA showed 432 WBC/HPF. Previously on 5/5/21 showed only 1 WBC/HPF and 8/9/20 showed no WBC, all were clean catches. Urine culture showed >= 3 organisms. Daughter notes patient does have a cough. Reaves catheter is draining okay. Daughter notes there is occasionally clusters of cloudiness draining more than before. \par \par 11/23/2021: The patient gave permission for a telehealth visit. I spoke with the daughter. She states patient has had issues with heart rate and oxygen which has been better, but did have acute episode of frequent BMs. Has been taking course of Cefadroxil 500mg BID. \par \par 12/21/2021: I attempted to call the patient at 5:20 PM and 5:40 PM. Patient was scheduled for 5:00 PM appointment.\par I left a voicemail for the patient's daughter. I would like to know how patient is doing since starting Levofloxacin 500mg for UTI. We called again at 5:40 PM and there was not answer and we were not given the opportunity to leave a voice message.

## 2021-12-25 NOTE — ASSESSMENT
[FreeTextEntry1] : Ms. Venegas is a 89 year old woman here for a follow up of nocturia, microscopic hematuria, urethral caruncle.Patient believes she makes less urine in 24-hour period than previously.  Her age says that one given a diuretic.  Shea good quantity of urine.  The patient says that she generally makes it to the bathroom in time, though she must tarry which takes the diuretic. The patient complained about a foul order from the urine. The patient does not have dysuria.  There is no gross hematuria.  The patient does not push or strain to urinate so sometimes she has to increase her abdominal pressure to defecate. Patient uses a walker.  The patient also stated that she is having right lower abdominal pain and discomfort.  He corresponds to a bulge of increasing size in the right lower quadrant.  The patient has bilateral hearing aids and wears eye glasses.\par 7/3/18: The patient returned today and reported she has nocturia and wakes up every 2 hours to urinate. She broke her femur and a tibial fracture. \par 7/16/18: The patient returned and reported she has the urge to urinate but ends up having a bowel movement instead. Daughter noted bowels are large. Patient noted she has a hard time urinating. Wakes up 3-4 times during the night to urinate, noted it is sometimes just "drops of urine." Patient is more disorientated and confused, I advised her to consult with a neurologist. \par 8/30/18: The patient returned today and reported she is feeling adequate. Patient denies dysuria, gross hematuria, urgency, or incontinence. Wakes up 2-3 times during the night to urinate. \par 6/4/19: Patient presents today for a follow up. Today she notes that her urination is "okay". She does not leak during the day but still wears depends since she is still concerned about incontinence. At night, she wakes up when she has to urinate but due to mobility issues is not always able to make it to bathroom. Diarrhea is reported on occasion. Mirtazapine has been initiated by another provider and it is reported that is has decreased her nocturia from 4x a night to once a night. The patient produced a catheterized urine sample which will be sent for urinalysis, urine cytology, and urine culture.Upon completion of a physical exam it is determined that vaginitis is present. Therefore Fluconazole will be prescribed at this time. I will provide her with 1 500 mg tablet that will be taken one time only due to her use of Warfarin. It is advised that she switch to cotton underwear during the day and if leakage is still reported then she can switch back to wearing the depends.  The patient is to follow up in 3 weeks or sooner if clinically indicated. \par 05/26/2020: Patient presents today for a follow up. Pt ambulates in a wheelchair and is able to go short distances with a walker. Nocturia x1-2 usually, but voided unusually frequently last night. Denies hematuria,or burning. No symptoms in March. Reports issues with short term, but not long term memory. Has been consulting with PCP and testing treatments regarding memory. Reports occasional stomach pains. On physical exam patient had no erythema of vaginal mucosa, no vaginal discharge and no prolapse. Pt will provide a urine sample today for culture and analysis. Pt received a catheterization of urine today for total volume of 180mL. PVR not needed despite pt concerns. \par \par 06/12/2020: Pt contacted at [phone]. Gave permission to conduct a Telephone visit. Urine culture through the [place] lab from 06/01/2020 grew 75 colony forming units per mL of Pseudomonas Aeruginosa. 1-5 Epithelial cells per field. Anything greater than 3 is abnormal. There were 3-10 WBC per high power field. Anything greater than 3 is abnormal. Bacteria were heavy. Pt had frequency and nocturia. She complained it was difficulty to sleep at night. Pt report she is feeling urgency when waking up to use the bathroom. She has not been leaking as much, or wetting herself. Her output is also not as great. Nocturia x3, or about every 2-3 hours. Pt is still on Cefpodoxime, but she will run out by 06/16/2020. Urine has become slightly cloudy, whereas it had been clear previously. Had not been using Melatonin at night. Has been taking Mirtazapine. \par \par 08/31/2021: The patient gave permission for a telephone visit. I spoke to patient's daughter Tabatha. She reports that on 6/4/21, after patient got off commode, her legs gave out and she broke both her shins, and has been immobilized since then. A few weeks ago, she had orders to move legs slightly. Daughter concerned because she has had Reaves in her since she has not been standing which is changed once a month. Was told once she had a slight rash in the vulva and buttocks, but has not heard about a rash since then. Memory has been okay. Daughter will send over recent lab work for review. She states WBC was 5.57. Daughter states patient's urine is clear. Patient is receiving physical therapy twice a week and daughter will try to find more help. \par \par 11/16/2021: I attempted to call the patient at 3:18 PM. Patient was scheduled for 3:40 PM appointment. I left a voicemail stating I would try again at the appointed time. I informed the patient the tuberculosis test would take 12 weeks for results. UA showed 432 WBC/HPF and was a clean catch, however urine culture was likely contaminated and should have it repeated. Previous UA did not show as many WBC.  \par \par 11/16/2021: The patient gave permission for a telehealth visit. Daughter states patient has been home for the past 3 weeks. Was previously in the hospital for a bad wound and pulmonary congestion and had been on antibiotics. Reviewed 11/3/21 urine tests. UA showed 432 WBC/HPF. Previously on 5/5/21 showed only 1 WBC/HPF and 8/9/20 showed no WBC, all were clean catches. Urine culture showed >= 3 organisms. Daughter notes patient does have a cough. Reaves catheter is draining okay. Daughter notes there is occasionally clusters of cloudiness draining more than before. \par \par 11/23/2021: The patient gave permission for a telehealth visit. I spoke with the daughter. She states patient has had issues with heart rate and oxygen which has been better, but did have acute episode of frequent BMs. Has been taking course of Cefadroxil 500mg BID. \par \par 12/21/2021: I attempted to call the patient at 5:20 PM and 5:40 PM. Patient was scheduled for 5:00 PM appointment.\par I left a voicemail for the patient's daughter. I would like to know how patient is doing since starting Levofloxacin 500mg for UTI. We called again at 5:40 PM and there was not answer and we were not given the opportunity to leave a voice message. \par

## 2021-12-25 NOTE — HISTORY OF PRESENT ILLNESS
[FreeTextEntry1] : Ms. Venegas is a 89 year old woman here for a follow up of nocturia, microscopic hematuria, urethral caruncle.Patient believes she makes less urine in 24-hour period than previously.  Her age says that one given a diuretic.  Shea good quantity of urine.  The patient says that she generally makes it to the bathroom in time, though she must tarry which takes the diuretic. The patient complained about a foul order from the urine. The patient does not have dysuria.  There is no gross hematuria.  The patient does not push or strain to urinate so sometimes she has to increase her abdominal pressure to defecate. Patient uses a walker.  The patient also stated that she is having right lower abdominal pain and discomfort.  He corresponds to a bulge of increasing size in the right lower quadrant.  The patient has bilateral hearing aids and wears eye glasses.\par 7/3/18: The patient returned today and reported she has nocturia and wakes up every 2 hours to urinate. She broke her femur and a tibial fracture. \par 7/16/18: The patient returned and reported she has the urge to urinate but ends up having a bowel movement instead. Daughter noted bowels are large. Patient noted she has a hard time urinating. Wakes up 3-4 times during the night to urinate, noted it is sometimes just "drops of urine." Patient is more disorientated and confused, I advised her to consult with a neurologist. \par 8/30/18: The patient returned today and reported she is feeling adequate. Patient denies dysuria, gross hematuria, urgency, or incontinence. Wakes up 2-3 times during the night to urinate. \par 6/4/19: Patient presents today for a follow up. Today she notes that her urination is "okay". She does not leak during the day but still wears depends since she is still concerned about incontinence. At night, she wakes up when she has to urinate but due to mobility issues is not always able to make it to bathroom. Diarrhea is reported on occasion. Mirtazapine has been initiated by another provider and it is reported that is has decreased her nocturia from 4x a night to once a night. \par 05/26/2020: Patient presents today for a follow up. Pt ambulates in a wheelchair and is able to go short distances with a walker. Nocturia x1-2 usually, but voided unusually frequently last night. Denies hematuria,or burning. No symptoms in March. Reports issues with short term, but not long term memory. Has been consulting with PCP and testing treatments regarding memory. Reports occasional stomach pains.\par \par 06/12/2020: Pt contacted at [phone]. Gave permission to conduct a Telephone visit. Urine culture through the [place] lab from 06/01/2020 grew 75 colony forming units per mL of Pseudomonas Aeruginosa. 1-5 Epithelial cells per field. Anything greater than 3 is abnormal. There were 3-10 WBC per high power field. Anything greater than 3 is abnormal. Bacteria were heavy. Pt had frequency and nocturia. She complained it was difficulty to sleep at night. Pt report she is feeling urgency when waking up to use the bathroom. She has not been leaking as much, or wetting herself. Her output is also not as great. Nocturia x3, or about every 2-3 hours. Pt is still on Cefpodoxime, but she will run out by 06/16/2020. Urine has become slightly cloudy, whereas it had been clear previously. Had not been using Melatonin at night. Has been taking Mirtazapine. \par \par 08/31/2021: The patient gave permission for a telephone visit. I spoke to patient's daughter Tabatha. She reports that on 6/4/21, after patient got off commode, her legs gave out and she broke both her shins, and has been immobilized since then. A few weeks ago, she had orders to move legs slightly. Daughter concerned because she has had Reaves in her since she has not been standing which is changed once a month. Was told once she had a slight rash in the vulva and buttocks, but has not heard about a rash since then. Memory has been okay. Daughter will send over recent lab work for review. She states WBC was 5.57. Daughter states patient's urine is clear. Patient is receiving physical therapy twice a week and daughter will try to find more help. \par \par 11/16/2021: The patient gave permission for a telehealth visit. Daughter states patient has been home for the past 3 weeks. Was previously in the hospital for a bad wound and pulmonary congestion and had been on antibiotics. Reviewed 11/3/21 urine tests. UA showed 432 WBC/HPF. Previously on 5/5/21 showed only 1 WBC/HPF and 8/9/20 showed no WBC, all were clean catches. Urine culture showed >= 3 organisms. Daughter notes patient does have a cough. Reaves catheter is draining okay. Daughter notes there is occasionally clusters of cloudiness draining more than before. \par \par 11/23/2021: The patient gave permission for a telehealth visit. I spoke with the daughter. She states patient has had issues with heart rate and oxygen which has been better, but did have acute episode of frequent BMs. Has been taking course of Cefadroxil 500mg BID. \par \par 12/22/2021: The patient gave permission for a telehealth visit. I spoke with patient's daughter Tabatha. Patient has been okay although feeling tired. Urine is clear. Completed course of Levofloxacin 500mg x 14 days and daughter notes that patient had leg pain for several days in a row. Never had a fever. Currently wears a catheter and has catheter changed by homecare nurses.

## 2021-12-25 NOTE — ASSESSMENT
[FreeTextEntry1] : Ms. Venegas is a 89 year old woman here for a follow up of nocturia, microscopic hematuria, urethral caruncle.Patient believes she makes less urine in 24-hour period than previously.  Her age says that one given a diuretic.  Shea good quantity of urine.  The patient says that she generally makes it to the bathroom in time, though she must tarry which takes the diuretic. The patient complained about a foul order from the urine. The patient does not have dysuria.  There is no gross hematuria.  The patient does not push or strain to urinate so sometimes she has to increase her abdominal pressure to defecate. Patient uses a walker.  The patient also stated that she is having right lower abdominal pain and discomfort.  He corresponds to a bulge of increasing size in the right lower quadrant.  The patient has bilateral hearing aids and wears eye glasses.\par 7/3/18: The patient returned today and reported she has nocturia and wakes up every 2 hours to urinate. She broke her femur and a tibial fracture. \par 7/16/18: The patient returned and reported she has the urge to urinate but ends up having a bowel movement instead. Daughter noted bowels are large. Patient noted she has a hard time urinating. Wakes up 3-4 times during the night to urinate, noted it is sometimes just "drops of urine." Patient is more disorientated and confused, I advised her to consult with a neurologist. \par 8/30/18: The patient returned today and reported she is feeling adequate. Patient denies dysuria, gross hematuria, urgency, or incontinence. Wakes up 2-3 times during the night to urinate. \par 6/4/19: Patient presents today for a follow up. Today she notes that her urination is "okay". She does not leak during the day but still wears depends since she is still concerned about incontinence. At night, she wakes up when she has to urinate but due to mobility issues is not always able to make it to bathroom. Diarrhea is reported on occasion. Mirtazapine has been initiated by another provider and it is reported that is has decreased her nocturia from 4x a night to once a night. The patient produced a catheterized urine sample which will be sent for urinalysis, urine cytology, and urine culture.Upon completion of a physical exam it is determined that vaginitis is present. Therefore Fluconazole will be prescribed at this time. I will provide her with 1 500 mg tablet that will be taken one time only due to her use of Warfarin. It is advised that she switch to cotton underwear during the day and if leakage is still reported then she can switch back to wearing the depends.  The patient is to follow up in 3 weeks or sooner if clinically indicated. \par 05/26/2020: Patient presents today for a follow up. Pt ambulates in a wheelchair and is able to go short distances with a walker. Nocturia x1-2 usually, but voided unusually frequently last night. Denies hematuria,or burning. No symptoms in March. Reports issues with short term, but not long term memory. Has been consulting with PCP and testing treatments regarding memory. Reports occasional stomach pains. On physical exam patient had no erythema of vaginal mucosa, no vaginal discharge and no prolapse. Pt will provide a urine sample today for culture and analysis. Pt received a catheterization of urine today for total volume of 180mL. PVR not needed despite pt concerns. \par \par 06/12/2020: Pt contacted at [phone]. Gave permission to conduct a Telephone visit. Urine culture through the [place] lab from 06/01/2020 grew 75 colony forming units per mL of Pseudomonas Aeruginosa. 1-5 Epithelial cells per field. Anything greater than 3 is abnormal. There were 3-10 WBC per high power field. Anything greater than 3 is abnormal. Bacteria were heavy. Pt had frequency and nocturia. She complained it was difficulty to sleep at night. Pt report she is feeling urgency when waking up to use the bathroom. She has not been leaking as much, or wetting herself. Her output is also not as great. Nocturia x3, or about every 2-3 hours. Pt is still on Cefpodoxime, but she will run out by 06/16/2020. Urine has become slightly cloudy, whereas it had been clear previously. Had not been using Melatonin at night. Has been taking Mirtazapine. \par \par 08/31/2021: The patient gave permission for a telephone visit. I spoke to patient's daughter Taabtha. She reports that on 6/4/21, after patient got off commode, her legs gave out and she broke both her shins, and has been immobilized since then. A few weeks ago, she had orders to move legs slightly. Daughter concerned because she has had Reaves in her since she has not been standing which is changed once a month. Was told once she had a slight rash in the vulva and buttocks, but has not heard about a rash since then. Memory has been okay. Daughter will send over recent lab work for review. She states WBC was 5.57. Daughter states patient's urine is clear. Patient is receiving physical therapy twice a week and daughter will try to find more help. \par \par 11/16/2021: The patient gave permission for a telehealth visit. Daughter states patient has been home for the past 3 weeks. Was previously in the hospital for a bad wound and pulmonary congestion and had been on antibiotics. Reviewed 11/3/21 urine tests. UA showed 432 WBC/HPF. Previously on 5/5/21 showed only 1 WBC/HPF and 8/9/20 showed no WBC, all were clean catches. Urine culture showed >= 3 organisms. Daughter notes patient does have a cough. Reaves catheter is draining okay. Daughter notes there is occasionally clusters of cloudiness draining more than before. \par \par 11/23/2021: The patient gave permission for a telehealth visit. I spoke with the daughter. She states patient has had issues with heart rate and oxygen which has been better, but did have acute episode of frequent BMs. Has been taking course of Cefadroxil 500mg BID. \par \par 12/22/2021: The patient gave permission for a telehealth visit. I spoke with patient's daughter Tabatha. Patient has been okay although feeling tired. Urine is clear. Completed course of Levofloxacin 500mg x 14 days and daughter notes that patient had leg pain for several days in a row. Never had a fever. Currently has a Folry catheter and has catheter changed by home care nurses. \par \par Patient should continue to have monthly catheter changes and irrigation to help drainage. \par \par Instructed patient to provide urine specimen to a [facility] lab. \par \par Preparation, telehealth visit, and coordination of care took: 21 minutes

## 2021-12-25 NOTE — ADDENDUM
[FreeTextEntry1] : I, Isa Vincentin, acted solely as a scribe for Dr. Indio Sexton on this date 12/21/2021.\par \par All medical record entries made by the Scribe were at my, Dr. Indio Sexton, direction and personally dictated by me on 12/21/2021. I have reviewed the chart and agree that the record accurately reflects my personal performance of the history, physical exam, assessment and plan.  I have also personally directed, reviewed and agreed with the chart.

## 2021-12-28 ENCOUNTER — NON-APPOINTMENT (OUTPATIENT)
Age: 86
End: 2021-12-28

## 2021-12-29 ENCOUNTER — APPOINTMENT (OUTPATIENT)
Dept: WOUND CARE | Facility: CLINIC | Age: 86
End: 2021-12-29
Payer: MEDICARE

## 2021-12-29 PROCEDURE — 99214 OFFICE O/P EST MOD 30 MIN: CPT | Mod: 95

## 2021-12-30 ENCOUNTER — APPOINTMENT (OUTPATIENT)
Dept: HOME HEALTH SERVICES | Facility: HOME HEALTH | Age: 86
End: 2021-12-30
Payer: MEDICARE

## 2021-12-30 VITALS
TEMPERATURE: 97.5 F | HEART RATE: 91 BPM | OXYGEN SATURATION: 95 % | DIASTOLIC BLOOD PRESSURE: 70 MMHG | BODY MASS INDEX: 20.83 KG/M2 | HEIGHT: 65 IN | RESPIRATION RATE: 18 BRPM | WEIGHT: 125 LBS | SYSTOLIC BLOOD PRESSURE: 126 MMHG

## 2021-12-30 DIAGNOSIS — M54.9 DORSALGIA, UNSPECIFIED: ICD-10-CM

## 2021-12-30 DIAGNOSIS — R55 SYNCOPE AND COLLAPSE: ICD-10-CM

## 2021-12-30 DIAGNOSIS — N36.2 URETHRAL CARUNCLE: ICD-10-CM

## 2021-12-30 DIAGNOSIS — Z86.39 PERSONAL HISTORY OF OTHER ENDOCRINE, NUTRITIONAL AND METABOLIC DISEASE: ICD-10-CM

## 2021-12-30 DIAGNOSIS — R39.9 UNSPECIFIED SYMPTOMS AND SIGNS INVOLVING THE GENITOURINARY SYSTEM: ICD-10-CM

## 2021-12-30 DIAGNOSIS — Z87.898 PERSONAL HISTORY OF OTHER SPECIFIED CONDITIONS: ICD-10-CM

## 2021-12-30 DIAGNOSIS — N90.9 NONINFLAMMATORY DISORDER OF VULVA AND PERINEUM, UNSPECIFIED: ICD-10-CM

## 2021-12-30 DIAGNOSIS — K56.600 PARTIAL INTESTINAL OBSTRUCTION, UNSPECIFIED AS TO CAUSE: ICD-10-CM

## 2021-12-30 PROCEDURE — 99497 ADVNCD CARE PLAN 30 MIN: CPT

## 2021-12-30 PROCEDURE — G0008: CPT

## 2021-12-30 PROCEDURE — 90662 IIV NO PRSV INCREASED AG IM: CPT

## 2021-12-30 PROCEDURE — G0506: CPT

## 2021-12-30 PROCEDURE — 99345 HOME/RES VST NEW HIGH MDM 75: CPT | Mod: 25

## 2021-12-30 NOTE — HISTORY OF PRESENT ILLNESS
[Home] : at home, [unfilled] , at the time of the visit. [Medical Office: ([facility])___] : at the medical office located in  [Family Member] : family member [Other:____] : [unfilled] [FreeTextEntry1] : 89 y/o woman whom over past few months has gone from being ambulatory to bedbound, due to prolonged hospital, rehab. stay, which has left her completely deconditioned and with several wounds due to pressure.\par Pt. broke her leg in june 2021, was in [facility] - then rehab. leg was casted according to daughter, july transferred to assisted living for 6 weeks, with visiting nurse service coming in, was sitting for prolonged periods at that time, (prior to this pt. was walking), then after developing a gluteal wound was sent back to hospital for debridement, ([facility], then transferred to rehab. in [place], unable to do PT, eating poorly, worsening overall including SOB, which necessitated a thoracentesis, due to Afib, and heart failure, on supplemental O2.\par Wounds include bilateral heel DTI's, \par left posterior leg wound, \par sacral unstageable, \par  rt. ishium stage 4.\par Eating poorly, discussed supplements with daughter \par has no offloading in place\par using offloading boots for feet

## 2021-12-30 NOTE — PHYSICAL EXAM
[Skin Ulcer] : ulcer [Alert] : alert [Oriented to Person] : oriented to person [Oriented to Place] : oriented to place [Calm] : calm [Please See PDF for [employer]] : Please See PDF for [employer]. [Oriented to Time] : disoriented to time [de-identified] : elderly female, pleasant, well groomed, lying in bed, NAD

## 2021-12-30 NOTE — ASSESSMENT
[FreeTextEntry1] : 89 y/o woman whom over past few months has gone from being ambulatory to bedbound, due to prolonged hospital, rehab. stay, which has left her completely deconditioned and with several wounds due to pressure. Pt. broke her leg in june 2021, was in [facility] - then rehab. leg was casted according to daughter, july transferred to assisted living for 6 weeks, with visiting nurse service coming in, was sitting for prolonged periods at that time, (prior to this pt. was walking), then after developing a gluteal wound was sent back to hospital for debridement, [facility] ([facility]), then transferred to rehab. in [place], unable to do PT, eating poorly, worsening overall including SOB, which necessitated a thoracentesis, due to Afib, and heart failure, on supplemental O2. Wounds include bilateral heel DTI's,  left posterior leg wound,  sacral unstageable,   rt. ishium stage 4. Eating poorly, discussed supplements with daughter  has no offloading in place using offloading boots for feet.\par \par 11/23/21\par Daughter reports mom (pt.) is lethargic, poor appetite, currently being treated for UTI, giving her juliano supplements, group 2 mattress had some issues, company serviced bed, and is functioning well now \par bilateral heels are dry, with stable eschar, offloading with soft boots\par sacral improved greatly, yellow slough and brown eschar are lifting - using santyl\par left ischium is being vac'd, wound clean and pink, improving\par left posterior calf wound has healed - apply cavilon and leave open to air\par \par 12/29/21\par sacral wound improving, using vac, undermining of 1.5 at 12 0'clock, straight depth 1.5, wound edge 2-4 o'clock and 10 0'clock has brown/black tissue, wound base pink and clean\par right ischium continues to improve, 0.8 cm, packing with alginate\par heels left healed, right heel stable eschar , offloading with boots and has group 2 mattress\par daughter reports mom is c/o being uncomfortable and in pain a lot, using tylenol with minimal relief, gives her 1/2 oxycodone with some relief\par

## 2021-12-30 NOTE — REVIEW OF SYSTEMS
[Fatigue] : fatigue [Hearing Loss] : hearing loss [Palpitations] : palpitations [Lower Ext Edema] : lower extremity edema [Shortness Of Breath] : shortness of breath [Cough] : cough [Constipation] : constipation [Incontinence] : incontinence [Back Pain] : back pain [Skin Rash] : skin rash [Anxiety] : anxiety [Depression] : depression [Negative] : Cardiovascular

## 2021-12-30 NOTE — PLAN
[FreeTextEntry1] : 12/29/21\par Plan - c/w vac, at lower setting a pt. c/o of pain in area\par ischium - c/w pack with alginate\par left heel cavilon and offload, right heel with stable eschar - c/w betadine, offload\par discussed with nurse\par c/w dietary supplement - juliano, protein, multivit\par length conversation with daughter regarding all aspects of care, very anxious, encouraged her that mom is making progress\par follow up 2-3 weeks TEB\par

## 2021-12-30 NOTE — COUNSELING
[Normal Weight - ( BMI  <25 )] : normal weight - ( BMI  <25 ) [DASH diet recommended] : DASH diet recommended [Smoke/CO Detectors] : smoke/CO detectors [Use assistive device to avoid falls] : use assistive device to avoid falls [] : diabetic screening [Decrease stress] : decrease stress [Decrease hospital use] : decrease hospital use [Minimize unnecessary interventions] : minimize unnecessary interventions [Comfort Care] : comfort care [Discussed disease trajectory with patient/caregiver] : discussed disease trajectory with patient/caregiver [Patient/Caregiver has ___ understanding of disease process] : patient/caregiver has [unfilled] understanding of disease process [Completed Medical Orders for Life-Sustaining Treatment] : completed medical orders for life-sustaining treatment [DNR] : Code Status: DNR [Limited] : Treatment Guidelines: Limited [DNI] : Intubation: DNI [Last Verification Date: _____] : RUSTST Completion/last verification date: [unfilled] [_____] : HCP: [unfilled] [ - New patient with 2 or more chronic conditions; CCM discussed and patient-centered care plan established] : New patient with 2 or more chronic conditions; CCM discussed and patient-centered care plan established

## 2021-12-31 LAB — ACID FAST STN UR: NORMAL

## 2022-01-01 ENCOUNTER — NON-APPOINTMENT (OUTPATIENT)
Age: 87
End: 2022-01-01

## 2022-01-04 ENCOUNTER — LABORATORY RESULT (OUTPATIENT)
Age: 87
End: 2022-01-04

## 2022-01-05 LAB
25(OH)D3 SERPL-MCNC: 70.7 NG/ML
ALBUMIN SERPL ELPH-MCNC: 3.3 G/DL
ALP BLD-CCNC: 84 U/L
ALT SERPL-CCNC: 8 U/L
ANION GAP SERPL CALC-SCNC: 13 MMOL/L
AST SERPL-CCNC: 20 U/L
BASOPHILS # BLD AUTO: 0.12 K/UL
BASOPHILS NFR BLD AUTO: 1.7 %
BILIRUB SERPL-MCNC: 0.3 MG/DL
BUN SERPL-MCNC: 23 MG/DL
CALCIUM SERPL-MCNC: 9.6 MG/DL
CHLORIDE SERPL-SCNC: 102 MMOL/L
CHOLEST SERPL-MCNC: 138 MG/DL
CO2 SERPL-SCNC: 25 MMOL/L
CREAT SERPL-MCNC: 0.61 MG/DL
EOSINOPHIL # BLD AUTO: 0.06 K/UL
EOSINOPHIL NFR BLD AUTO: 0.9 %
GLUCOSE SERPL-MCNC: 110 MG/DL
HCT VFR BLD CALC: 33.9 %
HDLC SERPL-MCNC: 47 MG/DL
HGB BLD-MCNC: 10.6 G/DL
LDLC SERPL CALC-MCNC: 78 MG/DL
LYMPHOCYTES # BLD AUTO: 0.51 K/UL
LYMPHOCYTES NFR BLD AUTO: 7 %
MAN DIFF?: NORMAL
MCHC RBC-ENTMCNC: 30 PG
MCHC RBC-ENTMCNC: 31.3 GM/DL
MCV RBC AUTO: 96 FL
MONOCYTES # BLD AUTO: 1.78 K/UL
MONOCYTES NFR BLD AUTO: 24.6 %
NEUTROPHILS # BLD AUTO: 4.75 K/UL
NEUTROPHILS NFR BLD AUTO: 65.8 %
NONHDLC SERPL-MCNC: 91 MG/DL
NT-PROBNP SERPL-MCNC: 6275 PG/ML
PLATELET # BLD AUTO: 152 K/UL
POTASSIUM SERPL-SCNC: 4.2 MMOL/L
PREALB SERPL NEPH-MCNC: 12 MG/DL
PROT SERPL-MCNC: 6 G/DL
RBC # BLD: 3.53 M/UL
RBC # FLD: 14.8 %
SODIUM SERPL-SCNC: 140 MMOL/L
TRIGL SERPL-MCNC: 66 MG/DL
TSH SERPL-ACNC: 2.28 UIU/ML
VIT B12 SERPL-MCNC: 708 PG/ML
WBC # FLD AUTO: 7.22 K/UL

## 2022-01-05 NOTE — CHRONIC CARE ASSESSMENT
[Can not Exercise (Disability)] : Exercise: The patient can not exercise due to disability [General Adherence] : and is generally adherent [PPS Score: ____] : Palliative Performance Scale (PPS) Score: [unfilled] [FAST Score: ____] : Functional Assessment Scale (FAST) Score: [unfilled] [Class III] : [employer] [place] Heart Association Class Output: Class III

## 2022-01-05 NOTE — HISTORY OF PRESENT ILLNESS
[Family Member] : family member [FreeTextEntry1] : debility CHF   sacral  wounds   [FreeTextEntry2] : Patient denies fever, cough, trouble breathing, rash, vomiting and diarrhea. Patient has not been in close contact with someone Covid positive.\par N95 mask ,gloves and eyewear used during visit  Total  face to  face time  is  30 minutes \par patient is seen today for initial visit and enrollment into  a house call  program along with care manager\par patient is homebound due  debility CHF   sacral  wounds  \par  most of the history obtained from family member \par 89 y/o woman whom over past few months has gone from being ambulatory to bedbound, due to prolonged hospital, rehab. stay, which has left her completely deconditioned and with several wounds due to pressure.\par Pt. broke her leg in june 2021, was in [facility] [place] - then rehab.  unable to do PT, eating poorly, worsening overall including SOB, which necessitated a thoracentesis, due to Afib, and heart failure, on supplemental O2.\par Wounds include bilateral heel DTI's, \par left posterior leg wound, sacral unstageable,  rt. ishium stage 4\par  patient  still follows with   specialists \par diet regular  appetite   ok needs  supplements \par dysphagia  yes to pills \par Weight  stable now\par constipation yes \par incontinence  patient has sales \par pressure/bed sores yes \par Ambulates none bedbound \par falls none \par Behavior very  anxious  on meds  at times agitated  \par Mood ok \par  memory  ok  declining  \par  sleep  ok \par sensory deficits   vision ok hard of hearing \par pain yes \par Medication refills done and reconciliation  done \par Goals of care discussed and completed \par

## 2022-01-05 NOTE — PHYSICAL EXAM
[Normal Sclera/Conjunctiva] : normal sclera/conjunctiva [No JVD] : no jugular venous distention [Breast Exam Declined] : patient declined to have breast exam done [Non Tender] : non-tender [No CVA Tenderness] : no ~M costovertebral angle tenderness [No Motor Deficits] : the motor exam was normal [Oriented x3] : oriented to person, place, and time [Patient Refused] : rectal exam was refused by the patient [de-identified] : comfortable  cooperative   but  frail  [de-identified] : lest sided crackles  poor effort   [de-identified] :  irregular 3/6  murmur   1 pedal edema   [de-identified] :  softly distended   right  inguinal hernia   [de-identified] :  mónica  [de-identified] :  nonambulatory  [de-identified] : sacral wound improving, using vac, undermining of 1.5 at 12 0'clock, straight depth 1.5, wound edge 2-4 o'clock and 10 0'clock has brown/black tissue, wound base pink and clean\par right ischium continues to improve, 0.8 cm, packing with alginate\par heels left healed, right heel stable eschar , offloading with boots and has group 2 mattress  venous  stasis  hyperpigmentation  dry skin but no ulceration   long elongated  brittle nails  [de-identified] : anxious

## 2022-01-12 ENCOUNTER — NON-APPOINTMENT (OUTPATIENT)
Age: 87
End: 2022-01-12

## 2022-01-21 ENCOUNTER — APPOINTMENT (OUTPATIENT)
Dept: WOUND CARE | Facility: CLINIC | Age: 87
End: 2022-01-21
Payer: MEDICARE

## 2022-01-21 PROCEDURE — 99442: CPT | Mod: 95

## 2022-01-21 NOTE — PLAN
[FreeTextEntry1] : 1/21/22\par Plan - c/w vac, using white and black foam, instructed to pull back on white foam to allow undermining to close up\par heel - c/w betadine and offload, wash with soap and water and rinse between applications of betadine\par pictures to be sent to TA\par follow up TEB

## 2022-01-21 NOTE — ASSESSMENT
[FreeTextEntry1] : 89 y/o woman whom over past few months has gone from being ambulatory to bedbound, due to prolonged hospital, rehab. stay, which has left her completely deconditioned and with several wounds due to pressure. Pt. broke her leg in june 2021, was in [facility] - then rehab. leg was casted according to daughter, july transferred to assisted living for 6 weeks, with visiting nurse service coming in, was sitting for prolonged periods at that time, (prior to this pt. was walking), then after developing a gluteal wound was sent back to hospital for debridement, [facility] ([facility]), then transferred to rehab. in [place], unable to do PT, eating poorly, worsening overall including SOB, which necessitated a thoracentesis, due to Afib, and heart failure, on supplemental O2. Wounds include bilateral heel DTI's,  left posterior leg wound,  sacral unstageable,   rt. ishium stage 4. Eating poorly, discussed supplements with daughter  has no offloading in place using offloading boots for feet.\par \par 11/23/21\par Daughter reports mom (pt.) is lethargic, poor appetite, currently being treated for UTI, giving her juliano supplements, group 2 mattress had some issues, company serviced bed, and is functioning well now \par bilateral heels are dry, with stable eschar, offloading with soft boots\par sacral improved greatly, yellow slough and brown eschar are lifting - using santyl\par left ischium is being vac'd, wound clean and pink, improving\par left posterior calf wound has healed - apply cavilon and leave open to air\par \par 12/29/21\par sacral wound improving, using vac, undermining of 1.5 at 12 0'clock, straight depth 1.5, wound edge 2-4 o'clock and 10 0'clock has brown/black tissue, wound base pink and clean\par right ischium continues to improve, 0.8 cm, packing with alginate\par heels left healed, right heel stable eschar , offloading with boots and has group 2 mattress\par daughter reports mom is c/o being uncomfortable and in pain a lot, using tylenol with minimal relief, gives her 1/2 oxycodone with some relief\par \par 1/21/22\par sacral wound improved , depth is now 0.9 cm, undermining present from 10 - 2 o'clock of 1.3 cm\par right heel with stable eschar that is starting to lift, no signs of periwound erythema, stable and clean appears to be ready to fall off\par

## 2022-01-21 NOTE — PHYSICAL EXAM
[Skin Ulcer] : ulcer [Alert] : alert [Oriented to Person] : oriented to person [Oriented to Place] : oriented to place [Calm] : calm [Please See PDF for [employer]] : Please See PDF for [employer]. [Oriented to Time] : disoriented to time [de-identified] : elderly female, pleasant, well groomed, lying in bed, NAD

## 2022-01-21 NOTE — HISTORY OF PRESENT ILLNESS
[Home] : at home, [unfilled] , at the time of the visit. [Medical Office: ([facility])___] : at the medical office located in  [Family Member] : family member [Formal Caregiver] : formal caregiver [Other:____] : [unfilled] [Verbal consent obtained from patient] : the patient, [unfilled] [FreeTextEntry4] : Terrance NP [FreeTextEntry1] : 89 y/o woman whom over past few months has gone from being ambulatory to bedbound, due to prolonged hospital, rehab. stay, which has left her completely deconditioned and with several wounds due to pressure.\par Pt. broke her leg in june 2021, was in [facility] - then rehab. leg was casted according to daughter, july transferred to assisted living for 6 weeks, with visiting nurse service coming in, was sitting for prolonged periods at that time, (prior to this pt. was walking), then after developing a gluteal wound was sent back to hospital for debridement, ([facility], then transferred to rehab. in [place], unable to do PT, eating poorly, worsening overall including SOB, which necessitated a thoracentesis, due to Afib, and heart failure, on supplemental O2.\par Wounds include bilateral heel DTI's, \par left posterior leg wound, \par sacral unstageable, \par  rt. ishium stage 4.\par Eating poorly, discussed supplements with daughter \par has no offloading in place\par using offloading boots for feet

## 2022-01-28 ENCOUNTER — TRANSCRIPTION ENCOUNTER (OUTPATIENT)
Age: 87
End: 2022-01-28

## 2022-01-28 ENCOUNTER — NON-APPOINTMENT (OUTPATIENT)
Age: 87
End: 2022-01-28

## 2022-02-04 ENCOUNTER — TRANSCRIPTION ENCOUNTER (OUTPATIENT)
Age: 87
End: 2022-02-04

## 2022-02-04 ENCOUNTER — LABORATORY RESULT (OUTPATIENT)
Age: 87
End: 2022-02-04

## 2022-02-04 ENCOUNTER — NON-APPOINTMENT (OUTPATIENT)
Age: 87
End: 2022-02-04

## 2022-02-05 ENCOUNTER — NON-APPOINTMENT (OUTPATIENT)
Age: 87
End: 2022-02-05

## 2022-02-07 ENCOUNTER — NON-APPOINTMENT (OUTPATIENT)
Age: 87
End: 2022-02-07

## 2022-02-10 ENCOUNTER — NON-APPOINTMENT (OUTPATIENT)
Age: 87
End: 2022-02-10

## 2022-02-10 NOTE — PATIENT PROFILE ADULT - ABILITY TO HEAR (WITH HEARING AID OR HEARING APPLIANCE IF NORMALLY USED):
11-Feb-2022 Mildly to Moderately Impaired: difficulty hearing in some environments or speaker may need to increase volume or speak distinctly

## 2022-02-11 ENCOUNTER — NON-APPOINTMENT (OUTPATIENT)
Age: 87
End: 2022-02-11

## 2022-02-11 ENCOUNTER — LABORATORY RESULT (OUTPATIENT)
Age: 87
End: 2022-02-11

## 2022-02-14 ENCOUNTER — NON-APPOINTMENT (OUTPATIENT)
Age: 87
End: 2022-02-14

## 2022-02-14 LAB
ALBUMIN SERPL ELPH-MCNC: 3.6 G/DL
ALP BLD-CCNC: 88 U/L
ALT SERPL-CCNC: 8 U/L
ANION GAP SERPL CALC-SCNC: 12 MMOL/L
AST SERPL-CCNC: 15 U/L
BACTERIA STL CULT: NORMAL
BASOPHILS # BLD AUTO: 0.04 K/UL
BASOPHILS NFR BLD AUTO: 0.9 %
BILIRUB SERPL-MCNC: 0.3 MG/DL
BUN SERPL-MCNC: 19 MG/DL
C DIFF TOX GENS STL QL NAA+PROBE: NORMAL
CALCIUM SERPL-MCNC: 9.7 MG/DL
CDIFF BY PCR: NOT DETECTED
CHLORIDE SERPL-SCNC: 102 MMOL/L
CO2 SERPL-SCNC: 24 MMOL/L
CREAT SERPL-MCNC: 0.54 MG/DL
DEPRECATED O AND P PREP STL: NORMAL
EOSINOPHIL # BLD AUTO: 0.08 K/UL
EOSINOPHIL NFR BLD AUTO: 1.7 %
FERRITIN SERPL-MCNC: 168 NG/ML
FOLATE SERPL-MCNC: 15 NG/ML
HCT VFR BLD CALC: 34.6 %
HEMOCCULT STL QL: NEGATIVE
HGB BLD-MCNC: 11.1 G/DL
IRON SATN MFR SERPL: 18 %
IRON SERPL-MCNC: 43 UG/DL
LYMPHOCYTES # BLD AUTO: 0.82 K/UL
LYMPHOCYTES NFR BLD AUTO: 18.3 %
MAN DIFF?: NORMAL
MCHC RBC-ENTMCNC: 30.1 PG
MCHC RBC-ENTMCNC: 32.1 GM/DL
MCV RBC AUTO: 93.8 FL
MONOCYTES # BLD AUTO: 0.58 K/UL
MONOCYTES NFR BLD AUTO: 13 %
NEUTROPHILS # BLD AUTO: 2.97 K/UL
NEUTROPHILS NFR BLD AUTO: 66.1 %
PLATELET # BLD AUTO: 140 K/UL
POTASSIUM SERPL-SCNC: 4.2 MMOL/L
PREALB SERPL NEPH-MCNC: 14 MG/DL
PROT SERPL-MCNC: 5.5 G/DL
RBC # BLD: 3.69 M/UL
RBC # FLD: 14.8 %
SODIUM SERPL-SCNC: 138 MMOL/L
TIBC SERPL-MCNC: 239 UG/DL
UIBC SERPL-MCNC: 196 UG/DL
VIT B12 SERPL-MCNC: 652 PG/ML
WBC # FLD AUTO: 4.49 K/UL

## 2022-02-15 ENCOUNTER — NON-APPOINTMENT (OUTPATIENT)
Age: 87
End: 2022-02-15

## 2022-02-16 ENCOUNTER — APPOINTMENT (OUTPATIENT)
Dept: HOME HEALTH SERVICES | Facility: HOME HEALTH | Age: 87
End: 2022-02-16

## 2022-02-16 RX ORDER — LEVOFLOXACIN 500 MG/1
500 TABLET, FILM COATED ORAL DAILY
Qty: 14 | Refills: 0 | Status: DISCONTINUED | COMMUNITY
Start: 2021-12-07 | End: 2022-02-16

## 2022-02-17 ENCOUNTER — NON-APPOINTMENT (OUTPATIENT)
Age: 87
End: 2022-02-17

## 2022-02-18 ENCOUNTER — NON-APPOINTMENT (OUTPATIENT)
Age: 87
End: 2022-02-18

## 2022-02-18 ENCOUNTER — APPOINTMENT (OUTPATIENT)
Dept: WOUND CARE | Facility: CLINIC | Age: 87
End: 2022-02-18
Payer: MEDICARE

## 2022-02-18 PROCEDURE — 99212 OFFICE O/P EST SF 10 MIN: CPT | Mod: 95

## 2022-02-22 ENCOUNTER — NON-APPOINTMENT (OUTPATIENT)
Age: 87
End: 2022-02-22

## 2022-02-22 NOTE — HISTORY OF PRESENT ILLNESS
[Home] : at home, [unfilled] , at the time of the visit. [Medical Office: ([facility])___] : at the medical office located in  [Family Member] : family member [Formal Caregiver] : formal caregiver [Other:____] : [unfilled] [Verbal consent obtained from patient] : the patient, [unfilled] [FreeTextEntry1] : 89 y/o woman whom over past few months has gone from being ambulatory to bedbound, due to prolonged hospital, rehab. stay, which has left her completely deconditioned and with several wounds due to pressure.\par Pt. broke her leg in june 2021, was in [facility] - then rehab. leg was casted according to daughter, july transferred to assisted living for 6 weeks, with visiting nurse service coming in, was sitting for prolonged periods at that time, (prior to this pt. was walking), then after developing a gluteal wound was sent back to hospital for debridement, ([facility], then transferred to rehab. in [place], unable to do PT, eating poorly, worsening overall including SOB, which necessitated a thoracentesis, due to Afib, and heart failure, on supplemental O2.\par Wounds include bilateral heel DTI's, \par left posterior leg wound, \par sacral unstageable, \par  rt. ishium stage 4.\par Eating poorly, discussed supplements with daughter \par has no offloading in place\par using offloading boots for feet [FreeTextEntry4] : Terrance NP

## 2022-02-22 NOTE — ASSESSMENT
[FreeTextEntry1] : 89 y/o woman whom over past few months has gone from being ambulatory to bedbound, due to prolonged hospital, rehab. stay, which has left her completely deconditioned and with several wounds due to pressure. Pt. broke her leg in june 2021, was in [facility] - then rehab. leg was casted according to daughter, july transferred to assisted living for 6 weeks, with visiting nurse service coming in, was sitting for prolonged periods at that time, (prior to this pt. was walking), then after developing a gluteal wound was sent back to hospital for debridement, [facility] ([facility]), then transferred to rehab. in [place], unable to do PT, eating poorly, worsening overall including SOB, which necessitated a thoracentesis, due to Afib, and heart failure, on supplemental O2. Wounds include bilateral heel DTI's,  left posterior leg wound,  sacral unstageable,   rt. ishium stage 4. Eating poorly, discussed supplements with daughter  has no offloading in place using offloading boots for feet.\par \par 11/23/21\par Daughter reports mom (pt.) is lethargic, poor appetite, currently being treated for UTI, giving her juliano supplements, group 2 mattress had some issues, company serviced bed, and is functioning well now \par bilateral heels are dry, with stable eschar, offloading with soft boots\par sacral improved greatly, yellow slough and brown eschar are lifting - using santyl\par left ischium is being vac'd, wound clean and pink, improving\par left posterior calf wound has healed - apply cavilon and leave open to air\par \par 12/29/21\par sacral wound improving, using vac, undermining of 1.5 at 12 0'clock, straight depth 1.5, wound edge 2-4 o'clock and 10 0'clock has brown/black tissue, wound base pink and clean\par right ischium continues to improve, 0.8 cm, packing with alginate\par heels left healed, right heel stable eschar , offloading with boots and has group 2 mattress\par daughter reports mom is c/o being uncomfortable and in pain a lot, using tylenol with minimal relief, gives her 1/2 oxycodone with some relief\par \par 1/21/22\par sacral wound improved , depth is now 0.9 cm, undermining present from 10 - 2 o'clock of 1.3 cm\par right heel with stable eschar that is starting to lift, no signs of periwound erythema, stable and clean appears to be ready to fall off\par 2/18/22\par sacral wound improved, depth 0.5 cm, undermining remains at 10 - 2 0'clock, vac has still been on\par right heel scab has fallen off, wound healed\par appetite good\par

## 2022-02-22 NOTE — PHYSICAL EXAM
[Skin Ulcer] : ulcer [Alert] : alert [Oriented to Person] : oriented to person [Oriented to Place] : oriented to place [Calm] : calm [Please See PDF for [employer]] : Please See PDF for [employer]. [Oriented to Time] : disoriented to time [de-identified] : elderly female, pleasant, well groomed, lying in bed, NAD

## 2022-02-23 ENCOUNTER — NON-APPOINTMENT (OUTPATIENT)
Age: 87
End: 2022-02-23

## 2022-02-23 ENCOUNTER — APPOINTMENT (OUTPATIENT)
Dept: HOME HEALTH SERVICES | Facility: HOME HEALTH | Age: 87
End: 2022-02-23

## 2022-02-23 RX ORDER — AMPICILLIN 500 MG/1
500 CAPSULE ORAL
Qty: 28 | Refills: 0 | Status: DISCONTINUED | COMMUNITY
Start: 2020-03-12 | End: 2022-02-23

## 2022-02-23 NOTE — HISTORY OF PRESENT ILLNESS
[Family Member] : family member [FreeTextEntry1] : debility CHF   sacral  wounds   [FreeTextEntry2] : Patient denies fever, cough, trouble breathing, rash, vomiting and diarrhea. Patient has not been in close contact with someone Covid positive.\par N95 mask ,gloves and eyewear used during visit  Total  face to  face time  is  30 minutes \par interval events reviewed   and addressed \par patient is homebound due  debility CHF   sacral  wounds  \par  most of the history obtained from family member \par 89 y/o woman whom over past few months has gone from being ambulatory to bedbound, due to prolonged hospital, rehab. stay, which has left her completely deconditioned and with several wounds due to pressure.\par Pt. broke her leg in june 2021, was in [facility] - then rehab.  unable to do PT, eating poorly, worsening overall including SOB, which necessitated a thoracentesis, due to Afib, and heart failure, on supplemental O2.\par Wounds include bilateral heel DTI's, \par left posterior leg wound, sacral unstageable,  rt. ishium stage 4\par  patient  still follows with   specialists \par diet regular  appetite   ok needs  supplements \par dysphagia  yes to pills \par Weight  stable now\par constipation yes \par incontinence  patient has sales \par pressure/bed sores yes \par Ambulates none bedbound \par falls none \par Behavior very  anxious  on meds  at times agitated  \par Mood ok \par  memory  ok  declining  \par  sleep  ok \par sensory deficits   vision ok hard of hearing \par pain yes \par Medication refills done and reconciliation  done \par Goals of care discussed and completed \par

## 2022-02-23 NOTE — PHYSICAL EXAM
[Normal Sclera/Conjunctiva] : normal sclera/conjunctiva [No JVD] : no jugular venous distention [Breast Exam Declined] : patient declined to have breast exam done [Non Tender] : non-tender [Patient Refused] : rectal exam was refused by the patient [No CVA Tenderness] : no ~M costovertebral angle tenderness [No Motor Deficits] : the motor exam was normal [Oriented x3] : oriented to person, place, and time [de-identified] : comfortable  cooperative   but  frail  [de-identified] : lest sided crackles  poor effort   [de-identified] :  softly distended   right  inguinal hernia   [de-identified] :  irregular 3/6  murmur   1 pedal edema   [de-identified] :  mónica  [de-identified] :  nonambulatory  [de-identified] : sacral wound improving, using vac, undermining of 1.5 at 12 0'clock, straight depth 1.5, wound edge 2-4 o'clock and 10 0'clock has brown/black tissue, wound base pink and clean\par right ischium continues to improve, 0.8 cm, packing with alginate\par heels left healed, right heel stable eschar , offloading with boots and has group 2 mattress  venous  stasis  hyperpigmentation  dry skin but no ulceration   long elongated  brittle nails  [de-identified] : anxious

## 2022-02-23 NOTE — CHRONIC CARE ASSESSMENT
[PPS Score: ____] : Palliative Performance Scale (PPS) Score: [unfilled] [FAST Score: ____] : Functional Assessment Scale (FAST) Score: [unfilled] [Class III] : New [employer] Heart Association Class Output: Class III [Can not Exercise (Disability)] : Exercise: The patient can not exercise due to disability [General Adherence] : and is generally adherent

## 2022-02-23 NOTE — COUNSELING
[Normal Weight - ( BMI  <25 )] : normal weight - ( BMI  <25 ) [DASH diet recommended] : DASH diet recommended [Smoke/CO Detectors] : smoke/CO detectors [Use assistive device to avoid falls] : use assistive device to avoid falls [] : diabetic screening [Decrease stress] : decrease stress [Decrease hospital use] : decrease hospital use [Minimize unnecessary interventions] : minimize unnecessary interventions [Comfort Care] : comfort care [Discussed disease trajectory with patient/caregiver] : discussed disease trajectory with patient/caregiver [Patient/Caregiver has ___ understanding of disease process] : patient/caregiver has [unfilled] understanding of disease process [Completed Medical Orders for Life-Sustaining Treatment] : completed medical orders for life-sustaining treatment [DNR] : Code Status: DNR [Limited] : Treatment Guidelines: Limited [DNI] : Intubation: DNI [[facility]: _____] [facility]/last [facility] date: [unfilled] [_____] : HCP: [unfilled]

## 2022-02-25 ENCOUNTER — NON-APPOINTMENT (OUTPATIENT)
Age: 87
End: 2022-02-25

## 2022-02-25 RX ORDER — CEFADROXIL 500 MG/1
500 CAPSULE ORAL TWICE DAILY
Qty: 20 | Refills: 0 | Status: COMPLETED | COMMUNITY
Start: 2021-11-16 | End: 2022-02-25

## 2022-02-25 NOTE — H&P ADULT - [id number]_GEN_A_CS
----- Message from Brandon Jewell RN sent at 8/25/2021 11:20 AM CDT -----  Pt due for physical and f/u labs from August. No

## 2022-02-28 ENCOUNTER — TRANSCRIPTION ENCOUNTER (OUTPATIENT)
Age: 87
End: 2022-02-28

## 2022-02-28 ENCOUNTER — NON-APPOINTMENT (OUTPATIENT)
Age: 87
End: 2022-02-28

## 2022-03-01 ENCOUNTER — APPOINTMENT (OUTPATIENT)
Dept: HOME HEALTH SERVICES | Facility: HOME HEALTH | Age: 87
End: 2022-03-01
Payer: MEDICARE

## 2022-03-01 VITALS
DIASTOLIC BLOOD PRESSURE: 70 MMHG | HEART RATE: 68 BPM | SYSTOLIC BLOOD PRESSURE: 120 MMHG | OXYGEN SATURATION: 96 % | TEMPERATURE: 98.2 F

## 2022-03-01 DIAGNOSIS — T83.511A INFECTION AND INFLAMMATORY REACTION DUE TO INDWELLING URETHRAL CATHETER, INITIAL ENCOUNTER: ICD-10-CM

## 2022-03-01 DIAGNOSIS — N39.0 INFECTION AND INFLAMMATORY REACTION DUE TO INDWELLING URETHRAL CATHETER, INITIAL ENCOUNTER: ICD-10-CM

## 2022-03-01 DIAGNOSIS — Z86.19 PERSONAL HISTORY OF OTHER INFECTIOUS AND PARASITIC DISEASES: ICD-10-CM

## 2022-03-01 PROCEDURE — 99350 HOME/RES VST EST HIGH MDM 60: CPT

## 2022-03-01 NOTE — REASON FOR VISIT
[Follow-Up] : a follow-up visit [Formal Caregiver] : formal caregiver [Pre-Visit Preparation] : pre-visit preparation was done [Intercurrent Specialty/Sub-specialty Visits] : the patient has intercurrent specialty/sub-specialty visits

## 2022-03-01 NOTE — HISTORY OF PRESENT ILLNESS
[Family Member] : family member [FreeTextEntry1] : debility CHF   bedbound  frequent UTI  [FreeTextEntry2] : Patient denies fever, cough, trouble breathing, rash, vomiting and diarrhea. Patient has not been in close contact with someone Covid positive.\par N95 mask \par interval events reviewed   and addressed \par  most of the history obtained from family member  daughter who has lots of concerns  \par \par chronic UTI and bladder spasms  daughter in active communication with urology and patient on antibiotics now \par  chronic constipation  on regimen of miralax  patient is having  soft  3 BM  a day  on average  daugher is concerned  about  stomach  distention multiple   Xrays showed stools   advised on enema senokot tea and colace daughter not in agreement \par  ear wax  using debrox   but patient not  comfortable  with  cleaning  via a spray bottle   will defer   for now  \par   scab on  back of head  persistent for long time  given advise to daughter  on how to  arrange a dermatology viist  \par  patient  still follows with   specialists \par diet regular  appetite   ok needs  supplements \par dysphagia  yes to pills \par Weight  stable now\par Ambulates none bedbound \par falls none \par Behavior very  anxious  on meds  at times agitated  \par Mood ok \par  memory  ok  declining  \par  sleep  ok \par sensory deficits   vision ok hard of hearing \par pain yes \par Medication refills done and reconciliation  done \par Goals of care discussed and completed \par

## 2022-03-01 NOTE — PHYSICAL EXAM
[Normal Sclera/Conjunctiva] : normal sclera/conjunctiva [No JVD] : no jugular venous distention [Breast Exam Declined] : patient declined to have breast exam done [Non Tender] : non-tender [Patient Refused] : rectal exam was refused by the patient [No CVA Tenderness] : no ~M costovertebral angle tenderness [No Motor Deficits] : the motor exam was normal [Oriented x3] : oriented to person, place, and time [Normal Outer Ear/Nose] : the ears and nose were normal in appearance [No Respiratory Distress] : no respiratory distress [Clear to Auscultation] : lungs were clear to auscultation bilaterally [de-identified] : comfortable  cooperative   but  frail   occipital area of scalp  large flat  dry  scab  with  edges easily peeled off    matted hair  [de-identified] : bilateral loose wax  but TM visualized   r [de-identified] :   poor effort    but    unlabored breathing  [de-identified] :  irregular 3/6  murmur   1 pedal edema   [de-identified] :  softly distended   right  inguinal hernia   [de-identified] :  mónica  [de-identified] :  nonambulatory  [de-identified] : sacral wound improving as per nella  and not examined  today since wound nurse  involved in care , using vac, undermining of 1.5 at 12 0'clock, straight depth 1.5, wound edge 2-4 o'clock and 10 0'clock has brown/black tissue, wound base pink and clean\par right ischium continues to improve, 0.8 cm, packing with alginate\par heels left healed, right heel stable eschar , offloading with boots and has group 2 mattress  venous  stasis  hyperpigmentation  dry skin but no ulceration   long elongated  brittle nails  [de-identified] : anxious

## 2022-03-01 NOTE — COUNSELING
[Normal Weight - ( BMI  <25 )] : normal weight - ( BMI  <25 ) [DASH diet recommended] : DASH diet recommended [Smoke/CO Detectors] : smoke/CO detectors [Use assistive device to avoid falls] : use assistive device to avoid falls [] : diabetic screening [Decrease stress] : decrease stress [Decrease hospital use] : decrease hospital use [Minimize unnecessary interventions] : minimize unnecessary interventions [Comfort Care] : comfort care [Discussed disease trajectory with patient/caregiver] : discussed disease trajectory with patient/caregiver [Patient/Caregiver has ___ understanding of disease process] : patient/caregiver has [unfilled] understanding of disease process [Completed Medical Orders for Life-Sustaining Treatment] : completed medical orders for life-sustaining treatment [DNR] : Code Status: DNR [Limited] : Treatment Guidelines: Limited [DNI] : Intubation: DNI [[facility] Date: _____] [facility]/last verification date: [unfilled] [_____] : HCP: [unfilled]

## 2022-03-02 ENCOUNTER — APPOINTMENT (OUTPATIENT)
Dept: UROLOGY | Facility: CLINIC | Age: 87
End: 2022-03-02
Payer: MEDICARE

## 2022-03-02 PROCEDURE — 99443: CPT | Mod: 95

## 2022-03-04 ENCOUNTER — TRANSCRIPTION ENCOUNTER (OUTPATIENT)
Age: 87
End: 2022-03-04

## 2022-03-05 LAB
APPEARANCE: CLEAR
BACTERIA: NEGATIVE
BILIRUBIN URINE: NEGATIVE
BLOOD URINE: NEGATIVE
COLOR: NORMAL
GLUCOSE QUALITATIVE U: NEGATIVE
HYALINE CASTS: 1 /LPF
KETONES URINE: NEGATIVE
LEUKOCYTE ESTERASE URINE: ABNORMAL
MICROSCOPIC-UA: NORMAL
NITRITE URINE: NEGATIVE
PH URINE: 7
PROTEIN URINE: NEGATIVE
RED BLOOD CELLS URINE: 4 /HPF
SPECIFIC GRAVITY URINE: 1.01
SQUAMOUS EPITHELIAL CELLS: 1 /HPF
UROBILINOGEN URINE: NORMAL
WHITE BLOOD CELLS URINE: 3 /HPF

## 2022-03-06 NOTE — HISTORY OF PRESENT ILLNESS
[FreeTextEntry1] : Ms. Venegas is a 89 year old woman here for a follow up of nocturia, microscopic hematuria, urethral caruncle.Patient believes she makes less urine in 24-hour period than previously.  Her age says that one given a diuretic.  Shea good quantity of urine.  The patient says that she generally makes it to the bathroom in time, though she must tarry which takes the diuretic. The patient complained about a foul order from the urine. The patient does not have dysuria.  There is no gross hematuria.  The patient does not push or strain to urinate so sometimes she has to increase her abdominal pressure to defecate. Patient uses a walker.  The patient also stated that she is having right lower abdominal pain and discomfort.  He corresponds to a bulge of increasing size in the right lower quadrant.  The patient has bilateral hearing aids and wears eye glasses.\par 7/3/18: The patient returned today and reported she has nocturia and wakes up every 2 hours to urinate. She broke her femur and a tibial fracture. \par 7/16/18: The patient returned and reported she has the urge to urinate but ends up having a bowel movement instead. Daughter noted bowels are large. Patient noted she has a hard time urinating. Wakes up 3-4 times during the night to urinate, noted it is sometimes just "drops of urine." Patient is more disorientated and confused, I advised her to consult with a neurologist. \par 8/30/18: The patient returned today and reported she is feeling adequate. Patient denies dysuria, gross hematuria, urgency, or incontinence. Wakes up 2-3 times during the night to urinate. \par 6/4/19: Patient presents today for a follow up. Today she notes that her urination is "okay". She does not leak during the day but still wears depends since she is still concerned about incontinence. At night, she wakes up when she has to urinate but due to mobility issues is not always able to make it to bathroom. Diarrhea is reported on occasion. Mirtazapine has been initiated by another provider and it is reported that is has decreased her nocturia from 4x a night to once a night. \par 05/26/2020: Patient presents today for a follow up. Pt ambulates in a wheelchair and is able to go short distances with a walker. Nocturia x1-2 usually, but voided unusually frequently last night. Denies hematuria,or burning. No symptoms in March. Reports issues with short term, but not long term memory. Has been consulting with PCP and testing treatments regarding memory. Reports occasional stomach pains.\par \par 06/12/2020: Pt contacted at [phone]. Gave permission to conduct a Telephone visit. Urine culture through the [facility] lab from 06/01/2020 grew 75 colony forming units per mL of Pseudomonas Aeruginosa. 1-5 Epithelial cells per field. Anything greater than 3 is abnormal. There were 3-10 WBC per high power field. Anything greater than 3 is abnormal. Bacteria were heavy. Pt had frequency and nocturia. She complained it was difficulty to sleep at night. Pt report she is feeling urgency when waking up to use the bathroom. She has not been leaking as much, or wetting herself. Her output is also not as great. Nocturia x3, or about every 2-3 hours. Pt is still on Cefpodoxime, but she will run out by 06/16/2020. Urine has become slightly cloudy, whereas it had been clear previously. Had not been using Melatonin at night. Has been taking Mirtazapine. \par \par 08/31/2021: The patient gave permission for a telephone visit. I spoke to patient's daughter Tabatha. She reports that on 6/4/21, after patient got off commode, her legs gave out and she broke both her shins, and has been immobilized since then. A few weeks ago, she had orders to move legs slightly. Daughter concerned because she has had Reaves in her since she has not been standing which is changed once a month. Was told once she had a slight rash in the vulva and buttocks, but has not heard about a rash since then. Memory has been okay. Daughter will send over recent lab work for review. She states WBC was 5.57. Daughter states patient's urine is clear. Patient is receiving physical therapy twice a week and daughter will try to find more help. \par \par 11/16/2021: The patient gave permission for a telehealth visit. Daughter states patient has been home for the past 3 weeks. Was previously in the hospital for a bad wound and pulmonary congestion and had been on antibiotics. Reviewed 11/3/21 urine tests. UA showed 432 WBC/HPF. Previously on 5/5/21 showed only 1 WBC/HPF and 8/9/20 showed no WBC, all were clean catches. Urine culture showed >= 3 organisms. Daughter notes patient does have a cough. Reaves catheter is draining okay. Daughter notes there is occasionally clusters of cloudiness draining more than before. \par \par 11/23/2021: The patient gave permission for a telehealth visit. I spoke with the daughter. She states patient has had issues with heart rate and oxygen which has been better, but did have acute episode of frequent BMs. Has been taking course of Cefadroxil 500mg BID. \par \par 12/22/2021: The patient gave permission for a telehealth visit. I spoke with patient's daughter Tabatha. Patient has been okay although feeling tired. Urine is clear. Completed course of Levofloxacin 500mg x 14 days and daughter notes that patient had leg pain for several days in a row. Never had a fever. Currently wears a catheter and has catheter changed by homecare nurses. \par \par 03/02/2022: The patient gave permission for a telehealth visit. I spoke with patient's daughter. Daughter states patient is doing okay currently. Daughter states she does not think patient was symptomatic for UTI but did have two episodes of bladder spasms which has not happened in the last 5 days. Last course of antibiotics was in 12/2021. Most recently last week she was put on Augmentin. Urine culture 2/11/22 grew >100,000 CFU/mL Acinetobacter baumannii which was susceptible to Augmentin. Last catheter change was less than 2 weeks ago when she also provided a urine sample before starting antibiotics. Has not had fever and urine looked clear. Leaks around Reaves catheter occasionally. Drains above 1500cc of urine a day. Wound is healing. Had 2/28/22 abdominal US for abdominal distention which was technically difficult due to gas, but showed mild thickening of the gallbladder wall with no evidence of cholelithiasis or acute cholecystitis. Small right pleural effusion. No hydronephrosis or renal calcification bilaterally.

## 2022-03-06 NOTE — ASSESSMENT
[FreeTextEntry1] : Ms. Venegas is a 89 year old woman here for a follow up of nocturia, microscopic hematuria, urethral caruncle.Patient believes she makes less urine in 24-hour period than previously.  Her age says that one given a diuretic.  Shea good quantity of urine.  The patient says that she generally makes it to the bathroom in time, though she must tarry which takes the diuretic. The patient complained about a foul order from the urine. The patient does not have dysuria.  There is no gross hematuria.  The patient does not push or strain to urinate so sometimes she has to increase her abdominal pressure to defecate. Patient uses a walker.  The patient also stated that she is having right lower abdominal pain and discomfort.  He corresponds to a bulge of increasing size in the right lower quadrant.  The patient has bilateral hearing aids and wears eye glasses.\par 7/3/18: The patient returned today and reported she has nocturia and wakes up every 2 hours to urinate. She broke her femur and a tibial fracture. \par 7/16/18: The patient returned and reported she has the urge to urinate but ends up having a bowel movement instead. Daughter noted bowels are large. Patient noted she has a hard time urinating. Wakes up 3-4 times during the night to urinate, noted it is sometimes just "drops of urine." Patient is more disorientated and confused, I advised her to consult with a neurologist. \par 8/30/18: The patient returned today and reported she is feeling adequate. Patient denies dysuria, gross hematuria, urgency, or incontinence. Wakes up 2-3 times during the night to urinate. \par 6/4/19: Patient presents today for a follow up. Today she notes that her urination is "okay". She does not leak during the day but still wears depends since she is still concerned about incontinence. At night, she wakes up when she has to urinate but due to mobility issues is not always able to make it to bathroom. Diarrhea is reported on occasion. Mirtazapine has been initiated by another provider and it is reported that is has decreased her nocturia from 4x a night to once a night. The patient produced a catheterized urine sample which will be sent for urinalysis, urine cytology, and urine culture.Upon completion of a physical exam it is determined that vaginitis is present. Therefore Fluconazole will be prescribed at this time. I will provide her with 1 500 mg tablet that will be taken one time only due to her use of Warfarin. It is advised that she switch to cotton underwear during the day and if leakage is still reported then she can switch back to wearing the depends.  The patient is to follow up in 3 weeks or sooner if clinically indicated. \par 05/26/2020: Patient presents today for a follow up. Pt ambulates in a wheelchair and is able to go short distances with a walker. Nocturia x1-2 usually, but voided unusually frequently last night. Denies hematuria,or burning. No symptoms in March. Reports issues with short term, but not long term memory. Has been consulting with PCP and testing treatments regarding memory. Reports occasional stomach pains. On physical exam patient had no erythema of vaginal mucosa, no vaginal discharge and no prolapse. Pt will provide a urine sample today for culture and analysis. Pt received a catheterization of urine today for total volume of 180mL. PVR not needed despite pt concerns. \par \par 06/12/2020: Pt contacted at [phone]. Gave permission to conduct a Telephone visit. Urine culture through the [place] lab from 06/01/2020 grew 75 colony forming units per mL of Pseudomonas Aeruginosa. 1-5 Epithelial cells per field. Anything greater than 3 is abnormal. There were 3-10 WBC per high power field. Anything greater than 3 is abnormal. Bacteria were heavy. Pt had frequency and nocturia. She complained it was difficulty to sleep at night. Pt report she is feeling urgency when waking up to use the bathroom. She has not been leaking as much, or wetting herself. Her output is also not as great. Nocturia x3, or about every 2-3 hours. Pt is still on Cefpodoxime, but she will run out by 06/16/2020. Urine has become slightly cloudy, whereas it had been clear previously. Had not been using Melatonin at night. Has been taking Mirtazapine. \par \par 08/31/2021: The patient gave permission for a telephone visit. I spoke to patient's daughter Tabatha. She reports that on 6/4/21, after patient got off commode, her legs gave out and she broke both her shins, and has been immobilized since then. A few weeks ago, she had orders to move legs slightly. Daughter concerned because she has had Reaves in her since she has not been standing which is changed once a month. Was told once she had a slight rash in the vulva and buttocks, but has not heard about a rash since then. Memory has been okay. Daughter will send over recent lab work for review. She states WBC was 5.57. Daughter states patient's urine is clear. Patient is receiving physical therapy twice a week and daughter will try to find more help. \par \par 11/16/2021: The patient gave permission for a telehealth visit. Daughter states patient has been home for the past 3 weeks. Was previously in the hospital for a bad wound and pulmonary congestion and had been on antibiotics. Reviewed 11/3/21 urine tests. UA showed 432 WBC/HPF. Previously on 5/5/21 showed only 1 WBC/HPF and 8/9/20 showed no WBC, all were clean catches. Urine culture showed >= 3 organisms. Daughter notes patient does have a cough. Reaves catheter is draining okay. Daughter notes there is occasionally clusters of cloudiness draining more than before. \par \par 11/23/2021: The patient gave permission for a telehealth visit. I spoke with the daughter. She states patient has had issues with heart rate and oxygen which has been better, but did have acute episode of frequent BMs. Has been taking course of Cefadroxil 500mg BID. \par \par 12/22/2021: The patient gave permission for a telehealth visit. I spoke with patient's daughter Tabatha. Patient has been okay although feeling tired. Urine is clear. Completed course of Levofloxacin 500mg x 14 days and daughter notes that patient had leg pain for several days in a row. Never had a fever. Currently has a Reaves catheter and has catheter changed by home care nurses. \par \par 03/02/2022: The patient gave permission for a telehealth visit. I spoke with patient's daughter. Daughter states patient is doing okay currently. Daughter states she does not think patient was symptomatic for UTI but did have two episodes of bladder spasms which has not happened in the last 5 days. Last course of antibiotics was in 12/2021. Most recently last week she was put on Augmentin. Urine culture 2/11/22 grew >100,000 CFU/mL Acinetobacter baumannii which was susceptible to Augmentin. Last catheter change was less than 2 weeks ago when she also provided a urine sample before starting antibiotics. Has not had fever and urine looked clear. Leaks around Reaves catheter occasionally. Drains above 1500cc of urine a day. Wound is healing. Had 2/28/22 abdominal US for abdominal distention which was technically difficult due to gas, but showed mild thickening of the gallbladder wall with no evidence of cholelithiasis or acute cholecystitis. Small right pleural effusion. No hydronephrosis or renal calcification bilaterally. \par \par Reviewed interpretation of 2/28/22 US abdomen. \par \par Recommend patient provide a catheterized urine specimen now to see what her colonizing bacteria is now so that if she becomes symptomatic, I will know what antibiotic to prescribe. I would not treat as she is not symptomatic and urine looks clear. \par \par Patient should keep Reaves and continue to change it monthly. \par \par Schedule telehealth video visit in 2 months for follow up. \par \par Duration of audio-only telehealth appointment was: 22  minutes

## 2022-03-06 NOTE — ADDENDUM
[FreeTextEntry1] : I, Isa Vincentin, acted solely as a scribe for Dr. Indio Sexton on this date 03/02/2022.\par \par All medical record entries made by the Scribe were at my, Dr. Indio Sexton, direction and personally dictated by me on 03/02/2022. I have reviewed the chart and agree that the record accurately reflects my personal performance of the history, physical exam, assessment and plan.  I have also personally directed, reviewed and agreed with the chart.

## 2022-03-07 ENCOUNTER — APPOINTMENT (OUTPATIENT)
Dept: DERMATOLOGY | Facility: CLINIC | Age: 87
End: 2022-03-07
Payer: MEDICARE

## 2022-03-07 PROCEDURE — 99213 OFFICE O/P EST LOW 20 MIN: CPT | Mod: 95

## 2022-03-07 NOTE — ASSESSMENT
[FreeTextEntry1] : ?neoplasm\par \par uncertain at this point\par pt's daughter states it's all scab and not a pigmented lesion\par discussed there is a rather high likelihood of skin ca given the history but it could be a benign growth that is exacerbated by chronic pressure\par it is impossible to ascertain via telemedicine\par discussed with pt\par try to offload pressure with foam donut pillow for now\par consider coming in for firm dx\par \par This visit was conducted via live synchronous audio/video telehealth with the patient who attested to being located in [facility] where the provider is licensed to practice medicine.\par

## 2022-03-07 NOTE — HISTORY OF PRESENT ILLNESS
[FreeTextEntry1] : spot on back of head [de-identified] : spot on back of head\par x mos\par worsening\par has had a small bump there in the past - but was told not an issue from a local derm\par but it has evolved considerably\par has a scab around it

## 2022-03-08 LAB — BACTERIA UR CULT: ABNORMAL

## 2022-03-16 ENCOUNTER — APPOINTMENT (OUTPATIENT)
Dept: UROLOGY | Facility: CLINIC | Age: 87
End: 2022-03-16
Payer: MEDICARE

## 2022-03-16 PROCEDURE — 99443: CPT | Mod: 95

## 2022-03-19 NOTE — HISTORY OF PRESENT ILLNESS
[FreeTextEntry1] : Ms. Venegas is a 89 year old woman here for a follow up of nocturia, microscopic hematuria, urethral caruncle.Patient believes she makes less urine in 24-hour period than previously.  Her age says that one given a diuretic.  Shea good quantity of urine.  The patient says that she generally makes it to the bathroom in time, though she must tarry which takes the diuretic. The patient complained about a foul order from the urine. The patient does not have dysuria.  There is no gross hematuria.  The patient does not push or strain to urinate so sometimes she has to increase her abdominal pressure to defecate. Patient uses a walker.  The patient also stated that she is having right lower abdominal pain and discomfort.  He corresponds to a bulge of increasing size in the right lower quadrant.  The patient has bilateral hearing aids and wears eye glasses.\par 7/3/18: The patient returned today and reported she has nocturia and wakes up every 2 hours to urinate. She broke her femur and a tibial fracture. \par 7/16/18: The patient returned and reported she has the urge to urinate but ends up having a bowel movement instead. Daughter noted bowels are large. Patient noted she has a hard time urinating. Wakes up 3-4 times during the night to urinate, noted it is sometimes just "drops of urine." Patient is more disorientated and confused, I advised her to consult with a neurologist. \par 8/30/18: The patient returned today and reported she is feeling adequate. Patient denies dysuria, gross hematuria, urgency, or incontinence. Wakes up 2-3 times during the night to urinate. \par 6/4/19: Patient presents today for a follow up. Today she notes that her urination is "okay". She does not leak during the day but still wears depends since she is still concerned about incontinence. At night, she wakes up when she has to urinate but due to mobility issues is not always able to make it to bathroom. Diarrhea is reported on occasion. Mirtazapine has been initiated by another provider and it is reported that is has decreased her nocturia from 4x a night to once a night. \par 05/26/2020: Patient presents today for a follow up. Pt ambulates in a wheelchair and is able to go short distances with a walker. Nocturia x1-2 usually, but voided unusually frequently last night. Denies hematuria,or burning. No symptoms in March. Reports issues with short term, but not long term memory. Has been consulting with PCP and testing treatments regarding memory. Reports occasional stomach pains.\par \par 06/12/2020: Pt contacted at [phone]. Gave permission to conduct a Telephone visit. Urine culture through the [place] lab from 06/01/2020 grew 75 colony forming units per mL of Pseudomonas Aeruginosa. 1-5 Epithelial cells per field. Anything greater than 3 is abnormal. There were 3-10 WBC per high power field. Anything greater than 3 is abnormal. Bacteria were heavy. Pt had frequency and nocturia. She complained it was difficulty to sleep at night. Pt report she is feeling urgency when waking up to use the bathroom. She has not been leaking as much, or wetting herself. Her output is also not as great. Nocturia x3, or about every 2-3 hours. Pt is still on Cefpodoxime, but she will run out by 06/16/2020. Urine has become slightly cloudy, whereas it had been clear previously. Had not been using Melatonin at night. Has been taking Mirtazapine. \par \par 08/31/2021: The patient gave permission for a telephone visit. I spoke to patient's daughter Tabatah. She reports that on 6/4/21, after patient got off commode, her legs gave out and she broke both her shins, and has been immobilized since then. A few weeks ago, she had orders to move legs slightly. Daughter concerned because she has had Reaves in her since she has not been standing which is changed once a month. Was told once she had a slight rash in the vulva and buttocks, but has not heard about a rash since then. Memory has been okay. Daughter will send over recent lab work for review. She states WBC was 5.57. Daughter states patient's urine is clear. Patient is receiving physical therapy twice a week and daughter will try to find more help. \par \par 11/16/2021: The patient gave permission for a telehealth visit. Daughter states patient has been home for the past 3 weeks. Was previously in the hospital for a bad wound and pulmonary congestion and had been on antibiotics. Reviewed 11/3/21 urine tests. UA showed 432 WBC/HPF. Previously on 5/5/21 showed only 1 WBC/HPF and 8/9/20 showed no WBC, all were clean catches. Urine culture showed >= 3 organisms. Daughter notes patient does have a cough. Reaves catheter is draining okay. Daughter notes there is occasionally clusters of cloudiness draining more than before. \par \par 11/23/2021: The patient gave permission for a telehealth visit. I spoke with the daughter. She states patient has had issues with heart rate and oxygen which has been better, but did have acute episode of frequent BMs. Has been taking course of Cefadroxil 500mg BID. \par \par 12/22/2021: The patient gave permission for a telehealth visit. I spoke with patient's daughter Tabatha. Patient has been okay although feeling tired. Urine is clear. Completed course of Levofloxacin 500mg x 14 days and daughter notes that patient had leg pain for several days in a row. Never had a fever. Currently wears a catheter and has catheter changed by homecare nurses. \par \par 03/02/2022: The patient gave permission for a telehealth visit. I spoke with patient's daughter. Daughter states patient is doing okay currently. Daughter states she does not think patient was symptomatic for UTI but did have two episodes of bladder spasms which has not happened in the last 5 days. Last course of antibiotics was in 12/2021. Most recently last week she was put on Augmentin. Urine culture 2/11/22 grew >100,000 CFU/mL Acinetobacter baumannii which was susceptible to Augmentin. Last catheter change was less than 2 weeks ago when she also provided a urine sample before starting antibiotics. Has not had fever and urine looked clear. Leaks around Reaves catheter occasionally. Drains above 1500cc of urine a day. Wound is healing. Had 2/28/22 abdominal US for abdominal distention which was technically difficult due to gas, but showed mild thickening of the gallbladder wall with no evidence of cholelithiasis or acute cholecystitis. Small right pleural effusion. No hydronephrosis or renal calcification bilaterally. \par \par 03/16/2022: The patient gave permission for a telehealth visit. I spoke to patient's daughter Tabatha. Patient is on indwelling Reaves catheter. Reviewed 3/4/22 UA which showed WBC 4/HPF, RBC/HPF, leukocyte esterase small, bacteria none, specific gravity 1.010. Urine culture grew >100,000 CFU/mL Acinetobacter baumannii/nosocom group (carbapenem resistant). There is no need to treat at this time, but if needed would give amikacin. Ampicillin susceptible but unclear to me if a carbapenem resistant ampicillin abx would be adequate. I would discuss with ID consultant if treatment were indicated. House call physician Dr. Gillian Farmer ordered CMP 2/4/22 total protein slightly low 5.5, albumin normal 3.6, ALT low 8, otherwise normal, creatinine 0.54 normal and low due to patient's small body mass. 12/30/21 weight measurement 125 lbs, 5' 5", body surface area 1.62 m^2, BMI 20.8. Patient cannot get out of bed due to CHF. States she has not been able to irrigate the catheter although still draining. Occasionally leaks a little urine around the catheter. Daughter states patient is constantly feeling pressure to urinate and spasm and has some pain with catheter. Patient is able to remember the people around her. Still has open wound on back which is improving, reports it is about 1/4 inch in size.

## 2022-03-19 NOTE — ASSESSMENT
[FreeTextEntry1] : Ms. Venegas is a 89 year old woman here for a follow up of nocturia, microscopic hematuria, urethral caruncle.Patient believes she makes less urine in 24-hour period than previously.  Her age says that one given a diuretic.  Shea good quantity of urine.  The patient says that she generally makes it to the bathroom in time, though she must tarry which takes the diuretic. The patient complained about a foul order from the urine. The patient does not have dysuria.  There is no gross hematuria.  The patient does not push or strain to urinate so sometimes she has to increase her abdominal pressure to defecate. Patient uses a walker.  The patient also stated that she is having right lower abdominal pain and discomfort.  He corresponds to a bulge of increasing size in the right lower quadrant.  The patient has bilateral hearing aids and wears eye glasses.\par 7/3/18: The patient returned today and reported she has nocturia and wakes up every 2 hours to urinate. She broke her femur and a tibial fracture. \par 7/16/18: The patient returned and reported she has the urge to urinate but ends up having a bowel movement instead. Daughter noted bowels are large. Patient noted she has a hard time urinating. Wakes up 3-4 times during the night to urinate, noted it is sometimes just "drops of urine." Patient is more disorientated and confused, I advised her to consult with a neurologist. \par 8/30/18: The patient returned today and reported she is feeling adequate. Patient denies dysuria, gross hematuria, urgency, or incontinence. Wakes up 2-3 times during the night to urinate. \par 6/4/19: Patient presents today for a follow up. Today she notes that her urination is "okay". She does not leak during the day but still wears depends since she is still concerned about incontinence. At night, she wakes up when she has to urinate but due to mobility issues is not always able to make it to bathroom. Diarrhea is reported on occasion. Mirtazapine has been initiated by another provider and it is reported that is has decreased her nocturia from 4x a night to once a night. The patient produced a catheterized urine sample which will be sent for urinalysis, urine cytology, and urine culture.Upon completion of a physical exam it is determined that vaginitis is present. Therefore Fluconazole will be prescribed at this time. I will provide her with 1 500 mg tablet that will be taken one time only due to her use of Warfarin. It is advised that she switch to cotton underwear during the day and if leakage is still reported then she can switch back to wearing the depends.  The patient is to follow up in 3 weeks or sooner if clinically indicated. \par 05/26/2020: Patient presents today for a follow up. Pt ambulates in a wheelchair and is able to go short distances with a walker. Nocturia x1-2 usually, but voided unusually frequently last night. Denies hematuria,or burning. No symptoms in March. Reports issues with short term, but not long term memory. Has been consulting with PCP and testing treatments regarding memory. Reports occasional stomach pains. On physical exam patient had no erythema of vaginal mucosa, no vaginal discharge and no prolapse. Pt will provide a urine sample today for culture and analysis. Pt received a catheterization of urine today for total volume of 180mL. PVR not needed despite pt concerns. \par \par 06/12/2020: Pt contacted at [phone]. Gave permission to conduct a Telephone visit. Urine culture through the [facility] lab from 06/01/2020 grew 75 colony forming units per mL of Pseudomonas Aeruginosa. 1-5 Epithelial cells per field. Anything greater than 3 is abnormal. There were 3-10 WBC per high power field. Anything greater than 3 is abnormal. Bacteria were heavy. Pt had frequency and nocturia. She complained it was difficulty to sleep at night. Pt report she is feeling urgency when waking up to use the bathroom. She has not been leaking as much, or wetting herself. Her output is also not as great. Nocturia x3, or about every 2-3 hours. Pt is still on Cefpodoxime, but she will run out by 06/16/2020. Urine has become slightly cloudy, whereas it had been clear previously. Had not been using Melatonin at night. Has been taking Mirtazapine. \par \par 08/31/2021: The patient gave permission for a telephone visit. I spoke to patient's daughter Tabatha. She reports that on 6/4/21, after patient got off commode, her legs gave out and she broke both her shins, and has been immobilized since then. A few weeks ago, she had orders to move legs slightly. Daughter concerned because she has had Reaves in her since she has not been standing which is changed once a month. Was told once she had a slight rash in the vulva and buttocks, but has not heard about a rash since then. Memory has been okay. Daughter will send over recent lab work for review. She states WBC was 5.57. Daughter states patient's urine is clear. Patient is receiving physical therapy twice a week and daughter will try to find more help. \par \par 11/16/2021: The patient gave permission for a telehealth visit. Daughter states patient has been home for the past 3 weeks. Was previously in the hospital for a bad wound and pulmonary congestion and had been on antibiotics. Reviewed 11/3/21 urine tests. UA showed 432 WBC/HPF. Previously on 5/5/21 showed only 1 WBC/HPF and 8/9/20 showed no WBC, all were clean catches. Urine culture showed >= 3 organisms. Daughter notes patient does have a cough. Reaves catheter is draining okay. Daughter notes there is occasionally clusters of cloudiness draining more than before. \par \par 11/23/2021: The patient gave permission for a telehealth visit. I spoke with the daughter. She states patient has had issues with heart rate and oxygen which has been better, but did have acute episode of frequent BMs. Has been taking course of Cefadroxil 500mg BID. \par \par 12/22/2021: The patient gave permission for a telehealth visit. I spoke with patient's daughter Tabatha. Patient has been okay although feeling tired. Urine is clear. Completed course of Levofloxacin 500mg x 14 days and daughter notes that patient had leg pain for several days in a row. Never had a fever. Currently has a Reaves catheter and has catheter changed by home care nurses. \par \par 03/02/2022: The patient gave permission for a telehealth visit. I spoke with patient's daughter. Daughter states patient is doing okay currently. Daughter states she does not think patient was symptomatic for UTI but did have two episodes of bladder spasms which has not happened in the last 5 days. Last course of antibiotics was in 12/2021. Most recently last week she was put on Augmentin. Urine culture 2/11/22 grew >100,000 CFU/mL Acinetobacter baumannii which was susceptible to Augmentin. Last catheter change was less than 2 weeks ago when she also provided a urine sample before starting antibiotics. Has not had fever and urine looked clear. Leaks around Reaves catheter occasionally. Drains above 1500cc of urine a day. Wound is healing. Had 2/28/22 abdominal US for abdominal distention which was technically difficult due to gas, but showed mild thickening of the gallbladder wall with no evidence of cholelithiasis or acute cholecystitis. Small right pleural effusion. No hydronephrosis or renal calcification bilaterally. \par \par 03/16/2022: The patient gave permission for a telehealth visit. I spoke to patient's daughter Tabatha. Patient is on indwelling Reaves catheter. Reviewed 3/4/22 UA which showed WBC 4/HPF, RBC/HPF, leukocyte esterase small, bacteria none, specific gravity 1.010. Urine culture grew >100,000 CFU/mL Acinetobacter baumannii/nosocom group (carbapenem resistant). There is no need to treat at this time, but if needed would give amikacin. Ampicillin susceptible but unclear to me if a carbapenem resistant ampicillin abx would be adequate. I would discuss with ID consultant if treatment were indicated. House call physician Dr. Gillian Farmer ordered CMP 2/4/22 total protein slightly low 5.5, albumin normal 3.6, ALT low 8, otherwise normal, creatinine 0.54 normal and low due to patient's small body mass. 12/30/21 weight measurement 125 lbs, 5' 5", body surface area 1.62 m^2, BMI 20.8. Patient cannot get out of bed due to CHF. States she has not been able to irrigate the catheter although still draining. Occasionally leaks a little urine around the catheter. Daughter states patient is constantly feeling pressure to urinate and spasm and has some pain with catheter. Patient is able to remember the people around her. Still has open wound on back which is improving, reports it is about 1/4 inch in size. \par \par Reviewed dermatology note of 3/7/22. \par \par Reviewed 3/4/22 UA and urine culture and CMP of 2/4/22 with patient's daughter which are WNL. \par \par We discussed removing the catheter and instead wearing a diaper to collect the urine. For now, we will leave Reaves catheter. We will wait until sacral ulcer is improved until submitting another urine specimen. \par \par Reaves catheter should be changed once a month. \par \par Schedule telehealth visit in 2 months for follow up. \par \par Duration of audio-only telehealth appointment was: 22 minutes

## 2022-03-19 NOTE — ADDENDUM
[FreeTextEntry1] : I, Isa Vincentin, acted solely as a scribe for Dr. Indio Sexton on this date 03/16/2022.\par \par All medical record entries made by the Scribe were at my, Dr. Indio Sexton, direction and personally dictated by me on 03/16/2022. I have reviewed the chart and agree that the record accurately reflects my personal performance of the history, physical exam, assessment and plan.  I have also personally directed, reviewed and agreed with the chart.

## 2022-04-03 ENCOUNTER — TRANSCRIPTION ENCOUNTER (OUTPATIENT)
Age: 87
End: 2022-04-03

## 2022-04-04 ENCOUNTER — TRANSCRIPTION ENCOUNTER (OUTPATIENT)
Age: 87
End: 2022-04-04

## 2022-04-05 ENCOUNTER — APPOINTMENT (OUTPATIENT)
Dept: HOME HEALTH SERVICES | Facility: HOME HEALTH | Age: 87
End: 2022-04-05
Payer: MEDICARE

## 2022-04-05 ENCOUNTER — NON-APPOINTMENT (OUTPATIENT)
Age: 87
End: 2022-04-05

## 2022-04-05 VITALS — SYSTOLIC BLOOD PRESSURE: 126 MMHG | OXYGEN SATURATION: 95 % | DIASTOLIC BLOOD PRESSURE: 72 MMHG

## 2022-04-05 DIAGNOSIS — L29.0 PRURITUS ANI: ICD-10-CM

## 2022-04-05 DIAGNOSIS — N28.1 CYST OF KIDNEY, ACQUIRED: ICD-10-CM

## 2022-04-05 DIAGNOSIS — R68.89 OTHER GENERAL SYMPTOMS AND SIGNS: ICD-10-CM

## 2022-04-05 DIAGNOSIS — L89.311 PRESSURE ULCER OF RIGHT BUTTOCK, STAGE 1: ICD-10-CM

## 2022-04-05 DIAGNOSIS — R10.31 RIGHT LOWER QUADRANT PAIN: ICD-10-CM

## 2022-04-05 DIAGNOSIS — R82.81 PYURIA: ICD-10-CM

## 2022-04-05 DIAGNOSIS — M47.9 SPONDYLOSIS, UNSPECIFIED: ICD-10-CM

## 2022-04-05 PROCEDURE — 99349 HOME/RES VST EST MOD MDM 40: CPT | Mod: 95

## 2022-04-05 RX ORDER — AMOXICILLIN AND CLAVULANATE POTASSIUM 250; 62.5 MG/5ML; MG/5ML
250-62.5 FOR SUSPENSION ORAL
Qty: 1 | Refills: 0 | Status: DISCONTINUED | COMMUNITY
Start: 2022-02-23 | End: 2022-04-05

## 2022-04-05 NOTE — ED ADULT TRIAGE NOTE - NS ED TRIAGE AVPU SCALE
Please see treatment note section for full evaluation.   Alert-The patient is alert, awake and responds to voice. The patient is oriented to time, place, and person. The triage nurse is able to obtain subjective information.

## 2022-04-05 NOTE — COUNSELING
[Normal Weight - ( BMI  <25 )] : normal weight - ( BMI  <25 ) [DASH diet recommended] : DASH diet recommended [Smoke/CO Detectors] : smoke/CO detectors [Use assistive device to avoid falls] : use assistive device to avoid falls [] : diabetic screening [Decrease stress] : decrease stress [Decrease hospital use] : decrease hospital use [Minimize unnecessary interventions] : minimize unnecessary interventions [Comfort Care] : comfort care [Discussed disease trajectory with patient/caregiver] : discussed disease trajectory with patient/caregiver [Patient/Caregiver has ___ understanding of disease process] : patient/caregiver has [unfilled] understanding of disease process [Completed Medical Orders for Life-Sustaining Treatment] : completed medical orders for life-sustaining treatment [DNR] : Code Status: DNR [Limited] : Treatment Guidelines: Limited [DNI] : Intubation: DNI [[facility]: _____] [facility]/last verification date: [unfilled] [_____] : HCP: [unfilled]

## 2022-04-07 PROBLEM — R68.89 SUSPECTED DEEP TISSUE INJURY OF UNKNOWN DEPTH OF HEEL: Status: RESOLVED | Noted: 2021-11-05 | Resolved: 2022-04-07

## 2022-04-07 NOTE — HISTORY OF PRESENT ILLNESS
[Family Member] : family member [FreeTextEntry1] : debility CHF   bedbound  frequent UTI  [FreeTextEntry2] : Details:\par \par CLAUDIO MCDOWELL is being seen for a visit provided via telehealth. Real-time audio visual technology was provided using [email or web address] \par [employer] technology was not used it was due to inability to connect via [employer] technology.\par \par CLAUDIO MCDOWELL (Jun 22 1931) or his/her representative was educated about potential risks associated with any technology used while obtaining care through telehealth during this public health emergency. CLAUDIO MCDOWELL (Jun 22 1931) or his/her representative was educated that this Informed Consent for Telehealth Services During a Public Health Emergency will remain in effect solely during the term of the public health emergency. CLAUDIO MCDOWELL (Jun 22 1931) or his/her representative has verbally consented to the use of telehealth on 04/05/2022  1:26PM.\par \par CLAUDIO MCDOWELL was located at their home, [address] [place] [address]\[place] [address], at the time of the visit.\par The [facility] clinician, STEPHAN DIXON, was located remotely at their home in [place] at the time of the visit. \par The patient, CLAUDIO MCDOWELL, and the [facility] clinician, STEPHAN DIXON, participated in the telehealth encounter.\par Other participants included: daughter Tabatha and aide La \par  interval events reviewed   and addressed recent radiology reviewed \par discussed with patient /caretakers   at length and in detail \par  most of the history obtained from family member  daughter who has lots of concerns  \par  patient is  seen today for an acute visit  for  chronic constipation  on regimen of miralax  patient is having  soft  3 BM  a day  on average  daughter is concerned  a   Xrays showed large  stools   in rectosigmoid   advised on enema senokot tea and colace \par  CXR showed no PNA patient on Robitussin   coughs but no SOB \par   scab on  back of head  persistent for long time patient  had been  seen by  dermatologist but  no  followup   arranged   given advise to daughter  on how to  arrange a dermatology visit   in person \par  also patient  has  a lesion on elbow   wich on the video looks like a boil \par diet regular  appetite   ok needs  supplements  \par Behavior very  anxious  on meds  at times agitated  \par Mood ok \par  memory  ok  declining  \par  sleep  ok \par sensory deficits   vision ok hard of hearing \par pain yes \par Medication refills done and reconciliation  done \par Goals of care unchanged \par

## 2022-04-07 NOTE — PATIENT PROFILE ADULT - LIVING ENVIRONMENT
For information on Fall & Injury Prevention, visit: [email or web address] OR  [email or web address] OR  https://www.cdc.gov/steadi/patient.html
no

## 2022-04-07 NOTE — PHYSICAL EXAM
[Normal Sclera/Conjunctiva] : normal sclera/conjunctiva [Normal Outer Ear/Nose] : the ears and nose were normal in appearance [No JVD] : no jugular venous distention [No Respiratory Distress] : no respiratory distress [Clear to Auscultation] : lungs were clear to auscultation bilaterally [Breast Exam Declined] : patient declined to have breast exam done [Non Tender] : non-tender [Patient Refused] : rectal exam was refused by the patient [No CVA Tenderness] : no ~M costovertebral angle tenderness [No Motor Deficits] : the motor exam was normal [Oriented x3] : oriented to person, place, and time [de-identified] : comfortable  cooperative  vitals done by aide     [de-identified] :   poor effort    but    unlabored breathing  [de-identified] :  irregular 3/6  murmur   1 pedal edema   [de-identified] :  softly distended   right  inguinal hernia   [de-identified] :  mónica  [de-identified] :  nonambulatory  [de-identified] : anxious  [de-identified] :  elbow pustule  sacral wound not seen  daugher  and not examined  today since wound nurse  involved in care , using vac, undermining of 1.5 at 12 0'clock, straight depth 1.5, wound edge 2-4 o'clock and 10 0'clock has brown/black tissue, wound base pink and clean\par right ischium continues to improve, 0.8 cm, packing with alginate\par heels left healed, right heel stable eschar , offloading with boots and has group 2 mattress  venous  stasis  hyperpigmentation  dry skin but no ulceration   long elongated  brittle nails

## 2022-04-08 ENCOUNTER — NON-APPOINTMENT (OUTPATIENT)
Age: 87
End: 2022-04-08

## 2022-04-13 ENCOUNTER — APPOINTMENT (OUTPATIENT)
Dept: HOME HEALTH SERVICES | Facility: HOME HEALTH | Age: 87
End: 2022-04-13
Payer: MEDICARE

## 2022-04-13 VITALS
SYSTOLIC BLOOD PRESSURE: 130 MMHG | HEART RATE: 64 BPM | OXYGEN SATURATION: 96 % | TEMPERATURE: 97.8 F | RESPIRATION RATE: 16 BRPM | DIASTOLIC BLOOD PRESSURE: 78 MMHG

## 2022-04-13 DIAGNOSIS — H61.23 IMPACTED CERUMEN, BILATERAL: ICD-10-CM

## 2022-04-13 PROCEDURE — 99350 HOME/RES VST EST HIGH MDM 60: CPT

## 2022-04-13 NOTE — COUNSELING
[Normal Weight - ( BMI  <25 )] : normal weight - ( BMI  <25 ) [DASH diet recommended] : DASH diet recommended [Smoke/CO Detectors] : smoke/CO detectors [Use assistive device to avoid falls] : use assistive device to avoid falls [] : diabetic screening [Decrease stress] : decrease stress [Decrease hospital use] : decrease hospital use [Minimize unnecessary interventions] : minimize unnecessary interventions [Comfort Care] : comfort care [Discussed disease trajectory with patient/caregiver] : discussed disease trajectory with patient/caregiver [Patient/Caregiver has ___ understanding of disease process] : patient/caregiver has [unfilled] understanding of disease process [Completed Medical Orders for Life-Sustaining Treatment] : completed medical orders for life-sustaining treatment [DNR] : Code Status: DNR [Limited] : Treatment Guidelines: Limited [DNI] : Intubation: DNI [Last [facility] Date: _____] [facility] Completion/last verification date: [unfilled] [_____] : HCP: [unfilled]

## 2022-04-18 PROBLEM — H61.23 BILATERAL IMPACTED CERUMEN: Status: ACTIVE | Noted: 2022-03-01

## 2022-04-18 NOTE — PHYSICAL EXAM
[Normal Sclera/Conjunctiva] : normal sclera/conjunctiva [Normal Outer Ear/Nose] : the ears and nose were normal in appearance [No JVD] : no jugular venous distention [No Respiratory Distress] : no respiratory distress [Clear to Auscultation] : lungs were clear to auscultation bilaterally [Breast Exam Declined] : patient declined to have breast exam done [Non Tender] : non-tender [Patient Refused] : rectal exam was refused by the patient [No CVA Tenderness] : no ~M costovertebral angle tenderness [No Motor Deficits] : the motor exam was normal [Oriented x3] : oriented to person, place, and time [de-identified] : comfortable  cooperative   but  frail   occipital area of scalp  large flat  dry  scab  with  edges easily peeled off    matted hair  [de-identified] : bilateral loose wax  but TM visualized   r [de-identified] :   poor effort    but    unlabored breathing  [de-identified] :  irregular 3/6  murmur   1 pedal edema   [de-identified] :  softly distended   right  inguinal hernia   [de-identified] :  sales  in place  [de-identified] :  nonambulatory  [de-identified] : sacral wound improving  clean  use of barrier team stressed  \par right ischium continues to improve, 0.8 cm, packing with alginate\par heels left healed, right heel stable eschar , offloading with boots and has group 2 mattress  venous  stasis  hyperpigmentation  dry skin but no ulceration   long elongated  brittle nails  [de-identified] : anxious

## 2022-04-18 NOTE — HISTORY OF PRESENT ILLNESS
[Family Member] : family member [FreeTextEntry1] : debility CHF   bedbound  frequent UTI  [FreeTextEntry2] : Patient denies fever, cough, trouble breathing, rash, vomiting and diarrhea. Patient has not been in close contact with someone Covid positive.\par N95 mask \par interval events reviewed   and addressed \par  most of the history obtained from family member  daughter who has lots of concerns   mostly  chronic and addressed  previously \par  \par  again  complaints of  and  unpredictable  chronic constipation  on regimen of miralax  patient is having  soft  3 BM  a day  on average  daugher is concerned  about  stomach  distention multiple   Xrays showed large amount of  stools   advised on enema senokot tea and colace daughter not in agreement  but will consider \par  ear wax  unchanged  not issues  today \par   scab on  back of head  persistent for long time  given advise to daughter  on how to  arrange a dermatology viist  which she did but refusing  to take  mom for biopsy  \par  patient  still follows with   specialists \par diet regular  appetite   ok needs  supplements \par dysphagia  yes to pills \par Weight  stable now\par Ambulates none bedbound \par falls none \par Behavior very  anxious  on meds  at times agitated  \par Mood ok \par  memory  ok  declining  \par  sleep  ok \par sensory deficits   vision ok hard of hearing \par pain yes \par Medication refills done and reconciliation  done \par Goals of care discussed and completed \par

## 2022-04-25 ENCOUNTER — NON-APPOINTMENT (OUTPATIENT)
Age: 87
End: 2022-04-25

## 2022-05-06 ENCOUNTER — APPOINTMENT (OUTPATIENT)
Dept: WOUND CARE | Facility: CLINIC | Age: 87
End: 2022-05-06
Payer: MEDICARE

## 2022-05-06 PROCEDURE — 99212 OFFICE O/P EST SF 10 MIN: CPT | Mod: 95

## 2022-05-10 NOTE — HISTORY OF PRESENT ILLNESS
[Home] : at home, [unfilled] , at the time of the visit. [Medical Office: ([facility])___] : at the medical office located in  [Family Member] : family member [Formal Caregiver] : formal caregiver [Other:____] : [unfilled] [Verbal consent obtained from patient] : the patient, [unfilled] [FreeTextEntry4] : Terrance NP [FreeTextEntry1] : 89 y/o woman whom over past few months has gone from being ambulatory to bedbound, due to prolonged hospital, rehab. stay, which has left her completely deconditioned and with several wounds due to pressure.\par Pt. broke her leg in june 2021, was in [facility] - then rehab. leg was casted according to daughter, july transferred to assisted living for 6 weeks, with visiting nurse service coming in, was sitting for prolonged periods at that time, (prior to this pt. was walking), then after developing a gluteal wound was sent back to hospital for debridement, ([facility], then transferred to rehab. in [place], unable to do PT, eating poorly, worsening overall including SOB, which necessitated a thoracentesis, due to Afib, and heart failure, on supplemental O2.\par Wounds include bilateral heel DTI's, \par left posterior leg wound, \par sacral unstageable, \par  rt. ishium stage 4.\par Eating poorly, discussed supplements with daughter \par has no offloading in place\par using offloading boots for feet

## 2022-05-10 NOTE — PLAN
[FreeTextEntry1] : 5/6/22\par Plan - offload head, wash scalp wound soap and water, adaptic, aquacel/4x4, secure with either cordelia around head or foam adhesive\par discussed that scalp wound if does not improve may need bx. from derm.\par sacral - c/w aquacel/foam, offload area\par follow up 2-3 weeks

## 2022-05-10 NOTE — PHYSICAL EXAM
Please check pmpd [Skin Ulcer] : ulcer [Alert] : alert [Oriented to Person] : oriented to person [Oriented to Place] : oriented to place [Calm] : calm [Please See PDF for [employer]] : Please See PDF for [employer]. [Oriented to Time] : disoriented to time [de-identified] : elderly female, pleasant, well groomed, lying in bed, NAD

## 2022-05-10 NOTE — ASSESSMENT
[FreeTextEntry1] : 89 y/o woman whom over past few months has gone from being ambulatory to bedbound, due to prolonged hospital, rehab. stay, which has left her completely deconditioned and with several wounds due to pressure. Pt. broke her leg in june 2021, was in [facility] - then rehab. leg was casted according to daughter, july transferred to assisted living for 6 weeks, with visiting nurse service coming in, was sitting for prolonged periods at that time, (prior to this pt. was walking), then after developing a gluteal wound was sent back to hospital for debridement, [facility] ([facility]), then transferred to rehab. in [place], unable to do PT, eating poorly, worsening overall including SOB, which necessitated a thoracentesis, due to Afib, and heart failure, on supplemental O2. Wounds include bilateral heel DTI's,  left posterior leg wound,  sacral unstageable,   rt. ishium stage 4. Eating poorly, discussed supplements with daughter  has no offloading in place using offloading boots for feet.\par \par 11/23/21\par Daughter reports mom (pt.) is lethargic, poor appetite, currently being treated for UTI, giving her juliano supplements, group 2 mattress had some issues, company serviced bed, and is functioning well now \par bilateral heels are dry, with stable eschar, offloading with soft boots\par sacral improved greatly, yellow slough and brown eschar are lifting - using santyl\par left ischium is being vac'd, wound clean and pink, improving\par left posterior calf wound has healed - apply cavilon and leave open to air\par \par 12/29/21\par sacral wound improving, using vac, undermining of 1.5 at 12 0'clock, straight depth 1.5, wound edge 2-4 o'clock and 10 0'clock has brown/black tissue, wound base pink and clean\par right ischium continues to improve, 0.8 cm, packing with alginate\par heels left healed, right heel stable eschar , offloading with boots and has group 2 mattress\par daughter reports mom is c/o being uncomfortable and in pain a lot, using tylenol with minimal relief, gives her 1/2 oxycodone with some relief\par \par 1/21/22\par sacral wound improved , depth is now 0.9 cm, undermining present from 10 - 2 o'clock of 1.3 cm\par right heel with stable eschar that is starting to lift, no signs of periwound erythema, stable and clean appears to be ready to fall off\par 2/18/22\par sacral wound improved, depth 0.5 cm, undermining remains at 10 - 2 0'clock, vac has still been on\par right heel scab has fallen off, wound healed\par appetite good\par 5/6/22\par not seen since 2/2022\par sacral treated with aquacel, improved, 0.5 cm depth\par appetite good \par scalp wound - ? from pressure, denies trauma to area, area moist, darkened today, had been hypergranular and pink prior\par is offloading area, small - moderate clear drainage\par

## 2022-05-27 NOTE — PHYSICAL THERAPY INITIAL EVALUATION ADULT - RISK REDUCTION/PREVENTION, PT EVAL
CASE MANAGEMENT INITIAL ASSESSMENT      Reviewed chart and completed assessment with patient:  Family present: None  Explained Case Management role/services. To daughter over the phone    Primary contact information:See below    Health Care Decision Maker :   Primary Decision Maker: Jalyn Parks - [phone]    Secondary Decision Maker: Gilson Powell - [phone]          Can this person be reached and be able to respond quickly, such as within a few minutes or hours? Yes  Admit date/status:inpatient 5/27/2022  Diagnosis:NSTEMI   Is this a Readmission?:  No      Insurance:Medicare    Precert required for SNF: No       3 night stay required: Yes    Living arrangements, Adls, care needs, prior to admission:Lives at home with daughter and son in law     [facility] at home:  Walker_x_Cane__RTS__ BSC__Shower Chair__  02__ HHN__ CPAP__  [facility]__ W/C__x_ Other_____    Services in the home and/or outpatient, prior to admission:Active with [facility] for skilled nursing    Current PCP:Leilani Kahn                                Medications:Has coverage reports no barriers    Transportation needs: Family    PT/OT recs:Not seen yet this admission    Hospital Exemption Notification (HEN):N/A    Barriers to discharge:None    Plan/comments:Covid positive. From home with daughter and son in law. Actove with [facility]. Mild dementia per daughter. Plan is to return home when stable.  Cardiology consulted for elevated troponins    ECOC on chart for MD signature risk factors

## 2022-06-04 LAB
APPEARANCE: CLEAR
BACTERIA: NEGATIVE
BILIRUBIN URINE: NEGATIVE
BLOOD URINE: NEGATIVE
COLOR: NORMAL
GLUCOSE QUALITATIVE U: NEGATIVE
HYALINE CASTS: 5 /LPF
KETONES URINE: NEGATIVE
LEUKOCYTE ESTERASE URINE: ABNORMAL
MICROSCOPIC-UA: NORMAL
NITRITE URINE: POSITIVE
PH URINE: 6.5
PROTEIN URINE: NEGATIVE
RED BLOOD CELLS URINE: 1 /HPF
SPECIFIC GRAVITY URINE: 1.01
SQUAMOUS EPITHELIAL CELLS: 1 /HPF
UROBILINOGEN URINE: NORMAL
WHITE BLOOD CELLS URINE: 16 /HPF

## 2022-06-06 LAB — BACTERIA UR CULT: ABNORMAL

## 2022-06-07 ENCOUNTER — APPOINTMENT (OUTPATIENT)
Dept: UROLOGY | Facility: CLINIC | Age: 87
End: 2022-06-07
Payer: MEDICARE

## 2022-06-07 LAB — URINE CYTOLOGY: NORMAL

## 2022-06-07 PROCEDURE — 99443: CPT | Mod: 95

## 2022-06-09 ENCOUNTER — NON-APPOINTMENT (OUTPATIENT)
Age: 87
End: 2022-06-09

## 2022-06-10 ENCOUNTER — APPOINTMENT (OUTPATIENT)
Dept: HOME HEALTH SERVICES | Facility: HOME HEALTH | Age: 87
End: 2022-06-10

## 2022-06-10 ENCOUNTER — LABORATORY RESULT (OUTPATIENT)
Age: 87
End: 2022-06-10

## 2022-06-10 ENCOUNTER — NON-APPOINTMENT (OUTPATIENT)
Age: 87
End: 2022-06-10

## 2022-06-11 LAB
25(OH)D3 SERPL-MCNC: 76.9 NG/ML
ALBUMIN SERPL ELPH-MCNC: 4.3 G/DL
ALP BLD-CCNC: 104 U/L
ALT SERPL-CCNC: 20 U/L
ANION GAP SERPL CALC-SCNC: 12 MMOL/L
AST SERPL-CCNC: 28 U/L
BILIRUB SERPL-MCNC: 0.4 MG/DL
BUN SERPL-MCNC: 30 MG/DL
CALCIUM SERPL-MCNC: 9.9 MG/DL
CHLORIDE SERPL-SCNC: 103 MMOL/L
CO2 SERPL-SCNC: 27 MMOL/L
CREAT SERPL-MCNC: 0.67 MG/DL
EGFR: 83 ML/MIN/1.73M2
GLUCOSE SERPL-MCNC: 118 MG/DL
POTASSIUM SERPL-SCNC: 3.9 MMOL/L
PREALB SERPL NEPH-MCNC: 22 MG/DL
PROT SERPL-MCNC: 6.5 G/DL
SODIUM SERPL-SCNC: 141 MMOL/L
TSH SERPL-ACNC: 2.9 UIU/ML
VIT B12 SERPL-MCNC: 1182 PG/ML

## 2022-06-13 ENCOUNTER — TRANSCRIPTION ENCOUNTER (OUTPATIENT)
Age: 87
End: 2022-06-13

## 2022-06-13 LAB
BASOPHILS # BLD AUTO: 0.08 K/UL
BASOPHILS NFR BLD AUTO: 1.8 %
EOSINOPHIL # BLD AUTO: 0.04 K/UL
EOSINOPHIL NFR BLD AUTO: 0.9 %
HCT VFR BLD CALC: 38.5 %
HGB BLD-MCNC: 12.6 G/DL
LYMPHOCYTES # BLD AUTO: 1.27 K/UL
LYMPHOCYTES NFR BLD AUTO: 28.9 %
MAN DIFF?: NORMAL
MCHC RBC-ENTMCNC: 31.4 PG
MCHC RBC-ENTMCNC: 32.7 GM/DL
MCV RBC AUTO: 96 FL
MONOCYTES # BLD AUTO: 0.92 K/UL
MONOCYTES NFR BLD AUTO: 21 %
NEUTROPHILS # BLD AUTO: 2.08 K/UL
NEUTROPHILS NFR BLD AUTO: 47.4 %
PLATELET # BLD AUTO: 89 K/UL
RBC # BLD: 4.01 M/UL
RBC # FLD: 14.6 %
WBC # FLD AUTO: 4.39 K/UL

## 2022-06-13 NOTE — ASSESSMENT
[FreeTextEntry1] : Ms. Venegas is a 89 year old woman here for a follow up of nocturia, microscopic hematuria, urethral caruncle.Patient believes she makes less urine in 24-hour period than previously.  Her age says that one given a diuretic.  Shea good quantity of urine.  The patient says that she generally makes it to the bathroom in time, though she must tarry which takes the diuretic. The patient complained about a foul order from the urine. The patient does not have dysuria.  There is no gross hematuria.  The patient does not push or strain to urinate so sometimes she has to increase her abdominal pressure to defecate. Patient uses a walker.  The patient also stated that she is having right lower abdominal pain and discomfort.  He corresponds to a bulge of increasing size in the right lower quadrant.  The patient has bilateral hearing aids and wears eye glasses.\par 7/3/18: The patient returned today and reported she has nocturia and wakes up every 2 hours to urinate. She broke her femur and a tibial fracture. \par 7/16/18: The patient returned and reported she has the urge to urinate but ends up having a bowel movement instead. Daughter noted bowels are large. Patient noted she has a hard time urinating. Wakes up 3-4 times during the night to urinate, noted it is sometimes just "drops of urine." Patient is more disorientated and confused, I advised her to consult with a neurologist. \par 8/30/18: The patient returned today and reported she is feeling adequate. Patient denies dysuria, gross hematuria, urgency, or incontinence. Wakes up 2-3 times during the night to urinate. \par 6/4/19: Patient presents today for a follow up. Today she notes that her urination is "okay". She does not leak during the day but still wears depends since she is still concerned about incontinence. At night, she wakes up when she has to urinate but due to mobility issues is not always able to make it to bathroom. Diarrhea is reported on occasion. Mirtazapine has been initiated by another provider and it is reported that is has decreased her nocturia from 4x a night to once a night. The patient produced a catheterized urine sample which will be sent for urinalysis, urine cytology, and urine culture.Upon completion of a physical exam it is determined that vaginitis is present. Therefore Fluconazole will be prescribed at this time. I will provide her with 1 500 mg tablet that will be taken one time only due to her use of Warfarin. It is advised that she switch to cotton underwear during the day and if leakage is still reported then she can switch back to wearing the depends.  The patient is to follow up in 3 weeks or sooner if clinically indicated. \par 05/26/2020: Patient presents today for a follow up. Pt ambulates in a wheelchair and is able to go short distances with a walker. Nocturia x1-2 usually, but voided unusually frequently last night. Denies hematuria,or burning. No symptoms in March. Reports issues with short term, but not long term memory. Has been consulting with PCP and testing treatments regarding memory. Reports occasional stomach pains. On physical exam patient had no erythema of vaginal mucosa, no vaginal discharge and no prolapse. Pt will provide a urine sample today for culture and analysis. Pt received a catheterization of urine today for total volume of 180mL. PVR not needed despite pt concerns. \par \par 06/12/2020: Pt contacted at [phone]. Gave permission to conduct a Telephone visit. Urine culture through the [facility] lab from 06/01/2020 grew 75 colony forming units per mL of Pseudomonas Aeruginosa. 1-5 Epithelial cells per field. Anything greater than 3 is abnormal. There were 3-10 WBC per high power field. Anything greater than 3 is abnormal. Bacteria were heavy. Pt had frequency and nocturia. She complained it was difficulty to sleep at night. Pt report she is feeling urgency when waking up to use the bathroom. She has not been leaking as much, or wetting herself. Her output is also not as great. Nocturia x3, or about every 2-3 hours. Pt is still on Cefpodoxime, but she will run out by 06/16/2020. Urine has become slightly cloudy, whereas it had been clear previously. Had not been using Melatonin at night. Has been taking Mirtazapine. \par \par 08/31/2021: The patient gave permission for a telephone visit. I spoke to patient's daughter Tabatha. She reports that on 6/4/21, after patient got off commode, her legs gave out and she broke both her shins, and has been immobilized since then. A few weeks ago, she had orders to move legs slightly. Daughter concerned because she has had Sales in her since she has not been standing which is changed once a month. Was told once she had a slight rash in the vulva and buttocks, but has not heard about a rash since then. Memory has been okay. Daughter will send over recent lab work for review. She states WBC was 5.57. Daughter states patient's urine is clear. Patient is receiving physical therapy twice a week and daughter will try to find more help. \par \par 11/16/2021: The patient gave permission for a telehealth visit. Daughter states patient has been home for the past 3 weeks. Was previously in the hospital for a bad wound and pulmonary congestion and had been on antibiotics. Reviewed 11/3/21 urine tests. UA showed 432 WBC/HPF. Previously on 5/5/21 showed only 1 WBC/HPF and 8/9/20 showed no WBC, all were clean catches. Urine culture showed >= 3 organisms. Daughter notes patient does have a cough. Sales catheter is draining okay. Daughter notes there is occasionally clusters of cloudiness draining more than before. \par \par 11/23/2021: The patient gave permission for a telehealth visit. I spoke with the daughter. She states patient has had issues with heart rate and oxygen which has been better, but did have acute episode of frequent BMs. Has been taking course of Cefadroxil 500mg BID. \par \par 12/22/2021: The patient gave permission for a telehealth visit. I spoke with patient's daughter Tabatha. Patient has been okay although feeling tired. Urine is clear. Completed course of Levofloxacin 500mg x 14 days and daughter notes that patient had leg pain for several days in a row. Never had a fever. Currently has a Sales catheter and has catheter changed by home care nurses. \par \par 03/02/2022: The patient gave permission for a telehealth visit. I spoke with patient's daughter. Daughter states patient is doing okay currently. Daughter states she does not think patient was symptomatic for UTI but did have two episodes of bladder spasms which has not happened in the last 5 days. Last course of antibiotics was in 12/2021. Most recently last week she was put on Augmentin. Urine culture 2/11/22 grew >100,000 CFU/mL Acinetobacter baumannii which was susceptible to Augmentin. Last catheter change was less than 2 weeks ago when she also provided a urine sample before starting antibiotics. Has not had fever and urine looked clear. Leaks around Sales catheter occasionally. Drains above 1500cc of urine a day. Wound is healing. Had 2/28/22 abdominal US for abdominal distention which was technically difficult due to gas, but showed mild thickening of the gallbladder wall with no evidence of cholelithiasis or acute cholecystitis. Small right pleural effusion. No hydronephrosis or renal calcification bilaterally. \par \par 03/16/2022: The patient gave permission for a telehealth visit. I spoke to patient's daughter Tabatha. Patient is on indwelling Sales catheter. Reviewed 3/4/22 UA which showed WBC 4/HPF, RBC/HPF, leukocyte esterase small, bacteria none, specific gravity 1.010. Urine culture grew >100,000 CFU/mL Acinetobacter baumannii/nosocom group (carbapenem resistant). There is no need to treat at this time, but if needed would give amikacin. Ampicillin susceptible but unclear to me if a carbapenem resistant ampicillin abx would be adequate. I would discuss with ID consultant if treatment were indicated. House call physician Dr. Gillian Farmer ordered CMP 2/4/22 total protein slightly low 5.5, albumin normal 3.6, ALT low 8, otherwise normal, creatinine 0.54 normal and low due to patient's small body mass. 12/30/21 weight measurement 125 lbs, 5' 5", body surface area 1.62 m^2, BMI 20.8. Patient cannot get out of bed due to CHF. States she has not been able to irrigate the catheter although still draining. Occasionally leaks a little urine around the catheter. Daughter states patient is constantly feeling pressure to urinate and spasm and has some pain with catheter. Patient is able to remember the people around her. Still has open wound on back which is improving, reports it is about 1/4 inch in size. \par \par Reviewed dermatology note of 3/7/22. \par Reviewed 3/4/22 UA and urine culture and CMP of 2/4/22 with patient's daughter which are WNL. \par We discussed removing the catheter and instead wearing a diaper to collect the urine. For now, we will leave Sales catheter. We will wait until sacral ulcer is improved until submitting another urine specimen. \par Sales catheter should be changed once a month. \par Schedule telehealth visit in 2 months for follow up. \par \par 06/07/2022:  is scheduled today for a follow up telephone visit for which she gave permission for. The visit was conducted with her daughter. Her daughter denies the patient having any pelvic pain. Last week she was experiencing more frequent painful spasms with the catheter. Patient's skin is very fragile. She reports the patient is very conscious of the urge she has to urinate. Pt is able to eat on her own. The patient's daughter reports a lesion on the back of her moms head, about the size of a quarter, that continues to scab but not fully healing. She is unable to find a dermatologist or wound care specialist that will come and evaluate her. Had a telehealth visit with a dermatologist that will not get back to her. \par \par Reviewed and discussed urine test results from 6/3/2022. Urine culture of that date grew 10,000-49,000 CFU/ml enterococcus faecalis and klebsiella oxytoca/ Raoutella ornithinolytica. Will not treat at current time but if sales is removed, treat 4-5 days before removal and then after catheter removal for 5-6 days. I informed the patient's daughter that most likely if we removed the sales, she would urinate on a pad or diaper and have to make sure her skin remains dry and intact. I informed the patient's daughter that it is up to her, I would be happy to try and remove the catheter and see how she does without it or just leave it in. It was my recommendation that, although a chronic sales poses a risk of urinary tract infection, I would leave it in as there is a risk of skin breakdown when she urinates in a pad and her skin is very fragile. 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 The patient's daughter opted to leave the catheter in. \par \par It was my recommendation that the patient's daughter contact the dermatologist who provided her with a telehealth visit. I reviewed the notes of both the dermatology and the wound care people. In it they do not provide a differential diagnosis. The dermatology resident mentioned that it is most likely skin cancer worsened by chronic pressure. Unable to give diagnosis without biopsy and in person evaluation. Images were sent to attending dermatologist . advised the daughter to call and ask for the opinion of .  \par \par Patient will have her catheter changed in one months time. \par \par Duration of telephone visit: 30 minutes.

## 2022-06-13 NOTE — HISTORY OF PRESENT ILLNESS
[FreeTextEntry1] : Ms. Venegas is a 89 year old woman here for a follow up of nocturia, microscopic hematuria, urethral caruncle.Patient believes she makes less urine in 24-hour period than previously.  Her age says that one given a diuretic.  Shea good quantity of urine.  The patient says that she generally makes it to the bathroom in time, though she must tarry which takes the diuretic. The patient complained about a foul order from the urine. The patient does not have dysuria.  There is no gross hematuria.  The patient does not push or strain to urinate so sometimes she has to increase her abdominal pressure to defecate. Patient uses a walker.  The patient also stated that she is having right lower abdominal pain and discomfort.  He corresponds to a bulge of increasing size in the right lower quadrant.  The patient has bilateral hearing aids and wears eye glasses.\par 7/3/18: The patient returned today and reported she has nocturia and wakes up every 2 hours to urinate. She broke her femur and a tibial fracture. \par 7/16/18: The patient returned and reported she has the urge to urinate but ends up having a bowel movement instead. Daughter noted bowels are large. Patient noted she has a hard time urinating. Wakes up 3-4 times during the night to urinate, noted it is sometimes just "drops of urine." Patient is more disorientated and confused, I advised her to consult with a neurologist. \par 8/30/18: The patient returned today and reported she is feeling adequate. Patient denies dysuria, gross hematuria, urgency, or incontinence. Wakes up 2-3 times during the night to urinate. \par 6/4/19: Patient presents today for a follow up. Today she notes that her urination is "okay". She does not leak during the day but still wears depends since she is still concerned about incontinence. At night, she wakes up when she has to urinate but due to mobility issues is not always able to make it to bathroom. Diarrhea is reported on occasion. Mirtazapine has been initiated by another provider and it is reported that is has decreased her nocturia from 4x a night to once a night. \par 05/26/2020: Patient presents today for a follow up. Pt ambulates in a wheelchair and is able to go short distances with a walker. Nocturia x1-2 usually, but voided unusually frequently last night. Denies hematuria,or burning. No symptoms in March. Reports issues with short term, but not long term memory. Has been consulting with PCP and testing treatments regarding memory. Reports occasional stomach pains.\par \par 06/12/2020: Pt contacted at [phone]. Gave permission to conduct a Telephone visit. Urine culture through the [facility] lab from 06/01/2020 grew 75 colony forming units per mL of Pseudomonas Aeruginosa. 1-5 Epithelial cells per field. Anything greater than 3 is abnormal. There were 3-10 WBC per high power field. Anything greater than 3 is abnormal. Bacteria were heavy. Pt had frequency and nocturia. She complained it was difficulty to sleep at night. Pt report she is feeling urgency when waking up to use the bathroom. She has not been leaking as much, or wetting herself. Her output is also not as great. Nocturia x3, or about every 2-3 hours. Pt is still on Cefpodoxime, but she will run out by 06/16/2020. Urine has become slightly cloudy, whereas it had been clear previously. Had not been using Melatonin at night. Has been taking Mirtazapine. \par \par 08/31/2021: The patient gave permission for a telephone visit. I spoke to patient's daughter Tabatha. She reports that on 6/4/21, after patient got off commode, her legs gave out and she broke both her shins, and has been immobilized since then. A few weeks ago, she had orders to move legs slightly. Daughter concerned because she has had Reaves in her since she has not been standing which is changed once a month. Was told once she had a slight rash in the vulva and buttocks, but has not heard about a rash since then. Memory has been okay. Daughter will send over recent lab work for review. She states WBC was 5.57. Daughter states patient's urine is clear. Patient is receiving physical therapy twice a week and daughter will try to find more help. \par \par 11/16/2021: The patient gave permission for a telehealth visit. Daughter states patient has been home for the past 3 weeks. Was previously in the hospital for a bad wound and pulmonary congestion and had been on antibiotics. Reviewed 11/3/21 urine tests. UA showed 432 WBC/HPF. Previously on 5/5/21 showed only 1 WBC/HPF and 8/9/20 showed no WBC, all were clean catches. Urine culture showed >= 3 organisms. Daughter notes patient does have a cough. Reaves catheter is draining okay. Daughter notes there is occasionally clusters of cloudiness draining more than before. \par \par 11/23/2021: The patient gave permission for a telehealth visit. I spoke with the daughter. She states patient has had issues with heart rate and oxygen which has been better, but did have acute episode of frequent BMs. Has been taking course of Cefadroxil 500mg BID. \par \par 12/22/2021: The patient gave permission for a telehealth visit. I spoke with patient's daughter Tabatha. Patient has been okay although feeling tired. Urine is clear. Completed course of Levofloxacin 500mg x 14 days and daughter notes that patient had leg pain for several days in a row. Never had a fever. Currently wears a catheter and has catheter changed by homecare nurses. \par \par 03/02/2022: The patient gave permission for a telehealth visit. I spoke with patient's daughter. Daughter states patient is doing okay currently. Daughter states she does not think patient was symptomatic for UTI but did have two episodes of bladder spasms which has not happened in the last 5 days. Last course of antibiotics was in 12/2021. Most recently last week she was put on Augmentin. Urine culture 2/11/22 grew >100,000 CFU/mL Acinetobacter baumannii which was susceptible to Augmentin. Last catheter change was less than 2 weeks ago when she also provided a urine sample before starting antibiotics. Has not had fever and urine looked clear. Leaks around Reaves catheter occasionally. Drains above 1500cc of urine a day. Wound is healing. Had 2/28/22 abdominal US for abdominal distention which was technically difficult due to gas, but showed mild thickening of the gallbladder wall with no evidence of cholelithiasis or acute cholecystitis. Small right pleural effusion. No hydronephrosis or renal calcification bilaterally. \par \par 03/16/2022: The patient gave permission for a telehealth visit. I spoke to patient's daughter Tabatha. Patient is on indwelling Reaves catheter. Reviewed 3/4/22 UA which showed WBC 4/HPF, RBC/HPF, leukocyte esterase small, bacteria none, specific gravity 1.010. Urine culture grew >100,000 CFU/mL Acinetobacter baumannii/nosocom group (carbapenem resistant). There is no need to treat at this time, but if needed would give amikacin. Ampicillin susceptible but unclear to me if a carbapenem resistant ampicillin abx would be adequate. I would discuss with ID consultant if treatment were indicated. House call physician Dr. Gillian Farmer ordered CMP 2/4/22 total protein slightly low 5.5, albumin normal 3.6, ALT low 8, otherwise normal, creatinine 0.54 normal and low due to patient's small body mass. 12/30/21 weight measurement 125 lbs, 5' 5", body surface area 1.62 m^2, BMI 20.8. Patient cannot get out of bed due to CHF. States she has not been able to irrigate the catheter although still draining. Occasionally leaks a little urine around the catheter. Daughter states patient is constantly feeling pressure to urinate and spasm and has some pain with catheter. Patient is able to remember the people around her. Still has open wound on back which is improving, reports it is about 1/4 inch in size. \par \par 06/07/2022:  is scheduled today for a follow up telephone visit for which she gave permission for. The visit was conducted with her daughter. Her daughter denies the patient having any pelvic pain. Last week she was experiencing more frequent painful spasms with the catheter. Patient's skin is very fragile. She reports the patient is very conscious of the urge she has to urinate. Pt is able to eat on her own. The patient's daughter reports a lesion on the back of her moms head, about the size of a quarter, that continues to scab but not fully healing. She is unable to find a dermatologist or wound care specialist that will come and evaluate her. Had a telehealth visit with a dermatologist that will not get back to her.

## 2022-06-13 NOTE — ADDENDUM
[FreeTextEntry1] : This note was authored by Senia Corbin working as a scribe for Dr.Gary Sexton. I, Dr. Indio Sexton have reviewed the content of this note and confirm it is true and accurate. I personally performed the history and physical examination and made all the decisions 06/07/2022.

## 2022-06-20 ENCOUNTER — NON-APPOINTMENT (OUTPATIENT)
Age: 87
End: 2022-06-20

## 2022-06-22 ENCOUNTER — RX CHANGE (OUTPATIENT)
Age: 87
End: 2022-06-22

## 2022-06-23 ENCOUNTER — APPOINTMENT (OUTPATIENT)
Dept: DERMATOLOGY | Facility: CLINIC | Age: 87
End: 2022-06-23
Payer: MEDICARE

## 2022-06-23 PROCEDURE — 99214 OFFICE O/P EST MOD 30 MIN: CPT | Mod: 95

## 2022-06-23 NOTE — HISTORY OF PRESENT ILLNESS
[Home] : at home, [unfilled] , at the time of the visit. [Medical Office: ([facility])___] : at the medical office located in  [Other:____] : [unfilled] [Verbal consent obtained from patient] : the patient, [unfilled] [FreeTextEntry1] : Lesion of the scalp. [de-identified] : Present for months, with recurrent crusting.  No overt bleeding.  No itching or tenderness.  They have been dressing with a variety of dressings, with the assistance of VNS.

## 2022-06-23 NOTE — ASSESSMENT
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

## 2022-07-01 ENCOUNTER — APPOINTMENT (OUTPATIENT)
Dept: UROLOGY | Facility: CLINIC | Age: 87
End: 2022-07-01

## 2022-07-01 PROCEDURE — 99443: CPT | Mod: 95

## 2022-07-01 NOTE — HISTORY OF PRESENT ILLNESS
[FreeTextEntry1] : Ms. Venegas is a 89 year old woman here for a follow up of nocturia, microscopic hematuria, urethral caruncle.Patient believes she makes less urine in 24-hour period than previously.  Her age says that one given a diuretic.  Shea good quantity of urine.  The patient says that she generally makes it to the bathroom in time, though she must tarry which takes the diuretic. The patient complained about a foul order from the urine. The patient does not have dysuria.  There is no gross hematuria.  The patient does not push or strain to urinate so sometimes she has to increase her abdominal pressure to defecate. Patient uses a walker.  The patient also stated that she is having right lower abdominal pain and discomfort.  He corresponds to a bulge of increasing size in the right lower quadrant.  The patient has bilateral hearing aids and wears eye glasses.\par 7/3/18: The patient returned today and reported she has nocturia and wakes up every 2 hours to urinate. She broke her femur and a tibial fracture. \par 7/16/18: The patient returned and reported she has the urge to urinate but ends up having a bowel movement instead. Daughter noted bowels are large. Patient noted she has a hard time urinating. Wakes up 3-4 times during the night to urinate, noted it is sometimes just "drops of urine." Patient is more disorientated and confused, I advised her to consult with a neurologist. \par 8/30/18: The patient returned today and reported she is feeling adequate. Patient denies dysuria, gross hematuria, urgency, or incontinence. Wakes up 2-3 times during the night to urinate. \par 6/4/19: Patient presents today for a follow up. Today she notes that her urination is "okay". She does not leak during the day but still wears depends since she is still concerned about incontinence. At night, she wakes up when she has to urinate but due to mobility issues is not always able to make it to bathroom. Diarrhea is reported on occasion. Mirtazapine has been initiated by another provider and it is reported that is has decreased her nocturia from 4x a night to once a night. \par 05/26/2020: Patient presents today for a follow up. Pt ambulates in a wheelchair and is able to go short distances with a walker. Nocturia x1-2 usually, but voided unusually frequently last night. Denies hematuria,or burning. No symptoms in March. Reports issues with short term, but not long term memory. Has been consulting with PCP and testing treatments regarding memory. Reports occasional stomach pains.\par \par 06/12/2020: Pt contacted at [phone]. Gave permission to conduct a Telephone visit. Urine culture through the [facility] lab from 06/01/2020 grew 75 colony forming units per mL of Pseudomonas Aeruginosa. 1-5 Epithelial cells per field. Anything greater than 3 is abnormal. There were 3-10 WBC per high power field. Anything greater than 3 is abnormal. Bacteria were heavy. Pt had frequency and nocturia. She complained it was difficulty to sleep at night. Pt report she is feeling urgency when waking up to use the bathroom. She has not been leaking as much, or wetting herself. Her output is also not as great. Nocturia x3, or about every 2-3 hours. Pt is still on Cefpodoxime, but she will run out by 06/16/2020. Urine has become slightly cloudy, whereas it had been clear previously. Had not been using Melatonin at night. Has been taking Mirtazapine. \par \par 08/31/2021: The patient gave permission for a telephone visit. I spoke to patient's daughter Tabatha. She reports that on 6/4/21, after patient got off commode, her legs gave out and she broke both her shins, and has been immobilized since then. A few weeks ago, she had orders to move legs slightly. Daughter concerned because she has had Reaves in her since she has not been standing which is changed once a month. Was told once she had a slight rash in the vulva and buttocks, but has not heard about a rash since then. Memory has been okay. Daughter will send over recent lab work for review. She states WBC was 5.57. Daughter states patient's urine is clear. Patient is receiving physical therapy twice a week and daughter will try to find more help. \par \par 11/16/2021: The patient gave permission for a telehealth visit. Daughter states patient has been home for the past 3 weeks. Was previously in the hospital for a bad wound and pulmonary congestion and had been on antibiotics. Reviewed 11/3/21 urine tests. UA showed 432 WBC/HPF. Previously on 5/5/21 showed only 1 WBC/HPF and 8/9/20 showed no WBC, all were clean catches. Urine culture showed >= 3 organisms. Daughter notes patient does have a cough. Reaves catheter is draining okay. Daughter notes there is occasionally clusters of cloudiness draining more than before. \par \par 11/23/2021: The patient gave permission for a telehealth visit. I spoke with the daughter. She states patient has had issues with heart rate and oxygen which has been better, but did have acute episode of frequent BMs. Has been taking course of Cefadroxil 500mg BID. \par \par 12/22/2021: The patient gave permission for a telehealth visit. I spoke with patient's daughter Tabatha. Patient has been okay although feeling tired. Urine is clear. Completed course of Levofloxacin 500mg x 14 days and daughter notes that patient had leg pain for several days in a row. Never had a fever. Currently wears a catheter and has catheter changed by homecare nurses. \par \par 03/02/2022: The patient gave permission for a telehealth visit. I spoke with patient's daughter. Daughter states patient is doing okay currently. Daughter states she does not think patient was symptomatic for UTI but did have two episodes of bladder spasms which has not happened in the last 5 days. Last course of antibiotics was in 12/2021. Most recently last week she was put on Augmentin. Urine culture 2/11/22 grew >100,000 CFU/mL Acinetobacter baumannii which was susceptible to Augmentin. Last catheter change was less than 2 weeks ago when she also provided a urine sample before starting antibiotics. Has not had fever and urine looked clear. Leaks around Reaves catheter occasionally. Drains above 1500cc of urine a day. Wound is healing. Had 2/28/22 abdominal US for abdominal distention which was technically difficult due to gas, but showed mild thickening of the gallbladder wall with no evidence of cholelithiasis or acute cholecystitis. Small right pleural effusion. No hydronephrosis or renal calcification bilaterally. \par \par 03/16/2022: The patient gave permission for a telehealth visit. I spoke to patient's daughter Tabatha. Patient is on indwelling Reaves catheter. Reviewed 3/4/22 UA which showed WBC 4/HPF, RBC/HPF, leukocyte esterase small, bacteria none, specific gravity 1.010. Urine culture grew >100,000 CFU/mL Acinetobacter baumannii/nosocom group (carbapenem resistant). There is no need to treat at this time, but if needed would give amikacin. Ampicillin susceptible but unclear to me if a carbapenem resistant ampicillin abx would be adequate. I would discuss with ID consultant if treatment were indicated. House call physician Dr. Gillian Farmer ordered CMP 2/4/22 total protein slightly low 5.5, albumin normal 3.6, ALT low 8, otherwise normal, creatinine 0.54 normal and low due to patient's small body mass. 12/30/21 weight measurement 125 lbs, 5' 5", body surface area 1.62 m^2, BMI 20.8. Patient cannot get out of bed due to CHF. States she has not been able to irrigate the catheter although still draining. Occasionally leaks a little urine around the catheter. Daughter states patient is constantly feeling pressure to urinate and spasm and has some pain with catheter. Patient is able to remember the people around her. Still has open wound on back which is improving, reports it is about 1/4 inch in size. \par \par 06/07/2022:  is scheduled today for a follow up telephone visit for which she gave permission for. The visit was conducted with her daughter. Her daughter denies the patient having any pelvic pain. Last week she was experiencing more frequent painful spasms with the catheter. Patient's skin is very fragile. She reports the patient is very conscious of the urge she has to urinate. Pt is able to eat on her own. The patient's daughter reports a lesion on the back of her moms head, about the size of a quarter, that continues to scab but not fully healing. She is unable to find a dermatologist or wound care specialist that will come and evaluate her. Had a telehealth visit with a dermatologist that will not get back to her. \par \par 07/01/2022:  presents today for a follow up telephone visit for which she gave permission for.  The visit was mostly conducted with her daughter. Her daughter was a bit upset as she had just hung up from her sister who is having a number of health problems including a mass in her colon and lung nodules that are being removed. In regards to the patient, she had a few bladder spasms this week. She reports that her urine is fairly clear. She still has a wound on the sacrum that was healing but now opened again. Has consulted with dermatology, however she has not undergone a biopsy. I briefly spoke to . She sounds much better. Her voice is strong and clear. She reports she is not feeling completely okay but making progress. \par

## 2022-07-01 NOTE — ADDENDUM
[FreeTextEntry1] : This note was authored by Senia Corbin working as a scribe for Dr.Gary Sexton. I, Dr. Indio Sexton have reviewed the content of this note and confirm it is true and accurate. I personally performed the history and physical examination and made all the decisions 07/01/2022.

## 2022-07-01 NOTE — ASSESSMENT
[FreeTextEntry1] : Ms. Venegas is a 89 year old woman here for a follow up of nocturia, microscopic hematuria, urethral caruncle.Patient believes she makes less urine in 24-hour period than previously.  Her age says that one given a diuretic.  Shea good quantity of urine.  The patient says that she generally makes it to the bathroom in time, though she must tarry which takes the diuretic. The patient complained about a foul order from the urine. The patient does not have dysuria.  There is no gross hematuria.  The patient does not push or strain to urinate so sometimes she has to increase her abdominal pressure to defecate. Patient uses a walker.  The patient also stated that she is having right lower abdominal pain and discomfort.  He corresponds to a bulge of increasing size in the right lower quadrant.  The patient has bilateral hearing aids and wears eye glasses.\par 7/3/18: The patient returned today and reported she has nocturia and wakes up every 2 hours to urinate. She broke her femur and a tibial fracture. \par 7/16/18: The patient returned and reported she has the urge to urinate but ends up having a bowel movement instead. Daughter noted bowels are large. Patient noted she has a hard time urinating. Wakes up 3-4 times during the night to urinate, noted it is sometimes just "drops of urine." Patient is more disorientated and confused, I advised her to consult with a neurologist. \par 8/30/18: The patient returned today and reported she is feeling adequate. Patient denies dysuria, gross hematuria, urgency, or incontinence. Wakes up 2-3 times during the night to urinate. \par 6/4/19: Patient presents today for a follow up. Today she notes that her urination is "okay". She does not leak during the day but still wears depends since she is still concerned about incontinence. At night, she wakes up when she has to urinate but due to mobility issues is not always able to make it to bathroom. Diarrhea is reported on occasion. Mirtazapine has been initiated by another provider and it is reported that is has decreased her nocturia from 4x a night to once a night. The patient produced a catheterized urine sample which will be sent for urinalysis, urine cytology, and urine culture.Upon completion of a physical exam it is determined that vaginitis is present. Therefore Fluconazole will be prescribed at this time. I will provide her with 1 500 mg tablet that will be taken one time only due to her use of Warfarin. It is advised that she switch to cotton underwear during the day and if leakage is still reported then she can switch back to wearing the depends.  The patient is to follow up in 3 weeks or sooner if clinically indicated. \par 05/26/2020: Patient presents today for a follow up. Pt ambulates in a wheelchair and is able to go short distances with a walker. Nocturia x1-2 usually, but voided unusually frequently last night. Denies hematuria,or burning. No symptoms in March. Reports issues with short term, but not long term memory. Has been consulting with PCP and testing treatments regarding memory. Reports occasional stomach pains. On physical exam patient had no erythema of vaginal mucosa, no vaginal discharge and no prolapse. Pt will provide a urine sample today for culture and analysis. Pt received a catheterization of urine today for total volume of 180mL. PVR not needed despite pt concerns. \par \par 06/12/2020: Pt contacted at [phone]. Gave permission to conduct a Telephone visit. Urine culture through the [place] lab from 06/01/2020 grew 75 colony forming units per mL of Pseudomonas Aeruginosa. 1-5 Epithelial cells per field. Anything greater than 3 is abnormal. There were 3-10 WBC per high power field. Anything greater than 3 is abnormal. Bacteria were heavy. Pt had frequency and nocturia. She complained it was difficulty to sleep at night. Pt report she is feeling urgency when waking up to use the bathroom. She has not been leaking as much, or wetting herself. Her output is also not as great. Nocturia x3, or about every 2-3 hours. Pt is still on Cefpodoxime, but she will run out by 06/16/2020. Urine has become slightly cloudy, whereas it had been clear previously. Had not been using Melatonin at night. Has been taking Mirtazapine. \par \par 08/31/2021: The patient gave permission for a telephone visit. I spoke to patient's daughter Tabatha. She reports that on 6/4/21, after patient got off commode, her legs gave out and she broke both her shins, and has been immobilized since then. A few weeks ago, she had orders to move legs slightly. Daughter concerned because she has had Sales in her since she has not been standing which is changed once a month. Was told once she had a slight rash in the vulva and buttocks, but has not heard about a rash since then. Memory has been okay. Daughter will send over recent lab work for review. She states WBC was 5.57. Daughter states patient's urine is clear. Patient is receiving physical therapy twice a week and daughter will try to find more help. \par \par 11/16/2021: The patient gave permission for a telehealth visit. Daughter states patient has been home for the past 3 weeks. Was previously in the hospital for a bad wound and pulmonary congestion and had been on antibiotics. Reviewed 11/3/21 urine tests. UA showed 432 WBC/HPF. Previously on 5/5/21 showed only 1 WBC/HPF and 8/9/20 showed no WBC, all were clean catches. Urine culture showed >= 3 organisms. Daughter notes patient does have a cough. Sales catheter is draining okay. Daughter notes there is occasionally clusters of cloudiness draining more than before. \par \par 11/23/2021: The patient gave permission for a telehealth visit. I spoke with the daughter. She states patient has had issues with heart rate and oxygen which has been better, but did have acute episode of frequent BMs. Has been taking course of Cefadroxil 500mg BID. \par \par 12/22/2021: The patient gave permission for a telehealth visit. I spoke with patient's daughter Tabatha. Patient has been okay although feeling tired. Urine is clear. Completed course of Levofloxacin 500mg x 14 days and daughter notes that patient had leg pain for several days in a row. Never had a fever. Currently has a Sales catheter and has catheter changed by home care nurses. \par \par 03/02/2022: The patient gave permission for a telehealth visit. I spoke with patient's daughter. Daughter states patient is doing okay currently. Daughter states she does not think patient was symptomatic for UTI but did have two episodes of bladder spasms which has not happened in the last 5 days. Last course of antibiotics was in 12/2021. Most recently last week she was put on Augmentin. Urine culture 2/11/22 grew >100,000 CFU/mL Acinetobacter baumannii which was susceptible to Augmentin. Last catheter change was less than 2 weeks ago when she also provided a urine sample before starting antibiotics. Has not had fever and urine looked clear. Leaks around Sales catheter occasionally. Drains above 1500cc of urine a day. Wound is healing. Had 2/28/22 abdominal US for abdominal distention which was technically difficult due to gas, but showed mild thickening of the gallbladder wall with no evidence of cholelithiasis or acute cholecystitis. Small right pleural effusion. No hydronephrosis or renal calcification bilaterally. \par \par 03/16/2022: The patient gave permission for a telehealth visit. I spoke to patient's daughter Tabatha. Patient is on indwelling Sales catheter. Reviewed 3/4/22 UA which showed WBC 4/HPF, RBC/HPF, leukocyte esterase small, bacteria none, specific gravity 1.010. Urine culture grew >100,000 CFU/mL Acinetobacter baumannii/nosocom group (carbapenem resistant). There is no need to treat at this time, but if needed would give amikacin. Ampicillin susceptible but unclear to me if a carbapenem resistant ampicillin abx would be adequate. I would discuss with ID consultant if treatment were indicated. House call physician Dr. Gillian Farmer ordered CMP 2/4/22 total protein slightly low 5.5, albumin normal 3.6, ALT low 8, otherwise normal, creatinine 0.54 normal and low due to patient's small body mass. 12/30/21 weight measurement 125 lbs, 5' 5", body surface area 1.62 m^2, BMI 20.8. Patient cannot get out of bed due to CHF. States she has not been able to irrigate the catheter although still draining. Occasionally leaks a little urine around the catheter. Daughter states patient is constantly feeling pressure to urinate and spasm and has some pain with catheter. Patient is able to remember the people around her. Still has open wound on back which is improving, reports it is about 1/4 inch in size. \par \par Reviewed dermatology note of 3/7/22. \par Reviewed 3/4/22 UA and urine culture and CMP of 2/4/22 with patient's daughter which are WNL. \par We discussed removing the catheter and instead wearing a diaper to collect the urine. For now, we will leave Sales catheter. We will wait until sacral ulcer is improved until submitting another urine specimen. \par Sales catheter should be changed once a month. \par Schedule telehealth visit in 2 months for follow up. \par \par 06/07/2022:  is scheduled today for a follow up telephone visit for which she gave permission for. The visit was conducted with her daughter. Her daughter denies the patient having any pelvic pain. Last week she was experiencing more frequent painful spasms with the catheter. Patient's skin is very fragile. She reports the patient is very conscious of the urge she has to urinate. Pt is able to eat on her own. The patient's daughter reports a lesion on the back of her moms head, about the size of a quarter, that continues to scab but not fully healing. She is unable to find a dermatologist or wound care specialist that will come and evaluate her. Had a telehealth visit with a dermatologist that will not get back to her. \par \par Reviewed and discussed urine test results from 6/3/2022. Urine culture of that date grew 10,000-49,000 CFU/ml enterococcus faecalis and klebsiella oxytoca/ Raoutella ornithinolytica. Will not treat at current time but if sales is removed, treat 4-5 days before removal and then after catheter removal for 5-6 days. I informed the patient's daughter that most likely if we removed the sales, she would urinate on a pad or diaper and have to make sure her skin remains dry and intact. I informed the patient's daughter that it is up to her, I would be happy to try and remove the catheter and see how she does without it or just leave it in. It was my recommendation that, although a chronic sales poses a risk of urinary tract infection, I would leave it in as there is a risk of skin breakdown when she urinates in a pad and her skin is very fragile. 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 The patient's daughter opted to leave the catheter in. \par It was my recommendation that the patient's daughter contact the dermatologist who provided her with a telehealth visit. I reviewed the notes of both the dermatology and the wound care people. In it they do not provide a differential diagnosis. The dermatology resident mentioned that it is most likely skin cancer worsened by chronic pressure. Unable to give diagnosis without biopsy and in person evaluation. Images were sent to attending dermatologist . advised the daughter to call and ask for the opinion of .  \par Patient will have her catheter changed in one months time. \par \par 07/01/2022:  presents today for a follow up telephone visit for which she gave permission for.  The visit was mostly conducted with her daughter. Her daughter was a bit upset as she had just hung up from her sister who is having a number of health problems including a mass in her colon and lung nodules that are being removed. In regards to the patient, she had a few bladder spasms this week. She reports that her urine is fairly clear. She still has a wound on the sacrum that was healing but now opened again. Has consulted with dermatology, however she has not undergone a biopsy. I briefly spoke to . She sounds much better. Her voice is strong and clear. She reports she is not feeling completely okay but making progress. \par \par My recommendation is that we keep in the sales. Once the skin is good without ulceration then we can try a trial without catheter and just diapers. \par \par The patient's daughter was interested if there were any external options once the sales were to come out as she doesn't know if the skin will hold up well with her voiding in a diaper. I informed her that there is an external collecting vacuum device that is placed up against the vulva. I will look into this option and communicate with her nursing staff. \par \par I reviewed the patient's dermatology follow up note. 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 \par \par The patient will provide a urine sample and drop it off at her nearest [facility] lab which will be sent for urinalysis, urine cytology, and urine culture. The patient is always colonized, I explained that unless the patient is symptomatic, we will not treat. \par \par Patient will have a telehealth visit in 3 months time or sooner if clinically indicated. \par \par Duration of telephone visit: 30 minutes.

## 2022-07-11 ENCOUNTER — NON-APPOINTMENT (OUTPATIENT)
Age: 87
End: 2022-07-11

## 2022-07-11 NOTE — PROGRESS NOTE ADULT - SUBJECTIVE AND OBJECTIVE BOX
Patient is a 89y old  Female who presents with a chief complaint of s/p fall c/o severe bilateral leg pain and inability to ambulate (05 Jun 2021 07:52)      HPI:  89 y/o F PMHx significant for early dementia, lymphedema of lower extremities, atrial fibrillation on Coumadin (last INR2.6), hx of proximal femur fracture in January 2021, Hx L hip long IMN with Dr. Kitchen 2017, hearing loss, cystitis, hx of HTN, spinal stenosis, anxiety was BIBA s/p witnessed fall at home with resultant severe bilateral leg pain and inability to ambulate. Of note the patient's daughter states that the patient has not had a bowel movement in 4 days therefore decided to administer a dose of Miralax at home. She subsequently attempted to assist patient to the bathroom at night with the assistance the patient's walker. Despite all the daughter's efforts the patient was reportedly too weak on her feet resulting in the patient's fall onto her knees. The patient's daughter states that the patient did not strike her head, or lose consciousness, but did note considerable ecchymoses overlying her bilateral knees and lower legs. The patient denies any numbness/tingling/burning in the BLLE. No other bone/joint complaints. At baseline the patient walks minimally at home with the use of a walker and assist. Occasionally uses her wheelchair.   Daughter reports a decline in her overall ambulatory status over the past year. Imaging in the ED revealed bilateral proximal 1/3 tibia fractures with displacement and possible left patella fracture. In the ED Orthopedics was consulted.   (04 Jun 2021 02:34)    6/4 Cardiology. 89 year old female admitted with near syncope. felt weak ambulating to BR, fell back and onto knees. denies LOC. denies chest pain or dyspnea. Patient noted to have bilat tib fib fractures. Patient has past history of afib, permanent and htn.  6/5 events noted. severe anemia likely due to bilat thigh hematoma. alert, no complaints  PAST MEDICAL & SURGICAL HISTORY:  Lymphedema of both lower extremities    Venous insufficiency    Monoclonal gammopathies    Paroxysmal atrial fibrillation    Acute cystitis without hematuria  septic  4/2016    Essential hypertension    Spinal stenosis    Spondyloarthritis    Vaginal prolapse    [place] (hard of hearing), bilateral    Hypertension    S/P kyphoplasty  2014    S/P thyroidectomy  partial   1975    History of vaginal surgery    History of fracture of femur  hx of proximal femur fracture in January 2021    History of repair of left hip joint  Hx L hip long IMN with Dr. Kitchen 2017          MEDICATIONS  (STANDING):  senna 2 Tablet(s) Oral at bedtime    MEDICATIONS  (PRN):  acetaminophen   Tablet .. 650 milliGRAM(s) Oral every 6 hours PRN Mild Pain (1 - 3)  haloperidol    Injectable 0.5 milliGRAM(s) IV Push once PRN severe agitation  melatonin 3 milliGRAM(s) Oral at bedtime PRN Insomnia  morphine  - Injectable 2 milliGRAM(s) IV Push every 4 hours PRN Severe Pain (7 - 10)  ondansetron Injectable 4 milliGRAM(s) IV Push every 6 hours PRN Nausea and/or Vomiting  oxyCODONE    IR 2.5 milliGRAM(s) Oral every 4 hours PRN Moderate Pain (4 - 6)          Vital Signs Last 24 Hrs  T(C): 36.3 (05 Jun 2021 05:10), Max: 36.7 (04 Jun 2021 16:48)  T(F): 97.4 (05 Jun 2021 05:10), Max: 98 (04 Jun 2021 16:48)  HR: 71 (05 Jun 2021 08:00) (67 - 90)  BP: 140/86 (05 Jun 2021 08:00) (97/43 - 140/86)  BP(mean): 98 (05 Jun 2021 08:00) (46 - 98)  RR: 16 (05 Jun 2021 08:00) (14 - 26)  SpO2: 95% (05 Jun 2021 06:00) (91% - 97%)    I&O's Summary    04 Jun 2021 07:01  -  05 Jun 2021 07:00  --------------------------------------------------------  IN: 601 mL / OUT: 950 mL / NET: -349 mL        PHYSICAL EXAM  General Appearance: NAD  HEENT:   Neck:   Back:   Lungs: clr  Heart: IRR s1s2  Abdomen:   Extremities: wrapped   Skin:   Neurologic: allert      INTERPRETATION OF TELEMETRY:    ECG:        LABS:                          9.8    8.15  )-----------( 55       ( 05 Jun 2021 06:33 )             30.4     06-05    134<L>  |  105  |  15  ----------------------------<  108<H>  4.8   |  23  |  0.76    Ca    8.9      05 Jun 2021 06:33    TPro  x   /  Alb  3.2<L>  /  TBili  x   /  DBili  x   /  AST  x   /  ALT  x   /  AlkPhos  x   06-04    CARDIAC MARKERS ( 04 Jun 2021 02:02 )  <0.015 ng/mL / x     / x     / x     / x            Pro BNP  -- 06-04 @ 16:56  D Dimer  [id number] 06-04 @ 16:56    PT/INR - ( 05 Jun 2021 06:33 )   PT: 17.8 sec;   INR: 1.57 ratio         PTT - ( 05 Jun 2021 06:33 )  PTT:26.3 sec          RADIOLOGY & ADDITIONAL STUDIES:     Muscle Hinge Flap Text: The defect edges were debeveled with a #15 scalpel blade.  Given the size, depth and location of the defect and the proximity to free margins a muscle hinge flap was deemed most appropriate.  Using a sterile surgical marker, an appropriate hinge flap was drawn incorporating the defect. The area thus outlined was incised with a #15 scalpel blade.  The skin margins were undermined to an appropriate distance in all directions utilizing iris scissors.

## 2022-07-13 LAB — BACTERIA UR CULT: ABNORMAL

## 2022-07-14 ENCOUNTER — APPOINTMENT (OUTPATIENT)
Dept: DERMATOLOGY | Facility: CLINIC | Age: 87
End: 2022-07-14

## 2022-07-14 ENCOUNTER — APPOINTMENT (OUTPATIENT)
Dept: UROLOGY | Facility: CLINIC | Age: 87
End: 2022-07-14

## 2022-07-14 ENCOUNTER — APPOINTMENT (OUTPATIENT)
Dept: HOME HEALTH SERVICES | Facility: HOME HEALTH | Age: 87
End: 2022-07-14

## 2022-07-14 PROCEDURE — 99443: CPT | Mod: 95

## 2022-07-14 PROCEDURE — 99213 OFFICE O/P EST LOW 20 MIN: CPT | Mod: 95

## 2022-07-14 NOTE — COUNSELING
[Normal Weight - ( BMI  <25 )] : normal weight - ( BMI  <25 ) [DASH diet recommended] : DASH diet recommended [Smoke/CO Detectors] : smoke/CO detectors [Use assistive device to avoid falls] : use assistive device to avoid falls [] : diabetic screening [Decrease stress] : decrease stress [Decrease hospital use] : decrease hospital use [Minimize unnecessary interventions] : minimize unnecessary interventions [Comfort Care] : comfort care [Discussed disease trajectory with patient/caregiver] : discussed disease trajectory with patient/caregiver [Patient/Caregiver has ___ understanding of disease process] : patient/caregiver has [unfilled] understanding of disease process [Completed Medical Orders for Life-Sustaining Treatment] : completed medical orders for life-sustaining treatment [DNR] : Code Status: DNR [Limited] : Treatment Guidelines: Limited [DNI] : Intubation: DNI [[facility] Date: _____] [facility] [unfilled] [_____] : HCP: [unfilled]

## 2022-07-14 NOTE — ASSESSMENT
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

## 2022-07-14 NOTE — PHYSICAL EXAM
[Alert] : alert [Oriented x 3] : ~L oriented x 3 [Well Nourished] : well nourished [FreeTextEntry3] : Granulomatous nodule, right posterior scalp.\par \par Greasy tan and brown papules of the frontal hairline.

## 2022-07-14 NOTE — PHYSICAL EXAM
[Normal Sclera/Conjunctiva] : normal sclera/conjunctiva [Normal Outer Ear/Nose] : the ears and nose were normal in appearance [No JVD] : no jugular venous distention [No Respiratory Distress] : no respiratory distress [Clear to Auscultation] : lungs were clear to auscultation bilaterally [Breast Exam Declined] : patient declined to have breast exam done [Non Tender] : non-tender [Patient Refused] : rectal exam was refused by the patient [No CVA Tenderness] : no ~M costovertebral angle tenderness [No Motor Deficits] : the motor exam was normal [Oriented x3] : oriented to person, place, and time [de-identified] : comfortable  cooperative   but  frail   occipital area of scalp  large flat  dry  scab  with  edges easily peeled off    matted hair  [de-identified] : bilateral loose wax  but TM visualized   r [de-identified] :   poor effort    but    unlabored breathing  [de-identified] :  irregular 3/6  murmur   1 pedal edema   [de-identified] :  softly distended   right  inguinal hernia   [de-identified] :  sales  in place  [de-identified] :  nonambulatory  [de-identified] : sacral wound improving  clean  use of barrier team stressed  \par right ischium continues to improve, 0.8 cm, packing with alginate\par heels left healed, right heel stable eschar , offloading with boots and has group 2 mattress  venous  stasis  hyperpigmentation  dry skin but no ulceration   long elongated  brittle nails  [de-identified] : anxious

## 2022-07-14 NOTE — HISTORY OF PRESENT ILLNESS
[Home] : at home, [unfilled] , at the time of the visit. [Medical Office: ([facility])___] : at the medical office located in  [Verbal consent obtained from patient] : the patient, [unfilled] [Other:____] : [unfilled] [FreeTextEntry1] : Check scalp. [de-identified] : Daughter has been cleaning thoroughly.\par Also with lesions along the frontal hairline.

## 2022-07-15 ENCOUNTER — APPOINTMENT (OUTPATIENT)
Dept: UROLOGY | Facility: CLINIC | Age: 87
End: 2022-07-15

## 2022-07-15 PROCEDURE — 99443: CPT | Mod: 95

## 2022-07-15 NOTE — ADDENDUM
[FreeTextEntry1] : This note was authored by Senia Corbin working as a scribe for Dr.Gary Sexton. I, Dr. Indio Sexton have reviewed the content of this note and confirm it is true and accurate. I personally performed the history and physical examination and made all the decisions 07/15/2022.

## 2022-07-15 NOTE — HISTORY OF PRESENT ILLNESS
[FreeTextEntry1] : Ms. Venegas is a 89 year old woman here for a follow up of nocturia, microscopic hematuria, urethral caruncle.Patient believes she makes less urine in 24-hour period than previously.  Her age says that one given a diuretic.  Shea good quantity of urine.  The patient says that she generally makes it to the bathroom in time, though she must tarry which takes the diuretic. The patient complained about a foul order from the urine. The patient does not have dysuria.  There is no gross hematuria.  The patient does not push or strain to urinate so sometimes she has to increase her abdominal pressure to defecate. Patient uses a walker.  The patient also stated that she is having right lower abdominal pain and discomfort.  He corresponds to a bulge of increasing size in the right lower quadrant.  The patient has bilateral hearing aids and wears eye glasses.\par 7/3/18: The patient returned today and reported she has nocturia and wakes up every 2 hours to urinate. She broke her femur and a tibial fracture. \par 7/16/18: The patient returned and reported she has the urge to urinate but ends up having a bowel movement instead. Daughter noted bowels are large. Patient noted she has a hard time urinating. Wakes up 3-4 times during the night to urinate, noted it is sometimes just "drops of urine." Patient is more disorientated and confused, I advised her to consult with a neurologist. \par 8/30/18: The patient returned today and reported she is feeling adequate. Patient denies dysuria, gross hematuria, urgency, or incontinence. Wakes up 2-3 times during the night to urinate. \par 6/4/19: Patient presents today for a follow up. Today she notes that her urination is "okay". She does not leak during the day but still wears depends since she is still concerned about incontinence. At night, she wakes up when she has to urinate but due to mobility issues is not always able to make it to bathroom. Diarrhea is reported on occasion. Mirtazapine has been initiated by another provider and it is reported that is has decreased her nocturia from 4x a night to once a night. \par 05/26/2020: Patient presents today for a follow up. Pt ambulates in a wheelchair and is able to go short distances with a walker. Nocturia x1-2 usually, but voided unusually frequently last night. Denies hematuria,or burning. No symptoms in March. Reports issues with short term, but not long term memory. Has been consulting with PCP and testing treatments regarding memory. Reports occasional stomach pains.\par \par 06/12/2020: Pt contacted at [phone]. Gave permission to conduct a Telephone visit. Urine culture through the [facility] from 06/01/2020 grew 75 colony forming units per mL of Pseudomonas Aeruginosa. 1-5 Epithelial cells per field. Anything greater than 3 is abnormal. There were 3-10 WBC per high power field. Anything greater than 3 is abnormal. Bacteria were heavy. Pt had frequency and nocturia. She complained it was difficulty to sleep at night. Pt report she is feeling urgency when waking up to use the bathroom. She has not been leaking as much, or wetting herself. Her output is also not as great. Nocturia x3, or about every 2-3 hours. Pt is still on Cefpodoxime, but she will run out by 06/16/2020. Urine has become slightly cloudy, whereas it had been clear previously. Had not been using Melatonin at night. Has been taking Mirtazapine. \par \par 08/31/2021: The patient gave permission for a telephone visit. I spoke to patient's daughter Tabatha. She reports that on 6/4/21, after patient got off commode, her legs gave out and she broke both her shins, and has been immobilized since then. A few weeks ago, she had orders to move legs slightly. Daughter concerned because she has had Reaves in her since she has not been standing which is changed once a month. Was told once she had a slight rash in the vulva and buttocks, but has not heard about a rash since then. Memory has been okay. Daughter will send over recent lab work for review. She states WBC was 5.57. Daughter states patient's urine is clear. Patient is receiving physical therapy twice a week and daughter will try to find more help. \par \par 11/16/2021: The patient gave permission for a telehealth visit. Daughter states patient has been home for the past 3 weeks. Was previously in the hospital for a bad wound and pulmonary congestion and had been on antibiotics. Reviewed 11/3/21 urine tests. UA showed 432 WBC/HPF. Previously on 5/5/21 showed only 1 WBC/HPF and 8/9/20 showed no WBC, all were clean catches. Urine culture showed >= 3 organisms. Daughter notes patient does have a cough. Reaves catheter is draining okay. Daughter notes there is occasionally clusters of cloudiness draining more than before. \par \par 11/23/2021: The patient gave permission for a telehealth visit. I spoke with the daughter. She states patient has had issues with heart rate and oxygen which has been better, but did have acute episode of frequent BMs. Has been taking course of Cefadroxil 500mg BID. \par \par 12/22/2021: The patient gave permission for a telehealth visit. I spoke with patient's daughter Tabatha. Patient has been okay although feeling tired. Urine is clear. Completed course of Levofloxacin 500mg x 14 days and daughter notes that patient had leg pain for several days in a row. Never had a fever. Currently wears a catheter and has catheter changed by homecare nurses. \par \par 03/02/2022: The patient gave permission for a telehealth visit. I spoke with patient's daughter. Daughter states patient is doing okay currently. Daughter states she does not think patient was symptomatic for UTI but did have two episodes of bladder spasms which has not happened in the last 5 days. Last course of antibiotics was in 12/2021. Most recently last week she was put on Augmentin. Urine culture 2/11/22 grew >100,000 CFU/mL Acinetobacter baumannii which was susceptible to Augmentin. Last catheter change was less than 2 weeks ago when she also provided a urine sample before starting antibiotics. Has not had fever and urine looked clear. Leaks around Reaves catheter occasionally. Drains above 1500cc of urine a day. Wound is healing. Had 2/28/22 abdominal US for abdominal distention which was technically difficult due to gas, but showed mild thickening of the gallbladder wall with no evidence of cholelithiasis or acute cholecystitis. Small right pleural effusion. No hydronephrosis or renal calcification bilaterally. \par \par 03/16/2022: The patient gave permission for a telehealth visit. I spoke to patient's daughter Tabatha. Patient is on indwelling Reaves catheter. Reviewed 3/4/22 UA which showed WBC 4/HPF, RBC/HPF, leukocyte esterase small, bacteria none, specific gravity 1.010. Urine culture grew >100,000 CFU/mL Acinetobacter baumannii/nosocom group (carbapenem resistant). There is no need to treat at this time, but if needed would give amikacin. Ampicillin susceptible but unclear to me if a carbapenem resistant ampicillin abx would be adequate. I would discuss with ID consultant if treatment were indicated. House call physician Dr. Gillian Farmer ordered CMP 2/4/22 total protein slightly low 5.5, albumin normal 3.6, ALT low 8, otherwise normal, creatinine 0.54 normal and low due to patient's small body mass. 12/30/21 weight measurement 125 lbs, 5' 5", body surface area 1.62 m^2, BMI 20.8. Patient cannot get out of bed due to CHF. States she has not been able to irrigate the catheter although still draining. Occasionally leaks a little urine around the catheter. Daughter states patient is constantly feeling pressure to urinate and spasm and has some pain with catheter. Patient is able to remember the people around her. Still has open wound on back which is improving, reports it is about 1/4 inch in size. \par \par 06/07/2022:  is scheduled today for a follow up telephone visit for which she gave permission for. The visit was conducted with her daughter. Her daughter denies the patient having any pelvic pain. Last week she was experiencing more frequent painful spasms with the catheter. Patient's skin is very fragile. She reports the patient is very conscious of the urge she has to urinate. Pt is able to eat on her own. The patient's daughter reports a lesion on the back of her moms head, about the size of a quarter, that continues to scab but not fully healing. She is unable to find a dermatologist or wound care specialist that will come and evaluate her. Had a telehealth visit with a dermatologist that will not get back to her. \par \par 07/01/2022:  presents today for a follow up telephone visit for which she gave permission for.  The visit was mostly conducted with her daughter. Her daughter was a bit upset as she had just hung up from her sister who is having a number of health problems including a mass in her colon and lung nodules that are being removed. In regards to the patient, she had a few bladder spasms this week. She reports that her urine is fairly clear. She still has a wound on the sacrum that was healing but now opened again. Has consulted with dermatology, however she has not undergone a biopsy. I briefly spoke to . She sounds much better. Her voice is strong and clear. She reports she is not feeling completely okay but making progress. \par \par 07/15/2022:  presents today for a follow up telephone visit for which she gave permission for. The visit was conducted with her daughter who is very active in her mother's care. Pt had a telehealth appointment with dermatology yesterday. 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 They should be removed at family's convenience and will be sent for pathology. Instructions were given on how to care for the scalp lesion. At the time of providing the urine specimen on 7/9/2022 which grew >100,000 CFU/ml enterobacter cloacae complex and >100,000 CFU/ml enterococcus faecalis, the patient was stable in terms of her urination. She was not symptomatic. She does have some bladder spasms after being moved in the morning which results in urinating around the catheter. She does not have any pain. Urine is perfectly clear. At night she puts out less. Her daughter states that the sacrum ulcer is healing but not closed yet.

## 2022-07-15 NOTE — ASSESSMENT
[FreeTextEntry1] : Ms. Venegas is a 89 year old woman here for a follow up of nocturia, microscopic hematuria, urethral caruncle.Patient believes she makes less urine in 24-hour period than previously.  Her age says that one given a diuretic.  Shea good quantity of urine.  The patient says that she generally makes it to the bathroom in time, though she must tarry which takes the diuretic. The patient complained about a foul order from the urine. The patient does not have dysuria.  There is no gross hematuria.  The patient does not push or strain to urinate so sometimes she has to increase her abdominal pressure to defecate. Patient uses a walker.  The patient also stated that she is having right lower abdominal pain and discomfort.  He corresponds to a bulge of increasing size in the right lower quadrant.  The patient has bilateral hearing aids and wears eye glasses.\par 7/3/18: The patient returned today and reported she has nocturia and wakes up every 2 hours to urinate. She broke her femur and a tibial fracture. \par 7/16/18: The patient returned and reported she has the urge to urinate but ends up having a bowel movement instead. Daughter noted bowels are large. Patient noted she has a hard time urinating. Wakes up 3-4 times during the night to urinate, noted it is sometimes just "drops of urine." Patient is more disorientated and confused, I advised her to consult with a neurologist. \par 8/30/18: The patient returned today and reported she is feeling adequate. Patient denies dysuria, gross hematuria, urgency, or incontinence. Wakes up 2-3 times during the night to urinate. \par 6/4/19: Patient presents today for a follow up. Today she notes that her urination is "okay". She does not leak during the day but still wears depends since she is still concerned about incontinence. At night, she wakes up when she has to urinate but due to mobility issues is not always able to make it to bathroom. Diarrhea is reported on occasion. Mirtazapine has been initiated by another provider and it is reported that is has decreased her nocturia from 4x a night to once a night. The patient produced a catheterized urine sample which will be sent for urinalysis, urine cytology, and urine culture.Upon completion of a physical exam it is determined that vaginitis is present. Therefore Fluconazole will be prescribed at this time. I will provide her with 1 500 mg tablet that will be taken one time only due to her use of Warfarin. It is advised that she switch to cotton underwear during the day and if leakage is still reported then she can switch back to wearing the depends.  The patient is to follow up in 3 weeks or sooner if clinically indicated. \par 05/26/2020: Patient presents today for a follow up. Pt ambulates in a wheelchair and is able to go short distances with a walker. Nocturia x1-2 usually, but voided unusually frequently last night. Denies hematuria,or burning. No symptoms in March. Reports issues with short term, but not long term memory. Has been consulting with PCP and testing treatments regarding memory. Reports occasional stomach pains. On physical exam patient had no erythema of vaginal mucosa, no vaginal discharge and no prolapse. Pt will provide a urine sample today for culture and analysis. Pt received a catheterization of urine today for total volume of 180mL. PVR not needed despite pt concerns. \par \par 06/12/2020: Pt contacted at [phone]. Gave permission to conduct a Telephone visit. Urine culture through the [facility] from 06/01/2020 grew 75 colony forming units per mL of Pseudomonas Aeruginosa. 1-5 Epithelial cells per field. Anything greater than 3 is abnormal. There were 3-10 WBC per high power field. Anything greater than 3 is abnormal. Bacteria were heavy. Pt had frequency and nocturia. She complained it was difficulty to sleep at night. Pt report she is feeling urgency when waking up to use the bathroom. She has not been leaking as much, or wetting herself. Her output is also not as great. Nocturia x3, or about every 2-3 hours. Pt is still on Cefpodoxime, but she will run out by 06/16/2020. Urine has become slightly cloudy, whereas it had been clear previously. Had not been using Melatonin at night. Has been taking Mirtazapine. \par \par 08/31/2021: The patient gave permission for a telephone visit. I spoke to patient's daughter Tabatha. She reports that on 6/4/21, after patient got off commode, her legs gave out and she broke both her shins, and has been immobilized since then. A few weeks ago, she had orders to move legs slightly. Daughter concerned because she has had Sales in her since she has not been standing which is changed once a month. Was told once she had a slight rash in the vulva and buttocks, but has not heard about a rash since then. Memory has been okay. Daughter will send over recent lab work for review. She states WBC was 5.57. Daughter states patient's urine is clear. Patient is receiving physical therapy twice a week and daughter will try to find more help. \par \par 11/16/2021: The patient gave permission for a telehealth visit. Daughter states patient has been home for the past 3 weeks. Was previously in the hospital for a bad wound and pulmonary congestion and had been on antibiotics. Reviewed 11/3/21 urine tests. UA showed 432 WBC/HPF. Previously on 5/5/21 showed only 1 WBC/HPF and 8/9/20 showed no WBC, all were clean catches. Urine culture showed >= 3 organisms. Daughter notes patient does have a cough. Sales catheter is draining okay. Daughter notes there is occasionally clusters of cloudiness draining more than before. \par \par 11/23/2021: The patient gave permission for a telehealth visit. I spoke with the daughter. She states patient has had issues with heart rate and oxygen which has been better, but did have acute episode of frequent BMs. Has been taking course of Cefadroxil 500mg BID. \par \par 12/22/2021: The patient gave permission for a telehealth visit. I spoke with patient's daughter Tabatha. Patient has been okay although feeling tired. Urine is clear. Completed course of Levofloxacin 500mg x 14 days and daughter notes that patient had leg pain for several days in a row. Never had a fever. Currently has a Sales catheter and has catheter changed by home care nurses. \par \par 03/02/2022: The patient gave permission for a telehealth visit. I spoke with patient's daughter. Daughter states patient is doing okay currently. Daughter states she does not think patient was symptomatic for UTI but did have two episodes of bladder spasms which has not happened in the last 5 days. Last course of antibiotics was in 12/2021. Most recently last week she was put on Augmentin. Urine culture 2/11/22 grew >100,000 CFU/mL Acinetobacter baumannii which was susceptible to Augmentin. Last catheter change was less than 2 weeks ago when she also provided a urine sample before starting antibiotics. Has not had fever and urine looked clear. Leaks around Sales catheter occasionally. Drains above 1500cc of urine a day. Wound is healing. Had 2/28/22 abdominal US for abdominal distention which was technically difficult due to gas, but showed mild thickening of the gallbladder wall with no evidence of cholelithiasis or acute cholecystitis. Small right pleural effusion. No hydronephrosis or renal calcification bilaterally. \par \par 03/16/2022: The patient gave permission for a telehealth visit. I spoke to patient's daughter Tabatha. Patient is on indwelling Sales catheter. Reviewed 3/4/22 UA which showed WBC 4/HPF, RBC/HPF, leukocyte esterase small, bacteria none, specific gravity 1.010. Urine culture grew >100,000 CFU/mL Acinetobacter baumannii/nosocom group (carbapenem resistant). There is no need to treat at this time, but if needed would give amikacin. Ampicillin susceptible but unclear to me if a carbapenem resistant ampicillin abx would be adequate. I would discuss with ID consultant if treatment were indicated. House call physician Dr. Gillian Farmer ordered CMP 2/4/22 total protein slightly low 5.5, albumin normal 3.6, ALT low 8, otherwise normal, creatinine 0.54 normal and low due to patient's small body mass. 12/30/21 weight measurement 125 lbs, 5' 5", body surface area 1.62 m^2, BMI 20.8. Patient cannot get out of bed due to CHF. States she has not been able to irrigate the catheter although still draining. Occasionally leaks a little urine around the catheter. Daughter states patient is constantly feeling pressure to urinate and spasm and has some pain with catheter. Patient is able to remember the people around her. Still has open wound on back which is improving, reports it is about 1/4 inch in size. \par \par Reviewed dermatology note of 3/7/22. \par Reviewed 3/4/22 UA and urine culture and CMP of 2/4/22 with patient's daughter which are WNL. \par We discussed removing the catheter and instead wearing a diaper to collect the urine. For now, we will leave Sales catheter. We will wait until sacral ulcer is improved until submitting another urine specimen. \par Sales catheter should be changed once a month. \par Schedule telehealth visit in 2 months for follow up. \par \par 06/07/2022:  is scheduled today for a follow up telephone visit for which she gave permission for. The visit was conducted with her daughter. Her daughter denies the patient having any pelvic pain. Last week she was experiencing more frequent painful spasms with the catheter. Patient's skin is very fragile. She reports the patient is very conscious of the urge she has to urinate. Pt is able to eat on her own. The patient's daughter reports a lesion on the back of her moms head, about the size of a quarter, that continues to scab but not fully healing. She is unable to find a dermatologist or wound care specialist that will come and evaluate her. Had a telehealth visit with a dermatologist that will not get back to her. \par \par Reviewed and discussed urine test results from 6/3/2022. Urine culture of that date grew 10,000-49,000 CFU/ml enterococcus faecalis and klebsiella oxytoca/ Raoutella ornithinolytica. Will not treat at current time but if sales is removed, treat 4-5 days before removal and then after catheter removal for 5-6 days. I informed the patient's daughter that most likely if we removed the sales, she would urinate on a pad or diaper and have to make sure her skin remains dry and intact. I informed the patient's daughter that it is up to her, I would be happy to try and remove the catheter and see how she does without it or just leave it in. It was my recommendation that, although a chronic sales poses a risk of urinary tract infection, I would leave it in as there is a risk of skin breakdown when she urinates in a pad and her skin is very fragile. 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 The patient's daughter opted to leave the catheter in. \par It was my recommendation that the patient's daughter contact the dermatologist who provided her with a telehealth visit. I reviewed the notes of both the dermatology and the wound care people. In it they do not provide a differential diagnosis. The dermatology resident mentioned that it is most likely skin cancer worsened by chronic pressure. Unable to give diagnosis without biopsy and in person evaluation. Images were sent to attending dermatologist . advised the daughter to call and ask for the opinion of .  \par Patient will have her catheter changed in one months time. \par \par 07/01/2022:  presents today for a follow up telephone visit for which she gave permission for.  The visit was mostly conducted with her daughter. Her daughter was a bit upset as she had just hung up from her sister who is having a number of health problems including a mass in her colon and lung nodules that are being removed. In regards to the patient, she had a few bladder spasms this week. She reports that her urine is fairly clear. She still has a wound on the sacrum that was healing but now opened again. Has consulted with dermatology, however she has not undergone a biopsy. I briefly spoke to . She sounds much better. Her voice is strong and clear. She reports she is not feeling completely okay but making progress. \par \par My recommendation is that we keep in the sales. Once the skin is good without ulceration then we can try a trial without catheter and just diapers. \par The patient's daughter was interested if there were any external options once the sales were to come out as she doesn't know if the skin will hold up well with her voiding in a diaper. I informed her that there is an external collecting vacuum device that is placed up against the vulva. I will look into this option and communicate with her nursing staff. \par I reviewed the patient's dermatology follow up note. 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 \par The patient will provide a urine sample and drop it off at her nearest [facility] lab which will be sent for urinalysis, urine cytology, and urine culture. The patient is always colonized, I explained that unless the patient is symptomatic, we will not treat. \par Patient will have a telehealth visit in 3 months time or sooner if clinically indicated. \par \par 07/15/2022:  presents today for a follow up telephone visit for which she gave permission for. The visit was conducted with her daughter who is very active in her mother's care. Pt had a telehealth appointment with dermatology yesterday. 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 They should be removed at family's convenience and will be sent for pathology. Instructions were given on how to care for the scalp lesion. At the time of providing the urine specimen on 7/9/2022 which grew >100,000 CFU/ml enterobacter cloacae complex and >100,000 CFU/ml enterococcus faecalis, the patient was stable in terms of her urination. She was not symptomatic. She does have some bladder spasms after being moved in the morning which results in urinating around the catheter. She does not have any pain. Urine is perfectly clear. At night she puts out less. Her daughter states that the sacrum ulcer is healing but not closed yet. \par \par Her daughter stated that when her mother has her catheter changed by her visiting nurse, she experiences some pain. She is changed with her legs straight as she can't bend her legs. I explained that the pain is most likely due to the catheter passing through the urethra and that if she is more comfortable with her legs straight, she should keep them straight. Advised her to give her mom tylenol about an hour before the catheter change. Her daughter also states she has some oxycodone, which she sometimes gives her half a tablet. If the tylenol is not enough, she was advised to try oxycodone so long as it does not affect her breathing. \par \par Reviewed 7/9/2022 urine test results. Her daughter stated that when she went to drop off the specimen there were no orders in the system. She had to contact the on call physician who only put in orders for urine culture. Urine culture grew >100,000 CFU/ml enterobacter cloacae complex and >100,000 CFU/ml enterococcus faecalis. Pt is not symptomatic. Will not treat at this time. Patient will continue to provide urine specimens about once a month so in the case that she is symptomatic, we know what to treat her with. Urine orders were entered and a paper copy will be mailed in the case that she is told again that there are no orders in the system. \par \par We reviewed the plan that when all her skin pressure soars healed, we would then consider providing abx for bacteria in urine and remove sales for voiding trial. If she were to void on her own successfully, she would void on diapers, pads, or chucks. If she didn’t do well without the sales and developed infections persistently or was having trouble urinating on her own, we would go back to sales catheter. Her daughter expresses interest in an external collecting device if the sales were to be removed. Will look into it and discuss further in the future once the pressure ulcers are healed. \par \par I reviewed the patient's most recent CBC with diff of 6/10/2022. Assured the patient's daughter that she is doing well. I spoke to the patient briefly at the end of the visit and she sounds like she is doing well. Her memory is good as she remembered she wanted to ask me how my wife is doing. \par \par Patient will have a telephone visit in one months time for reassessment. \par \par Duration of telephone visit: 25 minutes.

## 2022-07-17 NOTE — ASSESSMENT
[FreeTextEntry1] : Ms. Venegas is a 89 year old woman here for a follow up of nocturia, microscopic hematuria, urethral caruncle.Patient believes she makes less urine in 24-hour period than previously.  Her age says that one given a diuretic.  Shea good quantity of urine.  The patient says that she generally makes it to the bathroom in time, though she must tarry which takes the diuretic. The patient complained about a foul order from the urine. The patient does not have dysuria.  There is no gross hematuria.  The patient does not push or strain to urinate so sometimes she has to increase her abdominal pressure to defecate. Patient uses a walker.  The patient also stated that she is having right lower abdominal pain and discomfort.  He corresponds to a bulge of increasing size in the right lower quadrant.  The patient has bilateral hearing aids and wears eye glasses.\par 7/3/18: The patient returned today and reported she has nocturia and wakes up every 2 hours to urinate. She broke her femur and a tibial fracture. \par 7/16/18: The patient returned and reported she has the urge to urinate but ends up having a bowel movement instead. Daughter noted bowels are large. Patient noted she has a hard time urinating. Wakes up 3-4 times during the night to urinate, noted it is sometimes just "drops of urine." Patient is more disorientated and confused, I advised her to consult with a neurologist. \par 8/30/18: The patient returned today and reported she is feeling adequate. Patient denies dysuria, gross hematuria, urgency, or incontinence. Wakes up 2-3 times during the night to urinate. \par 6/4/19: Patient presents today for a follow up. Today she notes that her urination is "okay". She does not leak during the day but still wears depends since she is still concerned about incontinence. At night, she wakes up when she has to urinate but due to mobility issues is not always able to make it to bathroom. Diarrhea is reported on occasion. Mirtazapine has been initiated by another provider and it is reported that is has decreased her nocturia from 4x a night to once a night. The patient produced a catheterized urine sample which will be sent for urinalysis, urine cytology, and urine culture.Upon completion of a physical exam it is determined that vaginitis is present. Therefore Fluconazole will be prescribed at this time. I will provide her with 1 500 mg tablet that will be taken one time only due to her use of Warfarin. It is advised that she switch to cotton underwear during the day and if leakage is still reported then she can switch back to wearing the depends.  The patient is to follow up in 3 weeks or sooner if clinically indicated. \par 05/26/2020: Patient presents today for a follow up. Pt ambulates in a wheelchair and is able to go short distances with a walker. Nocturia x1-2 usually, but voided unusually frequently last night. Denies hematuria,or burning. No symptoms in March. Reports issues with short term, but not long term memory. Has been consulting with PCP and testing treatments regarding memory. Reports occasional stomach pains. On physical exam patient had no erythema of vaginal mucosa, no vaginal discharge and no prolapse. Pt will provide a urine sample today for culture and analysis. Pt received a catheterization of urine today for total volume of 180mL. PVR not needed despite pt concerns. \par \par 06/12/2020: Pt contacted at [phone]. Gave permission to conduct a Telephone visit. Urine culture through the [facility] lab from 06/01/2020 grew 75 colony forming units per mL of Pseudomonas Aeruginosa. 1-5 Epithelial cells per field. Anything greater than 3 is abnormal. There were 3-10 WBC per high power field. Anything greater than 3 is abnormal. Bacteria were heavy. Pt had frequency and nocturia. She complained it was difficulty to sleep at night. Pt report she is feeling urgency when waking up to use the bathroom. She has not been leaking as much, or wetting herself. Her output is also not as great. Nocturia x3, or about every 2-3 hours. Pt is still on Cefpodoxime, but she will run out by 06/16/2020. Urine has become slightly cloudy, whereas it had been clear previously. Had not been using Melatonin at night. Has been taking Mirtazapine. \par \par 08/31/2021: The patient gave permission for a telephone visit. I spoke to patient's daughter Tabatha. She reports that on 6/4/21, after patient got off commode, her legs gave out and she broke both her shins, and has been immobilized since then. A few weeks ago, she had orders to move legs slightly. Daughter concerned because she has had Sales in her since she has not been standing which is changed once a month. Was told once she had a slight rash in the vulva and buttocks, but has not heard about a rash since then. Memory has been okay. Daughter will send over recent lab work for review. She states WBC was 5.57. Daughter states patient's urine is clear. Patient is receiving physical therapy twice a week and daughter will try to find more help. \par \par 11/16/2021: The patient gave permission for a telehealth visit. Daughter states patient has been home for the past 3 weeks. Was previously in the hospital for a bad wound and pulmonary congestion and had been on antibiotics. Reviewed 11/3/21 urine tests. UA showed 432 WBC/HPF. Previously on 5/5/21 showed only 1 WBC/HPF and 8/9/20 showed no WBC, all were clean catches. Urine culture showed >= 3 organisms. Daughter notes patient does have a cough. Sales catheter is draining okay. Daughter notes there is occasionally clusters of cloudiness draining more than before. \par \par 11/23/2021: The patient gave permission for a telehealth visit. I spoke with the daughter. She states patient has had issues with heart rate and oxygen which has been better, but did have acute episode of frequent BMs. Has been taking course of Cefadroxil 500mg BID. \par \par 12/22/2021: The patient gave permission for a telehealth visit. I spoke with patient's daughter Tabatha. Patient has been okay although feeling tired. Urine is clear. Completed course of Levofloxacin 500mg x 14 days and daughter notes that patient had leg pain for several days in a row. Never had a fever. Currently has a Sales catheter and has catheter changed by home care nurses. \par \par 03/02/2022: The patient gave permission for a telehealth visit. I spoke with patient's daughter. Daughter states patient is doing okay currently. Daughter states she does not think patient was symptomatic for UTI but did have two episodes of bladder spasms which has not happened in the last 5 days. Last course of antibiotics was in 12/2021. Most recently last week she was put on Augmentin. Urine culture 2/11/22 grew >100,000 CFU/mL Acinetobacter baumannii which was susceptible to Augmentin. Last catheter change was less than 2 weeks ago when she also provided a urine sample before starting antibiotics. Has not had fever and urine looked clear. Leaks around Sales catheter occasionally. Drains above 1500cc of urine a day. Wound is healing. Had 2/28/22 abdominal US for abdominal distention which was technically difficult due to gas, but showed mild thickening of the gallbladder wall with no evidence of cholelithiasis or acute cholecystitis. Small right pleural effusion. No hydronephrosis or renal calcification bilaterally. \par \par 03/16/2022: The patient gave permission for a telehealth visit. I spoke to patient's daughter Tabatha. Patient is on indwelling Sales catheter. Reviewed 3/4/22 UA which showed WBC 4/HPF, RBC/HPF, leukocyte esterase small, bacteria none, specific gravity 1.010. Urine culture grew >100,000 CFU/mL Acinetobacter baumannii/nosocom group (carbapenem resistant). There is no need to treat at this time, but if needed would give amikacin. Ampicillin susceptible but unclear to me if a carbapenem resistant ampicillin abx would be adequate. I would discuss with ID consultant if treatment were indicated. House call physician Dr. Gillian Farmer ordered CMP 2/4/22 total protein slightly low 5.5, albumin normal 3.6, ALT low 8, otherwise normal, creatinine 0.54 normal and low due to patient's small body mass. 12/30/21 weight measurement 125 lbs, 5' 5", body surface area 1.62 m^2, BMI 20.8. Patient cannot get out of bed due to CHF. States she has not been able to irrigate the catheter although still draining. Occasionally leaks a little urine around the catheter. Daughter states patient is constantly feeling pressure to urinate and spasm and has some pain with catheter. Patient is able to remember the people around her. Still has open wound on back which is improving, reports it is about 1/4 inch in size. \par \par Reviewed dermatology note of 3/7/22. \par Reviewed 3/4/22 UA and urine culture and CMP of 2/4/22 with patient's daughter which are WNL. \par We discussed removing the catheter and instead wearing a diaper to collect the urine. For now, we will leave Sales catheter. We will wait until sacral ulcer is improved until submitting another urine specimen. \par Sales catheter should be changed once a month. \par Schedule telehealth visit in 2 months for follow up. \par \par 06/07/2022:  is scheduled today for a follow up telephone visit for which she gave permission for. The visit was conducted with her daughter. Her daughter denies the patient having any pelvic pain. Last week she was experiencing more frequent painful spasms with the catheter. Patient's skin is very fragile. She reports the patient is very conscious of the urge she has to urinate. Pt is able to eat on her own. The patient's daughter reports a lesion on the back of her moms head, about the size of a quarter, that continues to scab but not fully healing. She is unable to find a dermatologist or wound care specialist that will come and evaluate her. Had a telehealth visit with a dermatologist that will not get back to her. \par \par Reviewed and discussed urine test results from 6/3/2022. Urine culture of that date grew 10,000-49,000 CFU/ml enterococcus faecalis and klebsiella oxytoca/ Raoutella ornithinolytica. Will not treat at current time but if sales is removed, treat 4-5 days before removal and then after catheter removal for 5-6 days. I informed the patient's daughter that most likely if we removed the sales, she would urinate on a pad or diaper and have to make sure her skin remains dry and intact. I informed the patient's daughter that it is up to her, I would be happy to try and remove the catheter and see how she does without it or just leave it in. It was my recommendation that, although a chronic sales poses a risk of urinary tract infection, I would leave it in as there is a risk of skin breakdown when she urinates in a pad and her skin is very fragile. 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 The patient's daughter opted to leave the catheter in. \par It was my recommendation that the patient's daughter contact the dermatologist who provided her with a telehealth visit. I reviewed the notes of both the dermatology and the wound care people. In it they do not provide a differential diagnosis. The dermatology resident mentioned that it is most likely skin cancer worsened by chronic pressure. Unable to give diagnosis without biopsy and in person evaluation. Images were sent to attending dermatologist . advised the daughter to call and ask for the opinion of .  \par Patient will have her catheter changed in one months time. \par \par 07/01/2022:  presents today for a follow up telephone visit for which she gave permission for.  The visit was mostly conducted with her daughter. Her daughter was a bit upset as she had just hung up from her sister who is having a number of health problems including a mass in her colon and lung nodules that are being removed. In regards to the patient, she had a few bladder spasms this week. She reports that her urine is fairly clear. She still has a wound on the sacrum that was healing but now opened again. Has consulted with dermatology, however she has not undergone a biopsy. I briefly spoke to . She sounds much better. Her voice is strong and clear. She reports she is not feeling completely okay but making progress. \par \par My recommendation is that we keep in the sales. Once the skin is good without ulceration then we can try a trial without catheter and just diapers. \par The patient's daughter was interested if there were any external options once the sales were to come out as she doesn't know if the skin will hold up well with her voiding in a diaper. I informed her that there is an external collecting vacuum device that is placed up against the vulva. I will look into this option and communicate with her nursing staff. \par I reviewed the patient's dermatology follow up note. 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 \par The patient will provide a urine sample and drop it off at her nearest [facility] lab which will be sent for urinalysis, urine cytology, and urine culture. The patient is always colonized, I explained that unless the patient is symptomatic, we will not treat. \par Patient will have a telehealth visit in 3 months time or sooner if clinically indicated. \par \par 7/15//2022:  was scheduled for a telephone visit today. She was unable to be reached. A VM was left reiterating what I understood to be the plan for her; that when all her skin pressure soars healed, we would then consider providing abx for bacteria in urine and remove sales for voiding trial. If she were to void on her own successfully, she would void on diapers, pads, or chucks. If she didn’t do well without the Sales and developed infections persistently or was having trouble urinating on her own, we would go back to Sales catheter drainage. \par \par \par Duration of telephone visit was 22 minutes.

## 2022-07-17 NOTE — HISTORY OF PRESENT ILLNESS
[FreeTextEntry1] : Ms. Venegas is a 89 year old woman here for a follow up of nocturia, microscopic hematuria, urethral caruncle.Patient believes she makes less urine in 24-hour period than previously.  Her age says that one given a diuretic.  Shea good quantity of urine.  The patient says that she generally makes it to the bathroom in time, though she must tarry which takes the diuretic. The patient complained about a foul order from the urine. The patient does not have dysuria.  There is no gross hematuria.  The patient does not push or strain to urinate so sometimes she has to increase her abdominal pressure to defecate. Patient uses a walker.  The patient also stated that she is having right lower abdominal pain and discomfort.  He corresponds to a bulge of increasing size in the right lower quadrant.  The patient has bilateral hearing aids and wears eye glasses.\par 7/3/18: The patient returned today and reported she has nocturia and wakes up every 2 hours to urinate. She broke her femur and a tibial fracture. \par 7/16/18: The patient returned and reported she has the urge to urinate but ends up having a bowel movement instead. Daughter noted bowels are large. Patient noted she has a hard time urinating. Wakes up 3-4 times during the night to urinate, noted it is sometimes just "drops of urine." Patient is more disorientated and confused, I advised her to consult with a neurologist. \par 8/30/18: The patient returned today and reported she is feeling adequate. Patient denies dysuria, gross hematuria, urgency, or incontinence. Wakes up 2-3 times during the night to urinate. \par 6/4/19: Patient presents today for a follow up. Today she notes that her urination is "okay". She does not leak during the day but still wears depends since she is still concerned about incontinence. At night, she wakes up when she has to urinate but due to mobility issues is not always able to make it to bathroom. Diarrhea is reported on occasion. Mirtazapine has been initiated by another provider and it is reported that is has decreased her nocturia from 4x a night to once a night. \par 05/26/2020: Patient presents today for a follow up. Pt ambulates in a wheelchair and is able to go short distances with a walker. Nocturia x1-2 usually, but voided unusually frequently last night. Denies hematuria,or burning. No symptoms in March. Reports issues with short term, but not long term memory. Has been consulting with PCP and testing treatments regarding memory. Reports occasional stomach pains.\par \par 06/12/2020: Pt contacted at [phone]. Gave permission to conduct a Telephone visit. Urine culture through the [facility] lab from 06/01/2020 grew 75 colony forming units per mL of Pseudomonas Aeruginosa. 1-5 Epithelial cells per field. Anything greater than 3 is abnormal. There were 3-10 WBC per high power field. Anything greater than 3 is abnormal. Bacteria were heavy. Pt had frequency and nocturia. She complained it was difficulty to sleep at night. Pt report she is feeling urgency when waking up to use the bathroom. She has not been leaking as much, or wetting herself. Her output is also not as great. Nocturia x3, or about every 2-3 hours. Pt is still on Cefpodoxime, but she will run out by 06/16/2020. Urine has become slightly cloudy, whereas it had been clear previously. Had not been using Melatonin at night. Has been taking Mirtazapine. \par \par 08/31/2021: The patient gave permission for a telephone visit. I spoke to patient's daughter Tabatha. She reports that on 6/4/21, after patient got off commode, her legs gave out and she broke both her shins, and has been immobilized since then. A few weeks ago, she had orders to move legs slightly. Daughter concerned because she has had Sales in her since she has not been standing which is changed once a month. Was told once she had a slight rash in the vulva and buttocks, but has not heard about a rash since then. Memory has been okay. Daughter will send over recent lab work for review. She states WBC was 5.57. Daughter states patient's urine is clear. Patient is receiving physical therapy twice a week and daughter will try to find more help. \par \par 11/16/2021: The patient gave permission for a telehealth visit. Daughter states patient has been home for the past 3 weeks. Was previously in the hospital for a bad wound and pulmonary congestion and had been on antibiotics. Reviewed 11/3/21 urine tests. UA showed 432 WBC/HPF. Previously on 5/5/21 showed only 1 WBC/HPF and 8/9/20 showed no WBC, all were clean catches. Urine culture showed >= 3 organisms. Daughter notes patient does have a cough. Sales catheter is draining okay. Daughter notes there is occasionally clusters of cloudiness draining more than before. \par \par 11/23/2021: The patient gave permission for a telehealth visit. I spoke with the daughter. She states patient has had issues with heart rate and oxygen which has been better, but did have acute episode of frequent BMs. Has been taking course of Cefadroxil 500mg BID. \par \par 12/22/2021: The patient gave permission for a telehealth visit. I spoke with patient's daughter Tabatha. Patient has been okay although feeling tired. Urine is clear. Completed course of Levofloxacin 500mg x 14 days and daughter notes that patient had leg pain for several days in a row. Never had a fever. Currently wears a catheter and has catheter changed by homecare nurses. \par \par 03/02/2022: The patient gave permission for a telehealth visit. I spoke with patient's daughter. Daughter states patient is doing okay currently. Daughter states she does not think patient was symptomatic for UTI but did have two episodes of bladder spasms which has not happened in the last 5 days. Last course of antibiotics was in 12/2021. Most recently last week she was put on Augmentin. Urine culture 2/11/22 grew >100,000 CFU/mL Acinetobacter baumannii which was susceptible to Augmentin. Last catheter change was less than 2 weeks ago when she also provided a urine sample before starting antibiotics. Has not had fever and urine looked clear. Leaks around Sales catheter occasionally. Drains above 1500cc of urine a day. Wound is healing. Had 2/28/22 abdominal US for abdominal distention which was technically difficult due to gas, but showed mild thickening of the gallbladder wall with no evidence of cholelithiasis or acute cholecystitis. Small right pleural effusion. No hydronephrosis or renal calcification bilaterally. \par \par 03/16/2022: The patient gave permission for a telehealth visit. I spoke to patient's daughter Tabatha. Patient is on indwelling Sales catheter. Reviewed 3/4/22 UA which showed WBC 4/HPF, RBC/HPF, leukocyte esterase small, bacteria none, specific gravity 1.010. Urine culture grew >100,000 CFU/mL Acinetobacter baumannii/nosocom group (carbapenem resistant). There is no need to treat at this time, but if needed would give amikacin. Ampicillin susceptible but unclear to me if a carbapenem resistant ampicillin abx would be adequate. I would discuss with ID consultant if treatment were indicated. House call physician Dr. Gillian Farmer ordered CMP 2/4/22 total protein slightly low 5.5, albumin normal 3.6, ALT low 8, otherwise normal, creatinine 0.54 normal and low due to patient's small body mass. 12/30/21 weight measurement 125 lbs, 5' 5", body surface area 1.62 m^2, BMI 20.8. Patient cannot get out of bed due to CHF. States she has not been able to irrigate the catheter although still draining. Occasionally leaks a little urine around the catheter. Daughter states patient is constantly feeling pressure to urinate and spasm and has some pain with catheter. Patient is able to remember the people around her. Still has open wound on back which is improving, reports it is about 1/4 inch in size. \par \par 06/07/2022:  is scheduled today for a follow up telephone visit for which she gave permission for. The visit was conducted with her daughter. Her daughter denies the patient having any pelvic pain. Last week she was experiencing more frequent painful spasms with the catheter. Patient's skin is very fragile. She reports the patient is very conscious of the urge she has to urinate. Pt is able to eat on her own. The patient's daughter reports a lesion on the back of her moms head, about the size of a quarter, that continues to scab but not fully healing. She is unable to find a dermatologist or wound care specialist that will come and evaluate her. Had a telehealth visit with a dermatologist that will not get back to her. \par \par 07/01/2022:  presents today for a follow up telephone visit for which she gave permission for.  The visit was mostly conducted with her daughter. Her daughter was a bit upset as she had just hung up from her sister who is having a number of health problems including a mass in her colon and lung nodules that are being removed. In regards to the patient, she had a few bladder spasms this week. She reports that her urine is fairly clear. She still has a wound on the sacrum that was healing but now opened again. Has consulted with dermatology, however she has not undergone a biopsy. I briefly spoke to . She sounds much better. Her voice is strong and clear. She reports she is not feeling completely okay but making progress. \par \par 7/14//2022:  was scheduled for a telephone visit today. She was unable to be reached. A VM was left reiterating what I understood to be the plan for her; that when all her skin pressure soars healed, we would then consider providing abx for bacteria in urine and remove sales for voiding trial. If she were to void on her own successfully, she would void on diapers, pads, or chucks. If she didn’t do well without the sales and developed infections persistently or was having trouble urinating on her own, we would go back to sales catheter. \par

## 2022-07-19 ENCOUNTER — NON-APPOINTMENT (OUTPATIENT)
Age: 87
End: 2022-07-19

## 2022-07-21 ENCOUNTER — APPOINTMENT (OUTPATIENT)
Dept: HOME HEALTH SERVICES | Facility: HOME HEALTH | Age: 87
End: 2022-07-21

## 2022-07-21 VITALS
DIASTOLIC BLOOD PRESSURE: 78 MMHG | TEMPERATURE: 97.2 F | SYSTOLIC BLOOD PRESSURE: 124 MMHG | OXYGEN SATURATION: 97 % | HEART RATE: 55 BPM

## 2022-07-21 DIAGNOSIS — Z86.79 PERSONAL HISTORY OF OTHER DISEASES OF THE CIRCULATORY SYSTEM: ICD-10-CM

## 2022-07-21 DIAGNOSIS — Z87.19 PERSONAL HISTORY OF OTHER DISEASES OF THE DIGESTIVE SYSTEM: ICD-10-CM

## 2022-07-21 DIAGNOSIS — R19.5 OTHER FECAL ABNORMALITIES: ICD-10-CM

## 2022-07-21 DIAGNOSIS — R32 UNSPECIFIED URINARY INCONTINENCE: ICD-10-CM

## 2022-07-21 DIAGNOSIS — T14.90XA INJURY, UNSPECIFIED, INITIAL ENCOUNTER: ICD-10-CM

## 2022-07-21 DIAGNOSIS — Z87.898 PERSONAL HISTORY OF OTHER SPECIFIED CONDITIONS: ICD-10-CM

## 2022-07-21 DIAGNOSIS — R33.9 RETENTION OF URINE, UNSPECIFIED: ICD-10-CM

## 2022-07-21 DIAGNOSIS — K64.8 OTHER HEMORRHOIDS: ICD-10-CM

## 2022-07-21 PROCEDURE — 99497 ADVNCD CARE PLAN 30 MIN: CPT

## 2022-07-21 PROCEDURE — 99349 HOME/RES VST EST MOD MDM 40: CPT | Mod: 25

## 2022-07-21 NOTE — COUNSELING
[Normal Weight - ( BMI  <25 )] : normal weight - ( BMI  <25 ) [DASH diet recommended] : DASH diet recommended [Smoke/CO Detectors] : smoke/CO detectors [Use assistive device to avoid falls] : use assistive device to avoid falls [] : abdominal aortic ultrasound [Decrease stress] : decrease stress [Decrease hospital use] : decrease hospital use [Minimize unnecessary interventions] : minimize unnecessary interventions [Comfort Care] : comfort care [Discussed disease trajectory with patient/caregiver] : discussed disease trajectory with patient/caregiver [Patient/Caregiver has ___ understanding of disease process] : patient/caregiver has [unfilled] understanding of disease process [Completed Medical Orders for Life-Sustaining Treatment] : completed medical orders for life-sustaining treatment [DNR] : Code Status: DNR [Limited] : Treatment Guidelines: Limited [DNI] : Intubation: DNI [[facility] Date: _____] [facility]: [unfilled] [_____] : HCP: [unfilled]

## 2022-07-22 NOTE — PROGRESS NOTE ADULT - ASSESSMENT
A/P:  Patient is a 89y Female w/ L Patella fx & BLLE proximal tib/fib fxs sp closed reduction and splint     -Medical management per primary team  -NWB BLLE in long leg trilam splints  -Keep splints c/d/i  -Ice/Elevate  -Incentive Spirometry  -Multimodal Analgesia PRN  -DVT PE ppx - Coumadin  -SCDs  -PT/OT OOBTC  -Ortho stable for discharge. FU outpatient with LEXIS Kitchen in 1-2 weeks after discharge. Call office to schedule appointment.  -Discharge planning - Home  -Will discuss w/ attending Dr. Kitchen and advise if plan changes Cimzia Pregnancy And Lactation Text: This medication crosses the placenta but can be considered safe in certain situations. Cimzia may be excreted in breast milk.

## 2022-07-24 PROBLEM — R32 INCONTINENCE: Status: RESOLVED | Noted: 2022-07-01 | Resolved: 2022-07-24

## 2022-07-24 PROBLEM — R19.5 DARK STOOLS: Status: RESOLVED | Noted: 2022-02-04 | Resolved: 2022-07-24

## 2022-07-24 PROBLEM — Z87.898 HISTORY OF CHRONIC PAIN: Status: RESOLVED | Noted: 2022-03-01 | Resolved: 2022-07-24

## 2022-07-24 PROBLEM — T14.90XA CLOSED WOUND: Status: RESOLVED | Noted: 2021-11-23 | Resolved: 2022-07-24

## 2022-07-24 NOTE — HISTORY OF PRESENT ILLNESS
[Family Member] : family member [FreeTextEntry1] : debility CHF   bedbound  frequent UTI  [FreeTextEntry2] : Patient denies fever, cough, trouble breathing, rash, vomiting and diarrhea. 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 as per derm   with Recommendation of removal at families convenience.\par Will tx by D&C if necessary, with bx.\par SK's - frontal hairline. deemed  Benign. \par  ear wax  unchanged  not issues  today  \par diet regular  appetite   ok needs  supplements \par dysphagia  yes to pills \par Weight  stable now\par Ambulates none bedbound \par falls none \par  has indwelling Reaves and  has been seeing  urologist via telehealth  \par  bed sores sacral  still with wound care 3 tomes a week as per daughter  improving \par Behavior very  anxious  on meds  at times agitated  \par Mood ok \par  memory  ok  declining  \par  sleep  ok \par sensory deficits   vision ok hard of hearing \par pain yes \par Medication refills done and reconciliation  done \par Goals of care discussed and completed  today \par

## 2022-07-24 NOTE — PHYSICAL EXAM
[Normal Sclera/Conjunctiva] : normal sclera/conjunctiva [Normal Outer Ear/Nose] : the ears and nose were normal in appearance [No JVD] : no jugular venous distention [No Respiratory Distress] : no respiratory distress [Clear to Auscultation] : lungs were clear to auscultation bilaterally [Breast Exam Declined] : patient declined to have breast exam done [Non Tender] : non-tender [Patient Refused] : rectal exam was refused by the patient [No CVA Tenderness] : no ~M costovertebral angle tenderness [No Motor Deficits] : the motor exam was normal [Oriented x3] : oriented to person, place, and time [de-identified] : comfortable  cooperative   but  frail   occipital area of scalp  quarter sized nodule  bleeds easily no signs of infection  [de-identified] :  TM visualized   r [de-identified] :   poor effort   breathing  [de-identified] :  irregular 3/6  murmur   1 pedal edema   [de-identified] :  softly distended   right  inguinal hernia   [de-identified] :  sales  in place  [de-identified] :  nonambulatory  [de-identified] : sacral wound not examined  as per daughter request    use of barrier team stressed  \par right ischium continues to improve, 0.8 cm, packing with alginate\par heels left healed, right heel stable eschar , offloading with boots and has group 2 mattress  venous  stasis  hyperpigmentation  dry skin but no ulceration   long elongated  brittle nails  [de-identified] : anxious

## 2022-08-05 ENCOUNTER — TRANSCRIPTION ENCOUNTER (OUTPATIENT)
Age: 87
End: 2022-08-05

## 2022-08-09 ENCOUNTER — NON-APPOINTMENT (OUTPATIENT)
Age: 87
End: 2022-08-09

## 2022-08-12 ENCOUNTER — LABORATORY RESULT (OUTPATIENT)
Age: 87
End: 2022-08-12

## 2022-08-15 ENCOUNTER — TRANSCRIPTION ENCOUNTER (OUTPATIENT)
Age: 87
End: 2022-08-15

## 2022-08-16 ENCOUNTER — NON-APPOINTMENT (OUTPATIENT)
Age: 87
End: 2022-08-16

## 2022-08-17 ENCOUNTER — LABORATORY RESULT (OUTPATIENT)
Age: 87
End: 2022-08-17

## 2022-08-17 LAB
APPEARANCE: CLEAR
BACTERIA UR CULT: NORMAL
BACTERIA: ABNORMAL
BILIRUBIN URINE: NEGATIVE
BLOOD URINE: NORMAL
COLOR: NORMAL
GLUCOSE QUALITATIVE U: NEGATIVE
HYALINE CASTS: 0 /LPF
KETONES URINE: NEGATIVE
LEUKOCYTE ESTERASE URINE: ABNORMAL
MICROSCOPIC-UA: NORMAL
NITRITE URINE: NEGATIVE
PH URINE: 7
PROTEIN URINE: NEGATIVE
RED BLOOD CELLS URINE: 1 /HPF
SPECIFIC GRAVITY URINE: 1.01
SQUAMOUS EPITHELIAL CELLS: 7 /HPF
URINE CYTOLOGY: NORMAL
UROBILINOGEN URINE: NORMAL
WHITE BLOOD CELLS URINE: 5 /HPF

## 2022-08-18 ENCOUNTER — NON-APPOINTMENT (OUTPATIENT)
Age: 87
End: 2022-08-18

## 2022-08-22 ENCOUNTER — RX CHANGE (OUTPATIENT)
Age: 87
End: 2022-08-22

## 2022-08-23 ENCOUNTER — NON-APPOINTMENT (OUTPATIENT)
Age: 87
End: 2022-08-23

## 2022-08-30 ENCOUNTER — NON-APPOINTMENT (OUTPATIENT)
Age: 87
End: 2022-08-30

## 2022-09-08 NOTE — PROGRESS NOTE BEHAVIORAL HEALTH - NS ED BHA AXIS I SECONDARY1 CODE FT
F41.9
F32.9
F41.9
PAST MEDICAL HISTORY:  BPH (benign prostatic hyperplasia)     Cirrhosis     Cirrhosis of liver not due to alcohol     COPD, mild     GERD (gastroesophageal reflux disease)     HTN (hypertension)     PAD (peripheral artery disease)     PVD (peripheral vascular disease)

## 2022-09-10 LAB
APPEARANCE: CLEAR
BACTERIA UR CULT: NORMAL
BACTERIA: NEGATIVE
BILIRUBIN URINE: NEGATIVE
BLOOD URINE: NEGATIVE
COLOR: NORMAL
GLUCOSE QUALITATIVE U: NEGATIVE
HYALINE CASTS: 0 /LPF
KETONES URINE: NEGATIVE
LEUKOCYTE ESTERASE URINE: ABNORMAL
MICROSCOPIC-UA: NORMAL
NITRITE URINE: NEGATIVE
PH URINE: 6
PROTEIN URINE: NEGATIVE
RED BLOOD CELLS URINE: 0 /HPF
SPECIFIC GRAVITY URINE: 1.01
SQUAMOUS EPITHELIAL CELLS: 1 /HPF
URINE COMMENTS: NORMAL
UROBILINOGEN URINE: NORMAL
WHITE BLOOD CELLS URINE: 8 /HPF

## 2022-09-12 ENCOUNTER — RX RENEWAL (OUTPATIENT)
Age: 87
End: 2022-09-12

## 2022-09-13 ENCOUNTER — APPOINTMENT (OUTPATIENT)
Dept: UROLOGY | Facility: CLINIC | Age: 87
End: 2022-09-13

## 2022-09-13 PROCEDURE — 99443: CPT | Mod: 95

## 2022-09-14 ENCOUNTER — NON-APPOINTMENT (OUTPATIENT)
Age: 87
End: 2022-09-14

## 2022-09-14 LAB — URINE CYTOLOGY: NORMAL

## 2022-09-15 ENCOUNTER — APPOINTMENT (OUTPATIENT)
Dept: UROLOGY | Facility: CLINIC | Age: 87
End: 2022-09-15

## 2022-09-15 PROCEDURE — 99215 OFFICE O/P EST HI 40 MIN: CPT | Mod: 95

## 2022-09-15 PROCEDURE — G2212 PROLONG OUTPT/OFFICE VIS: CPT | Mod: 95

## 2022-09-19 ENCOUNTER — NON-APPOINTMENT (OUTPATIENT)
Age: 87
End: 2022-09-19

## 2022-09-21 ENCOUNTER — TRANSCRIPTION ENCOUNTER (OUTPATIENT)
Age: 87
End: 2022-09-21

## 2022-09-21 ENCOUNTER — NON-APPOINTMENT (OUTPATIENT)
Age: 87
End: 2022-09-21

## 2022-09-22 ENCOUNTER — NON-APPOINTMENT (OUTPATIENT)
Age: 87
End: 2022-09-22

## 2022-09-23 ENCOUNTER — NON-APPOINTMENT (OUTPATIENT)
Age: 87
End: 2022-09-23

## 2022-09-27 NOTE — HISTORY OF PRESENT ILLNESS
[FreeTextEntry1] : Ms. Venegas is a 89 year old woman here for a follow up of nocturia, microscopic hematuria, urethral caruncle.Patient believes she makes less urine in 24-hour period than previously.  Her age says that one given a diuretic.  Shea good quantity of urine.  The patient says that she generally makes it to the bathroom in time, though she must tarry which takes the diuretic. The patient complained about a foul order from the urine. The patient does not have dysuria.  There is no gross hematuria.  The patient does not push or strain to urinate so sometimes she has to increase her abdominal pressure to defecate. Patient uses a walker.  The patient also stated that she is having right lower abdominal pain and discomfort.  He corresponds to a bulge of increasing size in the right lower quadrant.  The patient has bilateral hearing aids and wears eye glasses.\par 7/3/18: The patient returned today and reported she has nocturia and wakes up every 2 hours to urinate. She broke her femur and a tibial fracture. \par 7/16/18: The patient returned and reported she has the urge to urinate but ends up having a bowel movement instead. Daughter noted bowels are large. Patient noted she has a hard time urinating. Wakes up 3-4 times during the night to urinate, noted it is sometimes just "drops of urine." Patient is more disorientated and confused, I advised her to consult with a neurologist. \par 8/30/18: The patient returned today and reported she is feeling adequate. Patient denies dysuria, gross hematuria, urgency, or incontinence. Wakes up 2-3 times during the night to urinate. \par 6/4/19: Patient presents today for a follow up. Today she notes that her urination is "okay". She does not leak during the day but still wears depends since she is still concerned about incontinence. At night, she wakes up when she has to urinate but due to mobility issues is not always able to make it to bathroom. Diarrhea is reported on occasion. Mirtazapine has been initiated by another provider and it is reported that is has decreased her nocturia from 4x a night to once a night. \par 05/26/2020: Patient presents today for a follow up. Pt ambulates in a wheelchair and is able to go short distances with a walker. Nocturia x1-2 usually, but voided unusually frequently last night. Denies hematuria,or burning. No symptoms in March. Reports issues with short term, but not long term memory. Has been consulting with PCP and testing treatments regarding memory. Reports occasional stomach pains.\par \par 06/12/2020: Pt contacted at [phone]. Gave permission to conduct a Telephone visit. Urine culture through the [facility] from 06/01/2020 grew 75 colony forming units per mL of Pseudomonas Aeruginosa. 1-5 Epithelial cells per field. Anything greater than 3 is abnormal. There were 3-10 WBC per high power field. Anything greater than 3 is abnormal. Bacteria were heavy. Pt had frequency and nocturia. She complained it was difficulty to sleep at night. Pt report she is feeling urgency when waking up to use the bathroom. She has not been leaking as much, or wetting herself. Her output is also not as great. Nocturia x3, or about every 2-3 hours. Pt is still on Cefpodoxime, but she will run out by 06/16/2020. Urine has become slightly cloudy, whereas it had been clear previously. Had not been using Melatonin at night. Has been taking Mirtazapine. \par \par 08/31/2021: The patient gave permission for a telephone visit. I spoke to patient's daughter Tabatha. She reports that on 6/4/21, after patient got off commode, her legs gave out and she broke both her shins, and has been immobilized since then. A few weeks ago, she had orders to move legs slightly. Daughter concerned because she has had Reaves in her since she has not been standing which is changed once a month. Was told once she had a slight rash in the vulva and buttocks, but has not heard about a rash since then. Memory has been okay. Daughter will send over recent lab work for review. She states WBC was 5.57. Daughter states patient's urine is clear. Patient is receiving physical therapy twice a week and daughter will try to find more help. \par \par 11/16/2021: The patient gave permission for a telehealth visit. Daughter states patient has been home for the past 3 weeks. Was previously in the hospital for a bad wound and pulmonary congestion and had been on antibiotics. Reviewed 11/3/21 urine tests. UA showed 432 WBC/HPF. Previously on 5/5/21 showed only 1 WBC/HPF and 8/9/20 showed no WBC, all were clean catches. Urine culture showed >= 3 organisms. Daughter notes patient does have a cough. Reaves catheter is draining okay. Daughter notes there is occasionally clusters of cloudiness draining more than before. \par \par 11/23/2021: The patient gave permission for a telehealth visit. I spoke with the daughter. She states patient has had issues with heart rate and oxygen which has been better, but did have acute episode of frequent BMs. Has been taking course of Cefadroxil 500mg BID. \par \par 12/22/2021: The patient gave permission for a telehealth visit. I spoke with patient's daughter Tabatha. Patient has been okay although feeling tired. Urine is clear. Completed course of Levofloxacin 500mg x 14 days and daughter notes that patient had leg pain for several days in a row. Never had a fever. Currently wears a catheter and has catheter changed by homecare nurses. \par \par 03/02/2022: The patient gave permission for a telehealth visit. I spoke with patient's daughter. Daughter states patient is doing okay currently. Daughter states she does not think patient was symptomatic for UTI but did have two episodes of bladder spasms which has not happened in the last 5 days. Last course of antibiotics was in 12/2021. Most recently last week she was put on Augmentin. Urine culture 2/11/22 grew >100,000 CFU/mL Acinetobacter baumannii which was susceptible to Augmentin. Last catheter change was less than 2 weeks ago when she also provided a urine sample before starting antibiotics. Has not had fever and urine looked clear. Leaks around Reaves catheter occasionally. Drains above 1500cc of urine a day. Wound is healing. Had 2/28/22 abdominal US for abdominal distention which was technically difficult due to gas, but showed mild thickening of the gallbladder wall with no evidence of cholelithiasis or acute cholecystitis. Small right pleural effusion. No hydronephrosis or renal calcification bilaterally. \par \par 03/16/2022: The patient gave permission for a telehealth visit. I spoke to patient's daughter Tabatha. Patient is on indwelling Reaves catheter. Reviewed 3/4/22 UA which showed WBC 4/HPF, RBC/HPF, leukocyte esterase small, bacteria none, specific gravity 1.010. Urine culture grew >100,000 CFU/mL Acinetobacter baumannii/nosocom group (carbapenem resistant). There is no need to treat at this time, but if needed would give amikacin. Ampicillin susceptible but unclear to me if a carbapenem resistant ampicillin abx would be adequate. I would discuss with ID consultant if treatment were indicated. House call physician Dr. Gillian Farmer ordered CMP 2/4/22 total protein slightly low 5.5, albumin normal 3.6, ALT low 8, otherwise normal, creatinine 0.54 normal and low due to patient's small body mass. 12/30/21 weight measurement 125 lbs, 5' 5", body surface area 1.62 m^2, BMI 20.8. Patient cannot get out of bed due to CHF. States she has not been able to irrigate the catheter although still draining. Occasionally leaks a little urine around the catheter. Daughter states patient is constantly feeling pressure to urinate and spasm and has some pain with catheter. Patient is able to remember the people around her. Still has open wound on back which is improving, reports it is about 1/4 inch in size. \par \par 06/07/2022:  is scheduled today for a follow up telephone visit for which she gave permission for. The visit was conducted with her daughter. Her daughter denies the patient having any pelvic pain. Last week she was experiencing more frequent painful spasms with the catheter. Patient's skin is very fragile. She reports the patient is very conscious of the urge she has to urinate. Pt is able to eat on her own. The patient's daughter reports a lesion on the back of her moms head, about the size of a quarter, that continues to scab but not fully healing. She is unable to find a dermatologist or wound care specialist that will come and evaluate her. Had a telehealth visit with a dermatologist that will not get back to her. \par \par 07/01/2022:  presents today for a follow up telephone visit for which she gave permission for.  The visit was mostly conducted with her daughter. Her daughter was a bit upset as she had just hung up from her sister who is having a number of health problems including a mass in her colon and lung nodules that are being removed. In regards to the patient, she had a few bladder spasms this week. She reports that her urine is fairly clear. She still has a wound on the sacrum that was healing but now opened again. Has consulted with dermatology, however she has not undergone a biopsy. I briefly spoke to . She sounds much better. Her voice is strong and clear. She reports she is not feeling completely okay but making progress. \par \par 07/15/2022:  presents today for a follow up telephone visit for which she gave permission for. The visit was conducted with her daughter who is very active in her mother's care. Pt had a telehealth appointment with dermatology yesterday. 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 They should be removed at family's convenience and will be sent for pathology. Instructions were given on how to care for the scalp lesion. At the time of providing the urine specimen on 7/9/2022 which grew >100,000 CFU/ml enterobacter cloacae complex and >100,000 CFU/ml enterococcus faecalis, the patient was stable in terms of her urination. She was not symptomatic. She does have some bladder spasms after being moved in the morning which results in urinating around the catheter. She does not have any pain. Urine is perfectly clear. At night she puts out less. Her daughter states that the sacrum ulcer is healing but not closed yet. \par \par 09/15/2022:  presents today for a follow up telehealth visit for which she gave permission for. The visit was conducted with her daughter who is very active in her mother's care. She reports that her mother is doing okay. She does not appear symptomatic from an infection. She has had some bladder spasms and is a little fatigued. She states the bladder spasms aren't painful but they do wake her up. Sacral ulcer is healing. Has been trying to increase her water intake. Daughter reports that the home health aides do not like the idea of taking out the catheter as she will always be wet since she'd void into diapers or chucks. Her BM are fairly irregular as there are some days where she has about 5-6 BM and on others she is slightly constipated. Pt's daughter reports her memory is worsening a bit. Occasionally repeats a question if she is anxious.

## 2022-09-27 NOTE — REVIEW OF SYSTEMS
[Eyesight Problems] : eyesight problems [Loss Of Hearing] : hearing loss [Chest Pain] : chest pain [Constipation] : constipation [Joint Pain] : joint pain [Anxiety] : anxiety [Easy Bleeding] : a tendency for easy bleeding [Feeling Tired] : feeling tired [Memory Lapses Or Loss] : memory loss [Feeling Poorly] : not feeling poorly [Palpitations] : no palpitations [Cough] : no cough [Dysuria] : no dysuria [Convulsions] : no convulsions [Hot Flashes] : no hot flashes

## 2022-09-27 NOTE — PHYSICAL EXAM
[General Appearance - Well Developed] : well developed [Normal Appearance] : normal appearance [General Appearance - In No Acute Distress] : no acute distress [Skin Color & Pigmentation] : normal skin color and pigmentation [Oriented To Time, Place, And Person] : oriented to person, place, and time [Affect] : the affect was normal [Mood] : the mood was normal [Not Anxious] : not anxious [FreeTextEntry1] : Complexion is good, not pale

## 2022-09-27 NOTE — ASSESSMENT
[FreeTextEntry1] : Ms. Venegas is a 89 year old woman here for a follow up of nocturia, microscopic hematuria, urethral caruncle.Patient believes she makes less urine in 24-hour period than previously.  Her age says that one given a diuretic.  Shea good quantity of urine.  The patient says that she generally makes it to the bathroom in time, though she must tarry which takes the diuretic. The patient complained about a foul order from the urine. The patient does not have dysuria.  There is no gross hematuria.  The patient does not push or strain to urinate so sometimes she has to increase her abdominal pressure to defecate. Patient uses a walker.  The patient also stated that she is having right lower abdominal pain and discomfort.  He corresponds to a bulge of increasing size in the right lower quadrant.  The patient has bilateral hearing aids and wears eye glasses.\par 7/3/18: The patient returned today and reported she has nocturia and wakes up every 2 hours to urinate. She broke her femur and a tibial fracture. \par 7/16/18: The patient returned and reported she has the urge to urinate but ends up having a bowel movement instead. Daughter noted bowels are large. Patient noted she has a hard time urinating. Wakes up 3-4 times during the night to urinate, noted it is sometimes just "drops of urine." Patient is more disorientated and confused, I advised her to consult with a neurologist. \par 8/30/18: The patient returned today and reported she is feeling adequate. Patient denies dysuria, gross hematuria, urgency, or incontinence. Wakes up 2-3 times during the night to urinate. \par 6/4/19: Patient presents today for a follow up. Today she notes that her urination is "okay". She does not leak during the day but still wears depends since she is still concerned about incontinence. At night, she wakes up when she has to urinate but due to mobility issues is not always able to make it to bathroom. Diarrhea is reported on occasion. Mirtazapine has been initiated by another provider and it is reported that is has decreased her nocturia from 4x a night to once a night. The patient produced a catheterized urine sample which will be sent for urinalysis, urine cytology, and urine culture.Upon completion of a physical exam it is determined that vaginitis is present. Therefore Fluconazole will be prescribed at this time. I will provide her with 1 500 mg tablet that will be taken one time only due to her use of Warfarin. It is advised that she switch to cotton underwear during the day and if leakage is still reported then she can switch back to wearing the depends.  The patient is to follow up in 3 weeks or sooner if clinically indicated. \par 05/26/2020: Patient presents today for a follow up. Pt ambulates in a wheelchair and is able to go short distances with a walker. Nocturia x1-2 usually, but voided unusually frequently last night. Denies hematuria,or burning. No symptoms in March. Reports issues with short term, but not long term memory. Has been consulting with PCP and testing treatments regarding memory. Reports occasional stomach pains. On physical exam patient had no erythema of vaginal mucosa, no vaginal discharge and no prolapse. Pt will provide a urine sample today for culture and analysis. Pt received a catheterization of urine today for total volume of 180mL. PVR not needed despite pt concerns. \par \par 06/12/2020: Pt contacted at [phone]. Gave permission to conduct a Telephone visit. Urine culture through the [facility] lab from 06/01/2020 grew 75 colony forming units per mL of Pseudomonas Aeruginosa. 1-5 Epithelial cells per field. Anything greater than 3 is abnormal. There were 3-10 WBC per high power field. Anything greater than 3 is abnormal. Bacteria were heavy. Pt had frequency and nocturia. She complained it was difficulty to sleep at night. Pt report she is feeling urgency when waking up to use the bathroom. She has not been leaking as much, or wetting herself. Her output is also not as great. Nocturia x3, or about every 2-3 hours. Pt is still on Cefpodoxime, but she will run out by 06/16/2020. Urine has become slightly cloudy, whereas it had been clear previously. Had not been using Melatonin at night. Has been taking Mirtazapine. \par \par 08/31/2021: The patient gave permission for a telephone visit. I spoke to patient's daughter Tabatha. She reports that on 6/4/21, after patient got off commode, her legs gave out and she broke both her shins, and has been immobilized since then. A few weeks ago, she had orders to move legs slightly. Daughter concerned because she has had Sales in her since she has not been standing which is changed once a month. Was told once she had a slight rash in the vulva and buttocks, but has not heard about a rash since then. Memory has been okay. Daughter will send over recent lab work for review. She states WBC was 5.57. Daughter states patient's urine is clear. Patient is receiving physical therapy twice a week and daughter will try to find more help. \par \par 11/16/2021: The patient gave permission for a telehealth visit. Daughter states patient has been home for the past 3 weeks. Was previously in the hospital for a bad wound and pulmonary congestion and had been on antibiotics. Reviewed 11/3/21 urine tests. UA showed 432 WBC/HPF. Previously on 5/5/21 showed only 1 WBC/HPF and 8/9/20 showed no WBC, all were clean catches. Urine culture showed >= 3 organisms. Daughter notes patient does have a cough. Sales catheter is draining okay. Daughter notes there is occasionally clusters of cloudiness draining more than before. \par \par 11/23/2021: The patient gave permission for a telehealth visit. I spoke with the daughter. She states patient has had issues with heart rate and oxygen which has been better, but did have acute episode of frequent BMs. Has been taking course of Cefadroxil 500mg BID. \par \par 12/22/2021: The patient gave permission for a telehealth visit. I spoke with patient's daughter Tabatha. Patient has been okay although feeling tired. Urine is clear. Completed course of Levofloxacin 500mg x 14 days and daughter notes that patient had leg pain for several days in a row. Never had a fever. Currently has a Sales catheter and has catheter changed by home care nurses. \par \par 03/02/2022: The patient gave permission for a telehealth visit. I spoke with patient's daughter. Daughter states patient is doing okay currently. Daughter states she does not think patient was symptomatic for UTI but did have two episodes of bladder spasms which has not happened in the last 5 days. Last course of antibiotics was in 12/2021. Most recently last week she was put on Augmentin. Urine culture 2/11/22 grew >100,000 CFU/mL Acinetobacter baumannii which was susceptible to Augmentin. Last catheter change was less than 2 weeks ago when she also provided a urine sample before starting antibiotics. Has not had fever and urine looked clear. Leaks around Sales catheter occasionally. Drains above 1500cc of urine a day. Wound is healing. Had 2/28/22 abdominal US for abdominal distention which was technically difficult due to gas, but showed mild thickening of the gallbladder wall with no evidence of cholelithiasis or acute cholecystitis. Small right pleural effusion. No hydronephrosis or renal calcification bilaterally. \par \par 03/16/2022: The patient gave permission for a telehealth visit. I spoke to patient's daughter Tabatha. Patient is on indwelling Sales catheter. Reviewed 3/4/22 UA which showed WBC 4/HPF, RBC/HPF, leukocyte esterase small, bacteria none, specific gravity 1.010. Urine culture grew >100,000 CFU/mL Acinetobacter baumannii/nosocom group (carbapenem resistant). There is no need to treat at this time, but if needed would give amikacin. Ampicillin susceptible but unclear to me if a carbapenem resistant ampicillin abx would be adequate. I would discuss with ID consultant if treatment were indicated. House call physician Dr. Gillian Farmer ordered CMP 2/4/22 total protein slightly low 5.5, albumin normal 3.6, ALT low 8, otherwise normal, creatinine 0.54 normal and low due to patient's small body mass. 12/30/21 weight measurement 125 lbs, 5' 5", body surface area 1.62 m^2, BMI 20.8. Patient cannot get out of bed due to CHF. States she has not been able to irrigate the catheter although still draining. Occasionally leaks a little urine around the catheter. Daughter states patient is constantly feeling pressure to urinate and spasm and has some pain with catheter. Patient is able to remember the people around her. Still has open wound on back which is improving, reports it is about 1/4 inch in size. \par \par Reviewed dermatology note of 3/7/22. \par Reviewed 3/4/22 UA and urine culture and CMP of 2/4/22 with patient's daughter which are WNL. \par We discussed removing the catheter and instead wearing a diaper to collect the urine. For now, we will leave Sales catheter. We will wait until sacral ulcer is improved until submitting another urine specimen. \par Sales catheter should be changed once a month. \par Schedule telehealth visit in 2 months for follow up. \par \par 06/07/2022:  is scheduled today for a follow up telephone visit for which she gave permission for. The visit was conducted with her daughter. Her daughter denies the patient having any pelvic pain. Last week she was experiencing more frequent painful spasms with the catheter. Patient's skin is very fragile. She reports the patient is very conscious of the urge she has to urinate. Pt is able to eat on her own. The patient's daughter reports a lesion on the back of her moms head, about the size of a quarter, that continues to scab but not fully healing. She is unable to find a dermatologist or wound care specialist that will come and evaluate her. Had a telehealth visit with a dermatologist that will not get back to her. \par \par Reviewed and discussed urine test results from 6/3/2022. Urine culture of that date grew 10,000-49,000 CFU/ml enterococcus faecalis and klebsiella oxytoca/ Raoutella ornithinolytica. Will not treat at current time but if sales is removed, treat 4-5 days before removal and then after catheter removal for 5-6 days. I informed the patient's daughter that most likely if we removed the sales, she would urinate on a pad or diaper and have to make sure her skin remains dry and intact. I informed the patient's daughter that it is up to her, I would be happy to try and remove the catheter and see how she does without it or just leave it in. It was my recommendation that, although a chronic sales poses a risk of urinary tract infection, I would leave it in as there is a risk of skin breakdown when she urinates in a pad and her skin is very fragile. 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 The patient's daughter opted to leave the catheter in. \par It was my recommendation that the patient's daughter contact the dermatologist who provided her with a telehealth visit. I reviewed the notes of both the dermatology and the wound care people. In it they do not provide a differential diagnosis. The dermatology resident mentioned that it is most likely skin cancer worsened by chronic pressure. Unable to give diagnosis without biopsy and in person evaluation. Images were sent to attending dermatologist . advised the daughter to call and ask for the opinion of .  \par Patient will have her catheter changed in one months time. \par \par 07/01/2022:  presents today for a follow up telephone visit for which she gave permission for.  The visit was mostly conducted with her daughter. Her daughter was a bit upset as she had just hung up from her sister who is having a number of health problems including a mass in her colon and lung nodules that are being removed. In regards to the patient, she had a few bladder spasms this week. She reports that her urine is fairly clear. She still has a wound on the sacrum that was healing but now opened again. Has consulted with dermatology, however she has not undergone a biopsy. I briefly spoke to . She sounds much better. Her voice is strong and clear. She reports she is not feeling completely okay but making progress. \par \par My recommendation is that we keep in the sales. Once the skin is good without ulceration then we can try a trial without catheter and just diapers. \par The patient's daughter was interested if there were any external options once the sales were to come out as she doesn't know if the skin will hold up well with her voiding in a diaper. I informed her that there is an external collecting vacuum device that is placed up against the vulva. I will look into this option and communicate with her nursing staff. \par I reviewed the patient's dermatology follow up note. 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 \par The patient will provide a urine sample and drop it off at her nearest [facility] lab which will be sent for urinalysis, urine cytology, and urine culture. The patient is always colonized, I explained that unless the patient is symptomatic, we will not treat. \par Patient will have a telehealth visit in 3 months time or sooner if clinically indicated. \par \par 07/15/2022:  presents today for a follow up telephone visit for which she gave permission for. The visit was conducted with her daughter who is very active in her mother's care. Pt had a telehealth appointment with dermatology yesterday. 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 They should be removed at family's convenience and will be sent for pathology. Instructions were given on how to care for the scalp lesion. At the time of providing the urine specimen on 7/9/2022 which grew >100,000 CFU/ml enterobacter cloacae complex and >100,000 CFU/ml enterococcus faecalis, the patient was stable in terms of her urination. She was not symptomatic. She does have some bladder spasms after being moved in the morning which results in urinating around the catheter. She does not have any pain. Urine is perfectly clear. At night she puts out less. Her daughter states that the sacrum ulcer is healing but not closed yet. \par \par Her daughter stated that when her mother has her catheter changed by her visiting nurse, she experiences some pain. She is changed with her legs straight as she can't bend her legs. I explained that the pain is most likely due to the catheter passing through the urethra and that if she is more comfortable with her legs straight, she should keep them straight. Advised her to give her mom tylenol about an hour before the catheter change. Her daughter also states she has some oxycodone, which she sometimes gives her half a tablet. If the tylenol is not enough, she was advised to try oxycodone so long as it does not affect her breathing. \par \par Reviewed 7/9/2022 urine test results. Her daughter stated that when she went to drop off the specimen there were no orders in the system. She had to contact the on call physician who only put in orders for urine culture. Urine culture grew >100,000 CFU/ml enterobacter cloacae complex and >100,000 CFU/ml enterococcus faecalis. Pt is not symptomatic. Will not treat at this time. Patient will continue to provide urine specimens about once a month so in the case that she is symptomatic, we know what to treat her with. Urine orders were entered and a paper copy will be mailed in the case that she is told again that there are no orders in the system. \par \par We reviewed the plan that when all her skin pressure soars healed, we would then consider providing abx for bacteria in urine and remove sales for voiding trial. If she were to void on her own successfully, she would void on diapers, pads, or chucks. If she didn’t do well without the sales and developed infections persistently or was having trouble urinating on her own, we would go back to sales catheter. Her daughter expresses interest in an external collecting device if the sales were to be removed. Will look into it and discuss further in the future once the pressure ulcers are healed. \par I reviewed the patient's most recent CBC with diff of 6/10/2022. Assured the patient's daughter that she is doing well. I spoke to the patient briefly at the end of the visit and she sounds like she is doing well. Her memory is good as she remembered she wanted to ask me how my wife is doing. \par Patient will have a telephone visit in one months time for reassessment. \par \par 09/15/2022:  presents today for a follow up telehealth visit for which she gave permission for. The visit was conducted with her daughter who is very active in her mother's care. She reports that her mother is doing okay. She does not appear symptomatic from an infection. She has had some bladder spasms and is a little fatigued. She states the bladder spasms aren't painful but they do wake her up. Sacral ulcer is healing. Has been trying to increase her water intake. Daughter reports that the home health aides do not like the idea of taking out the catheter as she will always be wet since she'd void into diapers or chucks. Her BM are fairly irregular as there are some days where she has about 5-6 BM and on others she is slightly constipated. Pt's daughter reports her memory is worsening a bit. Occasionally repeats a question if she is anxious. \par \par Reviewed urine test results of 9/9/2022. Results were remarkably good for having a sales catheter. Also reviewed 8/17/2022 blood work. \par \par We once again reviewed that when sacral ulcer is fully healed, we can do a urine culture and treat. Then can consider removing sales which will reduce the chance of infections but will need to watch her skin to ensure there is no breakdown. If she would like to continue with the sales, which I am also agreeable with, I can provide a medication to further reduce bladder spasms, however she runs the risk of constipation. Will revisit at a later time, but for the time being she will remain with the sales while the ulcer is healing. \par \par My recommendation was that she try hyoscyamine sublingual tablets 0.125 mg to take after the visiting nurse leaves to try and avoid the occasional bladder spasms. \par \par Patient will have a telehealth visit in one month for reassessment, sooner if she tries the hyoscyamine tablets and likes them/ doesn't like them. \par \par Preparation, audio-visual visit, and coordination of care took : 65 minutes

## 2022-09-27 NOTE — ADDENDUM
[FreeTextEntry1] : This note was authored by Senia Corbin working as a scribe for Dr.Gary Sexton. I, Dr. Indio Sexton have reviewed the content of this note and confirm it is true and accurate. I personally performed the history and physical examination and made all the decisions 09/15/2022.

## 2022-10-10 ENCOUNTER — NON-APPOINTMENT (OUTPATIENT)
Age: 87
End: 2022-10-10

## 2022-10-13 ENCOUNTER — APPOINTMENT (OUTPATIENT)
Dept: HOME HEALTH SERVICES | Facility: HOME HEALTH | Age: 87
End: 2022-10-13

## 2022-10-13 VITALS
DIASTOLIC BLOOD PRESSURE: 66 MMHG | TEMPERATURE: 97.9 F | SYSTOLIC BLOOD PRESSURE: 106 MMHG | HEART RATE: 70 BPM | OXYGEN SATURATION: 94 %

## 2022-10-13 DIAGNOSIS — L89.314 PRESSURE ULCER OF RIGHT BUTTOCK, STAGE 4: ICD-10-CM

## 2022-10-13 PROCEDURE — 90662 IIV NO PRSV INCREASED AG IM: CPT

## 2022-10-13 PROCEDURE — G0008: CPT

## 2022-10-13 PROCEDURE — 99350 HOME/RES VST EST HIGH MDM 60: CPT | Mod: 25

## 2022-10-13 NOTE — PHYSICAL EXAM
[Normal Sclera/Conjunctiva] : normal sclera/conjunctiva [Normal Outer Ear/Nose] : the ears and nose were normal in appearance [No JVD] : no jugular venous distention [No Respiratory Distress] : no respiratory distress [Clear to Auscultation] : lungs were clear to auscultation bilaterally [Breast Exam Declined] : patient declined to have breast exam done [Non Tender] : non-tender [Patient Refused] : rectal exam was refused by the patient [No CVA Tenderness] : no ~M costovertebral angle tenderness [No Motor Deficits] : the motor exam was normal [Oriented x3] : oriented to person, place, and time [de-identified] : comfortable  cooperative and  pleasant    but  frail   occipital area of scalp  quarter sized nodule  as per derm pyogenic granuloma   no signs of infection  [de-identified] :  hearing aids  [de-identified] :  irregular 3/6  murmur   trace  pedal edema   [de-identified] :  softly distended   right  inguinal hernia   [de-identified] :  sales  in place  draining  clear urine  [de-identified] :  nonambulatory  [de-identified] : sacral wound not examined  as per daughter request    use of barrier cream stressed  \par right ischium continues to improve, 0.8 cm, packing with alginate\par heels left healed, right heel stable eschar , offloading with boots and has group 2 mattress  venous  stasis  hyperpigmentation  dry skin but no ulceration   long elongated  brittle nails  [de-identified] : anxious

## 2022-10-13 NOTE — COUNSELING
[Normal Weight - ( BMI  <25 )] : normal weight - ( BMI  <25 ) [DASH diet recommended] : DASH diet recommended [Smoke/CO Detectors] : smoke/CO detectors [Use assistive device to avoid falls] : use assistive device to avoid falls [] : diabetic screening [Decrease stress] : decrease stress [Decrease hospital use] : decrease hospital use [Minimize unnecessary interventions] : minimize unnecessary interventions [Comfort Care] : comfort care [Discussed disease trajectory with patient/caregiver] : discussed disease trajectory with patient/caregiver [Patient/Caregiver has ___ understanding of disease process] : patient/caregiver has [unfilled] understanding of disease process [Completed Medical Orders for Life-Sustaining Treatment] : completed medical orders for life-sustaining treatment [DNR] : Code Status: DNR [Limited] : Treatment Guidelines: Limited [DNI] : Intubation: DNI [[facility] Date: _____] [facility]: [unfilled] [_____] : HCP: [unfilled]

## 2022-10-13 NOTE — HISTORY OF PRESENT ILLNESS
[Family Member] : family member [FreeTextEntry1] : debility CHF   bedbound  frequent UTI  [FreeTextEntry2] : Patient denies fever, cough, trouble breathing, rash, vomiting and diarrhea. Patient has not been in close contact with someone Covid positive.\par N95 mask \par interval events reviewed   and addressed \par  most of the history obtained from family member  daughter who has lots of concerns   mostly  chronic and addressed  previously  \par  pasty stools varied from several a day  to none \par  daughter does not adhere to bowel regimen\par  no abdominal pain  no fevers no vomiting  \par  \par diet regular  appetite   ok needs  supplements \par dysphagia  yes to pills \par Weight  stable now\par \par Ambulates none bedbound \par falls none \par  has indwelling [place] and  has been seeing  urologist via telehealth    changed yesterday \par \par Behavior very  anxious  on meds  at times agitated  \par Mood ok \par  memory  ok  declining  \par  sleep  ok \par sensory deficits   vision ok hard of hearing \par pain yes \par Medication refills done and reconciliation  done \par Goals of care discussed and completed  today \par

## 2022-10-13 NOTE — ADDENDUM
[FreeTextEntry1] : daughter  Tabatha    demands blood work  which in my opinion not necessary  since done 3 months ago and normal without any  significant changes  in patient status  \par   after long discussion  will  follow, CBC  for  low  platelets prealbumin and   renal \par  again explained limits of in house management of  multiple  issues /  thrombocytopenia  heart  issues  \par  again advised   daughter  if  not  satisfied with my care  will  provide  other  in house  physicians

## 2022-10-14 ENCOUNTER — LABORATORY RESULT (OUTPATIENT)
Age: 87
End: 2022-10-14

## 2022-10-16 LAB
APPEARANCE: CLEAR
BACTERIA UR CULT: ABNORMAL
BACTERIA: NEGATIVE
BILIRUBIN URINE: NEGATIVE
BLOOD URINE: ABNORMAL
COLOR: NORMAL
GLUCOSE QUALITATIVE U: NEGATIVE
HYALINE CASTS: 0 /LPF
KETONES URINE: NEGATIVE
LEUKOCYTE ESTERASE URINE: ABNORMAL
MICROSCOPIC-UA: NORMAL
NITRITE URINE: NEGATIVE
PH URINE: 7
PROTEIN URINE: NEGATIVE
RED BLOOD CELLS URINE: 2 /HPF
SPECIFIC GRAVITY URINE: 1.01
SQUAMOUS EPITHELIAL CELLS: 2 /HPF
URINE CYTOLOGY: NORMAL
UROBILINOGEN URINE: NORMAL
WHITE BLOOD CELLS URINE: 10 /HPF

## 2022-10-17 ENCOUNTER — LABORATORY RESULT (OUTPATIENT)
Age: 87
End: 2022-10-17

## 2022-10-17 ENCOUNTER — APPOINTMENT (OUTPATIENT)
Dept: UROLOGY | Facility: CLINIC | Age: 87
End: 2022-10-17

## 2022-10-18 ENCOUNTER — LABORATORY RESULT (OUTPATIENT)
Age: 87
End: 2022-10-18

## 2022-10-18 ENCOUNTER — APPOINTMENT (OUTPATIENT)
Dept: UROLOGY | Facility: CLINIC | Age: 87
End: 2022-10-18

## 2022-10-18 PROCEDURE — 99443: CPT | Mod: 95

## 2022-10-18 NOTE — ASSESSMENT
[FreeTextEntry1] : Barb Venegas in her daughter Tabatha Deluna gave permission for an audio video telehealth visit.  They were in their home in [facility].  I was in my office in [facility].\par \par Barb Venegas is currently 89 years of age.  Her daughter Tabatha Deluna is devoted to her care.  She is very concerned about her mother and has been so for many years.  She becomes very anxious at times and that is concerning her mother.  Barb Venegas lives with her daughter.  Barb Venegas has been bedbound for several years.\par \par The patient had developed a sacral decubitus ulcer while in a nursing facility.  A Reaves catheter was placed because of fecal incontinence and concern that urine mixed with the feces would contaminate the sacral decubitus ulcer.  Once the patient left the nursing facility and will be with her daughter.  The sacral decubitus ulcer has finally healed.  I have discussed removing the Reaves catheter with the daughter.  However as the patient has fecal incontinence there is concern about the urine mixing with the feces and being more likely to cause skin problems.  For now we will leave the Reaves catheter in.\par \par The patient has had clinically symptomatic urinary tract infections.  The restart with increased urination around the Reaves catheter due to bladder spasms.  Bladder spasms have been occurring for staff.  The patient is positioned properly in in bed and the personal care attendant leaves for the day.  We have managed to prevent this with the administration of sublingual hyoscyamine.\par \par We do periodic surveillance urine analysis and urine culture tests at the time the Reaves catheter is changed by visiting nurse.  He also performed a times of increased spasms or purulence of the urine or change in mental status,\par \par On October 11, 2022 visiting nurse using an order I have previously placed at the request of the daughter sent urine for urine cytology which proved to be negative for malignant cells, urine culture which grew Greater than 100,000 and E. coli that was sensitive to all antibiotics tested.  Urinalysis looked quite good for urine taken from a Reaves catheter that was used for chronic Reaves catheter drainage.  Ileukocyte esterase was large but nitrites were negative.  There were 2 epithelial cells per high-power field.  There were only 10 white blood cells per high-power field and only 2 red blood cells per high-power field on microscopic exam there were no bacteria seen and there were no hyaline casts.  Specific gravity was 1.010 which shows good hydration and this bedbound woman cared for diligently by her daughter.  Blood by dipstick was trace.  There was no protein glucose or ketones in the urine.  There is no bilirubin and no urobilinogen.  An important portion of the visit was my review with the daughter about bladder spasms and urination around the catheter.  Urine appearance and urine analysis have been clear.  The urine was clear again today.\par \par The patient has significant short-term memory deficit.  She does have some useful short-term memory.  Her long-term memory remains very much intact.  She is very pleasant and to make satisfactory conversation.  She does admit to sometimes not remembering something.  Her complexion was good and there was no pallor.  Facial movements were symmetric.  Cranial nerves were intact.  I was not able to assess the olfactory nerve as this was a telehealth visit.  \par \par I asked the daughter to see to it that the urine sample was sent periodically when the catheter was changed.  I would do this for surveillance purposes.  I also asked that a urine sample be sent at times of increased cloudiness of the urine.  At any time that gross blood was visible in the urine.  I would also asked that urine be sent if mental status should change.  I asked that urine sample be sent if urine leakage around the catheter picked up and appeared to be from spasms.  The daughter said that she would think that this was performed.  I also asked that the daughter schedule a telehealth visit 3 days after urine samples were sent so we can discuss the circumstances around the decision to send the specimen the specimen and if the patient was having any obvious symptoms or signs that might indicate clinically significant infection.\par \par Preparation, audio and visual telehealth visit and coordination of care took 40 minutes

## 2022-10-18 NOTE — HISTORY OF PRESENT ILLNESS
[FreeTextEntry1] : Barb Venegas in her daughter Tabatha Deluna gave permission for an audio video telehealth visit.  They were in their home in [facility].  I was in my office in [facility].\par \par Barb Venegas is currently 89 years of age.  Her daughter Tabatha Deluna is devoted to her care.  She is very concerned about her mother and has been so for many years.  She becomes very anxious at times and that is concerning her mother.  Barb Venegas lives with her daughter.  Barb Venegas has been bedbound for several years.\par \par The patient had developed a sacral decubitus ulcer while in a nursing facility.  A Reaves catheter was placed because of fecal incontinence and concern that urine mixed with the feces would contaminate the sacral decubitus ulcer.  Once the patient left the nursing facility and will be with her daughter.  The sacral decubitus ulcer has finally healed.  I have discussed removing the Reaves catheter with the daughter.  However as the patient has fecal incontinence there is concern about the urine mixing with the feces and being more likely to cause skin problems.  For now we will leave the Reaves catheter in.\par \par The patient has had clinically symptomatic urinary tract infections.  He clinically significant infections usually start with increased urination around the Reaves catheter due to bladder spasms.  Bladder spasms have been occurring for staff.  The patient is positioned properly in in bed and the personal care attendant leaves for the day.  We have managed to prevent this with the administration of sublingual hyoscyamine.\par \par We do periodic surveillance urine analysis and urine culture tests at the time the Reaves catheter is changed by visiting nurse.  He also performed a times of increased spasms or purulence of the urine or change in mental status,\par \par On October 11, 2022 visiting nurse using an order I have previously placed at the request of the daughter sent urine for urine cytology which proved to be negative for malignant cells, urine culture which grew Greater than 100,000 and E. coli that was sensitive to all antibiotics tested.  Urinalysis looked quite good for urine taken from a Reaves catheter that was used for chronic Reaves catheter drainage.  Ileukocyte esterase was large but nitrites were negative.  There were 2 epithelial cells per high-power field.  There were only 10 white blood cells per high-power field and only 2 red blood cells per high-power field on microscopic exam there were no bacteria seen and there were no hyaline casts.  Specific gravity was 1.010 which shows good hydration and this bedbound woman cared for diligently by her daughter.  Blood by dipstick was trace.  There was no protein glucose or ketones in the urine.  There is no bilirubin and no urobilinogen.  An important portion of the visit was my review with the daughter about bladder spasms and urination around the catheter.  Urine appearance and urine analysis have been clear.  The urine was clear again today.\par \par The patient has significant short-term memory deficit.  She does have some useful short-term memory.  Her long-term memory remains very much intact.  She is very pleasant and to make satisfactory conversation.  She does admit to sometimes not remembering something.  Her complexion was good and there was no pallor.  Facial movements were symmetric.  Cranial nerves were intact.  I was not able to assess the olfactory nerve as this was a telehealth visit.  \par \par

## 2022-10-24 ENCOUNTER — NON-APPOINTMENT (OUTPATIENT)
Age: 87
End: 2022-10-24

## 2022-11-02 NOTE — ADDENDUM
[FreeTextEntry1] : This note was authored by [Chaitanya Reilly] working as a scribe for Dr.Gary Sexton. I, Dr. Indio Sexton have reviewed the content of this note and confirm it is true and accurate. I personally performed the history and physical examination and made all the decisions. 11/01/2022

## 2022-11-02 NOTE — ASSESSMENT
[FreeTextEntry1] : Barb Venegas in her daughter Tabatha Deluna gave permission for an audio video telehealth visit.  They were in their home in [facility].  I was in my office in [facility].\par \par Barb Venegas is currently 89 years of age.  Her daughter Tabatha Deluna is devoted to her care.  She is very concerned about her mother and has been so for many years.  She becomes very anxious at times and that is concerning her mother.  Barb Venegas lives with her daughter.  Barb Venegas has been bedbound for several years.\par \par The patient had developed a sacral decubitus ulcer while in a nursing facility.  A Reaves catheter was placed because of fecal incontinence and concern that urine mixed with the feces would contaminate the sacral decubitus ulcer.  Once the patient left the nursing facility and will be with her daughter.  The sacral decubitus ulcer has finally healed.  I have discussed removing the Reaves catheter with the daughter.  However as the patient has fecal incontinence there is concern about the urine mixing with the feces and being more likely to cause skin problems.  For now we will leave the Reaves catheter in.\par \par The patient has had clinically symptomatic urinary tract infections.  The restart with increased urination around the Reaves catheter due to bladder spasms.  Bladder spasms have been occurring for staff.  The patient is positioned properly in in bed and the personal care attendant leaves for the day.  We have managed to prevent this with the administration of sublingual hyoscyamine.\par \par We do periodic surveillance urine analysis and urine culture tests at the time the Reaves catheter is changed by visiting nurse.  He also performed a times of increased spasms or purulence of the urine or change in mental status,\par \par On October 11, 2022 visiting nurse using an order I have previously placed at the request of the daughter sent urine for urine cytology which proved to be negative for malignant cells, urine culture which grew Greater than 100,000 and E. coli that was sensitive to all antibiotics tested.  Urinalysis looked quite good for urine taken from a Reaves catheter that was used for chronic Reaves catheter drainage.  Ileukocyte esterase was large but nitrites were negative.  There were 2 epithelial cells per high-power field.  There were only 10 white blood cells per high-power field and only 2 red blood cells per high-power field on microscopic exam there were no bacteria seen and there were no hyaline casts.  Specific gravity was 1.010 which shows good hydration and this bedbound woman cared for diligently by her daughter.  Blood by dipstick was trace.  There was no protein glucose or ketones in the urine.  There is no bilirubin and no urobilinogen.  An important portion of the visit was my review with the daughter about bladder spasms and urination around the catheter.  Urine appearance and urine analysis have been clear.  The urine was clear again today.\par \par The patient has significant short-term memory deficit.  She does have some useful short-term memory.  Her long-term memory remains very much intact.  She is very pleasant and to make satisfactory conversation.  She does admit to sometimes not remembering something.  Her complexion was good and there was no pallor.  Facial movements were symmetric.  Cranial nerves were intact.  I was not able to assess the olfactory nerve as this was a telehealth visit.  \par \par I asked the daughter to see to it that the urine sample was sent periodically when the catheter was changed.  I would do this for surveillance purposes.  I also asked that a urine sample be sent at times of increased cloudiness of the urine.  At any time that gross blood was visible in the urine.  I would also asked that urine be sent if mental status should change.  I asked that urine sample be sent if urine leakage around the catheter picked up and appeared to be from spasms.  The daughter said that she would think that this was performed.  I also asked that the daughter schedule a telehealth visit 3 days after urine samples were sent so we can discuss the circumstances around the decision to send the specimen the specimen and if the patient was having any obvious symptoms or signs that might indicate clinically significant infection.\par \par Preparation, audio and visual telehealth visit and coordination of care took 40 minutes\par \par 10/17/22: Despite numerous attempts, I was unable to reach the patient or her daughter.  I left multiple messages indicating that they should call back and reschedule their appointment so we can speak about her urologic issues.

## 2022-11-02 NOTE — HISTORY OF PRESENT ILLNESS
[FreeTextEntry1] : Ms. Venegas is a 89 year old woman here for a follow up of nocturia, microscopic hematuria, urethral caruncle.Patient believes she makes less urine in 24-hour period than previously.  Her age says that one given a diuretic.  Shea good quantity of urine.  The patient says that she generally makes it to the bathroom in time, though she must tarry which takes the diuretic. The patient complained about a foul order from the urine. The patient does not have dysuria.  There is no gross hematuria.  The patient does not push or strain to urinate so sometimes she has to increase her abdominal pressure to defecate. Patient uses a walker.  The patient also stated that she is having right lower abdominal pain and discomfort.  He corresponds to a bulge of increasing size in the right lower quadrant.  The patient has bilateral hearing aids and wears eye glasses.\par 7/3/18: The patient returned today and reported she has nocturia and wakes up every 2 hours to urinate. She broke her femur and a tibial fracture. \par 7/16/18: The patient returned and reported she has the urge to urinate but ends up having a bowel movement instead. Daughter noted bowels are large. Patient noted she has a hard time urinating. Wakes up 3-4 times during the night to urinate, noted it is sometimes just "drops of urine." Patient is more disorientated and confused, I advised her to consult with a neurologist. \par 8/30/18: The patient returned today and reported she is feeling adequate. Patient denies dysuria, gross hematuria, urgency, or incontinence. Wakes up 2-3 times during the night to urinate. \par 6/4/19: Patient presents today for a follow up. Today she notes that her urination is "okay". She does not leak during the day but still wears depends since she is still concerned about incontinence. At night, she wakes up when she has to urinate but due to mobility issues is not always able to make it to bathroom. Diarrhea is reported on occasion. Mirtazapine has been initiated by another provider and it is reported that is has decreased her nocturia from 4x a night to once a night. \par 05/26/2020: Patient presents today for a follow up. Pt ambulates in a wheelchair and is able to go short distances with a walker. Nocturia x1-2 usually, but voided unusually frequently last night. Denies hematuria,or burning. No symptoms in March. Reports issues with short term, but not long term memory. Has been consulting with PCP and testing treatments regarding memory. Reports occasional stomach pains.\par \par 06/12/2020: Pt contacted at [phone]. Gave permission to conduct a Telephone visit. Urine culture through the [facility] from 06/01/2020 grew 75 colony forming units per mL of Pseudomonas Aeruginosa. 1-5 Epithelial cells per field. Anything greater than 3 is abnormal. There were 3-10 WBC per high power field. Anything greater than 3 is abnormal. Bacteria were heavy. Pt had frequency and nocturia. She complained it was difficulty to sleep at night. Pt report she is feeling urgency when waking up to use the bathroom. She has not been leaking as much, or wetting herself. Her output is also not as great. Nocturia x3, or about every 2-3 hours. Pt is still on Cefpodoxime, but she will run out by 06/16/2020. Urine has become slightly cloudy, whereas it had been clear previously. Had not been using Melatonin at night. Has been taking Mirtazapine. \par \par 08/31/2021: The patient gave permission for a telephone visit. I spoke to patient's daughter Tabatha. She reports that on 6/4/21, after patient got off commode, her legs gave out and she broke both her shins, and has been immobilized since then. A few weeks ago, she had orders to move legs slightly. Daughter concerned because she has had Reaves in her since she has not been standing which is changed once a month. Was told once she had a slight rash in the vulva and buttocks, but has not heard about a rash since then. Memory has been okay. Daughter will send over recent lab work for review. She states WBC was 5.57. Daughter states patient's urine is clear. Patient is receiving physical therapy twice a week and daughter will try to find more help. \par \par 11/16/2021: The patient gave permission for a telehealth visit. Daughter states patient has been home for the past 3 weeks. Was previously in the hospital for a bad wound and pulmonary congestion and had been on antibiotics. Reviewed 11/3/21 urine tests. UA showed 432 WBC/HPF. Previously on 5/5/21 showed only 1 WBC/HPF and 8/9/20 showed no WBC, all were clean catches. Urine culture showed >= 3 organisms. Daughter notes patient does have a cough. Reaves catheter is draining okay. Daughter notes there is occasionally clusters of cloudiness draining more than before. \par \par 11/23/2021: The patient gave permission for a telehealth visit. I spoke with the daughter. She states patient has had issues with heart rate and oxygen which has been better, but did have acute episode of frequent BMs. Has been taking course of Cefadroxil 500mg BID. \par \par 12/22/2021: The patient gave permission for a telehealth visit. I spoke with patient's daughter Tabatha. Patient has been okay although feeling tired. Urine is clear. Completed course of Levofloxacin 500mg x 14 days and daughter notes that patient had leg pain for several days in a row. Never had a fever. Currently wears a catheter and has catheter changed by homecare nurses. \par \par 03/02/2022: The patient gave permission for a telehealth visit. I spoke with patient's daughter. Daughter states patient is doing okay currently. Daughter states she does not think patient was symptomatic for UTI but did have two episodes of bladder spasms which has not happened in the last 5 days. Last course of antibiotics was in 12/2021. Most recently last week she was put on Augmentin. Urine culture 2/11/22 grew >100,000 CFU/mL Acinetobacter baumannii which was susceptible to Augmentin. Last catheter change was less than 2 weeks ago when she also provided a urine sample before starting antibiotics. Has not had fever and urine looked clear. Leaks around Reaves catheter occasionally. Drains above 1500cc of urine a day. Wound is healing. Had 2/28/22 abdominal US for abdominal distention which was technically difficult due to gas, but showed mild thickening of the gallbladder wall with no evidence of cholelithiasis or acute cholecystitis. Small right pleural effusion. No hydronephrosis or renal calcification bilaterally. \par \par 03/16/2022: The patient gave permission for a telehealth visit. I spoke to patient's daughter Tabatha. Patient is on indwelling Reaves catheter. Reviewed 3/4/22 UA which showed WBC 4/HPF, RBC/HPF, leukocyte esterase small, bacteria none, specific gravity 1.010. Urine culture grew >100,000 CFU/mL Acinetobacter baumannii/nosocom group (carbapenem resistant). There is no need to treat at this time, but if needed would give amikacin. Ampicillin susceptible but unclear to me if a carbapenem resistant ampicillin abx would be adequate. I would discuss with ID consultant if treatment were indicated. House call physician Dr. Gillian Farmer ordered CMP 2/4/22 total protein slightly low 5.5, albumin normal 3.6, ALT low 8, otherwise normal, creatinine 0.54 normal and low due to patient's small body mass. 12/30/21 weight measurement 125 lbs, 5' 5", body surface area 1.62 m^2, BMI 20.8. Patient cannot get out of bed due to CHF. States she has not been able to irrigate the catheter although still draining. Occasionally leaks a little urine around the catheter. Daughter states patient is constantly feeling pressure to urinate and spasm and has some pain with catheter. Patient is able to remember the people around her. Still has open wound on back which is improving, reports it is about 1/4 inch in size. \par \par 06/07/2022:  is scheduled today for a follow up telephone visit for which she gave permission for. The visit was conducted with her daughter. Her daughter denies the patient having any pelvic pain. Last week she was experiencing more frequent painful spasms with the catheter. Patient's skin is very fragile. She reports the patient is very conscious of the urge she has to urinate. Pt is able to eat on her own. The patient's daughter reports a lesion on the back of her moms head, about the size of a quarter, that continues to scab but not fully healing. She is unable to find a dermatologist or wound care specialist that will come and evaluate her. Had a telehealth visit with a dermatologist that will not get back to her. \par \par 07/01/2022:  presents today for a follow up telephone visit for which she gave permission for.  The visit was mostly conducted with her daughter. Her daughter was a bit upset as she had just hung up from her sister who is having a number of health problems including a mass in her colon and lung nodules that are being removed. In regards to the patient, she had a few bladder spasms this week. She reports that her urine is fairly clear. She still has a wound on the sacrum that was healing but now opened again. Has consulted with dermatology, however she has not undergone a biopsy. I briefly spoke to . She sounds much better. Her voice is strong and clear. She reports she is not feeling completely okay but making progress. \par \par 07/15/2022:  presents today for a follow up telephone visit for which she gave permission for. The visit was conducted with her daughter who is very active in her mother's care. Pt had a telehealth appointment with dermatology yesterday. 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 They should be removed at family's convenience and will be sent for pathology. Instructions were given on how to care for the scalp lesion. At the time of providing the urine specimen on 7/9/2022 which grew >100,000 CFU/ml enterobacter cloacae complex and >100,000 CFU/ml enterococcus faecalis, the patient was stable in terms of her urination. She was not symptomatic. She does have some bladder spasms after being moved in the morning which results in urinating around the catheter. She does not have any pain. Urine is perfectly clear. At night she puts out less. Her daughter states that the sacrum ulcer is healing but not closed yet. \par \par 09/13/2022: An attempt was made to reach the daughter of Barb Venegas, who has always assisted her mother in her office visits as well as her telehealth visits. This has become more important as there have been some deterioration in the patient's short term memory. It is mild at this point but significant enough that presence of her daughter Tabatha Deluna is important. But even before it was necessary, the daughter would always accompany her mother and do a large portion of the speaking. The daughter is a relatively anxious individual who is concerned about her mother's well-being. Over the years, she has become a little more self-assured and has displayed less anxiety in the past. She cares for her mother. Her mother has been bedbound and suffers from diffuse atrophy. The patient had been hospitalized for a while. 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 She now lives with her daughter. She has had a Reaves catheter to enhance healing of the ulcer. She is incontinent of stool, which has been a problem due to mixing of the stool and urine in the diaper. \par  \par At this point, the sacral ulcer has healed and epithelialized over. The concern was that the patient needed multiple diaper changes daily. The daughter has indicated that she prefers to keep the Reaves catheter in. I believe this to be reasonable. We are doing surveillance cultures and unless the patient void frequently under the catheter or evidence of suprapubic pain, gross hematuria, fever, I am avoiding treatment with antibiotics. A recent urine culture of September 9 had three organisms.  Today's phone call regarded whether the patient was symptomatic. The Reaves catheter drainage has been relatively good. She has not had the above mentioned problems. There has been limited voiding around the catheter which is positional, and related to when the health aide comes and goes. Of note, the urinalysis of Sep 9 was exceptionally good, there were only 8 WBCs/HPF with positive leukocyte esterase. SG was 1.010. Indicating that the patient is well hydrated. The patient is able to make coherent, appropriate conversation. I spoke to them about removal of the Reaves catheter, in which case they would be treated any organisms which were present. As that is not the plan at this time, I will not culture and not treat. \par \par The duration of this audio only telehealth visit was 30 minutes.

## 2022-11-02 NOTE — HISTORY OF PRESENT ILLNESS
[FreeTextEntry1] : Barb Venegas in her daughter Tabatha Deluna gave permission for an audio video telehealth visit.  They were in their home in [facility].  I was in my office in [facility].\par \par Barb Venegas is currently 89 years of age.  Her daughter Tabatha Deluna is devoted to her care.  She is very concerned about her mother and has been so for many years.  She becomes very anxious at times and that is concerning her mother.  Barb Venegas lives with her daughter.  Barb Venegas has been bedbound for several years.\par \par The patient had developed a sacral decubitus ulcer while in a nursing facility.  A Reaves catheter was placed because of fecal incontinence and concern that urine mixed with the feces would contaminate the sacral decubitus ulcer.  Once the patient left the nursing facility and will be with her daughter.  The sacral decubitus ulcer has finally healed.  I have discussed removing the Reaves catheter with the daughter.  However as the patient has fecal incontinence there is concern about the urine mixing with the feces and being more likely to cause skin problems.  For now we will leave the Reaves catheter in.\par \par The patient has had clinically symptomatic urinary tract infections.  He clinically significant infections usually start with increased urination around the Reaves catheter due to bladder spasms.  Bladder spasms have been occurring for staff.  The patient is positioned properly in in bed and the personal care attendant leaves for the day.  We have managed to prevent this with the administration of sublingual hyoscyamine.\par \par We do periodic surveillance urine analysis and urine culture tests at the time the Reaves catheter is changed by visiting nurse.  He also performed a times of increased spasms or purulence of the urine or change in mental status,\par \par On October 11, 2022 visiting nurse using an order I have previously placed at the request of the daughter sent urine for urine cytology which proved to be negative for malignant cells, urine culture which grew Greater than 100,000 and E. coli that was sensitive to all antibiotics tested.  Urinalysis looked quite good for urine taken from a Reaves catheter that was used for chronic Reaves catheter drainage.  Ileukocyte esterase was large but nitrites were negative.  There were 2 epithelial cells per high-power field.  There were only 10 white blood cells per high-power field and only 2 red blood cells per high-power field on microscopic exam there were no bacteria seen and there were no hyaline casts.  Specific gravity was 1.010 which shows good hydration and this bedbound woman cared for diligently by her daughter.  Blood by dipstick was trace.  There was no protein glucose or ketones in the urine.  There is no bilirubin and no urobilinogen.  An important portion of the visit was my review with the daughter about bladder spasms and urination around the catheter.  Urine appearance and urine analysis have been clear.  The urine was clear again today.\par \par The patient has significant short-term memory deficit.  She does have some useful short-term memory.  Her long-term memory remains very much intact.  She is very pleasant and to make satisfactory conversation.  She does admit to sometimes not remembering something.  Her complexion was good and there was no pallor.  Facial movements were symmetric.  Cranial nerves were intact.  I was not able to assess the olfactory nerve as this was a telehealth visit.  \par \par 10/17/22: Despite numerous attempts, I was unable to reach the patient or her daughter.  I left multiple messages indicating that they should call back and reschedule their appointment so we can speak about her urologic issues.\par \par

## 2022-11-02 NOTE — ASSESSMENT
[FreeTextEntry1] : Ms. Venegas is a 89 year old woman here for a follow up of nocturia, microscopic hematuria, urethral caruncle.Patient believes she makes less urine in 24-hour period than previously.  Her age says that one given a diuretic.  Shea good quantity of urine.  The patient says that she generally makes it to the bathroom in time, though she must tarry which takes the diuretic. The patient complained about a foul order from the urine. The patient does not have dysuria.  There is no gross hematuria.  The patient does not push or strain to urinate so sometimes she has to increase her abdominal pressure to defecate. Patient uses a walker.  The patient also stated that she is having right lower abdominal pain and discomfort.  He corresponds to a bulge of increasing size in the right lower quadrant.  The patient has bilateral hearing aids and wears eye glasses.\par 7/3/18: The patient returned today and reported she has nocturia and wakes up every 2 hours to urinate. She broke her femur and a tibial fracture. \par 7/16/18: The patient returned and reported she has the urge to urinate but ends up having a bowel movement instead. Daughter noted bowels are large. Patient noted she has a hard time urinating. Wakes up 3-4 times during the night to urinate, noted it is sometimes just "drops of urine." Patient is more disorientated and confused, I advised her to consult with a neurologist. \par 8/30/18: The patient returned today and reported she is feeling adequate. Patient denies dysuria, gross hematuria, urgency, or incontinence. Wakes up 2-3 times during the night to urinate. \par 6/4/19: Patient presents today for a follow up. Today she notes that her urination is "okay". She does not leak during the day but still wears depends since she is still concerned about incontinence. At night, she wakes up when she has to urinate but due to mobility issues is not always able to make it to bathroom. Diarrhea is reported on occasion. Mirtazapine has been initiated by another provider and it is reported that is has decreased her nocturia from 4x a night to once a night. The patient produced a catheterized urine sample which will be sent for urinalysis, urine cytology, and urine culture.Upon completion of a physical exam it is determined that vaginitis is present. Therefore Fluconazole will be prescribed at this time. I will provide her with 1 500 mg tablet that will be taken one time only due to her use of Warfarin. It is advised that she switch to cotton underwear during the day and if leakage is still reported then she can switch back to wearing the depends.  The patient is to follow up in 3 weeks or sooner if clinically indicated. \par 05/26/2020: Patient presents today for a follow up. Pt ambulates in a wheelchair and is able to go short distances with a walker. Nocturia x1-2 usually, but voided unusually frequently last night. Denies hematuria,or burning. No symptoms in March. Reports issues with short term, but not long term memory. Has been consulting with PCP and testing treatments regarding memory. Reports occasional stomach pains. On physical exam patient had no erythema of vaginal mucosa, no vaginal discharge and no prolapse. Pt will provide a urine sample today for culture and analysis. Pt received a catheterization of urine today for total volume of 180mL. PVR not needed despite pt concerns. \par \par 06/12/2020: Pt contacted at [phone]. Gave permission to conduct a Telephone visit. Urine culture through the [facility] from 06/01/2020 grew 75 colony forming units per mL of Pseudomonas Aeruginosa. 1-5 Epithelial cells per field. Anything greater than 3 is abnormal. There were 3-10 WBC per high power field. Anything greater than 3 is abnormal. Bacteria were heavy. Pt had frequency and nocturia. She complained it was difficulty to sleep at night. Pt report she is feeling urgency when waking up to use the bathroom. She has not been leaking as much, or wetting herself. Her output is also not as great. Nocturia x3, or about every 2-3 hours. Pt is still on Cefpodoxime, but she will run out by 06/16/2020. Urine has become slightly cloudy, whereas it had been clear previously. Had not been using Melatonin at night. Has been taking Mirtazapine. \par \par 08/31/2021: The patient gave permission for a telephone visit. I spoke to patient's daughter Tabatha. She reports that on 6/4/21, after patient got off commode, her legs gave out and she broke both her shins, and has been immobilized since then. A few weeks ago, she had orders to move legs slightly. Daughter concerned because she has had Sales in her since she has not been standing which is changed once a month. Was told once she had a slight rash in the vulva and buttocks, but has not heard about a rash since then. Memory has been okay. Daughter will send over recent lab work for review. She states WBC was 5.57. Daughter states patient's urine is clear. Patient is receiving physical therapy twice a week and daughter will try to find more help. \par \par 11/16/2021: The patient gave permission for a telehealth visit. Daughter states patient has been home for the past 3 weeks. Was previously in the hospital for a bad wound and pulmonary congestion and had been on antibiotics. Reviewed 11/3/21 urine tests. UA showed 432 WBC/HPF. Previously on 5/5/21 showed only 1 WBC/HPF and 8/9/20 showed no WBC, all were clean catches. Urine culture showed >= 3 organisms. Daughter notes patient does have a cough. Sales catheter is draining okay. Daughter notes there is occasionally clusters of cloudiness draining more than before. \par \par 11/23/2021: The patient gave permission for a telehealth visit. I spoke with the daughter. She states patient has had issues with heart rate and oxygen which has been better, but did have acute episode of frequent BMs. Has been taking course of Cefadroxil 500mg BID. \par \par 12/22/2021: The patient gave permission for a telehealth visit. I spoke with patient's daughter Tabatha. Patient has been okay although feeling tired. Urine is clear. Completed course of Levofloxacin 500mg x 14 days and daughter notes that patient had leg pain for several days in a row. Never had a fever. Currently has a Sales catheter and has catheter changed by home care nurses. \par \par 03/02/2022: The patient gave permission for a telehealth visit. I spoke with patient's daughter. Daughter states patient is doing okay currently. Daughter states she does not think patient was symptomatic for UTI but did have two episodes of bladder spasms which has not happened in the last 5 days. Last course of antibiotics was in 12/2021. Most recently last week she was put on Augmentin. Urine culture 2/11/22 grew >100,000 CFU/mL Acinetobacter baumannii which was susceptible to Augmentin. Last catheter change was less than 2 weeks ago when she also provided a urine sample before starting antibiotics. Has not had fever and urine looked clear. Leaks around Sales catheter occasionally. Drains above 1500cc of urine a day. Wound is healing. Had 2/28/22 abdominal US for abdominal distention which was technically difficult due to gas, but showed mild thickening of the gallbladder wall with no evidence of cholelithiasis or acute cholecystitis. Small right pleural effusion. No hydronephrosis or renal calcification bilaterally. \par \par 03/16/2022: The patient gave permission for a telehealth visit. I spoke to patient's daughter Tabatha. Patient is on indwelling Sales catheter. Reviewed 3/4/22 UA which showed WBC 4/HPF, RBC/HPF, leukocyte esterase small, bacteria none, specific gravity 1.010. Urine culture grew >100,000 CFU/mL Acinetobacter baumannii/nosocom group (carbapenem resistant). There is no need to treat at this time, but if needed would give amikacin. Ampicillin susceptible but unclear to me if a carbapenem resistant ampicillin abx would be adequate. I would discuss with ID consultant if treatment were indicated. House call physician Dr. Gillian Farmer ordered CMP 2/4/22 total protein slightly low 5.5, albumin normal 3.6, ALT low 8, otherwise normal, creatinine 0.54 normal and low due to patient's small body mass. 12/30/21 weight measurement 125 lbs, 5' 5", body surface area 1.62 m^2, BMI 20.8. Patient cannot get out of bed due to CHF. States she has not been able to irrigate the catheter although still draining. Occasionally leaks a little urine around the catheter. Daughter states patient is constantly feeling pressure to urinate and spasm and has some pain with catheter. Patient is able to remember the people around her. Still has open wound on back which is improving, reports it is about 1/4 inch in size. \par \par Reviewed dermatology note of 3/7/22. \par Reviewed 3/4/22 UA and urine culture and CMP of 2/4/22 with patient's daughter which are WNL. \par We discussed removing the catheter and instead wearing a diaper to collect the urine. For now, we will leave Sales catheter. We will wait until sacral ulcer is improved until submitting another urine specimen. \par Sales catheter should be changed once a month. \par Schedule telehealth visit in 2 months for follow up. \par \par 06/07/2022:  is scheduled today for a follow up telephone visit for which she gave permission for. The visit was conducted with her daughter. Her daughter denies the patient having any pelvic pain. Last week she was experiencing more frequent painful spasms with the catheter. Patient's skin is very fragile. She reports the patient is very conscious of the urge she has to urinate. Pt is able to eat on her own. The patient's daughter reports a lesion on the back of her moms head, about the size of a quarter, that continues to scab but not fully healing. She is unable to find a dermatologist or wound care specialist that will come and evaluate her. Had a telehealth visit with a dermatologist that will not get back to her. \par \par Reviewed and discussed urine test results from 6/3/2022. Urine culture of that date grew 10,000-49,000 CFU/ml enterococcus faecalis and klebsiella oxytoca/ Raoutella ornithinolytica. Will not treat at current time but if sales is removed, treat 4-5 days before removal and then after catheter removal for 5-6 days. I informed the patient's daughter that most likely if we removed the sales, she would urinate on a pad or diaper and have to make sure her skin remains dry and intact. I informed the patient's daughter that it is up to her, I would be happy to try and remove the catheter and see how she does without it or just leave it in. It was my recommendation that, although a chronic sales poses a risk of urinary tract infection, I would leave it in as there is a risk of skin breakdown when she urinates in a pad and her skin is very fragile. 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 The patient's daughter opted to leave the catheter in. \par It was my recommendation that the patient's daughter contact the dermatologist who provided her with a telehealth visit. I reviewed the notes of both the dermatology and the wound care people. In it they do not provide a differential diagnosis. The dermatology resident mentioned that it is most likely skin cancer worsened by chronic pressure. Unable to give diagnosis without biopsy and in person evaluation. Images were sent to attending dermatologist . advised the daughter to call and ask for the opinion of .  \par Patient will have her catheter changed in one months time. \par \par 07/01/2022:  presents today for a follow up telephone visit for which she gave permission for.  The visit was mostly conducted with her daughter. Her daughter was a bit upset as she had just hung up from her sister who is having a number of health problems including a mass in her colon and lung nodules that are being removed. In regards to the patient, she had a few bladder spasms this week. She reports that her urine is fairly clear. She still has a wound on the sacrum that was healing but now opened again. Has consulted with dermatology, however she has not undergone a biopsy. I briefly spoke to . She sounds much better. Her voice is strong and clear. She reports she is not feeling completely okay but making progress. \par \par My recommendation is that we keep in the sales. Once the skin is good without ulceration then we can try a trial without catheter and just diapers. \par The patient's daughter was interested if there were any external options once the sales were to come out as she doesn't know if the skin will hold up well with her voiding in a diaper. I informed her that there is an external collecting vacuum device that is placed up against the vulva. I will look into this option and communicate with her nursing staff. \par I reviewed the patient's dermatology follow up note. 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 \par The patient will provide a urine sample and drop it off at her nearest [facility] lab which will be sent for urinalysis, urine cytology, and urine culture. The patient is always colonized, I explained that unless the patient is symptomatic, we will not treat. \par Patient will have a telehealth visit in 3 months time or sooner if clinically indicated. \par \par 07/15/2022:  presents today for a follow up telephone visit for which she gave permission for. The visit was conducted with her daughter who is very active in her mother's care. Pt had a telehealth appointment with dermatology yesterday. 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 They should be removed at family's convenience and will be sent for pathology. Instructions were given on how to care for the scalp lesion. At the time of providing the urine specimen on 7/9/2022 which grew >100,000 CFU/ml enterobacter cloacae complex and >100,000 CFU/ml enterococcus faecalis, the patient was stable in terms of her urination. She was not symptomatic. She does have some bladder spasms after being moved in the morning which results in urinating around the catheter. She does not have any pain. Urine is perfectly clear. At night she puts out less. Her daughter states that the sacrum ulcer is healing but not closed yet. \par \par Her daughter stated that when her mother has her catheter changed by her visiting nurse, she experiences some pain. She is changed with her legs straight as she can't bend her legs. I explained that the pain is most likely due to the catheter passing through the urethra and that if she is more comfortable with her legs straight, she should keep them straight. Advised her to give her mom tylenol about an hour before the catheter change. Her daughter also states she has some oxycodone, which she sometimes gives her half a tablet. If the tylenol is not enough, she was advised to try oxycodone so long as it does not affect her breathing. \par \par Reviewed 7/9/2022 urine test results. Her daughter stated that when she went to drop off the specimen there were no orders in the system. She had to contact the on call physician who only put in orders for urine culture. Urine culture grew >100,000 CFU/ml enterobacter cloacae complex and >100,000 CFU/ml enterococcus faecalis. Pt is not symptomatic. Will not treat at this time. Patient will continue to provide urine specimens about once a month so in the case that she is symptomatic, we know what to treat her with. Urine orders were entered and a paper copy will be mailed in the case that she is told again that there are no orders in the system. \par \par We reviewed the plan that when all her skin pressure soars healed, we would then consider providing abx for bacteria in urine and remove sales for voiding trial. If she were to void on her own successfully, she would void on diapers, pads, or chucks. If she didn’t do well without the sales and developed infections persistently or was having trouble urinating on her own, we would go back to sales catheter. Her daughter expresses interest in an external collecting device if the sales were to be removed. Will look into it and discuss further in the future once the pressure ulcers are healed. \par \par I reviewed the patient's most recent CBC with diff of 6/10/2022. Assured the patient's daughter that she is doing well. I spoke to the patient briefly at the end of the visit and she sounds like she is doing well. Her memory is good as she remembered she wanted to ask me how my wife is doing. \par \par Patient will have a telephone visit in one months time for reassessment. \par \par Duration of telephone visit: 25 minutes. \par \par 09/13/2022: An attempt was made to reach the daughter of Barb Venegas, who has always assisted her mother in her office visits as well as her telehealth visits. This has become more important as there have been some deterioration in the patient's short term memory. It is mild at this point but significant enough that presence of her daughter Tabatha Deluna is important. But even before it was necessary, the daughter would always accompany her mother and do a large portion of the speaking. The daughter is a relatively anxious individual who is concerned about her mother's well-being. Over the years, she has become a little more self-assured and has displayed less anxiety in the past. She cares for her mother. Her mother has been bedbound and suffers from diffuse atrophy. The patient had been hospitalized for a while. 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 She now lives with her daughter. She has had a Sales catheter to enhance healing of the ulcer. She is incontinent of stool, which has been a problem due to mixing of the stool and urine in the diaper. \par  \par At this point, the sacral ulcer has healed and epithelialized over. The concern was that the patient needed multiple diaper changes daily. The daughter has indicated that she prefers to keep the Sales catheter in. I believe this to be reasonable. We are doing surveillance cultures and unless the patient void frequently under the catheter or evidence of suprapubic pain, gross hematuria, fever, I am avoiding treatment with antibiotics. A recent urine culture of September 9 had three organisms.  Today's phone call regarded whether the patient was symptomatic. The Sales catheter drainage has been relatively good. She has not had the above mentioned problems. There has been limited voiding around the catheter which is positional, and related to when the health aide comes and goes. Of note, the urinalysis of Sep 9 was exceptionally good, there were only 8 WBCs/HPF with positive leukocyte esterase. SG was 1.010. Indicating that the patient is well hydrated. The patient is able to make coherent, appropriate conversation. I spoke to them about removal of the Sales catheter, in which case they would be treated any organisms which were present. As that is not the plan at this time, I will not culture and not treat. \par \par The duration of this audio only telehealth visit was 30 minutes.

## 2022-11-04 ENCOUNTER — APPOINTMENT (OUTPATIENT)
Dept: UROLOGY | Facility: CLINIC | Age: 87
End: 2022-11-04

## 2022-11-04 PROCEDURE — 99443: CPT | Mod: 95

## 2022-11-06 NOTE — ADDENDUM
[FreeTextEntry1] : I, Clovis Rebolledo, acted solely as a scribe for Dr. Indio Sexton on 11/04/2022.

## 2022-11-06 NOTE — ASSESSMENT
[FreeTextEntry1] : Barb Mcdowell in her daughter Tabatha Deluna gave permission for an audio video telehealth visit.  They were in their home in [facility].  I was in my office in [facility].\par \par Barb Mcdowell is currently 89 years of age.  Her daughter Tabatha Deluna is devoted to her care.  She is very concerned about her mother and has been so for many years.  She becomes very anxious at times and that is concerning her mother.  Barb Mcdowell lives with her daughter.  Barb Mcdowell has been bedbound for several years.\par \par The patient had developed a sacral decubitus ulcer while in a nursing facility.  A Reaves catheter was placed because of fecal incontinence and concern that urine mixed with the feces would contaminate the sacral decubitus ulcer.  Once the patient left the nursing facility and will be with her daughter.  The sacral decubitus ulcer has finally healed.  I have discussed removing the Reaves catheter with the daughter.  However as the patient has fecal incontinence there is concern about the urine mixing with the feces and being more likely to cause skin problems.  For now we will leave the Reaves catheter in.\par \par The patient has had clinically symptomatic urinary tract infections.  The restart with increased urination around the Reaves catheter due to bladder spasms.  Bladder spasms have been occurring for staff.  The patient is positioned properly in in bed and the personal care attendant leaves for the day.  We have managed to prevent this with the administration of sublingual hyoscyamine.\par \par We do periodic surveillance urine analysis and urine culture tests at the time the Reaves catheter is changed by visiting nurse.  He also performed a times of increased spasms or purulence of the urine or change in mental status,\par \par On October 11, 2022 visiting nurse using an order I have previously placed at the request of the daughter sent urine for urine cytology which proved to be negative for malignant cells, urine culture which grew Greater than 100,000 and E. coli that was sensitive to all antibiotics tested.  Urinalysis looked quite good for urine taken from a Reaves catheter that was used for chronic Reaves catheter drainage.  Ileukocyte esterase was large but nitrites were negative.  There were 2 epithelial cells per high-power field.  There were only 10 white blood cells per high-power field and only 2 red blood cells per high-power field on microscopic exam there were no bacteria seen and there were no hyaline casts.  Specific gravity was 1.010 which shows good hydration and this bedbound woman cared for diligently by her daughter.  Blood by dipstick was trace.  There was no protein glucose or ketones in the urine.  There is no bilirubin and no urobilinogen.  An important portion of the visit was my review with the daughter about bladder spasms and urination around the catheter.  Urine appearance and urine analysis have been clear.  The urine was clear again today.\par \par The patient has significant short-term memory deficit.  She does have some useful short-term memory.  Her long-term memory remains very much intact.  She is very pleasant and to make satisfactory conversation.  She does admit to sometimes not remembering something.  Her complexion was good and there was no pallor.  Facial movements were symmetric.  Cranial nerves were intact.  I was not able to assess the olfactory nerve as this was a telehealth visit.  \par \par I asked the daughter to see to it that the urine sample was sent periodically when the catheter was changed.  I would do this for surveillance purposes.  I also asked that a urine sample be sent at times of increased cloudiness of the urine.  At any time that gross blood was visible in the urine.  I would also asked that urine be sent if mental status should change.  I asked that urine sample be sent if urine leakage around the catheter picked up and appeared to be from spasms.  The daughter said that she would think that this was performed.  I also asked that the daughter schedule a telehealth visit 3 days after urine samples were sent so we can discuss the circumstances around the decision to send the specimen the specimen and if the patient was having any obvious symptoms or signs that might indicate clinically significant infection.\par \par 11/04/2022: Ms. MCDOWELL is a 89 year old female presenting today for a telehealth visit. She was accompanied by her daughter who helped with the visit. They gave permission for an audio only telehealth conference. The patient was located in her home in [place], [place]. I was located in my office on [place], [place]. Today her daughter reports the pt experienced rare urinary leaking around the catheter due to bladder spasms. She also reports her systolic pressure was around 140 last week. I offered Gemtesa to manage the bladder spasms and the daughter was cautious about more medications. I informed her if the urinary leaking are only once every 2 weeks or so, she does not have to medicate. The daughter was agreeable to monitor the occurrence of urinary leaking over the next 4 weeks and assess the discomfort it causes the pt. She denies any other urinary issues.\par \par Plan: She will provide a urine specimen for UA, urine cytology and urine culture. She will follow up 2 or 3  weeks after providing urine sample/\par \par Duration of call: 22 mins

## 2022-11-06 NOTE — HISTORY OF PRESENT ILLNESS
[FreeTextEntry1] : Barb Mcdowell in her daughter Tabatha Deluna gave permission for an audio video telehealth visit.  They were in their home in [facility].  I was in my office in [facility].\par \par Barb Mcdowell is currently 89 years of age.  Her daughter aTbatha Deluna is devoted to her care.  She is very concerned about her mother and has been so for many years.  She becomes very anxious at times and that is concerning her mother.  Barb Mcdowell lives with her daughter.  Barb Mcdowell has been bedbound for several years.\par \par The patient had developed a sacral decubitus ulcer while in a nursing facility.  A Reaves catheter was placed because of fecal incontinence and concern that urine mixed with the feces would contaminate the sacral decubitus ulcer.  Once the patient left the nursing facility and will be with her daughter.  The sacral decubitus ulcer has finally healed.  I have discussed removing the Reaves catheter with the daughter.  However as the patient has fecal incontinence there is concern about the urine mixing with the feces and being more likely to cause skin problems.  For now we will leave the Reaves catheter in.\par \par The patient has had clinically symptomatic urinary tract infections.  He clinically significant infections usually start with increased urination around the Reaves catheter due to bladder spasms.  Bladder spasms have been occurring for staff.  The patient is positioned properly in in bed and the personal care attendant leaves for the day.  We have managed to prevent this with the administration of sublingual hyoscyamine.\par \par We do periodic surveillance urine analysis and urine culture tests at the time the Reaves catheter is changed by visiting nurse.  He also performed a times of increased spasms or purulence of the urine or change in mental status,\par \par On October 11, 2022 visiting nurse using an order I have previously placed at the request of the daughter sent urine for urine cytology which proved to be negative for malignant cells, urine culture which grew Greater than 100,000 and E. coli that was sensitive to all antibiotics tested.  Urinalysis looked quite good for urine taken from a Reaves catheter that was used for chronic Reaves catheter drainage.  Ileukocyte esterase was large but nitrites were negative.  There were 2 epithelial cells per high-power field.  There were only 10 white blood cells per high-power field and only 2 red blood cells per high-power field on microscopic exam there were no bacteria seen and there were no hyaline casts.  Specific gravity was 1.010 which shows good hydration and this bedbound woman cared for diligently by her daughter.  Blood by dipstick was trace.  There was no protein glucose or ketones in the urine.  There is no bilirubin and no urobilinogen.  An important portion of the visit was my review with the daughter about bladder spasms and urination around the catheter.  Urine appearance and urine analysis have been clear.  The urine was clear again today.\par \par The patient has significant short-term memory deficit.  She does have some useful short-term memory.  Her long-term memory remains very much intact.  She is very pleasant and to make satisfactory conversation.  She does admit to sometimes not remembering something.  Her complexion was good and there was no pallor.  Facial movements were symmetric.  Cranial nerves were intact.  I was not able to assess the olfactory nerve as this was a telehealth visit.  \par \par 11/04/2022: Ms. MCDOWELL is a 89 year old female presenting today for a telehealth visit. She was accompanied by her daughter who helped with the visit. They gave permission for an audio only telehealth conference. The patient was located in her home in [place], [place]. I was located in my office on [place], [place]. Today her daughter reports the pt experienced rare urinary leaking around the catheter due to bladder spasms. She also reports her systolic pressure was around 140 last week. I offered Gemtesa to manage the bladder spasms and the daughter was cautious about more medications. I informed her if the urinary leaking are only once every 2 weeks or so, she does not have to medicate. The daughter was agreeable to monitor the occurrence of urinary leaking over the next 4 weeks and assess the discomfort it causes the pt. She denies any other urinary issues.\par \par

## 2022-11-18 LAB
APPEARANCE: CLEAR
BACTERIA: NEGATIVE
BILIRUBIN URINE: NEGATIVE
BLOOD URINE: ABNORMAL
COLOR: YELLOW
GLUCOSE QUALITATIVE U: NEGATIVE
HYALINE CASTS: 7 /LPF
KETONES URINE: NEGATIVE
LEUKOCYTE ESTERASE URINE: ABNORMAL
MICROSCOPIC-UA: NORMAL
NITRITE URINE: NEGATIVE
PH URINE: 7
PROTEIN URINE: NEGATIVE
RED BLOOD CELLS URINE: 0 /HPF
SPECIFIC GRAVITY URINE: 1.01
SQUAMOUS EPITHELIAL CELLS: 0 /HPF
URINE COMMENTS: NORMAL
UROBILINOGEN URINE: NORMAL
WHITE BLOOD CELLS URINE: 35 /HPF

## 2022-11-19 LAB — URINE CYTOLOGY: NORMAL

## 2022-11-25 ENCOUNTER — NON-APPOINTMENT (OUTPATIENT)
Age: 87
End: 2022-11-25

## 2022-11-26 ENCOUNTER — TRANSCRIPTION ENCOUNTER (OUTPATIENT)
Age: 87
End: 2022-11-26

## 2022-11-26 ENCOUNTER — NON-APPOINTMENT (OUTPATIENT)
Age: 87
End: 2022-11-26

## 2022-11-27 ENCOUNTER — TRANSCRIPTION ENCOUNTER (OUTPATIENT)
Age: 87
End: 2022-11-27

## 2022-11-28 ENCOUNTER — TRANSCRIPTION ENCOUNTER (OUTPATIENT)
Age: 87
End: 2022-11-28

## 2022-11-28 ENCOUNTER — NON-APPOINTMENT (OUTPATIENT)
Age: 87
End: 2022-11-28

## 2022-11-28 ENCOUNTER — APPOINTMENT (OUTPATIENT)
Dept: HOME HEALTH SERVICES | Facility: HOME HEALTH | Age: 87
End: 2022-11-28
Payer: MEDICARE

## 2022-11-28 ENCOUNTER — APPOINTMENT (OUTPATIENT)
Dept: HOME HEALTH SERVICES | Facility: HOME HEALTH | Age: 87
End: 2022-11-28

## 2022-11-28 VITALS
SYSTOLIC BLOOD PRESSURE: 138 MMHG | OXYGEN SATURATION: 86 % | DIASTOLIC BLOOD PRESSURE: 68 MMHG | RESPIRATION RATE: 16 BRPM | HEART RATE: 67 BPM | TEMPERATURE: 36.7 F

## 2022-11-28 DIAGNOSIS — J20.9 ACUTE BRONCHITIS, UNSPECIFIED: ICD-10-CM

## 2022-11-28 PROCEDURE — 99348 HOME/RES VST EST LOW MDM 30: CPT | Mod: 95

## 2022-11-28 PROCEDURE — 99349 HOME/RES VST EST MOD MDM 40: CPT

## 2022-11-29 ENCOUNTER — LABORATORY RESULT (OUTPATIENT)
Age: 87
End: 2022-11-29

## 2022-11-29 ENCOUNTER — TRANSCRIPTION ENCOUNTER (OUTPATIENT)
Age: 87
End: 2022-11-29

## 2022-11-29 PROBLEM — J20.9 ACUTE BRONCHITIS: Status: RESOLVED | Noted: 2022-11-26 | Resolved: 2022-12-26

## 2022-11-30 ENCOUNTER — LABORATORY RESULT (OUTPATIENT)
Age: 87
End: 2022-11-30

## 2022-11-30 ENCOUNTER — TRANSCRIPTION ENCOUNTER (OUTPATIENT)
Age: 87
End: 2022-11-30

## 2022-12-01 ENCOUNTER — LABORATORY RESULT (OUTPATIENT)
Age: 87
End: 2022-12-01

## 2022-12-01 ENCOUNTER — APPOINTMENT (OUTPATIENT)
Dept: PULMONOLOGY | Facility: CLINIC | Age: 87
End: 2022-12-01

## 2022-12-01 PROCEDURE — 99205 OFFICE O/P NEW HI 60 MIN: CPT | Mod: CS,95

## 2022-12-02 ENCOUNTER — LABORATORY RESULT (OUTPATIENT)
Age: 87
End: 2022-12-02

## 2022-12-02 NOTE — HISTORY OF PRESENT ILLNESS
[Home] : at home, [unfilled] , at the time of the visit. [Medical Office: ([facility])___] : at the medical office located in  [Family Member] : family member [Formal Caregiver] : formal caregiver [Verbal consent obtained from patient] : the patient, [unfilled] [FreeTextEntry1] : Ms. Venegas is a 89 yo F w/ PMH of dementia, afib, CHF, bedbound with pressure ulcers, HTN, chronic sales, spinal stenosis who was referred to [facility] after testing positive for COVID. She had positive exposure from her HHA over one week ago. Her symptoms started on Wed-Thursday, 7-8 days ago. She had worsened short of breath at rest, and cough productive of thick sputum. No fevers. No chest pain. Her doctor prescribed a course of azithromycin and nebulizers.She was also taking guaifenesin prn.  Per family at her worst she was saturating 88% on room air. She was sent home oxygen. Her condition gradually improved. I spoke with her vising nurse Valeria who stated today she has clear lungs and she is breathing comfortably and is markedly improved from prior. \par \par  Now saturating 94-95% on room air, up to % on 2L NC. She is a former smoker, quit smoking 40 years ago. Per daughter no prior history of COPD/asthma.  She has received COVID vaccin x2 but elected not to take any boosters. \par \par On exam via video visit she is awake, alert, answering questions normally. Hearing impaired but albe to answer some questions. She is breathing comfortably on nasal cannula. Family removed O2 and saturation did not drop below 94%. No overt wheezing or cough witnessed. she is elderly and bedbound. \par  \par \par

## 2022-12-05 ENCOUNTER — LABORATORY RESULT (OUTPATIENT)
Age: 87
End: 2022-12-05

## 2022-12-05 ENCOUNTER — APPOINTMENT (OUTPATIENT)
Dept: PULMONOLOGY | Facility: CLINIC | Age: 87
End: 2022-12-05

## 2022-12-05 PROCEDURE — 99213 OFFICE O/P EST LOW 20 MIN: CPT | Mod: CS,95

## 2022-12-06 NOTE — HISTORY OF PRESENT ILLNESS
[Home] : at home, [unfilled] , at the time of the visit. [Family Member] : family member [Formal Caregiver] : formal caregiver [Verbal consent obtained from patient] : the patient, [unfilled] [FreeTextEntry1] : Ms. Venegas is a 89 yo F w/ PMH of dementia, afib, CHF, bedbound with pressure ulcers, HTN, chronic sales, spinal stenosis who was referred to [facility] after testing positive for COVID. This is a follow up appointment.\par \par  Her symptoms started on 11/23 approximately 13 days ago. She had worsened short of breath at rest, and cough productive of thick sputum. No fevers. No chest pain. She is currently taking guaifenesin prn and albuterol bid. Per family at her worst she was saturating 88% on room air. She was sent home oxygen and a course of azithromycin. Her condition has gradually improved.  \par \par Overall she is improving. Daughter stated she is more irritable and fatigued, however she remains awake, alert and mental status appears unchanged from prior. Her breathing is improved. She is now saturating 94-95% on room air, up to % on 2L NC.  She still has a cough productive of thick sputum. She is a former smoker, quit smoking 40 years ago. Per daughter no prior history of COPD/asthma. She has received COVID vaccine x2 but elected not to take any boosters. \par \par On exam via video visit she is awake, alert, answering questions normally. She is answering simple questions appropriately. She states she is irritated at being ill but feels better. Her chronic "aches and pains" are improving. She is breathing comfortably on nasal cannula. No retractions, normal respiratory rate. Family removed O2 and saturation did not drop below 94%. No overt wheezing or cough witnessed. She is elderly and bedbound. \par  \par

## 2022-12-07 ENCOUNTER — LABORATORY RESULT (OUTPATIENT)
Age: 87
End: 2022-12-07

## 2022-12-09 ENCOUNTER — NON-APPOINTMENT (OUTPATIENT)
Age: 87
End: 2022-12-09

## 2022-12-24 LAB
APPEARANCE: CLEAR
BACTERIA: NEGATIVE
BILIRUBIN URINE: NEGATIVE
BLOOD URINE: NEGATIVE
COLOR: COLORLESS
GLUCOSE QUALITATIVE U: NEGATIVE
HYALINE CASTS: 0 /LPF
KETONES URINE: NEGATIVE
LEUKOCYTE ESTERASE URINE: ABNORMAL
MICROSCOPIC-UA: NORMAL
NITRITE URINE: NEGATIVE
PH URINE: 7
PROTEIN URINE: NEGATIVE
RED BLOOD CELLS URINE: 1 /HPF
SPECIFIC GRAVITY URINE: 1.01
SQUAMOUS EPITHELIAL CELLS: 3 /HPF
UROBILINOGEN URINE: NORMAL
WHITE BLOOD CELLS URINE: 4 /HPF

## 2022-12-30 ENCOUNTER — TRANSCRIPTION ENCOUNTER (OUTPATIENT)
Age: 87
End: 2022-12-30

## 2023-01-01 NOTE — PATIENT PROFILE ADULT. - NS PRO ABUSE SCREEN AFRAID ANYONE YN
Maternal history reviewed, patient examined.     This is a 0 DOL AGA baby boy born at 39.3 weeks to a 25 yo  mom via vaginal delivery.   Mom is O+, baby is O+ JOANN- [place] -. Mom is GBS-(), HBsAg-, RPR-, HIV- and Rubella Immune.  SROM of 3 hrs. APGARS 9/9. EOSS of 0.07 at birth.     The pregnancy was un-complicated and the labor and delivery were un-remarkable. Family declined Hepatitis B vaccination and Erythromycin eye ointment.  Vitamin K injection was given.     The nursery course to date has been un-remarkable  Due to void, due to stool.    General Appearance: comfortable, no distress, no dysmorphic features   Head: normocephalic, anterior fontanelle open and flat  Eyes/ENT: red reflex present b/l, palate intact  Neck/clavicles: no masses, no crepitus  Chest: no grunting, flaring or retractions, clear and equal breath sounds b/l  CV: RRR, nl S1 S2, no murmurs, well perfused  Abdomen: soft, nontender, nondistended, no masses  : Normal male, testes descended b/l  Back: no defects  Extremities: full range of motion, no hip clicks, normal digits. 2+ Femoral pulses.  Neuro: good tone, moves all extremities, symmetric Hanh, suck, grasp  Skin: no lesions, no jaundice    Laboratory & Imaging Studies:     Assessment:   This is a 0 DOL full term baby boy born via vaginal delivery. Family is aware that erythromycin ointment eye prophylaxis is part of both hospital policy and state mandate for  care- verbal declination is against medical advice.    Plan:  Admit to well baby nursery  Normal / Healthy  Care and teaching  Hepatitis B vaccination, Vitamin K injection and Erythromycin eye ointment given  PMD: Undecided no

## 2023-01-04 ENCOUNTER — NON-APPOINTMENT (OUTPATIENT)
Age: 88
End: 2023-01-04

## 2023-01-05 ENCOUNTER — APPOINTMENT (OUTPATIENT)
Dept: HOME HEALTH SERVICES | Facility: HOME HEALTH | Age: 88
End: 2023-01-05
Payer: MEDICARE

## 2023-01-05 PROCEDURE — 99349 HOME/RES VST EST MOD MDM 40: CPT

## 2023-01-05 NOTE — COUNSELING
[Normal Weight - ( BMI  <25 )] : normal weight - ( BMI  <25 ) [DASH diet recommended] : DASH diet recommended [Smoke/CO Detectors] : smoke/CO detectors [Use assistive device to avoid falls] : use assistive device to avoid falls [] : diabetic screening [Decrease stress] : decrease stress [Decrease hospital use] : decrease hospital use [Minimize unnecessary interventions] : minimize unnecessary interventions [Comfort Care] : comfort care [Discussed disease trajectory with patient/caregiver] : discussed disease trajectory with patient/caregiver [Patient/Caregiver has ___ understanding of disease process] : patient/caregiver has [unfilled] understanding of disease process [Completed Medical Orders for Life-Sustaining Treatment] : completed medical orders for life-sustaining treatment [DNR] : Code Status: DNR [Limited] : Treatment Guidelines: Limited [DNI] : Intubation: DNI [[facility] Date: _____] [facility]: [unfilled] [_____] : HCP: [unfilled]

## 2023-01-08 VITALS
DIASTOLIC BLOOD PRESSURE: 70 MMHG | HEART RATE: 65 BPM | TEMPERATURE: 96.6 F | SYSTOLIC BLOOD PRESSURE: 128 MMHG | OXYGEN SATURATION: 97 %

## 2023-01-08 RX ORDER — AZITHROMYCIN 200 MG/5ML
200 POWDER, FOR SUSPENSION ORAL DAILY
Qty: 2 | Refills: 0 | Status: DISCONTINUED | COMMUNITY
Start: 2022-11-26 | End: 2023-01-08

## 2023-01-08 RX ORDER — POLYETHYLENE GLYCOL 3350 17 G/17G
17 POWDER, FOR SOLUTION ORAL DAILY
Qty: 510 | Refills: 2 | Status: DISCONTINUED | COMMUNITY
Start: 2021-11-01 | End: 2023-01-08

## 2023-01-08 NOTE — HISTORY OF PRESENT ILLNESS
[Family Member] : family member [FreeTextEntry1] : debility CHF   bedbound  frequent UTI  [FreeTextEntry2] : Patient denies fever, cough, trouble breathing, rash, vomiting and diarrhea. Patient has not been in close contact with someone Covid positive.\par N95 mask \par interval events reviewed   and addressed \par  most of the history obtained from family member  daughter who has lots of concerns   mostly  chronic and addressed  previously  \par denies any chest  pains  palpitations     shortness  of breath at rest no weight changes  presently \par  no abdominal pain  no fevers no vomiting   had covid  in december   and now is [facility] oxygen most of the time  and  back to  baseline \par  \par diet regular  appetite   ok needs  supplements  ensure daughter  is buying \par dysphagia  yes to pills \par Weight  stable now\par Ambulates none bedbound \par falls none \par  has indwelling Reaves and  has been seeing  urologist via telehealth   \par Behavior very  anxious  on meds  at times agitated  \par Mood ok \par  memory  ok  declining  \par  sleep  ok \par sensory deficits   vision ok hard of hearing \par pain yes \par Medication refills done and reconciliation  done \par Goals of care discussed and completed  \par

## 2023-01-08 NOTE — PHYSICAL EXAM
[Normal Sclera/Conjunctiva] : normal sclera/conjunctiva [Normal Outer Ear/Nose] : the ears and nose were normal in appearance [No JVD] : no jugular venous distention [No Respiratory Distress] : no respiratory distress [Clear to Auscultation] : lungs were clear to auscultation bilaterally [Breast Exam Declined] : patient declined to have breast exam done [Non Tender] : non-tender [Patient Refused] : rectal exam was refused by the patient [No CVA Tenderness] : no ~M costovertebral angle tenderness [No Motor Deficits] : the motor exam was normal [Oriented x3] : oriented to person, place, and time [de-identified] : comfortable  cooperative and  pleasant    but  frail   occipital area of scalp  quarter sized nodule  as per derm pyogenic granuloma   no signs of infection  [de-identified] :  hearing aids  [de-identified] :  irregular 3/6  murmur   trace  pedal edema   [de-identified] :  softly distended   right  inguinal hernia   [de-identified] :  sales  in place  draining  clear urine  [de-identified] :  nonambulatory  [de-identified] : sacral wound not examined  as per daughter request    use of barrier cream stressed  \par right ischium continues to improve, 0.8 cm, packing with alginate\par heels left healed, right heel stable eschar , offloading with boots and has group 2 mattress  venous  stasis  hyperpigmentation  dry skin but no ulceration   long elongated  brittle nails  [de-identified] : anxious

## 2023-01-19 ENCOUNTER — NON-APPOINTMENT (OUTPATIENT)
Age: 88
End: 2023-01-19

## 2023-01-19 ENCOUNTER — APPOINTMENT (OUTPATIENT)
Dept: DERMATOLOGY | Facility: CLINIC | Age: 88
End: 2023-01-19
Payer: MEDICARE

## 2023-01-19 ENCOUNTER — APPOINTMENT (OUTPATIENT)
Dept: DERMATOLOGY | Facility: CLINIC | Age: 88
End: 2023-01-19

## 2023-01-19 DIAGNOSIS — L21.9 SEBORRHEIC DERMATITIS, UNSPECIFIED: ICD-10-CM

## 2023-01-19 DIAGNOSIS — L29.9 PRURITUS, UNSPECIFIED: ICD-10-CM

## 2023-01-19 PROCEDURE — 99214 OFFICE O/P EST MOD 30 MIN: CPT | Mod: 95

## 2023-01-19 NOTE — ASSESSMENT
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1% cream bid prn.\par Emollients, including Eucerin eczema relief or Aveeno lotion discussed in detail.\par Sarna lotion - continue to use as needed, as desired.\par

## 2023-01-19 NOTE — PHYSICAL EXAM
[FreeTextEntry3] : Approximately a 1-1.5 cm dome shaped oval red granulomatous plaque of the right scalp.  Clean, with crusting.\par \par Erythema of the right posterior auricular region, mild scale.\par \par Xerosis of the right upper back, posterior shoulder region.  SK's with erythema in the region.  Few erythematous patches, solitary excoriations.

## 2023-01-19 NOTE — HISTORY OF PRESENT ILLNESS
[Home] : at home, [unfilled] , at the time of the visit. [Medical Office: ([facility])___] : at the medical office located in  [Other:____] : [unfilled] [Verbal consent obtained from patient] : the patient, [unfilled] [FreeTextEntry1] : Lesion on the scalp, and itching on trunk and behind the ears. [de-identified] : Lesion on the scalp is long standing.  Over the past months, no growth, no tenderness, no bleeding.  Self tx, as per our last telehealth appt. is with peroxide and vaseline.  IN addition, they have been combing the hair away from the growth.\par Itching behind the ear and the back, persistent for weeks or months.

## 2023-01-23 ENCOUNTER — APPOINTMENT (OUTPATIENT)
Dept: HOME HEALTH SERVICES | Facility: HOME HEALTH | Age: 88
End: 2023-01-23

## 2023-01-28 LAB
APPEARANCE: CLEAR
BACTERIA UR CULT: ABNORMAL
BACTERIA: NEGATIVE
BILIRUBIN URINE: NEGATIVE
BLOOD URINE: ABNORMAL
COLOR: YELLOW
GLUCOSE QUALITATIVE U: NEGATIVE
HYALINE CASTS: 0 /LPF
KETONES URINE: NEGATIVE
LEUKOCYTE ESTERASE URINE: ABNORMAL
MICROSCOPIC-UA: NORMAL
NITRITE URINE: NEGATIVE
PH URINE: 7
PROTEIN URINE: NEGATIVE
RED BLOOD CELLS URINE: 5 /HPF
SPECIFIC GRAVITY URINE: 1.01
SQUAMOUS EPITHELIAL CELLS: 5 /HPF
URINE COMMENTS: NORMAL
URINE CYTOLOGY: NORMAL
UROBILINOGEN URINE: NORMAL
WHITE BLOOD CELLS URINE: 200 /HPF

## 2023-01-30 ENCOUNTER — LABORATORY RESULT (OUTPATIENT)
Age: 88
End: 2023-01-30

## 2023-01-30 ENCOUNTER — NON-APPOINTMENT (OUTPATIENT)
Age: 88
End: 2023-01-30

## 2023-01-31 ENCOUNTER — LABORATORY RESULT (OUTPATIENT)
Age: 88
End: 2023-01-31

## 2023-01-31 ENCOUNTER — TRANSCRIPTION ENCOUNTER (OUTPATIENT)
Age: 88
End: 2023-01-31

## 2023-02-01 ENCOUNTER — APPOINTMENT (OUTPATIENT)
Dept: HOME HEALTH SERVICES | Facility: HOME HEALTH | Age: 88
End: 2023-02-01

## 2023-02-01 ENCOUNTER — LABORATORY RESULT (OUTPATIENT)
Age: 88
End: 2023-02-01

## 2023-02-01 VITALS
OXYGEN SATURATION: 95 % | DIASTOLIC BLOOD PRESSURE: 78 MMHG | TEMPERATURE: 97.4 F | RESPIRATION RATE: 16 BRPM | SYSTOLIC BLOOD PRESSURE: 140 MMHG | HEART RATE: 68 BPM

## 2023-02-02 ENCOUNTER — LABORATORY RESULT (OUTPATIENT)
Age: 88
End: 2023-02-02

## 2023-02-02 ENCOUNTER — TRANSCRIPTION ENCOUNTER (OUTPATIENT)
Age: 88
End: 2023-02-02

## 2023-02-03 NOTE — HISTORY OF PRESENT ILLNESS
[Family Member] : family member [FreeTextEntry1] : debility CHF   bedbound  frequent UTI  [FreeTextEntry2] : Patient denies fever, cough, trouble breathing, rash, vomiting and diarrhea. Patient has not been in close contact with someone Covid positive.\par N95 mask \par interval events reviewed   and addressed \par  most of the history obtained from family member  daughter who has lots of concerns   mostly  chronic and addressed  previously  \par  \par  Daughter reports patient with cough - did have CXR, negative. Patient was started on abx, daughter does not report much relief with abx. \par patient denies SOB, but oxygen level is in the 80s. \par diet regular  appetite - poor. Has not eaten much the past few days. She is a picky eater - and also, her throat "burns" - so foods that she normally eats, she is not tolerating well. \par dysphagia  yes to pills. HHA reports patient did cough up her ensure. \par Weight  stable now\par \par Ambulates none bedbound \par falls none \par  has indwelling Reaves and  has been seeing  urologist via telehealth. Reaves changed within two weeks.\par \par Behavior very  anxious  on meds  at times agitated . Daughter report patient seems more loopy - she is normally alert & oriented. but, does seem to have minor confusion. \par Mood ok \par  memory  ok  declining  \par  sleep  ok \par sensory deficits   vision ok hard of hearing \par pain yes \par Medication refills done and reconciliation  done \par Goals of care discussed and completed  today \par

## 2023-02-03 NOTE — PHYSICAL EXAM
[Normal Sclera/Conjunctiva] : normal sclera/conjunctiva [Normal Outer Ear/Nose] : the ears and nose were normal in appearance [No JVD] : no jugular venous distention [No Respiratory Distress] : no respiratory distress [Clear to Auscultation] : lungs were clear to auscultation bilaterally [Normal Rate] : heart rate was normal  [No Edema] : there was no peripheral edema [Breast Exam Declined] : patient declined to have breast exam done [Non Tender] : non-tender [Patient Refused] : rectal exam was refused by the patient [No CVA Tenderness] : no ~M costovertebral angle tenderness [No Motor Deficits] : the motor exam was normal [Oriented x3] : oriented to person, place, and time [de-identified] : comfortable  cooperative and  pleasant    but  frail     [de-identified] :  hearing aids  [de-identified] :  irregular 3/6  murmur   trace  pedal edema   [de-identified] :  softly distended   right  inguinal hernia   [de-identified] :  sales  in place  draining  clear urine  [de-identified] :  nonambulatory  [de-identified] : sacral wound not examined  as per daughter request    use of barrier cream stressed  \par right ischium continues to improve, 0.8 cm, packing with alginate\par heels left healed, right heel stable eschar , offloading with boots and has group 2 mattress  venous  stasis  hyperpigmentation  dry skin but no ulceration   long elongated  brittle nails  [de-identified] : anxious

## 2023-02-03 NOTE — ADDENDUM
[FreeTextEntry1] : Will order oxygen concentrator with portability as patient's sat level is 86% RA at rest @2lpm continuous via NC\par

## 2023-02-05 ENCOUNTER — APPOINTMENT (OUTPATIENT)
Dept: AFTER HOURS CARE | Facility: EMERGENCY ROOM | Age: 88
End: 2023-02-05
Payer: MEDICARE

## 2023-02-05 ENCOUNTER — TRANSCRIPTION ENCOUNTER (OUTPATIENT)
Age: 88
End: 2023-02-05

## 2023-02-06 ENCOUNTER — TRANSCRIPTION ENCOUNTER (OUTPATIENT)
Age: 88
End: 2023-02-06

## 2023-02-06 ENCOUNTER — NON-APPOINTMENT (OUTPATIENT)
Age: 88
End: 2023-02-06

## 2023-02-06 PROCEDURE — 99204 OFFICE O/P NEW MOD 45 MIN: CPT | Mod: 95

## 2023-02-08 ENCOUNTER — NON-APPOINTMENT (OUTPATIENT)
Age: 88
End: 2023-02-08

## 2023-02-08 ENCOUNTER — APPOINTMENT (OUTPATIENT)
Dept: HOME HEALTH SERVICES | Facility: HOME HEALTH | Age: 88
End: 2023-02-08

## 2023-02-13 NOTE — DIETITIAN NUTRITION RISK NOTIFICATION - MALNUTRITION EVALUATION AS DEMONSTRATED BY (ADULTS > 20 YEARS OF AGE)
If symptoms do not resolve with this treatment please make appointment to see dermatologist.   Loss of subcutaneous fat.../Loss of muscle...

## 2023-03-01 ENCOUNTER — APPOINTMENT (OUTPATIENT)
Dept: UROLOGY | Facility: CLINIC | Age: 88
End: 2023-03-01
Payer: MEDICARE

## 2023-03-01 PROCEDURE — 99443: CPT | Mod: 95

## 2023-03-03 LAB
APPEARANCE: ABNORMAL
BACTERIA UR CULT: ABNORMAL
BACTERIA: NEGATIVE
BILIRUBIN URINE: NEGATIVE
BLOOD URINE: ABNORMAL
COLOR: YELLOW
GLUCOSE QUALITATIVE U: NEGATIVE
HYALINE CASTS: 2 /LPF
KETONES URINE: NEGATIVE
LEUKOCYTE ESTERASE URINE: ABNORMAL
MICROSCOPIC-UA: NORMAL
NITRITE URINE: NEGATIVE
PH URINE: 7
PROTEIN URINE: NEGATIVE
RED BLOOD CELLS URINE: 0 /HPF
SPECIFIC GRAVITY URINE: 1.01
SQUAMOUS EPITHELIAL CELLS: 0 /HPF
UROBILINOGEN URINE: NORMAL
WHITE BLOOD CELLS URINE: 59 /HPF

## 2023-03-05 NOTE — HISTORY OF PRESENT ILLNESS
[FreeTextEntry1] : Barb Mcdowell in her daughter Tabatha Deluna gave permission for an audio video telehealth visit.  They were in their home in [facility].  I was in my office in [facility].\par \par Barb Mcdowell is currently 89 years of age.  Her daughter Tabatha Deluna is devoted to her care.  She is very concerned about her mother and has been so for many years.  She becomes very anxious at times and that is concerning her mother.  Barb Mcdowell lives with her daughter.  Barb Mcdowell has been bedbound for several years.\par \par The patient had developed a sacral decubitus ulcer while in a nursing facility.  A Sales catheter was placed because of fecal incontinence and concern that urine mixed with the feces would contaminate the sacral decubitus ulcer.  Once the patient left the nursing facility and will be with her daughter.  The sacral decubitus ulcer has finally healed.  I have discussed removing the Sales catheter with the daughter.  However as the patient has fecal incontinence there is concern about the urine mixing with the feces and being more likely to cause skin problems.  For now we will leave the Sales catheter in.\par \par The patient has had clinically symptomatic urinary tract infections.  He clinically significant infections usually start with increased urination around the Sales catheter due to bladder spasms.  Bladder spasms have been occurring for staff.  The patient is positioned properly in in bed and the personal care attendant leaves for the day.  We have managed to prevent this with the administration of sublingual hyoscyamine.\par \par We do periodic surveillance urine analysis and urine culture tests at the time the Sales catheter is changed by visiting nurse.  He also performed a times of increased spasms or purulence of the urine or change in mental status,\par \par On October 11, 2022 visiting nurse using an order I have previously placed at the request of the daughter sent urine for urine cytology which proved to be negative for malignant cells, urine culture which grew Greater than 100,000 and E. coli that was sensitive to all antibiotics tested.  Urinalysis looked quite good for urine taken from a Sales catheter that was used for chronic Sales catheter drainage.  Ileukocyte esterase was large but nitrites were negative.  There were 2 epithelial cells per high-power field.  There were only 10 white blood cells per high-power field and only 2 red blood cells per high-power field on microscopic exam there were no bacteria seen and there were no hyaline casts.  Specific gravity was 1.010 which shows good hydration and this bedbound woman cared for diligently by her daughter.  Blood by dipstick was trace.  There was no protein glucose or ketones in the urine.  There is no bilirubin and no urobilinogen.  An important portion of the visit was my review with the daughter about bladder spasms and urination around the catheter.  Urine appearance and urine analysis have been clear.  The urine was clear again today.\par \par The patient has significant short-term memory deficit.  She does have some useful short-term memory.  Her long-term memory remains very much intact.  She is very pleasant and to make satisfactory conversation.  She does admit to sometimes not remembering something.  Her complexion was good and there was no pallor.  Facial movements were symmetric.  Cranial nerves were intact.  I was not able to assess the olfactory nerve as this was a telehealth visit.  \par \par 11/04/2022: Ms. MCDOWELL is a 89 year old female presenting today for a telehealth visit. She was accompanied by her daughter who helped with the visit. They gave permission for an audio only telehealth conference. The patient was located in her home in [place], [place]. I was located in my office on [place], [place]. Today her daughter reports the pt experienced rare urinary leaking around the catheter due to bladder spasms. She also reports her systolic pressure was around 140 last week. I offered Gemtesa to manage the bladder spasms and the daughter was cautious about more medications. I informed her if the urinary leaking are only once every 2 weeks or so, she does not have to medicate. The daughter was agreeable to monitor the occurrence of urinary leaking over the next 4 weeks and assess the discomfort it causes the pt. She denies any other urinary issues.\par \par 03/01/2023: Ms. MCDOWELL is a 89 year old female presenting today for a telehealth visit. She was accompanied by her daughter who helped with the visit. They gave permission for an audio only telehealth conference. The patient was located in her home in [place], [place]. I was located in my office on [place], [place]. The pt has chronic sales catheter drainage. She provided a urine specimen from the catheter on 2/27/2023. UA was slightly turbid with large LEC and 59 WBC/HPF. Culture grew >100,000 CFU/ml E.Coli. The sensitivity report is not yet back. The pt's daughter was concerned as when the patient was constipated a few weeks ago she was having very watery BM which grew messy and were around the catheter area. She was given a Fleet's enema last month which helped. Currently she is having BM, paste-like in appearance. Her daughter does not think that her stomach is distended. She has recently had the catheter changed and is not voiding around it. Urine has been very clear. She does not seem to have pain in the urinary passageway or bladder. Denies fevers. Denies gross hematuria. Pt had covid around Thanksgiving, only had the first two vaccines from two years ago.

## 2023-03-05 NOTE — ASSESSMENT
[FreeTextEntry1] : Barb Mcdowell in her daughter Tabatha Deluna gave permission for an audio video telehealth visit.  They were in their home in [facility].  I was in my office in [facility].\par \par Barb Mcdowell is currently 89 years of age.  Her daughter Tabatha Deluna is devoted to her care.  She is very concerned about her mother and has been so for many years.  She becomes very anxious at times and that is concerning her mother.  Barb Mcdowell lives with her daughter.  Barb Mcdowell has been bedbound for several years.\par \par The patient had developed a sacral decubitus ulcer while in a nursing facility.  A Sales catheter was placed because of fecal incontinence and concern that urine mixed with the feces would contaminate the sacral decubitus ulcer.  Once the patient left the nursing facility and will be with her daughter.  The sacral decubitus ulcer has finally healed.  I have discussed removing the Sales catheter with the daughter.  However as the patient has fecal incontinence there is concern about the urine mixing with the feces and being more likely to cause skin problems.  For now we will leave the Sales catheter in.\par \par The patient has had clinically symptomatic urinary tract infections.  The restart with increased urination around the Sales catheter due to bladder spasms.  Bladder spasms have been occurring for staff.  The patient is positioned properly in in bed and the personal care attendant leaves for the day.  We have managed to prevent this with the administration of sublingual hyoscyamine.\par \par We do periodic surveillance urine analysis and urine culture tests at the time the Sales catheter is changed by visiting nurse.  He also performed a times of increased spasms or purulence of the urine or change in mental status,\par \par On October 11, 2022 visiting nurse using an order I have previously placed at the request of the daughter sent urine for urine cytology which proved to be negative for malignant cells, urine culture which grew Greater than 100,000 and E. coli that was sensitive to all antibiotics tested.  Urinalysis looked quite good for urine taken from a Sales catheter that was used for chronic Sales catheter drainage.  Ileukocyte esterase was large but nitrites were negative.  There were 2 epithelial cells per high-power field.  There were only 10 white blood cells per high-power field and only 2 red blood cells per high-power field on microscopic exam there were no bacteria seen and there were no hyaline casts.  Specific gravity was 1.010 which shows good hydration and this bedbound woman cared for diligently by her daughter.  Blood by dipstick was trace.  There was no protein glucose or ketones in the urine.  There is no bilirubin and no urobilinogen.  An important portion of the visit was my review with the daughter about bladder spasms and urination around the catheter.  Urine appearance and urine analysis have been clear.  The urine was clear again today.\par \par The patient has significant short-term memory deficit.  She does have some useful short-term memory.  Her long-term memory remains very much intact.  She is very pleasant and to make satisfactory conversation.  She does admit to sometimes not remembering something.  Her complexion was good and there was no pallor.  Facial movements were symmetric.  Cranial nerves were intact.  I was not able to assess the olfactory nerve as this was a telehealth visit.  \par \par I asked the daughter to see to it that the urine sample was sent periodically when the catheter was changed.  I would do this for surveillance purposes.  I also asked that a urine sample be sent at times of increased cloudiness of the urine.  At any time that gross blood was visible in the urine.  I would also asked that urine be sent if mental status should change.  I asked that urine sample be sent if urine leakage around the catheter picked up and appeared to be from spasms.  The daughter said that she would think that this was performed.  I also asked that the daughter schedule a telehealth visit 3 days after urine samples were sent so we can discuss the circumstances around the decision to send the specimen the specimen and if the patient was having any obvious symptoms or signs that might indicate clinically significant infection.\par \par 11/04/2022: Ms. MCDOWELL is a 89 year old female presenting today for a telehealth visit. She was accompanied by her daughter who helped with the visit. They gave permission for an audio only telehealth conference. The patient was located in her home in [place], [place]. I was located in my office on [place], [place]. Today her daughter reports the pt experienced rare urinary leaking around the catheter due to bladder spasms. She also reports her systolic pressure was around 140 last week. I offered Gemtesa to manage the bladder spasms and the daughter was cautious about more medications. I informed her if the urinary leaking are only once every 2 weeks or so, she does not have to medicate. The daughter was agreeable to monitor the occurrence of urinary leaking over the next 4 weeks and assess the discomfort it causes the pt. She denies any other urinary issues.\par \par Plan: She will provide a urine specimen for UA, urine cytology and urine culture. She will follow up 2 or 3  weeks after providing urine sample/\par \par 03/01/2023: Ms. MCDOWELL is a 89 year old female presenting today for a telehealth visit. She was accompanied by her daughter who helped with the visit. They gave permission for an audio only telehealth conference. The patient was located in her home in [place], [place]. I was located in my office on [place], [place]. The pt has chronic sales catheter drainage. She provided a urine specimen from the catheter on 2/27/2023. UA was slightly turbid with large LEC and 59 WBC/HPF. Culture grew >100,000 CFU/ml E.Coli. The sensitivity report is not yet back. The pt's daughter was concerned as when the patient was constipated a few weeks ago she was having very watery BM which grew messy and were around the catheter area. She was given a Fleet's enema last month which helped. Currently she is having BM, paste-like in appearance. Her daughter does not think that her stomach is distended. She has recently had the catheter changed and is not voiding around it. Urine has been very clear. She does not seem to have pain in the urinary passageway or bladder. Denies fevers. Denies gross hematuria. Pt had covid around Thanksgiving, only had the first two vaccines from two years ago. \par \par Reviewed and discussed 2/27/2023 urine test results. Given the patient is not febrile and is not symptomatic, I will not treat at this time as these urine test results are consistent with someone who has chronic sales drainage.  If she gets ill, her daughter will call promptly. \par \par Patient appears to be stable at this time. I spoke to her at the conclusion of the visit and she sounds good. Her memory seems to be good as she personally asked to speak to me. Was a little hesitant to reply at times but did ask me about my family. She spoke softly yet clearly. \par \par I did advise the patient and her daughter to speak with her PCP in about 4 months about receiving the covid booster vaccines. \par \par Patient will have a telephone visit in 3 months, sooner if clinically indicated. Will continue to submit surveillance urine cultures when her catheter is changed each month.\par \par Duration of telephone visit: 22 minutes.

## 2023-03-05 NOTE — ADDENDUM
[FreeTextEntry1] : This note was authored by Senia Corbin working as a scribe for Dr.Gary Sexton. I, Dr. Indio Sexton have reviewed the content of this note and confirm it is true and accurate. I personally performed the history and physical examination and made all the decisions 03/01/2023

## 2023-03-06 ENCOUNTER — APPOINTMENT (OUTPATIENT)
Dept: UROLOGY | Facility: CLINIC | Age: 88
End: 2023-03-06

## 2023-03-09 NOTE — ED ADULT NURSE NOTE - SEPSIS REFERENCE DATA FOR CRITERIA 1 WBC
Group Topic: BH Medication Education    Date: 3/9/2023  Start Time: 1615  End Time: 1640  Facilitators: Lucille Flores RN; Lucille Vargas RN    Focus: Medication Education  Number in attendance: 10    Method: Group  Attendance: Present  Participation: Active  Patient Response: Interested in topic  Mood: Normal  Affect: Type: Euthymic (normal mood)   Range: Full (normal)   Congruency: Congruent   Stability: Stable  Behavior/Socialization: Appropriate to group  Thought Process: Unremarkable  Task Performance: Follows directions  Patient Evaluation: Encouragement - needs prompts       < 4,000 OR > 12,000

## 2023-03-10 ENCOUNTER — TRANSCRIPTION ENCOUNTER (OUTPATIENT)
Age: 88
End: 2023-03-10

## 2023-03-10 ENCOUNTER — NON-APPOINTMENT (OUTPATIENT)
Age: 88
End: 2023-03-10

## 2023-03-13 ENCOUNTER — APPOINTMENT (OUTPATIENT)
Dept: UROLOGY | Facility: CLINIC | Age: 88
End: 2023-03-13
Payer: MEDICARE

## 2023-03-13 PROCEDURE — 99443: CPT | Mod: 95

## 2023-03-14 NOTE — ASSESSMENT
[FreeTextEntry1] : Barb Mcdowell in her daughter Tabatha Deluna gave permission for an audio video telehealth visit.  They were in their home in [facility].  I was in my office in [facility].\par \par Barb Mcdowell is currently 89 years of age.  Her daughter Tabatha Deluna is devoted to her care.  She is very concerned about her mother and has been so for many years.  She becomes very anxious at times and that is concerning her mother.  Barb Mcdowell lives with her daughter.  Barb Mcdowell has been bedbound for several years.\par \par The patient had developed a sacral decubitus ulcer while in a nursing facility.  A Sales catheter was placed because of fecal incontinence and concern that urine mixed with the feces would contaminate the sacral decubitus ulcer.  Once the patient left the nursing facility and will be with her daughter.  The sacral decubitus ulcer has finally healed.  I have discussed removing the Sales catheter with the daughter.  However as the patient has fecal incontinence there is concern about the urine mixing with the feces and being more likely to cause skin problems.  For now we will leave the Sales catheter in.\par \par The patient has had clinically symptomatic urinary tract infections.  The restart with increased urination around the Sales catheter due to bladder spasms.  Bladder spasms have been occurring for staff.  The patient is positioned properly in in bed and the personal care attendant leaves for the day.  We have managed to prevent this with the administration of sublingual hyoscyamine.\par \par We do periodic surveillance urine analysis and urine culture tests at the time the Sales catheter is changed by visiting nurse.  He also performed a times of increased spasms or purulence of the urine or change in mental status,\par \par On October 11, 2022 visiting nurse using an order I have previously placed at the request of the daughter sent urine for urine cytology which proved to be negative for malignant cells, urine culture which grew Greater than 100,000 and E. coli that was sensitive to all antibiotics tested.  Urinalysis looked quite good for urine taken from a Sales catheter that was used for chronic Sales catheter drainage.  Ileukocyte esterase was large but nitrites were negative.  There were 2 epithelial cells per high-power field.  There were only 10 white blood cells per high-power field and only 2 red blood cells per high-power field on microscopic exam there were no bacteria seen and there were no hyaline casts.  Specific gravity was 1.010 which shows good hydration and this bedbound woman cared for diligently by her daughter.  Blood by dipstick was trace.  There was no protein glucose or ketones in the urine.  There is no bilirubin and no urobilinogen.  An important portion of the visit was my review with the daughter about bladder spasms and urination around the catheter.  Urine appearance and urine analysis have been clear.  The urine was clear again today.\par \par The patient has significant short-term memory deficit.  She does have some useful short-term memory.  Her long-term memory remains very much intact.  She is very pleasant and to make satisfactory conversation.  She does admit to sometimes not remembering something.  Her complexion was good and there was no pallor.  Facial movements were symmetric.  Cranial nerves were intact.  I was not able to assess the olfactory nerve as this was a telehealth visit.  \par \par I asked the daughter to see to it that the urine sample was sent periodically when the catheter was changed.  I would do this for surveillance purposes.  I also asked that a urine sample be sent at times of increased cloudiness of the urine.  At any time that gross blood was visible in the urine.  I would also asked that urine be sent if mental status should change.  I asked that urine sample be sent if urine leakage around the catheter picked up and appeared to be from spasms.  The daughter said that she would think that this was performed.  I also asked that the daughter schedule a telehealth visit 3 days after urine samples were sent so we can discuss the circumstances around the decision to send the specimen the specimen and if the patient was having any obvious symptoms or signs that might indicate clinically significant infection.\par \par 11/04/2022: Ms. MCDOWELL is a 89 year old female presenting today for a telehealth visit. She was accompanied by her daughter who helped with the visit. They gave permission for an audio only telehealth conference. The patient was located in her home in [place], [place]. I was located in my office on [place], [place]. Today her daughter reports the pt experienced rare urinary leaking around the catheter due to bladder spasms. She also reports her systolic pressure was around 140 last week. I offered Gemtesa to manage the bladder spasms and the daughter was cautious about more medications. I informed her if the urinary leaking are only once every 2 weeks or so, she does not have to medicate. The daughter was agreeable to monitor the occurrence of urinary leaking over the next 4 weeks and assess the discomfort it causes the pt. She denies any other urinary issues.\par \par Plan: She will provide a urine specimen for UA, urine cytology and urine culture. She will follow up 2 or 3  weeks after providing urine sample/\par \par 03/01/2023: Ms. MCDOWELL is a 89 year old female presenting today for a telehealth visit. She was accompanied by her daughter who helped with the visit. They gave permission for an audio only telehealth conference. The patient was located in her home in [place], [place]. I was located in my office on [place], [place]. The pt has chronic sales catheter drainage. She provided a urine specimen from the catheter on 2/27/2023. UA was slightly turbid with large LEC and 59 WBC/HPF. Culture grew >100,000 CFU/ml E.Coli. The sensitivity report is not yet back. The pt's daughter was concerned as when the patient was constipated a few weeks ago she was having very watery BM which grew messy and were around the catheter area. She was given a Fleet's enema last month which helped. Currently she is having BM, paste-like in appearance. Her daughter does not think that her stomach is distended. She has recently had the catheter changed and is not voiding around it. Urine has been very clear. She does not seem to have pain in the urinary passageway or bladder. Denies fevers. Denies gross hematuria. Pt had covid around Thanksgiving, only had the first two vaccines from two years ago. \par \par Reviewed and discussed 2/27/2023 urine test results. Given the patient is not febrile and is not symptomatic, I will not treat at this time as these urine test results are consistent with someone who has chronic sales drainage.  If she gets ill, her daughter will call promptly. \par Patient appears to be stable at this time. I spoke to her at the conclusion of the visit and she sounds good. Her memory seems to be good as she personally asked to speak to me. Was a little hesitant to reply at times but did ask me about my family. She spoke softly yet clearly. \par I did advise the patient and her daughter to speak with her PCP in about 4 months about receiving the covid booster vaccines. \par Patient will have a telephone visit in 3 months, sooner if clinically indicated. Will continue to submit surveillance urine cultures when her catheter is changed each month.\par \par 03/13/2023: Ms. MCDOWELL is a 89 year old female presenting today for a telehealth visit. She was accompanied by her daughter who helped with the visit. They gave permission for an audio only telehealth conference. The patient was located in her home in [place], [place]. I was located in my office on [place], [place]. The patient obtained an US prior to today’s visit 3/9/23 which revealed: Comparison is made with the exam of 2/28/22. Transabdominal ultrasound of the abdomen was performed with color flow imaging. The examination is limited in evaluation. The visualized aorta and inferior vena cava show no abnormality. The pancreas is obscured by overlying bowel gas. The liver measures 12.4 cm with homogeneous echotexture. No intrinsic mass and no intra-or extrahepatic ductal dilatation. There is hepatopedal flow of the main portal vein. No gallstones, pericholecystic fluid, wall thickening or positive sonographic Melendez sign. The common bile duct measures 0.3 cm. The right kidney measures 9.2 x 5.6 x 3.0 cm with a nonobstructing 1.2 cm stone in the upper pole. Multiple additional subcentimeter calcifications are seen. These were not seen on previous exam.\par No hydronephrosis or renal mass. The left kidney measures 9.3 x 3.9 x 3.5 cm. There is a 2.1 x 2.4 x 2.0 cm cyst in the medial mid pole, not seen on previous study. No hydronephrosis or renal calcification. The spleen measures 8.2 cm and show no abnormality. There is a small right pleural effusion, stable.\par IMPRESSIONS: Multiple nonobstructing stones of the right kidney with no hydronephrosis. 2.4 cm cyst of the midpole left kidney. Small right pleural effusion, stable.\par The daughter has answered the phone and reports the patient's urine does not get dark. She is not aware of the amount of water she gives her mother. I requested she measure's this from now on as the pt has stones and needs to increase water consumption. Her daughter reports this week the pt has experienced more spasms than normal and believes this may be due to severe constipation. She provided the pt with an Enema to aide in this situation. She is very concerned but I informed her the Enema may have stimulated the spasms and we should give her a few days to clear up and re-evaluate. \par \par I informed the patient's care taker to now measure and increase water volume as the pt has stones. \par \par Clinical findings and US results were reviewed at length with the patient. I personally reviewed the imaging myself.\par \par Pt will schedule a telehealth visit in 1- 2 weeks.\par \par Duration of call: 25 Minutes

## 2023-03-14 NOTE — ADDENDUM
[FreeTextEntry1] : This note was authored by Katlyn Avila working as a scribe for Dr.Gary Sexton. I, Dr. Indio Sexton have reviewed the content of this note and confirm it is true and accurate. I personally performed the history and physical examination and made all the decisions 03/13/2023\par

## 2023-03-14 NOTE — HISTORY OF PRESENT ILLNESS
[FreeTextEntry1] : Barb Mcdowell in her daughter Tabatha Deluna gave permission for an audio video telehealth visit.  They were in their home in [facility].  I was in my office in [facility].\par \par Barb Mcdowell is currently 89 years of age.  Her daughter Tabatha Deluna is devoted to her care.  She is very concerned about her mother and has been so for many years.  She becomes very anxious at times and that is concerning her mother.  Barb Mcdowell lives with her daughter.  Barb Mcdowell has been bedbound for several years.\par \par The patient had developed a sacral decubitus ulcer while in a nursing facility.  A Sales catheter was placed because of fecal incontinence and concern that urine mixed with the feces would contaminate the sacral decubitus ulcer.  Once the patient left the nursing facility and will be with her daughter.  The sacral decubitus ulcer has finally healed.  I have discussed removing the Sales catheter with the daughter.  However as the patient has fecal incontinence there is concern about the urine mixing with the feces and being more likely to cause skin problems.  For now we will leave the Sales catheter in.\par \par The patient has had clinically symptomatic urinary tract infections.  He clinically significant infections usually start with increased urination around the Sales catheter due to bladder spasms.  Bladder spasms have been occurring for staff.  The patient is positioned properly in in bed and the personal care attendant leaves for the day.  We have managed to prevent this with the administration of sublingual hyoscyamine.\par \par We do periodic surveillance urine analysis and urine culture tests at the time the Sales catheter is changed by visiting nurse.  He also performed a times of increased spasms or purulence of the urine or change in mental status,\par \par On October 11, 2022 visiting nurse using an order I have previously placed at the request of the daughter sent urine for urine cytology which proved to be negative for malignant cells, urine culture which grew Greater than 100,000 and E. coli that was sensitive to all antibiotics tested.  Urinalysis looked quite good for urine taken from a Sales catheter that was used for chronic Sales catheter drainage.  Ileukocyte esterase was large but nitrites were negative.  There were 2 epithelial cells per high-power field.  There were only 10 white blood cells per high-power field and only 2 red blood cells per high-power field on microscopic exam there were no bacteria seen and there were no hyaline casts.  Specific gravity was 1.010 which shows good hydration and this bedbound woman cared for diligently by her daughter.  Blood by dipstick was trace.  There was no protein glucose or ketones in the urine.  There is no bilirubin and no urobilinogen.  An important portion of the visit was my review with the daughter about bladder spasms and urination around the catheter.  Urine appearance and urine analysis have been clear.  The urine was clear again today.\par \par The patient has significant short-term memory deficit.  She does have some useful short-term memory.  Her long-term memory remains very much intact.  She is very pleasant and to make satisfactory conversation.  She does admit to sometimes not remembering something.  Her complexion was good and there was no pallor.  Facial movements were symmetric.  Cranial nerves were intact.  I was not able to assess the olfactory nerve as this was a telehealth visit.  \par \par 11/04/2022: Ms. MCDOWELL is a 89 year old female presenting today for a telehealth visit. She was accompanied by her daughter who helped with the visit. They gave permission for an audio only telehealth conference. The patient was located in her home in [place], [place]. I was located in my office on [place], [place]. Today her daughter reports the pt experienced rare urinary leaking around the catheter due to bladder spasms. She also reports her systolic pressure was around 140 last week. I offered Gemtesa to manage the bladder spasms and the daughter was cautious about more medications. I informed her if the urinary leaking are only once every 2 weeks or so, she does not have to medicate. The daughter was agreeable to monitor the occurrence of urinary leaking over the next 4 weeks and assess the discomfort it causes the pt. She denies any other urinary issues.\par \par 03/01/2023: Ms. MCDOWELL is a 89 year old female presenting today for a telehealth visit. She was accompanied by her daughter who helped with the visit. They gave permission for an audio only telehealth conference. The patient was located in her home in [place], [place]. I was located in my office on [place], [place]. The pt has chronic sales catheter drainage. She provided a urine specimen from the catheter on 2/27/2023. UA was slightly turbid with large LEC and 59 WBC/HPF. Culture grew >100,000 CFU/ml E.Coli. The sensitivity report is not yet back. The pt's daughter was concerned as when the patient was constipated a few weeks ago she was having very watery BM which grew messy and were around the catheter area. She was given a Fleet's enema last month which helped. Currently she is having BM, paste-like in appearance. Her daughter does not think that her stomach is distended. She has recently had the catheter changed and is not voiding around it. Urine has been very clear. She does not seem to have pain in the urinary passageway or bladder. Denies fevers. Denies gross hematuria. Pt had covid around Thanksgiving, only had the first two vaccines from two years ago. \par \par 03/13/2023: Ms. MCDOWELL is a 89 year old female presenting today for a telehealth visit. She was accompanied by her daughter who helped with the visit. They gave permission for an audio only telehealth conference. The patient was located in her home in [place], [place]. I was located in my office on [place], [place]. The patient obtained an US prior to today’s visit 3/9/23 which revealed: Comparison is made with the exam of 2/28/22. Transabdominal ultrasound of the abdomen was performed with color flow imaging. The examination is limited in evaluation. The visualized aorta and inferior vena cava show no abnormality. The pancreas is obscured by overlying bowel gas. The liver measures 12.4 cm with homogeneous echotexture. No intrinsic mass and no intra-or extrahepatic ductal dilatation. There is hepatopedal flow of the main portal vein. No gallstones, pericholecystic fluid, wall thickening or positive sonographic Melendez sign. The common bile duct measures 0.3 cm. The right kidney measures 9.2 x 5.6 x 3.0 cm with a nonobstructing 1.2 cm stone in the upper pole. Multiple additional subcentimeter calcifications are seen. These were not seen on previous exam.\par No hydronephrosis or renal mass. The left kidney measures 9.3 x 3.9 x 3.5 cm. There is a 2.1 x 2.4 x 2.0 cm cyst in the medial mid pole, not seen on previous study. No hydronephrosis or renal calcification. The spleen measures 8.2 cm and show no abnormality. There is a small right pleural effusion, stable.\par IMPRESSIONS: Multiple nonobstructing stones of the right kidney with no hydronephrosis. 2.4 cm cyst of the midpole left kidney. Small right pleural effusion, stable.\par The daughter has answered the phone and reports the patient's urine does not get dark. She is not aware of the amount of water she gives her mother. I requested she measure's this from now on as the pt has stones and needs to increase water consumption. Her daughter reports this week the pt has experienced more spasms than normal and believes this may be due to severe constipation. She provided the pt with an Enema to aide in this situation. She is very concerned but I informed her the Enema may have stimulated the spasms and we should give her a few days to clear up and re-evaluate.

## 2023-03-22 ENCOUNTER — APPOINTMENT (OUTPATIENT)
Dept: UROLOGY | Facility: CLINIC | Age: 88
End: 2023-03-22
Payer: MEDICARE

## 2023-03-22 PROCEDURE — 99214 OFFICE O/P EST MOD 30 MIN: CPT | Mod: 95

## 2023-03-22 NOTE — PHYSICAL EXAM
[General Appearance - Well Developed] : well developed [General Appearance - Well Nourished] : well nourished [Normal Appearance] : normal appearance [Well Groomed] : well groomed [General Appearance - In No Acute Distress] : no acute distress [Skin Color & Pigmentation] : normal skin color and pigmentation [] : no respiratory distress [Oriented To Time, Place, And Person] : oriented to person, place, and time [Affect] : the affect was normal [Mood] : the mood was normal [Not Anxious] : not anxious

## 2023-03-23 NOTE — HISTORY OF PRESENT ILLNESS
[FreeTextEntry1] : Claudio Mcdowell in her daughter Tabatha Deluna gave permission for an audio video telehealth visit.  They were in their home in [facility].  I was in my office in [facility].\par \par Claudio Mcdowell is currently 89 years of age.  Her daughter Tabahta Deluna is devoted to her care.  She is very concerned about her mother and has been so for many years.  She becomes very anxious at times and that is concerning her mother.  Claudio Mcdowell lives with her daughter.  Claudio Mcdowell has been bedbound for several years.\par \par The patient had developed a sacral decubitus ulcer while in a nursing facility.  A Sales catheter was placed because of fecal incontinence and concern that urine mixed with the feces would contaminate the sacral decubitus ulcer.  Once the patient left the nursing facility and will be with her daughter.  The sacral decubitus ulcer has finally healed.  I have discussed removing the Sales catheter with the daughter.  However as the patient has fecal incontinence there is concern about the urine mixing with the feces and being more likely to cause skin problems.  For now we will leave the Sales catheter in.\par \par The patient has had clinically symptomatic urinary tract infections.  He clinically significant infections usually start with increased urination around the Sales catheter due to bladder spasms.  Bladder spasms have been occurring for staff.  The patient is positioned properly in in bed and the personal care attendant leaves for the day.  We have managed to prevent this with the administration of sublingual hyoscyamine.\par \par We do periodic surveillance urine analysis and urine culture tests at the time the Sales catheter is changed by visiting nurse.  He also performed a times of increased spasms or purulence of the urine or change in mental status,\par \par On October 11, 2022 visiting nurse using an order I have previously placed at the request of the daughter sent urine for urine cytology which proved to be negative for malignant cells, urine culture which grew Greater than 100,000 and E. coli that was sensitive to all antibiotics tested.  Urinalysis looked quite good for urine taken from a Sales catheter that was used for chronic Sales catheter drainage.  Ileukocyte esterase was large but nitrites were negative.  There were 2 epithelial cells per high-power field.  There were only 10 white blood cells per high-power field and only 2 red blood cells per high-power field on microscopic exam there were no bacteria seen and there were no hyaline casts.  Specific gravity was 1.010 which shows good hydration and this bedbound woman cared for diligently by her daughter.  Blood by dipstick was trace.  There was no protein glucose or ketones in the urine.  There is no bilirubin and no urobilinogen.  An important portion of the visit was my review with the daughter about bladder spasms and urination around the catheter.  Urine appearance and urine analysis have been clear.  The urine was clear again today.\par \par The patient has significant short-term memory deficit.  She does have some useful short-term memory.  Her long-term memory remains very much intact.  She is very pleasant and to make satisfactory conversation.  She does admit to sometimes not remembering something.  Her complexion was good and there was no pallor.  Facial movements were symmetric.  Cranial nerves were intact.  I was not able to assess the olfactory nerve as this was a telehealth visit.  \par \par 11/04/2022: Ms. MCDOWELL is a 89 year old female presenting today for a telehealth visit. She was accompanied by her daughter who helped with the visit. They gave permission for an audio only telehealth conference. The patient was located in her home in [place], [place]. I was located in my office on [place], [place]. Today her daughter reports the pt experienced rare urinary leaking around the catheter due to bladder spasms. She also reports her systolic pressure was around 140 last week. I offered Gemtesa to manage the bladder spasms and the daughter was cautious about more medications. I informed her if the urinary leaking are only once every 2 weeks or so, she does not have to medicate. The daughter was agreeable to monitor the occurrence of urinary leaking over the next 4 weeks and assess the discomfort it causes the pt. She denies any other urinary issues.\par \par 03/01/2023: Ms. MCDOWELL is a 89 year old female presenting today for a telehealth visit. She was accompanied by her daughter who helped with the visit. They gave permission for an audio only telehealth conference. The patient was located in her home in [place], [place]. I was located in my office on [place], [place]. The pt has chronic sales catheter drainage. She provided a urine specimen from the catheter on 2/27/2023. UA was slightly turbid with large LEC and 59 WBC/HPF. Culture grew >100,000 CFU/ml E.Coli. The sensitivity report is not yet back. The pt's daughter was concerned as when the patient was constipated a few weeks ago she was having very watery BM which grew messy and were around the catheter area. She was given a Fleet's enema last month which helped. Currently she is having BM, paste-like in appearance. Her daughter does not think that her stomach is distended. She has recently had the catheter changed and is not voiding around it. Urine has been very clear. She does not seem to have pain in the urinary passageway or bladder. Denies fevers. Denies gross hematuria. Pt had covid around Thanksgiving, only had the first two vaccines from two years ago. \par \par 03/13/2023: Ms. MCDOWELL is a 89 year old female presenting today for a telehealth visit. She was accompanied by her daughter who helped with the visit. They gave permission for an audio only telehealth conference. The patient was located in her home in [place], [place]. I was located in my office on [place], [place]. The patient obtained an US prior to today’s visit 3/9/23 which revealed: Comparison is made with the exam of 2/28/22. Transabdominal ultrasound of the abdomen was performed with color flow imaging. The examination is limited in evaluation. The visualized aorta and inferior vena cava show no abnormality. The pancreas is obscured by overlying bowel gas. The liver measures 12.4 cm with homogeneous echotexture. No intrinsic mass and no intra-or extrahepatic ductal dilatation. There is hepatopedal flow of the main portal vein. No gallstones, pericholecystic fluid, wall thickening or positive sonographic Melendez sign. The common bile duct measures 0.3 cm. The right kidney measures 9.2 x 5.6 x 3.0 cm with a nonobstructing 1.2 cm stone in the upper pole. Multiple additional subcentimeter calcifications are seen. These were not seen on previous exam.\par No hydronephrosis or renal mass. The left kidney measures 9.3 x 3.9 x 3.5 cm. There is a 2.1 x 2.4 x 2.0 cm cyst in the medial mid pole, not seen on previous study. No hydronephrosis or renal calcification. The spleen measures 8.2 cm and show no abnormality. There is a small right pleural effusion, stable.\par IMPRESSIONS: Multiple nonobstructing stones of the right kidney with no hydronephrosis. 2.4 cm cyst of the midpole left kidney. Small right pleural effusion, stable.\par The daughter has answered the phone and reports the patient's urine does not get dark. She is not aware of the amount of water she gives her mother. I requested she measure's this from now on as the pt has stones and needs to increase water consumption. Her daughter reports this week the pt has experienced more spasms than normal and believes this may be due to severe constipation. She provided the pt with an Enema to aide in this situation. She is very concerned but I informed her the Enema may have stimulated the spasms and we should give her a few days to clear up and re-evaluate. \par \par 03/22/2023: Ms. CLAUDIO MCDOWELL presents today for an audio visual telehealth visit for which she gave permission for. The patient was located at home at [address] [place] [address] and I was located at my office in [place], [place]. She is accompanied by her daughter whom most of the visit was conducted with. Her daughter has been keeping track of her mother's water consumption. She is averaging about 58 oz of water in a 24 hr period. Additionally, she is drinking juice and ensure nutrition drinks. She does not consume any caffeine. She is on furosemide 40 mg. I said hello to the patient at the end of the visit and she is looking well. She reports feeling no pain at the current moment.

## 2023-03-23 NOTE — ASSESSMENT
[FreeTextEntry1] : Claudio Mcdowell in her daughter Tabatha Deluna gave permission for an audio video telehealth visit.  They were in their home in [facility].  I was in my office in [facility].\par \par Claudio Mcdowell is currently 89 years of age.  Her daughter Tabatha Deluna is devoted to her care.  She is very concerned about her mother and has been so for many years.  She becomes very anxious at times and that is concerning her mother.  Claudio Mcdowell lives with her daughter.  Claudio Mcdowell has been bedbound for several years.\par \par The patient had developed a sacral decubitus ulcer while in a nursing facility.  A Sales catheter was placed because of fecal incontinence and concern that urine mixed with the feces would contaminate the sacral decubitus ulcer.  Once the patient left the nursing facility and will be with her daughter.  The sacral decubitus ulcer has finally healed.  I have discussed removing the Sales catheter with the daughter.  However as the patient has fecal incontinence there is concern about the urine mixing with the feces and being more likely to cause skin problems.  For now we will leave the Sales catheter in.\par \par The patient has had clinically symptomatic urinary tract infections.  The restart with increased urination around the Sales catheter due to bladder spasms.  Bladder spasms have been occurring for staff.  The patient is positioned properly in in bed and the personal care attendant leaves for the day.  We have managed to prevent this with the administration of sublingual hyoscyamine.\par \par We do periodic surveillance urine analysis and urine culture tests at the time the Sales catheter is changed by visiting nurse.  He also performed a times of increased spasms or purulence of the urine or change in mental status,\par \par On October 11, 2022 visiting nurse using an order I have previously placed at the request of the daughter sent urine for urine cytology which proved to be negative for malignant cells, urine culture which grew Greater than 100,000 and E. coli that was sensitive to all antibiotics tested.  Urinalysis looked quite good for urine taken from a Sales catheter that was used for chronic Sales catheter drainage.  Ileukocyte esterase was large but nitrites were negative.  There were 2 epithelial cells per high-power field.  There were only 10 white blood cells per high-power field and only 2 red blood cells per high-power field on microscopic exam there were no bacteria seen and there were no hyaline casts.  Specific gravity was 1.010 which shows good hydration and this bedbound woman cared for diligently by her daughter.  Blood by dipstick was trace.  There was no protein glucose or ketones in the urine.  There is no bilirubin and no urobilinogen.  An important portion of the visit was my review with the daughter about bladder spasms and urination around the catheter.  Urine appearance and urine analysis have been clear.  The urine was clear again today.\par \par The patient has significant short-term memory deficit.  She does have some useful short-term memory.  Her long-term memory remains very much intact.  She is very pleasant and to make satisfactory conversation.  She does admit to sometimes not remembering something.  Her complexion was good and there was no pallor.  Facial movements were symmetric.  Cranial nerves were intact.  I was not able to assess the olfactory nerve as this was a telehealth visit.  \par \par I asked the daughter to see to it that the urine sample was sent periodically when the catheter was changed.  I would do this for surveillance purposes.  I also asked that a urine sample be sent at times of increased cloudiness of the urine.  At any time that gross blood was visible in the urine.  I would also asked that urine be sent if mental status should change.  I asked that urine sample be sent if urine leakage around the catheter picked up and appeared to be from spasms.  The daughter said that she would think that this was performed.  I also asked that the daughter schedule a telehealth visit 3 days after urine samples were sent so we can discuss the circumstances around the decision to send the specimen the specimen and if the patient was having any obvious symptoms or signs that might indicate clinically significant infection.\par \par 11/04/2022: Ms. MCDOWELL is a 89 year old female presenting today for a telehealth visit. She was accompanied by her daughter who helped with the visit. They gave permission for an audio only telehealth conference. The patient was located in her home in [place], [place]. I was located in my office on [place], [place]. Today her daughter reports the pt experienced rare urinary leaking around the catheter due to bladder spasms. She also reports her systolic pressure was around 140 last week. I offered Gemtesa to manage the bladder spasms and the daughter was cautious about more medications. I informed her if the urinary leaking are only once every 2 weeks or so, she does not have to medicate. The daughter was agreeable to monitor the occurrence of urinary leaking over the next 4 weeks and assess the discomfort it causes the pt. She denies any other urinary issues.\par \par Plan: She will provide a urine specimen for UA, urine cytology and urine culture. She will follow up 2 or 3  weeks after providing urine sample/\par \par 03/01/2023: Ms. MCDOWELL is a 89 year old female presenting today for a telehealth visit. She was accompanied by her daughter who helped with the visit. They gave permission for an audio only telehealth conference. The patient was located in her home in [place], [place]. I was located in my office on [place], [place]. The pt has chronic sales catheter drainage. She provided a urine specimen from the catheter on 2/27/2023. UA was slightly turbid with large LEC and 59 WBC/HPF. Culture grew >100,000 CFU/ml E.Coli. The sensitivity report is not yet back. The pt's daughter was concerned as when the patient was constipated a few weeks ago she was having very watery BM which grew messy and were around the catheter area. She was given a Fleet's enema last month which helped. Currently she is having BM, paste-like in appearance. Her daughter does not think that her stomach is distended. She has recently had the catheter changed and is not voiding around it. Urine has been very clear. She does not seem to have pain in the urinary passageway or bladder. Denies fevers. Denies gross hematuria. Pt had covid around Thanksgiving, only had the first two vaccines from two years ago. \par \par Reviewed and discussed 2/27/2023 urine test results. Given the patient is not febrile and is not symptomatic, I will not treat at this time as these urine test results are consistent with someone who has chronic sales drainage.  If she gets ill, her daughter will call promptly. \par Patient appears to be stable at this time. I spoke to her at the conclusion of the visit and she sounds good. Her memory seems to be good as she personally asked to speak to me. Was a little hesitant to reply at times but did ask me about my family. She spoke softly yet clearly. \par I did advise the patient and her daughter to speak with her PCP in about 4 months about receiving the covid booster vaccines. \par Patient will have a telephone visit in 3 months, sooner if clinically indicated. Will continue to submit surveillance urine cultures when her catheter is changed each month.\par \par 03/13/2023: Ms. MCDOWELL is a 89 year old female presenting today for a telehealth visit. She was accompanied by her daughter who helped with the visit. They gave permission for an audio only telehealth conference. The patient was located in her home in [place], [place]. I was located in my office on [place], [place]. The patient obtained an US prior to today’s visit 3/9/23 which revealed: Comparison is made with the exam of 2/28/22. Transabdominal ultrasound of the abdomen was performed with color flow imaging. The examination is limited in evaluation. The visualized aorta and inferior vena cava show no abnormality. The pancreas is obscured by overlying bowel gas. The liver measures 12.4 cm with homogeneous echotexture. No intrinsic mass and no intra-or extrahepatic ductal dilatation. There is hepatopedal flow of the main portal vein. No gallstones, pericholecystic fluid, wall thickening or positive sonographic Melendez sign. The common bile duct measures 0.3 cm. The right kidney measures 9.2 x 5.6 x 3.0 cm with a nonobstructing 1.2 cm stone in the upper pole. Multiple additional subcentimeter calcifications are seen. These were not seen on previous exam.\par No hydronephrosis or renal mass. The left kidney measures 9.3 x 3.9 x 3.5 cm. There is a 2.1 x 2.4 x 2.0 cm cyst in the medial mid pole, not seen on previous study. No hydronephrosis or renal calcification. The spleen measures 8.2 cm and show no abnormality. There is a small right pleural effusion, stable.\par IMPRESSIONS: Multiple nonobstructing stones of the right kidney with no hydronephrosis. 2.4 cm cyst of the midpole left kidney. Small right pleural effusion, stable.\par The daughter has answered the phone and reports the patient's urine does not get dark. She is not aware of the amount of water she gives her mother. I requested she measure's this from now on as the pt has stones and needs to increase water consumption. Her daughter reports this week the pt has experienced more spasms than normal and believes this may be due to severe constipation. She provided the pt with an Enema to aide in this situation. She is very concerned but I informed her the Enema may have stimulated the spasms and we should give her a few days to clear up and re-evaluate. \par \par I informed the patient's care taker to now measure and increase water volume as the pt has stones. \par Clinical findings and US results were reviewed at length with the patient. I personally reviewed the imaging myself.\par Pt will schedule a telehealth visit in 1- 2 weeks.\par \par 03/22/2023: Ms. CLAUDIO MCDOWELL presents today for an audio visual telehealth visit for which she gave permission for. The patient was located at home at [address] [place] [address] and I was located at my office in [place], [place]. She is accompanied by her daughter whom most of the visit was conducted with. Her daughter has been keeping track of her mother's water consumption. She is averaging about 58 oz of water in a 24 hr period. Additionally, she is drinking juice and ensure nutrition drinks. She does not consume any caffeine. She is on furosemide 40 mg. I said hello to the patient at the end of the visit and she is looking well. She reports feeling no pain at the current moment. \par \par I advised the patient's daughter to keep up the water intake and if she can, up it to 64 oz of water in a 24 hr period. I recommended she add fresh lemon juice to her water as well. I also advised the patient's daughter to speak with the prescribing doctor for furosemide on if it is okay to increase her water intake. I informed her I would be happy to talk to him if he needs. \par \par Reviewed the patient's abdominal US of 3/9/2023 once again. US prior to that on 2/28/2022 showed no hydro, mass, or renal calcification, though the kidneys were not visualized well due to bowel gas. \par \par Advised the patient's daughter to speak with the pt's gastroenterologist about her BM problems. \par \par Patient is getting blood work drawn in her house in a few days. Will send orders for the things that I want done. \par \par Patient will have a telehealth visit next week to review lab work results. \par \par Preparation, audio-visual visit, and coordination of care took : 30 minutes.

## 2023-03-23 NOTE — ADDENDUM
[FreeTextEntry1] : This note was authored by Senia Corbin working as a scribe for Dr.Gary Sexton. I, Dr. Indio Sexton have reviewed the content of this note and confirm it is true and accurate. I personally performed the history and physical examination and made all the decisions 03/22/2023

## 2023-03-26 ENCOUNTER — RESULT REVIEW (OUTPATIENT)
Age: 88
End: 2023-03-26

## 2023-03-29 ENCOUNTER — NON-APPOINTMENT (OUTPATIENT)
Age: 88
End: 2023-03-29

## 2023-03-29 ENCOUNTER — APPOINTMENT (OUTPATIENT)
Dept: UROLOGY | Facility: CLINIC | Age: 88
End: 2023-03-29
Payer: MEDICARE

## 2023-03-29 PROCEDURE — 99443: CPT | Mod: 95

## 2023-03-30 NOTE — HISTORY OF PRESENT ILLNESS
[FreeTextEntry1] : Claudio Mcdowell in her daughter Tabatha Deluna gave permission for an audio video telehealth visit.  They were in their home in [facility].  I was in my office in [facility].\par \par Claudio Mcdowell is currently 89 years of age.  Her daughter Tabatha Deluna is devoted to her care.  She is very concerned about her mother and has been so for many years.  She becomes very anxious at times and that is concerning her mother.  Claudio Mcdowell lives with her daughter.  Claudio Mcdowell has been bedbound for several years.\par \par The patient had developed a sacral decubitus ulcer while in a nursing facility.  A Sales catheter was placed because of fecal incontinence and concern that urine mixed with the feces would contaminate the sacral decubitus ulcer.  Once the patient left the nursing facility and will be with her daughter.  The sacral decubitus ulcer has finally healed.  I have discussed removing the Sales catheter with the daughter.  However as the patient has fecal incontinence there is concern about the urine mixing with the feces and being more likely to cause skin problems.  For now we will leave the Sales catheter in.\par \par The patient has had clinically symptomatic urinary tract infections.  He clinically significant infections usually start with increased urination around the Sales catheter due to bladder spasms.  Bladder spasms have been occurring for staff.  The patient is positioned properly in in bed and the personal care attendant leaves for the day.  We have managed to prevent this with the administration of sublingual hyoscyamine.\par \par We do periodic surveillance urine analysis and urine culture tests at the time the Sales catheter is changed by visiting nurse.  He also performed a times of increased spasms or purulence of the urine or change in mental status,\par \par On October 11, 2022 visiting nurse using an order I have previously placed at the request of the daughter sent urine for urine cytology which proved to be negative for malignant cells, urine culture which grew Greater than 100,000 and E. coli that was sensitive to all antibiotics tested.  Urinalysis looked quite good for urine taken from a Sales catheter that was used for chronic Sales catheter drainage.  Ileukocyte esterase was large but nitrites were negative.  There were 2 epithelial cells per high-power field.  There were only 10 white blood cells per high-power field and only 2 red blood cells per high-power field on microscopic exam there were no bacteria seen and there were no hyaline casts.  Specific gravity was 1.010 which shows good hydration and this bedbound woman cared for diligently by her daughter.  Blood by dipstick was trace.  There was no protein glucose or ketones in the urine.  There is no bilirubin and no urobilinogen.  An important portion of the visit was my review with the daughter about bladder spasms and urination around the catheter.  Urine appearance and urine analysis have been clear.  The urine was clear again today.\par \par The patient has significant short-term memory deficit.  She does have some useful short-term memory.  Her long-term memory remains very much intact.  She is very pleasant and to make satisfactory conversation.  She does admit to sometimes not remembering something.  Her complexion was good and there was no pallor.  Facial movements were symmetric.  Cranial nerves were intact.  I was not able to assess the olfactory nerve as this was a telehealth visit.  \par \par 11/04/2022: Ms. MCDOWELL is a 89 year old female presenting today for a telehealth visit. She was accompanied by her daughter who helped with the visit. They gave permission for an audio only telehealth conference. The patient was located in her home in [place], [place]. I was located in my office on [place], [place]. Today her daughter reports the pt experienced rare urinary leaking around the catheter due to bladder spasms. She also reports her systolic pressure was around 140 last week. I offered Gemtesa to manage the bladder spasms and the daughter was cautious about more medications. I informed her if the urinary leaking are only once every 2 weeks or so, she does not have to medicate. The daughter was agreeable to monitor the occurrence of urinary leaking over the next 4 weeks and assess the discomfort it causes the pt. She denies any other urinary issues.\par \par 03/01/2023: Ms. MCDOWELL is a 89 year old female presenting today for a telehealth visit. She was accompanied by her daughter who helped with the visit. They gave permission for an audio only telehealth conference. The patient was located in her home in [place], [place]. I was located in my office on [place], [place]. The pt has chronic sales catheter drainage. She provided a urine specimen from the catheter on 2/27/2023. UA was slightly turbid with large LEC and 59 WBC/HPF. Culture grew >100,000 CFU/ml E.Coli. The sensitivity report is not yet back. The pt's daughter was concerned as when the patient was constipated a few weeks ago she was having very watery BM which grew messy and were around the catheter area. She was given a Fleet's enema last month which helped. Currently she is having BM, paste-like in appearance. Her daughter does not think that her stomach is distended. She has recently had the catheter changed and is not voiding around it. Urine has been very clear. She does not seem to have pain in the urinary passageway or bladder. Denies fevers. Denies gross hematuria. Pt had covid around Thanksgiving, only had the first two vaccines from two years ago. \par \par 03/13/2023: Ms. MCDOWELL is a 89 year old female presenting today for a telehealth visit. She was accompanied by her daughter who helped with the visit. They gave permission for an audio only telehealth conference. The patient was located in her home in [place], [place]. I was located in my office on [place], [place]. The patient obtained an US prior to today’s visit 3/9/23 which revealed: Comparison is made with the exam of 2/28/22. Transabdominal ultrasound of the abdomen was performed with color flow imaging. The examination is limited in evaluation. The visualized aorta and inferior vena cava show no abnormality. The pancreas is obscured by overlying bowel gas. The liver measures 12.4 cm with homogeneous echotexture. No intrinsic mass and no intra-or extrahepatic ductal dilatation. There is hepatopedal flow of the main portal vein. No gallstones, pericholecystic fluid, wall thickening or positive sonographic Melendez sign. The common bile duct measures 0.3 cm. The right kidney measures 9.2 x 5.6 x 3.0 cm with a nonobstructing 1.2 cm stone in the upper pole. Multiple additional subcentimeter calcifications are seen. These were not seen on previous exam.\par No hydronephrosis or renal mass. The left kidney measures 9.3 x 3.9 x 3.5 cm. There is a 2.1 x 2.4 x 2.0 cm cyst in the medial mid pole, not seen on previous study. No hydronephrosis or renal calcification. The spleen measures 8.2 cm and show no abnormality. There is a small right pleural effusion, stable.\par IMPRESSIONS: Multiple nonobstructing stones of the right kidney with no hydronephrosis. 2.4 cm cyst of the midpole left kidney. Small right pleural effusion, stable.\par The daughter has answered the phone and reports the patient's urine does not get dark. She is not aware of the amount of water she gives her mother. I requested she measure's this from now on as the pt has stones and needs to increase water consumption. Her daughter reports this week the pt has experienced more spasms than normal and believes this may be due to severe constipation. She provided the pt with an Enema to aide in this situation. She is very concerned but I informed her the Enema may have stimulated the spasms and we should give her a few days to clear up and re-evaluate. \par \par 03/22/2023: Ms. CLAUDIO MCDOWELL presents today for an audio visual telehealth visit for which she gave permission for. The patient was located at home at [address] [place] [address] and I was located at my office in [place], [place]. She is accompanied by her daughter whom most of the visit was conducted with. Her daughter has been keeping track of her mother's water consumption. She is averaging about 58 oz of water in a 24 hr period. Additionally, she is drinking juice and ensure nutrition drinks. She does not consume any caffeine. She is on furosemide 40 mg. I said hello to the patient at the end of the visit and she is looking well. She reports feeling no pain at the current moment. \par \par 3/29/2023:  Patient Claudio Mcdowell and daughter Tabatha Deluna were home in [place], [place] and I was in my office in [facility].  Patient and daughter gave permission for a [facility] telehealth visit.  They preferred this to [id number].  The patient looks good today.  Her complexion was good there is no pallor.  Smile was symmetric her affect was normal she appeared happy she could make conversation it was appropriate.  She said that she currently had no pain.  When I asked questions that after 3/2 how she had been recently the sometimes she hesitated and deferred to her daughter for cancer compatible with her impaired short-term memory.  Long-term memory remains good she recognizes me once know so I am does ask questions about things like her bladder as she was worried about it her blood tests.  I assured her the results were satisfactory.

## 2023-03-30 NOTE — ADDENDUM
[FreeTextEntry1] : This note was authored by Senia Corbin working as a scribe for Dr.Gary Sexton. I, Dr. Indio Sexton have reviewed the content of this note and confirm it is true and accurate. I personally performed the history and physical examination and made all the decisions 03/29/2023.

## 2023-03-30 NOTE — ASSESSMENT
[FreeTextEntry1] : Claudio Mcdowell in her daughter Tabatha Deluna gave permission for an audio video telehealth visit.  They were in their home in [facility].  I was in my office in [facility].\par \par Claudio Mcdowell is currently 89 years of age.  Her daughter Tabatha Deluna is devoted to her care.  She is very concerned about her mother and has been so for many years.  She becomes very anxious at times and that is concerning her mother.  Claudio Mcdowell lives with her daughter.  Claudio Mcdowell has been bedbound for several years.\par \par The patient had developed a sacral decubitus ulcer while in a nursing facility.  A Sales catheter was placed because of fecal incontinence and concern that urine mixed with the feces would contaminate the sacral decubitus ulcer.  Once the patient left the nursing facility and will be with her daughter.  The sacral decubitus ulcer has finally healed.  I have discussed removing the Sales catheter with the daughter.  However as the patient has fecal incontinence there is concern about the urine mixing with the feces and being more likely to cause skin problems.  For now we will leave the Sales catheter in.\par \par The patient has had clinically symptomatic urinary tract infections.  The restart with increased urination around the Sales catheter due to bladder spasms.  Bladder spasms have been occurring for staff.  The patient is positioned properly in in bed and the personal care attendant leaves for the day.  We have managed to prevent this with the administration of sublingual hyoscyamine.\par \par We do periodic surveillance urine analysis and urine culture tests at the time the Sales catheter is changed by visiting nurse.  He also performed a times of increased spasms or purulence of the urine or change in mental status,\par \par On October 11, 2022 visiting nurse using an order I have previously placed at the request of the daughter sent urine for urine cytology which proved to be negative for malignant cells, urine culture which grew Greater than 100,000 and E. coli that was sensitive to all antibiotics tested.  Urinalysis looked quite good for urine taken from a Sales catheter that was used for chronic Sales catheter drainage.  Ileukocyte esterase was large but nitrites were negative.  There were 2 epithelial cells per high-power field.  There were only 10 white blood cells per high-power field and only 2 red blood cells per high-power field on microscopic exam there were no bacteria seen and there were no hyaline casts.  Specific gravity was 1.010 which shows good hydration and this bedbound woman cared for diligently by her daughter.  Blood by dipstick was trace.  There was no protein glucose or ketones in the urine.  There is no bilirubin and no urobilinogen.  An important portion of the visit was my review with the daughter about bladder spasms and urination around the catheter.  Urine appearance and urine analysis have been clear.  The urine was clear again today.\par \par The patient has significant short-term memory deficit.  She does have some useful short-term memory.  Her long-term memory remains very much intact.  She is very pleasant and to make satisfactory conversation.  She does admit to sometimes not remembering something.  Her complexion was good and there was no pallor.  Facial movements were symmetric.  Cranial nerves were intact.  I was not able to assess the olfactory nerve as this was a telehealth visit.  \par \par I asked the daughter to see to it that the urine sample was sent periodically when the catheter was changed.  I would do this for surveillance purposes.  I also asked that a urine sample be sent at times of increased cloudiness of the urine.  At any time that gross blood was visible in the urine.  I would also asked that urine be sent if mental status should change.  I asked that urine sample be sent if urine leakage around the catheter picked up and appeared to be from spasms.  The daughter said that she would think that this was performed.  I also asked that the daughter schedule a telehealth visit 3 days after urine samples were sent so we can discuss the circumstances around the decision to send the specimen the specimen and if the patient was having any obvious symptoms or signs that might indicate clinically significant infection.\par \par 11/04/2022: Ms. MCDOWELL is a 89 year old female presenting today for a telehealth visit. She was accompanied by her daughter who helped with the visit. They gave permission for an audio only telehealth conference. The patient was located in her home in [place], [place]. I was located in my office on [place], [place]. Today her daughter reports the pt experienced rare urinary leaking around the catheter due to bladder spasms. She also reports her systolic pressure was around 140 last week. I offered Gemtesa to manage the bladder spasms and the daughter was cautious about more medications. I informed her if the urinary leaking are only once every 2 weeks or so, she does not have to medicate. The daughter was agreeable to monitor the occurrence of urinary leaking over the next 4 weeks and assess the discomfort it causes the pt. She denies any other urinary issues.\par \par Plan: She will provide a urine specimen for UA, urine cytology and urine culture. She will follow up 2 or 3  weeks after providing urine sample/\par \par 03/01/2023: Ms. MCDOWELL is a 89 year old female presenting today for a telehealth visit. She was accompanied by her daughter who helped with the visit. They gave permission for an audio only telehealth conference. The patient was located in her home in [place], [place]. I was located in my office on [place], [place]. The pt has chronic sales catheter drainage. She provided a urine specimen from the catheter on 2/27/2023. UA was slightly turbid with large LEC and 59 WBC/HPF. Culture grew >100,000 CFU/ml E.Coli. The sensitivity report is not yet back. The pt's daughter was concerned as when the patient was constipated a few weeks ago she was having very watery BM which grew messy and were around the catheter area. She was given a Fleet's enema last month which helped. Currently she is having BM, paste-like in appearance. Her daughter does not think that her stomach is distended. She has recently had the catheter changed and is not voiding around it. Urine has been very clear. She does not seem to have pain in the urinary passageway or bladder. Denies fevers. Denies gross hematuria. Pt had covid around Thanksgiving, only had the first two vaccines from two years ago. \par \par Reviewed and discussed 2/27/2023 urine test results. Given the patient is not febrile and is not symptomatic, I will not treat at this time as these urine test results are consistent with someone who has chronic sales drainage.  If she gets ill, her daughter will call promptly. \par Patient appears to be stable at this time. I spoke to her at the conclusion of the visit and she sounds good. Her memory seems to be good as she personally asked to speak to me. Was a little hesitant to reply at times but did ask me about my family. She spoke softly yet clearly. \par I did advise the patient and her daughter to speak with her PCP in about 4 months about receiving the covid booster vaccines. \par Patient will have a telephone visit in 3 months, sooner if clinically indicated. Will continue to submit surveillance urine cultures when her catheter is changed each month.\par \par 03/13/2023: Ms. MCDOWELL is a 89 year old female presenting today for a telehealth visit. She was accompanied by her daughter who helped with the visit. They gave permission for an audio only telehealth conference. The patient was located in her home in [place], [place]. I was located in my office on [place], [place]. The patient obtained an US prior to today’s visit 3/9/23 which revealed: Comparison is made with the exam of 2/28/22. Transabdominal ultrasound of the abdomen was performed with color flow imaging. The examination is limited in evaluation. The visualized aorta and inferior vena cava show no abnormality. The pancreas is obscured by overlying bowel gas. The liver measures 12.4 cm with homogeneous echotexture. No intrinsic mass and no intra-or extrahepatic ductal dilatation. There is hepatopedal flow of the main portal vein. No gallstones, pericholecystic fluid, wall thickening or positive sonographic Melendez sign. The common bile duct measures 0.3 cm. The right kidney measures 9.2 x 5.6 x 3.0 cm with a nonobstructing 1.2 cm stone in the upper pole. Multiple additional subcentimeter calcifications are seen. These were not seen on previous exam.\par No hydronephrosis or renal mass. The left kidney measures 9.3 x 3.9 x 3.5 cm. There is a 2.1 x 2.4 x 2.0 cm cyst in the medial mid pole, not seen on previous study. No hydronephrosis or renal calcification. The spleen measures 8.2 cm and show no abnormality. There is a small right pleural effusion, stable.\par IMPRESSIONS: Multiple nonobstructing stones of the right kidney with no hydronephrosis. 2.4 cm cyst of the midpole left kidney. Small right pleural effusion, stable.\par The daughter has answered the phone and reports the patient's urine does not get dark. She is not aware of the amount of water she gives her mother. I requested she measure's this from now on as the pt has stones and needs to increase water consumption. Her daughter reports this week the pt has experienced more spasms than normal and believes this may be due to severe constipation. She provided the pt with an Enema to aide in this situation. She is very concerned but I informed her the Enema may have stimulated the spasms and we should give her a few days to clear up and re-evaluate. \par \par I informed the patient's care taker to now measure and increase water volume as the pt has stones. \par Clinical findings and US results were reviewed at length with the patient. I personally reviewed the imaging myself.\par Pt will schedule a telehealth visit in 1- 2 weeks.\par \par 03/22/2023: Ms. CLAUDIO MCDOWELL presents today for an audio visual telehealth visit for which she gave permission for. The patient was located at home at [address] [place] [address] and I was located at my office in [place], [place]. She is accompanied by her daughter whom most of the visit was conducted with. Her daughter has been keeping track of her mother's water consumption. She is averaging about 58 oz of water in a 24 hr period. Additionally, she is drinking juice and ensure nutrition drinks. She does not consume any caffeine. She is on furosemide 40 mg. I said hello to the patient at the end of the visit and she is looking well. She reports feeling no pain at the current moment. \par I advised the patient's daughter to keep up the water intake and if she can, up it to 64 oz of water in a 24 hr period. I recommended she add fresh lemon juice to her water as well. I also advised the patient's daughter to speak with the prescribing doctor for furosemide on if it is okay to increase her water intake. I informed her I would be happy to talk to him if he needs. \par Reviewed the patient's abdominal US of 3/9/2023 once again. US prior to that on 2/28/2022 showed no hydro, mass, or renal calcification, though the kidneys were not visualized well due to bowel gas. \par Advised the patient's daughter to speak with the pt's gastroenterologist about her BM problems. \par Patient is getting blood work drawn in her house in a few days. Will send orders for the things that I want done. \par \par 3/29/2023:  Patient Claudio Mcdowell and daughter Tabatha Deluna were home in [place], [place] and I was in my office in [facility].  Patient and daughter gave permission for a FaceTime telehealth visit.  They preferred this to [employer].  The patient looks good today.  Her complexion was good there is no pallor.  Smile was symmetric her affect was normal she appeared happy she could make conversation it was appropriate.  She said that she currently had no pain.  When I asked questions that after 3/2 how she had been recently the sometimes she hesitated and deferred to her daughter for cancer compatible with her impaired short-term memory.  Long-term memory remains good she recognizes me once know so I am does ask questions about things like her bladder as she was worried about it her blood tests.  I assured her the results were satisfactory.\par \par I reviewed the blood and urine test results in detail with her daughter.  I explained the findings.  Patient based upon urine osmolality which was low and serum osmolality which was in the mid range of normal is appropriately hydrated.  The patient has congestive heart failure but was not dyspneic on observation today and said that she had no trouble breathing currently.  Her daughter confirms this.  The pro brain natruretic protein in the serum was elevated in keeping with her congestive heart.  The patient does not exert for self she remains in bed.  The patient had excess catheter immunoglobulin change in her urine.  This is in keeping with her diagnosis of multiple myeloma.  She has had for over 10 years without treatment I suspect its more likely gammopathy but has been.  The daughter said that at one point there was consideration of the degree of her having chronic lymphocytic leukemia.  I doubt she has had chronic lymphocytic leukemia.  I am unaware of her having any elevated lymphocyte counts.  A complete blood count ordered by Dr. Farmer showed  low lymphocytes at 470/mcL on December 7, 2022 blood draw at which time her total white blood cell count was 3850 white blood cells per microliter.  This was within normal range.  In view of the urine findings of a past diagnosis of multiple myeloma I will check the immunoglobin electrophoresis addition to a complete blood cell count.  In about 2 months I will see if we can arrange for a renal ultrasound to see if there is any growth in the patient's kidney stones.  I reviewed the issues involved in this kidney stone formation and growth.  I reviewed have an embolization bed 10 resulted in loss of calcium from the bones and hypercalciuria which could lead to nephrolithiasis.  The daughter is extremely concerned about the welfare of her mother.  The patient daughter does explore her concerns and most of her questions are pertinent.  I answered the daughter's questions to her satisfaction patient will have additional blood test drawn at home from the mobile phlebotomy team.  In 2 months time we will attempt to arrange for a home renal ultrasound.\par \par Preparation, audiovisual telehealth appointment and coordination of care took 75 minutes.

## 2023-03-31 ENCOUNTER — NON-APPOINTMENT (OUTPATIENT)
Age: 88
End: 2023-03-31

## 2023-04-03 ENCOUNTER — APPOINTMENT (OUTPATIENT)
Dept: HOME HEALTH SERVICES | Facility: HOME HEALTH | Age: 88
End: 2023-04-03
Payer: MEDICARE

## 2023-04-03 VITALS
DIASTOLIC BLOOD PRESSURE: 80 MMHG | HEART RATE: 70 BPM | OXYGEN SATURATION: 96 % | TEMPERATURE: 97.3 F | SYSTOLIC BLOOD PRESSURE: 152 MMHG

## 2023-04-03 PROCEDURE — 99350 HOME/RES VST EST HIGH MDM 60: CPT

## 2023-04-03 NOTE — COUNSELING
[Normal Weight - ( BMI  <25 )] : normal weight - ( BMI  <25 ) [DASH diet recommended] : DASH diet recommended [Smoke/CO Detectors] : smoke/CO detectors [Use assistive device to avoid falls] : use assistive device to avoid falls [] : diabetic screening [Decrease stress] : decrease stress [Decrease hospital use] : decrease hospital use [Minimize unnecessary interventions] : minimize unnecessary interventions [Comfort Care] : comfort care [Discussed disease trajectory with patient/caregiver] : discussed disease trajectory with patient/caregiver [Patient/Caregiver has ___ understanding of disease process] : patient/caregiver has [unfilled] understanding of disease process [Completed Medical Orders for Life-Sustaining Treatment] : completed medical orders for life-sustaining treatment [DNR] : Code Status: DNR [Limited] : Treatment Guidelines: Limited [DNI] : Intubation: DNI [[facility] Date: _____] [facility]: [unfilled] [_____] : HCP: [unfilled]

## 2023-04-05 ENCOUNTER — LABORATORY RESULT (OUTPATIENT)
Age: 88
End: 2023-04-05

## 2023-04-05 NOTE — ED ADULT NURSE REASSESSMENT NOTE - CONDITION
unchanged Double O-Z Plasty Text: The defect edges were debeveled with a #15 scalpel blade. Given the location of the defect, shape of the defect and the proximity to free margins a Double O-Z plasty (double transposition flap) was deemed most appropriate. Using a sterile surgical marker, the appropriate transposition flaps were drawn incorporating the defect and placing the expected incisions within the relaxed skin tension lines where possible. The area thus outlined was incised deep to adipose tissue with a #15 scalpel blade. The skin margins were undermined to an appropriate distance in all directions utilizing iris scissors. Hemostasis was achieved with electrocautery. The flaps were then transposed and carried over into place, one clockwise and the other counterclockwise, and anchored with interrupted buried subcutaneous sutures.

## 2023-04-09 NOTE — PHYSICAL EXAM
[Normal Sclera/Conjunctiva] : normal sclera/conjunctiva [Normal Outer Ear/Nose] : the ears and nose were normal in appearance [No JVD] : no jugular venous distention [No Respiratory Distress] : no respiratory distress [Clear to Auscultation] : lungs were clear to auscultation bilaterally [Breast Exam Declined] : patient declined to have breast exam done [Non Tender] : non-tender [Patient Refused] : rectal exam was refused by the patient [No CVA Tenderness] : no ~M costovertebral angle tenderness [No Motor Deficits] : the motor exam was normal [Oriented x3] : oriented to person, place, and time [de-identified] : comfortable  cooperative and  pleasant    but  frail   occipital area of scalp  quarter sized nodule  as per derm pyogenic granuloma   no signs of infection  [de-identified] :  hearing aids  [de-identified] :  irregular 3/6  murmur   trace  pedal edema   [de-identified] :  softly distended   right  inguinal hernia   [de-identified] :  sales  in place  draining  clear urine  [de-identified] :  nonambulatory  [de-identified] : sacral wound not examined  as per daughter request    use of barrier cream stressed  \par right ischium continues to improve, 0.8 cm, packing with alginate\par heels left healed, right heel stable eschar , offloading with boots and has group 2 mattress  venous  stasis  hyperpigmentation  dry skin but no ulceration   long elongated  brittle nails  [de-identified] : anxious

## 2023-04-09 NOTE — HISTORY OF PRESENT ILLNESS
[Family Member] : family member [FreeTextEntry1] : debility CHF   bedbound  frequent UTI  [FreeTextEntry2] :  89 year old female is   seen today for a follow  up  and  has been  stable  since last visit  with  no new major developments  complains, hospitalizations  and no changes with medications  and   unchanged chronic  condition \par  most of the history obtained from family member  daughter  Tabatha who has lots of concerns   mostly  chronic and addressed  previously  \par denies any chest  pains  palpitations     shortness  of breath at rest no weight changes  presently \par  as per daughter  has a good day today \par   intense involvement of other specialists   appreciated  \par  diet regular  appetite   ok \par dysphagia  yes to pills \par Weight  stable now\par Ambulates none bedbound \par falls none \par  has indwelling [place] and  has been seeing  urologist via telehealth   \par Behavior very  anxious  on meds  at times agitated  \par Mood ok \par  memory  ok  declining  \par  sleep  ok \par sensory deficits   vision ok hard of hearing \par pain yes \par Medication refills done and reconciliation  done \par Goals of care discussed and completed  \par

## 2023-04-10 ENCOUNTER — NON-APPOINTMENT (OUTPATIENT)
Age: 88
End: 2023-04-10

## 2023-04-11 ENCOUNTER — LABORATORY RESULT (OUTPATIENT)
Age: 88
End: 2023-04-11

## 2023-04-12 ENCOUNTER — LABORATORY RESULT (OUTPATIENT)
Age: 88
End: 2023-04-12

## 2023-04-18 ENCOUNTER — APPOINTMENT (OUTPATIENT)
Dept: UROLOGY | Facility: CLINIC | Age: 88
End: 2023-04-18
Payer: MEDICARE

## 2023-04-18 ENCOUNTER — TRANSCRIPTION ENCOUNTER (OUTPATIENT)
Age: 88
End: 2023-04-18

## 2023-04-18 DIAGNOSIS — G62.9 POLYNEUROPATHY, UNSPECIFIED: ICD-10-CM

## 2023-04-18 PROCEDURE — 99443: CPT | Mod: 95

## 2023-04-18 NOTE — PHYSICAL EXAM
[General Appearance - Well Developed] : well developed [General Appearance - Well Nourished] : well nourished [Normal Appearance] : normal appearance [Well Groomed] : well groomed [General Appearance - In No Acute Distress] : no acute distress [Skin Color & Pigmentation] : normal skin color and pigmentation

## 2023-04-19 ENCOUNTER — APPOINTMENT (OUTPATIENT)
Dept: HOME HEALTH SERVICES | Facility: HOME HEALTH | Age: 88
End: 2023-04-19

## 2023-04-19 ENCOUNTER — NON-APPOINTMENT (OUTPATIENT)
Age: 88
End: 2023-04-19

## 2023-04-19 ENCOUNTER — RESULT REVIEW (OUTPATIENT)
Age: 88
End: 2023-04-19

## 2023-04-19 RX ORDER — SODIUM HYPOCHLORITE 1.25 MG/ML
0.12 SOLUTION TOPICAL
Qty: 1 | Refills: 3 | Status: DISCONTINUED | COMMUNITY
Start: 2021-12-01 | End: 2023-04-19

## 2023-04-21 PROBLEM — G62.9 PERIPHERAL NEUROPATHY: Status: ACTIVE | Noted: 2018-08-30

## 2023-04-21 NOTE — ADDENDUM
[FreeTextEntry1] : This note was authored by Clovis Rebolledo working as a scribe for Dr. Indio Sexton. I, Dr. Indio Sexton have reviewed the content of this note and confirm it is true and accurate. I personally performed the history and physical examination and made all the decisions. 04/18/2023

## 2023-04-21 NOTE — HISTORY OF PRESENT ILLNESS
[FreeTextEntry1] : Claudio Mcdowell in her daughter Tabatha Deluna gave permission for an audio video telehealth visit.  They were in their home in [facility].  I was in my office in [facility].\par \par Claudio Mcdowell is currently 89 years of age.  Her daughter Tabatha Deluna is devoted to her care.  She is very concerned about her mother and has been so for many years.  She becomes very anxious at times and that is concerning her mother.  Claudio Mcdowell lives with her daughter.  Claudio Mcdowell has been bedbound for several years.\par \par The patient had developed a sacral decubitus ulcer while in a nursing facility.  A Sales catheter was placed because of fecal incontinence and concern that urine mixed with the feces would contaminate the sacral decubitus ulcer.  Once the patient left the nursing facility and will be with her daughter.  The sacral decubitus ulcer has finally healed.  I have discussed removing the Sales catheter with the daughter.  However as the patient has fecal incontinence there is concern about the urine mixing with the feces and being more likely to cause skin problems.  For now we will leave the Sales catheter in.\par \par The patient has had clinically symptomatic urinary tract infections.  He clinically significant infections usually start with increased urination around the Sales catheter due to bladder spasms.  Bladder spasms have been occurring for staff.  The patient is positioned properly in in bed and the personal care attendant leaves for the day.  We have managed to prevent this with the administration of sublingual hyoscyamine.\par \par We do periodic surveillance urine analysis and urine culture tests at the time the Sales catheter is changed by visiting nurse.  He also performed a times of increased spasms or purulence of the urine or change in mental status,\par \par On October 11, 2022 visiting nurse using an order I have previously placed at the request of the daughter sent urine for urine cytology which proved to be negative for malignant cells, urine culture which grew Greater than 100,000 and E. coli that was sensitive to all antibiotics tested.  Urinalysis looked quite good for urine taken from a Sales catheter that was used for chronic Sales catheter drainage.  Ileukocyte esterase was large but nitrites were negative.  There were 2 epithelial cells per high-power field.  There were only 10 white blood cells per high-power field and only 2 red blood cells per high-power field on microscopic exam there were no bacteria seen and there were no hyaline casts.  Specific gravity was 1.010 which shows good hydration and this bedbound woman cared for diligently by her daughter.  Blood by dipstick was trace.  There was no protein glucose or ketones in the urine.  There is no bilirubin and no urobilinogen.  An important portion of the visit was my review with the daughter about bladder spasms and urination around the catheter.  Urine appearance and urine analysis have been clear.  The urine was clear again today.\par \par The patient has significant short-term memory deficit.  She does have some useful short-term memory.  Her long-term memory remains very much intact.  She is very pleasant and to make satisfactory conversation.  She does admit to sometimes not remembering something.  Her complexion was good and there was no pallor.  Facial movements were symmetric.  Cranial nerves were intact.  I was not able to assess the olfactory nerve as this was a telehealth visit.  \par \par 11/04/2022: Ms. MCDOWELL is a 89 year old female presenting today for a telehealth visit. She was accompanied by her daughter who helped with the visit. They gave permission for an audio only telehealth conference. The patient was located in her home in [place], [place]. I was located in my office on [place], [place]. Today her daughter reports the pt experienced rare urinary leaking around the catheter due to bladder spasms. She also reports her systolic pressure was around 140 last week. I offered Gemtesa to manage the bladder spasms and the daughter was cautious about more medications. I informed her if the urinary leaking are only once every 2 weeks or so, she does not have to medicate. The daughter was agreeable to monitor the occurrence of urinary leaking over the next 4 weeks and assess the discomfort it causes the pt. She denies any other urinary issues.\par \par 03/01/2023: Ms. MCDOWELL is a 89 year old female presenting today for a telehealth visit. She was accompanied by her daughter who helped with the visit. They gave permission for an audio only telehealth conference. The patient was located in her home in [place], [place]. I was located in my office on [place], [place]. The pt has chronic sales catheter drainage. She provided a urine specimen from the catheter on 2/27/2023. UA was slightly turbid with large LEC and 59 WBC/HPF. Culture grew >100,000 CFU/ml E.Coli. The sensitivity report is not yet back. The pt's daughter was concerned as when the patient was constipated a few weeks ago she was having very watery BM which grew messy and were around the catheter area. She was given a Fleet's enema last month which helped. Currently she is having BM, paste-like in appearance. Her daughter does not think that her stomach is distended. She has recently had the catheter changed and is not voiding around it. Urine has been very clear. She does not seem to have pain in the urinary passageway or bladder. Denies fevers. Denies gross hematuria. Pt had covid around Thanksgiving, only had the first two vaccines from two years ago. \par \par 03/13/2023: Ms. MCDOWELL is a 89 year old female presenting today for a telehealth visit. She was accompanied by her daughter who helped with the visit. They gave permission for an audio only telehealth conference. The patient was located in her home in [place], [place]. I was located in my office on [place], [place]. The patient obtained an US prior to today’s visit 3/9/23 which revealed: Comparison is made with the exam of 2/28/22. Transabdominal ultrasound of the abdomen was performed with color flow imaging. The examination is limited in evaluation. The visualized aorta and inferior vena cava show no abnormality. The pancreas is obscured by overlying bowel gas. The liver measures 12.4 cm with homogeneous echotexture. No intrinsic mass and no intra-or extrahepatic ductal dilatation. There is hepatopedal flow of the main portal vein. No gallstones, pericholecystic fluid, wall thickening or positive sonographic Melendez sign. The common bile duct measures 0.3 cm. The right kidney measures 9.2 x 5.6 x 3.0 cm with a nonobstructing 1.2 cm stone in the upper pole. Multiple additional subcentimeter calcifications are seen. These were not seen on previous exam.\par No hydronephrosis or renal mass. The left kidney measures 9.3 x 3.9 x 3.5 cm. There is a 2.1 x 2.4 x 2.0 cm cyst in the medial mid pole, not seen on previous study. No hydronephrosis or renal calcification. The spleen measures 8.2 cm and show no abnormality. There is a small right pleural effusion, stable.\par IMPRESSIONS: Multiple nonobstructing stones of the right kidney with no hydronephrosis. 2.4 cm cyst of the midpole left kidney. Small right pleural effusion, stable.\par The daughter has answered the phone and reports the patient's urine does not get dark. She is not aware of the amount of water she gives her mother. I requested she measure's this from now on as the pt has stones and needs to increase water consumption. Her daughter reports this week the pt has experienced more spasms than normal and believes this may be due to severe constipation. She provided the pt with an Enema to aide in this situation. She is very concerned but I informed her the Enema may have stimulated the spasms and we should give her a few days to clear up and re-evaluate. \par \par 03/22/2023: Ms. CLAUDIO MCDOWELL presents today for an audio visual telehealth visit for which she gave permission for. The patient was located at home at [address] [place] [address] and I was located at my office in [place], [place]. She is accompanied by her daughter whom most of the visit was conducted with. Her daughter has been keeping track of her mother's water consumption. She is averaging about 58 oz of water in a 24 hr period. Additionally, she is drinking juice and ensure nutrition drinks. She does not consume any caffeine. She is on furosemide 40 mg. I said hello to the patient at the end of the visit and she is looking well. She reports feeling no pain at the current moment. \par \par 3/29/2023:  Patient Claudio Mcdowell and daughter Tabatha Deluna were home in [place], [place] and I was in my office in [facility].  Patient and daughter gave permission for a [employer] telehealth visit.  They preferred this to [employer].  The patient looks good today.  Her complexion was good there is no pallor.  Smile was symmetric her affect was normal she appeared happy she could make conversation it was appropriate.  She said that she currently had no pain.  When I asked questions that after 3/2 how she had been recently the sometimes she hesitated and deferred to her daughter for cancer compatible with her impaired short-term memory.  Long-term memory remains good she recognizes me once know so I am does ask questions about things like her bladder as she was worried about it her blood tests.  I assured her the results were satisfactory.\par \par 04/18/2023: Ms. MCDOWELL is a 89 year old female presenting today for an audio and visual telehealth visit for which she gave verbal permission. We utilized [employer] telemetry doc platform. The patient was located in her home at [address] [place] [address]. I was located in my office on [place], [place]. She was accompanied by her daughter who helped to serve as a historian. She reports discomfort in her eyes. She describes the discomfort as a feeling of sand. She opened her eyes for me, and they do not look red. Pupils look normal. She reports the right eyes bothers her significantly more than the left. I advise that the patient tries lubricating drops and it if does not feel better she will notify her visiting nurse who is due to come next week for a sales catheter change. She reports episode of bladder spasms. She denies gross hematuria. She describes the urine as barely yellow. I reviewed and discussed her  latest pertinent lab on 04/12/2023 which shows "alkaline phosphate normal at 89. Creatinine was normal at 0.62 mg/dL. WBC normal at 5.01. RBC normal at 3.87".

## 2023-04-21 NOTE — ASSESSMENT
[FreeTextEntry1] : Claudio Mcdowell in her daughter Tabatha Deluna gave permission for an audio video telehealth visit.  They were in their home in [facility].  I was in my office in [facility].\par \par Claudio Mcdowell is currently 89 years of age.  Her daughter Tabatha Deluna is devoted to her care.  She is very concerned about her mother and has been so for many years.  She becomes very anxious at times and that is concerning her mother.  Claudio Mcdowell lives with her daughter.  Claudio Mcdowell has been bedbound for several years.\par \par The patient had developed a sacral decubitus ulcer while in a nursing facility.  A Sales catheter was placed because of fecal incontinence and concern that urine mixed with the feces would contaminate the sacral decubitus ulcer.  Once the patient left the nursing facility and will be with her daughter.  The sacral decubitus ulcer has finally healed.  I have discussed removing the Sales catheter with the daughter.  However as the patient has fecal incontinence there is concern about the urine mixing with the feces and being more likely to cause skin problems.  For now we will leave the Sales catheter in.\par \par The patient has had clinically symptomatic urinary tract infections.  The restart with increased urination around the Sales catheter due to bladder spasms.  Bladder spasms have been occurring for staff.  The patient is positioned properly in in bed and the personal care attendant leaves for the day.  We have managed to prevent this with the administration of sublingual hyoscyamine.\par \par We do periodic surveillance urine analysis and urine culture tests at the time the Sales catheter is changed by visiting nurse.  He also performed a times of increased spasms or purulence of the urine or change in mental status,\par \par On October 11, 2022 visiting nurse using an order I have previously placed at the request of the daughter sent urine for urine cytology which proved to be negative for malignant cells, urine culture which grew Greater than 100,000 and E. coli that was sensitive to all antibiotics tested.  Urinalysis looked quite good for urine taken from a Sales catheter that was used for chronic Sales catheter drainage.  Ileukocyte esterase was large but nitrites were negative.  There were 2 epithelial cells per high-power field.  There were only 10 white blood cells per high-power field and only 2 red blood cells per high-power field on microscopic exam there were no bacteria seen and there were no hyaline casts.  Specific gravity was 1.010 which shows good hydration and this bedbound woman cared for diligently by her daughter.  Blood by dipstick was trace.  There was no protein glucose or ketones in the urine.  There is no bilirubin and no urobilinogen.  An important portion of the visit was my review with the daughter about bladder spasms and urination around the catheter.  Urine appearance and urine analysis have been clear.  The urine was clear again today.\par \par The patient has significant short-term memory deficit.  She does have some useful short-term memory.  Her long-term memory remains very much intact.  She is very pleasant and to make satisfactory conversation.  She does admit to sometimes not remembering something.  Her complexion was good and there was no pallor.  Facial movements were symmetric.  Cranial nerves were intact.  I was not able to assess the olfactory nerve as this was a telehealth visit.  \par \par I asked the daughter to see to it that the urine sample was sent periodically when the catheter was changed.  I would do this for surveillance purposes.  I also asked that a urine sample be sent at times of increased cloudiness of the urine.  At any time that gross blood was visible in the urine.  I would also asked that urine be sent if mental status should change.  I asked that urine sample be sent if urine leakage around the catheter picked up and appeared to be from spasms.  The daughter said that she would think that this was performed.  I also asked that the daughter schedule a telehealth visit 3 days after urine samples were sent so we can discuss the circumstances around the decision to send the specimen the specimen and if the patient was having any obvious symptoms or signs that might indicate clinically significant infection.\par \par 11/04/2022: Ms. MCDOWELL is a 89 year old female presenting today for a telehealth visit. She was accompanied by her daughter who helped with the visit. They gave permission for an audio only telehealth conference. The patient was located in her home in [place]. I was located in my office on [place], [place]. Today her daughter reports the pt experienced rare urinary leaking around the catheter due to bladder spasms. She also reports her systolic pressure was around 140 last week. I offered Gemtesa to manage the bladder spasms and the daughter was cautious about more medications. I informed her if the urinary leaking are only once every 2 weeks or so, she does not have to medicate. The daughter was agreeable to monitor the occurrence of urinary leaking over the next 4 weeks and assess the discomfort it causes the pt. She denies any other urinary issues.\par \par Plan: She will provide a urine specimen for UA, urine cytology and urine culture. She will follow up 2 or 3  weeks after providing urine sample/\par \par 03/01/2023: Ms. MCDOWELL is a 89 year old female presenting today for a telehealth visit. She was accompanied by her daughter who helped with the visit. They gave permission for an audio only telehealth conference. The patient was located in her home in [place]. I was located in my office on [place], [place]. The pt has chronic sales catheter drainage. She provided a urine specimen from the catheter on 2/27/2023. UA was slightly turbid with large LEC and 59 WBC/HPF. Culture grew >100,000 CFU/ml E.Coli. The sensitivity report is not yet back. The pt's daughter was concerned as when the patient was constipated a few weeks ago she was having very watery BM which grew messy and were around the catheter area. She was given a Fleet's enema last month which helped. Currently she is having BM, paste-like in appearance. Her daughter does not think that her stomach is distended. She has recently had the catheter changed and is not voiding around it. Urine has been very clear. She does not seem to have pain in the urinary passageway or bladder. Denies fevers. Denies gross hematuria. Pt had covid around Thanksgiving, only had the first two vaccines from two years ago. \par \par Reviewed and discussed 2/27/2023 urine test results. Given the patient is not febrile and is not symptomatic, I will not treat at this time as these urine test results are consistent with someone who has chronic sales drainage.  If she gets ill, her daughter will call promptly. \par Patient appears to be stable at this time. I spoke to her at the conclusion of the visit and she sounds good. Her memory seems to be good as she personally asked to speak to me. Was a little hesitant to reply at times but did ask me about my family. She spoke softly yet clearly. \par I did advise the patient and her daughter to speak with her PCP in about 4 months about receiving the covid booster vaccines. \par Patient will have a telephone visit in 3 months, sooner if clinically indicated. Will continue to submit surveillance urine cultures when her catheter is changed each month.\par \par 03/13/2023: Ms. MCDOWELL is a 89 year old female presenting today for a telehealth visit. She was accompanied by her daughter who helped with the visit. They gave permission for an audio only telehealth conference. The patient was located in her home in [place]. I was located in my office on [place], [place]. The patient obtained an US prior to today’s visit 3/9/23 which revealed: Comparison is made with the exam of 2/28/22. Transabdominal ultrasound of the abdomen was performed with color flow imaging. The examination is limited in evaluation. The visualized aorta and inferior vena cava show no abnormality. The pancreas is obscured by overlying bowel gas. The liver measures 12.4 cm with homogeneous echotexture. No intrinsic mass and no intra-or extrahepatic ductal dilatation. There is hepatopedal flow of the main portal vein. No gallstones, pericholecystic fluid, wall thickening or positive sonographic Melendez sign. The common bile duct measures 0.3 cm. The right kidney measures 9.2 x 5.6 x 3.0 cm with a nonobstructing 1.2 cm stone in the upper pole. Multiple additional subcentimeter calcifications are seen. These were not seen on previous exam.\par No hydronephrosis or renal mass. The left kidney measures 9.3 x 3.9 x 3.5 cm. There is a 2.1 x 2.4 x 2.0 cm cyst in the medial mid pole, not seen on previous study. No hydronephrosis or renal calcification. The spleen measures 8.2 cm and show no abnormality. There is a small right pleural effusion, stable.\par IMPRESSIONS: Multiple nonobstructing stones of the right kidney with no hydronephrosis. 2.4 cm cyst of the midpole left kidney. Small right pleural effusion, stable.\par The daughter has answered the phone and reports the patient's urine does not get dark. She is not aware of the amount of water she gives her mother. I requested she measure's this from now on as the pt has stones and needs to increase water consumption. Her daughter reports this week the pt has experienced more spasms than normal and believes this may be due to severe constipation. She provided the pt with an Enema to aide in this situation. She is very concerned but I informed her the Enema may have stimulated the spasms and we should give her a few days to clear up and re-evaluate. \par \par I informed the patient's care taker to now measure and increase water volume as the pt has stones. \par Clinical findings and US results were reviewed at length with the patient. I personally reviewed the imaging myself.\par Pt will schedule a telehealth visit in 1- 2 weeks.\par \par 03/22/2023: Ms. CLAUDIO MCDOWELL presents today for an audio visual telehealth visit for which she gave permission for. The patient was located at home at [address] [place] [address] and I was located at my office in [place], [place]. She is accompanied by her daughter whom most of the visit was conducted with. Her daughter has been keeping track of her mother's water consumption. She is averaging about 58 oz of water in a 24 hr period. Additionally, she is drinking juice and ensure nutrition drinks. She does not consume any caffeine. She is on furosemide 40 mg. I said hello to the patient at the end of the visit and she is looking well. She reports feeling no pain at the current moment. \par I advised the patient's daughter to keep up the water intake and if she can, up it to 64 oz of water in a 24 hr period. I recommended she add fresh lemon juice to her water as well. I also advised the patient's daughter to speak with the prescribing doctor for furosemide on if it is okay to increase her water intake. I informed her I would be happy to talk to him if he needs. \par Reviewed the patient's abdominal US of 3/9/2023 once again. US prior to that on 2/28/2022 showed no hydro, mass, or renal calcification, though the kidneys were not visualized well due to bowel gas. \par Advised the patient's daughter to speak with the pt's gastroenterologist about her BM problems. \par Patient is getting blood work drawn in her house in a few days. Will send orders for the things that I want done. \par \par 3/29/2023:  Patient Claudio Mcdowell and daughter Tabatha Deluna were home in [place], [place] and I was in my office in [facility].  Patient and daughter gave permission for a FaceTime telehealth visit.  They preferred this to [employer].  The patient looks good today.  Her complexion was good there is no pallor.  Smile was symmetric her affect was normal she appeared happy she could make conversation it was appropriate.  She said that she currently had no pain.  When I asked questions that after 3/2 how she had been recently the sometimes she hesitated and deferred to her daughter for cancer compatible with her impaired short-term memory.  Long-term memory remains good she recognizes me once know so I am does ask questions about things like her bladder as she was worried about it her blood tests.  I assured her the results were satisfactory.\par \par I reviewed the blood and urine test results in detail with her daughter.  I explained the findings.  Patient based upon urine osmolality which was low and serum osmolality which was in the mid range of normal is appropriately hydrated.  The patient has congestive heart failure but was not dyspneic on observation today and said that she had no trouble breathing currently.  Her daughter confirms this.  The pro brain natruretic protein in the serum was elevated in keeping with her congestive heart.  The patient does not exert for self she remains in bed.  The patient had excess catheter immunoglobulin change in her urine.  This is in keeping with her diagnosis of multiple myeloma.  She has had for over 10 years without treatment I suspect its more likely gammopathy but has been.  The daughter said that at one point there was consideration of the degree of her having chronic lymphocytic leukemia.  I doubt she has had chronic lymphocytic leukemia.  I am unaware of her having any elevated lymphocyte counts.  A complete blood count ordered by Dr. Farmer showed  low lymphocytes at 470/mcL on December 7, 2022 blood draw at which time her total white blood cell count was 3850 white blood cells per microliter.  This was within normal range.  In view of the urine findings of a past diagnosis of multiple myeloma I will check the immunoglobin electrophoresis addition to a complete blood cell count.  In about 2 months I will see if we can arrange for a renal ultrasound to see if there is any growth in the patient's kidney stones.  I reviewed the issues involved in this kidney stone formation and growth.  I reviewed have an embolization bed 10 resulted in loss of calcium from the bones and hypercalciuria which could lead to nephrolithiasis.  The daughter is extremely concerned about the welfare of her mother.  The patient daughter does explore her concerns and most of her questions are pertinent.  I answered the daughter's questions to her satisfaction patient will have additional blood test drawn at home from the mobile phlebotomy team.  In 2 months time we will attempt to arrange for a home renal ultrasound.\par \par 04/18/2023: Ms. MCDOWELL is a 89 year old female presenting today for an audio and visual telehealth visit for which she gave verbal permission. We utilized [employer] teams telemetry doc platform. The patient was located in her home at [address] [place] [address]. I was located in my office on [place], [place]. She was accompanied by her daughter who helped to serve as a historian. She reports discomfort in her eyes. She describes the discomfort as a feeling of sand. She opened her eyes for me, and they do not look red. Pupils look normal. She reports the right eyes bothers her significantly more than the left. I advise that the patient tries lubricating drops and it if does not feel better she will notify her visiting nurse who is due to come next week for a sales catheter change. She reports episode of bladder spasms. She denies gross hematuria. She describes the urine as barely yellow. I reviewed and discussed her  latest pertinent lab on 04/12/2023 which shows "alkaline phosphate normal at 89. Creatinine was normal at 0.62 mg/dL. WBC normal at 5.01. RBC normal at 3.87". \par \par Plan: She will provides urine specimen for Urine Analysis, Urine Culture and Urine Cytology. She will try lubricating drops to alleviate eyes discomfort. She will return for a telehalth 3 days after providing urine sample. \par \par Preparation, audio-visual visit, and coordination of care took :  30 minutes

## 2023-04-28 ENCOUNTER — NON-APPOINTMENT (OUTPATIENT)
Age: 88
End: 2023-04-28

## 2023-05-02 ENCOUNTER — TRANSCRIPTION ENCOUNTER (OUTPATIENT)
Age: 88
End: 2023-05-02

## 2023-05-09 ENCOUNTER — APPOINTMENT (OUTPATIENT)
Dept: UROLOGY | Facility: CLINIC | Age: 88
End: 2023-05-09
Payer: MEDICARE

## 2023-05-09 ENCOUNTER — NON-APPOINTMENT (OUTPATIENT)
Age: 88
End: 2023-05-09

## 2023-05-09 PROCEDURE — 99443: CPT | Mod: 95

## 2023-05-09 NOTE — HISTORY OF PRESENT ILLNESS
[FreeTextEntry1] : Claudio Mcdowell in her daughter Tabatha Deluna gave permission for an audio video telehealth visit.  They were in their home in [facility].  I was in my office in [facility].\par \par Claudio Mcdowell is currently 89 years of age.  Her daughter Tabatha Deluna is devoted to her care.  She is very concerned about her mother and has been so for many years.  She becomes very anxious at times and that is concerning her mother.  Claudio Mcdowell lives with her daughter.  Claudio Mcdowell has been bedbound for several years.\par \par The patient had developed a sacral decubitus ulcer while in a nursing facility.  A Sales catheter was placed because of fecal incontinence and concern that urine mixed with the feces would contaminate the sacral decubitus ulcer.  Once the patient left the nursing facility and will be with her daughter.  The sacral decubitus ulcer has finally healed.  I have discussed removing the Sales catheter with the daughter.  However as the patient has fecal incontinence there is concern about the urine mixing with the feces and being more likely to cause skin problems.  For now we will leave the Sales catheter in.\par \par The patient has had clinically symptomatic urinary tract infections.  He clinically significant infections usually start with increased urination around the Sales catheter due to bladder spasms.  Bladder spasms have been occurring for staff.  The patient is positioned properly in in bed and the personal care attendant leaves for the day.  We have managed to prevent this with the administration of sublingual hyoscyamine.\par \par We do periodic surveillance urine analysis and urine culture tests at the time the Sales catheter is changed by visiting nurse.  He also performed a times of increased spasms or purulence of the urine or change in mental status,\par \par On October 11, 2022 visiting nurse using an order I have previously placed at the request of the daughter sent urine for urine cytology which proved to be negative for malignant cells, urine culture which grew Greater than 100,000 and E. coli that was sensitive to all antibiotics tested.  Urinalysis looked quite good for urine taken from a Sales catheter that was used for chronic Sales catheter drainage.  Ileukocyte esterase was large but nitrites were negative.  There were 2 epithelial cells per high-power field.  There were only 10 white blood cells per high-power field and only 2 red blood cells per high-power field on microscopic exam there were no bacteria seen and there were no hyaline casts.  Specific gravity was 1.010 which shows good hydration and this bedbound woman cared for diligently by her daughter.  Blood by dipstick was trace.  There was no protein glucose or ketones in the urine.  There is no bilirubin and no urobilinogen.  An important portion of the visit was my review with the daughter about bladder spasms and urination around the catheter.  Urine appearance and urine analysis have been clear.  The urine was clear again today.\par \par The patient has significant short-term memory deficit.  She does have some useful short-term memory.  Her long-term memory remains very much intact.  She is very pleasant and to make satisfactory conversation.  She does admit to sometimes not remembering something.  Her complexion was good and there was no pallor.  Facial movements were symmetric.  Cranial nerves were intact.  I was not able to assess the olfactory nerve as this was a telehealth visit.  \par \par 11/04/2022: Ms. MCDOWELL is a 89 year old female presenting today for a telehealth visit. She was accompanied by her daughter who helped with the visit. They gave permission for an audio only telehealth conference. The patient was located in her home in [place], [place]. I was located in my office on [place], [place]. Today her daughter reports the pt experienced rare urinary leaking around the catheter due to bladder spasms. She also reports her systolic pressure was around 140 last week. I offered Gemtesa to manage the bladder spasms and the daughter was cautious about more medications. I informed her if the urinary leaking are only once every 2 weeks or so, she does not have to medicate. The daughter was agreeable to monitor the occurrence of urinary leaking over the next 4 weeks and assess the discomfort it causes the pt. She denies any other urinary issues.\par \par 03/01/2023: Ms. MCDOWELL is a 89 year old female presenting today for a telehealth visit. She was accompanied by her daughter who helped with the visit. They gave permission for an audio only telehealth conference. The patient was located in her home in [place], [place]. I was located in my office on [place], [place]. The pt has chronic sales catheter drainage. She provided a urine specimen from the catheter on 2/27/2023. UA was slightly turbid with large LEC and 59 WBC/HPF. Culture grew >100,000 CFU/ml E.Coli. The sensitivity report is not yet back. The pt's daughter was concerned as when the patient was constipated a few weeks ago she was having very watery BM which grew messy and were around the catheter area. She was given a Fleet's enema last month which helped. Currently she is having BM, paste-like in appearance. Her daughter does not think that her stomach is distended. She has recently had the catheter changed and is not voiding around it. Urine has been very clear. She does not seem to have pain in the urinary passageway or bladder. Denies fevers. Denies gross hematuria. Pt had covid around Thanksgiving, only had the first two vaccines from two years ago. \par \par 03/13/2023: Ms. MCDOWELL is a 89 year old female presenting today for a telehealth visit. She was accompanied by her daughter who helped with the visit. They gave permission for an audio only telehealth conference. The patient was located in her home in [place], [place]. I was located in my office on [place], [place]. The patient obtained an US prior to today’s visit 3/9/23 which revealed: Comparison is made with the exam of 2/28/22. Transabdominal ultrasound of the abdomen was performed with color flow imaging. The examination is limited in evaluation. The visualized aorta and inferior vena cava show no abnormality. The pancreas is obscured by overlying bowel gas. The liver measures 12.4 cm with homogeneous echotexture. No intrinsic mass and no intra-or extrahepatic ductal dilatation. There is hepatopedal flow of the main portal vein. No gallstones, pericholecystic fluid, wall thickening or positive sonographic Melendez sign. The common bile duct measures 0.3 cm. The right kidney measures 9.2 x 5.6 x 3.0 cm with a nonobstructing 1.2 cm stone in the upper pole. Multiple additional subcentimeter calcifications are seen. These were not seen on previous exam.\par No hydronephrosis or renal mass. The left kidney measures 9.3 x 3.9 x 3.5 cm. There is a 2.1 x 2.4 x 2.0 cm cyst in the medial mid pole, not seen on previous study. No hydronephrosis or renal calcification. The spleen measures 8.2 cm and show no abnormality. There is a small right pleural effusion, stable.\par IMPRESSIONS: Multiple nonobstructing stones of the right kidney with no hydronephrosis. 2.4 cm cyst of the midpole left kidney. Small right pleural effusion, stable.\par The daughter has answered the phone and reports the patient's urine does not get dark. She is not aware of the amount of water she gives her mother. I requested she measure's this from now on as the pt has stones and needs to increase water consumption. Her daughter reports this week the pt has experienced more spasms than normal and believes this may be due to severe constipation. She provided the pt with an Enema to aide in this situation. She is very concerned but I informed her the Enema may have stimulated the spasms and we should give her a few days to clear up and re-evaluate. \par \par 03/22/2023: Ms. CLAUDIO MCDOWELL presents today for an audio visual telehealth visit for which she gave permission for. The patient was located at home at [address] [place] [address] and I was located at my office in [place], [place]. She is accompanied by her daughter whom most of the visit was conducted with. Her daughter has been keeping track of her mother's water consumption. She is averaging about 58 oz of water in a 24 hr period. Additionally, she is drinking juice and ensure nutrition drinks. She does not consume any caffeine. She is on furosemide 40 mg. I said hello to the patient at the end of the visit and she is looking well. She reports feeling no pain at the current moment. \par \par 3/29/2023:  Patient Claudio Mcdowell and daughter Tabatha Deluna were home in [place], [place] and I was in my office in [facility].  Patient and daughter gave permission for a [facility] telehealth visit.  They preferred this to [facility].  The patient looks good today.  Her complexion was good there is no pallor.  Smile was symmetric her affect was normal she appeared happy she could make conversation it was appropriate.  She said that she currently had no pain.  When I asked questions that after 3/2 how she had been recently the sometimes she hesitated and deferred to her daughter for cancer compatible with her impaired short-term memory.  Long-term memory remains good she recognizes me once know so I am does ask questions about things like her bladder as she was worried about it her blood tests.  I assured her the results were satisfactory.\par \par 04/18/2023: Ms. MCDOWELL is a 89 year old female presenting today for an audio and visual telehealth visit for which she gave verbal permission. We utilized [employer] telemetry doc platform. The patient was located in her home at [address] [place] [address]. I was located in my office on [place], [place]. She was accompanied by her daughter who helped to serve as a historian. She reports discomfort in her eyes. She describes the discomfort as a feeling of sand. She opened her eyes for me, and they do not look red. Pupils look normal. She reports the right eyes bothers her significantly more than the left. I advise that the patient tries lubricating drops and it if does not feel better she will notify her visiting nurse who is due to come next week for a sales catheter change. She reports episode of bladder spasms. She denies gross hematuria. She describes the urine as barely yellow. I reviewed and discussed her  latest pertinent lab on 04/12/2023 which shows "alkaline phosphate normal at 89. Creatinine was normal at 0.62 mg/dL. WBC normal at 5.01. RBC normal at 3.87". \par \par 05/09/2023: Ms. MCDOWELL presents today for a follow up audio only telehealth visit for which they gave permission for. She was accompanied by her daughter who helped to serve as a historian. The patient was located at home [address] [place] [address] and I was located in my office in [place], [place]. The patient obtained lab work 5/2/23 prior to today's visit. The UA revealed microscopic hematuria, 5/HPF RBC, 27/HPF WBC. The patient demonstrated great hydration as the specific gravity is 1.006. The Sales catheter was changed May 2, 2023 with no clear urine in the bag. While changing the diaper the aide reports blood in the diaper. The daughter states last week her mother experienced spasms but as of two days ago she is now fine. However, she states the patient is more fatigued. She denies the pt having a temperature. I assured her because her mother is post menopausal any abrasion to the labia can cause a fissure.

## 2023-05-09 NOTE — ASSESSMENT
[FreeTextEntry1] : Claudio Mcdowell in her daughter Tabatha Deluna gave permission for an audio video telehealth visit.  They were in their home in [facility].  I was in my office in [facility].\par \par Claudio Mcdowell is currently 89 years of age.  Her daughter Tabatha Deluna is devoted to her care.  She is very concerned about her mother and has been so for many years.  She becomes very anxious at times and that is concerning her mother.  Claudio Mcdowell lives with her daughter.  Claudio Mcdowell has been bedbound for several years.\par \par The patient had developed a sacral decubitus ulcer while in a nursing facility.  A Sales catheter was placed because of fecal incontinence and concern that urine mixed with the feces would contaminate the sacral decubitus ulcer.  Once the patient left the nursing facility and will be with her daughter.  The sacral decubitus ulcer has finally healed.  I have discussed removing the Sales catheter with the daughter.  However as the patient has fecal incontinence there is concern about the urine mixing with the feces and being more likely to cause skin problems.  For now we will leave the Sales catheter in.\par \par The patient has had clinically symptomatic urinary tract infections.  The restart with increased urination around the Sales catheter due to bladder spasms.  Bladder spasms have been occurring for staff.  The patient is positioned properly in in bed and the personal care attendant leaves for the day.  We have managed to prevent this with the administration of sublingual hyoscyamine.\par \par We do periodic surveillance urine analysis and urine culture tests at the time the Sales catheter is changed by visiting nurse.  He also performed a times of increased spasms or purulence of the urine or change in mental status,\par \par On October 11, 2022 visiting nurse using an order I have previously placed at the request of the daughter sent urine for urine cytology which proved to be negative for malignant cells, urine culture which grew Greater than 100,000 and E. coli that was sensitive to all antibiotics tested.  Urinalysis looked quite good for urine taken from a Sales catheter that was used for chronic Sales catheter drainage.  Ileukocyte esterase was large but nitrites were negative.  There were 2 epithelial cells per high-power field.  There were only 10 white blood cells per high-power field and only 2 red blood cells per high-power field on microscopic exam there were no bacteria seen and there were no hyaline casts.  Specific gravity was 1.010 which shows good hydration and this bedbound woman cared for diligently by her daughter.  Blood by dipstick was trace.  There was no protein glucose or ketones in the urine.  There is no bilirubin and no urobilinogen.  An important portion of the visit was my review with the daughter about bladder spasms and urination around the catheter.  Urine appearance and urine analysis have been clear.  The urine was clear again today.\par \par The patient has significant short-term memory deficit.  She does have some useful short-term memory.  Her long-term memory remains very much intact.  She is very pleasant and to make satisfactory conversation.  She does admit to sometimes not remembering something.  Her complexion was good and there was no pallor.  Facial movements were symmetric.  Cranial nerves were intact.  I was not able to assess the olfactory nerve as this was a telehealth visit.  \par \par I asked the daughter to see to it that the urine sample was sent periodically when the catheter was changed.  I would do this for surveillance purposes.  I also asked that a urine sample be sent at times of increased cloudiness of the urine.  At any time that gross blood was visible in the urine.  I would also asked that urine be sent if mental status should change.  I asked that urine sample be sent if urine leakage around the catheter picked up and appeared to be from spasms.  The daughter said that she would think that this was performed.  I also asked that the daughter schedule a telehealth visit 3 days after urine samples were sent so we can discuss the circumstances around the decision to send the specimen the specimen and if the patient was having any obvious symptoms or signs that might indicate clinically significant infection.\par \par 11/04/2022: Ms. MCDOWELL is a 89 year old female presenting today for a telehealth visit. She was accompanied by her daughter who helped with the visit. They gave permission for an audio only telehealth conference. The patient was located in her home in [place], [place]. I was located in my office on [place], [place]. Today her daughter reports the pt experienced rare urinary leaking around the catheter due to bladder spasms. She also reports her systolic pressure was around 140 last week. I offered Gemtesa to manage the bladder spasms and the daughter was cautious about more medications. I informed her if the urinary leaking are only once every 2 weeks or so, she does not have to medicate. The daughter was agreeable to monitor the occurrence of urinary leaking over the next 4 weeks and assess the discomfort it causes the pt. She denies any other urinary issues.\par \par Plan: She will provide a urine specimen for UA, urine cytology and urine culture. She will follow up 2 or 3  weeks after providing urine sample/\par \par 03/01/2023: Ms. MCDOWELL is a 89 year old female presenting today for a telehealth visit. She was accompanied by her daughter who helped with the visit. They gave permission for an audio only telehealth conference. The patient was located in her home in [place], [place]. I was located in my office on [place], [place]. The pt has chronic sales catheter drainage. She provided a urine specimen from the catheter on 2/27/2023. UA was slightly turbid with large LEC and 59 WBC/HPF. Culture grew >100,000 CFU/ml E.Coli. The sensitivity report is not yet back. The pt's daughter was concerned as when the patient was constipated a few weeks ago she was having very watery BM which grew messy and were around the catheter area. She was given a Fleet's enema last month which helped. Currently she is having BM, paste-like in appearance. Her daughter does not think that her stomach is distended. She has recently had the catheter changed and is not voiding around it. Urine has been very clear. She does not seem to have pain in the urinary passageway or bladder. Denies fevers. Denies gross hematuria. Pt had covid around Thanksgiving, only had the first two vaccines from two years ago. \par \par Reviewed and discussed 2/27/2023 urine test results. Given the patient is not febrile and is not symptomatic, I will not treat at this time as these urine test results are consistent with someone who has chronic sales drainage.  If she gets ill, her daughter will call promptly. \par Patient appears to be stable at this time. I spoke to her at the conclusion of the visit and she sounds good. Her memory seems to be good as she personally asked to speak to me. Was a little hesitant to reply at times but did ask me about my family. She spoke softly yet clearly. \par I did advise the patient and her daughter to speak with her PCP in about 4 months about receiving the covid booster vaccines. \par Patient will have a telephone visit in 3 months, sooner if clinically indicated. Will continue to submit surveillance urine cultures when her catheter is changed each month.\par \par 03/13/2023: Ms. MCDOWELL is a 89 year old female presenting today for a telehealth visit. She was accompanied by her daughter who helped with the visit. They gave permission for an audio only telehealth conference. The patient was located in her home in [place], [place]. I was located in my office on [place], [place]. The patient obtained an US prior to today’s visit 3/9/23 which revealed: Comparison is made with the exam of 2/28/22. Transabdominal ultrasound of the abdomen was performed with color flow imaging. The examination is limited in evaluation. The visualized aorta and inferior vena cava show no abnormality. The pancreas is obscured by overlying bowel gas. The liver measures 12.4 cm with homogeneous echotexture. No intrinsic mass and no intra-or extrahepatic ductal dilatation. There is hepatopedal flow of the main portal vein. No gallstones, pericholecystic fluid, wall thickening or positive sonographic Melendez sign. The common bile duct measures 0.3 cm. The right kidney measures 9.2 x 5.6 x 3.0 cm with a nonobstructing 1.2 cm stone in the upper pole. Multiple additional subcentimeter calcifications are seen. These were not seen on previous exam.\par No hydronephrosis or renal mass. The left kidney measures 9.3 x 3.9 x 3.5 cm. There is a 2.1 x 2.4 x 2.0 cm cyst in the medial mid pole, not seen on previous study. No hydronephrosis or renal calcification. The spleen measures 8.2 cm and show no abnormality. There is a small right pleural effusion, stable.\par IMPRESSIONS: Multiple nonobstructing stones of the right kidney with no hydronephrosis. 2.4 cm cyst of the midpole left kidney. Small right pleural effusion, stable.\par The daughter has answered the phone and reports the patient's urine does not get dark. She is not aware of the amount of water she gives her mother. I requested she measure's this from now on as the pt has stones and needs to increase water consumption. Her daughter reports this week the pt has experienced more spasms than normal and believes this may be due to severe constipation. She provided the pt with an Enema to aide in this situation. She is very concerned but I informed her the Enema may have stimulated the spasms and we should give her a few days to clear up and re-evaluate. \par \par I informed the patient's care taker to now measure and increase water volume as the pt has stones. \par Clinical findings and US results were reviewed at length with the patient. I personally reviewed the imaging myself.\par Pt will schedule a telehealth visit in 1- 2 weeks.\par \par 03/22/2023: Ms. CLAUDIO MCDOWELL presents today for an audio visual telehealth visit for which she gave permission for. The patient was located at home at [address] [place] [address] and I was located at my office in [place], [place]. She is accompanied by her daughter whom most of the visit was conducted with. Her daughter has been keeping track of her mother's water consumption. She is averaging about 58 oz of water in a 24 hr period. Additionally, she is drinking juice and ensure nutrition drinks. She does not consume any caffeine. She is on furosemide 40 mg. I said hello to the patient at the end of the visit and she is looking well. She reports feeling no pain at the current moment. \par I advised the patient's daughter to keep up the water intake and if she can, up it to 64 oz of water in a 24 hr period. I recommended she add fresh lemon juice to her water as well. I also advised the patient's daughter to speak with the prescribing doctor for furosemide on if it is okay to increase her water intake. I informed her I would be happy to talk to him if he needs. \par Reviewed the patient's abdominal US of 3/9/2023 once again. US prior to that on 2/28/2022 showed no hydro, mass, or renal calcification, though the kidneys were not visualized well due to bowel gas. \par Advised the patient's daughter to speak with the pt's gastroenterologist about her BM problems. \par Patient is getting blood work drawn in her house in a few days. Will send orders for the things that I want done. \par \par 3/29/2023:  Patient Claudio Mcdowell and daughter Tabatha Deluna were home in [place], [place] and I was in my office in [facility].  Patient and daughter gave permission for a FaceTime telehealth visit.  They preferred this to I-MD.  The patient looks good today.  Her complexion was good there is no pallor.  Smile was symmetric her affect was normal she appeared happy she could make conversation it was appropriate.  She said that she currently had no pain.  When I asked questions that after 3/2 how she had been recently the sometimes she hesitated and deferred to her daughter for cancer compatible with her impaired short-term memory.  Long-term memory remains good she recognizes me once know so I am does ask questions about things like her bladder as she was worried about it her blood tests.  I assured her the results were satisfactory.\par \par I reviewed the blood and urine test results in detail with her daughter.  I explained the findings.  Patient based upon urine osmolality which was low and serum osmolality which was in the mid range of normal is appropriately hydrated.  The patient has congestive heart failure but was not dyspneic on observation today and said that she had no trouble breathing currently.  Her daughter confirms this.  The pro brain natruretic protein in the serum was elevated in keeping with her congestive heart.  The patient does not exert for self she remains in bed.  The patient had excess catheter immunoglobulin change in her urine.  This is in keeping with her diagnosis of multiple myeloma.  She has had for over 10 years without treatment I suspect its more likely gammopathy but has been.  The daughter said that at one point there was consideration of the degree of her having chronic lymphocytic leukemia.  I doubt she has had chronic lymphocytic leukemia.  I am unaware of her having any elevated lymphocyte counts.  A complete blood count ordered by Dr. Farmer showed  low lymphocytes at 470/mcL on December 7, 2022 blood draw at which time her total white blood cell count was 3850 white blood cells per microliter.  This was within normal range.  In view of the urine findings of a past diagnosis of multiple myeloma I will check the immunoglobin electrophoresis addition to a complete blood cell count.  In about 2 months I will see if we can arrange for a renal ultrasound to see if there is any growth in the patient's kidney stones.  I reviewed the issues involved in this kidney stone formation and growth.  I reviewed have an embolization bed 10 resulted in loss of calcium from the bones and hypercalciuria which could lead to nephrolithiasis.  The daughter is extremely concerned about the welfare of her mother.  The patient daughter does explore her concerns and most of her questions are pertinent.  I answered the daughter's questions to her satisfaction patient will have additional blood test drawn at home from the mobile phlebotomy team.  In 2 months time we will attempt to arrange for a home renal ultrasound.\par \par 04/18/2023: Ms. MCDOWELL is a 89 year old female presenting today for an audio and visual telehealth visit for which she gave verbal permission. We utilized [employer] teams telemetry doc platform. The patient was located in her home at [address] [place] [address]. I was located in my office on [place], [place]. She was accompanied by her daughter who helped to serve as a historian. She reports discomfort in her eyes. She describes the discomfort as a feeling of sand. She opened her eyes for me, and they do not look red. Pupils look normal. She reports the right eyes bothers her significantly more than the left. I advise that the patient tries lubricating drops and it if does not feel better she will notify her visiting nurse who is due to come next week for a sales catheter change. She reports episode of bladder spasms. She denies gross hematuria. She describes the urine as barely yellow. I reviewed and discussed her  latest pertinent lab on 04/12/2023 which shows "alkaline phosphate normal at 89. Creatinine was normal at 0.62 mg/dL. WBC normal at 5.01. RBC normal at 3.87". \par \par Plan: She will provides urine specimen for Urine Analysis, Urine Culture and Urine Cytology. She will try lubricating drops to alleviate eyes discomfort. She will return for a telehalth 3 days after providing urine sample. \par \par 05/09/2023: Ms. MCDOWELL presents today for a follow up audio only telehealth visit for which they gave permission for. She was accompanied by her daughter who helped to serve as a historian. The patient was located at home [address] [place] [address] and I was located in my office in [place], [place]. The patient obtained lab work 5/2/23 prior to today's visit. The UA revealed microscopic hematuria, 5/HPF RBC, 27/HPF WBC. The patient demonstrated great hydration as the specific gravity is 1.006. The Sales catheter was changed May 2, 2023 with no clear urine in the bag. While changing the diaper the aide reports blood in the diaper. The daughter states last week her mother experienced spasms but as of two days ago she is now fine. However, she states the patient is more fatigued. She denies the pt having a temperature. I assured her because her mother is post menopausal any abrasion to the labia can cause a fissure. \par \par Reviewed and discussed laboratory work of 5/2/23 which She  obtained prior to today's visit as requested. I assured the daughter the patient remains well in a urological standpoint. \par \par Upon every catheter change patient is to give a urine sample.\par The daughter is to resend urine culture from prior catheter change.\par \par Pt will schedule a telehealth visit \par \par Duration of call: 23 Minutes

## 2023-05-09 NOTE — ADDENDUM
[FreeTextEntry1] : This note was authored by Katlyn Avila working as a scribe for Dr.Gary Sexton. I, Dr. Indio Sexton have reviewed the content of this note and confirm it is true and accurate. I personally performed the history and physical examination and made all the decisions 05/09/2023

## 2023-05-18 ENCOUNTER — APPOINTMENT (OUTPATIENT)
Dept: HOME HEALTH SERVICES | Facility: HOME HEALTH | Age: 88
End: 2023-05-18

## 2023-05-18 VITALS
TEMPERATURE: 97 F | DIASTOLIC BLOOD PRESSURE: 80 MMHG | OXYGEN SATURATION: 94 % | SYSTOLIC BLOOD PRESSURE: 160 MMHG | HEART RATE: 76 BPM

## 2023-05-26 NOTE — PLAN
[No new medications perscribed] : Treat in place: No new medications prescribed [FreeTextEntry1] : -recommended NOT to give imodium and monitor patient tonight for any worsening symptoms as patient has given laxatives and may have decreased episodes of diarrhea with time. Daughter agrees with plan to give more time for expectant management\par -f/u with primary team in the morning\par -Red flag symptoms for immediate ER evaluation discussed. daughter expresses full understanding and agrees.\par \par

## 2023-05-26 NOTE — ASSESSMENT
[FreeTextEntry1] : diarrhea 2 weeks ago, given imodium, then constipation, given miralax, senna, dulcolax, now NB diarrhea again without any other symptoms

## 2023-05-26 NOTE — HISTORY OF PRESENT ILLNESS
[Home] : at home, [unfilled] , at the time of the visit. [Verbal consent obtained from patient] : the patient, [unfilled] [Other Location: e.g. Home (Enter Location, City,State)___] : at [unfilled] [FreeTextEntry8] : 89 yo female with PMH CHF, Atrial fibrillation for evaluation of multiple episodes of diarrhea. Per daughter, patient has been having several days of constipation after using imodium for loose stool episode from 2 weeks ago. States she had given patient miralax and senna and dulcolax and patient is back to having diarrhea very frequently since 7pm, episode every hour or so. Denies recent antibiotic use. Denies fevers, chills, abd pain. Denies chest pain or shortness of breath. daughter requesting advice if she should give imodium again. Denies chagne in mental status. \par

## 2023-06-02 ENCOUNTER — NON-APPOINTMENT (OUTPATIENT)
Age: 88
End: 2023-06-02

## 2023-06-02 ENCOUNTER — APPOINTMENT (OUTPATIENT)
Dept: UROLOGY | Facility: CLINIC | Age: 88
End: 2023-06-02
Payer: MEDICARE

## 2023-06-02 LAB
APPEARANCE: CLEAR
BACTERIA UR CULT: ABNORMAL
BACTERIA: ABNORMAL /HPF
BILIRUBIN URINE: NEGATIVE
BLOOD URINE: ABNORMAL
CAST: 5 /LPF
COLOR: YELLOW
EPITHELIAL CELLS: 0 /HPF
GLUCOSE QUALITATIVE U: NEGATIVE MG/DL
KETONES URINE: NEGATIVE MG/DL
LEUKOCYTE ESTERASE URINE: ABNORMAL
MICROSCOPIC-UA: NORMAL
NITRITE URINE: NEGATIVE
PH URINE: 7
PROTEIN URINE: NEGATIVE MG/DL
RED BLOOD CELLS URINE: 0 /HPF
REVIEW: NORMAL
SPECIFIC GRAVITY URINE: 1.01
UROBILINOGEN URINE: 0.2 MG/DL
WHITE BLOOD CELLS URINE: 60 /HPF

## 2023-06-02 PROCEDURE — 99213 OFFICE O/P EST LOW 20 MIN: CPT | Mod: 95

## 2023-06-02 PROCEDURE — 99214 OFFICE O/P EST MOD 30 MIN: CPT | Mod: 95

## 2023-06-04 ENCOUNTER — FORM ENCOUNTER (OUTPATIENT)
Age: 88
End: 2023-06-04

## 2023-06-05 ENCOUNTER — TRANSCRIPTION ENCOUNTER (OUTPATIENT)
Age: 88
End: 2023-06-05

## 2023-06-06 ENCOUNTER — NON-APPOINTMENT (OUTPATIENT)
Age: 88
End: 2023-06-06

## 2023-06-06 ENCOUNTER — TRANSCRIPTION ENCOUNTER (OUTPATIENT)
Age: 88
End: 2023-06-06

## 2023-06-06 ENCOUNTER — APPOINTMENT (OUTPATIENT)
Dept: UROLOGY | Facility: CLINIC | Age: 88
End: 2023-06-06
Payer: MEDICARE

## 2023-06-06 PROCEDURE — 99443: CPT | Mod: 95

## 2023-06-07 ENCOUNTER — APPOINTMENT (OUTPATIENT)
Dept: HOME HEALTH SERVICES | Facility: HOME HEALTH | Age: 88
End: 2023-06-07
Payer: MEDICARE

## 2023-06-07 VITALS
HEART RATE: 61 BPM | OXYGEN SATURATION: 94 % | TEMPERATURE: 97.2 F | SYSTOLIC BLOOD PRESSURE: 140 MMHG | DIASTOLIC BLOOD PRESSURE: 78 MMHG

## 2023-06-07 LAB
24R-OH-CALCIDIOL SERPL-MCNC: 61.7 PG/ML
ALBUMIN SERPL ELPH-MCNC: 4.2 G/DL
ALP BLD-CCNC: 95 U/L
ALT SERPL-CCNC: 30 U/L
ANION GAP SERPL CALC-SCNC: 13 MMOL/L
AST SERPL-CCNC: 48 U/L
BILIRUB SERPL-MCNC: 0.8 MG/DL
BUN SERPL-MCNC: 18 MG/DL
CALCIUM SERPL-MCNC: 9.7 MG/DL
CHLORIDE SERPL-SCNC: 99 MMOL/L
CO2 SERPL-SCNC: 24 MMOL/L
CREAT SERPL-MCNC: 0.65 MG/DL
EGFR: 83 ML/MIN/1.73M2
GLUCOSE SERPL-MCNC: 121 MG/DL
HCT VFR BLD CALC: 39.2 %
HGB BLD-MCNC: 12.8 G/DL
INFLUENZA A RESULT: NOT DETECTED
INFLUENZA B RESULT: NOT DETECTED
MCHC RBC-ENTMCNC: 31.7 PG
MCHC RBC-ENTMCNC: 32.7 GM/DL
MCV RBC AUTO: 97 FL
PLATELET # BLD AUTO: 76 K/UL
POTASSIUM SERPL-SCNC: 3.9 MMOL/L
PROT SERPL-MCNC: 6.3 G/DL
RBC # BLD: 4.04 M/UL
RBC # FLD: 14.2 %
RESP SYN VIRUS RESULT: NOT DETECTED
SARS-COV-2 RESULT: DETECTED
SODIUM SERPL-SCNC: 135 MMOL/L
WBC # FLD AUTO: 9.39 K/UL

## 2023-06-07 PROCEDURE — 99349 HOME/RES VST EST MOD MDM 40: CPT

## 2023-06-07 NOTE — PHYSICAL EXAM
[Normal Sclera/Conjunctiva] : normal sclera/conjunctiva [Normal Outer Ear/Nose] : the ears and nose were normal in appearance [No JVD] : no jugular venous distention [No Respiratory Distress] : no respiratory distress [Breast Exam Declined] : patient declined to have breast exam done [Non Tender] : non-tender [Patient Refused] : rectal exam was refused by the patient [No CVA Tenderness] : no ~M costovertebral angle tenderness [No Motor Deficits] : the motor exam was normal [Oriented x3] : oriented to person, place, and time [de-identified] : comfortable  cooperative and  pleasant    but  frail   occipital area of scalp  quarter sized nodule  as per derm pyogenic granuloma   no signs of infection  [de-identified] :  hearing aids  [de-identified] : crackles at bases   transmitted breath sounds  [de-identified] :  irregular 3/6  murmur   trace  pedal edema   [de-identified] :  softly distended   right  inguinal hernia   [de-identified] :  sales  in place  draining  clear urine  [de-identified] :  nonambulatory  [de-identified] : sacral wound not examined  as per daughter request    use of barrier cream stressed  \par right ischium continues to improve, 0.8 cm, packing with alginate\par heels left healed, right heel stable eschar , offloading with boots and has group 2 mattress  venous  stasis  hyperpigmentation  dry skin but no ulceration   long elongated  brittle nails  [de-identified] : anxious

## 2023-06-07 NOTE — HISTORY OF PRESENT ILLNESS
[Family Member] : family member [FreeTextEntry1] : debility CHF   bedbound  frequent UTI  [FreeTextEntry2] :  89 year old female is   seen today for an acute visit  \par  more tired and  episodes of a dry cough. for 3 weeks as per daughter  \par  An X Ray of 6/5/23 revealed Mild peribronchial thickening suggesting bronchitis in the right clinical setting with no focal pneumonia or congestive heart failure. COPD with chronic lung changes.\par  The mental status is the same as usual but her BP has elevated despite different  dose of losartan / 50 mg  / \par  today  patient denies any chest  pains  palpitations    no   shortness  of breath at rest  on oxygen  2 liters \par no weight changes  presently some  loose stools since patient on Augmentin \par   intense involvement of other specialists   appreciated  \par  diet regular  appetite   ok \par dysphagia  yes to pills \par Weight  stable now\par Ambulates none bedbound \par falls none \par  has indwelling Reaves and  has been seeing  urologist via telehealth   \par Behavior very  anxious  on meds  at times agitated  \par Mood ok \par  memory  ok  declining  \par  sleep  ok \par sensory deficits   vision ok hard of hearing  wears hearing aids\par   recent blood work   radiology reviewed \par discussed with patient /caretakers   at length and in detail \par  COVId positive \par \par  \par

## 2023-06-08 ENCOUNTER — NON-APPOINTMENT (OUTPATIENT)
Age: 88
End: 2023-06-08

## 2023-06-10 NOTE — ASSESSMENT
[FreeTextEntry1] : Claudio Mcdowell in her daughter Tabatha Deluna gave permission for an audio video telehealth visit.  They were in their home in [facility].  I was in my office in [facility].\par \par Claudio Mcdowell is currently 89 years of age.  Her daughter Tabatha Deluna is devoted to her care.  She is very concerned about her mother and has been so for many years.  She becomes very anxious at times and that is concerning her mother.  Claudio Mcdowell lives with her daughter.  Claudio Mcdowell has been bedbound for several years.\par \par The patient had developed a sacral decubitus ulcer while in a nursing facility.  A Sales catheter was placed because of fecal incontinence and concern that urine mixed with the feces would contaminate the sacral decubitus ulcer.  Once the patient left the nursing facility and will be with her daughter.  The sacral decubitus ulcer has finally healed.  I have discussed removing the Sales catheter with the daughter.  However as the patient has fecal incontinence there is concern about the urine mixing with the feces and being more likely to cause skin problems.  For now we will leave the Sales catheter in.\par \par The patient has had clinically symptomatic urinary tract infections.  The restart with increased urination around the Sales catheter due to bladder spasms.  Bladder spasms have been occurring for staff.  The patient is positioned properly in in bed and the personal care attendant leaves for the day.  We have managed to prevent this with the administration of sublingual hyoscyamine.\par \par We do periodic surveillance urine analysis and urine culture tests at the time the Sales catheter is changed by visiting nurse.  He also performed a times of increased spasms or purulence of the urine or change in mental status,\par \par On October 11, 2022 visiting nurse using an order I have previously placed at the request of the daughter sent urine for urine cytology which proved to be negative for malignant cells, urine culture which grew Greater than 100,000 and E. coli that was sensitive to all antibiotics tested.  Urinalysis looked quite good for urine taken from a Sales catheter that was used for chronic Sales catheter drainage.  Ileukocyte esterase was large but nitrites were negative.  There were 2 epithelial cells per high-power field.  There were only 10 white blood cells per high-power field and only 2 red blood cells per high-power field on microscopic exam there were no bacteria seen and there were no hyaline casts.  Specific gravity was 1.010 which shows good hydration and this bedbound woman cared for diligently by her daughter.  Blood by dipstick was trace.  There was no protein glucose or ketones in the urine.  There is no bilirubin and no urobilinogen.  An important portion of the visit was my review with the daughter about bladder spasms and urination around the catheter.  Urine appearance and urine analysis have been clear.  The urine was clear again today.\par \par The patient has significant short-term memory deficit.  She does have some useful short-term memory.  Her long-term memory remains very much intact.  She is very pleasant and to make satisfactory conversation.  She does admit to sometimes not remembering something.  Her complexion was good and there was no pallor.  Facial movements were symmetric.  Cranial nerves were intact.  I was not able to assess the olfactory nerve as this was a telehealth visit.  \par \par I asked the daughter to see to it that the urine sample was sent periodically when the catheter was changed.  I would do this for surveillance purposes.  I also asked that a urine sample be sent at times of increased cloudiness of the urine.  At any time that gross blood was visible in the urine.  I would also asked that urine be sent if mental status should change.  I asked that urine sample be sent if urine leakage around the catheter picked up and appeared to be from spasms.  The daughter said that she would think that this was performed.  I also asked that the daughter schedule a telehealth visit 3 days after urine samples were sent so we can discuss the circumstances around the decision to send the specimen the specimen and if the patient was having any obvious symptoms or signs that might indicate clinically significant infection.\par \par 11/04/2022: Ms. MCDOWELL is a 89 year old female presenting today for a telehealth visit. She was accompanied by her daughter who helped with the visit. They gave permission for an audio only telehealth conference. The patient was located in her home in [place], [place]. I was located in my office on [place], [place]. Today her daughter reports the pt experienced rare urinary leaking around the catheter due to bladder spasms. She also reports her systolic pressure was around 140 last week. I offered Gemtesa to manage the bladder spasms and the daughter was cautious about more medications. I informed her if the urinary leaking are only once every 2 weeks or so, she does not have to medicate. The daughter was agreeable to monitor the occurrence of urinary leaking over the next 4 weeks and assess the discomfort it causes the pt. She denies any other urinary issues.\par \par Plan: She will provide a urine specimen for UA, urine cytology and urine culture. She will follow up 2 or 3  weeks after providing urine sample/\par \par 03/01/2023: Ms. MCDOWELL is a 89 year old female presenting today for a telehealth visit. She was accompanied by her daughter who helped with the visit. They gave permission for an audio only telehealth conference. The patient was located in her home in [place], [place]. I was located in my office on [place], [place]. The pt has chronic sales catheter drainage. She provided a urine specimen from the catheter on 2/27/2023. UA was slightly turbid with large LEC and 59 WBC/HPF. Culture grew >100,000 CFU/ml E.Coli. The sensitivity report is not yet back. The pt's daughter was concerned as when the patient was constipated a few weeks ago she was having very watery BM which grew messy and were around the catheter area. She was given a Fleet's enema last month which helped. Currently she is having BM, paste-like in appearance. Her daughter does not think that her stomach is distended. She has recently had the catheter changed and is not voiding around it. Urine has been very clear. She does not seem to have pain in the urinary passageway or bladder. Denies fevers. Denies gross hematuria. Pt had covid around Thanksgiving, only had the first two vaccines from two years ago. \par \par Reviewed and discussed 2/27/2023 urine test results. Given the patient is not febrile and is not symptomatic, I will not treat at this time as these urine test results are consistent with someone who has chronic sales drainage.  If she gets ill, her daughter will call promptly. \par Patient appears to be stable at this time. I spoke to her at the conclusion of the visit and she sounds good. Her memory seems to be good as she personally asked to speak to me. Was a little hesitant to reply at times but did ask me about my family. She spoke softly yet clearly. \par I did advise the patient and her daughter to speak with her PCP in about 4 months about receiving the covid booster vaccines. \par Patient will have a telephone visit in 3 months, sooner if clinically indicated. Will continue to submit surveillance urine cultures when her catheter is changed each month.\par \par 03/13/2023: Ms. MCDOWELL is a 89 year old female presenting today for a telehealth visit. She was accompanied by her daughter who helped with the visit. They gave permission for an audio only telehealth conference. The patient was located in her home in [place], [place]. I was located in my office on [place], [place]. The patient obtained an US prior to today’s visit 3/9/23 which revealed: Comparison is made with the exam of 2/28/22. Transabdominal ultrasound of the abdomen was performed with color flow imaging. The examination is limited in evaluation. The visualized aorta and inferior vena cava show no abnormality. The pancreas is obscured by overlying bowel gas. The liver measures 12.4 cm with homogeneous echotexture. No intrinsic mass and no intra-or extrahepatic ductal dilatation. There is hepatopedal flow of the main portal vein. No gallstones, pericholecystic fluid, wall thickening or positive sonographic Melendez sign. The common bile duct measures 0.3 cm. The right kidney measures 9.2 x 5.6 x 3.0 cm with a nonobstructing 1.2 cm stone in the upper pole. Multiple additional subcentimeter calcifications are seen. These were not seen on previous exam.\par No hydronephrosis or renal mass. The left kidney measures 9.3 x 3.9 x 3.5 cm. There is a 2.1 x 2.4 x 2.0 cm cyst in the medial mid pole, not seen on previous study. No hydronephrosis or renal calcification. The spleen measures 8.2 cm and show no abnormality. There is a small right pleural effusion, stable.\par IMPRESSIONS: Multiple nonobstructing stones of the right kidney with no hydronephrosis. 2.4 cm cyst of the midpole left kidney. Small right pleural effusion, stable.\par The daughter has answered the phone and reports the patient's urine does not get dark. She is not aware of the amount of water she gives her mother. I requested she measure's this from now on as the pt has stones and needs to increase water consumption. Her daughter reports this week the pt has experienced more spasms than normal and believes this may be due to severe constipation. She provided the pt with an Enema to aide in this situation. She is very concerned but I informed her the Enema may have stimulated the spasms and we should give her a few days to clear up and re-evaluate. \par \par I informed the patient's care taker to now measure and increase water volume as the pt has stones. \par Clinical findings and US results were reviewed at length with the patient. I personally reviewed the imaging myself.\par Pt will schedule a telehealth visit in 1- 2 weeks.\par \par 03/22/2023: Ms. CLAUDIO MCDOWELL presents today for an audio visual telehealth visit for which she gave permission for. The patient was located at home at [address] [place] [address] and I was located at my office in [place], [place]. She is accompanied by her daughter whom most of the visit was conducted with. Her daughter has been keeping track of her mother's water consumption. She is averaging about 58 oz of water in a 24 hr period. Additionally, she is drinking juice and ensure nutrition drinks. She does not consume any caffeine. She is on furosemide 40 mg. I said hello to the patient at the end of the visit and she is looking well. She reports feeling no pain at the current moment. \par I advised the patient's daughter to keep up the water intake and if she can, up it to 64 oz of water in a 24 hr period. I recommended she add fresh lemon juice to her water as well. I also advised the patient's daughter to speak with the prescribing doctor for furosemide on if it is okay to increase her water intake. I informed her I would be happy to talk to him if he needs. \par Reviewed the patient's abdominal US of 3/9/2023 once again. US prior to that on 2/28/2022 showed no hydro, mass, or renal calcification, though the kidneys were not visualized well due to bowel gas. \par Advised the patient's daughter to speak with the pt's gastroenterologist about her BM problems. \par Patient is getting blood work drawn in her house in a few days. Will send orders for the things that I want done. \par \par 3/29/2023:  Patient Claudio Mcdowell and daughter Tabatha Deluna were home in [place], [place] and I was in my office in [facility].  Patient and daughter gave permission for a FaceTime telehealth visit.  They preferred this to [employer].  The patient looks good today.  Her complexion was good there is no pallor.  Smile was symmetric her affect was normal she appeared happy she could make conversation it was appropriate.  She said that she currently had no pain.  When I asked questions that after 3/2 how she had been recently the sometimes she hesitated and deferred to her daughter for cancer compatible with her impaired short-term memory.  Long-term memory remains good she recognizes me once know so I am does ask questions about things like her bladder as she was worried about it her blood tests.  I assured her the results were satisfactory.\par \par I reviewed the blood and urine test results in detail with her daughter.  I explained the findings.  Patient based upon urine osmolality which was low and serum osmolality which was in the mid range of normal is appropriately hydrated.  The patient has congestive heart failure but was not dyspneic on observation today and said that she had no trouble breathing currently.  Her daughter confirms this.  The pro brain natruretic protein in the serum was elevated in keeping with her congestive heart.  The patient does not exert for self she remains in bed.  The patient had excess catheter immunoglobulin change in her urine.  This is in keeping with her diagnosis of multiple myeloma.  She has had for over 10 years without treatment I suspect its more likely gammopathy but has been.  The daughter said that at one point there was consideration of the degree of her having chronic lymphocytic leukemia.  I doubt she has had chronic lymphocytic leukemia.  I am unaware of her having any elevated lymphocyte counts.  A complete blood count ordered by Dr. Farmer showed  low lymphocytes at 470/mcL on December 7, 2022 blood draw at which time her total white blood cell count was 3850 white blood cells per microliter.  This was within normal range.  In view of the urine findings of a past diagnosis of multiple myeloma I will check the immunoglobin electrophoresis addition to a complete blood cell count.  In about 2 months I will see if we can arrange for a renal ultrasound to see if there is any growth in the patient's kidney stones.  I reviewed the issues involved in this kidney stone formation and growth.  I reviewed have an embolization bed 10 resulted in loss of calcium from the bones and hypercalciuria which could lead to nephrolithiasis.  The daughter is extremely concerned about the welfare of her mother.  The patient daughter does explore her concerns and most of her questions are pertinent.  I answered the daughter's questions to her satisfaction patient will have additional blood test drawn at home from the mobile phlebotomy team.  In 2 months time we will attempt to arrange for a home renal ultrasound.\par \par 04/18/2023: Ms. MCDOWELL is a 89 year old female presenting today for an audio and visual telehealth visit for which she gave verbal permission. We utilized [employer] teams telemetry doc platform. The patient was located in her home at [address] [place] [address]. I was located in my office on [place], [place]. She was accompanied by her daughter who helped to serve as a historian. She reports discomfort in her eyes. She describes the discomfort as a feeling of sand. She opened her eyes for me, and they do not look red. Pupils look normal. She reports the right eyes bothers her significantly more than the left. I advise that the patient tries lubricating drops and it if does not feel better she will notify her visiting nurse who is due to come next week for a sales catheter change. She reports episode of bladder spasms. She denies gross hematuria. She describes the urine as barely yellow. I reviewed and discussed her  latest pertinent lab on 04/12/2023 which shows "alkaline phosphate normal at 89. Creatinine was normal at 0.62 mg/dL. WBC normal at 5.01. RBC normal at 3.87". \par \par Plan: She will provides urine specimen for Urine Analysis, Urine Culture and Urine Cytology. She will try lubricating drops to alleviate eyes discomfort. She will return for a telehalth 3 days after providing urine sample. \par \par 05/09/2023: Ms. MCDOWELL presents today for a follow up audio only telehealth visit for which they gave permission for. She was accompanied by her daughter who helped to serve as a historian. The patient was located at home [address] [place] [address] and I was located in my office in [place], [place]. The patient obtained lab work 5/2/23 prior to today's visit. The UA revealed microscopic hematuria, 5/HPF RBC, 27/HPF WBC. The patient demonstrated great hydration as the specific gravity is 1.006. The Sales catheter was changed May 2, 2023 with no clear urine in the bag. While changing the diaper the aide reports blood in the diaper. The daughter states last week her mother experienced spasms but as of two days ago she is now fine. However, she states the patient is more fatigued. She denies the pt having a temperature. I assured her because her mother is post menopausal any abrasion to the labia can cause a fissure. \par \par Reviewed and discussed laboratory work of 5/2/23 which She  obtained prior to today's visit as requested. I assured the daughter the patient remains well in a urological standpoint. \par \par Upon every catheter change patient is to give a urine sample.\par The daughter is to resend urine culture from prior catheter change.\par \par Pt will schedule a telehealth visit \par \par 06/02/2023: Ms. MCDOWELL is a 89 year old female presenting today for an audio and visual telehealth visit for which she gave verbal permission. We utilized [employer] teams telemetry [employer] platform. The patient was located in her home at [address] [place] [address]. I was located in my office on [place], [place]. She was accompanied by her daughter Tabatha. She reports that her mother complains of onset dysuria that dissipated on its own and has not recurred since. She reports right sided back pain that is aggravated when she lays on the right side. She provided urine specimen. She notes the urine was very clear. I reviewed and discussed the patient's pertinent lab works. Her latest Urinalysis on 05/30/2023 that shows "60 WBC/HPF. Trace Blood Urine. Specific Gravity Urine 1.007". Urine Culture on the same date shows ">100,000 CFU/ml Escherichia coli and <10,000 CFU/ml Normal Urogenital ángel present". The patient takes Mirtazapine for anxiety at low dose. The daughter reports the patient is experiencing increasing anxiety and is thinking of having the prescriber increasing the dosage.  Her BP runs around 120/60. Her Hemoglobin hematocrit looks good. gaze is conjugated. facial expression looks symmetric. Urine was faint yellow and clear. \par \par Plan:\par Preparation, audio-visual visit, and coordination of care took : 24 mins\par \par 06/06/2023: Ms. MCDOWELL presents today for a follow up audio only telehealth visit for which they gave permission for. The patient was located at home [address] [place] [address] and I was located in my office in [place], [place]. She was accompanied by her daughter Tabatha. The daughter states pt is experiencing episodes of a dry cough. An X Ray  of 6/5/23 revealed Mild peribronchial thickening suggesting bronchitis in the right clinical setting with no focal pneumonia or congestive heart failure. COPD with chronic lung changes. The mental status is the same as usual but her BP has elevated some. I assured her a little elevation is not as dangerous. The pt continues to experience some urinary frequency and it is a little more concentrated. \par \par I assured the daughter the patient may continue to  take Augmentin as they have already started with 1 dose. \par She is to send a copy of the Chest CT scan.\par \par Pt will schedule a telehealth visit\par \par Duration of call: 22 Minutes

## 2023-06-10 NOTE — ADDENDUM
[FreeTextEntry1] : This note was authored by Katlyn Avila working as a scribe for Dr.Gary Sexton. I, Dr. Indio Sexton have reviewed the content of this note and confirm it is true and accurate. I personally performed the history and physical examination and made all the decisions 06/06/2023\par

## 2023-06-10 NOTE — HISTORY OF PRESENT ILLNESS
MD Office [FreeTextEntry1] : Claudio Mcdowell in her daughter Tabatha Deluna gave permission for an audio video telehealth visit.  They were in their home in [facility].  I was in my office in [facility].\par \par Claudio Mcdowell is currently 89 years of age.  Her daughter Tabatha Deluna is devoted to her care.  She is very concerned about her mother and has been so for many years.  She becomes very anxious at times and that is concerning her mother.  Claudio Mcdowell lives with her daughter.  Claudio Mcdowell has been bedbound for several years.\par \par The patient had developed a sacral decubitus ulcer while in a nursing facility.  A Sales catheter was placed because of fecal incontinence and concern that urine mixed with the feces would contaminate the sacral decubitus ulcer.  Once the patient left the nursing facility and will be with her daughter.  The sacral decubitus ulcer has finally healed.  I have discussed removing the Sales catheter with the daughter.  However as the patient has fecal incontinence there is concern about the urine mixing with the feces and being more likely to cause skin problems.  For now we will leave the Sales catheter in.\par \par The patient has had clinically symptomatic urinary tract infections.  He clinically significant infections usually start with increased urination around the Sales catheter due to bladder spasms.  Bladder spasms have been occurring for staff.  The patient is positioned properly in in bed and the personal care attendant leaves for the day.  We have managed to prevent this with the administration of sublingual hyoscyamine.\par \par We do periodic surveillance urine analysis and urine culture tests at the time the Sales catheter is changed by visiting nurse.  He also performed a times of increased spasms or purulence of the urine or change in mental status,\par \par On October 11, 2022 visiting nurse using an order I have previously placed at the request of the daughter sent urine for urine cytology which proved to be negative for malignant cells, urine culture which grew Greater than 100,000 and E. coli that was sensitive to all antibiotics tested.  Urinalysis looked quite good for urine taken from a Sales catheter that was used for chronic Sales catheter drainage.  Ileukocyte esterase was large but nitrites were negative.  There were 2 epithelial cells per high-power field.  There were only 10 white blood cells per high-power field and only 2 red blood cells per high-power field on microscopic exam there were no bacteria seen and there were no hyaline casts.  Specific gravity was 1.010 which shows good hydration and this bedbound woman cared for diligently by her daughter.  Blood by dipstick was trace.  There was no protein glucose or ketones in the urine.  There is no bilirubin and no urobilinogen.  An important portion of the visit was my review with the daughter about bladder spasms and urination around the catheter.  Urine appearance and urine analysis have been clear.  The urine was clear again today.\par \par The patient has significant short-term memory deficit.  She does have some useful short-term memory.  Her long-term memory remains very much intact.  She is very pleasant and to make satisfactory conversation.  She does admit to sometimes not remembering something.  Her complexion was good and there was no pallor.  Facial movements were symmetric.  Cranial nerves were intact.  I was not able to assess the olfactory nerve as this was a telehealth visit.  \par \par 11/04/2022: Ms. MCDOWELL is a 89 year old female presenting today for a telehealth visit. She was accompanied by her daughter who helped with the visit. They gave permission for an audio only telehealth conference. The patient was located in her home in [place], [place]. I was located in my office on [place], [place]. Today her daughter reports the pt experienced rare urinary leaking around the catheter due to bladder spasms. She also reports her systolic pressure was around 140 last week. I offered Gemtesa to manage the bladder spasms and the daughter was cautious about more medications. I informed her if the urinary leaking are only once every 2 weeks or so, she does not have to medicate. The daughter was agreeable to monitor the occurrence of urinary leaking over the next 4 weeks and assess the discomfort it causes the pt. She denies any other urinary issues.\par \par 03/01/2023: Ms. MCDOWELL is a 89 year old female presenting today for a telehealth visit. She was accompanied by her daughter who helped with the visit. They gave permission for an audio only telehealth conference. The patient was located in her home in [place]. I was located in my office on [place], [place]. The pt has chronic sales catheter drainage. She provided a urine specimen from the catheter on 2/27/2023. UA was slightly turbid with large LEC and 59 WBC/HPF. Culture grew >100,000 CFU/ml E.Coli. The sensitivity report is not yet back. The pt's daughter was concerned as when the patient was constipated a few weeks ago she was having very watery BM which grew messy and were around the catheter area. She was given a Fleet's enema last month which helped. Currently she is having BM, paste-like in appearance. Her daughter does not think that her stomach is distended. She has recently had the catheter changed and is not voiding around it. Urine has been very clear. She does not seem to have pain in the urinary passageway or bladder. Denies fevers. Denies gross hematuria. Pt had covid around Thanksgiving, only had the first two vaccines from two years ago. \par \par 03/13/2023: Ms. MCDOWELL is a 89 year old female presenting today for a telehealth visit. She was accompanied by her daughter who helped with the visit. They gave permission for an audio only telehealth conference. The patient was located in her home in [place]. I was located in my office on [place], [place]. The patient obtained an US prior to today’s visit 3/9/23 which revealed: Comparison is made with the exam of 2/28/22. Transabdominal ultrasound of the abdomen was performed with color flow imaging. The examination is limited in evaluation. The visualized aorta and inferior vena cava show no abnormality. The pancreas is obscured by overlying bowel gas. The liver measures 12.4 cm with homogeneous echotexture. No intrinsic mass and no intra-or extrahepatic ductal dilatation. There is hepatopedal flow of the main portal vein. No gallstones, pericholecystic fluid, wall thickening or positive sonographic Melendez sign. The common bile duct measures 0.3 cm. The right kidney measures 9.2 x 5.6 x 3.0 cm with a nonobstructing 1.2 cm stone in the upper pole. Multiple additional subcentimeter calcifications are seen. These were not seen on previous exam.\par No hydronephrosis or renal mass. The left kidney measures 9.3 x 3.9 x 3.5 cm. There is a 2.1 x 2.4 x 2.0 cm cyst in the medial mid pole, not seen on previous study. No hydronephrosis or renal calcification. The spleen measures 8.2 cm and show no abnormality. There is a small right pleural effusion, stable.\par IMPRESSIONS: Multiple nonobstructing stones of the right kidney with no hydronephrosis. 2.4 cm cyst of the midpole left kidney. Small right pleural effusion, stable.\par The daughter has answered the phone and reports the patient's urine does not get dark. She is not aware of the amount of water she gives her mother. I requested she measure's this from now on as the pt has stones and needs to increase water consumption. Her daughter reports this week the pt has experienced more spasms than normal and believes this may be due to severe constipation. She provided the pt with an Enema to aide in this situation. She is very concerned but I informed her the Enema may have stimulated the spasms and we should give her a few days to clear up and re-evaluate. \par \par 03/22/2023: Ms. CLAUDIO MCDOWELL presents today for an audio visual telehealth visit for which she gave permission for. The patient was located at home at [address] [place] [address] and I was located at my office in [place], [place]. She is accompanied by her daughter whom most of the visit was conducted with. Her daughter has been keeping track of her mother's water consumption. She is averaging about 58 oz of water in a 24 hr period. Additionally, she is drinking juice and ensure nutrition drinks. She does not consume any caffeine. She is on furosemide 40 mg. I said hello to the patient at the end of the visit and she is looking well. She reports feeling no pain at the current moment. \par \par 3/29/2023:  Patient Claudio Mcdowell and daughter Tabatha Deluna were home in [place], [place] and I was in my office in [facility].  Patient and daughter gave permission for a [facility] telehealth visit.  They preferred this to [facility].  The patient looks good today.  Her complexion was good there is no pallor.  Smile was symmetric her affect was normal she appeared happy she could make conversation it was appropriate.  She said that she currently had no pain.  When I asked questions that after 3/2 how she had been recently the sometimes she hesitated and deferred to her daughter for cancer compatible with her impaired short-term memory.  Long-term memory remains good she recognizes me once know so I am does ask questions about things like her bladder as she was worried about it her blood tests.  I assured her the results were satisfactory.\par \par 04/18/2023: Ms. MCDOWELL is a 89 year old female presenting today for an audio and visual telehealth visit for which she gave verbal permission. We utilized [employer] telemetry doc platform. The patient was located in her home at [address] [place] [address]. I was located in my office on [place], [place]. She was accompanied by her daughter who helped to serve as a historian. She reports discomfort in her eyes. She describes the discomfort as a feeling of sand. She opened her eyes for me, and they do not look red. Pupils look normal. She reports the right eyes bothers her significantly more than the left. I advise that the patient tries lubricating drops and it if does not feel better she will notify her visiting nurse who is due to come next week for a sales catheter change. She reports episode of bladder spasms. She denies gross hematuria. She describes the urine as barely yellow. I reviewed and discussed her  latest pertinent lab on 04/12/2023 which shows "alkaline phosphate normal at 89. Creatinine was normal at 0.62 mg/dL. WBC normal at 5.01. RBC normal at 3.87". \par \par 05/09/2023: Ms. MCDOWELL presents today for a follow up audio only telehealth visit for which they gave permission for. She was accompanied by her daughter who helped to serve as a historian. The patient was located at home [address] [place] [address] and I was located in my office in [place], [place]. The patient obtained lab work 5/2/23 prior to today's visit. The UA revealed microscopic hematuria, 5/HPF RBC, 27/HPF WBC. The patient demonstrated great hydration as the specific gravity is 1.006. The Sales catheter was changed May 2, 2023 with no clear urine in the bag. While changing the diaper the aide reports blood in the diaper. The daughter states last week her mother experienced spasms but as of two days ago she is now fine. However, she states the patient is more fatigued. She denies the pt having a temperature. I assured her because her mother is post menopausal any abrasion to the labia can cause a fissure. \par \par 06/02/2023: Ms. MCDOWELL is a 89 year old female presenting today for an audio and visual telehealth visit for which she gave verbal permission. We utilized [employer] telemetry wedgies platform. The patient was located in her home at [address] [place] [address]. I was located in my office on [place], [place]. She was accompanied by her daughter Tabatha. She reports that her mother complains of onset dysuria that dissipated on its own and has not recurred since. She reports right sided back pain that is aggravated when she lays on the right side. She provided urine specimen. She notes the urine was very clear. I reviewed and discussed the patient's pertinent lab works. Her latest Urinalysis on 05/30/2023 that shows "60 WBC/HPF. Trace Blood Urine. Specific Gravity Urine 1.007". Urine Culture on the same date shows ">100,000 CFU/ml Escherichia coli and <10,000 CFU/ml Normal Urogenital ángel present". The patient takes Mirtazapine for anxiety at low dose. The daughter reports the patient is experiencing increasing anxiety and is thinking of having the prescriber increasing the dosage.  Her BP runs around 120/60. Her Hemoglobin hematocrit looks good. gaze is conjugated. facial expression looks symmetric. Urine was faint yellow and clear. \par \par 06/06/2023: Ms. MCDOWELL presents today for a follow up audio only telehealth visit for which they gave permission for. The patient was located at home [address] [place] [address] and I was located in my office in [place], [place]. She was accompanied by her daughter Tabatha. The daughter states pt is experiencing episodes of a dry cough. An X Ray  of 6/5/23 revealed Mild peribronchial thickening suggesting bronchitis in the right clinical setting with no focal pneumonia or congestive heart failure. COPD with chronic lung changes. The mental status is the same as usual but her BP has elevated some. I assured her a little elevation is not as dangerous. The pt continues to experience some urinary frequency and it is a little more concentrated.

## 2023-06-12 ENCOUNTER — NON-APPOINTMENT (OUTPATIENT)
Age: 88
End: 2023-06-12

## 2023-06-15 ENCOUNTER — NON-APPOINTMENT (OUTPATIENT)
Age: 88
End: 2023-06-15

## 2023-06-15 ENCOUNTER — TRANSCRIPTION ENCOUNTER (OUTPATIENT)
Age: 88
End: 2023-06-15

## 2023-06-17 ENCOUNTER — NON-APPOINTMENT (OUTPATIENT)
Age: 88
End: 2023-06-17

## 2023-06-17 ENCOUNTER — TRANSCRIPTION ENCOUNTER (OUTPATIENT)
Age: 88
End: 2023-06-17

## 2023-06-18 ENCOUNTER — TRANSCRIPTION ENCOUNTER (OUTPATIENT)
Age: 88
End: 2023-06-18

## 2023-06-19 ENCOUNTER — NON-APPOINTMENT (OUTPATIENT)
Age: 88
End: 2023-06-19

## 2023-06-20 ENCOUNTER — APPOINTMENT (OUTPATIENT)
Dept: HOME HEALTH SERVICES | Facility: HOME HEALTH | Age: 88
End: 2023-06-20

## 2023-06-21 NOTE — ADDENDUM
[FreeTextEntry1] : This note was authored by Clovis Rebolledo working as a scribe for Dr. Alireza Robbins. I, Dr. Alireza Robbins have reviewed the content of this note and confirm it is true and accurate. I personally performed the history and physical examination and made all the decisions. 06/02/2023

## 2023-06-21 NOTE — ASSESSMENT
[FreeTextEntry1] : Claudio Mcdowell in her daughter Tabatha Deluna gave permission for an audio video telehealth visit.  They were in their home in [facility].  I was in my office in [facility].\par \par Claudio Mcdowell is currently 89 years of age.  Her daughter Tabatha Deluna is devoted to her care.  She is very concerned about her mother and has been so for many years.  She becomes very anxious at times and that is concerning her mother.  Claudio Mcdowell lives with her daughter.  Claudio Mcdowell has been bedbound for several years.\par \par The patient had developed a sacral decubitus ulcer while in a nursing facility.  A Sales catheter was placed because of fecal incontinence and concern that urine mixed with the feces would contaminate the sacral decubitus ulcer.  Once the patient left the nursing facility and will be with her daughter.  The sacral decubitus ulcer has finally healed.  I have discussed removing the Sales catheter with the daughter.  However as the patient has fecal incontinence there is concern about the urine mixing with the feces and being more likely to cause skin problems.  For now we will leave the Sales catheter in.\par \par The patient has had clinically symptomatic urinary tract infections.  The restart with increased urination around the Sales catheter due to bladder spasms.  Bladder spasms have been occurring for staff.  The patient is positioned properly in in bed and the personal care attendant leaves for the day.  We have managed to prevent this with the administration of sublingual hyoscyamine.\par \par We do periodic surveillance urine analysis and urine culture tests at the time the Sales catheter is changed by visiting nurse.  He also performed a times of increased spasms or purulence of the urine or change in mental status,\par \par On October 11, 2022 visiting nurse using an order I have previously placed at the request of the daughter sent urine for urine cytology which proved to be negative for malignant cells, urine culture which grew Greater than 100,000 and E. coli that was sensitive to all antibiotics tested.  Urinalysis looked quite good for urine taken from a Sales catheter that was used for chronic Sales catheter drainage.  Ileukocyte esterase was large but nitrites were negative.  There were 2 epithelial cells per high-power field.  There were only 10 white blood cells per high-power field and only 2 red blood cells per high-power field on microscopic exam there were no bacteria seen and there were no hyaline casts.  Specific gravity was 1.010 which shows good hydration and this bedbound woman cared for diligently by her daughter.  Blood by dipstick was trace.  There was no protein glucose or ketones in the urine.  There is no bilirubin and no urobilinogen.  An important portion of the visit was my review with the daughter about bladder spasms and urination around the catheter.  Urine appearance and urine analysis have been clear.  The urine was clear again today.\par \par The patient has significant short-term memory deficit.  She does have some useful short-term memory.  Her long-term memory remains very much intact.  She is very pleasant and to make satisfactory conversation.  She does admit to sometimes not remembering something.  Her complexion was good and there was no pallor.  Facial movements were symmetric.  Cranial nerves were intact.  I was not able to assess the olfactory nerve as this was a telehealth visit.  \par \par I asked the daughter to see to it that the urine sample was sent periodically when the catheter was changed.  I would do this for surveillance purposes.  I also asked that a urine sample be sent at times of increased cloudiness of the urine.  At any time that gross blood was visible in the urine.  I would also asked that urine be sent if mental status should change.  I asked that urine sample be sent if urine leakage around the catheter picked up and appeared to be from spasms.  The daughter said that she would think that this was performed.  I also asked that the daughter schedule a telehealth visit 3 days after urine samples were sent so we can discuss the circumstances around the decision to send the specimen the specimen and if the patient was having any obvious symptoms or signs that might indicate clinically significant infection.\par \par 11/04/2022: Ms. MCDOWELL is a 89 year old female presenting today for a telehealth visit. She was accompanied by her daughter who helped with the visit. They gave permission for an audio only telehealth conference. The patient was located in her home in [place], [place]. I was located in my office on [place], [place]. Today her daughter reports the pt experienced rare urinary leaking around the catheter due to bladder spasms. She also reports her systolic pressure was around 140 last week. I offered Gemtesa to manage the bladder spasms and the daughter was cautious about more medications. I informed her if the urinary leaking are only once every 2 weeks or so, she does not have to medicate. The daughter was agreeable to monitor the occurrence of urinary leaking over the next 4 weeks and assess the discomfort it causes the pt. She denies any other urinary issues.\par \par Plan: She will provide a urine specimen for UA, urine cytology and urine culture. She will follow up 2 or 3  weeks after providing urine sample/\par \par 03/01/2023: Ms. MCDOEWLL is a 89 year old female presenting today for a telehealth visit. She was accompanied by her daughter who helped with the visit. They gave permission for an audio only telehealth conference. The patient was located in her home in [place], [place]. I was located in my office on [place], [place]. The pt has chronic sales catheter drainage. She provided a urine specimen from the catheter on 2/27/2023. UA was slightly turbid with large LEC and 59 WBC/HPF. Culture grew >100,000 CFU/ml E.Coli. The sensitivity report is not yet back. The pt's daughter was concerned as when the patient was constipated a few weeks ago she was having very watery BM which grew messy and were around the catheter area. She was given a Fleet's enema last month which helped. Currently she is having BM, paste-like in appearance. Her daughter does not think that her stomach is distended. She has recently had the catheter changed and is not voiding around it. Urine has been very clear. She does not seem to have pain in the urinary passageway or bladder. Denies fevers. Denies gross hematuria. Pt had covid around Thanksgiving, only had the first two vaccines from two years ago. \par \par Reviewed and discussed 2/27/2023 urine test results. Given the patient is not febrile and is not symptomatic, I will not treat at this time as these urine test results are consistent with someone who has chronic sales drainage.  If she gets ill, her daughter will call promptly. \par Patient appears to be stable at this time. I spoke to her at the conclusion of the visit and she sounds good. Her memory seems to be good as she personally asked to speak to me. Was a little hesitant to reply at times but did ask me about my family. She spoke softly yet clearly. \par I did advise the patient and her daughter to speak with her PCP in about 4 months about receiving the covid booster vaccines. \par Patient will have a telephone visit in 3 months, sooner if clinically indicated. Will continue to submit surveillance urine cultures when her catheter is changed each month.\par \par 03/13/2023: Ms. MCDOWELL is a 89 year old female presenting today for a telehealth visit. She was accompanied by her daughter who helped with the visit. They gave permission for an audio only telehealth conference. The patient was located in her home in [place], [place]. I was located in my office on [place], [place]. The patient obtained an US prior to today’s visit 3/9/23 which revealed: Comparison is made with the exam of 2/28/22. Transabdominal ultrasound of the abdomen was performed with color flow imaging. The examination is limited in evaluation. The visualized aorta and inferior vena cava show no abnormality. The pancreas is obscured by overlying bowel gas. The liver measures 12.4 cm with homogeneous echotexture. No intrinsic mass and no intra-or extrahepatic ductal dilatation. There is hepatopedal flow of the main portal vein. No gallstones, pericholecystic fluid, wall thickening or positive sonographic Melendez sign. The common bile duct measures 0.3 cm. The right kidney measures 9.2 x 5.6 x 3.0 cm with a nonobstructing 1.2 cm stone in the upper pole. Multiple additional subcentimeter calcifications are seen. These were not seen on previous exam.\par No hydronephrosis or renal mass. The left kidney measures 9.3 x 3.9 x 3.5 cm. There is a 2.1 x 2.4 x 2.0 cm cyst in the medial mid pole, not seen on previous study. No hydronephrosis or renal calcification. The spleen measures 8.2 cm and show no abnormality. There is a small right pleural effusion, stable.\par IMPRESSIONS: Multiple nonobstructing stones of the right kidney with no hydronephrosis. 2.4 cm cyst of the midpole left kidney. Small right pleural effusion, stable.\par The daughter has answered the phone and reports the patient's urine does not get dark. She is not aware of the amount of water she gives her mother. I requested she measure's this from now on as the pt has stones and needs to increase water consumption. Her daughter reports this week the pt has experienced more spasms than normal and believes this may be due to severe constipation. She provided the pt with an Enema to aide in this situation. She is very concerned but I informed her the Enema may have stimulated the spasms and we should give her a few days to clear up and re-evaluate. \par \par I informed the patient's care taker to now measure and increase water volume as the pt has stones. \par Clinical findings and US results were reviewed at length with the patient. I personally reviewed the imaging myself.\par Pt will schedule a telehealth visit in 1- 2 weeks.\par \par 03/22/2023: Ms. CLAUDIO MCDOWELL presents today for an audio visual telehealth visit for which she gave permission for. The patient was located at home at [address] [place] [address] and I was located at my office in [place], [place]. She is accompanied by her daughter whom most of the visit was conducted with. Her daughter has been keeping track of her mother's water consumption. She is averaging about 58 oz of water in a 24 hr period. Additionally, she is drinking juice and ensure nutrition drinks. She does not consume any caffeine. She is on furosemide 40 mg. I said hello to the patient at the end of the visit and she is looking well. She reports feeling no pain at the current moment. \par I advised the patient's daughter to keep up the water intake and if she can, up it to 64 oz of water in a 24 hr period. I recommended she add fresh lemon juice to her water as well. I also advised the patient's daughter to speak with the prescribing doctor for furosemide on if it is okay to increase her water intake. I informed her I would be happy to talk to him if he needs. \par Reviewed the patient's abdominal US of 3/9/2023 once again. US prior to that on 2/28/2022 showed no hydro, mass, or renal calcification, though the kidneys were not visualized well due to bowel gas. \par Advised the patient's daughter to speak with the pt's gastroenterologist about her BM problems. \par Patient is getting blood work drawn in her house in a few days. Will send orders for the things that I want done. \par \par 3/29/2023:  Patient Claudio Mcdowell and daughter Tabatha Deluna were home in [place], [place] and I was in my office in [facility].  Patient and daughter gave permission for a FaceTime telehealth visit.  They preferred this to [employer].  The patient looks good today.  Her complexion was good there is no pallor.  Smile was symmetric her affect was normal she appeared happy she could make conversation it was appropriate.  She said that she currently had no pain.  When I asked questions that after 3/2 how she had been recently the sometimes she hesitated and deferred to her daughter for cancer compatible with her impaired short-term memory.  Long-term memory remains good she recognizes me once know so I am does ask questions about things like her bladder as she was worried about it her blood tests.  I assured her the results were satisfactory.\par \par I reviewed the blood and urine test results in detail with her daughter.  I explained the findings.  Patient based upon urine osmolality which was low and serum osmolality which was in the mid range of normal is appropriately hydrated.  The patient has congestive heart failure but was not dyspneic on observation today and said that she had no trouble breathing currently.  Her daughter confirms this.  The pro brain natruretic protein in the serum was elevated in keeping with her congestive heart.  The patient does not exert for self she remains in bed.  The patient had excess catheter immunoglobulin change in her urine.  This is in keeping with her diagnosis of multiple myeloma.  She has had for over 10 years without treatment I suspect its more likely gammopathy but has been.  The daughter said that at one point there was consideration of the degree of her having chronic lymphocytic leukemia.  I doubt she has had chronic lymphocytic leukemia.  I am unaware of her having any elevated lymphocyte counts.  A complete blood count ordered by Dr. Farmer showed  low lymphocytes at 470/mcL on December 7, 2022 blood draw at which time her total white blood cell count was 3850 white blood cells per microliter.  This was within normal range.  In view of the urine findings of a past diagnosis of multiple myeloma I will check the immunoglobin electrophoresis addition to a complete blood cell count.  In about 2 months I will see if we can arrange for a renal ultrasound to see if there is any growth in the patient's kidney stones.  I reviewed the issues involved in this kidney stone formation and growth.  I reviewed have an embolization bed 10 resulted in loss of calcium from the bones and hypercalciuria which could lead to nephrolithiasis.  The daughter is extremely concerned about the welfare of her mother.  The patient daughter does explore her concerns and most of her questions are pertinent.  I answered the daughter's questions to her satisfaction patient will have additional blood test drawn at home from the mobile phlebotomy team.  In 2 months time we will attempt to arrange for a home renal ultrasound.\par \par 04/18/2023: Ms. MCDOWELL is a 89 year old female presenting today for an audio and visual telehealth visit for which she gave verbal permission. We utilized [employer] teams telemetry doc platform. The patient was located in her home at [address] [place] [address]. I was located in my office on [place], [place]. She was accompanied by her daughter who helped to serve as a historian. She reports discomfort in her eyes. She describes the discomfort as a feeling of sand. She opened her eyes for me, and they do not look red. Pupils look normal. She reports the right eyes bothers her significantly more than the left. I advise that the patient tries lubricating drops and it if does not feel better she will notify her visiting nurse who is due to come next week for a sales catheter change. She reports episode of bladder spasms. She denies gross hematuria. She describes the urine as barely yellow. I reviewed and discussed her  latest pertinent lab on 04/12/2023 which shows "alkaline phosphate normal at 89. Creatinine was normal at 0.62 mg/dL. WBC normal at 5.01. RBC normal at 3.87". \par \par Plan: She will provides urine specimen for Urine Analysis, Urine Culture and Urine Cytology. She will try lubricating drops to alleviate eyes discomfort. She will return for a telehalth 3 days after providing urine sample. \par \par 05/09/2023: Ms. MCDOWELL presents today for a follow up audio only telehealth visit for which they gave permission for. She was accompanied by her daughter who helped to serve as a historian. The patient was located at home [address] [place] [address] and I was located in my office in [place], [place]. The patient obtained lab work 5/2/23 prior to today's visit. The UA revealed microscopic hematuria, 5/HPF RBC, 27/HPF WBC. The patient demonstrated great hydration as the specific gravity is 1.006. The Sales catheter was changed May 2, 2023 with no clear urine in the bag. While changing the diaper the aide reports blood in the diaper. The daughter states last week her mother experienced spasms but as of two days ago she is now fine. However, she states the patient is more fatigued. She denies the pt having a temperature. I assured her because her mother is post menopausal any abrasion to the labia can cause a fissure. \par \par Reviewed and discussed laboratory work of 5/2/23 which She  obtained prior to today's visit as requested. I assured the daughter the patient remains well in a urological standpoint. \par \par Upon every catheter change patient is to give a urine sample.\par The daughter is to resend urine culture from prior catheter change.\par \par Pt will schedule a telehealth visit \par \par 06/02/2023: Ms. MCDOWELL is a 89 year old female presenting today for an audio and visual telehealth visit for which she gave verbal permission. We utilized [employer] teams telemetry [employer] platform. The patient was located in her home at [address] [place] [address]. I was located in my office on [place], [place]. She was accompanied by her daughter Tabatha. She reports that her mother complains of onset dysuria that dissipated on its own and has not recurred since. She reports right sided back pain that is aggravated when she lays on the right side. She provided urine specimen. She notes the urine was very clear. I reviewed and discussed the patient's pertinent lab works. Her latest Urinalysis on 05/30/2023 that shows "60 WBC/HPF. Trace Blood Urine. Specific Gravity Urine 1.007". Urine Culture on the same date shows ">100,000 CFU/ml Escherichia coli and <10,000 CFU/ml Normal Urogenital ángel present". The patient takes Mirtazapine for anxiety at low dose. The daughter reports the patient is experiencing increasing anxiety and is thinking of having the prescriber increasing the dosage.  Her BP runs around 120/60. Her Hemoglobin hematocrit looks good. gaze is conjugated. facial expression looks symmetric. Urine was faint yellow and clear. \par \par Plan:\par \par Preparation, audio-visual visit, and coordination of care took : 30  mins\par \par

## 2023-06-21 NOTE — HISTORY OF PRESENT ILLNESS
[FreeTextEntry1] : Claudio Mcdowell in her daughter Taabtha Deluna gave permission for an audio video telehealth visit.  They were in their home in [facility].  [facility]par \par Claudio Mcdowell is currently 89 years of age.  Her daughter Tabatha Deluna is devoted to her care.  She is very concerned about her mother and has been so for many years.  She becomes very anxious at times and that is concerning her mother.  Claudio Mcdowell lives with her daughter.  Claudio Mcdowell has been bedbound for several years.\par \par The patient had developed a sacral decubitus ulcer while in a nursing facility.  A Sales catheter was placed because of fecal incontinence and concern that urine mixed with the feces would contaminate the sacral decubitus ulcer.  Once the patient left the nursing facility and will be with her daughter.  The sacral decubitus ulcer has finally healed.  I have discussed removing the Sales catheter with the daughter.  However as the patient has fecal incontinence there is concern about the urine mixing with the feces and being more likely to cause skin problems.  For now we will leave the Sales catheter in.\par \par The patient has had clinically symptomatic urinary tract infections.  He clinically significant infections usually start with increased urination around the Sales catheter due to bladder spasms.  Bladder spasms have been occurring for staff.  The patient is positioned properly in in bed and the personal care attendant leaves for the day.  We have managed to prevent this with the administration of sublingual hyoscyamine.\par \par We do periodic surveillance urine analysis and urine culture tests at the time the Sales catheter is changed by visiting nurse.  He also performed a times of increased spasms or purulence of the urine or change in mental status,\par \par On October 11, 2022 visiting nurse using an order I have previously placed at the request of the daughter sent urine for urine cytology which proved to be negative for malignant cells, urine culture which grew Greater than 100,000 and E. coli that was sensitive to all antibiotics tested.  Urinalysis looked quite good for urine taken from a Sales catheter that was used for chronic Sales catheter drainage.  Ileukocyte esterase was large but nitrites were negative.  There were 2 epithelial cells per high-power field.  There were only 10 white blood cells per high-power field and only 2 red blood cells per high-power field on microscopic exam there were no bacteria seen and there were no hyaline casts.  Specific gravity was 1.010 which shows good hydration and this bedbound woman cared for diligently by her daughter.  Blood by dipstick was trace.  There was no protein glucose or ketones in the urine.  There is no bilirubin and no urobilinogen.  An important portion of the visit was my review with the daughter about bladder spasms and urination around the catheter.  Urine appearance and urine analysis have been clear.  The urine was clear again today.\par \par The patient has significant short-term memory deficit.  She does have some useful short-term memory.  Her long-term memory remains very much intact.  She is very pleasant and to make satisfactory conversation.  She does admit to sometimes not remembering something.  Her complexion was good and there was no pallor.  Facial movements were symmetric.  Cranial nerves were intact.  I was not able to assess the olfactory nerve as this was a telehealth visit.  \par \par 11/04/2022: Ms. MCDOWELL is a 89 year old female presenting today for a telehealth visit. She was accompanied by her daughter who helped with the visit. They gave permission for an audio only telehealth conference. The patient was located in her home in [place], [place]. I was located in my office on [place], [place]. Today her daughter reports the pt experienced rare urinary leaking around the catheter due to bladder spasms. She also reports her systolic pressure was around 140 last week. I offered Gemtesa to manage the bladder spasms and the daughter was cautious about more medications. I informed her if the urinary leaking are only once every 2 weeks or so, she does not have to medicate. The daughter was agreeable to monitor the occurrence of urinary leaking over the next 4 weeks and assess the discomfort it causes the pt. She denies any other urinary issues.\par \par 03/01/2023: Ms. MCDOWELL is a 89 year old female presenting today for a telehealth visit. She was accompanied by her daughter who helped with the visit. They gave permission for an audio only telehealth conference. The patient was located in her home in [place], [place]. I was located in my office on [place], [place]. The pt has chronic sales catheter drainage. She provided a urine specimen from the catheter on 2/27/2023. UA was slightly turbid with large LEC and 59 WBC/HPF. Culture grew >100,000 CFU/ml E.Coli. The sensitivity report is not yet back. The pt's daughter was concerned as when the patient was constipated a few weeks ago she was having very watery BM which grew messy and were around the catheter area. She was given a Fleet's enema last month which helped. Currently she is having BM, paste-like in appearance. Her daughter does not think that her stomach is distended. She has recently had the catheter changed and is not voiding around it. Urine has been very clear. She does not seem to have pain in the urinary passageway or bladder. Denies fevers. Denies gross hematuria. Pt had covid around Thanksgiving, only had the first two vaccines from two years ago. \par \par 03/13/2023: Ms. MCDOWELL is a 89 year old female presenting today for a telehealth visit. She was accompanied by her daughter who helped with the visit. They gave permission for an audio only telehealth conference. The patient was located in her home in [place], [place]. I was located in my office on [place], [place]. The patient obtained an US prior to today’s visit 3/9/23 which revealed: Comparison is made with the exam of 2/28/22. Transabdominal ultrasound of the abdomen was performed with color flow imaging. The examination is limited in evaluation. The visualized aorta and inferior vena cava show no abnormality. The pancreas is obscured by overlying bowel gas. The liver measures 12.4 cm with homogeneous echotexture. No intrinsic mass and no intra-or extrahepatic ductal dilatation. There is hepatopedal flow of the main portal vein. No gallstones, pericholecystic fluid, wall thickening or positive sonographic Melendez sign. The common bile duct measures 0.3 cm. The right kidney measures 9.2 x 5.6 x 3.0 cm with a nonobstructing 1.2 cm stone in the upper pole. Multiple additional subcentimeter calcifications are seen. These were not seen on previous exam.\par No hydronephrosis or renal mass. The left kidney measures 9.3 x 3.9 x 3.5 cm. There is a 2.1 x 2.4 x 2.0 cm cyst in the medial mid pole, not seen on previous study. No hydronephrosis or renal calcification. The spleen measures 8.2 cm and show no abnormality. There is a small right pleural effusion, stable.\par IMPRESSIONS: Multiple nonobstructing stones of the right kidney with no hydronephrosis. 2.4 cm cyst of the midpole left kidney. Small right pleural effusion, stable.\par The daughter has answered the phone and reports the patient's urine does not get dark. She is not aware of the amount of water she gives her mother. I requested she measure's this from now on as the pt has stones and needs to increase water consumption. Her daughter reports this week the pt has experienced more spasms than normal and believes this may be due to severe constipation. She provided the pt with an Enema to aide in this situation. She is very concerned but I informed her the Enema may have stimulated the spasms and we should give her a few days to clear up and re-evaluate. \par \par 03/22/2023: Ms. CLAUDIO MCDOWELL presents today for an audio visual telehealth visit for which she gave permission for. The patient was located at home at [address] [place] [address] and I was located at my office in [place], [place]. She is accompanied by her daughter whom most of the visit was conducted with. Her daughter has been keeping track of her mother's water consumption. She is averaging about 58 oz of water in a 24 hr period. Additionally, she is drinking juice and ensure nutrition drinks. She does not consume any caffeine. She is on furosemide 40 mg. I said hello to the patient at the end of the visit and she is looking well. She reports feeling no pain at the current moment. \par \par 3/29/2023:  Patient Claudio Mcdowell and daughter Tabatha Deluna were home in [place], [place] and I was in my office in [facility].  Patient and daughter gave permission for a [employer] telehealth visit.  They preferred this to [employer].  The patient looks good today.  Her complexion was good there is no pallor.  Smile was symmetric her affect was normal she appeared happy she could make conversation it was appropriate.  She said that she currently had no pain.  When I asked questions that after 3/2 how she had been recently the sometimes she hesitated and deferred to her daughter for cancer compatible with her impaired short-term memory.  Long-term memory remains good she recognizes me once know so I am does ask questions about things like her bladder as she was worried about it her blood tests.  I assured her the results were satisfactory.\par \par 04/18/2023: Ms. MCDOWELL is a 89 year old female presenting today for an audio and visual telehealth visit for which she gave verbal permission. We utilized [employer] teams telemetry doc platform. The patient was located in her home at [address] [place] [address]. I was located in my office on [place], [place]. She was accompanied by her daughter who helped to serve as a historian. She reports discomfort in her eyes. She describes the discomfort as a feeling of sand. She opened her eyes for me, and they do not look red. Pupils look normal. She reports the right eyes bothers her significantly more than the left. I advise that the patient tries lubricating drops and it if does not feel better she will notify her visiting nurse who is due to come next week for a sales catheter change. She reports episode of bladder spasms. She denies gross hematuria. She describes the urine as barely yellow. I reviewed and discussed her  latest pertinent lab on 04/12/2023 which shows "alkaline phosphate normal at 89. Creatinine was normal at 0.62 mg/dL. WBC normal at 5.01. RBC normal at 3.87". \par \par 05/09/2023: Ms. MCDOWELL presents today for a follow up audio only telehealth visit for which they gave permission for. She was accompanied by her daughter who helped to serve as a historian. The patient was located at home [address] [place] [address] and I was located in my office in [place], [place]. The patient obtained lab work 5/2/23 prior to today's visit. The UA revealed microscopic hematuria, 5/HPF RBC, 27/HPF WBC. The patient demonstrated great hydration as the specific gravity is 1.006. The Sales catheter was changed May 2, 2023 with no clear urine in the bag. While changing the diaper the aide reports blood in the diaper. The daughter states last week her mother experienced spasms but as of two days ago she is now fine. However, she states the patient is more fatigued. She denies the pt having a temperature. I assured her because her mother is post menopausal any abrasion to the labia can cause a fissure. \par \par 06/02/2023: Ms. MCDOWELL is a 89 year old female presenting today for an audio and visual telehealth visit for which she gave verbal permission. We utilized [employer] teams telemetry [employer] platform. The patient was located in her home at [address] [place] [address]. I was located in my office on [place], [place]. She was accompanied by her daughter Tabatha. She reports that her mother complains of onset dysuria that dissipated on its own and has not recurred since. She reports right sided back pain that is aggravated when she lays on the right side. She provided urine specimen. She notes the urine was very clear. I reviewed and discussed the patient's pertinent lab works. Her latest Urinalysis on 05/30/2023 that shows "60 WBC/HPF. Trace Blood Urine. Specific Gravity Urine 1.007". Urine Culture on the same date shows ">100,000 CFU/ml Escherichia coli and <10,000 CFU/ml Normal Urogenital ángel present". The patient takes Mirtazapine for anxiety at low dose. The daughter reports the patient is experiencing increasing anxiety and is thinking of having the prescriber increasing the dosage.  Her BP runs around 120/60. Her Hemoglobin hematocrit looks good. gaze is conjugated. facial expression looks symmetric. Urine was faint yellow and clear.

## 2023-06-23 ENCOUNTER — NON-APPOINTMENT (OUTPATIENT)
Age: 88
End: 2023-06-23

## 2023-06-23 DIAGNOSIS — H10.33 UNSPECIFIED ACUTE CONJUNCTIVITIS, BILATERAL: ICD-10-CM

## 2023-06-23 RX ORDER — GENTAMICIN SULFATE 3 MG/ML
0.3 SOLUTION OPHTHALMIC
Qty: 1 | Refills: 3 | Status: DISCONTINUED | COMMUNITY
Start: 2023-06-23 | End: 2023-06-23

## 2023-06-30 LAB
APPEARANCE: CLEAR
BACTERIA: ABNORMAL /HPF
BILIRUBIN URINE: NEGATIVE
BLOOD URINE: NEGATIVE
CAST: NORMAL /LPF
COLOR: YELLOW
EPITHELIAL CELLS: 0 /HPF
GLUCOSE QUALITATIVE U: NEGATIVE MG/DL
KETONES URINE: NEGATIVE MG/DL
LEUKOCYTE ESTERASE URINE: ABNORMAL
MICROSCOPIC-UA: NORMAL
NITRITE URINE: NEGATIVE
PH URINE: 7
PROTEIN URINE: NORMAL MG/DL
RED BLOOD CELLS URINE: 0 /HPF
REVIEW: NORMAL
SPECIFIC GRAVITY URINE: 1.01
UROBILINOGEN URINE: 1 MG/DL
WHITE BLOOD CELLS URINE: 79 /HPF

## 2023-07-02 ENCOUNTER — TRANSCRIPTION ENCOUNTER (OUTPATIENT)
Age: 88
End: 2023-07-02

## 2023-07-02 RX ORDER — LOPERAMIDE HYDROCHLORIDE 2 MG/1
2 TABLET ORAL
Qty: 20 | Refills: 1 | Status: COMPLETED | COMMUNITY
Start: 2022-07-11 | End: 2023-07-02

## 2023-07-02 RX ORDER — TRAMADOL HYDROCHLORIDE AND ACETAMINOPHEN 37.5; 325 MG/1; MG/1
37.5-325 TABLET, FILM COATED ORAL EVERY 8 HOURS
Qty: 10 | Refills: 0 | Status: COMPLETED | COMMUNITY
Start: 2021-12-29 | End: 2023-07-02

## 2023-07-02 RX ORDER — OXYCODONE 5 MG/1
5 TABLET ORAL DAILY
Qty: 10 | Refills: 0 | Status: COMPLETED | COMMUNITY
Start: 2022-01-27 | End: 2023-07-02

## 2023-07-03 ENCOUNTER — LABORATORY RESULT (OUTPATIENT)
Age: 88
End: 2023-07-03

## 2023-07-04 ENCOUNTER — NON-APPOINTMENT (OUTPATIENT)
Age: 88
End: 2023-07-04

## 2023-07-04 LAB
BACTERIA UR CULT: ABNORMAL
URINE CYTOLOGY: NORMAL

## 2023-07-05 ENCOUNTER — NON-APPOINTMENT (OUTPATIENT)
Age: 88
End: 2023-07-05

## 2023-07-05 ENCOUNTER — APPOINTMENT (OUTPATIENT)
Dept: UROLOGY | Facility: CLINIC | Age: 88
End: 2023-07-05
Payer: MEDICARE

## 2023-07-05 DIAGNOSIS — F51.04 PSYCHOPHYSIOLOGIC INSOMNIA: ICD-10-CM

## 2023-07-05 PROCEDURE — 99443: CPT | Mod: 95

## 2023-07-05 NOTE — PROGRESS NOTE BEHAVIORAL HEALTH - ABNORMAL MOVEMENTS
Clinic Follow Up:  [facility] Follow Up Note    Reason for Follow Up:  The primary encounter diagnosis was Fatty liver. Diagnoses of Hepatomegaly and Thrombocytopenia were also pertinent to this visit.    PCP: Casper Barrett     The patient location is: [place]  The chief complaint leading to consultation is: above    Visit type: audiovisual    Face to Face time with patient: 20 minutes  28 minutes of total time spent on the encounter, which includes face to face time and non-face to face time preparing to see the patient (eg, review of tests), Obtaining and/or reviewing separately obtained history, Documenting clinical information in the electronic or other health record, Independently interpreting results (not separately reported) and communicating results to the patient/family/caregiver, or Care coordination (not separately reported).         Each patient to whom he or she provides medical services by telemedicine is:  (1) informed of the relationship between the physician and patient and the respective role of any other health care provider with respect to management of the patient; and (2) notified that he or she may decline to receive medical services by telemedicine and may withdraw from such care at any time.    Notes:       HPI:  This is a 69 y.o. male here for follow up of the above  Pt states that since his last visit, he has been feeling overall well without any new complaints.   He has been working on weight loss with improved nutrition and has lost about 10 pounds.     Recent labs show a continued worsening of thrombocytopenia.   LFTs are stable with a slight increase in total bili  Fibroscan was without concern for cirrhosis, however the IQR was on the higher side.       Review of Systems   Constitutional:  Negative for chills, fever, malaise/fatigue and weight loss.   Respiratory:  Negative for cough.    Cardiovascular:  Negative for chest pain.   Gastrointestinal:         
Per HPI   Musculoskeletal:  Negative for myalgias.   Skin:  Negative for itching and rash.   Neurological:  Negative for headaches.   Psychiatric/Behavioral:  The patient is not nervous/anxious.      Medical History:  Past Medical History:   Diagnosis Date    Diabetes mellitus, type 2     Hypertension     Morbid obesity        Surgical History:   Past Surgical History:   Procedure Laterality Date    HERNIA REPAIR      KNEE ARTHROSCOPY W/ DEBRIDEMENT      VASECTOMY         Family History:   Family History   Problem Relation Age of Onset    Heart disease Mother     Vision loss Mother     Diabetes Father     Heart disease Father     Hypertension Father     Diabetes Maternal Grandmother     Cancer Maternal Grandfather        Social History:   Social History     Tobacco Use    Smoking status: Never    Smokeless tobacco: Never   Substance Use Topics    Alcohol use: No    Drug use: No       Allergies: Reviewed    Home Medications:  Current Outpatient Medications on File Prior to Visit   Medication Sig Dispense Refill    aspirin (ECOTRIN) 81 MG EC tablet Take 81 mg by mouth once daily.      atorvastatin (LIPITOR) 40 MG tablet Take 1 tablet (40 mg total) by mouth once daily. 90 tablet 4    cyclobenzaprine (FLEXERIL) 10 MG tablet TAKE 1 TABLET BY MOUTH EVERY DAY IN THE EVENING 30 tablet 0    enalapril (VASOTEC) 20 MG tablet Take 1 tablet (20 mg total) by mouth once daily. 90 tablet 4    ibuprofen (ADVIL,MOTRIN) 800 MG tablet Take 800 mg by mouth every 8 (eight) hours as needed.      multivitamin with minerals tablet Take 1 tablet by mouth once daily.      OZEMPIC 2 mg/dose (8 mg/3 mL) PnIj INJECT 2 MG SUBCUTANEOUSLY EVERY 7 DAYS 3 each 0    tadalafiL (CIALIS) 20 MG Tab Take 20 mg by mouth as needed.      testosterone cypionate (DEPOTESTOTERONE CYPIONATE) 100 mg/mL injection Inject 200 mg into the muscle every 14 (fourteen) days.      testosterone cypionate (DEPOTESTOTERONE CYPIONATE) 200 mg/mL injection Inject 200 mg into the 
"muscle every 14 (fourteen) days.       No current facility-administered medications on file prior to visit.       Physical Exam:  Vital Signs:  Ht 5' 10" (1.778 m)   Wt 122 kg (269 lb)   BMI 38.60 kg/m²   Body mass index is 38.6 kg/m².  Physical Exam  Constitutional:       Appearance: He is well-developed.   HENT:      Head: Normocephalic.   Eyes:      General: No scleral icterus.  Pulmonary:      Effort: Pulmonary effort is normal.   Musculoskeletal:         General: Normal range of motion.      Cervical back: Normal range of motion.   Skin:     General: Skin is dry.   Neurological:      Mental Status: He is alert.       Labs: Pertinent labs reviewed.  FIB-4 Calculation: 3.74 at 7/5/2023  3:36 PM     FIB-4 below 1.30 is considered as low-risk for advanced fibrosis  FIB-4 over 2.67 is considered as high-risk for advanced fibrosis  FIB-4 values between 1.30 and 2.67 are considered as intermediate-risk of advanced fibrosis for ages 36-64.     For ages > 64 the cut-off for low-risk goes to < 2.  This is a screening tool and clinical judgement should be used in the interpretation of these results.      Assessment:  1. Fatty liver    2. Hepatomegaly    3. Thrombocytopenia        Recommendations:  Given the continued, worsening thrombocytopenia and the above noted FIB4, there is still concern for possible cirrhosis  - discussed with pt the option for continued monitoring vs liver biopsy for confirmation of fibrosis  - pt agrees to liver biopsy for further diagnostic evaluation.   - risks and benefits discussed.  Order placed   - continued weight loss with improved nutrition encouraged.       Return to Clinic:  Follow up to be determined by results of above.    "
Tremors

## 2023-07-09 PROBLEM — F51.04 CHRONIC INSOMNIA: Status: ACTIVE | Noted: 2022-06-22

## 2023-07-09 NOTE — HISTORY OF PRESENT ILLNESS
[FreeTextEntry1] : Claudio Mcdowell in her daughter Tabatha Deluna gave permission for an audio video telehealth visit.  They were in their home in [facility].  I was in my office in [facility].\par \par Claudio Mcdowell is currently 89 years of age.  Her daughter Tabatha Deluna is devoted to her care.  She is very concerned about her mother and has been so for many years.  She becomes very anxious at times and that is concerning her mother.  Claudio Mcdowell lives with her daughter.  Claudio Mcdowell has been bedbound for several years.\par \par The patient had developed a sacral decubitus ulcer while in a nursing facility.  A Sales catheter was placed because of fecal incontinence and concern that urine mixed with the feces would contaminate the sacral decubitus ulcer.  Once the patient left the nursing facility and will be with her daughter.  The sacral decubitus ulcer has finally healed.  I have discussed removing the Sales catheter with the daughter.  However as the patient has fecal incontinence there is concern about the urine mixing with the feces and being more likely to cause skin problems.  For now we will leave the Sales catheter in.\par \par The patient has had clinically symptomatic urinary tract infections.  He clinically significant infections usually start with increased urination around the Sales catheter due to bladder spasms.  Bladder spasms have been occurring for staff.  The patient is positioned properly in in bed and the personal care attendant leaves for the day.  We have managed to prevent this with the administration of sublingual hyoscyamine.\par \par We do periodic surveillance urine analysis and urine culture tests at the time the Sales catheter is changed by visiting nurse.  He also performed a times of increased spasms or purulence of the urine or change in mental status,\par \par On October 11, 2022 visiting nurse using an order I have previously placed at the request of the daughter sent urine for urine cytology which proved to be negative for malignant cells, urine culture which grew Greater than 100,000 and E. coli that was sensitive to all antibiotics tested.  Urinalysis looked quite good for urine taken from a Sales catheter that was used for chronic Sales catheter drainage.  Ileukocyte esterase was large but nitrites were negative.  There were 2 epithelial cells per high-power field.  There were only 10 white blood cells per high-power field and only 2 red blood cells per high-power field on microscopic exam there were no bacteria seen and there were no hyaline casts.  Specific gravity was 1.010 which shows good hydration and this bedbound woman cared for diligently by her daughter.  Blood by dipstick was trace.  There was no protein glucose or ketones in the urine.  There is no bilirubin and no urobilinogen.  An important portion of the visit was my review with the daughter about bladder spasms and urination around the catheter.  Urine appearance and urine analysis have been clear.  The urine was clear again today.\par \par The patient has significant short-term memory deficit.  She does have some useful short-term memory.  Her long-term memory remains very much intact.  She is very pleasant and to make satisfactory conversation.  She does admit to sometimes not remembering something.  Her complexion was good and there was no pallor.  Facial movements were symmetric.  Cranial nerves were intact.  I was not able to assess the olfactory nerve as this was a telehealth visit.  \par \par 11/04/2022: Ms. MCDOWELL is a 89 year old female presenting today for a telehealth visit. She was accompanied by her daughter who helped with the visit. They gave permission for an audio only telehealth conference. The patient was located in her home in [place], [place]. I was located in my office on [place], [place]. Today her daughter reports the pt experienced rare urinary leaking around the catheter due to bladder spasms. She also reports her systolic pressure was around 140 last week. I offered Gemtesa to manage the bladder spasms and the daughter was cautious about more medications. I informed her if the urinary leaking are only once every 2 weeks or so, she does not have to medicate. The daughter was agreeable to monitor the occurrence of urinary leaking over the next 4 weeks and assess the discomfort it causes the pt. She denies any other urinary issues.\par \par 03/01/2023: Ms. MCDOWELL is a 89 year old female presenting today for a telehealth visit. She was accompanied by her daughter who helped with the visit. They gave permission for an audio only telehealth conference. The patient was located in her home in [place], [place]. I was located in my office on [place], [place]. The pt has chronic sales catheter drainage. She provided a urine specimen from the catheter on 2/27/2023. UA was slightly turbid with large LEC and 59 WBC/HPF. Culture grew >100,000 CFU/ml E.Coli. The sensitivity report is not yet back. The pt's daughter was concerned as when the patient was constipated a few weeks ago she was having very watery BM which grew messy and were around the catheter area. She was given a Fleet's enema last month which helped. Currently she is having BM, paste-like in appearance. Her daughter does not think that her stomach is distended. She has recently had the catheter changed and is not voiding around it. Urine has been very clear. She does not seem to have pain in the urinary passageway or bladder. Denies fevers. Denies gross hematuria. Pt had covid around Thanksgiving, only had the first two vaccines from two years ago. \par \par 03/13/2023: Ms. MCDOWELL is a 89 year old female presenting today for a telehealth visit. She was accompanied by her daughter who helped with the visit. They gave permission for an audio only telehealth conference. The patient was located in her home in [place], [place]. I was located in my office on [place], [place]. The patient obtained an US prior to today’s visit 3/9/23 which revealed: Comparison is made with the exam of 2/28/22. Transabdominal ultrasound of the abdomen was performed with color flow imaging. The examination is limited in evaluation. The visualized aorta and inferior vena cava show no abnormality. The pancreas is obscured by overlying bowel gas. The liver measures 12.4 cm with homogeneous echotexture. No intrinsic mass and no intra-or extrahepatic ductal dilatation. There is hepatopedal flow of the main portal vein. No gallstones, pericholecystic fluid, wall thickening or positive sonographic Melendez sign. The common bile duct measures 0.3 cm. The right kidney measures 9.2 x 5.6 x 3.0 cm with a nonobstructing 1.2 cm stone in the upper pole. Multiple additional subcentimeter calcifications are seen. These were not seen on previous exam.\par No hydronephrosis or renal mass. The left kidney measures 9.3 x 3.9 x 3.5 cm. There is a 2.1 x 2.4 x 2.0 cm cyst in the medial mid pole, not seen on previous study. No hydronephrosis or renal calcification. The spleen measures 8.2 cm and show no abnormality. There is a small right pleural effusion, stable.\par IMPRESSIONS: Multiple nonobstructing stones of the right kidney with no hydronephrosis. 2.4 cm cyst of the midpole left kidney. Small right pleural effusion, stable.\par The daughter has answered the phone and reports the patient's urine does not get dark. She is not aware of the amount of water she gives her mother. I requested she measure's this from now on as the pt has stones and needs to increase water consumption. Her daughter reports this week the pt has experienced more spasms than normal and believes this may be due to severe constipation. She provided the pt with an Enema to aide in this situation. She is very concerned but I informed her the Enema may have stimulated the spasms and we should give her a few days to clear up and re-evaluate. \par \par 03/22/2023: Ms. CLAUDIO MCDOWELL presents today for an audio visual telehealth visit for which she gave permission for. The patient was located at home at [address] [place] [address] and I was located at my office in [place], [place]. She is accompanied by her daughter whom most of the visit was conducted with. Her daughter has been keeping track of her mother's water consumption. She is averaging about 58 oz of water in a 24 hr period. Additionally, she is drinking juice and ensure nutrition drinks. She does not consume any caffeine. She is on furosemide 40 mg. I said hello to the patient at the end of the visit and she is looking well. She reports feeling no pain at the current moment. \par \par 3/29/2023:  Patient Claudio Mcdowell and daughter Tabatha Deluna were home in [place], [place] and I was in my office in [facility].  Patient and daughter gave permission for a LOFT[employer] telehealth visit.  They preferred this to [employer].  The patient looks good today.  Her complexion was good there is no pallor.  Smile was symmetric her affect was normal she appeared happy she could make conversation it was appropriate.  She said that she currently had no pain.  When I asked questions that after 3/2 how she had been recently the sometimes she hesitated and deferred to her daughter for cancer compatible with her impaired short-term memory.  Long-term memory remains good she recognizes me once know so I am does ask questions about things like her bladder as she was worried about it her blood tests.  I assured her the results were satisfactory.\par \par 04/18/2023: Ms. MCDOWELL is a 89 year old female presenting today for an audio and visual telehealth visit for which she gave verbal permission. We utilized [employer] telemetry doc platform. The patient was located in her home at [address] [place] [address]. I was located in my office on [place], [place]. She was accompanied by her daughter who helped to serve as a historian. She reports discomfort in her eyes. She describes the discomfort as a feeling of sand. She opened her eyes for me, and they do not look red. Pupils look normal. She reports the right eyes bothers her significantly more than the left. I advise that the patient tries lubricating drops and it if does not feel better she will notify her visiting nurse who is due to come next week for a sales catheter change. She reports episode of bladder spasms. She denies gross hematuria. She describes the urine as barely yellow. I reviewed and discussed her  latest pertinent lab on 04/12/2023 which shows "alkaline phosphate normal at 89. Creatinine was normal at 0.62 mg/dL. WBC normal at 5.01. RBC normal at 3.87". \par \par 05/09/2023: Ms. MCDOWELL presents today for a follow up audio only telehealth visit for which they gave permission for. She was accompanied by her daughter who helped to serve as a historian. The patient was located at home [address] [place] [address] and I was located in my office in [place], [place]. The patient obtained lab work 5/2/23 prior to today's visit. The UA revealed microscopic hematuria, 5/HPF RBC, 27/HPF WBC. The patient demonstrated great hydration as the specific gravity is 1.006. The Sales catheter was changed May 2, 2023 with no clear urine in the bag. While changing the diaper the aide reports blood in the diaper. The daughter states last week her mother experienced spasms but as of two days ago she is now fine. However, she states the patient is more fatigued. She denies the pt having a temperature. I assured her because her mother is post menopausal any abrasion to the labia can cause a fissure. \par \par 06/02/2023: Ms. MCDOWELL is a 89 year old female presenting today for an audio and visual telehealth visit for which she gave verbal permission. We utilized [employer] teams telemetry [employer] platform. The patient was located in her home at [address] [place] [address]. I was located in my office on [place], [place]. She was accompanied by her daughter Tabatha. She reports that her mother complains of onset dysuria that dissipated on its own and has not recurred since. She reports right sided back pain that is aggravated when she lays on the right side. She provided urine specimen. She notes the urine was very clear. I reviewed and discussed the patient's pertinent lab works. Her latest Urinalysis on 05/30/2023 that shows "60 WBC/HPF. Trace Blood Urine. Specific Gravity Urine 1.007". Urine Culture on the same date shows ">100,000 CFU/ml Escherichia coli and <10,000 CFU/ml Normal Urogenital ángel present". The patient takes Mirtazapine for anxiety at low dose. The daughter reports the patient is experiencing increasing anxiety and is thinking of having the prescriber increasing the dosage.  Her BP runs around 120/60. Her Hemoglobin hematocrit looks good. gaze is conjugated. facial expression looks symmetric. Urine was faint yellow and clear. \par \par 06/06/2023: Ms. MCDOWELL presents today for a follow up audio only telehealth visit for which they gave permission for. The patient was located at home [address] [place] [address] and I was located in my office in [place], [place]. She was accompanied by her daughter Tabatha. The daughter states pt is experiencing episodes of a dry cough. An X Ray  of 6/5/23 revealed Mild peribronchial thickening suggesting bronchitis in the right clinical setting with no focal pneumonia or congestive heart failure. COPD with chronic lung changes. The mental status is the same as usual but her BP has elevated some. I assured her a little elevation is not as dangerous. The pt continues to experience some urinary frequency and it is a little more concentrated. \par \par 07/05/2023: Ms. CLAUDIO MCDOWELL presents today for a follow up audio only telephone visit for which she gave permission for. The pt was located at home, [address] [place] [address], and I was located in my office in [place], [place]. She is accompanied by her daughter Tabatha. Patient provided a surveillance urine specimen on 6/29/2023. Pt has chronic indwelling sales. UA revealed large LEC, 79 WBC/HPF, and occasional bacteria/hpf. Urine culture grew >100,000 CFU/ml EColi and 50,000-99,000 CFU/ml Enterobacter cloacae complex. Her daughter reports that her mother has been having some breathing problems and constipation. Her urine has been fairly good. There are some times when it looks like she's putting out less. It appears a little more concentrated. She tries to give her more water when she notices this. She is barely eating and mostly drinks Ensure. According to her most recent UA, she was well hydrated. She has not been squirting urine around the catheter, not having bladder spasms.

## 2023-07-09 NOTE — ADDENDUM
[FreeTextEntry1] : This note was authored by Senia Corbin working as a scribe for Dr.Gary Sexton. I, Dr. Indio Sexton have reviewed the content of this note and confirm it is true and accurate. I personally performed the history and physical examination and made all the decisions 07/05/2023

## 2023-07-09 NOTE — ASSESSMENT
[FreeTextEntry1] : Claudio Mcdowell in her daughter Tabatha Deluna gave permission for an audio video telehealth visit.  They were in their home in [facility].  I was in my office in [facility].\par \par Claudio Mcdowell is currently 89 years of age.  Her daughter Tabatha Deluna is devoted to her care.  She is very concerned about her mother and has been so for many years.  She becomes very anxious at times and that is concerning her mother.  Claudio Mcdowell lives with her daughter.  Claudio Mcdowell has been bedbound for several years.\par \par The patient had developed a sacral decubitus ulcer while in a nursing facility.  A Sales catheter was placed because of fecal incontinence and concern that urine mixed with the feces would contaminate the sacral decubitus ulcer.  Once the patient left the nursing facility and will be with her daughter.  The sacral decubitus ulcer has finally healed.  I have discussed removing the Sales catheter with the daughter.  However as the patient has fecal incontinence there is concern about the urine mixing with the feces and being more likely to cause skin problems.  For now we will leave the Sales catheter in.\par \par The patient has had clinically symptomatic urinary tract infections.  The restart with increased urination around the Sales catheter due to bladder spasms.  Bladder spasms have been occurring for staff.  The patient is positioned properly in in bed and the personal care attendant leaves for the day.  We have managed to prevent this with the administration of sublingual hyoscyamine.\par \par We do periodic surveillance urine analysis and urine culture tests at the time the Sales catheter is changed by visiting nurse.  He also performed a times of increased spasms or purulence of the urine or change in mental status,\par \par On October 11, 2022 visiting nurse using an order I have previously placed at the request of the daughter sent urine for urine cytology which proved to be negative for malignant cells, urine culture which grew Greater than 100,000 and E. coli that was sensitive to all antibiotics tested.  Urinalysis looked quite good for urine taken from a Sales catheter that was used for chronic Sales catheter drainage.  Ileukocyte esterase was large but nitrites were negative.  There were 2 epithelial cells per high-power field.  There were only 10 white blood cells per high-power field and only 2 red blood cells per high-power field on microscopic exam there were no bacteria seen and there were no hyaline casts.  Specific gravity was 1.010 which shows good hydration and this bedbound woman cared for diligently by her daughter.  Blood by dipstick was trace.  There was no protein glucose or ketones in the urine.  There is no bilirubin and no urobilinogen.  An important portion of the visit was my review with the daughter about bladder spasms and urination around the catheter.  Urine appearance and urine analysis have been clear.  The urine was clear again today.\par \par The patient has significant short-term memory deficit.  She does have some useful short-term memory.  Her long-term memory remains very much intact.  She is very pleasant and to make satisfactory conversation.  She does admit to sometimes not remembering something.  Her complexion was good and there was no pallor.  Facial movements were symmetric.  Cranial nerves were intact.  I was not able to assess the olfactory nerve as this was a telehealth visit.  \par \par I asked the daughter to see to it that the urine sample was sent periodically when the catheter was changed.  I would do this for surveillance purposes.  I also asked that a urine sample be sent at times of increased cloudiness of the urine.  At any time that gross blood was visible in the urine.  I would also asked that urine be sent if mental status should change.  I asked that urine sample be sent if urine leakage around the catheter picked up and appeared to be from spasms.  The daughter said that she would think that this was performed.  I also asked that the daughter schedule a telehealth visit 3 days after urine samples were sent so we can discuss the circumstances around the decision to send the specimen the specimen and if the patient was having any obvious symptoms or signs that might indicate clinically significant infection.\par \par 11/04/2022: Ms. MCDOWELL is a 89 year old female presenting today for a telehealth visit. She was accompanied by her daughter who helped with the visit. They gave permission for an audio only telehealth conference. The patient was located in her home in [place], [place]. I was located in my office on [place], [place]. Today her daughter reports the pt experienced rare urinary leaking around the catheter due to bladder spasms. She also reports her systolic pressure was around 140 last week. I offered Gemtesa to manage the bladder spasms and the daughter was cautious about more medications. I informed her if the urinary leaking are only once every 2 weeks or so, she does not have to medicate. The daughter was agreeable to monitor the occurrence of urinary leaking over the next 4 weeks and assess the discomfort it causes the pt. She denies any other urinary issues.\par \par Plan: She will provide a urine specimen for UA, urine cytology and urine culture. She will follow up 2 or 3  weeks after providing urine sample/\par \par 03/01/2023: Ms. MCDOWELL is a 89 year old female presenting today for a telehealth visit. She was accompanied by her daughter who helped with the visit. They gave permission for an audio only telehealth conference. The patient was located in her home in [place], [place]. I was located in my office on [place], [place]. The pt has chronic sales catheter drainage. She provided a urine specimen from the catheter on 2/27/2023. UA was slightly turbid with large LEC and 59 WBC/HPF. Culture grew >100,000 CFU/ml E.Coli. The sensitivity report is not yet back. The pt's daughter was concerned as when the patient was constipated a few weeks ago she was having very watery BM which grew messy and were around the catheter area. She was given a Fleet's enema last month which helped. Currently she is having BM, paste-like in appearance. Her daughter does not think that her stomach is distended. She has recently had the catheter changed and is not voiding around it. Urine has been very clear. She does not seem to have pain in the urinary passageway or bladder. Denies fevers. Denies gross hematuria. Pt had covid around Thanksgiving, only had the first two vaccines from two years ago. \par \par Reviewed and discussed 2/27/2023 urine test results. Given the patient is not febrile and is not symptomatic, I will not treat at this time as these urine test results are consistent with someone who has chronic sales drainage.  If she gets ill, her daughter will call promptly. \par Patient appears to be stable at this time. I spoke to her at the conclusion of the visit and she sounds good. Her memory seems to be good as she personally asked to speak to me. Was a little hesitant to reply at times but did ask me about my family. She spoke softly yet clearly. \par I did advise the patient and her daughter to speak with her PCP in about 4 months about receiving the covid booster vaccines. \par Patient will have a telephone visit in 3 months, sooner if clinically indicated. Will continue to submit surveillance urine cultures when her catheter is changed each month.\par \par 03/13/2023: Ms. MCDOWELL is a 89 year old female presenting today for a telehealth visit. She was accompanied by her daughter who helped with the visit. They gave permission for an audio only telehealth conference. The patient was located in her home in [place], [place]. I was located in my office on [place], [place]. The patient obtained an US prior to today’s visit 3/9/23 which revealed: Comparison is made with the exam of 2/28/22. Transabdominal ultrasound of the abdomen was performed with color flow imaging. The examination is limited in evaluation. The visualized aorta and inferior vena cava show no abnormality. The pancreas is obscured by overlying bowel gas. The liver measures 12.4 cm with homogeneous echotexture. No intrinsic mass and no intra-or extrahepatic ductal dilatation. There is hepatopedal flow of the main portal vein. No gallstones, pericholecystic fluid, wall thickening or positive sonographic Melendez sign. The common bile duct measures 0.3 cm. The right kidney measures 9.2 x 5.6 x 3.0 cm with a nonobstructing 1.2 cm stone in the upper pole. Multiple additional subcentimeter calcifications are seen. These were not seen on previous exam.\par No hydronephrosis or renal mass. The left kidney measures 9.3 x 3.9 x 3.5 cm. There is a 2.1 x 2.4 x 2.0 cm cyst in the medial mid pole, not seen on previous study. No hydronephrosis or renal calcification. The spleen measures 8.2 cm and show no abnormality. There is a small right pleural effusion, stable.\par IMPRESSIONS: Multiple nonobstructing stones of the right kidney with no hydronephrosis. 2.4 cm cyst of the midpole left kidney. Small right pleural effusion, stable.\par The daughter has answered the phone and reports the patient's urine does not get dark. She is not aware of the amount of water she gives her mother. I requested she measure's this from now on as the pt has stones and needs to increase water consumption. Her daughter reports this week the pt has experienced more spasms than normal and believes this may be due to severe constipation. She provided the pt with an Enema to aide in this situation. She is very concerned but I informed her the Enema may have stimulated the spasms and we should give her a few days to clear up and re-evaluate. \par \par I informed the patient's care taker to now measure and increase water volume as the pt has stones. \par Clinical findings and US results were reviewed at length with the patient. I personally reviewed the imaging myself.\par Pt will schedule a telehealth visit in 1- 2 weeks.\par \par 03/22/2023: Ms. CLAUDIO MCDOWELL presents today for an audio visual telehealth visit for which she gave permission for. The patient was located at home at [address] [place] [address] and I was located at my office in [place], [place]. She is accompanied by her daughter whom most of the visit was conducted with. Her daughter has been keeping track of her mother's water consumption. She is averaging about 58 oz of water in a 24 hr period. Additionally, she is drinking juice and ensure nutrition drinks. She does not consume any caffeine. She is on furosemide 40 mg. I said hello to the patient at the end of the visit and she is looking well. She reports feeling no pain at the current moment. \par I advised the patient's daughter to keep up the water intake and if she can, up it to 64 oz of water in a 24 hr period. I recommended she add fresh lemon juice to her water as well. I also advised the patient's daughter to speak with the prescribing doctor for furosemide on if it is okay to increase her water intake. I informed her I would be happy to talk to him if he needs. \par Reviewed the patient's abdominal US of 3/9/2023 once again. US prior to that on 2/28/2022 showed no hydro, mass, or renal calcification, though the kidneys were not visualized well due to bowel gas. \par Advised the patient's daughter to speak with the pt's gastroenterologist about her BM problems. \par Patient is getting blood work drawn in her house in a few days. Will send orders for the things that I want done. \par \par 3/29/2023:  Patient Claudio Mcdowell and daughter Tabatha Deluna were home in [place], [place] and I was in my office in [facility].  Patient and daughter gave permission for a FaceTime telehealth visit.  They preferred this to [employer].  The patient looks good today.  Her complexion was good there is no pallor.  Smile was symmetric her affect was normal she appeared happy she could make conversation it was appropriate.  She said that she currently had no pain.  When I asked questions that after 3/2 how she had been recently the sometimes she hesitated and deferred to her daughter for cancer compatible with her impaired short-term memory.  Long-term memory remains good she recognizes me once know so I am does ask questions about things like her bladder as she was worried about it her blood tests.  I assured her the results were satisfactory.\par \par I reviewed the blood and urine test results in detail with her daughter.  I explained the findings.  Patient based upon urine osmolality which was low and serum osmolality which was in the mid range of normal is appropriately hydrated.  The patient has congestive heart failure but was not dyspneic on observation today and said that she had no trouble breathing currently.  Her daughter confirms this.  The pro brain natruretic protein in the serum was elevated in keeping with her congestive heart.  The patient does not exert for self she remains in bed.  The patient had excess catheter immunoglobulin change in her urine.  This is in keeping with her diagnosis of multiple myeloma.  She has had for over 10 years without treatment I suspect its more likely gammopathy but has been.  The daughter said that at one point there was consideration of the degree of her having chronic lymphocytic leukemia.  I doubt she has had chronic lymphocytic leukemia.  I am unaware of her having any elevated lymphocyte counts.  A complete blood count ordered by Dr. Farmer showed  low lymphocytes at 470/mcL on December 7, 2022 blood draw at which time her total white blood cell count was 3850 white blood cells per microliter.  This was within normal range.  In view of the urine findings of a past diagnosis of multiple myeloma I will check the immunoglobin electrophoresis addition to a complete blood cell count.  In about 2 months I will see if we can arrange for a renal ultrasound to see if there is any growth in the patient's kidney stones.  I reviewed the issues involved in this kidney stone formation and growth.  I reviewed have an embolization bed 10 resulted in loss of calcium from the bones and hypercalciuria which could lead to nephrolithiasis.  The daughter is extremely concerned about the welfare of her mother.  The patient daughter does explore her concerns and most of her questions are pertinent.  I answered the daughter's questions to her satisfaction patient will have additional blood test drawn at home from the mobile phlebotomy team.  In 2 months time we will attempt to arrange for a home renal ultrasound.\par \par 04/18/2023: Ms. MCDOWELL is a 89 year old female presenting today for an audio and visual telehealth visit for which she gave verbal permission. We utilized [employer] teams telemetry doc platform. The patient was located in her home at [address] [place] [address]. I was located in my office on [place], [place]. She was accompanied by her daughter who helped to serve as a historian. She reports discomfort in her eyes. She describes the discomfort as a feeling of sand. She opened her eyes for me, and they do not look red. Pupils look normal. She reports the right eyes bothers her significantly more than the left. I advise that the patient tries lubricating drops and it if does not feel better she will notify her visiting nurse who is due to come next week for a sales catheter change. She reports episode of bladder spasms. She denies gross hematuria. She describes the urine as barely yellow. I reviewed and discussed her  latest pertinent lab on 04/12/2023 which shows "alkaline phosphate normal at 89. Creatinine was normal at 0.62 mg/dL. WBC normal at 5.01. RBC normal at 3.87". \par \par Plan: She will provides urine specimen for Urine Analysis, Urine Culture and Urine Cytology. She will try lubricating drops to alleviate eyes discomfort. She will return for a telehalth 3 days after providing urine sample. \par \par 05/09/2023: Ms. MCDOWELL presents today for a follow up audio only telehealth visit for which they gave permission for. She was accompanied by her daughter who helped to serve as a historian. The patient was located at home [address] [place] [address] and I was located in my office in [place], [place]. The patient obtained lab work 5/2/23 prior to today's visit. The UA revealed microscopic hematuria, 5/HPF RBC, 27/HPF WBC. The patient demonstrated great hydration as the specific gravity is 1.006. The Sales catheter was changed May 2, 2023 with no clear urine in the bag. While changing the diaper the aide reports blood in the diaper. The daughter states last week her mother experienced spasms but as of two days ago she is now fine. However, she states the patient is more fatigued. She denies the pt having a temperature. I assured her because her mother is post menopausal any abrasion to the labia can cause a fissure. \par \par Reviewed and discussed laboratory work of 5/2/23 which She  obtained prior to today's visit as requested. I assured the daughter the patient remains well in a urological standpoint. \par \par Upon every catheter change patient is to give a urine sample.\par The daughter is to resend urine culture from prior catheter change.\par \par Pt will schedule a telehealth visit \par \par 06/02/2023: Ms. MCDOWELL is a 89 year old female presenting today for an audio and visual telehealth visit for which she gave verbal permission. We utilized [employer] teams telemetry [employer] platform. The patient was located in her home at [address] [place] [address]. I was located in my office on [place], [place]. She was accompanied by her daughter Tabatha. She reports that her mother complains of onset dysuria that dissipated on its own and has not recurred since. She reports right sided back pain that is aggravated when she lays on the right side. She provided urine specimen. She notes the urine was very clear. I reviewed and discussed the patient's pertinent lab works. Her latest Urinalysis on 05/30/2023 that shows "60 WBC/HPF. Trace Blood Urine. Specific Gravity Urine 1.007". Urine Culture on the same date shows ">100,000 CFU/ml Escherichia coli and <10,000 CFU/ml Normal Urogenital ángel present". The patient takes Mirtazapine for anxiety at low dose. The daughter reports the patient is experiencing increasing anxiety and is thinking of having the prescriber increasing the dosage.  Her BP runs around 120/60. Her Hemoglobin hematocrit looks good. gaze is conjugated. facial expression looks symmetric. Urine was faint yellow and clear. \par \par Plan:\par Preparation, audio-visual visit, and coordination of care took : 24 mins\par \par 06/06/2023: Ms. MCDOWELL presents today for a follow up audio only telehealth visit for which they gave permission for. The patient was located at home [address] [place] [address] and I was located in my office in [place], [place]. She was accompanied by her daughter Tabatha. The daughter states pt is experiencing episodes of a dry cough. An X Ray  of 6/5/23 revealed Mild peribronchial thickening suggesting bronchitis in the right clinical setting with no focal pneumonia or congestive heart failure. COPD with chronic lung changes. The mental status is the same as usual but her BP has elevated some. I assured her a little elevation is not as dangerous. The pt continues to experience some urinary frequency and it is a little more concentrated. \par \par I assured the daughter the patient may continue to  take Augmentin as they have already started with 1 dose. \par She is to send a copy of the Chest CT scan.\par Pt will schedule a telehealth visit\par \par 07/05/2023: Ms. CLAUDIO MCDOWELL presents today for a follow up audio only telephone visit for which she gave permission for. The pt was located at home, [address] [place] [address], and I was located in my office in [place], [place]. She is accompanied by her daughter Tabatha. Patient provided a surveillance urine specimen on 6/29/2023. Pt has chronic indwelling sales. UA revealed large LEC, 79 WBC/HPF, and occasional bacteria/hpf. Urine culture grew >100,000 CFU/ml EColi and 50,000-99,000 CFU/ml Enterobacter cloacae complex. Her daughter reports that her mother has been having some breathing problems and constipation. Her urine has been fairly good. There are some times when it looks like she's putting out less. It appears a little more concentrated. She tries to give her more water when she notices this. She is barely eating and mostly drinks Ensure. According to her most recent UA, she was well hydrated. She has not been squirting urine around the catheter, not having bladder spasms. \par \par From a urologic standpoint, she is doing well. \par \par Will provide surveillance urine specimen in 1-2 months, will schedule a telehealth visit to review and discuss results. \par \par Duration of telephone visit: 21 minutes.

## 2023-07-13 ENCOUNTER — TRANSCRIPTION ENCOUNTER (OUTPATIENT)
Age: 88
End: 2023-07-13

## 2023-07-17 ENCOUNTER — APPOINTMENT (OUTPATIENT)
Dept: HOME HEALTH SERVICES | Facility: HOME HEALTH | Age: 88
End: 2023-07-17

## 2023-07-17 ENCOUNTER — NON-APPOINTMENT (OUTPATIENT)
Age: 88
End: 2023-07-17

## 2023-07-17 LAB
25(OH)D3 SERPL-MCNC: 63 NG/ML
ALBUMIN SERPL ELPH-MCNC: 4 G/DL
ALP BLD-CCNC: 77 U/L
ALT SERPL-CCNC: 10 U/L
ANION GAP SERPL CALC-SCNC: 11 MMOL/L
AST SERPL-CCNC: 19 U/L
BILIRUB SERPL-MCNC: 0.3 MG/DL
BUN SERPL-MCNC: 17 MG/DL
CALCIUM SERPL-MCNC: 9.5 MG/DL
CHLORIDE SERPL-SCNC: 101 MMOL/L
CHOLEST SERPL-MCNC: 128 MG/DL
CO2 SERPL-SCNC: 26 MMOL/L
CREAT SERPL-MCNC: 0.6 MG/DL
EGFR: 84 ML/MIN/1.73M2
FERRITIN SERPL-MCNC: 176 NG/ML
GLUCOSE SERPL-MCNC: 94 MG/DL
HDLC SERPL-MCNC: 50 MG/DL
IRON SATN MFR SERPL: 14 %
IRON SERPL-MCNC: 39 UG/DL
LDLC SERPL CALC-MCNC: 67 MG/DL
MAGNESIUM SERPL-MCNC: 2.1 MG/DL
NONHDLC SERPL-MCNC: 78 MG/DL
PHOSPHATE SERPL-MCNC: 2.7 MG/DL
POTASSIUM SERPL-SCNC: 4.2 MMOL/L
PROT SERPL-MCNC: 6 G/DL
SODIUM SERPL-SCNC: 138 MMOL/L
TIBC SERPL-MCNC: 274 UG/DL
TRIGL SERPL-MCNC: 56 MG/DL
TSH SERPL-ACNC: 1.48 UIU/ML
UIBC SERPL-MCNC: 235 UG/DL

## 2023-07-19 ENCOUNTER — APPOINTMENT (OUTPATIENT)
Dept: HOME HEALTH SERVICES | Facility: HOME HEALTH | Age: 88
End: 2023-07-19
Payer: MEDICARE

## 2023-07-19 VITALS
DIASTOLIC BLOOD PRESSURE: 62 MMHG | TEMPERATURE: 36.1 F | OXYGEN SATURATION: 94 % | HEART RATE: 61 BPM | RESPIRATION RATE: 14 BRPM | SYSTOLIC BLOOD PRESSURE: 124 MMHG

## 2023-07-19 DIAGNOSIS — M54.50 LOW BACK PAIN, UNSPECIFIED: ICD-10-CM

## 2023-07-19 PROCEDURE — 99349 HOME/RES VST EST MOD MDM 40: CPT

## 2023-07-20 ENCOUNTER — NON-APPOINTMENT (OUTPATIENT)
Age: 88
End: 2023-07-20

## 2023-07-26 RX ORDER — COLLAGENASE SANTYL 250 [ARB'U]/G
250 OINTMENT TOPICAL DAILY
Qty: 1 | Refills: 2 | Status: ACTIVE | COMMUNITY
Start: 2021-11-05

## 2023-07-26 NOTE — REVIEW OF SYSTEMS
[Postnasal Drip] : postnasal drip [Constipation] : constipation [Incontinence] : incontinence [Joint Pain] : joint pain [Negative] : Psychiatric [FreeTextEntry8] : sales in place  [FreeTextEntry9] : generalized weakness; bed-bound  [de-identified] : frail/thin skin

## 2023-07-26 NOTE — PHYSICAL EXAM
[Normal Sclera/Conjunctiva] : normal sclera/conjunctiva [Normal Outer Ear/Nose] : the ears and nose were normal in appearance [No JVD] : no jugular venous distention [No Respiratory Distress] : no respiratory distress [Breast Exam Declined] : patient declined to have breast exam done [Non Tender] : non-tender [Patient Refused] : rectal exam was refused by the patient [No CVA Tenderness] : no ~M costovertebral angle tenderness [No Motor Deficits] : the motor exam was normal [Oriented x3] : oriented to person, place, and time [de-identified] : comfortable  cooperative and  pleasant    but  frail   occipital area of scalp  quarter sized nodule  as per derm pyogenic granuloma   no signs of infection  [de-identified] :  hearing aids  [de-identified] : crackles at bases   transmitted breath sounds  [de-identified] :  irregular 3/6  murmur   trace  pedal edema   [de-identified] :  softly distended   right  inguinal hernia   [de-identified] :  sales  in place  draining  clear urine  [de-identified] :  nonambulatory  [de-identified] : sacral wound not examined  as per daughter request    use of barrier cream stressed  \par right ischium continues to improve, 0.8 cm, packing with alginate\par heels left healed, right heel stable eschar , offloading with boots and has group 2 mattress  venous  stasis  hyperpigmentation  dry skin but no ulceration   long elongated  brittle nails  [de-identified] : anxious

## 2023-07-26 NOTE — HEALTH RISK ASSESSMENT
[HRA Reviewed] : Health risk assessment reviewed [Independent] : feeding [FreeTextEntry8] : n/a [Full assistance needed] : managing finances [Patient not ambulatory (Wheelchair)] : Patient is not ambulatory (Wheelchair)

## 2023-07-26 NOTE — HISTORY OF PRESENT ILLNESS
[Family Member] : family member [FreeTextEntry1] : debility CHF   bedbound  frequent UTI  [FreeTextEntry2] :  89 year old female is   seen today for an routine follow-up of chronic medical conditions. \par \par Daughter called in to report patient has an intermittent/puffing out - odd breathing pattern. Patient has also been managing constipation for the past week. \par Daughter was giving patient 1/2 a MiraLax everyday, had a GI specialist come to the house - a lot of stool was felt. Since then, patient with multiple BM's. \par  The mental status is the same as usual but her BP has elevated despite different  dose of losartan / 50 mg  / \par  today  patient denies any chest  pains  palpitations    no   shortness  of breath at rest  on oxygen  2 liters \par no weight changes \par   intense involvement of other specialists   appreciated  \par  diet regular  appetite   ok \par dysphagia  yes to pills \par Weight  stable now\par Ambulates none bedbound \par falls none \par  has indwelling [place] and  has been seeing  urologist via telehealth   \par Behavior very  anxious  on meds  at times agitated  \par Mood ok \par  memory  ok  declining  \par  sleep  ok \par sensory deficits   vision ok hard of hearing  wears hearing aids\par   recent blood work   radiology reviewed \par discussed with patient /caretakers   at length and in detail\par  \par

## 2023-08-01 NOTE — DISCHARGE NOTE NURSING/CASE MANAGEMENT/SOCIAL WORK - NSDCPEFALRISK_GEN_ALL_CORE
Mom calling stating he was stung by a wasp, never been stung before, please advise, mom wondering about benadryl dosing   Patient information on fall and injury prevention

## 2023-08-07 ENCOUNTER — APPOINTMENT (OUTPATIENT)
Dept: UROLOGY | Facility: CLINIC | Age: 88
End: 2023-08-07
Payer: MEDICARE

## 2023-08-07 LAB
APPEARANCE: CLEAR
BACTERIA UR CULT: ABNORMAL
BACTERIA: ABNORMAL /HPF
BILIRUBIN URINE: NEGATIVE
BLOOD URINE: ABNORMAL
CAST: 22 /LPF
COLOR: YELLOW
EPITHELIAL CELLS: 0 /HPF
GLUCOSE QUALITATIVE U: NEGATIVE MG/DL
KETONES URINE: NEGATIVE MG/DL
LEUKOCYTE ESTERASE URINE: ABNORMAL
MICROSCOPIC-UA: NORMAL
NITRITE URINE: NEGATIVE
PH URINE: 7
PROTEIN URINE: NEGATIVE MG/DL
RED BLOOD CELLS URINE: 0 /HPF
REVIEW: NORMAL
SPECIFIC GRAVITY URINE: 1.01
URINE CYTOLOGY: NORMAL
UROBILINOGEN URINE: 0.2 MG/DL
WHITE BLOOD CELLS URINE: 103 /HPF

## 2023-08-07 PROCEDURE — 99443: CPT | Mod: 95

## 2023-08-08 ENCOUNTER — NON-APPOINTMENT (OUTPATIENT)
Age: 88
End: 2023-08-08

## 2023-08-13 NOTE — ASSESSMENT
[FreeTextEntry1] : Claudio Mcdowell in her daughter Tabatha Deluna gave permission for an audio video telehealth visit.  They were in their home in [facility].  I was in my office in [facility].  Claudio Mcdowell is currently 89 years of age.  Her daughter Tabatha Deluna is devoted to her care.  She is very concerned about her mother and has been so for many years.  She becomes very anxious at times and that is concerning her mother.  Claudio Mcdowell lives with her daughter.  Claudio Mcdowell has been bedbound for several years.  The patient had developed a sacral decubitus ulcer while in a nursing facility.  A Sales catheter was placed because of fecal incontinence and concern that urine mixed with the feces would contaminate the sacral decubitus ulcer.  Once the patient left the nursing facility and will be with her daughter.  The sacral decubitus ulcer has finally healed.  I have discussed removing the Sales catheter with the daughter.  However as the patient has fecal incontinence there is concern about the urine mixing with the feces and being more likely to cause skin problems.  For now we will leave the Sales catheter in.  The patient has had clinically symptomatic urinary tract infections.  The restart with increased urination around the Sales catheter due to bladder spasms.  Bladder spasms have been occurring for staff.  The patient is positioned properly in in bed and the personal care attendant leaves for the day.  We have managed to prevent this with the administration of sublingual hyoscyamine.  We do periodic surveillance urine analysis and urine culture tests at the time the Sales catheter is changed by visiting nurse.  He also performed a times of increased spasms or purulence of the urine or change in mental status,  On October 11, 2022 visiting nurse using an order I have previously placed at the request of the daughter sent urine for urine cytology which proved to be negative for malignant cells, urine culture which grew Greater than 100,000 and E. coli that was sensitive to all antibiotics tested.  Urinalysis looked quite good for urine taken from a Sales catheter that was used for chronic Sales catheter drainage.  Ileukocyte esterase was large but nitrites were negative.  There were 2 epithelial cells per high-power field.  There were only 10 white blood cells per high-power field and only 2 red blood cells per high-power field on microscopic exam there were no bacteria seen and there were no hyaline casts.  Specific gravity was 1.010 which shows good hydration and this bedbound woman cared for diligently by her daughter.  Blood by dipstick was trace.  There was no protein glucose or ketones in the urine.  There is no bilirubin and no urobilinogen.  An important portion of the visit was my review with the daughter about bladder spasms and urination around the catheter.  Urine appearance and urine analysis have been clear.  The urine was clear again today.  The patient has significant short-term memory deficit.  She does have some useful short-term memory.  Her long-term memory remains very much intact.  She is very pleasant and to make satisfactory conversation.  She does admit to sometimes not remembering something.  Her complexion was good and there was no pallor.  Facial movements were symmetric.  Cranial nerves were intact.  I was not able to assess the olfactory nerve as this was a telehealth visit.    I asked the daughter to see to it that the urine sample was sent periodically when the catheter was changed.  I would do this for surveillance purposes.  I also asked that a urine sample be sent at times of increased cloudiness of the urine.  At any time that gross blood was visible in the urine.  I would also asked that urine be sent if mental status should change.  I asked that urine sample be sent if urine leakage around the catheter picked up and appeared to be from spasms.  The daughter said that she would think that this was performed.  I also asked that the daughter schedule a telehealth visit 3 days after urine samples were sent so we can discuss the circumstances around the decision to send the specimen the specimen and if the patient was having any obvious symptoms or signs that might indicate clinically significant infection.  11/04/2022: Ms. MCDOWELL is a 89 year old female presenting today for a telehealth visit. She was accompanied by her daughter who helped with the visit. They gave permission for an audio only telehealth conference. The patient was located in her home in [place], [place]. I was located in my office on [place], [place]. Today her daughter reports the pt experienced rare urinary leaking around the catheter due to bladder spasms. She also reports her systolic pressure was around 140 last week. I offered Gemtesa to manage the bladder spasms and the daughter was cautious about more medications. I informed her if the urinary leaking are only once every 2 weeks or so, she does not have to medicate. The daughter was agreeable to monitor the occurrence of urinary leaking over the next 4 weeks and assess the discomfort it causes the pt. She denies any other urinary issues.  Plan: She will provide a urine specimen for UA, urine cytology and urine culture. She will follow up 2 or 3  weeks after providing urine sample/  03/01/2023: Ms. MCDOWELL is a 89 year old female presenting today for a telehealth visit. She was accompanied by her daughter who helped with the visit. They gave permission for an audio only telehealth conference. The patient was located in her home in [place], [place]. I was located in my office on [place], [place]. The pt has chronic sales catheter drainage. She provided a urine specimen from the catheter on 2/27/2023. UA was slightly turbid with large LEC and 59 WBC/HPF. Culture grew >100,000 CFU/ml E.Coli. The sensitivity report is not yet back. The pt's daughter was concerned as when the patient was constipated a few weeks ago she was having very watery BM which grew messy and were around the catheter area. She was given a Fleet's enema last month which helped. Currently she is having BM, paste-like in appearance. Her daughter does not think that her stomach is distended. She has recently had the catheter changed and is not voiding around it. Urine has been very clear. She does not seem to have pain in the urinary passageway or bladder. Denies fevers. Denies gross hematuria. [facility], only had the first two vaccines from two years ago.   Reviewed and discussed 2/27/2023 urine test results. Given the patient is not febrile and is not symptomatic, I will not treat at this time as these urine test results are consistent with someone who has chronic sales drainage.  If she gets ill, her daughter will call promptly.  Patient appears to be stable at this time. I spoke to her at the conclusion of the visit and she sounds good. Her memory seems to be good as she personally asked to speak to me. Was a little hesitant to reply at times but did ask me about my family. She spoke softly yet clearly.  I did advise the patient and her daughter to speak with her PCP in about 4 months about receiving the covid booster vaccines.  Patient will have a telephone visit in 3 months, sooner if clinically indicated. Will continue to submit surveillance urine cultures when her catheter is changed each month.  03/13/2023: Ms. MCDOWELL is a 89 year old female presenting today for a telehealth visit. She was accompanied by her daughter who helped with the visit. They gave permission for an audio only telehealth conference. The patient was located in her home in [place], [place]. I was located in my office on [place], [place]. The patient obtained an US prior to today's visit 3/9/23 which revealed: Comparison is made with the exam of 2/28/22. Transabdominal ultrasound of the abdomen was performed with color flow imaging. The examination is limited in evaluation. The visualized aorta and inferior vena cava show no abnormality. The pancreas is obscured by overlying bowel gas. The liver measures 12.4 cm with homogeneous echotexture. No intrinsic mass and no intra-or extrahepatic ductal dilatation. There is hepatopedal flow of the main portal vein. No gallstones, pericholecystic fluid, wall thickening or positive sonographic Melendez sign. The common bile duct measures 0.3 cm. The right kidney measures 9.2 x 5.6 x 3.0 cm with a nonobstructing 1.2 cm stone in the upper pole. Multiple additional subcentimeter calcifications are seen. These were not seen on previous exam. No hydronephrosis or renal mass. The left kidney measures 9.3 x 3.9 x 3.5 cm. There is a 2.1 x 2.4 x 2.0 cm cyst in the medial mid pole, not seen on previous study. No hydronephrosis or renal calcification. The spleen measures 8.2 cm and show no abnormality. There is a small right pleural effusion, stable. IMPRESSIONS: Multiple nonobstructing stones of the right kidney with no hydronephrosis. 2.4 cm cyst of the midpole left kidney. Small right pleural effusion, stable. The daughter has answered the phone and reports the patient's urine does not get dark. She is not aware of the amount of water she gives her mother. I requested she measure's this from now on as the pt has stones and needs to increase water consumption. Her daughter reports this week the pt has experienced more spasms than normal and believes this may be due to severe constipation. She provided the pt with an Enema to aide in this situation. She is very concerned but I informed her the Enema may have stimulated the spasms and we should give her a few days to clear up and re-evaluate.   I informed the patient's care taker to now measure and increase water volume as the pt has stones.  Clinical findings and US results were reviewed at length with the patient. I personally reviewed the imaging myself. Pt will schedule a telehealth visit in 1- 2 weeks.  03/22/2023: Ms. CLAUDIO MCDOWELL presents today for an audio visual telehealth visit for which she gave permission for. The patient was located at home at [address] [place] [address] and I was located at my office in [place], [place]. She is accompanied by her daughter whom most of the visit was conducted with. Her daughter has been keeping track of her mother's water consumption. She is averaging about 58 oz of water in a 24 hr period. Additionally, she is drinking juice and ensure nutrition drinks. She does not consume any caffeine. She is on furosemide 40 mg. I said hello to the patient at the end of the visit and she is looking well. She reports feeling no pain at the current moment.  I advised the patient's daughter to keep up the water intake and if she can, up it to 64 oz of water in a 24 hr period. I recommended she add fresh lemon juice to her water as well. I also advised the patient's daughter to speak with the prescribing doctor for furosemide on if it is okay to increase her water intake. I informed her I would be happy to talk to him if he needs.  Reviewed the patient's abdominal US of 3/9/2023 once again. US prior to that on 2/28/2022 showed no hydro, mass, or renal calcification, though the kidneys were not visualized well due to bowel gas.  Advised the patient's daughter to speak with the pt's gastroenterologist about her BM problems.  Patient is getting blood work drawn in her house in a few days. Will send orders for the things that I want done.   3/29/2023:  Patient Claudio Mcdowell and daughter Tabatha Deluna were home in [place], [place] and I was in my office in [facility].  Patient and daughter gave permission for a FaceTime telehealth visit.  They preferred this to [employer].  The patient looks good today.  Her complexion was good there is no pallor.  Smile was symmetric her affect was normal she appeared happy she could make conversation it was appropriate.  She said that she currently had no pain.  When I asked questions that after 3/2 how she had been recently the sometimes she hesitated and deferred to her daughter for cancer compatible with her impaired short-term memory.  Long-term memory remains good she recognizes me once know so I am does ask questions about things like her bladder as she was worried about it her blood tests.  I assured her the results were satisfactory.  I reviewed the blood and urine test results in detail with her daughter.  I explained the findings.  Patient based upon urine osmolality which was low and serum osmolality which was in the mid range of normal is appropriately hydrated.  The patient has congestive heart failure but was not dyspneic on observation today and said that she had no trouble breathing currently.  Her daughter confirms this.  The pro brain natruretic protein in the serum was elevated in keeping with her congestive heart.  The patient does not exert for self she remains in bed.  The patient had excess catheter immunoglobulin change in her urine.  This is in keeping with her diagnosis of multiple myeloma.  She has had for over 10 years without treatment I suspect its more likely gammopathy but has been.  The daughter said that at one point there was consideration of the degree of her having chronic lymphocytic leukemia.  I doubt she has had chronic lymphocytic leukemia.  I am unaware of her having any elevated lymphocyte counts.  A complete blood count ordered by Dr. Farmer showed  low lymphocytes at 470/mcL on December 7, 2022 blood draw at which time her total white blood cell count was 3850 white blood cells per microliter.  This was within normal range.  In view of the urine findings of a past diagnosis of multiple myeloma I will check the immunoglobin electrophoresis addition to a complete blood cell count.  In about 2 months I will see if we can arrange for a renal ultrasound to see if there is any growth in the patient's kidney stones.  I reviewed the issues involved in this kidney stone formation and growth.  I reviewed have an embolization bed 10 resulted in loss of calcium from the bones and hypercalciuria which could lead to nephrolithiasis.  The daughter is extremely concerned about the welfare of her mother.  The patient daughter does explore her concerns and most of her questions are pertinent.  I answered the daughter's questions to her satisfaction patient will have additional blood test drawn at home from the mobile phlebotomy team.  In 2 months time we will attempt to arrange for a home renal ultrasound.  04/18/2023: Ms. MCDOWELL is a 89 year old female presenting today for an audio and visual telehealth visit for which she gave verbal permission. We utilized [employer] teams telemetry doc platform. The patient was located in her home at [address] [place] [address]. I was located in my office on [place], [place]. She was accompanied by her daughter who helped to serve as a historian. She reports discomfort in her eyes. She describes the discomfort as a feeling of sand. She opened her eyes for me, and they do not look red. Pupils look normal. She reports the right eyes bothers her significantly more than the left. I advise that the patient tries lubricating drops and it if does not feel better she will notify her visiting nurse who is due to come next week for a sales catheter change. She reports episode of bladder spasms. She denies gross hematuria. She describes the urine as barely yellow. I reviewed and discussed her  latest pertinent lab on 04/12/2023 which shows "alkaline phosphate normal at 89. Creatinine was normal at 0.62 mg/dL. WBC normal at 5.01. RBC normal at 3.87".   Plan: She will provides urine specimen for Urine Analysis, Urine Culture and Urine Cytology. She will try lubricating drops to alleviate eyes discomfort. She will return for a telehalth 3 days after providing urine sample.   05/09/2023: Ms. MCDOWELL presents today for a follow up audio only telehealth visit for which they gave permission for. She was accompanied by her daughter who helped to serve as a historian. The patient was located at home [address] [place] [address] and I was located in my office in [place], [place]. The patient obtained lab work 5/2/23 prior to today's visit. The UA revealed microscopic hematuria, 5/HPF RBC, 27/HPF WBC. The patient demonstrated great hydration as the specific gravity is 1.006. The Sales catheter was changed May 2, 2023 with no clear urine in the bag. While changing the diaper the aide reports blood in the diaper. The daughter states last week her mother experienced spasms but as of two days ago she is now fine. However, she states the patient is more fatigued. She denies the pt having a temperature. I assured her because her mother is post menopausal any abrasion to the labia can cause a fissure.   Reviewed and discussed laboratory work of 5/2/23 which She  obtained prior to today's visit as requested. I assured the daughter the patient remains well in a urological standpoint.   Upon every catheter change patient is to give a urine sample. The daughter is to resend urine culture from prior catheter change.  Pt will schedule a telehealth visit   06/02/2023: Ms. MCDOWELL is a 89 year old female presenting today for an audio and visual telehealth visit for which she gave verbal permission. We utilized [employer] teams telemetry doc platform. The patient was located in her home at [address] [place] [address]. I was located in my office on [place], [place]. She was accompanied by her daughter Tabatha. She reports that her mother complains of onset dysuria that dissipated on its own and has not recurred since. She reports right sided back pain that is aggravated when she lays on the right side. She provided urine specimen. She notes the urine was very clear. I reviewed and discussed the patient's pertinent lab works. Her latest Urinalysis on 05/30/2023 that shows "60 WBC/HPF. Trace Blood Urine. Specific Gravity Urine 1.007". Urine Culture on the same date shows ">100,000 CFU/ml Escherichia coli and <10,000 CFU/ml Normal Urogenital ángel present". The patient takes Mirtazapine for anxiety at low dose. The daughter reports the patient is experiencing increasing anxiety and is thinking of having the prescriber increasing the dosage.  Her BP runs around 120/60. Her Hemoglobin hematocrit looks good. gaze is conjugated. facial expression looks symmetric. Urine was faint yellow and clear.   Plan: Preparation, audio-visual visit, and coordination of care took : 24 mins  06/06/2023: Ms. MCDOWELL presents today for a follow up audio only telehealth visit for which they gave permission for. The patient was located at home [address] [place] [address] and I was located in my office in [place], [place]. She was accompanied by her daughter Tabatha. The daughter states pt is experiencing episodes of a dry cough. An X Ray  of 6/5/23 revealed Mild peribronchial thickening suggesting bronchitis in the right clinical setting with no focal pneumonia or congestive heart failure. COPD with chronic lung changes. The mental status is the same as usual but her BP has elevated some. I assured her a little elevation is not as dangerous. The pt continues to experience some urinary frequency and it is a little more concentrated.   I assured the daughter the patient may continue to  take Augmentin as they have already started with 1 dose.  She is to send a copy of the Chest CT scan. Pt will schedule a telehealth visit  07/05/2023: Ms. CLAUDIO MCDOWELL presents today for a follow up audio only telephone visit for which she gave permission for. The pt was located at home, [address] [place] [address], and I was located in my office in [place], [place]. She is accompanied by her daughter Tabatha. Patient provided a surveillance urine specimen on 6/29/2023. Pt has chronic indwelling sales. UA revealed large LEC, 79 WBC/HPF, and occasional bacteria/hpf. Urine culture grew >100,000 CFU/ml EColi and 50,000-99,000 CFU/ml Enterobacter cloacae complex. Her daughter reports that her mother has been having some breathing problems and constipation. Her urine has been fairly good. There are some times when it looks like she's putting out less. It appears a little more concentrated. She tries to give her more water when she notices this. She is barely eating and mostly drinks Ensure. According to her most recent UA, she was well hydrated. She has not been squirting urine around the catheter, not having bladder spasms.   From a urologic standpoint, she is doing well.  Will provide surveillance urine specimen in 1-2 months, will schedule a telehealth visit to review and discuss results.   08/07/2023: Ms. CLAUDIO MCDOWELL was scheduled today for a follow up audio only telephone visit for which she gave permission for. The patient and her daughter were tried early at 4:40 PM, however there was no answer. A VM was left informing her that I will try calling at her scheduled appt time of 5PM. I called again at 5 PM and there once again was no answer. Pt advised to reschedule for tomorrow.  Patient provided a surveillance urine specimen on 8/1/2023. Has indwelling sales. UA revealed trace blood, large LEC, 103 WBC/HPF, few bacteria/ HPF, and 22 casts/HPF. Urine culture grew >100,000 CFU/ml E.Coli. Urine cytology was negative for high grade urothelial carcinoma. Specimen consisted of rare clusters of urothelial cells. The differential diagnosis includes mechanical disruption. She was tried once more at 5:47 PM and was able to be reached. I spoke to the patient's daughter, Tabatha. She denies any leaking around the catheter. Urine has been clear. Denies any fevers. She feels her mom is somewhat confused more often, worse the last week. She also reports she is more tired. Denies any pelvic pain.   Reviewed and discussed lab work of 8/1/2023 in detail with the pt. Given that the patient is confused, I am choosing to treat. It is possible that it is progressive memory loss, early dementia, however given the patient's age we will treat and see if her confusion improves.   I am prescribing Amoxicillin suspension 400 mg per 5 ml,10 ml BID until finished.   Patient will have a telehealth visit a few days after completion of abx.   Duration of telephone visit: 25 minutes.

## 2023-08-13 NOTE — HISTORY OF PRESENT ILLNESS
[FreeTextEntry1] : Claudio Mcdowell in her daughter Tabatha Deluna gave permission for an audio video telehealth visit.  They were in their home in [facility].  I was in my office in [facility].  Claudio Mcdowell is currently 89 years of age.  Her daughter Tabatha Deluna is devoted to her care.  She is very concerned about her mother and has been so for many years.  She becomes very anxious at times and that is concerning her mother.  Claudio Mcdowell lives with her daughter.  Claudio Mcdowell has been bedbound for several years.  The patient had developed a sacral decubitus ulcer while in a nursing facility.  A Sales catheter was placed because of fecal incontinence and concern that urine mixed with the feces would contaminate the sacral decubitus ulcer.  Once the patient left the nursing facility and will be with her daughter.  The sacral decubitus ulcer has finally healed.  I have discussed removing the Sales catheter with the daughter.  However as the patient has fecal incontinence there is concern about the urine mixing with the feces and being more likely to cause skin problems.  For now we will leave the Sales catheter in.  The patient has had clinically symptomatic urinary tract infections.  He clinically significant infections usually start with increased urination around the Sales catheter due to bladder spasms.  Bladder spasms have been occurring for staff.  The patient is positioned properly in in bed and the personal care attendant leaves for the day.  We have managed to prevent this with the administration of sublingual hyoscyamine.  We do periodic surveillance urine analysis and urine culture tests at the time the Sales catheter is changed by visiting nurse.  He also performed a times of increased spasms or purulence of the urine or change in mental status,  On October 11, 2022 visiting nurse using an order I have previously placed at the request of the daughter sent urine for urine cytology which proved to be negative for malignant cells, urine culture which grew Greater than 100,000 and E. coli that was sensitive to all antibiotics tested.  Urinalysis looked quite good for urine taken from a Sales catheter that was used for chronic Sales catheter drainage.  Ileukocyte esterase was large but nitrites were negative.  There were 2 epithelial cells per high-power field.  There were only 10 white blood cells per high-power field and only 2 red blood cells per high-power field on microscopic exam there were no bacteria seen and there were no hyaline casts.  Specific gravity was 1.010 which shows good hydration and this bedbound woman cared for diligently by her daughter.  Blood by dipstick was trace.  There was no protein glucose or ketones in the urine.  There is no bilirubin and no urobilinogen.  An important portion of the visit was my review with the daughter about bladder spasms and urination around the catheter.  Urine appearance and urine analysis have been clear.  The urine was clear again today.  The patient has significant short-term memory deficit.  She does have some useful short-term memory.  Her long-term memory remains very much intact.  She is very pleasant and to make satisfactory conversation.  She does admit to sometimes not remembering something.  Her complexion was good and there was no pallor.  Facial movements were symmetric.  Cranial nerves were intact.  I was not able to assess the olfactory nerve as this was a telehealth visit.    11/04/2022: Ms. MCDOWELL is a 89 year old female presenting today for a telehealth visit. She was accompanied by her daughter who helped with the visit. They gave permission for an audio only telehealth conference. The patient was located in her home in [place], [place]. I was located in my office on [place], [place]. Today her daughter reports the pt experienced rare urinary leaking around the catheter due to bladder spasms. She also reports her systolic pressure was around 140 last week. I offered Gemtesa to manage the bladder spasms and the daughter was cautious about more medications. I informed her if the urinary leaking are only once every 2 weeks or so, she does not have to medicate. The daughter was agreeable to monitor the occurrence of urinary leaking over the next 4 weeks and assess the discomfort it causes the pt. She denies any other urinary issues.  03/01/2023: Ms. MCDOWELL is a 89 year old female presenting today for a telehealth visit. She was accompanied by her daughter who helped with the visit. They gave permission for an audio only telehealth conference. The patient was located in her home in [place], [place]. I was located in my office on [place], [place]. The pt has chronic sales catheter drainage. She provided a urine specimen from the catheter on 2/27/2023. UA was slightly turbid with large LEC and 59 WBC/HPF. Culture grew >100,000 CFU/ml E.Coli. The sensitivity report is not yet back. The pt's daughter was concerned as when the patient was constipated a few weeks ago she was having very watery BM which grew messy and were around the catheter area. She was given a Fleet's enema last month which helped. Currently she is having BM, paste-like in appearance. Her daughter does not think that her stomach is distended. She has recently had the catheter changed and is not voiding around it. Urine has been very clear. She does not seem to have pain in the urinary passageway or bladder. Denies fevers. Denies gross hematuria. Pt had covid around Thanksgiving, only had the first two vaccines from two years ago.   03/13/2023: Ms. MCDOWELL is a 89 year old female presenting today for a telehealth visit. She was accompanied by her daughter who helped with the visit. They gave permission for an audio only telehealth conference. The patient was located in her home in [place], [place]. I was located in my office on [place], [place]. The patient obtained an US prior to today's visit 3/9/23 which revealed: Comparison is made with the exam of 2/28/22. Transabdominal ultrasound of the abdomen was performed with color flow imaging. The examination is limited in evaluation. The visualized aorta and inferior vena cava show no abnormality. The pancreas is obscured by overlying bowel gas. The liver measures 12.4 cm with homogeneous echotexture. No intrinsic mass and no intra-or extrahepatic ductal dilatation. There is hepatopedal flow of the main portal vein. No gallstones, pericholecystic fluid, wall thickening or positive sonographic Melendez sign. The common bile duct measures 0.3 cm. The right kidney measures 9.2 x 5.6 x 3.0 cm with a nonobstructing 1.2 cm stone in the upper pole. Multiple additional subcentimeter calcifications are seen. These were not seen on previous exam. No hydronephrosis or renal mass. The left kidney measures 9.3 x 3.9 x 3.5 cm. There is a 2.1 x 2.4 x 2.0 cm cyst in the medial mid pole, not seen on previous study. No hydronephrosis or renal calcification. The spleen measures 8.2 cm and show no abnormality. There is a small right pleural effusion, stable. IMPRESSIONS: Multiple nonobstructing stones of the right kidney with no hydronephrosis. 2.4 cm cyst of the midpole left kidney. Small right pleural effusion, stable. The daughter has answered the phone and reports the patient's urine does not get dark. She is not aware of the amount of water she gives her mother. I requested she measure's this from now on as the pt has stones and needs to increase water consumption. Her daughter reports this week the pt has experienced more spasms than normal and believes this may be due to severe constipation. She provided the pt with an Enema to aide in this situation. She is very concerned but I informed her the Enema may have stimulated the spasms and we should give her a few days to clear up and re-evaluate.   03/22/2023: Ms. CLAUDIO MCDOWELL presents today for an audio visual telehealth visit for which she gave permission for. The patient was located at home at [address] [place] [address] and I was located at my office in [place], [place]. She is accompanied by her daughter whom most of the visit was conducted with. Her daughter has been keeping track of her mother's water consumption. She is averaging about 58 oz of water in a 24 hr period. Additionally, she is drinking juice and ensure nutrition drinks. She does not consume any caffeine. She is on furosemide 40 mg. I said hello to the patient at the end of the visit and she is looking well. She reports feeling no pain at the current moment.   3/29/2023:  Patient Claudio Mcdowell and daughter Tabatha Deluna were home in [place], [place] and I was in my office in [facility].  Patient and daughter gave permission for a writewith telehealth visit.  They preferred this to [employer].  The patient looks good today.  Her complexion was good there is no pallor.  Smile was symmetric her affect was normal she appeared happy she could make conversation it was appropriate.  She said that she currently had no pain.  When I asked questions that after 3/2 how she had been recently the sometimes she hesitated and deferred to her daughter for cancer compatible with her impaired short-term memory.  Long-term memory remains good she recognizes me once know so I am does ask questions about things like her bladder as she was worried about it her blood tests.  I assured her the results were satisfactory.  04/18/2023: Ms. MCDOWELL is a 89 year old female presenting today for an audio and visual telehealth visit for which she gave verbal permission. We utilized [employer] teams telemetry doc platform. The patient was located in her home at [address] [place] [address]. I was located in my office on [place], [place]. She was accompanied by her daughter who helped to serve as a historian. She reports discomfort in her eyes. She describes the discomfort as a feeling of sand. She opened her eyes for me, and they do not look red. Pupils look normal. She reports the right eyes bothers her significantly more than the left. I advise that the patient tries lubricating drops and it if does not feel better she will notify her visiting nurse who is due to come next week for a sales catheter change. She reports episode of bladder spasms. She denies gross hematuria. She describes the urine as barely yellow. I reviewed and discussed her  latest pertinent lab on 04/12/2023 which shows "alkaline phosphate normal at 89. Creatinine was normal at 0.62 mg/dL. WBC normal at 5.01. RBC normal at 3.87".   05/09/2023: Ms. MCDOWELL presents today for a follow up audio only telehealth visit for which they gave permission for. She was accompanied by her daughter who helped to serve as a historian. The patient was located at home [address] [place] [address] and I was located in my office in [place], [place]. The patient obtained lab work 5/2/23 prior to today's visit. The UA revealed microscopic hematuria, 5/HPF RBC, 27/HPF WBC. The patient demonstrated great hydration as the specific gravity is 1.006. The Sales catheter was changed May 2, 2023 with no clear urine in the bag. While changing the diaper the aide reports blood in the diaper. The daughter states last week her mother experienced spasms but as of two days ago she is now fine. However, she states the patient is more fatigued. She denies the pt having a temperature. I assured her because her mother is post menopausal any abrasion to the labia can cause a fissure.   06/02/2023: Ms. MCDOWELL is a 89 year old female presenting today for an audio and visual telehealth visit for which she gave verbal permission. We utilized [employer] teams telemetry [employer] platform. The patient was located in her home at [address] [place] [address]. I was located in my office on [place], [place]. She was accompanied by her daughter Tabatha. She reports that her mother complains of onset dysuria that dissipated on its own and has not recurred since. She reports right sided back pain that is aggravated when she lays on the right side. She provided urine specimen. She notes the urine was very clear. I reviewed and discussed the patient's pertinent lab works. Her latest Urinalysis on 05/30/2023 that shows "60 WBC/HPF. Trace Blood Urine. Specific Gravity Urine 1.007". Urine Culture on the same date shows ">100,000 CFU/ml Escherichia coli and <10,000 CFU/ml Normal Urogenital ángel present". The patient takes Mirtazapine for anxiety at low dose. The daughter reports the patient is experiencing increasing anxiety and is thinking of having the prescriber increasing the dosage.  Her BP runs around 120/60. Her Hemoglobin hematocrit looks good. gaze is conjugated. facial expression looks symmetric. Urine was faint yellow and clear.   06/06/2023: Ms. MCDOWELL presents today for a follow up audio only telehealth visit for which they gave permission for. The patient was located at home [address] [place] [address] and I was located in my office in [place], [place]. She was accompanied by her daughter Tabatha. The daughter states pt is experiencing episodes of a dry cough. An X Ray  of 6/5/23 revealed Mild peribronchial thickening suggesting bronchitis in the right clinical setting with no focal pneumonia or congestive heart failure. COPD with chronic lung changes. The mental status is the same as usual but her BP has elevated some. I assured her a little elevation is not as dangerous. The pt continues to experience some urinary frequency and it is a little more concentrated.   07/05/2023: Ms. CLAUDIO MCDOWELL presents today for a follow up audio only telephone visit for which she gave permission for. The pt was located at home, [address] [place] [address], and I was located in my office in [place], [place]. She is accompanied by her daughter Tabatha. Patient provided a surveillance urine specimen on 6/29/2023. Pt has chronic indwelling sales. UA revealed large LEC, 79 WBC/HPF, and occasional bacteria/hpf. Urine culture grew >100,000 CFU/ml EColi and 50,000-99,000 CFU/ml Enterobacter cloacae complex. Her daughter reports that her mother has been having some breathing problems and constipation. Her urine has been fairly good. There are some times when it looks like she's putting out less. It appears a little more concentrated. She tries to give her more water when she notices this. She is barely eating and mostly drinks Ensure. According to her most recent UA, she was well hydrated. She has not been squirting urine around the catheter, not having bladder spasms.   08/07/2023: Ms. CLAUDIO MCDOWELL was scheduled today for a follow up audio only telephone visit for which she gave permission for. The patient and her daughter were tried early at 4:40 PM, however there was no answer. A VM was left informing her that I will try calling at her scheduled appt time of 5PM. I called again at 5 PM and there once again was no answer. Pt advised to reschedule for tomorrow.  Patient provided a surveillance urine specimen on 8/1/2023. Has indwelling sales. UA revealed trace blood, large LEC, 103 WBC/HPF, few bacteria/ HPF, and 22 casts/HPF. Urine culture grew >100,000 CFU/ml E.Coli. Urine cytology was negative for high grade urothelial carcinoma. Specimen consisted of rare clusters of urothelial cells. The differential diagnosis includes mechanical disruption.    08/07/2023: Ms. CLAUDIO MCDOWELL was scheduled today for a follow up audio only telephone visit for which she gave permission for. The patient and her daughter were tried early at 4:40 PM, however there was no answer. A VM was left informing her that I will try calling at her scheduled appt time of 5PM. I called again at 5 PM and there once again was no answer. Pt advised to reschedule for tomorrow.  Patient provided a surveillance urine specimen on 8/1/2023. Has indwelling sales. UA revealed trace blood, large LEC, 103 WBC/HPF, few bacteria/ HPF, and 22 casts/HPF. Urine culture grew >100,000 CFU/ml E.Coli. Urine cytology was negative for high grade urothelial carcinoma. Specimen consisted of rare clusters of urothelial cells. The differential diagnosis includes mechanical disruption. She was tried once more at 5:47 PM and was able to be reached. I spoke to the patient's daughter, Tabatha. She denies any leaking around the catheter. Urine has been clear. Denies any fevers. She feels her mom is somewhat confused more often, worse the last week. She also reports she is more tired. Denies any pelvic pain.

## 2023-08-13 NOTE — REVIEW OF SYSTEMS
[Eyesight Problems] : eyesight problems [Loss Of Hearing] : hearing loss [Chest Pain] : chest pain [Constipation] : constipation [Joint Pain] : joint pain [Anxiety] : anxiety [Easy Bleeding] : a tendency for easy bleeding [Feeling Tired] : feeling tired [Confused] : confusion [Feeling Poorly] : not feeling poorly [Palpitations] : no palpitations [Cough] : no cough [Dysuria] : no dysuria [Convulsions] : no convulsions [Hot Flashes] : no hot flashes

## 2023-08-13 NOTE — ADDENDUM
[FreeTextEntry1] : This note was authored by Senia Corbin working as a scribe for Dr.Gary Sexton. I, Dr. Indio Sexton have reviewed the content of this note and confirm it is true and accurate. I personally performed the history and physical examination and made all the decisions 08/07/2023

## 2023-08-14 ENCOUNTER — NON-APPOINTMENT (OUTPATIENT)
Age: 88
End: 2023-08-14

## 2023-08-21 ENCOUNTER — NON-APPOINTMENT (OUTPATIENT)
Age: 88
End: 2023-08-21

## 2023-08-23 ENCOUNTER — RX CHANGE (OUTPATIENT)
Age: 88
End: 2023-08-23

## 2023-08-23 RX ORDER — OMEPRAZOLE 40 MG/1
40 CAPSULE, DELAYED RELEASE ORAL
Qty: 30 | Refills: 3 | Status: DISCONTINUED | COMMUNITY
Start: 2023-08-08 | End: 2023-08-23

## 2023-08-25 ENCOUNTER — NON-APPOINTMENT (OUTPATIENT)
Age: 88
End: 2023-08-25

## 2023-08-28 ENCOUNTER — APPOINTMENT (OUTPATIENT)
Dept: HOME HEALTH SERVICES | Facility: HOME HEALTH | Age: 88
End: 2023-08-28
Payer: MEDICARE

## 2023-08-28 VITALS
DIASTOLIC BLOOD PRESSURE: 60 MMHG | OXYGEN SATURATION: 94 % | TEMPERATURE: 97.7 F | HEART RATE: 60 BPM | SYSTOLIC BLOOD PRESSURE: 138 MMHG

## 2023-08-28 DIAGNOSIS — Z87.01 PERSONAL HISTORY OF PNEUMONIA (RECURRENT): ICD-10-CM

## 2023-08-28 PROCEDURE — 99348 HOME/RES VST EST LOW MDM 30: CPT

## 2023-08-28 RX ORDER — AMOXICILLIN 400 MG/5ML
400 FOR SUSPENSION ORAL TWICE DAILY
Qty: 1 | Refills: 0 | Status: DISCONTINUED | COMMUNITY
Start: 2023-08-07 | End: 2023-08-28

## 2023-08-28 NOTE — PHYSICAL EXAM
[Normal Sclera/Conjunctiva] : normal sclera/conjunctiva [Normal Outer Ear/Nose] : the ears and nose were normal in appearance [No JVD] : no jugular venous distention [No Respiratory Distress] : no respiratory distress [Breast Exam Declined] : patient declined to have breast exam done [Non Tender] : non-tender [Patient Refused] : rectal exam was refused by the patient [No CVA Tenderness] : no ~M costovertebral angle tenderness [No Motor Deficits] : the motor exam was normal [Oriented x3] : oriented to person, place, and time [de-identified] : comfortable  cooperative and  pleasant    but  frail   occipital area of scalp  quarter sized nodule  as per derm pyogenic granuloma   no signs of infection  [de-identified] :  hearing aids  [de-identified] : crackles at bases   transmitted breath sounds  [de-identified] :  irregular 3/6  murmur   trace  pedal edema   [de-identified] :  softly distended   right  inguinal hernia   [de-identified] :  sales  in place  draining  clear urine  [de-identified] :  nonambulatory  [de-identified] : blanching erythema of sacral area  no skin breaks   use of barrier cream stressed   right ischium continues to improve, 0.8 cm, packing with alginate heels left healed, right heel stable eschar , offloading with boots and has group 2 mattress  venous  stasis  hyperpigmentation  dry skin but no ulceration   long elongated  brittle nails  [de-identified] : anxious

## 2023-08-28 NOTE — HISTORY OF PRESENT ILLNESS
[Family Member] : family member [FreeTextEntry1] : debility CHF   bedbound  frequent UTI  [FreeTextEntry2] : patient  is seen today for a follow  up  and  has been  stable  since last visit  with  no new major developments  complains, hospitalizations  and no changes with medications  and   unchanged chronic  condition  interval events  reviewed and addressed  patient is doing well except cough  on going   not using oxygen no fevers   denies any chest  pains  palpitations     shortness  of breath at rest no weight changes   clear phlegm     good appetite    intense involvement of other specialists   appreciated    diet regular  appetite   ok  dysphagia  yes to pills   has indwelling [place] and  has been seeing  urologist via telehealth    Behavior very  anxious  on meds  at times agitated   sensory deficits   vision ok hard of hearing  wears hearing aids Review of systems otherwise negative. See also updated history sheet.

## 2023-08-28 NOTE — REASON FOR VISIT
[Follow-Up] : a follow-up visit [Pre-Visit Preparation] : pre-visit preparation was done [Intercurrent Specialty/Sub-specialty Visits] : the patient has intercurrent specialty/sub-specialty visits [Family Member] : family member [Formal Caregiver] : formal caregiver

## 2023-09-06 ENCOUNTER — APPOINTMENT (OUTPATIENT)
Dept: UROLOGY | Facility: CLINIC | Age: 88
End: 2023-09-06
Payer: MEDICARE

## 2023-09-06 PROCEDURE — 99442: CPT | Mod: 95

## 2023-09-07 ENCOUNTER — TRANSCRIPTION ENCOUNTER (OUTPATIENT)
Age: 88
End: 2023-09-07

## 2023-09-07 ENCOUNTER — INPATIENT (INPATIENT)
Facility: HOSPITAL | Age: 88
LOS: 6 days | Discharge: HOME HEALTH | DRG: 698 | End: 2023-09-14
Attending: STUDENT IN AN ORGANIZED HEALTH CARE EDUCATION/TRAINING PROGRAM | Admitting: INTERNAL MEDICINE
Payer: MEDICARE

## 2023-09-07 ENCOUNTER — APPOINTMENT (OUTPATIENT)
Dept: HOME HEALTH SERVICES | Facility: HOME HEALTH | Age: 88
End: 2023-09-07
Payer: MEDICARE

## 2023-09-07 ENCOUNTER — NON-APPOINTMENT (OUTPATIENT)
Age: 88
End: 2023-09-07

## 2023-09-07 ENCOUNTER — APPOINTMENT (OUTPATIENT)
Dept: UROLOGY | Facility: CLINIC | Age: 88
End: 2023-09-07
Payer: MEDICARE

## 2023-09-07 VITALS
TEMPERATURE: 100 F | DIASTOLIC BLOOD PRESSURE: 70 MMHG | OXYGEN SATURATION: 95 % | RESPIRATION RATE: 18 BRPM | SYSTOLIC BLOOD PRESSURE: 137 MMHG | HEART RATE: 92 BPM

## 2023-09-07 DIAGNOSIS — Z87.81 PERSONAL HISTORY OF (HEALED) TRAUMATIC FRACTURE: Chronic | ICD-10-CM
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
## 2023-09-07 DIAGNOSIS — Z98.89 OTHER SPECIFIED POSTPROCEDURAL STATES: Chronic | ICD-10-CM

## 2023-09-07 DIAGNOSIS — E89.0 POSTPROCEDURAL HYPOTHYROIDISM: Chronic | ICD-10-CM

## 2023-09-07 DIAGNOSIS — A41.9 SEPSIS, UNSPECIFIED ORGANISM: ICD-10-CM

## 2023-09-07 DIAGNOSIS — Z98.890 OTHER SPECIFIED POSTPROCEDURAL STATES: Chronic | ICD-10-CM

## 2023-09-07 LAB
ALBUMIN SERPL ELPH-MCNC: 3.5 G/DL — SIGNIFICANT CHANGE UP (ref 3.3–5)
ALP SERPL-CCNC: 144 U/L — HIGH (ref 40–120)
ALT FLD-CCNC: 150 U/L — HIGH (ref 12–78)
ANION GAP SERPL CALC-SCNC: 6 MMOL/L — SIGNIFICANT CHANGE UP (ref 5–17)
ANISOCYTOSIS BLD QL: SLIGHT — SIGNIFICANT CHANGE UP
APPEARANCE UR: ABNORMAL
APTT BLD: 32.3 SEC — SIGNIFICANT CHANGE UP (ref 24.5–35.6)
AST SERPL-CCNC: 233 U/L — HIGH (ref 15–37)
BACTERIA # UR AUTO: ABNORMAL
BASOPHILS # BLD AUTO: 0 K/UL — SIGNIFICANT CHANGE UP (ref 0–0.2)
BASOPHILS NFR BLD AUTO: 0 % — SIGNIFICANT CHANGE UP (ref 0–2)
BILIRUB SERPL-MCNC: 1.5 MG/DL — HIGH (ref 0.2–1.2)
BILIRUB UR-MCNC: NEGATIVE — SIGNIFICANT CHANGE UP
BUN SERPL-MCNC: 17 MG/DL — SIGNIFICANT CHANGE UP (ref 7–23)
CALCIUM SERPL-MCNC: 9.8 MG/DL — SIGNIFICANT CHANGE UP (ref 8.5–10.1)
CHLORIDE SERPL-SCNC: 99 MMOL/L — SIGNIFICANT CHANGE UP (ref 96–108)
CO2 SERPL-SCNC: 25 MMOL/L — SIGNIFICANT CHANGE UP (ref 22–31)
COLOR SPEC: ABNORMAL
CREAT SERPL-MCNC: 0.63 MG/DL — SIGNIFICANT CHANGE UP (ref 0.5–1.3)
DIFF PNL FLD: ABNORMAL
EGFR: 83 ML/MIN/1.73M2 — SIGNIFICANT CHANGE UP
ELLIPTOCYTES BLD QL SMEAR: SLIGHT — SIGNIFICANT CHANGE UP
EOSINOPHIL # BLD AUTO: 0 K/UL — SIGNIFICANT CHANGE UP (ref 0–0.5)
EOSINOPHIL NFR BLD AUTO: 0 % — SIGNIFICANT CHANGE UP (ref 0–6)
EPI CELLS # UR: SIGNIFICANT CHANGE UP
GLUCOSE SERPL-MCNC: 142 MG/DL — HIGH (ref 70–99)
GLUCOSE UR QL: NEGATIVE — SIGNIFICANT CHANGE UP
HCT VFR BLD CALC: 37.5 % — SIGNIFICANT CHANGE UP (ref 34.5–45)
HGB BLD-MCNC: 12.9 G/DL — SIGNIFICANT CHANGE UP (ref 11.5–15.5)
INR BLD: 1.47 RATIO — HIGH (ref 0.85–1.18)
KETONES UR-MCNC: ABNORMAL
LACTATE SERPL-SCNC: 0.9 MMOL/L — SIGNIFICANT CHANGE UP (ref 0.7–2)
LEUKOCYTE ESTERASE UR-ACNC: ABNORMAL
LYMPHOCYTES # BLD AUTO: 0.43 K/UL — LOW (ref 1–3.3)
LYMPHOCYTES # BLD AUTO: 2 % — LOW (ref 13–44)
MANUAL SMEAR VERIFICATION: SIGNIFICANT CHANGE UP
MCHC RBC-ENTMCNC: 32.1 PG — SIGNIFICANT CHANGE UP (ref 27–34)
MCHC RBC-ENTMCNC: 34.4 GM/DL — SIGNIFICANT CHANGE UP (ref 32–36)
MCV RBC AUTO: 93.3 FL — SIGNIFICANT CHANGE UP (ref 80–100)
MONOCYTES # BLD AUTO: 4.06 K/UL — HIGH (ref 0–0.9)
MONOCYTES NFR BLD AUTO: 19 % — HIGH (ref 2–14)
NEUTROPHILS # BLD AUTO: 16.65 K/UL — HIGH (ref 1.8–7.4)
NEUTROPHILS NFR BLD AUTO: 78 % — HIGH (ref 43–77)
NITRITE UR-MCNC: POSITIVE
NRBC # BLD: 0 /100 — SIGNIFICANT CHANGE UP (ref 0–0)
NRBC # BLD: SIGNIFICANT CHANGE UP /100 WBCS (ref 0–0)
OVALOCYTES BLD QL SMEAR: SLIGHT — SIGNIFICANT CHANGE UP
PH UR: 6 — SIGNIFICANT CHANGE UP (ref 5–8)
PLAT MORPH BLD: NORMAL — SIGNIFICANT CHANGE UP
PLATELET # BLD AUTO: 69 K/UL — LOW (ref 150–400)
POTASSIUM SERPL-MCNC: 3.8 MMOL/L — SIGNIFICANT CHANGE UP (ref 3.5–5.3)
POTASSIUM SERPL-SCNC: 3.8 MMOL/L — SIGNIFICANT CHANGE UP (ref 3.5–5.3)
PROT SERPL-MCNC: 6.7 GM/DL — SIGNIFICANT CHANGE UP (ref 6–8.3)
PROT UR-MCNC: 500 MG/DL
PROTHROM AB SERPL-ACNC: 16.4 SEC — HIGH (ref 9.5–13)
RAPID RVP RESULT: SIGNIFICANT CHANGE UP
RBC # BLD: 4.02 M/UL — SIGNIFICANT CHANGE UP (ref 3.8–5.2)
RBC # FLD: 13.8 % — SIGNIFICANT CHANGE UP (ref 10.3–14.5)
RBC BLD AUTO: ABNORMAL
RBC CASTS # UR COMP ASSIST: >50 /HPF (ref 0–4)
SARS-COV-2 RNA SPEC QL NAA+PROBE: SIGNIFICANT CHANGE UP
SODIUM SERPL-SCNC: 130 MMOL/L — LOW (ref 135–145)
SP GR SPEC: 1.02 — SIGNIFICANT CHANGE UP (ref 1.01–1.02)
UROBILINOGEN FLD QL: 1
VARIANT LYMPHS # BLD: 1 % — SIGNIFICANT CHANGE UP (ref 0–6)
WBC # BLD: 21.35 K/UL — HIGH (ref 3.8–10.5)
WBC # FLD AUTO: 21.35 K/UL — HIGH (ref 3.8–10.5)
WBC UR QL: >50 /HPF (ref 0–5)

## 2023-09-07 PROCEDURE — 73700 CT LOWER EXTREMITY W/O DYE: CPT | Mod: LT

## 2023-09-07 PROCEDURE — 99443: CPT | Mod: 95

## 2023-09-07 PROCEDURE — 73560 X-RAY EXAM OF KNEE 1 OR 2: CPT | Mod: LT

## 2023-09-07 PROCEDURE — 99350 HOME/RES VST EST HIGH MDM 60: CPT | Mod: 95

## 2023-09-07 PROCEDURE — 93005 ELECTROCARDIOGRAM TRACING: CPT

## 2023-09-07 PROCEDURE — 76705 ECHO EXAM OF ABDOMEN: CPT

## 2023-09-07 PROCEDURE — 73590 X-RAY EXAM OF LOWER LEG: CPT | Mod: LT

## 2023-09-07 PROCEDURE — 36415 COLL VENOUS BLD VENIPUNCTURE: CPT

## 2023-09-07 PROCEDURE — 85027 COMPLETE CBC AUTOMATED: CPT

## 2023-09-07 PROCEDURE — 71045 X-RAY EXAM CHEST 1 VIEW: CPT | Mod: 26

## 2023-09-07 PROCEDURE — 83735 ASSAY OF MAGNESIUM: CPT

## 2023-09-07 PROCEDURE — 87040 BLOOD CULTURE FOR BACTERIA: CPT

## 2023-09-07 PROCEDURE — 80048 BASIC METABOLIC PNL TOTAL CA: CPT

## 2023-09-07 PROCEDURE — 73562 X-RAY EXAM OF KNEE 3: CPT | Mod: RT

## 2023-09-07 PROCEDURE — 80076 HEPATIC FUNCTION PANEL: CPT

## 2023-09-07 PROCEDURE — 99285 EMERGENCY DEPT VISIT HI MDM: CPT

## 2023-09-07 PROCEDURE — 74177 CT ABD & PELVIS W/CONTRAST: CPT

## 2023-09-07 PROCEDURE — 84100 ASSAY OF PHOSPHORUS: CPT

## 2023-09-07 PROCEDURE — 80053 COMPREHEN METABOLIC PANEL: CPT

## 2023-09-07 PROCEDURE — 76376 3D RENDER W/INTRP POSTPROCES: CPT

## 2023-09-07 PROCEDURE — 93306 TTE W/DOPPLER COMPLETE: CPT

## 2023-09-07 PROCEDURE — 93970 EXTREMITY STUDY: CPT

## 2023-09-07 RX ORDER — CEFTRIAXONE 500 MG/1
1000 INJECTION, POWDER, FOR SOLUTION INTRAMUSCULAR; INTRAVENOUS ONCE
Refills: 0 | Status: DISCONTINUED | OUTPATIENT
Start: 2023-09-07 | End: 2023-09-07

## 2023-09-07 RX ORDER — ACETAMINOPHEN 500 MG
650 TABLET ORAL ONCE
Refills: 0 | Status: COMPLETED | OUTPATIENT
Start: 2023-09-07 | End: 2023-09-07

## 2023-09-07 RX ORDER — ACETAMINOPHEN 500 MG
650 TABLET ORAL EVERY 6 HOURS
Refills: 0 | Status: DISCONTINUED | OUTPATIENT
Start: 2023-09-07 | End: 2023-09-08

## 2023-09-07 RX ORDER — CEFTRIAXONE 500 MG/1
1000 INJECTION, POWDER, FOR SOLUTION INTRAMUSCULAR; INTRAVENOUS ONCE
Refills: 0 | Status: COMPLETED | OUTPATIENT
Start: 2023-09-07 | End: 2023-09-07

## 2023-09-07 RX ORDER — SODIUM CHLORIDE 9 MG/ML
1000 INJECTION INTRAMUSCULAR; INTRAVENOUS; SUBCUTANEOUS ONCE
Refills: 0 | Status: COMPLETED | OUTPATIENT
Start: 2023-09-07 | End: 2023-09-07

## 2023-09-07 RX ADMIN — Medication 650 MILLIGRAM(S): at 21:43

## 2023-09-07 RX ADMIN — CEFTRIAXONE 1000 MILLIGRAM(S): 500 INJECTION, POWDER, FOR SOLUTION INTRAMUSCULAR; INTRAVENOUS at 22:54

## 2023-09-07 RX ADMIN — SODIUM CHLORIDE 1000 MILLILITER(S): 9 INJECTION INTRAMUSCULAR; INTRAVENOUS; SUBCUTANEOUS at 22:54

## 2023-09-07 NOTE — ED PROVIDER NOTE - PROGRESS NOTE DETAILS
Daughter states patient received 50 mg losartan in the AM, and 100 mg in the PM due to high blood pressure. Efrain Garcia D.O. JG: Received signout from Dr. Garcia to follow up US which was normal.  Shows nonobstructing intrarenal calculi, no abnormality with gallbladder.  Needs ICU consult due to hypotension.  Going on 4th L of NSS now.  Discussed case with ICU PA coming to see patient.  CT pending to r/o obstructing kidney stone or kidney infection.  Will need admission for urosepsis.  Levophed being hung at this time.

## 2023-09-07 NOTE — PATIENT PROFILE ADULT - DOMESTIC TRAVEL HIGH RISK QUESTION
From: Kathya DOLAN  To: Kai Adams  Sent: 9/6/2023 9:52 AM CDT  Subject: Event Monitor    Good Morning Kai,     We received your message in regards to the Holter monitor. Dr. Parra would recommend if possible to wear the monitor for the full 30 days. He wants to make sure you continue to stay out of A-fib and are safe to continue to be off a blood thinner. In order to determine this, the protocol would be to have you wear the monitor for 30 days. Please let our office know if you have questions.       Take care,   MARILY Rasheed- Dr. Parra  
Refill request.    Medication: Metoprolol Succinate 25 mg- take 1 tablet nightly  (90 tabs, 3 refills)  Aspirin 81- 1 tablet daily (90 tabs, 3 refills)    Last clinic visit: 8/23/23  Follow up visit 9/11/24    Chart reviewed.  E prescribed to preferred pharmacy.    Please see message below. Patient needs medications sent to mail order pharmacy.    
No

## 2023-09-07 NOTE — ED PROVIDER NOTE - GENITOURINARY [+], MLM
Internal Medicine Interval Note  Note Author: Michelle Jenkins M.D.     Name Cheng Kline     1958   Age/Sex 59 y.o. male   MRN [id number]   Code Status Full     After 5PM or if no immediate response to page, please call for cross-coverage  Attending/Team: [facility] See Patient List for primary contact information  Call [phone] to page    1st Call - Day Intern (R1):   Dr Jenkins 2nd Call - Day Sr. Resident (R2/R3):   Dr. Engle         Reason for interval visit  (Principal Problem)   Acute on Chronic Pancreatitis      Interval Problem Daily Status Update  (24 hours, problem oriented, brief subjective history, new lab/imaging data pertinent to that problem)   -Patient doing better. But still complains of pain. Abdomen mildly tender. Lipase on admission() 171. MRCP done in [facility] last year that showed Pancreatic divisum. Repeat MRCP pending.        Review of Systems   Constitutional: Negative for chills and fever.   HENT: Negative for hearing loss and tinnitus.    Eyes: Negative for blurred vision and double vision.   Respiratory: Negative for cough, hemoptysis and sputum production.    Cardiovascular: Negative for chest pain, palpitations and leg swelling.   Gastrointestinal: Positive for abdominal pain. Negative for heartburn.   Skin: Negative for itching and rash.       Disposition/Barriers to discharge:   ERCP to be done    Consultants/Specialty  Pcp Pt States None  GI        Quality Measures  Quality-Core Measures   Reviewed items::  Labs reviewed, Medications reviewed and Radiology images reviewed  Multani catheter::  No Multani  DVT prophylaxis - mechanical:  SCDs          Physical Exam       Vitals:    07/10/18 0400 07/10/18 0717 07/10/18 1125 07/10/18 1219   BP: 155/85 150/65  151/76   Pulse: 69 65 72 64   Resp: 16 16 18 16   Temp: 37.1 °C (98.8 °F) 36.5 °C (97.7 °F)  36.3 °C (97.3 °F)   SpO2: 95% 96% 95% 97%   Weight:       Height:         Body mass index is 29.33 kg/m². Weight: 95.4 kg  (210 lb 5.1 oz)  Oxygen Therapy:  Pulse Oximetry: 97 %, O2 (LPM): 0, FiO2%: 21 %, O2 Delivery: None (Room Air)    Physical Exam   Constitutional: He is oriented to person, place, and time. He appears distressed.   Eyes: Pupils are equal, round, and reactive to light. Right eye exhibits no discharge. Left eye exhibits no discharge.   Neck: Normal range of motion.   Cardiovascular: Normal rate, regular rhythm and normal heart sounds.    Pulmonary/Chest: Effort normal and breath sounds normal. No respiratory distress. He has no wheezes.   Abdominal: There is tenderness. There is no rebound and no guarding.   BS present   Musculoskeletal: Normal range of motion.   Neurological: He is alert and oriented to person, place, and time. Gait normal.   Skin: Skin is warm.   Psychiatric: Affect normal.             Assessment/Plan     Acute on chronic abdominal pain, likely due to pancreatitis  - Patient has been transferred here [facility] for [facility]. GI was consulted and spoken with. Possible ERCP tommorow.   - N/S infusion  - On clear liquid (No colored food)  - Pain management with Oxycodone 10 mg Q4h PRN    Chronic back pain   - continue home medications (Oxycodone) for pain control         Universal Safety Interventions + dark urine

## 2023-09-07 NOTE — REVIEW OF SYSTEMS
[Feeling Tired] : feeling tired [Eyesight Problems] : eyesight problems [Loss Of Hearing] : hearing loss [Chest Pain] : chest pain [Constipation] : constipation [Joint Pain] : joint pain [Confused] : confusion [Anxiety] : anxiety [Easy Bleeding] : a tendency for easy bleeding [Feeling Poorly] : not feeling poorly [Palpitations] : no palpitations [Cough] : no cough [Dysuria] : no dysuria [Convulsions] : no convulsions [Hot Flashes] : no hot flashes

## 2023-09-07 NOTE — ED PROVIDER NOTE - CLINICAL SUMMARY MEDICAL DECISION MAKING FREE TEXT BOX
WBC 21, lactic acid WNL.  UTI on cath urine.  RVP negative.  LFT mildly elevated, pending US abdomen.  Admit to medicine, pending US.  Given IVF, antibiotics. WBC 21, lactic acid WNL.  UTI on cath urine.  RVP negative.  LFT mildly elevated, pending US abdomen. Given IVF, antibiotics. Discussed admission with medicine team.  SIgnout to Dr. De Leon

## 2023-09-07 NOTE — ED PROVIDER NOTE - IV ALTEPLASE INCLUSION HIDDEN
10/28/19 CPE  Upcoming appointment: 11/18/20    Refilled x 90 days.  
Outpatient Medications Marked as Taking for the 10/5/20 encounter (Telephone) with Jace Prado MD   Medication Sig Dispense Refill   • metoPROLOL succinate (TOPROL-XL) 100 MG 24 hr tablet TAKE 1 TABLET BY MOUTH EVERY DAY 30 tablet 0   • potassium chloride (K-TAB) 20 MEQ ER tablet Take 40 mEq by mouth daily. 180 tablet 3         Pharmacy: [facility]    Request of 90 day supply of each medication.   
Pharmacy requesting 90 day supply of Metoprolol due to insurance coverage.   
show

## 2023-09-07 NOTE — ED PROVIDER NOTE - OBJECTIVE STATEMENT
89 y/o female with PMHx of HTN, Afib on Eliquis, with chronic indwelling catheter BIBEMS to the ED with HHA and daughter c/o dark urine and fever. Per home health aide, pt had a sales catheter change today and they noted dark red urine, pt noted to have had dark urine for the last 2 days. Also noted fever of 100.1F since today 5:30PM. Pt vomited twice today but otherwise has been eating and drinking normally. No meds PTA. NKDA. Per friend over phone, pt follows with urologist Dr. Sexton for chronic catheter/UTIs and is resistant to an antibiotic. Pt with chronic wound to occiput. Daughter also notes pt has been sneezing and coughing since today.    Urologist: Dr. David Sexton

## 2023-09-07 NOTE — ED ADULT TRIAGE NOTE - CHIEF COMPLAINT QUOTE
pt BIBEMS c/o fever. per home health aide, pt had a sales change today, noted cloudy urine, fever 100.1 at home. no meds PTA. pt endorses feeling "crummy"

## 2023-09-07 NOTE — ED PROVIDER NOTE - NSICDXPASTMEDICALHX_GEN_ALL_CORE_FT
PAST MEDICAL HISTORY:  Acute cystitis without hematuria septic  4/2016    Essential hypertension     [place] (hard of hearing), bilateral     Hypertension     Lymphedema of both lower extremities     Monoclonal gammopathies     Paroxysmal atrial fibrillation     Spinal stenosis     Spondyloarthritis     Vaginal prolapse     Venous insufficiency

## 2023-09-07 NOTE — ED ADULT NURSE NOTE - OBJECTIVE STATEMENT
The patient is a 89y Female complaining of fever. Pt had indwelling catheter replacement today dark urine output noted. Pt endorses vomiting. Pt denies CP, SOB. Pt is GCS 15, HEENT clear, PERRL. 2 20 g IVs inserted labs drawn. Pt safety is maintained.

## 2023-09-07 NOTE — ADDENDUM
[FreeTextEntry1] : This note was authored by Katlyn Avila working as a scribe for Dr.Gary Sexton. I, Dr. Indio Sexton have reviewed the content of this note and confirm it is true and accurate. I personally performed the history and physical examination and made all the decisions 09/07/2023

## 2023-09-07 NOTE — ASSESSMENT
[FreeTextEntry1] : Claudio Mcdowell in her daughter Tabatha Deluna gave permission for an audio video telehealth visit.  They were in their home in [facility].  I was in my office in [facility].  Claudio Mcdowell is currently 89 years of age.  Her daughter Tabatha Deluna is devoted to her care.  She is very concerned about her mother and has been so for many years.  She becomes very anxious at times and that is concerning her mother.  Claudio Mcdowell lives with her daughter.  Claudio Mcdowell has been bedbound for several years.  The patient had developed a sacral decubitus ulcer while in a nursing facility.  A Sales catheter was placed because of fecal incontinence and concern that urine mixed with the feces would contaminate the sacral decubitus ulcer.  Once the patient left the nursing facility and will be with her daughter.  The sacral decubitus ulcer has finally healed.  I have discussed removing the Sales catheter with the daughter.  However as the patient has fecal incontinence there is concern about the urine mixing with the feces and being more likely to cause skin problems.  For now we will leave the Sales catheter in.  The patient has had clinically symptomatic urinary tract infections.  The restart with increased urination around the Sales catheter due to bladder spasms.  Bladder spasms have been occurring for staff.  The patient is positioned properly in in bed and the personal care attendant leaves for the day.  We have managed to prevent this with the administration of sublingual hyoscyamine.  We do periodic surveillance urine analysis and urine culture tests at the time the Sales catheter is changed by visiting nurse.  He also performed a times of increased spasms or purulence of the urine or change in mental status,  On October 11, 2022 visiting nurse using an order I have previously placed at the request of the daughter sent urine for urine cytology which proved to be negative for malignant cells, urine culture which grew Greater than 100,000 and E. coli that was sensitive to all antibiotics tested.  Urinalysis looked quite good for urine taken from a Sales catheter that was used for chronic Sales catheter drainage.  Ileukocyte esterase was large but nitrites were negative.  There were 2 epithelial cells per high-power field.  There were only 10 white blood cells per high-power field and only 2 red blood cells per high-power field on microscopic exam there were no bacteria seen and there were no hyaline casts.  Specific gravity was 1.010 which shows good hydration and this bedbound woman cared for diligently by her daughter.  Blood by dipstick was trace.  There was no protein glucose or ketones in the urine.  There is no bilirubin and no urobilinogen.  An important portion of the visit was my review with the daughter about bladder spasms and urination around the catheter.  Urine appearance and urine analysis have been clear.  The urine was clear again today.  The patient has significant short-term memory deficit.  She does have some useful short-term memory.  Her long-term memory remains very much intact.  She is very pleasant and to make satisfactory conversation.  She does admit to sometimes not remembering something.  Her complexion was good and there was no pallor.  Facial movements were symmetric.  Cranial nerves were intact.  I was not able to assess the olfactory nerve as this was a telehealth visit.    I asked the daughter to see to it that the urine sample was sent periodically when the catheter was changed.  I would do this for surveillance purposes.  I also asked that a urine sample be sent at times of increased cloudiness of the urine.  At any time that gross blood was visible in the urine.  I would also asked that urine be sent if mental status should change.  I asked that urine sample be sent if urine leakage around the catheter picked up and appeared to be from spasms.  The daughter said that she would think that this was performed.  I also asked that the daughter schedule a telehealth visit 3 days after urine samples were sent so we can discuss the circumstances around the decision to send the specimen the specimen and if the patient was having any obvious symptoms or signs that might indicate clinically significant infection.  11/04/2022: Ms. MCDOWELL is a 89 year old female presenting today for a telehealth visit. She was accompanied by her daughter who helped with the visit. They gave permission for an audio only telehealth conference. The patient was located in her home in [place], [place]. I was located in my office on [place], [place]. Today her daughter reports the pt experienced rare urinary leaking around the catheter due to bladder spasms. She also reports her systolic pressure was around 140 last week. I offered Gemtesa to manage the bladder spasms and the daughter was cautious about more medications. I informed her if the urinary leaking are only once every 2 weeks or so, she does not have to medicate. The daughter was agreeable to monitor the occurrence of urinary leaking over the next 4 weeks and assess the discomfort it causes the pt. She denies any other urinary issues.  Plan: She will provide a urine specimen for UA, urine cytology and urine culture. She will follow up 2 or 3  weeks after providing urine sample/  03/01/2023: Ms. MCDOWELL is a 89 year old female presenting today for a telehealth visit. She was accompanied by her daughter who helped with the visit. They gave permission for an audio only telehealth conference. The patient was located in her home in [place], [place]. I was located in my office on [place], [place]. The pt has chronic sales catheter drainage. She provided a urine specimen from the catheter on 2/27/2023. UA was slightly turbid with large LEC and 59 WBC/HPF. Culture grew >100,000 CFU/ml E.Coli. The sensitivity report is not yet back. The pt's daughter was concerned as when the patient was constipated a few weeks ago she was having very watery BM which grew messy and were around the catheter area. She was given a Fleet's enema last month which helped. Currently she is having BM, paste-like in appearance. Her daughter does not think that her stomach is distended. She has recently had the catheter changed and is not voiding around it. Urine has been very clear. She does not seem to have pain in the urinary passageway or bladder. Denies fevers. Denies gross hematuria. [facility] only had the first two vaccines from two years ago.   Reviewed and discussed 2/27/2023 urine test results. Given the patient is not febrile and is not symptomatic, I will not treat at this time as these urine test results are consistent with someone who has chronic sales drainage.  If she gets ill, her daughter will call promptly.  Patient appears to be stable at this time. I spoke to her at the conclusion of the visit and she sounds good. Her memory seems to be good as she personally asked to speak to me. Was a little hesitant to reply at times but did ask me about my family. She spoke softly yet clearly.  I did advise the patient and her daughter to speak with her PCP in about 4 months about receiving the covid booster vaccines.  Patient will have a telephone visit in 3 months, sooner if clinically indicated. Will continue to submit surveillance urine cultures when her catheter is changed each month.  03/13/2023: Ms. MCDOWELL is a 89 year old female presenting today for a telehealth visit. She was accompanied by her daughter who helped with the visit. They gave permission for an audio only telehealth conference. The patient was located in her home in [place], [place]. I was located in my office on [place], [place]. The patient obtained an US prior to today's visit 3/9/23 which revealed: Comparison is made with the exam of 2/28/22. Transabdominal ultrasound of the abdomen was performed with color flow imaging. The examination is limited in evaluation. The visualized aorta and inferior vena cava show no abnormality. The pancreas is obscured by overlying bowel gas. The liver measures 12.4 cm with homogeneous echotexture. No intrinsic mass and no intra-or extrahepatic ductal dilatation. There is hepatopedal flow of the main portal vein. No gallstones, pericholecystic fluid, wall thickening or positive sonographic Melendez sign. The common bile duct measures 0.3 cm. The right kidney measures 9.2 x 5.6 x 3.0 cm with a nonobstructing 1.2 cm stone in the upper pole. Multiple additional subcentimeter calcifications are seen. These were not seen on previous exam. No hydronephrosis or renal mass. The left kidney measures 9.3 x 3.9 x 3.5 cm. There is a 2.1 x 2.4 x 2.0 cm cyst in the medial mid pole, not seen on previous study. No hydronephrosis or renal calcification. The spleen measures 8.2 cm and show no abnormality. There is a small right pleural effusion, stable. IMPRESSIONS: Multiple nonobstructing stones of the right kidney with no hydronephrosis. 2.4 cm cyst of the midpole left kidney. Small right pleural effusion, stable. The daughter has answered the phone and reports the patient's urine does not get dark. She is not aware of the amount of water she gives her mother. I requested she measure's this from now on as the pt has stones and needs to increase water consumption. Her daughter reports this week the pt has experienced more spasms than normal and believes this may be due to severe constipation. She provided the pt with an Enema to aide in this situation. She is very concerned but I informed her the Enema may have stimulated the spasms and we should give her a few days to clear up and re-evaluate.   I informed the patient's care taker to now measure and increase water volume as the pt has stones.  Clinical findings and US results were reviewed at length with the patient. I personally reviewed the imaging myself. Pt will schedule a telehealth visit in 1- 2 weeks.  03/22/2023: Ms. CLAUDIO MCDOWELL presents today for an audio visual telehealth visit for which she gave permission for. The patient was located at home at [address] [place] [address] and I was located at my office in [place], [place]. She is accompanied by her daughter whom most of the visit was conducted with. Her daughter has been keeping track of her mother's water consumption. She is averaging about 58 oz of water in a 24 hr period. Additionally, she is drinking juice and ensure nutrition drinks. She does not consume any caffeine. She is on furosemide 40 mg. I said hello to the patient at the end of the visit and she is looking well. She reports feeling no pain at the current moment.  I advised the patient's daughter to keep up the water intake and if she can, up it to 64 oz of water in a 24 hr period. I recommended she add fresh lemon juice to her water as well. I also advised the patient's daughter to speak with the prescribing doctor for furosemide on if it is okay to increase her water intake. I informed her I would be happy to talk to him if he needs.  Reviewed the patient's abdominal US of 3/9/2023 once again. US prior to that on 2/28/2022 showed no hydro, mass, or renal calcification, though the kidneys were not visualized well due to bowel gas.  Advised the patient's daughter to speak with the pt's gastroenterologist about her BM problems.  Patient is getting blood work drawn in her house in a few days. Will send orders for the things that I want done.   3/29/2023:  Patient Claudio Mcdowell and daughter Tabatha Deluna were home in [place], [place] and I was in my office in [facility].  Patient and daughter gave permission for a FaceTime telehealth visit.  They preferred this to [employer].  The patient looks good today.  Her complexion was good there is no pallor.  Smile was symmetric her affect was normal she appeared happy she could make conversation it was appropriate.  She said that she currently had no pain.  When I asked questions that after 3/2 how she had been recently the sometimes she hesitated and deferred to her daughter for cancer compatible with her impaired short-term memory.  Long-term memory remains good she recognizes me once know so I am does ask questions about things like her bladder as she was worried about it her blood tests.  I assured her the results were satisfactory.  I reviewed the blood and urine test results in detail with her daughter.  I explained the findings.  Patient based upon urine osmolality which was low and serum osmolality which was in the mid range of normal is appropriately hydrated.  The patient has congestive heart failure but was not dyspneic on observation today and said that she had no trouble breathing currently.  Her daughter confirms this.  The pro brain natruretic protein in the serum was elevated in keeping with her congestive heart.  The patient does not exert for self she remains in bed.  The patient had excess catheter immunoglobulin change in her urine.  This is in keeping with her diagnosis of multiple myeloma.  She has had for over 10 years without treatment I suspect its more likely gammopathy but has been.  The daughter said that at one point there was consideration of the degree of her having chronic lymphocytic leukemia.  I doubt she has had chronic lymphocytic leukemia.  I am unaware of her having any elevated lymphocyte counts.  A complete blood count ordered by Dr. Farmer showed  low lymphocytes at 470/mcL on December 7, 2022 blood draw at which time her total white blood cell count was 3850 white blood cells per microliter.  This was within normal range.  In view of the urine findings of a past diagnosis of multiple myeloma I will check the immunoglobin electrophoresis addition to a complete blood cell count.  In about 2 months I will see if we can arrange for a renal ultrasound to see if there is any growth in the patient's kidney stones.  I reviewed the issues involved in this kidney stone formation and growth.  I reviewed have an embolization bed 10 resulted in loss of calcium from the bones and hypercalciuria which could lead to nephrolithiasis.  The daughter is extremely concerned about the welfare of her mother.  The patient daughter does explore her concerns and most of her questions are pertinent.  I answered the daughter's questions to her satisfaction patient will have additional blood test drawn at home from the mobile phlebotomy team.  In 2 months time we will attempt to arrange for a home renal ultrasound.  04/18/2023: Ms. MCDOWELL is a 89 year old female presenting today for an audio and visual telehealth visit for which she gave verbal permission. We utilized [employer] teams telemetry doc platform. The patient was located in her home at [address] [place] [address]. I was located in my office on [place], [place]. She was accompanied by her daughter who helped to serve as a historian. She reports discomfort in her eyes. She describes the discomfort as a feeling of sand. She opened her eyes for me, and they do not look red. Pupils look normal. She reports the right eyes bothers her significantly more than the left. I advise that the patient tries lubricating drops and it if does not feel better she will notify her visiting nurse who is due to come next week for a sales catheter change. She reports episode of bladder spasms. She denies gross hematuria. She describes the urine as barely yellow. I reviewed and discussed her  latest pertinent lab on 04/12/2023 which shows "alkaline phosphate normal at 89. Creatinine was normal at 0.62 mg/dL. WBC normal at 5.01. RBC normal at 3.87".   Plan: She will provides urine specimen for Urine Analysis, Urine Culture and Urine Cytology. She will try lubricating drops to alleviate eyes discomfort. She will return for a telehalth 3 days after providing urine sample.   05/09/2023: Ms. MCDOWELL presents today for a follow up audio only telehealth visit for which they gave permission for. She was accompanied by her daughter who helped to serve as a historian. The patient was located at home [address] [place] [address] and I was located in my office in [place], [place]. The patient obtained lab work 5/2/23 prior to today's visit. The UA revealed microscopic hematuria, 5/HPF RBC, 27/HPF WBC. The patient demonstrated great hydration as the specific gravity is 1.006. The Sales catheter was changed May 2, 2023 with no clear urine in the bag. While changing the diaper the aide reports blood in the diaper. The daughter states last week her mother experienced spasms but as of two days ago she is now fine. However, she states the patient is more fatigued. She denies the pt having a temperature. I assured her because her mother is post menopausal any abrasion to the labia can cause a fissure.   Reviewed and discussed laboratory work of 5/2/23 which She  obtained prior to today's visit as requested. I assured the daughter the patient remains well in a urological standpoint.   Upon every catheter change patient is to give a urine sample. The daughter is to resend urine culture from prior catheter change.  Pt will schedule a telehealth visit   06/02/2023: Ms. MCDOWELL is a 89 year old female presenting today for an audio and visual telehealth visit for which she gave verbal permission. We utilized [employer] teams telemetry doc platform. The patient was located in her home at [address] [place] [address]. I was located in my office on [place], [place]. She was accompanied by her daughter Tabatha. She reports that her mother complains of onset dysuria that dissipated on its own and has not recurred since. She reports right sided back pain that is aggravated when she lays on the right side. She provided urine specimen. She notes the urine was very clear. I reviewed and discussed the patient's pertinent lab works. Her latest Urinalysis on 05/30/2023 that shows "60 WBC/HPF. Trace Blood Urine. Specific Gravity Urine 1.007". Urine Culture on the same date shows ">100,000 CFU/ml Escherichia coli and <10,000 CFU/ml Normal Urogenital ángel present". The patient takes Mirtazapine for anxiety at low dose. The daughter reports the patient is experiencing increasing anxiety and is thinking of having the prescriber increasing the dosage.  Her BP runs around 120/60. Her Hemoglobin hematocrit looks good. gaze is conjugated. facial expression looks symmetric. Urine was faint yellow and clear.   Plan: Preparation, audio-visual visit, and coordination of care took : 24 mins  06/06/2023: Ms. MCDOWELL presents today for a follow up audio only telehealth visit for which they gave permission for. The patient was located at home [address] [place] [address] and I was located in my office in [place], [place]. She was accompanied by her daughter Tabatha. The daughter states pt is experiencing episodes of a dry cough. An X Ray  of 6/5/23 revealed Mild peribronchial thickening suggesting bronchitis in the right clinical setting with no focal pneumonia or congestive heart failure. COPD with chronic lung changes. The mental status is the same as usual but her BP has elevated some. I assured her a little elevation is not as dangerous. The pt continues to experience some urinary frequency and it is a little more concentrated.   I assured the daughter the patient may continue to  take Augmentin as they have already started with 1 dose.  She is to send a copy of the Chest CT scan. Pt will schedule a telehealth visit  07/05/2023: Ms. CLAUDIO MCDOWELL presents today for a follow up audio only telephone visit for which she gave permission for. The pt was located at home, [address] [place] [address], and I was located in my office in [place], [place]. She is accompanied by her daughter Tabatha. Patient provided a surveillance urine specimen on 6/29/2023. Pt has chronic indwelling sales. UA revealed large LEC, 79 WBC/HPF, and occasional bacteria/hpf. Urine culture grew >100,000 CFU/ml EColi and 50,000-99,000 CFU/ml Enterobacter cloacae complex. Her daughter reports that her mother has been having some breathing problems and constipation. Her urine has been fairly good. There are some times when it looks like she's putting out less. It appears a little more concentrated. She tries to give her more water when she notices this. She is barely eating and mostly drinks Ensure. According to her most recent UA, she was well hydrated. She has not been squirting urine around the catheter, not having bladder spasms.   From a urologic standpoint, she is doing well.  Will provide surveillance urine specimen in 1-2 months, will schedule a telehealth visit to review and discuss results.   08/07/2023: Ms. CLAUDIO MCDOWELL was scheduled today for a follow up audio only telephone visit for which she gave permission for. The patient and her daughter were tried early at 4:40 PM, however there was no answer. A VM was left informing her that I will try calling at her scheduled appt time of 5PM. I called again at 5 PM and there once again was no answer. Pt advised to reschedule for tomorrow.  Patient provided a surveillance urine specimen on 8/1/2023. Has indwelling sales. UA revealed trace blood, large LEC, 103 WBC/HPF, few bacteria/ HPF, and 22 casts/HPF. Urine culture grew >100,000 CFU/ml E.Coli. Urine cytology was negative for high grade urothelial carcinoma. Specimen consisted of rare clusters of urothelial cells. The differential diagnosis includes mechanical disruption. She was tried once more at 5:47 PM and was able to be reached. I spoke to the patient's daughter, Tabahta. She denies any leaking around the catheter. Urine has been clear. Denies any fevers. She feels her mom is somewhat confused more often, worse the last week. She also reports she is more tired. Denies any pelvic pain.   Reviewed and discussed lab work of 8/1/2023 in detail with the pt. Given that the patient is confused, I am choosing to treat. It is possible that it is progressive memory loss, early dementia, however given the patient's age we will treat and see if her confusion improves.  I am prescribing Amoxicillin suspension 400 mg per 5 ml,10 ml BID until finished.  Patient will have a telehealth visit a few days after completion of abx.   09/06/2023: Ms. CLAUDIO MCDOWELL presents today for a follow up audio only telephone visit for which she gave permission for. The pt was located at home, [address] [place] [address], and I was located in my office in [place], [place]. Patient's daughter called earlier in the day and spoke to my nurse Melissa Escobar RN. She informed her that the patient's nurse who changes her catheter was on vacation last week. She is scheduled to have the catheter changed tomorrow, however for the past few days she has noticed pink/ red urine in the catheter. She denies fever, chills, nausea, and vomiting. My nurse advised her to increase her water intake and speak with me later to see if this changed anything. I reached her daughter at 5:23 PM and she was on the train and wouldn't be home for another half an hour. She informed me that the gross hematuria has improved and her catheter change is scheduled for tomorrow. They will also obtain a urine sample tomorrow.  I informed the patient's daughter that so longs as things are good, I will call tomorrow to see how the catheter change went and to see her mother and to ask her how she is doing. Pt's daughter was agreeable.  Duration of telephone visit was 6 minutes.   09/07/2023: Ms. MCDOWELL presents today for a follow up audio only telehealth visit for which they gave permission for. The patient was located at home [address] [place] [address] and I was located in my office in [place], [place]. Daughter reports seeing gross hematuria and brown urine. Having sent an image to my 's email. Today the nurse reported no physical bleeding. Urine is now medium yellow. Admits to one bladder spasm on Monday Denies having diarrhea today but on Tuesday she did have diarrhea. Lately stool is soft and unformed. Daughter states she was nausea after  gave her medication as she experiences nausea when she wakes up. The nurse said this was normal. She complains of feeling the urge to go. Daughter states BP is normal in the morning but spikes some in the afternoon. The catheter has been placed in for more than 2 years. Daughter states she has not smoked for 40 years and was not a heavy smoker.  Advised for water intake increase as the color of urine was not dangerously dark. Patient may drink gatorade.   Informed daughter if there are numerous spams they must contact [facility].    Daughter will send another picture of urine at the end of the day.   Duration of call 23 Minutes.

## 2023-09-07 NOTE — HISTORY OF PRESENT ILLNESS
[FreeTextEntry1] : Claudio Mcdowell in her daughter Tabatha Deluna gave permission for an audio video telehealth visit.  They were in their home in [facility].  I was in my office in [facility].  Claudio Mcdowell is currently 89 years of age.  Her daughter Tabatha Deluna is devoted to her care.  She is very concerned about her mother and has been so for many years.  She becomes very anxious at times and that is concerning her mother.  Claudio Mcdowell lives with her daughter.  Claudio Mcdowell has been bedbound for several years.  The patient had developed a sacral decubitus ulcer while in a nursing facility.  A Sales catheter was placed because of fecal incontinence and concern that urine mixed with the feces would contaminate the sacral decubitus ulcer.  Once the patient left the nursing facility and will be with her daughter.  The sacral decubitus ulcer has finally healed.  I have discussed removing the Sales catheter with the daughter.  However as the patient has fecal incontinence there is concern about the urine mixing with the feces and being more likely to cause skin problems.  For now we will leave the Sales catheter in.  The patient has had clinically symptomatic urinary tract infections.  He clinically significant infections usually start with increased urination around the Sales catheter due to bladder spasms.  Bladder spasms have been occurring for staff.  The patient is positioned properly in in bed and the personal care attendant leaves for the day.  We have managed to prevent this with the administration of sublingual hyoscyamine.  We do periodic surveillance urine analysis and urine culture tests at the time the Sales catheter is changed by visiting nurse.  He also performed a times of increased spasms or purulence of the urine or change in mental status,  On October 11, 2022 visiting nurse using an order I have previously placed at the request of the daughter sent urine for urine cytology which proved to be negative for malignant cells, urine culture which grew Greater than 100,000 and E. coli that was sensitive to all antibiotics tested.  Urinalysis looked quite good for urine taken from a Sales catheter that was used for chronic Sales catheter drainage.  Ileukocyte esterase was large but nitrites were negative.  There were 2 epithelial cells per high-power field.  There were only 10 white blood cells per high-power field and only 2 red blood cells per high-power field on microscopic exam there were no bacteria seen and there were no hyaline casts.  Specific gravity was 1.010 which shows good hydration and this bedbound woman cared for diligently by her daughter.  Blood by dipstick was trace.  There was no protein glucose or ketones in the urine.  There is no bilirubin and no urobilinogen.  An important portion of the visit was my review with the daughter about bladder spasms and urination around the catheter.  Urine appearance and urine analysis have been clear.  The urine was clear again today.  The patient has significant short-term memory deficit.  She does have some useful short-term memory.  Her long-term memory remains very much intact.  She is very pleasant and to make satisfactory conversation.  She does admit to sometimes not remembering something.  Her complexion was good and there was no pallor.  Facial movements were symmetric.  Cranial nerves were intact.  I was not able to assess the olfactory nerve as this was a telehealth visit.    11/04/2022: Ms. MCDOWELL is a 89 year old female presenting today for a telehealth visit. She was accompanied by her daughter who helped with the visit. They gave permission for an audio only telehealth conference. The patient was located in her home in [place], [place]. I was located in my office on [place], [place]. Today her daughter reports the pt experienced rare urinary leaking around the catheter due to bladder spasms. She also reports her systolic pressure was around 140 last week. I offered Gemtesa to manage the bladder spasms and the daughter was cautious about more medications. I informed her if the urinary leaking are only once every 2 weeks or so, she does not have to medicate. The daughter was agreeable to monitor the occurrence of urinary leaking over the next 4 weeks and assess the discomfort it causes the pt. She denies any other urinary issues.  03/01/2023: Ms. MCDOWELL is a 89 year old female presenting today for a telehealth visit. She was accompanied by her daughter who helped with the visit. They gave permission for an audio only telehealth conference. The patient was located in her home in [place], [place]. I was located in my office on [place], [place]. The pt has chronic sales catheter drainage. She provided a urine specimen from the catheter on 2/27/2023. UA was slightly turbid with large LEC and 59 WBC/HPF. Culture grew >100,000 CFU/ml E.Coli. The sensitivity report is not yet back. The pt's daughter was concerned as when the patient was constipated a few weeks ago she was having very watery BM which grew messy and were around the catheter area. She was given a Fleet's enema last month which helped. Currently she is having BM, paste-like in appearance. Her daughter does not think that her stomach is distended. She has recently had the catheter changed and is not voiding around it. Urine has been very clear. She does not seem to have pain in the urinary passageway or bladder. Denies fevers. Denies gross hematuria. Pt had covid around Thanksgiving, only had the first two vaccines from two years ago.   03/13/2023: Ms. MCDOWELL is a 89 year old female presenting today for a telehealth visit. She was accompanied by her daughter who helped with the visit. They gave permission for an audio only telehealth conference. The patient was located in her home in [place], [place]. I was located in my office on [place], [place]. The patient obtained an US prior to today's visit 3/9/23 which revealed: Comparison is made with the exam of 2/28/22. Transabdominal ultrasound of the abdomen was performed with color flow imaging. The examination is limited in evaluation. The visualized aorta and inferior vena cava show no abnormality. The pancreas is obscured by overlying bowel gas. The liver measures 12.4 cm with homogeneous echotexture. No intrinsic mass and no intra-or extrahepatic ductal dilatation. There is hepatopedal flow of the main portal vein. No gallstones, pericholecystic fluid, wall thickening or positive sonographic Melendez sign. The common bile duct measures 0.3 cm. The right kidney measures 9.2 x 5.6 x 3.0 cm with a nonobstructing 1.2 cm stone in the upper pole. Multiple additional subcentimeter calcifications are seen. These were not seen on previous exam. No hydronephrosis or renal mass. The left kidney measures 9.3 x 3.9 x 3.5 cm. There is a 2.1 x 2.4 x 2.0 cm cyst in the medial mid pole, not seen on previous study. No hydronephrosis or renal calcification. The spleen measures 8.2 cm and show no abnormality. There is a small right pleural effusion, stable. IMPRESSIONS: Multiple nonobstructing stones of the right kidney with no hydronephrosis. 2.4 cm cyst of the midpole left kidney. Small right pleural effusion, stable. The daughter has answered the phone and reports the patient's urine does not get dark. She is not aware of the amount of water she gives her mother. I requested she measure's this from now on as the pt has stones and needs to increase water consumption. Her daughter reports this week the pt has experienced more spasms than normal and believes this may be due to severe constipation. She provided the pt with an Enema to aide in this situation. She is very concerned but I informed her the Enema may have stimulated the spasms and we should give her a few days to clear up and re-evaluate.   03/22/2023: Ms. CLAUDIO MCDOWELL presents today for an audio visual telehealth visit for which she gave permission for. The patient was located at home at [address] [place] [address] and I was located at my office in [place], [place]. She is accompanied by her daughter whom most of the visit was conducted with. Her daughter has been keeping track of her mother's water consumption. She is averaging about 58 oz of water in a 24 hr period. Additionally, she is drinking juice and ensure nutrition drinks. She does not consume any caffeine. She is on furosemide 40 mg. I said hello to the patient at the end of the visit and she is looking well. She reports feeling no pain at the current moment.   3/29/2023:  Patient Claudio Mcdowell and daughter Tabatha Deluna were home in [place], [place] and I was in my office in [facility].  Patient and daughter gave permission for a [employer] telehealth visit.  They preferred this to [employer].  The patient looks good today.  Her complexion was good there is no pallor.  Smile was symmetric her affect was normal she appeared happy she could make conversation it was appropriate.  She said that she currently had no pain.  When I asked questions that after 3/2 how she had been recently the sometimes she hesitated and deferred to her daughter for cancer compatible with her impaired short-term memory.  Long-term memory remains good she recognizes me once know so I am does ask questions about things like her bladder as she was worried about it her blood tests.  I assured her the results were satisfactory.  04/18/2023: Ms. MCDOWELL is a 89 year old female presenting today for an audio and visual telehealth visit for which she gave verbal permission. We utilized [employer] teams telemetry doc platform. The patient was located in her home at [address] [place] [address]. I was located in my office on [place], [place]. She was accompanied by her daughter who helped to serve as a historian. She reports discomfort in her eyes. She describes the discomfort as a feeling of sand. She opened her eyes for me, and they do not look red. Pupils look normal. She reports the right eyes bothers her significantly more than the left. I advise that the patient tries lubricating drops and it if does not feel better she will notify her visiting nurse who is due to come next week for a sales catheter change. She reports episode of bladder spasms. She denies gross hematuria. She describes the urine as barely yellow. I reviewed and discussed her  latest pertinent lab on 04/12/2023 which shows "alkaline phosphate normal at 89. Creatinine was normal at 0.62 mg/dL. WBC normal at 5.01. RBC normal at 3.87".   05/09/2023: Ms. MCDOWELL presents today for a follow up audio only telehealth visit for which they gave permission for. She was accompanied by her daughter who helped to serve as a historian. The patient was located at home [address] [place] [address] and I was located in my office in [place], [place]. The patient obtained lab work 5/2/23 prior to today's visit. The UA revealed microscopic hematuria, 5/HPF RBC, 27/HPF WBC. The patient demonstrated great hydration as the specific gravity is 1.006. The Sales catheter was changed May 2, 2023 with no clear urine in the bag. While changing the diaper the aide reports blood in the diaper. The daughter states last week her mother experienced spasms but as of two days ago she is now fine. However, she states the patient is more fatigued. She denies the pt having a temperature. I assured her because her mother is post menopausal any abrasion to the labia can cause a fissure.   06/02/2023: Ms. MCDOWELL is a 89 year old female presenting today for an audio and visual telehealth visit for which she gave verbal permission. We utilized [employer] teams telemetry [employer] platform. The patient was located in her home at [address] [place] [address]. I was located in my office on [place], [place]. She was accompanied by her daughter Tabatha. She reports that her mother complains of onset dysuria that dissipated on its own and has not recurred since. She reports right sided back pain that is aggravated when she lays on the right side. She provided urine specimen. She notes the urine was very clear. I reviewed and discussed the patient's pertinent lab works. Her latest Urinalysis on 05/30/2023 that shows "60 WBC/HPF. Trace Blood Urine. Specific Gravity Urine 1.007". Urine Culture on the same date shows ">100,000 CFU/ml Escherichia coli and <10,000 CFU/ml Normal Urogenital ángel present". The patient takes Mirtazapine for anxiety at low dose. The daughter reports the patient is experiencing increasing anxiety and is thinking of having the prescriber increasing the dosage.  Her BP runs around 120/60. Her Hemoglobin hematocrit looks good. gaze is conjugated. facial expression looks symmetric. Urine was faint yellow and clear.   06/06/2023: Ms. MCDOWELL presents today for a follow up audio only telehealth visit for which they gave permission for. The patient was located at home [address] [place] [address] and I was located in my office in [place], [place]. She was accompanied by her daughter Tabatha. The daughter states pt is experiencing episodes of a dry cough. An X Ray  of 6/5/23 revealed Mild peribronchial thickening suggesting bronchitis in the right clinical setting with no focal pneumonia or congestive heart failure. COPD with chronic lung changes. The mental status is the same as usual but her BP has elevated some. I assured her a little elevation is not as dangerous. The pt continues to experience some urinary frequency and it is a little more concentrated.   07/05/2023: Ms. CLAUDIO MCDOWELL presents today for a follow up audio only telephone visit for which she gave permission for. The pt was located at home, [address] [place] [address], and I was located in my office in [place], [place]. She is accompanied by her daughter Tabatha. Patient provided a surveillance urine specimen on 6/29/2023. Pt has chronic indwelling sales. UA revealed large LEC, 79 WBC/HPF, and occasional bacteria/hpf. Urine culture grew >100,000 CFU/ml EColi and 50,000-99,000 CFU/ml Enterobacter cloacae complex. Her daughter reports that her mother has been having some breathing problems and constipation. Her urine has been fairly good. There are some times when it looks like she's putting out less. It appears a little more concentrated. She tries to give her more water when she notices this. She is barely eating and mostly drinks Ensure. According to her most recent UA, she was well hydrated. She has not been squirting urine around the catheter, not having bladder spasms.   08/07/2023: Ms. CLAUDIO MCDOWELL was scheduled today for a follow up audio only telephone visit for which she gave permission for. The patient and her daughter were tried early at 4:40 PM, however there was no answer. A VM was left informing her that I will try calling at her scheduled appt time of 5PM. I called again at 5 PM and there once again was no answer. Pt advised to reschedule for tomorrow.  Patient provided a surveillance urine specimen on 8/1/2023. Has indwelling sales. UA revealed trace blood, large LEC, 103 WBC/HPF, few bacteria/ HPF, and 22 casts/HPF. Urine culture grew >100,000 CFU/ml E.Coli. Urine cytology was negative for high grade urothelial carcinoma. Specimen consisted of rare clusters of urothelial cells. The differential diagnosis includes mechanical disruption.    08/07/2023: Ms. CLAUDIO MCDOWELL was scheduled today for a follow up audio only telephone visit for which she gave permission for. The patient and her daughter were tried early at 4:40 PM, however there was no answer. A VM was left informing her that I will try calling at her scheduled appt time of 5PM. I called again at 5 PM and there once again was no answer. Pt advised to reschedule for tomorrow.  Patient provided a surveillance urine specimen on 8/1/2023. Has indwelling sales. UA revealed trace blood, large LEC, 103 WBC/HPF, few bacteria/ HPF, and 22 casts/HPF. Urine culture grew >100,000 CFU/ml E.Coli. Urine cytology was negative for high grade urothelial carcinoma. Specimen consisted of rare clusters of urothelial cells. The differential diagnosis includes mechanical disruption. She was tried once more at 5:47 PM and was able to be reached. I spoke to the patient's daughter, Tabatha. She denies any leaking around the catheter. Urine has been clear. Denies any fevers. She feels her mom is somewhat confused more often, worse the last week. She also reports she is more tired. Denies any pelvic pain.  09/06/2023: Ms. CLAUDIO MCDOWELL presents today for a follow up audio only telephone visit for which she gave permission for. The pt was located at home, [address] [place] [address], and I was located in my office in [place], [place]. Patient's daughter called earlier in the day and spoke to my nurse Melissa Escobar RN. She informed her that the patient's nurse who changes her catheter was on vacation last week. She is scheduled to have the catheter changed tomorrow, however for the past few days she has noticed pink/ red urine in the catheter. She denies fever, chills, nausea, and vomiting. My nurse advised her to increase her water intake and speak with me later to see if this changed anything. I reached her daughter at 5:23 PM and she was on the train and wouldn't be home for another half an hour. She informed me that the gross hematuria has improved and her catheter change is scheduled for tomorrow. They will also obtain a urine sample tomorrow.  09/07/2023: Ms. MCDOWELL presents today for a follow up audio only telehealth visit for which they gave permission for. The patient was located at home [address] [place] [address] and I was located in my office in [place], [place]. Daughter reports seeing gross hematuria and brown urine. Having sent an image to my 's email. Today the nurse reported no physical bleeding. Urine is now medium yellow. Admits to one bladder spasm on Monday Denies having diarrhea today but on Tuesday she did have diarrhea. Lately stool is soft and unformed. Daughter states she was nausea after  gave her medication as she experiences nausea when she wakes up. The nurse said this was normal. She complains of feeling the urge to go. Daughter states BP is normal in the morning but spikes some in the afternoon. The catheter has been placed in for more than 2 years. Daughter states she has not smoked for 40 years and was not a heavy smoker.

## 2023-09-07 NOTE — ED ADULT NURSE NOTE - NSFALLRISKINTERV_ED_ALL_ED
Assistance with ambulation/Communicate fall risk and risk factors to all staff, patient, and family/Provide visual cue: yellow wristband, yellow gown, etc/Reinforce activity limits and safety measures with patient and family/Call bell, personal items and telephone in reach/Instruct patient to call for assistance before getting out of bed/chair/stretcher/Non-slip footwear applied when patient is off stretcher/[place] to call system/Physically safe environment - no spills, clutter or unnecessary equipment/Purposeful Proactive Rounding/Room/bathroom lighting operational, light cord in reach

## 2023-09-07 NOTE — ED ADULT NURSE NOTE - NSICDXPASTMEDICALHX_GEN_ALL_CORE_FT
PAST MEDICAL HISTORY:  Acute cystitis without hematuria septic  4/2016    Essential hypertension     [facility] (hard of hearing), bilateral     Hypertension     Lymphedema of both lower extremities     Monoclonal gammopathies     Paroxysmal atrial fibrillation     Spinal stenosis     Spondyloarthritis     Vaginal prolapse     Venous insufficiency

## 2023-09-07 NOTE — ED CLERICAL - NS ED CLERK NOTE PRE-ARRIVAL INFORMATION; ADDITIONAL PRE-ARRIVAL INFORMATION

## 2023-09-08 ENCOUNTER — NON-APPOINTMENT (OUTPATIENT)
Age: 88
End: 2023-09-08

## 2023-09-08 LAB
ALBUMIN SERPL ELPH-MCNC: 2.9 G/DL — LOW (ref 3.3–5)
ALP SERPL-CCNC: 147 U/L — HIGH (ref 40–120)
ALT FLD-CCNC: 209 U/L — HIGH (ref 12–78)
ANION GAP SERPL CALC-SCNC: 6 MMOL/L — SIGNIFICANT CHANGE UP (ref 5–17)
APPEARANCE: ABNORMAL
AST SERPL-CCNC: 209 U/L — HIGH (ref 15–37)
BACTERIA: ABNORMAL /HPF
BILIRUB SERPL-MCNC: 0.8 MG/DL — SIGNIFICANT CHANGE UP (ref 0.2–1.2)
BILIRUBIN URINE: NEGATIVE
BLOOD URINE: ABNORMAL
BUN SERPL-MCNC: 13 MG/DL — SIGNIFICANT CHANGE UP (ref 7–23)
CALCIUM SERPL-MCNC: 8.9 MG/DL — SIGNIFICANT CHANGE UP (ref 8.5–10.1)
CAST: 28 /LPF
CHLORIDE SERPL-SCNC: 108 MMOL/L — SIGNIFICANT CHANGE UP (ref 96–108)
CO2 SERPL-SCNC: 23 MMOL/L — SIGNIFICANT CHANGE UP (ref 22–31)
COLOR: YELLOW
CREAT SERPL-MCNC: 0.66 MG/DL — SIGNIFICANT CHANGE UP (ref 0.5–1.3)
EGFR: 82 ML/MIN/1.73M2 — SIGNIFICANT CHANGE UP
EPITHELIAL CELLS: 2 /HPF
GLUCOSE QUALITATIVE U: NEGATIVE MG/DL
GLUCOSE SERPL-MCNC: 125 MG/DL — HIGH (ref 70–99)
GRAM STN FLD: SIGNIFICANT CHANGE UP
HCT VFR BLD CALC: 33.6 % — LOW (ref 34.5–45)
HGB BLD-MCNC: 11.4 G/DL — LOW (ref 11.5–15.5)
HYALINE CASTS: PRESENT
KETONES URINE: NEGATIVE MG/DL
LEUKOCYTE ESTERASE URINE: ABNORMAL
MAGNESIUM SERPL-MCNC: 1.8 MG/DL — SIGNIFICANT CHANGE UP (ref 1.6–2.6)
MCHC RBC-ENTMCNC: 32.4 PG — SIGNIFICANT CHANGE UP (ref 27–34)
MCHC RBC-ENTMCNC: 33.9 GM/DL — SIGNIFICANT CHANGE UP (ref 32–36)
MCV RBC AUTO: 95.5 FL — SIGNIFICANT CHANGE UP (ref 80–100)
MICROSCOPIC-UA: NORMAL
NITRITE URINE: NEGATIVE
PH URINE: 7
PHOSPHATE SERPL-MCNC: 2.5 MG/DL — SIGNIFICANT CHANGE UP (ref 2.5–4.5)
PLATELET # BLD AUTO: 65 K/UL — LOW (ref 150–400)
POTASSIUM SERPL-MCNC: 3.9 MMOL/L — SIGNIFICANT CHANGE UP (ref 3.5–5.3)
POTASSIUM SERPL-SCNC: 3.9 MMOL/L — SIGNIFICANT CHANGE UP (ref 3.5–5.3)
PROT SERPL-MCNC: 5.7 GM/DL — LOW (ref 6–8.3)
PROTEIN URINE: 100 MG/DL
RBC # BLD: 3.52 M/UL — LOW (ref 3.8–5.2)
RBC # FLD: 14 % — SIGNIFICANT CHANGE UP (ref 10.3–14.5)
RED BLOOD CELLS URINE: 67 /HPF
REVIEW: NORMAL
SODIUM SERPL-SCNC: 137 MMOL/L — SIGNIFICANT CHANGE UP (ref 135–145)
SPECIFIC GRAVITY URINE: 1.01
SPECIMEN SOURCE: SIGNIFICANT CHANGE UP
UROBILINOGEN URINE: 1 MG/DL
WBC # BLD: 20.6 K/UL — HIGH (ref 3.8–10.5)
WBC # FLD AUTO: 20.6 K/UL — HIGH (ref 3.8–10.5)
WHITE BLOOD CELLS URINE: 562 /HPF

## 2023-09-08 PROCEDURE — 74177 CT ABD & PELVIS W/CONTRAST: CPT | Mod: 26

## 2023-09-08 PROCEDURE — 76705 ECHO EXAM OF ABDOMEN: CPT | Mod: 26

## 2023-09-08 PROCEDURE — 93306 TTE W/DOPPLER COMPLETE: CPT | Mod: 26

## 2023-09-08 PROCEDURE — 93010 ELECTROCARDIOGRAM REPORT: CPT

## 2023-09-08 PROCEDURE — 99291 CRITICAL CARE FIRST HOUR: CPT

## 2023-09-08 RX ORDER — ACETAMINOPHEN 500 MG
1000 TABLET ORAL ONCE
Refills: 0 | Status: COMPLETED | OUTPATIENT
Start: 2023-09-08 | End: 2023-09-08

## 2023-09-08 RX ORDER — ZINC SULFATE TAB 220 MG (50 MG ZINC EQUIVALENT) 220 (50 ZN) MG
220 TAB ORAL DAILY
Refills: 0 | Status: DISCONTINUED | OUTPATIENT
Start: 2023-09-08 | End: 2023-09-14

## 2023-09-08 RX ORDER — SODIUM CHLORIDE 9 MG/ML
1000 INJECTION INTRAMUSCULAR; INTRAVENOUS; SUBCUTANEOUS ONCE
Refills: 0 | Status: COMPLETED | OUTPATIENT
Start: 2023-09-08 | End: 2023-09-08

## 2023-09-08 RX ORDER — MEROPENEM 1 G/30ML
1000 INJECTION INTRAVENOUS EVERY 8 HOURS
Refills: 0 | Status: DISCONTINUED | OUTPATIENT
Start: 2023-09-08 | End: 2023-09-08

## 2023-09-08 RX ORDER — LANOLIN ALCOHOL/MO/W.PET/CERES
3 CREAM (GRAM) TOPICAL AT BEDTIME
Refills: 0 | Status: DISCONTINUED | OUTPATIENT
Start: 2023-09-08 | End: 2023-09-14

## 2023-09-08 RX ORDER — APIXABAN 2.5 MG/1
2.5 TABLET, FILM COATED ORAL EVERY 12 HOURS
Refills: 0 | Status: DISCONTINUED | OUTPATIENT
Start: 2023-09-08 | End: 2023-09-14

## 2023-09-08 RX ORDER — ACETAMINOPHEN 500 MG
1 TABLET ORAL
Qty: 0 | Refills: 0 | DISCHARGE

## 2023-09-08 RX ORDER — CEFTRIAXONE 500 MG/1
2000 INJECTION, POWDER, FOR SOLUTION INTRAMUSCULAR; INTRAVENOUS EVERY 24 HOURS
Refills: 0 | Status: DISCONTINUED | OUTPATIENT
Start: 2023-09-08 | End: 2023-09-11

## 2023-09-08 RX ORDER — METOPROLOL TARTRATE 50 MG
1 TABLET ORAL
Qty: 0 | Refills: 0 | DISCHARGE

## 2023-09-08 RX ORDER — BENZOYL PEROXIDE MICRONIZED 5.8 %
1 TOWELETTE (EA) TOPICAL
Qty: 0 | Refills: 0 | DISCHARGE

## 2023-09-08 RX ORDER — SENNA PLUS 8.6 MG/1
2 TABLET ORAL
Qty: 0 | Refills: 0 | DISCHARGE

## 2023-09-08 RX ORDER — CHOLECALCIFEROL (VITAMIN D3) 125 MCG
3 CAPSULE ORAL
Qty: 0 | Refills: 0 | DISCHARGE

## 2023-09-08 RX ORDER — MULTIVIT-MIN/FERROUS GLUCONATE 9 MG/15 ML
1 LIQUID (ML) ORAL
Qty: 0 | Refills: 0 | DISCHARGE

## 2023-09-08 RX ORDER — MIDODRINE HYDROCHLORIDE 2.5 MG/1
10 TABLET ORAL EVERY 8 HOURS
Refills: 0 | Status: DISCONTINUED | OUTPATIENT
Start: 2023-09-08 | End: 2023-09-10

## 2023-09-08 RX ORDER — POTASSIUM CHLORIDE 20 MEQ
0.5 PACKET (EA) ORAL
Qty: 0 | Refills: 0 | DISCHARGE

## 2023-09-08 RX ORDER — ZINC OXIDE 200 MG/G
1 OINTMENT TOPICAL DAILY
Refills: 0 | Status: DISCONTINUED | OUTPATIENT
Start: 2023-09-08 | End: 2023-09-14

## 2023-09-08 RX ORDER — METHOCARBAMOL 500 MG/1
1 TABLET, FILM COATED ORAL
Qty: 0 | Refills: 0 | DISCHARGE

## 2023-09-08 RX ORDER — NOREPINEPHRINE BITARTRATE/D5W 8 MG/250ML
0.05 PLASTIC BAG, INJECTION (ML) INTRAVENOUS
Qty: 8 | Refills: 0 | Status: DISCONTINUED | OUTPATIENT
Start: 2023-09-08 | End: 2023-09-09

## 2023-09-08 RX ORDER — POLYETHYLENE GLYCOL 3350 17 G/17G
17 POWDER, FOR SOLUTION ORAL
Qty: 0 | Refills: 0 | DISCHARGE

## 2023-09-08 RX ORDER — VANCOMYCIN HCL 1 G
750 VIAL (EA) INTRAVENOUS EVERY 12 HOURS
Refills: 0 | Status: DISCONTINUED | OUTPATIENT
Start: 2023-09-08 | End: 2023-09-08

## 2023-09-08 RX ORDER — MIRTAZAPINE 45 MG/1
15 TABLET, ORALLY DISINTEGRATING ORAL ONCE
Refills: 0 | Status: COMPLETED | OUTPATIENT
Start: 2023-09-08 | End: 2023-09-08

## 2023-09-08 RX ORDER — POLYETHYLENE GLYCOL 3350 17 G/17G
17 POWDER, FOR SOLUTION ORAL DAILY
Refills: 0 | Status: DISCONTINUED | OUTPATIENT
Start: 2023-09-08 | End: 2023-09-14

## 2023-09-08 RX ORDER — PANTOPRAZOLE SODIUM 20 MG/1
40 TABLET, DELAYED RELEASE ORAL
Refills: 0 | Status: DISCONTINUED | OUTPATIENT
Start: 2023-09-08 | End: 2023-09-14

## 2023-09-08 RX ORDER — NYSTATIN CREAM 100000 [USP'U]/G
1 CREAM TOPICAL
Qty: 0 | Refills: 0 | DISCHARGE

## 2023-09-08 RX ORDER — SALICYLIC ACID 0.5 %
1 CLEANSER (GRAM) TOPICAL DAILY
Refills: 0 | Status: DISCONTINUED | OUTPATIENT
Start: 2023-09-08 | End: 2023-09-14

## 2023-09-08 RX ORDER — POLYETHYLENE GLYCOL 3350 17 G/17G
17 POWDER, FOR SOLUTION ORAL DAILY
Refills: 0 | Status: DISCONTINUED | OUTPATIENT
Start: 2023-09-08 | End: 2023-09-08

## 2023-09-08 RX ORDER — INFLUENZA VIRUS VACCINE 15; 15; 15; 15 UG/.5ML; UG/.5ML; UG/.5ML; UG/.5ML
0.7 SUSPENSION INTRAMUSCULAR ONCE
Refills: 0 | Status: DISCONTINUED | OUTPATIENT
Start: 2023-09-08 | End: 2023-09-14

## 2023-09-08 RX ORDER — LANOLIN/MINERAL OIL
1 LOTION (ML) TOPICAL DAILY
Refills: 0 | Status: DISCONTINUED | OUTPATIENT
Start: 2023-09-08 | End: 2023-09-14

## 2023-09-08 RX ORDER — CHLORHEXIDINE GLUCONATE 213 G/1000ML
1 SOLUTION TOPICAL
Refills: 0 | Status: DISCONTINUED | OUTPATIENT
Start: 2023-09-08 | End: 2023-09-14

## 2023-09-08 RX ORDER — SENNA PLUS 8.6 MG/1
2 TABLET ORAL AT BEDTIME
Refills: 0 | Status: DISCONTINUED | OUTPATIENT
Start: 2023-09-08 | End: 2023-09-14

## 2023-09-08 RX ORDER — LANOLIN ALCOHOL/MO/W.PET/CERES
1 CREAM (GRAM) TOPICAL
Qty: 0 | Refills: 0 | DISCHARGE

## 2023-09-08 RX ORDER — CHOLECALCIFEROL (VITAMIN D3) 125 MCG
2000 CAPSULE ORAL DAILY
Refills: 0 | Status: DISCONTINUED | OUTPATIENT
Start: 2023-09-08 | End: 2023-09-14

## 2023-09-08 RX ORDER — LACTOBACILLUS ACIDOPHILUS 100MM CELL
1 CAPSULE ORAL
Qty: 0 | Refills: 0 | DISCHARGE

## 2023-09-08 RX ORDER — LANOLIN/MINERAL OIL
1 LOTION (ML) TOPICAL
Qty: 0 | Refills: 0 | DISCHARGE

## 2023-09-08 RX ADMIN — SENNA PLUS 2 TABLET(S): 8.6 TABLET ORAL at 21:38

## 2023-09-08 RX ADMIN — Medication 400 MILLIGRAM(S): at 15:29

## 2023-09-08 RX ADMIN — Medication 400 MILLIGRAM(S): at 05:55

## 2023-09-08 RX ADMIN — SODIUM CHLORIDE 1000 MILLILITER(S): 9 INJECTION INTRAMUSCULAR; INTRAVENOUS; SUBCUTANEOUS at 01:20

## 2023-09-08 RX ADMIN — Medication 6.19 MICROGRAM(S)/KG/MIN: at 02:57

## 2023-09-08 RX ADMIN — SODIUM CHLORIDE 1000 MILLILITER(S): 9 INJECTION INTRAMUSCULAR; INTRAVENOUS; SUBCUTANEOUS at 03:30

## 2023-09-08 RX ADMIN — MIDODRINE HYDROCHLORIDE 10 MILLIGRAM(S): 2.5 TABLET ORAL at 21:38

## 2023-09-08 RX ADMIN — MIDODRINE HYDROCHLORIDE 10 MILLIGRAM(S): 2.5 TABLET ORAL at 15:29

## 2023-09-08 RX ADMIN — CEFTRIAXONE 2000 MILLIGRAM(S): 500 INJECTION, POWDER, FOR SOLUTION INTRAMUSCULAR; INTRAVENOUS at 11:56

## 2023-09-08 RX ADMIN — MIRTAZAPINE 15 MILLIGRAM(S): 45 TABLET, ORALLY DISINTEGRATING ORAL at 22:44

## 2023-09-08 RX ADMIN — POLYETHYLENE GLYCOL 3350 17 GRAM(S): 17 POWDER, FOR SOLUTION ORAL at 15:30

## 2023-09-08 RX ADMIN — SODIUM CHLORIDE 1000 MILLILITER(S): 9 INJECTION INTRAMUSCULAR; INTRAVENOUS; SUBCUTANEOUS at 00:10

## 2023-09-08 RX ADMIN — Medication 1000 MILLIGRAM(S): at 18:00

## 2023-09-08 RX ADMIN — APIXABAN 2.5 MILLIGRAM(S): 2.5 TABLET, FILM COATED ORAL at 21:37

## 2023-09-08 RX ADMIN — Medication 3 MILLIGRAM(S): at 22:26

## 2023-09-08 RX ADMIN — APIXABAN 2.5 MILLIGRAM(S): 2.5 TABLET, FILM COATED ORAL at 11:56

## 2023-09-08 NOTE — PROVIDER CONTACT NOTE (EICU) - SITUATION
89 y/o female with PMHx of HTN, Afib on Eliquis, with chronic indwelling catheter BIBEMS to the ED with HHA and daughter c/o dark urine and fever. Per home health aide, pt had a sales catheter change today and they noted dark red urine, pt noted to have had dark urine for the last 2 days. Also noted fever of 100.1F since today 5:30PM. Pt vomited twice today but otherwise has been eating and drinking normally. No meds PTA. NKDA. Per friend over phone, pt follows with urologist Dr. Sexton for chronic catheter/UTIs and is resistant to an antibiotic.  Case discussed with the ICU PA, remains on Levophed for BP support.

## 2023-09-08 NOTE — CONSULT NOTE ADULT - SUBJECTIVE AND OBJECTIVE BOX
Patient is a 89y old  Female who presents with a chief complaint of UTI, septic shock (08 Sep 2023 06:23)    HPI:  89 y/o female with h/o HTN, Afib on Eliquis, chronic indwelling catheter, kidney stones was admitted on 9/7 for persistent lower abdominal pain, dark urine and fever (Tmax 100.1F) x 2 days. 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 Family states patient just had her chronic sales replaced on the day PTA. Upon arrival patient was found to be febrile and hypotensive. Received a total of 4LF IVFs, however, remained hypotensive and was consequently started on levophed. Labs significant for leukocytosis 21k with left shift. UA suggestive of infection. In ER she received meropenem and vancomycin IV.     Recent urine cultures reports reviewed --> noted with prior E.coli pan S and Enterobacter on occasion; no ESBL noted.    PMH: as above  PSH: as above  Meds: per reconciliation sheet, noted below  MEDICATIONS  (STANDING):  acetaminophen   IVPB .. 1000 milliGRAM(s) IV Intermittent once  apixaban 2.5 milliGRAM(s) Oral every 12 hours  chlorhexidine 4% Liquid 1 Application(s) Topical <User Schedule>  meropenem Injectable 1000 milliGRAM(s) IV Push every 8 hours  norepinephrine Infusion 0.05 MICROgram(s)/kG/Min (6.19 mL/Hr) IV Continuous <Continuous>  vancomycin  IVPB 750 milliGRAM(s) IV Intermittent every 12 hours    MEDICATIONS  (PRN):  acetaminophen     Tablet .. 650 milliGRAM(s) Oral every 6 hours PRN Temp greater or equal to 38C (100.4F), Mild Pain (1 - 3)    Allergies    No Known Allergies    Intolerances      Social: no smoking, no alcohol, no illegal drugs; no recent travel, no exposure to TB  FAMILY HISTORY:  No pertinent family history in first degree relatives      no history of premature cardiovascular disease in first degree relatives    ROS: the patient denies fever, no chills, no HA, no seizures, no dizziness, no sore throat, no nasal congestion, no blurry vision, no CP, no palpitations, no SOB, no cough, no abdominal pain, no diarrhea, no N/V, no dysuria, no leg pain, no claudication, no rash, no joint aches, no rectal pain or bleeding, no night sweats  All other systems reviewed and are negative    Vital Signs Last 24 Hrs  T(C): 38.2 (08 Sep 2023 07:38), Max: 38.2 (07 Sep 2023 23:38)  T(F): 100.8 (08 Sep 2023 07:38), Max: 100.8 (07 Sep 2023 23:38)  HR: 74 (08 Sep 2023 09:00) (56 - 92)  BP: 117/53 (08 Sep 2023 09:00) (95/55 - 137/70)  BP(mean): 66 (08 Sep 2023 09:00) (66 - 93)  RR: 21 (08 Sep 2023 09:00) (18 - 21)  SpO2: 95% (08 Sep 2023 09:00) (94% - 100%)    Parameters below as of 08 Sep 2023 09:00    O2 Flow (L/min): 2    PE:    Constitutional:  No acute distress  HEENT: NC/AT, EOMI, PERRLA, conjunctivae clear; ears and nose atraumatic; pharynx benign  Neck: supple; thyroid not palpable  Back: no tenderness  Respiratory: respiratory effort normal; clear to auscultation  Cardiovascular: S1S2 regular, no murmurs  Abdomen: soft, not tender, not distended, positive BS; no liver or spleen organomegaly  Genitourinary: no suprapubic tenderness  Lymphatic: no LN palpable  Musculoskeletal: no muscle tenderness, no joint swelling or tenderness  Extremities: no pedal edema  Neurological/ Psychiatric: AxOx3, judgement and insight normal; moving all extremities  Skin: no rashes; no palpable lesions    Labs: all available labs reviewed                        11.4   20.60 )-----------( x        ( 08 Sep 2023 09:06 )             33.6     09-08    137  |  108  |  13  ----------------------------<  125<H>  3.9   |  23  |  0.66    Ca    8.9      08 Sep 2023 09:06  Phos  2.5     09-08  Mg     1.8     09-08    TPro  5.7<L>  /  Alb  2.9<L>  /  TBili  0.8  /  DBili  x   /  AST  209<H>  /  ALT  209<H>  /  AlkPhos  147<H>  09-08     LIVER FUNCTIONS - ( 08 Sep 2023 09:06 )  Alb: 2.9 g/dL / Pro: 5.7 gm/dL / ALK PHOS: 147 U/L / ALT: 209 U/L / AST: 209 U/L / GGT: x           Urinalysis (09-07 @ 21:14)  Urine Appearance: very cloudy  Protein, Urine: 500 mg/dL    Urine Microscopic-Add On (NC) (09-07 @ 21:14)  White Blood Cell - Urine: >50 /HPF  Red Blood Cell - Urine: >50 /HPF    (09-07 @ 21:14)  NotDete    Radiology: all available radiological tests reviewed    < from: CT Abdomen and Pelvis w/ IV Cont (09.08.23 @ 02:20) >  Severe cardiomegaly.  Bibasilar subsegmental atelectasis. Trace right pleural effusion.    Mild dilatation extra renal pelvis right kidney. No hydronephrosis.   Linear nonobstructing calcification lower pole both kidneys. No   obstructing renal or ureteral stones. Simple bilateral renal cysts. No CT   evidence of pyelonephritis. No perinephric fluid collections.    Sales catheter is present within the urinary bladder which is decompressed.  Large fecal impaction rectosigmoid portion of the colon.  < end of copied text >    Advanced directives addressed: full resuscitation Pre-procedure teaching reinforced.

## 2023-09-08 NOTE — ASSESSMENT
[FreeTextEntry1] : Claudio Mcdowell in her daughter Tabatha Deluna gave permission for an audio video telehealth visit.  They were in their home in [facility].  I was in my office in [facility].  Claudio Mcdowell is currently 89 years of age.  Her daughter Tabatha Deluna is devoted to her care.  She is very concerned about her mother and has been so for many years.  She becomes very anxious at times and that is concerning her mother.  Claudio Mcdowell lives with her daughter.  Claudio Mcdowell has been bedbound for several years.  The patient had developed a sacral decubitus ulcer while in a nursing facility.  A Sales catheter was placed because of fecal incontinence and concern that urine mixed with the feces would contaminate the sacral decubitus ulcer.  Once the patient left the nursing facility and will be with her daughter.  The sacral decubitus ulcer has finally healed.  I have discussed removing the Sales catheter with the daughter.  However as the patient has fecal incontinence there is concern about the urine mixing with the feces and being more likely to cause skin problems.  For now we will leave the Sales catheter in.  The patient has had clinically symptomatic urinary tract infections.  The restart with increased urination around the Sales catheter due to bladder spasms.  Bladder spasms have been occurring for staff.  The patient is positioned properly in in bed and the personal care attendant leaves for the day.  We have managed to prevent this with the administration of sublingual hyoscyamine.  We do periodic surveillance urine analysis and urine culture tests at the time the Sales catheter is changed by visiting nurse.  He also performed a times of increased spasms or purulence of the urine or change in mental status,  On October 11, 2022 visiting nurse using an order I have previously placed at the request of the daughter sent urine for urine cytology which proved to be negative for malignant cells, urine culture which grew Greater than 100,000 and E. coli that was sensitive to all antibiotics tested.  Urinalysis looked quite good for urine taken from a Sales catheter that was used for chronic Sales catheter drainage.  Ileukocyte esterase was large but nitrites were negative.  There were 2 epithelial cells per high-power field.  There were only 10 white blood cells per high-power field and only 2 red blood cells per high-power field on microscopic exam there were no bacteria seen and there were no hyaline casts.  Specific gravity was 1.010 which shows good hydration and this bedbound woman cared for diligently by her daughter.  Blood by dipstick was trace.  There was no protein glucose or ketones in the urine.  There is no bilirubin and no urobilinogen.  An important portion of the visit was my review with the daughter about bladder spasms and urination around the catheter.  Urine appearance and urine analysis have been clear.  The urine was clear again today.  The patient has significant short-term memory deficit.  She does have some useful short-term memory.  Her long-term memory remains very much intact.  She is very pleasant and to make satisfactory conversation.  She does admit to sometimes not remembering something.  Her complexion was good and there was no pallor.  Facial movements were symmetric.  Cranial nerves were intact.  I was not able to assess the olfactory nerve as this was a telehealth visit.    I asked the daughter to see to it that the urine sample was sent periodically when the catheter was changed.  I would do this for surveillance purposes.  I also asked that a urine sample be sent at times of increased cloudiness of the urine.  At any time that gross blood was visible in the urine.  I would also asked that urine be sent if mental status should change.  I asked that urine sample be sent if urine leakage around the catheter picked up and appeared to be from spasms.  The daughter said that she would think that this was performed.  I also asked that the daughter schedule a telehealth visit 3 days after urine samples were sent so we can discuss the circumstances around the decision to send the specimen the specimen and if the patient was having any obvious symptoms or signs that might indicate clinically significant infection.  11/04/2022: Ms. MCDOWELL is a 89 year old female presenting today for a telehealth visit. She was accompanied by her daughter who helped with the visit. They gave permission for an audio only telehealth conference. The patient was located in her home in [place], [place]. I was located in my office on [place], [place]. Today her daughter reports the pt experienced rare urinary leaking around the catheter due to bladder spasms. She also reports her systolic pressure was around 140 last week. I offered Gemtesa to manage the bladder spasms and the daughter was cautious about more medications. I informed her if the urinary leaking are only once every 2 weeks or so, she does not have to medicate. The daughter was agreeable to monitor the occurrence of urinary leaking over the next 4 weeks and assess the discomfort it causes the pt. She denies any other urinary issues.  Plan: She will provide a urine specimen for UA, urine cytology and urine culture. She will follow up 2 or 3  weeks after providing urine sample/  03/01/2023: Ms. MCDOWELL is a 89 year old female presenting today for a telehealth visit. She was accompanied by her daughter who helped with the visit. They gave permission for an audio only telehealth conference. The patient was located in her home in [place], [place]. I was located in my office on [place], [place]. The pt has chronic sales catheter drainage. She provided a urine specimen from the catheter on 2/27/2023. UA was slightly turbid with large LEC and 59 WBC/HPF. Culture grew >100,000 CFU/ml E.Coli. The sensitivity report is not yet back. The pt's daughter was concerned as when the patient was constipated a few weeks ago she was having very watery BM which grew messy and were around the catheter area. She was given a Fleet's enema last month which helped. Currently she is having BM, paste-like in appearance. Her daughter does not think that her stomach is distended. She has recently had the catheter changed and is not voiding around it. Urine has been very clear. She does not seem to have pain in the urinary passageway or bladder. Denies fevers. Denies gross hematuria. [facility], only had the first two vaccines from two years ago.   Reviewed and discussed 2/27/2023 urine test results. Given the patient is not febrile and is not symptomatic, I will not treat at this time as these urine test results are consistent with someone who has chronic sales drainage.  If she gets ill, her daughter will call promptly.  Patient appears to be stable at this time. I spoke to her at the conclusion of the visit and she sounds good. Her memory seems to be good as she personally asked to speak to me. Was a little hesitant to reply at times but did ask me about my family. She spoke softly yet clearly.  I did advise the patient and her daughter to speak with her PCP in about 4 months about receiving the covid booster vaccines.  Patient will have a telephone visit in 3 months, sooner if clinically indicated. Will continue to submit surveillance urine cultures when her catheter is changed each month.  03/13/2023: Ms. MCDOWELL is a 89 year old female presenting today for a telehealth visit. She was accompanied by her daughter who helped with the visit. They gave permission for an audio only telehealth conference. The patient was located in her home in [place], [place]. I was located in my office on [place], [place]. The patient obtained an US prior to today's visit 3/9/23 which revealed: Comparison is made with the exam of 2/28/22. Transabdominal ultrasound of the abdomen was performed with color flow imaging. The examination is limited in evaluation. The visualized aorta and inferior vena cava show no abnormality. The pancreas is obscured by overlying bowel gas. The liver measures 12.4 cm with homogeneous echotexture. No intrinsic mass and no intra-or extrahepatic ductal dilatation. There is hepatopedal flow of the main portal vein. No gallstones, pericholecystic fluid, wall thickening or positive sonographic Melendez sign. The common bile duct measures 0.3 cm. The right kidney measures 9.2 x 5.6 x 3.0 cm with a nonobstructing 1.2 cm stone in the upper pole. Multiple additional subcentimeter calcifications are seen. These were not seen on previous exam. No hydronephrosis or renal mass. The left kidney measures 9.3 x 3.9 x 3.5 cm. There is a 2.1 x 2.4 x 2.0 cm cyst in the medial mid pole, not seen on previous study. No hydronephrosis or renal calcification. The spleen measures 8.2 cm and show no abnormality. There is a small right pleural effusion, stable. IMPRESSIONS: Multiple nonobstructing stones of the right kidney with no hydronephrosis. 2.4 cm cyst of the midpole left kidney. Small right pleural effusion, stable. The daughter has answered the phone and reports the patient's urine does not get dark. She is not aware of the amount of water she gives her mother. I requested she measure's this from now on as the pt has stones and needs to increase water consumption. Her daughter reports this week the pt has experienced more spasms than normal and believes this may be due to severe constipation. She provided the pt with an Enema to aide in this situation. She is very concerned but I informed her the Enema may have stimulated the spasms and we should give her a few days to clear up and re-evaluate.   I informed the patient's care taker to now measure and increase water volume as the pt has stones.  Clinical findings and US results were reviewed at length with the patient. I personally reviewed the imaging myself. Pt will schedule a telehealth visit in 1- 2 weeks.  03/22/2023: Ms. CLAUDIO MCDOWELL presents today for an audio visual telehealth visit for which she gave permission for. The patient was located at home at [address] [place] [address] and I was located at my office in [place], [place]. She is accompanied by her daughter whom most of the visit was conducted with. Her daughter has been keeping track of her mother's water consumption. She is averaging about 58 oz of water in a 24 hr period. Additionally, she is drinking juice and ensure nutrition drinks. She does not consume any caffeine. She is on furosemide 40 mg. I said hello to the patient at the end of the visit and she is looking well. She reports feeling no pain at the current moment.  I advised the patient's daughter to keep up the water intake and if she can, up it to 64 oz of water in a 24 hr period. I recommended she add fresh lemon juice to her water as well. I also advised the patient's daughter to speak with the prescribing doctor for furosemide on if it is okay to increase her water intake. I informed her I would be happy to talk to him if he needs.  Reviewed the patient's abdominal US of 3/9/2023 once again. US prior to that on 2/28/2022 showed no hydro, mass, or renal calcification, though the kidneys were not visualized well due to bowel gas.  Advised the patient's daughter to speak with the pt's gastroenterologist about her BM problems.  Patient is getting blood work drawn in her house in a few days. Will send orders for the things that I want done.   3/29/2023:  Patient Claudio Mcdowell and daughter Tabatha Deluna were home in [place], [place] and I was in my office in [facility].  Patient and daughter gave permission for a FaceTime telehealth visit.  They preferred this to [employer].  The patient looks good today.  Her complexion was good there is no pallor.  Smile was symmetric her affect was normal she appeared happy she could make conversation it was appropriate.  She said that she currently had no pain.  When I asked questions that after 3/2 how she had been recently the sometimes she hesitated and deferred to her daughter for cancer compatible with her impaired short-term memory.  Long-term memory remains good she recognizes me once know so I am does ask questions about things like her bladder as she was worried about it her blood tests.  I assured her the results were satisfactory.  I reviewed the blood and urine test results in detail with her daughter.  I explained the findings.  Patient based upon urine osmolality which was low and serum osmolality which was in the mid range of normal is appropriately hydrated.  The patient has congestive heart failure but was not dyspneic on observation today and said that she had no trouble breathing currently.  Her daughter confirms this.  The pro brain natruretic protein in the serum was elevated in keeping with her congestive heart.  The patient does not exert for self she remains in bed.  The patient had excess catheter immunoglobulin change in her urine.  This is in keeping with her diagnosis of multiple myeloma.  She has had for over 10 years without treatment I suspect its more likely gammopathy but has been.  The daughter said that at one point there was consideration of the degree of her having chronic lymphocytic leukemia.  I doubt she has had chronic lymphocytic leukemia.  I am unaware of her having any elevated lymphocyte counts.  A complete blood count ordered by Dr. Farmer showed  low lymphocytes at 470/mcL on December 7, 2022 blood draw at which time her total white blood cell count was 3850 white blood cells per microliter.  This was within normal range.  In view of the urine findings of a past diagnosis of multiple myeloma I will check the immunoglobin electrophoresis addition to a complete blood cell count.  In about 2 months I will see if we can arrange for a renal ultrasound to see if there is any growth in the patient's kidney stones.  I reviewed the issues involved in this kidney stone formation and growth.  I reviewed have an embolization bed 10 resulted in loss of calcium from the bones and hypercalciuria which could lead to nephrolithiasis.  The daughter is extremely concerned about the welfare of her mother.  The patient daughter does explore her concerns and most of her questions are pertinent.  I answered the daughter's questions to her satisfaction patient will have additional blood test drawn at home from the mobile phlebotomy team.  In 2 months time we will attempt to arrange for a home renal ultrasound.  04/18/2023: Ms. MCDOWELL is a 89 year old female presenting today for an audio and visual telehealth visit for which she gave verbal permission. We utilized [employer] teams telemetry doc platform. The patient was located in her home at [address] [place] [address]. I was located in my office on [place], [place]. She was accompanied by her daughter who helped to serve as a historian. She reports discomfort in her eyes. She describes the discomfort as a feeling of sand. She opened her eyes for me, and they do not look red. Pupils look normal. She reports the right eyes bothers her significantly more than the left. I advise that the patient tries lubricating drops and it if does not feel better she will notify her visiting nurse who is due to come next week for a sales catheter change. She reports episode of bladder spasms. She denies gross hematuria. She describes the urine as barely yellow. I reviewed and discussed her  latest pertinent lab on 04/12/2023 which shows "alkaline phosphate normal at 89. Creatinine was normal at 0.62 mg/dL. WBC normal at 5.01. RBC normal at 3.87".   Plan: She will provides urine specimen for Urine Analysis, Urine Culture and Urine Cytology. She will try lubricating drops to alleviate eyes discomfort. She will return for a telehalth 3 days after providing urine sample.   05/09/2023: Ms. MCDOWELL presents today for a follow up audio only telehealth visit for which they gave permission for. She was accompanied by her daughter who helped to serve as a historian. The patient was located at home [address] [place] [address] and I was located in my office in [place], [place]. The patient obtained lab work 5/2/23 prior to today's visit. The UA revealed microscopic hematuria, 5/HPF RBC, 27/HPF WBC. The patient demonstrated great hydration as the specific gravity is 1.006. The Sales catheter was changed May 2, 2023 with no clear urine in the bag. While changing the diaper the aide reports blood in the diaper. The daughter states last week her mother experienced spasms but as of two days ago she is now fine. However, she states the patient is more fatigued. She denies the pt having a temperature. I assured her because her mother is post menopausal any abrasion to the labia can cause a fissure.   Reviewed and discussed laboratory work of 5/2/23 which She  obtained prior to today's visit as requested. I assured the daughter the patient remains well in a urological standpoint.   Upon every catheter change patient is to give a urine sample. The daughter is to resend urine culture from prior catheter change.  Pt will schedule a telehealth visit   06/02/2023: Ms. MCDOWELL is a 89 year old female presenting today for an audio and visual telehealth visit for which she gave verbal permission. We utilized [employer] teams telemetry doc platform. The patient was located in her home at [address] [place] [address]. I was located in my office on [place], [place]. She was accompanied by her daughter Tabatha. She reports that her mother complains of onset dysuria that dissipated on its own and has not recurred since. She reports right sided back pain that is aggravated when she lays on the right side. She provided urine specimen. She notes the urine was very clear. I reviewed and discussed the patient's pertinent lab works. Her latest Urinalysis on 05/30/2023 that shows "60 WBC/HPF. Trace Blood Urine. Specific Gravity Urine 1.007". Urine Culture on the same date shows ">100,000 CFU/ml Escherichia coli and <10,000 CFU/ml Normal Urogenital ángel present". The patient takes Mirtazapine for anxiety at low dose. The daughter reports the patient is experiencing increasing anxiety and is thinking of having the prescriber increasing the dosage.  Her BP runs around 120/60. Her Hemoglobin hematocrit looks good. gaze is conjugated. facial expression looks symmetric. Urine was faint yellow and clear.   Plan: Preparation, audio-visual visit, and coordination of care took : 24 mins  06/06/2023: Ms. MCDOWELL presents today for a follow up audio only telehealth visit for which they gave permission for. The patient was located at home [address] [place] [address] and I was located in my office in [place], [place]. She was accompanied by her daughter Tabatha. The daughter states pt is experiencing episodes of a dry cough. An X Ray  of 6/5/23 revealed Mild peribronchial thickening suggesting bronchitis in the right clinical setting with no focal pneumonia or congestive heart failure. COPD with chronic lung changes. The mental status is the same as usual but her BP has elevated some. I assured her a little elevation is not as dangerous. The pt continues to experience some urinary frequency and it is a little more concentrated.   I assured the daughter the patient may continue to  take Augmentin as they have already started with 1 dose.  She is to send a copy of the Chest CT scan. Pt will schedule a telehealth visit  07/05/2023: Ms. CLAUDIO MCDOWELL presents today for a follow up audio only telephone visit for which she gave permission for. The pt was located at home, [address] [place] [address], and I was located in my office in [place], [place]. She is accompanied by her daughter Tabatha. Patient provided a surveillance urine specimen on 6/29/2023. Pt has chronic indwelling sales. UA revealed large LEC, 79 WBC/HPF, and occasional bacteria/hpf. Urine culture grew >100,000 CFU/ml EColi and 50,000-99,000 CFU/ml Enterobacter cloacae complex. Her daughter reports that her mother has been having some breathing problems and constipation. Her urine has been fairly good. There are some times when it looks like she's putting out less. It appears a little more concentrated. She tries to give her more water when she notices this. She is barely eating and mostly drinks Ensure. According to her most recent UA, she was well hydrated. She has not been squirting urine around the catheter, not having bladder spasms.   From a urologic standpoint, she is doing well.  Will provide surveillance urine specimen in 1-2 months, will schedule a telehealth visit to review and discuss results.   08/07/2023: Ms. CLAUDIO MCDOWELL was scheduled today for a follow up audio only telephone visit for which she gave permission for. The patient and her daughter were tried early at 4:40 PM, however there was no answer. A VM was left informing her that I will try calling at her scheduled appt time of 5PM. I called again at 5 PM and there once again was no answer. Pt advised to reschedule for tomorrow.  Patient provided a surveillance urine specimen on 8/1/2023. Has indwelling sales. UA revealed trace blood, large LEC, 103 WBC/HPF, few bacteria/ HPF, and 22 casts/HPF. Urine culture grew >100,000 CFU/ml E.Coli. Urine cytology was negative for high grade urothelial carcinoma. Specimen consisted of rare clusters of urothelial cells. The differential diagnosis includes mechanical disruption. She was tried once more at 5:47 PM and was able to be reached. I spoke to the patient's daughter, Tabatha. She denies any leaking around the catheter. Urine has been clear. Denies any fevers. She feels her mom is somewhat confused more often, worse the last week. She also reports she is more tired. Denies any pelvic pain.   Reviewed and discussed lab work of 8/1/2023 in detail with the pt. Given that the patient is confused, I am choosing to treat. It is possible that it is progressive memory loss, early dementia, however given the patient's age we will treat and see if her confusion improves.  I am prescribing Amoxicillin suspension 400 mg per 5 ml,10 ml BID until finished.  Patient will have a telehealth visit a few days after completion of abx.   09/06/2023: Ms. CLAUDIO MCDOWELL presents today for a follow up audio only telephone visit for which she gave permission for. The pt was located at home, [address] [place] [address], and I was located in my office in [place], [place]. Patient's daughter called earlier in the day and spoke to my nurse Melissa Escobar RN. She informed her that the patient's nurse who changes her catheter was on vacation last week. She is scheduled to have the catheter changed tomorrow, however for the past few days she has noticed pink/ red urine in the catheter. She denies fever, chills, nausea, and vomiting. My nurse advised her to increase her water intake and speak with me later to see if this changed anything. I reached her daughter at 5:23 PM and she was on the train and wouldn't be home for another half an hour. She informed me that the gross hematuria has improved and her catheter change is scheduled for tomorrow. They will also obtain a urine sample tomorrow.  I informed the patient's daughter that so longs as things are good, I will call tomorrow to see how the catheter change went and to see her mother and to ask her how she is doing. Pt's daughter was agreeable.    Duration of telephone visit was 13 minutes.

## 2023-09-08 NOTE — ADVANCED PRACTICE NURSE CONSULT - ASSESSMENT
This is a 89 year old female admitted on with 9/7/2023 and per MD H&P "pmhx of HTN, Afib on Eliquis, chronic indwelling catheter who was BIBEMS to ED with persistent lower abdominal pain, dark urine and fever (Tmax 100.1F) x 2 days. 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 Family states patient just had her chronic sales replaced yesterday. Upon arrival patient was found to be febrile and hypotensive. Received a total of 4LF IVFs, however, remained hypotensive and was consequently started on levophed. Labs significant for leukocytosis 21k with left shift, bilirubin 1.5, and elevated LFTs. UA suggestive of infection. Underwent RUQ ultrasound and CT abd pelvis which was negative for acute cholecystis, but revealed a right nonobstructing intrarenal calculi. Patient was admitted to ICU for further management of septic shock secondary to UTI. "    Patients daughter at bedside   Patient is currently confused alert to person.   Vishnu score : 12  Patient currently on Total care sport mattress and recommend maintaining low air loss mattress for pressure redistribution, turning and positioning every two hours and utilize HoverMAT to reduce friction and shear.     Patient is incontinent of stool.   Daughter reported that patient has history of pressure injuries in the past and now with scarring. Recommend utilizing 1-absorbant pad under patient to wick away moisture, Yair Zinc barrier cream to assist with moisture management with incontinence.     Consulted to assess posterior aspect of scalp   noted a 3cm x 2.4cm x +3cm hypergranular tissue, noted yellowish brownish drainage on old dressing. Patients daughter reports that it has been growing and getting larger and producing more drainage. I recommend cleaning with saline and pat dry, apply Xeroform to tissue and cover with a Telfa and wrap with Chelsea change two times per day and prn if soiled. Do not let xeroform dry out on tissue as it will stick and disrupt the tissue. Recommend a Dermatologist consult and outpatient follow-up to assess and diagnose this hypergranular tissue.

## 2023-09-08 NOTE — PATIENT PROFILE ADULT - FUNCTIONAL ASSESSMENT - BASIC MOBILITY 6.
1-calculated by average/Not able to assess (calculate score using [facility] averaging method)

## 2023-09-08 NOTE — H&P ADULT - NSHPPHYSICALEXAM_GEN_ALL_CORE
Physical Examination:    General: Resting comfortably in bed. Non focal     HEENT: Pupils equal, reactive to light.  Symmetric.    PULM: Clear to auscultation bilaterally, no significant sputum production    CVS: Afib rate controlled, no murmurs, rubs, or gallops    ABD: Soft, nondistended, nontender, normoactive bowel sounds, no masses    EXT: No edema, nontender    SKIN: Cool to touch b/l lower extremity.

## 2023-09-08 NOTE — PROGRESS NOTE ADULT - NS ATTEND AMEND GEN_ALL_CORE FT
89 yo female with PMHx of HTN, Afib on Eliquis, chronic indwelling catheter who was BIBEMS to ED with persistent lower abdominal pain, dark urine and fever (Tmax 100.1F) x 2 days. Patient has history of multiple UTIs and follows with urologist Dr. Sexton for chronic sales mgmt    Admitted for  1. Septic shock 2/2 UTI  2. Thrombocytopenia  3. Transaminitis    plan:  ICU  Septic shock from a UTI  Cultures PND  ECHO Done  Wean pressors  Rocephon as out patient cultures have been a sensitive E. Cili  Wound Care for Scalp lesion  Monitor thrombocytopenia and transmits.  Likely from Sepsis  PO Diet    D/W Daughter, ID

## 2023-09-08 NOTE — PROGRESS NOTE ADULT - ASSESSMENT
ASSESSMENT   89 yo female with PMHx of HTN, Afib on Eliquis, chronic indwelling catheter who was BIBEMS to ED with persistent lower abdominal pain, dark urine and fever (Tmax 100.1F) x 2 days. Patient has history of multiple UTIs and follows with urologist Dr. Sexton for chronic sales mgmt    Admitted for  1. Septic shock 2/2 UTI  2. Thrombocytopenia  3. Transaminitis     PLAN  Neuro: AAOx2-3. pain control prn. avoid   CV:      ASSESSMENT   89 yo female with PMHx of HTN, Afib on Eliquis, chronic indwelling catheter who was BIBEMS to ED with persistent lower abdominal pain, dark urine and fever (Tmax 100.1F) x 2 days. Patient has history of multiple UTIs and follows with urologist Dr. Sexton for chronic sales mgmt    Admitted for  1. Septic shock 2/2 UTI  2. Thrombocytopenia  3. Transaminitis     PLAN  Neuro: AAOx2-3. pain control prn. avoid   CV: on pressors hang @ 1.5. titrate to maintain MAP > 65. hold anti -htn meds. HR controlled in Afib on monitor. cont eliquis bid. add midodrine. TTE ordered  Pulm: on 2L NC sating 97-99%. wean as tolerated to maintain O2 sat > 94%.   GI: PO diet. large stool burden seen on CT scan. will adjust bowel regimen. Transaminitis likely d/t hypoperfusion. trend LFTs. US abd no evidence acute santo  Nephro: sales in place. Cr stable. pt received ~4L IV fluids. cont gentle maintenance fluids for now.   ID: Leukocytosis trending down. cont IV CTX 2gm qd as per ID recs. f/u urine and blood cx.   Heme: DVT ppx - on doac as above. SCDs. Thrombocytopenia suspect in setting of sepsis. cont to trend CBC  PT eval.    Dispo: ICU. wean pressors. IV abx. f/u cultures.     Will discuss with Dr. Lomeli   ASSESSMENT   89 yo female with PMHx of HTN, Afib on Eliquis, chronic indwelling catheter who was BIBEMS to ED with persistent lower abdominal pain, dark urine and fever (Tmax 100.1F) x 2 days. Patient has history of multiple UTIs and follows with urologist Dr. Sexton for chronic sales mgmt    Admitted for  1. Septic shock 2/2 UTI  2. Thrombocytopenia  3. Transaminitis     PLAN  Neuro: AAOx2-3. pain control prn. avoid   CV: on pressors hang @ 1.5. titrate to maintain MAP > 65. hold anti -htn meds. HR controlled in Afib on monitor. cont eliquis bid. add midodrine. TTE ordered  Pulm: on 2L NC sating 97-99%. wean as tolerated to maintain O2 sat > 94%.   GI: PO diet. large stool burden seen on CT scan. will adjust bowel regimen. Transaminitis likely d/t hypoperfusion. trend LFTs. US abd no evidence acute santo  Nephro: sales in place. Cr stable. pt received ~4L IV fluids. cont gentle maintenance fluids for now.   ID: Leukocytosis trending down. cont IV CTX 2gm qd as per ID recs. f/u urine and blood cx. pt with hx of pansensitive E.coli in prior cx as per HIE  Heme: DVT ppx - on doac as above. SCDs. Thrombocytopenia suspect worsened in setting of sepsis. 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 cont to trend CBC  PT eval.    Dispo: ICU. wean pressors. IV abx. f/u cultures.     Will discuss with Dr. Lomeli

## 2023-09-08 NOTE — H&P ADULT - NSHPLABSRESULTS_GEN_ALL_CORE
< from: CT Abdomen and Pelvis w/ IV Cont (09.08.23 @ 02:20) >    IMPRESSION:  Severe cardiomegaly.    Bibasilar subsegmental atelectasis. Trace right pleural effusion.    Mild dilatation extra renal pelvis right kidney. No hydronephrosis.   Linear nonobstructing calcification lower pole both kidneys. No   obstructing renal or ureteral stones. Simple bilateral renal cysts. No CT   evidence of pyelonephritis. No perinephric fluid collections.    Reaves catheter is present within the urinary bladder which is   decompressed.    Large fecal impaction rectosigmoid portion of the colon.    Please refer to detailed findings otherwise described above.    < from: US Abdomen Upper Quadrant Right (09.08.23 @ 00:16) >    IMPRESSION:  No cholelithiasis or sonographic evidence of cholecystitis.    Question nonobstructing right intrarenal calculi. No right hydronephrosis.

## 2023-09-08 NOTE — PROGRESS NOTE ADULT - SUBJECTIVE AND OBJECTIVE BOX
Patient is a 89y old  Female who presents with a chief complaint of UTI, septic shock (08 Sep 2023 09:50)    BRIEF HOSPITAL COURSE: 89 yo female with PMHx of HTN, Afib on Eliquis, chronic indwelling catheter who was BIBEMS to ED with persistent lower abdominal pain, dark urine and fever (Tmax 100.1F) x 2 days. Patient has history of multiple UTIs and follows with urologist Dr. 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 Family states patient just had her chronic sales replaced yesterday. Upon arrival patient was found to be febrile and hypotensive. Received a total of 4LF IVFs, however, remained hypotensive and was consequently started on levophed.   Patient was admitted to ICU for further management of septic shock secondary to UTI    9/8 pt AAOx 2-3. unsure where she is. denies pain. follows all commands, asking to eat    PAST MEDICAL & SURGICAL HISTORY:  Lymphedema of both lower extremities  Venous insufficiency  Monoclonal gammopathies  Paroxysmal atrial fibrillation  Acute cystitis without hematuria  septic  4/2016  Essential hypertension  Spinal stenosis  Spondyloarthritis  Vaginal prolapse  [place] (hard of hearing), bilateral  Hypertension  S/P kyphoplasty  2014  S/P thyroidectomy  partial   1975  History of vaginal surgery  History of fracture of femur  hx of proximal femur fracture in January 2021  History of repair of left hip joint  Hx L hip long IMN with Dr. Kitchen 2017      Medications:  cefTRIAXone Injectable. 2000 milliGRAM(s) IV Push every 24 hour  norepinephrine Infusion 0.05 MICROgram(s)/kG/Min IV Continuous <Continuous>  acetaminophen     Tablet .. 650 milliGRAM(s) Oral every 6 hours PRN  acetaminophen   IVPB .. 1000 milliGRAM(s) IV Intermittent once  apixaban 2.5 milliGRAM(s) Oral every 12 hours  chlorhexidine 4% Liquid 1 Application(s) Topical <User Schedule>      ICU Vital Signs Last 24 Hrs  T(C): 38.2 (08 Sep 2023 07:38), Max: 38.2 (07 Sep 2023 23:38)  T(F): 100.8 (08 Sep 2023 07:38), Max: 100.8 (07 Sep 2023 23:38)  HR: 73 (08 Sep 2023 12:00) (52 - 92)  BP: 126/69 (08 Sep 2023 12:00) (69/31 - 141/52)  BP(mean): 80 (08 Sep 2023 12:00) (43 - 93)  ABP: --  ABP(mean): --  RR: 24 (08 Sep 2023 12:00) (17 - 28)  SpO2: 97% (08 Sep 2023 12:00) (94% - 100%)    O2 Parameters below as of 08 Sep 2023 12:00    O2 Flow (L/min): 2      LABS:                        11.4   20.60 )-----------( 65       ( 08 Sep 2023 09:06 )             33.6     09-08    137  |  108  |  13  ----------------------------<  125<H>  3.9   |  23  |  0.66    Ca    8.9      08 Sep 2023 09:06  Phos  2.5     09-08  Mg     1.8     09-08    TPro  5.7<L>  /  Alb  2.9<L>  /  TBili  0.8  /  DBili  x   /  AST  209<H>  /  ALT  209<H>  /  AlkPhos  147<H>  09-08    CAPILLARY BLOOD GLUCOSE    PT/INR - ( 07 Sep 2023 21:14 )   PT: 16.4 sec;   INR: 1.47 ratio    PTT - ( 07 Sep 2023 21:14 )  PTT:32.3 sec  Urinalysis Basic - ( 08 Sep 2023 09:06 )    Color: x / Appearance: x / SG: x / pH: x  Gluc: 125 mg/dL / Ketone: x  / Bili: x / Urobili: x   Blood: x / Protein: x / Nitrite: x   Leuk Esterase: x / RBC: x / WBC x   Sq Epi: x / Non Sq Epi: x / Bacteria: x      CULTURES:  Rapid RVP Result: NotDetec (09-07 @ 21:14)      Physical Examination:    General: No acute distress.      HEENT: Pupils equal, reactive to light.  Symmetric.    PULM: Clear to auscultation bilaterally, no significant sputum production    NECK: Supple, no lymphadenopathy, trachea midline    CVS: Regular rate and rhythm, no murmurs, rubs, or gallops    ABD: Soft, nondistended, nontender, normoactive bowel sounds, no masses    EXT: No edema, nontender    SKIN: Warm and well perfused, no rashes noted.    NEURO: Alert, oriented, interactive, nonfocal    DEVICES:     RADIOLOGY:   < from: CT Abdomen and Pelvis w/ IV Cont (09.08.23 @ 02:20) >    IMPRESSION:  Severe cardiomegaly.    Bibasilar subsegmental atelectasis. Trace right pleural effusion.    Mild dilatation extra renal pelvis right kidney. No hydronephrosis.   Linear nonobstructing calcification lower pole both kidneys. No   obstructing renal or ureteral stones. Simple bilateral renal cysts. No CT   evidence of pyelonephritis. No perinephric fluid collections.    Sales catheter is present within the urinary bladder which is   decompressed.    Large fecal impaction rectosigmoid portion of the colon.    < end of copied text >    < from: US Abdomen Upper Quadrant Right (09.08.23 @ 00:16) >    IMPRESSION:  No cholelithiasis or sonographic evidence of cholecystitis.    Question non-obstructing right intrarenal calculi. No right hydronephrosis.    < end of copied text >

## 2023-09-08 NOTE — ADDENDUM
[FreeTextEntry1] : This note was authored by Senia Corbin working as a scribe for Dr.Gary Sexton. I, Dr. Indio Sexton have reviewed the content of this note and confirm it is true and accurate. I personally performed the history and physical examination and made all the decisions 09/06/2023

## 2023-09-08 NOTE — HISTORY OF PRESENT ILLNESS
[FreeTextEntry1] : Claudio Mcdowell in her daughter Tabatha Deluna gave permission for an audio video telehealth visit.  They were in their home in [facility].  I was in my office in [facility].  Claudio Mcdowell is currently 89 years of age.  Her daughter Tabatha Deluna is devoted to her care.  She is very concerned about her mother and has been so for many years.  She becomes very anxious at times and that is concerning her mother.  Claudio Mcdowell lives with her daughter.  Claudio Mcdowell has been bedbound for several years.  The patient had developed a sacral decubitus ulcer while in a nursing facility.  A Sales catheter was placed because of fecal incontinence and concern that urine mixed with the feces would contaminate the sacral decubitus ulcer.  Once the patient left the nursing facility and will be with her daughter.  The sacral decubitus ulcer has finally healed.  I have discussed removing the Sales catheter with the daughter.  However as the patient has fecal incontinence there is concern about the urine mixing with the feces and being more likely to cause skin problems.  For now we will leave the Sales catheter in.  The patient has had clinically symptomatic urinary tract infections.  He clinically significant infections usually start with increased urination around the Sales catheter due to bladder spasms.  Bladder spasms have been occurring for staff.  The patient is positioned properly in in bed and the personal care attendant leaves for the day.  We have managed to prevent this with the administration of sublingual hyoscyamine.  We do periodic surveillance urine analysis and urine culture tests at the time the Sales catheter is changed by visiting nurse.  He also performed a times of increased spasms or purulence of the urine or change in mental status,  On October 11, 2022 visiting nurse using an order I have previously placed at the request of the daughter sent urine for urine cytology which proved to be negative for malignant cells, urine culture which grew Greater than 100,000 and E. coli that was sensitive to all antibiotics tested.  Urinalysis looked quite good for urine taken from a Sales catheter that was used for chronic Sales catheter drainage.  Ileukocyte esterase was large but nitrites were negative.  There were 2 epithelial cells per high-power field.  There were only 10 white blood cells per high-power field and only 2 red blood cells per high-power field on microscopic exam there were no bacteria seen and there were no hyaline casts.  Specific gravity was 1.010 which shows good hydration and this bedbound woman cared for diligently by her daughter.  Blood by dipstick was trace.  There was no protein glucose or ketones in the urine.  There is no bilirubin and no urobilinogen.  An important portion of the visit was my review with the daughter about bladder spasms and urination around the catheter.  Urine appearance and urine analysis have been clear.  The urine was clear again today.  The patient has significant short-term memory deficit.  She does have some useful short-term memory.  Her long-term memory remains very much intact.  She is very pleasant and to make satisfactory conversation.  She does admit to sometimes not remembering something.  Her complexion was good and there was no pallor.  Facial movements were symmetric.  Cranial nerves were intact.  I was not able to assess the olfactory nerve as this was a telehealth visit.    11/04/2022: Ms. MCDOWELL is a 89 year old female presenting today for a telehealth visit. She was accompanied by her daughter who helped with the visit. They gave permission for an audio only telehealth conference. The patient was located in her home in [place], [place]. I was located in my office on [place], [place]. Today her daughter reports the pt experienced rare urinary leaking around the catheter due to bladder spasms. She also reports her systolic pressure was around 140 last week. I offered Gemtesa to manage the bladder spasms and the daughter was cautious about more medications. I informed her if the urinary leaking are only once every 2 weeks or so, she does not have to medicate. The daughter was agreeable to monitor the occurrence of urinary leaking over the next 4 weeks and assess the discomfort it causes the pt. She denies any other urinary issues.  03/01/2023: Ms. MCDOWELL is a 89 year old female presenting today for a telehealth visit. She was accompanied by her daughter who helped with the visit. They gave permission for an audio only telehealth conference. The patient was located in her home in [place], [place]. I was located in my office on [place], [place]. The pt has chronic sales catheter drainage. She provided a urine specimen from the catheter on 2/27/2023. UA was slightly turbid with large LEC and 59 WBC/HPF. Culture grew >100,000 CFU/ml E.Coli. The sensitivity report is not yet back. The pt's daughter was concerned as when the patient was constipated a few weeks ago she was having very watery BM which grew messy and were around the catheter area. She was given a Fleet's enema last month which helped. Currently she is having BM, paste-like in appearance. Her daughter does not think that her stomach is distended. She has recently had the catheter changed and is not voiding around it. Urine has been very clear. She does not seem to have pain in the urinary passageway or bladder. Denies fevers. Denies gross hematuria. Pt had covid around Thanksgiving, only had the first two vaccines from two years ago.   03/13/2023: Ms. MCDOWELL is a 89 year old female presenting today for a telehealth visit. She was accompanied by her daughter who helped with the visit. They gave permission for an audio only telehealth conference. The patient was located in her home in [place], [place]. I was located in my office on [place], [place]. The patient obtained an US prior to today's visit 3/9/23 which revealed: Comparison is made with the exam of 2/28/22. Transabdominal ultrasound of the abdomen was performed with color flow imaging. The examination is limited in evaluation. The visualized aorta and inferior vena cava show no abnormality. The pancreas is obscured by overlying bowel gas. The liver measures 12.4 cm with homogeneous echotexture. No intrinsic mass and no intra-or extrahepatic ductal dilatation. There is hepatopedal flow of the main portal vein. No gallstones, pericholecystic fluid, wall thickening or positive sonographic Melendez sign. The common bile duct measures 0.3 cm. The right kidney measures 9.2 x 5.6 x 3.0 cm with a nonobstructing 1.2 cm stone in the upper pole. Multiple additional subcentimeter calcifications are seen. These were not seen on previous exam. No hydronephrosis or renal mass. The left kidney measures 9.3 x 3.9 x 3.5 cm. There is a 2.1 x 2.4 x 2.0 cm cyst in the medial mid pole, not seen on previous study. No hydronephrosis or renal calcification. The spleen measures 8.2 cm and show no abnormality. There is a small right pleural effusion, stable. IMPRESSIONS: Multiple nonobstructing stones of the right kidney with no hydronephrosis. 2.4 cm cyst of the midpole left kidney. Small right pleural effusion, stable. The daughter has answered the phone and reports the patient's urine does not get dark. She is not aware of the amount of water she gives her mother. I requested she measure's this from now on as the pt has stones and needs to increase water consumption. Her daughter reports this week the pt has experienced more spasms than normal and believes this may be due to severe constipation. She provided the pt with an Enema to aide in this situation. She is very concerned but I informed her the Enema may have stimulated the spasms and we should give her a few days to clear up and re-evaluate.   03/22/2023: Ms. CLAUDIO MCDOWELL presents today for an audio visual telehealth visit for which she gave permission for. The patient was located at home at [address] [place] [address] and I was located at my office in [place], [place]. She is accompanied by her daughter whom most of the visit was conducted with. Her daughter has been keeping track of her mother's water consumption. She is averaging about 58 oz of water in a 24 hr period. Additionally, she is drinking juice and ensure nutrition drinks. She does not consume any caffeine. She is on furosemide 40 mg. I said hello to the patient at the end of the visit and she is looking well. She reports feeling no pain at the current moment.   3/29/2023:  Patient Claudio Mcdowell and daughter Tabatha Deluna were home in [place], [place] and I was in my office in [facility].  Patient and daughter gave permission for a [employer] telehealth visit.  They preferred this to [employer].  The patient looks good today.  Her complexion was good there is no pallor.  Smile was symmetric her affect was normal she appeared happy she could make conversation it was appropriate.  She said that she currently had no pain.  When I asked questions that after 3/2 how she had been recently the sometimes she hesitated and deferred to her daughter for cancer compatible with her impaired short-term memory.  Long-term memory remains good she recognizes me once know so I am does ask questions about things like her bladder as she was worried about it her blood tests.  I assured her the results were satisfactory.  04/18/2023: Ms. MCDOWELL is a 89 year old female presenting today for an audio and visual telehealth visit for which she gave verbal permission. We utilized [employer] teams telemetry doc platform. The patient was located in her home at [address] [place] [address]. I was located in my office on [place], [place]. She was accompanied by her daughter who helped to serve as a historian. She reports discomfort in her eyes. She describes the discomfort as a feeling of sand. She opened her eyes for me, and they do not look red. Pupils look normal. She reports the right eyes bothers her significantly more than the left. I advise that the patient tries lubricating drops and it if does not feel better she will notify her visiting nurse who is due to come next week for a sales catheter change. She reports episode of bladder spasms. She denies gross hematuria. She describes the urine as barely yellow. I reviewed and discussed her  latest pertinent lab on 04/12/2023 which shows "alkaline phosphate normal at 89. Creatinine was normal at 0.62 mg/dL. WBC normal at 5.01. RBC normal at 3.87".   05/09/2023: Ms. MCDOWELL presents today for a follow up audio only telehealth visit for which they gave permission for. She was accompanied by her daughter who helped to serve as a historian. The patient was located at home [address] [place] [address] and I was located in my office in [place] [employer] [place], [place]. The patient obtained lab work 5/2/23 prior to today's visit. The UA revealed microscopic hematuria, 5/HPF RBC, 27/HPF WBC. The patient demonstrated great hydration as the specific gravity is 1.006. The Sales catheter was changed May 2, 2023 with no clear urine in the bag. While changing the diaper the aide reports blood in the diaper. The daughter states last week her mother experienced spasms but as of two days ago she is now fine. However, she states the patient is more fatigued. She denies the pt having a temperature. I assured her because her mother is post menopausal any abrasion to the labia can cause a fissure.   06/02/2023: Ms. MCDOWELL is a 89 year old female presenting today for an audio and visual telehealth visit for which she gave verbal permission. We utilized [employer] teams telemetry Settle platform. The patient was located in her home at [address] [place] [address]. I was located in my office on [place], [place]. She was accompanied by her daughter Tabatha. She reports that her mother complains of onset dysuria that dissipated on its own and has not recurred since. She reports right sided back pain that is aggravated when she lays on the right side. She provided urine specimen. She notes the urine was very clear. I reviewed and discussed the patient's pertinent lab works. Her latest Urinalysis on 05/30/2023 that shows "60 WBC/HPF. Trace Blood Urine. Specific Gravity Urine 1.007". Urine Culture on the same date shows ">100,000 CFU/ml Escherichia coli and <10,000 CFU/ml Normal Urogenital ángel present". The patient takes Mirtazapine for anxiety at low dose. The daughter reports the patient is experiencing increasing anxiety and is thinking of having the prescriber increasing the dosage.  Her BP runs around 120/60. Her Hemoglobin hematocrit looks good. gaze is conjugated. facial expression looks symmetric. Urine was faint yellow and clear.   06/06/2023: Ms. MCDOWELL presents today for a follow up audio only telehealth visit for which they gave permission for. The patient was located at home [address] [place] [address] and I was located in my office in [place] [employer] [place], [place]. She was accompanied by her daughter Tabatha. The daughter states pt is experiencing episodes of a dry cough. An X Ray  of 6/5/23 revealed Mild peribronchial thickening suggesting bronchitis in the right clinical setting with no focal pneumonia or congestive heart failure. COPD with chronic lung changes. The mental status is the same as usual but her BP has elevated some. I assured her a little elevation is not as dangerous. The pt continues to experience some urinary frequency and it is a little more concentrated.   07/05/2023: Ms. CLAUDIO MCDOWELL presents today for a follow up audio only telephone visit for which she gave permission for. The pt was located at home, [address] [place] [address], and I was located in my office in [place], [place]. She is accompanied by her daughter Tabatha. Patient provided a surveillance urine specimen on 6/29/2023. Pt has chronic indwelling sales. UA revealed large LEC, 79 WBC/HPF, and occasional bacteria/hpf. Urine culture grew >100,000 CFU/ml EColi and 50,000-99,000 CFU/ml Enterobacter cloacae complex. Her daughter reports that her mother has been having some breathing problems and constipation. Her urine has been fairly good. There are some times when it looks like she's putting out less. It appears a little more concentrated. She tries to give her more water when she notices this. She is barely eating and mostly drinks Ensure. According to her most recent UA, she was well hydrated. She has not been squirting urine around the catheter, not having bladder spasms.   08/07/2023: Ms. CLAUDIO MCDOWELL was scheduled today for a follow up audio only telephone visit for which she gave permission for. The patient and her daughter were tried early at 4:40 PM, however there was no answer. A VM was left informing her that I will try calling at her scheduled appt time of 5PM. I called again at 5 PM and there once again was no answer. Pt advised to reschedule for tomorrow.  Patient provided a surveillance urine specimen on 8/1/2023. Has indwelling sales. UA revealed trace blood, large LEC, 103 WBC/HPF, few bacteria/ HPF, and 22 casts/HPF. Urine culture grew >100,000 CFU/ml E.Coli. Urine cytology was negative for high grade urothelial carcinoma. Specimen consisted of rare clusters of urothelial cells. The differential diagnosis includes mechanical disruption.    08/07/2023: Ms. CLAUDIO MCDOWELL was scheduled today for a follow up audio only telephone visit for which she gave permission for. The patient and her daughter were tried early at 4:40 PM, however there was no answer. A VM was left informing her that I will try calling at her scheduled appt time of 5PM. I called again at 5 PM and there once again was no answer. Pt advised to reschedule for tomorrow.  Patient provided a surveillance urine specimen on 8/1/2023. Has indwelling sales. UA revealed trace blood, large LEC, 103 WBC/HPF, few bacteria/ HPF, and 22 casts/HPF. Urine culture grew >100,000 CFU/ml E.Coli. Urine cytology was negative for high grade urothelial carcinoma. Specimen consisted of rare clusters of urothelial cells. The differential diagnosis includes mechanical disruption. She was tried once more at 5:47 PM and was able to be reached. I spoke to the patient's daughter, Tabatha. She denies any leaking around the catheter. Urine has been clear. Denies any fevers. She feels her mom is somewhat confused more often, worse the last week. She also reports she is more tired. Denies any pelvic pain.  09/06/2023: Ms. CLAUDIO MCDOWELL presents today for a follow up audio only telephone visit for which she gave permission for. The pt was located at home, [address] [place] [address], and I was located in my office in [place], [place]. Patient's daughter called earlier in the day and spoke to my nurse Melissa Escobar RN. She informed her that the patient's nurse who changes her catheter was on vacation last week. She is scheduled to have the catheter changed tomorrow, however for the past few days she has noticed pink/ red urine in the catheter. She denies fever, chills, nausea, and vomiting. My nurse advised her to increase her water intake and speak with me later to see if this changed anything. I reached her daughter at 5:23 PM and she was on the train and wouldn't be home for another half an hour. She informed me that the gross hematuria has improved and her catheter change is scheduled for tomorrow. They will also obtain a urine sample tomorrow.

## 2023-09-08 NOTE — ADVANCED PRACTICE NURSE CONSULT - RECOMMEDATIONS
Posterior Scalp   -Clean with Saline and PAT dry   -Apply Xeroform to tissue (on the tissue only not healthy skin)   -Cover with Telfa   -Wrap with cordelia   Change two times per day and prn if soiled.      Consult Dermatologist and follow up outpatient     -Continue turning/positioning patient from side-to-side q2h while in bed, q1h when/if OOB chair, or in accordance w/ pt's plan of care. Utilize pillows and/or Spry positioner pillow to assist w/ turning/positioning. When/if OOB chair, utilize pillows or chair cushion to offload pressure.   -Continue to offload heels from bed surface with soft pillow under calfs or by applying offloading boots to BLEs.   -Continue applying Coloplast Yair Protect moisture barrier cream to buttock and perineal area daily and prn after each incontinent episode.    -Continue utilizing one underpad underneath patient to contain incontinence episodes; change pad when saturated/soiled.   -Consider utilizing female incontinence device/Primafit (if patient candidate) to contain urinary incontinence.  -Continue nutrition consult for optimal wound healing & nutritional status.   -Assess skin/wound qshift, report changes to primary provider.   -Maintain Low Air loss Mattress   -Utilize HoverMAT for Boosting and transferring to reduce friction and shear     Plan of Care: Primary RN made aware of above recs. Spoke w/ Dr Lomeli  in regards to above. No further needs/recs from [facility] service at this time. Staff RN to perform routine skin/wound assessment and manage wound care. Questions or concerns or if wound worsens and reconsult needed, please contact [facility]

## 2023-09-08 NOTE — H&P ADULT - ASSESSMENT
Assessment/Plan:  Assessment/Plan: Patient is a 89 y/o female with pmhx of HTN, Afib on Eliquis, chronic indwelling catheter who was BIBEMS to ED with persistent lower abdominal pain, dark urine and fever (Tmax 100.1F) x 2 days. Admitted to ICU for:     1. Septic shock   2. UTI     Neuro: Hx of dementia, but is functional at baseline. Lives with aide. Tylenol ordered for fever/pain  Cardiac: S/p 4L IVF for resuscitation. Added levophed infusion to keep MAP> 65 and to promote end organ perfusion. Will continue to trend serum chemistry and lactate. Afib is rate controlled (60-70 bpm). Formal echo pending.   Resp: Adequate oxygenation on conventional nasal cannula. Maintain spO2> 90%.   GI: Bilirubin/LFTs noted to be elevated. RUQ sono negative for acute cholecystitis. Will continue to trend liver enzymes and avoid hepatotoxic agents   : Presents with chronic sales catheter and hx of recurrent infections. Follows with Dr. Sexton. Antibiotics broadened to meropenem and vancomycin. CT abd/pelvis revealing right infrarenal nonobstructive stone, no pyelonephritis. Continue to trend UOP, lytes and acid/base disorders   ID: Blood/urine cultures sent. Will consider adding stress dose steroids if vasopressor requirements increase.   Endo: No active issues.   Heme: H/H stable, no signs of active bleeding. Resuming home dose of Eliquis    CRITICAL CARE TIME SPENT: 35 minutes    Time spent evaluating/treating pateint with medical issues that pose a high risk for life threatening deterioration, and/or end-organ damage, reviewing data/labs/imaging, discussing case with multidisciplinary team, discussing plan/goals of care with patient/family. Non-inclusive of procedure time.     Case Discussed with eICU Attending, Dr. Mccabe

## 2023-09-08 NOTE — GOALS OF CARE CONVERSATION - ADVANCED CARE PLANNING - CONVERSATION DETAILS
Goals of care conversation took placed at bedside with patient's daughter. Daughter was updated about mother's worsening medical condition and need for admission to ICU for further management. States that she is not ready to commit to a DNR/DNI status, but would reconsider if patient's condition continued to decline. States that she does not want to subject her mother to unnecessary pain/suffering. Daughter would like to speak to her siblings before any decisions are made. Patient will remain full code at this. Further goals of care discussion to follow.

## 2023-09-09 ENCOUNTER — NON-APPOINTMENT (OUTPATIENT)
Age: 88
End: 2023-09-09

## 2023-09-09 LAB
ALBUMIN SERPL ELPH-MCNC: 2.8 G/DL — LOW (ref 3.3–5)
ALP SERPL-CCNC: 141 U/L — HIGH (ref 40–120)
ALT FLD-CCNC: 248 U/L — HIGH (ref 12–78)
ANION GAP SERPL CALC-SCNC: 4 MMOL/L — LOW (ref 5–17)
AST SERPL-CCNC: 195 U/L — HIGH (ref 15–37)
BILIRUB DIRECT SERPL-MCNC: 0.3 MG/DL — SIGNIFICANT CHANGE UP (ref 0–0.3)
BILIRUB INDIRECT FLD-MCNC: 0.1 MG/DL — LOW (ref 0.2–1)
BILIRUB SERPL-MCNC: 0.4 MG/DL — SIGNIFICANT CHANGE UP (ref 0.2–1.2)
BUN SERPL-MCNC: 14 MG/DL — SIGNIFICANT CHANGE UP (ref 7–23)
CALCIUM SERPL-MCNC: 9.1 MG/DL — SIGNIFICANT CHANGE UP (ref 8.5–10.1)
CHLORIDE SERPL-SCNC: 106 MMOL/L — SIGNIFICANT CHANGE UP (ref 96–108)
CO2 SERPL-SCNC: 23 MMOL/L — SIGNIFICANT CHANGE UP (ref 22–31)
CREAT SERPL-MCNC: 0.6 MG/DL — SIGNIFICANT CHANGE UP (ref 0.5–1.3)
E COLI DNA BLD POS QL NAA+NON-PROBE: SIGNIFICANT CHANGE UP
EGFR: 84 ML/MIN/1.73M2 — SIGNIFICANT CHANGE UP
GLUCOSE SERPL-MCNC: 105 MG/DL — HIGH (ref 70–99)
HCT VFR BLD CALC: 33.7 % — LOW (ref 34.5–45)
HGB BLD-MCNC: 11.4 G/DL — LOW (ref 11.5–15.5)
MAGNESIUM SERPL-MCNC: 2 MG/DL — SIGNIFICANT CHANGE UP (ref 1.6–2.6)
MCHC RBC-ENTMCNC: 32.4 PG — SIGNIFICANT CHANGE UP (ref 27–34)
MCHC RBC-ENTMCNC: 33.8 GM/DL — SIGNIFICANT CHANGE UP (ref 32–36)
MCV RBC AUTO: 95.7 FL — SIGNIFICANT CHANGE UP (ref 80–100)
METHOD TYPE: SIGNIFICANT CHANGE UP
PHOSPHATE SERPL-MCNC: 2.2 MG/DL — LOW (ref 2.5–4.5)
PLATELET # BLD AUTO: 56 K/UL — LOW (ref 150–400)
POTASSIUM SERPL-MCNC: 3.9 MMOL/L — SIGNIFICANT CHANGE UP (ref 3.5–5.3)
POTASSIUM SERPL-SCNC: 3.9 MMOL/L — SIGNIFICANT CHANGE UP (ref 3.5–5.3)
PROT SERPL-MCNC: 5.8 GM/DL — LOW (ref 6–8.3)
RBC # BLD: 3.52 M/UL — LOW (ref 3.8–5.2)
RBC # FLD: 14.2 % — SIGNIFICANT CHANGE UP (ref 10.3–14.5)
SODIUM SERPL-SCNC: 133 MMOL/L — LOW (ref 135–145)
WBC # BLD: 8.2 K/UL — SIGNIFICANT CHANGE UP (ref 3.8–10.5)
WBC # FLD AUTO: 8.2 K/UL — SIGNIFICANT CHANGE UP (ref 3.8–10.5)

## 2023-09-09 PROCEDURE — 99233 SBSQ HOSP IP/OBS HIGH 50: CPT

## 2023-09-09 RX ORDER — ACETAMINOPHEN 500 MG
650 TABLET ORAL ONCE
Refills: 0 | Status: COMPLETED | OUTPATIENT
Start: 2023-09-09 | End: 2023-09-09

## 2023-09-09 RX ADMIN — APIXABAN 2.5 MILLIGRAM(S): 2.5 TABLET, FILM COATED ORAL at 10:51

## 2023-09-09 RX ADMIN — MIDODRINE HYDROCHLORIDE 10 MILLIGRAM(S): 2.5 TABLET ORAL at 14:05

## 2023-09-09 RX ADMIN — Medication 650 MILLIGRAM(S): at 19:00

## 2023-09-09 RX ADMIN — MIDODRINE HYDROCHLORIDE 10 MILLIGRAM(S): 2.5 TABLET ORAL at 23:19

## 2023-09-09 RX ADMIN — PANTOPRAZOLE SODIUM 40 MILLIGRAM(S): 20 TABLET, DELAYED RELEASE ORAL at 06:37

## 2023-09-09 RX ADMIN — CEFTRIAXONE 2000 MILLIGRAM(S): 500 INJECTION, POWDER, FOR SOLUTION INTRAMUSCULAR; INTRAVENOUS at 10:50

## 2023-09-09 RX ADMIN — ZINC SULFATE TAB 220 MG (50 MG ZINC EQUIVALENT) 220 MILLIGRAM(S): 220 (50 ZN) TAB at 10:50

## 2023-09-09 RX ADMIN — Medication 2000 UNIT(S): at 10:50

## 2023-09-09 RX ADMIN — Medication 10 MILLIGRAM(S): at 10:52

## 2023-09-09 RX ADMIN — Medication 1 TABLET(S): at 10:51

## 2023-09-09 RX ADMIN — CHLORHEXIDINE GLUCONATE 1 APPLICATION(S): 213 SOLUTION TOPICAL at 06:38

## 2023-09-09 RX ADMIN — MIDODRINE HYDROCHLORIDE 10 MILLIGRAM(S): 2.5 TABLET ORAL at 06:37

## 2023-09-09 RX ADMIN — Medication 1 DROP(S): at 23:53

## 2023-09-09 RX ADMIN — APIXABAN 2.5 MILLIGRAM(S): 2.5 TABLET, FILM COATED ORAL at 23:19

## 2023-09-09 RX ADMIN — Medication 3 MILLIGRAM(S): at 23:19

## 2023-09-09 RX ADMIN — Medication 1 DROP(S): at 10:50

## 2023-09-09 NOTE — PROGRESS NOTE ADULT - ASSESSMENT
ASSESSMENT   89 yo female with PMHx of HTN, Afib on Eliquis, chronic indwelling catheter who was BIBEMS to ED with persistent lower abdominal pain, dark urine and fever (Tmax 100.1F) x 2 days. Patient has history of multiple UTIs and follows with urologist Dr. Sexton for chronic sales mgmt    Admitted for  1. Septic shock 2/2 UTI  2. Thrombocytopenia  3. Transaminitis     PLAN    CV:  Off Pressors, wean Midodrine.  ECHO noted  Pulm: on 2L NC sating 97-99%. wean as tolerated to maintain O2 sat > 94%.   GI: PO diet. miralax, trend LFTs, starting to improve  Nephro: sales in place. Cr stable. pt received ~4L IV fluids. cont gentle maintenance fluids for now.   ID: Cont IV CTX 2gm qd.  E.coli Bactremia.  UC PND  Heme: DVT ppx - on doac as above. SCDs. Thrombocytopenia suspect worsened in setting of sepsis. 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 cont to trend CBC  PT eval.    Transfer to Med Surg  Called into the Hospitalist at 9:35 AM

## 2023-09-09 NOTE — PROGRESS NOTE ADULT - SUBJECTIVE AND OBJECTIVE BOX
Patient is a 89y old  Female who presents with a chief complaint of UTI, septic shock (08 Sep 2023 09:50)    BRIEF HOSPITAL COURSE: 89 yo female with PMHx of HTN, Afib on Eliquis, chronic indwelling catheter who was BIBEMS to ED with persistent lower abdominal pain, dark urine and fever (Tmax 100.1F) x 2 days. Patient has history of multiple UTIs and follows with urologist Dr. 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 Family states patient just had her chronic sales replaced yesterday. Upon arrival patient was found to be febrile and hypotensive. Received a total of 4LF IVFs, however, remained hypotensive and was consequently started on levophed.   Patient was admitted to ICU for further management of septic shock secondary to UTI    9/8 pt AAOx 2-3. unsure where she is. denies pain. follows all commands, asking to eat  9/9: No events over night, off pressors, BC positive with E. Coli       PAST MEDICAL & SURGICAL HISTORY:  Lymphedema of both lower extremities      Venous insufficiency      Monoclonal gammopathies      Paroxysmal atrial fibrillation      Acute cystitis without hematuria  septic  4/2016      Essential hypertension      Spinal stenosis      Spondyloarthritis      Vaginal prolapse      [place] (hard of hearing), bilateral      Hypertension      S/P kyphoplasty  2014      S/P thyroidectomy  partial   1975      History of vaginal surgery      History of fracture of femur  hx of proximal femur fracture in January 2021      History of repair of left hip joint  Hx L hip long IMN with Dr. Kitchen 2017          FAMILY HISTORY:  No pertinent family history in first degree relatives        Social Hx:    Allergies    No Known Allergies    Intolerances            ICU Vital Signs Last 24 Hrs  T(C): 37.9 (09 Sep 2023 04:00), Max: 38.2 (08 Sep 2023 15:00)  T(F): 100.3 (09 Sep 2023 04:00), Max: 100.8 (08 Sep 2023 15:00)  HR: 81 (09 Sep 2023 09:00) (52 - 86)  BP: 126/81 (09 Sep 2023 09:00) (69/43 - 157/68)  BP(mean): 94 (09 Sep 2023 09:00) (51 - 94)  ABP: --  ABP(mean): --  RR: 21 (09 Sep 2023 09:00) (12 - 28)  SpO2: 100% (09 Sep 2023 09:00) (93% - 100%)    O2 Parameters below as of 09 Sep 2023 09:00    O2 Flow (L/min): 2              I&O's Summary    08 Sep 2023 07:01  -  09 Sep 2023 07:00  --------------------------------------------------------  IN: 152 mL / OUT: 850 mL / NET: -698 mL                              11.4   8.20  )-----------( 56       ( 09 Sep 2023 05:19 )             33.7       09-09    133<L>  |  106  |  14  ----------------------------<  105<H>  3.9   |  23  |  0.60    Ca    9.1      09 Sep 2023 05:19  Phos  2.2     09-09  Mg     2.0     09-09    TPro  5.8<L>  /  Alb  2.8<L>  /  TBili  0.4  /  DBili  0.3  /  AST  195<H>  /  ALT  248<H>  /  AlkPhos  141<H>  09-09                Urinalysis Basic - ( 09 Sep 2023 05:19 )    Color: x / Appearance: x / SG: x / pH: x  Gluc: 105 mg/dL / Ketone: x  / Bili: x / Urobili: x   Blood: x / Protein: x / Nitrite: x   Leuk Esterase: x / RBC: x / WBC x   Sq Epi: x / Non Sq Epi: x / Bacteria: x        MEDICATIONS  (STANDING):  apixaban 2.5 milliGRAM(s) Oral every 12 hours  artificial  tears Solution 1 Drop(s) Both EYES two times a day  bisacodyl Suppository 10 milliGRAM(s) Rectal once  cefTRIAXone Injectable. 2000 milliGRAM(s) IV Push every 24 hours  chlorhexidine 4% Liquid 1 Application(s) Topical <User Schedule>  cholecalciferol 2000 Unit(s) Oral daily  influenza  Vaccine (HIGH DOSE) 0.7 milliLiter(s) IntraMuscular once  melatonin 3 milliGRAM(s) Oral at bedtime  midodrine 10 milliGRAM(s) Oral every 8 hours  mineral oil/petrolatum Hydrophilic Ointment 1 Application(s) Topical daily  multivitamin 1 Tablet(s) Oral daily  norepinephrine Infusion 0.05 MICROgram(s)/kG/Min (6.19 mL/Hr) IV Continuous <Continuous>  pantoprazole    Tablet 40 milliGRAM(s) Oral before breakfast  polyethylene glycol 3350 17 Gram(s) Oral daily  senna 2 Tablet(s) Oral at bedtime  vitamin A &amp; D Ointment 1 Application(s) Topical daily  zinc oxide 40% Paste 1 Application(s) Topical daily  zinc sulfate 220 milliGRAM(s) Oral daily    MEDICATIONS  (PRN):  triamcinolone 0.1% Oral Paste 1 Application(s) Topical daily PRN rash to shoulders and/or back      DVT Prophylaxis: DOAC    Advanced Directives:  Discussed with:    Visit Information:    ** Time is exclusive of billed procedures and/or teaching and/or routine family updates.

## 2023-09-10 LAB
-  AMIKACIN: SIGNIFICANT CHANGE UP
-  AMPICILLIN/SULBACTAM: SIGNIFICANT CHANGE UP
-  AMPICILLIN: SIGNIFICANT CHANGE UP
-  AZTREONAM: SIGNIFICANT CHANGE UP
-  CEFAZOLIN: SIGNIFICANT CHANGE UP
-  CEFEPIME: SIGNIFICANT CHANGE UP
-  CEFOXITIN: SIGNIFICANT CHANGE UP
-  CEFTRIAXONE: SIGNIFICANT CHANGE UP
-  CIPROFLOXACIN: SIGNIFICANT CHANGE UP
-  ERTAPENEM: SIGNIFICANT CHANGE UP
-  GENTAMICIN: SIGNIFICANT CHANGE UP
-  IMIPENEM: SIGNIFICANT CHANGE UP
-  LEVOFLOXACIN: SIGNIFICANT CHANGE UP
-  MEROPENEM: SIGNIFICANT CHANGE UP
-  PIPERACILLIN/TAZOBACTAM: SIGNIFICANT CHANGE UP
-  TOBRAMYCIN: SIGNIFICANT CHANGE UP
-  TRIMETHOPRIM/SULFAMETHOXAZOLE: SIGNIFICANT CHANGE UP
ANION GAP SERPL CALC-SCNC: 4 MMOL/L — LOW (ref 5–17)
BUN SERPL-MCNC: 14 MG/DL — SIGNIFICANT CHANGE UP (ref 7–23)
CALCIUM SERPL-MCNC: 9.4 MG/DL — SIGNIFICANT CHANGE UP (ref 8.5–10.1)
CHLORIDE SERPL-SCNC: 103 MMOL/L — SIGNIFICANT CHANGE UP (ref 96–108)
CO2 SERPL-SCNC: 24 MMOL/L — SIGNIFICANT CHANGE UP (ref 22–31)
CREAT SERPL-MCNC: 0.61 MG/DL — SIGNIFICANT CHANGE UP (ref 0.5–1.3)
CULTURE RESULTS: SIGNIFICANT CHANGE UP
EGFR: 84 ML/MIN/1.73M2 — SIGNIFICANT CHANGE UP
GLUCOSE SERPL-MCNC: 100 MG/DL — HIGH (ref 70–99)
HCT VFR BLD CALC: 32.8 % — LOW (ref 34.5–45)
HGB BLD-MCNC: 11.3 G/DL — LOW (ref 11.5–15.5)
MAGNESIUM SERPL-MCNC: 2.1 MG/DL — SIGNIFICANT CHANGE UP (ref 1.6–2.6)
MCHC RBC-ENTMCNC: 32.3 PG — SIGNIFICANT CHANGE UP (ref 27–34)
MCHC RBC-ENTMCNC: 34.5 GM/DL — SIGNIFICANT CHANGE UP (ref 32–36)
MCV RBC AUTO: 93.7 FL — SIGNIFICANT CHANGE UP (ref 80–100)
METHOD TYPE: SIGNIFICANT CHANGE UP
PHOSPHATE SERPL-MCNC: 2.3 MG/DL — LOW (ref 2.5–4.5)
PLATELET # BLD AUTO: 60 K/UL — LOW (ref 150–400)
POTASSIUM SERPL-MCNC: 4 MMOL/L — SIGNIFICANT CHANGE UP (ref 3.5–5.3)
POTASSIUM SERPL-SCNC: 4 MMOL/L — SIGNIFICANT CHANGE UP (ref 3.5–5.3)
RBC # BLD: 3.5 M/UL — LOW (ref 3.8–5.2)
RBC # FLD: 14 % — SIGNIFICANT CHANGE UP (ref 10.3–14.5)
SODIUM SERPL-SCNC: 131 MMOL/L — LOW (ref 135–145)
SPECIMEN SOURCE: SIGNIFICANT CHANGE UP
WBC # BLD: 7.41 K/UL — SIGNIFICANT CHANGE UP (ref 3.8–10.5)
WBC # FLD AUTO: 7.41 K/UL — SIGNIFICANT CHANGE UP (ref 3.8–10.5)

## 2023-09-10 PROCEDURE — 73560 X-RAY EXAM OF KNEE 1 OR 2: CPT | Mod: 26,LT

## 2023-09-10 PROCEDURE — 99233 SBSQ HOSP IP/OBS HIGH 50: CPT

## 2023-09-10 RX ORDER — MULTIVIT WITH MIN/MFOLATE/K2 340-15/3 G
296 POWDER (GRAM) ORAL ONCE
Refills: 0 | Status: COMPLETED | OUTPATIENT
Start: 2023-09-10 | End: 2023-09-10

## 2023-09-10 RX ORDER — FUROSEMIDE 40 MG
20 TABLET ORAL DAILY
Refills: 0 | Status: DISCONTINUED | OUTPATIENT
Start: 2023-09-10 | End: 2023-09-14

## 2023-09-10 RX ORDER — METOPROLOL TARTRATE 50 MG
25 TABLET ORAL DAILY
Refills: 0 | Status: DISCONTINUED | OUTPATIENT
Start: 2023-09-11 | End: 2023-09-14

## 2023-09-10 RX ADMIN — ZINC OXIDE 1 APPLICATION(S): 200 OINTMENT TOPICAL at 09:54

## 2023-09-10 RX ADMIN — Medication 1 TABLET(S): at 09:46

## 2023-09-10 RX ADMIN — Medication 1 APPLICATION(S): at 09:54

## 2023-09-10 RX ADMIN — Medication 2000 UNIT(S): at 09:45

## 2023-09-10 RX ADMIN — Medication 296 MILLILITER(S): at 14:15

## 2023-09-10 RX ADMIN — Medication 1 DROP(S): at 22:39

## 2023-09-10 RX ADMIN — CEFTRIAXONE 2000 MILLIGRAM(S): 500 INJECTION, POWDER, FOR SOLUTION INTRAMUSCULAR; INTRAVENOUS at 09:49

## 2023-09-10 RX ADMIN — Medication 20 MILLIGRAM(S): at 14:14

## 2023-09-10 RX ADMIN — SENNA PLUS 2 TABLET(S): 8.6 TABLET ORAL at 21:21

## 2023-09-10 RX ADMIN — APIXABAN 2.5 MILLIGRAM(S): 2.5 TABLET, FILM COATED ORAL at 09:50

## 2023-09-10 RX ADMIN — CHLORHEXIDINE GLUCONATE 1 APPLICATION(S): 213 SOLUTION TOPICAL at 09:45

## 2023-09-10 RX ADMIN — MIDODRINE HYDROCHLORIDE 10 MILLIGRAM(S): 2.5 TABLET ORAL at 14:14

## 2023-09-10 RX ADMIN — APIXABAN 2.5 MILLIGRAM(S): 2.5 TABLET, FILM COATED ORAL at 21:21

## 2023-09-10 RX ADMIN — POLYETHYLENE GLYCOL 3350 17 GRAM(S): 17 POWDER, FOR SOLUTION ORAL at 09:49

## 2023-09-10 RX ADMIN — Medication 1 DROP(S): at 09:54

## 2023-09-10 RX ADMIN — PANTOPRAZOLE SODIUM 40 MILLIGRAM(S): 20 TABLET, DELAYED RELEASE ORAL at 06:30

## 2023-09-10 RX ADMIN — ZINC SULFATE TAB 220 MG (50 MG ZINC EQUIVALENT) 220 MILLIGRAM(S): 220 (50 ZN) TAB at 09:46

## 2023-09-10 RX ADMIN — Medication 3 MILLIGRAM(S): at 21:21

## 2023-09-10 NOTE — DIETITIAN INITIAL EVALUATION ADULT - PERTINENT LABORATORY DATA
09-10    131<L>  |  103  |  14  ----------------------------<  100<H>  4.0   |  24  |  0.61    Ca    9.4      10 Sep 2023 06:33  Phos  2.3     09-10  Mg     2.1     09-10    TPro  5.8<L>  /  Alb  2.8<L>  /  TBili  0.4  /  DBili  0.3  /  AST  195<H>  /  ALT  248<H>  /  AlkPhos  141<H>  09-09

## 2023-09-10 NOTE — DIETITIAN INITIAL EVALUATION ADULT - ADD RECOMMEND
1) Liberalize diet to low sodium to maximize caloric and nutrient intake.   2) Add ensure plus high protein BID to optimize PO intake (provides 350 kcal, 20g protein/ shake)   3) Obtain vitamin D 25OH level to assess nutriture  4) Please obtain weekly weights   5) Consider adding thiamine 100 mg daily 2/2 poor PO intake/ malnutrition  6) Recommend to add MVI w/minerals, Vit C 500 mg BID, add Zinc Sulfate 220 mg x 10 days to promote wound healing.  7) Monitor bowel movements, if no BM for >3 days, consider implementing bowel regimen.  8) Encourage protein-rich foods, maximize food preferences  9) Monitor and replete lytes PRN  10) Confirm goals of care regarding nutrition support  RD will continue to monitor PO intake, labs, hydration, and wt prn.

## 2023-09-10 NOTE — DIETITIAN INITIAL EVALUATION ADULT - OTHER INFO
89 y/o F with a PMHx of HTN, Afib on Eliquis, chronic indwelling catheter who was BIBEMS to ED with persistent lower abdominal pain, dark urine and fever (Tmax 100.1F) x 2 days. 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 Family states patient just had her chronic sales replaced yesterday. Upon arrival patient was found to be febrile and hypotensive. Received a total of 4LF IVFs, however, remained hypotensive and was consequently started on levophed. Labs significant for leukocytosis 21k with left shift, bilirubin 1.5, and elevated LFTs. UA suggestive of infection. Underwent RUQ ultrasound and CT abd pelvis which was negative for acute cholecystis, but revealed a right nonobstructing intrarenal calculi. Admitted for septic shock 2/2 UTI; tx to ICU for pressor support. FULL CODE.    Limited information obtained at time of visit; Pt not forthcoming with information upon RD visit. Appears confused at time of visit. Pt hungry but did not order breakfast at time of visit; helped pt order breakfast (pancakes, eggs, coffee, banana). Pt unable to state her UBW or if her weight has changed recently. RD obtained bedscale wt on 9/10 - 145#; 3+ edema may be skewing bedscale wt. No weight hx to review. NFPE reveals mild to moderate muscle/ fat wasting, pt meets criteria for PCM at this time. Recommend to liberalize diet to low sodium to maximize caloric and nutrient intake. Will trial Ensure Plus High Protein BID in effort to optimize PO intake. Please see additional recommendations below.

## 2023-09-10 NOTE — DIETITIAN INITIAL EVALUATION ADULT - PERTINENT MEDS FT
MEDICATIONS  (STANDING):  apixaban 2.5 milliGRAM(s) Oral every 12 hours  artificial  tears Solution 1 Drop(s) Both EYES two times a day  cefTRIAXone Injectable. 2000 milliGRAM(s) IV Push every 24 hours  chlorhexidine 4% Liquid 1 Application(s) Topical <User Schedule>  cholecalciferol 2000 Unit(s) Oral daily  influenza  Vaccine (HIGH DOSE) 0.7 milliLiter(s) IntraMuscular once  magnesium citrate Oral Solution 296 milliLiter(s) Oral once  melatonin 3 milliGRAM(s) Oral at bedtime  midodrine 10 milliGRAM(s) Oral every 8 hours  mineral oil/petrolatum Hydrophilic Ointment 1 Application(s) Topical daily  multivitamin 1 Tablet(s) Oral daily  pantoprazole    Tablet 40 milliGRAM(s) Oral before breakfast  polyethylene glycol 3350 17 Gram(s) Oral daily  senna 2 Tablet(s) Oral at bedtime  vitamin A &amp; D Ointment 1 Application(s) Topical daily  zinc oxide 40% Paste 1 Application(s) Topical daily  zinc sulfate 220 milliGRAM(s) Oral daily    MEDICATIONS  (PRN):  triamcinolone 0.1% Oral Paste 1 Application(s) Topical daily PRN rash to shoulders and/or back    Home Medications:  A+D topical ointment: Apply topically to affected area once a day (08 Sep 2023 14:20)  apixaban 2.5 mg oral tablet: 1 tab(s) orally every 12 hours (08 Sep 2023 14:06)  Aquaphor topical ointment: Apply topically to affected area once a day , apply to back of head (08 Sep 2023 14:20)  Artificial Tears ophthalmic solution: 1 drop(s) in each eye 2 times a day (08 Sep 2023 14:20)  chlorhexidine 0.12% mucous membrane liquid: 15 milliliter(s) orally 2 times a day , rinse and let fall out of mouth (08 Sep 2023 14:19)  Emergen-C oral powder for reconstitution: 0.5 packet(s) orally once a day (in the afternoon) (08 Sep 2023 14:20)  emollients, topical lotion: Apply topically to affected area once a day , apply to whole body (08 Sep 2023 14:20)  ferrous sulfate 325 mg (65 mg elemental iron) oral tablet: 1 tab(s) orally every other day (08 Sep 2023 14:19)  Fish Oil 1000 mg oral capsule: 1 cap(s) orally once a day with dinner (08 Sep 2023 14:20)  Flonase 50 mcg/inh nasal spray: 1 spray(s) in each nostril once a day (in the morning) (08 Sep 2023 14:20)  furosemide 20 mg oral tablet: 1 tab(s) orally once a day as needed for  diarrhea ***HOLD 40mg for diarrhea and give only 20mg*** (08 Sep 2023 14:19)  furosemide 40 mg oral tablet: 1 tab(s) orally once a day ***HOLD 40mg for diarrhea and give only 20mg*** (08 Sep 2023 14:19)  hydrocortisone 2.5% topical cream: Apply topically to affected area 2 times a day as needed for  itching behind ears (08 Sep 2023 14:19)  losartan 25 mg oral tablet: 1 tab(s) orally once a day as needed for  high BP (08 Sep 2023 14:19)  losartan 50 mg oral tablet: 1 tab(s) orally once a day (in the morning) (08 Sep 2023 14:19)  melatonin 3 mg oral tablet: 0.5 tab(s) orally once a day (at bedtime) (08 Sep 2023 14:19)  metoprolol succinate 25 mg oral tablet, extended release: 1 tab(s) orally once a day (08 Sep 2023 14:20)  mirtazapine 15 mg oral tablet: 1 tab(s) orally once a day (at bedtime) (08 Sep 2023 14:06)  Multiple Vitamins oral tablet: 1 tab(s) orally once a day (08 Sep 2023 14:20)  nystatin 100,000 units/g topical powder: Apply topically to affected area 2 times a day as needed for  itching , apply between toes for fungus (08 Sep 2023 14:20)  ofloxacin 0.3% ophthalmic solution: 1 drop(s) in each affected eye 4 times a day as needed for eye infection recurrence (08 Sep 2023 14:19)  omeprazole 20 mg oral tablet, disintegrating, delayed release: 1 tab(s) orally once a day (08 Sep 2023 14:20)  polyethylene glycol 3350 oral powder for reconstitution: 17 gram(s) orally once a day as needed for  constipation (08 Sep 2023 14:19)  Probiotic Formula (Bacillus Coagulans) oral capsule: 1 cap(s) orally once a day with dinner (08 Sep 2023 14:19)  senna (sennosides) 8.6 mg oral tablet: 1 tab(s) orally once a day as needed for  constipation (08 Sep 2023 14:20)  triamcinolone 0.1% topical cream: Apply topically to affected area once a day as needed for  rash on the shoulder/back (08 Sep 2023 14:19)  Tylenol Extra Strength 500 mg oral tablet: 2 tab(s) orally 2 times a day (08 Sep 2023 14:19)  Vitamin D3 50 mcg (2000 intl units) oral capsule: 1 cap(s) orally once a day with dinner (08 Sep 2023 14:20)  zinc oxide 40% topical ointment: Apply topically to affected area once a day (08 Sep 2023 14:20)  zinc sulfate 220 mg oral capsule: 1 cap(s) orally once a day (08 Sep 2023 14:06)

## 2023-09-10 NOTE — DIETITIAN INITIAL EVALUATION ADULT - NSFNSGIIOFT_GEN_A_CORE
I&O's Detail    09 Sep 2023 07:01  -  10 Sep 2023 07:00  --------------------------------------------------------  IN:  Total IN: 0 mL    OUT:    Indwelling Catheter - Urethral (mL): 850 mL  Total OUT: 850 mL    Total NET: -850 mL

## 2023-09-10 NOTE — DIETITIAN INITIAL EVALUATION ADULT - NSICDXPASTMEDICALHX_GEN_ALL_CORE_FT
PAST MEDICAL HISTORY:  Acute cystitis without hematuria septic  4/2016    Essential hypertension     [place] (hard of hearing), bilateral     Hypertension     Lymphedema of both lower extremities     Monoclonal gammopathies     Paroxysmal atrial fibrillation     Spinal stenosis     Spondyloarthritis     Vaginal prolapse     Venous insufficiency

## 2023-09-10 NOTE — PROGRESS NOTE ADULT - SUBJECTIVE AND OBJECTIVE BOX
CHIEF COMPLAINT/INTERVAL HISTORY:    Patient is a 89y old  Female who presents with a chief complaint of UTI, septic shock (09 Sep 2023 09:52)      HPI:  Patient is a 89 y/o female with pmhx of HTN, Afib on Eliquis, chronic indwelling catheter who was BIBEMS to ED with persistent lower abdominal pain, dark urine and fever (Tmax 100.1F) x 2 days. 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 Family states patient just had her chronic sales replaced yesterday. Upon arrival patient was found to be febrile and hypotensive. Received a total of 4LF IVFs, however, remained hypotensive and was consequently started on levophed. Labs significant for leukocytosis 21k with left shift, bilirubin 1.5, and elevated LFTs. UA suggestive of infection. Underwent RUQ ultrasound and CT abd pelvis which was negative for acute cholecystis, but revealed a right nonobstructing intrarenal calculi. Patient was admitted to ICU for further management of septic shock secondary to UTI.  (08 Sep 2023 06:23)      SUBJECTIVE & OBJECTIVE: Pt seen and examined at bedside.     ICU Vital Signs Last 24 Hrs  T(C): 36.6 (10 Sep 2023 07:44), Max: 37.6 (09 Sep 2023 19:00)  T(F): 97.9 (10 Sep 2023 07:44), Max: 99.6 (09 Sep 2023 19:00)  HR: 102 (10 Sep 2023 07:44) (56 - 102)  BP: 117/61 (10 Sep 2023 07:44) (112/44 - 146/98)  BP(mean): 73 (09 Sep 2023 19:00) (73 - 106)  ABP: --  ABP(mean): --  RR: 20 (10 Sep 2023 07:44) (17 - 28)  SpO2: 96% (10 Sep 2023 07:44) (93% - 100%)    O2 Parameters below as of 10 Sep 2023 07:44  Patient On (Oxygen Delivery Method): room air              MEDICATIONS  (STANDING):  apixaban 2.5 milliGRAM(s) Oral every 12 hours  artificial  tears Solution 1 Drop(s) Both EYES two times a day  cefTRIAXone Injectable. 2000 milliGRAM(s) IV Push every 24 hours  chlorhexidine 4% Liquid 1 Application(s) Topical <User Schedule>  cholecalciferol 2000 Unit(s) Oral daily  influenza  Vaccine (HIGH DOSE) 0.7 milliLiter(s) IntraMuscular once  melatonin 3 milliGRAM(s) Oral at bedtime  midodrine 10 milliGRAM(s) Oral every 8 hours  mineral oil/petrolatum Hydrophilic Ointment 1 Application(s) Topical daily  multivitamin 1 Tablet(s) Oral daily  pantoprazole    Tablet 40 milliGRAM(s) Oral before breakfast  polyethylene glycol 3350 17 Gram(s) Oral daily  senna 2 Tablet(s) Oral at bedtime  vitamin A &amp; D Ointment 1 Application(s) Topical daily  zinc oxide 40% Paste 1 Application(s) Topical daily  zinc sulfate 220 milliGRAM(s) Oral daily    MEDICATIONS  (PRN):  triamcinolone 0.1% Oral Paste 1 Application(s) Topical daily PRN rash to shoulders and/or back        PHYSICAL EXAM:    GENERAL: NAD, well-groomed, well-developed  NERVOUS SYSTEM:  Alert & Oriented X3, Motor Strength 5/5 B/L upper and lower extremities; DTRs 2+ intact and symmetric  CHEST/LUNG: Clear to auscultation bilaterally; No rales, rhonchi, wheezing, or rubs  HEART: Regular rate and rhythm; No murmurs, rubs, or gallops  ABDOMEN: Soft, Nontender, Nondistended; Bowel sounds present  EXTREMITIES:  2+ Peripheral Pulses, No clubbing, cyanosis, or edema    LABS:                        11.3   7.41  )-----------( 60       ( 10 Sep 2023 06:33 )             32.8     09-10    131<L>  |  103  |  14  ----------------------------<  100<H>  4.0   |  24  |  0.61    Ca    9.4      10 Sep 2023 06:33  Phos  2.3     09-10  Mg     2.1     09-10    TPro  5.8<L>  /  Alb  2.8<L>  /  TBili  0.4  /  DBili  0.3  /  AST  195<H>  /  ALT  248<H>  /  AlkPhos  141<H>  09-09      Urinalysis Basic - ( 10 Sep 2023 06:33 )    Color: x / Appearance: x / SG: x / pH: x  Gluc: 100 mg/dL / Ketone: x  / Bili: x / Urobili: x   Blood: x / Protein: x / Nitrite: x   Leuk Esterase: x / RBC: x / WBC x   Sq Epi: x / Non Sq Epi: x / Bacteria: x        CAPILLARY BLOOD GLUCOSE          RECENT CULTURES:      RADIOLOGY & ADDITIONAL TESTS:    Health Issues:  Sepsis    Sepsis        DVT/GI ppx  Discussed with pt @ bedside     CHIEF COMPLAINT/INTERVAL HISTORY:    Patient is a 89y old  Female who presents with a chief complaint of UTI, septic shock (09 Sep 2023 09:52)      HPI:  Patient is a 89 y/o female with pmhx of HTN, Afib on Eliquis, chronic indwelling catheter who was BIBEMS to ED with persistent lower abdominal pain, dark urine and fever (Tmax 100.1F) x 2 days. 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 Family states patient just had her chronic sales replaced yesterday. Upon arrival patient was found to be febrile and hypotensive. Received a total of 4LF IVFs, however, remained hypotensive and was consequently started on levophed. Labs significant for leukocytosis 21k with left shift, bilirubin 1.5, and elevated LFTs. UA suggestive of infection. Underwent RUQ ultrasound and CT abd pelvis which was negative for acute cholecystis, but revealed a right non obstructing intrarenal calculi. Patient was admitted to ICU for further management of septic shock secondary to UTI.  (08 Sep 2023 06:23)  Pt downgraded to medicine.        ICU Vital Signs Last 24 Hrs  T(C): 36.6 (10 Sep 2023 07:44), Max: 37.6 (09 Sep 2023 19:00)  T(F): 97.9 (10 Sep 2023 07:44), Max: 99.6 (09 Sep 2023 19:00)  HR: 102 (10 Sep 2023 07:44) (56 - 102)  BP: 117/61 (10 Sep 2023 07:44) (112/44 - 146/98)  BP(mean): 73 (09 Sep 2023 19:00) (73 - 106)  ABP: --  ABP(mean): --  RR: 20 (10 Sep 2023 07:44) (17 - 28)  SpO2: 96% (10 Sep 2023 07:44) (93% - 100%)    O2 Parameters below as of 10 Sep 2023 07:44  Patient On (Oxygen Delivery Method): room air              MEDICATIONS  (STANDING):  apixaban 2.5 milliGRAM(s) Oral every 12 hours  artificial  tears Solution 1 Drop(s) Both EYES two times a day  cefTRIAXone Injectable. 2000 milliGRAM(s) IV Push every 24 hours  chlorhexidine 4% Liquid 1 Application(s) Topical <User Schedule>  cholecalciferol 2000 Unit(s) Oral daily  influenza  Vaccine (HIGH DOSE) 0.7 milliLiter(s) IntraMuscular once  melatonin 3 milliGRAM(s) Oral at bedtime  midodrine 10 milliGRAM(s) Oral every 8 hours  mineral oil/petrolatum Hydrophilic Ointment 1 Application(s) Topical daily  multivitamin 1 Tablet(s) Oral daily  pantoprazole    Tablet 40 milliGRAM(s) Oral before breakfast  polyethylene glycol 3350 17 Gram(s) Oral daily  senna 2 Tablet(s) Oral at bedtime  vitamin A &amp; D Ointment 1 Application(s) Topical daily  zinc oxide 40% Paste 1 Application(s) Topical daily  zinc sulfate 220 milliGRAM(s) Oral daily    MEDICATIONS  (PRN):  triamcinolone 0.1% Oral Paste 1 Application(s) Topical daily PRN rash to shoulders and/or back        PHYSICAL EXAM:    GENERAL: NAD, well-groomed, well-developed  NERVOUS SYSTEM:  Alert & Oriented X3, Motor Strength 5/5 B/L upper and lower extremities; DTRs 2+ intact and symmetric  CHEST/LUNG: Clear to auscultation bilaterally; No rales, rhonchi, wheezing, or rubs  HEART: Regular rate and rhythm; No murmurs, rubs, or gallops  ABDOMEN: Soft, Nontender, Nondistended; Bowel sounds present  EXTREMITIES:  2+ Peripheral Pulses, No clubbing, cyanosis, or edema    LABS:                        11.3   7.41  )-----------( 60       ( 10 Sep 2023 06:33 )             32.8     09-10    131<L>  |  103  |  14  ----------------------------<  100<H>  4.0   |  24  |  0.61    Ca    9.4      10 Sep 2023 06:33  Phos  2.3     09-10  Mg     2.1     09-10    TPro  5.8<L>  /  Alb  2.8<L>  /  TBili  0.4  /  DBili  0.3  /  AST  195<H>  /  ALT  248<H>  /  AlkPhos  141<H>  09-09      Urinalysis Basic - ( 10 Sep 2023 06:33 )    Color: x / Appearance: x / SG: x / pH: x  Gluc: 100 mg/dL / Ketone: x  / Bili: x / Urobili: x   Blood: x / Protein: x / Nitrite: x   Leuk Esterase: x / RBC: x / WBC x   Sq Epi: x / Non Sq Epi: x / Bacteria: x       TTE Echo Complete w/o Contrast w/ Doppler (09.08.23 @ 14:57) >         Endocardium is not well visualized, however, overall left ventricular   systolic function appears normal. Technically Difficult Study.   The left atrium is severely dilated.   Moderate aortic stenosis.   Moderate (2+) aortic regurgitation.   Moderate to severe (3+) tricuspid valve regurgitation.    < from: CT Abdomen and Pelvis w/ IV Cont (09.08.23 @ 02:20) >  IMPRESSION:  Severe cardiomegaly.    Bibasilar subsegmental atelectasis. Trace right pleural effusion.    Mild dilatation extra renal pelvis right kidney. No hydronephrosis.   Linear nonobstructing calcification lower pole both kidneys. No   obstructing renal or ureteral stones. Simple bilateral renal cysts. No CT   evidence of pyelonephritis. No perinephric fluid collections.    Sales catheter is present within the urinary bladder which is   decompressed.    Large fecal impaction rectosigmoid portion of the colon.              89 yo female with PMHx of HTN, Afib on Eliquis, chronic indwelling catheter who was BIBEMS to ED with persistent lower abdominal pain, dark urine and fever (Tmax 100.1F) x 2 days. Patient has history of multiple UTIs and follows with urologist Dr. Sexton for chronic sales mgmt    Admitted for  1. Septic shock 2/2 CAUTI with E Coli bacteremia  2. Thrombocytopenia  3. Transaminitis   4. Constipation    PLAN      Sales in place. Cr stable. pt received ~4L IV fluids  IV CTX 2gm qd  Thrombocytopenia suspect worsened in setting of sepsis. 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 cont to trend CBC  start Mag citrate         CHIEF COMPLAINT/INTERVAL HISTORY:    Patient is a 89y old  Female who presents with a chief complaint of UTI, septic shock (09 Sep 2023 09:52)      HPI:  Patient is a 89 y/o female with pmhx of HTN, Afib on Eliquis, chronic indwelling catheter who was BIBEMS to ED with persistent lower abdominal pain, dark urine and fever (Tmax 100.1F) x 2 days. Patient has history of multi-drug resistant UTIs and follows with urologist Dr. Sexotn for management of her sales. Family states patient just had her chronic sales replaced yesterday. Upon arrival patient was found to be febrile and hypotensive. Received a total of 4LF IVFs, however, remained hypotensive and was consequently started on levophed. Labs significant for leukocytosis 21k with left shift, bilirubin 1.5, and elevated LFTs. UA suggestive of infection. Underwent RUQ ultrasound and CT abd pelvis which was negative for acute cholecystis, but revealed a right non obstructing intrarenal calculi. Patient was admitted to ICU for further management of septic shock secondary to UTI.  (08 Sep 2023 06:23)  Pt downgraded to medicine. E Coli bacteremia pending sensitivity  Pt is weak, not ambulatory, no c/o      ICU Vital Signs Last 24 Hrs  T(C): 36.6 (10 Sep 2023 07:44), Max: 37.6 (09 Sep 2023 19:00)  T(F): 97.9 (10 Sep 2023 07:44), Max: 99.6 (09 Sep 2023 19:00)  HR: 102 (10 Sep 2023 07:44) (56 - 102)  BP: 117/61 (10 Sep 2023 07:44) (112/44 - 146/98)  BP(mean): 73 (09 Sep 2023 19:00) (73 - 106)  ABP: --  ABP(mean): --  RR: 20 (10 Sep 2023 07:44) (17 - 28)  SpO2: 96% (10 Sep 2023 07:44) (93% - 100%)    O2 Parameters below as of 10 Sep 2023 07:44  Patient On (Oxygen Delivery Method): room air      Home Medications:    apixaban 2.5 mg oral tablet: 1 tab(s) orally every 12 hours (08 Sep 2023 14:06)  ferrous sulfate 325 mg (65 mg elemental iron) oral tablet: 1 tab(s) orally every other day (08 Sep 2023 14:19)  furosemide 20 mg oral tablet: 1 tab(s) orally once a day as needed for  diarrhea ***HOLD 40mg for diarrhea and give only 20mg*** (08 Sep 2023 14:19)  furosemide 40 mg oral tablet: 1 tab(s) orally once a day ***HOLD 40mg for diarrhea and give only 20mg*** (08 Sep 2023 14:19)  losartan 25 mg oral tablet: 1 tab(s) orally once a day as needed for  high BP (08 Sep 2023 14:19)  losartan 50 mg oral tablet: 1 tab(s) orally once a day (in the morning) (08 Sep 2023 14:19)  metoprolol succinate 25 mg oral tablet, extended release: 1 tab(s) orally once a day (08 Sep 2023 14:20)  mirtazapine 15 mg oral tablet: 1 tab(s) orally once a day (at bedtime) (08 Sep 2023 14:06)  omeprazole 20 mg oral tablet, disintegrating, delayed release: 1 tab(s) orally once a day (08 Sep 2023 14:20)            MEDICATIONS  (STANDING):  apixaban 2.5 milliGRAM(s) Oral every 12 hours  artificial  tears Solution 1 Drop(s) Both EYES two times a day  cefTRIAXone Injectable. 2000 milliGRAM(s) IV Push every 24 hours  chlorhexidine 4% Liquid 1 Application(s) Topical <User Schedule>  cholecalciferol 2000 Unit(s) Oral daily  influenza  Vaccine (HIGH DOSE) 0.7 milliLiter(s) IntraMuscular once  melatonin 3 milliGRAM(s) Oral at bedtime  midodrine 10 milliGRAM(s) Oral every 8 hours  mineral oil/petrolatum Hydrophilic Ointment 1 Application(s) Topical daily  multivitamin 1 Tablet(s) Oral daily  pantoprazole    Tablet 40 milliGRAM(s) Oral before breakfast  polyethylene glycol 3350 17 Gram(s) Oral daily  senna 2 Tablet(s) Oral at bedtime  vitamin A &amp; D Ointment 1 Application(s) Topical daily  zinc oxide 40% Paste 1 Application(s) Topical daily  zinc sulfate 220 milliGRAM(s) Oral daily    MEDICATIONS  (PRN):  triamcinolone 0.1% Oral Paste 1 Application(s) Topical daily PRN rash to shoulders and/or back        PHYSICAL EXAM:    GENERAL: NAD,   NERVOUS SYSTEM:  Alert & Oriented X3, gen weakness  CHEST/LUNG: Clear to auscultation bilaterally; No rales, rhonchi, wheezing, or rubs  HEART: Regular rate and rhythm; No murmurs, rubs, or gallops  ABDOMEN: Soft, Nontender, Nondistended; Bowel sounds present  EXTREMITIES:  2+ Peripheral Pulses, No clubbing, cyanosis, or edema    LABS:                        11.3   7.41  )-----------( 60       ( 10 Sep 2023 06:33 )             32.8     09-10    131<L>  |  103  |  14  ----------------------------<  100<H>  4.0   |  24  |  0.61    Ca    9.4      10 Sep 2023 06:33  Phos  2.3     09-10  Mg     2.1     09-10    TPro  5.8<L>  /  Alb  2.8<L>  /  TBili  0.4  /  DBili  0.3  /  AST  195<H>  /  ALT  248<H>  /  AlkPhos  141<H>           TTE Echo Complete w/o Contrast w/ Doppler (23 @ 14:57) >         Endocardium is not well visualized, however, overall left ventricular   systolic function appears normal. Technically Difficult Study.   The left atrium is severely dilated.   Moderate aortic stenosis.   Moderate (2+) aortic regurgitation.   Moderate to severe (3+) tricuspid valve regurgitation.    < from: CT Abdomen and Pelvis w/ IV Cont (23 @ 02:20) >  IMPRESSION:  Severe cardiomegaly.    Bibasilar subsegmental atelectasis. Trace right pleural effusion.    Mild dilatation extra renal pelvis right kidney. No hydronephrosis.   Linear nonobstructing calcification lower pole both kidneys. No   obstructing renal or ureteral stones. Simple bilateral renal cysts. No CT   evidence of pyelonephritis. No perinephric fluid collections.    Sales catheter is present within the urinary bladder which is   decompressed.    Large fecal impaction rectosigmoid portion of the colon.              89 yo female with PMHx of HTN, Afib on Eliquis, chronic indwelling catheter who was BIBEMS to ED with persistent lower abdominal pain, dark urine and fever (Tmax 100.1F) x 2 days. Patient has history of multiple UTIs and follows with urologist Dr. Sexton for chronic sales mgmt    Admitted for  1. Septic shock 2/2 CAUTI with E Coli bacteremia  2. Thrombocytopenia  3. Transaminitis   4. Constipation  5. Stg 2 decubitus    PLAN      Sales in place. Cr stable.   IV CTX 2gm qd  Thrombocytopenia suspect worsened in setting of sepsis. 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 cont to trend CBC  start Mag citrate, pt has BM this am  abnormal TTE she is on diuretics at home unable to resume due to borderline BP    [facility] meetin23:  Daughter was updated about mother's worsening medical condition and need for admission to ICU for further management. States that she was not ready to commit to a DNR/DNI status, but would reconsider if patient's condition continued to decline. States that she does not want to subject her mother to unnecessary pain/suffering. Daughter would like to speak to her siblings before any decisions are made. Patient will remain full code at this. Further goals of care discussion to follow nxt week.           CHIEF COMPLAINT/INTERVAL HISTORY:    Patient is a 89y old  Female who presents with a chief complaint of UTI, septic shock (09 Sep 2023 09:52)      HPI:  Patient is a 89 y/o female with pmhx of HTN, Afib on Eliquis, chronic indwelling catheter who was BIBEMS to ED with persistent lower abdominal pain, dark urine and fever (Tmax 100.1F) x 2 days. 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 Family states patient just had her chronic sales replaced yesterday. Upon arrival patient was found to be febrile and hypotensive. Received a total of 4LF IVFs, however, remained hypotensive and was consequently started on levophed. Labs significant for leukocytosis 21k with left shift, bilirubin 1.5, and elevated LFTs. UA suggestive of infection. Underwent RUQ ultrasound and CT abd pelvis which was negative for acute cholecystis, but revealed a right non obstructing intrarenal calculi. Patient was admitted to ICU for further management of septic shock secondary to UTI.  (08 Sep 2023 06:23)  Pt downgraded to medicine. E Coli bacteremia pending sensitivity  Pt is weak, not ambulatory, no c/o  case d/w daughter time spent 45 min      ICU Vital Signs Last 24 Hrs  T(C): 36.6 (10 Sep 2023 07:44), Max: 37.6 (09 Sep 2023 19:00)  T(F): 97.9 (10 Sep 2023 07:44), Max: 99.6 (09 Sep 2023 19:00)  HR: 102 (10 Sep 2023 07:44) (56 - 102)  BP: 117/61 (10 Sep 2023 07:44) (112/44 - 146/98)  BP(mean): 73 (09 Sep 2023 19:00) (73 - 106)  ABP: --  ABP(mean): --  RR: 20 (10 Sep 2023 07:44) (17 - 28)  SpO2: 96% (10 Sep 2023 07:44) (93% - 100%)    O2 Parameters below as of 10 Sep 2023 07:44  Patient On (Oxygen Delivery Method): room air      Home Medications:    apixaban 2.5 mg oral tablet: 1 tab(s) orally every 12 hours (08 Sep 2023 14:06)  ferrous sulfate 325 mg (65 mg elemental iron) oral tablet: 1 tab(s) orally every other day (08 Sep 2023 14:19)  furosemide 20 mg oral tablet: 1 tab(s) orally once a day as needed for  diarrhea ***HOLD 40mg for diarrhea and give only 20mg*** (08 Sep 2023 14:19)  furosemide 40 mg oral tablet: 1 tab(s) orally once a day ***HOLD 40mg for diarrhea and give only 20mg*** (08 Sep 2023 14:19)  losartan 25 mg oral tablet: 1 tab(s) orally once a day as needed for  high BP (08 Sep 2023 14:19)  losartan 50 mg oral tablet: 1 tab(s) orally once a day (in the morning) (08 Sep 2023 14:19)  metoprolol succinate 25 mg oral tablet, extended release: 1 tab(s) orally once a day (08 Sep 2023 14:20)  mirtazapine 15 mg oral tablet: 1 tab(s) orally once a day (at bedtime) (08 Sep 2023 14:06)  omeprazole 20 mg oral tablet, disintegrating, delayed release: 1 tab(s) orally once a day (08 Sep 2023 14:20)            MEDICATIONS  (STANDING):  apixaban 2.5 milliGRAM(s) Oral every 12 hours  artificial  tears Solution 1 Drop(s) Both EYES two times a day  cefTRIAXone Injectable. 2000 milliGRAM(s) IV Push every 24 hours  chlorhexidine 4% Liquid 1 Application(s) Topical <User Schedule>  cholecalciferol 2000 Unit(s) Oral daily  influenza  Vaccine (HIGH DOSE) 0.7 milliLiter(s) IntraMuscular once  melatonin 3 milliGRAM(s) Oral at bedtime  midodrine 10 milliGRAM(s) Oral every 8 hours  mineral oil/petrolatum Hydrophilic Ointment 1 Application(s) Topical daily  multivitamin 1 Tablet(s) Oral daily  pantoprazole    Tablet 40 milliGRAM(s) Oral before breakfast  polyethylene glycol 3350 17 Gram(s) Oral daily  senna 2 Tablet(s) Oral at bedtime  vitamin A &amp; D Ointment 1 Application(s) Topical daily  zinc oxide 40% Paste 1 Application(s) Topical daily  zinc sulfate 220 milliGRAM(s) Oral daily    MEDICATIONS  (PRN):  triamcinolone 0.1% Oral Paste 1 Application(s) Topical daily PRN rash to shoulders and/or back        PHYSICAL EXAM:    GENERAL: NAD,   NERVOUS SYSTEM:  Alert & Oriented X3, gen weakness  CHEST/LUNG: Clear to auscultation bilaterally; No rales, rhonchi, wheezing, or rubs  HEART: Regular rate and rhythm; No murmurs, rubs, or gallops  ABDOMEN: Soft, Nontender, Nondistended; Bowel sounds present  EXTREMITIES:  foot drop, _ 1 edema, more pronounced left knee with pain when moved  Skin chronic scalp wound   stg2 decub  LABS:                        11.3   7.41  )-----------( 60       ( 10 Sep 2023 06:33 )             32.8     09-10    131<L>  |  103  |  14  ----------------------------<  100<H>  4.0   |  24  |  0.61    Ca    9.4      10 Sep 2023 06:33  Phos  2.3     09-10  Mg     2.1     09-10    TPro  5.8<L>  /  Alb  2.8<L>  /  TBili  0.4  /  DBili  0.3  /  AST  195<H>  /  ALT  248<H>  /  AlkPhos  141<H>           TTE Echo Complete w/o Contrast w/ Doppler (23 @ 14:57) >         Endocardium is not well visualized, however, overall left ventricular   systolic function appears normal. Technically Difficult Study.   The left atrium is severely dilated.   Moderate aortic stenosis.   Moderate (2+) aortic regurgitation.   Moderate to severe (3+) tricuspid valve regurgitation.    < from: CT Abdomen and Pelvis w/ IV Cont (23 @ 02:20) >  IMPRESSION:  Severe cardiomegaly.    Bibasilar subsegmental atelectasis. Trace right pleural effusion.    Mild dilatation extra renal pelvis right kidney. No hydronephrosis.   Linear nonobstructing calcification lower pole both kidneys. No   obstructing renal or ureteral stones. Simple bilateral renal cysts. No CT   evidence of pyelonephritis. No perinephric fluid collections.    Sales catheter is present within the urinary bladder which is   decompressed.    Large fecal impaction rectosigmoid portion of the colon.              89 yo female with PMHx of HTN, Afib on Eliquis, chronic indwelling catheter who was BIBEMS to ED with persistent lower abdominal pain, dark urine and fever (Tmax 100.1F) x 2 days. Patient has history of multiple UTIs and follows with urologist Dr. Sexton for chronic sales mgmt    Admitted for  1. Septic shock 2/2 CAUTI with E Coli bacteremia  2. Thrombocytopenia  3. Transaminitis   4. Constipation  5. Stg 2 decubitus  6. New left knee pain    PLAN      Sales in place.   IV CTX 2gm qd pending sensitivity; Ucx not resulted  Thrombocytopenia suspect worsened in setting of sepsis. 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 cont to trend CBC  start Mag citrate, pt has BM this am  abnormal TTE she is on diuretics at home start 20 lasix today; BB on hold also  xray left knee if large effusion will consult Ortho  repeat am LFT  I consulted cardio per daughter's request  add low O2  decrease mido to 5 tid    [facility] meetin23:  Daughter was updated about mother's worsening medical condition and need for admission to ICU for further management. States that she was not ready to commit to a DNR/DNI status, but would reconsider if patient's condition continued to decline. States that she does not want to subject her mother to unnecessary pain/suffering. Daughter would like to speak to her siblings before any decisions are made. Patient will remain full code at this. Further goals of care discussion to follow nxt week.

## 2023-09-10 NOTE — DIETITIAN INITIAL EVALUATION ADULT - ORAL INTAKE PTA/DIET HISTORY
Limited information obtained at time of visit; Pt not forthcoming with information upon RD visit. Appears confused at time of visit. Reports that she normally consumes 3 meals/ day and does not follow any diet restrictions.

## 2023-09-11 LAB
ADD ON TEST-SPECIMEN IN LAB: SIGNIFICANT CHANGE UP
ALBUMIN SERPL ELPH-MCNC: 2.8 G/DL — LOW (ref 3.3–5)
ALP SERPL-CCNC: 114 U/L — SIGNIFICANT CHANGE UP (ref 40–120)
ALT FLD-CCNC: 110 U/L — HIGH (ref 12–78)
AST SERPL-CCNC: 41 U/L — HIGH (ref 15–37)
BACTERIA UR CULT: ABNORMAL
BILIRUB DIRECT SERPL-MCNC: 0.2 MG/DL — SIGNIFICANT CHANGE UP (ref 0–0.3)
BILIRUB INDIRECT FLD-MCNC: 0.2 MG/DL — SIGNIFICANT CHANGE UP (ref 0.2–1)
BILIRUB SERPL-MCNC: 0.4 MG/DL — SIGNIFICANT CHANGE UP (ref 0.2–1.2)
HCT VFR BLD CALC: 32.9 % — LOW (ref 34.5–45)
HGB BLD-MCNC: 11.4 G/DL — LOW (ref 11.5–15.5)
MCHC RBC-ENTMCNC: 32.3 PG — SIGNIFICANT CHANGE UP (ref 27–34)
MCHC RBC-ENTMCNC: 34.7 GM/DL — SIGNIFICANT CHANGE UP (ref 32–36)
MCV RBC AUTO: 93.2 FL — SIGNIFICANT CHANGE UP (ref 80–100)
PLATELET # BLD AUTO: 75 K/UL — LOW (ref 150–400)
PROT SERPL-MCNC: 5.7 GM/DL — LOW (ref 6–8.3)
RBC # BLD: 3.53 M/UL — LOW (ref 3.8–5.2)
RBC # FLD: 14.1 % — SIGNIFICANT CHANGE UP (ref 10.3–14.5)
WBC # BLD: 5.84 K/UL — SIGNIFICANT CHANGE UP (ref 3.8–10.5)
WBC # FLD AUTO: 5.84 K/UL — SIGNIFICANT CHANGE UP (ref 3.8–10.5)

## 2023-09-11 PROCEDURE — 73700 CT LOWER EXTREMITY W/O DYE: CPT | Mod: 26,LT

## 2023-09-11 PROCEDURE — 73562 X-RAY EXAM OF KNEE 3: CPT | Mod: 26,RT

## 2023-09-11 PROCEDURE — 76376 3D RENDER W/INTRP POSTPROCES: CPT | Mod: 26

## 2023-09-11 PROCEDURE — 73590 X-RAY EXAM OF LOWER LEG: CPT | Mod: 26,RT,76

## 2023-09-11 PROCEDURE — 99222 1ST HOSP IP/OBS MODERATE 55: CPT

## 2023-09-11 PROCEDURE — 99232 SBSQ HOSP IP/OBS MODERATE 35: CPT

## 2023-09-11 PROCEDURE — 93970 EXTREMITY STUDY: CPT | Mod: 26

## 2023-09-11 RX ORDER — SODIUM,POTASSIUM PHOSPHATES 278-250MG
1 POWDER IN PACKET (EA) ORAL EVERY 6 HOURS
Refills: 0 | Status: COMPLETED | OUTPATIENT
Start: 2023-09-11 | End: 2023-09-11

## 2023-09-11 RX ORDER — CEFTRIAXONE 500 MG/1
2000 INJECTION, POWDER, FOR SOLUTION INTRAMUSCULAR; INTRAVENOUS EVERY 24 HOURS
Refills: 0 | Status: DISCONTINUED | OUTPATIENT
Start: 2023-09-12 | End: 2023-09-14

## 2023-09-11 RX ORDER — CEFTRIAXONE 500 MG/1
2000 INJECTION, POWDER, FOR SOLUTION INTRAMUSCULAR; INTRAVENOUS EVERY 24 HOURS
Refills: 0 | Status: DISCONTINUED | OUTPATIENT
Start: 2023-09-11 | End: 2023-09-11

## 2023-09-11 RX ORDER — ACETAMINOPHEN 500 MG
1000 TABLET ORAL ONCE
Refills: 0 | Status: COMPLETED | OUTPATIENT
Start: 2023-09-11 | End: 2023-09-11

## 2023-09-11 RX ADMIN — SENNA PLUS 2 TABLET(S): 8.6 TABLET ORAL at 22:27

## 2023-09-11 RX ADMIN — Medication 400 MILLIGRAM(S): at 14:20

## 2023-09-11 RX ADMIN — CEFTRIAXONE 2000 MILLIGRAM(S): 500 INJECTION, POWDER, FOR SOLUTION INTRAMUSCULAR; INTRAVENOUS at 09:15

## 2023-09-11 RX ADMIN — Medication 3 MILLIGRAM(S): at 22:25

## 2023-09-11 RX ADMIN — APIXABAN 2.5 MILLIGRAM(S): 2.5 TABLET, FILM COATED ORAL at 22:25

## 2023-09-11 RX ADMIN — Medication 1 TABLET(S): at 09:16

## 2023-09-11 RX ADMIN — Medication 1 DROP(S): at 09:21

## 2023-09-11 RX ADMIN — Medication 1 PACKET(S): at 22:25

## 2023-09-11 RX ADMIN — Medication 2000 UNIT(S): at 09:16

## 2023-09-11 RX ADMIN — PANTOPRAZOLE SODIUM 40 MILLIGRAM(S): 20 TABLET, DELAYED RELEASE ORAL at 06:03

## 2023-09-11 RX ADMIN — ZINC OXIDE 1 APPLICATION(S): 200 OINTMENT TOPICAL at 09:27

## 2023-09-11 RX ADMIN — Medication 20 MILLIGRAM(S): at 06:03

## 2023-09-11 RX ADMIN — APIXABAN 2.5 MILLIGRAM(S): 2.5 TABLET, FILM COATED ORAL at 09:16

## 2023-09-11 RX ADMIN — ZINC SULFATE TAB 220 MG (50 MG ZINC EQUIVALENT) 220 MILLIGRAM(S): 220 (50 ZN) TAB at 09:15

## 2023-09-11 RX ADMIN — Medication 1 PACKET(S): at 17:00

## 2023-09-11 RX ADMIN — Medication 1 DROP(S): at 22:26

## 2023-09-11 RX ADMIN — Medication 25 MILLIGRAM(S): at 09:16

## 2023-09-11 RX ADMIN — Medication 1000 MILLIGRAM(S): at 15:00

## 2023-09-11 RX ADMIN — CHLORHEXIDINE GLUCONATE 1 APPLICATION(S): 213 SOLUTION TOPICAL at 09:13

## 2023-09-11 NOTE — PROGRESS NOTE ADULT - SUBJECTIVE AND OBJECTIVE BOX
Date of service: 09-11-23 @ 14:18    Lying in bed in [facility]  More alert today  Fever is down  Hypotension resolving    ROS: no fever or chills; denies pain, limited    MEDICATIONS  (STANDING):  acetaminophen   IVPB .. 1000 milliGRAM(s) IV Intermittent once  apixaban 2.5 milliGRAM(s) Oral every 12 hours  artificial  tears Solution 1 Drop(s) Both EYES two times a day  chlorhexidine 4% Liquid 1 Application(s) Topical <User Schedule>  cholecalciferol 2000 Unit(s) Oral daily  furosemide    Tablet 20 milliGRAM(s) Oral daily  influenza  Vaccine (HIGH DOSE) 0.7 milliLiter(s) IntraMuscular once  melatonin 3 milliGRAM(s) Oral at bedtime  metoprolol succinate ER 25 milliGRAM(s) Oral daily  mineral oil/petrolatum Hydrophilic Ointment 1 Application(s) Topical daily  multivitamin 1 Tablet(s) Oral daily  pantoprazole    Tablet 40 milliGRAM(s) Oral before breakfast  polyethylene glycol 3350 17 Gram(s) Oral daily  potassium phosphate / sodium phosphate Powder (PHOS-NaK) 1 Packet(s) Oral every 6 hours  senna 2 Tablet(s) Oral at bedtime  vitamin A &amp; D Ointment 1 Application(s) Topical daily  zinc oxide 40% Paste 1 Application(s) Topical daily  zinc sulfate 220 milliGRAM(s) Oral daily    Vital Signs Last 24 Hrs  T(C): 36.9 (11 Sep 2023 06:00), Max: 37.1 (11 Sep 2023 00:01)  T(F): 98.5 (11 Sep 2023 06:00), Max: 98.8 (11 Sep 2023 00:01)  HR: 85 (11 Sep 2023 06:00) (60 - 88)  BP: 138/69 (11 Sep 2023 06:00) (123/54 - 152/53)  BP(mean): --  RR: 18 (11 Sep 2023 06:00) (17 - 18)  SpO2: 96% (11 Sep 2023 06:00) (93% - 96%)    Parameters below as of 11 Sep 2023 06:00  Patient On (Oxygen Delivery Method): nasal cannula  O2 Flow (L/min): 2     Physical exam:    Constitutional:  No acute distress  HEENT: NC/AT, EOMI, PERRLA, conjunctivae clear; ears and nose atraumatic  Neck: supple; thyroid not palpable  Back: no tenderness  Respiratory: respiratory effort normal; clear to auscultation  Cardiovascular: S1S2 regular, no murmurs  Abdomen: soft, not tender, not distended, positive BS  Genitourinary: no suprapubic tenderness  Lymphatic: no LN palpable  Musculoskeletal: no muscle tenderness, no joint swelling or tenderness  Extremities: no pedal edema  Neurological/ Psychiatric: AxOx3, moving all extremities  Skin: no rashes; no palpable lesions    Labs: reviewed                        11.4   5.84  )-----------( 75       ( 11 Sep 2023 08:23 )             32.9     09-10    131<L>  |  103  |  14  ----------------------------<  100<H>  4.0   |  24  |  0.61    Ca    9.4      10 Sep 2023 06:33  Phos  2.3     09-10  Mg     2.1     09-10    TPro  5.7<L>  /  Alb  2.8<L>  /  TBili  0.4  /  DBili  0.2  /  AST  41<H>  /  ALT  110<H>  /  AlkPhos  114  09-11                        11.4   20.60 )-----------( x        ( 08 Sep 2023 09:06 )             33.6     09-08    137  |  108  |  13  ----------------------------<  125<H>  3.9   |  23  |  0.66    Ca    8.9      08 Sep 2023 09:06  Phos  2.5     09-08  Mg     1.8     09-08    TPro  5.7<L>  /  Alb  2.9<L>  /  TBili  0.8  /  DBili  x   /  AST  209<H>  /  ALT  209<H>  /  AlkPhos  147<H>  09-08     LIVER FUNCTIONS - ( 08 Sep 2023 09:06 )  Alb: 2.9 g/dL / Pro: 5.7 gm/dL / ALK PHOS: 147 U/L / ALT: 209 U/L / AST: 209 U/L / GGT: x           Urinalysis (09-07 @ 21:14)  Urine Appearance: very cloudy  Protein, Urine: 500 mg/dL    Urine Microscopic-Add On (NC) (09-07 @ 21:14)  White Blood Cell - Urine: >50 /HPF  Red Blood Cell - Urine: >50 /HPF    (09-07 @ 21:14)  NotDetec    Culture - Urine (collected 07 Sep 2023 21:14)  Source: Clean Catch Clean Catch (Midstream)  Final Report (10 Sep 2023 14:39):    >=3 organisms. Probable collection contamination.    Culture - Blood (collected 07 Sep 2023 21:14)  Source: .Blood Blood-Peripheral  Gram Stain (08 Sep 2023 22:41):    Growth in anaerobic bottle: Gram Negative Rods  Preliminary Report (09 Sep 2023 19:08):    Growth in anaerobic bottle: Escherichia coli    Direct identification is available within approximately 3-5    hours either by Blood Panel Multiplexed PCR or Direct    MALDI-TOF. Details: [email or web address]  Organism: Blood Culture PCR  Escherichia coli (10 Sep 2023 15:20)  Organism: Escherichia coli (10 Sep 2023 15:20)      Method Type: MAJOR      -  Amikacin: S <=16      -  Ampicillin: R >16 These ampicillin results predict results for amoxicillin      -  Ampicillin/Sulbactam: I 16/8      -  Aztreonam: S <=4      -  Cefazolin: S <=2      -  Cefepime: S <=2      -  Cefoxitin: S <=8      -  Ceftriaxone: S <=1      -  Ciprofloxacin: S <=0.25      -  Ertapenem: S <=0.5      -  Gentamicin: S <=2      -  Imipenem: S <=1      -  Levofloxacin: S <=0.5      -  Meropenem: S <=1      -  Piperacillin/Tazobactam: S <=8      -  Tobramycin: S <=2      -  Trimethoprim/Sulfamethoxazole: R >2/38  Organism: Blood Culture PCR (09 Sep 2023 00:38)      Method Type: PCR      -  Escherichia coli: Detec    Culture - Blood (collected 07 Sep 2023 21:14)  Source: .Blood Blood-Peripheral  Preliminary Report (11 Sep 2023 04:01):    No growth at 72 Hours    Radiology: all available radiological tests reviewed    < from: CT Abdomen and Pelvis w/ IV Cont (09.08.23 @ 02:20) >  Severe cardiomegaly.  Bibasilar subsegmental atelectasis. Trace right pleural effusion.    Mild dilatation extra renal pelvis right kidney. No hydronephrosis.   Linear nonobstructing calcification lower pole both kidneys. No   obstructing renal or ureteral stones. Simple bilateral renal cysts. No CT   evidence of pyelonephritis. No perinephric fluid collections.    Reaves catheter is present within the urinary bladder which is decompressed.  Large fecal impaction rectosigmoid portion of the colon.  < end of copied text >    Advanced directives addressed: full resuscitation

## 2023-09-11 NOTE — CONSULT NOTE ADULT - SUBJECTIVE AND OBJECTIVE BOX
Pt seen, note in progress. Orthopedics Consult Note:    This is a 89y Female admitted to medical service for urosepsis with e.coli bacteremia. Orthopedics consult placed to evaluate pt c/o left knee pain. Pt has a history of bilateral proximal tib-fib fractures s/p fall in 6/21 which were managed nonoperatively. Pt is nonamublatory and at baseline has chronic bilateral knee pain and limited ROM, left > right. Pt/Pts daughter at bedside report increase pain in the left knee after ambulance transfer to the hospital. Pt and family deny any specific injury or trauma. Pt also c/o vague foot pain bilaterally that seems to be localized to the heels and mainly occurs with position change in bed. Pt denies any numbness, tingling or paresthesias.     PAST MEDICAL & SURGICAL HISTORY:  Lymphedema of both lower extremities      Venous insufficiency      Monoclonal gammopathies      Paroxysmal atrial fibrillation      Acute cystitis without hematuria  septic  4/2016      Essential hypertension      Spinal stenosis      Spondyloarthritis      Vaginal prolapse      [place] (hard of hearing), bilateral      Hypertension      S/P kyphoplasty  2014      S/P thyroidectomy  partial   1975      History of vaginal surgery      History of fracture of femur  hx of proximal femur fracture in January 2021      History of repair of left hip joint  Hx L hip long IMN with Dr. Kitchen 2017          Allergies:  No Known Allergies    Imaging:  X-rays of the left knee demonstrate healed and malunited appearing proximal tib-fib fractures; diffuse osteopenia and advanced degenerative changes in the knee joint. Distal portion of IMN noted in left femur.   X-rays of the right knee and bilateral tib-fibs pending.  CT scan of the left knee moderate to severe degenerative changes, healed proximal tib-fib fractures. No evidence of acute fracture or effusion.      Physical Exam:  General:  No acute distress, resting comfortably. AAOx3.  Musculoskeletal:  Left knee:  No swelling, no erythema, no ecchymosis, skin intact, normothermic. No palpable effusion. +TTP over medial joint line, +mild TTP over proximal tib-fib. Minimal active ROM, limited passive ROM 2' stiffness and pain ~0-45'. No pain with micromotion. Mild pain with axial loading. Unable to SLR 2' weakness. No pain with log roll. Moving ankle and all toes, +EHL/FHL/TA/GS. SILT throughout. DP and PT pulses 2+. Decubitus dressing over heel, displaced to reveal mild erythema over calc without TTP.  Right knee:  No swelling, no erythema, no ecchymosis, skin intact, normothermic. No palpable effusion. +TTP over medial joint line. Minimal active ROM, limited passive ROM 2' stiffness ~0-60'. No pain with micromotion. Unable to SLR 2' weakness. No pain with axial loading. No pain with log roll. Moving ankle and all toes, +EHL/FHL/TA/GS. SILT throughout. DP and PT pulses 2+. Decubitus dressing over heel, displaced to reveal mild erythema over calc without TTP.    Secondary Survey:  B/L hips, B/L ankles, and B/L UEs with no swelling, no ecchymoses, no abrasions, no lacerations or any other signs of injury with full painless baseline ROM and no bony TTP. Sensation intact distally, moving all digits. Distal pulses intact.     Spine and ribs with no bony TTP.     Assessment:  89y Female with chronic bilateral proximal tib-fib fractures with increased left knee pain after ambulance transfers without obvious trauma or injury; no evidence of acute fracture, likely OA flair. Pt also with bilateral heel pressure injuries.     Plan:  B/L heel pressure injury management per primary team; currently in bilateral padded heel protectors.  F/u x-rays of bilateral tib-fibs and right knee.  WBAT LLE  Pain control.  Ice application.  LLE elevation.  No acute orthopedic surgical intervention.  Follow-up with Dr. Kitchen in office upon discharge as needed.    Case discussed with Dr. Kitchen who agrees with plan. Orthopedics Consult Note:    This is a 89y Female admitted to medical service for urosepsis with e.coli bacteremia. Orthopedics consult placed to evaluate pt c/o left knee pain. Pt has a history of bilateral proximal tib-fib fractures s/p fall in 6/21 which were managed nonoperatively. Pt is nonamublatory and at baseline has chronic bilateral knee pain and limited ROM, left > right. Pt/Pts daughter at bedside report increase pain in the left knee after ambulance transfer to the hospital. Pt and family deny any specific injury or trauma. Pt also c/o vague foot pain bilaterally that seems to be localized to the heels and mainly occurs with position change in bed. Pt denies any numbness, tingling or paresthesias.     PAST MEDICAL & SURGICAL HISTORY:  Lymphedema of both lower extremities      Venous insufficiency      Monoclonal gammopathies      Paroxysmal atrial fibrillation      Acute cystitis without hematuria  septic  4/2016      Essential hypertension      Spinal stenosis      Spondyloarthritis      Vaginal prolapse      [place] (hard of hearing), bilateral      Hypertension      S/P kyphoplasty  2014      S/P thyroidectomy  partial   1975      History of vaginal surgery      History of fracture of femur  hx of proximal femur fracture in January 2021      History of repair of left hip joint  Hx L hip long IMN with Dr. Kitchen 2017          Allergies:  No Known Allergies    Imaging:  X-rays of the left knee demonstrate healed and malunited appearing proximal tib-fib fractures; diffuse osteopenia and advanced degenerative changes in the knee joint. Distal portion of IMN noted in left femur.   X-rays of the right knee and bilateral tib-fibs pending.  CT scan of the left knee moderate to severe degenerative changes, healed proximal tib-fib fractures. No evidence of acute fracture or effusion.      Physical Exam:  General:  No acute distress, resting comfortably. AAOx3.  Musculoskeletal:  Left knee:  No swelling, no erythema, no ecchymosis, skin intact, normothermic. No palpable effusion. +TTP over medial joint line, +mild TTP over proximal tib-fib. Minimal active ROM, limited passive ROM 2' stiffness and pain ~0-45'. No pain with micromotion. Mild pain with axial loading. Unable to SLR 2' weakness. No pain with log roll. Moving ankle and all toes, +EHL/FHL/TA/GS. SILT throughout. DP and PT pulses 2+. Decubitus dressing over heel, displaced to reveal mild erythema over calc without TTP.  Right knee:  No swelling, no erythema, no ecchymosis, skin intact, normothermic. No palpable effusion. +TTP over medial joint line. Minimal active ROM, limited passive ROM 2' stiffness ~0-60'. No pain with micromotion. Unable to SLR 2' weakness. No pain with axial loading. No pain with log roll. Moving ankle and all toes, +EHL/FHL/TA/GS. SILT throughout. DP and PT pulses 2+. Decubitus dressing over heel, displaced to reveal mild erythema over calc without TTP.    Secondary Survey:  B/L hips, B/L ankles, and B/L UEs with no swelling, no ecchymoses, no abrasions, no lacerations or any other signs of injury with full painless baseline ROM and no bony TTP. Sensation intact distally, moving all digits. Distal pulses intact.     Spine and ribs with no bony TTP.     Assessment:  89y Female with chronic bilateral proximal tib-fib fractures with increased left knee pain after ambulance transfers without obvious trauma or injury; no evidence of acute fracture, likely OA flair. Pt also with bilateral heel pressure injuries.     Plan:  B/L heel pressure injury management per primary team; currently in bilateral padded heel protectors.  F/u x-rays of bilateral tib-fibs and right knee.  WBAT LLE  Pain control.  Ice application.  LLE elevation.  No acute orthopedic surgical intervention.  Follow-up with Dr. Kitchen in office upon discharge as needed.    Case discussed with Dr. Kitchen who agrees with plan.        Total Encounter Time: 55 min    Encounter time included reviewing the history, patient records, speaking with pt (and their duty or caretaker at bedside), discussing the nature of the injury, reviewing imaging, interpreting labs, ordering additional tests, and coordinating plan w Attending and relaying plan to Medical Team.     Orthopedics Consult Note:    This is a 89y Female admitted to medical service for urosepsis with e.coli bacteremia. Orthopedics consult placed to evaluate pt c/o left knee pain. Pt has a history of bilateral proximal tib-fib fractures s/p fall in 6/21 which were managed nonoperatively. Pt is nonamublatory and at baseline has chronic bilateral knee pain and limited ROM, left > right. Pt/Pts daughter at bedside report increase pain in the left knee after ambulance transfer to the hospital. Pt and family deny any specific injury or trauma. Pt also c/o vague foot pain bilaterally that seems to be localized to the heels and mainly occurs with position change in bed. Pt denies any numbness, tingling or paresthesias.     PAST MEDICAL & SURGICAL HISTORY:  Lymphedema of both lower extremities      Venous insufficiency      Monoclonal gammopathies      Paroxysmal atrial fibrillation      Acute cystitis without hematuria  septic  4/2016      Essential hypertension      Spinal stenosis      Spondyloarthritis      Vaginal prolapse      [place] (hard of hearing), bilateral      Hypertension      S/P kyphoplasty  2014      S/P thyroidectomy  partial   1975      History of vaginal surgery      History of fracture of femur  hx of proximal femur fracture in January 2021      History of repair of left hip joint  Hx L hip long IMN with Dr. Kitchen 2017          Allergies:  No Known Allergies    Imaging:  X-rays of the left knee demonstrate healed and malunited appearing proximal tib-fib fractures; diffuse osteopenia and advanced degenerative changes in the knee joint. Distal portion of IMN noted in left femur.   X-rays of the right knee and bilateral tib-fibs pending.  CT scan of the left knee moderate to severe degenerative changes, healed proximal tib-fib fractures. No evidence of acute fracture or effusion.      Physical Exam:  General:  No acute distress, resting comfortably. AAOx3.  Musculoskeletal:  Left knee:  No swelling, no erythema, no ecchymosis, skin intact, normothermic. No palpable effusion. +TTP over medial joint line, +mild TTP over proximal tib-fib. Minimal active ROM, limited passive ROM 2' stiffness and pain ~0-45'. No pain with micromotion. Mild pain with axial loading. Unable to SLR 2' weakness. No pain with log roll. Calves are soft, mildly tender bilaterally. Moving ankle and all toes, +EHL/FHL/TA/GS. SILT throughout. DP and PT pulses 2+. Decubitus dressing over heel, displaced to reveal mild erythema over calc without TTP.  Right knee:  No swelling, no erythema, no ecchymosis, skin intact, normothermic. No palpable effusion. +TTP over medial joint line. Minimal active ROM, limited passive ROM 2' stiffness ~0-60'. No pain with micromotion. Unable to SLR 2' weakness. No pain with axial loading. No pain with log roll. Calves are soft, mildly tender bilaterally. Moving ankle and all toes, +EHL/FHL/TA/GS. SILT throughout. DP and PT pulses 2+. Decubitus dressing over heel, displaced to reveal mild erythema over calc without TTP.    Secondary Survey:  B/L hips, B/L ankles, and B/L UEs with no swelling, no ecchymoses, no abrasions, no lacerations or any other signs of injury with full painless baseline ROM and no bony TTP. Sensation intact distally, moving all digits. Distal pulses intact.     Spine and ribs with no bony TTP.     Assessment:  89y Female with chronic bilateral proximal tib-fib fractures with increased left knee pain after ambulance transfers without obvious trauma or injury; no evidence of acute fracture, likely OA flair. Pt also with bilateral heel pressure injuries.     Plan:  B/L heel pressure injury management per primary team; currently in bilateral padded heel protectors.  F/u x-rays of bilateral tib-fibs and right knee.  B/L LE venous dopplers to r/o DVT.  WBAT LLE  Pain control; Antinflammatories/NSAIDs if no medical contraindications.  Ice application.  LLE elevation.  No acute orthopedic surgical intervention.  Follow-up with Dr. Kitchen in office upon discharge as needed.    Case discussed with Dr. Kitchen who agrees with plan.        Total Encounter Time: 55 min    Encounter time included reviewing the history, patient records, speaking with pt (and their duty or caretaker at bedside), discussing the nature of the injury, reviewing imaging, interpreting labs, ordering additional tests, and coordinating plan w Attending and relaying plan to Medical Team.

## 2023-09-11 NOTE — PROGRESS NOTE ADULT - ASSESSMENT
89 y/o female with h/o HTN, Afib on Eliquis, chronic indwelling catheter, kidney stones was admitted on 9/7 for persistent lower abdominal pain, dark urine and fever (Tmax 100.1F) x 2 days. 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 Family states patient just had her chronic sales replaced on the day PTA. Upon arrival patient was found to be febrile and hypotensive. Received a total of 4LF IVFs, however, remained hypotensive and was consequently started on levophed. Labs significant for leukocytosis 21k with left shift. UA suggestive of infection. In ER she received meropenem and vancomycin IV.     1. Febrile syndrome. Hypotension resolved. Sepsis with E.coli. Pyuria. Likely UTI. Urinary retention with chronic sales. Kidney stones.  -leukocytosis resolving  -more alert  -BC x 2, urine c/s noted  -on ceftriaxone 2 gm IV qd # 4  -tolerating abx well so far; no side effects noted  -repeat BC x 2 noted  -continue abx coverage   -off pressors  -monitor temps  -f/u CBC  -supportive care  2. Other issues:   -care per medicine    d/w medicine team

## 2023-09-11 NOTE — PROGRESS NOTE ADULT - SUBJECTIVE AND OBJECTIVE BOX
89 y/o woman with pmhx of HTN, Afib on Eliquis, chronic indwelling catheter who was BIBEMS to ED with persistent lower abdominal pain, dark urine and fever (Tmax 100.1F) x 2 days. 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 [facility] patient just had her chronic sales replaced day prior to admission. Upon arrival patient was found to be febrile and hypotensive. Received a total of 4LF IVFs, however, remained hypotensive and was consequently started on levophed. Labs significant for leukocytosis 21k with left shift, bilirubin 1.5, and elevated LFTs. UA suggestive of infection. Underwent RUQ ultrasound and CT abd pelvis which was negative for acute cholecystis, but revealed a right non obstructing intrarenal calculi. Patient was admitted to ICU for further management of septic shock secondary to UTI.  Pt downgraded to medicine for further management.       9/11- patient was seen and examined- VSS. patient c/p left knee pain-  Daughter at the bedside- plan was d/w daughter. all of he concerns were addressed.     Vital Signs Last 24 Hrs  T(C): 36.9 (11 Sep 2023 14:24), Max: 37.1 (11 Sep 2023 00:01)  T(F): 98.4 (11 Sep 2023 14:24), Max: 98.8 (11 Sep 2023 00:01)  HR: 79 (11 Sep 2023 14:24) (60 - 88)  BP: 151/80 (11 Sep 2023 14:24) (123/54 - 152/53)  BP(mean): --  RR: 16 (11 Sep 2023 14:24) (16 - 18)  SpO2: 98% (11 Sep 2023 14:24) (93% - 98%)    Parameters below as of 11 Sep 2023 14:24  Patient On (Oxygen Delivery Method): nasal cannula  O2 Flow (L/min): 2      ROS:   All 10 systems reviewed and found to be negative with the exception of what has been described above.     PE:  Constitutional: NAD, laying in bed  HEENT: NC/AT  Back: no tenderness  Respiratory: respirations even and non labored, LCTA  Cardiovascular: S1S2 regular, no murmurs  Abdomen: soft, not tender, not distended, positive BS  Genitourinary: voiding  Rectal: deferred  Musculoskeletal: no muscle tenderness, no joint swelling or tenderness  Extremities: no pedal edema   Neurological: no focal deficits    MEDICATIONS  (STANDING):  apixaban 2.5 milliGRAM(s) Oral every 12 hours  artificial  tears Solution 1 Drop(s) Both EYES two times a day  chlorhexidine 4% Liquid 1 Application(s) Topical <User Schedule>  cholecalciferol 2000 Unit(s) Oral daily  furosemide    Tablet 20 milliGRAM(s) Oral daily  influenza  Vaccine (HIGH DOSE) 0.7 milliLiter(s) IntraMuscular once  melatonin 3 milliGRAM(s) Oral at bedtime  metoprolol succinate ER 25 milliGRAM(s) Oral daily  mineral oil/petrolatum Hydrophilic Ointment 1 Application(s) Topical daily  multivitamin 1 Tablet(s) Oral daily  pantoprazole    Tablet 40 milliGRAM(s) Oral before breakfast  polyethylene glycol 3350 17 Gram(s) Oral daily  potassium phosphate / sodium phosphate Powder (PHOS-NaK) 1 Packet(s) Oral every 6 hours  senna 2 Tablet(s) Oral at bedtime  vitamin A &amp; D Ointment 1 Application(s) Topical daily  zinc oxide 40% Paste 1 Application(s) Topical daily  zinc sulfate 220 milliGRAM(s) Oral daily    MEDICATIONS  (PRN):  triamcinolone 0.1% Oral Paste 1 Application(s) Topical daily PRN rash to shoulders and/or back          LABS:    09-10    131<L>  |  103  |  14  ----------------------------<  100<H>  4.0   |  24  |  0.61    Ca    9.4      10 Sep 2023 06:33  Phos  2.3     09-10  Mg     2.1     09-10    TPro  5.7<L>  /  Alb  2.8<L>  /  TBili  0.4  /  DBili  0.2  /  AST  41<H>  /  ALT  110<H>  /  AlkPhos  114  09-11                          11.4   5.84  )-----------( 75       ( 11 Sep 2023 08:23 )             32.9                               TTE Echo Complete w/o Contrast w/ Doppler (09.08.23 @ 14:57) >         Endocardium is not well visualized, however, overall left ventricular   systolic function appears normal. Technically Difficult Study.   The left atrium is severely dilated.   Moderate aortic stenosis.   Moderate (2+) aortic regurgitation.   Moderate to severe (3+) tricuspid valve regurgitation.    < from: CT Abdomen and Pelvis w/ IV Cont (09.08.23 @ 02:20) >  IMPRESSION:  Severe cardiomegaly.    Bibasilar subsegmental atelectasis. Trace right pleural effusion.    Mild dilatation extra renal pelvis right kidney. No hydronephrosis.   Linear nonobstructing calcification lower pole both kidneys. No   obstructing renal or ureteral stones. Simple bilateral renal cysts. No CT   evidence of pyelonephritis. No perinephric fluid collections.    Sales catheter is present within the urinary bladder which is   decompressed.    Large fecal impaction rectosigmoid portion of the colon.

## 2023-09-11 NOTE — PROGRESS NOTE ADULT - SUBJECTIVE AND OBJECTIVE BOX
Date of service: 09-11-23 @ 13:53    Lying in bed in NAD  Alert and confused  Has urinary frequency    ROS: no fever or chills; poorly verbal    MEDICATIONS  (STANDING):  apixaban 2.5 milliGRAM(s) Oral every 12 hours  artificial  tears Solution 1 Drop(s) Both EYES two times a day  chlorhexidine 4% Liquid 1 Application(s) Topical <User Schedule>  cholecalciferol 2000 Unit(s) Oral daily  furosemide    Tablet 20 milliGRAM(s) Oral daily  influenza  Vaccine (HIGH DOSE) 0.7 milliLiter(s) IntraMuscular once  melatonin 3 milliGRAM(s) Oral at bedtime  metoprolol succinate ER 25 milliGRAM(s) Oral daily  mineral oil/petrolatum Hydrophilic Ointment 1 Application(s) Topical daily  multivitamin 1 Tablet(s) Oral daily  pantoprazole    Tablet 40 milliGRAM(s) Oral before breakfast  polyethylene glycol 3350 17 Gram(s) Oral daily  potassium phosphate / sodium phosphate Powder (PHOS-NaK) 1 Packet(s) Oral every 6 hours  senna 2 Tablet(s) Oral at bedtime  vitamin A &amp; D Ointment 1 Application(s) Topical daily  zinc oxide 40% Paste 1 Application(s) Topical daily  zinc sulfate 220 milliGRAM(s) Oral daily    Vital Signs Last 24 Hrs  T(C): 36.9 (11 Sep 2023 06:00), Max: 37.1 (11 Sep 2023 00:01)  T(F): 98.5 (11 Sep 2023 06:00), Max: 98.8 (11 Sep 2023 00:01)  HR: 85 (11 Sep 2023 06:00) (60 - 88)  BP: 138/69 (11 Sep 2023 06:00) (123/54 - 152/53)  BP(mean): --  RR: 18 (11 Sep 2023 06:00) (17 - 18)  SpO2: 96% (11 Sep 2023 06:00) (93% - 96%)    Parameters below as of 11 Sep 2023 06:00  Patient On (Oxygen Delivery Method): nasal cannula  O2 Flow (L/min): 2     Physical exam:    Constitutional:  No acute distress  HEENT: NC/AT, EOMI, PERRLA, conjunctivae clear; ears and nose atraumatic  Neck: supple; thyroid not palpable  Back: no tenderness  Respiratory: respiratory effort normal; clear to auscultation  Cardiovascular: S1S2 regular, no murmurs  Abdomen: soft, not tender, not distended, positive BS  Genitourinary: no suprapubic tenderness  Lymphatic: no LN palpable  Musculoskeletal: no muscle tenderness, no joint swelling or tenderness  Extremities: no pedal edema  Neurological/ Psychiatric: AxOx3, moving all extremities  Skin: no rashes; no palpable lesions    Labs: reviewed                        11.4   5.84  )-----------( 75       ( 11 Sep 2023 08:23 )             32.9     09-10    131<L>  |  103  |  14  ----------------------------<  100<H>  4.0   |  24  |  0.61    Ca    9.4      10 Sep 2023 06:33  Phos  2.3     09-10  Mg     2.1     09-10    TPro  5.7<L>  /  Alb  2.8<L>  /  TBili  0.4  /  DBili  0.2  /  AST  41<H>  /  ALT  110<H>  /  AlkPhos  114  09-11                        11.4   20.60 )-----------( x        ( 08 Sep 2023 09:06 )             33.6     09-08    137  |  108  |  13  ----------------------------<  125<H>  3.9   |  23  |  0.66    Ca    8.9      08 Sep 2023 09:06  Phos  2.5     09-08  Mg     1.8     09-08    TPro  5.7<L>  /  Alb  2.9<L>  /  TBili  0.8  /  DBili  x   /  AST  209<H>  /  ALT  209<H>  /  AlkPhos  147<H>  09-08     LIVER FUNCTIONS - ( 08 Sep 2023 09:06 )  Alb: 2.9 g/dL / Pro: 5.7 gm/dL / ALK PHOS: 147 U/L / ALT: 209 U/L / AST: 209 U/L / GGT: x           Urinalysis (09-07 @ 21:14)  Urine Appearance: very cloudy  Protein, Urine: 500 mg/dL    Urine Microscopic-Add On (NC) (09-07 @ 21:14)  White Blood Cell - Urine: >50 /HPF  Red Blood Cell - Urine: >50 /HPF    (09-07 @ 21:14)  NotDetec    Culture - Urine (collected 07 Sep 2023 21:14)  Source: Clean Catch Clean Catch (Midstream)  Final Report (10 Sep 2023 14:39):    >=3 organisms. Probable collection contamination.    Culture - Blood (collected 07 Sep 2023 21:14)  Source: .Blood Blood-Peripheral  Gram Stain (08 Sep 2023 22:41):    Growth in anaerobic bottle: Gram Negative Rods  Preliminary Report (09 Sep 2023 19:08):    Growth in anaerobic bottle: Escherichia coli    Direct identification is available within approximately 3-5    hours either by Blood Panel Multiplexed PCR or Direct    MALDI-TOF. Details: [email or web address]  Organism: Blood Culture PCR  Escherichia coli (10 Sep 2023 15:20)  Organism: Escherichia coli (10 Sep 2023 15:20)      Method Type: MAJOR      -  Amikacin: S <=16      -  Ampicillin: R >16 These ampicillin results predict results for amoxicillin      -  Ampicillin/Sulbactam: I 16/8      -  Aztreonam: S <=4      -  Cefazolin: S <=2      -  Cefepime: S <=2      -  Cefoxitin: S <=8      -  Ceftriaxone: S <=1      -  Ciprofloxacin: S <=0.25      -  Ertapenem: S <=0.5      -  Gentamicin: S <=2      -  Imipenem: S <=1      -  Levofloxacin: S <=0.5      -  Meropenem: S <=1      -  Piperacillin/Tazobactam: S <=8      -  Tobramycin: S <=2      -  Trimethoprim/Sulfamethoxazole: R >2/38  Organism: Blood Culture PCR (09 Sep 2023 00:38)      Method Type: PCR      -  Escherichia coli: Detec    Culture - Blood (collected 07 Sep 2023 21:14)  Source: .Blood Blood-Peripheral  Preliminary Report (11 Sep 2023 04:01):    No growth at 72 Hours    Radiology: all available radiological tests reviewed    < from: CT Abdomen and Pelvis w/ IV Cont (09.08.23 @ 02:20) >  Severe cardiomegaly.  Bibasilar subsegmental atelectasis. Trace right pleural effusion.    Mild dilatation extra renal pelvis right kidney. No hydronephrosis.   Linear nonobstructing calcification lower pole both kidneys. No   obstructing renal or ureteral stones. Simple bilateral renal cysts. No CT   evidence of pyelonephritis. No perinephric fluid collections.    [place]aves catheter is present within the urinary bladder which is decompressed.  Large fecal impaction rectosigmoid portion of the colon.  < end of copied text >    Advanced directives addressed: full resuscitation

## 2023-09-11 NOTE — PROGRESS NOTE ADULT - ASSESSMENT
89 y/o female with h/o HTN, Afib on Eliquis, chronic indwelling catheter, kidney stones was admitted on 9/7 for persistent lower abdominal pain, dark urine and fever (Tmax 100.1F) x 2 days. 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 Family states patient just had her chronic sales replaced on the day PTA. Upon arrival patient was found to be febrile and hypotensive. Received a total of 4LF IVFs, however, remained hypotensive and was consequently started on levophed. Labs significant for leukocytosis 21k with left shift. UA suggestive of infection. In ER she received meropenem and vancomycin IV.     1. Febrile syndrome. Hypotension resolved. Sepsis with E.coli. Pyuria. Likely UTI. Urinary retention with chronic sales. Kidney stones.  -leukocytosis resolving  -more alert  -BC x 2, urine c/s noted  -on ceftriaxone 2 gm IV qd # 2  -tolerating abx well so far; no side effects noted  -repeat BC x 2  -continue abx coverage   -off pressors  -monitor temps  -f/u CBC  -supportive care  2. Other issues:   -care per medicine    d/w medicine team

## 2023-09-11 NOTE — PROGRESS NOTE ADULT - ASSESSMENT
89 yo woman  with PMHx of HTN, Afib on Eliquis, chronic indwelling catheter who was BIBEMS to ED with persistent lower abdominal pain, dark urine and fever (Tmax 100.1F) x 2 days. Patient has history of multiple UTI.     Admitted for  1. Septic shock 2/2 CAUTI with E Coli bacteremia  2. Thrombocytopenia  3. Transaminitis   4. Constipation  5. Stg 2 decubitus  6. New left knee pain    PLAN    - monitor temps   - IV CTX 2gm - will continue IV for now- will dc on oral antibiotics based on sensitivities   - Thrombocytopenia suspect worsened in setting of sepsis. 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 cont to trend CBC  - abnormal TTE she is on diuretics at home start 20 lasix   - metoprolol   - Ortho consulted for worsening left knee pain  - f/u on imaging as per Ortho orders  - Pain meds PRN   - monitor for s/e of antibiotics   - cardiology consult appreciated     [facility] meetin23:  Daughter was updated about mother's worsening medical condition and need for admission to ICU for further management. States that she was not ready to commit to a DNR/DNI status, but would reconsider if patient's condition continued to decline. States that she does not want to subject her mother to unnecessary pain/suffering. Daughter would like to speak to her siblings before any decisions are made. Patient will remain full code at this. Further goals of care discussion to follow.

## 2023-09-11 NOTE — CONSULT NOTE ADULT - SUBJECTIVE AND OBJECTIVE BOX
Patient is a 89y old  Female who presents with a chief complaint of Sepsis     (10 Sep 2023 11:12)  9/11/2023- Cardiology Consultation: 89 year old woman admitted due to abdominal pain and fever. Found to have septic shock due to E coli bacteremia after exchange of a chronic indweliing Sales catheter.  Required fluid resuscitation and low dose norepinephrine and antibiotics and ICU level of care. . Responded favorably and Levophed was replaced by midodrine which was discontinued 9/120 due to normotension and she was transferred to a med-surg floor. Today she is complaining of left knee pain. Patient  without other complaints.  History of long term persistent atrial fibrillation, CLL, thrombocytopenia, chronic venous insufficiency, hypertension, recurrent UTIs, mild dementia. Patient does not ambulate at home and is cared for by her daughter and health aides.     HPI:  Patient is a 89 y/o female with pmhx of HTN, Afib on Eliquis, chronic indwelling catheter who was BIBEMS to ED with persistent lower abdominal pain, dark urine and fever (Tmax 100.1F) x 2 days. 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 Family states patient just had her chronic sales replaced yesterday. Upon arrival patient was found to be febrile and hypotensive. Received a total of 4LF IVFs, however, remained hypotensive and was consequently started on levophed. Labs significant for leukocytosis 21k with left shift, bilirubin 1.5, and elevated LFTs. UA suggestive of infection. Underwent RUQ ultrasound and CT abd pelvis which was negative for acute cholecystis, but revealed a right nonobstructing intrarenal calculi. Patient was admitted to ICU for further management of septic shock secondary to UTI.  (08 Sep 2023 06:23)      PAST MEDICAL & SURGICAL HISTORY:  Lymphedema of both lower extremities      Venous insufficiency      Monoclonal gammopathies      Paroxysmal atrial fibrillation      Acute cystitis without hematuria  septic  4/2016      Essential hypertension      Spinal stenosis      Spondyloarthritis      Vaginal prolapse      [place]), bilateral      Hypertension      S/P kyphoplasty  2014      S/P thyroidectomy  partial   1975      History of vaginal surgery      History of fracture of femur  hx of proximal femur fracture in January 2021      History of repair of left hip joint  Hx L hip long IMN with Dr. Kitchen 2017            MEDICATIONS  (STANDING):  apixaban 2.5 milliGRAM(s) Oral every 12 hours  artificial  tears Solution 1 Drop(s) Both EYES two times a day  cefTRIAXone Injectable. 2000 milliGRAM(s) IV Push every 24 hours  chlorhexidine 4% Liquid 1 Application(s) Topical <User Schedule>  cholecalciferol 2000 Unit(s) Oral daily  furosemide    Tablet 20 milliGRAM(s) Oral daily  influenza  Vaccine (HIGH DOSE) 0.7 milliLiter(s) IntraMuscular once  melatonin 3 milliGRAM(s) Oral at bedtime  metoprolol succinate ER 25 milliGRAM(s) Oral daily  mineral oil/petrolatum Hydrophilic Ointment 1 Application(s) Topical daily  multivitamin 1 Tablet(s) Oral daily  pantoprazole    Tablet 40 milliGRAM(s) Oral before breakfast  polyethylene glycol 3350 17 Gram(s) Oral daily  senna 2 Tablet(s) Oral at bedtime  vitamin A &amp; D Ointment 1 Application(s) Topical daily  zinc oxide 40% Paste 1 Application(s) Topical daily  zinc sulfate 220 milliGRAM(s) Oral daily    MEDICATIONS  (PRN):  triamcinolone 0.1% Oral Paste 1 Application(s) Topical daily PRN rash to shoulders and/or back      FAMILY HISTORY:  No pertinent family history in first degree relatives        SOCIAL HISTORY:  Tobacco-    no       Alcohol-      no        Illicit drugs-     no         Occupation-              Marital  status-  REVIEW OF SYSTEM:  Pertinent items are noted in HPI.    Vital Signs Last 24 Hrs  T(C): 36.8 (11 Sep 2023 05:00), Max: 37.1 (11 Sep 2023 00:01)  T(F): 98.2 (11 Sep 2023 05:00), Max: 98.8 (11 Sep 2023 00:01)  HR: 88 (11 Sep 2023 05:00) (60 - 102)  BP: 149/65 (11 Sep 2023 05:00) (117/61 - 152/53)  BP(mean): --  RR: 17 (11 Sep 2023 00:01) (17 - 20)  SpO2: 95% (11 Sep 2023 00:01) (93% - 96%)    Parameters below as of 11 Sep 2023 00:01  Patient On (Oxygen Delivery Method): nasal cannula        I&O's Summary    10 Sep 2023 07:01  -  11 Sep 2023 07:00  --------------------------------------------------------  IN: 0 mL / OUT: 550 mL / NET: -550 mL      PHYSICAL EXAM  General Appearance: frail, alert  HEENT: PERRL, conjunctiva clear, EOM's intact, non injected pharynx, no exudate, TM   normal  Neck: Supple, , no adenopathy, thyroid: not enlarged, no carotid bruit or JVD  Back: Symmetric, no  tenderness,no soft tissue tenderness  Lungs: Clear to auscultation bilaterally,no adventitious breath sounds, normal   expiratory phase  Heart: Regular rate and rhythm, S1, S2 normal, 1/6 systolic murmur LSB  Abdomen: Soft, non-tender, bowel sounds active , no hepatosplenomegaly. Sales in place  Extremities: 1+ edema both legs with venous stasis discoloration. Left knee arthritic, mildly tender.  Skin: Skin color, texture normal, no rashes   Neurologic: Alert and oriented X3 , cranial nerves intact, sensory and motor normal,          ACC: [id number] EXAM:  ECHO TTE WO CON COMP W DOPP   ORDERED BY: JAYESH CARLOS     PROCEDURE DATE:  09/08/2023           Summary     Endocardium is not well visualized, however, overall left ventricular   systolic function appears normal. Technically Difficult Study.   The left atrium is severely dilated.   Moderate aortic stenosis.   Moderate (2+) aortic regurgitation.   Moderate to severe (3+) tricuspid valve regurgitation.     Signature     ----------------------------------------------------------------   Electronically signed by Blake Felix MD(Interpreting   physician) on 09/08/2023 08:02 PM   ----------------------------------------------------------------            INTERPRETATION OF TELEMETRY:    ECG: atrial fibrillation VR 59 BPM, LVH, possible septal infarct age undetermined.        LABS:                          11.3   7.41  )-----------( 60       ( 10 Sep 2023 06:33 )             32.8     09-10    131<L>  |  103  |  14  ----------------------------<  100<H>  4.0   |  24  |  0.61    Ca    9.4      10 Sep 2023 06:33  Phos  2.3     09-10  Mg     2.1     09-10    TPro  5.7<L>  /  Alb  2.8<L>  /  TBili  0.4  /  DBili  0.2  /  AST  41<H>  /  ALT  110<H>  /  AlkPhos  114  09-11              Urinalysis Basic - ( 10 Sep 2023 06:33 )    Color: x / Appearance: x / SG: x / pH: x  Gluc: 100 mg/dL / Ketone: x  / Bili: x / Urobili: x   Blood: x / Protein: x / Nitrite: x   Leuk Esterase: x / RBC: x / WBC x   Sq Epi: x / Non Sq Epi: x / Bacteria: x            RADIOLOGY & ADDITIONAL STUDIES:    IMPRESSION:  1. E coli sepsis due to UTI. Doing well, off pressors.  2.Long term persistent atrial fibrillation. Rate controlled.  3. Moderate aortic stenosis and regurgitation, moderate to severe tricuspid regurgitation. Clinically stable. No treatment is indicated.  4. Hypertension, stable.  5.Left knee pain. Likely arthritic. Suggest orthopedic evaluation in the setting of bacteremia to exclude septic arthritis.  PLAN:  Continue antibiotics per ID. Continue metoprolol Eliquis, furosemide for peripheral edema. Will follow as needed.

## 2023-09-12 ENCOUNTER — NON-APPOINTMENT (OUTPATIENT)
Age: 88
End: 2023-09-12

## 2023-09-12 PROCEDURE — 99232 SBSQ HOSP IP/OBS MODERATE 35: CPT

## 2023-09-12 RX ORDER — LOSARTAN POTASSIUM 100 MG/1
25 TABLET, FILM COATED ORAL DAILY
Refills: 0 | Status: DISCONTINUED | OUTPATIENT
Start: 2023-09-12 | End: 2023-09-12

## 2023-09-12 RX ORDER — MIRTAZAPINE 45 MG/1
15 TABLET, ORALLY DISINTEGRATING ORAL AT BEDTIME
Refills: 0 | Status: DISCONTINUED | OUTPATIENT
Start: 2023-09-12 | End: 2023-09-14

## 2023-09-12 RX ORDER — LOSARTAN POTASSIUM 100 MG/1
25 TABLET, FILM COATED ORAL DAILY
Refills: 0 | Status: DISCONTINUED | OUTPATIENT
Start: 2023-09-12 | End: 2023-09-14

## 2023-09-12 RX ORDER — ACETAMINOPHEN 500 MG
650 TABLET ORAL ONCE
Refills: 0 | Status: COMPLETED | OUTPATIENT
Start: 2023-09-12 | End: 2023-09-12

## 2023-09-12 RX ORDER — KETOROLAC TROMETHAMINE 30 MG/ML
15 SYRINGE (ML) INJECTION ONCE
Refills: 0 | Status: DISCONTINUED | OUTPATIENT
Start: 2023-09-12 | End: 2023-09-12

## 2023-09-12 RX ADMIN — APIXABAN 2.5 MILLIGRAM(S): 2.5 TABLET, FILM COATED ORAL at 09:54

## 2023-09-12 RX ADMIN — CEFTRIAXONE 2000 MILLIGRAM(S): 500 INJECTION, POWDER, FOR SOLUTION INTRAMUSCULAR; INTRAVENOUS at 09:56

## 2023-09-12 RX ADMIN — Medication 650 MILLIGRAM(S): at 06:23

## 2023-09-12 RX ADMIN — Medication 3 MILLIGRAM(S): at 21:52

## 2023-09-12 RX ADMIN — Medication 1 TABLET(S): at 09:53

## 2023-09-12 RX ADMIN — Medication 1 APPLICATION(S): at 09:54

## 2023-09-12 RX ADMIN — Medication 2000 UNIT(S): at 09:53

## 2023-09-12 RX ADMIN — APIXABAN 2.5 MILLIGRAM(S): 2.5 TABLET, FILM COATED ORAL at 21:53

## 2023-09-12 RX ADMIN — Medication 25 MILLIGRAM(S): at 09:53

## 2023-09-12 RX ADMIN — SENNA PLUS 2 TABLET(S): 8.6 TABLET ORAL at 21:53

## 2023-09-12 RX ADMIN — Medication 15 MILLIGRAM(S): at 21:52

## 2023-09-12 RX ADMIN — CHLORHEXIDINE GLUCONATE 1 APPLICATION(S): 213 SOLUTION TOPICAL at 06:46

## 2023-09-12 RX ADMIN — MIRTAZAPINE 15 MILLIGRAM(S): 45 TABLET, ORALLY DISINTEGRATING ORAL at 21:53

## 2023-09-12 RX ADMIN — PANTOPRAZOLE SODIUM 40 MILLIGRAM(S): 20 TABLET, DELAYED RELEASE ORAL at 06:08

## 2023-09-12 RX ADMIN — LOSARTAN POTASSIUM 25 MILLIGRAM(S): 100 TABLET, FILM COATED ORAL at 09:54

## 2023-09-12 RX ADMIN — ZINC OXIDE 1 APPLICATION(S): 200 OINTMENT TOPICAL at 09:58

## 2023-09-12 RX ADMIN — Medication 1 DROP(S): at 09:56

## 2023-09-12 RX ADMIN — ZINC SULFATE TAB 220 MG (50 MG ZINC EQUIVALENT) 220 MILLIGRAM(S): 220 (50 ZN) TAB at 09:54

## 2023-09-12 RX ADMIN — Medication 20 MILLIGRAM(S): at 06:08

## 2023-09-12 RX ADMIN — Medication 1 APPLICATION(S): at 09:58

## 2023-09-12 RX ADMIN — Medication 1 DROP(S): at 21:52

## 2023-09-12 NOTE — PROGRESS NOTE ADULT - SUBJECTIVE AND OBJECTIVE BOX
89 y/o woman with pmhx of HTN, Afib on Eliquis, chronic indwelling catheter who was BIBEMS to ED with persistent lower abdominal pain, dark urine and fever (Tmax 100.1F) x 2 days. 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 [facility] patient just had her chronic sales replaced day prior to admission. Upon arrival patient was found to be febrile and hypotensive. Received a total of 4LF IVFs, however, remained hypotensive and was consequently started on levophed. Labs significant for leukocytosis 21k with left shift, bilirubin 1.5, and elevated LFTs. UA suggestive of infection. Underwent RUQ ultrasound and CT abd pelvis which was negative for acute cholecystis, but revealed a right non obstructing intrarenal calculi. Patient was admitted to ICU for further management of septic shock secondary to UTI.  Pt downgraded to medicine for further management.       9/11- patient was seen and examined- VSS. patient c/p left knee pain-  Daughter at the bedside- plan was d/w daughter. all of he concerns were addressed.   9/12- patient was seen and examined. Still with complaints of left knee pain- worse with movement. denies SOB, chest pain.     Vital Signs Last 24 Hrs  T(C): 36.9 (12 Sep 2023 08:00), Max: 37.1 (12 Sep 2023 06:00)  T(F): 98.5 (12 Sep 2023 08:00), Max: 98.8 (12 Sep 2023 06:00)  HR: 80 (12 Sep 2023 08:00) (72 - 84)  BP: 138/87 (12 Sep 2023 08:00) (138/87 - 180/78)  BP(mean): --  RR: 19 (12 Sep 2023 08:00) (16 - 20)  SpO2: 93% (12 Sep 2023 08:00) (93% - 98%)    Parameters below as of 12 Sep 2023 08:00  Patient On (Oxygen Delivery Method): nasal cannula  O2 Flow (L/min): 2        ROS:   All 10 systems reviewed and found to be negative with the exception of what has been described above.     PE:  Constitutional: NAD, laying in bed  HEENT: NC/AT  Back: no tenderness  Respiratory: respirations even and non labored, LCTA- diminished at the base   Cardiovascular: S1S2 regular, no murmurs  Abdomen: soft, not tender, not distended, positive BS  Genitourinary: voiding  Musculoskeletal: no muscle tenderness, no joint swelling or tenderness  Extremities: no pedal edema   Neurological: no focal deficits    MEDICATIONS  (STANDING):  apixaban 2.5 milliGRAM(s) Oral every 12 hours  artificial  tears Solution 1 Drop(s) Both EYES two times a day  cefTRIAXone Injectable. 2000 milliGRAM(s) IV Push every 24 hours  chlorhexidine 4% Liquid 1 Application(s) Topical <User Schedule>  cholecalciferol 2000 Unit(s) Oral daily  furosemide    Tablet 20 milliGRAM(s) Oral daily  influenza  Vaccine (HIGH DOSE) 0.7 milliLiter(s) IntraMuscular once  losartan 25 milliGRAM(s) Oral daily  melatonin 3 milliGRAM(s) Oral at bedtime  metoprolol succinate ER 25 milliGRAM(s) Oral daily  mineral oil/petrolatum Hydrophilic Ointment 1 Application(s) Topical daily  multivitamin 1 Tablet(s) Oral daily  pantoprazole    Tablet 40 milliGRAM(s) Oral before breakfast  polyethylene glycol 3350 17 Gram(s) Oral daily  senna 2 Tablet(s) Oral at bedtime  vitamin A &amp; D Ointment 1 Application(s) Topical daily  zinc oxide 40% Paste 1 Application(s) Topical daily  zinc sulfate 220 milliGRAM(s) Oral daily    MEDICATIONS  (PRN):  triamcinolone 0.1% Oral Paste 1 Application(s) Topical daily PRN rash to shoulders and/or back          LABS:  09-10    131<L>  |  103  |  14  ----------------------------<  100<H>  4.0   |  24  |  0.61    Ca    9.4      10 Sep 2023 06:33  Phos  2.3     09-10  Mg     2.1     09-10    TPro  5.7<L>  /  Alb  2.8<L>  /  TBili  0.4  /  DBili  0.2  /  AST  41<H>  /  ALT  110<H>  /  AlkPhos  114  09-11                          11.4   5.84  )-----------( 75       ( 11 Sep 2023 08:23 )             32.9                               TTE Echo Complete w/o Contrast w/ Doppler (09.08.23 @ 14:57) >     Endocardium is not well visualized, however, overall left ventricular   systolic function appears normal. Technically Difficult Study.   The left atrium is severely dilated.   Moderate aortic stenosis.   Moderate (2+) aortic regurgitation.   Moderate to severe (3+) tricuspid valve regurgitation.    < from: CT Abdomen and Pelvis w/ IV Cont (09.08.23 @ 02:20) >  IMPRESSION:  Severe cardiomegaly.    Bibasilar subsegmental atelectasis. Trace right pleural effusion.    Mild dilatation extra renal pelvis right kidney. No hydronephrosis.   Linear non-obstructing calcification lower pole both kidneys. No   obstructing renal or ureteral stones. Simple bilateral renal cysts. No CT   evidence of pyelonephritis. No perinephric fluid collections.    Sales catheter is present within the urinary bladder which is   decompressed.    Large fecal impaction rectosigmoid portion of the colon.

## 2023-09-12 NOTE — PROGRESS NOTE ADULT - ASSESSMENT
89 y/o female with h/o HTN, Afib on Eliquis, chronic indwelling catheter, kidney stones was admitted on 9/7 for persistent lower abdominal pain, dark urine and fever (Tmax 100.1F) x 2 days. 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 Family states patient just had her chronic sales replaced on the day PTA. Upon arrival patient was found to be febrile and hypotensive. Received a total of 4LF IVFs, however, remained hypotensive and was consequently started on levophed. Labs significant for leukocytosis 21k with left shift. UA suggestive of infection. In ER she received meropenem and vancomycin IV.     1. Febrile syndrome. Hypotension resolved. Sepsis with E.coli. Pyuria. Likely UTI. Urinary retention with chronic sales. Kidney stones.  -leukocytosis resolving  -more alert  -BC x 2, urine c/s noted  -on ceftriaxone 2 gm IV qd # 5  -tolerating abx well so far; no side effects noted  -repeat BC x 2   -continue abx coverage   -monitor temps  -f/u CBC  -supportive care  2. Other issues:   -care per medicine    d/w medicine team and daughter HCP at bedside

## 2023-09-12 NOTE — PROGRESS NOTE ADULT - ASSESSMENT
89 yo woman  with PMHx of HTN, Afib on Eliquis, chronic indwelling catheter who was BIBEMS to ED with persistent lower abdominal pain, dark urine and fever (Tmax 100.1F) x 2 days. Patient has history of multiple UTI.     Admitted for  1. Septic shock 2/2 CAUTI with E Coli bacteremia  2. Thrombocytopenia  3. Transaminitis   4. Constipation- resolved   5. Stg 2 decubitus  6. New left knee pain    PLAN    - monitor temps   - IV CTX 2gm - will continue IV for now- will dc on oral antibiotics based on sensitivities   - Thrombocytopenia suspect worsened in setting of sepsis. 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 cont to trend CBC  - abnormal TTE she is on diuretics at home start 20 lasix   - metoprolol   - Ortho consulted for worsening left knee pain  - f/u on imaging as per Ortho orders  - Pain meds PRN   - monitor for s/e of antibiotics   - cardiology consult appreciated     [facility] meetin23:  Daughter was updated about mother's worsening medical condition and need for admission to ICU for further management. States that she was not ready to commit to a DNR/DNI status, but would reconsider if patient's condition continued to decline. States that she does not want to subject her mother to unnecessary pain/suffering. Daughter would like to speak to her siblings before any decisions are made. Patient will remain full code at this. Further goals of care discussion to follow.

## 2023-09-12 NOTE — PROGRESS NOTE ADULT - SUBJECTIVE AND OBJECTIVE BOX
Patient is a 89y old  Female who presents with a chief complaint of UTI, septic shock (11 Sep 2023 15:10)  9/11/2023- Cardiology Consultation: 89 year old woman admitted due to abdominal pain and fever. Found to have septic shock due to E coli bacteremia after exchange of a chronic indweliing Reaves catheter.  Required fluid resuscitation and low dose norepinephrine and antibiotics and ICU level of care. . Responded favorably and Levophed was replaced by midodrine which was discontinued 9/120 due to normotension and she was transferred to a med-surg floor. Today she is complaining of left knee pain. Patient  without other complaints.  History of long term persistent atrial fibrillation, CLL, thrombocytopenia, chronic venous insufficiency, hypertension, recurrent UTIs, mild dementia. Patient does not ambulate at home and is cared for by her daughter and health aides.     Followup HPI:  9/12- Notes left knee pain. No other complaints.    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 Kitchen 2017          Review of symptoms:  Negative except for as noted in today's HPI.      MEDICATIONS  (STANDING):  apixaban 2.5 milliGRAM(s) Oral every 12 hours  artificial  tears Solution 1 Drop(s) Both EYES two times a day  cefTRIAXone Injectable. 2000 milliGRAM(s) IV Push every 24 hours  chlorhexidine 4% Liquid 1 Application(s) Topical <User Schedule>  cholecalciferol 2000 Unit(s) Oral daily  furosemide    Tablet 20 milliGRAM(s) Oral daily  influenza  Vaccine (HIGH DOSE) 0.7 milliLiter(s) IntraMuscular once  melatonin 3 milliGRAM(s) Oral at bedtime  metoprolol succinate ER 25 milliGRAM(s) Oral daily  mineral oil/petrolatum Hydrophilic Ointment 1 Application(s) Topical daily  multivitamin 1 Tablet(s) Oral daily  pantoprazole    Tablet 40 milliGRAM(s) Oral before breakfast  polyethylene glycol 3350 17 Gram(s) Oral daily  senna 2 Tablet(s) Oral at bedtime  vitamin A &amp; D Ointment 1 Application(s) Topical daily  zinc oxide 40% Paste 1 Application(s) Topical daily  zinc sulfate 220 milliGRAM(s) Oral daily    MEDICATIONS  (PRN):  triamcinolone 0.1% Oral Paste 1 Application(s) Topical daily PRN rash to shoulders and/or back          Vital Signs Last 24 Hrs  T(C): 37.1 (12 Sep 2023 06:00), Max: 37.1 (12 Sep 2023 06:00)  T(F): 98.8 (12 Sep 2023 06:00), Max: 98.8 (12 Sep 2023 06:00)  HR: 84 (12 Sep 2023 06:00) (72 - 84)  BP: 162/77 (12 Sep 2023 06:00) (143/86 - 180/78)  BP(mean): --  RR: 20 (12 Sep 2023 06:00) (16 - 20)  SpO2: 95% (12 Sep 2023 06:00) (95% - 98%)    Parameters below as of 12 Sep 2023 06:00  Patient On (Oxygen Delivery Method): nasal cannula  O2 Flow (L/min): 2      I&O's Summary      PHYSICAL EXAM  General Appearance: comfortable, frail  HEENT:   Neck:   Lungs: clear  Heart: 1/6 systolic murmur LSB  Abdomen:   Extremities: left knee pain with flexion  Neurologic:       INTERPRETATION OF TELEMETRY:    ECG:    LABS:                          11.4   5.84  )-----------( 75       ( 11 Sep 2023 08:23 )             32.9         TPro  5.7<L>  /  Alb  2.8<L>  /  TBili  0.4  /  DBili  0.2  /  AST  41<H>  /  ALT  110<H>  /  AlkPhos  114  09-11                      RADIOLOGY & ADDITIONAL STUDIES:        ACC: [id number] EXAM:  CT 3D RECONSTRUCT WO WRKSTATON   ORDERED BY:   ZAY JIMENEZ     ACC: [id number] EXAM:  CT KNEE ONLY LT   ORDERED BY: ZAY JIMENEZ     IMPRESSION:    1.  At least moderate left knee arthrosis without effusion or acute   displaced fracture.  2.  Posttraumatic deformity of left proximal tibial shaft.    --- End of Report ---    HAYDEE SALAZAR MD; Attending Radiologist  This document has been electronically signed. Sep 11 2023  3:32PM        ACC: [id number] EXAM:  US DPLX LWR EXT VEINS COMPL BI   ORDERED BY: WINDY ESTES     PROCEDURE DATE:  09/11/2023      IMPRESSION:    Bilateral soleal veins and right peroneal vein could not be assessed. No   obvious thrombus in the visualized bilateral lower extremity deep veins.    --- End of Report ---    ORLANDO ALBA MD; Attending Radiologist  This document has been electronically signed. Sep 11 2023 10:05PM            IMPRESSION:  1. E coli sepsis due to UTI. Doing well.  2.Long term persistent atrial fibrillation. Rate controlled.  3. Moderate aortic stenosis and regurgitation, moderate to severe tricuspid regurgitation. Clinically stable. No treatment is indicated.  4. Hypertension. BP now in the hypertensive range. Will restart losartan 25 mg qd.   5.Left knee pain. Left knee arthrosis. Ortho consult appreciated.    PLAN:  Continue antibiotics per ID. Continue metoprolol Eliquis, furosemide for peripheral edema.Add losrtan 25 mg qd.  Will follow as needed.

## 2023-09-12 NOTE — PROGRESS NOTE ADULT - SUBJECTIVE AND OBJECTIVE BOX
Date of service: 09-12-23 @ 13:59    Lying in bed in NAD  Alert and somewhat verbal  Poor appetite   Has b/l knee pain    ROS: no fever or chills; denies dizziness, no HA, no SOB or cough, no abdominal pain, no diarrhea or constipation; no rashes    MEDICATIONS  (STANDING):  apixaban 2.5 milliGRAM(s) Oral every 12 hours  artificial  tears Solution 1 Drop(s) Both EYES two times a day  cefTRIAXone Injectable. 2000 milliGRAM(s) IV Push every 24 hours  chlorhexidine 4% Liquid 1 Application(s) Topical <User Schedule>  cholecalciferol 2000 Unit(s) Oral daily  furosemide    Tablet 20 milliGRAM(s) Oral daily  influenza  Vaccine (HIGH DOSE) 0.7 milliLiter(s) IntraMuscular once  losartan 25 milliGRAM(s) Oral daily  melatonin 3 milliGRAM(s) Oral at bedtime  metoprolol succinate ER 25 milliGRAM(s) Oral daily  mineral oil/petrolatum Hydrophilic Ointment 1 Application(s) Topical daily  mirtazapine 15 milliGRAM(s) Oral at bedtime  multivitamin 1 Tablet(s) Oral daily  pantoprazole    Tablet 40 milliGRAM(s) Oral before breakfast  polyethylene glycol 3350 17 Gram(s) Oral daily  senna 2 Tablet(s) Oral at bedtime  vitamin A &amp; D Ointment 1 Application(s) Topical daily  zinc oxide 40% Paste 1 Application(s) Topical daily  zinc sulfate 220 milliGRAM(s) Oral daily    Vital Signs Last 24 Hrs  T(C): 36.9 (12 Sep 2023 08:00), Max: 37.1 (12 Sep 2023 06:00)  T(F): 98.5 (12 Sep 2023 08:00), Max: 98.8 (12 Sep 2023 06:00)  HR: 80 (12 Sep 2023 08:00) (72 - 84)  BP: 138/87 (12 Sep 2023 08:00) (138/87 - 180/78)  BP(mean): --  RR: 19 (12 Sep 2023 08:00) (16 - 20)  SpO2: 93% (12 Sep 2023 08:00) (93% - 98%)    Parameters below as of 12 Sep 2023 08:00  Patient On (Oxygen Delivery Method): nasal cannula  O2 Flow (L/min): 2     Physical exam:    Constitutional:  No acute distress  HEENT: NC/AT, EOMI, PERRLA, conjunctivae clear; ears and nose atraumatic  Neck: supple; thyroid not palpable  Back: no tenderness  Respiratory: respiratory effort normal; clear to auscultation  Cardiovascular: S1S2 regular, no murmurs  Abdomen: soft, not tender, not distended, positive BS  Genitourinary: no suprapubic tenderness  Lymphatic: no LN palpable  Musculoskeletal: no muscle tenderness, no joint swelling or tenderness  Extremities: no pedal edema  Neurological/ Psychiatric: AxOx3, moving upper extremities  Skin: no rashes; no palpable lesions    Labs: reviewed                        11.4   5.84  )-----------( 75       ( 11 Sep 2023 08:23 )             32.9     09-10    131<L>  |  103  |  14  ----------------------------<  100<H>  4.0   |  24  |  0.61    Ca    9.4      10 Sep 2023 06:33  Phos  2.3     09-10  Mg     2.1     09-10    TPro  5.7<L>  /  Alb  2.8<L>  /  TBili  0.4  /  DBili  0.2  /  AST  41<H>  /  ALT  110<H>  /  AlkPhos  114  09-11                        11.4   20.60 )-----------( x        ( 08 Sep 2023 09:06 )             33.6     09-08    137  |  108  |  13  ----------------------------<  125<H>  3.9   |  23  |  0.66    Ca    8.9      08 Sep 2023 09:06  Phos  2.5     09-08  Mg     1.8     09-08    TPro  5.7<L>  /  Alb  2.9<L>  /  TBili  0.8  /  DBili  x   /  AST  209<H>  /  ALT  209<H>  /  AlkPhos  147<H>  09-08     LIVER FUNCTIONS - ( 08 Sep 2023 09:06 )  Alb: 2.9 g/dL / Pro: 5.7 gm/dL / ALK PHOS: 147 U/L / ALT: 209 U/L / AST: 209 U/L / GGT: x           Urinalysis (09-07 @ 21:14)  Urine Appearance: very cloudy  Protein, Urine: 500 mg/dL    Urine Microscopic-Add On (NC) (09-07 @ 21:14)  White Blood Cell - Urine: >50 /HPF  Red Blood Cell - Urine: >50 /HPF    (09-07 @ 21:14)  NotDetec    Culture - Urine (collected 07 Sep 2023 21:14)  Source: Clean Catch Clean Catch (Midstream)  Final Report (10 Sep 2023 14:39):    >=3 organisms. Probable collection contamination.    Culture - Blood (collected 07 Sep 2023 21:14)  Source: .Blood Blood-Peripheral  Gram Stain (08 Sep 2023 22:41):    Growth in anaerobic bottle: Gram Negative Rods  Preliminary Report (09 Sep 2023 19:08):    Growth in anaerobic bottle: Escherichia coli    Direct identification is available within approximately 3-5    hours either by Blood Panel Multiplexed PCR or Direct    MALDI-TOF. Details: [email or web address]  Organism: Blood Culture PCR  Escherichia coli (10 Sep 2023 15:20)  Organism: Escherichia coli (10 Sep 2023 15:20)      Method Type: MAJOR      -  Amikacin: S <=16      -  Ampicillin: R >16 These ampicillin results predict results for amoxicillin      -  Ampicillin/Sulbactam: I 16/8      -  Aztreonam: S <=4      -  Cefazolin: S <=2      -  Cefepime: S <=2      -  Cefoxitin: S <=8      -  Ceftriaxone: S <=1      -  Ciprofloxacin: S <=0.25      -  Ertapenem: S <=0.5      -  Gentamicin: S <=2      -  Imipenem: S <=1      -  Levofloxacin: S <=0.5      -  Meropenem: S <=1      -  Piperacillin/Tazobactam: S <=8      -  Tobramycin: S <=2      -  Trimethoprim/Sulfamethoxazole: R >2/38  Organism: Blood Culture PCR (09 Sep 2023 00:38)      Method Type: PCR      -  Escherichia coli: Detec    Culture - Blood (collected 07 Sep 2023 21:14)  Source: .Blood Blood-Peripheral  Preliminary Report (11 Sep 2023 04:01):    No growth at 72 Hours    Radiology: all available radiological tests reviewed    < from: CT Abdomen and Pelvis w/ IV Cont (09.08.23 @ 02:20) >  Severe cardiomegaly.  Bibasilar subsegmental atelectasis. Trace right pleural effusion.    Mild dilatation extra renal pelvis right kidney. No hydronephrosis.   Linear nonobstructing calcification lower pole both kidneys. No   obstructing renal or ureteral stones. Simple bilateral renal cysts. No CT   evidence of pyelonephritis. No perinephric fluid collections.    Reaves catheter is present within the urinary bladder which is decompressed.  Large fecal impaction rectosigmoid portion of the colon.  < end of copied text >    Advanced directives addressed: full resuscitation

## 2023-09-13 ENCOUNTER — TRANSCRIPTION ENCOUNTER (OUTPATIENT)
Age: 88
End: 2023-09-13

## 2023-09-13 LAB
ANION GAP SERPL CALC-SCNC: 4 MMOL/L — LOW (ref 5–17)
BUN SERPL-MCNC: 13 MG/DL — SIGNIFICANT CHANGE UP (ref 7–23)
CALCIUM SERPL-MCNC: 8.9 MG/DL — SIGNIFICANT CHANGE UP (ref 8.5–10.1)
CHLORIDE SERPL-SCNC: 104 MMOL/L — SIGNIFICANT CHANGE UP (ref 96–108)
CO2 SERPL-SCNC: 28 MMOL/L — SIGNIFICANT CHANGE UP (ref 22–31)
CREAT SERPL-MCNC: 0.52 MG/DL — SIGNIFICANT CHANGE UP (ref 0.5–1.3)
CULTURE RESULTS: SIGNIFICANT CHANGE UP
EGFR: 87 ML/MIN/1.73M2 — SIGNIFICANT CHANGE UP
GLUCOSE SERPL-MCNC: 103 MG/DL — HIGH (ref 70–99)
POTASSIUM SERPL-MCNC: 4.2 MMOL/L — SIGNIFICANT CHANGE UP (ref 3.5–5.3)
POTASSIUM SERPL-SCNC: 4.2 MMOL/L — SIGNIFICANT CHANGE UP (ref 3.5–5.3)
SODIUM SERPL-SCNC: 136 MMOL/L — SIGNIFICANT CHANGE UP (ref 135–145)
SPECIMEN SOURCE: SIGNIFICANT CHANGE UP

## 2023-09-13 PROCEDURE — 99232 SBSQ HOSP IP/OBS MODERATE 35: CPT

## 2023-09-13 PROCEDURE — 99221 1ST HOSP IP/OBS SF/LOW 40: CPT

## 2023-09-13 RX ORDER — OXYCODONE AND ACETAMINOPHEN 5; 325 MG/1; MG/1
1 TABLET ORAL EVERY 4 HOURS
Refills: 0 | Status: DISCONTINUED | OUTPATIENT
Start: 2023-09-13 | End: 2023-09-14

## 2023-09-13 RX ORDER — TRAMADOL HYDROCHLORIDE 50 MG/1
25 TABLET ORAL EVERY 8 HOURS
Refills: 0 | Status: DISCONTINUED | OUTPATIENT
Start: 2023-09-13 | End: 2023-09-14

## 2023-09-13 RX ADMIN — CHLORHEXIDINE GLUCONATE 1 APPLICATION(S): 213 SOLUTION TOPICAL at 09:37

## 2023-09-13 RX ADMIN — ZINC SULFATE TAB 220 MG (50 MG ZINC EQUIVALENT) 220 MILLIGRAM(S): 220 (50 ZN) TAB at 09:38

## 2023-09-13 RX ADMIN — Medication 1 DROP(S): at 09:49

## 2023-09-13 RX ADMIN — Medication 1 DROP(S): at 21:29

## 2023-09-13 RX ADMIN — Medication 3 MILLIGRAM(S): at 21:31

## 2023-09-13 RX ADMIN — MIRTAZAPINE 15 MILLIGRAM(S): 45 TABLET, ORALLY DISINTEGRATING ORAL at 21:31

## 2023-09-13 RX ADMIN — APIXABAN 2.5 MILLIGRAM(S): 2.5 TABLET, FILM COATED ORAL at 09:38

## 2023-09-13 RX ADMIN — ZINC OXIDE 1 APPLICATION(S): 200 OINTMENT TOPICAL at 09:37

## 2023-09-13 RX ADMIN — Medication 1 APPLICATION(S): at 09:36

## 2023-09-13 RX ADMIN — PANTOPRAZOLE SODIUM 40 MILLIGRAM(S): 20 TABLET, DELAYED RELEASE ORAL at 06:36

## 2023-09-13 RX ADMIN — Medication 1 TABLET(S): at 09:38

## 2023-09-13 RX ADMIN — Medication 25 MILLIGRAM(S): at 09:38

## 2023-09-13 RX ADMIN — TRAMADOL HYDROCHLORIDE 25 MILLIGRAM(S): 50 TABLET ORAL at 21:41

## 2023-09-13 RX ADMIN — Medication 20 MILLIGRAM(S): at 06:36

## 2023-09-13 RX ADMIN — LOSARTAN POTASSIUM 25 MILLIGRAM(S): 100 TABLET, FILM COATED ORAL at 09:38

## 2023-09-13 RX ADMIN — Medication 1 APPLICATION(S): at 09:39

## 2023-09-13 RX ADMIN — APIXABAN 2.5 MILLIGRAM(S): 2.5 TABLET, FILM COATED ORAL at 21:31

## 2023-09-13 RX ADMIN — Medication 2000 UNIT(S): at 09:38

## 2023-09-13 RX ADMIN — CEFTRIAXONE 2000 MILLIGRAM(S): 500 INJECTION, POWDER, FOR SOLUTION INTRAMUSCULAR; INTRAVENOUS at 09:37

## 2023-09-13 RX ADMIN — SENNA PLUS 2 TABLET(S): 8.6 TABLET ORAL at 21:31

## 2023-09-13 NOTE — DISCHARGE NOTE PROVIDER - NSDCCPCAREPLAN_GEN_ALL_CORE_FT
PRINCIPAL DISCHARGE DIAGNOSIS  Diagnosis: Sepsis  Assessment and Plan of Treatment: complete antibiotic regimen as prescribed. Complete the full course of antibiotics- do not skip or miss doses- do not stop even when you start to feel better.   Notify your PCP if your symptom has worsened or if you develop excessive diarrhea while on antibiotic.   f/u wtih your PCP as an outpt  Pain meds as needed.   follow the medications outlined in your medication discharge paperwork.   f/uw ith your dermatologist        SECONDARY DISCHARGE DIAGNOSES  Diagnosis: Acute UTI  Assessment and Plan of Treatment:     Diagnosis: Transaminitis  Assessment and Plan of Treatment:      PRINCIPAL DISCHARGE DIAGNOSIS  Diagnosis: Sepsis  Assessment and Plan of Treatment: complete antibiotic regimen as prescribed. Complete the full course of antibiotics- do not skip or miss doses- do not stop even when you start to feel better.   Notify your PCP if your symptom has worsened or if you develop excessive diarrhea while on antibiotic.   f/u wtih your PCP as an outpt  Pain meds as needed for left knee pain  follow the medications outlined in your medication discharge paperwork.   f/uw ith your dermatologist  Chronic sales catheter- f/u with urologist as an outpatient         SECONDARY DISCHARGE DIAGNOSES  Diagnosis: Acute UTI  Assessment and Plan of Treatment:     Diagnosis: Transaminitis  Assessment and Plan of Treatment:

## 2023-09-13 NOTE — DISCHARGE NOTE PROVIDER - ATTENDING DISCHARGE PHYSICAL EXAMINATION:
Patient seen today, awake, alert, c/o pain to legs, no overnight issues reported, repeat blood cultures negative, today is day 7 of IV ceftriaxone, will continue with ceftin for 7 more days.        Patient seen today, awake, alert, c/o pain to legs, no overnight issues reported, repeat blood cultures negative, today is day 7 of IV ceftriaxone, will continue with keflex for 7 more days.

## 2023-09-13 NOTE — DISCHARGE NOTE PROVIDER - DETAILS OF MALNUTRITION DIAGNOSIS/DIAGNOSES
This patient has been assessed with a concern for Malnutrition and was treated during this hospitalization for the following Nutrition diagnosis/diagnoses:     -  09/10/2023: Moderate protein-calorie malnutrition

## 2023-09-13 NOTE — DISCHARGE NOTE PROVIDER - HOSPITAL COURSE
89 y/o woman with pmhx of HTN, Afib on Eliquis, chronic indwelling catheter who was BIBEMS to ED with persistent lower abdominal pain, dark urine and fever (Tmax 100.1F) x 2 days. 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 [facility] patient just had her chronic sales replaced day prior to admission. Upon arrival patient was found to be febrile and hypotensive. Received a total of 4LF IVFs, however, remained hypotensive and was consequently started on levophed. Labs significant for leukocytosis 21k with left shift, bilirubin 1.5, and elevated LFTs. UA suggestive of infection. Underwent RUQ ultrasound and CT abd pelvis which was negative for acute cholecystis, but revealed a right non obstructing intrarenal calculi. Patient was admitted to ICU for further management of septic shock secondary to UTI.  Pt downgraded to medicine for further management.     9/13- patient was seen and examined-  no acute overnight events- no new complaints    ROS:   All 10 systems reviewed and found to be negative with the exception of what has been described above.     Vital Signs Last 24 Hrs  T(C): 36.8 (13 Sep 2023 07:59), Max: 37.3 (12 Sep 2023 21:19)  T(F): 98.2 (13 Sep 2023 07:59), Max: 99.1 (12 Sep 2023 21:19)  HR: 79 (13 Sep 2023 07:59) (78 - 96)  BP: 131/77 (13 Sep 2023 07:59) (131/77 - 159/75)  BP(mean): --  RR: 18 (13 Sep 2023 07:59) (18 - 18)  SpO2: 95% (13 Sep 2023 07:59) (95% - 95%)    Parameters below as of 12 Sep 2023 21:19  Patient On (Oxygen Delivery Method): nasal cannula  O2 Flow (L/min): 2      PE:  Constitutional: NAD, laying in bed  HEENT: NC/AT  Back: no tenderness  Respiratory: respirations even and non labored, LCTA- diminished at the base   Cardiovascular: S1S2 regular, no murmurs  Abdomen: soft, not tender, not distended, positive BS  Genitourinary: voiding  Musculoskeletal: no muscle tenderness, no joint swelling or tenderness  Extremities: no pedal edema   Neurological: no focal deficits    Meds/labs- reviewed    PLAN  Admitted for  1. Septic shock 2/2 CAUTI with E Coli bacteremia  2. Thrombocytopenia- stable platelet   3. Transaminitis - LFT trending down- outpt f/u with PCP   4. Constipation- resolved   5. Stg 2 decubitus- turn and repositione   6. New left knee pain- images reviewed by Ortho    PLAN    - monitor temps - afebrile   - IV CTX 2gm - will continue IV for now- will dc on oral antibiotics based on sensitivities   - Thrombocytopenia suspect worsened in setting of sepsis. 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 Platelet stable   - abnormal TTE she is on diuretics at home start 20 lasix   - metoprolol   - Ortho consulted for worsening left knee pain - no acute orthopedic intervention   - Pain meds PRN   - monitor for s/e of antibiotics   - cardiology consult appreciated     Case d/w team on IDR.   89 y/o woman with pmhx of HTN, Afib on Eliquis, chronic indwelling catheter who was BIBEMS to ED with persistent lower abdominal pain, dark urine and fever (Tmax 100.1F) x 2 days. 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 [facility] patient just had her chronic sales replaced day prior to admission. Upon arrival patient was found to be febrile and hypotensive. Received a total of 4LF IVFs, however, remained hypotensive and was consequently started on levophed. Labs significant for leukocytosis 21k with left shift, bilirubin 1.5, and elevated LFTs. UA suggestive of infection. Underwent RUQ ultrasound and CT abd pelvis which was negative for acute cholecystis, but revealed a right non obstructing intrarenal calculi. Patient was admitted to ICU for further management of septic shock secondary to UTI.  Pt downgraded to medicine for further management.     9/13- patient was seen and examined-  no acute overnight events- no new complaints    ROS:   All 10 systems reviewed and found to be negative with the exception of what has been described above.     Vital Signs Last 24 Hrs  T(C): 36.8 (13 Sep 2023 07:59), Max: 37.3 (12 Sep 2023 21:19)  T(F): 98.2 (13 Sep 2023 07:59), Max: 99.1 (12 Sep 2023 21:19)  HR: 79 (13 Sep 2023 07:59) (78 - 96)  BP: 131/77 (13 Sep 2023 07:59) (131/77 - 159/75)  BP(mean): --  RR: 18 (13 Sep 2023 07:59) (18 - 18)  SpO2: 95% (13 Sep 2023 07:59) (95% - 95%)    Parameters below as of 12 Sep 2023 21:19  Patient On (Oxygen Delivery Method): nasal cannula  O2 Flow (L/min): 2      PE:  Constitutional: NAD, laying in bed  HEENT: NC/AT  Back: no tenderness  Respiratory: respirations even and non labored, LCTA- diminished at the base   Cardiovascular: S1S2 regular, no murmurs  Abdomen: soft, not tender, not distended, positive BS  Genitourinary: voiding  Musculoskeletal: no muscle tenderness, no joint swelling or tenderness  Extremities: no pedal edema   Neurological: no focal deficits    Meds/labs- reviewed    PLAN  Admitted for  1. Septic shock 2/2 CAUTI with E Coli bacteremia  2. Thrombocytopenia- stable platelet   3. Transaminitis - LFT trending down- outpt f/u with PCP   4. Constipation- resolved   5. Stg 2 decubitus- turn and repositione   6. New left knee pain- images reviewed by Ortho    PLAN    - monitor temps - afebrile   - IV CTX 2gm - will continue IV for now- will dc on oral antibiotics based on sensitivities   - Thrombocytopenia suspect worsened in setting of sepsis. 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   89 y/o woman with pmhx of HTN, Afib on Eliquis, chronic indwelling catheter who was BIBEMS to ED with persistent lower abdominal pain, dark urine and fever (Tmax 100.1F) x 2 days. 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 [facility] patient just had her chronic sales replaced day prior to admission. Upon arrival patient was found to be febrile and hypotensive. Received a total of 4LF IVFs, however, remained hypotensive and was consequently started on levophed. Labs significant for leukocytosis 21k with left shift, bilirubin 1.5, and elevated LFTs. UA suggestive of infection. Underwent RUQ ultrasound and CT abd pelvis which was negative for acute cholecystis, but revealed a right non obstructing intrarenal calculi. Patient was admitted to ICU for further management of septic shock secondary to UTI.  Pt downgraded to medicine for further management.     Vital Signs Last 24 Hrs  T(C): 36.3 (14 Sep 2023 07:11), Max: 36.9 (13 Sep 2023 21:00)  T(F): 97.3 (14 Sep 2023 07:11), Max: 98.4 (13 Sep 2023 21:00)  HR: 73 (14 Sep 2023 07:11) (73 - 81)  BP: 159/68 (14 Sep 2023 07:11) (129/69 - 159/68)  RR: 18 (14 Sep 2023 07:11) (18 - 18)  SpO2: 95% (14 Sep 2023 07:11) (95% - 96%)    Parameters below as of 14 Sep 2023 07:11  Patient On (Oxygen Delivery Method): nasal cannula  O2 Flow (L/min): 2        PE:  Constitutional: NAD, laying in bed  HEENT: NC/AT  Back: no tenderness  Respiratory: respirations even and non labored, LCTA- diminished at the base   Cardiovascular: S1S2 regular, no murmurs  Abdomen: soft, not tender, not distended, positive BS  Genitourinary: voiding  Musculoskeletal: no muscle tenderness, no joint swelling or tenderness  Extremities: no pedal edema   Neurological: no focal deficits    Meds/labs- reviewed    PLAN  Admitted for  1. Septic shock 2/2 CAUTI with E Coli bacteremia  2. Thrombocytopenia- stable platelet   3. Transaminitis - LFT trending down- outpt f/u with PCP   4. Constipation- resolved   5. Stg 2 decubitus- turn and reposition   6. New left knee pain- images reviewed by Ortho    PLAN    - monitor temps - afebrile   - IV CTX 2gm - will continue IV for now- will dc on oral antibiotics based on sensitivities   - Thrombocytopenia suspect worsened in setting of sepsis. 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

## 2023-09-13 NOTE — DISCHARGE NOTE NURSING/CASE MANAGEMENT/SOCIAL WORK - PATIENT PORTAL LINK FT
You can access the FollowMyHealth Patient Portal offered by [facility] by registering at the following website: [email or web address]. By joining [facility]’s FollowMyHealth portal, you will also be able to view your health information using other applications (apps) compatible with our system.

## 2023-09-13 NOTE — DISCHARGE NOTE PROVIDER - PROVIDER TOKENS
PROVIDER:[TOKEN:[[id number]]],PROVIDER:[TOKEN:[[id number]]],PROVIDER:[TOKEN:[[id number]]] PROVIDER:[TOKEN:[[id number]]],PROVIDER:[TOKEN:[[id number]]],PROVIDER:[TOKEN:[[id number]]],FREE:[LAST:[your Urologist in the community],PHONE:[(   )    -],FAX:[(   )    -]]

## 2023-09-13 NOTE — DISCHARGE NOTE PROVIDER - NSDCMRMEDTOKEN_GEN_ALL_CORE_FT
A+D topical ointment: Apply topically to affected area once a day  apixaban 2.5 mg oral tablet: 1 tab(s) orally every 12 hours  Aquaphor topical ointment: Apply topically to affected area once a day , apply to back of head  Artificial Tears ophthalmic solution: 1 drop(s) in each eye 2 times a day  chlorhexidine 0.12% mucous membrane liquid: 15 milliliter(s) orally 2 times a day , rinse and let fall out of mouth  Emergen-C oral powder for reconstitution: 0.5 packet(s) orally once a day (in the afternoon)  emollients, topical lotion: Apply topically to affected area once a day , apply to whole body  ferrous sulfate 325 mg (65 mg elemental iron) oral tablet: 1 tab(s) orally every other day  Fish Oil 1000 mg oral capsule: 1 cap(s) orally once a day with dinner  Flonase 50 mcg/inh nasal spray: 1 spray(s) in each nostril once a day (in the morning)  furosemide 20 mg oral tablet: 1 tab(s) orally once a day as needed for  diarrhea ***HOLD 40mg for diarrhea and give only 20mg***  furosemide 40 mg oral tablet: 1 tab(s) orally once a day ***HOLD 40mg for diarrhea and give only 20mg***  hydrocortisone 2.5% topical cream: Apply topically to affected area 2 times a day as needed for  itching behind ears  LORazepam 0.5 mg oral tablet: 1 tab(s) orally once a day (at bedtime), As Needed - for anxiety MDD:1  losartan 25 mg oral tablet: 1 tab(s) orally once a day as needed for  high BP  losartan 50 mg oral tablet: 1 tab(s) orally once a day (in the morning)  melatonin 3 mg oral tablet: 0.5 tab(s) orally once a day (at bedtime)  metoprolol succinate 25 mg oral tablet, extended release: 1 tab(s) orally once a day  mirtazapine 15 mg oral tablet: 1 tab(s) orally once a day (at bedtime)  Multiple Vitamins oral tablet: 1 tab(s) orally once a day  nystatin 100,000 units/g topical powder: Apply topically to affected area 2 times a day as needed for  itching , apply between toes for fungus  ofloxacin 0.3% ophthalmic solution: 1 drop(s) in each affected eye 4 times a day as needed for eye infection recurrence  omeprazole 20 mg oral tablet, disintegrating, delayed release: 1 tab(s) orally once a day  polyethylene glycol 3350 oral powder for reconstitution: 17 gram(s) orally once a day as needed for  constipation  Probiotic Formula (Bacillus Coagulans) oral capsule: 1 cap(s) orally once a day with dinner  senna (sennosides) 8.6 mg oral tablet: 1 tab(s) orally once a day as needed for  constipation  triamcinolone 0.1% topical cream: Apply topically to affected area once a day as needed for  rash on the shoulder/back  Tylenol Extra Strength 500 mg oral tablet: 2 tab(s) orally 2 times a day  Vitamin D3 50 mcg (2000 intl units) oral capsule: 1 cap(s) orally once a day with dinner  zinc oxide 40% topical ointment: Apply topically to affected area once a day  zinc sulfate 220 mg oral capsule: 1 cap(s) orally once a day   A+D topical ointment: Apply topically to affected area once a day  apixaban 2.5 mg oral tablet: 1 tab(s) orally every 12 hours  Aquaphor topical ointment: Apply topically to affected area once a day , apply to back of head  Artificial Tears ophthalmic solution: 1 drop(s) in each eye 2 times a day  cefuroxime 500 mg oral tablet: 1 tab(s) orally 2 times a day  chlorhexidine 0.12% mucous membrane liquid: 15 milliliter(s) orally 2 times a day , rinse and let fall out of mouth  Emergen-C oral powder for reconstitution: 0.5 packet(s) orally once a day (in the afternoon)  emollients, topical lotion: Apply topically to affected area once a day , apply to whole body  ferrous sulfate 325 mg (65 mg elemental iron) oral tablet: 1 tab(s) orally every other day  Fish Oil 1000 mg oral capsule: 1 cap(s) orally once a day with dinner  Flonase 50 mcg/inh nasal spray: 1 spray(s) in each nostril once a day (in the morning)  furosemide 20 mg oral tablet: 1 tab(s) orally once a day  LORazepam 0.5 mg oral tablet: 1 tab(s) orally once a day (at bedtime) as needed for  anxiety  losartan 25 mg oral tablet: 1 tab(s) orally once a day  melatonin 3 mg oral tablet: 0.5 tab(s) orally once a day (at bedtime)  metoprolol succinate 25 mg oral tablet, extended release: 1 tab(s) orally once a day  mirtazapine 15 mg oral tablet: 1 tab(s) orally once a day (at bedtime)  Multiple Vitamins oral tablet: 1 tab(s) orally once a day  nystatin 100,000 units/g topical powder: Apply topically to affected area 2 times a day as needed for  itching , apply between toes for fungus  omeprazole 20 mg oral tablet, disintegrating, delayed release: 1 tab(s) orally once a day  polyethylene glycol 3350 oral powder for reconstitution: 17 gram(s) orally once a day as needed for  constipation  Probiotic Formula (Bacillus Coagulans) oral capsule: 1 cap(s) orally once a day with dinner  senna (sennosides) 8.6 mg oral tablet: 1 tab(s) orally once a day as needed for  constipation  traMADol 50 mg oral tablet: 0.5 tab(s) orally every 8 hours as needed for  severe pain MDD: 1.5 tab  triamcinolone 0.1% topical cream: Apply topically to affected area once a day as needed for  rash on the shoulder/back  Tylenol Extra Strength 500 mg oral tablet: 2 tab(s) orally 2 times a day  Vitamin D3 50 mcg (2000 intl units) oral capsule: 1 cap(s) orally once a day with dinner  zinc oxide 40% topical ointment: Apply topically to affected area 2 times a day apply to affected area  zinc sulfate 220 mg oral capsule: 1 cap(s) orally once a day   A+D topical ointment: Apply topically to affected area once a day  apixaban 2.5 mg oral tablet: 1 tab(s) orally every 12 hours  Aquaphor topical ointment: Apply topically to affected area once a day , apply to back of head  Artificial Tears ophthalmic solution: 1 drop(s) in each eye 2 times a day  cephalexin 500 mg oral capsule: 1 cap(s) orally every 6 hours  cephalexin 500 mg oral capsule: 1 cap(s) orally every 6 hours  chlorhexidine 0.12% mucous membrane liquid: 15 milliliter(s) orally 2 times a day , rinse and let fall out of mouth  Emergen-C oral powder for reconstitution: 0.5 packet(s) orally once a day (in the afternoon)  emollients, topical lotion: Apply topically to affected area once a day , apply to whole body  ferrous sulfate 325 mg (65 mg elemental iron) oral tablet: 1 tab(s) orally every other day  Fish Oil 1000 mg oral capsule: 1 cap(s) orally once a day with dinner  Flonase 50 mcg/inh nasal spray: 1 spray(s) in each nostril once a day (in the morning)  furosemide 20 mg oral tablet: 1 tab(s) orally once a day  LORazepam 0.5 mg oral tablet: 1 tab(s) orally once a day (at bedtime) as needed for  anxiety  losartan 25 mg oral tablet: 1 tab(s) orally once a day  melatonin 3 mg oral tablet: 0.5 tab(s) orally once a day (at bedtime)  metoprolol succinate 25 mg oral tablet, extended release: 1 tab(s) orally once a day  mirtazapine 15 mg oral tablet: 1 tab(s) orally once a day (at bedtime)  Multiple Vitamins oral tablet: 1 tab(s) orally once a day  naloxone 4 mg/0.1 mL nasal spray: 4 milligram(s) intranasally once a day as needed for opioid overdose  nystatin 100,000 units/g topical powder: Apply topically to affected area 2 times a day as needed for  itching , apply between toes for fungus  omeprazole 20 mg oral tablet, disintegrating, delayed release: 1 tab(s) orally once a day  polyethylene glycol 3350 oral powder for reconstitution: 17 gram(s) orally once a day as needed for  constipation  Probiotic Formula (Bacillus Coagulans) oral capsule: 1 cap(s) orally once a day with dinner  senna (sennosides) 8.6 mg oral tablet: 1 tab(s) orally once a day as needed for  constipation  traMADol 50 mg oral tablet: 0.5 tab(s) orally every 8 hours as needed for  severe pain MDD: 1.5 tab  triamcinolone 0.1% topical cream: Apply topically to affected area once a day as needed for  rash on the shoulder/back  Tylenol Extra Strength 500 mg oral tablet: 2 tab(s) orally 2 times a day  Vitamin D3 50 mcg (2000 intl units) oral capsule: 1 cap(s) orally once a day with dinner  zinc oxide 40% topical ointment: Apply topically to affected area 2 times a day apply to affected area  zinc sulfate 220 mg oral capsule: 1 cap(s) orally once a day   A+D topical ointment: Apply topically to affected area once a day  apixaban 2.5 mg oral tablet: 1 tab(s) orally every 12 hours  Aquaphor topical ointment: Apply topically to affected area once a day , apply to back of head  Artificial Tears ophthalmic solution: 1 drop(s) in each eye 2 times a day  cephalexin 500 mg oral capsule: 1 cap(s) orally every 6 hours  chlorhexidine 0.12% mucous membrane liquid: 15 milliliter(s) orally 2 times a day , rinse and let fall out of mouth  Emergen-C oral powder for reconstitution: 0.5 packet(s) orally once a day (in the afternoon)  emollients, topical lotion: Apply topically to affected area once a day , apply to whole body  ferrous sulfate 325 mg (65 mg elemental iron) oral tablet: 1 tab(s) orally every other day  Fish Oil 1000 mg oral capsule: 1 cap(s) orally once a day with dinner  Flonase 50 mcg/inh nasal spray: 1 spray(s) in each nostril once a day (in the morning)  furosemide 20 mg oral tablet: 1 tab(s) orally once a day  LORazepam 0.5 mg oral tablet: 1 tab(s) orally once a day (at bedtime) as needed for  anxiety  losartan 25 mg oral tablet: 1 tab(s) orally once a day  melatonin 3 mg oral tablet: 0.5 tab(s) orally once a day (at bedtime)  metoprolol succinate 25 mg oral tablet, extended release: 1 tab(s) orally once a day  mirtazapine 15 mg oral tablet: 1 tab(s) orally once a day (at bedtime)  Multiple Vitamins oral tablet: 1 tab(s) orally once a day  naloxone 4 mg/0.1 mL nasal spray: 4 milligram(s) intranasally once a day as needed for opioid overdose  nystatin 100,000 units/g topical powder: Apply topically to affected area 2 times a day as needed for  itching , apply between toes for fungus  omeprazole 20 mg oral tablet, disintegrating, delayed release: 1 tab(s) orally once a day  polyethylene glycol 3350 oral powder for reconstitution: 17 gram(s) orally once a day as needed for  constipation  Probiotic Formula (Bacillus Coagulans) oral capsule: 1 cap(s) orally once a day with dinner  senna (sennosides) 8.6 mg oral tablet: 1 tab(s) orally once a day as needed for  constipation  traMADol 50 mg oral tablet: 0.5 tab(s) orally every 8 hours as needed for  severe pain MDD: 1.5 tab  triamcinolone 0.1% topical cream: Apply topically to affected area once a day as needed for  rash on the shoulder/back  Tylenol Extra Strength 500 mg oral tablet: 2 tab(s) orally 2 times a day  Vitamin D3 50 mcg (2000 intl units) oral capsule: 1 cap(s) orally once a day with dinner  zinc oxide 40% topical ointment: Apply topically to affected area 2 times a day apply to affected area  zinc sulfate 220 mg oral capsule: 1 cap(s) orally once a day

## 2023-09-13 NOTE — CONSULT NOTE ADULT - SUBJECTIVE AND OBJECTIVE BOX
Patient is a 89y Female with multiple medical problems, admitted with urosepsis, presenting to me with a long standing lesion of the scalp.  Present for years, sometimes crusting, on the occiput of the scalp.  I have seen in past via telehealth, as patient's family has felt it would be too difficult for patient to come into the office.  Tx with local wound care and vaseline in the past, but her nursing aide indicated the site does get irritated as she is bed bound and generally lying on this site.  The patient notes it is occasionally tender.        PAST MEDICAL & SURGICAL HISTORY:  Lymphedema of both lower extremities  Venous insufficiency  Monoclonal gammopathies  Paroxysmal atrial fibrillation  Acute cystitis without hematuria, septic  4/2016  Essential hypertension  Spinal stenosis  Spondyloarthritis  Vaginal prolapse  [place] (hard of hearing), bilateral  S/P kyphoplasty, 2014  S/P thyroidectomy, partial   1975  History of vaginal surgery  History of fracture of femur, hx of proximal femur fracture in January 2021  History of repair of left hip joint  Hx L hip long IMN with Dr. Kitchen 2017          MEDICATIONS  (STANDING):  apixaban 2.5 milliGRAM(s) Oral every 12 hours  artificial  tears Solution 1 Drop(s) Both EYES two times a day  cefTRIAXone Injectable. 2000 milliGRAM(s) IV Push every 24 hours  chlorhexidine 4% Liquid 1 Application(s) Topical <User Schedule>  cholecalciferol 2000 Unit(s) Oral daily  furosemide    Tablet 20 milliGRAM(s) Oral daily  influenza  Vaccine (HIGH DOSE) 0.7 milliLiter(s) IntraMuscular once  losartan 25 milliGRAM(s) Oral daily  melatonin 3 milliGRAM(s) Oral at bedtime  metoprolol succinate ER 25 milliGRAM(s) Oral daily  mineral oil/petrolatum Hydrophilic Ointment 1 Application(s) Topical daily  mirtazapine 15 milliGRAM(s) Oral at bedtime  multivitamin 1 Tablet(s) Oral daily  pantoprazole    Tablet 40 milliGRAM(s) Oral before breakfast  polyethylene glycol 3350 17 Gram(s) Oral daily  senna 2 Tablet(s) Oral at bedtime  vitamin A &amp; D Ointment 1 Application(s) Topical daily  zinc oxide 40% Paste 1 Application(s) Topical daily  zinc sulfate 220 milliGRAM(s) Oral daily    MEDICATIONS  (PRN):  LORazepam     Tablet 0.5 milliGRAM(s) Oral at bedtime PRN Anxiety  triamcinolone 0.1% Oral Paste 1 Application(s) Topical daily PRN rash to shoulders and/or back      Allergies:  No Known Allergies        PE:Vital Signs Last 24 Hrs  T(C): 36.8 (13 Sep 2023 07:59), Max: 37.3 (12 Sep 2023 21:19)  T(F): 98.2 (13 Sep 2023 07:59), Max: 99.1 (12 Sep 2023 21:19)  HR: 79 (13 Sep 2023 07:59) (78 - 96)  BP: 131/77 (13 Sep 2023 07:59) (131/77 - 159/75)  BP(mean): --  RR: 18 (13 Sep 2023 07:59) (18 - 18)  SpO2: 95% (13 Sep 2023 07:59) (95% - 95%)    Parameters below as of 12 Sep 2023 21:19  Patient On (Oxygen Delivery Method): nasal cannula  O2 Flow (L/min): 2    Greasy brown 1 cm plaque of the right temporal region.    Brownish plaque, mild erythema, approx. 5-6 cm on the posterior scalp.  No crusting, or tenderness currently.          Labs:                        11.4   5.84  )-----------( 75       ( 11 Sep 2023 08:23 )             32.9              09-13    136  |  104  |  13  ----------------------------<  103<H>  4.2   |  28  |  0.52    Ca    8.9      13 Sep 2023 06:38

## 2023-09-13 NOTE — DISCHARGE NOTE PROVIDER - NSDCCAREPROVSEEN_GEN_ALL_CORE_FT
Peewee, Tony Stewart, Alexsandra Posada, Johnny Turcios, Luana Bhatt, Sharon Laurent, Bernardo CASTAÑEDA

## 2023-09-13 NOTE — PROGRESS NOTE ADULT - SUBJECTIVE AND OBJECTIVE BOX
Date of service: 09-13-23 @ 14:24    Lying in bed in [facility]  More alert today  Pain control is improving  Has low grade fever    ROS: no fever or chills; denies dizziness, no HA, no SOB or cough, no abdominal pain, no diarrhea or constipation; no legs pain, no rashes    MEDICATIONS  (STANDING):  apixaban 2.5 milliGRAM(s) Oral every 12 hours  artificial  tears Solution 1 Drop(s) Both EYES two times a day  cefTRIAXone Injectable. 2000 milliGRAM(s) IV Push every 24 hours  chlorhexidine 4% Liquid 1 Application(s) Topical <User Schedule>  cholecalciferol 2000 Unit(s) Oral daily  furosemide    Tablet 20 milliGRAM(s) Oral daily  influenza  Vaccine (HIGH DOSE) 0.7 milliLiter(s) IntraMuscular once  losartan 25 milliGRAM(s) Oral daily  melatonin 3 milliGRAM(s) Oral at bedtime  metoprolol succinate ER 25 milliGRAM(s) Oral daily  mineral oil/petrolatum Hydrophilic Ointment 1 Application(s) Topical daily  mirtazapine 15 milliGRAM(s) Oral at bedtime  multivitamin 1 Tablet(s) Oral daily  pantoprazole    Tablet 40 milliGRAM(s) Oral before breakfast  polyethylene glycol 3350 17 Gram(s) Oral daily  senna 2 Tablet(s) Oral at bedtime  vitamin A &amp; D Ointment 1 Application(s) Topical daily  zinc oxide 40% Paste 1 Application(s) Topical daily  zinc sulfate 220 milliGRAM(s) Oral daily    Vital Signs Last 24 Hrs  T(C): 36.8 (13 Sep 2023 07:59), Max: 37.3 (12 Sep 2023 21:19)  T(F): 98.2 (13 Sep 2023 07:59), Max: 99.1 (12 Sep 2023 21:19)  HR: 79 (13 Sep 2023 07:59) (78 - 96)  BP: 131/77 (13 Sep 2023 07:59) (131/77 - 159/75)  BP(mean): --  RR: 18 (13 Sep 2023 07:59) (18 - 18)  SpO2: 95% (13 Sep 2023 07:59) (95% - 95%)    Parameters below as of 12 Sep 2023 21:19  Patient On (Oxygen Delivery Method): nasal cannula  O2 Flow (L/min): 2     Physical exam:    Constitutional:  No acute distress  HEENT: NC/AT, EOMI, PERRLA, conjunctivae clear; ears and nose atraumatic  Neck: supple; thyroid not palpable  Back: no tenderness  Respiratory: respiratory effort normal; clear to auscultation  Cardiovascular: S1S2 regular, no murmurs  Abdomen: soft, not tender, not distended, positive BS  Genitourinary: no suprapubic tenderness  Lymphatic: no LN palpable  Musculoskeletal: no muscle tenderness, no joint swelling or tenderness  Extremities: no pedal edema  Neurological/ Psychiatric: AxOx3, moving upper extremities  Skin: no rashes; no palpable lesions    Labs: reviewed                        11.4   5.84  )-----------( 75       ( 11 Sep 2023 08:23 )             32.9     09-10    131<L>  |  103  |  14  ----------------------------<  100<H>  4.0   |  24  |  0.61    Ca    9.4      10 Sep 2023 06:33  Phos  2.3     09-10  Mg     2.1     09-10    TPro  5.7<L>  /  Alb  2.8<L>  /  TBili  0.4  /  DBili  0.2  /  AST  41<H>  /  ALT  110<H>  /  AlkPhos  114  09-11                        11.4   20.60 )-----------( x        ( 08 Sep 2023 09:06 )             33.6     09-08    137  |  108  |  13  ----------------------------<  125<H>  3.9   |  23  |  0.66    Ca    8.9      08 Sep 2023 09:06  Phos  2.5     09-08  Mg     1.8     09-08    TPro  5.7<L>  /  Alb  2.9<L>  /  TBili  0.8  /  DBili  x   /  AST  209<H>  /  ALT  209<H>  /  AlkPhos  147<H>  09-08     LIVER FUNCTIONS - ( 08 Sep 2023 09:06 )  Alb: 2.9 g/dL / Pro: 5.7 gm/dL / ALK PHOS: 147 U/L / ALT: 209 U/L / AST: 209 U/L / GGT: x           Urinalysis (09-07 @ 21:14)  Urine Appearance: very cloudy  Protein, Urine: 500 mg/dL    Urine Microscopic-Add On (NC) (09-07 @ 21:14)  White Blood Cell - Urine: >50 /HPF  Red Blood Cell - Urine: >50 /HPF    (09-07 @ 21:14)  NotDetec    Culture - Urine (collected 07 Sep 2023 21:14)  Source: Clean Catch Clean Catch (Midstream)  Final Report (10 Sep 2023 14:39):    >=3 organisms. Probable collection contamination.    Culture - Blood (collected 07 Sep 2023 21:14)  Source: .Blood Blood-Peripheral  Gram Stain (08 Sep 2023 22:41):    Growth in anaerobic bottle: Gram Negative Rods  Preliminary Report (09 Sep 2023 19:08):    Growth in anaerobic bottle: Escherichia coli    Direct identification is available within approximately 3-5    hours either by Blood Panel Multiplexed PCR or Direct    MALDI-TOF. Details: [email or web address]  Organism: Blood Culture PCR  Escherichia coli (10 Sep 2023 15:20)  Organism: Escherichia coli (10 Sep 2023 15:20)      Method Type: MAJOR      -  Amikacin: S <=16      -  Ampicillin: R >16 These ampicillin results predict results for amoxicillin      -  Ampicillin/Sulbactam: I 16/8      -  Aztreonam: S <=4      -  Cefazolin: S <=2      -  Cefepime: S <=2      -  Cefoxitin: S <=8      -  Ceftriaxone: S <=1      -  Ciprofloxacin: S <=0.25      -  Ertapenem: S <=0.5      -  Gentamicin: S <=2      -  Imipenem: S <=1      -  Levofloxacin: S <=0.5      -  Meropenem: S <=1      -  Piperacillin/Tazobactam: S <=8      -  Tobramycin: S <=2      -  Trimethoprim/Sulfamethoxazole: R >2/38  Organism: Blood Culture PCR (09 Sep 2023 00:38)      Method Type: PCR      -  Escherichia coli: Detec    Culture - Blood (collected 07 Sep 2023 21:14)  Source: .Blood Blood-Peripheral  Preliminary Report (11 Sep 2023 04:01):    No growth at 72 Hours    Radiology: all available radiological tests reviewed    < from: CT Abdomen and Pelvis w/ IV Cont (09.08.23 @ 02:20) >  Severe cardiomegaly.  Bibasilar subsegmental atelectasis. Trace right pleural effusion.    Mild dilatation extra renal pelvis right kidney. No hydronephrosis.   Linear nonobstructing calcification lower pole both kidneys. No   obstructing renal or ureteral stones. Simple bilateral renal cysts. No CT   evidence of pyelonephritis. No perinephric fluid collections.    Reaves catheter is present within the urinary bladder which is decompressed.  Large fecal impaction rectosigmoid portion of the colon.  < end of copied text >    Advanced directives addressed: full resuscitation

## 2023-09-13 NOTE — CONSULT NOTE ADULT - ASSESSMENT
Impression:  The patient has an obvious Seborrheic keratosis of the right temporal region.  On the occiput of the scalp is likely an irritated seborrheic keratosis.  This site is complicated by the fact that it is a weight bearing site for her head, as she is bed bound and generally lying on this area of her head.  The site may occasional break down.  Once there is some crusting to the areas, hair becomes entrapped within the wound, for generates a granulomatous reaction (basically a foreign body reaction), which can leading to swelling, further crusting, and some purulence to the site, with bacterial overgrowth.    The most appropriate treatment is to dry to keep the region clean and dry, and to keep the hair from getting caught up in any crusting that occurs.    Recommend:   - padding to the site.  Keep in place with a hair net.   - Comb hair daily to be sure the hairs to not get caught up within the patch.   - When irritation and crusting to occur, tx daily with hydrogen peroxide to try to remove as much crusting as possible, gently tease out any hairs within the patch, and apply vaseline generously and often to keep the site moist, preventing the wound from drying out, thereby minimizing any further crusting.  Cover vaseline with a non-stick telfa pad, then pad the area well.    I have discussed this with the patient, her daughter and her nursing aide.    Thanks,  Demond Puckett MD
89 y/o female with h/o HTN, Afib on Eliquis, chronic indwelling catheter, kidney stones was admitted on 9/7 for persistent lower abdominal pain, dark urine and fever (Tmax 100.1F) x 2 days. 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 Family states patient just had her chronic sales replaced on the day PTA. Upon arrival patient was found to be febrile and hypotensive. Received a total of 4LF IVFs, however, remained hypotensive and was consequently started on levophed. Labs significant for leukocytosis 21k with left shift. UA suggestive of infection. In ER she received meropenem and vancomycin IV.     1. Febrile syndrome. Hypotension. Probable sepsis. Pyuria. Likely UTI. Urinary retention with chronic sales. Kidney stones.  -leukocytosis  -no history of ESBL  -obtain BC x 2, urine c/s  -start ceftriaxone 2 gm IV qd  -reason for abx use and side effects reviewed with patient; monitor BMP   -old chart reviewed to assess prior cultures  -pressors  -monitor temps  -f/u CBC  -supportive care  2. Other issues:   -care per medicine    d/w Dr. Lomeli

## 2023-09-13 NOTE — DISCHARGE NOTE NURSING/CASE MANAGEMENT/SOCIAL WORK - NSDCPEFALRISK_GEN_ALL_CORE
For information on Fall & Injury Prevention, visit: [email or web address] OR  [email or web address] OR  https://www.cdc.gov/steadi/patient.html

## 2023-09-13 NOTE — DISCHARGE NOTE PROVIDER - CARE PROVIDERS DIRECT ADDRESSES
[email or web address],DirectAddress_Unknown

## 2023-09-13 NOTE — PROGRESS NOTE ADULT - ASSESSMENT
89 y/o female with h/o HTN, Afib on Eliquis, chronic indwelling catheter, kidney stones was admitted on 9/7 for persistent lower abdominal pain, dark urine and fever (Tmax 100.1F) x 2 days. 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 Family states patient just had her chronic sales replaced on the day PTA. Upon arrival patient was found to be febrile and hypotensive. Received a total of 4LF IVFs, however, remained hypotensive and was consequently started on levophed. Labs significant for leukocytosis 21k with left shift. UA suggestive of infection. In ER she received meropenem and vancomycin IV.     1. Febrile syndrome. Hypotension resolved. Sepsis with E.coli. Pyuria. Likely UTI. Urinary retention with chronic sales. Kidney stones.  -leukocytosis resolving  -more alert  -BC x 2, urine c/s noted  -on ceftriaxone 2 gm IV qd # 6  -tolerating abx well so far; no side effects noted  -repeat BC x 2 collected  -continue IV abx coverage for now, plan to change to oral abx in AM if continues to improve   -monitor temps  -f/u CBC  -supportive care  2. Other issues:   -care per medicine    d/w medicine team and daughter HCP at bedside

## 2023-09-13 NOTE — DISCHARGE NOTE PROVIDER - CARE PROVIDER_API CALL
Demond Puckett  Dermatology  [address], [address]  [place] [address]  Phone: [phone]  Fax: [phone]  Follow Up Time:     Tony Vides  Cardiovascular Disease  [address] [place] [address]  Phone: [phone]  Fax: [phone]  Follow Up Time:     Gillian Farmer  [address] [address]  Phone: [phone]  Fax: [phone]  Follow Up Time:    Demond Puckett  Dermatology  [address], [address]  [place] [address]  Phone: [phone]  Fax: [phone]  Follow Up Time:     Tony Vides  Cardiovascular Disease  [address] [place] [address]  Phone: [phone]  Fax: [phone]  Follow Up Time:     Gillian Farmer  [address] [address]  Phone: [phone]  Fax: [phone]  Follow Up Time:     your Urologist in the community,   Phone: (   )    -  Fax: (   )    -  Follow Up Time:

## 2023-09-14 VITALS
HEART RATE: 74 BPM | OXYGEN SATURATION: 96 % | DIASTOLIC BLOOD PRESSURE: 77 MMHG | TEMPERATURE: 98 F | SYSTOLIC BLOOD PRESSURE: 158 MMHG

## 2023-09-14 LAB
CULTURE RESULTS: SIGNIFICANT CHANGE UP
ORGANISM # SPEC MICROSCOPIC CNT: SIGNIFICANT CHANGE UP
SPECIMEN SOURCE: SIGNIFICANT CHANGE UP

## 2023-09-14 PROCEDURE — 99239 HOSP IP/OBS DSCHRG MGMT >30: CPT

## 2023-09-14 RX ORDER — HYDROCORTISONE 1 %
1 OINTMENT (GRAM) TOPICAL
Refills: 0 | DISCHARGE

## 2023-09-14 RX ORDER — MIRTAZAPINE 45 MG/1
1 TABLET, ORALLY DISINTEGRATING ORAL
Refills: 0 | DISCHARGE
Start: 2023-09-14

## 2023-09-14 RX ORDER — CEPHALEXIN 500 MG
1 CAPSULE ORAL
Qty: 28 | Refills: 0
Start: 2023-09-14 | End: 2023-09-20

## 2023-09-14 RX ORDER — ZINC OXIDE 200 MG/G
1 OINTMENT TOPICAL
Qty: 0 | Refills: 0 | DISCHARGE
Start: 2023-09-14

## 2023-09-14 RX ORDER — ZINC OXIDE 200 MG/G
1 OINTMENT TOPICAL
Refills: 0 | DISCHARGE

## 2023-09-14 RX ORDER — CEFUROXIME AXETIL 250 MG
1 TABLET ORAL
Qty: 14 | Refills: 0
Start: 2023-09-14 | End: 2023-09-20

## 2023-09-14 RX ORDER — CEFUROXIME AXETIL 250 MG
500 TABLET ORAL EVERY 12 HOURS
Refills: 0 | Status: DISCONTINUED | OUTPATIENT
Start: 2023-09-14 | End: 2023-09-14

## 2023-09-14 RX ORDER — ACETAMINOPHEN 500 MG
650 TABLET ORAL ONCE
Refills: 0 | Status: DISCONTINUED | OUTPATIENT
Start: 2023-09-14 | End: 2023-09-14

## 2023-09-14 RX ORDER — LOSARTAN POTASSIUM 100 MG/1
1 TABLET, FILM COATED ORAL
Refills: 0 | DISCHARGE

## 2023-09-14 RX ORDER — MIRTAZAPINE 45 MG/1
1 TABLET, ORALLY DISINTEGRATING ORAL
Qty: 0 | Refills: 0 | DISCHARGE

## 2023-09-14 RX ORDER — FUROSEMIDE 40 MG
1 TABLET ORAL
Qty: 30 | Refills: 0
Start: 2023-09-14 | End: 2023-10-13

## 2023-09-14 RX ORDER — OFLOXACIN 0.3 %
1 DROPS OPHTHALMIC (EYE)
Refills: 0 | DISCHARGE

## 2023-09-14 RX ORDER — APIXABAN 2.5 MG/1
1 TABLET, FILM COATED ORAL
Qty: 0 | Refills: 0 | DISCHARGE
Start: 2023-09-14

## 2023-09-14 RX ORDER — METOPROLOL TARTRATE 50 MG
1 TABLET ORAL
Refills: 0 | DISCHARGE

## 2023-09-14 RX ORDER — FUROSEMIDE 40 MG
1 TABLET ORAL
Refills: 0 | DISCHARGE

## 2023-09-14 RX ORDER — NALOXONE HYDROCHLORIDE 4 MG/.1ML
4 SPRAY NASAL
Qty: 1 | Refills: 0
Start: 2023-09-14 | End: 2023-10-13

## 2023-09-14 RX ORDER — METOPROLOL TARTRATE 50 MG
1 TABLET ORAL
Refills: 0 | DISCHARGE
Start: 2023-09-14

## 2023-09-14 RX ORDER — LOSARTAN POTASSIUM 100 MG/1
1 TABLET, FILM COATED ORAL
Qty: 30 | Refills: 0
Start: 2023-09-14 | End: 2023-10-13

## 2023-09-14 RX ORDER — TRAMADOL HYDROCHLORIDE 50 MG/1
0.5 TABLET ORAL
Qty: 7.5 | Refills: 0
Start: 2023-09-14 | End: 2023-09-18

## 2023-09-14 RX ADMIN — Medication 20 MILLIGRAM(S): at 06:23

## 2023-09-14 RX ADMIN — ZINC SULFATE TAB 220 MG (50 MG ZINC EQUIVALENT) 220 MILLIGRAM(S): 220 (50 ZN) TAB at 09:48

## 2023-09-14 RX ADMIN — POLYETHYLENE GLYCOL 3350 17 GRAM(S): 17 POWDER, FOR SOLUTION ORAL at 09:47

## 2023-09-14 RX ADMIN — Medication 1 APPLICATION(S): at 10:20

## 2023-09-14 RX ADMIN — Medication 1 DROP(S): at 09:44

## 2023-09-14 RX ADMIN — CHLORHEXIDINE GLUCONATE 1 APPLICATION(S): 213 SOLUTION TOPICAL at 10:19

## 2023-09-14 RX ADMIN — Medication 1 TABLET(S): at 09:48

## 2023-09-14 RX ADMIN — PANTOPRAZOLE SODIUM 40 MILLIGRAM(S): 20 TABLET, DELAYED RELEASE ORAL at 06:23

## 2023-09-14 RX ADMIN — Medication 2000 UNIT(S): at 09:49

## 2023-09-14 RX ADMIN — CEFTRIAXONE 2000 MILLIGRAM(S): 500 INJECTION, POWDER, FOR SOLUTION INTRAMUSCULAR; INTRAVENOUS at 09:48

## 2023-09-14 RX ADMIN — Medication 1 APPLICATION(S): at 10:16

## 2023-09-14 RX ADMIN — LOSARTAN POTASSIUM 25 MILLIGRAM(S): 100 TABLET, FILM COATED ORAL at 09:49

## 2023-09-14 RX ADMIN — ZINC OXIDE 1 APPLICATION(S): 200 OINTMENT TOPICAL at 10:20

## 2023-09-14 RX ADMIN — Medication 25 MILLIGRAM(S): at 09:48

## 2023-09-14 RX ADMIN — APIXABAN 2.5 MILLIGRAM(S): 2.5 TABLET, FILM COATED ORAL at 09:48

## 2023-09-14 NOTE — PROGRESS NOTE ADULT - SUBJECTIVE AND OBJECTIVE BOX
Patient is a 89y old  Female who presents with a chief complaint of UTI, septic shock (13 Sep 2023 14:23)  9/11/2023- Cardiology Consultation: 89 year old woman admitted due to abdominal pain and fever. Found to have septic shock due to E coli bacteremia after exchange of a chronic indweliing Reaves catheter.  Required fluid resuscitation and low dose norepinephrine and antibiotics and ICU level of care. . Responded favorably and Levophed was replaced by midodrine which was discontinued 9/120 due to normotension and she was transferred to a med-surg floor. Today she is complaining of left knee pain. Patient  without other complaints.  History of long term persistent atrial fibrillation, CLL, thrombocytopenia, chronic venous insufficiency, hypertension, recurrent UTIs, mild dementia. Patient does not ambulate at home and is cared for by her daughter and health aides.     Followup HPI:    9/12- Notes left knee pain. No other complaints.  9/14- No complaints. Is anxious about how she will travel home upon discharge.    PAST MEDICAL & SURGICAL HISTORY:  Lymphedema of both lower extremities      Venous insufficiency      Monoclonal gammopathies      Paroxysmal atrial fibrillation      Acute cystitis without hematuria  septic  4/2016      Essential hypertension      Spinal stenosis      Spondyloarthritis      Vaginal prolapse      [place] bilateral      Hypertension      S/P kyphoplasty  2014      S/P thyroidectomy  partial   1975      History of vaginal surgery      History of fracture of femur  hx of proximal femur fracture in January 2021      History of repair of left hip joint  Hx L hip long IMN with Dr. Kitchen 2017          Review of symptoms:  Negative except for as noted in today's HPI.      MEDICATIONS  (STANDING):  apixaban 2.5 milliGRAM(s) Oral every 12 hours  artificial  tears Solution 1 Drop(s) Both EYES two times a day  cefTRIAXone Injectable. 2000 milliGRAM(s) IV Push every 24 hours  chlorhexidine 4% Liquid 1 Application(s) Topical <User Schedule>  cholecalciferol 2000 Unit(s) Oral daily  furosemide    Tablet 20 milliGRAM(s) Oral daily  influenza  Vaccine (HIGH DOSE) 0.7 milliLiter(s) IntraMuscular once  losartan 25 milliGRAM(s) Oral daily  melatonin 3 milliGRAM(s) Oral at bedtime  metoprolol succinate ER 25 milliGRAM(s) Oral daily  mineral oil/petrolatum Hydrophilic Ointment 1 Application(s) Topical daily  mirtazapine 15 milliGRAM(s) Oral at bedtime  multivitamin 1 Tablet(s) Oral daily  pantoprazole    Tablet 40 milliGRAM(s) Oral before breakfast  polyethylene glycol 3350 17 Gram(s) Oral daily  senna 2 Tablet(s) Oral at bedtime  vitamin A &amp; D Ointment 1 Application(s) Topical daily  zinc oxide 40% Paste 1 Application(s) Topical daily  zinc sulfate 220 milliGRAM(s) Oral daily    MEDICATIONS  (PRN):  LORazepam     Tablet 0.5 milliGRAM(s) Oral at bedtime PRN Anxiety  oxycodone    5 mG/acetaminophen 325 mG 1 Tablet(s) Oral every 4 hours PRN Severe Pain (7 - 10)  traMADol 25 milliGRAM(s) Oral every 8 hours PRN Moderate Pain (4 - 6)  triamcinolone 0.1% Oral Paste 1 Application(s) Topical daily PRN rash to shoulders and/or back          Vital Signs Last 24 Hrs  T(C): 36.9 (13 Sep 2023 21:00), Max: 36.9 (13 Sep 2023 21:00)  T(F): 98.4 (13 Sep 2023 21:00), Max: 98.4 (13 Sep 2023 21:00)  HR: 77 (13 Sep 2023 21:00) (77 - 81)  BP: 137/77 (13 Sep 2023 21:00) (129/69 - 137/77)  BP(mean): --  RR: 18 (13 Sep 2023 21:00) (18 - 18)  SpO2: 96% (13 Sep 2023 21:00) (95% - 96%)    Parameters below as of 13 Sep 2023 21:00  Patient On (Oxygen Delivery Method): nasal cannula  O2 Flow (L/min): 2      I&O's Summary    13 Sep 2023 07:01  -  14 Sep 2023 07:00  --------------------------------------------------------  IN: 450 mL / OUT: 1550 mL / NET: -1100 mL        PHYSICAL EXAM  General Appearance: comfortable  HEENT:   Neck:   Lungs: clear  Heart: 1/6 systolic murmur LSB  Abdomen: non tender  Extremities: no edema  Neurologic:       INTERPRETATION OF TELEMETRY:    ECG:    LABS:      09-13    136  |  104  |  13  ----------------------------<  103<H>  4.2   |  28  |  0.52    Ca    8.9      13 Sep 2023 06:38                Urinalysis Basic - ( 13 Sep 2023 06:38 )    Color: x / Appearance: x / SG: x / pH: x  Gluc: 103 mg/dL / Ketone: x  / Bili: x / Urobili: x   Blood: x / Protein: x / Nitrite: x   Leuk Esterase: x / RBC: x / WBC x   Sq Epi: x / Non Sq Epi: x / Bacteria: x            RADIOLOGY & ADDITIONAL STUDIES:    IMPRESSION:  1. E coli sepsis due to UTI. Doing well.  2.Long term persistent atrial fibrillation. Rate controlled.  3. Moderate aortic stenosis and regurgitation, moderate to severe tricuspid regurgitation. Clinically stable. No treatment is indicated.  4. Hypertension. Stable control.  5.Left knee pain. Left knee arthrosis. Ortho consult appreciated.  PLAN:  Continue antibiotics per ID. Continue metoprolol Eliquis, losartan, furosemide for peripheral edema. Cardiac status stable for discharge. Will no longer follow.

## 2023-09-14 NOTE — PROGRESS NOTE ADULT - ASSESSMENT
89 yo woman  with PMHx of HTN, Afib on Eliquis, chronic indwelling catheter who was BIBEMS to ED with persistent lower abdominal pain, dark urine and fever (Tmax 100.1F) x 2 days. Patient has history of multiple UTI.     Admitted for  1. Septic shock 2/2 CAUTI with E Coli bacteremia  2. Thrombocytopenia  3. Transaminitis   4. Constipation- resolved   5. Stg 2 decubitus  6. New left knee pain    PLAN    - monitor temps   - IV CTX 2gm - will continue IV for now- will dc on oral antibiotics based on sensitivities   - Thrombocytopenia suspect worsened in setting of sepsis. 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 cont to trend CBC  - abnormal TTE she is on diuretics at home start 20 lasix   - metoprolol   - Ortho consulted for worsening left knee pain  - f/u on imaging as per Ortho orders  - Pain meds PRN   - monitor for s/e of antibiotics   - cardiology consult appreciated                    89 yo woman  with PMHx of HTN, Afib on Eliquis, chronic indwelling catheter who was BIBEMS to ED with persistent lower abdominal pain, dark urine and fever (Tmax 100.1F) x 2 days. Patient has history of multiple UTI.     Admitted for  1. Septic shock 2/2 CAUTI with E Coli bacteremia  2. Thrombocytopenia  3. Transaminitis   4. Constipation- resolved   5. Stg 2 decubitus  6. New left knee pain    PLAN    - monitor temps   - IV CTX 2gm - will continue IV for now- will dc on oral antibiotics based on sensitivities   - Thrombocytopenia suspect worsened in setting of sepsis. 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 cont to trend CBC  - abnormal TTE she is on diuretics at home start 20 lasix   - metoprolol   - Ortho consulted for worsening left knee pain, recommendations noted   - Pain meds PRN   - monitor for s/e of antibiotics   - cardiology consult appreciated   - repeat blood cultures negative       DISPO: D/C today

## 2023-09-14 NOTE — PROGRESS NOTE ADULT - SUBJECTIVE AND OBJECTIVE BOX
Date of service: 09-14-23 @ 14:41    Lying in bed in NAD  Alert and verbal  Denies pain  Urine in sales bag ic clear    ROS: no fever or chills; denies dizziness, no HA, no SOB or cough, no abdominal pain, no diarrhea or constipation; no legs pain, no rashes    MEDICATIONS  (STANDING):  apixaban 2.5 milliGRAM(s) Oral every 12 hours  artificial  tears Solution 1 Drop(s) Both EYES two times a day  cefuroxime   Tablet 500 milliGRAM(s) Oral every 12 hours  chlorhexidine 4% Liquid 1 Application(s) Topical <User Schedule>  cholecalciferol 2000 Unit(s) Oral daily  furosemide    Tablet 20 milliGRAM(s) Oral daily  influenza  Vaccine (HIGH DOSE) 0.7 milliLiter(s) IntraMuscular once  losartan 25 milliGRAM(s) Oral daily  melatonin 3 milliGRAM(s) Oral at bedtime  metoprolol succinate ER 25 milliGRAM(s) Oral daily  mineral oil/petrolatum Hydrophilic Ointment 1 Application(s) Topical daily  mirtazapine 15 milliGRAM(s) Oral at bedtime  multivitamin 1 Tablet(s) Oral daily  pantoprazole    Tablet 40 milliGRAM(s) Oral before breakfast  polyethylene glycol 3350 17 Gram(s) Oral daily  senna 2 Tablet(s) Oral at bedtime  vitamin A &amp; D Ointment 1 Application(s) Topical daily  zinc oxide 40% Paste 1 Application(s) Topical daily  zinc sulfate 220 milliGRAM(s) Oral daily    Vital Signs Last 24 Hrs  T(C): 36.5 (14 Sep 2023 13:11), Max: 36.9 (13 Sep 2023 21:00)  T(F): 97.7 (14 Sep 2023 13:11), Max: 98.4 (13 Sep 2023 21:00)  HR: 74 (14 Sep 2023 13:11) (73 - 81)  BP: 158/77 (14 Sep 2023 13:11) (129/69 - 159/68)  BP(mean): 99 (14 Sep 2023 13:11) (99 - 99)  RR: 18 (14 Sep 2023 07:11) (18 - 18)  SpO2: 96% (14 Sep 2023 13:11) (95% - 96%)    Parameters below as of 14 Sep 2023 07:11  Patient On (Oxygen Delivery Method): nasal cannula  O2 Flow (L/min): 2     Physical exam:    Constitutional:  No acute distress  HEENT: NC/AT, EOMI, PERRLA, conjunctivae clear; ears and nose atraumatic  Neck: supple; thyroid not palpable  Back: no tenderness  Respiratory: respiratory effort normal; clear to auscultation  Cardiovascular: S1S2 regular, no murmurs  Abdomen: soft, not tender, not distended, positive BS  Genitourinary: no suprapubic tenderness  Lymphatic: no LN palpable  Musculoskeletal: no muscle tenderness, no joint swelling or tenderness  Extremities: no pedal edema  Neurological/ Psychiatric: AxOx3, moving upper extremities  Skin: no rashes; no palpable lesions    Labs: reviewed                        11.4   5.84  )-----------( 75       ( 11 Sep 2023 08:23 )             32.9     09-10    131<L>  |  103  |  14  ----------------------------<  100<H>  4.0   |  24  |  0.61    Ca    9.4      10 Sep 2023 06:33  Phos  2.3     09-10  Mg     2.1     09-10    TPro  5.7<L>  /  Alb  2.8<L>  /  TBili  0.4  /  DBili  0.2  /  AST  41<H>  /  ALT  110<H>  /  AlkPhos  114  09-11                        11.4   20.60 )-----------( x        ( 08 Sep 2023 09:06 )             33.6     09-08    137  |  108  |  13  ----------------------------<  125<H>  3.9   |  23  |  0.66    Ca    8.9      08 Sep 2023 09:06  Phos  2.5     09-08  Mg     1.8     09-08    TPro  5.7<L>  /  Alb  2.9<L>  /  TBili  0.8  /  DBili  x   /  AST  209<H>  /  ALT  209<H>  /  AlkPhos  147<H>  09-08     LIVER FUNCTIONS - ( 08 Sep 2023 09:06 )  Alb: 2.9 g/dL / Pro: 5.7 gm/dL / ALK PHOS: 147 U/L / ALT: 209 U/L / AST: 209 U/L / GGT: x           Urinalysis (09-07 @ 21:14)  Urine Appearance: very cloudy  Protein, Urine: 500 mg/dL    Urine Microscopic-Add On (NC) (09-07 @ 21:14)  White Blood Cell - Urine: >50 /HPF  Red Blood Cell - Urine: >50 /HPF    (09-07 @ 21:14)  NotDetec    Culture - Urine (collected 07 Sep 2023 21:14)  Source: Clean Catch Clean Catch (Midstream)  Final Report (10 Sep 2023 14:39):    >=3 organisms. Probable collection contamination.    Culture - Blood (collected 07 Sep 2023 21:14)  Source: .Blood Blood-Peripheral  Gram Stain (08 Sep 2023 22:41):    Growth in anaerobic bottle: Gram Negative Rods  Preliminary Report (09 Sep 2023 19:08):    Growth in anaerobic bottle: Escherichia coli    Direct identification is available within approximately 3-5    hours either by Blood Panel Multiplexed PCR or Direct    MALDI-TOF. Details: [email or web address]  Organism: Blood Culture PCR  Escherichia coli (10 Sep 2023 15:20)  Organism: Escherichia coli (10 Sep 2023 15:20)      Method Type: MAJOR      -  Amikacin: S <=16      -  Ampicillin: R >16 These ampicillin results predict results for amoxicillin      -  Ampicillin/Sulbactam: I 16/8      -  Aztreonam: S <=4      -  Cefazolin: S <=2      -  Cefepime: S <=2      -  Cefoxitin: S <=8      -  Ceftriaxone: S <=1      -  Ciprofloxacin: S <=0.25      -  Ertapenem: S <=0.5      -  Gentamicin: S <=2      -  Imipenem: S <=1      -  Levofloxacin: S <=0.5      -  Meropenem: S <=1      -  Piperacillin/Tazobactam: S <=8      -  Tobramycin: S <=2      -  Trimethoprim/Sulfamethoxazole: R >2/38  Organism: Blood Culture PCR (09 Sep 2023 00:38)      Method Type: PCR      -  Escherichia coli: Detec    Culture - Blood (collected 07 Sep 2023 21:14)  Source: .Blood Blood-Peripheral  Preliminary Report (11 Sep 2023 04:01):    No growth at 72 Hours    Radiology: all available radiological tests reviewed    < from: CT Abdomen and Pelvis w/ IV Cont (09.08.23 @ 02:20) >  Severe cardiomegaly.  Bibasilar subsegmental atelectasis. Trace right pleural effusion.    Mild dilatation extra renal pelvis right kidney. No hydronephrosis.   Linear nonobstructing calcification lower pole both kidneys. No   obstructing renal or ureteral stones. Simple bilateral renal cysts. No CT   evidence of pyelonephritis. No perinephric fluid collections.    Sales catheter is present within the urinary bladder which is decompressed.  Large fecal impaction rectosigmoid portion of the colon.  < end of copied text >    Advanced directives addressed: full resuscitation

## 2023-09-14 NOTE — PROGRESS NOTE ADULT - REASON FOR ADMISSION
UTI, septic shock

## 2023-09-14 NOTE — PROGRESS NOTE ADULT - SUBJECTIVE AND OBJECTIVE BOX
HPI:  Patient is a 89 y/o female with pmhx of HTN, Afib on Eliquis, chronic indwelling catheter who was BIBEMS to ED with persistent lower abdominal pain, dark urine and fever (Tmax 100.1F) x 2 days. 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 Family states patient just had her chronic sales replaced yesterday. Upon arrival patient was found to be febrile and hypotensive. Received a total of 4LF IVFs, however, remained hypotensive and was consequently started on levophed. Labs significant for leukocytosis 21k with left shift, bilirubin 1.5, and elevated LFTs. UA suggestive of infection. Underwent RUQ ultrasound and CT abd pelvis which was negative for acute cholecystis, but revealed a right nonobstructing intrarenal calculi. Patient was admitted to ICU for further management of septic shock secondary to UTI.  (08 Sep 2023 06:23)      MEDICATIONS  (STANDING):  apixaban 2.5 milliGRAM(s) Oral every 12 hours  artificial  tears Solution 1 Drop(s) Both EYES two times a day  cefTRIAXone Injectable. 2000 milliGRAM(s) IV Push every 24 hours  chlorhexidine 4% Liquid 1 Application(s) Topical <User Schedule>  cholecalciferol 2000 Unit(s) Oral daily  furosemide    Tablet 20 milliGRAM(s) Oral daily  influenza  Vaccine (HIGH DOSE) 0.7 milliLiter(s) IntraMuscular once  losartan 25 milliGRAM(s) Oral daily  melatonin 3 milliGRAM(s) Oral at bedtime  metoprolol succinate ER 25 milliGRAM(s) Oral daily  mineral oil/petrolatum Hydrophilic Ointment 1 Application(s) Topical daily  mirtazapine 15 milliGRAM(s) Oral at bedtime  multivitamin 1 Tablet(s) Oral daily  pantoprazole    Tablet 40 milliGRAM(s) Oral before breakfast  polyethylene glycol 3350 17 Gram(s) Oral daily  senna 2 Tablet(s) Oral at bedtime  vitamin A &amp; D Ointment 1 Application(s) Topical daily  zinc oxide 40% Paste 1 Application(s) Topical daily  zinc sulfate 220 milliGRAM(s) Oral daily    MEDICATIONS  (PRN):  LORazepam     Tablet 0.5 milliGRAM(s) Oral at bedtime PRN Anxiety  oxycodone    5 mG/acetaminophen 325 mG 1 Tablet(s) Oral every 4 hours PRN Severe Pain (7 - 10)  traMADol 25 milliGRAM(s) Oral every 8 hours PRN Moderate Pain (4 - 6)  triamcinolone 0.1% Oral Paste 1 Application(s) Topical daily PRN rash to shoulders and/or back      Vital Signs Last 24 Hrs  T(C): 36.3 (14 Sep 2023 07:11), Max: 36.9 (13 Sep 2023 21:00)  T(F): 97.3 (14 Sep 2023 07:11), Max: 98.4 (13 Sep 2023 21:00)  HR: 73 (14 Sep 2023 07:11) (73 - 81)  BP: 159/68 (14 Sep 2023 07:11) (129/69 - 159/68)  BP(mean): --  RR: 18 (14 Sep 2023 07:11) (18 - 18)  SpO2: 95% (14 Sep 2023 07:11) (95% - 96%)    Parameters below as of 14 Sep 2023 07:11  Patient On (Oxygen Delivery Method): nasal cannula  O2 Flow (L/min): 2      GEN: NAD, comfortable, resting in bed  HEENT: NC/AT, EOMI, PERRLA, MMM, clear conjunctiva and sclera, normal hearing, no nasal discharge, throat clear, no thrush, normal dentition.   NECK: supple, no JVD, no LAD, no thyromegaly  BACK:  ROM intact, no spinal/paraspinal tenderness  CV: S1S2, RRR, no mumur  RESP: good air movement, CTABL, no rales, rhonchi or wheezing, respirations unlabored  ABD: +BS, soft, ND, NT, no guarding, no rigidity, no HSM  EXT: +2 radial and pedal pulses, no edema, no calve tenderness  SKIN: No visible Rashes or Ulcers  MSK: ROM intact in all extremities  NEURO:  sensory and CN 2-12 Grossly intact, no motor deficits, no, saddle anesthesia, AOx3  PSYCH: normal behavior         LABS:      13 Sep 2023 06:38    136    |  104    |  13     ----------------------------<  103    4.2     |  28     |  0.52     Ca    8.9        13 Sep 2023 06:38          CAPILLARY BLOOD GLUCOSE            Urinalysis Basic - ( 13 Sep 2023 06:38 )    Color: x / Appearance: x / SG: x / pH: x  Gluc: 103 mg/dL / Ketone: x  / Bili: x / Urobili: x   Blood: x / Protein: x / Nitrite: x   Leuk Esterase: x / RBC: x / WBC x   Sq Epi: x / Non Sq Epi: x / Bacteria: x        RADIOLOGY:  TTE Echo Complete w/o Contrast w/ Doppler (09.08.23 @ 14:57) >     Endocardium is not well visualized, however, overall left ventricular   systolic function appears normal. Technically Difficult Study.   The left atrium is severely dilated.   Moderate aortic stenosis.   Moderate (2+) aortic regurgitation.   Moderate to severe (3+) tricuspid valve regurgitation.    < from: CT Abdomen and Pelvis w/ IV Cont (09.08.23 @ 02:20) >  IMPRESSION:  Severe cardiomegaly.    Bibasilar subsegmental atelectasis. Trace right pleural effusion.    Mild dilatation extra renal pelvis right kidney. No hydronephrosis.   Linear non-obstructing calcification lower pole both kidneys. No   obstructing renal or ureteral stones. Simple bilateral renal cysts. No CT   evidence of pyelonephritis. No perinephric fluid collections.    Sales catheter is present within the urinary bladder which is   decompressed.    Large fecal impaction rectosigmoid portion of the colon.                             HPI: 89 y/o female with pmhx of HTN, Afib on Eliquis, chronic indwelling catheter who was BIBEMS to ED with persistent lower abdominal pain, dark urine and fever (Tmax 100.1F) x 2 days. 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 Family states patient just had her chronic sales replaced yesterday. Upon arrival patient was found to be febrile and hypotensive. Received a total of 4LF IVFs, however, remained hypotensive and was consequently started on levophed. Labs significant for leukocytosis 21k with left shift, bilirubin 1.5, and elevated LFTs. UA suggestive of infection. Underwent RUQ ultrasound and CT abd pelvis which was negative for acute cholecystis, but revealed a right nonobstructing intrarenal calculi. Patient was admitted to ICU for further management of septic shock secondary to UTI.  (08 Sep 2023 06:23)    Subjective: Patient seen today, feels well, c/o intermittent leg pains. No overnight issues reported. Repeat blood cx negative.     REVIEW OF SYSTEMS:    CONSTITUTIONAL: No weakness, fevers or chills  EYES/ENT: No visual changes;  No vertigo or throat pain   NECK: No pain or stiffness  RESPIRATORY: No cough, wheezing, hemoptysis; No shortness of breath  CARDIOVASCULAR: No chest pain or palpitations  GASTROINTESTINAL: No abdominal or epigastric pain. No nausea, vomiting, or hematemesis; No diarrhea or constipation. No melena or hematochezia.  GENITOURINARY: No dysuria, frequency or hematuria  NEUROLOGICAL: No numbness or weakness  SKIN: No itching, rashes  MSK: +Knee pain     MEDICATIONS  (STANDING):  apixaban 2.5 milliGRAM(s) Oral every 12 hours  artificial  tears Solution 1 Drop(s) Both EYES two times a day  cefTRIAXone Injectable. 2000 milliGRAM(s) IV Push every 24 hours  chlorhexidine 4% Liquid 1 Application(s) Topical <User Schedule>  cholecalciferol 2000 Unit(s) Oral daily  furosemide    Tablet 20 milliGRAM(s) Oral daily  influenza  Vaccine (HIGH DOSE) 0.7 milliLiter(s) IntraMuscular once  losartan 25 milliGRAM(s) Oral daily  melatonin 3 milliGRAM(s) Oral at bedtime  metoprolol succinate ER 25 milliGRAM(s) Oral daily  mineral oil/petrolatum Hydrophilic Ointment 1 Application(s) Topical daily  mirtazapine 15 milliGRAM(s) Oral at bedtime  multivitamin 1 Tablet(s) Oral daily  pantoprazole    Tablet 40 milliGRAM(s) Oral before breakfast  polyethylene glycol 3350 17 Gram(s) Oral daily  senna 2 Tablet(s) Oral at bedtime  vitamin A &amp; D Ointment 1 Application(s) Topical daily  zinc oxide 40% Paste 1 Application(s) Topical daily  zinc sulfate 220 milliGRAM(s) Oral daily    MEDICATIONS  (PRN):  LORazepam     Tablet 0.5 milliGRAM(s) Oral at bedtime PRN Anxiety  oxycodone    5 mG/acetaminophen 325 mG 1 Tablet(s) Oral every 4 hours PRN Severe Pain (7 - 10)  traMADol 25 milliGRAM(s) Oral every 8 hours PRN Moderate Pain (4 - 6)  triamcinolone 0.1% Oral Paste 1 Application(s) Topical daily PRN rash to shoulders and/or back      Vital Signs Last 24 Hrs  T(C): 36.5 (14 Sep 2023 13:11), Max: 36.9 (13 Sep 2023 21:00)  T(F): 97.7 (14 Sep 2023 13:11), Max: 98.4 (13 Sep 2023 21:00)  HR: 74 (14 Sep 2023 13:11) (73 - 81)  BP: 158/77 (14 Sep 2023 13:11) (129/69 - 159/68)  BP(mean): 99 (14 Sep 2023 13:11) (99 - 99)  RR: 18 (14 Sep 2023 07:11) (18 - 18)  SpO2: 96% (14 Sep 2023 13:11) (95% - 96%)    Parameters below as of 14 Sep 2023 07:11  Patient On (Oxygen Delivery Method): nasal cannula  O2 Flow (L/min): 2      PHYSICAL EXAM:  GENERAL: NAD, lying in bed comfortably  HEAD:  Atraumatic, Normocephalic  EYES: conjunctiva and sclera clear  ENT: Moist mucous membranes  NECK: Supple, No JVD  CHEST/LUNG: Clear to auscultation bilaterally; No rales, rhonchi, wheezing. Unlabored respirations  HEART: Regular rate and rhythm; No murmurs  ABDOMEN: Bowel sounds present; Soft, Nontender, Nondistended.   EXTREMITIES:  2+ Peripheral Pulses, brisk capillary refill. No clubbing, cyanosis, or edema  NERVOUS SYSTEM:  Alert & Oriented X3, speech clear. No deficits   MSK: FROM all 4 extremities, full and equal strength        LABS:      13 Sep 2023 06:38    136    |  104    |  13     ----------------------------<  103    4.2     |  28     |  0.52     Ca    8.9        13 Sep 2023 06:38          CAPILLARY BLOOD GLUCOSE            Urinalysis Basic - ( 13 Sep 2023 06:38 )    Color: x / Appearance: x / SG: x / pH: x  Gluc: 103 mg/dL / Ketone: x  / Bili: x / Urobili: x   Blood: x / Protein: x / Nitrite: x   Leuk Esterase: x / RBC: x / WBC x   Sq Epi: x / Non Sq Epi: x / Bacteria: x        RADIOLOGY:  TTE Echo Complete w/o Contrast w/ Doppler (09.08.23 @ 14:57) >     Endocardium is not well visualized, however, overall left ventricular   systolic function appears normal. Technically Difficult Study.   The left atrium is severely dilated.   Moderate aortic stenosis.   Moderate (2+) aortic regurgitation.   Moderate to severe (3+) tricuspid valve regurgitation.    < from: CT Abdomen and Pelvis w/ IV Cont (09.08.23 @ 02:20) >  IMPRESSION:  Severe cardiomegaly.    Bibasilar subsegmental atelectasis. Trace right pleural effusion.    Mild dilatation extra renal pelvis right kidney. No hydronephrosis.   Linear non-obstructing calcification lower pole both kidneys. No   obstructing renal or ureteral stones. Simple bilateral renal cysts. No CT   evidence of pyelonephritis. No perinephric fluid collections.    Sales catheter is present within the urinary bladder which is   decompressed.    Large fecal impaction rectosigmoid portion of the colon.

## 2023-09-14 NOTE — PROGRESS NOTE ADULT - ASSESSMENT
89 y/o female with h/o HTN, Afib on Eliquis, chronic indwelling catheter, kidney stones was admitted on 9/7 for persistent lower abdominal pain, dark urine and fever (Tmax 100.1F) x 2 days. 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 Family states patient just had her chronic sales replaced on the day PTA. Upon arrival patient was found to be febrile and hypotensive. Received a total of 4LF IVFs, however, remained hypotensive and was consequently started on levophed. Labs significant for leukocytosis 21k with left shift. UA suggestive of infection. In ER she received meropenem and vancomycin IV.     1. Febrile syndrome. Hypotension resolved. Sepsis with E.coli. Pyuria. Likely UTI. Urinary retention with chronic sales. Kidney stones.  -leukocytosis resolving  -more alert  -BC x 2, urine c/s noted  -on ceftriaxone 2 gm IV qd # 7  -tolerating abx well so far; no side effects noted  -repeat BC x 2 collected  -change abx to keflex 500 mg PO q6h for 7 more days  -monitor temps  -f/u CBC  -supportive care  2. Other issues:   -care per medicine    d/w medicine team and daughter HCP at bedside

## 2023-09-14 NOTE — PROGRESS NOTE ADULT - NUTRITIONAL ASSESSMENT
This patient has been assessed with a concern for Malnutrition and has been determined to have a diagnosis/diagnoses of Moderate protein-calorie malnutrition.    This patient is being managed with:   Diet DASH/TLC-  Sodium & Cholesterol Restricted  Supplement Feeding Modality:  Oral  Ensure Clear Cans or Servings Per Day:  1       Frequency:  Three Times a day  Entered: Sep 11 2023 12:58PM  
This patient has been assessed with a concern for Malnutrition and has been determined to have a diagnosis/diagnoses of Moderate protein-calorie malnutrition.    This patient is being managed with:   Diet DASH/TLC-  Sodium & Cholesterol Restricted  Supplement Feeding Modality:  Oral  Ensure Clear Cans or Servings Per Day:  1       Frequency:  Three Times a day  Entered: Sep 11 2023 12:58PM  
This patient has been assessed with a concern for Malnutrition and has been determined to have a diagnosis/diagnoses of Moderate protein-calorie malnutrition.    This patient is being managed with:   Diet DASH/TLC-  Sodium & Cholesterol Restricted  Supplement Feeding Modality:  Oral  Ensure Plus High Protein Cans or Servings Per Day:  1       Frequency:  Three Times a day  Entered: Sep 12 2023 12:55PM

## 2023-09-14 NOTE — PROGRESS NOTE ADULT - PROVIDER SPECIALTY LIST ADULT
Cardiology
Hospitalist
Hospitalist
Cardiology
Critical Care
Infectious Disease
Hospitalist
Infectious Disease
Infectious Disease
Hospitalist
Critical Care

## 2023-09-15 ENCOUNTER — APPOINTMENT (OUTPATIENT)
Dept: HOME HEALTH SERVICES | Facility: HOME HEALTH | Age: 88
End: 2023-09-15

## 2023-09-15 RX ORDER — NYSTATIN AND TRIAMCINOLONE ACETONIDE 100000; 1 [USP'U]/G; MG/G
100000-0.1 OINTMENT TOPICAL
Qty: 15 | Refills: 0 | Status: ACTIVE | COMMUNITY
Start: 2022-01-26

## 2023-09-21 DIAGNOSIS — R65.21 SEVERE SEPSIS WITH SEPTIC SHOCK: ICD-10-CM

## 2023-09-21 DIAGNOSIS — Z97.4 PRESENCE OF EXTERNAL HEARING-AID: ICD-10-CM

## 2023-09-21 DIAGNOSIS — I48.19 OTHER PERSISTENT ATRIAL FIBRILLATION: ICD-10-CM

## 2023-09-21 DIAGNOSIS — T83.511A INFECTION AND INFLAMMATORY REACTION DUE TO INDWELLING URETHRAL CATHETER, INITIAL ENCOUNTER: ICD-10-CM

## 2023-09-21 DIAGNOSIS — Z79.01 LONG TERM (CURRENT) USE OF ANTICOAGULANTS: ICD-10-CM

## 2023-09-21 DIAGNOSIS — D69.6 THROMBOCYTOPENIA, UNSPECIFIED: ICD-10-CM

## 2023-09-21 DIAGNOSIS — A41.51 SEPSIS DUE TO ESCHERICHIA COLI [E. COLI]: ICD-10-CM

## 2023-09-21 DIAGNOSIS — M17.12 UNILATERAL PRIMARY OSTEOARTHRITIS, LEFT KNEE: ICD-10-CM

## 2023-09-21 DIAGNOSIS — L82.1 OTHER SEBORRHEIC KERATOSIS: ICD-10-CM

## 2023-09-21 DIAGNOSIS — K59.00 CONSTIPATION, UNSPECIFIED: ICD-10-CM

## 2023-09-21 DIAGNOSIS — R74.01 ELEVATION OF LEVELS OF LIVER TRANSAMINASE LEVELS: ICD-10-CM
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
## 2023-09-21 DIAGNOSIS — E44.0 MODERATE PROTEIN-CALORIE MALNUTRITION: ICD-10-CM

## 2023-09-21 DIAGNOSIS — R33.9 RETENTION OF URINE, UNSPECIFIED: ICD-10-CM

## 2023-09-21 DIAGNOSIS — Z20.822 CONTACT WITH AND (SUSPECTED) EXPOSURE TO COVID-19: ICD-10-CM

## 2023-09-21 DIAGNOSIS — N39.0 URINARY TRACT INFECTION, SITE NOT SPECIFIED: ICD-10-CM

## 2023-09-21 DIAGNOSIS — I10 ESSENTIAL (PRIMARY) HYPERTENSION: ICD-10-CM

## 2023-09-21 DIAGNOSIS — N20.0 CALCULUS OF KIDNEY: ICD-10-CM

## 2023-09-22 ENCOUNTER — TRANSCRIPTION ENCOUNTER (OUTPATIENT)
Age: 88
End: 2023-09-22

## 2023-09-22 ENCOUNTER — NON-APPOINTMENT (OUTPATIENT)
Age: 88
End: 2023-09-22

## 2023-09-22 ENCOUNTER — APPOINTMENT (OUTPATIENT)
Dept: HOME HEALTH SERVICES | Facility: HOME HEALTH | Age: 88
End: 2023-09-22

## 2023-09-23 RX ORDER — HYOSCYAMINE SULFATE 0.12 MG/1
0.12 TABLET SUBLINGUAL
Qty: 60 | Refills: 11 | Status: ACTIVE | COMMUNITY
Start: 2022-09-15

## 2023-09-23 RX ORDER — KETOCONAZOLE 20 MG/G
2 CREAM TOPICAL TWICE DAILY
Qty: 30 | Refills: 2 | Status: ACTIVE | COMMUNITY
Start: 2019-03-01

## 2023-09-26 ENCOUNTER — APPOINTMENT (OUTPATIENT)
Dept: HOME HEALTH SERVICES | Facility: HOME HEALTH | Age: 88
End: 2023-09-26
Payer: MEDICARE

## 2023-09-26 VITALS
HEART RATE: 70 BPM | SYSTOLIC BLOOD PRESSURE: 138 MMHG | TEMPERATURE: 97.6 F | OXYGEN SATURATION: 95 % | DIASTOLIC BLOOD PRESSURE: 80 MMHG

## 2023-09-26 DIAGNOSIS — Z23 ENCOUNTER FOR IMMUNIZATION: ICD-10-CM

## 2023-09-26 PROCEDURE — 90662 IIV NO PRSV INCREASED AG IM: CPT

## 2023-09-26 PROCEDURE — 99495 TRANSJ CARE MGMT MOD F2F 14D: CPT

## 2023-09-26 PROCEDURE — G0008: CPT

## 2023-09-27 ENCOUNTER — MED ADMIN CHARGE (OUTPATIENT)
Age: 88
End: 2023-09-27

## 2023-09-27 PROBLEM — Z23 FLU VACCINE NEED: Status: ACTIVE | Noted: 2021-12-30

## 2023-09-28 ENCOUNTER — NON-APPOINTMENT (OUTPATIENT)
Age: 88
End: 2023-09-28

## 2023-09-28 ENCOUNTER — TRANSCRIPTION ENCOUNTER (OUTPATIENT)
Age: 88
End: 2023-09-28

## 2023-09-28 DIAGNOSIS — Z79.899 OTHER LONG TERM (CURRENT) DRUG THERAPY: ICD-10-CM

## 2023-10-02 ENCOUNTER — TRANSCRIPTION ENCOUNTER (OUTPATIENT)
Age: 88
End: 2023-10-02

## 2023-10-02 ENCOUNTER — NON-APPOINTMENT (OUTPATIENT)
Age: 88
End: 2023-10-02

## 2023-10-02 LAB
ALBUMIN SERPL ELPH-MCNC: 4.3 G/DL
ALP BLD-CCNC: 145 U/L
ALT SERPL-CCNC: 12 U/L
ANION GAP SERPL CALC-SCNC: 12 MMOL/L
AST SERPL-CCNC: 19 U/L
BILIRUB SERPL-MCNC: 0.6 MG/DL
BUN SERPL-MCNC: 14 MG/DL
CALCIUM SERPL-MCNC: 9.6 MG/DL
CHLORIDE SERPL-SCNC: 101 MMOL/L
CO2 SERPL-SCNC: 26 MMOL/L
CREAT SERPL-MCNC: 0.54 MG/DL
EGFR: 86 ML/MIN/1.73M2
GLUCOSE SERPL-MCNC: 121 MG/DL
HCT VFR BLD CALC: 35.2 %
HGB BLD-MCNC: 11.1 G/DL
MCHC RBC-ENTMCNC: 31 PG
MCHC RBC-ENTMCNC: 31.5 GM/DL
MCV RBC AUTO: 98.3 FL
PHOSPHATE SERPL-MCNC: 2.8 MG/DL
PLATELET # BLD AUTO: 101 K/UL
POTASSIUM SERPL-SCNC: 4 MMOL/L
PREALB SERPL NEPH-MCNC: 20 MG/DL
PROT SERPL-MCNC: 6.6 G/DL
RBC # BLD: 3.58 M/UL
RBC # FLD: 15 %
SODIUM SERPL-SCNC: 138 MMOL/L
WBC # FLD AUTO: 3.41 K/UL

## 2023-10-03 ENCOUNTER — TRANSCRIPTION ENCOUNTER (OUTPATIENT)
Age: 88
End: 2023-10-03

## 2023-10-03 ENCOUNTER — NON-APPOINTMENT (OUTPATIENT)
Age: 88
End: 2023-10-03

## 2023-10-04 ENCOUNTER — NON-APPOINTMENT (OUTPATIENT)
Age: 88
End: 2023-10-04

## 2023-10-04 ENCOUNTER — APPOINTMENT (OUTPATIENT)
Dept: DERMATOLOGY | Facility: CLINIC | Age: 88
End: 2023-10-04
Payer: MEDICARE

## 2023-10-04 LAB
APPEARANCE: CLEAR
BACTERIA UR CULT: NORMAL
BACTERIA: ABNORMAL /HPF
BILIRUBIN URINE: NEGATIVE
BLOOD URINE: ABNORMAL
CAST: 3 /LPF
COLOR: YELLOW
EPITHELIAL CELLS: 4 /HPF
GLUCOSE QUALITATIVE U: NEGATIVE MG/DL
KETONES URINE: NEGATIVE MG/DL
LEUKOCYTE ESTERASE URINE: ABNORMAL
MICROSCOPIC-UA: NORMAL
NITRITE URINE: NEGATIVE
PH URINE: 8
PROTEIN URINE: 30 MG/DL
RED BLOOD CELLS URINE: 1 /HPF
REVIEW: NORMAL
SPECIFIC GRAVITY URINE: 1.01
URINE CYTOLOGY: NORMAL
UROBILINOGEN URINE: 0.2 MG/DL
WHITE BLOOD CELLS URINE: 16 /HPF

## 2023-10-04 PROCEDURE — 99214 OFFICE O/P EST MOD 30 MIN: CPT | Mod: 95

## 2023-10-05 ENCOUNTER — APPOINTMENT (OUTPATIENT)
Dept: HOME HEALTH SERVICES | Facility: HOME HEALTH | Age: 88
End: 2023-10-05
Payer: MEDICARE

## 2023-10-05 VITALS
DIASTOLIC BLOOD PRESSURE: 80 MMHG | OXYGEN SATURATION: 96 % | TEMPERATURE: 97.6 F | HEART RATE: 72 BPM | SYSTOLIC BLOOD PRESSURE: 140 MMHG

## 2023-10-05 PROCEDURE — 99348 HOME/RES VST EST LOW MDM 30: CPT

## 2023-10-06 ENCOUNTER — TRANSCRIPTION ENCOUNTER (OUTPATIENT)
Age: 88
End: 2023-10-06

## 2023-10-07 ENCOUNTER — TRANSCRIPTION ENCOUNTER (OUTPATIENT)
Age: 88
End: 2023-10-07

## 2023-10-08 ENCOUNTER — TRANSCRIPTION ENCOUNTER (OUTPATIENT)
Age: 88
End: 2023-10-08

## 2023-10-08 ENCOUNTER — NON-APPOINTMENT (OUTPATIENT)
Age: 88
End: 2023-10-08

## 2023-10-09 ENCOUNTER — APPOINTMENT (OUTPATIENT)
Dept: HOME HEALTH SERVICES | Facility: HOME HEALTH | Age: 88
End: 2023-10-09

## 2023-10-09 VITALS
RESPIRATION RATE: 17 BRPM | TEMPERATURE: 97.6 F | OXYGEN SATURATION: 97 % | HEART RATE: 68 BPM | SYSTOLIC BLOOD PRESSURE: 170 MMHG | DIASTOLIC BLOOD PRESSURE: 80 MMHG

## 2023-10-11 ENCOUNTER — LABORATORY RESULT (OUTPATIENT)
Age: 88
End: 2023-10-11

## 2023-10-12 ENCOUNTER — APPOINTMENT (OUTPATIENT)
Dept: UROLOGY | Facility: CLINIC | Age: 88
End: 2023-10-12
Payer: MEDICARE

## 2023-10-12 ENCOUNTER — LABORATORY RESULT (OUTPATIENT)
Age: 88
End: 2023-10-12

## 2023-10-12 PROCEDURE — 99443: CPT | Mod: 95

## 2023-10-13 ENCOUNTER — NON-APPOINTMENT (OUTPATIENT)
Age: 88
End: 2023-10-13

## 2023-10-26 ENCOUNTER — INPATIENT (INPATIENT)
Facility: HOSPITAL | Age: 88
LOS: 9 days | Discharge: HOME HEALTH | DRG: 698 | End: 2023-11-05
Attending: FAMILY MEDICINE | Admitting: STUDENT IN AN ORGANIZED HEALTH CARE EDUCATION/TRAINING PROGRAM
Payer: MEDICARE

## 2023-10-26 ENCOUNTER — TRANSCRIPTION ENCOUNTER (OUTPATIENT)
Age: 88
End: 2023-10-26

## 2023-10-26 ENCOUNTER — NON-APPOINTMENT (OUTPATIENT)
Age: 88
End: 2023-10-26

## 2023-10-26 ENCOUNTER — APPOINTMENT (OUTPATIENT)
Dept: HOME HEALTH SERVICES | Facility: HOME HEALTH | Age: 88
End: 2023-10-26
Payer: MEDICARE

## 2023-10-26 VITALS
OXYGEN SATURATION: 96 % | TEMPERATURE: 101 F | SYSTOLIC BLOOD PRESSURE: 182 MMHG | HEIGHT: 60 IN | WEIGHT: 119.93 LBS | HEART RATE: 98 BPM | DIASTOLIC BLOOD PRESSURE: 81 MMHG | RESPIRATION RATE: 20 BRPM

## 2023-10-26 DIAGNOSIS — R07.89 OTHER CHEST PAIN: ICD-10-CM

## 2023-10-26 DIAGNOSIS — I08.2 RHEUMATIC DISORDERS OF BOTH AORTIC AND TRICUSPID VALVES: ICD-10-CM

## 2023-10-26 DIAGNOSIS — I48.21 PERMANENT ATRIAL FIBRILLATION: ICD-10-CM

## 2023-10-26 DIAGNOSIS — Z87.81 PERSONAL HISTORY OF (HEALED) TRAUMATIC FRACTURE: Chronic | ICD-10-CM

## 2023-10-26 DIAGNOSIS — Z98.89 OTHER SPECIFIED POSTPROCEDURAL STATES: Chronic | ICD-10-CM

## 2023-10-26 DIAGNOSIS — I24.89 OTHER FORMS OF ACUTE ISCHEMIC HEART DISEASE: ICD-10-CM

## 2023-10-26 DIAGNOSIS — K59.00 CONSTIPATION, UNSPECIFIED: ICD-10-CM

## 2023-10-26 DIAGNOSIS — Z98.890 OTHER SPECIFIED POSTPROCEDURAL STATES: Chronic | ICD-10-CM

## 2023-10-26 DIAGNOSIS — Z79.899 OTHER LONG TERM (CURRENT) DRUG THERAPY: ICD-10-CM

## 2023-10-26 DIAGNOSIS — N39.0 URINARY TRACT INFECTION, SITE NOT SPECIFIED: ICD-10-CM

## 2023-10-26 DIAGNOSIS — L89.322 PRESSURE ULCER OF LEFT BUTTOCK, STAGE 2: ICD-10-CM

## 2023-10-26 DIAGNOSIS — I11.0 HYPERTENSIVE HEART DISEASE WITH HEART FAILURE: ICD-10-CM

## 2023-10-26 DIAGNOSIS — T83.511A INFECTION AND INFLAMMATORY REACTION DUE TO INDWELLING URETHRAL CATHETER, INITIAL ENCOUNTER: ICD-10-CM

## 2023-10-26 DIAGNOSIS — A41.51 SEPSIS DUE TO ESCHERICHIA COLI [E. COLI]: ICD-10-CM

## 2023-10-26 DIAGNOSIS — D69.6 THROMBOCYTOPENIA, UNSPECIFIED: ICD-10-CM

## 2023-10-26 DIAGNOSIS — E87.1 HYPO-OSMOLALITY AND HYPONATREMIA: ICD-10-CM

## 2023-10-26 DIAGNOSIS — L89.152 PRESSURE ULCER OF SACRAL REGION, STAGE 2: ICD-10-CM

## 2023-10-26 DIAGNOSIS — Z74.01 BED CONFINEMENT STATUS: ICD-10-CM

## 2023-10-26 DIAGNOSIS — H91.93 UNSPECIFIED HEARING LOSS, BILATERAL: ICD-10-CM

## 2023-10-26 DIAGNOSIS — E89.0 POSTPROCEDURAL HYPOTHYROIDISM: Chronic | ICD-10-CM

## 2023-10-26 DIAGNOSIS — E86.0 DEHYDRATION: ICD-10-CM

## 2023-10-26 DIAGNOSIS — I50.30 UNSPECIFIED DIASTOLIC (CONGESTIVE) HEART FAILURE: ICD-10-CM

## 2023-10-26 DIAGNOSIS — E43 UNSPECIFIED SEVERE PROTEIN-CALORIE MALNUTRITION: ICD-10-CM

## 2023-10-26 DIAGNOSIS — Z87.81 PERSONAL HISTORY OF (HEALED) TRAUMATIC FRACTURE: ICD-10-CM

## 2023-10-26 DIAGNOSIS — Z79.01 LONG TERM (CURRENT) USE OF ANTICOAGULANTS: ICD-10-CM

## 2023-10-26 DIAGNOSIS — R33.8 OTHER RETENTION OF URINE: ICD-10-CM

## 2023-10-26 LAB
ALBUMIN SERPL ELPH-MCNC: 4 G/DL — SIGNIFICANT CHANGE UP (ref 3.3–5)
ALBUMIN SERPL ELPH-MCNC: 4 G/DL — SIGNIFICANT CHANGE UP (ref 3.3–5)
ALP SERPL-CCNC: 102 U/L — SIGNIFICANT CHANGE UP (ref 40–120)
ALP SERPL-CCNC: 102 U/L — SIGNIFICANT CHANGE UP (ref 40–120)
ALT FLD-CCNC: 26 U/L — SIGNIFICANT CHANGE UP (ref 12–78)
ALT FLD-CCNC: 26 U/L — SIGNIFICANT CHANGE UP (ref 12–78)
ANION GAP SERPL CALC-SCNC: 8 MMOL/L — SIGNIFICANT CHANGE UP (ref 5–17)
ANION GAP SERPL CALC-SCNC: 8 MMOL/L — SIGNIFICANT CHANGE UP (ref 5–17)
APPEARANCE UR: CLEAR — SIGNIFICANT CHANGE UP
APPEARANCE UR: CLEAR — SIGNIFICANT CHANGE UP
APTT BLD: 31.5 SEC — SIGNIFICANT CHANGE UP (ref 24.5–35.6)
APTT BLD: 31.5 SEC — SIGNIFICANT CHANGE UP (ref 24.5–35.6)
AST SERPL-CCNC: 55 U/L — HIGH (ref 15–37)
AST SERPL-CCNC: 55 U/L — HIGH (ref 15–37)
BACTERIA # UR AUTO: ABNORMAL /HPF
BACTERIA # UR AUTO: ABNORMAL /HPF
BASOPHILS # BLD AUTO: 0 K/UL — SIGNIFICANT CHANGE UP (ref 0–0.2)
BASOPHILS # BLD AUTO: 0 K/UL — SIGNIFICANT CHANGE UP (ref 0–0.2)
BASOPHILS NFR BLD AUTO: 0 % — SIGNIFICANT CHANGE UP (ref 0–2)
BASOPHILS NFR BLD AUTO: 0 % — SIGNIFICANT CHANGE UP (ref 0–2)
BILIRUB SERPL-MCNC: 1 MG/DL — SIGNIFICANT CHANGE UP (ref 0.2–1.2)
BILIRUB SERPL-MCNC: 1 MG/DL — SIGNIFICANT CHANGE UP (ref 0.2–1.2)
BILIRUB UR-MCNC: NEGATIVE — SIGNIFICANT CHANGE UP
BILIRUB UR-MCNC: NEGATIVE — SIGNIFICANT CHANGE UP
BUN SERPL-MCNC: 18 MG/DL — SIGNIFICANT CHANGE UP (ref 7–23)
BUN SERPL-MCNC: 18 MG/DL — SIGNIFICANT CHANGE UP (ref 7–23)
CALCIUM SERPL-MCNC: 9.6 MG/DL — SIGNIFICANT CHANGE UP (ref 8.5–10.1)
CALCIUM SERPL-MCNC: 9.6 MG/DL — SIGNIFICANT CHANGE UP (ref 8.5–10.1)
CHLORIDE SERPL-SCNC: 96 MMOL/L — SIGNIFICANT CHANGE UP (ref 96–108)
CHLORIDE SERPL-SCNC: 96 MMOL/L — SIGNIFICANT CHANGE UP (ref 96–108)
CO2 SERPL-SCNC: 25 MMOL/L — SIGNIFICANT CHANGE UP (ref 22–31)
CO2 SERPL-SCNC: 25 MMOL/L — SIGNIFICANT CHANGE UP (ref 22–31)
COLOR SPEC: YELLOW — SIGNIFICANT CHANGE UP
COLOR SPEC: YELLOW — SIGNIFICANT CHANGE UP
CREAT SERPL-MCNC: 0.63 MG/DL — SIGNIFICANT CHANGE UP (ref 0.5–1.3)
CREAT SERPL-MCNC: 0.63 MG/DL — SIGNIFICANT CHANGE UP (ref 0.5–1.3)
DIFF PNL FLD: ABNORMAL
DIFF PNL FLD: ABNORMAL
EGFR: 83 ML/MIN/1.73M2 — SIGNIFICANT CHANGE UP
EGFR: 83 ML/MIN/1.73M2 — SIGNIFICANT CHANGE UP
EOSINOPHIL # BLD AUTO: 0 K/UL — SIGNIFICANT CHANGE UP (ref 0–0.5)
EOSINOPHIL # BLD AUTO: 0 K/UL — SIGNIFICANT CHANGE UP (ref 0–0.5)
EOSINOPHIL NFR BLD AUTO: 0 % — SIGNIFICANT CHANGE UP (ref 0–6)
EOSINOPHIL NFR BLD AUTO: 0 % — SIGNIFICANT CHANGE UP (ref 0–6)
GLUCOSE SERPL-MCNC: 127 MG/DL — HIGH (ref 70–99)
GLUCOSE SERPL-MCNC: 127 MG/DL — HIGH (ref 70–99)
GLUCOSE UR QL: NEGATIVE MG/DL — SIGNIFICANT CHANGE UP
GLUCOSE UR QL: NEGATIVE MG/DL — SIGNIFICANT CHANGE UP
HCT VFR BLD CALC: 37.8 % — SIGNIFICANT CHANGE UP (ref 34.5–45)
HCT VFR BLD CALC: 37.8 % — SIGNIFICANT CHANGE UP (ref 34.5–45)
HGB BLD-MCNC: 13.2 G/DL — SIGNIFICANT CHANGE UP (ref 11.5–15.5)
HGB BLD-MCNC: 13.2 G/DL — SIGNIFICANT CHANGE UP (ref 11.5–15.5)
INR BLD: 1.28 RATIO — HIGH (ref 0.85–1.18)
INR BLD: 1.28 RATIO — HIGH (ref 0.85–1.18)
KETONES UR-MCNC: NEGATIVE MG/DL — SIGNIFICANT CHANGE UP
KETONES UR-MCNC: NEGATIVE MG/DL — SIGNIFICANT CHANGE UP
LACTATE SERPL-SCNC: 1.1 MMOL/L — SIGNIFICANT CHANGE UP (ref 0.7–2)
LACTATE SERPL-SCNC: 1.1 MMOL/L — SIGNIFICANT CHANGE UP (ref 0.7–2)
LEUKOCYTE ESTERASE UR-ACNC: ABNORMAL
LEUKOCYTE ESTERASE UR-ACNC: ABNORMAL
LYMPHOCYTES # BLD AUTO: 0.13 K/UL — LOW (ref 1–3.3)
LYMPHOCYTES # BLD AUTO: 0.13 K/UL — LOW (ref 1–3.3)
LYMPHOCYTES # BLD AUTO: 1 % — LOW (ref 13–44)
LYMPHOCYTES # BLD AUTO: 1 % — LOW (ref 13–44)
MANUAL SMEAR VERIFICATION: YES — SIGNIFICANT CHANGE UP
MANUAL SMEAR VERIFICATION: YES — SIGNIFICANT CHANGE UP
MCHC RBC-ENTMCNC: 32.5 PG — SIGNIFICANT CHANGE UP (ref 27–34)
MCHC RBC-ENTMCNC: 32.5 PG — SIGNIFICANT CHANGE UP (ref 27–34)
MCHC RBC-ENTMCNC: 34.9 GM/DL — SIGNIFICANT CHANGE UP (ref 32–36)
MCHC RBC-ENTMCNC: 34.9 GM/DL — SIGNIFICANT CHANGE UP (ref 32–36)
MCV RBC AUTO: 93.1 FL — SIGNIFICANT CHANGE UP (ref 80–100)
MCV RBC AUTO: 93.1 FL — SIGNIFICANT CHANGE UP (ref 80–100)
MONOCYTES # BLD AUTO: 2.13 K/UL — HIGH (ref 0–0.9)
MONOCYTES # BLD AUTO: 2.13 K/UL — HIGH (ref 0–0.9)
MONOCYTES NFR BLD AUTO: 16 % — HIGH (ref 2–14)
MONOCYTES NFR BLD AUTO: 16 % — HIGH (ref 2–14)
NEUTROPHILS # BLD AUTO: 11.06 K/UL — HIGH (ref 1.8–7.4)
NEUTROPHILS # BLD AUTO: 11.06 K/UL — HIGH (ref 1.8–7.4)
NEUTROPHILS NFR BLD AUTO: 80 % — HIGH (ref 43–77)
NEUTROPHILS NFR BLD AUTO: 80 % — HIGH (ref 43–77)
NEUTS BAND # BLD: 3 % — SIGNIFICANT CHANGE UP (ref 0–8)
NEUTS BAND # BLD: 3 % — SIGNIFICANT CHANGE UP (ref 0–8)
NITRITE UR-MCNC: NEGATIVE — SIGNIFICANT CHANGE UP
NITRITE UR-MCNC: NEGATIVE — SIGNIFICANT CHANGE UP
NRBC # BLD: 0 /100 — SIGNIFICANT CHANGE UP (ref 0–0)
NRBC # BLD: 0 /100 — SIGNIFICANT CHANGE UP (ref 0–0)
NRBC # BLD: SIGNIFICANT CHANGE UP /100 WBCS (ref 0–0)
NRBC # BLD: SIGNIFICANT CHANGE UP /100 WBCS (ref 0–0)
PH UR: 6 — SIGNIFICANT CHANGE UP (ref 5–8)
PH UR: 6 — SIGNIFICANT CHANGE UP (ref 5–8)
PLAT MORPH BLD: NORMAL — SIGNIFICANT CHANGE UP
PLAT MORPH BLD: NORMAL — SIGNIFICANT CHANGE UP
PLATELET # BLD AUTO: 82 K/UL — LOW (ref 150–400)
PLATELET # BLD AUTO: 82 K/UL — LOW (ref 150–400)
PLATELET COUNT - ESTIMATE: ABNORMAL
PLATELET COUNT - ESTIMATE: ABNORMAL
POTASSIUM SERPL-MCNC: 4.8 MMOL/L — SIGNIFICANT CHANGE UP (ref 3.5–5.3)
POTASSIUM SERPL-MCNC: 4.8 MMOL/L — SIGNIFICANT CHANGE UP (ref 3.5–5.3)
POTASSIUM SERPL-SCNC: 4.8 MMOL/L — SIGNIFICANT CHANGE UP (ref 3.5–5.3)
POTASSIUM SERPL-SCNC: 4.8 MMOL/L — SIGNIFICANT CHANGE UP (ref 3.5–5.3)
PROT SERPL-MCNC: 7.6 GM/DL — SIGNIFICANT CHANGE UP (ref 6–8.3)
PROT SERPL-MCNC: 7.6 GM/DL — SIGNIFICANT CHANGE UP (ref 6–8.3)
PROT UR-MCNC: 100 MG/DL
PROT UR-MCNC: 100 MG/DL
PROTHROM AB SERPL-ACNC: 14.4 SEC — HIGH (ref 9.5–13)
PROTHROM AB SERPL-ACNC: 14.4 SEC — HIGH (ref 9.5–13)
RAPID RVP RESULT: SIGNIFICANT CHANGE UP
RAPID RVP RESULT: SIGNIFICANT CHANGE UP
RBC # BLD: 4.06 M/UL — SIGNIFICANT CHANGE UP (ref 3.8–5.2)
RBC # BLD: 4.06 M/UL — SIGNIFICANT CHANGE UP (ref 3.8–5.2)
RBC # FLD: 14.4 % — SIGNIFICANT CHANGE UP (ref 10.3–14.5)
RBC # FLD: 14.4 % — SIGNIFICANT CHANGE UP (ref 10.3–14.5)
RBC BLD AUTO: NORMAL — SIGNIFICANT CHANGE UP
RBC BLD AUTO: NORMAL — SIGNIFICANT CHANGE UP
RBC CASTS # UR COMP ASSIST: 25 /HPF — HIGH (ref 0–4)
RBC CASTS # UR COMP ASSIST: 25 /HPF — HIGH (ref 0–4)
SARS-COV-2 RNA SPEC QL NAA+PROBE: SIGNIFICANT CHANGE UP
SARS-COV-2 RNA SPEC QL NAA+PROBE: SIGNIFICANT CHANGE UP
SODIUM SERPL-SCNC: 129 MMOL/L — LOW (ref 135–145)
SODIUM SERPL-SCNC: 129 MMOL/L — LOW (ref 135–145)
SP GR SPEC: 1.01 — SIGNIFICANT CHANGE UP (ref 1–1.03)
SP GR SPEC: 1.01 — SIGNIFICANT CHANGE UP (ref 1–1.03)
SQUAMOUS # UR AUTO: 1 /HPF — SIGNIFICANT CHANGE UP (ref 0–5)
SQUAMOUS # UR AUTO: 1 /HPF — SIGNIFICANT CHANGE UP (ref 0–5)
TROPONIN I, HIGH SENSITIVITY RESULT: 71.72 NG/L — HIGH
TROPONIN I, HIGH SENSITIVITY RESULT: 71.72 NG/L — HIGH
UROBILINOGEN FLD QL: NEGATIVE MG/DL — SIGNIFICANT CHANGE UP (ref 0.2–1)
UROBILINOGEN FLD QL: NEGATIVE MG/DL — SIGNIFICANT CHANGE UP (ref 0.2–1)
WBC # BLD: 13.33 K/UL — HIGH (ref 3.8–10.5)
WBC # BLD: 13.33 K/UL — HIGH (ref 3.8–10.5)
WBC # FLD AUTO: 13.33 K/UL — HIGH (ref 3.8–10.5)
WBC # FLD AUTO: 13.33 K/UL — HIGH (ref 3.8–10.5)
WBC UR QL: 204 /HPF — HIGH (ref 0–5)
WBC UR QL: 204 /HPF — HIGH (ref 0–5)

## 2023-10-26 PROCEDURE — 84484 ASSAY OF TROPONIN QUANT: CPT

## 2023-10-26 PROCEDURE — 93010 ELECTROCARDIOGRAM REPORT: CPT | Mod: 76

## 2023-10-26 PROCEDURE — 80048 BASIC METABOLIC PNL TOTAL CA: CPT

## 2023-10-26 PROCEDURE — 71045 X-RAY EXAM CHEST 1 VIEW: CPT | Mod: 26

## 2023-10-26 PROCEDURE — 36415 COLL VENOUS BLD VENIPUNCTURE: CPT

## 2023-10-26 PROCEDURE — 74018 RADEX ABDOMEN 1 VIEW: CPT

## 2023-10-26 PROCEDURE — 83935 ASSAY OF URINE OSMOLALITY: CPT

## 2023-10-26 PROCEDURE — 82272 OCCULT BLD FECES 1-3 TESTS: CPT

## 2023-10-26 PROCEDURE — C9113: CPT

## 2023-10-26 PROCEDURE — 83930 ASSAY OF BLOOD OSMOLALITY: CPT

## 2023-10-26 PROCEDURE — 99348 HOME/RES VST EST LOW MDM 30: CPT | Mod: 95

## 2023-10-26 PROCEDURE — 87040 BLOOD CULTURE FOR BACTERIA: CPT

## 2023-10-26 PROCEDURE — 85027 COMPLETE CBC AUTOMATED: CPT

## 2023-10-26 PROCEDURE — 84300 ASSAY OF URINE SODIUM: CPT

## 2023-10-26 PROCEDURE — 80076 HEPATIC FUNCTION PANEL: CPT

## 2023-10-26 PROCEDURE — 93005 ELECTROCARDIOGRAM TRACING: CPT

## 2023-10-26 PROCEDURE — 93308 TTE F-UP OR LMTD: CPT

## 2023-10-26 PROCEDURE — 80053 COMPREHEN METABOLIC PANEL: CPT

## 2023-10-26 PROCEDURE — 99285 EMERGENCY DEPT VISIT HI MDM: CPT

## 2023-10-26 RX ORDER — ONDANSETRON 8 MG/1
4 TABLET, FILM COATED ORAL ONCE
Refills: 0 | Status: COMPLETED | OUTPATIENT
Start: 2023-10-26 | End: 2023-10-26

## 2023-10-26 RX ORDER — SODIUM CHLORIDE 9 MG/ML
1000 INJECTION INTRAMUSCULAR; INTRAVENOUS; SUBCUTANEOUS ONCE
Refills: 0 | Status: COMPLETED | OUTPATIENT
Start: 2023-10-26 | End: 2023-10-26

## 2023-10-26 RX ORDER — PIPERACILLIN AND TAZOBACTAM 4; .5 G/20ML; G/20ML
3.38 INJECTION, POWDER, LYOPHILIZED, FOR SOLUTION INTRAVENOUS ONCE
Refills: 0 | Status: COMPLETED | OUTPATIENT
Start: 2023-10-26 | End: 2023-10-26

## 2023-10-26 RX ORDER — ACETAMINOPHEN 500 MG
1000 TABLET ORAL ONCE
Refills: 0 | Status: COMPLETED | OUTPATIENT
Start: 2023-10-26 | End: 2023-10-26

## 2023-10-26 RX ADMIN — SODIUM CHLORIDE 1000 MILLILITER(S): 9 INJECTION INTRAMUSCULAR; INTRAVENOUS; SUBCUTANEOUS at 21:38

## 2023-10-26 RX ADMIN — PIPERACILLIN AND TAZOBACTAM 200 GRAM(S): 4; .5 INJECTION, POWDER, LYOPHILIZED, FOR SOLUTION INTRAVENOUS at 22:51

## 2023-10-26 RX ADMIN — Medication 400 MILLIGRAM(S): at 21:38

## 2023-10-26 NOTE — ED CLERICAL - NS ED CLERK NOTE PRE-ARRIVAL INFORMATION; ADDITIONAL PRE-ARRIVAL INFORMATION

## 2023-10-26 NOTE — ED ADULT NURSE NOTE - OBJECTIVE STATEMENT
89y female presented to the ED with complaints of fever and abdominal distention. Pt is bed bound and has a 24 hour home health aid. A&O 2-3 at baseline. Pt has chronic Reaves that was changed in ED. Pt had shingles infection to left shoulder a few weeks ago. Since finishing Valcyclovir she has been having increased blood pressure and tacahycardia. one episode of vomiting PTA.

## 2023-10-26 NOTE — ED ADULT TRIAGE NOTE - CHIEF COMPLAINT QUOTE
pt presents to ED from home c/o fever, abdominal distention, vomiting today. pt presents with sales catheter, as per EMS aide reports urine has become cloudy over the last day. pt a&ox2 at baseline. denies pain at this time.

## 2023-10-26 NOTE — ED PROVIDER NOTE - OBJECTIVE STATEMENT
89 y/o female with PMHx of Afib on Eliquis, CLL, HTN, renal calculus, venous insufficiency with lymphedema, and dementia A&Ox2 baseline BIBEMS from home to the ED r/o UTI. Daughter at bedside reports pt diagnosed with shingles infection posterior left shoulder a few weeks ago, completed course of Valacyclovir. Since then pt with increased blood pressure. Over the last few days pt with darker and cloudy urine in leg bag, increased abdominal distension, and shakiness. Today pt was tachycardic and threw up, daughter called EMS who found pt to be febrile. Denies any recent flu-like symptoms. Daughter notes recent medication changes including increase of losartan from 25mg QD to 50mg BID, furosemide 20mg QD to 20mg BID, and started on spironolactone which improved pt's O2 saturation.

## 2023-10-26 NOTE — ED PROVIDER NOTE - NSICDXPASTMEDICALHX_GEN_ALL_CORE_FT
PAST MEDICAL HISTORY:  Acute cystitis without hematuria septic  4/2016    Essential hypertension     [place] (hard of hearing), bilateral     Hypertension     Lymphedema of both lower extremities     Monoclonal gammopathies     Paroxysmal atrial fibrillation     Spinal stenosis     Spondyloarthritis     Vaginal prolapse     Venous insufficiency

## 2023-10-26 NOTE — ED ADULT NURSE NOTE - NSICDXPASTMEDICALHX_GEN_ALL_CORE_FT
PAST MEDICAL HISTORY:  Acute cystitis without hematuria septic  4/2016    Essential hypertension     [place] (hard of hearing), bilateral     Hypertension     Lymphedema of both lower extremities     Monoclonal gammopathies     Paroxysmal atrial fibrillation     Spinal stenosis     Spondyloarthritis     Vaginal prolapse     Venous insufficiency

## 2023-10-26 NOTE — ED PROVIDER NOTE - PHYSICAL EXAMINATION
Constitutional: NAD AOx2  Eyes: PERRL EOMI  Head: Normocephalic atraumatic  Mouth: MMM  Cardiac: tachycardic and regular rhythm  Resp: Lungs CTAB  GI: Abd s/nd/nt  : sales catheter in place  Neuro: CN2-12 grossly intact, WALLER x 4  Skin: left upper back no open blisters no erythema scattered scabs

## 2023-10-26 NOTE — ED ADULT NURSE NOTE - NSFALLHARMRISKINTERV_ED_ALL_ED

## 2023-10-27 ENCOUNTER — NON-APPOINTMENT (OUTPATIENT)
Age: 88
End: 2023-10-27

## 2023-10-27 LAB
ALBUMIN SERPL ELPH-MCNC: 3.3 G/DL — SIGNIFICANT CHANGE UP (ref 3.3–5)
ALBUMIN SERPL ELPH-MCNC: 3.3 G/DL — SIGNIFICANT CHANGE UP (ref 3.3–5)
ALP SERPL-CCNC: 87 U/L — SIGNIFICANT CHANGE UP (ref 40–120)
ALP SERPL-CCNC: 87 U/L — SIGNIFICANT CHANGE UP (ref 40–120)
ALT FLD-CCNC: 21 U/L — SIGNIFICANT CHANGE UP (ref 12–78)
ALT FLD-CCNC: 21 U/L — SIGNIFICANT CHANGE UP (ref 12–78)
ANION GAP SERPL CALC-SCNC: 6 MMOL/L — SIGNIFICANT CHANGE UP (ref 5–17)
ANION GAP SERPL CALC-SCNC: 6 MMOL/L — SIGNIFICANT CHANGE UP (ref 5–17)
AST SERPL-CCNC: 33 U/L — SIGNIFICANT CHANGE UP (ref 15–37)
AST SERPL-CCNC: 33 U/L — SIGNIFICANT CHANGE UP (ref 15–37)
BILIRUB SERPL-MCNC: 1.2 MG/DL — SIGNIFICANT CHANGE UP (ref 0.2–1.2)
BILIRUB SERPL-MCNC: 1.2 MG/DL — SIGNIFICANT CHANGE UP (ref 0.2–1.2)
BUN SERPL-MCNC: 16 MG/DL — SIGNIFICANT CHANGE UP (ref 7–23)
BUN SERPL-MCNC: 16 MG/DL — SIGNIFICANT CHANGE UP (ref 7–23)
CALCIUM SERPL-MCNC: 9.2 MG/DL — SIGNIFICANT CHANGE UP (ref 8.5–10.1)
CALCIUM SERPL-MCNC: 9.2 MG/DL — SIGNIFICANT CHANGE UP (ref 8.5–10.1)
CHLORIDE SERPL-SCNC: 99 MMOL/L — SIGNIFICANT CHANGE UP (ref 96–108)
CHLORIDE SERPL-SCNC: 99 MMOL/L — SIGNIFICANT CHANGE UP (ref 96–108)
CO2 SERPL-SCNC: 24 MMOL/L — SIGNIFICANT CHANGE UP (ref 22–31)
CO2 SERPL-SCNC: 24 MMOL/L — SIGNIFICANT CHANGE UP (ref 22–31)
CREAT SERPL-MCNC: 0.8 MG/DL — SIGNIFICANT CHANGE UP (ref 0.5–1.3)
CREAT SERPL-MCNC: 0.8 MG/DL — SIGNIFICANT CHANGE UP (ref 0.5–1.3)
E COLI DNA BLD POS QL NAA+NON-PROBE: SIGNIFICANT CHANGE UP
E COLI DNA BLD POS QL NAA+NON-PROBE: SIGNIFICANT CHANGE UP
EGFR: 69 ML/MIN/1.73M2 — SIGNIFICANT CHANGE UP
EGFR: 69 ML/MIN/1.73M2 — SIGNIFICANT CHANGE UP
GLUCOSE SERPL-MCNC: 112 MG/DL — HIGH (ref 70–99)
GLUCOSE SERPL-MCNC: 112 MG/DL — HIGH (ref 70–99)
GRAM STN FLD: ABNORMAL
GRAM STN FLD: ABNORMAL
HCT VFR BLD CALC: 37.5 % — SIGNIFICANT CHANGE UP (ref 34.5–45)
HCT VFR BLD CALC: 37.5 % — SIGNIFICANT CHANGE UP (ref 34.5–45)
HGB BLD-MCNC: 12.5 G/DL — SIGNIFICANT CHANGE UP (ref 11.5–15.5)
HGB BLD-MCNC: 12.5 G/DL — SIGNIFICANT CHANGE UP (ref 11.5–15.5)
MCHC RBC-ENTMCNC: 32.1 PG — SIGNIFICANT CHANGE UP (ref 27–34)
MCHC RBC-ENTMCNC: 32.1 PG — SIGNIFICANT CHANGE UP (ref 27–34)
MCHC RBC-ENTMCNC: 33.3 GM/DL — SIGNIFICANT CHANGE UP (ref 32–36)
MCHC RBC-ENTMCNC: 33.3 GM/DL — SIGNIFICANT CHANGE UP (ref 32–36)
MCV RBC AUTO: 96.4 FL — SIGNIFICANT CHANGE UP (ref 80–100)
MCV RBC AUTO: 96.4 FL — SIGNIFICANT CHANGE UP (ref 80–100)
METHOD TYPE: SIGNIFICANT CHANGE UP
METHOD TYPE: SIGNIFICANT CHANGE UP
PLATELET # BLD AUTO: 75 K/UL — LOW (ref 150–400)
PLATELET # BLD AUTO: 75 K/UL — LOW (ref 150–400)
POTASSIUM SERPL-MCNC: 4.5 MMOL/L — SIGNIFICANT CHANGE UP (ref 3.5–5.3)
POTASSIUM SERPL-MCNC: 4.5 MMOL/L — SIGNIFICANT CHANGE UP (ref 3.5–5.3)
POTASSIUM SERPL-SCNC: 4.5 MMOL/L — SIGNIFICANT CHANGE UP (ref 3.5–5.3)
POTASSIUM SERPL-SCNC: 4.5 MMOL/L — SIGNIFICANT CHANGE UP (ref 3.5–5.3)
PROT SERPL-MCNC: 6.5 GM/DL — SIGNIFICANT CHANGE UP (ref 6–8.3)
PROT SERPL-MCNC: 6.5 GM/DL — SIGNIFICANT CHANGE UP (ref 6–8.3)
RBC # BLD: 3.89 M/UL — SIGNIFICANT CHANGE UP (ref 3.8–5.2)
RBC # BLD: 3.89 M/UL — SIGNIFICANT CHANGE UP (ref 3.8–5.2)
RBC # FLD: 14.7 % — HIGH (ref 10.3–14.5)
RBC # FLD: 14.7 % — HIGH (ref 10.3–14.5)
SODIUM SERPL-SCNC: 129 MMOL/L — LOW (ref 135–145)
SODIUM SERPL-SCNC: 129 MMOL/L — LOW (ref 135–145)
TROPONIN I, HIGH SENSITIVITY RESULT: 82.82 NG/L — HIGH
TROPONIN I, HIGH SENSITIVITY RESULT: 82.82 NG/L — HIGH
TROPONIN I, HIGH SENSITIVITY RESULT: 99.64 NG/L — HIGH
TROPONIN I, HIGH SENSITIVITY RESULT: 99.64 NG/L — HIGH
WBC # BLD: 13.43 K/UL — HIGH (ref 3.8–10.5)
WBC # BLD: 13.43 K/UL — HIGH (ref 3.8–10.5)
WBC # FLD AUTO: 13.43 K/UL — HIGH (ref 3.8–10.5)
WBC # FLD AUTO: 13.43 K/UL — HIGH (ref 3.8–10.5)

## 2023-10-27 PROCEDURE — 99223 1ST HOSP IP/OBS HIGH 75: CPT

## 2023-10-27 RX ORDER — OMEGA-3 ACID ETHYL ESTERS 1 G
1 CAPSULE ORAL
Refills: 0 | DISCHARGE

## 2023-10-27 RX ORDER — LOSARTAN POTASSIUM 100 MG/1
1 TABLET, FILM COATED ORAL
Refills: 0 | DISCHARGE

## 2023-10-27 RX ORDER — ACETAMINOPHEN 500 MG
650 TABLET ORAL EVERY 6 HOURS
Refills: 0 | Status: DISCONTINUED | OUTPATIENT
Start: 2023-10-27 | End: 2023-11-02

## 2023-10-27 RX ORDER — METOPROLOL TARTRATE 50 MG
25 TABLET ORAL DAILY
Refills: 0 | Status: DISCONTINUED | OUTPATIENT
Start: 2023-10-27 | End: 2023-11-05

## 2023-10-27 RX ORDER — ONDANSETRON 8 MG/1
4 TABLET, FILM COATED ORAL EVERY 8 HOURS
Refills: 0 | Status: DISCONTINUED | OUTPATIENT
Start: 2023-10-27 | End: 2023-11-05

## 2023-10-27 RX ORDER — SODIUM CHLORIDE 9 MG/ML
1000 INJECTION INTRAMUSCULAR; INTRAVENOUS; SUBCUTANEOUS
Refills: 0 | Status: DISCONTINUED | OUTPATIENT
Start: 2023-10-27 | End: 2023-10-28

## 2023-10-27 RX ORDER — ACETAMINOPHEN 500 MG
2 TABLET ORAL
Refills: 0 | DISCHARGE

## 2023-10-27 RX ORDER — SALICYLIC ACID 0.5 %
1 CLEANSER (GRAM) TOPICAL
Refills: 0 | DISCHARGE

## 2023-10-27 RX ORDER — CEFTRIAXONE 500 MG/1
1000 INJECTION, POWDER, FOR SOLUTION INTRAMUSCULAR; INTRAVENOUS EVERY 24 HOURS
Refills: 0 | Status: COMPLETED | OUTPATIENT
Start: 2023-10-27 | End: 2023-11-02

## 2023-10-27 RX ORDER — POLYETHYLENE GLYCOL 3350 17 G/17G
17 POWDER, FOR SOLUTION ORAL
Refills: 0 | DISCHARGE

## 2023-10-27 RX ORDER — POLYETHYLENE GLYCOL 3350 17 G/17G
17 POWDER, FOR SOLUTION ORAL DAILY
Refills: 0 | Status: DISCONTINUED | OUTPATIENT
Start: 2023-10-27 | End: 2023-11-03

## 2023-10-27 RX ORDER — INFLUENZA VIRUS VACCINE 15; 15; 15; 15 UG/.5ML; UG/.5ML; UG/.5ML; UG/.5ML
0.7 SUSPENSION INTRAMUSCULAR ONCE
Refills: 0 | Status: DISCONTINUED | OUTPATIENT
Start: 2023-10-27 | End: 2023-11-05

## 2023-10-27 RX ORDER — FERROUS SULFATE 325(65) MG
325 TABLET ORAL DAILY
Refills: 0 | Status: DISCONTINUED | OUTPATIENT
Start: 2023-10-27 | End: 2023-11-05

## 2023-10-27 RX ORDER — MIRTAZAPINE 45 MG/1
1.5 TABLET, ORALLY DISINTEGRATING ORAL
Refills: 0 | DISCHARGE

## 2023-10-27 RX ORDER — SENNA PLUS 8.6 MG/1
1 TABLET ORAL
Refills: 0 | DISCHARGE

## 2023-10-27 RX ORDER — CHLORHEXIDINE GLUCONATE 213 G/1000ML
15 SOLUTION TOPICAL
Refills: 0 | DISCHARGE

## 2023-10-27 RX ORDER — LANOLIN/MINERAL OIL
1 LOTION (ML) TOPICAL
Refills: 0 | DISCHARGE

## 2023-10-27 RX ORDER — FUROSEMIDE 40 MG
20 TABLET ORAL
Refills: 0 | Status: DISCONTINUED | OUTPATIENT
Start: 2023-10-27 | End: 2023-10-28

## 2023-10-27 RX ORDER — APIXABAN 2.5 MG/1
2.5 TABLET, FILM COATED ORAL EVERY 12 HOURS
Refills: 0 | Status: DISCONTINUED | OUTPATIENT
Start: 2023-10-27 | End: 2023-11-05

## 2023-10-27 RX ORDER — PANTOPRAZOLE SODIUM 20 MG/1
40 TABLET, DELAYED RELEASE ORAL
Refills: 0 | Status: DISCONTINUED | OUTPATIENT
Start: 2023-10-27 | End: 2023-10-31

## 2023-10-27 RX ORDER — FERROUS SULFATE 325(65) MG
1 TABLET ORAL
Refills: 0 | DISCHARGE

## 2023-10-27 RX ORDER — MULTIVIT-MIN/FERROUS GLUCONATE 9 MG/15 ML
0.5 LIQUID (ML) ORAL
Refills: 0 | DISCHARGE

## 2023-10-27 RX ORDER — CHOLECALCIFEROL (VITAMIN D3) 125 MCG
1 CAPSULE ORAL
Refills: 0 | DISCHARGE

## 2023-10-27 RX ORDER — SENNA PLUS 8.6 MG/1
2 TABLET ORAL AT BEDTIME
Refills: 0 | Status: DISCONTINUED | OUTPATIENT
Start: 2023-10-27 | End: 2023-11-05

## 2023-10-27 RX ORDER — MIRTAZAPINE 45 MG/1
22.5 TABLET, ORALLY DISINTEGRATING ORAL AT BEDTIME
Refills: 0 | Status: DISCONTINUED | OUTPATIENT
Start: 2023-10-27 | End: 2023-11-05

## 2023-10-27 RX ORDER — SPIRONOLACTONE 25 MG/1
25 TABLET, FILM COATED ORAL DAILY
Refills: 0 | Status: DISCONTINUED | OUTPATIENT
Start: 2023-10-27 | End: 2023-10-28

## 2023-10-27 RX ORDER — LANOLIN ALCOHOL/MO/W.PET/CERES
3 CREAM (GRAM) TOPICAL AT BEDTIME
Refills: 0 | Status: DISCONTINUED | OUTPATIENT
Start: 2023-10-27 | End: 2023-11-05

## 2023-10-27 RX ORDER — BACILLUS COAGULANS/INULIN 1B-250 MG
1 CAPSULE ORAL
Refills: 0 | DISCHARGE

## 2023-10-27 RX ORDER — LOSARTAN POTASSIUM 100 MG/1
50 TABLET, FILM COATED ORAL
Refills: 0 | Status: DISCONTINUED | OUTPATIENT
Start: 2023-10-27 | End: 2023-11-05

## 2023-10-27 RX ORDER — FLUTICASONE PROPIONATE 50 MCG
1 SPRAY, SUSPENSION NASAL
Refills: 0 | DISCHARGE

## 2023-10-27 RX ORDER — LANOLIN ALCOHOL/MO/W.PET/CERES
0.5 CREAM (GRAM) TOPICAL
Refills: 0 | DISCHARGE

## 2023-10-27 RX ADMIN — SENNA PLUS 2 TABLET(S): 8.6 TABLET ORAL at 21:01

## 2023-10-27 RX ADMIN — Medication 25 MILLIGRAM(S): at 10:51

## 2023-10-27 RX ADMIN — Medication 650 MILLIGRAM(S): at 21:03

## 2023-10-27 RX ADMIN — Medication 325 MILLIGRAM(S): at 10:52

## 2023-10-27 RX ADMIN — LOSARTAN POTASSIUM 50 MILLIGRAM(S): 100 TABLET, FILM COATED ORAL at 10:52

## 2023-10-27 RX ADMIN — APIXABAN 2.5 MILLIGRAM(S): 2.5 TABLET, FILM COATED ORAL at 10:51

## 2023-10-27 RX ADMIN — APIXABAN 2.5 MILLIGRAM(S): 2.5 TABLET, FILM COATED ORAL at 21:01

## 2023-10-27 RX ADMIN — Medication 650 MILLIGRAM(S): at 22:00

## 2023-10-27 RX ADMIN — Medication 20 MILLIGRAM(S): at 21:01

## 2023-10-27 RX ADMIN — MIRTAZAPINE 22.5 MILLIGRAM(S): 45 TABLET, ORALLY DISINTEGRATING ORAL at 21:02

## 2023-10-27 RX ADMIN — CEFTRIAXONE 1000 MILLIGRAM(S): 500 INJECTION, POWDER, FOR SOLUTION INTRAMUSCULAR; INTRAVENOUS at 06:38

## 2023-10-27 RX ADMIN — Medication 20 MILLIGRAM(S): at 10:52

## 2023-10-27 RX ADMIN — SPIRONOLACTONE 25 MILLIGRAM(S): 25 TABLET, FILM COATED ORAL at 10:52

## 2023-10-27 RX ADMIN — LOSARTAN POTASSIUM 50 MILLIGRAM(S): 100 TABLET, FILM COATED ORAL at 21:02

## 2023-10-27 RX ADMIN — SODIUM CHLORIDE 50 MILLILITER(S): 9 INJECTION INTRAMUSCULAR; INTRAVENOUS; SUBCUTANEOUS at 16:01

## 2023-10-27 RX ADMIN — Medication 650 MILLIGRAM(S): at 13:05

## 2023-10-27 NOTE — ED ADULT NURSE REASSESSMENT NOTE - NS ED NURSE REASSESS COMMENT FT1
Report received from night RN Deanna. Pt is resting on stretcher in room, no complaints at this time. Private aide from home at bedside. Safety and comfort measures maintained, call bell within reach.

## 2023-10-27 NOTE — PROVIDER CONTACT NOTE (CRITICAL VALUE NOTIFICATION) - SITUATION
Critical blood culture results: growth in aerobic bottle, gram negative rods. Blood culture collected on 10/26

## 2023-10-27 NOTE — H&P ADULT - ASSESSMENT
A/P:    1.  Acute UTI  Sepsis  Chronic Reaves catheter use  -Started on IV antibiotics  -Follow cultures  -Follow clinically  -Cannot give 30 cc/kg IV fluid due to recent history of worsening CHF    2.  Hyponatremia  -Got IV fluid  -Follow repeat sodium level    3.  Chronic thrombocytopenia  -Platelet at baseline level  -No active bleeding  -Will monitor    4.  History of CHF  -Continue spironolactone, Lasix and losartan    5.  History of A-fib  -Heart rate controlled  -Continue metoprolol and Eliquis    6.  Eliquis for DVT prophylaxis    7.  CODE STATUS  -Full code     A/P:    1.  Acute UTI  Sepsis  Chronic Reaves catheter use  -Started on IV antibiotics  -Follow cultures  -Follow clinically  -Cannot give 30 cc/kg IV fluid due to recent history of worsening CHF    2.  Hyponatremia  -Got IV fluid  -Follow repeat sodium level    3.  Chronic thrombocytopenia  -Platelet at baseline level  -No active bleeding  -Will monitor    4.  History of CHF  -Continue spironolactone, Lasix and losartan    5.  History of A-fib  -Heart rate controlled  -Continue metoprolol and Eliquis    6.  Eliquis for DVT prophylaxis    7.  CODE STATUS  -Full code    8.  Elevated troponin  -Most likely demand ischemia  -We will repeat troponin  -Follow cardiology consult   A/P:    1.  Acute UTI  Sepsis  Chronic Reaves catheter use  -Started on IV antibiotics  -Follow cultures  -Follow clinically  -Cannot give 30 cc/kg IV fluid due to recent history of worsening CHF    2.  Hyponatremia  -Got IV fluid  -Follow repeat sodium level    3.  Chronic thrombocytopenia  -Platelet at baseline level  -No active bleeding  -Will monitor    4.  History of CHF  -Continue spironolactone, Lasix and losartan    5.  History of A-fib  -Heart rate controlled  -Continue metoprolol and Eliquis    6.  Eliquis for DVT prophylaxis    7.  CODE STATUS  -Full code    8.  Elevated troponin  -Most likely demand ischemia  -We will repeat troponin  -Follow cardiology consult    9.  h/o Decubitus ulcer  -in lower back  -follow wound care consult

## 2023-10-27 NOTE — H&P ADULT - NSHPPHYSICALEXAM_GEN_ALL_CORE
T(C): 37.2 (10-26-23 @ 23:00), Max: 39.1 (10-26-23 @ 21:28)  HR: 65 (10-27-23 @ 01:43) (64 - 98)  BP: 102/46 (10-27-23 @ 01:43) (99/54 - 182/81)  RR: 20 (10-27-23 @ 01:43) (20 - 22)  SpO2: 97% (10-27-23 @ 01:43) (96% - 98%)    CONSTITUTIONAL: Well groomed, no apparent distress  EYES: PERRLA and symmetric, EOMI, No conjunctival or scleral injection, non-icteric  ENMT: Oral mucosa with moist membranes. Normal dentition; no pharyngeal injection or exudates             NECK: Supple, symmetric and without tracheal deviation   RESP: No respiratory distress, no use of accessory muscles; CTA b/l, no WRR  CV: RRR, +S1S2, no MRG; no JVD; no peripheral edema  GI: Soft, NT, ND, no rebound, no guarding; no palpable masses; no hepatosplenomegaly; no hernia palpated  LYMPH: No cervical LAD or tenderness;   MSK: Decreased ROM of LE at baseline, normal ROM in UE  SKIN: No rashes or ulcers noted;  NEURO: unable ro assess in detail as patient has dementia.   PSYCH: unable ro assess in detail as patient has dementia.

## 2023-10-27 NOTE — PHARMACOTHERAPY INTERVENTION NOTE - COMMENTS
Medication history complete, reviewed medications with patient daughter in law at bedside and confirmed with doctor first med hx.   Medication history complete, reviewed medications with patient daughter in law at bedside and confirmed with doctor first med hx. Per daughter in law crush all the meds before administer to patient except Metoprolol succinate 25 mg.

## 2023-10-27 NOTE — PROGRESS NOTE ADULT - SUBJECTIVE AND OBJECTIVE BOX
History of Present Illness:   89 y/o Female with PMHx of Afib on Eliquis, CLL, HTN, renal calculus, venous insufficiency with lymphedema, and dementia, Urinary Retention s/p Reaves, Uti,  A&Ox2 baseline BIBEMS from home to the ED to rule out UTI. She had been shaky and more weak for the last few days. Home health Aid at bedside reported patient diagnosed with shingles infection posterior left shoulder a few weeks ago, completed course of Valacyclovir. Since then patient with increased blood pressure. Over the last few days patient with darker and cloudy urine in leg bag, increased abdominal distension, and shakiness. Yesterday patient was tachycardic and threw up, EMS was called, who found patient to be febrile. Denies any recent flu-like symptoms. In the ED patient was febrile too.       10.27: seen in the ER, awake and oriented to name and place; no dyspnea, no cp        A/P:    1.  Acute UTI  Sepsis  Chronic Reaves catheter use  -Started on IV antibiotics, Ceftriaxone day#1  f/u UCx, Blood Cx    2.  Hyponatremia  -s/p IV fluid  -Follow repeat sodium level in am    3.  Chronic thrombocytopenia  -Platelet at baseline level  -No active bleeding  -Will monitor    4.  History of CHF  compensated  -Continue spironolactone, Lasix and losartan  TTE: EF normal, mod pHtn    5.  History of A-fib  -Heart rate controlled  -Continue metoprolol and Eliquis    6.  Elevated troponin  -Most likely demand ischemia  -We will repeat troponin  -Follow cardiology consult    9.  h/o Decubitus ulcer  -in lower back  -follow wound care consult

## 2023-10-27 NOTE — H&P ADULT - NSHPREVIEWOFSYSTEMS_GEN_ALL_CORE
Gen: ++ fever, chills, weakness  ENT: No visual changes or throat pain  Neck: No pain or stiffness  Respiratory: No cough or wheezing  Cardiovascular: No chest pain or palpitations  Gastrointestinal: No abdominal pain, nausea, vomiting, constipation, or diarrhea  Hematologic: No easy bleeding or bruising  Neurologic: No numbness or focal weakness  Psych: No depression or insomnia  Skin: No rash or itching

## 2023-10-27 NOTE — H&P ADULT - NSICDXPASTMEDICALHX_GEN_ALL_CORE_FT
PAST MEDICAL HISTORY:  Acute cystitis without hematuria septic  4/2016    Essential hypertension     [place] (hard of hearing), bilateral     Hypertension     Lymphedema of both lower extremities     Monoclonal gammopathies     Paroxysmal atrial fibrillation     Spinal stenosis     Spondyloarthritis     Vaginal prolapse     Venous insufficiency

## 2023-10-27 NOTE — CHART NOTE - NSCHARTNOTEFT_GEN_A_CORE
RN Called to report that Pt's BC: + gram negative kamran. Pt is admitted for UTI. On Ceftriaxone. c/w Abx.     ICU Vital Signs Last 24 Hrs  T(C): 36.9 (27 Oct 2023 20:12), Max: 37.1 (27 Oct 2023 06:44)  T(F): 98.4 (27 Oct 2023 20:12), Max: 98.8 (27 Oct 2023 06:44)  HR: 67 (27 Oct 2023 20:12) (64 - 80)  BP: 130/67 (27 Oct 2023 20:12) (97/42 - 168/64)  BP(mean): 89 (27 Oct 2023 07:00) (89 - 89  RR: 18 (27 Oct 2023 20:12) (18 - 20)  SpO2: 97% (27 Oct 2023 20:12) (97% - 100%)    Hospitalist to f/u in AM.

## 2023-10-28 LAB
ANION GAP SERPL CALC-SCNC: 9 MMOL/L — SIGNIFICANT CHANGE UP (ref 5–17)
ANION GAP SERPL CALC-SCNC: 9 MMOL/L — SIGNIFICANT CHANGE UP (ref 5–17)
BUN SERPL-MCNC: 17 MG/DL — SIGNIFICANT CHANGE UP (ref 7–23)
BUN SERPL-MCNC: 17 MG/DL — SIGNIFICANT CHANGE UP (ref 7–23)
CALCIUM SERPL-MCNC: 9 MG/DL — SIGNIFICANT CHANGE UP (ref 8.5–10.1)
CALCIUM SERPL-MCNC: 9 MG/DL — SIGNIFICANT CHANGE UP (ref 8.5–10.1)
CHLORIDE SERPL-SCNC: 99 MMOL/L — SIGNIFICANT CHANGE UP (ref 96–108)
CHLORIDE SERPL-SCNC: 99 MMOL/L — SIGNIFICANT CHANGE UP (ref 96–108)
CO2 SERPL-SCNC: 25 MMOL/L — SIGNIFICANT CHANGE UP (ref 22–31)
CO2 SERPL-SCNC: 25 MMOL/L — SIGNIFICANT CHANGE UP (ref 22–31)
CREAT SERPL-MCNC: 0.59 MG/DL — SIGNIFICANT CHANGE UP (ref 0.5–1.3)
CREAT SERPL-MCNC: 0.59 MG/DL — SIGNIFICANT CHANGE UP (ref 0.5–1.3)
CULTURE RESULTS: SIGNIFICANT CHANGE UP
CULTURE RESULTS: SIGNIFICANT CHANGE UP
EGFR: 84 ML/MIN/1.73M2 — SIGNIFICANT CHANGE UP
EGFR: 84 ML/MIN/1.73M2 — SIGNIFICANT CHANGE UP
GLUCOSE SERPL-MCNC: 99 MG/DL — SIGNIFICANT CHANGE UP (ref 70–99)
GLUCOSE SERPL-MCNC: 99 MG/DL — SIGNIFICANT CHANGE UP (ref 70–99)
OB PNL STL: NEGATIVE — SIGNIFICANT CHANGE UP
OB PNL STL: NEGATIVE — SIGNIFICANT CHANGE UP
POTASSIUM SERPL-MCNC: 4 MMOL/L — SIGNIFICANT CHANGE UP (ref 3.5–5.3)
POTASSIUM SERPL-MCNC: 4 MMOL/L — SIGNIFICANT CHANGE UP (ref 3.5–5.3)
POTASSIUM SERPL-SCNC: 4 MMOL/L — SIGNIFICANT CHANGE UP (ref 3.5–5.3)
POTASSIUM SERPL-SCNC: 4 MMOL/L — SIGNIFICANT CHANGE UP (ref 3.5–5.3)
SODIUM SERPL-SCNC: 133 MMOL/L — LOW (ref 135–145)
SODIUM SERPL-SCNC: 133 MMOL/L — LOW (ref 135–145)
SPECIMEN SOURCE: SIGNIFICANT CHANGE UP
SPECIMEN SOURCE: SIGNIFICANT CHANGE UP
TROPONIN I, HIGH SENSITIVITY RESULT: 40.59 NG/L — SIGNIFICANT CHANGE UP
TROPONIN I, HIGH SENSITIVITY RESULT: 40.59 NG/L — SIGNIFICANT CHANGE UP

## 2023-10-28 PROCEDURE — 99233 SBSQ HOSP IP/OBS HIGH 50: CPT

## 2023-10-28 RX ORDER — FUROSEMIDE 40 MG
20 TABLET ORAL DAILY
Refills: 0 | Status: DISCONTINUED | OUTPATIENT
Start: 2023-10-29 | End: 2023-11-02

## 2023-10-28 RX ORDER — AMLODIPINE BESYLATE 2.5 MG/1
2.5 TABLET ORAL DAILY
Refills: 0 | Status: DISCONTINUED | OUTPATIENT
Start: 2023-10-28 | End: 2023-11-03

## 2023-10-28 RX ADMIN — LOSARTAN POTASSIUM 50 MILLIGRAM(S): 100 TABLET, FILM COATED ORAL at 09:45

## 2023-10-28 RX ADMIN — Medication 1 DROP(S): at 09:49

## 2023-10-28 RX ADMIN — LOSARTAN POTASSIUM 50 MILLIGRAM(S): 100 TABLET, FILM COATED ORAL at 22:16

## 2023-10-28 RX ADMIN — ONDANSETRON 4 MILLIGRAM(S): 8 TABLET, FILM COATED ORAL at 09:43

## 2023-10-28 RX ADMIN — APIXABAN 2.5 MILLIGRAM(S): 2.5 TABLET, FILM COATED ORAL at 09:45

## 2023-10-28 RX ADMIN — SENNA PLUS 2 TABLET(S): 8.6 TABLET ORAL at 22:15

## 2023-10-28 RX ADMIN — PANTOPRAZOLE SODIUM 40 MILLIGRAM(S): 20 TABLET, DELAYED RELEASE ORAL at 05:41

## 2023-10-28 RX ADMIN — Medication 3 MILLIGRAM(S): at 22:18

## 2023-10-28 RX ADMIN — Medication 25 MILLIGRAM(S): at 09:45

## 2023-10-28 RX ADMIN — SPIRONOLACTONE 25 MILLIGRAM(S): 25 TABLET, FILM COATED ORAL at 09:45

## 2023-10-28 RX ADMIN — MIRTAZAPINE 22.5 MILLIGRAM(S): 45 TABLET, ORALLY DISINTEGRATING ORAL at 22:17

## 2023-10-28 RX ADMIN — Medication 650 MILLIGRAM(S): at 08:17

## 2023-10-28 RX ADMIN — Medication 20 MILLIGRAM(S): at 09:45

## 2023-10-28 RX ADMIN — APIXABAN 2.5 MILLIGRAM(S): 2.5 TABLET, FILM COATED ORAL at 22:16

## 2023-10-28 RX ADMIN — CEFTRIAXONE 1000 MILLIGRAM(S): 500 INJECTION, POWDER, FOR SOLUTION INTRAMUSCULAR; INTRAVENOUS at 05:41

## 2023-10-28 RX ADMIN — Medication 325 MILLIGRAM(S): at 09:45

## 2023-10-28 RX ADMIN — Medication 1 DROP(S): at 22:16

## 2023-10-28 NOTE — DIETITIAN INITIAL EVALUATION ADULT - ADD RECOMMEND
1. Recommend to liberalize diet to regular to maximize PO intake   2. Encourage protein-rich foods, maximize food preferences   3. C/w ensure plus high protein TID to optimize PO intake (provides 350 kcal, 20g protein/ shake)   4. Consider adding 500 mg vit C suppl daily and 220 mg zinc sulfate x 10 days to promote wound healing   5. Consider obtaining vitamin D 25OH level to assess nutriture   6. Consider adding thiamine 100 mg daily 2/2 poor PO intake/ malnutrition   7. MVI w/ minerals daily to ensure 100% RDA met   8. Monitor bowel movements, if no BM for >3 days, consider implementing bowel regimen.   9. Confirm goals of care regarding nutrition support   RD will continue to monitor PO intake, labs, hydration, and wt prn.

## 2023-10-28 NOTE — DIETITIAN INITIAL EVALUATION ADULT - NSFNSGIIOFT_GEN_A_CORE
I&O's Detail    27 Oct 2023 07:01  -  28 Oct 2023 07:00  --------------------------------------------------------  IN:  Total IN: 0 mL    OUT:    Indwelling Catheter - Urethral (mL): 850 mL  Total OUT: 850 mL    Total NET: -850 mL

## 2023-10-28 NOTE — DIETITIAN NUTRITION RISK NOTIFICATION - TREATMENT: THE FOLLOWING DIET HAS BEEN RECOMMENDED
Diet, DASH/TLC:   Sodium & Cholesterol Restricted  Supplement Feeding Modality:  Oral  Ensure Plus High Protein Cans or Servings Per Day:  1       Frequency:  Three Times a day (10-27-23 @ 15:32) [Active]

## 2023-10-28 NOTE — DIETITIAN INITIAL EVALUATION ADULT - OTHER INFO
89 y/o Female with PMHx of Afib on Eliquis, CLL, HTN, renal calculus, venous insufficiency with lymphedema, and dementia A&Ox2 baseline BIBEMS from home to the ED to rule out UTI. She had been shaky and more weak for the last few days. Home health Aid at bedside reported patient diagnosed with shingles infection posterior left shoulder a few weeks ago, completed course of Valacyclovir. Over the last few days patient with darker and cloudy urine in leg bag, increased abdominal distension, and shakiness. Yesterday patient was tachycardic and threw up, EMS was called, who found patient to be febrile. In the ED patient was febrile too.     Known to nutr services w/ multiple previous dx of moderate PCM on 9/10/23, 10/15/21 and 6/10/21. Unable to obtain wt hx and relevant information 2/2 pt [place] and hx dementia. Aide was present upon RD visit this morning, however, was unable to obtain information as the aide was on a phone call. Observed breakfast tray of eggs, pancakes and toast upon visit this morning, untouched. Currently on DASH diet, recommend to liberalize diet to regular to maximize PO intake. Receives Ensure plus HP TID, will recommend to c/w ONS to optimize PO intake. Bed scale wt 130# obtained by RD on 10/28. As per previous RD note on 9/10/23, wt 145# w/ 3+ edema noted, likely skewing wt. ? wt loss x~1 mo 2/2 previous edema. NFPE reveals moderate muscle/fat wasting. Appears frail and weak. Multiple PIs noted. See below for other recommendations.

## 2023-10-28 NOTE — PROVIDER CONTACT NOTE (OTHER) - ACTION/TREATMENT ORDERED:
PA made aware. Cardiology consulted already for positive troponins. No new orders or interventions at this time.

## 2023-10-28 NOTE — DIETITIAN INITIAL EVALUATION ADULT - NSICDXPASTMEDICALHX_GEN_ALL_CORE_FT
PAST MEDICAL HISTORY:  Acute cystitis without hematuria septic  4/2016    Essential hypertension     [place] (hard of hearing), bilateral     Hypertension     Lymphedema of both lower extremities     Monoclonal gammopathies     Paroxysmal atrial fibrillation     Spinal stenosis     Spondyloarthritis     Vaginal prolapse     Venous insufficiency

## 2023-10-28 NOTE — DIETITIAN INITIAL EVALUATION ADULT - NS FNS DIET ORDER
Diet, DASH/TLC:   Sodium & Cholesterol Restricted  Supplement Feeding Modality:  Oral  Ensure Plus High Protein Cans or Servings Per Day:  1       Frequency:  Three Times a day (10-27-23 @ 15:32)

## 2023-10-28 NOTE — DIETITIAN INITIAL EVALUATION ADULT - ORAL INTAKE PTA/DIET HISTORY
Unable to obtain diet hx 2/2 pt [place] and hx dementia, appeared confused upon RD visit this morning. As per H&P, presents from home 2/2 increased weakness and episode of vomiting PTA. Has HHA as per H&P.

## 2023-10-28 NOTE — DIETITIAN INITIAL EVALUATION ADULT - NSFNSPHYEXAMSKINFT_GEN_A_CORE
Vishnu = 11  Pressure Injury 1: Right:, buttocks, Stage II  Pressure Injury 2: Left:, buttocks, Stage II

## 2023-10-28 NOTE — DIETITIAN INITIAL EVALUATION ADULT - PERTINENT MEDS FT
MEDICATIONS  (STANDING):  apixaban 2.5 milliGRAM(s) Oral every 12 hours  artificial  tears Solution 1 Drop(s) Both EYES two times a day  cefTRIAXone Injectable. 1000 milliGRAM(s) IV Push every 24 hours  ferrous    sulfate 325 milliGRAM(s) Oral daily  furosemide    Tablet 20 milliGRAM(s) Oral two times a day  influenza  Vaccine (HIGH DOSE) 0.7 milliLiter(s) IntraMuscular once  losartan 50 milliGRAM(s) Oral two times a day  metoprolol succinate ER 25 milliGRAM(s) Oral daily  mirtazapine 22.5 milliGRAM(s) Oral at bedtime  pantoprazole    Tablet 40 milliGRAM(s) Oral before breakfast  senna 2 Tablet(s) Oral at bedtime  sodium chloride 0.9%. 1000 milliLiter(s) (50 mL/Hr) IV Continuous <Continuous>  spironolactone 25 milliGRAM(s) Oral daily    MEDICATIONS  (PRN):  acetaminophen     Tablet .. 650 milliGRAM(s) Oral every 6 hours PRN Temp greater or equal to 38C (100.4F), Mild Pain (1 - 3)  aluminum hydroxide/magnesium hydroxide/simethicone Suspension 30 milliLiter(s) Oral every 4 hours PRN Dyspepsia  LORazepam     Tablet 0.5 milliGRAM(s) Oral at bedtime PRN for anxiety  melatonin 3 milliGRAM(s) Oral at bedtime PRN Insomnia  ondansetron Injectable 4 milliGRAM(s) IV Push every 8 hours PRN Nausea and/or Vomiting  polyethylene glycol 3350 17 Gram(s) Oral daily PRN for constipation

## 2023-10-28 NOTE — PROGRESS NOTE ADULT - SUBJECTIVE AND OBJECTIVE BOX
History of Present Illness:   89 y/o Female with PMHx of Afib on Eliquis, CLL, HTN, renal calculus, venous insufficiency with lymphedema, and dementia, Urinary Retention s/p Reaves, Uti,  A&Ox2 baseline BIBEMS from home to the ED to rule out UTI. She had been shaky and more weak for the last few days. Home health Aid at bedside reported patient diagnosed with shingles infection posterior left shoulder a few weeks ago, completed course of Valacyclovir. Since then patient with increased blood pressure. Over the last few days patient with darker and cloudy urine in leg bag, increased abdominal distension, and shakiness. Yesterday patient was tachycardic and threw up, EMS was called, who found patient to be febrile. Denies any recent flu-like symptoms. In the ED patient was febrile too.   -found to be dehydrated (diuretic escalated as outpatient due to Htn), and found to have Uti/Bacteremia     10.27: seen in the ER, awake and oriented to name and place; no dyspnea, no cp  10.28: no confusion, no cp, no sob            REVIEW OF SYSTEMS:    CONSTITUTIONAL: No weakness, No fevers or chills  ENT: No ear ache, No sorethroat  NECK: No pain, No stiffness  RESPIRATORY: No cough, No wheezing, No hemoptysis; No dyspnea  CARDIOVASCULAR: No chest pain, No palpitations  GASTROINTESTINAL: No abd pain, No nausea, No vomiting, No hematemesis, No diarrhea or constipation. No melena, No hematochezia.  GENITOURINARY: No dysuria, No  hematuria  NEUROLOGICAL: No diplopia, No paresthesia, No motor dysfunction  MUSCULOSKELETAL: No arthralgia, No myalgia  SKIN: No rashes, or lesions   PSYCH: no anxiety, no suicidal ideation    All other review of systems is negative unless indicated above    Vital Signs Last 24 Hrs  T(C): 37.2 (28 Oct 2023 15:32), Max: 37.2 (28 Oct 2023 15:32)  T(F): 98.9 (28 Oct 2023 15:32), Max: 98.9 (28 Oct 2023 15:32)  HR: 68 (28 Oct 2023 15:32) (62 - 78)  BP: 117/49 (28 Oct 2023 15:32) (117/49 - 164/74)  BP(mean): --  RR: 18 (28 Oct 2023 15:32) (18 - 18)  SpO2: 96% (28 Oct 2023 15:32) (93% - 99%)    Parameters below as of 28 Oct 2023 15:32  Patient On (Oxygen Delivery Method): nasal cannula  O2 Flow (L/min): 1      PHYSICAL EXAM:    GENERAL: NAD  HEENT:  NC/AT, EOMI, PERRLA, No scleral icterus, Moist mucous membranes  NECK: Supple, No JVD  CNS:  Alert & Oriented X3, Motor Strength 5/5 B/L upper and lower extremities; DTRs 2+ intact   LUNG: Normal Breath sounds, Clear to auscultation bilaterally, No rales, No rhonchi, No wheezing  HEART: RRR; No murmurs, No rubs  ABDOMEN: +BS, ST/ND/NT  GENITOURINARY: Voiding, Bladder not distended  EXTREMITIES:  2+ Peripheral Pulses, No clubbing, No cyanosis, No tibial edema  MUSCULOSKELTAL: Joints normal ROM, No TTP, No effusion  VAGINAL: deferred  SKIN: no rashes  RECTAL: deferred, not indicated  BREAST: deferred                          12.5   13.43 )-----------( 75       ( 27 Oct 2023 06:37 )             37.5     10-28    133<L>  |  99  |  17  ----------------------------<  99  4.0   |  25  |  0.59    Ca    9.0      28 Oct 2023 07:33    TPro  6.5  /  Alb  3.3  /  TBili  1.2  /  DBili  x   /  AST  33  /  ALT  21  /  AlkPhos  87  10-27    Vancomycin levels:   Cultures:     MEDICATIONS  (STANDING):  amLODIPine   Tablet 2.5 milliGRAM(s) Oral daily  apixaban 2.5 milliGRAM(s) Oral every 12 hours  artificial  tears Solution 1 Drop(s) Both EYES two times a day  cefTRIAXone Injectable. 1000 milliGRAM(s) IV Push every 24 hours  ferrous    sulfate 325 milliGRAM(s) Oral daily  influenza  Vaccine (HIGH DOSE) 0.7 milliLiter(s) IntraMuscular once  losartan 50 milliGRAM(s) Oral two times a day  metoprolol succinate ER 25 milliGRAM(s) Oral daily  mirtazapine 22.5 milliGRAM(s) Oral at bedtime  pantoprazole    Tablet 40 milliGRAM(s) Oral before breakfast  senna 2 Tablet(s) Oral at bedtime  sodium chloride 0.9%. 1000 milliLiter(s) (50 mL/Hr) IV Continuous <Continuous>    MEDICATIONS  (PRN):  acetaminophen     Tablet .. 650 milliGRAM(s) Oral every 6 hours PRN Temp greater or equal to 38C (100.4F), Mild Pain (1 - 3)  aluminum hydroxide/magnesium hydroxide/simethicone Suspension 30 milliLiter(s) Oral every 4 hours PRN Dyspepsia  LORazepam     Tablet 0.5 milliGRAM(s) Oral at bedtime PRN for anxiety  melatonin 3 milliGRAM(s) Oral at bedtime PRN Insomnia  ondansetron Injectable 4 milliGRAM(s) IV Push every 8 hours PRN Nausea and/or Vomiting  polyethylene glycol 3350 17 Gram(s) Oral daily PRN for constipation      all labs reviewed  all imaging reviewed        A/P:    1. Sepsis due to GNR bacteremia/Uti  Uti related likely to chronic Reaves  Reaves changed in ED  -Started on IV antibiotics, Ceftriaxone day#2  f/u UCx, Blood Cx    2. Hyponatremia: due to dehydration  -s/p IV fluid  -Follow repeat sodium level in am: improved  d/c Aldactone  reduce Lasix to 20mg daily     3. Htn:  c/w BB, Losartan  Add Norvasc     4. History of CHF, HFpEF  compensated  -Continue Lasix at 20mg/day  d/c Aldactone   TTE: EF normal, mod pHtn    5. History of Permanent  A-fib  -Heart rate controlled  -Continue metoprolol and Eliquis    6. Elevated troponin  -Most likely demand ischemia  -We will repeat troponin  -Follow cardiology consult    9. h/o Decubitus ulcer  -in lower back  -follow wound care consult     d/w daughter over phone and son at bed side  d/w RN  time 51m

## 2023-10-29 LAB
-  AMIKACIN: SIGNIFICANT CHANGE UP
-  AMIKACIN: SIGNIFICANT CHANGE UP
-  AMPICILLIN/SULBACTAM: SIGNIFICANT CHANGE UP
-  AMPICILLIN/SULBACTAM: SIGNIFICANT CHANGE UP
-  AMPICILLIN: SIGNIFICANT CHANGE UP
-  AMPICILLIN: SIGNIFICANT CHANGE UP
-  AZTREONAM: SIGNIFICANT CHANGE UP
-  AZTREONAM: SIGNIFICANT CHANGE UP
-  CEFAZOLIN: SIGNIFICANT CHANGE UP
-  CEFAZOLIN: SIGNIFICANT CHANGE UP
-  CEFEPIME: SIGNIFICANT CHANGE UP
-  CEFEPIME: SIGNIFICANT CHANGE UP
-  CEFOXITIN: SIGNIFICANT CHANGE UP
-  CEFOXITIN: SIGNIFICANT CHANGE UP
-  CEFTRIAXONE: SIGNIFICANT CHANGE UP
-  CEFTRIAXONE: SIGNIFICANT CHANGE UP
-  CIPROFLOXACIN: SIGNIFICANT CHANGE UP
-  CIPROFLOXACIN: SIGNIFICANT CHANGE UP
-  ERTAPENEM: SIGNIFICANT CHANGE UP
-  ERTAPENEM: SIGNIFICANT CHANGE UP
-  GENTAMICIN: SIGNIFICANT CHANGE UP
-  GENTAMICIN: SIGNIFICANT CHANGE UP
-  IMIPENEM: SIGNIFICANT CHANGE UP
-  IMIPENEM: SIGNIFICANT CHANGE UP
-  LEVOFLOXACIN: SIGNIFICANT CHANGE UP
-  LEVOFLOXACIN: SIGNIFICANT CHANGE UP
-  MEROPENEM: SIGNIFICANT CHANGE UP
-  MEROPENEM: SIGNIFICANT CHANGE UP
-  PIPERACILLIN/TAZOBACTAM: SIGNIFICANT CHANGE UP
-  PIPERACILLIN/TAZOBACTAM: SIGNIFICANT CHANGE UP
-  TOBRAMYCIN: SIGNIFICANT CHANGE UP
-  TOBRAMYCIN: SIGNIFICANT CHANGE UP
-  TRIMETHOPRIM/SULFAMETHOXAZOLE: SIGNIFICANT CHANGE UP
-  TRIMETHOPRIM/SULFAMETHOXAZOLE: SIGNIFICANT CHANGE UP
ADD ON TEST-SPECIMEN IN LAB: SIGNIFICANT CHANGE UP
ADD ON TEST-SPECIMEN IN LAB: SIGNIFICANT CHANGE UP
ANION GAP SERPL CALC-SCNC: 5 MMOL/L — SIGNIFICANT CHANGE UP (ref 5–17)
ANION GAP SERPL CALC-SCNC: 5 MMOL/L — SIGNIFICANT CHANGE UP (ref 5–17)
BUN SERPL-MCNC: 13 MG/DL — SIGNIFICANT CHANGE UP (ref 7–23)
BUN SERPL-MCNC: 13 MG/DL — SIGNIFICANT CHANGE UP (ref 7–23)
CALCIUM SERPL-MCNC: 8.6 MG/DL — SIGNIFICANT CHANGE UP (ref 8.5–10.1)
CALCIUM SERPL-MCNC: 8.6 MG/DL — SIGNIFICANT CHANGE UP (ref 8.5–10.1)
CHLORIDE SERPL-SCNC: 99 MMOL/L — SIGNIFICANT CHANGE UP (ref 96–108)
CHLORIDE SERPL-SCNC: 99 MMOL/L — SIGNIFICANT CHANGE UP (ref 96–108)
CO2 SERPL-SCNC: 23 MMOL/L — SIGNIFICANT CHANGE UP (ref 22–31)
CO2 SERPL-SCNC: 23 MMOL/L — SIGNIFICANT CHANGE UP (ref 22–31)
CREAT SERPL-MCNC: 0.59 MG/DL — SIGNIFICANT CHANGE UP (ref 0.5–1.3)
CREAT SERPL-MCNC: 0.59 MG/DL — SIGNIFICANT CHANGE UP (ref 0.5–1.3)
CULTURE RESULTS: ABNORMAL
CULTURE RESULTS: ABNORMAL
EGFR: 84 ML/MIN/1.73M2 — SIGNIFICANT CHANGE UP
EGFR: 84 ML/MIN/1.73M2 — SIGNIFICANT CHANGE UP
GLUCOSE SERPL-MCNC: 102 MG/DL — HIGH (ref 70–99)
GLUCOSE SERPL-MCNC: 102 MG/DL — HIGH (ref 70–99)
HCT VFR BLD CALC: 32.8 % — LOW (ref 34.5–45)
HCT VFR BLD CALC: 32.8 % — LOW (ref 34.5–45)
HGB BLD-MCNC: 11.2 G/DL — LOW (ref 11.5–15.5)
HGB BLD-MCNC: 11.2 G/DL — LOW (ref 11.5–15.5)
MCHC RBC-ENTMCNC: 32.4 PG — SIGNIFICANT CHANGE UP (ref 27–34)
MCHC RBC-ENTMCNC: 32.4 PG — SIGNIFICANT CHANGE UP (ref 27–34)
MCHC RBC-ENTMCNC: 34.1 GM/DL — SIGNIFICANT CHANGE UP (ref 32–36)
MCHC RBC-ENTMCNC: 34.1 GM/DL — SIGNIFICANT CHANGE UP (ref 32–36)
MCV RBC AUTO: 94.8 FL — SIGNIFICANT CHANGE UP (ref 80–100)
MCV RBC AUTO: 94.8 FL — SIGNIFICANT CHANGE UP (ref 80–100)
METHOD TYPE: SIGNIFICANT CHANGE UP
METHOD TYPE: SIGNIFICANT CHANGE UP
ORGANISM # SPEC MICROSCOPIC CNT: ABNORMAL
ORGANISM # SPEC MICROSCOPIC CNT: SIGNIFICANT CHANGE UP
ORGANISM # SPEC MICROSCOPIC CNT: SIGNIFICANT CHANGE UP
OSMOLALITY UR: 241 MOSM/KG — SIGNIFICANT CHANGE UP (ref 50–1200)
OSMOLALITY UR: 241 MOSM/KG — SIGNIFICANT CHANGE UP (ref 50–1200)
PLATELET # BLD AUTO: 58 K/UL — LOW (ref 150–400)
PLATELET # BLD AUTO: 58 K/UL — LOW (ref 150–400)
POTASSIUM SERPL-MCNC: 4.5 MMOL/L — SIGNIFICANT CHANGE UP (ref 3.5–5.3)
POTASSIUM SERPL-MCNC: 4.5 MMOL/L — SIGNIFICANT CHANGE UP (ref 3.5–5.3)
POTASSIUM SERPL-SCNC: 4.5 MMOL/L — SIGNIFICANT CHANGE UP (ref 3.5–5.3)
POTASSIUM SERPL-SCNC: 4.5 MMOL/L — SIGNIFICANT CHANGE UP (ref 3.5–5.3)
RBC # BLD: 3.46 M/UL — LOW (ref 3.8–5.2)
RBC # BLD: 3.46 M/UL — LOW (ref 3.8–5.2)
RBC # FLD: 14.4 % — SIGNIFICANT CHANGE UP (ref 10.3–14.5)
RBC # FLD: 14.4 % — SIGNIFICANT CHANGE UP (ref 10.3–14.5)
SODIUM SERPL-SCNC: 127 MMOL/L — LOW (ref 135–145)
SODIUM SERPL-SCNC: 127 MMOL/L — LOW (ref 135–145)
SODIUM UR-SCNC: <20 MMOL/L — SIGNIFICANT CHANGE UP
SODIUM UR-SCNC: <20 MMOL/L — SIGNIFICANT CHANGE UP
SPECIMEN SOURCE: SIGNIFICANT CHANGE UP
SPECIMEN SOURCE: SIGNIFICANT CHANGE UP
WBC # BLD: 6.42 K/UL — SIGNIFICANT CHANGE UP (ref 3.8–10.5)
WBC # BLD: 6.42 K/UL — SIGNIFICANT CHANGE UP (ref 3.8–10.5)
WBC # FLD AUTO: 6.42 K/UL — SIGNIFICANT CHANGE UP (ref 3.8–10.5)
WBC # FLD AUTO: 6.42 K/UL — SIGNIFICANT CHANGE UP (ref 3.8–10.5)

## 2023-10-29 PROCEDURE — 93010 ELECTROCARDIOGRAM REPORT: CPT

## 2023-10-29 PROCEDURE — 99233 SBSQ HOSP IP/OBS HIGH 50: CPT

## 2023-10-29 RX ADMIN — Medication 1 DROP(S): at 22:08

## 2023-10-29 RX ADMIN — MIRTAZAPINE 22.5 MILLIGRAM(S): 45 TABLET, ORALLY DISINTEGRATING ORAL at 22:08

## 2023-10-29 RX ADMIN — APIXABAN 2.5 MILLIGRAM(S): 2.5 TABLET, FILM COATED ORAL at 09:48

## 2023-10-29 RX ADMIN — Medication 20 MILLIGRAM(S): at 09:48

## 2023-10-29 RX ADMIN — AMLODIPINE BESYLATE 2.5 MILLIGRAM(S): 2.5 TABLET ORAL at 09:46

## 2023-10-29 RX ADMIN — Medication 650 MILLIGRAM(S): at 16:59

## 2023-10-29 RX ADMIN — Medication 325 MILLIGRAM(S): at 09:48

## 2023-10-29 RX ADMIN — Medication 3 MILLIGRAM(S): at 22:09

## 2023-10-29 RX ADMIN — SENNA PLUS 2 TABLET(S): 8.6 TABLET ORAL at 22:08

## 2023-10-29 RX ADMIN — LOSARTAN POTASSIUM 50 MILLIGRAM(S): 100 TABLET, FILM COATED ORAL at 09:48

## 2023-10-29 RX ADMIN — CEFTRIAXONE 1000 MILLIGRAM(S): 500 INJECTION, POWDER, FOR SOLUTION INTRAMUSCULAR; INTRAVENOUS at 05:57

## 2023-10-29 RX ADMIN — LOSARTAN POTASSIUM 50 MILLIGRAM(S): 100 TABLET, FILM COATED ORAL at 22:08

## 2023-10-29 RX ADMIN — Medication 25 MILLIGRAM(S): at 09:48

## 2023-10-29 RX ADMIN — PANTOPRAZOLE SODIUM 40 MILLIGRAM(S): 20 TABLET, DELAYED RELEASE ORAL at 06:09

## 2023-10-29 RX ADMIN — APIXABAN 2.5 MILLIGRAM(S): 2.5 TABLET, FILM COATED ORAL at 22:08

## 2023-10-29 NOTE — PROVIDER CONTACT NOTE (OTHER) - SITUATION
Answering service is aware of consult spoke to La
Pt had 12 beats vtach. Pt back to afib on monitor, rate controlled.

## 2023-10-29 NOTE — PROGRESS NOTE ADULT - SUBJECTIVE AND OBJECTIVE BOX
History of Present Illness:   89 y/o Female with PMHx of Afib on Eliquis, CLL, HTN, renal calculus, venous insufficiency with lymphedema, and dementia, Urinary Retention s/p Reaves, Uti,  A&Ox2 baseline BIBEMS from home to the ED to rule out UTI. She had been shaky and more weak for the last few days. Home health Aid at bedside reported patient diagnosed with shingles infection posterior left shoulder a few weeks ago, completed course of Valacyclovir. Since then patient with increased blood pressure. Over the last few days patient with darker and cloudy urine in leg bag, increased abdominal distension, and shakiness. Yesterday patient was tachycardic and threw up, EMS was called, who found patient to be febrile. Denies any recent flu-like symptoms. In the ED patient was febrile too.   -found to be dehydrated (diuretic escalated as outpatient due to Htn), and found to have Uti/Bacteremia     10.27: seen in the ER, awake and oriented to name and place; no dyspnea, no cp  10.28: no confusion, no cp, no sob  10.29: alert, no pain  Tele: Afib, rate controlled           REVIEW OF SYSTEMS:    CONSTITUTIONAL: No weakness, No fevers or chills  ENT: No ear ache, No sorethroat  NECK: No pain, No stiffness  RESPIRATORY: No cough, No wheezing, No hemoptysis; No dyspnea  CARDIOVASCULAR: No chest pain, No palpitations  GASTROINTESTINAL: No abd pain, No nausea, No vomiting, No hematemesis, No diarrhea or constipation. No melena, No hematochezia.  GENITOURINARY: No dysuria, No  hematuria  NEUROLOGICAL: No diplopia, No paresthesia, No motor dysfunction  MUSCULOSKELETAL: No arthralgia, No myalgia  SKIN: No rashes, or lesions   PSYCH: no anxiety, no suicidal ideation    All other review of systems is negative unless indicated above    Vital Signs Last 24 Hrs  T(C): 37.6 (29 Oct 2023 12:00), Max: 37.7 (29 Oct 2023 09:21)  T(F): 99.7 (29 Oct 2023 12:00), Max: 99.9 (29 Oct 2023 09:21)  HR: 65 (29 Oct 2023 08:42) (65 - 72)  BP: 149/64 (29 Oct 2023 08:42) (117/49 - 149/64)  RR: 18 (29 Oct 2023 08:42) (18 - 18)  SpO2: 94% (29 Oct 2023 08:42) (89% - 97%)    Parameters below as of 29 Oct 2023 08:42  Patient On (Oxygen Delivery Method): nasal cannula  O2 Flow (L/min): 2      PHYSICAL EXAM:    GENERAL: NAD, Well nourished  HEENT:  NC/AT, EOMI, PERRLA, No scleral icterus, Moist mucous membranes  NECK: Supple, No JVD  CNS:  Alert & Oriented X3, Motor Strength 5/5 B/L upper and lower extremities; DTRs 2+ intact   LUNG: Normal Breath sounds, Clear to auscultation bilaterally, No rales, No rhonchi, No wheezing  HEART: irregular; No murmurs, No rubs  ABDOMEN: +BS, ST/ND/NT  GENITOURINARY- Voiding, Bladder not distended  EXTREMITIES:  2+ Peripheral Pulses, No clubbing, No cyanosis, No tibial edema  MUSCULOSKELTAL: Joints normal ROM, No joint tenderness, No muscle tenderness  VAGINAL: deferred  RECTAL: deferred, not indicated  BREAST: deferred  Skin: no lesions       Labs:                        11.2   6.42  )-----------( 58       ( 29 Oct 2023 06:30 )             32.8     10-29    127<L>  |  99  |  13  ----------------------------<  102<H>  4.5   |  23  |  0.59    Ca    8.6      29 Oct 2023 06:30      MEDICATIONS  (STANDING):  amLODIPine   Tablet 2.5 milliGRAM(s) Oral daily  apixaban 2.5 milliGRAM(s) Oral every 12 hours  artificial  tears Solution 1 Drop(s) Both EYES two times a day  cefTRIAXone Injectable. 1000 milliGRAM(s) IV Push every 24 hours  ferrous    sulfate 325 milliGRAM(s) Oral daily  furosemide    Tablet 20 milliGRAM(s) Oral daily  influenza  Vaccine (HIGH DOSE) 0.7 milliLiter(s) IntraMuscular once  losartan 50 milliGRAM(s) Oral two times a day  metoprolol succinate ER 25 milliGRAM(s) Oral daily  mirtazapine 22.5 milliGRAM(s) Oral at bedtime  pantoprazole    Tablet 40 milliGRAM(s) Oral before breakfast  senna 2 Tablet(s) Oral at bedtime        all labs reviewed  all imaging reviewed        A/P:    1. 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 Hyponatremia: due to dehydration:  suspect diuretic induced  d/c Aldactone  reduce Lasix to 20mg daily     -s/p IV fluid  -Follow repeat sodium level in am: improved initially now down again to 127    -r/o SIADH:  check serum osmol, urine Na, urine osmol  check uric Acid    3. Htn: better controlled on current regimen   c/w BB, Losartan  Add Norvasc 2.5mg/day    4. History of CHF, HFpEF  compensated  -Continue Lasix at 20mg/day  d/c Aldactone   TTE: EF normal, mod pHtn    5. History of Permanent  A-fib  -Heart rate controlled  -Continue metoprolol and Eliquis    6. Elevated troponin  -Most likely demand ischemia  -We will repeat troponin  -Follow cardiology consult    9. h/o Decubitus ulcer  -in lower back  -follow wound care consult     d/w daughter extensively  over phone and son at bed side  d/w RN  time 51m History of Present Illness:   89 y/o Female with PMHx of Afib on Eliquis, CLL, HTN, renal calculus, venous insufficiency with lymphedema, and dementia, Urinary Retention s/p Reaves, Uti,  A&Ox2 baseline BIBEMS from home to the ED to rule out UTI. She had been shaky and more weak for the last few days. Home health Aid at bedside reported patient diagnosed with shingles infection posterior left shoulder a few weeks ago, completed course of Valacyclovir. Since then patient with increased blood pressure. Over the last few days patient with darker and cloudy urine in leg bag, increased abdominal distension, and shakiness. Yesterday patient was tachycardic and threw up, EMS was called, who found patient to be febrile. Denies any recent flu-like symptoms. In the ED patient was febrile too.   -found to be dehydrated (diuretic escalated as outpatient due to Htn), and found to have Uti/Bacteremia     10.27: seen in the ER, awake and oriented to name and place; no dyspnea, no cp  10.28: no confusion, no cp, no sob  10.29: alert, no pain  Tele: Afib, rate controlled           REVIEW OF SYSTEMS:    CONSTITUTIONAL: No weakness, No fevers or chills  ENT: No ear ache, No sorethroat  NECK: No pain, No stiffness  RESPIRATORY: No cough, No wheezing, No hemoptysis; No dyspnea  CARDIOVASCULAR: No chest pain, No palpitations  GASTROINTESTINAL: No abd pain, No nausea, No vomiting, No hematemesis, No diarrhea or constipation. No melena, No hematochezia.  GENITOURINARY: No dysuria, No  hematuria  NEUROLOGICAL: No diplopia, No paresthesia, No motor dysfunction  MUSCULOSKELETAL: No arthralgia, No myalgia  SKIN: No rashes, or lesions   PSYCH: no anxiety, no suicidal ideation    All other review of systems is negative unless indicated above    Vital Signs Last 24 Hrs  T(C): 37.6 (29 Oct 2023 12:00), Max: 37.7 (29 Oct 2023 09:21)  T(F): 99.7 (29 Oct 2023 12:00), Max: 99.9 (29 Oct 2023 09:21)  HR: 65 (29 Oct 2023 08:42) (65 - 72)  BP: 149/64 (29 Oct 2023 08:42) (117/49 - 149/64)  RR: 18 (29 Oct 2023 08:42) (18 - 18)  SpO2: 94% (29 Oct 2023 08:42) (89% - 97%)    Parameters below as of 29 Oct 2023 08:42  Patient On (Oxygen Delivery Method): nasal cannula  O2 Flow (L/min): 2      PHYSICAL EXAM:    GENERAL: NAD, Well nourished  HEENT:  NC/AT, EOMI, PERRLA, No scleral icterus, Moist mucous membranes  NECK: Supple, No JVD  CNS:  Alert & Oriented X3, Motor Strength 5/5 B/L upper and lower extremities; DTRs 2+ intact   LUNG: Normal Breath sounds, Clear to auscultation bilaterally, No rales, No rhonchi, No wheezing  HEART: irregular; No murmurs, No rubs  ABDOMEN: +BS, ST/ND/NT  GENITOURINARY- Voiding, Bladder not distended  EXTREMITIES:  2+ Peripheral Pulses, No clubbing, No cyanosis, No tibial edema  MUSCULOSKELTAL: Joints normal ROM, No joint tenderness, No muscle tenderness  VAGINAL: deferred  RECTAL: deferred, not indicated  BREAST: deferred  Skin: no lesions       Labs:                        11.2   6.42  )-----------( 58       ( 29 Oct 2023 06:30 )             32.8     10-29    127<L>  |  99  |  13  ----------------------------<  102<H>  4.5   |  23  |  0.59    Ca    8.6      29 Oct 2023 06:30      MEDICATIONS  (STANDING):  amLODIPine   Tablet 2.5 milliGRAM(s) Oral daily  apixaban 2.5 milliGRAM(s) Oral every 12 hours  artificial  tears Solution 1 Drop(s) Both EYES two times a day  cefTRIAXone Injectable. 1000 milliGRAM(s) IV Push every 24 hours  ferrous    sulfate 325 milliGRAM(s) Oral daily  furosemide    Tablet 20 milliGRAM(s) Oral daily  influenza  Vaccine (HIGH DOSE) 0.7 milliLiter(s) IntraMuscular once  losartan 50 milliGRAM(s) Oral two times a day  metoprolol succinate ER 25 milliGRAM(s) Oral daily  mirtazapine 22.5 milliGRAM(s) Oral at bedtime  pantoprazole    Tablet 40 milliGRAM(s) Oral before breakfast  senna 2 Tablet(s) Oral at bedtime        all labs reviewed  all imaging reviewed        A/P:    1. 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 Hyponatremia: due to dehydration:  suspect diuretic induced  d/c Aldactone  reduce Lasix to 20mg daily     -s/p IV fluid  -Follow repeat sodium level in am: improved initially now down again to 127  -stop IVF due to risk of fluid overload   -r/o SIADH:  check serum osmol, urine Na, urine osmol  check uric Acid    3. Htn: better controlled on current regimen   c/w BB, Losartan  Add Norvasc 2.5mg/day    4. History of CHF, HFpEF  compensated  -Continue Lasix at 20mg/day  d/c Aldactone   TTE: EF normal, mod pHtn    5. History of Permanent  A-fib  -Heart rate controlled  -Continue metoprolol and Eliquis    6. Elevated troponin  -Most likely demand ischemia  -We will repeat troponin: normalized   -Follow cardiology consult    9. h/o Decubitus ulcer  -in lower back  -follow wound care consult     d/w daughter extensively  over phone and son at bed side  d/w RN  time 51m

## 2023-10-30 LAB
ADD ON TEST-SPECIMEN IN LAB: SIGNIFICANT CHANGE UP
ADD ON TEST-SPECIMEN IN LAB: SIGNIFICANT CHANGE UP
ANION GAP SERPL CALC-SCNC: 5 MMOL/L — SIGNIFICANT CHANGE UP (ref 5–17)
ANION GAP SERPL CALC-SCNC: 5 MMOL/L — SIGNIFICANT CHANGE UP (ref 5–17)
BUN SERPL-MCNC: 11 MG/DL — SIGNIFICANT CHANGE UP (ref 7–23)
BUN SERPL-MCNC: 11 MG/DL — SIGNIFICANT CHANGE UP (ref 7–23)
CALCIUM SERPL-MCNC: 8.7 MG/DL — SIGNIFICANT CHANGE UP (ref 8.5–10.1)
CALCIUM SERPL-MCNC: 8.7 MG/DL — SIGNIFICANT CHANGE UP (ref 8.5–10.1)
CHLORIDE SERPL-SCNC: 100 MMOL/L — SIGNIFICANT CHANGE UP (ref 96–108)
CHLORIDE SERPL-SCNC: 100 MMOL/L — SIGNIFICANT CHANGE UP (ref 96–108)
CO2 SERPL-SCNC: 25 MMOL/L — SIGNIFICANT CHANGE UP (ref 22–31)
CO2 SERPL-SCNC: 25 MMOL/L — SIGNIFICANT CHANGE UP (ref 22–31)
CREAT SERPL-MCNC: 0.5 MG/DL — SIGNIFICANT CHANGE UP (ref 0.5–1.3)
CREAT SERPL-MCNC: 0.5 MG/DL — SIGNIFICANT CHANGE UP (ref 0.5–1.3)
EGFR: 88 ML/MIN/1.73M2 — SIGNIFICANT CHANGE UP
EGFR: 88 ML/MIN/1.73M2 — SIGNIFICANT CHANGE UP
GLUCOSE SERPL-MCNC: 93 MG/DL — SIGNIFICANT CHANGE UP (ref 70–99)
GLUCOSE SERPL-MCNC: 93 MG/DL — SIGNIFICANT CHANGE UP (ref 70–99)
OSMOLALITY SERPL: 272 MOSMOL/KG — LOW (ref 280–301)
OSMOLALITY SERPL: 272 MOSMOL/KG — LOW (ref 280–301)
POTASSIUM SERPL-MCNC: 4.5 MMOL/L — SIGNIFICANT CHANGE UP (ref 3.5–5.3)
POTASSIUM SERPL-MCNC: 4.5 MMOL/L — SIGNIFICANT CHANGE UP (ref 3.5–5.3)
POTASSIUM SERPL-SCNC: 4.5 MMOL/L — SIGNIFICANT CHANGE UP (ref 3.5–5.3)
POTASSIUM SERPL-SCNC: 4.5 MMOL/L — SIGNIFICANT CHANGE UP (ref 3.5–5.3)
SODIUM SERPL-SCNC: 130 MMOL/L — LOW (ref 135–145)
SODIUM SERPL-SCNC: 130 MMOL/L — LOW (ref 135–145)
TROPONIN I, HIGH SENSITIVITY RESULT: 24.86 NG/L — SIGNIFICANT CHANGE UP
TROPONIN I, HIGH SENSITIVITY RESULT: 24.86 NG/L — SIGNIFICANT CHANGE UP
TROPONIN I, HIGH SENSITIVITY RESULT: 25.58 NG/L — SIGNIFICANT CHANGE UP
TROPONIN I, HIGH SENSITIVITY RESULT: 25.58 NG/L — SIGNIFICANT CHANGE UP

## 2023-10-30 PROCEDURE — 93010 ELECTROCARDIOGRAM REPORT: CPT

## 2023-10-30 PROCEDURE — 99232 SBSQ HOSP IP/OBS MODERATE 35: CPT

## 2023-10-30 RX ORDER — LACTOBACILLUS ACIDOPHILUS 100MM CELL
1 CAPSULE ORAL DAILY
Refills: 0 | Status: DISCONTINUED | OUTPATIENT
Start: 2023-10-30 | End: 2023-11-05

## 2023-10-30 RX ADMIN — Medication 1 DROP(S): at 10:54

## 2023-10-30 RX ADMIN — PANTOPRAZOLE SODIUM 40 MILLIGRAM(S): 20 TABLET, DELAYED RELEASE ORAL at 06:30

## 2023-10-30 RX ADMIN — Medication 25 MILLIGRAM(S): at 10:54

## 2023-10-30 RX ADMIN — CEFTRIAXONE 1000 MILLIGRAM(S): 500 INJECTION, POWDER, FOR SOLUTION INTRAMUSCULAR; INTRAVENOUS at 06:30

## 2023-10-30 RX ADMIN — APIXABAN 2.5 MILLIGRAM(S): 2.5 TABLET, FILM COATED ORAL at 22:30

## 2023-10-30 RX ADMIN — LOSARTAN POTASSIUM 50 MILLIGRAM(S): 100 TABLET, FILM COATED ORAL at 22:30

## 2023-10-30 RX ADMIN — LOSARTAN POTASSIUM 50 MILLIGRAM(S): 100 TABLET, FILM COATED ORAL at 10:54

## 2023-10-30 RX ADMIN — Medication 20 MILLIGRAM(S): at 10:54

## 2023-10-30 RX ADMIN — Medication 325 MILLIGRAM(S): at 10:53

## 2023-10-30 RX ADMIN — SENNA PLUS 2 TABLET(S): 8.6 TABLET ORAL at 22:30

## 2023-10-30 RX ADMIN — Medication 3 MILLIGRAM(S): at 22:33

## 2023-10-30 RX ADMIN — Medication 1 DROP(S): at 22:35

## 2023-10-30 RX ADMIN — AMLODIPINE BESYLATE 2.5 MILLIGRAM(S): 2.5 TABLET ORAL at 10:54

## 2023-10-30 RX ADMIN — Medication 650 MILLIGRAM(S): at 11:55

## 2023-10-30 RX ADMIN — APIXABAN 2.5 MILLIGRAM(S): 2.5 TABLET, FILM COATED ORAL at 10:55

## 2023-10-30 RX ADMIN — Medication 0.25 MILLIGRAM(S): at 22:29

## 2023-10-30 RX ADMIN — Medication 650 MILLIGRAM(S): at 10:55

## 2023-10-30 RX ADMIN — MIRTAZAPINE 22.5 MILLIGRAM(S): 45 TABLET, ORALLY DISINTEGRATING ORAL at 22:31

## 2023-10-30 NOTE — PROGRESS NOTE ADULT - SUBJECTIVE AND OBJECTIVE BOX
History of Present Illness:   89 y/o Female with PMHx of Afib on Eliquis, CLL, HTN, renal calculus, venous insufficiency with lymphedema, and dementia, Urinary Retention s/p Reaves, Uti,  A&Ox2 baseline BIBEMS from home to the ED to rule out UTI. She had been shaky and more weak for the last few days. Home health Aid at bedside reported patient diagnosed with shingles infection posterior left shoulder a few weeks ago, completed course of Valacyclovir. Since then patient with increased blood pressure. Over the last few days patient with darker and cloudy urine in leg bag, increased abdominal distension, and shakiness. Yesterday patient was tachycardic and threw up, EMS was called, who found patient to be febrile. Denies any recent flu-like symptoms. In the ED patient was febrile too.   -found to be dehydrated (diuretic escalated as outpatient due to Htn), and found to have Uti/Bacteremia     Vital Signs Last 24 Hrs  T(C): 36.3 (29 Oct 2023 21:06), Max: 37.7 (29 Oct 2023 09:21)  T(F): 97.3 (29 Oct 2023 21:06), Max: 99.9 (29 Oct 2023 09:21)  HR: 71 (29 Oct 2023 21:06) (65 - 71)  BP: 147/60 (29 Oct 2023 21:06) (127/54 - 149/64)  BP(mean): --  RR: 18 (29 Oct 2023 21:06) (18 - 18)  SpO2: 99% (29 Oct 2023 21:06) (89% - 99%)    Parameters below as of 29 Oct 2023 21:06  Patient On (Oxygen Delivery Method): nasal cannula          PHYSICAL EXAM:    GENERAL: NAD, Well nourished  HEENT:  NC/AT, EOMI, PERRLA, No scleral icterus, Moist mucous membranes  NECK: Supple, No JVD  CNS:  Alert & Oriented X3, Motor Strength 5/5 B/L upper and lower extremities; DTRs 2+ intact   LUNG: Normal Breath sounds, Clear to auscultation bilaterally, No rales, No rhonchi, No wheezing  HEART: irregular; No murmurs, No rubs  ABDOMEN: +BS, ST/ND/NT  GENITOURINARY- Voiding, Bladder not distended  EXTREMITIES:  2+ Peripheral Pulses, No clubbing, No cyanosis, No tibial edema  MUSCULOSKELTAL: Joints normal ROM, No joint tenderness, No muscle tenderness                            11.2   6.42  )-----------( 58       ( 29 Oct 2023 06:30 )             32.8       MEDICATIONS  (STANDING):  amLODIPine   Tablet 2.5 milliGRAM(s) Oral daily  apixaban 2.5 milliGRAM(s) Oral every 12 hours  artificial  tears Solution 1 Drop(s) Both EYES two times a day  10-29    127<L>  |  99  |  13  ----------------------------<  102<H>  4.5   |  23  |  0.59    Ca    8.6      29 Oct 2023 06:30    cefTRIAXone Injectable. 1000 milliGRAM(s) IV Push every 24 hours  ferrous    sulfate 325 milliGRAM(s) Oral daily  furosemide    Tablet 20 milliGRAM(s) Oral daily  influenza  Vaccine (HIGH DOSE) 0.7 milliLiter(s) IntraMuscular once  losartan 50 milliGRAM(s) Oral two times a day  metoprolol succinate ER 25 milliGRAM(s) Oral daily  mirtazapine 22.5 milliGRAM(s) Oral at bedtime  pantoprazole    Tablet 40 milliGRAM(s) Oral before breakfast  senna 2 Tablet(s) Oral at bedtime        all labs reviewed  all imaging reviewed    t< from: TTE Echo Complete w/o Contrast w/ Doppler (09.08.23 @ 14:57) >   Summary     Endocardium is not well visualized, however, overall left ventricular   systolic function appears normal. Technically Difficult Study.   The left atrium is severely dilated.   Moderate aortic stenosis.   Moderate (2+) aortic regurgitation.   Moderate to severe (3+) tricuspid valve regurgitation.          A/P:    1. 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 Hyponatremia: due to dehydration:  suspect diuretic induced  d/c Aldactone  reduce Lasix to 20mg daily     -s/p IV fluid  has severe TR , off IVF    3. Htn: better controlled on current regimen   c/w BB, Losartan  Add Norvasc 2.5mg/day    4. History of CHF, HFpEF  compensated  -Continue Lasix at 20mg/day  d/c Aldactone   TTE: EF normal, mod pHtn    5. History of Permanent  A-fib  -Heart rate controlled  -Continue metoprolol and Eliquis    9. h/o Decubitus ulcer  -in lower back  -follow wound care consult       time 51m History of Present Illness:   89 y/o Female with PMHx of Afib on Eliquis, CLL, HTN, renal calculus, venous insufficiency with lymphedema, and dementia, Urinary Retention s/p Reaves, Uti,  A&Ox2 baseline BIBEMS from home to the ED to rule out UTI. She had been shaky and more weak for the last few days. Home health Aid at bedside reported patient diagnosed with shingles infection posterior left shoulder a few weeks ago, completed course of Valacyclovir. Since then patient with increased blood pressure. Over the last few days patient with darker and cloudy urine in leg bag, increased abdominal distension, and shakiness. Yesterday patient was tachycardic and threw up, EMS was called, who found patient to be febrile. Denies any recent flu-like symptoms. In the ED patient was febrile too.   -found to be dehydrated (diuretic escalated as outpatient due to Htn), and found to have Uti/Bacteremia     Vital Signs Last 24 Hrs  T(C): 36.3 (29 Oct 2023 21:06), Max: 37.7 (29 Oct 2023 09:21)  T(F): 97.3 (29 Oct 2023 21:06), Max: 99.9 (29 Oct 2023 09:21)  HR: 71 (29 Oct 2023 21:06) (65 - 71)  BP: 147/60 (29 Oct 2023 21:06) (127/54 - 149/64)  BP(mean): --  RR: 18 (29 Oct 2023 21:06) (18 - 18)  SpO2: 99% (29 Oct 2023 21:06) (89% - 99%)    Parameters below as of 29 Oct 2023 21:06  Patient On (Oxygen Delivery Method): nasal cannula          PHYSICAL EXAM:    GENERAL: NAD, Well nourished  HEENT:  NC/AT, EOMI, PERRLA, No scleral icterus, Moist mucous membranes  NECK: Supple, No JVD  CNS:  Alert & Oriented X3, Motor Strength 5/5 B/L upper and lower extremities; DTRs 2+ intact   LUNG: Normal Breath sounds, Clear to auscultation bilaterally, No rales, No rhonchi, No wheezing  HEART: irregular; No murmurs, No rubs  ABDOMEN: +BS, ST/ND/NT  GENITOURINARY- Voiding, Bladder not distended  EXTREMITIES:  2+ Peripheral Pulses, No clubbing, No cyanosis, No tibial edema  MUSCULOSKELTAL: Joints normal ROM, No joint tenderness, No muscle tenderness                            11.2   6.42  )-----------( 58       ( 29 Oct 2023 06:30 )             32.8       MEDICATIONS  (STANDING):  amLODIPine   Tablet 2.5 milliGRAM(s) Oral daily  apixaban 2.5 milliGRAM(s) Oral every 12 hours  artificial  tears Solution 1 Drop(s) Both EYES two times a day  10-29    127<L>  |  99  |  13  ----------------------------<  102<H>  4.5   |  23  |  0.59    Ca    8.6      29 Oct 2023 06:30    cefTRIAXone Injectable. 1000 milliGRAM(s) IV Push every 24 hours  ferrous    sulfate 325 milliGRAM(s) Oral daily  furosemide    Tablet 20 milliGRAM(s) Oral daily  influenza  Vaccine (HIGH DOSE) 0.7 milliLiter(s) IntraMuscular once  losartan 50 milliGRAM(s) Oral two times a day  metoprolol succinate ER 25 milliGRAM(s) Oral daily  mirtazapine 22.5 milliGRAM(s) Oral at bedtime  pantoprazole    Tablet 40 milliGRAM(s) Oral before breakfast  senna 2 Tablet(s) Oral at bedtime        all labs reviewed  all imaging reviewed    t< from: TTE Echo Complete w/o Contrast w/ Doppler (09.08.23 @ 14:57) >   Summary     Endocardium is not well visualized, however, overall left ventricular   systolic function appears normal. Technically Difficult Study.   The left atrium is severely dilated.   Moderate aortic stenosis.   Moderate (2+) aortic regurgitation.   Moderate to severe (3+) tricuspid valve regurgitation.          A/P:    1. 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 Hyponatremia: due to dehydration:  suspect diuretic induced  off  Aldactone  lasix was reduced  to 20mg daily on 10/29    -s/p IV fluid  has severe TR , off IVF    3. Htn: better controlled on current regimen   c/w BB, Losartan   Norvasc 2.5mg/day added 10/29    4. History of CHF, HFpEF  compensated  -Continue Lasix at 20mg/day  off Aldactone   TTE: EF normal, mod pHtn    5. History of Permanent  A-fib  -Heart rate controlled  -Continue metoprolol and Eliquis    9. h/o Decubitus ulcer  -in lower back  -follow wound care consult       time 51m case d/w daughter History of Present Illness:   89 y/o Female with PMHx of Afib on Eliquis, CLL, HTN, renal calculus, venous insufficiency with lymphedema, and dementia, Urinary Retention s/p Reaves, Uti,  A&Ox2 baseline BIBEMS from home to the ED to rule out UTI. She had been shaky and more weak for the last few days. Home health Aid at bedside reported patient diagnosed with shingles infection posterior left shoulder a few weeks ago, completed course of Valacyclovir. Since then patient with increased blood pressure. Over the last few days patient with darker and cloudy urine in leg bag, increased abdominal distension, and shakiness. Yesterday patient was tachycardic and threw up, EMS was called, who found patient to be febrile. Denies any recent flu-like symptoms. In the ED patient was febrile too.   -found to be dehydrated (diuretic escalated as outpatient due to Htn), and found to have Uti/Bacteremia     Vital Signs Last 24 Hrs  T(C): 36.3 (29 Oct 2023 21:06), Max: 37.7 (29 Oct 2023 09:21)  T(F): 97.3 (29 Oct 2023 21:06), Max: 99.9 (29 Oct 2023 09:21)  HR: 71 (29 Oct 2023 21:06) (65 - 71)  BP: 147/60 (29 Oct 2023 21:06) (127/54 - 149/64)  BP(mean): --  RR: 18 (29 Oct 2023 21:06) (18 - 18)  SpO2: 99% (29 Oct 2023 21:06) (89% - 99%)    Parameters below as of 29 Oct 2023 21:06  Patient On (Oxygen Delivery Method): nasal cannula          PHYSICAL EXAM:    GENERAL: NAD, Well nourished  HEENT:  NC/AT, EOMI, PERRLA, No scleral icterus, Moist mucous membranes  NECK: Supple, No JVD  CNS:  Alert & Oriented X3, Motor Strength 5/5 B/L upper and lower extremities; DTRs 2+ intact   LUNG: Normal Breath sounds, Clear to auscultation bilaterally, No rales, No rhonchi, No wheezing  HEART: irregular; No murmurs, No rubs  ABDOMEN: +BS, ST/ND/NT  GENITOURINARY- Voiding, Bladder not distended  EXTREMITIES:  2+ Peripheral Pulses, No clubbing, No cyanosis, No tibial edema  MUSCULOSKELTAL: Joints normal ROM, No joint tenderness, No muscle tenderness                            11.2   6.42  )-----------( 58       ( 29 Oct 2023 06:30 )             32.8       MEDICATIONS  (STANDING):  amLODIPine   Tablet 2.5 milliGRAM(s) Oral daily  apixaban 2.5 milliGRAM(s) Oral every 12 hours  artificial  tears Solution 1 Drop(s) Both EYES two times a day  10-29    127<L>  |  99  |  13  ----------------------------<  102<H>  4.5   |  23  |  0.59    Ca    8.6      29 Oct 2023 06:30    cefTRIAXone Injectable. 1000 milliGRAM(s) IV Push every 24 hours  ferrous    sulfate 325 milliGRAM(s) Oral daily  furosemide    Tablet 20 milliGRAM(s) Oral daily  influenza  Vaccine (HIGH DOSE) 0.7 milliLiter(s) IntraMuscular once  losartan 50 milliGRAM(s) Oral two times a day  metoprolol succinate ER 25 milliGRAM(s) Oral daily  mirtazapine 22.5 milliGRAM(s) Oral at bedtime  pantoprazole    Tablet 40 milliGRAM(s) Oral before breakfast  senna 2 Tablet(s) Oral at bedtime        all labs reviewed  all imaging reviewed    t< from: TTE Echo Complete w/o Contrast w/ Doppler (09.08.23 @ 14:57) >   Summary     Endocardium is not well visualized, however, overall left ventricular   systolic function appears normal. Technically Difficult Study.   The left atrium is severely dilated.   Moderate aortic stenosis.   Moderate (2+) aortic regurgitation.   Moderate to severe (3+) tricuspid valve regurgitation.          A/P:    1. 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 Hyponatremia: due to dehydration:  suspect diuretic induced  off  Aldactone  lasix was reduced  to 20mg daily on 10/29    -s/p IV fluid  has severe TR , off IVF    3. Htn: better controlled on current regimen   c/w BB, Losartan   Norvasc 2.5mg/day added 10/29    4. History of CHF, HFpEF  compensated  -Continue Lasix at 20mg/day  off Aldactone   TTE: EF normal, mod pHtn    5. History of Permanent  A-fib  -Heart rate controlled  -Continue metoprolol and Eliquis    9. h/o Decubitus ulcer  -in lower back  -follow wound care consult       10. Thromocytopenia poss consumption if <50 dc Eliquis  check AM cbc    time 51m case d/w daughter

## 2023-10-30 NOTE — CHART NOTE - NSCHARTNOTEFT_GEN_A_CORE
Called by nurse at the request of patients daughter. As per nurse, patients daughter states that patient takes Xanax 0.25mg at night at home.   Will order 1 time dose of Xanax 0.25mg.

## 2023-10-31 ENCOUNTER — NON-APPOINTMENT (OUTPATIENT)
Age: 88
End: 2023-10-31

## 2023-10-31 LAB
ADD ON TEST-SPECIMEN IN LAB: SIGNIFICANT CHANGE UP
ADD ON TEST-SPECIMEN IN LAB: SIGNIFICANT CHANGE UP
ANION GAP SERPL CALC-SCNC: 6 MMOL/L — SIGNIFICANT CHANGE UP (ref 5–17)
ANION GAP SERPL CALC-SCNC: 6 MMOL/L — SIGNIFICANT CHANGE UP (ref 5–17)
BUN SERPL-MCNC: 12 MG/DL — SIGNIFICANT CHANGE UP (ref 7–23)
BUN SERPL-MCNC: 12 MG/DL — SIGNIFICANT CHANGE UP (ref 7–23)
CALCIUM SERPL-MCNC: 8.6 MG/DL — SIGNIFICANT CHANGE UP (ref 8.5–10.1)
CALCIUM SERPL-MCNC: 8.6 MG/DL — SIGNIFICANT CHANGE UP (ref 8.5–10.1)
CHLORIDE SERPL-SCNC: 99 MMOL/L — SIGNIFICANT CHANGE UP (ref 96–108)
CHLORIDE SERPL-SCNC: 99 MMOL/L — SIGNIFICANT CHANGE UP (ref 96–108)
CO2 SERPL-SCNC: 27 MMOL/L — SIGNIFICANT CHANGE UP (ref 22–31)
CO2 SERPL-SCNC: 27 MMOL/L — SIGNIFICANT CHANGE UP (ref 22–31)
CREAT SERPL-MCNC: 0.49 MG/DL — LOW (ref 0.5–1.3)
CREAT SERPL-MCNC: 0.49 MG/DL — LOW (ref 0.5–1.3)
EGFR: 88 ML/MIN/1.73M2 — SIGNIFICANT CHANGE UP
EGFR: 88 ML/MIN/1.73M2 — SIGNIFICANT CHANGE UP
GLUCOSE SERPL-MCNC: 91 MG/DL — SIGNIFICANT CHANGE UP (ref 70–99)
GLUCOSE SERPL-MCNC: 91 MG/DL — SIGNIFICANT CHANGE UP (ref 70–99)
HCT VFR BLD CALC: 30.8 % — LOW (ref 34.5–45)
HCT VFR BLD CALC: 30.8 % — LOW (ref 34.5–45)
HGB BLD-MCNC: 10.3 G/DL — LOW (ref 11.5–15.5)
HGB BLD-MCNC: 10.3 G/DL — LOW (ref 11.5–15.5)
MCHC RBC-ENTMCNC: 31.9 PG — SIGNIFICANT CHANGE UP (ref 27–34)
MCHC RBC-ENTMCNC: 31.9 PG — SIGNIFICANT CHANGE UP (ref 27–34)
MCHC RBC-ENTMCNC: 33.4 GM/DL — SIGNIFICANT CHANGE UP (ref 32–36)
MCHC RBC-ENTMCNC: 33.4 GM/DL — SIGNIFICANT CHANGE UP (ref 32–36)
MCV RBC AUTO: 95.4 FL — SIGNIFICANT CHANGE UP (ref 80–100)
MCV RBC AUTO: 95.4 FL — SIGNIFICANT CHANGE UP (ref 80–100)
PLATELET # BLD AUTO: 69 K/UL — LOW (ref 150–400)
PLATELET # BLD AUTO: 69 K/UL — LOW (ref 150–400)
POTASSIUM SERPL-MCNC: 4.3 MMOL/L — SIGNIFICANT CHANGE UP (ref 3.5–5.3)
POTASSIUM SERPL-MCNC: 4.3 MMOL/L — SIGNIFICANT CHANGE UP (ref 3.5–5.3)
POTASSIUM SERPL-SCNC: 4.3 MMOL/L — SIGNIFICANT CHANGE UP (ref 3.5–5.3)
POTASSIUM SERPL-SCNC: 4.3 MMOL/L — SIGNIFICANT CHANGE UP (ref 3.5–5.3)
RBC # BLD: 3.23 M/UL — LOW (ref 3.8–5.2)
RBC # BLD: 3.23 M/UL — LOW (ref 3.8–5.2)
RBC # FLD: 14.3 % — SIGNIFICANT CHANGE UP (ref 10.3–14.5)
RBC # FLD: 14.3 % — SIGNIFICANT CHANGE UP (ref 10.3–14.5)
SODIUM SERPL-SCNC: 132 MMOL/L — LOW (ref 135–145)
SODIUM SERPL-SCNC: 132 MMOL/L — LOW (ref 135–145)
WBC # BLD: 4.55 K/UL — SIGNIFICANT CHANGE UP (ref 3.8–10.5)
WBC # BLD: 4.55 K/UL — SIGNIFICANT CHANGE UP (ref 3.8–10.5)
WBC # FLD AUTO: 4.55 K/UL — SIGNIFICANT CHANGE UP (ref 3.8–10.5)
WBC # FLD AUTO: 4.55 K/UL — SIGNIFICANT CHANGE UP (ref 3.8–10.5)

## 2023-10-31 PROCEDURE — 99232 SBSQ HOSP IP/OBS MODERATE 35: CPT

## 2023-10-31 RX ORDER — ALPRAZOLAM 0.25 MG
0.25 TABLET ORAL ONCE
Refills: 0 | Status: DISCONTINUED | OUTPATIENT
Start: 2023-10-31 | End: 2023-10-31

## 2023-10-31 RX ORDER — ATORVASTATIN CALCIUM 80 MG/1
10 TABLET, FILM COATED ORAL AT BEDTIME
Refills: 0 | Status: DISCONTINUED | OUTPATIENT
Start: 2023-10-31 | End: 2023-11-05

## 2023-10-31 RX ORDER — PANTOPRAZOLE SODIUM 20 MG/1
40 TABLET, DELAYED RELEASE ORAL DAILY
Refills: 0 | Status: DISCONTINUED | OUTPATIENT
Start: 2023-10-31 | End: 2023-11-05

## 2023-10-31 RX ADMIN — PANTOPRAZOLE SODIUM 40 MILLIGRAM(S): 20 TABLET, DELAYED RELEASE ORAL at 06:23

## 2023-10-31 RX ADMIN — Medication 650 MILLIGRAM(S): at 17:58

## 2023-10-31 RX ADMIN — Medication 25 MILLIGRAM(S): at 09:20

## 2023-10-31 RX ADMIN — PANTOPRAZOLE SODIUM 40 MILLIGRAM(S): 20 TABLET, DELAYED RELEASE ORAL at 17:57

## 2023-10-31 RX ADMIN — LOSARTAN POTASSIUM 50 MILLIGRAM(S): 100 TABLET, FILM COATED ORAL at 21:48

## 2023-10-31 RX ADMIN — MIRTAZAPINE 22.5 MILLIGRAM(S): 45 TABLET, ORALLY DISINTEGRATING ORAL at 21:49

## 2023-10-31 RX ADMIN — LOSARTAN POTASSIUM 50 MILLIGRAM(S): 100 TABLET, FILM COATED ORAL at 09:21

## 2023-10-31 RX ADMIN — ATORVASTATIN CALCIUM 10 MILLIGRAM(S): 80 TABLET, FILM COATED ORAL at 21:49

## 2023-10-31 RX ADMIN — APIXABAN 2.5 MILLIGRAM(S): 2.5 TABLET, FILM COATED ORAL at 21:48

## 2023-10-31 RX ADMIN — Medication 0.25 MILLIGRAM(S): at 18:06

## 2023-10-31 RX ADMIN — Medication 1 DROP(S): at 10:31

## 2023-10-31 RX ADMIN — SENNA PLUS 2 TABLET(S): 8.6 TABLET ORAL at 21:49

## 2023-10-31 RX ADMIN — Medication 20 MILLIGRAM(S): at 09:20

## 2023-10-31 RX ADMIN — Medication 1 TABLET(S): at 09:20

## 2023-10-31 RX ADMIN — Medication 325 MILLIGRAM(S): at 09:20

## 2023-10-31 RX ADMIN — CEFTRIAXONE 1000 MILLIGRAM(S): 500 INJECTION, POWDER, FOR SOLUTION INTRAMUSCULAR; INTRAVENOUS at 06:21

## 2023-10-31 RX ADMIN — Medication 1 DROP(S): at 21:48

## 2023-10-31 RX ADMIN — APIXABAN 2.5 MILLIGRAM(S): 2.5 TABLET, FILM COATED ORAL at 09:21

## 2023-10-31 RX ADMIN — AMLODIPINE BESYLATE 2.5 MILLIGRAM(S): 2.5 TABLET ORAL at 09:21

## 2023-10-31 NOTE — CONSULT NOTE ADULT - SUBJECTIVE AND OBJECTIVE BOX
Patient is a 89y old  Female who presents with a chief complaint of Acute UTI, Elevated troponin (30 Oct 2023 08:04)    10/31/2023- Cardiology Consultation: 89 year old woman with chronic indwelling Reaves and prior history of recurrent UTIs admitted with fever, weakness, cloudy urine, vomiting, shakiness. Found to have E coli urosepsis. With antibiotic therapy she has clinically improved and is without complaints currently. At no point was there chest pain or dyspnea. EKGs have not shown acute ischemic changes and TTE shows no new wall motion abnormality. Troponin did rise mildly then declined to normal.  History of long term persistent atrial fibrillation,HFpEF, Moderate AS, AR, severe TR, CLL, thrombocytopenia, chronic venous insufficiency, hypertension, recurrent UTIs, mild dementia. Patient does not ambulate at home and is cared for by her daughter and health aides.       HPI:  89 y/o Female with PMHx of Afib on Eliquis, CLL, HTN, renal calculus, venous insufficiency with lymphedema, and dementia A&Ox2 baseline BIBEMS from home to the ED to rule out UTI. She had been shaky and more weak for the last few days. Home health Aid at bedside reported patient diagnosed with shingles infection posterior left shoulder a few weeks ago, completed course of Valacyclovir. Since then patient with increased blood pressure. Over the last few days patient with darker and cloudy urine in leg bag, increased abdominal distension, and shakiness. Yesterday patient was tachycardic and threw up, EMS was called, who found patient to be febrile. Denies any recent flu-like symptoms. In the ED patient was febrile too.  (27 Oct 2023 04:04)      PAST MEDICAL & SURGICAL HISTORY:  Lymphedema of both lower extremities      Venous insufficiency      Monoclonal gammopathies      Paroxysmal atrial fibrillation      Acute cystitis without hematuria  septic  4/2016      Essential hypertension      Spinal stenosis      Spondyloarthritis      Vaginal prolapse      [place] (hard of hearing), bilateral      Hypertension      S/P kyphoplasty  2014      S/P thyroidectomy  partial   1975      History of vaginal surgery      History of fracture of femur  hx of proximal femur fracture in January 2021      History of repair of left hip joint  Hx L hip long IMN with Dr. Kitchen 2017            MEDICATIONS  (STANDING):  amLODIPine   Tablet 2.5 milliGRAM(s) Oral daily  apixaban 2.5 milliGRAM(s) Oral every 12 hours  artificial  tears Solution 1 Drop(s) Both EYES two times a day  cefTRIAXone Injectable. 1000 milliGRAM(s) IV Push every 24 hours  ferrous    sulfate 325 milliGRAM(s) Oral daily  furosemide    Tablet 20 milliGRAM(s) Oral daily  influenza  Vaccine (HIGH DOSE) 0.7 milliLiter(s) IntraMuscular once  lactobacillus acidophilus 1 Tablet(s) Oral daily  losartan 50 milliGRAM(s) Oral two times a day  metoprolol succinate ER 25 milliGRAM(s) Oral daily  mirtazapine 22.5 milliGRAM(s) Oral at bedtime  pantoprazole    Tablet 40 milliGRAM(s) Oral before breakfast  senna 2 Tablet(s) Oral at bedtime    MEDICATIONS  (PRN):  acetaminophen     Tablet .. 650 milliGRAM(s) Oral every 6 hours PRN Temp greater or equal to 38C (100.4F), Mild Pain (1 - 3)  aluminum hydroxide/magnesium hydroxide/simethicone Suspension 30 milliLiter(s) Oral every 4 hours PRN Dyspepsia  LORazepam     Tablet 0.5 milliGRAM(s) Oral daily PRN Anxiety  melatonin 3 milliGRAM(s) Oral at bedtime PRN Insomnia  ondansetron Injectable 4 milliGRAM(s) IV Push every 8 hours PRN Nausea and/or Vomiting  polyethylene glycol 3350 17 Gram(s) Oral daily PRN for constipation      FAMILY HISTORY:  No pertinent family history in first degree relatives        SOCIAL HISTORY:  Tobacco-  no         Alcohol-      no        Illicit drugs-  no            Occupation-              Marital  status-  REVIEW OF SYSTEM:  Pertinent items are noted in HPI.    Vital Signs Last 24 Hrs  T(C): 36.6 (30 Oct 2023 20:33), Max: 36.7 (30 Oct 2023 08:53)  T(F): 97.9 (30 Oct 2023 20:33), Max: 98.1 (30 Oct 2023 08:53)  HR: 73 (30 Oct 2023 20:33) (61 - 74)  BP: 153/66 (30 Oct 2023 20:33) (132/52 - 181/70)  BP(mean): --  RR: 18 (30 Oct 2023 20:33) (18 - 18)  SpO2: 97% (30 Oct 2023 20:33) (95% - 98%)    Parameters below as of 30 Oct 2023 20:33  Patient On (Oxygen Delivery Method): room air        I&O's Summary    PHYSICAL EXAM  General Appearance: comfortable  HEENT: PERRL, conjunctiva clear, EOM's intact, non injected pharynx, no exudate, TM   normal  Neck: Supple, , no adenopathy, thyroid: not enlarged, no carotid bruit or JVD  Back: Symmetric, no  tenderness,no soft tissue tenderness  Lungs: Clear to auscultation bilaterally,no adventitious breath sounds, normal   expiratory phase  Heart: Regular rate and rhythm, S1, S2 normal, 1/3 FARZANEH base.  Abdomen: Soft, non-tender, bowel sounds active , no hepatosplenomegaly  Extremities: no cyanosis or edema, no joint swelling. venous stasis discoloratioin  Skin: Skin color, texture normal, no rashes   Neurologic: Alert and oriented X3 , cranial nerves intact, sensory and motor normal,        INTERPRETATION OF TELEMETRY:    ECG: atrial fibrillation with controlled VR, LVH, nonspecific ST segment abnormality, no acute changes.         ACC: [id number] EXAM:  ECHO TTE WO CON COMP W DOPP   ORDERED BY: JAYESH CARLOS     PROCEDURE DATE:  09/08/2023         Impression     Summary     Endocardium is not well visualized, however, overall left ventricular   systolic function appears normal. Technically Difficult Study.   The left atrium is severely dilated.   Moderate aortic stenosis.   Moderate (2+) aortic regurgitation.   Moderate to severe (3+) tricuspid valve regurgitation.     Signature     ----------------------------------------------------------------   Electronically signed by Blake Felix MD(Interpreting   physician) on 09/08/2023 08:02 PM            LABS:  Troponin I, High Sensitivity (10.26.23 @ 20:35)   Troponin I, High Sensitivity Result: 71.72: First High Troponin Called To: gutierrez conner rn 10/26/2023 21:41:07 EDT Troponin I, High Sensitivity (10.27.23 @ 04:28)   Troponin I, High Sensitivity Result: 99.64: Serial measurements of high sensitivity troponin I (hs TnI) showing rapid   Troponin I, High Sensitivity (10.27.23 @ 06:37)   Troponin I, High Sensitivity Result: 82.82: Serial measurements of high sensitivity troponin I (hs TnI) showing rapid   upward or downward changes suggest acute myocardial injury. Please note Troponin I, High Sensitivity (10.28.23 @ 07:33)   Troponin I, High Sensitivity Result: 40.59: Serial measurements of high sensitivity troponin I (hs TnI) showing rapid Troponin I, High Sensitivity (10.30.23 @ 14:41)   Troponin I, High Sensitivity Result: 25.58: Serial measurements of high sensitivity troponin I (hs TnI) showing rapid Troponin I, High Sensitivity (10.30.23 @ 20:25)   Troponin I, High Sensitivity Result: 24.86: Serial measurements of high sensitivity troponin I (hs TnI) showing rapid   10-30    130<L>  |  100  |  11  ----------------------------<  93  4.5   |  25  |  0.50    Ca    8.7      30 Oct 2023 07:56                Urinalysis Basic - ( 30 Oct 2023 07:56 )    Color: x / Appearance: x / SG: x / pH: x  Gluc: 93 mg/dL / Ketone: x  / Bili: x / Urobili: x   Blood: x / Protein: x / Nitrite: x   Leuk Esterase: x / RBC: x / WBC x   Sq Epi: x / Non Sq Epi: x / Bacteria: x            RADIOLOGY & ADDITIONAL STUDIES:    IMPRESSION:  1.E coli urosepsis. Clinically doing well.  2.Troponin rise and decline due to myocardial injury due to oxygen supply demand mismatch due to urosepsis. CAD is highly likely.  3.Moderate aortic stenosis and regurgitation and moderate to severe TR.  Stable.No indication for surgical intervention.  4.Long term persistent atrial fibrillation, stable.  5.Hypertension. Stable on amlodipine and losartan.  6.HFpEF, stable.   PLAN:  Agree with antibiotic therapy. Add ASA 81 mg qd and atorvastatin 20 mg qd for CAD. Continue losartan, amlodipine, furosemide, Eliquis, metoprolol. I do not recommend stress testing or coronary artery imaging since revascularization will not likely improve the prognosis.  Will follow as needed.   Patient is a 89y old  Female who presents with a chief complaint of Acute UTI, Elevated troponin (30 Oct 2023 08:04)    10/31/2023- Cardiology Consultation: 89 year old woman with chronic indwelling Reaves and prior history of recurrent UTIs admitted with fever, weakness, cloudy urine, vomiting, shakiness. Found to have E coli urosepsis. With antibiotic therapy she has clinically improved and is without complaints currently. At no point was there chest pain or dyspnea. EKGs have not shown acute ischemic changes and TTE shows no new wall motion abnormality. Troponin did rise mildly then declined to normal.  History of long term persistent atrial fibrillation,HFpEF, Moderate AS, AR, severe TR, CLL, thrombocytopenia, chronic venous insufficiency, hypertension, recurrent UTIs, mild dementia. Patient does not ambulate at home and is cared for by her daughter and health aides.       HPI:  89 y/o Female with PMHx of Afib on Eliquis, CLL, HTN, renal calculus, venous insufficiency with lymphedema, and dementia A&Ox2 baseline BIBEMS from home to the ED to rule out UTI. She had been shaky and more weak for the last few days. Home health Aid at bedside reported patient diagnosed with shingles infection posterior left shoulder a few weeks ago, completed course of Valacyclovir. Since then patient with increased blood pressure. Over the last few days patient with darker and cloudy urine in leg bag, increased abdominal distension, and shakiness. Yesterday patient was tachycardic and threw up, EMS was called, who found patient to be febrile. Denies any recent flu-like symptoms. In the ED patient was febrile too.  (27 Oct 2023 04:04)      PAST MEDICAL & SURGICAL HISTORY:  Lymphedema of both lower extremities      Venous insufficiency      Monoclonal gammopathies      Paroxysmal atrial fibrillation      Acute cystitis without hematuria  septic  4/2016      Essential hypertension      Spinal stenosis      Spondyloarthritis      Vaginal prolapse      [place] (hard of hearing), bilateral      Hypertension      S/P kyphoplasty  2014      S/P thyroidectomy  partial   1975      History of vaginal surgery      History of fracture of femur  hx of proximal femur fracture in January 2021      History of repair of left hip joint  Hx L hip long IMN with Dr. Kitchen 2017            MEDICATIONS  (STANDING):  amLODIPine   Tablet 2.5 milliGRAM(s) Oral daily  apixaban 2.5 milliGRAM(s) Oral every 12 hours  artificial  tears Solution 1 Drop(s) Both EYES two times a day  cefTRIAXone Injectable. 1000 milliGRAM(s) IV Push every 24 hours  ferrous    sulfate 325 milliGRAM(s) Oral daily  furosemide    Tablet 20 milliGRAM(s) Oral daily  influenza  Vaccine (HIGH DOSE) 0.7 milliLiter(s) IntraMuscular once  lactobacillus acidophilus 1 Tablet(s) Oral daily  losartan 50 milliGRAM(s) Oral two times a day  metoprolol succinate ER 25 milliGRAM(s) Oral daily  mirtazapine 22.5 milliGRAM(s) Oral at bedtime  pantoprazole    Tablet 40 milliGRAM(s) Oral before breakfast  senna 2 Tablet(s) Oral at bedtime    MEDICATIONS  (PRN):  acetaminophen     Tablet .. 650 milliGRAM(s) Oral every 6 hours PRN Temp greater or equal to 38C (100.4F), Mild Pain (1 - 3)  aluminum hydroxide/magnesium hydroxide/simethicone Suspension 30 milliLiter(s) Oral every 4 hours PRN Dyspepsia  LORazepam     Tablet 0.5 milliGRAM(s) Oral daily PRN Anxiety  melatonin 3 milliGRAM(s) Oral at bedtime PRN Insomnia  ondansetron Injectable 4 milliGRAM(s) IV Push every 8 hours PRN Nausea and/or Vomiting  polyethylene glycol 3350 17 Gram(s) Oral daily PRN for constipation      FAMILY HISTORY:  No pertinent family history in first degree relatives        SOCIAL HISTORY:  Tobacco-  no         Alcohol-      no        Illicit drugs-  no            Occupation-              Marital  status-  REVIEW OF SYSTEM:  Pertinent items are noted in HPI.    Vital Signs Last 24 Hrs  T(C): 36.6 (30 Oct 2023 20:33), Max: 36.7 (30 Oct 2023 08:53)  T(F): 97.9 (30 Oct 2023 20:33), Max: 98.1 (30 Oct 2023 08:53)  HR: 73 (30 Oct 2023 20:33) (61 - 74)  BP: 153/66 (30 Oct 2023 20:33) (132/52 - 181/70)  BP(mean): --  RR: 18 (30 Oct 2023 20:33) (18 - 18)  SpO2: 97% (30 Oct 2023 20:33) (95% - 98%)    Parameters below as of 30 Oct 2023 20:33  Patient On (Oxygen Delivery Method): room air        I&O's Summary    PHYSICAL EXAM  General Appearance: comfortable  HEENT: PERRL, conjunctiva clear, EOM's intact, non injected pharynx, no exudate, TM   normal  Neck: Supple, , no adenopathy, thyroid: not enlarged, no carotid bruit or JVD  Back: Symmetric, no  tenderness,no soft tissue tenderness  Lungs: Clear to auscultation bilaterally,no adventitious breath sounds, normal   expiratory phase  Heart: Regular rate and rhythm, S1, S2 normal, 1/3 FARZANEH base.  Abdomen: Soft, non-tender, bowel sounds active , no hepatosplenomegaly  Extremities: no cyanosis or edema, no joint swelling. venous stasis discoloratioin  Skin: Skin color, texture normal, no rashes   Neurologic: Alert and oriented X3 , cranial nerves intact, sensory and motor normal,        INTERPRETATION OF TELEMETRY:    ECG: atrial fibrillation with controlled VR, LVH, nonspecific ST segment abnormality, no acute changes.         ACC: [id number] EXAM:  ECHO TTE WO CON COMP W DOPP   ORDERED BY: JAYESH CARLOS     PROCEDURE DATE:  09/08/2023         Impression     Summary     Endocardium is not well visualized, however, overall left ventricular   systolic function appears normal. Technically Difficult Study.   The left atrium is severely dilated.   Moderate aortic stenosis.   Moderate (2+) aortic regurgitation.   Moderate to severe (3+) tricuspid valve regurgitation.     Signature     ----------------------------------------------------------------   Electronically signed by Blake Felix MD(Interpreting   physician) on 09/08/2023 08:02 PM            LABS:  Troponin I, High Sensitivity (10.26.23 @ 20:35)   Troponin I, High Sensitivity Result: 71.72: First High Troponin Called To: gutierrez conner rn 10/26/2023 21:41:07 EDT Troponin I, High Sensitivity (10.27.23 @ 04:28)   Troponin I, High Sensitivity Result: 99.64: Serial measurements of high sensitivity troponin I (hs TnI) showing rapid   Troponin I, High Sensitivity (10.27.23 @ 06:37)   Troponin I, High Sensitivity Result: 82.82: Serial measurements of high sensitivity troponin I (hs TnI) showing rapid   upward or downward changes suggest acute myocardial injury. Please note Troponin I, High Sensitivity (10.28.23 @ 07:33)   Troponin I, High Sensitivity Result: 40.59: Serial measurements of high sensitivity troponin I (hs TnI) showing rapid Troponin I, High Sensitivity (10.30.23 @ 14:41)   Troponin I, High Sensitivity Result: 25.58: Serial measurements of high sensitivity troponin I (hs TnI) showing rapid Troponin I, High Sensitivity (10.30.23 @ 20:25)   Troponin I, High Sensitivity Result: 24.86: Serial measurements of high sensitivity troponin I (hs TnI) showing rapid   10-30    130<L>  |  100  |  11  ----------------------------<  93  4.5   |  25  |  0.50    Ca    8.7      30 Oct 2023 07:56                Urinalysis Basic - ( 30 Oct 2023 07:56 )    Color: x / Appearance: x / SG: x / pH: x  Gluc: 93 mg/dL / Ketone: x  / Bili: x / Urobili: x   Blood: x / Protein: x / Nitrite: x   Leuk Esterase: x / RBC: x / WBC x   Sq Epi: x / Non Sq Epi: x / Bacteria: x            RADIOLOGY & ADDITIONAL STUDIES:    IMPRESSION:  1.E coli urosepsis. Clinically doing well.  2.Troponin rise and decline due to myocardial injury due to oxygen supply demand mismatch due to urosepsis. CAD is highly likely.  3.Moderate aortic stenosis and regurgitation and moderate to severe TR.  Stable.No indication for surgical intervention.  4.Long term persistent atrial fibrillation, stable.  5.Hypertension. Stable on amlodipine and losartan.  6.HFpEF, stable.   PLAN:  Agree with antibiotic therapy. Add  atorvastatin 20 mg qd for CAD. Will not add ASA due to thrombocytopenia. Continue losartan, amlodipine, furosemide, Eliquis, metoprolol. I do not recommend stress testing or coronary artery imaging since revascularization will not likely improve the prognosis.  Will follow as needed.

## 2023-10-31 NOTE — PROGRESS NOTE ADULT - SUBJECTIVE AND OBJECTIVE BOX
History of Present Illness:   89 y/o Female with PMHx of Afib on Eliquis, CLL, HTN, renal calculus, venous insufficiency with lymphedema, and dementia, Urinary Retention s/p Reaves, Uti,  A&Ox2 baseline BIBEMS from home to the ED to rule out UTI. She had been shaky and more weak for the last few days. Home health Aid at bedside reported patient diagnosed with shingles infection posterior left shoulder a few weeks ago, completed course of Valacyclovir. Since then patient with increased blood pressure. Over the last few days patient with darker and cloudy urine in leg bag, increased abdominal distension, and shakiness. Yesterday patient was tachycardic and threw up, EMS was called, who found patient to be febrile. Denies any recent flu-like symptoms. In the ED patient was febrile too.   -found to be dehydrated (diuretic escalated as outpatient due to Htn), and found to have Uti/Bacteremia     Vital Signs Last 24 Hrs  T(C): 36.7 (31 Oct 2023 08:37), Max: 36.7 (31 Oct 2023 08:37)  T(F): 98.1 (31 Oct 2023 08:37), Max: 98.1 (31 Oct 2023 08:37)  HR: 67 (31 Oct 2023 08:37) (61 - 74)  BP: 168/67 (31 Oct 2023 08:37) (132/52 - 168/67)  BP(mean): --  RR: 18 (31 Oct 2023 08:37) (18 - 18)  SpO2: 98% (31 Oct 2023 08:37) (97% - 98%)    Parameters below as of 30 Oct 2023 20:33  Patient On (Oxygen Delivery Method): room air              PHYSICAL EXAM:    GENERAL: NAD, Well nourished  HEENT:  NC/AT, EOMI, PERRLA, No scleral icterus, Moist mucous membranes  NECK: Supple, No JVD  CNS:  Alert & Oriented X3, Motor Strength 5/5 B/L upper and lower extremities; DTRs 2+ intact   LUNG: Normal Breath sounds, Clear to auscultation bilaterally, No rales, No rhonchi, No wheezing  HEART: irregular; No murmurs, No rubs  ABDOMEN: +BS, ST/ND/NT  GENITOURINARY- Voiding, Bladder not distended  EXTREMITIES:  2+ Peripheral Pulses, No clubbing, No cyanosis, No tibial edema  MUSCULOSKELTAL: Joints normal ROM, No joint tenderness, No muscle tenderness                            11.2   6.42  )-----------( 58       ( 29 Oct 2023 06:30 )             32.8     MEDICATIONS  (STANDING):  amLODIPine   Tablet 2.5 milliGRAM(s) Oral daily  apixaban 2.5 milliGRAM(s) Oral every 12 hours  artificial  tears Solution 1 Drop(s) Both EYES two times a day  atorvastatin 10 milliGRAM(s) Oral at bedtime  cefTRIAXone Injectable. 1000 milliGRAM(s) IV Push every 24 hours  ferrous    sulfate 325 milliGRAM(s) Oral daily  furosemide    Tablet 20 milliGRAM(s) Oral daily  influenza  Vaccine (HIGH DOSE) 0.7 milliLiter(s) IntraMuscular once  lactobacillus acidophilus 1 Tablet(s) Oral daily  losartan 50 milliGRAM(s) Oral two times a day  metoprolol succinate ER 25 milliGRAM(s) Oral daily  mirtazapine 22.5 milliGRAM(s) Oral at bedtime  pantoprazole    Tablet 40 milliGRAM(s) Oral before breakfast  senna 2 Tablet(s) Oral at bedtime    MEDICATIONS  (PRN):  acetaminophen     Tablet .. 650 milliGRAM(s) Oral every 6 hours PRN Temp greater or equal to 38C (100.4F), Mild Pain (1 - 3)  aluminum hydroxide/magnesium hydroxide/simethicone Suspension 30 milliLiter(s) Oral every 4 hours PRN Dyspepsia  LORazepam     Tablet 0.5 milliGRAM(s) Oral daily PRN Anxiety  melatonin 3 milliGRAM(s) Oral at bedtime PRN Insomnia  ondansetron Injectable 4 milliGRAM(s) IV Push every 8 hours PRN Nausea and/or Vomiting  polyethylene glycol 3350 17 Gram(s) Oral daily PRN for constipation        all labs reviewed  all imaging reviewed    t< from: TTE Echo Complete w/o Contrast w/ Doppler (09.08.23 @ 14:57) >   Summary     Endocardium is not well visualized, however, overall left ventricular   systolic function appears normal. Technically Difficult Study.   The left atrium is severely dilated.   Moderate aortic stenosis.   Moderate (2+) aortic regurgitation.   Moderate to severe (3+) tricuspid valve regurgitation.          A/P:    1. Sepsis due to GNR bacteremia/Uti  Uti related likely to chronic Reaves  Reaves changed in ED  -Started on IV antibiotics, Ceftriaxone  f/u UCx, Blood Cx- E Coli    2. Hyponatremia: due to dehydration:  suspect diuretic induced  off  Aldactone  lasix was reduced  to 20mg daily on 10/29    -s/p IV fluid  has severe TR , off IVF    3. Htn: better controlled on current regimen   c/w BB, Losartan   Norvasc 2.5mg/day added 10/29    4. History of CHF, HFpEF  compensated  -Continue Lasix at 20mg/day  off Aldactone   TTE: EF normal, mod pHtn    5. History of Permanent  A-fib  -Heart rate controlled  -Continue metoprolol and Eliquis    9. h/o Decubitus ulcer  -in lower back  -follow wound care consult       10. Thromocytopenia poss consumption if <50 dc Eliquis  check AM cbc - plts improving        time 51m case d/w daughter

## 2023-11-01 ENCOUNTER — NON-APPOINTMENT (OUTPATIENT)
Age: 88
End: 2023-11-01

## 2023-11-01 ENCOUNTER — APPOINTMENT (OUTPATIENT)
Dept: UROLOGY | Facility: CLINIC | Age: 88
End: 2023-11-01
Payer: MEDICARE

## 2023-11-01 ENCOUNTER — TRANSCRIPTION ENCOUNTER (OUTPATIENT)
Age: 88
End: 2023-11-01

## 2023-11-01 DIAGNOSIS — M48.00 SPINAL STENOSIS, SITE UNSPECIFIED: ICD-10-CM

## 2023-11-01 DIAGNOSIS — D69.6 THROMBOCYTOPENIA, UNSPECIFIED: ICD-10-CM

## 2023-11-01 LAB
ANION GAP SERPL CALC-SCNC: 6 MMOL/L — SIGNIFICANT CHANGE UP (ref 5–17)
ANION GAP SERPL CALC-SCNC: 6 MMOL/L — SIGNIFICANT CHANGE UP (ref 5–17)
BUN SERPL-MCNC: 11 MG/DL — SIGNIFICANT CHANGE UP (ref 7–23)
BUN SERPL-MCNC: 11 MG/DL — SIGNIFICANT CHANGE UP (ref 7–23)
CALCIUM SERPL-MCNC: 8.6 MG/DL — SIGNIFICANT CHANGE UP (ref 8.5–10.1)
CALCIUM SERPL-MCNC: 8.6 MG/DL — SIGNIFICANT CHANGE UP (ref 8.5–10.1)
CHLORIDE SERPL-SCNC: 100 MMOL/L — SIGNIFICANT CHANGE UP (ref 96–108)
CHLORIDE SERPL-SCNC: 100 MMOL/L — SIGNIFICANT CHANGE UP (ref 96–108)
CO2 SERPL-SCNC: 25 MMOL/L — SIGNIFICANT CHANGE UP (ref 22–31)
CO2 SERPL-SCNC: 25 MMOL/L — SIGNIFICANT CHANGE UP (ref 22–31)
CREAT SERPL-MCNC: 0.5 MG/DL — SIGNIFICANT CHANGE UP (ref 0.5–1.3)
CREAT SERPL-MCNC: 0.5 MG/DL — SIGNIFICANT CHANGE UP (ref 0.5–1.3)
CULTURE RESULTS: SIGNIFICANT CHANGE UP
CULTURE RESULTS: SIGNIFICANT CHANGE UP
EGFR: 88 ML/MIN/1.73M2 — SIGNIFICANT CHANGE UP
EGFR: 88 ML/MIN/1.73M2 — SIGNIFICANT CHANGE UP
GLUCOSE SERPL-MCNC: 93 MG/DL — SIGNIFICANT CHANGE UP (ref 70–99)
GLUCOSE SERPL-MCNC: 93 MG/DL — SIGNIFICANT CHANGE UP (ref 70–99)
HCT VFR BLD CALC: 33.2 % — LOW (ref 34.5–45)
HCT VFR BLD CALC: 33.2 % — LOW (ref 34.5–45)
HGB BLD-MCNC: 11.2 G/DL — LOW (ref 11.5–15.5)
HGB BLD-MCNC: 11.2 G/DL — LOW (ref 11.5–15.5)
MCHC RBC-ENTMCNC: 31.7 PG — SIGNIFICANT CHANGE UP (ref 27–34)
MCHC RBC-ENTMCNC: 31.7 PG — SIGNIFICANT CHANGE UP (ref 27–34)
MCHC RBC-ENTMCNC: 33.7 GM/DL — SIGNIFICANT CHANGE UP (ref 32–36)
MCHC RBC-ENTMCNC: 33.7 GM/DL — SIGNIFICANT CHANGE UP (ref 32–36)
MCV RBC AUTO: 94.1 FL — SIGNIFICANT CHANGE UP (ref 80–100)
MCV RBC AUTO: 94.1 FL — SIGNIFICANT CHANGE UP (ref 80–100)
PLATELET # BLD AUTO: 68 K/UL — LOW (ref 150–400)
PLATELET # BLD AUTO: 68 K/UL — LOW (ref 150–400)
POTASSIUM SERPL-MCNC: 3.9 MMOL/L — SIGNIFICANT CHANGE UP (ref 3.5–5.3)
POTASSIUM SERPL-MCNC: 3.9 MMOL/L — SIGNIFICANT CHANGE UP (ref 3.5–5.3)
POTASSIUM SERPL-SCNC: 3.9 MMOL/L — SIGNIFICANT CHANGE UP (ref 3.5–5.3)
POTASSIUM SERPL-SCNC: 3.9 MMOL/L — SIGNIFICANT CHANGE UP (ref 3.5–5.3)
RBC # BLD: 3.53 M/UL — LOW (ref 3.8–5.2)
RBC # BLD: 3.53 M/UL — LOW (ref 3.8–5.2)
RBC # FLD: 14.1 % — SIGNIFICANT CHANGE UP (ref 10.3–14.5)
RBC # FLD: 14.1 % — SIGNIFICANT CHANGE UP (ref 10.3–14.5)
SODIUM SERPL-SCNC: 131 MMOL/L — LOW (ref 135–145)
SODIUM SERPL-SCNC: 131 MMOL/L — LOW (ref 135–145)
SPECIMEN SOURCE: SIGNIFICANT CHANGE UP
SPECIMEN SOURCE: SIGNIFICANT CHANGE UP
WBC # BLD: 4.4 K/UL — SIGNIFICANT CHANGE UP (ref 3.8–10.5)
WBC # BLD: 4.4 K/UL — SIGNIFICANT CHANGE UP (ref 3.8–10.5)
WBC # FLD AUTO: 4.4 K/UL — SIGNIFICANT CHANGE UP (ref 3.8–10.5)
WBC # FLD AUTO: 4.4 K/UL — SIGNIFICANT CHANGE UP (ref 3.8–10.5)

## 2023-11-01 PROCEDURE — 99232 SBSQ HOSP IP/OBS MODERATE 35: CPT

## 2023-11-01 PROCEDURE — 99443: CPT | Mod: 95

## 2023-11-01 RX ORDER — ACETAMINOPHEN 500 MG
1000 TABLET ORAL ONCE
Refills: 0 | Status: DISCONTINUED | OUTPATIENT
Start: 2023-11-01 | End: 2023-11-05

## 2023-11-01 RX ADMIN — ATORVASTATIN CALCIUM 10 MILLIGRAM(S): 80 TABLET, FILM COATED ORAL at 21:20

## 2023-11-01 RX ADMIN — Medication 20 MILLIGRAM(S): at 10:33

## 2023-11-01 RX ADMIN — Medication 1 TABLET(S): at 10:33

## 2023-11-01 RX ADMIN — Medication 25 MILLIGRAM(S): at 12:29

## 2023-11-01 RX ADMIN — SENNA PLUS 2 TABLET(S): 8.6 TABLET ORAL at 21:19

## 2023-11-01 RX ADMIN — Medication 325 MILLIGRAM(S): at 10:33

## 2023-11-01 RX ADMIN — PANTOPRAZOLE SODIUM 40 MILLIGRAM(S): 20 TABLET, DELAYED RELEASE ORAL at 10:33

## 2023-11-01 RX ADMIN — Medication 0.25 MILLIGRAM(S): at 17:03

## 2023-11-01 RX ADMIN — CEFTRIAXONE 1000 MILLIGRAM(S): 500 INJECTION, POWDER, FOR SOLUTION INTRAMUSCULAR; INTRAVENOUS at 06:07

## 2023-11-01 RX ADMIN — Medication 1 DROP(S): at 10:33

## 2023-11-01 RX ADMIN — APIXABAN 2.5 MILLIGRAM(S): 2.5 TABLET, FILM COATED ORAL at 21:20

## 2023-11-01 RX ADMIN — LOSARTAN POTASSIUM 50 MILLIGRAM(S): 100 TABLET, FILM COATED ORAL at 21:21

## 2023-11-01 RX ADMIN — APIXABAN 2.5 MILLIGRAM(S): 2.5 TABLET, FILM COATED ORAL at 12:29

## 2023-11-01 RX ADMIN — Medication 1 DROP(S): at 21:21

## 2023-11-01 RX ADMIN — LOSARTAN POTASSIUM 50 MILLIGRAM(S): 100 TABLET, FILM COATED ORAL at 10:33

## 2023-11-01 RX ADMIN — AMLODIPINE BESYLATE 2.5 MILLIGRAM(S): 2.5 TABLET ORAL at 10:33

## 2023-11-01 RX ADMIN — MIRTAZAPINE 22.5 MILLIGRAM(S): 45 TABLET, ORALLY DISINTEGRATING ORAL at 21:20

## 2023-11-01 RX ADMIN — Medication 650 MILLIGRAM(S): at 17:04

## 2023-11-01 NOTE — DISCHARGE NOTE NURSING/CASE MANAGEMENT/SOCIAL WORK - NSDCFUADDAPPT_GEN_ALL_CORE_FT
Dr Vides office will call back with appointment date due to office being closed today (11/3/2023) Follow up appointment with RUBI Irving at Dr. Vides's office on Monday, November 13th, 2023 at 3:00pm at [address] [place] [address].  If you need to cancel or reschedule your appointment please call [phone].

## 2023-11-01 NOTE — DISCHARGE NOTE NURSING/CASE MANAGEMENT/SOCIAL WORK - PATIENT PORTAL LINK FT
You can access the FollowMyHealth Patient Portal offered by [facility] by registering at the following website: [email or web address]. By joining [facility]’s FollowMyHealth portal, you will also be able to view your health information using other applications (apps) compatible with our system.

## 2023-11-01 NOTE — PROGRESS NOTE ADULT - SUBJECTIVE AND OBJECTIVE BOX
Patient is a 89y Female with a known history of :    HPI:  89 y/o Female with PMHx of Afib on Eliquis, CLL, HTN, renal calculus, venous insufficiency with lymphedema, and dementia A&Ox2 baseline BIBEMS from home to the ED to rule out UTI. She had been shaky and more weak for the last few days. Home health Aid at bedside reported patient diagnosed with shingles infection posterior left shoulder a few weeks ago, completed course of Valacyclovir. Since then patient with increased blood pressure. Over the last few days patient with darker and cloudy urine in leg bag, increased abdominal distension, and shakiness. Yesterday patient was tachycardic and threw up, EMS was called, who found patient to be febrile. Denies any recent flu-like symptoms. In the ED patient was febrile too.  (27 Oct 2023 04:04)  Found to have UTI and treated with antibiotics.  Mild Troponin elevation thought to be due to demand ischemia.  Now in bed, medicated due to agitation.  Aide bedside. Mild hypertension noted recently.        MEDICATIONS  (STANDING):  amLODIPine   Tablet 2.5 milliGRAM(s) Oral daily  apixaban 2.5 milliGRAM(s) Oral every 12 hours  artificial  tears Solution 1 Drop(s) Both EYES two times a day  atorvastatin 10 milliGRAM(s) Oral at bedtime  cefTRIAXone Injectable. 1000 milliGRAM(s) IV Push every 24 hours  ferrous    sulfate 325 milliGRAM(s) Oral daily  furosemide    Tablet 20 milliGRAM(s) Oral daily  influenza  Vaccine (HIGH DOSE) 0.7 milliLiter(s) IntraMuscular once  lactobacillus acidophilus 1 Tablet(s) Oral daily  losartan 50 milliGRAM(s) Oral two times a day  metoprolol succinate ER 25 milliGRAM(s) Oral daily  mirtazapine 22.5 milliGRAM(s) Oral at bedtime  pantoprazole  Injectable 40 milliGRAM(s) IV Push daily  senna 2 Tablet(s) Oral at bedtime    MEDICATIONS  (PRN):  acetaminophen     Tablet .. 650 milliGRAM(s) Oral every 6 hours PRN Temp greater or equal to 38C (100.4F), Mild Pain (1 - 3)  aluminum hydroxide/magnesium hydroxide/simethicone Suspension 30 milliLiter(s) Oral every 4 hours PRN Dyspepsia  LORazepam     Tablet 0.5 milliGRAM(s) Oral daily PRN Anxiety  melatonin 3 milliGRAM(s) Oral at bedtime PRN Insomnia  ondansetron Injectable 4 milliGRAM(s) IV Push every 8 hours PRN Nausea and/or Vomiting  polyethylene glycol 3350 17 Gram(s) Oral daily PRN for constipation      ALLERGIES: No Known Allergies      FAMILY HISTORY:  No pertinent family history in first degree relatives        Social history:  Alochol:   Smoking:   Drug Use:   Marital Status:     PHYSICAL EXAMINATION:  -----------------------------  T(C): 36.8 (10-31-23 @ 20:52), Max: 36.8 (10-31-23 @ 20:52)  HR: 60 (10-31-23 @ 20:52) (60 - 67)  BP: 138/52 (10-31-23 @ 20:52) (138/52 - 168/67)  RR: 18 (10-31-23 @ 20:52) (18 - 18)  SpO2: 98% (10-31-23 @ 20:52) (98% - 99%)  Wt(kg): --    10-31 @ 07:01  -  11-01 @ 07:00  --------------------------------------------------------  IN:  Total IN: 0 mL    OUT:    Indwelling Catheter - Urethral (mL): 1300 mL  Total OUT: 1300 mL    Total NET: -1300 mL            Constitutional: frail, asleep supine    HEENT: normal oral mucosa, no oral pallor and no oral cyanosis.   Neck: normal jugular venous A waves present, normal jugular venous V waves present and no jugular venous young A waves.   Pulmonary: no respiratory distress, normal respiratory rhythm and effort, no accessory muscle use and lungs were clear to auscultation bilaterally.   Cardiovascular: S1S2 single.  Faint systolic murmur heart Aortic focus   Musculoskeletal: the gait could not be assessed..   Extremities: no clubbing of the fingernails, no localized cyanosis, no petechial hemorrhages and no ischemic changes.   Psychiatric: was agitated. Now asleep    LABS:   --------  CBC Full  -  ( 31 Oct 2023 07:47 )  WBC Count : 4.55 K/uL  RBC Count : 3.23 M/uL  Hemoglobin : 10.3 g/dL  Hematocrit : 30.8 %  Platelet Count - Automated : 69 K/uL  Mean Cell Volume : 95.4 fl  Mean Cell Hemoglobin : 31.9 pg  Mean Cell Hemoglobin Concentration : 33.4 gm/dL  Auto Neutrophil # : x  Auto Lymphocyte # : x  Auto Monocyte # : x  Auto Eosinophil # : x  Auto Basophil # : x  Auto Neutrophil % : x  Auto Lymphocyte % : x  Auto Monocyte % : x  Auto Eosinophil % : x  Auto Basophil % : x      10-31    132<L>  |  99  |  12  ----------------------------<  91  4.3   |  27  |  0.49<L>    Ca    8.6      31 Oct 2023 07:48                 Culture Results:   No growth at 24 hours (10-30 @ 12:26)  Culture Results:   No growth at 24 hours (10-30 @ 12:22)    10-30 @ 12:26    Organism --   Gram Stain Blood -- Gram Stain --  Specimen Source .Blood None  Culture-Blood --    10-30 @ 12:22    Organism --   Gram Stain Blood -- Gram Stain --  Specimen Source .Blood None  Culture-Blood --        Radiology:

## 2023-11-01 NOTE — CDI QUERY NOTE - NSCDIOTHERTXTBX_GEN_ALL_CORE_HH
There is conflicting documentation regarding the patient's nutrition status.  The patient received a nutrition assessment by a Registered Dietician on 10/28.  The documentation of this assessment states: moderate protein calorie malnutrition Chart documentation also states the patient is well nourished.    Can the nutrition diagnosis and criteria be clarified, after study?    -Moderate malnutrition as evidenced   -Other malnutrition severity  -Other please specify      Chart Documentation:    -well nourished Bernardo Mendoza)  (Signed 29-Oct-2023 14:26)PHYSICAL EXAM:GENERAL: NAD, Well nourished  -PHYSICAL EXAM: GENERAL: NAD, Well nourished Sharon Bhatt)  (Signed 30-Oct-2023 12:05)  PN 10/31--PHYSICAL EXAM:GENERAL: NAD, Well nourished Last Updated: 31-Oct-2023 09:10 by Sharon Bhatt    Nutritional Assessment:· Nutritional Assessment	This patient has been assessed with a concern for Malnutrition and has been determined to have a diagnosis/diagnoses of Moderate protein-calorie malnutrition.This patient is being managed with: Diet DASH/TLC-Sodium & Cholesterol RestrictedSupplement Feeding Modality:  OralEnsure Plus High Protein Cans or Servings Per Day:  1       Frequency:  Three Times a dayEntered: Oct 27 2023  3:32PMThe following pending diet order is being considered for treatment of Moderate protein-calorie malnutrition:Diet Regular-Supplement Feeding Modality:  OralEnsure Plus High Protein Cans or Servings Per Day:  1       Frequency:  Three Times a dayEntered: Oct 28 2023 11:50AM There is conflicting documentation regarding the patient's nutrition status.  The patient received a nutrition assessment by a Registered Dietician on 10/28.  The documentation of this assessment states: moderate protein calorie malnutrition Chart documentation also states the patient is well nourished.    Can the nutrition diagnosis and criteria be clarified, after study?    -Patient was not well-nourished and was found to have moderate protein-calorie malnutrition    -Other nutritional diagnosis  -Other please specify        Chart Documentation:    -well nourished Bernardo Mendoza)  (Signed 29-Oct-2023 14:26)PHYSICAL EXAM:GENERAL: NAD, Well nourished  -PHYSICAL EXAM: GENERAL: NAD, Well nourished Sharon Bhatt)  (Signed 30-Oct-2023 12:05)  PN 10/31--PHYSICAL EXAM:GENERAL: NAD, Well nourished Last Updated: 31-Oct-2023 09:10 by Sharon Bhatt    Nutritional Assessment:· Nutritional Assessment	This patient has been assessed with a concern for Malnutrition and has been determined to have a diagnosis/diagnoses of Moderate protein-calorie malnutrition.This patient is being managed with: Diet DASH/TLC-Sodium & Cholesterol RestrictedSupplement Feeding Modality:  OralEnsure Plus High Protein Cans or Servings Per Day:  1       Frequency:  Three Times a dayEntered: Oct 27 2023  3:32PMThe following pending diet order is being considered for treatment of Moderate protein-calorie malnutrition:Diet Regular-Supplement Feeding Modality:  OralEnsure Plus High Protein Cans or Servings Per Day:  1       Frequency:  Three Times a dayEntered: Oct 28 2023 11:50AM

## 2023-11-01 NOTE — PROGRESS NOTE ADULT - SUBJECTIVE AND OBJECTIVE BOX
History of Present Illness:   89 y/o Female with PMHx of Afib on Eliquis, CLL, HTN, renal calculus, venous insufficiency with lymphedema, and dementia, Urinary Retention s/p Dhillon, Uti,  A&Ox2 baseline BIBEMS from home to the ED to rule out UTI. She had been shaky and more weak for the last few days. Home health Aid at bedside reported patient diagnosed with shingles infection posterior left shoulder a few weeks ago, completed course of Valacyclovir. Since then patient with increased blood pressure. Over the last few days patient with darker and cloudy urine in leg bag, increased abdominal distension, and shakiness. Yesterday patient was tachycardic and threw up, EMS was called, who found patient to be febrile. Denies any recent flu-like symptoms. In the ED patient was febrile too.   -found to be dehydrated (diuretic escalated as outpatient due to Htn), and found to have Uti/Bacteremia CAUTI    Vital Signs Last 24 Hrs  T(C): 36.8 (31 Oct 2023 20:52), Max: 36.8 (31 Oct 2023 20:52)  T(F): 98.2 (31 Oct 2023 20:52), Max: 98.2 (31 Oct 2023 20:52)  HR: 60 (31 Oct 2023 20:52) (60 - 67)  BP: 138/52 (31 Oct 2023 20:52) (138/52 - 168/67)  BP(mean): --  RR: 18 (31 Oct 2023 20:52) (18 - 18)  SpO2: 98% (31 Oct 2023 20:52) (98% - 99%)    Parameters below as of 31 Oct 2023 20:52  Patient On (Oxygen Delivery Method): room air          PHYSICAL EXAM:      HEENT:  NC/AT, EOMI, PERRLA, No scleral icterus, Moist mucous membranes  NECK: Supple, No JVD  CNS:  Alert & Oriented X3, Motor Strength 5/5 B/L upper and lower extremities; DTRs 2+ intact   LUNG: Normal Breath sounds, Clear to auscultation bilaterally, No rales, No rhonchi, No wheezing  HEART: irregular; No murmurs, No rubs  ABDOMEN: +BS, ST/ND/NT  GENITOURINARY- DHILLON  EXTREMITIES:  2+ Peripheral Pulses, No clubbing, No cyanosis, No tibial edema  MUSCULOSKELTAL: Joints normal ROM, No joint tenderness, No muscle tenderness  stg 2 sacral decub                          11.2   6.42  )-----------( 58       ( 29 Oct 2023 06:30 )             32.8     *****Copy/Paste individual medications to appropriate section (Continue, Start, Stop, Change Dose)*****                              *****Only copy inpatient medications that the patient will use at home*****    -------------------------------HOME and CURRENT MEDICATIONS------------------------------------    HOME MEDICATIONS/PRESCRIPTIONS:  A+D topical ointment: Apply topically to affected area once a day  apixaban 2.5 mg oral tablet: 1 tab(s) orally every 12 hours  Aquaphor topical ointment: Apply topically to affected area once a day , apply to back of head  Artificial Tears ophthalmic solution: 1 drop(s) in each eye 2 times a day  Emergen-C oral powder for reconstitution: 0.5 packet(s) orally once a day (in the afternoon)  emollients, topical lotion: Apply topically to affected area once a day , apply to whole body  Ensure Plus:   ferrous sulfate 325 mg (65 mg elemental iron) oral tablet: 1 tab(s) orally every other day  Fish Oil 1000 mg oral capsule: 1 cap(s) orally once a day with dinner  furosemide 20 mg oral tablet: 1 tab(s) orally 2 times a day ***crush med before administer to patient***  LORazepam 0.5 mg oral tablet: 1 tab(s) orally once a day (at bedtime) as needed for  anxiety  losartan 50 mg oral tablet: 1 tab(s) orally 2 times a day *** crush med before administer to patient ***  melatonin 3 mg oral tablet: 0.5 tab(s) orally once a day (at bedtime)  metoprolol succinate 25 mg oral tablet, extended release: 1 tab(s) orally once a day *** do not crush per pt daughter***  mirtazapine 15 mg oral tablet: 1.5 tab(s) orally once a day (at bedtime) *** crush med before administer to patient***  Multiple Vitamins oral tablet: 1 tab(s) orally once a day  nystatin 100,000 units/g topical powder: Apply topically to affected area 2 times a day as needed for  itching , apply between toes for fungus  omeprazole 20 mg oral tablet, disintegrating, delayed release: 1 tab(s) orally once a day  polyethylene glycol 3350 oral powder for reconstitution: 17 gram(s) orally once a day as needed for  constipation  Probiotic Formula (Bacillus Coagulans) oral capsule: 1 cap(s) orally once a day with dinner  senna (sennosides) 8.6 mg oral tablet: 1 tab(s) orally once a day as needed for  constipation  spironolactone 25 mg oral tablet: 1 tab(s) orally once a day (in the morning) ***crush med before administer to pt***  triamcinolone 0.1% topical cream: Apply topically to affected area once a day as needed for  rash on the shoulder/back  Tylenol Extra Strength 500 mg oral tablet: 2 tab(s) orally 2 times a day  Vitamin D3 50 mcg (2000 intl units) oral capsule: 1 cap(s) orally once a day with dinner  Xeroform dressing:   zinc oxide 40% topical ointment: Apply topically to affected area 2 times a day apply to affected area        MEDICATIONS  (STANDING):  amLODIPine   Tablet 2.5 milliGRAM(s) Oral daily  apixaban 2.5 milliGRAM(s) Oral every 12 hours  artificial  tears Solution 1 Drop(s) Both EYES two times a day  atorvastatin 10 milliGRAM(s) Oral at bedtime  cefTRIAXone Injectable. 1000 milliGRAM(s) IV Push every 24 hours  ferrous    sulfate 325 milliGRAM(s) Oral daily  furosemide    Tablet 20 milliGRAM(s) Oral daily  influenza  Vaccine (HIGH DOSE) 0.7 milliLiter(s) IntraMuscular once  lactobacillus acidophilus 1 Tablet(s) Oral daily  losartan 50 milliGRAM(s) Oral two times a day  metoprolol succinate ER 25 milliGRAM(s) Oral daily  mirtazapine 22.5 milliGRAM(s) Oral at bedtime  pantoprazole  Injectable 40 milliGRAM(s) IV Push daily  senna 2 Tablet(s) Oral at bedtime    MEDICATIONS  (PRN):  acetaminophen     Tablet .. 650 milliGRAM(s) Oral every 6 hours PRN Temp greater or equal to 38C (100.4F), Mild Pain (1 - 3)  aluminum hydroxide/magnesium hydroxide/simethicone Suspension 30 milliLiter(s) Oral every 4 hours PRN Dyspepsia  LORazepam     Tablet 0.5 milliGRAM(s) Oral daily PRN Anxiety  melatonin 3 milliGRAM(s) Oral at bedtime PRN Insomnia  ondansetron Injectable 4 milliGRAM(s) IV Push every 8 hours PRN Nausea and/or Vomiting  polyethylene glycol 3350 17 Gram(s) Oral daily PRN for constipation        repeat bl cx neg        all labs reviewed  all imaging reviewed    t< from: TTE Echo Complete w/o Contrast w/ Doppler (09.08.23 @ 14:57) >   Summary     Endocardium is not well visualized, however, overall left ventricular   systolic function appears normal. Technically Difficult Study.   The left atrium is severely dilated.   Moderate aortic stenosis.   Moderate (2+) aortic regurgitation.   Moderate to severe (3+) tricuspid valve regurgitation.          A/P:    1. Sepsis due to GNR bacteremia/Uti  Uti related likely to chronic Dhillon  Dhillon changed in ED  -Started on IV antibiotics, Ceftriaxone  f/u UCx, Blood Cx- E Coli change to PO Ceftin    2. Hyponatremia: due to dehydration: improving  suspect diuretic induced  off  Aldactone  lasix was reduced  to 20mg daily on 10/29  -s/p IV fluid  has severe TR , off IVF    3. Htn: better controlled on current regimen   c/w BB, Losartan   Norvasc 2.5mg/day added 10/29    4. History of CHF, HFpEF  compensated  -Continue Lasix at 20mg/day  off Aldactone   TTE: EF normal, mod pHtn    5. History of Permanent  A-fib  -Heart rate controlled  -Continue metoprolol and Eliquis    9. h/o Decubitus ulcer  -in lower back  -follow wound care consult       10. Thromocytopenia poss consumption if <50 dc Eliquis  check AM cbc - plts improving      dc home in am      time 51m case d/w daughter History of Present Illness:   89 y/o Female with PMHx of Afib on Eliquis, CLL, HTN, renal calculus, venous insufficiency with lymphedema, and dementia, Urinary Retention s/p Sales, Uti,  A&Ox2 baseline BIBEMS from home to the ED to rule out UTI. She had been shaky and more weak for the last few days. Home health Aid at bedside reported patient diagnosed with shingles infection posterior left shoulder a few weeks ago, completed course of Valacyclovir. Since then patient with increased blood pressure. Over the last few days patient with darker and cloudy urine in leg bag, increased abdominal distension, and shakiness. Yesterday patient was tachycardic and threw up, EMS was called, who found patient to be febrile. Denies any recent flu-like symptoms. In the ED patient was febrile too.   -found to be dehydrated (diuretic escalated as outpatient due to Htn), and found to have Uti/Bacteremia CAUTI    Vital Signs Last 24 Hrs  T(C): 36.8 (31 Oct 2023 20:52), Max: 36.8 (31 Oct 2023 20:52)  T(F): 98.2 (31 Oct 2023 20:52), Max: 98.2 (31 Oct 2023 20:52)  HR: 60 (31 Oct 2023 20:52) (60 - 67)  BP: 138/52 (31 Oct 2023 20:52) (138/52 - 168/67)  BP(mean): --  RR: 18 (31 Oct 2023 20:52) (18 - 18)  SpO2: 98% (31 Oct 2023 20:52) (98% - 99%)    Parameters below as of 31 Oct 2023 20:52  Patient On (Oxygen Delivery Method): room air          PHYSICAL EXAM:      HEENT:  NC/AT, EOMI, PERRLA, No scleral icterus, Moist mucous membranes  NECK: Supple, No JVD  CNS:  Alert & Oriented X3, Motor Strength 5/5 B/L upper and lower extremities; DTRs 2+ intact   LUNG: Normal Breath sounds, Clear to auscultation bilaterally, No rales, No rhonchi, No wheezing  HEART: irregular; No murmurs, No rubs  ABDOMEN: +BS, ST/ND/NT  GENITOURINARY- SALES  EXTREMITIES:  2+ Peripheral Pulses, No clubbing, No cyanosis, No tibial edema  MUSCULOSKELTAL: Joints normal ROM, No joint tenderness, No muscle tenderness  stg 2 sacral decub                          11.2   6.42  )-----------( 58       ( 29 Oct 2023 06:30 )             32.8     *****Copy/Paste individual medications to appropriate section (Continue, Start, Stop, Change Dose)*****                              *****Only copy inpatient medications that the patient will use at home*****    -------------------------------HOME and CURRENT MEDICATIONS------------------------------------    HOME MEDICATIONS/PRESCRIPTIONS:  A+D topical ointment: Apply topically to affected area once a day  apixaban 2.5 mg oral tablet: 1 tab(s) orally every 12 hours  Aquaphor topical ointment: Apply topically to affected area once a day , apply to back of head  Artificial Tears ophthalmic solution: 1 drop(s) in each eye 2 times a day  Emergen-C oral powder for reconstitution: 0.5 packet(s) orally once a day (in the afternoon)  emollients, topical lotion: Apply topically to affected area once a day , apply to whole body  Ensure Plus:   ferrous sulfate 325 mg (65 mg elemental iron) oral tablet: 1 tab(s) orally every other day  Fish Oil 1000 mg oral capsule: 1 cap(s) orally once a day with dinner  furosemide 20 mg oral tablet: 1 tab(s) orally 2 times a day ***crush med before administer to patient***  LORazepam 0.5 mg oral tablet: 1 tab(s) orally once a day (at bedtime) as needed for  anxiety  losartan 50 mg oral tablet: 1 tab(s) orally 2 times a day *** crush med before administer to patient ***  melatonin 3 mg oral tablet: 0.5 tab(s) orally once a day (at bedtime)  metoprolol succinate 25 mg oral tablet, extended release: 1 tab(s) orally once a day *** do not crush per pt daughter***  mirtazapine 15 mg oral tablet: 1.5 tab(s) orally once a day (at bedtime) *** crush med before administer to patient***  Multiple Vitamins oral tablet: 1 tab(s) orally once a day  nystatin 100,000 units/g topical powder: Apply topically to affected area 2 times a day as needed for  itching , apply between toes for fungus  omeprazole 20 mg oral tablet, disintegrating, delayed release: 1 tab(s) orally once a day  polyethylene glycol 3350 oral powder for reconstitution: 17 gram(s) orally once a day as needed for  constipation  Probiotic Formula (Bacillus Coagulans) oral capsule: 1 cap(s) orally once a day with dinner  senna (sennosides) 8.6 mg oral tablet: 1 tab(s) orally once a day as needed for  constipation  spironolactone 25 mg oral tablet: 1 tab(s) orally once a day (in the morning) ***crush med before administer to pt***  triamcinolone 0.1% topical cream: Apply topically to affected area once a day as needed for  rash on the shoulder/back  Tylenol Extra Strength 500 mg oral tablet: 2 tab(s) orally 2 times a day  Vitamin D3 50 mcg (2000 intl units) oral capsule: 1 cap(s) orally once a day with dinner  Xeroform dressing:   zinc oxide 40% topical ointment: Apply topically to affected area 2 times a day apply to affected area        MEDICATIONS  (STANDING):  amLODIPine   Tablet 2.5 milliGRAM(s) Oral daily  apixaban 2.5 milliGRAM(s) Oral every 12 hours  artificial  tears Solution 1 Drop(s) Both EYES two times a day  atorvastatin 10 milliGRAM(s) Oral at bedtime  cefTRIAXone Injectable. 1000 milliGRAM(s) IV Push every 24 hours  ferrous    sulfate 325 milliGRAM(s) Oral daily  furosemide    Tablet 20 milliGRAM(s) Oral daily  influenza  Vaccine (HIGH DOSE) 0.7 milliLiter(s) IntraMuscular once  lactobacillus acidophilus 1 Tablet(s) Oral daily  losartan 50 milliGRAM(s) Oral two times a day  metoprolol succinate ER 25 milliGRAM(s) Oral daily  mirtazapine 22.5 milliGRAM(s) Oral at bedtime  pantoprazole  Injectable 40 milliGRAM(s) IV Push daily  senna 2 Tablet(s) Oral at bedtime    MEDICATIONS  (PRN):  acetaminophen     Tablet .. 650 milliGRAM(s) Oral every 6 hours PRN Temp greater or equal to 38C (100.4F), Mild Pain (1 - 3)  aluminum hydroxide/magnesium hydroxide/simethicone Suspension 30 milliLiter(s) Oral every 4 hours PRN Dyspepsia  LORazepam     Tablet 0.5 milliGRAM(s) Oral daily PRN Anxiety  melatonin 3 milliGRAM(s) Oral at bedtime PRN Insomnia  ondansetron Injectable 4 milliGRAM(s) IV Push every 8 hours PRN Nausea and/or Vomiting  polyethylene glycol 3350 17 Gram(s) Oral daily PRN for constipation        repeat bl cx neg        all labs reviewed  all imaging reviewed    t< from: TTE Echo Complete w/o Contrast w/ Doppler (09.08.23 @ 14:57) >   Summary     Endocardium is not well visualized, however, overall left ventricular   systolic function appears normal. Technically Difficult Study.   The left atrium is severely dilated.   Moderate aortic stenosis.   Moderate (2+) aortic regurgitation.   Moderate to severe (3+) tricuspid valve regurgitation.          A/P:    1. 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 Hyponatremia: due to dehydration: improving  suspect diuretic induced  off  Aldactone  lasix was reduced  to 20mg daily on 10/29  -s/p IV fluid  has severe TR , off IVF    3. Htn: better controlled on current regimen   c/w BB, Losartan   Norvasc 2.5mg/day added 10/29    4. History of CHF, HFpEF  compensated  -Continue Lasix at 20mg/day  off Aldactone   TTE: EF normal, mod pHtn    5. History of Permanent  A-fib  -Heart rate controlled  -Continue metoprolol and Eliquis    9. h/o Decubitus ulcer  -in lower back  -follow wound care consult       10. Thrombocytopenia poss consumption if <50 dc Eliquis  check AM cbc - plts improving      dc home in am      time 51m case d/w daughter

## 2023-11-02 ENCOUNTER — TRANSCRIPTION ENCOUNTER (OUTPATIENT)
Age: 88
End: 2023-11-02

## 2023-11-02 LAB
ANION GAP SERPL CALC-SCNC: 5 MMOL/L — SIGNIFICANT CHANGE UP (ref 5–17)
ANION GAP SERPL CALC-SCNC: 5 MMOL/L — SIGNIFICANT CHANGE UP (ref 5–17)
BUN SERPL-MCNC: 12 MG/DL — SIGNIFICANT CHANGE UP (ref 7–23)
BUN SERPL-MCNC: 12 MG/DL — SIGNIFICANT CHANGE UP (ref 7–23)
CALCIUM SERPL-MCNC: 8.8 MG/DL — SIGNIFICANT CHANGE UP (ref 8.5–10.1)
CALCIUM SERPL-MCNC: 8.8 MG/DL — SIGNIFICANT CHANGE UP (ref 8.5–10.1)
CHLORIDE SERPL-SCNC: 102 MMOL/L — SIGNIFICANT CHANGE UP (ref 96–108)
CHLORIDE SERPL-SCNC: 102 MMOL/L — SIGNIFICANT CHANGE UP (ref 96–108)
CO2 SERPL-SCNC: 28 MMOL/L — SIGNIFICANT CHANGE UP (ref 22–31)
CO2 SERPL-SCNC: 28 MMOL/L — SIGNIFICANT CHANGE UP (ref 22–31)
CREAT SERPL-MCNC: 0.43 MG/DL — LOW (ref 0.5–1.3)
CREAT SERPL-MCNC: 0.43 MG/DL — LOW (ref 0.5–1.3)
EGFR: 91 ML/MIN/1.73M2 — SIGNIFICANT CHANGE UP
EGFR: 91 ML/MIN/1.73M2 — SIGNIFICANT CHANGE UP
GLUCOSE SERPL-MCNC: 94 MG/DL — SIGNIFICANT CHANGE UP (ref 70–99)
GLUCOSE SERPL-MCNC: 94 MG/DL — SIGNIFICANT CHANGE UP (ref 70–99)
HCT VFR BLD CALC: 32.9 % — LOW (ref 34.5–45)
HCT VFR BLD CALC: 32.9 % — LOW (ref 34.5–45)
HGB BLD-MCNC: 11.1 G/DL — LOW (ref 11.5–15.5)
HGB BLD-MCNC: 11.1 G/DL — LOW (ref 11.5–15.5)
MCHC RBC-ENTMCNC: 31.8 PG — SIGNIFICANT CHANGE UP (ref 27–34)
MCHC RBC-ENTMCNC: 31.8 PG — SIGNIFICANT CHANGE UP (ref 27–34)
MCHC RBC-ENTMCNC: 33.7 GM/DL — SIGNIFICANT CHANGE UP (ref 32–36)
MCHC RBC-ENTMCNC: 33.7 GM/DL — SIGNIFICANT CHANGE UP (ref 32–36)
MCV RBC AUTO: 94.3 FL — SIGNIFICANT CHANGE UP (ref 80–100)
MCV RBC AUTO: 94.3 FL — SIGNIFICANT CHANGE UP (ref 80–100)
PLATELET # BLD AUTO: 90 K/UL — LOW (ref 150–400)
PLATELET # BLD AUTO: 90 K/UL — LOW (ref 150–400)
POTASSIUM SERPL-MCNC: 4 MMOL/L — SIGNIFICANT CHANGE UP (ref 3.5–5.3)
POTASSIUM SERPL-MCNC: 4 MMOL/L — SIGNIFICANT CHANGE UP (ref 3.5–5.3)
POTASSIUM SERPL-SCNC: 4 MMOL/L — SIGNIFICANT CHANGE UP (ref 3.5–5.3)
POTASSIUM SERPL-SCNC: 4 MMOL/L — SIGNIFICANT CHANGE UP (ref 3.5–5.3)
RBC # BLD: 3.49 M/UL — LOW (ref 3.8–5.2)
RBC # BLD: 3.49 M/UL — LOW (ref 3.8–5.2)
RBC # FLD: 14 % — SIGNIFICANT CHANGE UP (ref 10.3–14.5)
RBC # FLD: 14 % — SIGNIFICANT CHANGE UP (ref 10.3–14.5)
SODIUM SERPL-SCNC: 135 MMOL/L — SIGNIFICANT CHANGE UP (ref 135–145)
SODIUM SERPL-SCNC: 135 MMOL/L — SIGNIFICANT CHANGE UP (ref 135–145)
TROPONIN I, HIGH SENSITIVITY RESULT: 25.13 NG/L — SIGNIFICANT CHANGE UP
TROPONIN I, HIGH SENSITIVITY RESULT: 25.13 NG/L — SIGNIFICANT CHANGE UP
WBC # BLD: 5.04 K/UL — SIGNIFICANT CHANGE UP (ref 3.8–10.5)
WBC # BLD: 5.04 K/UL — SIGNIFICANT CHANGE UP (ref 3.8–10.5)
WBC # FLD AUTO: 5.04 K/UL — SIGNIFICANT CHANGE UP (ref 3.8–10.5)
WBC # FLD AUTO: 5.04 K/UL — SIGNIFICANT CHANGE UP (ref 3.8–10.5)

## 2023-11-02 PROCEDURE — 93010 ELECTROCARDIOGRAM REPORT: CPT

## 2023-11-02 PROCEDURE — 99233 SBSQ HOSP IP/OBS HIGH 50: CPT

## 2023-11-02 RX ORDER — OMEPRAZOLE 10 MG/1
1 CAPSULE, DELAYED RELEASE ORAL
Refills: 0 | DISCHARGE

## 2023-11-02 RX ORDER — PANTOPRAZOLE SODIUM 20 MG/1
1 TABLET, DELAYED RELEASE ORAL
Qty: 30 | Refills: 0
Start: 2023-11-02 | End: 2023-12-01

## 2023-11-02 RX ORDER — ACETAMINOPHEN 500 MG
975 TABLET ORAL EVERY 12 HOURS
Refills: 0 | Status: DISCONTINUED | OUTPATIENT
Start: 2023-11-02 | End: 2023-11-05

## 2023-11-02 RX ORDER — MULTIVIT-MIN/FERROUS GLUCONATE 9 MG/15 ML
1 LIQUID (ML) ORAL DAILY
Refills: 0 | Status: DISCONTINUED | OUTPATIENT
Start: 2023-11-02 | End: 2023-11-05

## 2023-11-02 RX ORDER — NYSTATIN CREAM 100000 [USP'U]/G
1 CREAM TOPICAL
Refills: 0 | DISCHARGE

## 2023-11-02 RX ORDER — ISOSORBIDE MONONITRATE 60 MG/1
30 TABLET, EXTENDED RELEASE ORAL DAILY
Refills: 0 | Status: DISCONTINUED | OUTPATIENT
Start: 2023-11-02 | End: 2023-11-05

## 2023-11-02 RX ORDER — SPIRONOLACTONE 25 MG/1
1 TABLET, FILM COATED ORAL
Refills: 0 | DISCHARGE

## 2023-11-02 RX ORDER — ISOSORBIDE MONONITRATE 60 MG/1
1 TABLET, EXTENDED RELEASE ORAL
Qty: 30 | Refills: 0
Start: 2023-11-02 | End: 2023-12-01

## 2023-11-02 RX ORDER — NITROGLYCERIN 6.5 MG
0.4 CAPSULE, EXTENDED RELEASE ORAL
Refills: 0 | Status: DISCONTINUED | OUTPATIENT
Start: 2023-11-02 | End: 2023-11-05

## 2023-11-02 RX ORDER — ATORVASTATIN CALCIUM 80 MG/1
1 TABLET, FILM COATED ORAL
Qty: 30 | Refills: 0
Start: 2023-11-02 | End: 2023-12-01

## 2023-11-02 RX ORDER — FUROSEMIDE 40 MG
20 TABLET ORAL
Refills: 0 | Status: DISCONTINUED | OUTPATIENT
Start: 2023-11-02 | End: 2023-11-05

## 2023-11-02 RX ADMIN — Medication 1 DROP(S): at 09:50

## 2023-11-02 RX ADMIN — SENNA PLUS 2 TABLET(S): 8.6 TABLET ORAL at 23:00

## 2023-11-02 RX ADMIN — Medication 0.5 MILLIGRAM(S): at 15:10

## 2023-11-02 RX ADMIN — Medication 25 MILLIGRAM(S): at 09:49

## 2023-11-02 RX ADMIN — AMLODIPINE BESYLATE 2.5 MILLIGRAM(S): 2.5 TABLET ORAL at 09:50

## 2023-11-02 RX ADMIN — ATORVASTATIN CALCIUM 10 MILLIGRAM(S): 80 TABLET, FILM COATED ORAL at 23:01

## 2023-11-02 RX ADMIN — APIXABAN 2.5 MILLIGRAM(S): 2.5 TABLET, FILM COATED ORAL at 09:49

## 2023-11-02 RX ADMIN — Medication 975 MILLIGRAM(S): at 23:01

## 2023-11-02 RX ADMIN — LOSARTAN POTASSIUM 50 MILLIGRAM(S): 100 TABLET, FILM COATED ORAL at 09:49

## 2023-11-02 RX ADMIN — Medication 325 MILLIGRAM(S): at 09:48

## 2023-11-02 RX ADMIN — Medication 1 TABLET(S): at 09:49

## 2023-11-02 RX ADMIN — Medication 650 MILLIGRAM(S): at 15:02

## 2023-11-02 RX ADMIN — Medication 20 MILLIGRAM(S): at 09:49

## 2023-11-02 RX ADMIN — APIXABAN 2.5 MILLIGRAM(S): 2.5 TABLET, FILM COATED ORAL at 23:01

## 2023-11-02 RX ADMIN — LOSARTAN POTASSIUM 50 MILLIGRAM(S): 100 TABLET, FILM COATED ORAL at 23:01

## 2023-11-02 RX ADMIN — POLYETHYLENE GLYCOL 3350 17 GRAM(S): 17 POWDER, FOR SOLUTION ORAL at 06:12

## 2023-11-02 RX ADMIN — CEFTRIAXONE 1000 MILLIGRAM(S): 500 INJECTION, POWDER, FOR SOLUTION INTRAMUSCULAR; INTRAVENOUS at 06:00

## 2023-11-02 RX ADMIN — PANTOPRAZOLE SODIUM 40 MILLIGRAM(S): 20 TABLET, DELAYED RELEASE ORAL at 09:48

## 2023-11-02 RX ADMIN — Medication 1 DROP(S): at 23:01

## 2023-11-02 RX ADMIN — Medication 20 MILLIGRAM(S): at 23:01

## 2023-11-02 RX ADMIN — ISOSORBIDE MONONITRATE 30 MILLIGRAM(S): 60 TABLET, EXTENDED RELEASE ORAL at 09:49

## 2023-11-02 NOTE — DISCHARGE NOTE PROVIDER - DETAILS OF MALNUTRITION DIAGNOSIS/DIAGNOSES
This patient has been assessed with a concern for Malnutrition and was treated during this hospitalization for the following Nutrition diagnosis/diagnoses:     -  10/28/2023: Moderate protein-calorie malnutrition

## 2023-11-02 NOTE — DISCHARGE NOTE PROVIDER - NSDCMRMEDTOKEN_GEN_ALL_CORE_FT
A+D topical ointment: Apply topically to affected area once a day  apixaban 2.5 mg oral tablet: 1 tab(s) orally every 12 hours  Aquaphor topical ointment: Apply topically to affected area once a day , apply to back of head  Artificial Tears ophthalmic solution: 1 drop(s) in each eye 2 times a day  atorvastatin 20 mg oral tablet: 1 tab(s) orally once a day  Emergen-C oral powder for reconstitution: 0.5 packet(s) orally once a day (in the afternoon)  emollients, topical lotion: Apply topically to affected area once a day , apply to whole body  Ensure Plus:   ferrous sulfate 325 mg (65 mg elemental iron) oral tablet: 1 tab(s) orally every other day  Fish Oil 1000 mg oral capsule: 1 cap(s) orally once a day with dinner  furosemide 20 mg oral tablet: 1 tab(s) orally once a day ***crush med before administer to patient***  isosorbide mononitrate 30 mg oral tablet, extended release: 1 tab(s) orally once a day  LORazepam 0.5 mg oral tablet: 1 tab(s) orally once a day (at bedtime) as needed for  anxiety  losartan 50 mg oral tablet: 1 tab(s) orally 2 times a day *** crush med before administer to patient ***  melatonin 3 mg oral tablet: 0.5 tab(s) orally once a day (at bedtime)  metoprolol succinate 25 mg oral tablet, extended release: 1 tab(s) orally once a day *** do not crush per pt daughter***  mirtazapine 15 mg oral tablet: 1.5 tab(s) orally once a day (at bedtime) *** crush med before administer to patient***  Multiple Vitamins oral tablet: 1 tab(s) orally once a day  pantoprazole 40 mg oral granule, delayed release: 1 each orally once a day  polyethylene glycol 3350 oral powder for reconstitution: 17 gram(s) orally once a day as needed for  constipation  Probiotic Formula (Bacillus Coagulans) oral capsule: 1 cap(s) orally once a day with dinner  senna (sennosides) 8.6 mg oral tablet: 1 tab(s) orally once a day as needed for  constipation  Tylenol Extra Strength 500 mg oral tablet: 2 tab(s) orally 2 times a day  Vitamin D3 50 mcg (2000 intl units) oral capsule: 1 cap(s) orally once a day with dinner  Xeroform dressing:   zinc oxide 40% topical ointment: Apply topically to affected area 2 times a day apply to affected area   A+D topical ointment: Apply topically to affected area once a day  amLODIPine 5 mg oral tablet: 1 tab(s) orally once a day  apixaban 2.5 mg oral tablet: 1 tab(s) orally every 12 hours  Aquaphor topical ointment: Apply topically to affected area once a day , apply to back of head  Artificial Tears ophthalmic solution: 1 drop(s) in each eye 2 times a day  atorvastatin 20 mg oral tablet: 1 tab(s) orally once a day  cefuroxime 500 mg oral tablet: 1 tab(s) orally 2 times a day  Emergen-C oral powder for reconstitution: 0.5 packet(s) orally once a day (in the afternoon)  emollients, topical lotion: Apply topically to affected area once a day , apply to whole body  Ensure Plus:   ferrous sulfate 325 mg (65 mg elemental iron) oral tablet: 1 tab(s) orally every other day  Fish Oil 1000 mg oral capsule: 1 cap(s) orally once a day with dinner  furosemide 20 mg oral tablet: 1 tab(s) orally 2 times a day 1tab at 9 in morning and 1 tab at 2 pm afternoon  isosorbide mononitrate 30 mg oral tablet, extended release: 1 tab(s) orally once a day  LORazepam 0.5 mg oral tablet: 1 tab(s) orally once a day (at bedtime) as needed for  anxiety  losartan 50 mg oral tablet: 1 tab(s) orally 2 times a day *** crush med before administer to patient ***  melatonin 3 mg oral tablet: 0.5 tab(s) orally once a day (at bedtime)  metoprolol succinate 25 mg oral tablet, extended release: 1 tab(s) orally once a day *** do not crush per pt daughter***  mirtazapine 15 mg oral tablet: 1.5 tab(s) orally once a day (at bedtime) *** crush med before administer to patient***  Multiple Vitamins oral tablet: 1 tab(s) orally once a day  pantoprazole 40 mg oral granule, delayed release: 1 each orally once a day  polyethylene glycol 3350 oral powder for reconstitution: 17 gram(s) orally once a day as needed for  constipation  Probiotic Formula (Bacillus Coagulans) oral capsule: 1 cap(s) orally once a day with dinner  senna (sennosides) 8.6 mg oral tablet: 1 tab(s) orally once a day as needed for  constipation  Tylenol Extra Strength 500 mg oral tablet: 2 tab(s) orally 2 times a day  Vitamin D3 50 mcg (2000 intl units) oral capsule: 1 cap(s) orally once a day with dinner  Xeroform dressing:   zinc oxide 40% topical ointment: Apply topically to affected area 2 times a day apply to affected area   A+D topical ointment: Apply topically to affected area once a day  amLODIPine 5 mg oral tablet: 1 tab(s) orally once a day  apixaban 2.5 mg oral tablet: 1 tab(s) orally every 12 hours  Aquaphor topical ointment: Apply topically to affected area once a day , apply to back of head  Artificial Tears ophthalmic solution: 1 drop(s) in each eye 2 times a day  atorvastatin 20 mg oral tablet: 1 tab(s) orally once a day  cefuroxime 500 mg oral tablet: 1 tab(s) orally 2 times a day  Dulcolax Laxative 10 mg rectal suppository: 1 suppository(ies) rectally once a day as needed for constipation  Emergen-C oral powder for reconstitution: 0.5 packet(s) orally once a day (in the afternoon)  emollients, topical lotion: Apply topically to affected area once a day , apply to whole body  Ensure Plus:   ferrous sulfate 325 mg (65 mg elemental iron) oral tablet: 1 tab(s) orally every other day  Fish Oil 1000 mg oral capsule: 1 cap(s) orally once a day with dinner  furosemide 20 mg oral tablet: 1 tab(s) orally 2 times a day 1tab at 9 in morning and 1 tab at 2 pm afternoon  isosorbide mononitrate 30 mg oral tablet, extended release: 1 tab(s) orally once a day  LORazepam 0.5 mg oral tablet: 1 tab(s) orally once a day (at bedtime) as needed for  anxiety  losartan 50 mg oral tablet: 1 tab(s) orally 2 times a day *** crush med before administer to patient ***  melatonin 3 mg oral tablet: 0.5 tab(s) orally once a day (at bedtime)  metoprolol succinate 25 mg oral tablet, extended release: 1 tab(s) orally once a day *** do not crush per pt daughter***  mirtazapine 15 mg oral tablet: 1.5 tab(s) orally once a day (at bedtime) *** crush med before administer to patient***  Multiple Vitamins oral tablet: 1 tab(s) orally once a day  pantoprazole 40 mg oral granule, delayed release: 1 each orally once a day  polyethylene glycol 3350 oral powder for reconstitution: 17 gram(s) orally once a day as needed for  constipation  Probiotic Formula (Bacillus Coagulans) oral capsule: 1 cap(s) orally once a day with dinner  senna (sennosides) 8.6 mg oral tablet: 1 tab(s) orally once a day as needed for  constipation  Tylenol Extra Strength 500 mg oral tablet: 2 tab(s) orally 2 times a day  Vitamin D3 50 mcg (2000 intl units) oral capsule: 1 cap(s) orally once a day with dinner  Xeroform dressing:   zinc oxide 40% topical ointment: Apply topically to affected area 2 times a day apply to affected area

## 2023-11-02 NOTE — DISCHARGE NOTE PROVIDER - NSDCFUADDAPPT_GEN_ALL_CORE_FT
Follow up appointment with RUBI Irving at Dr. Vides's office on Monday, November 13th, 2023 at 3:00pm at [address] [place] [address].  If you need to cancel or reschedule your appointment please call [phone].

## 2023-11-02 NOTE — PROGRESS NOTE ADULT - SUBJECTIVE AND OBJECTIVE BOX
CC: Acute UTI, Elevated troponin     HPI: 89 y/o Female with PMHx of Afib on Eliquis, CLL, HTN, renal calculus, venous insufficiency with lymphedema, and dementia A&Ox2 baseline BIBEMS from home to the ED to rule out UTI. She had been shaky and more weak for the last few days. Home health Aid at bedside reported patient diagnosed with shingles infection posterior left shoulder a few weeks ago, completed course of Valacyclovir. Since then patient with increased blood pressure. Over the last few days patient with darker and cloudy urine in leg bag, increased abdominal distension, and shakiness. Yesterday patient was tachycardic and threw up, EMS was called, who found patient to be febrile. Denies any recent flu-like symptoms. In the ED patient was febrile too.     INTERVAL HPI/OVERNIGHT EVENTS: chart reviewed Pt was seen and examined, awake  but confused. As per chart had CP overnight, when asked Pt is unable to provide details, or explain where Pt was, states doesn't have it now. Pts aid at bedside, reports that noted vulva swelling and legs swelling   Later daughter was at bedside, P=results and plan discussed.     Vital Signs Last 24 Hrs  T(C): 36.2 (02 Nov 2023 15:55), Max: 36.7 (01 Nov 2023 20:43)  T(F): 97.2 (02 Nov 2023 15:55), Max: 98.1 (01 Nov 2023 20:43)  HR: 65 (02 Nov 2023 08:33) (65 - 73)  BP: 129/63 (02 Nov 2023 15:55) (129/63 - 160/70)  RR: 17 (02 Nov 2023 15:55) (17 - 18)  SpO2: 99% (02 Nov 2023 15:55) (99% - 100%)    Parameters below as of 02 Nov 2023 15:55  Patient On (Oxygen Delivery Method): room air        REVIEW OF SYSTEMS:  Unable to obtain due to dementia       PHYSICAL EXAM:  General: Frail elderly female,  in no acute distress  Eyes:  EOMI; conjunctiva and sclera clear  Head: Normocephalic; atraumatic  ENMT: No nasal discharge; airway clear  Neck: Supple; no JVD  Respiratory: Decreased BS at bases, No wheezes  Cardiovascular: Irregular rate and rhythm. S1 and S2 Normal;   Gastrointestinal: Soft non-tender non-distended; Normal bowel sounds  Genitourinary: No  suprapubic  tenderness, L labia edema no erythema or warmth   Extremities: +  edema upper thighs   Vascular: Peripheral pulses palpable 2+ bilaterally  Neurological: Alert and oriented x1-2, non focal, confused   Musculoskeletal: Normal muscle tone, without deformities      LABS:                         11.1   5.04  )-----------( 90       ( 02 Nov 2023 07:54 )             32.9     02 Nov 2023 07:54    135    |  102    |  12     ----------------------------<  94     4.0     |  28     |  0.43     Ca    8.8        02 Nov 2023 07:54        Urinalysis Basic - ( 02 Nov 2023 07:54 )  Color: x / Appearance: x / SG: x / pH: x  Gluc: 94 mg/dL / Ketone: x  / Bili: x / Urobili: x   Blood: x / Protein: x / Nitrite: x   Leuk Esterase: x / RBC: x / WBC x   Sq Epi: x / Non Sq Epi: x / Bacteria: x        MEDICATIONS  (STANDING):  acetaminophen   IVPB .. 1000 milliGRAM(s) IV Intermittent once  amLODIPine   Tablet 2.5 milliGRAM(s) Oral daily  apixaban 2.5 milliGRAM(s) Oral every 12 hours  artificial  tears Solution 1 Drop(s) Both EYES two times a day  atorvastatin 10 milliGRAM(s) Oral at bedtime  ferrous    sulfate 325 milliGRAM(s) Oral daily  furosemide    Tablet 20 milliGRAM(s) Oral daily  influenza  Vaccine (HIGH DOSE) 0.7 milliLiter(s) IntraMuscular once  isosorbide   mononitrate ER Tablet (IMDUR) 30 milliGRAM(s) Oral daily  lactobacillus acidophilus 1 Tablet(s) Oral daily  losartan 50 milliGRAM(s) Oral two times a day  metoprolol succinate ER 25 milliGRAM(s) Oral daily  mirtazapine 22.5 milliGRAM(s) Oral at bedtime  pantoprazole  Injectable 40 milliGRAM(s) IV Push daily  senna 2 Tablet(s) Oral at bedtime    MEDICATIONS  (PRN):  acetaminophen     Tablet .. 650 milliGRAM(s) Oral every 6 hours PRN Temp greater or equal to 38C (100.4F), Mild Pain (1 - 3)  aluminum hydroxide/magnesium hydroxide/simethicone Suspension 30 milliLiter(s) Oral every 4 hours PRN Dyspepsia  LORazepam     Tablet 0.5 milliGRAM(s) Oral daily PRN Anxiety  melatonin 3 milliGRAM(s) Oral at bedtime PRN Insomnia  ondansetron Injectable 4 milliGRAM(s) IV Push every 8 hours PRN Nausea and/or Vomiting  polyethylene glycol 3350 17 Gram(s) Oral daily PRN for constipation      RADIOLOGY & ADDITIONAL TESTS:    ACC: [id number] EXAM:  XR CHEST PORTABLE URGENT 1V   ORDERED BY: ALLEN HARRIS     PROCEDURE DATE:  10/26/2023    INTERPRETATION:  Sepsis.    AP chest. Prior 9/7/2023.    Chin obscures portions both apices. No change cardiomegaly with left   ventricular prominence. Cardiac apex obscures left base. Grossly clear   lungs. No consolidation or effusion.    IMPRESSION: Cardiomegaly. Grossly clear lungs.

## 2023-11-02 NOTE — DISCHARGE NOTE PROVIDER - HOSPITAL COURSE
89 y/o Female with PMHx of Afib on Eliquis, CLL, HTN, renal calculus, venous insufficiency with lymphedema, and dementia, Urinary Retention s/p Sales, Uti,  A&Ox2 baseline BIBEMS from home to the ED to rule out UTI. She had been shaky and more weak for the last few days. Home health Aid at bedside reported patient diagnosed with shingles infection posterior left shoulder a few weeks ago, completed course of Valacyclovir. Since then patient with increased blood pressure. Over the last few days patient with darker and cloudy urine in leg bag, increased abdominal distension, and shakiness. Yesterday patient was tachycardic and threw up, EMS was called, who found patient to be febrile. Denies any recent flu-like symptoms. In the ED patient was febrile too.   -found to be dehydrated (diuretic escalated as outpatient due to Htn), and found to have Uti/Bacteremia CAUTI  Vital Signs Last 24 Hrs  T(C): 36.5 (02 Nov 2023 08:33), Max: 36.7 (01 Nov 2023 15:37)  T(F): 97.7 (02 Nov 2023 08:33), Max: 98.1 (01 Nov 2023 20:43)  HR: 65 (02 Nov 2023 08:33) (65 - 73)  BP: 160/70 (02 Nov 2023 08:33) (151/66 - 160/70)  RR: 18 (02 Nov 2023 08:33) (18 - 18)  SpO2: 99% (02 Nov 2023 08:33) (98% - 100%)    Parameters below as of 02 Nov 2023 08:33  Patient On (Oxygen Delivery Method): room air    PHYSICAL EXAM:  General: in no acute distress  Eyes: PERRLA, EOMI; conjunctiva and sclera clear  Head: Normocephalic; atraumatic  ENMT: No nasal discharge; airway clear  Neck: Supple; non tender; no masses  Respiratory: No wheezes, rales or rhonchi  Cardiovascular: Regular rate and rhythm. S1 and S2 Normal; No murmurs, gallops or rubs  Gastrointestinal: Soft non-tender non-distended; Normal bowel sounds  Genitourinary: No  suprapubic  tenderness  Extremities: Normal range of motion, No clubbing, cyanosis or edema  Vascular: Peripheral pulses palpable 2+ bilaterally  Neurological: Alert and oriented x4  Skin: Warm and dry. No acute rash  Lymph Nodes: No acute cervical adenopathy  Musculoskeletal: Normal muscle tone, without deformities  Psychiatric: Cooperative and appropriate      1. 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 Hyponatremia: due to dehydration: improving  suspect diuretic induced  off  Aldactone  lasix was reduced  to 20mg daily on 10/29  -s/p IV fluid  has severe TR , off IVF    3. Htn: better controlled on current regimen   c/w BB, Losartan   Norvasc 2.5mg/day added 10/29    4. History of CHF, HFpEF  compensated  -Continue Lasix at 20mg/day  off Aldactone   TTE: EF normal, mod pHtn    5. History of Permanent  A-fib  -Heart rate controlled  -Continue metoprolol and Eliquis    9. h/o Decubitus ulcer  -in lower back  -follow wound care consult       10. Thrombocytopenia poss consumption if <50 dc Eliquis  check AM cbc - plts improving      dc home in am   89 y/o Female with PMHx of Afib on Eliquis, CLL, HTN, renal calculus, venous insufficiency with lymphedema, and dementia, Urinary Retention s/p Sales, Uti,  A&Ox2 baseline BIBEMS from home to the ED to rule out UTI. She had been shaky and more weak for the last few days. Home health Aid at bedside reported patient diagnosed with shingles infection posterior left shoulder a few weeks ago, completed course of Valacyclovir. Since then patient with increased blood pressure. Over the last few days patient with darker and cloudy urine in leg bag, increased abdominal distension, and shakiness. Yesterday patient was tachycardic and threw up, EMS was called, who found patient to be febrile. Denies any recent flu-like symptoms. In the ED patient was febrile too.   -found to be dehydrated (diuretic escalated as outpatient due to Htn), and found to have Uti/Bacteremia CAUTI  Vital Signs Last 24 Hrs  T(C): 36.5 (02 Nov 2023 08:33), Max: 36.7 (01 Nov 2023 15:37)  T(F): 97.7 (02 Nov 2023 08:33), Max: 98.1 (01 Nov 2023 20:43)  HR: 65 (02 Nov 2023 08:33) (65 - 73)  BP: 160/70 (02 Nov 2023 08:33) (151/66 - 160/70)  RR: 18 (02 Nov 2023 08:33) (18 - 18)  SpO2: 99% (02 Nov 2023 08:33) (98% - 100%)    Parameters below as of 02 Nov 2023 08:33  Patient On (Oxygen Delivery Method): room air    PHYSICAL EXAM:  General: in no acute distress  Eyes: PERRLA, EOMI; conjunctiva and sclera clear  Head: Normocephalic; atraumatic  ENMT: No nasal discharge; airway clear  Neck: Supple; non tender; no masses  Respiratory: No wheezes, rales or rhonchi  Cardiovascular: Regular rate and rhythm. S1 and S2 Normal; No murmurs, gallops or rubs  Gastrointestinal: Soft non-tender non-distended; Normal bowel sounds  Genitourinary: No  suprapubic  tenderness  Extremities: Normal range of motion, No clubbing, cyanosis or edema  Vascular: Peripheral pulses palpable 2+ bilaterally  Neurological: Alert and oriented x4  Skin: Warm and dry. No acute rash  Lymph Nodes: No acute cervical adenopathy  Musculoskeletal: Normal muscle tone, without deformities  Psychiatric: Cooperative and appropriate      1. 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 Hyponatremia: due to dehydration: improving  suspect diuretic induced  off  Aldactone  lasix was reduced  to 20mg daily on 10/29  -s/p IV fluid  has severe TR , off IVF    3. Htn: better controlled on current regimen   c/w BB, Losartan   Norvasc 2.5mg/day added 10/29    4. History of CHF, HFpEF  compensated  -Continue Lasix at 20mg/day  off Aldactone   TTE: EF normal, mod pHtn    5. History of Permanent  A-fib  -Heart rate controlled  -Continue metoprolol and Eliquis    9. h/o Decubitus ulcer  -in lower back  -follow wound care consult       10. Thrombocytopenia poss consumption if <50 dc Eliquis  check AM cbc - plts improving    -Patient was not well-nourished and was found to have moderate protein-calorie malnutrition      dc home in am   89 y/o Female with PMHx of Afib on Eliquis, CLL, HTN, renal calculus, venous insufficiency with lymphedema, and dementia, Urinary Retention s/p Sales, Uti,  A&Ox2 baseline BIBEMS from home to the ED to rule out UTI. She had been shaky and more weak for the last few days. Home health Aid at bedside reported patient diagnosed with shingles infection posterior left shoulder a few weeks ago, completed course of Valacyclovir. Since then patient with increased blood pressure. Over the last few days patient with darker and cloudy urine in leg bag, increased abdominal distension, and shakiness. Yesterday patient was tachycardic and threw up, EMS was called, who found patient to be febrile. Denies any recent flu-like symptoms. In the ED patient was febrile too.   -found to be dehydrated (diuretic escalated as outpatient due to Htn), and found to have Uti/Bacteremia CAUTI      PHYSICAL EXAM:  General: in no acute distress  Eyes: PERRLA, EOMI; conjunctiva and sclera clear  Head: Normocephalic; atraumatic  ENMT: No nasal discharge; airway clear  Neck: Supple; non tender; no masses  Respiratory: No wheezes, rales or rhonchi  Cardiovascular: Regular rate and rhythm. S1 and S2 Normal; No murmurs, gallops or rubs  Gastrointestinal: Soft non-tender non-distended; Normal bowel sounds  Genitourinary: No  suprapubic  tenderness  Extremities: Normal range of motion, No clubbing, cyanosis or edema  Vascular: Peripheral pulses palpable 2+ bilaterally  Neurological: Alert and oriented x4  Skin: Warm and dry. No acute rash  Lymph Nodes: No acute cervical adenopathy  Musculoskeletal: Normal muscle tone, without deformities  Psychiatric: Cooperative and appropriate      89 y/o Female with PMHx of Afib on Eliquis, CLL, HTN, renal calculus, venous insufficiency with lymphedema, and dementia admitted for:     1. Sepsis due to ECOLI  bacteremia/ Complicated UTI  Uti related likely to chronic Sales  Sales changed in ED  sales discontinued  this hospitalisation- dw with daughter who reports that patient urologist angel suggested to give trial of void  to mitigate risk of further uti's for the future  On IV Ceftriaxone day 8th day, repeat blood cx neg 10/30 , dc on ceftin po for 10 more days after blood cx neg    UCx contaminated, Blood Cx- E Coli  Repeat BCx NGTD  change to PO Ceftin at DC plan for total 10-14days total course     #Constipation with associated abdominal pain/distention   - senna/miralax/duclolax suppository PRN  - s/p enema  11/3  as per RN had 2 large BMs      2. Chronic Retention, s/p Sales   now removed  Pt voiding freely with mild residual   Monitor voids, straight cath if retaining >400ml, if persistent retention will need sales placement     3. Hyponatremia: due to dehydration: resolved  suspect diuretic induced  Hold  Aldactone  C/w lasix  20mg daily but will up titrate to BID  due to leg edema   s/p IV fluid    4. HTN   BP still better after Imdur this am   c/w BB, Losartan, also was started on Norvasc 2.5mg/day added 10/29, increased to 5mg     5. History of CHF, HFpEF  compensated  Continue Lasix at 20mg BID   off Aldactone   TTE: EF normal, mod pHtn    6. History of Permanent  A-fib  -Heart rate controlled  -Continue metoprolol and Eliquis    7. h/o Decubitus ulcer  -in lower back  -turn and position   -follow wound care consult   Clean with Saline and PAT dry   -Apply Triad Cream   -Adaptic  -Then Foam   daily change and prn if soiled     8. LE edema with H/o Lymphedema  as per daughter worse   new Norvasc   increased lasix dose 20 BID     9.   Moderate protein-calorie malnutrition.  C/w protein supplements     10. Thrombocytopenia    trend cbc - plts improving    11. Atypical Intermittent Chest pain   Trop  neg   ECG with no acute changes  ECHO preserved EF   C/w Eliquis, metoprolol, lipitor   Not on ASA due to thrombocytopenia     Dispo; Likely d/c home with home health aide, constipation resolved , pt voiding , bladder scan <400cc, PO abx on d/c, cleared by cardio for discharge  discharge time 47mins   89 y/o Female with PMHx of Afib on Eliquis, CLL, HTN, renal calculus, venous insufficiency with lymphedema, and dementia, Urinary Retention s/p Sales, Uti,  A&Ox2 baseline BIBEMS from home to the ED to rule out UTI. She had been shaky and more weak for the last few days. Home health Aid at bedside reported patient diagnosed with shingles infection posterior left shoulder a few weeks ago, completed course of Valacyclovir. Since then patient with increased blood pressure. Over the last few days patient with darker and cloudy urine in leg bag, increased abdominal distension, and shakiness. Yesterday patient was tachycardic and threw up, EMS was called, who found patient to be febrile. Denies any recent flu-like symptoms. In the ED patient was febrile too.   -found to be dehydrated (diuretic escalated as outpatient due to Htn), and found to have Uti/Bacteremia CAUTI      PHYSICAL EXAM:  General: in no acute distress  Eyes: PERRLA, EOMI; conjunctiva and sclera clear  Head: Normocephalic; atraumatic  ENMT: No nasal discharge; airway clear  Neck: Supple; non tender; no masses  Respiratory: No wheezes, rales or rhonchi  Cardiovascular: Regular rate and rhythm. S1 and S2 Normal; No murmurs, gallops or rubs  Gastrointestinal: Soft non-tender non-distended; Normal bowel sounds  Genitourinary: No  suprapubic  tenderness  Extremities: Normal range of motion, No clubbing, cyanosis or edema  Vascular: Peripheral pulses palpable 2+ bilaterally  Neurological: Alert and oriented x4  Skin: Warm and dry. No acute rash  Lymph Nodes: No acute cervical adenopathy  Musculoskeletal: Normal muscle tone, without deformities  Psychiatric: Cooperative and appropriate      89 y/o Female with PMHx of Afib on Eliquis, CLL, HTN, renal calculus, venous insufficiency with lymphedema, and dementia admitted for:     1. Sepsis due to ECOLI  bacteremia/ Complicated UTI  Uti related likely to chronic Sales  Sales changed in ED  sales discontinued  this hospitalisation- dw with daughter who reports that patient urologist angel suggested to give trial of void  to mitigate risk of further uti's for the future  On IV Ceftriaxone day 8th day, repeat blood cx neg 10/30 , dc on ceftin po for 10 more days after blood cx neg    UCx contaminated, Blood Cx- E Coli  Repeat BCx NGTD  change to PO Ceftin at DC plan for total 10-14days total course     #Constipation with associated abdominal pain/distention   - senna/miralax/duclolax suppository PRN  Xray abd 11/4 with fecal retention , now abd less distended after 2 BM overnight  - s/p enema  and bisacodyl supp  11/4  as per RN had 2 large BMs        2. Chronic Retention, s/p Sales   now removed  Monitor voids, straight cath if retaining >400ml, if persistent retention will need sales placement     3. Hyponatremia: due to dehydration: resolved  suspect diuretic induced  Hold  Aldactone  C/w lasix  20mg daily but will up titrate to BID  due to leg edema   s/p IV fluid    4. HTN   BP still better after Imdur this am   c/w BB, Losartan, also was started on Norvasc 2.5mg/day added 10/29, increased to 5mg     5. History of CHF, HFpEF  compensated  Continue Lasix at 20mg BID   off Aldactone   TTE: EF normal, mod pHtn    6. History of Permanent  A-fib  -Heart rate controlled  -Continue metoprolol and Eliquis    7. h/o Decubitus ulcer  -in lower back  -turn and position   -follow wound care consult   Clean with Saline and PAT dry   -Apply Triad Cream   -Adaptic  -Then Foam   daily change and prn if soiled     8. LE edema with H/o Lymphedema  as per daughter worse   new Norvasc   increased lasix dose 20 BID     9.   Moderate protein-calorie malnutrition.  C/w protein supplements     10. Thrombocytopenia    trend cbc - plts improving    11. Atypical Intermittent Chest pain   Trop  neg   ECG with no acute changes  ECHO preserved EF   C/w Eliquis, metoprolol, lipitor   Not on ASA due to thrombocytopenia     Dispo; Likely d/c home with home health aide, constipation resolved , pt voiding , bladder scan <108, PO abx on d/c, cleared by cardio for discharge  discharge time 47mins

## 2023-11-02 NOTE — DISCHARGE NOTE PROVIDER - NSDCCPCAREPLAN_GEN_ALL_CORE_FT
PRINCIPAL DISCHARGE DIAGNOSIS  Diagnosis: Acute UTI  Assessment and Plan of Treatment: please follow up with primary doctor in 1 week   if there is no urine output for 6 to 8 hours at home or has abdominal distention or abdominal pain please call [phone]  and return to ER to evaluate for urinary retntion  and need for sales catheter placement     PRINCIPAL DISCHARGE DIAGNOSIS  Diagnosis: Acute UTI  Assessment and Plan of Treatment: please follow up with primary doctor in 1 week   if there is no urine output for 6 to 8 hours at home or has abdominal distention or abdominal pain please call 911  and return to ER to evaluate for urinary retntion  and need for sales catheter placement  complete antibiotic as prescribed for UTI bacteremia     PRINCIPAL DISCHARGE DIAGNOSIS  Diagnosis: Acute UTI  Assessment and Plan of Treatment: please follow up with primary doctor as outpatient   follow up with urology for removal sales trial as outpatient   sales catheter had to be reinserted due to urinary retention  complete antibiotic as prescribed for UTI bacteremia

## 2023-11-02 NOTE — PROGRESS NOTE ADULT - SUBJECTIVE AND OBJECTIVE BOX
Patient is a 89y old  Female who presents with a chief complaint of Acute UTI, Elevated troponin (01 Nov 2023 07:54)    10/31/2023- Cardiology Consultation: 89 year old woman with chronic indwelling Reaves and prior history of recurrent UTIs admitted with fever, weakness, cloudy urine, vomiting, shakiness. Found to have E coli urosepsis. With antibiotic therapy she has clinically improved and is without complaints currently. At no point was there chest pain or dyspnea. EKGs have not shown acute ischemic changes and TTE shows no new wall motion abnormality. Troponin did rise mildly then declined to normal.  History of long term persistent atrial fibrillation,HFpEF, Moderate AS, AR, severe TR, CLL, thrombocytopenia, chronic venous insufficiency, hypertension, recurrent UTIs, mild dementia. Patient does not ambulate at home and is cared for by her daughter and health aides.       HPI:  89 y/o Female with PMHx of Afib on Eliquis, CLL, HTN, renal calculus, venous insufficiency with lymphedema, and dementia A&Ox2 baseline BIBEMS from home to the ED to rule out UTI. She had been shaky and more weak for the last few days. Home health Aid at bedside reported patient diagnosed with shingles infection posterior left shoulder a few weeks ago, completed course of Valacyclovir. Since then patient with increased blood pressure. Over the last few days patient with darker and cloudy urine in leg bag, increased abdominal distension, and shakiness. Yesterday patient was tachycardic and threw up, EMS was called, who found patient to be febrile. Denies any recent flu-like symptoms. In the ED patient was febrile too.  (27 Oct 2023 04:04)      Followup HPI:  11/2- She complained of vague right anterior chest pain this morning according to her health aid and RN. She is unable to describe the pain or to articulate whether the pain is still there now. At home she has been complaining of anterior chest pain periodically for weeks or perhaps months per the daughter Tabatha. Reaves was discontinued this morning to give her a trial of voiding.    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 Kitchen 2017          Review of symptoms:  Negative except for as noted in today's HPI.      MEDICATIONS  (STANDING):  acetaminophen   IVPB .. 1000 milliGRAM(s) IV Intermittent once  amLODIPine   Tablet 2.5 milliGRAM(s) Oral daily  apixaban 2.5 milliGRAM(s) Oral every 12 hours  artificial  tears Solution 1 Drop(s) Both EYES two times a day  atorvastatin 10 milliGRAM(s) Oral at bedtime  ferrous    sulfate 325 milliGRAM(s) Oral daily  furosemide    Tablet 20 milliGRAM(s) Oral daily  influenza  Vaccine (HIGH DOSE) 0.7 milliLiter(s) IntraMuscular once  lactobacillus acidophilus 1 Tablet(s) Oral daily  losartan 50 milliGRAM(s) Oral two times a day  metoprolol succinate ER 25 milliGRAM(s) Oral daily  mirtazapine 22.5 milliGRAM(s) Oral at bedtime  pantoprazole  Injectable 40 milliGRAM(s) IV Push daily  senna 2 Tablet(s) Oral at bedtime    MEDICATIONS  (PRN):  acetaminophen     Tablet .. 650 milliGRAM(s) Oral every 6 hours PRN Temp greater or equal to 38C (100.4F), Mild Pain (1 - 3)  aluminum hydroxide/magnesium hydroxide/simethicone Suspension 30 milliLiter(s) Oral every 4 hours PRN Dyspepsia  LORazepam     Tablet 0.5 milliGRAM(s) Oral daily PRN Anxiety  melatonin 3 milliGRAM(s) Oral at bedtime PRN Insomnia  ondansetron Injectable 4 milliGRAM(s) IV Push every 8 hours PRN Nausea and/or Vomiting  polyethylene glycol 3350 17 Gram(s) Oral daily PRN for constipation          Vital Signs Last 24 Hrs  T(C): 36.7 (01 Nov 2023 20:43), Max: 36.7 (01 Nov 2023 15:37)  T(F): 98.1 (01 Nov 2023 20:43), Max: 98.1 (01 Nov 2023 20:43)  HR: 73 (01 Nov 2023 20:43) (67 - 73)  BP: 156/67 (01 Nov 2023 20:43) (151/66 - 156/67)  BP(mean): --  RR: 18 (01 Nov 2023 20:43) (18 - 18)  SpO2: 100% (01 Nov 2023 20:43) (98% - 100%)    Parameters below as of 01 Nov 2023 20:43  Patient On (Oxygen Delivery Method): room air        I&O's Summary    01 Nov 2023 07:01  -  02 Nov 2023 07:00  --------------------------------------------------------  IN: 0 mL / OUT: 1175 mL / NET: -1175 mL        PHYSICAL EXAM  General Appearance: comfortable.   HEENT:   Neck:   Lungs: clear  Heart: 2/6 systolic murmur LSB  Abdomen: soft and non tender  Extremities: no edema  Neurologic:       INTERPRETATION OF TELEMETRY: atrial fibrillation with VR 67-73.    ECG: atrial fibrillatiion/flutter with VR 74 BPM, RAD, LVH voltage, ST-T abnormality is unchanged.    LABS:                          11.2   4.40  )-----------( 68       ( 01 Nov 2023 07:27 )             33.2     11-01    131<L>  |  100  |  11  ----------------------------<  93  3.9   |  25  |  0.50    Ca    8.6      01 Nov 2023 07:27                Urinalysis Basic - ( 01 Nov 2023 07:27 )    Color: x / Appearance: x / SG: x / pH: x  Gluc: 93 mg/dL / Ketone: x  / Bili: x / Urobili: x   Blood: x / Protein: x / Nitrite: x   Leuk Esterase: x / RBC: x / WBC x   Sq Epi: x / Non Sq Epi: x / Bacteria: x            RADIOLOGY & ADDITIONAL STUDIES:    IMPRESSION:    IMPRESSION:  1. Chest pain without dynamic EKG change. Unclear if this is cardiac in etiology. Could be musculoskeletal or of GI etiology. Will repeat troponin level. Add isosorbide 30 mg qd. Continue pantoprazole, atorvastatin. Avoid platelet inhibitors due to thrombocytopenia and increased bleeding risk.  2. .E coli urosepsis. Clinically doing well.  3..Troponin rise and decline due to myocardial injury due to oxygen supply demand mismatch due to urosepsis. Presence of CAD is highly likely.  4..Moderate aortic stenosis and regurgitation and moderate to severe TR.  Stable. No indication for surgical intervention.  5..Long term persistent atrial fibrillation,, metoprolol and losartan. Systolic mostly 150s and diastolic mostly in the 60s.  7..HFpEF, stable.   PLAN:  Repeat troponin. Add isosorbide. Continue metoprolol, atorvastatin, Eliquis, losartan, amlodipine.

## 2023-11-03 ENCOUNTER — APPOINTMENT (OUTPATIENT)
Dept: UROLOGY | Facility: CLINIC | Age: 88
End: 2023-11-03
Payer: MEDICARE

## 2023-11-03 LAB
ALBUMIN SERPL ELPH-MCNC: 3.4 G/DL — SIGNIFICANT CHANGE UP (ref 3.3–5)
ALBUMIN SERPL ELPH-MCNC: 3.4 G/DL — SIGNIFICANT CHANGE UP (ref 3.3–5)
ALP SERPL-CCNC: 74 U/L — SIGNIFICANT CHANGE UP (ref 40–120)
ALP SERPL-CCNC: 74 U/L — SIGNIFICANT CHANGE UP (ref 40–120)
ALT FLD-CCNC: 18 U/L — SIGNIFICANT CHANGE UP (ref 12–78)
ALT FLD-CCNC: 18 U/L — SIGNIFICANT CHANGE UP (ref 12–78)
ANION GAP SERPL CALC-SCNC: 6 MMOL/L — SIGNIFICANT CHANGE UP (ref 5–17)
ANION GAP SERPL CALC-SCNC: 6 MMOL/L — SIGNIFICANT CHANGE UP (ref 5–17)
AST SERPL-CCNC: 17 U/L — SIGNIFICANT CHANGE UP (ref 15–37)
AST SERPL-CCNC: 17 U/L — SIGNIFICANT CHANGE UP (ref 15–37)
BILIRUB DIRECT SERPL-MCNC: 0.2 MG/DL — SIGNIFICANT CHANGE UP (ref 0–0.3)
BILIRUB DIRECT SERPL-MCNC: 0.2 MG/DL — SIGNIFICANT CHANGE UP (ref 0–0.3)
BILIRUB INDIRECT FLD-MCNC: 0.3 MG/DL — SIGNIFICANT CHANGE UP (ref 0.2–1)
BILIRUB INDIRECT FLD-MCNC: 0.3 MG/DL — SIGNIFICANT CHANGE UP (ref 0.2–1)
BILIRUB SERPL-MCNC: 0.5 MG/DL — SIGNIFICANT CHANGE UP (ref 0.2–1.2)
BILIRUB SERPL-MCNC: 0.5 MG/DL — SIGNIFICANT CHANGE UP (ref 0.2–1.2)
BUN SERPL-MCNC: 13 MG/DL — SIGNIFICANT CHANGE UP (ref 7–23)
BUN SERPL-MCNC: 13 MG/DL — SIGNIFICANT CHANGE UP (ref 7–23)
CALCIUM SERPL-MCNC: 8.9 MG/DL — SIGNIFICANT CHANGE UP (ref 8.5–10.1)
CALCIUM SERPL-MCNC: 8.9 MG/DL — SIGNIFICANT CHANGE UP (ref 8.5–10.1)
CHLORIDE SERPL-SCNC: 102 MMOL/L — SIGNIFICANT CHANGE UP (ref 96–108)
CHLORIDE SERPL-SCNC: 102 MMOL/L — SIGNIFICANT CHANGE UP (ref 96–108)
CO2 SERPL-SCNC: 29 MMOL/L — SIGNIFICANT CHANGE UP (ref 22–31)
CO2 SERPL-SCNC: 29 MMOL/L — SIGNIFICANT CHANGE UP (ref 22–31)
CREAT SERPL-MCNC: 0.55 MG/DL — SIGNIFICANT CHANGE UP (ref 0.5–1.3)
CREAT SERPL-MCNC: 0.55 MG/DL — SIGNIFICANT CHANGE UP (ref 0.5–1.3)
EGFR: 86 ML/MIN/1.73M2 — SIGNIFICANT CHANGE UP
EGFR: 86 ML/MIN/1.73M2 — SIGNIFICANT CHANGE UP
GLUCOSE SERPL-MCNC: 92 MG/DL — SIGNIFICANT CHANGE UP (ref 70–99)
GLUCOSE SERPL-MCNC: 92 MG/DL — SIGNIFICANT CHANGE UP (ref 70–99)
HCT VFR BLD CALC: 33.5 % — LOW (ref 34.5–45)
HCT VFR BLD CALC: 33.5 % — LOW (ref 34.5–45)
HGB BLD-MCNC: 11.3 G/DL — LOW (ref 11.5–15.5)
HGB BLD-MCNC: 11.3 G/DL — LOW (ref 11.5–15.5)
MCHC RBC-ENTMCNC: 31.9 PG — SIGNIFICANT CHANGE UP (ref 27–34)
MCHC RBC-ENTMCNC: 31.9 PG — SIGNIFICANT CHANGE UP (ref 27–34)
MCHC RBC-ENTMCNC: 33.7 GM/DL — SIGNIFICANT CHANGE UP (ref 32–36)
MCHC RBC-ENTMCNC: 33.7 GM/DL — SIGNIFICANT CHANGE UP (ref 32–36)
MCV RBC AUTO: 94.6 FL — SIGNIFICANT CHANGE UP (ref 80–100)
MCV RBC AUTO: 94.6 FL — SIGNIFICANT CHANGE UP (ref 80–100)
PLATELET # BLD AUTO: 109 K/UL — LOW (ref 150–400)
PLATELET # BLD AUTO: 109 K/UL — LOW (ref 150–400)
POTASSIUM SERPL-MCNC: 4.3 MMOL/L — SIGNIFICANT CHANGE UP (ref 3.5–5.3)
POTASSIUM SERPL-MCNC: 4.3 MMOL/L — SIGNIFICANT CHANGE UP (ref 3.5–5.3)
POTASSIUM SERPL-SCNC: 4.3 MMOL/L — SIGNIFICANT CHANGE UP (ref 3.5–5.3)
POTASSIUM SERPL-SCNC: 4.3 MMOL/L — SIGNIFICANT CHANGE UP (ref 3.5–5.3)
PROT SERPL-MCNC: 6.3 GM/DL — SIGNIFICANT CHANGE UP (ref 6–8.3)
PROT SERPL-MCNC: 6.3 GM/DL — SIGNIFICANT CHANGE UP (ref 6–8.3)
RBC # BLD: 3.54 M/UL — LOW (ref 3.8–5.2)
RBC # BLD: 3.54 M/UL — LOW (ref 3.8–5.2)
RBC # FLD: 14 % — SIGNIFICANT CHANGE UP (ref 10.3–14.5)
RBC # FLD: 14 % — SIGNIFICANT CHANGE UP (ref 10.3–14.5)
SODIUM SERPL-SCNC: 137 MMOL/L — SIGNIFICANT CHANGE UP (ref 135–145)
SODIUM SERPL-SCNC: 137 MMOL/L — SIGNIFICANT CHANGE UP (ref 135–145)
WBC # BLD: 4.92 K/UL — SIGNIFICANT CHANGE UP (ref 3.8–10.5)
WBC # BLD: 4.92 K/UL — SIGNIFICANT CHANGE UP (ref 3.8–10.5)
WBC # FLD AUTO: 4.92 K/UL — SIGNIFICANT CHANGE UP (ref 3.8–10.5)
WBC # FLD AUTO: 4.92 K/UL — SIGNIFICANT CHANGE UP (ref 3.8–10.5)

## 2023-11-03 PROCEDURE — 99443: CPT | Mod: 95

## 2023-11-03 PROCEDURE — 99232 SBSQ HOSP IP/OBS MODERATE 35: CPT

## 2023-11-03 RX ORDER — AMLODIPINE BESYLATE 2.5 MG/1
5 TABLET ORAL DAILY
Refills: 0 | Status: DISCONTINUED | OUTPATIENT
Start: 2023-11-03 | End: 2023-11-05

## 2023-11-03 RX ORDER — ACETAMINOPHEN 500 MG
650 TABLET ORAL EVERY 6 HOURS
Refills: 0 | Status: DISCONTINUED | OUTPATIENT
Start: 2023-11-03 | End: 2023-11-05

## 2023-11-03 RX ORDER — POLYETHYLENE GLYCOL 3350 17 G/17G
17 POWDER, FOR SOLUTION ORAL DAILY
Refills: 0 | Status: DISCONTINUED | OUTPATIENT
Start: 2023-11-03 | End: 2023-11-05

## 2023-11-03 RX ORDER — CEFTRIAXONE 500 MG/1
1000 INJECTION, POWDER, FOR SOLUTION INTRAMUSCULAR; INTRAVENOUS EVERY 24 HOURS
Refills: 0 | Status: DISCONTINUED | OUTPATIENT
Start: 2023-11-03 | End: 2023-11-05

## 2023-11-03 RX ADMIN — Medication 10 MILLIGRAM(S): at 13:04

## 2023-11-03 RX ADMIN — APIXABAN 2.5 MILLIGRAM(S): 2.5 TABLET, FILM COATED ORAL at 21:09

## 2023-11-03 RX ADMIN — LOSARTAN POTASSIUM 50 MILLIGRAM(S): 100 TABLET, FILM COATED ORAL at 10:03

## 2023-11-03 RX ADMIN — Medication 1 TABLET(S): at 09:58

## 2023-11-03 RX ADMIN — Medication 975 MILLIGRAM(S): at 21:10

## 2023-11-03 RX ADMIN — PANTOPRAZOLE SODIUM 40 MILLIGRAM(S): 20 TABLET, DELAYED RELEASE ORAL at 10:03

## 2023-11-03 RX ADMIN — APIXABAN 2.5 MILLIGRAM(S): 2.5 TABLET, FILM COATED ORAL at 10:01

## 2023-11-03 RX ADMIN — Medication 1 DROP(S): at 15:08

## 2023-11-03 RX ADMIN — Medication 325 MILLIGRAM(S): at 10:02

## 2023-11-03 RX ADMIN — ISOSORBIDE MONONITRATE 30 MILLIGRAM(S): 60 TABLET, EXTENDED RELEASE ORAL at 10:02

## 2023-11-03 RX ADMIN — CEFTRIAXONE 1000 MILLIGRAM(S): 500 INJECTION, POWDER, FOR SOLUTION INTRAMUSCULAR; INTRAVENOUS at 15:08

## 2023-11-03 RX ADMIN — Medication 20 MILLIGRAM(S): at 21:09

## 2023-11-03 RX ADMIN — MIRTAZAPINE 22.5 MILLIGRAM(S): 45 TABLET, ORALLY DISINTEGRATING ORAL at 01:57

## 2023-11-03 RX ADMIN — Medication 1 TABLET(S): at 10:02

## 2023-11-03 RX ADMIN — Medication 975 MILLIGRAM(S): at 10:05

## 2023-11-03 RX ADMIN — AMLODIPINE BESYLATE 5 MILLIGRAM(S): 2.5 TABLET ORAL at 10:03

## 2023-11-03 RX ADMIN — Medication 0.25 MILLIGRAM(S): at 16:33

## 2023-11-03 RX ADMIN — Medication 20 MILLIGRAM(S): at 10:02

## 2023-11-03 RX ADMIN — MIRTAZAPINE 22.5 MILLIGRAM(S): 45 TABLET, ORALLY DISINTEGRATING ORAL at 21:11

## 2023-11-03 RX ADMIN — LOSARTAN POTASSIUM 50 MILLIGRAM(S): 100 TABLET, FILM COATED ORAL at 21:09

## 2023-11-03 RX ADMIN — Medication 25 MILLIGRAM(S): at 10:04

## 2023-11-03 RX ADMIN — Medication 650 MILLIGRAM(S): at 16:32

## 2023-11-03 RX ADMIN — ATORVASTATIN CALCIUM 10 MILLIGRAM(S): 80 TABLET, FILM COATED ORAL at 21:09

## 2023-11-03 RX ADMIN — POLYETHYLENE GLYCOL 3350 17 GRAM(S): 17 POWDER, FOR SOLUTION ORAL at 10:01

## 2023-11-03 NOTE — ADVANCED PRACTICE NURSE CONSULT - ASSESSMENT
This is a 89 year old female admitted on 10/26/2023, Female with PMHx of Afib on Eliquis, CLL, HTN, renal calculus, venous insufficiency with lymphedema, and dementia A&Ox2 baseline BIBEMS from home to the ED to rule out UTI. She had been shaky and more weak for the last few days. Home health Aid at bedside reported patient diagnosed with shingles infection posterior left shoulder a few weeks ago, completed course of Valacyclovir. Since then patient with increased blood pressure. Over the last few days patient with darker and cloudy urine in leg bag, increased abdominal distension, and shakiness. Yesterday patient was tachycardic and threw up, EMS was called, who found patient to be febrile. Denies any recent flu-like symptoms. In the ED patient was febrile too.     Patients Home Aid at bedside   Patient is currently confused alert to person.   Vishnu score : 10  Patient currently on Low Air lOss  mattress and recommend maintaining low air loss mattress for pressure redistribution, turning and positioning every two hours and utilize HoverMAT to reduce friction and shear.     Patient is incontinent of stool and urine   Recommend utilizing 1-absorbant pad under patient to wick away moisture, Yair Zinc barrier cream to assist with moisture management with incontinence.     Consulted to assess posterior aspect of scalp   noted a 3cm x 2.4cm x +1cm hypergranular tissue, noted yellowish brownish drainage on old dressing. Patients daughter reports that it has been growing and getting larger and producing more drainage. I recommend cleaning with saline and pat dry, apply Xeroform to tissue and cover with a Telfa and wrap with Chelsea change two times per day and prn if soiled. Do not let xeroform dry out on tissue as it will stick and disrupt the tissue. Recommend a Dermatologist consult and outpatient follow-up to assess and diagnose this hypergranular tissue.       Sacrum:  5.5cm x 6cm x 0.1cm partial thickness skin loss with nonblanchable erythema that can be classified as a stage 2 pressure injury. Recommend cleansing with saline and pat dry. Cover with with Triad cream light coat and then apply Adaptic and then a small foam.. Please do NOT apply Adhesive foam directly on skin loss. This change is recommend as daily and prn if soiled.

## 2023-11-03 NOTE — PROGRESS NOTE ADULT - SUBJECTIVE AND OBJECTIVE BOX
Patient is a 89y old  Female who presents with a chief complaint of Acute UTI, Elevated troponin (02 Nov 2023 13:47)  10/31/2023- Cardiology Consultation: 89 year old woman with chronic indwelling Reaves and prior history of recurrent UTIs admitted with fever, weakness, cloudy urine, vomiting, shakiness. Found to have E coli urosepsis. With antibiotic therapy she has clinically improved and is without complaints currently. At no point was there chest pain or dyspnea. EKGs have not shown acute ischemic changes and TTE shows no new wall motion abnormality. Troponin did rise mildly then declined to normal.  History of long term persistent atrial fibrillation,HFpEF, Moderate AS, AR, severe TR, CLL, thrombocytopenia, chronic venous insufficiency, hypertension, recurrent UTIs, mild dementia. Patient does not ambulate at home and is cared for by her daughter and health aides.         Followup HPI:    11/2- She complained of vague right anterior chest pain this morning according to her health aid and RN. She is unable to describe the pain or to articulate whether the pain is still there now. At home she has been complaining of anterior chest pain periodically for weeks or perhaps months per the daughter Tabatha. Reaves was discontinued this morning to give her a trial of voiding.    11/3- No further chest pain. Voiding without Reaves. Troponin normal. TTE shows no new LV wall motion abnormality.    PAST MEDICAL & SURGICAL HISTORY:  Lymphedema of both lower extremities      Venous insufficiency      Monoclonal gammopathies      Paroxysmal atrial fibrillation      Acute cystitis without hematuria  septic  4/2016      Essential hypertension      Spinal stenosis      Spondyloarthritis      Vaginal prolapse      [place] (hard of hearing), bilateral      Hypertension      S/P kyphoplasty  2014      S/P thyroidectomy  partial   1975      History of vaginal surgery      History of fracture of femur  hx of proximal femur fracture in January 2021      History of repair of left hip joint  Hx L hip long IMN with Dr. Kitchen 2017          Review of symptoms:  Negative except for as noted in today's HPI.      MEDICATIONS  (STANDING):  acetaminophen     Tablet .. 975 milliGRAM(s) Oral every 12 hours  acetaminophen   IVPB .. 1000 milliGRAM(s) IV Intermittent once  amLODIPine   Tablet 2.5 milliGRAM(s) Oral daily  apixaban 2.5 milliGRAM(s) Oral every 12 hours  artificial  tears Solution 1 Drop(s) Both EYES two times a day  atorvastatin 10 milliGRAM(s) Oral at bedtime  ferrous    sulfate 325 milliGRAM(s) Oral daily  furosemide    Tablet 20 milliGRAM(s) Oral two times a day  influenza  Vaccine (HIGH DOSE) 0.7 milliLiter(s) IntraMuscular once  isosorbide   mononitrate ER Tablet (IMDUR) 30 milliGRAM(s) Oral daily  lactobacillus acidophilus 1 Tablet(s) Oral daily  losartan 50 milliGRAM(s) Oral two times a day  metoprolol succinate ER 25 milliGRAM(s) Oral daily  mirtazapine 22.5 milliGRAM(s) Oral at bedtime  multivitamin/minerals 1 Tablet(s) Oral daily  pantoprazole  Injectable 40 milliGRAM(s) IV Push daily  senna 2 Tablet(s) Oral at bedtime    MEDICATIONS  (PRN):  aluminum hydroxide/magnesium hydroxide/simethicone Suspension 30 milliLiter(s) Oral every 4 hours PRN Dyspepsia  LORazepam     Tablet 0.25 milliGRAM(s) Oral two times a day PRN Anxiety  melatonin 3 milliGRAM(s) Oral at bedtime PRN Insomnia  nitroglycerin     SubLingual 0.4 milliGRAM(s) SubLingual every 5 minutes PRN Chest Pain  ondansetron Injectable 4 milliGRAM(s) IV Push every 8 hours PRN Nausea and/or Vomiting  polyethylene glycol 3350 17 Gram(s) Oral daily PRN for constipation          Vital Signs Last 24 Hrs  T(C): 36.8 (02 Nov 2023 20:44), Max: 36.8 (02 Nov 2023 20:44)  T(F): 98.2 (02 Nov 2023 20:44), Max: 98.2 (02 Nov 2023 20:44)  HR: 70 (02 Nov 2023 20:44) (65 - 70)  BP: 156/67 (02 Nov 2023 20:44) (129/63 - 160/70)  BP(mean): --  RR: 18 (02 Nov 2023 20:44) (17 - 18)  SpO2: 96% (02 Nov 2023 20:44) (96% - 99%)    Parameters below as of 02 Nov 2023 20:44  Patient On (Oxygen Delivery Method): room air        I&O's Summary      PHYSICAL EXAM  General Appearance: comfortable  HEENT:   Neck:   Lungs: clear  Heart: 2/6 systolic murmur LSB  Abdomen: non tender  Extremities: no edema  Neurologic: memory poor        ACC: [id number] EXAM:  ECHO TTE W CON  FU LTD   ORDERED BY: TONY VIDES     PROCEDURE DATE:  11/02/2023       Findings     Left Ventricle   Limited study to assess left ventricular function.   Endocardium is not well visualized, however, overall left ventricular   systolic function appears normal. Technically Difficult Study.   Definity was used to better visualize the endocardial border.   Apical hypertrophy is present.   A prominent papillary muscle is noted.   Estimated left ventricular ejection fraction is 65-70 %.     Pericardial Effusion   No evidence of pericardial effusion.     Pleural Effusion   Pleural effusion - is present.     Miscellaneous   The IVC is at the upper limits of normal in size.     Impression     Summary     Limited study to assess left ventricular function.   Endocardium is not well visualized, however, overall left ventricular   systolic function appears normal. Technically Difficult Study.   Definity was used to better visualize the endocardial border.   Apical hypertrophy is present.   A prominent papillary muscle is noted.   Estimated left ventricular ejection fraction is 65-70 %.   No evidence of pericardial effusion.   Pleural effusion - is present.   The IVC is at the upper limits of normal in size.     Signature     ----------------------------------------------------------------   Electronically signed byTony Vides MD(Interpreting physician)   on 11/02/2023 05:00 PM          INTERPRETATION OF TELEMETRY:    ECG:    LABS:                          11.1   5.04  )-----------( 90       ( 02 Nov 2023 07:54 )             32.9     11-02    135  |  102  |  12  ----------------------------<  94  4.0   |  28  |  0.43<L>    Ca    8.8      02 Nov 2023 07:54                Urinalysis Basic - ( 02 Nov 2023 07:54 )    Color: x / Appearance: x / SG: x / pH: x  Gluc: 94 mg/dL / Ketone: x  / Bili: x / Urobili: x   Blood: x / Protein: x / Nitrite: x   Leuk Esterase: x / RBC: x / WBC x   Sq Epi: x / Non Sq Epi: x / Bacteria: x            RADIOLOGY & ADDITIONAL STUDIES:    IMPRESSION:  1. S/P Chest pain without dynamic EKG change or LV wall motion changes. Unclear if this is cardiac in etiology. Could be musculoskeletal or of GI etiology. No evidence of ACS.  Continue isosorbide 30 mg qd. Continue pantoprazole, atorvastatin. Avoid platelet inhibitors due to thrombocytopenia and increased bleeding risk.  2.S/P  E coli urosepsis. Clinically doing well. Reaves out and voiding.  3..Troponin rise and decline due to myocardial injury due to oxygen supply demand mismatch due to urosepsis. Presence of CAD is highly likely.  4..Moderate aortic stenosis and regurgitation and moderate to severe TR.  Stable. No indication for surgical intervention.  5..Long term persistent atrial fibrillation. Stable rate control.  7..HFpEF, stable.  8.Hypertension. Systolics are into the 160s at times. Will increase amlodipine to 5 mg qd. Continue metoprolol, losartan furosemide.     PLAN:   Continue metoprolol, atorvastatin, Eliquis, losartan, amlodipine, furosemide, pantoprazole. Increase amlodipine 5 mg qd.  Cardiac status is stable for discharge.

## 2023-11-03 NOTE — ADVANCED PRACTICE NURSE CONSULT - RECOMMEDATIONS
Posterior Scalp   -Clean with Saline and PAT dry   -Apply Xeroform to tissue (on the tissue only not healthy skin)   -Cover with Nonstick foam   Change two times per day and prn if soiled.      Consult Dermatologist and follow up outpatient     Sacrum:   -Clean with Saline and PAT dry   -Apply Triad Cream   -Adaptic  -Then Foam   daily change and prn if soiled     Recommend limiting diaper use in bed   -Continue turning/positioning patient from side-to-side q2h while in bed, q1h when/if OOB chair, or in accordance w/ pt's plan of care. Utilize pillows and/or Spry positioner pillow to assist w/ turning/positioning. When/if OOB chair, utilize pillows or chair cushion to offload pressure.   -Continue to offload heels from bed surface with soft pillow under calfs or by applying offloading boots to BLEs.   -Continue applying Coloplast Yair Protect moisture barrier cream to buttock and perineal area daily and prn after each incontinent episode.    -Continue utilizing one underpad underneath patient to contain incontinence episodes; change pad when saturated/soiled.   -Consider utilizing female incontinence device/Primafit (if patient candidate) to contain urinary incontinence.  -Continue nutrition consult for optimal wound healing & nutritional status.   -Assess skin/wound qshift, report changes to primary provider.   -Maintain Low Air loss Mattress   -Utilize HoverMAT for Boosting and transferring to reduce friction and shear     Plan of Care: Primary RN made aware of above. . No further needs/recs from [facility] service at this time. Staff RN to perform routine skin/wound assessment and manage wound care. Questions or concerns or if wound worsens and reconsult needed, please contact ON

## 2023-11-03 NOTE — PROGRESS NOTE ADULT - SUBJECTIVE AND OBJECTIVE BOX
CC: Acute UTI, Elevated troponin     HPI: 89 y/o Female with PMHx of Afib on Eliquis, CLL, HTN, renal calculus, venous insufficiency with lymphedema, and dementia A&Ox2 baseline BIBEMS from home to the ED to rule out UTI. She had been shaky and more weak for the last few days. Home health Aid at bedside reported patient diagnosed with shingles infection posterior left shoulder a few weeks ago, completed course of Valacyclovir. Since then patient with increased blood pressure. Over the last few days patient with darker and cloudy urine in leg bag, increased abdominal distension, and shakiness. Yesterday patient was tachycardic and threw up, EMS was called, who found patient to be febrile. Denies any recent flu-like symptoms. In the ED patient was febrile too.     INTERVAL HPI/OVERNIGHT EVENTS:   - Pt/ family concerned as patient without a BM x4-5 days, +abd pain, +distention -> given suppository and enema with mild improvement   - Bladder scan PVR borderline at 350cc, will continue to monitor for signs of retention   - BP remains high, amlodipine increased per cardio    Vital Signs Last 24 Hrs  T(C): 36.2 (02 Nov 2023 15:55), Max: 36.7 (01 Nov 2023 20:43)  T(F): 97.2 (02 Nov 2023 15:55), Max: 98.1 (01 Nov 2023 20:43)  HR: 65 (02 Nov 2023 08:33) (65 - 73)  BP: 129/63 (02 Nov 2023 15:55) (129/63 - 160/70)  RR: 17 (02 Nov 2023 15:55) (17 - 18)  SpO2: 99% (02 Nov 2023 15:55) (99% - 100%)    Parameters below as of 02 Nov 2023 15:55  Patient On (Oxygen Delivery Method): room air        REVIEW OF SYSTEMS:  Unable to obtain due to dementia       PHYSICAL EXAM:  General: Frail elderly female,  in no acute distress  Eyes:  EOMI; conjunctiva and sclera clear  Head: Normocephalic; atraumatic  ENMT: No nasal discharge; airway clear  Neck: Supple; no JVD  Respiratory: Decreased BS at bases, No wheezes  Cardiovascular: Irregular rate and rhythm. S1 and S2 Normal;   Gastrointestinal: Soft non-tender non-distended; Normal bowel sounds  Genitourinary: No  suprapubic  tenderness, L labia edema no erythema or warmth   Extremities: +  edema upper thighs   Vascular: Peripheral pulses palpable 2+ bilaterally  Neurological: Alert and oriented x1-2, non focal, confused   Musculoskeletal: Normal muscle tone, without deformities      LABS:                         11.1   5.04  )-----------( 90       ( 02 Nov 2023 07:54 )             32.9     02 Nov 2023 07:54    135    |  102    |  12     ----------------------------<  94     4.0     |  28     |  0.43     Ca    8.8        02 Nov 2023 07:54        Urinalysis Basic - ( 02 Nov 2023 07:54 )  Color: x / Appearance: x / SG: x / pH: x  Gluc: 94 mg/dL / Ketone: x  / Bili: x / Urobili: x   Blood: x / Protein: x / Nitrite: x   Leuk Esterase: x / RBC: x / WBC x   Sq Epi: x / Non Sq Epi: x / Bacteria: x        MEDICATIONS  (STANDING):  acetaminophen   IVPB .. 1000 milliGRAM(s) IV Intermittent once  amLODIPine   Tablet 2.5 milliGRAM(s) Oral daily  apixaban 2.5 milliGRAM(s) Oral every 12 hours  artificial  tears Solution 1 Drop(s) Both EYES two times a day  atorvastatin 10 milliGRAM(s) Oral at bedtime  ferrous    sulfate 325 milliGRAM(s) Oral daily  furosemide    Tablet 20 milliGRAM(s) Oral daily  influenza  Vaccine (HIGH DOSE) 0.7 milliLiter(s) IntraMuscular once  isosorbide   mononitrate ER Tablet (IMDUR) 30 milliGRAM(s) Oral daily  lactobacillus acidophilus 1 Tablet(s) Oral daily  losartan 50 milliGRAM(s) Oral two times a day  metoprolol succinate ER 25 milliGRAM(s) Oral daily  mirtazapine 22.5 milliGRAM(s) Oral at bedtime  pantoprazole  Injectable 40 milliGRAM(s) IV Push daily  senna 2 Tablet(s) Oral at bedtime    MEDICATIONS  (PRN):  acetaminophen     Tablet .. 650 milliGRAM(s) Oral every 6 hours PRN Temp greater or equal to 38C (100.4F), Mild Pain (1 - 3)  aluminum hydroxide/magnesium hydroxide/simethicone Suspension 30 milliLiter(s) Oral every 4 hours PRN Dyspepsia  LORazepam     Tablet 0.5 milliGRAM(s) Oral daily PRN Anxiety  melatonin 3 milliGRAM(s) Oral at bedtime PRN Insomnia  ondansetron Injectable 4 milliGRAM(s) IV Push every 8 hours PRN Nausea and/or Vomiting  polyethylene glycol 3350 17 Gram(s) Oral daily PRN for constipation      RADIOLOGY & ADDITIONAL TESTS:    ACC: [id number] EXAM:  XR CHEST PORTABLE URGENT 1V   ORDERED BY: ALLEN HARRIS     PROCEDURE DATE:  10/26/2023    INTERPRETATION:  Sepsis.    AP chest. Prior 9/7/2023.    Chin obscures portions both apices. No change cardiomegaly with left   ventricular prominence. Cardiac apex obscures left base. Grossly clear   lungs. No consolidation or effusion.    IMPRESSION: Cardiomegaly. Grossly clear lungs.

## 2023-11-04 LAB
ANION GAP SERPL CALC-SCNC: 7 MMOL/L — SIGNIFICANT CHANGE UP (ref 5–17)
ANION GAP SERPL CALC-SCNC: 7 MMOL/L — SIGNIFICANT CHANGE UP (ref 5–17)
BUN SERPL-MCNC: 15 MG/DL — SIGNIFICANT CHANGE UP (ref 7–23)
BUN SERPL-MCNC: 15 MG/DL — SIGNIFICANT CHANGE UP (ref 7–23)
CALCIUM SERPL-MCNC: 9 MG/DL — SIGNIFICANT CHANGE UP (ref 8.5–10.1)
CALCIUM SERPL-MCNC: 9 MG/DL — SIGNIFICANT CHANGE UP (ref 8.5–10.1)
CHLORIDE SERPL-SCNC: 103 MMOL/L — SIGNIFICANT CHANGE UP (ref 96–108)
CHLORIDE SERPL-SCNC: 103 MMOL/L — SIGNIFICANT CHANGE UP (ref 96–108)
CO2 SERPL-SCNC: 27 MMOL/L — SIGNIFICANT CHANGE UP (ref 22–31)
CO2 SERPL-SCNC: 27 MMOL/L — SIGNIFICANT CHANGE UP (ref 22–31)
CREAT SERPL-MCNC: 0.54 MG/DL — SIGNIFICANT CHANGE UP (ref 0.5–1.3)
CREAT SERPL-MCNC: 0.54 MG/DL — SIGNIFICANT CHANGE UP (ref 0.5–1.3)
CULTURE RESULTS: SIGNIFICANT CHANGE UP
EGFR: 86 ML/MIN/1.73M2 — SIGNIFICANT CHANGE UP
EGFR: 86 ML/MIN/1.73M2 — SIGNIFICANT CHANGE UP
GLUCOSE SERPL-MCNC: 97 MG/DL — SIGNIFICANT CHANGE UP (ref 70–99)
GLUCOSE SERPL-MCNC: 97 MG/DL — SIGNIFICANT CHANGE UP (ref 70–99)
HCT VFR BLD CALC: 31.8 % — LOW (ref 34.5–45)
HCT VFR BLD CALC: 31.8 % — LOW (ref 34.5–45)
HGB BLD-MCNC: 10.7 G/DL — LOW (ref 11.5–15.5)
HGB BLD-MCNC: 10.7 G/DL — LOW (ref 11.5–15.5)
MCHC RBC-ENTMCNC: 31.8 PG — SIGNIFICANT CHANGE UP (ref 27–34)
MCHC RBC-ENTMCNC: 31.8 PG — SIGNIFICANT CHANGE UP (ref 27–34)
MCHC RBC-ENTMCNC: 33.6 GM/DL — SIGNIFICANT CHANGE UP (ref 32–36)
MCHC RBC-ENTMCNC: 33.6 GM/DL — SIGNIFICANT CHANGE UP (ref 32–36)
MCV RBC AUTO: 94.6 FL — SIGNIFICANT CHANGE UP (ref 80–100)
MCV RBC AUTO: 94.6 FL — SIGNIFICANT CHANGE UP (ref 80–100)
PLATELET # BLD AUTO: 115 K/UL — LOW (ref 150–400)
PLATELET # BLD AUTO: 115 K/UL — LOW (ref 150–400)
POTASSIUM SERPL-MCNC: 3.9 MMOL/L — SIGNIFICANT CHANGE UP (ref 3.5–5.3)
POTASSIUM SERPL-MCNC: 3.9 MMOL/L — SIGNIFICANT CHANGE UP (ref 3.5–5.3)
POTASSIUM SERPL-SCNC: 3.9 MMOL/L — SIGNIFICANT CHANGE UP (ref 3.5–5.3)
POTASSIUM SERPL-SCNC: 3.9 MMOL/L — SIGNIFICANT CHANGE UP (ref 3.5–5.3)
RBC # BLD: 3.36 M/UL — LOW (ref 3.8–5.2)
RBC # BLD: 3.36 M/UL — LOW (ref 3.8–5.2)
RBC # FLD: 14.2 % — SIGNIFICANT CHANGE UP (ref 10.3–14.5)
RBC # FLD: 14.2 % — SIGNIFICANT CHANGE UP (ref 10.3–14.5)
SODIUM SERPL-SCNC: 137 MMOL/L — SIGNIFICANT CHANGE UP (ref 135–145)
SODIUM SERPL-SCNC: 137 MMOL/L — SIGNIFICANT CHANGE UP (ref 135–145)
SPECIMEN SOURCE: SIGNIFICANT CHANGE UP
WBC # BLD: 6.66 K/UL — SIGNIFICANT CHANGE UP (ref 3.8–10.5)
WBC # BLD: 6.66 K/UL — SIGNIFICANT CHANGE UP (ref 3.8–10.5)
WBC # FLD AUTO: 6.66 K/UL — SIGNIFICANT CHANGE UP (ref 3.8–10.5)
WBC # FLD AUTO: 6.66 K/UL — SIGNIFICANT CHANGE UP (ref 3.8–10.5)

## 2023-11-04 PROCEDURE — 99232 SBSQ HOSP IP/OBS MODERATE 35: CPT

## 2023-11-04 PROCEDURE — 74018 RADEX ABDOMEN 1 VIEW: CPT | Mod: 26

## 2023-11-04 RX ORDER — AMLODIPINE BESYLATE 2.5 MG/1
1 TABLET ORAL
Qty: 30 | Refills: 0
Start: 2023-11-04 | End: 2023-12-03

## 2023-11-04 RX ORDER — FUROSEMIDE 40 MG
1 TABLET ORAL
Qty: 60 | Refills: 0
Start: 2023-11-04 | End: 2023-12-03

## 2023-11-04 RX ORDER — CEFUROXIME AXETIL 250 MG
1 TABLET ORAL
Qty: 20 | Refills: 0
Start: 2023-11-04 | End: 2023-11-13

## 2023-11-04 RX ORDER — FUROSEMIDE 40 MG
1 TABLET ORAL
Qty: 0 | Refills: 0 | DISCHARGE

## 2023-11-04 RX ADMIN — PANTOPRAZOLE SODIUM 40 MILLIGRAM(S): 20 TABLET, DELAYED RELEASE ORAL at 10:18

## 2023-11-04 RX ADMIN — Medication 1 DROP(S): at 12:18

## 2023-11-04 RX ADMIN — SENNA PLUS 2 TABLET(S): 8.6 TABLET ORAL at 22:22

## 2023-11-04 RX ADMIN — Medication 325 MILLIGRAM(S): at 10:04

## 2023-11-04 RX ADMIN — Medication 975 MILLIGRAM(S): at 11:42

## 2023-11-04 RX ADMIN — Medication 1 DROP(S): at 22:25

## 2023-11-04 RX ADMIN — Medication 0.25 MILLIGRAM(S): at 16:39

## 2023-11-04 RX ADMIN — Medication 975 MILLIGRAM(S): at 10:42

## 2023-11-04 RX ADMIN — Medication 1 TABLET(S): at 10:41

## 2023-11-04 RX ADMIN — MIRTAZAPINE 22.5 MILLIGRAM(S): 45 TABLET, ORALLY DISINTEGRATING ORAL at 22:24

## 2023-11-04 RX ADMIN — APIXABAN 2.5 MILLIGRAM(S): 2.5 TABLET, FILM COATED ORAL at 10:04

## 2023-11-04 RX ADMIN — ATORVASTATIN CALCIUM 10 MILLIGRAM(S): 80 TABLET, FILM COATED ORAL at 22:22

## 2023-11-04 RX ADMIN — Medication 20 MILLIGRAM(S): at 22:22

## 2023-11-04 RX ADMIN — CEFTRIAXONE 1000 MILLIGRAM(S): 500 INJECTION, POWDER, FOR SOLUTION INTRAMUSCULAR; INTRAVENOUS at 13:23

## 2023-11-04 RX ADMIN — Medication 1 DROP(S): at 01:15

## 2023-11-04 RX ADMIN — Medication 20 MILLIGRAM(S): at 10:05

## 2023-11-04 RX ADMIN — APIXABAN 2.5 MILLIGRAM(S): 2.5 TABLET, FILM COATED ORAL at 22:22

## 2023-11-04 RX ADMIN — LOSARTAN POTASSIUM 50 MILLIGRAM(S): 100 TABLET, FILM COATED ORAL at 22:22

## 2023-11-04 RX ADMIN — Medication 25 MILLIGRAM(S): at 10:05

## 2023-11-04 RX ADMIN — LOSARTAN POTASSIUM 50 MILLIGRAM(S): 100 TABLET, FILM COATED ORAL at 10:05

## 2023-11-04 RX ADMIN — AMLODIPINE BESYLATE 5 MILLIGRAM(S): 2.5 TABLET ORAL at 10:05

## 2023-11-04 RX ADMIN — ISOSORBIDE MONONITRATE 30 MILLIGRAM(S): 60 TABLET, EXTENDED RELEASE ORAL at 10:08

## 2023-11-04 RX ADMIN — Medication 975 MILLIGRAM(S): at 22:25

## 2023-11-04 RX ADMIN — Medication 10 MILLIGRAM(S): at 16:39

## 2023-11-04 RX ADMIN — Medication 1 TABLET(S): at 10:06

## 2023-11-04 NOTE — PROGRESS NOTE ADULT - SUBJECTIVE AND OBJECTIVE BOX
89 y/o Female with PMHx of Afib on Eliquis, CLL, HTN, renal calculus, venous insufficiency with lymphedema, and dementia A&Ox2 baseline BIBEMS from home to the ED to rule out UTI. She had been shaky and more weak for the last few days. Home health Aid at bedside reported patient diagnosed with shingles infection posterior left shoulder a few weeks ago, completed course of Valacyclovir. Since then patient with increased blood pressure. Over the last few days patient with darker and cloudy urine in leg bag, increased abdominal distension, and shakiness. Yesterday patient was tachycardic and threw up, EMS was called, who found patient to be febrile. Denies any recent flu-like symptoms. In the ED patient was febrile too.     INTERVAL HPI/OVERNIGHT EVENTS:   had 2 large  BM last night after enema as per LULU Boyle  - Bladder scan PVR borderline at 350cc, will continue to monitor for signs of retention   - BP stable    REVIEW OF SYSTEMS:  Unable to obtain due to dementia       PHYSICAL EXAM:  General: Frail elderly female,  in no acute distress  Eyes:  EOMI; conjunctiva and sclera clear  Head: Normocephalic; atraumatic  ENMT: No nasal discharge; airway clear  Neck: Supple; no JVD  Respiratory: Decreased BS at bases, No wheezes  Cardiovascular: Irregular rate and rhythm. S1 and S2 Normal;   Gastrointestinal: Soft non-tender non-distended; Normal bowel sounds  Genitourinary: No  suprapubic  tenderness, L labia edema no erythema or warmth   Extremities: +  edema upper thighs   Vascular: Peripheral pulses palpable 2+ bilaterally  Neurological: Alert and oriented x1-2, non focal, confused   Musculoskeletal: Normal muscle tone, without deformities      PHYSICAL EXAM:    Daily     Daily Weight in k.1 (2023 06:10)    ICU Vital Signs Last 24 Hrs  T(C): 36.8 (2023 08:29), Max: 36.8 (2023 08:29)  T(F): 98.2 (2023 08:29), Max: 98.2 (2023 08:29)  HR: 73 (2023 08:29) (69 - 90)  BP: 153/68 (2023 08:29) (121/56 - 153/68)  BP(mean): --  ABP: --  ABP(mean): --  RR: 18 (2023 08:29) (18 - 18)  SpO2: 96% (2023 08:29) (93% - 98%)    O2 Parameters below as of 2023 08:29  Patient On (Oxygen Delivery Method): nasal cannula  O2 Flow (L/min): 1                            10.7   6.66  )-----------( 115      ( 2023 06:23 )             31.8       CBC Full  -  ( 2023 06:23 )  WBC Count : 6.66 K/uL  RBC Count : 3.36 M/uL  Hemoglobin : 10.7 g/dL  Hematocrit : 31.8 %  Platelet Count - Automated : 115 K/uL  Mean Cell Volume : 94.6 fl  Mean Cell Hemoglobin : 31.8 pg  Mean Cell Hemoglobin Concentration : 33.6 gm/dL  Auto Neutrophil # : x  Auto Lymphocyte # : x  Auto Monocyte # : x  Auto Eosinophil # : x  Auto Basophil # : x  Auto Neutrophil % : x  Auto Lymphocyte % : x  Auto Monocyte % : x  Auto Eosinophil % : x  Auto Basophil % : x      11-04    137  |  103  |  15  ----------------------------<  97  3.9   |  27  |  0.54    Ca    9.0      2023 06:23    TPro  6.3  /  Alb  3.4  /  TBili  0.5  /  DBili  0.2  /  AST  17  /  ALT  18  /  AlkPhos  74  11-03      LIVER FUNCTIONS - ( 2023 07:24 )  Alb: 3.4 g/dL / Pro: 6.3 gm/dL / ALK PHOS: 74 U/L / ALT: 18 U/L / AST: 17 U/L / GGT: x                       Urinalysis Basic - ( 2023 06:23 )    Color: x / Appearance: x / SG: x / pH: x  Gluc: 97 mg/dL / Ketone: x  / Bili: x / Urobili: x   Blood: x / Protein: x / Nitrite: x   Leuk Esterase: x / RBC: x / WBC x   Sq Epi: x / Non Sq Epi: x / Bacteria: x            MEDICATIONS  (STANDING):  acetaminophen     Tablet .. 975 milliGRAM(s) Oral every 12 hours  acetaminophen   IVPB .. 1000 milliGRAM(s) IV Intermittent once  amLODIPine   Tablet 5 milliGRAM(s) Oral daily  apixaban 2.5 milliGRAM(s) Oral every 12 hours  artificial tears (preservative free) Ophthalmic Solution 1 Drop(s) Both EYES two times a day  atorvastatin 10 milliGRAM(s) Oral at bedtime  cefTRIAXone Injectable. 1000 milliGRAM(s) IV Push every 24 hours  ferrous    sulfate 325 milliGRAM(s) Oral daily  furosemide    Tablet 20 milliGRAM(s) Oral two times a day  influenza  Vaccine (HIGH DOSE) 0.7 milliLiter(s) IntraMuscular once  isosorbide   mononitrate ER Tablet (IMDUR) 30 milliGRAM(s) Oral daily  lactobacillus acidophilus 1 Tablet(s) Oral daily  losartan 50 milliGRAM(s) Oral two times a day  metoprolol succinate ER 25 milliGRAM(s) Oral daily  mirtazapine 22.5 milliGRAM(s) Oral at bedtime  multivitamin/minerals 1 Tablet(s) Oral daily  pantoprazole  Injectable 40 milliGRAM(s) IV Push daily  polyethylene glycol 3350 17 Gram(s) Oral daily  senna 2 Tablet(s) Oral at bedtime              RADIOLOGY & ADDITIONAL TESTS:    ACC: [id number] EXAM:  XR CHEST PORTABLE URGENT 1V   ORDERED BY: ALLEN HARRIS     PROCEDURE DATE:  10/26/2023    INTERPRETATION:  Sepsis.    AP chest. Prior 2023.    Chin obscures portions both apices. No change cardiomegaly with left   ventricular prominence. Cardiac apex obscures left base. Grossly clear   lungs. No consolidation or effusion.    IMPRESSION: Cardiomegaly. Grossly clear lungs.

## 2023-11-05 VITALS — DIASTOLIC BLOOD PRESSURE: 64 MMHG | SYSTOLIC BLOOD PRESSURE: 131 MMHG

## 2023-11-05 PROCEDURE — 99239 HOSP IP/OBS DSCHRG MGMT >30: CPT

## 2023-11-05 RX ADMIN — AMLODIPINE BESYLATE 5 MILLIGRAM(S): 2.5 TABLET ORAL at 09:11

## 2023-11-05 RX ADMIN — Medication 325 MILLIGRAM(S): at 09:08

## 2023-11-05 RX ADMIN — Medication 0.25 MILLIGRAM(S): at 14:20

## 2023-11-05 RX ADMIN — ISOSORBIDE MONONITRATE 30 MILLIGRAM(S): 60 TABLET, EXTENDED RELEASE ORAL at 09:11

## 2023-11-05 RX ADMIN — APIXABAN 2.5 MILLIGRAM(S): 2.5 TABLET, FILM COATED ORAL at 09:09

## 2023-11-05 RX ADMIN — Medication 1 TABLET(S): at 09:10

## 2023-11-05 RX ADMIN — Medication 1 TABLET(S): at 09:27

## 2023-11-05 RX ADMIN — Medication 25 MILLIGRAM(S): at 09:09

## 2023-11-05 RX ADMIN — Medication 975 MILLIGRAM(S): at 09:16

## 2023-11-05 RX ADMIN — PANTOPRAZOLE SODIUM 40 MILLIGRAM(S): 20 TABLET, DELAYED RELEASE ORAL at 09:10

## 2023-11-05 RX ADMIN — Medication 20 MILLIGRAM(S): at 09:11

## 2023-11-05 RX ADMIN — LOSARTAN POTASSIUM 50 MILLIGRAM(S): 100 TABLET, FILM COATED ORAL at 09:09

## 2023-11-05 RX ADMIN — CEFTRIAXONE 1000 MILLIGRAM(S): 500 INJECTION, POWDER, FOR SOLUTION INTRAMUSCULAR; INTRAVENOUS at 13:21

## 2023-11-05 RX ADMIN — Medication 1 DROP(S): at 09:10

## 2023-11-05 NOTE — PROGRESS NOTE ADULT - NUTRITIONAL ASSESSMENT
This patient has been assessed with a concern for Malnutrition and has been determined to have a diagnosis/diagnoses of Moderate protein-calorie malnutrition.    This patient is being managed with:   Diet DASH/TLC-  Sodium & Cholesterol Restricted  Supplement Feeding Modality:  Oral  Ensure Plus High Protein Cans or Servings Per Day:  1       Frequency:  Three Times a day  Entered: Oct 27 2023  3:32PM    The following pending diet order is being considered for treatment of Moderate protein-calorie malnutrition:  Diet Regular-  Supplement Feeding Modality:  Oral  Ensure Plus High Protein Cans or Servings Per Day:  1       Frequency:  Three Times a day  Entered: Oct 28 2023 11:50AM  

## 2023-11-05 NOTE — CHART NOTE - NSCHARTNOTEFT_GEN_A_CORE
confirmed with home health aide and daughter- patient has chronic  home o2 confirmed with home health aide and daughter- patient has chronic  home o2  sales replaced for bladder volume 450cc , not voided  discharge home with sales cath

## 2023-11-05 NOTE — PROGRESS NOTE ADULT - PROVIDER SPECIALTY LIST ADULT
Cardiology
Hospitalist
Cardiology
Hospitalist
Hospitalist
Cardiology
Hospitalist

## 2023-11-05 NOTE — PROGRESS NOTE ADULT - ASSESSMENT
89 y/o Female with PMHx of Afib on Eliquis, CLL, HTN, renal calculus, venous insufficiency with lymphedema, and dementia admitted for:     1. Sepsis due to ECOLI  bacteremia/ Complicated UTI  Uti related likely to chronic Sales  Sales changed in ED  On IV Ceftriaxone day 7-8    UCx contaminated, Blood Cx- E Coli  Repeat BCx NGTD  change to PO Ceftin at DC plan for total 10-14days total course     #Constipation with associated abdominal pain/distention   - senna/miralax/duclolax suppository PRN  - s/p enema today with small BM  - monitor     2. Chronic Retention, s/p Sales   now removed  Pt voiding freely with mild residual   Monitor voids, straight cath if retaining >400ml, if persistent retention will need sales placement     3. Hyponatremia: due to dehydration: resolved  suspect diuretic induced  Hold  Aldactone  C/w lasix  20mg daily but will up titrate to BID  due to leg edema   s/p IV fluid    4. HTN   BP still better after Imdur this am   c/w BB, Losartan, also was started on Norvasc 2.5mg/day added 10/29, increased to 5mg     5. History of CHF, HFpEF  compensated  Continue Lasix at 20mg BID   off Aldactone   TTE: EF normal, mod pHtn    6. History of Permanent  A-fib  -Heart rate controlled  -Continue metoprolol and Eliquis    7. h/o Decubitus ulcer  -in lower back  -turn and position   -follow wound care consult     8. LE edema with H/o Lymphedema  as per daughter worse   new Norvasc and/or  decreased lasix dose     9.   Moderate protein-calorie malnutrition.  C/w protein supplements     10. Thrombocytopenia    trend cbc - plts improving    11. Atypical Intermittent Chest pain   Trop  neg   ECG with no acute changes  ECHO ordered by jose will follow   C/w Eliquis, metoprolol, lipitor   Not on ASA due to thrombocytopenia   Tylenol BID, add Lidocain patch       Dispo; Likely d/c home tomorrow with home health aide, ensure not constipated/voiding freely, PO abx on d/c  
89 y/o Female with PMHx of Afib on Eliquis, CLL, HTN, renal calculus, venous insufficiency with lymphedema, and dementia admitted for:     1. Sepsis due to ECOLI  bacteremia/ Complicated UTI  Uti related likely to chronic Sales  Sales changed in ED  sales discontinued  this hospitalisation- dw with daughter who reports that patient urologist angel suggested to give trial of void  to mitigate risk of further uti's for the future  On IV Ceftriaxone day 8th day, repeat blood cx neg 10/30 , dc on ceftin po for 10 more days after blood cx neg    UCx contaminated, Blood Cx- E Coli  Repeat BCx NGTD  change to PO Ceftin at DC plan for total 10-14days total course     #Constipation with associated abdominal pain/distention resolved   - senna/miralax/duclolax suppository PRN  Xray abd 11/4 with fecal retention , now abd less distended after 2 BM overnight  - s/p enema  and bisacodyl supp  11/4  as per RN had 2 large BMs        2. Chronic Retention, s/p indwelling  Sales removal this hospitalisation  now voiding   bladder vol 108, - f/u urology as outpatient   if bladder vol >400cc would need sales reinsertion    3. Hyponatremia: due to dehydration: resolved  suspect diuretic induced  Hold  Aldactone  C/w lasix  20mg daily but will up titrate to BID  due to leg edema   s/p IV fluid    4. HTN   BP still better after Imdur this am   c/w BB, Losartan, also was started on Norvasc 2.5mg/day added 10/29, increased to 5mg     5. History of CHF, HFpEF  compensated  Continue Lasix at 20mg BID   off Aldactone   TTE: EF normal, mod pHtn    6. History of Permanent  A-fib  -Heart rate controlled  -Continue metoprolol and Eliquis    7. h/o Decubitus ulcer  -in lower back  -turn and position   -follow wound care consult   Clean with Saline and PAT dry   -Apply Triad Cream   -Adaptic  -Then Foam   daily change and prn if soiled     8. LE edema with H/o Lymphedema  as per daughter worse   new Norvasc   increased lasix dose 20 BID     9.   Moderate protein-calorie malnutrition.  C/w protein supplements     10. Thrombocytopenia    trend cbc - plts improving    11. Atypical Intermittent Chest pain   Trop  neg   ECG with no acute changes  ECHO preserved EF   C/w Eliquis, metoprolol, lipitor   Not on ASA due to thrombocytopenia     Dispo; Likely d/c home with home health aide, constipation resolved , pt voiding , bladder scan <108, PO abx on d/c, cleared by cardio for discharge  discharge time 47mins  dw with daughter
89 y/o Female with PMHx of Afib on Eliquis, CLL, HTN, renal calculus, venous insufficiency with lymphedema, and dementia admitted for:     1. Sepsis due to ECOLI  bacteremia/ Complicated UTI  Uti related likely to chronic Sales  Sales changed in ED  sales discontinued  this hospitalisation- dw with daughter who reports that patient urologist angel suggested to give trial of void  to mitigate risk of further uti's for the future  On IV Ceftriaxone day 8th day, repeat blood cx neg 10/30 , dc on ceftin po for 10 more days after blood cx neg    UCx contaminated, Blood Cx- E Coli  Repeat BCx NGTD  change to PO Ceftin at DC plan for total 10-14days total course     #Constipation with associated abdominal pain/distention   - senna/miralax/duclolax suppository PRN  - s/p enema  11/3  as per RN had 2 large BMs      2. Chronic Retention, s/p Sales   now removed  Pt voiding freely with mild residual   Monitor voids, straight cath if retaining >400ml, if persistent retention will need sales placement     3. Hyponatremia: due to dehydration: resolved  suspect diuretic induced  Hold  Aldactone  C/w lasix  20mg daily but will up titrate to BID  due to leg edema   s/p IV fluid    4. HTN   BP still better after Imdur this am   c/w BB, Losartan, also was started on Norvasc 2.5mg/day added 10/29, increased to 5mg     5. History of CHF, HFpEF  compensated  Continue Lasix at 20mg BID   off Aldactone   TTE: EF normal, mod pHtn    6. History of Permanent  A-fib  -Heart rate controlled  -Continue metoprolol and Eliquis    7. h/o Decubitus ulcer  -in lower back  -turn and position   -follow wound care consult   Clean with Saline and PAT dry   -Apply Triad Cream   -Adaptic  -Then Foam   daily change and prn if soiled     8. LE edema with H/o Lymphedema  as per daughter worse   new Norvasc   increased lasix dose 20 BID     9.   Moderate protein-calorie malnutrition.  C/w protein supplements     10. Thrombocytopenia    trend cbc - plts improving    11. Atypical Intermittent Chest pain   Trop  neg   ECG with no acute changes  ECHO preserved EF   C/w Eliquis, metoprolol, lipitor   Not on ASA due to thrombocytopenia     Dispo; Likely d/c home with home health aide, constipation resolved , pt voiding , bladder scan <400cc, PO abx on d/c, cleared by cardio for discharge  discharge time 47mins
89 year old female, history of A Fib, brought in to ER and fund to have a UTI.  Treated with antibiotics.  Mild Hypertension noted recently.  A Fib on EKG/  Echo demonstrated normal LV wall motion. Moderate AI and AS.  Moderate TR  Troponin elevation due to demand ischemia.    Suggest:  Continue current meds.  OOB if and when possible  Watch BP.  Conservative Cardiac follow up.
89 y/o Female with PMHx of Afib on Eliquis, CLL, HTN, renal calculus, venous insufficiency with lymphedema, and dementia admitted for:     1. Sepsis due to ECOLI  bacteremia/ Complicated UTI  Uti related likely to chronic Sales  Sales changed in ED  On IV Ceftriaxone day 7   f/u UCx, Blood Cx- E Coli  Repeat BCx NGTD  change to PO Ceftin at DC plan for total 10-14days course     2. Chronic Retention, s/p Sales   now removed  Pt voided once since, had neg postvoid  Monitor voids, straight cath if retaining >400ml, id persistent retention will need sales placement       3. Hyponatremia: due to dehydration: resolved  suspect diuretic induced  Hold  Aldactone  C/w lasix  20mg daily but will up titrate to BID  due to leg edema   s/p IV fluid      4. HTN   BP still better after Imdur this am   c/w BB, Losartan, also was started on Norvasc 2.5mg/day added 10/29  Unsure if norvasc contributes ti LE swelling     5. History of CHF, HFpEF  compensated  Continue Lasix at 20mg BID   off Aldactone   TTE: EF normal, mod pHtn    6. History of Permanent  A-fib  -Heart rate controlled  -Continue metoprolol and Eliquis    7. h/o Decubitus ulcer  -in lower back  -turn and position   -follow wound care consult     8. LE edema with H/o Lymphedema  as per daughter worse   new Norvasc and/or  decreased lasix dose   Check LE doppler     9.   Moderate protein-calorie malnutrition.  C/w protein supplements     10. Thrombocytopenia poss consumption if <50 dc Eliquis  check AM cbc - plts improving    11. Atypical Intermittent Chest pain   Trop  neg   ECG with no acute changes  ECHO ordered by jose will follow   C/w Eliquis, metoprolol, lipitor   Not on ASA due to thrombocytopenia   Tylenol BID, add Lidocain patch       Dispo; :E doppler, labs in am, d/c planning home with HC,, had private Aid

## 2023-11-05 NOTE — PROGRESS NOTE ADULT - SUBJECTIVE AND OBJECTIVE BOX
89 y/o Female with PMHx of Afib on Eliquis, CLL, HTN, renal calculus, venous insufficiency with lymphedema, and dementia A&Ox2 baseline BIBEMS from home to the ED to rule out UTI. She had been shaky and more weak for the last few days. Home health Aid at bedside reported patient diagnosed with shingles infection posterior left shoulder a few weeks ago, completed course of Valacyclovir. Since then patient with increased blood pressure. Over the last few days patient with darker and cloudy urine in leg bag, increased abdominal distension, and shakiness. Yesterday patient was tachycardic and threw up, EMS was called, who found patient to be febrile. Denies any recent flu-like symptoms. In the ED patient was febrile too.     INTERVAL HPI/OVERNIGHT EVENTS:   had 2 large  BM last night after enema and bisacodyl  as per LULU Boyle  pt voiding in diaper -incontinent, last bladder csan vol 108  - BP stable  tolerating po  pt wants to go home     REVIEW OF SYSTEMS:  Unable to obtain due to dementia       PHYSICAL EXAM:    Daily     Daily Weight in k.5 (2023 06:00)    ICU Vital Signs Last 24 Hrs  T(C): 36.6 (2023 20:15), Max: 37.1 (2023 15:34)  T(F): 97.8 (2023 20:15), Max: 98.7 (2023 15:34)  HR: 77 (2023 20:15) (64 - 77)  BP: 141/62 (2023 20:15) (120/52 - 141/62)  BP(mean): --  ABP: --  ABP(mean): --  RR: 18 (2023 20:15) (18 - 18)  SpO2: 96% (2023 20:15) (96% - 97%)    O2 Parameters below as of 2023 20:15  Patient On (Oxygen Delivery Method): room air  PHYSICAL EXAM:  General: Frail elderly female,  in no acute distress  Eyes:  EOMI; conjunctiva and sclera clear  Head: Normocephalic; atraumatic  ENMT: No nasal discharge; airway clear  Neck: Supple; no JVD  Respiratory: Decreased BS at bases, No wheezes  Cardiovascular: Irregular rate and rhythm. S1 and S2 Normal;   Gastrointestinal: Soft non-tender, less distended; Normal bowel sounds  Genitourinary: No  suprapubic  tenderness, L labia edema no erythema or warmth   Extremities: +  edema upper thighs improved  Vascular: Peripheral pulses palpable 2+ bilaterally  Neurological: Alert and oriented x1-2, non focal, confused   Musculoskeletal: Normal muscle tone, without deformities                                          10.7   6.66  )-----------( 115      ( 2023 06:23 )             31.8       CBC Full  -  ( 2023 06:23 )  WBC Count : 6.66 K/uL  RBC Count : 3.36 M/uL  Hemoglobin : 10.7 g/dL  Hematocrit : 31.8 %  Platelet Count - Automated : 115 K/uL  Mean Cell Volume : 94.6 fl  Mean Cell Hemoglobin : 31.8 pg  Mean Cell Hemoglobin Concentration : 33.6 gm/dL  Auto Neutrophil # : x  Auto Lymphocyte # : x  Auto Monocyte # : x  Auto Eosinophil # : x  Auto Basophil # : x  Auto Neutrophil % : x  Auto Lymphocyte % : x  Auto Monocyte % : x  Auto Eosinophil % : x  Auto Basophil % : x      11-04    137  |  103  |  15  ----------------------------<  97  3.9   |  27  |  0.54    Ca    9.0      2023 06:23                      Urinalysis Basic - ( 2023 06:23 )    Color: x / Appearance: x / SG: x / pH: x  Gluc: 97 mg/dL / Ketone: x  / Bili: x / Urobili: x   Blood: x / Protein: x / Nitrite: x   Leuk Esterase: x / RBC: x / WBC x   Sq Epi: x / Non Sq Epi: x / Bacteria: x            MEDICATIONS  (STANDING):  acetaminophen     Tablet .. 975 milliGRAM(s) Oral every 12 hours  acetaminophen   IVPB .. 1000 milliGRAM(s) IV Intermittent once  amLODIPine   Tablet 5 milliGRAM(s) Oral daily  apixaban 2.5 milliGRAM(s) Oral every 12 hours  artificial tears (preservative free) Ophthalmic Solution 1 Drop(s) Both EYES two times a day  atorvastatin 10 milliGRAM(s) Oral at bedtime  cefTRIAXone Injectable. 1000 milliGRAM(s) IV Push every 24 hours  ferrous    sulfate 325 milliGRAM(s) Oral daily  furosemide    Tablet 20 milliGRAM(s) Oral two times a day  influenza  Vaccine (HIGH DOSE) 0.7 milliLiter(s) IntraMuscular once  isosorbide   mononitrate ER Tablet (IMDUR) 30 milliGRAM(s) Oral daily  lactobacillus acidophilus 1 Tablet(s) Oral daily  losartan 50 milliGRAM(s) Oral two times a day  metoprolol succinate ER 25 milliGRAM(s) Oral daily  mirtazapine 22.5 milliGRAM(s) Oral at bedtime  multivitamin/minerals 1 Tablet(s) Oral daily  pantoprazole  Injectable 40 milliGRAM(s) IV Push daily  polyethylene glycol 3350 17 Gram(s) Oral daily  senna 2 Tablet(s) Oral at bedtime        RADIOLOGY & ADDITIONAL TESTS:    ACC: [id number] EXAM:  XR CHEST PORTABLE URGENT 1V   ORDERED BY: ALLEN HARRIS     PROCEDURE DATE:  10/26/2023    INTERPRETATION:  Sepsis.    AP chest. Prior 2023.    Chin obscures portions both apices. No change cardiomegaly with left   ventricular prominence. Cardiac apex obscures left base. Grossly clear   lungs. No consolidation or effusion.    IMPRESSION: Cardiomegaly. Grossly clear lungs.

## 2023-11-06 ENCOUNTER — APPOINTMENT (OUTPATIENT)
Dept: HOME HEALTH SERVICES | Facility: HOME HEALTH | Age: 88
End: 2023-11-06

## 2023-11-06 ENCOUNTER — TRANSCRIPTION ENCOUNTER (OUTPATIENT)
Age: 88
End: 2023-11-06

## 2023-11-06 ENCOUNTER — APPOINTMENT (OUTPATIENT)
Dept: AFTER HOURS CARE | Facility: EMERGENCY ROOM | Age: 88
End: 2023-11-06
Payer: MEDICARE

## 2023-11-06 PROCEDURE — 99205 OFFICE O/P NEW HI 60 MIN: CPT | Mod: 95

## 2023-11-06 PROCEDURE — 99215 OFFICE O/P EST HI 40 MIN: CPT | Mod: 95

## 2023-11-06 RX ORDER — LOSARTAN POTASSIUM 50 MG/1
50 TABLET, FILM COATED ORAL
Refills: 0 | Status: COMPLETED | COMMUNITY
Start: 2021-11-01 | End: 2023-11-06

## 2023-11-06 RX ORDER — POTASSIUM CHLORIDE 750 MG/1
10 TABLET, EXTENDED RELEASE ORAL DAILY
Refills: 0 | Status: COMPLETED | COMMUNITY
Start: 2021-11-01 | End: 2023-11-06

## 2023-11-06 RX ORDER — TRIAMCINOLONE ACETONIDE 1 MG/G
0.1 CREAM TOPICAL
Qty: 1 | Refills: 2 | Status: ACTIVE | COMMUNITY
Start: 2023-01-19

## 2023-11-07 ENCOUNTER — APPOINTMENT (OUTPATIENT)
Dept: UROLOGY | Facility: CLINIC | Age: 88
End: 2023-11-07
Payer: MEDICARE

## 2023-11-07 PROCEDURE — 99443: CPT | Mod: 95

## 2023-11-08 ENCOUNTER — APPOINTMENT (OUTPATIENT)
Dept: HOME HEALTH SERVICES | Facility: HOME HEALTH | Age: 88
End: 2023-11-08
Payer: MEDICARE

## 2023-11-08 VITALS
SYSTOLIC BLOOD PRESSURE: 135 MMHG | RESPIRATION RATE: 18 BRPM | TEMPERATURE: 97.5 F | HEART RATE: 57 BPM | OXYGEN SATURATION: 97 % | DIASTOLIC BLOOD PRESSURE: 74 MMHG

## 2023-11-08 PROCEDURE — 99496 TRANSJ CARE MGMT HIGH F2F 7D: CPT

## 2023-11-08 RX ORDER — PANTOPRAZOLE SODIUM 40 MG/1
40 GRANULE, DELAYED RELEASE ORAL
Refills: 0 | Status: COMPLETED | COMMUNITY
Start: 2023-11-08 | End: 2023-11-08

## 2023-11-08 RX ORDER — VALACYCLOVIR 1 G/1
1 TABLET, FILM COATED ORAL 3 TIMES DAILY
Qty: 15 | Refills: 0 | Status: COMPLETED | COMMUNITY
Start: 2023-10-04 | End: 2023-11-08

## 2023-11-08 RX ORDER — AMOXICILLIN AND CLAVULANATE POTASSIUM 400; 57 MG/5ML; MG/5ML
400-57 POWDER, FOR SUSPENSION ORAL
Qty: 150 | Refills: 0 | Status: COMPLETED | COMMUNITY
Start: 2022-11-28 | End: 2023-11-08

## 2023-11-08 RX ORDER — GABAPENTIN 250 MG/5ML
250 SOLUTION ORAL
Qty: 300 | Refills: 0 | Status: COMPLETED | COMMUNITY
Start: 2023-10-06 | End: 2023-11-08

## 2023-11-08 RX ORDER — ACYCLOVIR 200 MG/5ML
200 SUSPENSION ORAL
Qty: 700 | Refills: 0 | Status: COMPLETED | COMMUNITY
Start: 2023-10-02 | End: 2023-11-08

## 2023-11-08 RX ORDER — BENZONATATE 100 MG/1
100 CAPSULE ORAL EVERY 6 HOURS
Qty: 120 | Refills: 0 | Status: COMPLETED | COMMUNITY
Start: 2023-06-23 | End: 2023-11-08

## 2023-11-08 RX ORDER — OMEPRAZOLE 40 MG/1
40 CAPSULE, DELAYED RELEASE ORAL
Qty: 90 | Refills: 2 | Status: COMPLETED | COMMUNITY
End: 2023-11-08

## 2023-11-08 RX ORDER — SENNOSIDES 8.6 MG TABLETS 8.6 MG/1
8.6 TABLET ORAL
Refills: 0 | Status: COMPLETED | COMMUNITY
Start: 2021-11-01 | End: 2023-11-08

## 2023-11-08 RX ORDER — GABAPENTIN 100 MG/1
100 CAPSULE ORAL
Qty: 90 | Refills: 0 | Status: COMPLETED | COMMUNITY
Start: 2021-12-30 | End: 2023-11-08

## 2023-11-08 RX ORDER — ACYCLOVIR 200 MG/5ML
200 SUSPENSION ORAL
Qty: 300 | Refills: 0 | Status: COMPLETED | COMMUNITY
Start: 2023-10-04 | End: 2023-11-08

## 2023-11-08 RX ORDER — GLUCOSAMINE HCL/CHONDROITIN SU 500-400 MG
3 CAPSULE ORAL
Qty: 1 | Refills: 11 | Status: COMPLETED | COMMUNITY
Start: 2022-09-12 | End: 2023-11-08

## 2023-11-08 RX ORDER — METHYLPREDNISOLONE 4 MG/1
4 TABLET ORAL
Qty: 1 | Refills: 0 | Status: COMPLETED | COMMUNITY
Start: 2023-06-07 | End: 2023-11-08

## 2023-11-08 RX ORDER — METHOCARBAMOL 500 MG/1
500 TABLET, FILM COATED ORAL DAILY
Refills: 0 | Status: COMPLETED | COMMUNITY
Start: 2021-11-01 | End: 2023-11-08

## 2023-11-09 NOTE — CDI QUERY NOTE - NSCDIOTHERTXTBX_GEN_ALL_CORE_HH
Patient is documented to have decubitus ulcer stage II on the left and right buttocks. Could you please further clarify if it was present on admission or not    -Pressure ulcer/decubitus buttock left and right /sacrum stage II present on admission  -Pressure ulcer/decubitus buttock left and right/sacrum  stage II not present on admission  -Other please specify      Chart Documentation    ED-Skin: left upper back no open blisters no erythema scattered scabs -Present on Admission - Pressure Ulcer: No.    H&P .h/o Decubitus ulcer-in lower back-follow wound care consult Skin: No rash or itching Ruma Burns)  (Signed 27-Oct-2023 05:06)    Wound nurse consult-Sacrum:5.5cm x 6cm x 0.1cm partial thickness skin loss with nonblanchable erythema that can be classified as a stage 2 pressure injury. Recommend cleansing with saline and pat dry. Cover with with Triad cream light coat and then apply Adaptic and then a small foam.. Please do NOT apply Adhesive foam directly on skin loss. This change is recommend as daily and prn if soiled.       Dietitian consult -SkinBraden = 11Pressure Injury 1: Right:, buttocks, Stage II Pressure Injury 2: Left:, buttocks, Stage II    DC summary-7. h/o Decubitus ulcer-in lower back-turn and position -follow wound care consult Clean with Saline and PAT dry -Apply Triad Cream -Adaptic-Then Foam daily change and prn if soiled     Flow sheets-flow sheets -right buttock stage II POA; left buttock stage II POA

## 2023-11-10 NOTE — DIETITIAN INITIAL EVALUATION ADULT. - EST PROTEIN NEEDS5
65.7 71-year-old male with past medical history of asthma, schizophrenia, CVA, presents to the emergency department by ambulance with report of fall laceration to right periorbital area, patient awake alert, follows commands, noted 0.5cm lac to right outer periorbital area, Follow-up CT head, CT cervical spine, wound to be cleaned, and repaired.

## 2023-11-17 ENCOUNTER — NON-APPOINTMENT (OUTPATIENT)
Age: 88
End: 2023-11-17

## 2023-11-17 DIAGNOSIS — E78.5 HYPERLIPIDEMIA, UNSPECIFIED: ICD-10-CM

## 2023-11-18 ENCOUNTER — NON-APPOINTMENT (OUTPATIENT)
Age: 88
End: 2023-11-18

## 2023-11-19 ENCOUNTER — NON-APPOINTMENT (OUTPATIENT)
Age: 88
End: 2023-11-19

## 2023-11-19 ENCOUNTER — TRANSCRIPTION ENCOUNTER (OUTPATIENT)
Age: 88
End: 2023-11-19

## 2023-11-20 ENCOUNTER — LABORATORY RESULT (OUTPATIENT)
Age: 88
End: 2023-11-20

## 2023-11-20 ENCOUNTER — NON-APPOINTMENT (OUTPATIENT)
Age: 88
End: 2023-11-20

## 2023-11-20 ENCOUNTER — APPOINTMENT (OUTPATIENT)
Dept: UROLOGY | Facility: CLINIC | Age: 88
End: 2023-11-20
Payer: MEDICARE

## 2023-11-20 PROCEDURE — 99442: CPT | Mod: 95

## 2023-11-21 ENCOUNTER — APPOINTMENT (OUTPATIENT)
Dept: HOME HEALTH SERVICES | Facility: HOME HEALTH | Age: 88
End: 2023-11-21

## 2023-11-22 ENCOUNTER — NON-APPOINTMENT (OUTPATIENT)
Age: 88
End: 2023-11-22

## 2023-11-22 RX ORDER — ATORVASTATIN CALCIUM 20 MG/1
20 TABLET, FILM COATED ORAL
Qty: 30 | Refills: 0 | Status: COMPLETED | COMMUNITY
Start: 2023-11-02 | End: 2023-11-22

## 2023-11-22 RX ORDER — MULTIVITAMIN
TABLET ORAL DAILY
Refills: 0 | Status: ACTIVE | COMMUNITY
Start: 2021-11-01

## 2023-11-22 RX ORDER — BETAMETHASONE DIPROPIONATE 0.5 MG/G
0.05 OINTMENT TOPICAL TWICE DAILY
Qty: 1 | Refills: 0 | Status: ACTIVE | COMMUNITY
Start: 2023-10-02

## 2023-11-22 RX ORDER — TAVABOROLE 43.5 MG/ML
5 SOLUTION TOPICAL
Qty: 10 | Refills: 0 | Status: ACTIVE | COMMUNITY
Start: 2022-10-25

## 2023-11-22 RX ORDER — ONDANSETRON 4 MG/1
4 TABLET, ORALLY DISINTEGRATING ORAL EVERY 8 HOURS
Qty: 90 | Refills: 0 | Status: ACTIVE | COMMUNITY
Start: 2023-10-02

## 2023-11-24 ENCOUNTER — NON-APPOINTMENT (OUTPATIENT)
Age: 88
End: 2023-11-24

## 2023-11-28 ENCOUNTER — NON-APPOINTMENT (OUTPATIENT)
Age: 88
End: 2023-11-28

## 2023-11-29 ENCOUNTER — APPOINTMENT (OUTPATIENT)
Dept: UROLOGY | Facility: CLINIC | Age: 88
End: 2023-11-29
Payer: MEDICARE

## 2023-11-29 PROCEDURE — 99443: CPT | Mod: 95

## 2023-11-30 RX ORDER — LOSARTAN POTASSIUM 50 MG/1
50 TABLET, FILM COATED ORAL
Refills: 0 | Status: DISCONTINUED | COMMUNITY
Start: 2023-11-08 | End: 2023-11-30

## 2023-11-30 RX ORDER — AMLODIPINE BESYLATE 5 MG/1
5 TABLET ORAL
Qty: 30 | Refills: 0 | Status: DISCONTINUED | COMMUNITY
Start: 2023-11-04 | End: 2023-11-30

## 2023-12-06 LAB
APPEARANCE: CLEAR
BACTERIA UR CULT: NORMAL
BACTERIA: NEGATIVE /HPF
BILIRUBIN URINE: NEGATIVE
BLOOD URINE: NEGATIVE
CAST: 7 /LPF
COLOR: YELLOW
EPITHELIAL CELLS: 0 /HPF
GLUCOSE QUALITATIVE U: NEGATIVE MG/DL
KETONES URINE: NEGATIVE MG/DL
LEUKOCYTE ESTERASE URINE: ABNORMAL
MICROSCOPIC-UA: NORMAL
NITRITE URINE: NEGATIVE
PH URINE: 7.5
PROTEIN URINE: NORMAL MG/DL
RED BLOOD CELLS URINE: 2 /HPF
REVIEW: NORMAL
SPECIFIC GRAVITY URINE: 1.01
UROBILINOGEN URINE: 0.2 MG/DL
WHITE BLOOD CELLS URINE: 6 /HPF

## 2023-12-08 NOTE — DIETITIAN INITIAL EVALUATION ADULT. - CURRENT DIET ORDER MEETS ESTIMATED NUTRIENT REQUIREMENTS
Speech-Language Pathology Evaluation      Patient Name: Day Quigley    KRDXR'E Date: 12/8/2023     Problem List  Principal Problem:    Stroke-like symptoms  Active Problems:    HTN (hypertension), benign    Acquired hypothyroidism    Paroxysmal atrial fibrillation (HCC)    Esophageal dysmotility    Right shoulder pain      Past Medical History  Past Medical History:   Diagnosis Date    Acute CVA (cerebrovascular accident) ([address]) 06/18/2016    Adult hypothyroidism 09/12/2016    Anxiety 06/10/2019    Arthritis     Asthma     Colon polyp     Depression     Disease of thyroid gland     Dyslipidemia 06/10/2019    Esophageal dysmotility 06/10/2019    Essential hypertension 09/12/2016    GERD (gastroesophageal reflux disease)     Hyperlipidemia     Hypertension     Hypothyroidism     Obesity     Paroxysmal atrial fibrillation ([address]) 09/12/2016    Seizure disorder ([address]) 08/24/2018    Seizures ([address])     Stenosis of left carotid artery 08/13/2019    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      Recommended Diet: continue regular textured food, thin liquid  Recommended Form of Meds: whole with liquid   Esophageal clearance or reflux precautions: upright posture, slow rate of feeding, and avoiding eating for 2 hrs prior to bedtime  Other Recommendations: Continue frequent oral care        Current Medical Status  Day Quigley is a 80 y.o. female with a PMH of stroke, hypothyroidism, anxiety, depression, dyslipidemia, GERD and esophageal dysmotility, PAF, seizure disorder who presented 12/7 with R UE weakness, right shoulder pain. Patient also reports that her mouth feels weird and tongue feels thick. Stroke Team consulted at this time    Allergies:  No known food allergies    Past medical history:  Please see H&P for details    Special Studies:  MRI of brain just completed (results pending)    Social/Education/Vocational Hx:  Pt lives alone    Swallow Information   Current Symptoms/Concerns: r/o CVA and dysarthria  Current Diet: regular diet and thin liquids (assessed w/breakfast)  Baseline Diet: regular diet and thin liquids      Baseline Assessment   Behavior/Cognition: alert  Speech/Language Status: comprehended mod complex questions w/ease. No s/s dysarthria or impaired didochokinesis. No anomia in informal assessment  Patient Positioning: upright in bed  Pain Status/Interventions/Response to Interventions:  No report of or nonverbal indications of pain. Swallow Mechanism Exam  Facial: symmetrical  Labial: WFL  Lingual: WFL  Velum: symmetrical  Mandible: adequate ROM  Dentition: adequate  Vocal quality:clear/adequate   Respiratory Status: on RA       Consistencies Assessed and Performance   Materials administered included thin liquid, Romanian toast, multiple pills with thin liquid    Oral Stage:   Mastication was adequate with the materials administered today. Bolus formation and transfer were functional with no significant oral residue noted. No overt s/s reduced oral control. Pharyngeal Stage:   Swallow Mechanics:  Swallowing initiation appeared prompt. Laryngeal rise was palpated and judged to be within functional limits. No coughing, throat clearing, change in vocal quality or respiratory status noted today.      Esophageal Concerns: no s/s today but h/o esophageal dysmotility    Summary and Recommendations (see above)    Results Reviewed with: patient and RN     Treatment Recommended: None at this time     Trista Boateng [facility] License [id number] Statement Selected

## 2023-12-21 ENCOUNTER — APPOINTMENT (OUTPATIENT)
Dept: UROLOGY | Facility: CLINIC | Age: 88
End: 2023-12-21
Payer: MEDICARE

## 2023-12-21 PROCEDURE — 99443: CPT | Mod: 95

## 2023-12-21 NOTE — PHYSICAL EXAM
[General Appearance - Well Developed] : well developed [General Appearance - Well Nourished] : well nourished [Normal Appearance] : normal appearance [Well Groomed] : well groomed [General Appearance - In No Acute Distress] : no acute distress [Exaggerated Use Of Accessory Muscles For Inspiration] : no accessory muscle use [Skin Color & Pigmentation] : normal skin color and pigmentation [] : no rash [Skin Lesions] : no skin lesions [Oriented To Time, Place, And Person] : oriented to person, place, and time [Affect] : the affect was normal

## 2023-12-22 NOTE — ADDENDUM
[FreeTextEntry1] : This note was authored by Katlyn Avila working as a scribe for Dr.Gary Sexton. I, Dr. Indio Sexton have reviewed the content of this note and confirm it is true and accurate. I personally performed the history and physical examination and made all the decisions 12/21/2023.

## 2023-12-22 NOTE — HISTORY OF PRESENT ILLNESS
[FreeTextEntry1] : Claudio Mcdowell in her daughter Tabatha Deluna gave permission for an audio video telehealth visit.  They were in their home in [facility].  I was in my office in [facility].  Claudio Mcdowell is currently 89 years of age.  Her daughter Tabatha Deluna is devoted to her care.  She is very concerned about her mother and has been so for many years.  She becomes very anxious at times and that is concerning her mother.  Claudoi Mcdowell lives with her daughter.  Claudio Mcdowell has been bedbound for several years.  The patient had developed a sacral decubitus ulcer while in a nursing facility.  A Sales catheter was placed because of fecal incontinence and concern that urine mixed with the feces would contaminate the sacral decubitus ulcer.  Once the patient left the nursing facility and will be with her daughter.  The sacral decubitus ulcer has finally healed.  I have discussed removing the Sales catheter with the daughter.  However as the patient has fecal incontinence there is concern about the urine mixing with the feces and being more likely to cause skin problems.  For now we will leave the Sales catheter in.  The patient has had clinically symptomatic urinary tract infections.  He clinically significant infections usually start with increased urination around the Sales catheter due to bladder spasms.  Bladder spasms have been occurring for staff.  The patient is positioned properly in in bed and the personal care attendant leaves for the day.  We have managed to prevent this with the administration of sublingual hyoscyamine.  We do periodic surveillance urine analysis and urine culture tests at the time the Sales catheter is changed by visiting nurse.  He also performed a times of increased spasms or purulence of the urine or change in mental status,  On October 11, 2022 visiting nurse using an order I have previously placed at the request of the daughter sent urine for urine cytology which proved to be negative for malignant cells, urine culture which grew Greater than 100,000 and E. coli that was sensitive to all antibiotics tested.  Urinalysis looked quite good for urine taken from a Sales catheter that was used for chronic Sales catheter drainage.  Ileukocyte esterase was large but nitrites were negative.  There were 2 epithelial cells per high-power field.  There were only 10 white blood cells per high-power field and only 2 red blood cells per high-power field on microscopic exam there were no bacteria seen and there were no hyaline casts.  Specific gravity was 1.010 which shows good hydration and this bedbound woman cared for diligently by her daughter.  Blood by dipstick was trace.  There was no protein glucose or ketones in the urine.  There is no bilirubin and no urobilinogen.  An important portion of the visit was my review with the daughter about bladder spasms and urination around the catheter.  Urine appearance and urine analysis have been clear.  The urine was clear again today.  The patient has significant short-term memory deficit.  She does have some useful short-term memory.  Her long-term memory remains very much intact.  She is very pleasant and to make satisfactory conversation.  She does admit to sometimes not remembering something.  Her complexion was good and there was no pallor.  Facial movements were symmetric.  Cranial nerves were intact.  I was not able to assess the olfactory nerve as this was a telehealth visit.    11/04/2022: Ms. MCDOWELL is a 89 year old female presenting today for a telehealth visit. She was accompanied by her daughter who helped with the visit. They gave permission for an audio only telehealth conference. The patient was located in her home in [place], [place]. I was located in my office on [place], [place]. Today her daughter reports the pt experienced rare urinary leaking around the catheter due to bladder spasms. She also reports her systolic pressure was around 140 last week. I offered Gemtesa to manage the bladder spasms and the daughter was cautious about more medications. I informed her if the urinary leaking are only once every 2 weeks or so, she does not have to medicate. The daughter was agreeable to monitor the occurrence of urinary leaking over the next 4 weeks and assess the discomfort it causes the pt. She denies any other urinary issues.  03/01/2023: Ms. MCDOWELL is a 89 year old female presenting today for a telehealth visit. She was accompanied by her daughter who helped with the visit. They gave permission for an audio only telehealth conference. The patient was located in her home in [place], [place]. I was located in my office on [place], [place]. The pt has chronic sales catheter drainage. She provided a urine specimen from the catheter on 2/27/2023. UA was slightly turbid with large LEC and 59 WBC/HPF. Culture grew >100,000 CFU/ml E.Coli. The sensitivity report is not yet back. The pt's daughter was concerned as when the patient was constipated a few weeks ago she was having very watery BM which grew messy and were around the catheter area. She was given a Fleet's enema last month which helped. Currently she is having BM, paste-like in appearance. Her daughter does not think that her stomach is distended. She has recently had the catheter changed and is not voiding around it. Urine has been very clear. She does not seem to have pain in the urinary passageway or bladder. Denies fevers. Denies gross hematuria. Pt had covid around Thanksgiving, only had the first two vaccines from two years ago.   03/13/2023: Ms. MCDOWELL is a 89 year old female presenting today for a telehealth visit. She was accompanied by her daughter who helped with the visit. They gave permission for an audio only telehealth conference. The patient was located in her home in [place], [place]. I was located in my office on [place], [place]. The patient obtained an US prior to today's visit 3/9/23 which revealed: Comparison is made with the exam of 2/28/22. Transabdominal ultrasound of the abdomen was performed with color flow imaging. The examination is limited in evaluation. The visualized aorta and inferior vena cava show no abnormality. The pancreas is obscured by overlying bowel gas. The liver measures 12.4 cm with homogeneous echotexture. No intrinsic mass and no intra-or extrahepatic ductal dilatation. There is hepatopedal flow of the main portal vein. No gallstones, pericholecystic fluid, wall thickening or positive sonographic Melendez sign. The common bile duct measures 0.3 cm. The right kidney measures 9.2 x 5.6 x 3.0 cm with a nonobstructing 1.2 cm stone in the upper pole. Multiple additional subcentimeter calcifications are seen. These were not seen on previous exam. No hydronephrosis or renal mass. The left kidney measures 9.3 x 3.9 x 3.5 cm. There is a 2.1 x 2.4 x 2.0 cm cyst in the medial mid pole, not seen on previous study. No hydronephrosis or renal calcification. The spleen measures 8.2 cm and show no abnormality. There is a small right pleural effusion, stable. IMPRESSIONS: Multiple nonobstructing stones of the right kidney with no hydronephrosis. 2.4 cm cyst of the midpole left kidney. Small right pleural effusion, stable. The daughter has answered the phone and reports the patient's urine does not get dark. She is not aware of the amount of water she gives her mother. I requested she measure's this from now on as the pt has stones and needs to increase water consumption. Her daughter reports this week the pt has experienced more spasms than normal and believes this may be due to severe constipation. She provided the pt with an Enema to aide in this situation. She is very concerned but I informed her the Enema may have stimulated the spasms and we should give her a few days to clear up and re-evaluate.   03/22/2023: Ms. CLAUDIO MCDOWELL presents today for an audio visual telehealth visit for which she gave permission for. The patient was located at home at [address] [place] [address] and I was located at my office in [place], [place]. She is accompanied by her daughter whom most of the visit was conducted with. Her daughter has been keeping track of her mother's water consumption. She is averaging about 58 oz of water in a 24 hr period. Additionally, she is drinking juice and ensure nutrition drinks. She does not consume any caffeine. She is on furosemide 40 mg. I said hello to the patient at the end of the visit and she is looking well. She reports feeling no pain at the current moment.   3/29/2023:  Patient Claudio Mcdowell and daughter Tabatha Deluna were home in [place], [place] and I was in my office in [facility].  Patient and daughter gave permission for a [employer] telehealth visit.  They preferred this to [employer].  The patient looks good today.  Her complexion was good there is no pallor.  Smile was symmetric her affect was normal she appeared happy she could make conversation it was appropriate.  She said that she currently had no pain.  When I asked questions that after 3/2 how she had been recently the sometimes she hesitated and deferred to her daughter for cancer compatible with her impaired short-term memory.  Long-term memory remains good she recognizes me once know so I am does ask questions about things like her bladder as she was worried about it her blood tests.  I assured her the results were satisfactory.  04/18/2023: Ms. MCDOWELL is a 89 year old female presenting today for an audio and visual telehealth visit for which she gave verbal permission. We utilized [employer] teams telemetry doc platform. The patient was located in her home at [address] [place] [address]. I was located in my office on [place], [place]. She was accompanied by her daughter who helped to serve as a historian. She reports discomfort in her eyes. She describes the discomfort as a feeling of sand. She opened her eyes for me, and they do not look red. Pupils look normal. She reports the right eyes bothers her significantly more than the left. I advise that the patient tries lubricating drops and it if does not feel better she will notify her visiting nurse who is due to come next week for a sales catheter change. She reports episode of bladder spasms. She denies gross hematuria. She describes the urine as barely yellow. I reviewed and discussed her  latest pertinent lab on 04/12/2023 which shows "alkaline phosphate normal at 89. Creatinine was normal at 0.62 mg/dL. WBC normal at 5.01. RBC normal at 3.87".   05/09/2023: Ms. MCDOWELL presents today for a follow up audio only telehealth visit for which they gave permission for. She was accompanied by her daughter who helped to serve as a historian. The patient was located at home [address] [place] [address] and I was located in my office in [place], [place]. The patient obtained lab work 5/2/23 prior to today's visit. The UA revealed microscopic hematuria, 5/HPF RBC, 27/HPF WBC. The patient demonstrated great hydration as the specific gravity is 1.006. The Sales catheter was changed May 2, 2023 with no clear urine in the bag. While changing the diaper the aide reports blood in the diaper. The daughter states last week her mother experienced spasms but as of two days ago she is now fine. However, she states the patient is more fatigued. She denies the pt having a temperature. I assured her because her mother is post menopausal any abrasion to the labia can cause a fissure.   06/02/2023: Ms. MCDOWELL is a 89 year old female presenting today for an audio and visual telehealth visit for which she gave verbal permission. We utilized [employer] teams telemetry [employer] platform. The patient was located in her home at [address] [place] [address]. I was located in my office on [place], [place]. She was accompanied by her daughter Tabatha. She reports that her mother complains of onset dysuria that dissipated on its own and has not recurred since. She reports right sided back pain that is aggravated when she lays on the right side. She provided urine specimen. She notes the urine was very clear. I reviewed and discussed the patient's pertinent lab works. Her latest Urinalysis on 05/30/2023 that shows "60 WBC/HPF. Trace Blood Urine. Specific Gravity Urine 1.007". Urine Culture on the same date shows ">100,000 CFU/ml Escherichia coli and <10,000 CFU/ml Normal Urogenital ángel present". The patient takes Mirtazapine for anxiety at low dose. The daughter reports the patient is experiencing increasing anxiety and is thinking of having the prescriber increasing the dosage.  Her BP runs around 120/60. Her Hemoglobin hematocrit looks good. gaze is conjugated. facial expression looks symmetric. Urine was faint yellow and clear.   06/06/2023: Ms. MCDOWELL presents today for a follow up audio only telehealth visit for which they gave permission for. The patient was located at home [address] [place] [address] and I was located in my office in [place], [place]. She was accompanied by her daughter Tabatha. The daughter states pt is experiencing episodes of a dry cough. An X Ray  of 6/5/23 revealed Mild peribronchial thickening suggesting bronchitis in the right clinical setting with no focal pneumonia or congestive heart failure. COPD with chronic lung changes. The mental status is the same as usual but her BP has elevated some. I assured her a little elevation is not as dangerous. The pt continues to experience some urinary frequency and it is a little more concentrated.   10/12/2023: Ms. MCDOWELL presents today for a follow up audio only telehealth visit for which they gave permission for. The patient was located at home [address] [place] [address] and I was located in my office in [place], [place]. The 10/2/23 UA showed proteinuria 30 mg/dL, moderate blood, large Leukocyte Esterase, 16/HPF of WBC. UA showed improvement as patient has chronic sales drainage. Her daughter states patient is okay considering coming home from the hospital. Most pain is in her back/shoulders and some of her back area is red/raw.  Recently her BP has increased to 180/100 and was prescribed Losartan 50mg twice daily. Urine is a clear color, denies cloudy and dark appearance.  11/01/2023: Ms. MCDOWELL presents today for a follow up audio only telehealth visit for which they gave permission for. The patient was located at home [address] [place] [address] and I was located in my office in [place], [place]. The 10/13/23 CMP revealed creatinine at 0.58 and low ALT at 8 U/L. Patient was admitted into [facility] on 10/27/23 for sepsis/UTI. Her daughter states she may be released tomorrow. Daughter states during the day the patient's urine was normal, but she did have chills and a fever.   11/03/2023: Ms. MCDOWELL is a 89 year old female presenting today for an audio only telehealth visit for which she gave verbal permission. The patient was located in her home at [address] [place] [address]. I was located in my office on [place], [place]. She was accompanied by her daughter. She says her mother was supposed to be released today. Initially she was able to void. Because of constipation she was given a couple of enemas. She had a large evacuation and since then she has not been able to void again. The daughter says her latest PVR was 340 cc. She is waiting for her mother to be rescanned again.   11/07/2023: Ms. CLAUDIO MCDOWELL presents today for a follow up audio only telephone visit for which she gave permission for. The pt was located at home, [address] [place] [address], and I was located in my office in [place], [place]. The visit was conducted with the patient's daughter. The patient came home from the hospital on Sunday. The catheter was put back in without another attempt at a void trial. The patient does have issues with constipation. She is able to swallow liquids. Her daughter has had to crush some of her pills to put them in a liquid, however the pt finds it to taste bitter and has some discomfort swallowing the chunks of crushed pills. The hospital discharged her with a 7 day supply of Cefuroxime. Was started yesterday so she has had two doses.   11/20/2023: Ms. MCDOWELL presents today for a follow up audio only telehealth visit for which they gave permission for. The patient was located at home [address] [place] [address] and I was located in my office in [facility]. Daughter states patient is okay. She notes giving pt suppositories and 7 hours later she had mixed bowel movements. However, she did not have a bowel movement since Saturday. Patient is given pedialyte and water frequently. Today's temperature was 95 as before it was 96-97. Pt did not drink any liquids shortly after taking temperature. Daughter denies giving pt solace but she usually gives Miralax as she has done so today. Toady's urine color is clear as it  has been clear for some time.  Daughter states her stomach was distended. Bp has stabilized since being in the hospital but last night and today it  was higher than normal being being 168/88 before medication.   11/29/2023: Ms. CLAUDIO MCDOWELL presents today for a follow up audio only telephone visit for which she gave permission for. The pt was located at home, [address] [place] [address], and I was located in my office in [place], [place]. Visit was conducted with her daughter, Andree. She was informed yesterday by her house call physicians of the results of her urine culture (emailed from core lab showing >100,000 CFU/ml Pseudomonas aeruginosa susceptible only to amikacin, Ciprofloxacin, imipenem, levofloxacin, meropenum. Resistant to Ceftazidime, Pip/tazo. Intermediate for aztrenam, cefepime. They discussed that patient is having increasing oxygen requirements, although SPO2 ok on supplemental O2 (prior to this patient needed only occasional O2 suppl. every few days). 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 The want to treat with ciprofloxacin, 7 day course. Prescription sent to pharmacy. Her daughter inquired today on if she should be treated. She states last week her mother seemed very not herself, however today she seems better in terms of mental status and oxygenation. She has been giving her senakot and miralax.   12/21/2023: Ms. MCDOWELL presents today for a follow up audio-only telehealth visit for which they gave permission for. The patient was located at home [address] [place] [address] and I was located in my office in [place], [place]. The patient obtained lab work 12/4/23 prior to today's visit. Urinalysis is improved and showed trace proteinuria, small Leukocyte Esterase, 6/HPF of WBC and 7/LPF of Cast. Urine culture shows no significant growth, <10,000 CFU/mL Normal Urogenital Ángel, which is very good. Her daughter expresses concern as recently patient is experiencing a surplus of looser and larger amounts of bowel movements that is escaping to the vaginal area, thus causing infections. Urine is normal in color, denies fever. Patient is occasionally more fatigued than usual. Denies distended appearance of the abdomen. Admits to only 1 spasm around the catheter this month. Next sales change will be in 2 weeks.  Towards the end of visit, patient was shown on Facetime, appearing well oriented x 3, normal pigmentation, lips are moist, and smile is symmetrical. I am very impressed by how well the patient looks and speaks. It was more than just pleasantries, she inquired about personal questions. Overall, I am very pleased.

## 2023-12-22 NOTE — ASSESSMENT
[FreeTextEntry1] : 10/12/2023: Ms. MCDOWELL presents today for a follow up audio only telehealth visit for which they gave permission for. The patient was located at home [address] [place] [address] and I was located in my office in [place], [place]. The 10/2/23 UA showed proteinuria 30 mg/dL, moderate blood, large Leukocyte Esterase, 16/HPF of WBC. UA showed improvement as patient has chronic sales drainage. Her daughter states patient is okay considering coming home from the hospital. Most pain is in her back/shoulders and some of her back area is red/raw.  Recently her BP has increased to 180/100 and was prescribed Losartan 50mg twice daily. Urine is a clear color, denies cloudy and dark appearance.  Reviewed and discussed laboratory work of 10/2/23 which She obtained prior to today's visit as requested. Pt will go to her nearest [facility] lab for blood work and a urine sample which will be sent for urinalysis, urine culture, and urine cytology. Pt will schedule a telehealth visit after lab work to discuss results.   11/01/2023: Ms. MCDOWELL presents today for a follow up audio only telehealth visit for which they gave permission for. The patient was located at home [address] [place] [address] and I was located in my office in [place], [place]. The 10/13/23 CMP revealed creatinine at 0.58 and low ALT at 8 U/L. Patient was admitted into [facility] on 10/27/23 for sepsis/UTI. Her daughter states she may be released tomorrow. Daughter states during the day the patient's urine was normal, but she did have chills and a fever.   As the patient recently had a course of antibiotics, we suggested patient have catheter removed in hospital, as we will to observe bladder emptying and skin while the catheter is removed. If abnormalities arise, we will place the catheter back in.  If the daughter is to proceed with the plan, we will not discharge patient until a PVR is taken and until pt is able to void on her own the next morning. Hospitalist should weigh the diaper before and after and I will calculate it. If stool is present, then we will not be able to do so.  The daughter will have the Hospitalist contact me about patient.  Hospitalist has called at 4:06 pm. She states the urination looks contaminated and culture showed E-coli.  They will bladder scan her every hour.  Hospitalist will call tomorrow with update on patient and inform me on her fluid level.  Advised daughter to obtain a thermometer to observe temperature.    11/03/2023: Ms. MCDOWELL is a 89 year old female presenting today for an audio only telehealth visit for which she gave verbal permission. The patient was located in her home at [address] [place] [address]. I was located in my office on [place], [place]. She was accompanied by her daughter. She says her mother was supposed to be released today. Initially she was able to void. Because of constipation she was given a couple of enemas. She had a large evacuation and since then she has not been able to void again. The daughter says her latest PVR was 340 cc. She is waiting for her mother to be rescanned again.    Plan: If she is unable to void, she will be discharged with a sales catheter.   11/07/2023: Ms. CLAUDIO MCDOWELL presents today for a follow up audio only telephone visit for which she gave permission for. The pt was located at home, [address] [place] [address], and I was located in my office in [place], [place]. The visit was conducted with the patient's daughter. The patient came home from the hospital on Sunday. The catheter was put back in without another attempt at a void trial. The patient does have issues with constipation. She is able to swallow liquids. Her daughter has had to crush some of her pills to put them in a liquid, however the pt finds it to taste bitter and has some discomfort swallowing the chunks of crushed pills. The hospital discharged her with a 7 day supply of Cefuroxime. Was started yesterday so she has had two doses.    We discussed the possibility of a doing a trial of void. At this time I am recommending her mother settle back in to being home and we can readdress this possibility and how the patient and her daughter would like to proceed at the time of her next visit. Next catheter change will be on 12/5 should they choose to proceed with the catheter.   I looked it up on [employer] and Cefuroxime is available in the [place] as a liquid suspension. Given that this would be easier for the patient, I went to prescribe it for her which she could take rather than the pills. [employer] did not allow me to electronically prescribe it in a liquid suspension so I called the patient's pharmacy to give a verbal prescription for 200 ml of it for 20 ml BID for 5 days. I left a VM for the pharmacy as there was no answer. I requested they call me back if they could accept this prescription or if they could not. The pharmacy called me back shortly after and they told me that it is not currently offered in a liquid suspension, however he called the [facility] next door and they are willing to compound it for 40$. I saw this as acceptable and thanked him for going the extra mile and calling next door. He will contact the patient's daughter to inform her.   Patient and her daughter will have a telehealth visit in 2 weeks for reassessment, sooner if clinically indicated.   11/20/2023: Ms. MCDOWELL presents today for a follow up audio only telehealth visit for which they gave permission for. The patient was located at home [address] [place] [address] and I was located in my office in [facility]. Daughter states patient is okay. She notes giving pt suppositories and 7 hours later she had mixed bowel movements. However, she did not have a bowel movement since Saturday. Patient is given pedialyte and water frequently. Today's temperature was 95 as before it was 96-97. Pt did not drink any liquids shortly after taking temperature. Daughter denies giving pt solace but she usually gives Miralax as she has done so today. Senady's urine color is clear as it  has been clear for some time.  Daughter states her stomach was distended. Bp has stabilized since being in the hospital but last night and today it  was higher than normal being being 168/88 before medication.   Advised daughter to give patient Miralax tonight if there is no bowel movements.  Advised her to take Blood pressure tonight.  Pt will schedule a telehealth visit  11/29/2023: Ms. CLAUDIO MCDOWELL presents today for a follow up audio only telephone visit for which she gave permission for. The pt was located at home, [address] [place] [address], and I was located in my office in [place], [place]. Visit was conducted with her daughter, Andree. She was informed yesterday by her house call physicians of the results of her urine culture (emailed from core lab showing >100,000 CFU/ml Pseudomonas aeruginosa susceptible only to amikacin, Ciprofloxacin, imipenem, levofloxacin, meropenum. Resistant to Ceftazidime, Pip/tazo. Intermediate for aztrenam, cefepime. They discussed that patient is having increasing oxygen requirements, although SPO2 ok on supplemental O2 (prior to this patient needed only occasional O2 suppl. every few days). 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 The want to treat with ciprofloxacin, 7 day course. Prescription sent to pharmacy. Her daughter inquired today on if she should be treated. She states last week her mother seemed very not herself, however today she seems better in terms of mental status and oxygenation. She has been giving her senakot and miralax.   I explained that given her O2 issues and confusion over the last few days, I advised to treat with Cipro despite her daughter feeling she is better today. If there are any problems with crushing the pills for her or if she is not reacting to it well, she was advised to call right away. I posed the option of giving it as an oral solution if need be.    12/21/2023: Ms. MCDOWELL presents today for a follow up audio-visual telehealth visit for which they gave permission for. The patient was located at home [address] [place] [address] and I was located in my office in [place], [place]. The patient obtained lab work 12/4/23 prior to today's visit. Urinalysis is improved and showed trace proteinuria, small Leukocyte Esterase, 6/HPF of WBC and 7/LPF of Cast. Urine culture shows no significant growth, <10,000 CFU/mL Normal Urogenital Khadijah, which is very good. Her daughter expresses concern as recently patient is experiencing a surplus of looser and larger amounts of bowel movements that is escaping to the vaginal area, thus causing infections. Urine is normal in color, denies fever. Patient is occasionally more fatigued than usual. Denies distended appearance of the abdomen. Admits to only 1 spasm around the catheter this month. Next sales change will be in 2 weeks.  Towards the end of visit, patient was shown on Facetime, appearing well oriented x 3, normal pigmentation, lips are moist, and smile is symmetrical. I am very impressed by how well the patient looks and speaks. It was more than just pleasantries, she inquired about personal questions. Overall, I am very pleased.   Reviewed and discussed laboratory work of 12/4/23 which She obtained prior to today's visit as requested.  Advised daughter to have care team to place support under the pelvis, so pelvis is tilted up at a higher level of the anus.     As long as stool is pasty and firm, we advised Andree to give patient MiraLAX. If stool is loose and unfirm, she should decrease the amount of MiraLAX.   Pt will provide a urine sample which will be sent for urinalysis, urine culture, and urine cytology.  Pt will schedule a telehealth visit in 2 weeks for reassessment.   Preparation, audio-visual visit, and coordination of care took 45 Minutes.

## 2023-12-25 LAB
APPEARANCE: CLEAR
BACTERIA: ABNORMAL /HPF
BILIRUBIN URINE: NEGATIVE
BLOOD URINE: NEGATIVE
CALCIUM OXALATE CRYSTALS: 1
CAST: 3 /LPF
COARSE GRANULAR CASTS: PRESENT
COLOR: YELLOW
EPITHELIAL CELLS: 1 /HPF
GLUCOSE QUALITATIVE U: NEGATIVE MG/DL
KETONES URINE: NEGATIVE MG/DL
LEUKOCYTE ESTERASE URINE: ABNORMAL
MICROSCOPIC-UA: NORMAL
NITRITE URINE: POSITIVE
PH URINE: 7.5
PROTEIN URINE: NEGATIVE MG/DL
RED BLOOD CELLS URINE: NORMAL /HPF
REVIEW: NORMAL
SPECIFIC GRAVITY URINE: 1.01
UROBILINOGEN URINE: 0.2 MG/DL
WHITE BLOOD CELLS URINE: 6 /HPF
YEAST-LIKE CELLS: PRESENT

## 2023-12-26 LAB — BACTERIA UR CULT: ABNORMAL

## 2023-12-27 ENCOUNTER — APPOINTMENT (OUTPATIENT)
Dept: UROLOGY | Facility: CLINIC | Age: 88
End: 2023-12-27
Payer: MEDICARE

## 2023-12-27 LAB — URINE CYTOLOGY: NORMAL

## 2023-12-27 PROCEDURE — 99443: CPT | Mod: 95

## 2023-12-28 NOTE — REVIEW OF SYSTEMS
[Eyesight Problems] : eyesight problems [Loss Of Hearing] : hearing loss [Chest Pain] : chest pain [Diarrhea] : diarrhea [Joint Pain] : joint pain [Confused] : confusion [Anxiety] : anxiety [Easy Bleeding] : a tendency for easy bleeding [Feeling Poorly] : not feeling poorly [Palpitations] : no palpitations [Cough] : no cough [Constipation] : no constipation [Dysuria] : no dysuria [Convulsions] : no convulsions [Hot Flashes] : no hot flashes

## 2023-12-28 NOTE — ASSESSMENT
[FreeTextEntry1] : 10/12/2023: Ms. MCDOWELL presents today for a follow up audio only telehealth visit for which they gave permission for. The patient was located at home [address] [place] [address] and I was located in my office in [place], [place]. The 10/2/23 UA showed proteinuria 30 mg/dL, moderate blood, large Leukocyte Esterase, 16/HPF of WBC. UA showed improvement as patient has chronic sales drainage. Her daughter states patient is okay considering coming home from the hospital. Most pain is in her back/shoulders and some of her back area is red/raw.  Recently her BP has increased to 180/100 and was prescribed Losartan 50mg twice daily. Urine is a clear color, denies cloudy and dark appearance.  Reviewed and discussed laboratory work of 10/2/23 which She obtained prior to today's visit as requested. Pt will go to her nearest [facility] lab for blood work and a urine sample which will be sent for urinalysis, urine culture, and urine cytology. Pt will schedule a telehealth visit after lab work to discuss results.   11/01/2023: Ms. MCDOWELL presents today for a follow up audio only telehealth visit for which they gave permission for. The patient was located at home [address] [place] [address] and I was located in my office in [place], [place]. The 10/13/23 CMP revealed creatinine at 0.58 and low ALT at 8 U/L. Patient was admitted into [facility] on 10/27/23 for sepsis/UTI. Her daughter states she may be released tomorrow. Daughter states during the day the patient's urine was normal, but she did have chills and a fever.   As the patient recently had a course of antibiotics, we suggested patient have catheter removed in hospital, as we will to observe bladder emptying and skin while the catheter is removed. If abnormalities arise, we will place the catheter back in.  If the daughter is to proceed with the plan, we will not discharge patient until a PVR is taken and until pt is able to void on her own the next morning. Hospitalist should weigh the diaper before and after and I will calculate it. If stool is present, then we will not be able to do so.  The daughter will have the Hospitalist contact me about patient.  Hospitalist has called at 4:06 pm. She states the urination looks contaminated and culture showed E-coli.  They will bladder scan her every hour.  Hospitalist will call tomorrow with update on patient and inform me on her fluid level.  Advised daughter to obtain a thermometer to observe temperature.    11/03/2023: Ms. MCDOWELL is a 89 year old female presenting today for an audio only telehealth visit for which she gave verbal permission. The patient was located in her home at [address] [place] [address]. I was located in my office on [place], [place]. She was accompanied by her daughter. She says her mother was supposed to be released today. Initially she was able to void. Because of constipation she was given a couple of enemas. She had a large evacuation and since then she has not been able to void again. The daughter says her latest PVR was 340 cc. She is waiting for her mother to be rescanned again.    Plan: If she is unable to void, she will be discharged with a sales catheter.   11/07/2023: Ms. CLAUDIO MCDOWELL presents today for a follow up audio only telephone visit for which she gave permission for. The pt was located at home, [address] [place] [address], and I was located in my office in [place], [place]. The visit was conducted with the patient's daughter. The patient came home from the hospital on Sunday. The catheter was put back in without another attempt at a void trial. The patient does have issues with constipation. She is able to swallow liquids. Her daughter has had to crush some of her pills to put them in a liquid, however the pt finds it to taste bitter and has some discomfort swallowing the chunks of crushed pills. The hospital discharged her with a 7 day supply of Cefuroxime. Was started yesterday so she has had two doses.    We discussed the possibility of a doing a trial of void. At this time I am recommending her mother settle back in to being home and we can readdress this possibility and how the patient and her daughter would like to proceed at the time of her next visit. Next catheter change will be on 12/5 should they choose to proceed with the catheter.   I looked it up on [employer] and Cefuroxime is available in the [place] as a liquid suspension. Given that this would be easier for the patient, I went to prescribe it for her which she could take rather than the pills. [employer] did not allow me to electronically prescribe it in a liquid suspension so I called the patient's pharmacy to give a verbal prescription for 200 ml of it for 20 ml BID for 5 days. I left a VM for the pharmacy as there was no answer. I requested they call me back if they could accept this prescription or if they could not. The pharmacy called me back shortly after and they told me that it is not currently offered in a liquid suspension, however he called the [facility] pharmacy next door and they are willing to compound it for 40$. I saw this as acceptable and thanked him for going the extra mile and calling next door. He will contact the patient's daughter to inform her.   Patient and her daughter will have a telehealth visit in 2 weeks for reassessment, sooner if clinically indicated.   11/20/2023: Ms. MCDOWELL presents today for a follow up audio only telehealth visit for which they gave permission for. The patient was located at home [address] [place] [address] and I was located in my office in [facility]. Daughter states patient is okay. She notes giving pt suppositories and 7 hours later she had mixed bowel movements. However, she did not have a bowel movement since Saturday. Patient is given pedialyte and water frequently. Today's temperature was 95 as before it was 96-97. Pt did not drink any liquids shortly after taking temperature. Daughter denies giving pt solace but she usually gives Miralax as she has done so today. Senady's urine color is clear as it  has been clear for some time.  Daughter states her stomach was distended. Bp has stabilized since being in the hospital but last night and today it  was higher than normal being being 168/88 before medication.   Advised daughter to give patient Miralax tonight if there is no bowel movements.  Advised her to take Blood pressure tonight.  Pt will schedule a telehealth visit  11/29/2023: Ms. CLAUDIO MCDOWELL presents today for a follow up audio only telephone visit for which she gave permission for. The pt was located at home, [address] [place] [address], and I was located in my office in [place], [place]. Visit was conducted with her daughter, Andree. She was informed yesterday by her house call physicians of the results of her urine culture (emailed from core lab showing >100,000 CFU/ml Pseudomonas aeruginosa susceptible only to amikacin, Ciprofloxacin, imipenem, levofloxacin, meropenum. Resistant to Ceftazidime, Pip/tazo. Intermediate for aztrenam, cefepime. They discussed that patient is having increasing oxygen requirements, although SPO2 ok on supplemental O2 (prior to this patient needed only occasional O2 suppl. every few days). 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 The want to treat with ciprofloxacin, 7 day course. Prescription sent to pharmacy. Her daughter inquired today on if she should be treated. She states last week her mother seemed very not herself, however today she seems better in terms of mental status and oxygenation. She has been giving her senakot and miralax.   I explained that given her O2 issues and confusion over the last few days, I advised to treat with Cipro despite her daughter feeling she is better today. If there are any problems with crushing the pills for her or if she is not reacting to it well, she was advised to call right away. I posed the option of giving it as an oral solution if need be.    12/21/2023: Ms. MCDOWELL presents today for a follow up audio-visual telehealth visit for which they gave permission for. The patient was located at home [address] [place] [address] and I was located in my office in [place], [place]. The patient obtained lab work 12/4/23 prior to today's visit. Urinalysis is improved and showed trace proteinuria, small Leukocyte Esterase, 6/HPF of WBC and 7/LPF of Cast. Urine culture shows no significant growth, <10,000 CFU/mL Normal Urogenital Khadijah, which is very good. Her daughter expresses concern as recently patient is experiencing a surplus of looser and larger amounts of bowel movements that is escaping to the vaginal area, thus causing infections. Urine is normal in color, denies fever. Patient is occasionally more fatigued than usual. Denies distended appearance of the abdomen. Admits to only 1 spasm around the catheter this month. Next sales change will be in 2 weeks.  Towards the end of visit, patient was shown on Facetime, appearing well oriented x 3, normal pigmentation, lips are moist, and smile is symmetrical. I am very impressed by how well the patient looks and speaks. It was more than just pleasantries, she inquired about personal questions. Overall, I am very pleased.   Reviewed and discussed laboratory work of 12/4/23 which She obtained prior to today's visit as requested. Advised daughter to have care team to place support under the pelvis, so pelvis is tilted up at a higher level of the anus.   As long as stool is pasty and firm, we advised Andree to give patient MiraLAX. If stool is loose and unfirm, she should decrease the amount of MiraLAX.  Pt will provide a urine sample which will be sent for urinalysis, urine culture, and urine cytology. Pt will schedule a telehealth visit in 2 weeks for reassessment.   12/27/2023: Ms. CLAUDIO MCDOWELL presents today for a follow up audio only telephone visit for which she gave permission for. The pt was located at home, [address] [place] [address], and I was located in my office in [place], [place]. She is accompanied by her daughter. Patient provided a surveillance urine specimen on 12/22/2023. She has chronic sales catheter drainage. UA revealed small LEC, positive for nitrites, 6 WBC/HPF, no RBC seen, moderate bacteria/HPF, granular casts present, and yeast like cells present. Urine culture grew >100,000 CFU/ml Enterococcus faecalis and 50,000 - 99,000 CFU/mL Streptococcus mitis/oralis group. Urine cytology was negative for high grade urothelial carcinoma. Few mature squamous cells, rare benign urothelial cells and abundant fungal organisms morphologically consistent with Candida species present. Patient's daughter reports that her urine looks clear and a pale yellow color. She feels her mom still has a lot of anxiety and some confusion. She does report that her catheter bag sits in feces due to the patient's position. They tried to tilt her pelvis to avoid this but states it's easier said than done. She states there is no feces in the catheter or in the bag. BM are not loose, she describes them as pasty. Has about 2-3 BM a day.    Reviewed and discussed lab work of 12/22/2023 in detail with the pt.   Future urine orders were put in including fungal urine culture. Pt's daughter was advised to continue to try and alter the patient's position to avoid the catheter sitting in feces.    Patient should have a telehealth visit in 3 months, sooner if clinically indicated.    Duration of telephone visit was 23 minutes.

## 2023-12-28 NOTE — ADDENDUM
[FreeTextEntry1] : This note was authored by Senia Corbin working as a scribe for Dr.Gary Sexton. I, Dr. Indio Sexton have reviewed the content of this note and confirm it is true and accurate. I personally performed the history and physical examination and made all the decisions 12/27/2023

## 2023-12-28 NOTE — HISTORY OF PRESENT ILLNESS
[FreeTextEntry1] : Claudio Mcdowell in her daughter Tabatha Deluna gave permission for an audio video telehealth visit.  They were in their home in [facility].  I was in my office in [facility].  Claudio Mcdowell is currently 89 years of age.  Her daughter Tabatha Deluna is devoted to her care.  She is very concerned about her mother and has been so for many years.  She becomes very anxious at times and that is concerning her mother.  Claudio Mcdowell lives with her daughter.  Claudio Mcdowell has been bedbound for several years.  The patient had developed a sacral decubitus ulcer while in a nursing facility.  A Sales catheter was placed because of fecal incontinence and concern that urine mixed with the feces would contaminate the sacral decubitus ulcer.  Once the patient left the nursing facility and will be with her daughter.  The sacral decubitus ulcer has finally healed.  I have discussed removing the Sales catheter with the daughter.  However as the patient has fecal incontinence there is concern about the urine mixing with the feces and being more likely to cause skin problems.  For now we will leave the Sales catheter in.  The patient has had clinically symptomatic urinary tract infections.  He clinically significant infections usually start with increased urination around the Sales catheter due to bladder spasms.  Bladder spasms have been occurring for staff.  The patient is positioned properly in in bed and the personal care attendant leaves for the day.  We have managed to prevent this with the administration of sublingual hyoscyamine.  We do periodic surveillance urine analysis and urine culture tests at the time the Sales catheter is changed by visiting nurse.  He also performed a times of increased spasms or purulence of the urine or change in mental status,  On October 11, 2022 visiting nurse using an order I have previously placed at the request of the daughter sent urine for urine cytology which proved to be negative for malignant cells, urine culture which grew Greater than 100,000 and E. coli that was sensitive to all antibiotics tested.  Urinalysis looked quite good for urine taken from a Sales catheter that was used for chronic Sales catheter drainage.  Ileukocyte esterase was large but nitrites were negative.  There were 2 epithelial cells per high-power field.  There were only 10 white blood cells per high-power field and only 2 red blood cells per high-power field on microscopic exam there were no bacteria seen and there were no hyaline casts.  Specific gravity was 1.010 which shows good hydration and this bedbound woman cared for diligently by her daughter.  Blood by dipstick was trace.  There was no protein glucose or ketones in the urine.  There is no bilirubin and no urobilinogen.  An important portion of the visit was my review with the daughter about bladder spasms and urination around the catheter.  Urine appearance and urine analysis have been clear.  The urine was clear again today.  The patient has significant short-term memory deficit.  She does have some useful short-term memory.  Her long-term memory remains very much intact.  She is very pleasant and to make satisfactory conversation.  She does admit to sometimes not remembering something.  Her complexion was good and there was no pallor.  Facial movements were symmetric.  Cranial nerves were intact.  I was not able to assess the olfactory nerve as this was a telehealth visit.    11/04/2022: Ms. MCDOWELL is a 89 year old female presenting today for a telehealth visit. She was accompanied by her daughter who helped with the visit. They gave permission for an audio only telehealth conference. The patient was located in her home in [place], [place]. I was located in my office on [place], [place]. Today her daughter reports the pt experienced rare urinary leaking around the catheter due to bladder spasms. She also reports her systolic pressure was around 140 last week. I offered Gemtesa to manage the bladder spasms and the daughter was cautious about more medications. I informed her if the urinary leaking are only once every 2 weeks or so, she does not have to medicate. The daughter was agreeable to monitor the occurrence of urinary leaking over the next 4 weeks and assess the discomfort it causes the pt. She denies any other urinary issues.  03/01/2023: Ms. MCDOWELL is a 89 year old female presenting today for a telehealth visit. She was accompanied by her daughter who helped with the visit. They gave permission for an audio only telehealth conference. The patient was located in her home in [place], [place]. I was located in my office on [place], [place]. The pt has chronic sales catheter drainage. She provided a urine specimen from the catheter on 2/27/2023. UA was slightly turbid with large LEC and 59 WBC/HPF. Culture grew >100,000 CFU/ml E.Coli. The sensitivity report is not yet back. The pt's daughter was concerned as when the patient was constipated a few weeks ago she was having very watery BM which grew messy and were around the catheter area. She was given a Fleet's enema last month which helped. Currently she is having BM, paste-like in appearance. Her daughter does not think that her stomach is distended. She has recently had the catheter changed and is not voiding around it. Urine has been very clear. She does not seem to have pain in the urinary passageway or bladder. Denies fevers. Denies gross hematuria. Pt had covid around Thanksgiving, only had the first two vaccines from two years ago.   03/13/2023: Ms. MCDOWELL is a 89 year old female presenting today for a telehealth visit. She was accompanied by her daughter who helped with the visit. They gave permission for an audio only telehealth conference. The patient was located in her home in [place], [place]. I was located in my office on [place], [place]. The patient obtained an US prior to today's visit 3/9/23 which revealed: Comparison is made with the exam of 2/28/22. Transabdominal ultrasound of the abdomen was performed with color flow imaging. The examination is limited in evaluation. The visualized aorta and inferior vena cava show no abnormality. The pancreas is obscured by overlying bowel gas. The liver measures 12.4 cm with homogeneous echotexture. No intrinsic mass and no intra-or extrahepatic ductal dilatation. There is hepatopedal flow of the main portal vein. No gallstones, pericholecystic fluid, wall thickening or positive sonographic Melendez sign. The common bile duct measures 0.3 cm. The right kidney measures 9.2 x 5.6 x 3.0 cm with a nonobstructing 1.2 cm stone in the upper pole. Multiple additional subcentimeter calcifications are seen. These were not seen on previous exam. No hydronephrosis or renal mass. The left kidney measures 9.3 x 3.9 x 3.5 cm. There is a 2.1 x 2.4 x 2.0 cm cyst in the medial mid pole, not seen on previous study. No hydronephrosis or renal calcification. The spleen measures 8.2 cm and show no abnormality. There is a small right pleural effusion, stable. IMPRESSIONS: Multiple nonobstructing stones of the right kidney with no hydronephrosis. 2.4 cm cyst of the midpole left kidney. Small right pleural effusion, stable. The daughter has answered the phone and reports the patient's urine does not get dark. She is not aware of the amount of water she gives her mother. I requested she measure's this from now on as the pt has stones and needs to increase water consumption. Her daughter reports this week the pt has experienced more spasms than normal and believes this may be due to severe constipation. She provided the pt with an Enema to aide in this situation. She is very concerned but I informed her the Enema may have stimulated the spasms and we should give her a few days to clear up and re-evaluate.   03/22/2023: Ms. CLAUDIO MCDOWELL presents today for an audio visual telehealth visit for which she gave permission for. The patient was located at home at [address] [place] [address] and I was located at my office in [place], [place]. She is accompanied by her daughter whom most of the visit was conducted with. Her daughter has been keeping track of her mother's water consumption. She is averaging about 58 oz of water in a 24 hr period. Additionally, she is drinking juice and ensure nutrition drinks. She does not consume any caffeine. She is on furosemide 40 mg. I said hello to the patient at the end of the visit and she is looking well. She reports feeling no pain at the current moment.   3/29/2023:  Patient Claudio Mcdowell and daughter Tabatha Deluna were home in [place], [place] and I was in my office in [facility].  Patient and daughter gave permission for a [employer] telehealth visit.  They preferred this to [email or web address].  The patient looks good today.  Her complexion was good there is no pallor.  Smile was symmetric her affect was normal she appeared happy she could make conversation it was appropriate.  She said that she currently had no pain.  When I asked questions that after 3/2 how she had been recently the sometimes she hesitated and deferred to her daughter for cancer compatible with her impaired short-term memory.  Long-term memory remains good she recognizes me once know so I am does ask questions about things like her bladder as she was worried about it her blood tests.  I assured her the results were satisfactory.  04/18/2023: Ms. MCDOWELL is a 89 year old female presenting today for an audio and visual telehealth visit for which she gave verbal permission. We utilized [employer] teams telemetry doc platform. The patient was located in her home at [address] [place] [address]. I was located in my office on [place], [place]. She was accompanied by her daughter who helped to serve as a historian. She reports discomfort in her eyes. She describes the discomfort as a feeling of sand. She opened her eyes for me, and they do not look red. Pupils look normal. She reports the right eyes bothers her significantly more than the left. I advise that the patient tries lubricating drops and it if does not feel better she will notify her visiting nurse who is due to come next week for a sales catheter change. She reports episode of bladder spasms. She denies gross hematuria. She describes the urine as barely yellow. I reviewed and discussed her  latest pertinent lab on 04/12/2023 which shows "alkaline phosphate normal at 89. Creatinine was normal at 0.62 mg/dL. WBC normal at 5.01. RBC normal at 3.87".   05/09/2023: Ms. MCDOWELL presents today for a follow up audio only telehealth visit for which they gave permission for. She was accompanied by her daughter who helped to serve as a historian. The patient was located at home [address] [place] [address] and I was located in my office in [place], [place]. The patient obtained lab work 5/2/23 prior to today's visit. The UA revealed microscopic hematuria, 5/HPF RBC, 27/HPF WBC. The patient demonstrated great hydration as the specific gravity is 1.006. The Sales catheter was changed May 2, 2023 with no clear urine in the bag. While changing the diaper the aide reports blood in the diaper. The daughter states last week her mother experienced spasms but as of two days ago she is now fine. However, she states the patient is more fatigued. She denies the pt having a temperature. I assured her because her mother is post menopausal any abrasion to the labia can cause a fissure.   06/02/2023: Ms. MCDOWELL is a 89 year old female presenting today for an audio and visual telehealth visit for which she gave verbal permission. We utilized [employer] teams telemetry [employer] platform. The patient was located in her home at [address] [place] [address]. I was located in my office on [place], [place]. She was accompanied by her daughter Tabatha. She reports that her mother complains of onset dysuria that dissipated on its own and has not recurred since. She reports right sided back pain that is aggravated when she lays on the right side. She provided urine specimen. She notes the urine was very clear. I reviewed and discussed the patient's pertinent lab works. Her latest Urinalysis on 05/30/2023 that shows "60 WBC/HPF. Trace Blood Urine. Specific Gravity Urine 1.007". Urine Culture on the same date shows ">100,000 CFU/ml Escherichia coli and <10,000 CFU/ml Normal Urogenital ángel present". The patient takes Mirtazapine for anxiety at low dose. The daughter reports the patient is experiencing increasing anxiety and is thinking of having the prescriber increasing the dosage.  Her BP runs around 120/60. Her Hemoglobin hematocrit looks good. gaze is conjugated. facial expression looks symmetric. Urine was faint yellow and clear.   06/06/2023: Ms. MCDOWELL presents today for a follow up audio only telehealth visit for which they gave permission for. The patient was located at home [address] [place] [address] and I was located in my office in [place], [place]. She was accompanied by her daughter Tabatha. The daughter states pt is experiencing episodes of a dry cough. An X Ray  of 6/5/23 revealed Mild peribronchial thickening suggesting bronchitis in the right clinical setting with no focal pneumonia or congestive heart failure. COPD with chronic lung changes. The mental status is the same as usual but her BP has elevated some. I assured her a little elevation is not as dangerous. The pt continues to experience some urinary frequency and it is a little more concentrated.   10/12/2023: Ms. MCDOWELL presents today for a follow up audio only telehealth visit for which they gave permission for. The patient was located at home [address] [place] [address] and I was located in my office in [place], [place]. The 10/2/23 UA showed proteinuria 30 mg/dL, moderate blood, large Leukocyte Esterase, 16/HPF of WBC. UA showed improvement as patient has chronic sales drainage. Her daughter states patient is okay considering coming home from the hospital. Most pain is in her back/shoulders and some of her back area is red/raw.  Recently her BP has increased to 180/100 and was prescribed Losartan 50mg twice daily. Urine is a clear color, denies cloudy and dark appearance.  11/01/2023: Ms. MCDOWELL presents today for a follow up audio only telehealth visit for which they gave permission for. The patient was located at home [address] [place] [address] and I was located in my office in [place], [place]. The 10/13/23 CMP revealed creatinine at 0.58 and low ALT at 8 U/L. Patient was admitted into [facility] on 10/27/23 for sepsis/UTI. Her daughter states she may be released tomorrow. Daughter states during the day the patient's urine was normal, but she did have chills and a fever.   11/03/2023: Ms. MCDOWELL is a 89 year old female presenting today for an audio only telehealth visit for which she gave verbal permission. The patient was located in her home at [address] [place] [address]. I was located in my office on [place], [place]. She was accompanied by her daughter. She says her mother was supposed to be released today. Initially she was able to void. Because of constipation she was given a couple of enemas. She had a large evacuation and since then she has not been able to void again. The daughter says her latest PVR was 340 cc. She is waiting for her mother to be rescanned again.   11/07/2023: Ms. CLAUDIO MCDOWELL presents today for a follow up audio only telephone visit for which she gave permission for. The pt was located at home, [address] [place] [address], and I was located in my office in [place], [place]. The visit was conducted with the patient's daughter. The patient came home from the hospital on Sunday. The catheter was put back in without another attempt at a void trial. The patient does have issues with constipation. She is able to swallow liquids. Her daughter has had to crush some of her pills to put them in a liquid, however the pt finds it to taste bitter and has some discomfort swallowing the chunks of crushed pills. The hospital discharged her with a 7 day supply of Cefuroxime. Was started yesterday so she has had two doses.   11/20/2023: Ms. MCDOWELL presents today for a follow up audio only telehealth visit for which they gave permission for. The patient was located at home [address] [place] [address] and I was located in my office in [facility]. Daughter states patient is okay. She notes giving pt suppositories and 7 hours later she had mixed bowel movements. However, she did not have a bowel movement since Saturday. Patient is given pedialyte and water frequently. Today's temperature was 95 as before it was 96-97. Pt did not drink any liquids shortly after taking temperature. Daughter denies giving pt solace but she usually gives Miralax as she has done so today. Toady's urine color is clear as it  has been clear for some time.  Daughter states her stomach was distended. Bp has stabilized since being in the hospital but last night and today it  was higher than normal being being 168/88 before medication.   11/29/2023: Ms. CLAUDIO MCDOWELL presents today for a follow up audio only telephone visit for which she gave permission for. The pt was located at home, [address] [place] [address], and I was located in my office in [place], [place]. Visit was conducted with her daughter, Andree. She was informed yesterday by her house call physicians of the results of her urine culture (emailed from core lab showing >100,000 CFU/ml Pseudomonas aeruginosa susceptible only to amikacin, Ciprofloxacin, imipenem, levofloxacin, meropenum. Resistant to Ceftazidime, Pip/tazo. Intermediate for aztrenam, cefepime. They discussed that patient is having increasing oxygen requirements, although SPO2 ok on supplemental O2 (prior to this patient needed only occasional O2 suppl. every few days). 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 The want to treat with ciprofloxacin, 7 day course. Prescription sent to pharmacy. Her daughter inquired today on if she should be treated. She states last week her mother seemed very not herself, however today she seems better in terms of mental status and oxygenation. She has been giving her senakot and miralax.   12/21/2023: Ms. MCDOWELL presents today for a follow up audio-only telehealth visit for which they gave permission for. The patient was located at home [address] [place] [address] and I was located in my office in [place], [place]. The patient obtained lab work 12/4/23 prior to today's visit. Urinalysis is improved and showed trace proteinuria, small Leukocyte Esterase, 6/HPF of WBC and 7/LPF of Cast. Urine culture shows no significant growth, <10,000 CFU/mL Normal Urogenital Ángel, which is very good. Her daughter expresses concern as recently patient is experiencing a surplus of looser and larger amounts of bowel movements that is escaping to the vaginal area, thus causing infections. Urine is normal in color, denies fever. Patient is occasionally more fatigued than usual. Denies distended appearance of the abdomen. Admits to only 1 spasm around the catheter this month. Next sales change will be in 2 weeks.  Towards the end of visit, patient was shown on Facetime, appearing well oriented x 3, normal pigmentation, lips are moist, and smile is symmetrical. I am very impressed by how well the patient looks and speaks. It was more than just pleasantries, she inquired about personal questions. Overall, I am very pleased.   12/27/2023: Ms. CLAUDIO MCDOWELL presents today for a follow up audio only telephone visit for which she gave permission for. The pt was located at home, [address] [place] [address], and I was located in my office in [place], [place]. She is accompanied by her daughter. Patient provided a surveillance urine specimen on 12/22/2023. She has chronic sales catheter drainage. UA revealed small LEC, positive for nitrites, 6 WBC/HPF, no RBC seen, moderate bacteria/HPF, granular casts present, and yeast like cells present. Urine culture grew >100,000 CFU/ml Enterococcus faecalis and 50,000 - 99,000 CFU/mL Streptococcus mitis/oralis group. Urine cytology was negative for high grade urothelial carcinoma. Few mature squamous cells, rare benign urothelial cells and abundant fungal organisms morphologically consistent with Candida species present. Patient's daughter reports that her urine looks clear and a pale yellow color. She feels her mom still has a lot of anxiety and some confusion. She does report that her catheter bag sits in feces due to the patient's position. They tried to tilt her pelvis to avoid this but states it's easier said than done. She states there is no feces in the catheter or in the bag. BM are not loose, she describes them as pasty. Has about 2-3 BM a day.

## 2024-01-02 ENCOUNTER — APPOINTMENT (OUTPATIENT)
Dept: HOME HEALTH SERVICES | Facility: HOME HEALTH | Age: 89
End: 2024-01-02
Payer: MEDICARE

## 2024-01-02 VITALS
SYSTOLIC BLOOD PRESSURE: 151 MMHG | DIASTOLIC BLOOD PRESSURE: 74 MMHG | TEMPERATURE: 97.5 F | HEART RATE: 53 BPM | RESPIRATION RATE: 18 BRPM | OXYGEN SATURATION: 95 %

## 2024-01-02 PROCEDURE — 99350 HOME/RES VST EST HIGH MDM 60: CPT

## 2024-01-02 RX ORDER — CIPROFLOXACIN HYDROCHLORIDE 250 MG/1
250 TABLET, FILM COATED ORAL
Qty: 14 | Refills: 0 | Status: COMPLETED | COMMUNITY
Start: 2023-11-28 | End: 2024-01-02

## 2024-01-02 RX ORDER — CEFUROXIME AXETIL 500 MG/1
500 TABLET ORAL
Refills: 0 | Status: COMPLETED | COMMUNITY
Start: 2023-11-08 | End: 2024-01-02

## 2024-01-02 RX ORDER — ADHESIVE TAPE 3"X 2.3 YD
50 MCG TAPE, NON-MEDICATED TOPICAL DAILY
Refills: 0 | Status: COMPLETED | COMMUNITY
End: 2024-01-02

## 2024-01-02 NOTE — HEALTH RISK ASSESSMENT
[Some assistance needed] : feeding [Full assistance needed] : managing medications [] : managing finances [No falls in past year] : Patient reported no falls in the past year

## 2024-01-02 NOTE — COUNSELING
[Continue diet as tolerated] : continue diet as tolerated based on goals of care [Non - Smoker] : non-smoker [Smoke/CO Detectors] : smoke/CO detectors [Remove clutter and unsafe carpeting to avoid falls] : remove clutter and unsafe carpeting to avoid falls [] : foot exam [Patient/Caregiver not ready to engage in discussion] : patient/caregiver not ready to engage in discussion

## 2024-01-02 NOTE — PHYSICAL EXAM
[No JVD] : no jugular venous distention [Breast Exam Declined] : patient declined to have breast exam done [No CVA Tenderness] : no ~M costovertebral angle tenderness [de-identified] : confused [de-identified] : anxious  [No Acute Distress] : no acute distress [Normal Sclera/Conjunctiva] : normal sclera/conjunctiva [Normal Outer Ear/Nose] : the ears and nose were normal in appearance [Supple] : the neck was supple [Clear to Auscultation] : lungs were clear to auscultation bilaterally [No Respiratory Distress] : no respiratory distress [No Accessory Muscle Use] : no accessory muscle use [No Edema] : there was no peripheral edema [Normal Bowel Sounds] : normal bowel sounds [Non Tender] : non-tender [Soft] : abdomen soft [No Gross Sensory Deficits] : no gross sensory deficits [Normal Affect] : the affect was normal [Normal Mood] : the mood was normal [de-identified] : comfortable pleasant elderly lady laying in bed [de-identified] : hearing aides [de-identified] : irregularly irregular, 3/6 murmur [de-identified] : hernia [de-identified] : sales in place draining clear yellow urine [de-identified] : scalp lesion (stable since last visit as per family), Stage 2 sacral pressure injury almost healed 4x3

## 2024-01-02 NOTE — HISTORY OF PRESENT ILLNESS
[FreeTextEntry1] : chf, and functional quadriplegia and bedbound status.  [FreeTextEntry2] : 89 y/o Female with PMHx of Afib on Eliquis, CLL, HTN, renal calculus, venous insufficiency with lymphedema, and dementia seen for follow up.  Interval events: Saw urology who discussed TOV in future. Patient has been requiring O2 on Friday. restarted amlodipine 2.5 at dinner. [facility] noted slight wheezing one time yesterday when O2 was off. Has since resolved.   Daughter, one of the patient's caregivers, not feeling well currently and has URI symptoms.   Patient seen laying in bed in NAD. Patient very talkative.   Meds updated  Skin: sacral wound improving Pain: intermittent, feet and legs mostly, sometimes in LLQ abdomen. Has tramadol for pain but rarely uses.  Gait/falls: Non ambulatory, no falls Appetite: decreased since hospitalization BM: pasty stools Urine: sales in place with clear yellow urine Sleep: well mood: some good days and some not.   Medical problems:  chronic indwelling sales with frequent UTI's - following with Urology, hyoscyamine sulfate .125mg SL BID prn Afib- on eliquis 2.5mg, metoprolol tartrate 25 1/2 tab CLL - stable HTN - on amlodipine 2.5mg (not being given consistently), metoprolol tartrate 25 1/2 tab, Isosoride mononitrate 30mg daily, losartan 25mg daily venous insufficiency with lymphedema - atorvastatin, furosemide 40mg anxiety- mirtazapine 15mg, lorazepam 0.5mg prn GERD - PPI, famotidine prn

## 2024-01-02 NOTE — REVIEW OF SYSTEMS
[Incontinence] : incontinence [Confusion] : confusion [Negative] : Respiratory [FreeTextEntry7] : as above [FreeTextEntry8] : sales [FreeTextEntry9] : as above, somtime b/l hip or leg pain shazia with movement [de-identified] : healing sacral wound

## 2024-01-02 NOTE — REASON FOR VISIT
[Follow-Up] : a follow-up visit [Pre-Visit Preparation] : pre-visit preparation was done [Intercurrent Specialty/Sub-specialty Visits] : the patient has intercurrent specialty/sub-specialty visits [FreeTextEntry1] : follow up of chronic conditions, afib, dementia [FreeTextEntry2] : chart review [FreeTextEntry3] : urology

## 2024-01-02 NOTE — CURRENT MEDS
[Medication and Allergies Reconciled] : medication and allergies reconciled [High Risk Medications Reviewed and Reconciled (Beers Criteria)] : high risk medications reviewed and reconciled [Reviewed patient reported medication adherence from Comprehensive Assessment] : Reviewed patient reported medication adherence from comprehensive assessment [Adherent to medications] : Patient is adherent to medications as prescribed AICD (automatic cardioverter/defibrillator) present  Medtronic generator with Medtronic atrial (4076)and ventricular (6947) leads) 07  H/O:     H/O: hysterectomy

## 2024-01-05 ENCOUNTER — APPOINTMENT (OUTPATIENT)
Dept: UROLOGY | Facility: CLINIC | Age: 89
End: 2024-01-05
Payer: MEDICARE

## 2024-01-05 PROCEDURE — 99442: CPT | Mod: 93

## 2024-01-07 LAB
APPEARANCE: CLEAR
BACTERIA: NEGATIVE /HPF
BILIRUBIN URINE: NEGATIVE
BLOOD URINE: ABNORMAL
CAST: 2 /LPF
COLOR: YELLOW
EPITHELIAL CELLS: 0 /HPF
GLUCOSE QUALITATIVE U: NEGATIVE MG/DL
KETONES URINE: NEGATIVE MG/DL
LEUKOCYTE ESTERASE URINE: ABNORMAL
MICROSCOPIC-UA: NORMAL
NITRITE URINE: NEGATIVE
PH URINE: 7.5
PROTEIN URINE: NEGATIVE MG/DL
RED BLOOD CELLS URINE: 0 /HPF
REVIEW: NORMAL
SPECIFIC GRAVITY URINE: 1.01
UROBILINOGEN URINE: 0.2 MG/DL
WHITE BLOOD CELLS URINE: 1 /HPF

## 2024-01-07 NOTE — HISTORY OF PRESENT ILLNESS
[FreeTextEntry1] : Claudio Mcdowell in her daughter Tabatha Deluna gave permission for an audio video telehealth visit.  They were in their home in [facility].  I was in my office in [facility].  Claudio Mcdowell is currently 89 years of age.  Her daughter Tabatha Deluna is devoted to her care.  She is very concerned about her mother and has been so for many years.  She becomes very anxious at times and that is concerning her mother.  Claudio Mcdowell lives with her daughter.  Claudio Mcdowell has been bedbound for several years.  The patient had developed a sacral decubitus ulcer while in a nursing facility.  A Sales catheter was placed because of fecal incontinence and concern that urine mixed with the feces would contaminate the sacral decubitus ulcer.  Once the patient left the nursing facility and will be with her daughter.  The sacral decubitus ulcer has finally healed.  I have discussed removing the Sales catheter with the daughter.  However as the patient has fecal incontinence there is concern about the urine mixing with the feces and being more likely to cause skin problems.  For now we will leave the Sales catheter in.  The patient has had clinically symptomatic urinary tract infections.  He clinically significant infections usually start with increased urination around the Sales catheter due to bladder spasms.  Bladder spasms have been occurring for staff.  The patient is positioned properly in in bed and the personal care attendant leaves for the day.  We have managed to prevent this with the administration of sublingual hyoscyamine.  We do periodic surveillance urine analysis and urine culture tests at the time the Sales catheter is changed by visiting nurse.  He also performed a times of increased spasms or purulence of the urine or change in mental status,  On October 11, 2022 visiting nurse using an order I have previously placed at the request of the daughter sent urine for urine cytology which proved to be negative for malignant cells, urine culture which grew Greater than 100,000 and E. coli that was sensitive to all antibiotics tested.  Urinalysis looked quite good for urine taken from a Sales catheter that was used for chronic Sales catheter drainage.  Ileukocyte esterase was large but nitrites were negative.  There were 2 epithelial cells per high-power field.  There were only 10 white blood cells per high-power field and only 2 red blood cells per high-power field on microscopic exam there were no bacteria seen and there were no hyaline casts.  Specific gravity was 1.010 which shows good hydration and this bedbound woman cared for diligently by her daughter.  Blood by dipstick was trace.  There was no protein glucose or ketones in the urine.  There is no bilirubin and no urobilinogen.  An important portion of the visit was my review with the daughter about bladder spasms and urination around the catheter.  Urine appearance and urine analysis have been clear.  The urine was clear again today.  The patient has significant short-term memory deficit.  She does have some useful short-term memory.  Her long-term memory remains very much intact.  She is very pleasant and to make satisfactory conversation.  She does admit to sometimes not remembering something.  Her complexion was good and there was no pallor.  Facial movements were symmetric.  Cranial nerves were intact.  I was not able to assess the olfactory nerve as this was a telehealth visit.    11/04/2022: Ms. MCDOWELL is a 89 year old female presenting today for a telehealth visit. She was accompanied by her daughter who helped with the visit. They gave permission for an audio only telehealth conference. The patient was located in her home in [place], [place]. I was located in my office on [place], [place]. Today her daughter reports the pt experienced rare urinary leaking around the catheter due to bladder spasms. She also reports her systolic pressure was around 140 last week. I offered Gemtesa to manage the bladder spasms and the daughter was cautious about more medications. I informed her if the urinary leaking are only once every 2 weeks or so, she does not have to medicate. The daughter was agreeable to monitor the occurrence of urinary leaking over the next 4 weeks and assess the discomfort it causes the pt. She denies any other urinary issues.  03/01/2023: Ms. MCDOWELL is a 89 year old female presenting today for a telehealth visit. She was accompanied by her daughter who helped with the visit. They gave permission for an audio only telehealth conference. The patient was located in her home in [place], [place]. I was located in my office on [place], [place]. The pt has chronic sales catheter drainage. She provided a urine specimen from the catheter on 2/27/2023. UA was slightly turbid with large LEC and 59 WBC/HPF. Culture grew >100,000 CFU/ml E.Coli. The sensitivity report is not yet back. The pt's daughter was concerned as when the patient was constipated a few weeks ago she was having very watery BM which grew messy and were around the catheter area. She was given a Fleet's enema last month which helped. Currently she is having BM, paste-like in appearance. Her daughter does not think that her stomach is distended. She has recently had the catheter changed and is not voiding around it. Urine has been very clear. She does not seem to have pain in the urinary passageway or bladder. Denies fevers. Denies gross hematuria. Pt had covid around Thanksgiving, only had the first two vaccines from two years ago.   03/13/2023: Ms. MCDOWELL is a 89 year old female presenting today for a telehealth visit. She was accompanied by her daughter who helped with the visit. They gave permission for an audio only telehealth conference. The patient was located in her home in [place], [place]. I was located in my office on [place], [place]. The patient obtained an US prior to today's visit 3/9/23 which revealed: Comparison is made with the exam of 2/28/22. Transabdominal ultrasound of the abdomen was performed with color flow imaging. The examination is limited in evaluation. The visualized aorta and inferior vena cava show no abnormality. The pancreas is obscured by overlying bowel gas. The liver measures 12.4 cm with homogeneous echotexture. No intrinsic mass and no intra-or extrahepatic ductal dilatation. There is hepatopedal flow of the main portal vein. No gallstones, pericholecystic fluid, wall thickening or positive sonographic Melendez sign. The common bile duct measures 0.3 cm. The right kidney measures 9.2 x 5.6 x 3.0 cm with a nonobstructing 1.2 cm stone in the upper pole. Multiple additional subcentimeter calcifications are seen. These were not seen on previous exam. No hydronephrosis or renal mass. The left kidney measures 9.3 x 3.9 x 3.5 cm. There is a 2.1 x 2.4 x 2.0 cm cyst in the medial mid pole, not seen on previous study. No hydronephrosis or renal calcification. The spleen measures 8.2 cm and show no abnormality. There is a small right pleural effusion, stable. IMPRESSIONS: Multiple nonobstructing stones of the right kidney with no hydronephrosis. 2.4 cm cyst of the midpole left kidney. Small right pleural effusion, stable. The daughter has answered the phone and reports the patient's urine does not get dark. She is not aware of the amount of water she gives her mother. I requested she measure's this from now on as the pt has stones and needs to increase water consumption. Her daughter reports this week the pt has experienced more spasms than normal and believes this may be due to severe constipation. She provided the pt with an Enema to aide in this situation. She is very concerned but I informed her the Enema may have stimulated the spasms and we should give her a few days to clear up and re-evaluate.   03/22/2023: Ms. CLAUDIO MCDOWELL presents today for an audio visual telehealth visit for which she gave permission for. The patient was located at home at [address] [place] [address] and I was located at my office in [place], [place]. She is accompanied by her daughter whom most of the visit was conducted with. Her daughter has been keeping track of her mother's water consumption. She is averaging about 58 oz of water in a 24 hr period. Additionally, she is drinking juice and ensure nutrition drinks. She does not consume any caffeine. She is on furosemide 40 mg. I said hello to the patient at the end of the visit and she is looking well. She reports feeling no pain at the current moment.   3/29/2023:  Patient Claudio Mcdowell and daughter Tabatha Deluna were home in [place], [place] and I was in my office in [facility].  Patient and daughter gave permission for a [employer] telehealth visit.  They preferred this to myDocket.  The patient looks good today.  Her complexion was good there is no pallor.  Smile was symmetric her affect was normal she appeared happy she could make conversation it was appropriate.  She said that she currently had no pain.  When I asked questions that after 3/2 how she had been recently the sometimes she hesitated and deferred to her daughter for cancer compatible with her impaired short-term memory.  Long-term memory remains good she recognizes me once know so I am does ask questions about things like her bladder as she was worried about it her blood tests.  I assured her the results were satisfactory.  04/18/2023: Ms. MCDOWELL is a 89 year old female presenting today for an audio and visual telehealth visit for which she gave verbal permission. We utilized [employer] teams telemetry doc platform. The patient was located in her home at [address] [place] [address]. I was located in my office on [place], [place]. She was accompanied by her daughter who helped to serve as a historian. She reports discomfort in her eyes. She describes the discomfort as a feeling of sand. She opened her eyes for me, and they do not look red. Pupils look normal. She reports the right eyes bothers her significantly more than the left. I advise that the patient tries lubricating drops and it if does not feel better she will notify her visiting nurse who is due to come next week for a sales catheter change. She reports episode of bladder spasms. She denies gross hematuria. She describes the urine as barely yellow. I reviewed and discussed her  latest pertinent lab on 04/12/2023 which shows "alkaline phosphate normal at 89. Creatinine was normal at 0.62 mg/dL. WBC normal at 5.01. RBC normal at 3.87".   05/09/2023: Ms. MCDOWELL presents today for a follow up audio only telehealth visit for which they gave permission for. She was accompanied by her daughter who helped to serve as a historian. The patient was located at home [address] [place] [address] and I was located in my office in [place], [place]. The patient obtained lab work 5/2/23 prior to today's visit. The UA revealed microscopic hematuria, 5/HPF RBC, 27/HPF WBC. The patient demonstrated great hydration as the specific gravity is 1.006. The Sales catheter was changed May 2, 2023 with no clear urine in the bag. While changing the diaper the aide reports blood in the diaper. The daughter states last week her mother experienced spasms but as of two days ago she is now fine. However, she states the patient is more fatigued. She denies the pt having a temperature. I assured her because her mother is post menopausal any abrasion to the labia can cause a fissure.   06/02/2023: Ms. MCDOWELL is a 89 year old female presenting today for an audio and visual telehealth visit for which she gave verbal permission. We utilized [employer] teams telemetry Sandbox platform. The patient was located in her home at [address] [place] [address]. I was located in my office on [place], [place]. She was accompanied by her daughter Tabatha. She reports that her mother complains of onset dysuria that dissipated on its own and has not recurred since. She reports right sided back pain that is aggravated when she lays on the right side. She provided urine specimen. She notes the urine was very clear. I reviewed and discussed the patient's pertinent lab works. Her latest Urinalysis on 05/30/2023 that shows "60 WBC/HPF. Trace Blood Urine. Specific Gravity Urine 1.007". Urine Culture on the same date shows ">100,000 CFU/ml Escherichia coli and <10,000 CFU/ml Normal Urogenital ángel present". The patient takes Mirtazapine for anxiety at low dose. The daughter reports the patient is experiencing increasing anxiety and is thinking of having the prescriber increasing the dosage.  Her BP runs around 120/60. Her Hemoglobin hematocrit looks good. gaze is conjugated. facial expression looks symmetric. Urine was faint yellow and clear.   06/06/2023: Ms. MCDOWELL presents today for a follow up audio only telehealth visit for which they gave permission for. The patient was located at home [address] [place] [address] and I was located in my office in [place], [place]. She was accompanied by her daughter Tabatha. The daughter states pt is experiencing episodes of a dry cough. An X Ray  of 6/5/23 revealed Mild peribronchial thickening suggesting bronchitis in the right clinical setting with no focal pneumonia or congestive heart failure. COPD with chronic lung changes. The mental status is the same as usual but her BP has elevated some. I assured her a little elevation is not as dangerous. The pt continues to experience some urinary frequency and it is a little more concentrated.   10/12/2023: Ms. MCDOWELL presents today for a follow up audio only telehealth visit for which they gave permission for. The patient was located at home [address] [place] [address] and I was located in my office in [place], [place]. The 10/2/23 UA showed proteinuria 30 mg/dL, moderate blood, large Leukocyte Esterase, 16/HPF of WBC. UA showed improvement as patient has chronic sales drainage. Her daughter states patient is okay considering coming home from the hospital. Most pain is in her back/shoulders and some of her back area is red/raw.  Recently her BP has increased to 180/100 and was prescribed Losartan 50mg twice daily. Urine is a clear color, denies cloudy and dark appearance.  11/01/2023: Ms. MCDOWELL presents today for a follow up audio only telehealth visit for which they gave permission for. The patient was located at home [address] [place] [address] and I was located in my office in [place], [place]. The 10/13/23 CMP revealed creatinine at 0.58 and low ALT at 8 U/L. Patient was admitted into [facility] on 10/27/23 for sepsis/UTI. Her daughter states she may be released tomorrow. Daughter states during the day the patient's urine was normal, but she did have chills and a fever.   11/03/2023: Ms. MCDOWELL is a 89 year old female presenting today for an audio only telehealth visit for which she gave verbal permission. The patient was located in her home at [address] [place] [address]. I was located in my office on [place], [place]. She was accompanied by her daughter. She says her mother was supposed to be released today. Initially she was able to void. Because of constipation she was given a couple of enemas. She had a large evacuation and since then she has not been able to void again. The daughter says her latest PVR was 340 cc. She is waiting for her mother to be rescanned again.   11/07/2023: Ms. CLAUDIO MCDOWELL presents today for a follow up audio only telephone visit for which she gave permission for. The pt was located at home, [address] [place] [address], and I was located in my office in [place], [place]. The visit was conducted with the patient's daughter. The patient came home from the hospital on Sunday. The catheter was put back in without another attempt at a void trial. The patient does have issues with constipation. She is able to swallow liquids. Her daughter has had to crush some of her pills to put them in a liquid, however the pt finds it to taste bitter and has some discomfort swallowing the chunks of crushed pills. The hospital discharged her with a 7 day supply of Cefuroxime. Was started yesterday so she has had two doses.   11/20/2023: Ms. MCDOWELL presents today for a follow up audio only telehealth visit for which they gave permission for. The patient was located at home [address] [place] [address] and I was located in my office in [facility]. Daughter states patient is okay. She notes giving pt suppositories and 7 hours later she had mixed bowel movements. However, she did not have a bowel movement since Saturday. Patient is given pedialyte and water frequently. Today's temperature was 95 as before it was 96-97. Pt did not drink any liquids shortly after taking temperature. Daughter denies giving pt solace but she usually gives Miralax as she has done so today. Toady's urine color is clear as it  has been clear for some time.  Daughter states her stomach was distended. Bp has stabilized since being in the hospital but last night and today it  was higher than normal being being 168/88 before medication.   11/29/2023: Ms. CLAUDIO MCDOWELL presents today for a follow up audio only telephone visit for which she gave permission for. The pt was located at home, [address] [place] [address], and I was located in my office in [place], [place]. Visit was conducted with her daughter, Andree. She was informed yesterday by her house call physicians of the results of her urine culture (emailed from core lab showing >100,000 CFU/ml Pseudomonas aeruginosa susceptible only to amikacin, Ciprofloxacin, imipenem, levofloxacin, meropenum. Resistant to Ceftazidime, Pip/tazo. Intermediate for aztrenam, cefepime. They discussed that patient is having increasing oxygen requirements, although SPO2 ok on supplemental O2 (prior to this patient needed only occasional O2 suppl. every few days). 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 The want to treat with ciprofloxacin, 7 day course. Prescription sent to pharmacy. Her daughter inquired today on if she should be treated. She states last week her mother seemed very not herself, however today she seems better in terms of mental status and oxygenation. She has been giving her senakot and miralax.   12/21/2023: Ms. MCDOWELL presents today for a follow up audio-only telehealth visit for which they gave permission for. The patient was located at home [address] [place] [address] and I was located in my office in [place], [place]. The patient obtained lab work 12/4/23 prior to today's visit. Urinalysis is improved and showed trace proteinuria, small Leukocyte Esterase, 6/HPF of WBC and 7/LPF of Cast. Urine culture shows no significant growth, <10,000 CFU/mL Normal Urogenital Ángel, which is very good. Her daughter expresses concern as recently patient is experiencing a surplus of looser and larger amounts of bowel movements that is escaping to the vaginal area, thus causing infections. Urine is normal in color, denies fever. Patient is occasionally more fatigued than usual. Denies distended appearance of the abdomen. Admits to only 1 spasm around the catheter this month. Next sales change will be in 2 weeks.  Towards the end of visit, patient was shown on Facetime, appearing well oriented x 3, normal pigmentation, lips are moist, and smile is symmetrical. I am very impressed by how well the patient looks and speaks. It was more than just pleasantries, she inquired about personal questions. Overall, I am very pleased.   12/27/2023: Ms. CLAUDIO MCDOWELL presents today for a follow up audio only telephone visit for which she gave permission for. The pt was located at home, [address] [place] [address], and I was located in my office in [place], [place]. She is accompanied by her daughter. Patient provided a surveillance urine specimen on 12/22/2023. She has chronic sales catheter drainage. UA revealed small LEC, positive for nitrites, 6 WBC/HPF, no RBC seen, moderate bacteria/HPF, granular casts present, and yeast like cells present. Urine culture grew >100,000 CFU/ml Enterococcus faecalis and 50,000 - 99,000 CFU/mL Streptococcus mitis/oralis group. Urine cytology was negative for high grade urothelial carcinoma. Few mature squamous cells, rare benign urothelial cells and abundant fungal organisms morphologically consistent with Candida species present. Patient's daughter reports that her urine looks clear and a pale yellow color. She feels her mom still has a lot of anxiety and some confusion. She does report that her catheter bag sits in feces due to the patient's position. They tried to tilt her pelvis to avoid this but states it's easier said than done. She states there is no feces in the catheter or in the bag. BM are not loose, she describes them as pasty. Has about 2-3 BM a day.   01/05/2024: Ms. MCDOWELL is a 89 year old female presenting today for an audio only telehealth visit for which she gave verbal permission. The patient was located in her home at [address] [place] [address]. I was located in my office on [place], [place]. She was accompanied by her daughter who helped with the visit. She reports that a nurse came for a catheter change, and only succeeded on the third attempt. She says it's the same nurse who has been coming for 5 months now, and usually she encounters no difficulties. Now that the catheter has been changed, the patient is able to void.

## 2024-01-07 NOTE — ADDENDUM
[FreeTextEntry1] : This note was partly authored by Clovis Rebolledo working as a scribe for BENIGNO Kiran. I, BENIGNO Kiran, have reviewed the content of this note and confirm it is true and accurate. I personally performed the history and physical examination and made all the decisions. 01/05/2024.

## 2024-01-07 NOTE — ASSESSMENT
[FreeTextEntry1] : 10/12/2023: Ms. MCDOWELL presents today for a follow up audio only telehealth visit for which they gave permission for. The patient was located at home [address] [place] [address] and I was located in my office in [place], [place]. The 10/2/23 UA showed proteinuria 30 mg/dL, moderate blood, large Leukocyte Esterase, 16/HPF of WBC. UA showed improvement as patient has chronic sales drainage. Her daughter states patient is okay considering coming home from the hospital. Most pain is in her back/shoulders and some of her back area is red/raw.  Recently her BP has increased to 180/100 and was prescribed Losartan 50mg twice daily. Urine is a clear color, denies cloudy and dark appearance.  Reviewed and discussed laboratory work of 10/2/23 which She obtained prior to today's visit as requested. Pt will go to her nearest [facility] lab for blood work and a urine sample which will be sent for urinalysis, urine culture, and urine cytology. Pt will schedule a telehealth visit after lab work to discuss results.   11/01/2023: Ms. MCDOWELL presents today for a follow up audio only telehealth visit for which they gave permission for. The patient was located at home [address] [place] [address] and I was located in my office in [place], [place]. The 10/13/23 CMP revealed creatinine at 0.58 and low ALT at 8 U/L. Patient was admitted into [facility] on 10/27/23 for sepsis/UTI. Her daughter states she may be released tomorrow. Daughter states during the day the patient's urine was normal, but she did have chills and a fever.   As the patient recently had a course of antibiotics, we suggested patient have catheter removed in hospital, as we will to observe bladder emptying and skin while the catheter is removed. If abnormalities arise, we will place the catheter back in.  If the daughter is to proceed with the plan, we will not discharge patient until a PVR is taken and until pt is able to void on her own the next morning. Hospitalist should weigh the diaper before and after and I will calculate it. If stool is present, then we will not be able to do so.  The daughter will have the Hospitalist contact me about patient.  Hospitalist has called at 4:06 pm. She states the urination looks contaminated and culture showed E-coli.  They will bladder scan her every hour.  Hospitalist will call tomorrow with update on patient and inform me on her fluid level.  Advised daughter to obtain a thermometer to observe temperature.    11/03/2023: Ms. MCDOWELL is a 89 year old female presenting today for an audio only telehealth visit for which she gave verbal permission. The patient was located in her home at [address] [place] [address]. I was located in my office on [place], [place]. She was accompanied by her daughter. She says her mother was supposed to be released today. Initially she was able to void. Because of constipation she was given a couple of enemas. She had a large evacuation and since then she has not been able to void again. The daughter says her latest PVR was 340 cc. She is waiting for her mother to be rescanned again.    Plan: If she is unable to void, she will be discharged with a sales catheter.   11/07/2023: Ms. CLAUDIO MCDOWELL presents today for a follow up audio only telephone visit for which she gave permission for. The pt was located at home, [address] [place] [address], and I was located in my office in [place], [place]. The visit was conducted with the patient's daughter. The patient came home from the hospital on Sunday. The catheter was put back in without another attempt at a void trial. The patient does have issues with constipation. She is able to swallow liquids. Her daughter has had to crush some of her pills to put them in a liquid, however the pt finds it to taste bitter and has some discomfort swallowing the chunks of crushed pills. The hospital discharged her with a 7 day supply of Cefuroxime. Was started yesterday so she has had two doses.    We discussed the possibility of a doing a trial of void. At this time I am recommending her mother settle back in to being home and we can readdress this possibility and how the patient and her daughter would like to proceed at the time of her next visit. Next catheter change will be on 12/5 should they choose to proceed with the catheter.   I looked it up on [employer] and Cefuroxime is available in the [place] as a liquid suspension. Given that this would be easier for the patient, I went to prescribe it for her which she could take rather than the pills. [employer] did not allow me to electronically prescribe it in a liquid suspension so I called the patient's pharmacy to give a verbal prescription for 200 ml of it for 20 ml BID for 5 days. I left a VM for the pharmacy as there was no answer. I requested they call me back if they could accept this prescription or if they could not. The pharmacy called me back shortly after and they told me that it is not currently offered in a liquid suspension, however he called the [facility] next door and they are willing to compound it for 40$. I saw this as acceptable and thanked him for going the extra mile and calling next door. He will contact the patient's daughter to inform her.   Patient and her daughter will have a telehealth visit in 2 weeks for reassessment, sooner if clinically indicated.   11/20/2023: Ms. MCDOWELL presents today for a follow up audio only telehealth visit for which they gave permission for. The patient was located at home [address] [place] [address] and I was located in my office in [facility]. Daughter states patient is okay. She notes giving pt suppositories and 7 hours later she had mixed bowel movements. However, she did not have a bowel movement since Saturday. Patient is given pedialyte and water frequently. Today's temperature was 95 as before it was 96-97. Pt did not drink any liquids shortly after taking temperature. Daughter denies giving pt solace but she usually gives Miralax as she has done so today. Senady's urine color is clear as it  has been clear for some time.  Daughter states her stomach was distended. Bp has stabilized since being in the hospital but last night and today it  was higher than normal being being 168/88 before medication.   Advised daughter to give patient Miralax tonight if there is no bowel movements.  Advised her to take Blood pressure tonight.  Pt will schedule a telehealth visit  11/29/2023: Ms. CLAUDIO MCDOWELL presents today for a follow up audio only telephone visit for which she gave permission for. The pt was located at home, [address] [place] [address], and I was located in my office in [place], [place]. Visit was conducted with her daughter, Andree. She was informed yesterday by her house call physicians of the results of her urine culture (emailed from core lab showing >100,000 CFU/ml Pseudomonas aeruginosa susceptible only to amikacin, Ciprofloxacin, imipenem, levofloxacin, meropenum. Resistant to Ceftazidime, Pip/tazo. Intermediate for aztrenam, cefepime. They discussed that patient is having increasing oxygen requirements, although SPO2 ok on supplemental O2 (prior to this patient needed only occasional O2 suppl. every few days). 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 The want to treat with ciprofloxacin, 7 day course. Prescription sent to pharmacy. Her daughter inquired today on if she should be treated. She states last week her mother seemed very not herself, however today she seems better in terms of mental status and oxygenation. She has been giving her senakot and miralax.   I explained that given her O2 issues and confusion over the last few days, I advised to treat with Cipro despite her daughter feeling she is better today. If there are any problems with crushing the pills for her or if she is not reacting to it well, she was advised to call right away. I posed the option of giving it as an oral solution if need be.    12/21/2023: Ms. MCDOWELL presents today for a follow up audio-visual telehealth visit for which they gave permission for. The patient was located at home [address] [place] [address] and I was located in my office in [place], [place]. The patient obtained lab work 12/4/23 prior to today's visit. Urinalysis is improved and showed trace proteinuria, small Leukocyte Esterase, 6/HPF of WBC and 7/LPF of Cast. Urine culture shows no significant growth, <10,000 CFU/mL Normal Urogenital Khadijah, which is very good. Her daughter expresses concern as recently patient is experiencing a surplus of looser and larger amounts of bowel movements that is escaping to the vaginal area, thus causing infections. Urine is normal in color, denies fever. Patient is occasionally more fatigued than usual. Denies distended appearance of the abdomen. Admits to only 1 spasm around the catheter this month. Next sales change will be in 2 weeks.  Towards the end of visit, patient was shown on Facetime, appearing well oriented x 3, normal pigmentation, lips are moist, and smile is symmetrical. I am very impressed by how well the patient looks and speaks. It was more than just pleasantries, she inquired about personal questions. Overall, I am very pleased.   Reviewed and discussed laboratory work of 12/4/23 which She obtained prior to today's visit as requested. Advised daughter to have care team to place support under the pelvis, so pelvis is tilted up at a higher level of the anus.   As long as stool is pasty and firm, we advised Andree to give patient MiraLAX. If stool is loose and unfirm, she should decrease the amount of MiraLAX.  Pt will provide a urine sample which will be sent for urinalysis, urine culture, and urine cytology. Pt will schedule a telehealth visit in 2 weeks for reassessment.   12/27/2023: Ms. CLAUDIO MCDOWELL presents today for a follow up audio only telephone visit for which she gave permission for. The pt was located at home, [address] [place] [address], and I was located in my office in [place], [place]. She is accompanied by her daughter. Patient provided a surveillance urine specimen on 12/22/2023. She has chronic sales catheter drainage. UA revealed small LEC, positive for nitrites, 6 WBC/HPF, no RBC seen, moderate bacteria/HPF, granular casts present, and yeast like cells present. Urine culture grew >100,000 CFU/ml Enterococcus faecalis and 50,000 - 99,000 CFU/mL Streptococcus mitis/oralis group. Urine cytology was negative for high grade urothelial carcinoma. Few mature squamous cells, rare benign urothelial cells and abundant fungal organisms morphologically consistent with Candida species present. Patient's daughter reports that her urine looks clear and a pale yellow color. She feels her mom still has a lot of anxiety and some confusion. She does report that her catheter bag sits in feces due to the patient's position. They tried to tilt her pelvis to avoid this but states it's easier said than done. She states there is no feces in the catheter or in the bag. BM are not loose, she describes them as pasty. Has about 2-3 BM a day.    Reviewed and discussed lab work of 12/22/2023 in detail with the pt.  Future urine orders were put in including fungal urine culture. Pt's daughter was advised to continue to try and alter the patient's position to avoid the catheter sitting in feces.  Patient should have a telehealth visit in 3 months, sooner if clinically indicated.    01/05/2024: Ms. MCDOWELL is a 89 year old female presenting today for an audio only telehealth visit for which she gave verbal permission. The patient was located in her home at [address] [place] [address]. I was located in my office on [place], [place]. She was accompanied by her daughter who helped with the visit. She reports that a nurse came for a catheter change, and only succeeded on the third attempt. She says it's the same nurse who has been coming for 5 months now, and usually she encounters no difficulties. Now that the catheter has been changed, the patient is able to void.   Plan: Pt's daughter will keep us updated. If the patient experiences fever or chill, she will call ER and inform the office.   Duration of call: 15 mins.

## 2024-01-09 LAB
BACTERIA UR CULT: ABNORMAL
URINE CYTOLOGY: NORMAL

## 2024-01-17 ENCOUNTER — APPOINTMENT (OUTPATIENT)
Dept: UROLOGY | Facility: CLINIC | Age: 89
End: 2024-01-17
Payer: MEDICARE

## 2024-01-17 DIAGNOSIS — Z99.81 DEPENDENCE ON SUPPLEMENTAL OXYGEN: ICD-10-CM

## 2024-01-17 PROCEDURE — 99443: CPT

## 2024-01-17 NOTE — ASSESSMENT
[FreeTextEntry1] : 10/12/2023: Ms. MCDOWELL presents today for a follow up audio only telehealth visit for which they gave permission for. The patient was located at home [address] [place] [address] and I was located in my office in [place], [place]. The 10/2/23 UA showed proteinuria 30 mg/dL, moderate blood, large Leukocyte Esterase, 16/HPF of WBC. UA showed improvement as patient has chronic sales drainage. Her daughter states patient is okay considering coming home from the hospital. Most pain is in her back/shoulders and some of her back area is red/raw.  Recently her BP has increased to 180/100 and was prescribed Losartan 50mg twice daily. Urine is a clear color, denies cloudy and dark appearance.  Reviewed and discussed laboratory work of 10/2/23 which She obtained prior to today's visit as requested. Pt will go to her nearest [facility] lab for blood work and a urine sample which will be sent for urinalysis, urine culture, and urine cytology. Pt will schedule a telehealth visit after lab work to discuss results.   11/01/2023: Ms. MCDOWELL presents today for a follow up audio only telehealth visit for which they gave permission for. The patient was located at home [address] [place] [address] and I was located in my office in [place], [place]. The 10/13/23 CMP revealed creatinine at 0.58 and low ALT at 8 U/L. Patient was admitted into [facility] on 10/27/23 for sepsis/UTI. Her daughter states she may be released tomorrow. Daughter states during the day the patient's urine was normal, but she did have chills and a fever.   As the patient recently had a course of antibiotics, we suggested patient have catheter removed in hospital, as we will to observe bladder emptying and skin while the catheter is removed. If abnormalities arise, we will place the catheter back in.  If the daughter is to proceed with the plan, we will not discharge patient until a PVR is taken and until pt is able to void on her own the next morning. Hospitalist should weigh the diaper before and after and I will calculate it. If stool is present, then we will not be able to do so.  The daughter will have the Hospitalist contact me about patient.  Hospitalist has called at 4:06 pm. She states the urination looks contaminated and culture showed E-coli.  They will bladder scan her every hour.  Hospitalist will call tomorrow with update on patient and inform me on her fluid level.  Advised daughter to obtain a thermometer to observe temperature.    11/03/2023: Ms. MCDOWELL is a 89 year old female presenting today for an audio only telehealth visit for which she gave verbal permission. The patient was located in her home at [address] [place] [address]. I was located in my office on [place], [place]. She was accompanied by her daughter. She says her mother was supposed to be released today. Initially she was able to void. Because of constipation she was given a couple of enemas. She had a large evacuation and since then she has not been able to void again. The daughter says her latest PVR was 340 cc. She is waiting for her mother to be rescanned again.    Plan: If she is unable to void, she will be discharged with a sales catheter.   11/07/2023: Ms. CLAUDIO MCDOWELL presents today for a follow up audio only telephone visit for which she gave permission for. The pt was located at home, [address] [place] [address], and I was located in my office in [place], [place]. The visit was conducted with the patient's daughter. The patient came home from the hospital on Sunday. The catheter was put back in without another attempt at a void trial. The patient does have issues with constipation. She is able to swallow liquids. Her daughter has had to crush some of her pills to put them in a liquid, however the pt finds it to taste bitter and has some discomfort swallowing the chunks of crushed pills. The hospital discharged her with a 7 day supply of Cefuroxime. Was started yesterday so she has had two doses.    We discussed the possibility of a doing a trial of void. At this time I am recommending her mother settle back in to being home and we can readdress this possibility and how the patient and her daughter would like to proceed at the time of her next visit. Next catheter change will be on 12/5 should they choose to proceed with the catheter.   I looked it up on [employer] and Cefuroxime is available in the [place] as a liquid suspension. Given that this would be easier for the patient, I went to prescribe it for her which she could take rather than the pills. [employer] did not allow me to electronically prescribe it in a liquid suspension so I called the patient's pharmacy to give a verbal prescription for 200 ml of it for 20 ml BID for 5 days. I left a VM for the pharmacy as there was no answer. I requested they call me back if they could accept this prescription or if they could not. The pharmacy called me back shortly after and they told me that it is not currently offered in a liquid suspension, however he called the [facility] next door and they are willing to compound it for 40$. I saw this as acceptable and thanked him for going the extra mile and calling next door. He will contact the patient's daughter to inform her.   Patient and her daughter will have a telehealth visit in 2 weeks for reassessment, sooner if clinically indicated.   11/20/2023: Ms. MCDOWELL presents today for a follow up audio only telehealth visit for which they gave permission for. The patient was located at home [address] [place] [address] and I was located in my office in [facility]. Daughter states patient is okay. She notes giving pt suppositories and 7 hours later she had mixed bowel movements. However, she did not have a bowel movement since Saturday. Patient is given pedialyte and water frequently. Today's temperature was 95 as before it was 96-97. Pt did not drink any liquids shortly after taking temperature. Daughter denies giving pt solace but she usually gives Miralax as she has done so today. Senady's urine color is clear as it  has been clear for some time.  Daughter states her stomach was distended. Bp has stabilized since being in the hospital but last night and today it  was higher than normal being being 168/88 before medication.   Advised daughter to give patient Miralax tonight if there is no bowel movements.  Advised her to take Blood pressure tonight.  Pt will schedule a telehealth visit  11/29/2023: Ms. CLAUDIO MCDOWELL presents today for a follow up audio only telephone visit for which she gave permission for. The pt was located at home, [address] [place] [address], and I was located in my office in [place], [place]. Visit was conducted with her daughter, Andree. She was informed yesterday by her house call physicians of the results of her urine culture (emailed from core lab showing >100,000 CFU/ml Pseudomonas aeruginosa susceptible only to amikacin, Ciprofloxacin, imipenem, levofloxacin, meropenum. Resistant to Ceftazidime, Pip/tazo. Intermediate for aztrenam, cefepime. They discussed that patient is having increasing oxygen requirements, although SPO2 ok on supplemental O2 (prior to this patient needed only occasional O2 suppl. every few days). 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 The want to treat with ciprofloxacin, 7 day course. Prescription sent to pharmacy. Her daughter inquired today on if she should be treated. She states last week her mother seemed very not herself, however today she seems better in terms of mental status and oxygenation. She has been giving her senakot and miralax.   I explained that given her O2 issues and confusion over the last few days, I advised to treat with Cipro despite her daughter feeling she is better today. If there are any problems with crushing the pills for her or if she is not reacting to it well, she was advised to call right away. I posed the option of giving it as an oral solution if need be.    12/21/2023: Ms. MCDOWELL presents today for a follow up audio-visual telehealth visit for which they gave permission for. The patient was located at home [address] [place] [address] and I was located in my office in [place], [place]. The patient obtained lab work 12/4/23 prior to today's visit. Urinalysis is improved and showed trace proteinuria, small Leukocyte Esterase, 6/HPF of WBC and 7/LPF of Cast. Urine culture shows no significant growth, <10,000 CFU/mL Normal Urogenital Khadijah, which is very good. Her daughter expresses concern as recently patient is experiencing a surplus of looser and larger amounts of bowel movements that is escaping to the vaginal area, thus causing infections. Urine is normal in color, denies fever. Patient is occasionally more fatigued than usual. Denies distended appearance of the abdomen. Admits to only 1 spasm around the catheter this month. Next sales change will be in 2 weeks.  Towards the end of visit, patient was shown on Facetime, appearing well oriented x 3, normal pigmentation, lips are moist, and smile is symmetrical. I am very impressed by how well the patient looks and speaks. It was more than just pleasantries, she inquired about personal questions. Overall, I am very pleased.   Reviewed and discussed laboratory work of 12/4/23 which She obtained prior to today's visit as requested. Advised daughter to have care team to place support under the pelvis, so pelvis is tilted up at a higher level of the anus.   As long as stool is pasty and firm, we advised Andree to give patient MiraLAX. If stool is loose and unfirm, she should decrease the amount of MiraLAX.  Pt will provide a urine sample which will be sent for urinalysis, urine culture, and urine cytology. Pt will schedule a telehealth visit in 2 weeks for reassessment.   12/27/2023: Ms. CLAUDIO MCDOWELL presents today for a follow up audio only telephone visit for which she gave permission for. The pt was located at home, [address] [place] [address], and I was located in my office in [place], [place]. She is accompanied by her daughter. Patient provided a surveillance urine specimen on 12/22/2023. She has chronic sales catheter drainage. UA revealed small LEC, positive for nitrites, 6 WBC/HPF, no RBC seen, moderate bacteria/HPF, granular casts present, and yeast like cells present. Urine culture grew >100,000 CFU/ml Enterococcus faecalis and 50,000 - 99,000 CFU/mL Streptococcus mitis/oralis group. Urine cytology was negative for high grade urothelial carcinoma. Few mature squamous cells, rare benign urothelial cells and abundant fungal organisms morphologically consistent with Candida species present. Patient's daughter reports that her urine looks clear and a pale yellow color. She feels her mom still has a lot of anxiety and some confusion. She does report that her catheter bag sits in feces due to the patient's position. They tried to tilt her pelvis to avoid this but states it's easier said than done. She states there is no feces in the catheter or in the bag. BM are not loose, she describes them as pasty. Has about 2-3 BM a day.    Reviewed and discussed lab work of 12/22/2023 in detail with the pt.  Future urine orders were put in including fungal urine culture. Pt's daughter was advised to continue to try and alter the patient's position to avoid the catheter sitting in feces.  Patient should have a telehealth visit in 3 months, sooner if clinically indicated.    01/05/2024: Ms. MCDOWELL is a 89 year old female presenting today for an audio only telehealth visit for which she gave verbal permission. The patient was located in her home at [address] [place] [address]. I was located in my office on [place], [place]. She was accompanied by her daughter who helped with the visit. She reports that a nurse came for a catheter change, and only succeeded on the third attempt. She says it's the same nurse who has been coming for 5 months now, and usually she encounters no difficulties. Now that the catheter has been changed, the patient is able to void.   Plan: Pt's daughter will keep us updated. If the patient experiences fever or chill, she will call ER and inform the office.   01/17/2024: Ms. CLAUDIO MCDOWELL presents today for a follow up audio only telephone visit for which she gave permission for. The pt was located at home, [address] [place] [address], and I was located in my office in [place], [place]. She is accompanied by her daughter, Tabatha Deluna. Patient provided a urine specimen on 1/5/2024 for surveillance. UA revealed trace blood by urine and moderate LEC. This is excellent for her as she has chronic sales catheter drainage. Urine culture grew 50,000-99,000 CFU/ml Pseudomonas aeruginosa. Urine cytology was negative for high grade urothelial carcinoma. Few mature squamous cells, rare benign urothelial cells and abundant fungal organisms morphologically consistent with Candida species present. The patient's daughter reports that her mother is "different". She reports that she is hearing things a lot now. She states that usually the things are negative. For example, she will say "Why is Ac saying my oxygen isn't working properly?". It is clear what she is saying but fairly random. She is not seeing anything; it is strictly auditory.   Reviewed and discussed lab work of 1/5/2024 in detail with the pt's daughter. She was informed that given she has chronic sales catheter drainage, this specimen was excellent. If she had more WBC/HPF, I would be concerned, however she is in very good shape from a urologic standpoint. Her daughter was advised to speak with her mother's PCP about hearing things and her AMS as it is not urologic in origin or related to pending sepsis. I provided her the name of a neurologist, Dr.Li-Fen Tripathi if she would like to go for neuro consultation.    I once again re-iterated that at this time, given her skin breakdown, I recommend continued sales catheter drainage. Her daughter brought up an external urine collection apparatus once again, however I do not recommend we try that at least until her skin is in good condition. I also explained that if her mother were to go into urinary retention, this sort of device would not drain the urine from her.   Pt's daughter was advised to give her hyoscyamine for bladder spasms.   Patient will have a telehealth visit in 1-2 months, sooner if clinically indicated.    Duration of telephone visit was 30 minutes.

## 2024-01-17 NOTE — HISTORY OF PRESENT ILLNESS
[FreeTextEntry1] : Claudio Mcdowell in her daughter Tabatha Deluna gave permission for an audio video telehealth visit.  They were in their home in [facility].  I was in my office in [facility].  Claudio Mcdowell is currently 89 years of age.  Her daughter Tabatha Deluna is devoted to her care.  She is very concerned about her mother and has been so for many years.  She becomes very anxious at times and that is concerning her mother.  Claudio Mcdowell lives with her daughter.  Claudio Mcdowell has been bedbound for several years.  The patient had developed a sacral decubitus ulcer while in a nursing facility.  A Sales catheter was placed because of fecal incontinence and concern that urine mixed with the feces would contaminate the sacral decubitus ulcer.  Once the patient left the nursing facility and will be with her daughter.  The sacral decubitus ulcer has finally healed.  I have discussed removing the Sales catheter with the daughter.  However as the patient has fecal incontinence there is concern about the urine mixing with the feces and being more likely to cause skin problems.  For now we will leave the Sales catheter in.  The patient has had clinically symptomatic urinary tract infections.  He clinically significant infections usually start with increased urination around the Sales catheter due to bladder spasms.  Bladder spasms have been occurring for staff.  The patient is positioned properly in in bed and the personal care attendant leaves for the day.  We have managed to prevent this with the administration of sublingual hyoscyamine.  We do periodic surveillance urine analysis and urine culture tests at the time the Sales catheter is changed by visiting nurse.  He also performed a times of increased spasms or purulence of the urine or change in mental status,  On October 11, 2022 visiting nurse using an order I have previously placed at the request of the daughter sent urine for urine cytology which proved to be negative for malignant cells, urine culture which grew Greater than 100,000 and E. coli that was sensitive to all antibiotics tested.  Urinalysis looked quite good for urine taken from a Sales catheter that was used for chronic Sales catheter drainage.  Ileukocyte esterase was large but nitrites were negative.  There were 2 epithelial cells per high-power field.  There were only 10 white blood cells per high-power field and only 2 red blood cells per high-power field on microscopic exam there were no bacteria seen and there were no hyaline casts.  Specific gravity was 1.010 which shows good hydration and this bedbound woman cared for diligently by her daughter.  Blood by dipstick was trace.  There was no protein glucose or ketones in the urine.  There is no bilirubin and no urobilinogen.  An important portion of the visit was my review with the daughter about bladder spasms and urination around the catheter.  Urine appearance and urine analysis have been clear.  The urine was clear again today.  The patient has significant short-term memory deficit.  She does have some useful short-term memory.  Her long-term memory remains very much intact.  She is very pleasant and to make satisfactory conversation.  She does admit to sometimes not remembering something.  Her complexion was good and there was no pallor.  Facial movements were symmetric.  Cranial nerves were intact.  I was not able to assess the olfactory nerve as this was a telehealth visit.    11/04/2022: Ms. MCDOWELL is a 89 year old female presenting today for a telehealth visit. She was accompanied by her daughter who helped with the visit. They gave permission for an audio only telehealth conference. The patient was located in her home in [place], [place]. I was located in my office on [place], [place]. Today her daughter reports the pt experienced rare urinary leaking around the catheter due to bladder spasms. She also reports her systolic pressure was around 140 last week. I offered Gemtesa to manage the bladder spasms and the daughter was cautious about more medications. I informed her if the urinary leaking are only once every 2 weeks or so, she does not have to medicate. The daughter was agreeable to monitor the occurrence of urinary leaking over the next 4 weeks and assess the discomfort it causes the pt. She denies any other urinary issues.  03/01/2023: Ms. MCDOWELL is a 89 year old female presenting today for a telehealth visit. She was accompanied by her daughter who helped with the visit. They gave permission for an audio only telehealth conference. The patient was located in her home in [place], [place]. I was located in my office on [place], [place]. The pt has chronic sales catheter drainage. She provided a urine specimen from the catheter on 2/27/2023. UA was slightly turbid with large LEC and 59 WBC/HPF. Culture grew >100,000 CFU/ml E.Coli. The sensitivity report is not yet back. The pt's daughter was concerned as when the patient was constipated a few weeks ago she was having very watery BM which grew messy and were around the catheter area. She was given a Fleet's enema last month which helped. Currently she is having BM, paste-like in appearance. Her daughter does not think that her stomach is distended. She has recently had the catheter changed and is not voiding around it. Urine has been very clear. She does not seem to have pain in the urinary passageway or bladder. Denies fevers. Denies gross hematuria. Pt had covid around Thanksgiving, only had the first two vaccines from two years ago.   03/13/2023: Ms. MCDOWELL is a 89 year old female presenting today for a telehealth visit. She was accompanied by her daughter who helped with the visit. They gave permission for an audio only telehealth conference. The patient was located in her home in [place], [place]. I was located in my office on [place], [place]. The patient obtained an US prior to today's visit 3/9/23 which revealed: Comparison is made with the exam of 2/28/22. Transabdominal ultrasound of the abdomen was performed with color flow imaging. The examination is limited in evaluation. The visualized aorta and inferior vena cava show no abnormality. The pancreas is obscured by overlying bowel gas. The liver measures 12.4 cm with homogeneous echotexture. No intrinsic mass and no intra-or extrahepatic ductal dilatation. There is hepatopedal flow of the main portal vein. No gallstones, pericholecystic fluid, wall thickening or positive sonographic Melendez sign. The common bile duct measures 0.3 cm. The right kidney measures 9.2 x 5.6 x 3.0 cm with a nonobstructing 1.2 cm stone in the upper pole. Multiple additional subcentimeter calcifications are seen. These were not seen on previous exam. No hydronephrosis or renal mass. The left kidney measures 9.3 x 3.9 x 3.5 cm. There is a 2.1 x 2.4 x 2.0 cm cyst in the medial mid pole, not seen on previous study. No hydronephrosis or renal calcification. The spleen measures 8.2 cm and show no abnormality. There is a small right pleural effusion, stable. IMPRESSIONS: Multiple nonobstructing stones of the right kidney with no hydronephrosis. 2.4 cm cyst of the midpole left kidney. Small right pleural effusion, stable. The daughter has answered the phone and reports the patient's urine does not get dark. She is not aware of the amount of water she gives her mother. I requested she measure's this from now on as the pt has stones and needs to increase water consumption. Her daughter reports this week the pt has experienced more spasms than normal and believes this may be due to severe constipation. She provided the pt with an Enema to aide in this situation. She is very concerned but I informed her the Enema may have stimulated the spasms and we should give her a few days to clear up and re-evaluate.   03/22/2023: Ms. CLAUDIO MCDOWELL presents today for an audio visual telehealth visit for which she gave permission for. The patient was located at home at [address] [place] [address] and I was located at my office in [place], [place]. She is accompanied by her daughter whom most of the visit was conducted with. Her daughter has been keeping track of her mother's water consumption. She is averaging about 58 oz of water in a 24 hr period. Additionally, she is drinking juice and ensure nutrition drinks. She does not consume any caffeine. She is on furosemide 40 mg. I said hello to the patient at the end of the visit and she is looking well. She reports feeling no pain at the current moment.   3/29/2023:  Patient Claudio Mcdowell and daughter Tabatha Deluna were home in [place], [place] and I was in my office in [facility].  Patient and daughter gave permission for a [employer] telehealth visit.  They preferred this to [employer].  The patient looks good today.  Her complexion was good there is no pallor.  Smile was symmetric her affect was normal she appeared happy she could make conversation it was appropriate.  She said that she currently had no pain.  When I asked questions that after 3/2 how she had been recently the sometimes she hesitated and deferred to her daughter for cancer compatible with her impaired short-term memory.  Long-term memory remains good she recognizes me once know so I am does ask questions about things like her bladder as she was worried about it her blood tests.  I assured her the results were satisfactory.  04/18/2023: Ms. MCDOWELL is a 89 year old female presenting today for an audio and visual telehealth visit for which she gave verbal permission. We utilized [employer] teams telemetry doc platform. The patient was located in her home at [address] [place] [address]. I was located in my office on [place], [place]. She was accompanied by her daughter who helped to serve as a historian. She reports discomfort in her eyes. She describes the discomfort as a feeling of sand. She opened her eyes for me, and they do not look red. Pupils look normal. She reports the right eyes bothers her significantly more than the left. I advise that the patient tries lubricating drops and it if does not feel better she will notify her visiting nurse who is due to come next week for a sales catheter change. She reports episode of bladder spasms. She denies gross hematuria. She describes the urine as barely yellow. I reviewed and discussed her  latest pertinent lab on 04/12/2023 which shows "alkaline phosphate normal at 89. Creatinine was normal at 0.62 mg/dL. WBC normal at 5.01. RBC normal at 3.87".   05/09/2023: Ms. MCDOWELL presents today for a follow up audio only telehealth visit for which they gave permission for. She was accompanied by her daughter who helped to serve as a historian. The patient was located at home [address] [place] [address] and I was located in my office in [place], [place]. The patient obtained lab work 5/2/23 prior to today's visit. The UA revealed microscopic hematuria, 5/HPF RBC, 27/HPF WBC. The patient demonstrated great hydration as the specific gravity is 1.006. The Sales catheter was changed May 2, 2023 with no clear urine in the bag. While changing the diaper the aide reports blood in the diaper. The daughter states last week her mother experienced spasms but as of two days ago she is now fine. However, she states the patient is more fatigued. She denies the pt having a temperature. I assured her because her mother is post menopausal any abrasion to the labia can cause a fissure.   06/02/2023: Ms. MCDOWELL is a 89 year old female presenting today for an audio and visual telehealth visit for which she gave verbal permission. We utilized [employer] teams telemetry [employer] platform. The patient was located in her home at [address] [place] [address]. I was located in my office on [place], [place]. She was accompanied by her daughter Tabatha. She reports that her mother complains of onset dysuria that dissipated on its own and has not recurred since. She reports right sided back pain that is aggravated when she lays on the right side. She provided urine specimen. She notes the urine was very clear. I reviewed and discussed the patient's pertinent lab works. Her latest Urinalysis on 05/30/2023 that shows "60 WBC/HPF. Trace Blood Urine. Specific Gravity Urine 1.007". Urine Culture on the same date shows ">100,000 CFU/ml Escherichia coli and <10,000 CFU/ml Normal Urogenital ángel present". The patient takes Mirtazapine for anxiety at low dose. The daughter reports the patient is experiencing increasing anxiety and is thinking of having the prescriber increasing the dosage.  Her BP runs around 120/60. Her Hemoglobin hematocrit looks good. gaze is conjugated. facial expression looks symmetric. Urine was faint yellow and clear.   06/06/2023: Ms. MCDOWELL presents today for a follow up audio only telehealth visit for which they gave permission for. The patient was located at home [address] [place] [address] and I was located in my office in [place], [place]. She was accompanied by her daughter Tabatha. The daughter states pt is experiencing episodes of a dry cough. An X Ray  of 6/5/23 revealed Mild peribronchial thickening suggesting bronchitis in the right clinical setting with no focal pneumonia or congestive heart failure. COPD with chronic lung changes. The mental status is the same as usual but her BP has elevated some. I assured her a little elevation is not as dangerous. The pt continues to experience some urinary frequency and it is a little more concentrated.   10/12/2023: Ms. MCDOWELL presents today for a follow up audio only telehealth visit for which they gave permission for. The patient was located at home [address] [place] [address] and I was located in my office in [place], [place]. The 10/2/23 UA showed proteinuria 30 mg/dL, moderate blood, large Leukocyte Esterase, 16/HPF of WBC. UA showed improvement as patient has chronic sales drainage. Her daughter states patient is okay considering coming home from the hospital. Most pain is in her back/shoulders and some of her back area is red/raw.  Recently her BP has increased to 180/100 and was prescribed Losartan 50mg twice daily. Urine is a clear color, denies cloudy and dark appearance.  11/01/2023: Ms. MCDOWELL presents today for a follow up audio only telehealth visit for which they gave permission for. The patient was located at home [address] [place] [address] and I was located in my office in [place], [place]. The 10/13/23 CMP revealed creatinine at 0.58 and low ALT at 8 U/L. Patient was admitted into [facility] on 10/27/23 for sepsis/UTI. Her daughter states she may be released tomorrow. Daughter states during the day the patient's urine was normal, but she did have chills and a fever.   11/03/2023: Ms. MCDOWELL is a 89 year old female presenting today for an audio only telehealth visit for which she gave verbal permission. The patient was located in her home at [address] [place] [address]. I was located in my office on [place], [place]. She was accompanied by her daughter. She says her mother was supposed to be released today. Initially she was able to void. Because of constipation she was given a couple of enemas. She had a large evacuation and since then she has not been able to void again. The daughter says her latest PVR was 340 cc. She is waiting for her mother to be rescanned again.   11/07/2023: Ms. CLAUDIO MCDOWELL presents today for a follow up audio only telephone visit for which she gave permission for. The pt was located at home, [address] [place] [address], and I was located in my office in [place], [place]. The visit was conducted with the patient's daughter. The patient came home from the hospital on Sunday. The catheter was put back in without another attempt at a void trial. The patient does have issues with constipation. She is able to swallow liquids. Her daughter has had to crush some of her pills to put them in a liquid, however the pt finds it to taste bitter and has some discomfort swallowing the chunks of crushed pills. The hospital discharged her with a 7 day supply of Cefuroxime. Was started yesterday so she has had two doses.   11/20/2023: Ms. MCDOWELL presents today for a follow up audio only telehealth visit for which they gave permission for. The patient was located at home [address] [place] [address] and I was located in my office in [facility]. Daughter states patient is okay. She notes giving pt suppositories and 7 hours later she had mixed bowel movements. However, she did not have a bowel movement since Saturday. Patient is given pedialyte and water frequently. Today's temperature was 95 as before it was 96-97. Pt did not drink any liquids shortly after taking temperature. Daughter denies giving pt solace but she usually gives Miralax as she has done so today. Toady's urine color is clear as it  has been clear for some time.  Daughter states her stomach was distended. Bp has stabilized since being in the hospital but last night and today it  was higher than normal being being 168/88 before medication.   11/29/2023: Ms. CLAUDIO MCDOWELL presents today for a follow up audio only telephone visit for which she gave permission for. The pt was located at home, [address] [place] [address], and I was located in my office in [place], [place]. Visit was conducted with her daughter, Andree. She was informed yesterday by her house call physicians of the results of her urine culture (emailed from core lab showing >100,000 CFU/ml Pseudomonas aeruginosa susceptible only to amikacin, Ciprofloxacin, imipenem, levofloxacin, meropenum. Resistant to Ceftazidime, Pip/tazo. Intermediate for aztrenam, cefepime. They discussed that patient is having increasing oxygen requirements, although SPO2 ok on supplemental O2 (prior to this patient needed only occasional O2 suppl. every few days). 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 The want to treat with ciprofloxacin, 7 day course. Prescription sent to pharmacy. Her daughter inquired today on if she should be treated. She states last week her mother seemed very not herself, however today she seems better in terms of mental status and oxygenation. She has been giving her senakot and miralax.   12/21/2023: Ms. MCDOWELL presents today for a follow up audio-only telehealth visit for which they gave permission for. The patient was located at home [address] [place] [address] and I was located in my office in [place], [place]. The patient obtained lab work 12/4/23 prior to today's visit. Urinalysis is improved and showed trace proteinuria, small Leukocyte Esterase, 6/HPF of WBC and 7/LPF of Cast. Urine culture shows no significant growth, <10,000 CFU/mL Normal Urogenital Ángel, which is very good. Her daughter expresses concern as recently patient is experiencing a surplus of looser and larger amounts of bowel movements that is escaping to the vaginal area, thus causing infections. Urine is normal in color, denies fever. Patient is occasionally more fatigued than usual. Denies distended appearance of the abdomen. Admits to only 1 spasm around the catheter this month. Next sales change will be in 2 weeks.  Towards the end of visit, patient was shown on Facetime, appearing well oriented x 3, normal pigmentation, lips are moist, and smile is symmetrical. I am very impressed by how well the patient looks and speaks. It was more than just pleasantries, she inquired about personal questions. Overall, I am very pleased.   12/27/2023: Ms. CLAUDIO MCDOWELL presents today for a follow up audio only telephone visit for which she gave permission for. The pt was located at home, [address] [place] [address], and I was located in my office in [place], [place]. She is accompanied by her daughter. Patient provided a surveillance urine specimen on 12/22/2023. She has chronic sales catheter drainage. UA revealed small LEC, positive for nitrites, 6 WBC/HPF, no RBC seen, moderate bacteria/HPF, granular casts present, and yeast like cells present. Urine culture grew >100,000 CFU/ml Enterococcus faecalis and 50,000 - 99,000 CFU/mL Streptococcus mitis/oralis group. Urine cytology was negative for high grade urothelial carcinoma. Few mature squamous cells, rare benign urothelial cells and abundant fungal organisms morphologically consistent with Candida species present. Patient's daughter reports that her urine looks clear and a pale yellow color. She feels her mom still has a lot of anxiety and some confusion. She does report that her catheter bag sits in feces due to the patient's position. They tried to tilt her pelvis to avoid this but states it's easier said than done. She states there is no feces in the catheter or in the bag. BM are not loose, she describes them as pasty. Has about 2-3 BM a day.   01/05/2024: Ms. MCDOWELL is a 89 year old female presenting today for an audio only telehealth visit for which she gave verbal permission. The patient was located in her home at [address] [place] [address]. I was located in my office on [place], [place]. She was accompanied by her daughter who helped with the visit. She reports that a nurse came for a catheter change, and only succeeded on the third attempt. She says it's the same nurse who has been coming for 5 months now, and usually she encounters no difficulties. Now that the catheter has been changed, the patient is able to void.   01/17/2024: Ms. CLAUDIO MCDOWELL presents today for a follow up audio only telephone visit for which she gave permission for. The pt was located at home, [address] [place] [address], and I was located in my office in [place], [place]. She is accompanied by her daughter, Tabatha Deluna. Patient provided a urine specimen on 1/5/2024 for surveillance. UA revealed trace blood by urine and moderate LEC. This is excellent for her as she has chronic sales catheter drainage. Urine culture grew 50,000-99,000 CFU/ml Pseudomonas aeruginosa. Urine cytology was negative for high grade urothelial carcinoma. Few mature squamous cells, rare benign urothelial cells and abundant fungal organisms morphologically consistent with Candida species present. The patient's daughter reports that her mother is "different". She reports that she is hearing things a lot now. She states that usually the things are negative. For example, she will say "Why is Ac saying my oxygen isn't working properly?". It is clear what she is saying but fairly random. She is not seeing anything; it is strictly auditory.

## 2024-01-17 NOTE — ADDENDUM
[FreeTextEntry1] : This note was authored by Senia Corbin working as a scribe for Dr.Gary Sexton. I, Dr. Indio Sexton have reviewed the content of this note and confirm it is true and accurate. I personally performed the history and physical examination and made all the decisions 01/17/2024

## 2024-01-19 ENCOUNTER — NON-APPOINTMENT (OUTPATIENT)
Age: 89
End: 2024-01-19

## 2024-01-20 ENCOUNTER — TRANSCRIPTION ENCOUNTER (OUTPATIENT)
Age: 89
End: 2024-01-20

## 2024-01-22 ENCOUNTER — APPOINTMENT (OUTPATIENT)
Dept: UROLOGY | Facility: CLINIC | Age: 89
End: 2024-01-22
Payer: MEDICARE

## 2024-01-22 ENCOUNTER — APPOINTMENT (OUTPATIENT)
Dept: HOME HEALTH SERVICES | Facility: HOME HEALTH | Age: 89
End: 2024-01-22

## 2024-01-22 VITALS
DIASTOLIC BLOOD PRESSURE: 80 MMHG | RESPIRATION RATE: 19 BRPM | SYSTOLIC BLOOD PRESSURE: 142 MMHG | OXYGEN SATURATION: 97 % | HEART RATE: 54 BPM | TEMPERATURE: 97.6 F

## 2024-01-22 PROCEDURE — 99443: CPT | Mod: 93

## 2024-01-22 RX ORDER — HYDROPHILIC CREAM
PASTE (GRAM) TOPICAL
Qty: 71 | Refills: 3 | Status: COMPLETED | COMMUNITY
Start: 2024-01-20 | End: 2024-01-22

## 2024-01-22 RX ORDER — APIXABAN 2.5 MG/1
2.5 TABLET, FILM COATED ORAL
Qty: 24 | Refills: 0 | Status: ACTIVE | COMMUNITY
Start: 2021-11-01

## 2024-01-22 RX ORDER — OFLOXACIN 3 MG/ML
0.3 SOLUTION/ DROPS OPHTHALMIC 4 TIMES DAILY
Qty: 1 | Refills: 0 | Status: COMPLETED | COMMUNITY
Start: 2023-06-23 | End: 2024-01-22

## 2024-01-22 RX ORDER — SODIUM CHLORIDE FOR INHALATION 0.9 %
0.9 VIAL, NEBULIZER (ML) INHALATION
Qty: 2 | Refills: 1 | Status: ACTIVE | COMMUNITY
Start: 2022-12-05

## 2024-01-22 RX ORDER — POLYETHYLENE GLYCOL 3350 17 G/17G
17 POWDER, FOR SOLUTION ORAL DAILY
Qty: 1 | Refills: 3 | Status: ACTIVE | COMMUNITY
Start: 2022-12-30

## 2024-01-22 RX ORDER — LORAZEPAM 0.5 MG/1
0.5 TABLET ORAL
Qty: 30 | Refills: 0 | Status: ACTIVE | COMMUNITY
Start: 2021-11-01 | End: 1900-01-01

## 2024-01-22 RX ORDER — GINSENG 100 MG
CAPSULE ORAL
Qty: 90 | Refills: 3 | Status: ACTIVE | COMMUNITY
Start: 2022-02-07

## 2024-01-22 RX ORDER — AMLODIPINE BESYLATE 2.5 MG/1
2.5 TABLET ORAL
Qty: 90 | Refills: 3 | Status: COMPLETED | COMMUNITY
Start: 2023-11-30 | End: 2024-01-22

## 2024-01-22 RX ORDER — NUTRITIONAL SUPPLEMENT 0.06 G-1.5
LIQUID (ML) ORAL
Qty: 1 | Refills: 0 | Status: ACTIVE | COMMUNITY
Start: 2022-08-15

## 2024-01-22 RX ORDER — PANTOPRAZOLE 40 MG/1
40 TABLET, DELAYED RELEASE ORAL DAILY
Qty: 1 | Refills: 3 | Status: ACTIVE | COMMUNITY
Start: 2023-11-02

## 2024-01-22 RX ORDER — VITAMIN K2 90 MCG
125 MCG CAPSULE ORAL
Qty: 30 | Refills: 0 | Status: ACTIVE | COMMUNITY
Start: 2021-11-05

## 2024-01-22 RX ORDER — FENUGREEK SEED/BL.THISTLE/ANIS 340 MG
CAPSULE ORAL
Refills: 0 | Status: ACTIVE | COMMUNITY
Start: 2021-11-01

## 2024-01-22 RX ORDER — HYDROCORTISONE 25 MG/G
2.5 CREAM TOPICAL TWICE DAILY
Qty: 1 | Refills: 3 | Status: ACTIVE | COMMUNITY
Start: 2023-01-19

## 2024-01-22 RX ORDER — TRAMADOL HYDROCHLORIDE 50 MG/1
50 TABLET, COATED ORAL
Qty: 10 | Refills: 0 | Status: COMPLETED | COMMUNITY
Start: 2023-11-30 | End: 2024-01-22

## 2024-01-22 RX ORDER — GLUCOSAMINE HCL/CHONDROITIN SU 500-400 MG
3 CAPSULE ORAL
Qty: 90 | Refills: 2 | Status: ACTIVE | COMMUNITY
Start: 2021-11-01

## 2024-01-22 RX ORDER — ACETAMINOPHEN 325 MG/1
325 TABLET, FILM COATED ORAL EVERY 6 HOURS
Refills: 0 | Status: ACTIVE | COMMUNITY
Start: 2021-11-01

## 2024-01-22 RX ORDER — STANDARDIZED SENNA CONCENTRATE 8.6 MG/1
8.6 TABLET ORAL
Qty: 80 | Refills: 5 | Status: ACTIVE | COMMUNITY
Start: 2022-07-21

## 2024-01-22 RX ORDER — ZINC SULFATE TAB 220 MG (50 MG ZINC EQUIVALENT) 220 (50 ZN) MG
220 (50 ZN) TAB ORAL DAILY
Refills: 0 | Status: COMPLETED | COMMUNITY
Start: 2021-11-01 | End: 2024-01-22

## 2024-01-22 RX ORDER — HYOSCYAMINE SULFATE 0.12 MG/1
0.12 TABLET SUBLINGUAL
Qty: 60 | Refills: 11 | Status: ACTIVE | COMMUNITY
Start: 2024-01-22 | End: 1900-01-01

## 2024-01-22 NOTE — HISTORY OF PRESENT ILLNESS
[FreeTextEntry1] : Claudio Mcdowell in her daughter Tabatha Deluna gave permission for an audio video telehealth visit.  They were in their home in [facility].  [facility]  Claudio Mcdowell is currently 89 years of age.  Her daughter Tabatha Deluna is devoted to her care.  She is very concerned about her mother and has been so for many years.  She becomes very anxious at times and that is concerning her mother.  Claudio Mcdowell lives with her daughter.  Claudio Mcdowell has been bedbound for several years.  The patient had developed a sacral decubitus ulcer while in a nursing facility.  A Sales catheter was placed because of fecal incontinence and concern that urine mixed with the feces would contaminate the sacral decubitus ulcer.  Once the patient left the nursing facility and will be with her daughter.  The sacral decubitus ulcer has finally healed.  I have discussed removing the Sales catheter with the daughter.  However as the patient has fecal incontinence there is concern about the urine mixing with the feces and being more likely to cause skin problems.  For now we will leave the Sales catheter in.  The patient has had clinically symptomatic urinary tract infections.  He clinically significant infections usually start with increased urination around the Sales catheter due to bladder spasms.  Bladder spasms have been occurring for staff.  The patient is positioned properly in in bed and the personal care attendant leaves for the day.  We have managed to prevent this with the administration of sublingual hyoscyamine.  We do periodic surveillance urine analysis and urine culture tests at the time the Sales catheter is changed by visiting nurse.  He also performed a times of increased spasms or purulence of the urine or change in mental status,  On October 11, 2022 visiting nurse using an order I have previously placed at the request of the daughter sent urine for urine cytology which proved to be negative for malignant cells, urine culture which grew Greater than 100,000 and E. coli that was sensitive to all antibiotics tested.  Urinalysis looked quite good for urine taken from a Sales catheter that was used for chronic Sales catheter drainage.  Ileukocyte esterase was large but nitrites were negative.  There were 2 epithelial cells per high-power field.  There were only 10 white blood cells per high-power field and only 2 red blood cells per high-power field on microscopic exam there were no bacteria seen and there were no hyaline casts.  Specific gravity was 1.010 which shows good hydration and this bedbound woman cared for diligently by her daughter.  Blood by dipstick was trace.  There was no protein glucose or ketones in the urine.  There is no bilirubin and no urobilinogen.  An important portion of the visit was my review with the daughter about bladder spasms and urination around the catheter.  Urine appearance and urine analysis have been clear.  The urine was clear again today.  The patient has significant short-term memory deficit.  She does have some useful short-term memory.  Her long-term memory remains very much intact.  She is very pleasant and to make satisfactory conversation.  She does admit to sometimes not remembering something.  Her complexion was good and there was no pallor.  Facial movements were symmetric.  Cranial nerves were intact.  I was not able to assess the olfactory nerve as this was a telehealth visit.    11/04/2022: Ms. MCDOWELL is a 89 year old female presenting today for a telehealth visit. She was accompanied by her daughter who helped with the visit. They gave permission for an audio only telehealth conference. The patient was located in her home in [place], [place]. I was located in my office on [place], [place]. Today her daughter reports the pt experienced rare urinary leaking around the catheter due to bladder spasms. She also reports her systolic pressure was around 140 last week. I offered Gemtesa to manage the bladder spasms and the daughter was cautious about more medications. I informed her if the urinary leaking are only once every 2 weeks or so, she does not have to medicate. The daughter was agreeable to monitor the occurrence of urinary leaking over the next 4 weeks and assess the discomfort it causes the pt. She denies any other urinary issues.  03/01/2023: Ms. MCDOWELL is a 89 year old female presenting today for a telehealth visit. She was accompanied by her daughter who helped with the visit. They gave permission for an audio only telehealth conference. The patient was located in her home in [place], [place]. I was located in my office on [place], [place]. The pt has chronic sales catheter drainage. She provided a urine specimen from the catheter on 2/27/2023. UA was slightly turbid with large LEC and 59 WBC/HPF. Culture grew >100,000 CFU/ml E.Coli. The sensitivity report is not yet back. The pt's daughter was concerned as when the patient was constipated a few weeks ago she was having very watery BM which grew messy and were around the catheter area. She was given a Fleet's enema last month which helped. Currently she is having BM, paste-like in appearance. Her daughter does not think that her stomach is distended. She has recently had the catheter changed and is not voiding around it. Urine has been very clear. She does not seem to have pain in the urinary passageway or bladder. Denies fevers. Denies gross hematuria. Pt had covid around Thanksgiving, only had the first two vaccines from two years ago.   03/13/2023: Ms. MCDOWELL is a 89 year old female presenting today for a telehealth visit. She was accompanied by her daughter who helped with the visit. They gave permission for an audio only telehealth conference. The patient was located in her home in [place], [place]. I was located in my office on [place], [place]. The patient obtained an US prior to today's visit 3/9/23 which revealed: Comparison is made with the exam of 2/28/22. Transabdominal ultrasound of the abdomen was performed with color flow imaging. The examination is limited in evaluation. The visualized aorta and inferior vena cava show no abnormality. The pancreas is obscured by overlying bowel gas. The liver measures 12.4 cm with homogeneous echotexture. No intrinsic mass and no intra-or extrahepatic ductal dilatation. There is hepatopedal flow of the main portal vein. No gallstones, pericholecystic fluid, wall thickening or positive sonographic Melendez sign. The common bile duct measures 0.3 cm. The right kidney measures 9.2 x 5.6 x 3.0 cm with a nonobstructing 1.2 cm stone in the upper pole. Multiple additional subcentimeter calcifications are seen. These were not seen on previous exam. No hydronephrosis or renal mass. The left kidney measures 9.3 x 3.9 x 3.5 cm. There is a 2.1 x 2.4 x 2.0 cm cyst in the medial mid pole, not seen on previous study. No hydronephrosis or renal calcification. The spleen measures 8.2 cm and show no abnormality. There is a small right pleural effusion, stable. IMPRESSIONS: Multiple nonobstructing stones of the right kidney with no hydronephrosis. 2.4 cm cyst of the midpole left kidney. Small right pleural effusion, stable. The daughter has answered the phone and reports the patient's urine does not get dark. She is not aware of the amount of water she gives her mother. I requested she measure's this from now on as the pt has stones and needs to increase water consumption. Her daughter reports this week the pt has experienced more spasms than normal and believes this may be due to severe constipation. She provided the pt with an Enema to aide in this situation. She is very concerned but I informed her the Enema may have stimulated the spasms and we should give her a few days to clear up and re-evaluate.   03/22/2023: Ms. CLAUDIO MCDOWELL presents today for an audio visual telehealth visit for which she gave permission for. The patient was located at home at [address] [place] [address] and I was located at my office in [place], [place]. She is accompanied by her daughter whom most of the visit was conducted with. Her daughter has been keeping track of her mother's water consumption. She is averaging about 58 oz of water in a 24 hr period. Additionally, she is drinking juice and ensure nutrition drinks. She does not consume any caffeine. She is on furosemide 40 mg. I said hello to the patient at the end of the visit and she is looking well. She reports feeling no pain at the current moment.   3/29/2023:  Patient Claudio Mcdowell and daughter Tabatha Deluna were home in [place], [place] and I was in my office in [facility].  Patient and daughter gave permission for a [employer] telehealth visit.  They preferred this to [employer].  The patient looks good today.  Her complexion was good there is no pallor.  Smile was symmetric her affect was normal she appeared happy she could make conversation it was appropriate.  She said that she currently had no pain.  When I asked questions that after 3/2 how she had been recently the sometimes she hesitated and deferred to her daughter for cancer compatible with her impaired short-term memory.  Long-term memory remains good she recognizes me once know so I am does ask questions about things like her bladder as she was worried about it her blood tests.  I assured her the results were satisfactory.  04/18/2023: Ms. MCDOWELL is a 89 year old female presenting today for an audio and visual telehealth visit for which she gave verbal permission. We utilized [employer] teams telemetry doc platform. The patient was located in her home at [address] [place] [address]. I was located in my office on [place], [place]. She was accompanied by her daughter who helped to serve as a historian. She reports discomfort in her eyes. She describes the discomfort as a feeling of sand. She opened her eyes for me, and they do not look red. Pupils look normal. She reports the right eyes bothers her significantly more than the left. I advise that the patient tries lubricating drops and it if does not feel better she will notify her visiting nurse who is due to come next week for a sales catheter change. She reports episode of bladder spasms. She denies gross hematuria. She describes the urine as barely yellow. I reviewed and discussed her  latest pertinent lab on 04/12/2023 which shows "alkaline phosphate normal at 89. Creatinine was normal at 0.62 mg/dL. WBC normal at 5.01. RBC normal at 3.87".   05/09/2023: Ms. MCDOWELL presents today for a follow up audio only telehealth visit for which they gave permission for. She was accompanied by her daughter who helped to serve as a historian. The patient was located at home [address] [place] [address] and I was located in my office in [place], [place]. The patient obtained lab work 5/2/23 prior to today's visit. The UA revealed microscopic hematuria, 5/HPF RBC, 27/HPF WBC. The patient demonstrated great hydration as the specific gravity is 1.006. The Sales catheter was changed May 2, 2023 with no clear urine in the bag. While changing the diaper the aide reports blood in the diaper. The daughter states last week her mother experienced spasms but as of two days ago she is now fine. However, she states the patient is more fatigued. She denies the pt having a temperature. I assured her because her mother is post menopausal any abrasion to the labia can cause a fissure.   06/02/2023: Ms. MCDOWELL is a 89 year old female presenting today for an audio and visual telehealth visit for which she gave verbal permission. We utilized [employer] teams telemetry [employer] platform. The patient was located in her home at [address] [place] [address]. I was located in my office on [place], [place]. She was accompanied by her daughter Tabatha. She reports that her mother complains of onset dysuria that dissipated on its own and has not recurred since. She reports right sided back pain that is aggravated when she lays on the right side. She provided urine specimen. She notes the urine was very clear. I reviewed and discussed the patient's pertinent lab works. Her latest Urinalysis on 05/30/2023 that shows "60 WBC/HPF. Trace Blood Urine. Specific Gravity Urine 1.007". Urine Culture on the same date shows ">100,000 CFU/ml Escherichia coli and <10,000 CFU/ml Normal Urogenital ángel present". The patient takes Mirtazapine for anxiety at low dose. The daughter reports the patient is experiencing increasing anxiety and is thinking of having the prescriber increasing the dosage.  Her BP runs around 120/60. Her Hemoglobin hematocrit looks good. gaze is conjugated. facial expression looks symmetric. Urine was faint yellow and clear.   06/06/2023: Ms. MCDOWELL presents today for a follow up audio only telehealth visit for which they gave permission for. The patient was located at home [address] [place] [address] and I was located in my office in [place], [place]. She was accompanied by her daughter Tabatha. The daughter states pt is experiencing episodes of a dry cough. An X Ray  of 6/5/23 revealed Mild peribronchial thickening suggesting bronchitis in the right clinical setting with no focal pneumonia or congestive heart failure. COPD with chronic lung changes. The mental status is the same as usual but her BP has elevated some. I assured her a little elevation is not as dangerous. The pt continues to experience some urinary frequency and it is a little more concentrated.   10/12/2023: Ms. MCDOWELL presents today for a follow up audio only telehealth visit for which they gave permission for. The patient was located at home [address] [place] [address] and I was located in my office in [place], [place]. The 10/2/23 UA showed proteinuria 30 mg/dL, moderate blood, large Leukocyte Esterase, 16/HPF of WBC. UA showed improvement as patient has chronic sales drainage. Her daughter states patient is okay considering coming home from the hospital. Most pain is in her back/shoulders and some of her back area is red/raw.  Recently her BP has increased to 180/100 and was prescribed Losartan 50mg twice daily. Urine is a clear color, denies cloudy and dark appearance.  11/01/2023: Ms. MCDOWELL presents today for a follow up audio only telehealth visit for which they gave permission for. The patient was located at home [address] [place] [address] and I was located in my office in [place], [place]. The 10/13/23 CMP revealed creatinine at 0.58 and low ALT at 8 U/L. Patient was admitted into [facility] on 10/27/23 for sepsis/UTI. Her daughter states she may be released tomorrow. Daughter states during the day the patient's urine was normal, but she did have chills and a fever.   11/03/2023: Ms. MCDOWELL is a 89 year old female presenting today for an audio only telehealth visit for which she gave verbal permission. The patient was located in her home at [address] [place] [address]. I was located in my office on [place], [place]. She was accompanied by her daughter. She says her mother was supposed to be released today. Initially she was able to void. Because of constipation she was given a couple of enemas. She had a large evacuation and since then she has not been able to void again. The daughter says her latest PVR was 340 cc. She is waiting for her mother to be rescanned again.   11/07/2023: Ms. CLAUDIO MCDOWELL presents today for a follow up audio only telephone visit for which she gave permission for. The pt was located at home, [address] [place] [address], and I was located in my office in [place], [place]. The visit was conducted with the patient's daughter. The patient came home from the hospital on Sunday. The catheter was put back in without another attempt at a void trial. The patient does have issues with constipation. She is able to swallow liquids. Her daughter has had to crush some of her pills to put them in a liquid, however the pt finds it to taste bitter and has some discomfort swallowing the chunks of crushed pills. The hospital discharged her with a 7 day supply of Cefuroxime. Was started yesterday so she has had two doses.   11/20/2023: Ms. MCDOWELL presents today for a follow up audio only telehealth visit for which they gave permission for. The patient was located at home [address] [place] [address] and I was located in my office in [facility]. Daughter states patient is okay. She notes giving pt suppositories and 7 hours later she had mixed bowel movements. However, she did not have a bowel movement since Saturday. Patient is given pedialyte and water frequently. Today's temperature was 95 as before it was 96-97. Pt did not drink any liquids shortly after taking temperature. Daughter denies giving pt solace but she usually gives Miralax as she has done so today. Toady's urine color is clear as it  has been clear for some time.  Daughter states her stomach was distended. Bp has stabilized since being in the hospital but last night and today it  was higher than normal being being 168/88 before medication.   11/29/2023: Ms. CLAUDIO MCDOWELL presents today for a follow up audio only telephone visit for which she gave permission for. The pt was located at home, [address] [place] [address], and I was located in my office in [place], [place]. Visit was conducted with her daughter, Andree. She was informed yesterday by her house call physicians of the results of her urine culture (emailed from core lab showing >100,000 CFU/ml Pseudomonas aeruginosa susceptible only to amikacin, Ciprofloxacin, imipenem, levofloxacin, meropenum. Resistant to Ceftazidime, Pip/tazo. Intermediate for aztrenam, cefepime. They discussed that patient is having increasing oxygen requirements, although SPO2 ok on supplemental O2 (prior to this patient needed only occasional O2 suppl. every few days). 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 The want to treat with ciprofloxacin, 7 day course. Prescription sent to pharmacy. Her daughter inquired today on if she should be treated. She states last week her mother seemed very not herself, however today she seems better in terms of mental status and oxygenation. She has been giving her senakot and miralax.   12/21/2023: Ms. MCDOWELL presents today for a follow up audio-only telehealth visit for which they gave permission for. The patient was located at home [address] [place] [address] and I was located in my office in [place], [place]. The patient obtained lab work 12/4/23 prior to today's visit. Urinalysis is improved and showed trace proteinuria, small Leukocyte Esterase, 6/HPF of WBC and 7/LPF of Cast. Urine culture shows no significant growth, <10,000 CFU/mL Normal Urogenital Ángel, which is very good. Her daughter expresses concern as recently patient is experiencing a surplus of looser and larger amounts of bowel movements that is escaping to the vaginal area, thus causing infections. Urine is normal in color, denies fever. Patient is occasionally more fatigued than usual. Denies distended appearance of the abdomen. Admits to only 1 spasm around the catheter this month. Next sales change will be in 2 weeks.  Towards the end of visit, patient was shown on Facetime, appearing well oriented x 3, normal pigmentation, lips are moist, and smile is symmetrical. I am very impressed by how well the patient looks and speaks. It was more than just pleasantries, she inquired about personal questions. Overall, I am very pleased.   12/27/2023: Ms. CLAUDIO MCDOWELL presents today for a follow up audio only telephone visit for which she gave permission for. The pt was located at home, [address] [place] [address], and I was located in my office in [place], [place]. She is accompanied by her daughter. Patient provided a surveillance urine specimen on 12/22/2023. She has chronic sales catheter drainage. UA revealed small LEC, positive for nitrites, 6 WBC/HPF, no RBC seen, moderate bacteria/HPF, granular casts present, and yeast like cells present. Urine culture grew >100,000 CFU/ml Enterococcus faecalis and 50,000 - 99,000 CFU/mL Streptococcus mitis/oralis group. Urine cytology was negative for high grade urothelial carcinoma. Few mature squamous cells, rare benign urothelial cells and abundant fungal organisms morphologically consistent with Candida species present. Patient's daughter reports that her urine looks clear and a pale yellow color. She feels her mom still has a lot of anxiety and some confusion. She does report that her catheter bag sits in feces due to the patient's position. They tried to tilt her pelvis to avoid this but states it's easier said than done. She states there is no feces in the catheter or in the bag. BM are not loose, she describes them as pasty. Has about 2-3 BM a day.   01/05/2024: Ms. MCDOWELL is a 89 year old female presenting today for an audio only telehealth visit for which she gave verbal permission. The patient was located in her home at [address] [place] [address]. I was located in my office on [place], [place]. She was accompanied by her daughter who helped with the visit. She reports that a nurse came for a catheter change, and only succeeded on the third attempt. She says it's the same nurse who has been coming for 5 months now, and usually she encounters no difficulties. Now that the catheter has been changed, the patient is able to void.   01/17/2024: Ms. CLAUDIO MCDOWELL presents today for a follow up audio only telephone visit for which she gave permission for. The pt was located at home, [address] [place] [address], and I was located in my office in [place], [place]. She is accompanied by her daughter, Tabatha Deluna. Patient provided a urine specimen on 1/5/2024 for surveillance. UA revealed trace blood by urine and moderate LEC. This is excellent for her as she has chronic sales catheter drainage. Urine culture grew 50,000-99,000 CFU/ml Pseudomonas aeruginosa. Urine cytology was negative for high grade urothelial carcinoma. Few mature squamous cells, rare benign urothelial cells and abundant fungal organisms morphologically consistent with Candida species present. The patient's daughter reports that her mother is "different". She reports that she is hearing things a lot now. She states that usually the things are negative. For example, she will say "Why is Ac saying my oxygen isn't working properly?". It is clear what she is saying but fairly random. She is not seeing anything; it is strictly auditory.   01/22/2024: Ms. CLAUDIO MCDOWELL presents today for a follow up audio only telephone visit for which she gave permission for. The pt was located at home, [address] [place] [address], and I was located in my office in [place], [place]. Visit was conducted with the patient's daughter, Tabatha Deluna. She informs me that she did not realize she was out of hyoscyamine sulfate for bladder spasms. She has been getting bladder spasms after the aide leaves and is embarrassed by this as she thinks shes wetting the bed. Her daughter reports that her mother asks in the morning "why am I bothering taking my medications, I am going to die anyway".

## 2024-01-22 NOTE — ADDENDUM
[FreeTextEntry1] : This note was authored by Senia Corbin working as a scribe for Dr.Gary Sexton. I, Dr. Indio Sexton have reviewed the content of this note and confirm it is true and accurate. I personally performed the history and physical examination and made all the decisions 01/22/2024

## 2024-01-22 NOTE — ASSESSMENT
[FreeTextEntry1] : 10/12/2023: Ms. MCDOWELL presents today for a follow up audio only telehealth visit for which they gave permission for. The patient was located at home [address] [place] [address] and I was located in my office in [place], [place]. The 10/2/23 UA showed proteinuria 30 mg/dL, moderate blood, large Leukocyte Esterase, 16/HPF of WBC. UA showed improvement as patient has chronic sales drainage. Her daughter states patient is okay considering coming home from the hospital. Most pain is in her back/shoulders and some of her back area is red/raw.  Recently her BP has increased to 180/100 and was prescribed Losartan 50mg twice daily. Urine is a clear color, denies cloudy and dark appearance.  Reviewed and discussed laboratory work of 10/2/23 which She obtained prior to today's visit as requested. Pt will go to her nearest [facility] lab for blood work and a urine sample which will be sent for urinalysis, urine culture, and urine cytology. Pt will schedule a telehealth visit after lab work to discuss results.   11/01/2023: Ms. MCDOWELL presents today for a follow up audio only telehealth visit for which they gave permission for. The patient was located at home [address] [place] [address] and I was located in my office in [place], [place]. The 10/13/23 CMP revealed creatinine at 0.58 and low ALT at 8 U/L. Patient was admitted into [facility] on 10/27/23 for sepsis/UTI. Her daughter states she may be released tomorrow. Daughter states during the day the patient's urine was normal, but she did have chills and a fever.   As the patient recently had a course of antibiotics, we suggested patient have catheter removed in hospital, as we will to observe bladder emptying and skin while the catheter is removed. If abnormalities arise, we will place the catheter back in.  If the daughter is to proceed with the plan, we will not discharge patient until a PVR is taken and until pt is able to void on her own the next morning. Hospitalist should weigh the diaper before and after and I will calculate it. If stool is present, then we will not be able to do so.  The daughter will have the Hospitalist contact me about patient.  Hospitalist has called at 4:06 pm. She states the urination looks contaminated and culture showed E-coli.  They will bladder scan her every hour.  Hospitalist will call tomorrow with update on patient and inform me on her fluid level.  Advised daughter to obtain a thermometer to observe temperature.    11/03/2023: Ms. MCDOWELL is a 89 year old female presenting today for an audio only telehealth visit for which she gave verbal permission. The patient was located in her home at [address] [place] [address]. I was located in my office on [place], [place]. She was accompanied by her daughter. She says her mother was supposed to be released today. Initially she was able to void. Because of constipation she was given a couple of enemas. She had a large evacuation and since then she has not been able to void again. The daughter says her latest PVR was 340 cc. She is waiting for her mother to be rescanned again.    Plan: If she is unable to void, she will be discharged with a sales catheter.   11/07/2023: Ms. CLAUDIO MCDOWELL presents today for a follow up audio only telephone visit for which she gave permission for. The pt was located at home, [address] [place] [address], and I was located in my office in [place], [place]. The visit was conducted with the patient's daughter. The patient came home from the hospital on Sunday. The catheter was put back in without another attempt at a void trial. The patient does have issues with constipation. She is able to swallow liquids. Her daughter has had to crush some of her pills to put them in a liquid, however the pt finds it to taste bitter and has some discomfort swallowing the chunks of crushed pills. The hospital discharged her with a 7 day supply of Cefuroxime. Was started yesterday so she has had two doses.    We discussed the possibility of a doing a trial of void. At this time I am recommending her mother settle back in to being home and we can readdress this possibility and how the patient and her daughter would like to proceed at the time of her next visit. Next catheter change will be on 12/5 should they choose to proceed with the catheter.   I looked it up on [employer] and Cefuroxime is available in the [place] as a liquid suspension. Given that this would be easier for the patient, I went to prescribe it for her which she could take rather than the pills. [employer] did not allow me to electronically prescribe it in a liquid suspension so I called the patient's pharmacy to give a verbal prescription for 200 ml of it for 20 ml BID for 5 days. I left a VM for the pharmacy as there was no answer. I requested they call me back if they could accept this prescription or if they could not. The pharmacy called me back shortly after and they told me that it is not currently offered in a liquid suspension, however he called the [facility] next door and they are willing to compound it for 40$. I saw this as acceptable and thanked him for going the extra mile and calling next door. He will contact the patient's daughter to inform her.   Patient and her daughter will have a telehealth visit in 2 weeks for reassessment, sooner if clinically indicated.   11/20/2023: Ms. MCDOWELL presents today for a follow up audio only telehealth visit for which they gave permission for. The patient was located at home [address] [place] [address] and I was located in my office in [facility]. Daughter states patient is okay. She notes giving pt suppositories and 7 hours later she had mixed bowel movements. However, she did not have a bowel movement since Saturday. Patient is given pedialyte and water frequently. Today's temperature was 95 as before it was 96-97. Pt did not drink any liquids shortly after taking temperature. Daughter denies giving pt solace but she usually gives Miralax as she has done so today. Senady's urine color is clear as it  has been clear for some time.  Daughter states her stomach was distended. Bp has stabilized since being in the hospital but last night and today it  was higher than normal being being 168/88 before medication.   Advised daughter to give patient Miralax tonight if there is no bowel movements.  Advised her to take Blood pressure tonight.  Pt will schedule a telehealth visit  11/29/2023: Ms. CLAUDIO MCDOWELL presents today for a follow up audio only telephone visit for which she gave permission for. The pt was located at home, [address] [place] [address], and I was located in my office in [place], [place]. Visit was conducted with her daughter, Andree. She was informed yesterday by her house call physicians of the results of her urine culture (emailed from core lab showing >100,000 CFU/ml Pseudomonas aeruginosa susceptible only to amikacin, Ciprofloxacin, imipenem, levofloxacin, meropenum. Resistant to Ceftazidime, Pip/tazo. Intermediate for aztrenam, cefepime. They discussed that patient is having increasing oxygen requirements, although SPO2 ok on supplemental O2 (prior to this patient needed only occasional O2 suppl. every few days). 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 The want to treat with ciprofloxacin, 7 day course. Prescription sent to pharmacy. Her daughter inquired today on if she should be treated. She states last week her mother seemed very not herself, however today she seems better in terms of mental status and oxygenation. She has been giving her senakot and miralax.   I explained that given her O2 issues and confusion over the last few days, I advised to treat with Cipro despite her daughter feeling she is better today. If there are any problems with crushing the pills for her or if she is not reacting to it well, she was advised to call right away. I posed the option of giving it as an oral solution if need be.    12/21/2023: Ms. MCDOWELL presents today for a follow up audio-visual telehealth visit for which they gave permission for. The patient was located at home [address] [place] [address] and I was located in my office in [place], [place]. The patient obtained lab work 12/4/23 prior to today's visit. Urinalysis is improved and showed trace proteinuria, small Leukocyte Esterase, 6/HPF of WBC and 7/LPF of Cast. Urine culture shows no significant growth, <10,000 CFU/mL Normal Urogenital Khadijah, which is very good. Her daughter expresses concern as recently patient is experiencing a surplus of looser and larger amounts of bowel movements that is escaping to the vaginal area, thus causing infections. Urine is normal in color, denies fever. Patient is occasionally more fatigued than usual. Denies distended appearance of the abdomen. Admits to only 1 spasm around the catheter this month. Next sales change will be in 2 weeks.  Towards the end of visit, patient was shown on Facetime, appearing well oriented x 3, normal pigmentation, lips are moist, and smile is symmetrical. I am very impressed by how well the patient looks and speaks. It was more than just pleasantries, she inquired about personal questions. Overall, I am very pleased.   Reviewed and discussed laboratory work of 12/4/23 which She obtained prior to today's visit as requested. Advised daughter to have care team to place support under the pelvis, so pelvis is tilted up at a higher level of the anus.   As long as stool is pasty and firm, we advised Andree to give patient MiraLAX. If stool is loose and unfirm, she should decrease the amount of MiraLAX.  Pt will provide a urine sample which will be sent for urinalysis, urine culture, and urine cytology. Pt will schedule a telehealth visit in 2 weeks for reassessment.   12/27/2023: Ms. CLAUDIO MCDOWELL presents today for a follow up audio only telephone visit for which she gave permission for. The pt was located at home, [address] [place] [address], and I was located in my office in [place], [place]. She is accompanied by her daughter. Patient provided a surveillance urine specimen on 12/22/2023. She has chronic sales catheter drainage. UA revealed small LEC, positive for nitrites, 6 WBC/HPF, no RBC seen, moderate bacteria/HPF, granular casts present, and yeast like cells present. Urine culture grew >100,000 CFU/ml Enterococcus faecalis and 50,000 - 99,000 CFU/mL Streptococcus mitis/oralis group. Urine cytology was negative for high grade urothelial carcinoma. Few mature squamous cells, rare benign urothelial cells and abundant fungal organisms morphologically consistent with Candida species present. Patient's daughter reports that her urine looks clear and a pale yellow color. She feels her mom still has a lot of anxiety and some confusion. She does report that her catheter bag sits in feces due to the patient's position. They tried to tilt her pelvis to avoid this but states it's easier said than done. She states there is no feces in the catheter or in the bag. BM are not loose, she describes them as pasty. Has about 2-3 BM a day.    Reviewed and discussed lab work of 12/22/2023 in detail with the pt.  Future urine orders were put in including fungal urine culture. Pt's daughter was advised to continue to try and alter the patient's position to avoid the catheter sitting in feces.  Patient should have a telehealth visit in 3 months, sooner if clinically indicated.    01/05/2024: Ms. MCDOWELL is a 89 year old female presenting today for an audio only telehealth visit for which she gave verbal permission. The patient was located in her home at [address] [place] [address]. I was located in my office on [place], [place]. She was accompanied by her daughter who helped with the visit. She reports that a nurse came for a catheter change, and only succeeded on the third attempt. She says it's the same nurse who has been coming for 5 months now, and usually she encounters no difficulties. Now that the catheter has been changed, the patient is able to void.   Plan: Pt's daughter will keep us updated. If the patient experiences fever or chill, she will call ER and inform the office.   01/17/2024: Ms. CLAUDIO MCDOWELL presents today for a follow up audio only telephone visit for which she gave permission for. The pt was located at home, [address] [place] [address], and I was located in my office in [place], [place]. She is accompanied by her daughter, Tabatha Deluna. Patient provided a urine specimen on 1/5/2024 for surveillance. UA revealed trace blood by urine and moderate LEC. This is excellent for her as she has chronic sales catheter drainage. Urine culture grew 50,000-99,000 CFU/ml Pseudomonas aeruginosa. Urine cytology was negative for high grade urothelial carcinoma. Few mature squamous cells, rare benign urothelial cells and abundant fungal organisms morphologically consistent with Candida species present. The patient's daughter reports that her mother is "different". She reports that she is hearing things a lot now. She states that usually the things are negative. For example, she will say "Why is Ac saying my oxygen isn't working properly?". It is clear what she is saying but fairly random. She is not seeing anything; it is strictly auditory.   Reviewed and discussed lab work of 1/5/2024 in detail with the pt's daughter. She was informed that given she has chronic sales catheter drainage, this specimen was excellent. If she had more WBC/HPF, I would be concerned, however she is in very good shape from a urologic standpoint. Her daughter was advised to speak with her mother's PCP about hearing things and her AMS as it is not urologic in origin or related to pending sepsis. I provided her the name of a neurologist, Dr.Li-Fen Tripathi if she would like to go for neuro consultation.  I once again re-iterated that at this time, given her skin breakdown, I recommend continued sales catheter drainage. Her daughter brought up an external urine collection apparatus once again, however I do not recommend we try that at least until her skin is in good condition. I also explained that if her mother were to go into urinary retention, this sort of device would not drain the urine from her.  Pt's daughter was advised to give her hyoscyamine for bladder spasms.  Patient will have a telehealth visit in 1-2 months, sooner if clinically indicated.    01/22/2024: Ms. CLAUDIO MCDOWELL presents today for a follow up audio only telephone visit for which she gave permission for. The pt was located at home, [address] [place] [address], and I was located in my office in [place], [place]. Visit was conducted with the patient's daughter, Tabatha Deluna. She informs me that she did not realize she was out of hyoscyamine sulfate for bladder spasms. She has been getting bladder spasms after the aide leaves and is embarrassed by this as she thinks shes wetting the bed. Her daughter reports that her mother asks in the morning "why am I bothering taking my medications, I am going to die anyway".   Renewed hyoscyamine sulfate 0.125 mg sublingual tablets, give one tablet under the tongue as needed. I recommend she gives it about 15 minutes before the aide comes. I suggest she give it for 10 days in a row to see if we can calm things down and then stop. If it returns, we can try again for another month or two. I did speak with the patient at the end of the visit. She seemed to know who I was and understood me. I spoke to her about the bladder spasms she is experiencing. She was on board with trying hyoscyamine and thanked me for speaking with her.   I recommend the patient's daughter speak with her PCP about potential anti-depressant medication.     Patient will have a telephone visit in 2 weeks for reassessment, sooner if clinically indicated.    Duration of telephone visit was 22 minutes.

## 2024-01-23 ENCOUNTER — NON-APPOINTMENT (OUTPATIENT)
Age: 89
End: 2024-01-23

## 2024-01-23 RX ORDER — HYDROPHILIC CREAM
PASTE (GRAM) TOPICAL
Qty: 1 | Refills: 0 | Status: ACTIVE | COMMUNITY
Start: 2024-01-19 | End: 1900-01-01

## 2024-01-26 ENCOUNTER — NON-APPOINTMENT (OUTPATIENT)
Age: 89
End: 2024-01-26

## 2024-01-30 ENCOUNTER — APPOINTMENT (OUTPATIENT)
Dept: HOME HEALTH SERVICES | Facility: HOME HEALTH | Age: 89
End: 2024-01-30

## 2024-01-30 VITALS
SYSTOLIC BLOOD PRESSURE: 160 MMHG | TEMPERATURE: 97.2 F | RESPIRATION RATE: 18 BRPM | HEART RATE: 56 BPM | DIASTOLIC BLOOD PRESSURE: 80 MMHG | OXYGEN SATURATION: 96 %

## 2024-01-30 RX ORDER — NYSTATIN 100000 [USP'U]/G
100000 CREAM TOPICAL 3 TIMES DAILY
Qty: 1 | Refills: 3 | Status: ACTIVE | COMMUNITY
Start: 2024-01-30

## 2024-01-31 NOTE — DIETITIAN INITIAL EVALUATION ADULT - WEIGHT IN LBS
145 For information on Fall & Injury Prevention, visit: [email or web address] OR  [email or web address] OR  https://www.cdc.gov/steadi/patient.html

## 2024-02-02 ENCOUNTER — NON-APPOINTMENT (OUTPATIENT)
Age: 89
End: 2024-02-02

## 2024-02-05 ENCOUNTER — APPOINTMENT (OUTPATIENT)
Dept: UROLOGY | Facility: CLINIC | Age: 89
End: 2024-02-05

## 2024-02-05 ENCOUNTER — APPOINTMENT (OUTPATIENT)
Dept: UROLOGY | Facility: CLINIC | Age: 89
End: 2024-02-05
Payer: MEDICARE

## 2024-02-05 DIAGNOSIS — N95.2 POSTMENOPAUSAL ATROPHIC VAGINITIS: ICD-10-CM

## 2024-02-05 PROCEDURE — 99214 OFFICE O/P EST MOD 30 MIN: CPT

## 2024-02-05 NOTE — ADDENDUM
[FreeTextEntry1] : This note was authored by Senia Corbin working as a scribe for Dr.Gary Sexton. I, Dr. Indio Sexton have reviewed the content of this note and confirm it is true and accurate. I personally performed the history and physical examination and made all the decisions 02/05/2024

## 2024-02-05 NOTE — HISTORY OF PRESENT ILLNESS
[FreeTextEntry1] : Claudio Mcdowell in her daughter Tabatha Deluna gave permission for an audio video telehealth visit.  They were in their home in [facility].  I was in my office in [facility].  Claudio Mcdowell is currently 89 years of age.  Her daughter Tabatha Deluna is devoted to her care.  She is very concerned about her mother and has been so for many years.  She becomes very anxious at times and that is concerning her mother.  Claudio Mcdowell lives with her daughter.  Claudio Mcdowell has been bedbound for several years.  The patient had developed a sacral decubitus ulcer while in a nursing facility.  A Sales catheter was placed because of fecal incontinence and concern that urine mixed with the feces would contaminate the sacral decubitus ulcer.  Once the patient left the nursing facility and will be with her daughter.  The sacral decubitus ulcer has finally healed.  I have discussed removing the Sales catheter with the daughter.  However as the patient has fecal incontinence there is concern about the urine mixing with the feces and being more likely to cause skin problems.  For now we will leave the Sales catheter in.  The patient has had clinically symptomatic urinary tract infections.  He clinically significant infections usually start with increased urination around the Sales catheter due to bladder spasms.  Bladder spasms have been occurring for staff.  The patient is positioned properly in in bed and the personal care attendant leaves for the day.  We have managed to prevent this with the administration of sublingual hyoscyamine.  We do periodic surveillance urine analysis and urine culture tests at the time the Sales catheter is changed by visiting nurse.  He also performed a times of increased spasms or purulence of the urine or change in mental status,  On October 11, 2022 visiting nurse using an order I have previously placed at the request of the daughter sent urine for urine cytology which proved to be negative for malignant cells, urine culture which grew Greater than 100,000 and E. coli that was sensitive to all antibiotics tested.  Urinalysis looked quite good for urine taken from a Sales catheter that was used for chronic Sales catheter drainage.  Ileukocyte esterase was large but nitrites were negative.  There were 2 epithelial cells per high-power field.  There were only 10 white blood cells per high-power field and only 2 red blood cells per high-power field on microscopic exam there were no bacteria seen and there were no hyaline casts.  Specific gravity was 1.010 which shows good hydration and this bedbound woman cared for diligently by her daughter.  Blood by dipstick was trace.  There was no protein glucose or ketones in the urine.  There is no bilirubin and no urobilinogen.  An important portion of the visit was my review with the daughter about bladder spasms and urination around the catheter.  Urine appearance and urine analysis have been clear.  The urine was clear again today.  The patient has significant short-term memory deficit.  She does have some useful short-term memory.  Her long-term memory remains very much intact.  She is very pleasant and to make satisfactory conversation.  She does admit to sometimes not remembering something.  Her complexion was good and there was no pallor.  Facial movements were symmetric.  Cranial nerves were intact.  I was not able to assess the olfactory nerve as this was a telehealth visit.    11/04/2022: Ms. MCDOWELL is a 89 year old female presenting today for a telehealth visit. She was accompanied by her daughter who helped with the visit. They gave permission for an audio only telehealth conference. The patient was located in her home in [place], [place]. I was located in my office on [place], [place]. Today her daughter reports the pt experienced rare urinary leaking around the catheter due to bladder spasms. She also reports her systolic pressure was around 140 last week. I offered Gemtesa to manage the bladder spasms and the daughter was cautious about more medications. I informed her if the urinary leaking are only once every 2 weeks or so, she does not have to medicate. The daughter was agreeable to monitor the occurrence of urinary leaking over the next 4 weeks and assess the discomfort it causes the pt. She denies any other urinary issues.  03/01/2023: Ms. MCDOWELL is a 89 year old female presenting today for a telehealth visit. She was accompanied by her daughter who helped with the visit. They gave permission for an audio only telehealth conference. The patient was located in her home in [place], [place]. I was located in my office on [place], [place]. The pt has chronic sales catheter drainage. She provided a urine specimen from the catheter on 2/27/2023. UA was slightly turbid with large LEC and 59 WBC/HPF. Culture grew >100,000 CFU/ml E.Coli. The sensitivity report is not yet back. The pt's daughter was concerned as when the patient was constipated a few weeks ago she was having very watery BM which grew messy and were around the catheter area. She was given a Fleet's enema last month which helped. Currently she is having BM, paste-like in appearance. Her daughter does not think that her stomach is distended. She has recently had the catheter changed and is not voiding around it. Urine has been very clear. She does not seem to have pain in the urinary passageway or bladder. Denies fevers. Denies gross hematuria. Pt had covid around Thanksgiving, only had the first two vaccines from two years ago.   03/13/2023: Ms. MCDOWELL is a 89 year old female presenting today for a telehealth visit. She was accompanied by her daughter who helped with the visit. They gave permission for an audio only telehealth conference. The patient was located in her home in [place], [place]. I was located in my office on [place], [place]. The patient obtained an US prior to today's visit 3/9/23 which revealed: Comparison is made with the exam of 2/28/22. Transabdominal ultrasound of the abdomen was performed with color flow imaging. The examination is limited in evaluation. The visualized aorta and inferior vena cava show no abnormality. The pancreas is obscured by overlying bowel gas. The liver measures 12.4 cm with homogeneous echotexture. No intrinsic mass and no intra-or extrahepatic ductal dilatation. There is hepatopedal flow of the main portal vein. No gallstones, pericholecystic fluid, wall thickening or positive sonographic Melendez sign. The common bile duct measures 0.3 cm. The right kidney measures 9.2 x 5.6 x 3.0 cm with a nonobstructing 1.2 cm stone in the upper pole. Multiple additional subcentimeter calcifications are seen. These were not seen on previous exam. No hydronephrosis or renal mass. The left kidney measures 9.3 x 3.9 x 3.5 cm. There is a 2.1 x 2.4 x 2.0 cm cyst in the medial mid pole, not seen on previous study. No hydronephrosis or renal calcification. The spleen measures 8.2 cm and show no abnormality. There is a small right pleural effusion, stable. IMPRESSIONS: Multiple nonobstructing stones of the right kidney with no hydronephrosis. 2.4 cm cyst of the midpole left kidney. Small right pleural effusion, stable. The daughter has answered the phone and reports the patient's urine does not get dark. She is not aware of the amount of water she gives her mother. I requested she measure's this from now on as the pt has stones and needs to increase water consumption. Her daughter reports this week the pt has experienced more spasms than normal and believes this may be due to severe constipation. She provided the pt with an Enema to aide in this situation. She is very concerned but I informed her the Enema may have stimulated the spasms and we should give her a few days to clear up and re-evaluate.   03/22/2023: Ms. CLAUDIO MCDOWELL presents today for an audio visual telehealth visit for which she gave permission for. The patient was located at home at [address] [place] [address] and I was located at my office in [place], [place]. She is accompanied by her daughter whom most of the visit was conducted with. Her daughter has been keeping track of her mother's water consumption. She is averaging about 58 oz of water in a 24 hr period. Additionally, she is drinking juice and ensure nutrition drinks. She does not consume any caffeine. She is on furosemide 40 mg. I said hello to the patient at the end of the visit and she is looking well. She reports feeling no pain at the current moment.   3/29/2023:  Patient Claudio Mcdowell and daughter Tabatha Deluna were home in [place], [place] and I was in my office in [facility].  Patient and daughter gave permission for a [facility] telehealth visit.  They preferred this to [place].  The patient looks good today.  Her complexion was good there is no pallor.  Smile was symmetric her affect was normal she appeared happy she could make conversation it was appropriate.  She said that she currently had no pain.  When I asked questions that after 3/2 how she had been recently the sometimes she hesitated and deferred to her daughter for cancer compatible with her impaired short-term memory.  Long-term memory remains good she recognizes me once know so I am does ask questions about things like her bladder as she was worried about it her blood tests.  I assured her the results were satisfactory.  04/18/2023: Ms. MCDOWELL is a 89 year old female presenting today for an audio and visual telehealth visit for which she gave verbal permission. We utilized [employer] teams telemetry doc platform. The patient was located in her home at [address] [place] [address]. I was located in my office on [place], [place]. She was accompanied by her daughter who helped to serve as a historian. She reports discomfort in her eyes. She describes the discomfort as a feeling of sand. She opened her eyes for me, and they do not look red. Pupils look normal. She reports the right eyes bothers her significantly more than the left. I advise that the patient tries lubricating drops and it if does not feel better she will notify her visiting nurse who is due to come next week for a sales catheter change. She reports episode of bladder spasms. She denies gross hematuria. She describes the urine as barely yellow. I reviewed and discussed her  latest pertinent lab on 04/12/2023 which shows "alkaline phosphate normal at 89. Creatinine was normal at 0.62 mg/dL. WBC normal at 5.01. RBC normal at 3.87".   05/09/2023: Ms. MCDOWELL presents today for a follow up audio only telehealth visit for which they gave permission for. She was accompanied by her daughter who helped to serve as a historian. The patient was located at home [address] [place] [address] and I was located in my office in [place], [place]. The patient obtained lab work 5/2/23 prior to today's visit. The UA revealed microscopic hematuria, 5/HPF RBC, 27/HPF WBC. The patient demonstrated great hydration as the specific gravity is 1.006. The Sales catheter was changed May 2, 2023 with no clear urine in the bag. While changing the diaper the aide reports blood in the diaper. The daughter states last week her mother experienced spasms but as of two days ago she is now fine. However, she states the patient is more fatigued. She denies the pt having a temperature. I assured her because her mother is post menopausal any abrasion to the labia can cause a fissure.   06/02/2023: Ms. MCDOWELL is a 89 year old female presenting today for an audio and visual telehealth visit for which she gave verbal permission. We utilized [employer] teams telemetry [employer] platform. The patient was located in her home at [address] [place] [address]. I was located in my office on [place], [place]. She was accompanied by her daughter Tabatha. She reports that her mother complains of onset dysuria that dissipated on its own and has not recurred since. She reports right sided back pain that is aggravated when she lays on the right side. She provided urine specimen. She notes the urine was very clear. I reviewed and discussed the patient's pertinent lab works. Her latest Urinalysis on 05/30/2023 that shows "60 WBC/HPF. Trace Blood Urine. Specific Gravity Urine 1.007". Urine Culture on the same date shows ">100,000 CFU/ml Escherichia coli and <10,000 CFU/ml Normal Urogenital ángel present". The patient takes Mirtazapine for anxiety at low dose. The daughter reports the patient is experiencing increasing anxiety and is thinking of having the prescriber increasing the dosage.  Her BP runs around 120/60. Her Hemoglobin hematocrit looks good. gaze is conjugated. facial expression looks symmetric. Urine was faint yellow and clear.   06/06/2023: Ms. MCDOWELL presents today for a follow up audio only telehealth visit for which they gave permission for. The patient was located at home [address] [place] [address] and I was located in my office in [place], [place]. She was accompanied by her daughter Tabatha. The daughter states pt is experiencing episodes of a dry cough. An X Ray  of 6/5/23 revealed Mild peribronchial thickening suggesting bronchitis in the right clinical setting with no focal pneumonia or congestive heart failure. COPD with chronic lung changes. The mental status is the same as usual but her BP has elevated some. I assured her a little elevation is not as dangerous. The pt continues to experience some urinary frequency and it is a little more concentrated.   10/12/2023: Ms. MCDOWELL presents today for a follow up audio only telehealth visit for which they gave permission for. The patient was located at home [address] [place] [address] and I was located in my office in [place], [place]. The 10/2/23 UA showed proteinuria 30 mg/dL, moderate blood, large Leukocyte Esterase, 16/HPF of WBC. UA showed improvement as patient has chronic sales drainage. Her daughter states patient is okay considering coming home from the hospital. Most pain is in her back/shoulders and some of her back area is red/raw.  Recently her BP has increased to 180/100 and was prescribed Losartan 50mg twice daily. Urine is a clear color, denies cloudy and dark appearance.  11/01/2023: Ms. MCDOWELL presents today for a follow up audio only telehealth visit for which they gave permission for. The patient was located at home [address] [place] [address] and I was located in my office in [place], [place]. The 10/13/23 CMP revealed creatinine at 0.58 and low ALT at 8 U/L. Patient was admitted into [facility] on 10/27/23 for sepsis/UTI. Her daughter states she may be released tomorrow. Daughter states during the day the patient's urine was normal, but she did have chills and a fever.   11/03/2023: Ms. MCDOWELL is a 89 year old female presenting today for an audio only telehealth visit for which she gave verbal permission. The patient was located in her home at [address] [place] [address]. I was located in my office on [place], [place]. She was accompanied by her daughter. She says her mother was supposed to be released today. Initially she was able to void. Because of constipation she was given a couple of enemas. She had a large evacuation and since then she has not been able to void again. The daughter says her latest PVR was 340 cc. She is waiting for her mother to be rescanned again.   11/07/2023: Ms. CLAUDIO MCDOWELL presents today for a follow up audio only telephone visit for which she gave permission for. The pt was located at home, [address] [place] [address], and I was located in my office in [place], [place]. The visit was conducted with the patient's daughter. The patient came home from the hospital on Sunday. The catheter was put back in without another attempt at a void trial. The patient does have issues with constipation. She is able to swallow liquids. Her daughter has had to crush some of her pills to put them in a liquid, however the pt finds it to taste bitter and has some discomfort swallowing the chunks of crushed pills. The hospital discharged her with a 7 day supply of Cefuroxime. Was started yesterday so she has had two doses.   11/20/2023: Ms. MCDOWELL presents today for a follow up audio only telehealth visit for which they gave permission for. The patient was located at home [address] [place] [address] and I was located in my office in [facility]. Daughter states patient is okay. She notes giving pt suppositories and 7 hours later she had mixed bowel movements. However, she did not have a bowel movement since Saturday. Patient is given pedialyte and water frequently. Today's temperature was 95 as before it was 96-97. Pt did not drink any liquids shortly after taking temperature. Daughter denies giving pt solace but she usually gives Miralax as she has done so today. Toady's urine color is clear as it  has been clear for some time.  Daughter states her stomach was distended. Bp has stabilized since being in the hospital but last night and today it  was higher than normal being being 168/88 before medication.   11/29/2023: Ms. CLAUDIO MCDOWELL presents today for a follow up audio only telephone visit for which she gave permission for. The pt was located at home, [address] [place] [address], and I was located in my office in [place], [place]. Visit was conducted with her daughter, Andree. She was informed yesterday by her house call physicians of the results of her urine culture (emailed from core lab showing >100,000 CFU/ml Pseudomonas aeruginosa susceptible only to amikacin, Ciprofloxacin, imipenem, levofloxacin, meropenum. Resistant to Ceftazidime, Pip/tazo. Intermediate for aztrenam, cefepime. They discussed that patient is having increasing oxygen requirements, although SPO2 ok on supplemental O2 (prior to this patient needed only occasional O2 suppl. every few days). 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 The want to treat with ciprofloxacin, 7 day course. Prescription sent to pharmacy. Her daughter inquired today on if she should be treated. She states last week her mother seemed very not herself, however today she seems better in terms of mental status and oxygenation. She has been giving her senakot and miralax.   12/21/2023: Ms. MCDOWELL presents today for a follow up audio-only telehealth visit for which they gave permission for. The patient was located at home [address] [place] [address] and I was located in my office in [place], [place]. The patient obtained lab work 12/4/23 prior to today's visit. Urinalysis is improved and showed trace proteinuria, small Leukocyte Esterase, 6/HPF of WBC and 7/LPF of Cast. Urine culture shows no significant growth, <10,000 CFU/mL Normal Urogenital Ángel, which is very good. Her daughter expresses concern as recently patient is experiencing a surplus of looser and larger amounts of bowel movements that is escaping to the vaginal area, thus causing infections. Urine is normal in color, denies fever. Patient is occasionally more fatigued than usual. Denies distended appearance of the abdomen. Admits to only 1 spasm around the catheter this month. Next sales change will be in 2 weeks.  Towards the end of visit, patient was shown on Facetime, appearing well oriented x 3, normal pigmentation, lips are moist, and smile is symmetrical. I am very impressed by how well the patient looks and speaks. It was more than just pleasantries, she inquired about personal questions. Overall, I am very pleased.   12/27/2023: Ms. CLAUDIO MCDOWELL presents today for a follow up audio only telephone visit for which she gave permission for. The pt was located at home, [address] [place] [address], and I was located in my office in [place], [place]. She is accompanied by her daughter. Patient provided a surveillance urine specimen on 12/22/2023. She has chronic sales catheter drainage. UA revealed small LEC, positive for nitrites, 6 WBC/HPF, no RBC seen, moderate bacteria/HPF, granular casts present, and yeast like cells present. Urine culture grew >100,000 CFU/ml Enterococcus faecalis and 50,000 - 99,000 CFU/mL Streptococcus mitis/oralis group. Urine cytology was negative for high grade urothelial carcinoma. Few mature squamous cells, rare benign urothelial cells and abundant fungal organisms morphologically consistent with Candida species present. Patient's daughter reports that her urine looks clear and a pale yellow color. She feels her mom still has a lot of anxiety and some confusion. She does report that her catheter bag sits in feces due to the patient's position. They tried to tilt her pelvis to avoid this but states it's easier said than done. She states there is no feces in the catheter or in the bag. BM are not loose, she describes them as pasty. Has about 2-3 BM a day.   01/05/2024: Ms. MCDOWELL is a 89 year old female presenting today for an audio only telehealth visit for which she gave verbal permission. The patient was located in her home at [address] [place] [address]. I was located in my office on [place], [place]. She was accompanied by her daughter who helped with the visit. She reports that a nurse came for a catheter change, and only succeeded on the third attempt. She says it's the same nurse who has been coming for 5 months now, and usually she encounters no difficulties. Now that the catheter has been changed, the patient is able to void.   01/17/2024: Ms. CLAUDIO MCDOWELL presents today for a follow up audio only telephone visit for which she gave permission for. The pt was located at home, [address] [place] [address], and I was located in my office in [place], [place]. She is accompanied by her daughter, Tabatha Deluna. Patient provided a urine specimen on 1/5/2024 for surveillance. UA revealed trace blood by urine and moderate LEC. This is excellent for her as she has chronic sales catheter drainage. Urine culture grew 50,000-99,000 CFU/ml Pseudomonas aeruginosa. Urine cytology was negative for high grade urothelial carcinoma. Few mature squamous cells, rare benign urothelial cells and abundant fungal organisms morphologically consistent with Candida species present. The patient's daughter reports that her mother is "different". She reports that she is hearing things a lot now. She states that usually the things are negative. For example, she will say "Why is Ac saying my oxygen isn't working properly?". It is clear what she is saying but fairly random. She is not seeing anything; it is strictly auditory.   01/22/2024: Ms. CLAUDIO MCDOWELL presents today for a follow up audio only telephone visit for which she gave permission for. The pt was located at home, [address] [place] [address], and I was located in my office in [place], [place]. Visit was conducted with the patient's daughter, Tabatha Deluna. She informs me that she did not realize she was out of hyoscyamine sulfate for bladder spasms. She has been getting bladder spasms after the aide leaves and is embarrassed by this as she thinks shes wetting the bed. Her daughter reports that her mother asks in the morning "why am I bothering taking my medications, I am going to die anyway".   02/05/2024: Ms. CLAUDIO MCDOWELL presents today for an audio visual telehealth visit for which she gave permission for. The patient was located at home at [address] [place] [address] and I was located at my office in [place], [place]. Pt's daughter states she was not expecting my call today. I informed her that she was on my schedule and that if she would like we can talk. She informs me that hyoscyamine does not seem to be working for her mother and that some days she has bladder spasms and some she doesn't. Pt is currently not having diarrhea. She is due for a catheter change tomorrow. Patient has not started any medications for the psychological issues she has been having.

## 2024-02-05 NOTE — ASSESSMENT
[FreeTextEntry1] : 10/12/2023: Ms. MCDOWELL presents today for a follow up audio only telehealth visit for which they gave permission for. The patient was located at home [address] [place] [address] and I was located in my office in [place], [place]. The 10/2/23 UA showed proteinuria 30 mg/dL, moderate blood, large Leukocyte Esterase, 16/HPF of WBC. UA showed improvement as patient has chronic sales drainage. Her daughter states patient is okay considering coming home from the hospital. Most pain is in her back/shoulders and some of her back area is red/raw.  Recently her BP has increased to 180/100 and was prescribed Losartan 50mg twice daily. Urine is a clear color, denies cloudy and dark appearance.  Reviewed and discussed laboratory work of 10/2/23 which She obtained prior to today's visit as requested. Pt will [facility] for blood work and a urine sample which will be sent for urinalysis, urine culture, and urine cytology. Pt will schedule a telehealth visit after lab work to discuss results.   11/01/2023: Ms. MCDOWELL presents today for a follow up audio only telehealth visit for which they gave permission for. The patient was located at home [address] [place] [address] and I was located in my office in [place], [place]. The 10/13/23 CMP revealed creatinine at 0.58 and low ALT at 8 U/L. Patient was admitted into [facility] on 10/27/23 for sepsis/UTI. Her daughter states she may be released tomorrow. Daughter states during the day the patient's urine was normal, but she did have chills and a fever.   As the patient recently had a course of antibiotics, we suggested patient have catheter removed in hospital, as we will to observe bladder emptying and skin while the catheter is removed. If abnormalities arise, we will place the catheter back in.  If the daughter is to proceed with the plan, we will not discharge patient until a PVR is taken and until pt is able to void on her own the next morning. Hospitalist should weigh the diaper before and after and I will calculate it. If stool is present, then we will not be able to do so.  The daughter will have the Hospitalist contact me about patient.  Hospitalist has called at 4:06 pm. She states the urination looks contaminated and culture showed E-coli.  They will bladder scan her every hour.  Hospitalist will call tomorrow with update on patient and inform me on her fluid level.  Advised daughter to obtain a thermometer to observe temperature.    11/03/2023: Ms. MCDOWELL is a 89 year old female presenting today for an audio only telehealth visit for which she gave verbal permission. The patient was located in her home at [address] [place] [address]. I was located in my office on [place], [place]. She was accompanied by her daughter. She says her mother was supposed to be released today. Initially she was able to void. Because of constipation she was given a couple of enemas. She had a large evacuation and since then she has not been able to void again. The daughter says her latest PVR was 340 cc. She is waiting for her mother to be rescanned again.    Plan: If she is unable to void, she will be discharged with a sales catheter.   11/07/2023: Ms. CLAUDIO MCDOWELL presents today for a follow up audio only telephone visit for which she gave permission for. The pt was located at home, [address] [place] [address], and I was located in my office in [place], [place]. The visit was conducted with the patient's daughter. The patient came home from the hospital on Sunday. The catheter was put back in without another attempt at a void trial. The patient does have issues with constipation. She is able to swallow liquids. Her daughter has had to crush some of her pills to put them in a liquid, however the pt finds it to taste bitter and has some discomfort swallowing the chunks of crushed pills. The hospital discharged her with a 7 day supply of Cefuroxime. Was started yesterday so she has had two doses.    We discussed the possibility of a doing a trial of void. At this time I am recommending her mother settle back in to being home and we can readdress this possibility and how the patient and her daughter would like to proceed at the time of her next visit. Next catheter change will be on 12/5 should they choose to proceed with the catheter.   I looked it up on [employer] and Cefuroxime is available in the [place] as a liquid suspension. Given that this would be easier for the patient, I went to prescribe it for her which she could take rather than the pills. [employer] did not allow me to electronically prescribe it in a liquid suspension so I called the patient's pharmacy to give a verbal prescription for 200 ml of it for 20 ml BID for 5 days. I left a VM for the pharmacy as there was no answer. I requested they call me back if they could accept this prescription or if they could not. The pharmacy called me back shortly after and they told me that it is not currently offered in a liquid suspension, however he called the [facility] next door and they are willing to compound it for 40$. I saw this as acceptable and thanked him for going the extra mile and calling next door. He will contact the patient's daughter to inform her.   Patient and her daughter will have a telehealth visit in 2 weeks for reassessment, sooner if clinically indicated.   11/20/2023: Ms. MCDOWELL presents today for a follow up audio only telehealth visit for which they gave permission for. The patient was located at home [address] [place] [address] and I was located in my office in [facility]. Daughter states patient is okay. She notes giving pt suppositories and 7 hours later she had mixed bowel movements. However, she did not have a bowel movement since Saturday. Patient is given pedialyte and water frequently. Today's temperature was 95 as before it was 96-97. Pt did not drink any liquids shortly after taking temperature. Daughter denies giving pt solace but she usually gives Miralax as she has done so today. Senady's urine color is clear as it  has been clear for some time.  Daughter states her stomach was distended. Bp has stabilized since being in the hospital but last night and today it  was higher than normal being being 168/88 before medication.   Advised daughter to give patient Miralax tonight if there is no bowel movements.  Advised her to take Blood pressure tonight.  Pt will schedule a telehealth visit  11/29/2023: Ms. CLAUDIO MCDOWELL presents today for a follow up audio only telephone visit for which she gave permission for. The pt was located at home, [address] [place] [address], and I was located in my office in [place], [place]. Visit was conducted with her daughter, Andree. She was informed yesterday by her house call physicians of the results of her urine culture (emailed from core lab showing >100,000 CFU/ml Pseudomonas aeruginosa susceptible only to amikacin, Ciprofloxacin, imipenem, levofloxacin, meropenum. Resistant to Ceftazidime, Pip/tazo. Intermediate for aztrenam, cefepime. They discussed that patient is having increasing oxygen requirements, although SPO2 ok on supplemental O2 (prior to this patient needed only occasional O2 suppl. every few days). 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 The want to treat with ciprofloxacin, 7 day course. Prescription sent to pharmacy. Her daughter inquired today on if she should be treated. She states last week her mother seemed very not herself, however today she seems better in terms of mental status and oxygenation. She has been giving her senakot and miralax.   I explained that given her O2 issues and confusion over the last few days, I advised to treat with Cipro despite her daughter feeling she is better today. If there are any problems with crushing the pills for her or if she is not reacting to it well, she was advised to call right away. I posed the option of giving it as an oral solution if need be.    12/21/2023: Ms. MCDOWELL presents today for a follow up audio-visual telehealth visit for which they gave permission for. The patient was located at home [address] [place] [address] and I was located in my office in [place], [place]. The patient obtained lab work 12/4/23 prior to today's visit. Urinalysis is improved and showed trace proteinuria, small Leukocyte Esterase, 6/HPF of WBC and 7/LPF of Cast. Urine culture shows no significant growth, <10,000 CFU/mL Normal Urogenital Khadijah, which is very good. Her daughter expresses concern as recently patient is experiencing a surplus of looser and larger amounts of bowel movements that is escaping to the vaginal area, thus causing infections. Urine is normal in color, denies fever. Patient is occasionally more fatigued than usual. Denies distended appearance of the abdomen. Admits to only 1 spasm around the catheter this month. Next sales change will be in 2 weeks.  Towards the end of visit, patient was shown on Facetime, appearing well oriented x 3, normal pigmentation, lips are moist, and smile is symmetrical. I am very impressed by how well the patient looks and speaks. It was more than just pleasantries, she inquired about personal questions. Overall, I am very pleased.   Reviewed and discussed laboratory work of 12/4/23 which She obtained prior to today's visit as requested. Advised daughter to have care team to place support under the pelvis, so pelvis is tilted up at a higher level of the anus.   As long as stool is pasty and firm, we advised Andree to give patient MiraLAX. If stool is loose and unfirm, she should decrease the amount of MiraLAX.  Pt will provide a urine sample which will be sent for urinalysis, urine culture, and urine cytology. Pt will schedule a telehealth visit in 2 weeks for reassessment.   12/27/2023: Ms. CLAUDIO MCDOWELL presents today for a follow up audio only telephone visit for which she gave permission for. The pt was located at home, [address] [place] [address], and I was located in my office in [place], [place]. She is accompanied by her daughter. Patient provided a surveillance urine specimen on 12/22/2023. She has chronic sales catheter drainage. UA revealed small LEC, positive for nitrites, 6 WBC/HPF, no RBC seen, moderate bacteria/HPF, granular casts present, and yeast like cells present. Urine culture grew >100,000 CFU/ml Enterococcus faecalis and 50,000 - 99,000 CFU/mL Streptococcus mitis/oralis group. Urine cytology was negative for high grade urothelial carcinoma. Few mature squamous cells, rare benign urothelial cells and abundant fungal organisms morphologically consistent with Candida species present. Patient's daughter reports that her urine looks clear and a pale yellow color. She feels her mom still has a lot of anxiety and some confusion. She does report that her catheter bag sits in feces due to the patient's position. They tried to tilt her pelvis to avoid this but states it's easier said than done. She states there is no feces in the catheter or in the bag. BM are not loose, she describes them as pasty. Has about 2-3 BM a day.    Reviewed and discussed lab work of 12/22/2023 in detail with the pt.  Future urine orders were put in including fungal urine culture. Pt's daughter was advised to continue to try and alter the patient's position to avoid the catheter sitting in feces.  Patient should have a telehealth visit in 3 months, sooner if clinically indicated.    01/05/2024: Ms. MCDOWELL is a 89 year old female presenting today for an audio only telehealth visit for which she gave verbal permission. The patient was located in her home at [address] [place] [address]. I was located in my office on [place], [place]. She was accompanied by her daughter who helped with the visit. She reports that a nurse came for a catheter change, and only succeeded on the third attempt. She says it's the same nurse who has been coming for 5 months now, and usually she encounters no difficulties. Now that the catheter has been changed, the patient is able to void.   Plan: Pt's daughter will keep us updated. If the patient experiences fever or chill, she will call ER and inform the office.   01/17/2024: Ms. CLAUDIO MCDOWELL presents today for a follow up audio only telephone visit for which she gave permission for. The pt was located at home, [address] [place] [address], and I was located in my office in [place], [place]. She is accompanied by her daughter, Tabatha Deluna. Patient provided a urine specimen on 1/5/2024 for surveillance. UA revealed trace blood by urine and moderate LEC. This is excellent for her as she has chronic sales catheter drainage. Urine culture grew 50,000-99,000 CFU/ml Pseudomonas aeruginosa. Urine cytology was negative for high grade urothelial carcinoma. Few mature squamous cells, rare benign urothelial cells and abundant fungal organisms morphologically consistent with Candida species present. The patient's daughter reports that her mother is "different". She reports that she is hearing things a lot now. She states that usually the things are negative. For example, she will say "Why is Ac saying my oxygen isn't working properly?". It is clear what she is saying but fairly random. She is not seeing anything; it is strictly auditory.   Reviewed and discussed lab work of 1/5/2024 in detail with the pt's daughter. She was informed that given she has chronic sales catheter drainage, this specimen was excellent. If she had more WBC/HPF, I would be concerned, however she is in very good shape from a urologic standpoint. Her daughter was advised to speak with her mother's PCP about hearing things and her AMS as it is not urologic in origin or related to pending sepsis. I provided her the name of a neurologist, Dr.Li-Fen Tripathi if she would like to go for neuro consultation.  I once again re-iterated that at this time, given her skin breakdown, I recommend continued sales catheter drainage. Her daughter brought up an external urine collection apparatus once again, however I do not recommend we try that at least until her skin is in good condition. I also explained that if her mother were to go into urinary retention, this sort of device would not drain the urine from her.  Pt's daughter was advised to give her hyoscyamine for bladder spasms.  Patient will have a telehealth visit in 1-2 months, sooner if clinically indicated.    01/22/2024: Ms. CLAUDIO MCDOWELL presents today for a follow up audio only telephone visit for which she gave permission for. The pt was located at home, [address] [place] [address], and I was located in my office in [place], [place]. Visit was conducted with the patient's daughter, Tabatha Deluna. She informs me that she did not realize she was out of hyoscyamine sulfate for bladder spasms. She has been getting bladder spasms after the aide leaves and is embarrassed by this as she thinks shes wetting the bed. Her daughter reports that her mother asks in the morning "why am I bothering taking my medications, I am going to die anyway".   Renewed hyoscyamine sulfate 0.125 mg sublingual tablets, give one tablet under the tongue as needed. I recommend she gives it about 15 minutes before the aide comes. I suggest she give it for 10 days in a row to see if we can calm things down and then stop. If it returns, we can try again for another month or two. I did speak with the patient at the end of the visit. She seemed to know who I was and understood me. I spoke to her about the bladder spasms she is experiencing. She was on board with trying hyoscyamine and thanked me for speaking with her.  I recommend the patient's daughter speak with her PCP about potential anti-depressant medication.   Patient will have a telephone visit in 2 weeks for reassessment, sooner if clinically indicated.    02/05/2024: Ms. CLAUDIO MCDOWELL presents today for an audio visual telehealth visit for which she gave permission for. The patient was located at home at [address] [place] [address] and I was located at my office in [place], [place]. Pt's daughter states she was not expecting my call today. I informed her that she was on my schedule and that if she would like we can talk. She informs me that hyoscyamine does not seem to be working for her mother and that some days she has bladder spasms and some she doesn't. Pt is currently not having diarrhea. She is due for a catheter change tomorrow. Patient has not started any medications for the psychological issues she has been having.    Pt will discontinue use of hyoscyamine. I prescribed the patient Trospium 20 mg, one tablet once daily at bedtime. I informed the patient there may be constipation as a side effect.     Orders for UA, urine culture, and urine cytology were put in for her daughter to bring the patient's sample to her nearest  lab.   Patient will have a telehealth visit this Friday after the catheter change.     Preparation, audio-visual visit, and coordination of care took 30 minutes.

## 2024-02-09 ENCOUNTER — APPOINTMENT (OUTPATIENT)
Dept: UROLOGY | Facility: CLINIC | Age: 89
End: 2024-02-09
Payer: MEDICARE

## 2024-02-09 ENCOUNTER — TRANSCRIPTION ENCOUNTER (OUTPATIENT)
Age: 89
End: 2024-02-09

## 2024-02-09 LAB
APPEARANCE: CLEAR
BACTERIA UR CULT: ABNORMAL
BACTERIA: NEGATIVE /HPF
BILIRUBIN URINE: NEGATIVE
BLOOD URINE: NEGATIVE
CAST: 0 /LPF
COLOR: YELLOW
EPITHELIAL CELLS: 0 /HPF
GLUCOSE QUALITATIVE U: NEGATIVE MG/DL
KETONES URINE: NEGATIVE MG/DL
LEUKOCYTE ESTERASE URINE: ABNORMAL
MICROSCOPIC-UA: NORMAL
NITRITE URINE: NEGATIVE
PH URINE: 7
PROTEIN URINE: NEGATIVE MG/DL
RED BLOOD CELLS URINE: 0 /HPF
SPECIFIC GRAVITY URINE: 1
URINE CYTOLOGY: NORMAL
UROBILINOGEN URINE: 0.2 MG/DL
WHITE BLOOD CELLS URINE: 4 /HPF

## 2024-02-09 PROCEDURE — 99443: CPT

## 2024-02-09 RX ORDER — TROSPIUM CHLORIDE 20 MG/1
20 TABLET, FILM COATED ORAL AT BEDTIME
Qty: 30 | Refills: 11 | Status: ACTIVE | COMMUNITY
Start: 2024-02-09 | End: 1900-01-01

## 2024-02-09 RX ORDER — NYSTATIN 100000 [USP'U]/G
100000 CREAM TOPICAL 4 TIMES DAILY
Qty: 3 | Refills: 0 | Status: ACTIVE | COMMUNITY
Start: 2024-02-09 | End: 1900-01-01

## 2024-02-14 ENCOUNTER — TRANSCRIPTION ENCOUNTER (OUTPATIENT)
Age: 89
End: 2024-02-14

## 2024-02-16 ENCOUNTER — TRANSCRIPTION ENCOUNTER (OUTPATIENT)
Age: 89
End: 2024-02-16

## 2024-02-16 ENCOUNTER — NON-APPOINTMENT (OUTPATIENT)
Age: 89
End: 2024-02-16

## 2024-02-16 DIAGNOSIS — R05.9 COUGH, UNSPECIFIED: ICD-10-CM

## 2024-02-16 RX ORDER — GUAIFENESIN 400 MG/20ML
400 SOLUTION ORAL EVERY 8 HOURS
Qty: 1 | Refills: 0 | Status: ACTIVE | COMMUNITY
Start: 2024-02-16 | End: 1900-01-01

## 2024-02-17 ENCOUNTER — RX RENEWAL (OUTPATIENT)
Age: 89
End: 2024-02-17

## 2024-02-19 ENCOUNTER — NON-APPOINTMENT (OUTPATIENT)
Age: 89
End: 2024-02-19

## 2024-02-19 ENCOUNTER — TRANSCRIPTION ENCOUNTER (OUTPATIENT)
Age: 89
End: 2024-02-19

## 2024-02-21 ENCOUNTER — NON-APPOINTMENT (OUTPATIENT)
Age: 89
End: 2024-02-21

## 2024-02-21 RX ORDER — NYSTATIN 100000 [USP'U]/G
100000 CREAM TOPICAL 3 TIMES DAILY
Qty: 1 | Refills: 3 | Status: ACTIVE | COMMUNITY
Start: 2024-02-21 | End: 1900-01-01

## 2024-02-23 ENCOUNTER — APPOINTMENT (OUTPATIENT)
Dept: HOME HEALTH SERVICES | Facility: HOME HEALTH | Age: 89
End: 2024-02-23

## 2024-02-26 ENCOUNTER — APPOINTMENT (OUTPATIENT)
Dept: NEUROLOGY | Facility: CLINIC | Age: 89
End: 2024-02-26

## 2024-02-26 RX ORDER — ACETAMINOPHEN 500 MG/1
500 TABLET ORAL EVERY 8 HOURS
Qty: 180 | Refills: 5 | Status: ACTIVE | COMMUNITY
Start: 2022-03-01 | End: 1900-01-01

## 2024-03-05 NOTE — HISTORY OF PRESENT ILLNESS
[FreeTextEntry1] : Claudio Mcdowell in her daughter Tabatha Deluna gave permission for an audio video telehealth visit.  They were in their home in [facility].  I was in my office in [facility].  Claudio Mcdowell is currently 89 years of age.  Her daughter Tabatha Deluna is devoted to her care.  She is very concerned about her mother and has been so for many years.  She becomes very anxious at times and that is concerning her mother.  Claudio Mcdowell lives with her daughter.  Claudio Mcdowell has been bedbound for several years.  The patient had developed a sacral decubitus ulcer while in a nursing facility.  A Sales catheter was placed because of fecal incontinence and concern that urine mixed with the feces would contaminate the sacral decubitus ulcer.  Once the patient left the nursing facility and will be with her daughter.  The sacral decubitus ulcer has finally healed.  I have discussed removing the Sales catheter with the daughter.  However as the patient has fecal incontinence there is concern about the urine mixing with the feces and being more likely to cause skin problems.  For now we will leave the Sales catheter in.  The patient has had clinically symptomatic urinary tract infections.  He clinically significant infections usually start with increased urination around the Sales catheter due to bladder spasms.  Bladder spasms have been occurring for staff.  The patient is positioned properly in in bed and the personal care attendant leaves for the day.  We have managed to prevent this with the administration of sublingual hyoscyamine.  We do periodic surveillance urine analysis and urine culture tests at the time the Sales catheter is changed by visiting nurse.  He also performed a times of increased spasms or purulence of the urine or change in mental status,  On October 11, 2022 visiting nurse using an order I have previously placed at the request of the daughter sent urine for urine cytology which proved to be negative for malignant cells, urine culture which grew Greater than 100,000 and E. coli that was sensitive to all antibiotics tested.  Urinalysis looked quite good for urine taken from a Sales catheter that was used for chronic Sales catheter drainage.  Ileukocyte esterase was large but nitrites were negative.  There were 2 epithelial cells per high-power field.  There were only 10 white blood cells per high-power field and only 2 red blood cells per high-power field on microscopic exam there were no bacteria seen and there were no hyaline casts.  Specific gravity was 1.010 which shows good hydration and this bedbound woman cared for diligently by her daughter.  Blood by dipstick was trace.  There was no protein glucose or ketones in the urine.  There is no bilirubin and no urobilinogen.  An important portion of the visit was my review with the daughter about bladder spasms and urination around the catheter.  Urine appearance and urine analysis have been clear.  The urine was clear again today.  The patient has significant short-term memory deficit.  She does have some useful short-term memory.  Her long-term memory remains very much intact.  She is very pleasant and to make satisfactory conversation.  She does admit to sometimes not remembering something.  Her complexion was good and there was no pallor.  Facial movements were symmetric.  Cranial nerves were intact.  I was not able to assess the olfactory nerve as this was a telehealth visit.    11/04/2022: Ms. MCDOWELL is a 89 year old female presenting today for a telehealth visit. She was accompanied by her daughter who helped with the visit. They gave permission for an audio only telehealth conference. The patient was located in her home in [place], [place]. I was located in my office on [place], [place]. Today her daughter reports the pt experienced rare urinary leaking around the catheter due to bladder spasms. She also reports her systolic pressure was around 140 last week. I offered Gemtesa to manage the bladder spasms and the daughter was cautious about more medications. I informed her if the urinary leaking are only once every 2 weeks or so, she does not have to medicate. The daughter was agreeable to monitor the occurrence of urinary leaking over the next 4 weeks and assess the discomfort it causes the pt. She denies any other urinary issues.  03/01/2023: Ms. MCDOWELL is a 89 year old female presenting today for a telehealth visit. She was accompanied by her daughter who helped with the visit. They gave permission for an audio only telehealth conference. The patient was located in her home in [place], [place]. I was located in my office on [place], [place]. The pt has chronic sales catheter drainage. She provided a urine specimen from the catheter on 2/27/2023. UA was slightly turbid with large LEC and 59 WBC/HPF. Culture grew >100,000 CFU/ml E.Coli. The sensitivity report is not yet back. The pt's daughter was concerned as when the patient was constipated a few weeks ago she was having very watery BM which grew messy and were around the catheter area. She was given a Fleet's enema last month which helped. Currently she is having BM, paste-like in appearance. Her daughter does not think that her stomach is distended. She has recently had the catheter changed and is not voiding around it. Urine has been very clear. She does not seem to have pain in the urinary passageway or bladder. Denies fevers. Denies gross hematuria. Pt had covid around Thanksgiving, only had the first two vaccines from two years ago.   03/13/2023: Ms. MCDOWELL is a 89 year old female presenting today for a telehealth visit. She was accompanied by her daughter who helped with the visit. They gave permission for an audio only telehealth conference. The patient was located in her home in [place], [place]. I was located in my office on [place], [place]. The patient obtained an US prior to today's visit 3/9/23 which revealed: Comparison is made with the exam of 2/28/22. Transabdominal ultrasound of the abdomen was performed with color flow imaging. The examination is limited in evaluation. The visualized aorta and inferior vena cava show no abnormality. The pancreas is obscured by overlying bowel gas. The liver measures 12.4 cm with homogeneous echotexture. No intrinsic mass and no intra-or extrahepatic ductal dilatation. There is hepatopedal flow of the main portal vein. No gallstones, pericholecystic fluid, wall thickening or positive sonographic Melendez sign. The common bile duct measures 0.3 cm. The right kidney measures 9.2 x 5.6 x 3.0 cm with a nonobstructing 1.2 cm stone in the upper pole. Multiple additional subcentimeter calcifications are seen. These were not seen on previous exam. No hydronephrosis or renal mass. The left kidney measures 9.3 x 3.9 x 3.5 cm. There is a 2.1 x 2.4 x 2.0 cm cyst in the medial mid pole, not seen on previous study. No hydronephrosis or renal calcification. The spleen measures 8.2 cm and show no abnormality. There is a small right pleural effusion, stable. IMPRESSIONS: Multiple nonobstructing stones of the right kidney with no hydronephrosis. 2.4 cm cyst of the midpole left kidney. Small right pleural effusion, stable. The daughter has answered the phone and reports the patient's urine does not get dark. She is not aware of the amount of water she gives her mother. I requested she measure's this from now on as the pt has stones and needs to increase water consumption. Her daughter reports this week the pt has experienced more spasms than normal and believes this may be due to severe constipation. She provided the pt with an Enema to aide in this situation. She is very concerned but I informed her the Enema may have stimulated the spasms and we should give her a few days to clear up and re-evaluate.   03/22/2023: Ms. CLAUDIO MCDOWELL presents today for an audio visual telehealth visit for which she gave permission for. The patient was located at home at [address] [place] [address] and I was located at my office in [place], [place]. She is accompanied by her daughter whom most of the visit was conducted with. Her daughter has been keeping track of her mother's water consumption. She is averaging about 58 oz of water in a 24 hr period. Additionally, she is drinking juice and ensure nutrition drinks. She does not consume any caffeine. She is on furosemide 40 mg. I said hello to the patient at the end of the visit and she is looking well. She reports feeling no pain at the current moment.   3/29/2023:  Patient Claudio Mcdowell and daughter Tabatha Deluna were home in [place], [place] and I was in my office in [facility].  Patient and daughter gave permission for a [employer] telehealth visit.  They preferred this to [employer].  The patient looks good today.  Her complexion was good there is no pallor.  Smile was symmetric her affect was normal she appeared happy she could make conversation it was appropriate.  She said that she currently had no pain.  When I asked questions that after 3/2 how she had been recently the sometimes she hesitated and deferred to her daughter for cancer compatible with her impaired short-term memory.  Long-term memory remains good she recognizes me once know so I am does ask questions about things like her bladder as she was worried about it her blood tests.  I assured her the results were satisfactory.  04/18/2023: Ms. MCDOWELL is a 89 year old female presenting today for an audio and visual telehealth visit for which she gave verbal permission. We utilized [employer] telemetry doc platform. The patient was located in her home at [address] [place] [address]. I was located in my office on [place], [place]. She was accompanied by her daughter who helped to serve as a historian. She reports discomfort in her eyes. She describes the discomfort as a feeling of sand. She opened her eyes for me, and they do not look red. Pupils look normal. She reports the right eyes bothers her significantly more than the left. I advise that the patient tries lubricating drops and it if does not feel better she will notify her visiting nurse who is due to come next week for a sales catheter change. She reports episode of bladder spasms. She denies gross hematuria. She describes the urine as barely yellow. I reviewed and discussed her  latest pertinent lab on 04/12/2023 which shows "alkaline phosphate normal at 89. Creatinine was normal at 0.62 mg/dL. WBC normal at 5.01. RBC normal at 3.87".   05/09/2023: Ms. MCDOWELL presents today for a follow up audio only telehealth visit for which they gave permission for. She was accompanied by her daughter who helped to serve as a historian. The patient was located at home [address] [place] [address] and I was located in my office in [place], [place]. The patient obtained lab work 5/2/23 prior to today's visit. The UA revealed microscopic hematuria, 5/HPF RBC, 27/HPF WBC. The patient demonstrated great hydration as the specific gravity is 1.006. The Sales catheter was changed May 2, 2023 with no clear urine in the bag. While changing the diaper the aide reports blood in the diaper. The daughter states last week her mother experienced spasms but as of two days ago she is now fine. However, she states the patient is more fatigued. She denies the pt having a temperature. I assured her because her mother is post menopausal any abrasion to the labia can cause a fissure.   06/02/2023: Ms. MCDOWELL is a 89 year old female presenting today for an audio and visual telehealth visit for which she gave verbal permission. We utilized [employer] teams telemetry [employer] platform. The patient was located in her home at [address] [place] [address]. I was located in my office on [place], [place]. She was accompanied by her daughter Tabatha. She reports that her mother complains of onset dysuria that dissipated on its own and has not recurred since. She reports right sided back pain that is aggravated when she lays on the right side. She provided urine specimen. She notes the urine was very clear. I reviewed and discussed the patient's pertinent lab works. Her latest Urinalysis on 05/30/2023 that shows "60 WBC/HPF. Trace Blood Urine. Specific Gravity Urine 1.007". Urine Culture on the same date shows ">100,000 CFU/ml Escherichia coli and <10,000 CFU/ml Normal Urogenital ángel present". The patient takes Mirtazapine for anxiety at low dose. The daughter reports the patient is experiencing increasing anxiety and is thinking of having the prescriber increasing the dosage.  Her BP runs around 120/60. Her Hemoglobin hematocrit looks good. gaze is conjugated. facial expression looks symmetric. Urine was faint yellow and clear.   06/06/2023: Ms. MCDOWELL presents today for a follow up audio only telehealth visit for which they gave permission for. The patient was located at home [address] [place] [address] and I was located in my office in [place], [place]. She was accompanied by her daughter Tabatha. The daughter states pt is experiencing episodes of a dry cough. An X Ray  of 6/5/23 revealed Mild peribronchial thickening suggesting bronchitis in the right clinical setting with no focal pneumonia or congestive heart failure. COPD with chronic lung changes. The mental status is the same as usual but her BP has elevated some. I assured her a little elevation is not as dangerous. The pt continues to experience some urinary frequency and it is a little more concentrated.   10/12/2023: Ms. MCDOWELL presents today for a follow up audio only telehealth visit for which they gave permission for. The patient was located at home [address] [place] [address] and I was located in my office in [place], [place]. The 10/2/23 UA showed proteinuria 30 mg/dL, moderate blood, large Leukocyte Esterase, 16/HPF of WBC. UA showed improvement as patient has chronic sales drainage. Her daughter states patient is okay considering coming home from the hospital. Most pain is in her back/shoulders and some of her back area is red/raw.  Recently her BP has increased to 180/100 and was prescribed Losartan 50mg twice daily. Urine is a clear color, denies cloudy and dark appearance.  11/01/2023: Ms. MCDOWELL presents today for a follow up audio only telehealth visit for which they gave permission for. The patient was located at home [address] [place] [address] and I was located in my office in [place], [place]. The 10/13/23 CMP revealed creatinine at 0.58 and low ALT at 8 U/L. Patient was admitted into [facility] on 10/27/23 for sepsis/UTI. Her daughter states she may be released tomorrow. Daughter states during the day the patient's urine was normal, but she did have chills and a fever.   11/03/2023: Ms. MCDOWELL is a 89 year old female presenting today for an audio only telehealth visit for which she gave verbal permission. The patient was located in her home at [address] [place] [address]. I was located in my office on [place], [place]. She was accompanied by her daughter. She says her mother was supposed to be released today. Initially she was able to void. Because of constipation she was given a couple of enemas. She had a large evacuation and since then she has not been able to void again. The daughter says her latest PVR was 340 cc. She is waiting for her mother to be rescanned again.   11/07/2023: Ms. CLAUDIO MCDOWELL presents today for a follow up audio only telephone visit for which she gave permission for. The pt was located at home, [address] [place] [address], and I was located in my office in [place], [place]. The visit was conducted with the patient's daughter. The patient came home from the hospital on Sunday. The catheter was put back in without another attempt at a void trial. The patient does have issues with constipation. She is able to swallow liquids. Her daughter has had to crush some of her pills to put them in a liquid, however the pt finds it to taste bitter and has some discomfort swallowing the chunks of crushed pills. The hospital discharged her with a 7 day supply of Cefuroxime. Was started yesterday so she has had two doses.   11/20/2023: Ms. MCDOWELL presents today for a follow up audio only telehealth visit for which they gave permission for. The patient was located at home [address] [place] [address] and I was located in my office in [facility]. Daughter states patient is okay. She notes giving pt suppositories and 7 hours later she had mixed bowel movements. However, she did not have a bowel movement since Saturday. Patient is given pedialyte and water frequently. Today's temperature was 95 as before it was 96-97. Pt did not drink any liquids shortly after taking temperature. Daughter denies giving pt solace but she usually gives Miralax as she has done so today. Toady's urine color is clear as it  has been clear for some time.  Daughter states her stomach was distended. Bp has stabilized since being in the hospital but last night and today it  was higher than normal being being 168/88 before medication.   11/29/2023: Ms. CLAUDIO MCDOWELL presents today for a follow up audio only telephone visit for which she gave permission for. The pt was located at home, [address] [place] [address], and I was located in my office in [place], [place]. Visit was conducted with her daughter, Andree. She was informed yesterday by her house call physicians of the results of her urine culture (emailed from core lab showing >100,000 CFU/ml Pseudomonas aeruginosa susceptible only to amikacin, Ciprofloxacin, imipenem, levofloxacin, meropenum. Resistant to Ceftazidime, Pip/tazo. Intermediate for aztrenam, cefepime. They discussed that patient is having increasing oxygen requirements, although SPO2 ok on supplemental O2 (prior to this patient needed only occasional O2 suppl. every few days). 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 The want to treat with ciprofloxacin, 7 day course. Prescription sent to pharmacy. Her daughter inquired today on if she should be treated. She states last week her mother seemed very not herself, however today she seems better in terms of mental status and oxygenation. She has been giving her senakot and miralax.   12/21/2023: Ms. MCDOWELL presents today for a follow up audio-only telehealth visit for which they gave permission for. The patient was located at home [address] [place] [address] and I was located in my office in [place], [place]. The patient obtained lab work 12/4/23 prior to today's visit. Urinalysis is improved and showed trace proteinuria, small Leukocyte Esterase, 6/HPF of WBC and 7/LPF of Cast. Urine culture shows no significant growth, <10,000 CFU/mL Normal Urogenital Ángel, which is very good. Her daughter expresses concern as recently patient is experiencing a surplus of looser and larger amounts of bowel movements that is escaping to the vaginal area, thus causing infections. Urine is normal in color, denies fever. Patient is occasionally more fatigued than usual. Denies distended appearance of the abdomen. Admits to only 1 spasm around the catheter this month. Next sales change will be in 2 weeks.  Towards the end of visit, patient was shown on Facetime, appearing well oriented x 3, normal pigmentation, lips are moist, and smile is symmetrical. I am very impressed by how well the patient looks and speaks. It was more than just pleasantries, she inquired about personal questions. Overall, I am very pleased.   12/27/2023: Ms. CLAUDIO MCDOWELL presents today for a follow up audio only telephone visit for which she gave permission for. The pt was located at home, [address] [place] [address], and I was located in my office in [place], [place]. She is accompanied by her daughter. Patient provided a surveillance urine specimen on 12/22/2023. She has chronic sales catheter drainage. UA revealed small LEC, positive for nitrites, 6 WBC/HPF, no RBC seen, moderate bacteria/HPF, granular casts present, and yeast like cells present. Urine culture grew >100,000 CFU/ml Enterococcus faecalis and 50,000 - 99,000 CFU/mL Streptococcus mitis/oralis group. Urine cytology was negative for high grade urothelial carcinoma. Few mature squamous cells, rare benign urothelial cells and abundant fungal organisms morphologically consistent with Candida species present. Patient's daughter reports that her urine looks clear and a pale yellow color. She feels her mom still has a lot of anxiety and some confusion. She does report that her catheter bag sits in feces due to the patient's position. They tried to tilt her pelvis to avoid this but states it's easier said than done. She states there is no feces in the catheter or in the bag. BM are not loose, she describes them as pasty. Has about 2-3 BM a day.   01/05/2024: Ms. MCDOWELL is a 89 year old female presenting today for an audio only telehealth visit for which she gave verbal permission. The patient was located in her home at [address] [place] [address]. I was located in my office on [place], [place]. She was accompanied by her daughter who helped with the visit. She reports that a nurse came for a catheter change, and only succeeded on the third attempt. She says it's the same nurse who has been coming for 5 months now, and usually she encounters no difficulties. Now that the catheter has been changed, the patient is able to void.   01/17/2024: Ms. CLAUDIO MCDOWELL presents today for a follow up audio only telephone visit for which she gave permission for. The pt was located at home, [address] [place] [address], and I was located in my office in [place], [place]. She is accompanied by her daughter, Tabatha Deluna. Patient provided a urine specimen on 1/5/2024 for surveillance. UA revealed trace blood by urine and moderate LEC. This is excellent for her as she has chronic sales catheter drainage. Urine culture grew 50,000-99,000 CFU/ml Pseudomonas aeruginosa. Urine cytology was negative for high grade urothelial carcinoma. Few mature squamous cells, rare benign urothelial cells and abundant fungal organisms morphologically consistent with Candida species present. The patient's daughter reports that her mother is "different". She reports that she is hearing things a lot now. She states that usually the things are negative. For example, she will say "Why is Ac saying my oxygen isn't working properly?". It is clear what she is saying but fairly random. She is not seeing anything; it is strictly auditory.   01/22/2024: Ms. CLAUDIO MCDOWELL presents today for a follow up audio only telephone visit for which she gave permission for. The pt was located at home, [address] [place] [address], and I was located in my office in [place], [place]. Visit was conducted with the patient's daughter, Tabatha Deluna. She informs me that she did not realize she was out of hyoscyamine sulfate for bladder spasms. She has been getting bladder spasms after the aide leaves and is embarrassed by this as she thinks shes wetting the bed. Her daughter reports that her mother asks in the morning "why am I bothering taking my medications, I am going to die anyway".   02/05/2024: Ms. CLAUDIO MCDOWELL presents today for an audio visual telehealth visit for which she gave permission for. The patient was located at home at [address] [place] [address] and I was located at my office in [place], [place]. Pt's daughter states she was not expecting my call today. I informed her that she was on my schedule and that if she would like we can talk. She informs me that hyoscyamine does not seem to be working for her mother and that some days she has bladder spasms and some she doesn't. Pt is currently not having diarrhea. She is due for a catheter change tomorrow. Patient has not started any medications for the psychological issues she has been having.   02/09/2024: Ms. MCDOWELL is a 89 year old female presenting today for an audio only telehealth visit for which she gave verbal permission. The patient was located in her home at [address] [place] [address]. I was located in my office on [place], [place]. She was accompanied by her daughter who helps with the visit.   The patient has been with a chronic sales catheter since July 2021 when she fractured her foot. She reports today for a follow up.   I reviewed and discussed the patient's pertinent lab works. Urine Culture shows 10,000 - 49,000 CFU/mL Pseudomonas aeruginosa 10,000 - 49,000 CFU/mL Citrobacter freundii complex  I explain to the patient that patients with chronic sales catheter develop bacteria in the urine, that may come from the skin, and it is not significant clinically unless infection symptoms ensue. In addition, the patient reports she was not taking antibiotics when the sample was collected, and the colonies were less than 100 000.

## 2024-03-05 NOTE — REVIEW OF SYSTEMS
[Eyesight Problems] : eyesight problems [Loss Of Hearing] : hearing loss [Chest Pain] : chest pain [Diarrhea] : diarrhea [Confused] : confusion [Joint Pain] : joint pain [Anxiety] : anxiety [Easy Bleeding] : a tendency for easy bleeding [Feeling Poorly] : not feeling poorly [Palpitations] : no palpitations [Cough] : no cough [Constipation] : no constipation [Dysuria] : no dysuria [Hot Flashes] : no hot flashes [Convulsions] : no convulsions

## 2024-03-05 NOTE — ASSESSMENT
[FreeTextEntry1] : 10/12/2023: Ms. MCDOWELL presents today for a follow up audio only telehealth visit for which they gave permission for. The patient was located at home [address] [place] [address] and I was located in my office in [place], [place]. The 10/2/23 UA showed proteinuria 30 mg/dL, moderate blood, large Leukocyte Esterase, 16/HPF of WBC. UA showed improvement as patient has chronic sales drainage. Her daughter states patient is okay considering coming home from the hospital. Most pain is in her back/shoulders and some of her back area is red/raw.  Recently her BP has increased to 180/100 and was prescribed Losartan 50mg twice daily. Urine is a clear color, denies cloudy and dark appearance.  Reviewed and discussed laboratory work of 10/2/23 which She obtained prior to today's visit as requested. Pt will go to her nearest [facility] lab for blood work and a urine sample which will be sent for urinalysis, urine culture, and urine cytology. Pt will schedule a telehealth visit after lab work to discuss results.   11/01/2023: Ms. MCDOWELL presents today for a follow up audio only telehealth visit for which they gave permission for. The patient was located at home [address] [place] [address] and I was located in my office in [place], [place]. The 10/13/23 CMP revealed creatinine at 0.58 and low ALT at 8 U/L. Patient was admitted into [facility] on 10/27/23 for sepsis/UTI. Her daughter states she may be released tomorrow. Daughter states during the day the patient's urine was normal, but she did have chills and a fever.   As the patient recently had a course of antibiotics, we suggested patient have catheter removed in hospital, as we will to observe bladder emptying and skin while the catheter is removed. If abnormalities arise, we will place the catheter back in.  If the daughter is to proceed with the plan, we will not discharge patient until a PVR is taken and until pt is able to void on her own the next morning. Hospitalist should weigh the diaper before and after and I will calculate it. If stool is present, then we will not be able to do so.  The daughter will have the Hospitalist contact me about patient.  Hospitalist has called at 4:06 pm. She states the urination looks contaminated and culture showed E-coli.  They will bladder scan her every hour.  Hospitalist will call tomorrow with update on patient and inform me on her fluid level.  Advised daughter to obtain a thermometer to observe temperature.    11/03/2023: Ms. MCDOWELL is a 89 year old female presenting today for an audio only telehealth visit for which she gave verbal permission. The patient was located in her home at [address] [place] [address]. I was located in my office on [place], [place]. She was accompanied by her daughter. She says her mother was supposed to be released today. Initially she was able to void. Because of constipation she was given a couple of enemas. She had a large evacuation and since then she has not been able to void again. The daughter says her latest PVR was 340 cc. She is waiting for her mother to be rescanned again.    Plan: If she is unable to void, she will be discharged with a sales catheter.   11/07/2023: Ms. CLAUDIO MCDOWELL presents today for a follow up audio only telephone visit for which she gave permission for. The pt was located at home, [address] [place] [address], and I was located in my office in [place], [place]. The visit was conducted with the patient's daughter. The patient came home from the hospital on Sunday. The catheter was put back in without another attempt at a void trial. The patient does have issues with constipation. She is able to swallow liquids. Her daughter has had to crush some of her pills to put them in a liquid, however the pt finds it to taste bitter and has some discomfort swallowing the chunks of crushed pills. The hospital discharged her with a 7 day supply of Cefuroxime. Was started yesterday so she has had two doses.    We discussed the possibility of a doing a trial of void. At this time I am recommending her mother settle back in to being home and we can readdress this possibility and how the patient and her daughter would like to proceed at the time of her next visit. Next catheter change will be on 12/5 should they choose to proceed with the catheter.   I looked it up on [employer] and Cefuroxime is available in the [place] as a liquid suspension. Given that this would be easier for the patient, I went to prescribe it for her which she could take rather than the pills. [employer] did not allow me to electronically prescribe it in a liquid suspension so I called the patient's pharmacy to give a verbal prescription for 200 ml of it for 20 ml BID for 5 days. I left a VM for the pharmacy as there was no answer. I requested they call me back if they could accept this prescription or if they could not. The pharmacy called me back shortly after and they told me that it is not currently offered in a liquid suspension, however he called the [facility] next door and they are willing to compound it for 40$. I saw this as acceptable and thanked him for going the extra mile and calling next door. He will contact the patient's daughter to inform her.   Patient and her daughter will have a telehealth visit in 2 weeks for reassessment, sooner if clinically indicated.   11/20/2023: Ms. MCDOWELL presents today for a follow up audio only telehealth visit for which they gave permission for. The patient was located at home [address] [place] [address] and I was located in my office in [facility]. Daughter states patient is okay. She notes giving pt suppositories and 7 hours later she had mixed bowel movements. However, she did not have a bowel movement since Saturday. Patient is given pedialyte and water frequently. Today's temperature was 95 as before it was 96-97. Pt did not drink any liquids shortly after taking temperature. Daughter denies giving pt solace but she usually gives Miralax as she has done so today. Senady's urine color is clear as it  has been clear for some time.  Daughter states her stomach was distended. Bp has stabilized since being in the hospital but last night and today it  was higher than normal being being 168/88 before medication.   Advised daughter to give patient Miralax tonight if there is no bowel movements.  Advised her to take Blood pressure tonight.  Pt will schedule a telehealth visit  11/29/2023: Ms. CLAUDIO MCDOWELL presents today for a follow up audio only telephone visit for which she gave permission for. The pt was located at home, [address] [place] [address], and I was located in my office in [place], [place]. Visit was conducted with her daughter, Andree. She was informed yesterday by her house call physicians of the results of her urine culture (emailed from core lab showing >100,000 CFU/ml Pseudomonas aeruginosa susceptible only to amikacin, Ciprofloxacin, imipenem, levofloxacin, meropenum. Resistant to Ceftazidime, Pip/tazo. Intermediate for aztrenam, cefepime. They discussed that patient is having increasing oxygen requirements, although SPO2 ok on supplemental O2 (prior to this patient needed only occasional O2 suppl. every few days). 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 The want to treat with ciprofloxacin, 7 day course. Prescription sent to pharmacy. Her daughter inquired today on if she should be treated. She states last week her mother seemed very not herself, however today she seems better in terms of mental status and oxygenation. She has been giving her senakot and miralax.   I explained that given her O2 issues and confusion over the last few days, I advised to treat with Cipro despite her daughter feeling she is better today. If there are any problems with crushing the pills for her or if she is not reacting to it well, she was advised to call right away. I posed the option of giving it as an oral solution if need be.    12/21/2023: Ms. MCDOWELL presents today for a follow up audio-visual telehealth visit for which they gave permission for. The patient was located at home [address] [place] [address] and I was located in my office in [place], [place]. The patient obtained lab work 12/4/23 prior to today's visit. Urinalysis is improved and showed trace proteinuria, small Leukocyte Esterase, 6/HPF of WBC and 7/LPF of Cast. Urine culture shows no significant growth, <10,000 CFU/mL Normal Urogenital Khadijah, which is very good. Her daughter expresses concern as recently patient is experiencing a surplus of looser and larger amounts of bowel movements that is escaping to the vaginal area, thus causing infections. Urine is normal in color, denies fever. Patient is occasionally more fatigued than usual. Denies distended appearance of the abdomen. Admits to only 1 spasm around the catheter this month. Next sales change will be in 2 weeks.  Towards the end of visit, patient was shown on Facetime, appearing well oriented x 3, normal pigmentation, lips are moist, and smile is symmetrical. I am very impressed by how well the patient looks and speaks. It was more than just pleasantries, she inquired about personal questions. Overall, I am very pleased.   Reviewed and discussed laboratory work of 12/4/23 which She obtained prior to today's visit as requested. Advised daughter to have care team to place support under the pelvis, so pelvis is tilted up at a higher level of the anus.   As long as stool is pasty and firm, we advised Andree to give patient MiraLAX. If stool is loose and unfirm, she should decrease the amount of MiraLAX.  Pt will provide a urine sample which will be sent for urinalysis, urine culture, and urine cytology. Pt will schedule a telehealth visit in 2 weeks for reassessment.   12/27/2023: Ms. CLAUDIO MCDOWELL presents today for a follow up audio only telephone visit for which she gave permission for. The pt was located at home, [address] [place] [address], and I was located in my office in [place], [place]. She is accompanied by her daughter. Patient provided a surveillance urine specimen on 12/22/2023. She has chronic sales catheter drainage. UA revealed small LEC, positive for nitrites, 6 WBC/HPF, no RBC seen, moderate bacteria/HPF, granular casts present, and yeast like cells present. Urine culture grew >100,000 CFU/ml Enterococcus faecalis and 50,000 - 99,000 CFU/mL Streptococcus mitis/oralis group. Urine cytology was negative for high grade urothelial carcinoma. Few mature squamous cells, rare benign urothelial cells and abundant fungal organisms morphologically consistent with Candida species present. Patient's daughter reports that her urine looks clear and a pale yellow color. She feels her mom still has a lot of anxiety and some confusion. She does report that her catheter bag sits in feces due to the patient's position. They tried to tilt her pelvis to avoid this but states it's easier said than done. She states there is no feces in the catheter or in the bag. BM are not loose, she describes them as pasty. Has about 2-3 BM a day.    Reviewed and discussed lab work of 12/22/2023 in detail with the pt.  Future urine orders were put in including fungal urine culture. Pt's daughter was advised to continue to try and alter the patient's position to avoid the catheter sitting in feces.  Patient should have a telehealth visit in 3 months, sooner if clinically indicated.    01/05/2024: Ms. MCDOWELL is a 89 year old female presenting today for an audio only telehealth visit for which she gave verbal permission. The patient was located in her home at [address] [place] [address]. I was located in my office on [place], [place]. She was accompanied by her daughter who helped with the visit. She reports that a nurse came for a catheter change, and only succeeded on the third attempt. She says it's the same nurse who has been coming for 5 months now, and usually she encounters no difficulties. Now that the catheter has been changed, the patient is able to void.   Plan: Pt's daughter will keep us updated. If the patient experiences fever or chill, she will call ER and inform the office.   01/17/2024: Ms. CLAUDIO MCDOWELL presents today for a follow up audio only telephone visit for which she gave permission for. The pt was located at home, [address] [place] [address], and I was located in my office in [place], [place]. She is accompanied by her daughter, Tabatha Deluna. Patient provided a urine specimen on 1/5/2024 for surveillance. UA revealed trace blood by urine and moderate LEC. This is excellent for her as she has chronic sales catheter drainage. Urine culture grew 50,000-99,000 CFU/ml Pseudomonas aeruginosa. Urine cytology was negative for high grade urothelial carcinoma. Few mature squamous cells, rare benign urothelial cells and abundant fungal organisms morphologically consistent with Candida species present. The patient's daughter reports that her mother is "different". She reports that she is hearing things a lot now. She states that usually the things are negative. For example, she will say "Why is Ac saying my oxygen isn't working properly?". It is clear what she is saying but fairly random. She is not seeing anything; it is strictly auditory.   Reviewed and discussed lab work of 1/5/2024 in detail with the pt's daughter. She was informed that given she has chronic sales catheter drainage, this specimen was excellent. If she had more WBC/HPF, I would be concerned, however she is in very good shape from a urologic standpoint. Her daughter was advised to speak with her mother's PCP about hearing things and her AMS as it is not urologic in origin or related to pending sepsis. I provided her the name of a neurologist, Dr.Li-Fen Tripathi if she would like to go for neuro consultation.  I once again re-iterated that at this time, given her skin breakdown, I recommend continued sales catheter drainage. Her daughter brought up an external urine collection apparatus once again, however I do not recommend we try that at least until her skin is in good condition. I also explained that if her mother were to go into urinary retention, this sort of device would not drain the urine from her.  Pt's daughter was advised to give her hyoscyamine for bladder spasms.  Patient will have a telehealth visit in 1-2 months, sooner if clinically indicated.    01/22/2024: Ms. CLAUDIO MCDOWELL presents today for a follow up audio only telephone visit for which she gave permission for. The pt was located at home, [address] [place] [address], and I was located in my office in [place], [place]. Visit was conducted with the patient's daughter, Tabatha Deluna. She informs me that she did not realize she was out of hyoscyamine sulfate for bladder spasms. She has been getting bladder spasms after the aide leaves and is embarrassed by this as she thinks shes wetting the bed. Her daughter reports that her mother asks in the morning "why am I bothering taking my medications, I am going to die anyway".   Renewed hyoscyamine sulfate 0.125 mg sublingual tablets, give one tablet under the tongue as needed. I recommend she gives it about 15 minutes before the aide comes. I suggest she give it for 10 days in a row to see if we can calm things down and then stop. If it returns, we can try again for another month or two. I did speak with the patient at the end of the visit. She seemed to know who I was and understood me. I spoke to her about the bladder spasms she is experiencing. She was on board with trying hyoscyamine and thanked me for speaking with her.  I recommend the patient's daughter speak with her PCP about potential anti-depressant medication.   Patient will have a telephone visit in 2 weeks for reassessment, sooner if clinically indicated.    02/05/2024: Ms. CLAUDIO MCDOWELL presents today for an audio visual telehealth visit for which she gave permission for. The patient was located at home at [address] [place] [address] and I was located at my office in [place], [place]. Pt's daughter states she was not expecting my call today. I informed her that she was on my schedule and that if she would like we can talk. She informs me that hyoscyamine does not seem to be working for her mother and that some days she has bladder spasms and some she doesn't. Pt is currently not having diarrhea. She is due for a catheter change tomorrow. Patient has not started any medications for the psychological issues she has been having.    Pt will discontinue use of hyoscyamine. I prescribed the patient Trospium 20 mg, one tablet once daily at bedtime. I informed the patient there may be constipation as a side effect.   Orders for UA, urine culture, and urine cytology were put in for her daughter to bring the patient's sample to her nearest  lab.  Patient will have a telehealth visit this Friday after the catheter change.     02/09/2024: Ms. MCDOWELL is a 89 year old female presenting today for an audio only telehealth visit for which she gave verbal permission. The patient was located in her home at [address] [place] [address]. I was located in my office on [place], [place]. She was accompanied by her daughter who helps with the visit.   The patient has been with a chronic sales catheter since July 2021 when she fractured her foot. She reports today for a follow up.   I reviewed and discussed the patient's pertinent lab works. Urine Culture shows 10,000 - 49,000 CFU/mL Pseudomonas aeruginosa 10,000 - 49,000 CFU/mL Citrobacter freundii complex  I explain to the patient that patients with chronic sales catheter develop bacteria in the urine, that may come from the skin, and it is not significant clinically unless infection symptoms ensue. In addition, the patient reports she was not taking antibiotics when the sample was collected, and the colonies were less than 100 000.   Pt will start Trospium 20 mg.  She will provide urine for UA, Urine Cytology and Urine Culture. She will have a follow up in one month; earlier if needed.    Duration of call: 30 mins

## 2024-03-05 NOTE — ADDENDUM
[FreeTextEntry1] : This note was partly authored by Clovis Rebolledo working as a scribe for BENIGNO Kiran. I, BENIGNO Kiran, have reviewed the content of this note and confirm it is true and accurate. I personally performed the history and physical examination and made all the decisions. 02/09/2024.

## 2024-03-09 LAB — BACTERIA UR CULT: ABNORMAL

## 2024-03-10 LAB
APPEARANCE: CLEAR
BACTERIA: NEGATIVE /HPF
BILIRUBIN URINE: NEGATIVE
BLOOD URINE: ABNORMAL
CAST: 1 /LPF
COLOR: YELLOW
EPITHELIAL CELLS: 2 /HPF
GLUCOSE QUALITATIVE U: NEGATIVE MG/DL
KETONES URINE: NEGATIVE MG/DL
LEUKOCYTE ESTERASE URINE: ABNORMAL
MICROSCOPIC-UA: NORMAL
NITRITE URINE: NEGATIVE
PH URINE: 6.5
PROTEIN URINE: NEGATIVE MG/DL
RED BLOOD CELLS URINE: 1 /HPF
SPECIFIC GRAVITY URINE: 1.01
URINE CYTOLOGY: NORMAL
UROBILINOGEN URINE: 0.2 MG/DL
WHITE BLOOD CELLS URINE: 9 /HPF

## 2024-03-13 ENCOUNTER — APPOINTMENT (OUTPATIENT)
Dept: UROLOGY | Facility: CLINIC | Age: 89
End: 2024-03-13
Payer: MEDICARE

## 2024-03-13 ENCOUNTER — NON-APPOINTMENT (OUTPATIENT)
Age: 89
End: 2024-03-13

## 2024-03-13 PROBLEM — R05.9 COUGH: Status: ACTIVE | Noted: 2024-03-13

## 2024-03-13 PROCEDURE — 99442: CPT

## 2024-03-14 ENCOUNTER — LABORATORY RESULT (OUTPATIENT)
Age: 89
End: 2024-03-14

## 2024-03-14 NOTE — ADDENDUM
[FreeTextEntry1] : This note was authored by Senia Corbin working as a scribe for Dr.Gary Sexton. I, Dr. Indio Sexton have reviewed the content of this note and confirm it is true and accurate. I personally performed the history and physical examination and made all the decisions 03/13/2024

## 2024-03-14 NOTE — HISTORY OF PRESENT ILLNESS
[FreeTextEntry1] : Claudio Mcdowell in her daughter Tabatha Deluna gave permission for an audio video telehealth visit.  They were in their home in [facility].  I was in my office in [facility].  Claudio Mcdowell is currently 89 years of age.  Her daughter Tabatha Deluna is devoted to her care.  She is very concerned about her mother and has been so for many years.  She becomes very anxious at times and that is concerning her mother.  Claudio Mcdowell lives with her daughter.  Claudio Mcdowell has been bedbound for several years.  The patient had developed a sacral decubitus ulcer while in a nursing facility.  A Sales catheter was placed because of fecal incontinence and concern that urine mixed with the feces would contaminate the sacral decubitus ulcer.  Once the patient left the nursing facility and will be with her daughter.  The sacral decubitus ulcer has finally healed.  I have discussed removing the Sales catheter with the daughter.  However as the patient has fecal incontinence there is concern about the urine mixing with the feces and being more likely to cause skin problems.  For now we will leave the Slaes catheter in.  The patient has had clinically symptomatic urinary tract infections.  He clinically significant infections usually start with increased urination around the Sales catheter due to bladder spasms.  Bladder spasms have been occurring for staff.  The patient is positioned properly in in bed and the personal care attendant leaves for the day.  We have managed to prevent this with the administration of sublingual hyoscyamine.  We do periodic surveillance urine analysis and urine culture tests at the time the Sales catheter is changed by visiting nurse.  He also performed a times of increased spasms or purulence of the urine or change in mental status,  On October 11, 2022 visiting nurse using an order I have previously placed at the request of the daughter sent urine for urine cytology which proved to be negative for malignant cells, urine culture which grew Greater than 100,000 and E. coli that was sensitive to all antibiotics tested.  Urinalysis looked quite good for urine taken from a Sales catheter that was used for chronic Sales catheter drainage.  Ileukocyte esterase was large but nitrites were negative.  There were 2 epithelial cells per high-power field.  There were only 10 white blood cells per high-power field and only 2 red blood cells per high-power field on microscopic exam there were no bacteria seen and there were no hyaline casts.  Specific gravity was 1.010 which shows good hydration and this bedbound woman cared for diligently by her daughter.  Blood by dipstick was trace.  There was no protein glucose or ketones in the urine.  There is no bilirubin and no urobilinogen.  An important portion of the visit was my review with the daughter about bladder spasms and urination around the catheter.  Urine appearance and urine analysis have been clear.  The urine was clear again today.  The patient has significant short-term memory deficit.  She does have some useful short-term memory.  Her long-term memory remains very much intact.  She is very pleasant and to make satisfactory conversation.  She does admit to sometimes not remembering something.  Her complexion was good and there was no pallor.  Facial movements were symmetric.  Cranial nerves were intact.  I was not able to assess the olfactory nerve as this was a telehealth visit.    11/04/2022: Ms. MCDOWELL is a 89 year old female presenting today for a telehealth visit. She was accompanied by her daughter who helped with the visit. They gave permission for an audio only telehealth conference. The patient was located in her home in [place], [place]. I was located in my office on [place], [place]. Today her daughter reports the pt experienced rare urinary leaking around the catheter due to bladder spasms. She also reports her systolic pressure was around 140 last week. I offered Gemtesa to manage the bladder spasms and the daughter was cautious about more medications. I informed her if the urinary leaking are only once every 2 weeks or so, she does not have to medicate. The daughter was agreeable to monitor the occurrence of urinary leaking over the next 4 weeks and assess the discomfort it causes the pt. She denies any other urinary issues.  03/01/2023: Ms. MCDOWELL is a 89 year old female presenting today for a telehealth visit. She was accompanied by her daughter who helped with the visit. They gave permission for an audio only telehealth conference. The patient was located in her home in [place], [place]. I was located in my office on [place], [place]. The pt has chronic sales catheter drainage. She provided a urine specimen from the catheter on 2/27/2023. UA was slightly turbid with large LEC and 59 WBC/HPF. Culture grew >100,000 CFU/ml E.Coli. The sensitivity report is not yet back. The pt's daughter was concerned as when the patient was constipated a few weeks ago she was having very watery BM which grew messy and were around the catheter area. She was given a Fleet's enema last month which helped. Currently she is having BM, paste-like in appearance. Her daughter does not think that her stomach is distended. She has recently had the catheter changed and is not voiding around it. Urine has been very clear. She does not seem to have pain in the urinary passageway or bladder. Denies fevers. Denies gross hematuria. Pt had covid around Thanksgiving, only had the first two vaccines from two years ago.   03/13/2023: Ms. MCDOWELL is a 89 year old female presenting today for a telehealth visit. She was accompanied by her daughter who helped with the visit. They gave permission for an audio only telehealth conference. The patient was located in her home in [place], [place]. I was located in my office on [place], [place]. The patient obtained an US prior to today's visit 3/9/23 which revealed: Comparison is made with the exam of 2/28/22. Transabdominal ultrasound of the abdomen was performed with color flow imaging. The examination is limited in evaluation. The visualized aorta and inferior vena cava show no abnormality. The pancreas is obscured by overlying bowel gas. The liver measures 12.4 cm with homogeneous echotexture. No intrinsic mass and no intra-or extrahepatic ductal dilatation. There is hepatopedal flow of the main portal vein. No gallstones, pericholecystic fluid, wall thickening or positive sonographic Melendez sign. The common bile duct measures 0.3 cm. The right kidney measures 9.2 x 5.6 x 3.0 cm with a nonobstructing 1.2 cm stone in the upper pole. Multiple additional subcentimeter calcifications are seen. These were not seen on previous exam. No hydronephrosis or renal mass. The left kidney measures 9.3 x 3.9 x 3.5 cm. There is a 2.1 x 2.4 x 2.0 cm cyst in the medial mid pole, not seen on previous study. No hydronephrosis or renal calcification. The spleen measures 8.2 cm and show no abnormality. There is a small right pleural effusion, stable. IMPRESSIONS: Multiple nonobstructing stones of the right kidney with no hydronephrosis. 2.4 cm cyst of the midpole left kidney. Small right pleural effusion, stable. The daughter has answered the phone and reports the patient's urine does not get dark. She is not aware of the amount of water she gives her mother. I requested she measure's this from now on as the pt has stones and needs to increase water consumption. Her daughter reports this week the pt has experienced more spasms than normal and believes this may be due to severe constipation. She provided the pt with an Enema to aide in this situation. She is very concerned but I informed her the Enema may have stimulated the spasms and we should give her a few days to clear up and re-evaluate.   03/22/2023: Ms. CLAUDIO MCDOWELL presents today for an audio visual telehealth visit for which she gave permission for. The patient was located at home at [address] [place] [address] and I was located at my office in [place], [place]. She is accompanied by her daughter whom most of the visit was conducted with. Her daughter has been keeping track of her mother's water consumption. She is averaging about 58 oz of water in a 24 hr period. Additionally, she is drinking juice and ensure nutrition drinks. She does not consume any caffeine. She is on furosemide 40 mg. I said hello to the patient at the end of the visit and she is looking well. She reports feeling no pain at the current moment.   3/29/2023:  Patient Claudio Mcdowell and daughter Tabatha Deluna were home in [place], [place] and I was in my office in [facility].  Patient and daughter gave permission for a [employer] telehealth visit.  They preferred this to [employer].  The patient looks good today.  Her complexion was good there is no pallor.  Smile was symmetric her affect was normal she appeared happy she could make conversation it was appropriate.  She said that she currently had no pain.  When I asked questions that after 3/2 how she had been recently the sometimes she hesitated and deferred to her daughter for cancer compatible with her impaired short-term memory.  Long-term memory remains good she recognizes me once know so I am does ask questions about things like her bladder as she was worried about it her blood tests.  I assured her the results were satisfactory.  04/18/2023: Ms. MCDOWELL is a 89 year old female presenting today for an audio and visual telehealth visit for which she gave verbal permission. We utilized [employer] teams telemetry doc platform. The patient was located in her home at [address] [place] [address]. I was located in my office on [place], [place]. She was accompanied by her daughter who helped to serve as a historian. She reports discomfort in her eyes. She describes the discomfort as a feeling of sand. She opened her eyes for me, and they do not look red. Pupils look normal. She reports the right eyes bothers her significantly more than the left. I advise that the patient tries lubricating drops and it if does not feel better she will notify her visiting nurse who is due to come next week for a sales catheter change. She reports episode of bladder spasms. She denies gross hematuria. She describes the urine as barely yellow. I reviewed and discussed her  latest pertinent lab on 04/12/2023 which shows "alkaline phosphate normal at 89. Creatinine was normal at 0.62 mg/dL. WBC normal at 5.01. RBC normal at 3.87".   05/09/2023: Ms. MCDOWELL presents today for a follow up audio only telehealth visit for which they gave permission for. She was accompanied by her daughter who helped to serve as a historian. The patient was located at home [address] [place] [address] and I was located in my office in [place], [place]. The patient obtained lab work 5/2/23 prior to today's visit. The UA revealed microscopic hematuria, 5/HPF RBC, 27/HPF WBC. The patient demonstrated great hydration as the specific gravity is 1.006. The Sales catheter was changed May 2, 2023 with no clear urine in the bag. While changing the diaper the aide reports blood in the diaper. The daughter states last week her mother experienced spasms but as of two days ago she is now fine. However, she states the patient is more fatigued. She denies the pt having a temperature. I assured her because her mother is post menopausal any abrasion to the labia can cause a fissure.   06/02/2023: Ms. MCDOWELL is a 89 year old female presenting today for an audio and visual telehealth visit for which she gave verbal permission. We utilized [employer] teams telemetry [employer] platform. The patient was located in her home at [address] [place] [address]. I was located in my office on [place], [place]. She was accompanied by her daughter Tabatha. She reports that her mother complains of onset dysuria that dissipated on its own and has not recurred since. She reports right sided back pain that is aggravated when she lays on the right side. She provided urine specimen. She notes the urine was very clear. I reviewed and discussed the patient's pertinent lab works. Her latest Urinalysis on 05/30/2023 that shows "60 WBC/HPF. Trace Blood Urine. Specific Gravity Urine 1.007". Urine Culture on the same date shows ">100,000 CFU/ml Escherichia coli and <10,000 CFU/ml Normal Urogenital ángel present". The patient takes Mirtazapine for anxiety at low dose. The daughter reports the patient is experiencing increasing anxiety and is thinking of having the prescriber increasing the dosage.  Her BP runs around 120/60. Her Hemoglobin hematocrit looks good. gaze is conjugated. facial expression looks symmetric. Urine was faint yellow and clear.   06/06/2023: Ms. MCDOWELL presents today for a follow up audio only telehealth visit for which they gave permission for. The patient was located at home [address] [place] [address] and I was located in my office in [place], [place]. She was accompanied by her daughter Tabatha. The daughter states pt is experiencing episodes of a dry cough. An X Ray  of 6/5/23 revealed Mild peribronchial thickening suggesting bronchitis in the right clinical setting with no focal pneumonia or congestive heart failure. COPD with chronic lung changes. The mental status is the same as usual but her BP has elevated some. I assured her a little elevation is not as dangerous. The pt continues to experience some urinary frequency and it is a little more concentrated.   10/12/2023: Ms. MCDOWELL presents today for a follow up audio only telehealth visit for which they gave permission for. The patient was located at home [address] [place] [address] and I was located in my office in [place], [place]. The 10/2/23 UA showed proteinuria 30 mg/dL, moderate blood, large Leukocyte Esterase, 16/HPF of WBC. UA showed improvement as patient has chronic sales drainage. Her daughter states patient is okay considering coming home from the hospital. Most pain is in her back/shoulders and some of her back area is red/raw.  Recently her BP has increased to 180/100 and was prescribed Losartan 50mg twice daily. Urine is a clear color, denies cloudy and dark appearance.  11/01/2023: Ms. MCDOWELL presents today for a follow up audio only telehealth visit for which they gave permission for. The patient was located at home [address] [place] [address] and I was located in my office in [place], [place]. The 10/13/23 CMP revealed creatinine at 0.58 and low ALT at 8 U/L. Patient was admitted into [facility] on 10/27/23 for sepsis/UTI. Her daughter states she may be released tomorrow. Daughter states during the day the patient's urine was normal, but she did have chills and a fever.   11/03/2023: Ms. MCDOWELL is a 89 year old female presenting today for an audio only telehealth visit for which she gave verbal permission. The patient was located in her home at [address] [place] [address]. I was located in my office on [place], [place]. She was accompanied by her daughter. She says her mother was supposed to be released today. Initially she was able to void. Because of constipation she was given a couple of enemas. She had a large evacuation and since then she has not been able to void again. The daughter says her latest PVR was 340 cc. She is waiting for her mother to be rescanned again.   11/07/2023: Ms. CLAUDIO MCDOWELL presents today for a follow up audio only telephone visit for which she gave permission for. The pt was located at home, [address] [place] [address], and I was located in my office in [place], [place]. The visit was conducted with the patient's daughter. The patient came home from the hospital on Sunday. The catheter was put back in without another attempt at a void trial. The patient does have issues with constipation. She is able to swallow liquids. Her daughter has had to crush some of her pills to put them in a liquid, however the pt finds it to taste bitter and has some discomfort swallowing the chunks of crushed pills. The hospital discharged her with a 7 day supply of Cefuroxime. Was started yesterday so she has had two doses.   11/20/2023: Ms. MCDOWELL presents today for a follow up audio only telehealth visit for which they gave permission for. The patient was located at home [address] [place] [address] and I was located in my office in [facility]. Daughter states patient is okay. She notes giving pt suppositories and 7 hours later she had mixed bowel movements. However, she did not have a bowel movement since Saturday. Patient is given pedialyte and water frequently. Today's temperature was 95 as before it was 96-97. Pt did not drink any liquids shortly after taking temperature. Daughter denies giving pt solace but she usually gives Miralax as she has done so today. Toady's urine color is clear as it  has been clear for some time.  Daughter states her stomach was distended. Bp has stabilized since being in the hospital but last night and today it  was higher than normal being being 168/88 before medication.   11/29/2023: Ms. CLAUDIO MCDOWELL presents today for a follow up audio only telephone visit for which she gave permission for. The pt was located at home, [address] [place] [address], and I was located in my office in [place], [place]. Visit was conducted with her daughter, Andree. She was informed yesterday by her house call physicians of the results of her urine culture (emailed from core lab showing >100,000 CFU/ml Pseudomonas aeruginosa susceptible only to amikacin, Ciprofloxacin, imipenem, levofloxacin, meropenum. Resistant to Ceftazidime, Pip/tazo. Intermediate for aztrenam, cefepime. They discussed that patient is having increasing oxygen requirements, although SPO2 ok on supplemental O2 (prior to this patient needed only occasional O2 suppl. every few days). 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 The want to treat with ciprofloxacin, 7 day course. Prescription sent to pharmacy. Her daughter inquired today on if she should be treated. She states last week her mother seemed very not herself, however today she seems better in terms of mental status and oxygenation. She has been giving her senakot and miralax.   12/21/2023: Ms. MCDOWELL presents today for a follow up audio-only telehealth visit for which they gave permission for. The patient was located at home [address] [place] [address] and I was located in my office in [place], [place]. The patient obtained lab work 12/4/23 prior to today's visit. Urinalysis is improved and showed trace proteinuria, small Leukocyte Esterase, 6/HPF of WBC and 7/LPF of Cast. Urine culture shows no significant growth, <10,000 CFU/mL Normal Urogenital Ánegl, which is very good. Her daughter expresses concern as recently patient is experiencing a surplus of looser and larger amounts of bowel movements that is escaping to the vaginal area, thus causing infections. Urine is normal in color, denies fever. Patient is occasionally more fatigued than usual. Denies distended appearance of the abdomen. Admits to only 1 spasm around the catheter this month. Next sales change will be in 2 weeks.  Towards the end of visit, patient was shown on Facetime, appearing well oriented x 3, normal pigmentation, lips are moist, and smile is symmetrical. I am very impressed by how well the patient looks and speaks. It was more than just pleasantries, she inquired about personal questions. Overall, I am very pleased.   12/27/2023: Ms. CLAUDIO MCDOWELL presents today for a follow up audio only telephone visit for which she gave permission for. The pt was located at home, [address] [place] [address], and I was located in my office in [place], [place]. She is accompanied by her daughter. Patient provided a surveillance urine specimen on 12/22/2023. She has chronic sales catheter drainage. UA revealed small LEC, positive for nitrites, 6 WBC/HPF, no RBC seen, moderate bacteria/HPF, granular casts present, and yeast like cells present. Urine culture grew >100,000 CFU/ml Enterococcus faecalis and 50,000 - 99,000 CFU/mL Streptococcus mitis/oralis group. Urine cytology was negative for high grade urothelial carcinoma. Few mature squamous cells, rare benign urothelial cells and abundant fungal organisms morphologically consistent with Candida species present. Patient's daughter reports that her urine looks clear and a pale yellow color. She feels her mom still has a lot of anxiety and some confusion. She does report that her catheter bag sits in feces due to the patient's position. They tried to tilt her pelvis to avoid this but states it's easier said than done. She states there is no feces in the catheter or in the bag. BM are not loose, she describes them as pasty. Has about 2-3 BM a day.   01/05/2024: Ms. MCDOWELL is a 89 year old female presenting today for an audio only telehealth visit for which she gave verbal permission. The patient was located in her home at [address] [place] [address]. I was located in my office on [place], [place]. She was accompanied by her daughter who helped with the visit. She reports that a nurse came for a catheter change, and only succeeded on the third attempt. She says it's the same nurse who has been coming for 5 months now, and usually she encounters no difficulties. Now that the catheter has been changed, the patient is able to void.   01/17/2024: Ms. CLAUDIO MCDOWELL presents today for a follow up audio only telephone visit for which she gave permission for. The pt was located at home, [address] [place] [address], and I was located in my office in [place], [place]. She is accompanied by her daughter, Tabatha Deluna. Patient provided a urine specimen on 1/5/2024 for surveillance. UA revealed trace blood by urine and moderate LEC. This is excellent for her as she has chronic sales catheter drainage. Urine culture grew 50,000-99,000 CFU/ml Pseudomonas aeruginosa. Urine cytology was negative for high grade urothelial carcinoma. Few mature squamous cells, rare benign urothelial cells and abundant fungal organisms morphologically consistent with Candida species present. The patient's daughter reports that her mother is "different". She reports that she is hearing things a lot now. She states that usually the things are negative. For example, she will say "Why is Ac saying my oxygen isn't working properly?". It is clear what she is saying but fairly random. She is not seeing anything; it is strictly auditory.   01/22/2024: Ms. CLAUDIO MCDOWELL presents today for a follow up audio only telephone visit for which she gave permission for. The pt was located at home, [address] [place] [address], and I was located in my office in [place], [place]. Visit was conducted with the patient's daughter, Tabatha Deluna. She informs me that she did not realize she was out of hyoscyamine sulfate for bladder spasms. She has been getting bladder spasms after the aide leaves and is embarrassed by this as she thinks shes wetting the bed. Her daughter reports that her mother asks in the morning "why am I bothering taking my medications, I am going to die anyway".   02/05/2024: Ms. CLAUDIO MCDOWELL presents today for an audio visual telehealth visit for which she gave permission for. The patient was located at home at [address] [place] [address] and I was located at my office in [place], [place]. Pt's daughter states she was not expecting my call today. I informed her that she was on my schedule and that if she would like we can talk. She informs me that hyoscyamine does not seem to be working for her mother and that some days she has bladder spasms and some she doesn't. Pt is currently not having diarrhea. She is due for a catheter change tomorrow. Patient has not started any medications for the psychological issues she has been having.   02/09/2024: Ms. MCDOWELL is a 89 year old female presenting today for an audio only telehealth visit for which she gave verbal permission. The patient was located in her home at [address] [place] [address]. I was located in my office on [place], [place]. She was accompanied by her daughter who helps with the visit.   The patient has been with a chronic sales catheter since July 2021 when she fractured her foot. She reports today for a follow up.   I reviewed and discussed the patient's pertinent lab works. Urine Culture shows 10,000 - 49,000 CFU/mL Pseudomonas aeruginosa 10,000 - 49,000 CFU/mL Citrobacter freundii complex  I explain to the patient that patients with chronic sales catheter develop bacteria in the urine, that may come from the skin, and it is not significant clinically unless infection symptoms ensue. In addition, the patient reports she was not taking antibiotics when the sample was collected, and the colonies were less than 100 000.   03/13/2024: Ms. CLAUDIO MCDOWELL presents today for a follow up audio only telephone visit for which she gave permission for. The pt was located at home, [address] [place] [address], and I was located in my office in [place], [place]. Visit was conducted with her daughter Tabatha Deluna. Patient provided a urine specimen on 3/4/2024. Patient has a chronic indwelling sales. UA revealed trace blood, large LE, and 9 WBC/HPF. This is quite good considering her sales. Culture grew 50,000 - 99,000 CFU/mL Escherichia coli & >100,000 CFU/ml Citrobacter freundii complex. She does report that after they gave the specimen her mother had a week of stool getting in the vulvar area and she is worried this might travel to the bladder. There is no evidence of that currently. She states the urine is clear and yellow. There has been no drainage around the catheter. She has not had any increased bladder spasms. Patient's daughter reports that her mother is not feeling well today. She has a runny nose and a cough. Home care is arranging for a nasal swab.

## 2024-03-14 NOTE — ASSESSMENT
[FreeTextEntry1] : 10/12/2023: Ms. MCDOWELL presents today for a follow up audio only telehealth visit for which they gave permission for. The patient was located at home [address] [place] [address] and I was located in my office in [place], [place]. The 10/2/23 UA showed proteinuria 30 mg/dL, moderate blood, large Leukocyte Esterase, 16/HPF of WBC. UA showed improvement as patient has chronic sales drainage. Her daughter states patient is okay considering coming home from the hospital. Most pain is in her back/shoulders and some of her back area is red/raw.  Recently her BP has increased to 180/100 and was prescribed Losartan 50mg twice daily. Urine is a clear color, denies cloudy and dark appearance.  Reviewed and discussed laboratory work of 10/2/23 which She obtained prior to today's visit as requested. Pt will go to her nearest [facility] lab for blood work and a urine sample which will be sent for urinalysis, urine culture, and urine cytology. Pt will schedule a telehealth visit after lab work to discuss results.   11/01/2023: Ms. MCDOWELL presents today for a follow up audio only telehealth visit for which they gave permission for. The patient was located at home [address] [place] [address] and I was located in my office in [place], [place]. The 10/13/23 CMP revealed creatinine at 0.58 and low ALT at 8 U/L. Patient was admitted into [facility] on 10/27/23 for sepsis/UTI. Her daughter states she may be released tomorrow. Daughter states during the day the patient's urine was normal, but she did have chills and a fever.   As the patient recently had a course of antibiotics, we suggested patient have catheter removed in hospital, as we will to observe bladder emptying and skin while the catheter is removed. If abnormalities arise, we will place the catheter back in.  If the daughter is to proceed with the plan, we will not discharge patient until a PVR is taken and until pt is able to void on her own the next morning. Hospitalist should weigh the diaper before and after and I will calculate it. If stool is present, then we will not be able to do so.  The daughter will have the Hospitalist contact me about patient.  Hospitalist has called at 4:06 pm. She states the urination looks contaminated and culture showed E-coli.  They will bladder scan her every hour.  Hospitalist will call tomorrow with update on patient and inform me on her fluid level.  Advised daughter to obtain a thermometer to observe temperature.    11/03/2023: Ms. MCDOWELL is a 89 year old female presenting today for an audio only telehealth visit for which she gave verbal permission. The patient was located in her home at [address] [place] [address]. I was located in my office on [place], [place]. She was accompanied by her daughter. She says her mother was supposed to be released today. Initially she was able to void. Because of constipation she was given a couple of enemas. She had a large evacuation and since then she has not been able to void again. The daughter says her latest PVR was 340 cc. She is waiting for her mother to be rescanned again.    Plan: If she is unable to void, she will be discharged with a sales catheter.   11/07/2023: Ms. CLAUDIO MCDOWELL presents today for a follow up audio only telephone visit for which she gave permission for. The pt was located at home, [address] [place] [address], and I was located in my office in [place], [place]. The visit was conducted with the patient's daughter. The patient came home from the hospital on Sunday. The catheter was put back in without another attempt at a void trial. The patient does have issues with constipation. She is able to swallow liquids. Her daughter has had to crush some of her pills to put them in a liquid, however the pt finds it to taste bitter and has some discomfort swallowing the chunks of crushed pills. The hospital discharged her with a 7 day supply of Cefuroxime. Was started yesterday so she has had two doses.    We discussed the possibility of a doing a trial of void. At this time I am recommending her mother settle back in to being home and we can readdress this possibility and how the patient and her daughter would like to proceed at the time of her next visit. Next catheter change will be on 12/5 should they choose to proceed with the catheter.   I looked it up on [employer] and Cefuroxime is available in the [place] as a liquid suspension. Given that this would be easier for the patient, I went to prescribe it for her which she could take rather than the pills. [employer] did not allow me to electronically prescribe it in a liquid suspension so I called the patient's pharmacy to give a verbal prescription for 200 ml of it for 20 ml BID for 5 days. I left a VM for the pharmacy as there was no answer. I requested they call me back if they could accept this prescription or if they could not. The pharmacy called me back shortly after and they told me that it is not currently offered in a liquid suspension, however he called the [facility] next door and they are willing to compound it for 40$. I saw this as acceptable and thanked him for going the extra mile and calling next door. He will contact the patient's daughter to inform her.   Patient and her daughter will have a telehealth visit in 2 weeks for reassessment, sooner if clinically indicated.   11/20/2023: Ms. MCDOWELL presents today for a follow up audio only telehealth visit for which they gave permission for. The patient was located at home [address] [place] [address] and I was located in my office in [facility]. Daughter states patient is okay. She notes giving pt suppositories and 7 hours later she had mixed bowel movements. However, she did not have a bowel movement since Saturday. Patient is given pedialyte and water frequently. Today's temperature was 95 as before it was 96-97. Pt did not drink any liquids shortly after taking temperature. Daughter denies giving pt solace but she usually gives Miralax as she has done so today. Senady's urine color is clear as it  has been clear for some time.  Daughter states her stomach was distended. Bp has stabilized since being in the hospital but last night and today it  was higher than normal being being 168/88 before medication.   Advised daughter to give patient Miralax tonight if there is no bowel movements.  Advised her to take Blood pressure tonight.  Pt will schedule a telehealth visit  11/29/2023: Ms. CLAUDIO MCDOWELL presents today for a follow up audio only telephone visit for which she gave permission for. The pt was located at home, [address] [place] [address], and I was located in my office in [place], [place]. Visit was conducted with her daughter, Andree. She was informed yesterday by her house call physicians of the results of her urine culture (emailed from core lab showing >100,000 CFU/ml Pseudomonas aeruginosa susceptible only to amikacin, Ciprofloxacin, imipenem, levofloxacin, meropenum. Resistant to Ceftazidime, Pip/tazo. Intermediate for aztrenam, cefepime. They discussed that patient is having increasing oxygen requirements, although SPO2 ok on supplemental O2 (prior to this patient needed only occasional O2 suppl. every few days). 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 The want to treat with ciprofloxacin, 7 day course. Prescription sent to pharmacy. Her daughter inquired today on if she should be treated. She states last week her mother seemed very not herself, however today she seems better in terms of mental status and oxygenation. She has been giving her senakot and miralax.   I explained that given her O2 issues and confusion over the last few days, I advised to treat with Cipro despite her daughter feeling she is better today. If there are any problems with crushing the pills for her or if she is not reacting to it well, she was advised to call right away. I posed the option of giving it as an oral solution if need be.    12/21/2023: Ms. MCDOWELL presents today for a follow up audio-visual telehealth visit for which they gave permission for. The patient was located at home [address] [place] [address] and I was located in my office in [place], [place]. The patient obtained lab work 12/4/23 prior to today's visit. Urinalysis is improved and showed trace proteinuria, small Leukocyte Esterase, 6/HPF of WBC and 7/LPF of Cast. Urine culture shows no significant growth, <10,000 CFU/mL Normal Urogenital Khadijah, which is very good. Her daughter expresses concern as recently patient is experiencing a surplus of looser and larger amounts of bowel movements that is escaping to the vaginal area, thus causing infections. Urine is normal in color, denies fever. Patient is occasionally more fatigued than usual. Denies distended appearance of the abdomen. Admits to only 1 spasm around the catheter this month. Next sales change will be in 2 weeks.  Towards the end of visit, patient was shown on Facetime, appearing well oriented x 3, normal pigmentation, lips are moist, and smile is symmetrical. I am very impressed by how well the patient looks and speaks. It was more than just pleasantries, she inquired about personal questions. Overall, I am very pleased.   Reviewed and discussed laboratory work of 12/4/23 which She obtained prior to today's visit as requested. Advised daughter to have care team to place support under the pelvis, so pelvis is tilted up at a higher level of the anus.   As long as stool is pasty and firm, we advised Andree to give patient MiraLAX. If stool is loose and unfirm, she should decrease the amount of MiraLAX.  Pt will provide a urine sample which will be sent for urinalysis, urine culture, and urine cytology. Pt will schedule a telehealth visit in 2 weeks for reassessment.   12/27/2023: Ms. CLAUDIO MCDOWELL presents today for a follow up audio only telephone visit for which she gave permission for. The pt was located at home, [address] [place] [address], and I was located in my office in [place], [place]. She is accompanied by her daughter. Patient provided a surveillance urine specimen on 12/22/2023. She has chronic sales catheter drainage. UA revealed small LEC, positive for nitrites, 6 WBC/HPF, no RBC seen, moderate bacteria/HPF, granular casts present, and yeast like cells present. Urine culture grew >100,000 CFU/ml Enterococcus faecalis and 50,000 - 99,000 CFU/mL Streptococcus mitis/oralis group. Urine cytology was negative for high grade urothelial carcinoma. Few mature squamous cells, rare benign urothelial cells and abundant fungal organisms morphologically consistent with Candida species present. Patient's daughter reports that her urine looks clear and a pale yellow color. She feels her mom still has a lot of anxiety and some confusion. She does report that her catheter bag sits in feces due to the patient's position. They tried to tilt her pelvis to avoid this but states it's easier said than done. She states there is no feces in the catheter or in the bag. BM are not loose, she describes them as pasty. Has about 2-3 BM a day.    Reviewed and discussed lab work of 12/22/2023 in detail with the pt.  Future urine orders were put in including fungal urine culture. Pt's daughter was advised to continue to try and alter the patient's position to avoid the catheter sitting in feces.  Patient should have a telehealth visit in 3 months, sooner if clinically indicated.    01/05/2024: Ms. MCDOWELL is a 89 year old female presenting today for an audio only telehealth visit for which she gave verbal permission. The patient was located in her home at [address] [place] [address]. I was located in my office on [place], [place]. She was accompanied by her daughter who helped with the visit. She reports that a nurse came for a catheter change, and only succeeded on the third attempt. She says it's the same nurse who has been coming for 5 months now, and usually she encounters no difficulties. Now that the catheter has been changed, the patient is able to void.   Plan: Pt's daughter will keep us updated. If the patient experiences fever or chill, she will call ER and inform the office.   01/17/2024: Ms. CLAUDIO MCDOWELL presents today for a follow up audio only telephone visit for which she gave permission for. The pt was located at home, [address] [place] [address], and I was located in my office in [place], [place]. She is accompanied by her daughter, Tabatha Deluna. Patient provided a urine specimen on 1/5/2024 for surveillance. UA revealed trace blood by urine and moderate LEC. This is excellent for her as she has chronic sales catheter drainage. Urine culture grew 50,000-99,000 CFU/ml Pseudomonas aeruginosa. Urine cytology was negative for high grade urothelial carcinoma. Few mature squamous cells, rare benign urothelial cells and abundant fungal organisms morphologically consistent with Candida species present. The patient's daughter reports that her mother is "different". She reports that she is hearing things a lot now. She states that usually the things are negative. For example, she will say "Why is Ac saying my oxygen isn't working properly?". It is clear what she is saying but fairly random. She is not seeing anything; it is strictly auditory.   Reviewed and discussed lab work of 1/5/2024 in detail with the pt's daughter. She was informed that given she has chronic sales catheter drainage, this specimen was excellent. If she had more WBC/HPF, I would be concerned, however she is in very good shape from a urologic standpoint. Her daughter was advised to speak with her mother's PCP about hearing things and her AMS as it is not urologic in origin or related to pending sepsis. I provided her the name of a neurologist, Dr.Li-Fen Tripathi if she would like to go for neuro consultation.  I once again re-iterated that at this time, given her skin breakdown, I recommend continued sales catheter drainage. Her daughter brought up an external urine collection apparatus once again, however I do not recommend we try that at least until her skin is in good condition. I also explained that if her mother were to go into urinary retention, this sort of device would not drain the urine from her.  Pt's daughter was advised to give her hyoscyamine for bladder spasms.  Patient will have a telehealth visit in 1-2 months, sooner if clinically indicated.    01/22/2024: Ms. CLAUDIO MCDOWELL presents today for a follow up audio only telephone visit for which she gave permission for. The pt was located at home, [address] [place] [address], and I was located in my office in [place], [place]. Visit was conducted with the patient's daughter, Tabatha Deluna. She informs me that she did not realize she was out of hyoscyamine sulfate for bladder spasms. She has been getting bladder spasms after the aide leaves and is embarrassed by this as she thinks shes wetting the bed. Her daughter reports that her mother asks in the morning "why am I bothering taking my medications, I am going to die anyway".   Renewed hyoscyamine sulfate 0.125 mg sublingual tablets, give one tablet under the tongue as needed. I recommend she gives it about 15 minutes before the aide comes. I suggest she give it for 10 days in a row to see if we can calm things down and then stop. If it returns, we can try again for another month or two. I did speak with the patient at the end of the visit. She seemed to know who I was and understood me. I spoke to her about the bladder spasms she is experiencing. She was on board with trying hyoscyamine and thanked me for speaking with her.  I recommend the patient's daughter speak with her PCP about potential anti-depressant medication.   Patient will have a telephone visit in 2 weeks for reassessment, sooner if clinically indicated.    02/05/2024: Ms. CLAUDIO MCDOWELL presents today for an audio visual telehealth visit for which she gave permission for. The patient was located at home at [address] [place] [address] and I was located at my office in [place], [place]. Pt's daughter states she was not expecting my call today. I informed her that she was on my schedule and that if she would like we can talk. She informs me that hyoscyamine does not seem to be working for her mother and that some days she has bladder spasms and some she doesn't. Pt is currently not having diarrhea. She is due for a catheter change tomorrow. Patient has not started any medications for the psychological issues she has been having.    Pt will discontinue use of hyoscyamine. I prescribed the patient Trospium 20 mg, one tablet once daily at bedtime. I informed the patient there may be constipation as a side effect.   Orders for UA, urine culture, and urine cytology were put in for her daughter to bring the patient's sample to her nearest  lab.  Patient will have a telehealth visit this Friday after the catheter change.     02/09/2024: Ms. MCDOWELL is a 89 year old female presenting today for an audio only telehealth visit for which she gave verbal permission. The patient was located in her home at [address] [place] [address]. I was located in my office on [place], [place]. She was accompanied by her daughter who helps with the visit.   The patient has been with a chronic sales catheter since July 2021 when she fractured her foot. She reports today for a follow up.   I reviewed and discussed the patient's pertinent lab works. Urine Culture shows 10,000 - 49,000 CFU/mL Pseudomonas aeruginosa 10,000 - 49,000 CFU/mL Citrobacter freundii complex  I explain to the patient that patients with chronic sales catheter develop bacteria in the urine, that may come from the skin, and it is not significant clinically unless infection symptoms ensue. In addition, the patient reports she was not taking antibiotics when the sample was collected, and the colonies were less than 100 000.   Pt will start Trospium 20 mg.  She will provide urine for UA, Urine Cytology and Urine Culture. She will have a follow up in one month; earlier if needed.    03/13/2024: Ms. CLAUDIO MCDOWELL presents today for a follow up audio only telephone visit for which she gave permission for. The pt was located at home, [address] [place] [address], and I was located in my office in [place], [place]. Visit was conducted with her daughter Tabatha Deluna. Patient provided a urine specimen on 3/4/2024. Patient has a chronic indwelling sales. UA revealed trace blood, large LE, and 9 WBC/HPF. This is quite good considering her sales. Culture grew 50,000 - 99,000 CFU/mL Escherichia coli & >100,000 CFU/ml Citrobacter freundii complex. She does report that after they gave the specimen her mother had a week of stool getting in the vulvar area and she is worried this might travel to the bladder. There is no evidence of that currently. She states the urine is clear and yellow. There has been no drainage around the catheter. She has not had any increased bladder spasms. Patient's daughter reports that her mother is not feeling well today. She has a runny nose and a cough. Home care is arranging for a nasal swab.   Reviewed and discussed lab work of 3/4/2024 in detail with the pt's daughter.    If the patient's daughter notices a change in her urine, she will call promptly.    Duration of telephone visit was 15 minutes.

## 2024-03-15 ENCOUNTER — APPOINTMENT (OUTPATIENT)
Dept: HOME HEALTH SERVICES | Facility: HOME HEALTH | Age: 89
End: 2024-03-15

## 2024-03-15 ENCOUNTER — APPOINTMENT (OUTPATIENT)
Dept: UROLOGY | Facility: CLINIC | Age: 89
End: 2024-03-15

## 2024-03-15 LAB
25(OH)D3 SERPL-MCNC: 59.1 NG/ML
ALBUMIN SERPL ELPH-MCNC: 3.9 G/DL
ALP BLD-CCNC: 100 U/L
ALT SERPL-CCNC: 36 U/L
ANION GAP SERPL CALC-SCNC: 14 MMOL/L
AST SERPL-CCNC: 43 U/L
BILIRUB SERPL-MCNC: 0.3 MG/DL
BUN SERPL-MCNC: 20 MG/DL
CALCIUM SERPL-MCNC: 9 MG/DL
CHLORIDE SERPL-SCNC: 99 MMOL/L
CO2 SERPL-SCNC: 25 MMOL/L
CREAT SERPL-MCNC: 0.82 MG/DL
EGFR: 67 ML/MIN/1.73M2
GLUCOSE SERPL-MCNC: 91 MG/DL
POTASSIUM SERPL-SCNC: 3.4 MMOL/L
PROT SERPL-MCNC: 6 G/DL
SODIUM SERPL-SCNC: 138 MMOL/L

## 2024-03-15 RX ORDER — RISPERIDONE 0.25 MG/1
0.25 TABLET, ORALLY DISINTEGRATING ORAL
Qty: 180 | Refills: 3 | Status: ACTIVE | COMMUNITY
Start: 2024-01-19 | End: 1900-01-01

## 2024-03-16 ENCOUNTER — NON-APPOINTMENT (OUTPATIENT)
Age: 89
End: 2024-03-16

## 2024-03-16 ENCOUNTER — TRANSCRIPTION ENCOUNTER (OUTPATIENT)
Age: 89
End: 2024-03-16

## 2024-03-16 RX ORDER — CIPROFLOXACIN 3 MG/ML
0.3 SOLUTION OPHTHALMIC 3 TIMES DAILY
Qty: 1 | Refills: 0 | Status: ACTIVE | COMMUNITY
Start: 2024-03-16 | End: 1900-01-01

## 2024-03-18 ENCOUNTER — APPOINTMENT (OUTPATIENT)
Dept: HOME HEALTH SERVICES | Facility: HOME HEALTH | Age: 89
End: 2024-03-18
Payer: MEDICARE

## 2024-03-18 VITALS
TEMPERATURE: 97.8 F | DIASTOLIC BLOOD PRESSURE: 71 MMHG | SYSTOLIC BLOOD PRESSURE: 138 MMHG | OXYGEN SATURATION: 96 % | HEART RATE: 76 BPM | RESPIRATION RATE: 18 BRPM

## 2024-03-18 DIAGNOSIS — S01.90XA UNSPECIFIED OPEN WOUND OF UNSPECIFIED PART OF HEAD, INITIAL ENCOUNTER: ICD-10-CM

## 2024-03-18 DIAGNOSIS — D49.2 NEOPLASM OF UNSPECIFIED BEHAVIOR OF BONE, SOFT TISSUE, AND SKIN: ICD-10-CM

## 2024-03-18 DIAGNOSIS — L98.9 DISORDER OF THE SKIN AND SUBCUTANEOUS TISSUE, UNSPECIFIED: ICD-10-CM

## 2024-03-18 DIAGNOSIS — M25.562 PAIN IN LEFT KNEE: ICD-10-CM

## 2024-03-18 DIAGNOSIS — K59.09 OTHER CONSTIPATION: ICD-10-CM

## 2024-03-18 DIAGNOSIS — S81.812A LACERATION W/OUT FOREIGN BODY, LEFT LOWER LEG, INITIAL ENCOUNTER: ICD-10-CM

## 2024-03-18 DIAGNOSIS — L81.9 DISORDER OF PIGMENTATION, UNSPECIFIED: ICD-10-CM

## 2024-03-18 DIAGNOSIS — L98.0 PYOGENIC GRANULOMA: ICD-10-CM

## 2024-03-18 DIAGNOSIS — R92.8 OTHER ABNORMAL AND INCONCLUSIVE FINDINGS ON DIAGNOSTIC IMAGING OF BREAST: ICD-10-CM

## 2024-03-18 DIAGNOSIS — E87.1 HYPO-OSMOLALITY AND HYPONATREMIA: ICD-10-CM

## 2024-03-18 DIAGNOSIS — R13.10 DYSPHAGIA, UNSPECIFIED: ICD-10-CM

## 2024-03-18 DIAGNOSIS — Z87.2 PERSONAL HISTORY OF DISEASES OF THE SKIN AND SUBCUTANEOUS TISSUE: ICD-10-CM

## 2024-03-18 DIAGNOSIS — Z87.898 PERSONAL HISTORY OF OTHER SPECIFIED CONDITIONS: ICD-10-CM

## 2024-03-18 DIAGNOSIS — S81.802A UNSPECIFIED OPEN WOUND, LEFT LOWER LEG, INITIAL ENCOUNTER: ICD-10-CM

## 2024-03-18 DIAGNOSIS — L89.619 PRESSURE ULCER OF RIGHT HEEL, UNSPECIFIED STAGE: ICD-10-CM

## 2024-03-18 DIAGNOSIS — I16.0 HYPERTENSIVE URGENCY: ICD-10-CM

## 2024-03-18 DIAGNOSIS — Z87.19 PERSONAL HISTORY OF OTHER DISEASES OF THE DIGESTIVE SYSTEM: ICD-10-CM

## 2024-03-18 DIAGNOSIS — R68.89 OTHER GENERAL SYMPTOMS AND SIGNS: ICD-10-CM

## 2024-03-18 DIAGNOSIS — Z86.19 PERSONAL HISTORY OF OTHER INFECTIOUS AND PARASITIC DISEASES: ICD-10-CM

## 2024-03-18 DIAGNOSIS — R50.9 FEVER, UNSPECIFIED: ICD-10-CM

## 2024-03-18 DIAGNOSIS — B02.9 ZOSTER W/OUT COMPLICATIONS: ICD-10-CM

## 2024-03-18 DIAGNOSIS — L89.109 PRESSURE ULCER OF UNSPECIFIED PART OF BACK, UNSPECIFIED STAGE: ICD-10-CM

## 2024-03-18 DIAGNOSIS — R68.83 CHILLS (WITHOUT FEVER): ICD-10-CM

## 2024-03-18 DIAGNOSIS — R79.9 ABNORMAL FINDING OF BLOOD CHEMISTRY, UNSPECIFIED: ICD-10-CM

## 2024-03-18 DIAGNOSIS — N89.8 OTHER SPECIFIED NONINFLAMMATORY DISORDERS OF VAGINA: ICD-10-CM

## 2024-03-18 DIAGNOSIS — S81.002S: ICD-10-CM

## 2024-03-18 PROCEDURE — 99350 HOME/RES VST EST HIGH MDM 60: CPT

## 2024-03-18 NOTE — DIETITIAN INITIAL EVALUATION ADULT. - PATIENT PROFILE REVIEWED
[FreeTextEntry1] : 1. Dermatitis of uncertain etiology, flaring on the left dorsal foot Favor contact dermatitis 2/2 to rubber shoes? 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  - Education, counseling. - Stop keto, corn starch, and baking soda to area.  - Start clobetasol prop ointment to area BID x 2 weeks. SED. RTC in 2 weeks.  For weeping, start vinegar wraps: mix 1 part white vinegar in 3 parts warm water.  - Soak white cotton washcloth in solution above.  - Patient to apply and leave in place for at least 20-30 min at a time. Can use multiple times daily.  - [place] application of bland moisturizer- Vaseline daily.   RTC 2 weeks.   yes

## 2024-03-18 NOTE — COUNSELING
[Continue diet as tolerated] : continue diet as tolerated based on goals of care [Non - Smoker] : non-smoker [Smoke/CO Detectors] : smoke/CO detectors [Remove clutter and unsafe carpeting to avoid falls] : remove clutter and unsafe carpeting to avoid falls [] : eye exam [Patient/Caregiver not ready to engage in discussion] : patient/caregiver not ready to engage in discussion [Decrease hospital use] : decrease hospital use [Minimize unnecessary interventions] : minimize unnecessary interventions [Comfort Care] : comfort care [Maintain functional ability] : maintain functional ability

## 2024-03-20 ENCOUNTER — RX CHANGE (OUTPATIENT)
Age: 89
End: 2024-03-20

## 2024-03-20 ENCOUNTER — NON-APPOINTMENT (OUTPATIENT)
Age: 89
End: 2024-03-20

## 2024-03-20 PROBLEM — L81.9 DYSPIGMENTATION: Status: ACTIVE | Noted: 2024-03-20

## 2024-03-20 PROBLEM — R13.10 DYSPHAGIA, UNSPECIFIED TYPE: Status: ACTIVE | Noted: 2024-03-20

## 2024-03-20 RX ORDER — BENZONATATE 100 MG/1
100 CAPSULE ORAL
Qty: 28 | Refills: 3 | Status: ACTIVE | COMMUNITY
Start: 2024-03-20 | End: 1900-01-01

## 2024-03-20 RX ORDER — BENZONATATE 150 MG/1
150 CAPSULE ORAL
Qty: 30 | Refills: 1 | Status: DISCONTINUED | COMMUNITY
Start: 2024-03-16 | End: 2024-03-20

## 2024-03-20 NOTE — ASSESSMENT
[FreeTextEntry1] : Extensive support and counseling provided to daughter and patient regarding management of covid and associated symptoms  Patient has large amounts of secretions and difficulty managing in setting of advanced dementia. has had several choking episodes where mucous had to be removed manually. Requires suction for management of secretions  Nebulizer with 3% saline for increased secretions  Next visit: ACP!!!

## 2024-03-20 NOTE — REASON FOR VISIT
[Pre-Visit Preparation] : pre-visit preparation was done [Follow-Up] : a follow-up visit [Intercurrent Specialty/Sub-specialty Visits] : the patient has intercurrent specialty/sub-specialty visits [Family Member] : family member [Formal Caregiver] : formal caregiver [FreeTextEntry1] : follow up of chronic conditions, afib, dementia [FreeTextEntry2] : chart review [FreeTextEntry3] : urology

## 2024-03-20 NOTE — HISTORY OF PRESENT ILLNESS
[FreeTextEntry1] : chf, and functional quadriplegia and bedbound status.  [FreeTextEntry2] : 89 y/o Female with PMHx of Afib on Eliquis, CLL, HTN, renal calculus, venous insufficiency with lymphedema, and dementia seen for follow up.  Interval events: Patient has covid. Requiring supplemental O2. Discussed treatment options last week, family opted for symptom management. Daughter states patient having productive cough, mostly clear/white mucous but sometimes green, some blood noted (streaking/minimal). Also having rhinorrhea, lost sense of taste.   Skin: sacral wound improving Pain: intermittent, feet and legs mostly, sometimes in LLQ abdomen. Has tramadol for pain but rarely uses.  Gait/falls: Non ambulatory, no falls Appetite: good BM: switching between constipation and diarrhea Urine: sales in place with clear yellow urine Sleep: well mood: some good days and some not.   Medical problems:  Dementia with psychosis - risperidone .25mg in AM helping with sxs chronic indwelling sales with frequent UTI's - following with Urology, hyoscyamine sulfate .125mg SL BID prn Afib- on eliquis 2.5mg, metoprolol tartrate 25 1/2 tab CLL - stable HTN - on amlodipine 2.5mg (not being given consistently), metoprolol tartrate 25 1/2 tab, Isosoride mononitrate 30mg daily, losartan 25mg daily venous insufficiency with lymphedema - atorvastatin, furosemide 40mg anxiety- mirtazapine 15mg, lorazepam 0.5mg prn GERD - PPI, famotidine prn

## 2024-03-20 NOTE — REVIEW OF SYSTEMS
[Incontinence] : incontinence [Confusion] : confusion [Memory Loss] : memory loss [Anxiety] : anxiety [Negative] : Heme/Lymph [FreeTextEntry7] : as above [FreeTextEntry8] : sales [FreeTextEntry9] : as above, somtime b/l hip or leg pain shazia with movement [de-identified] : healing sacral wound, mass at back of head

## 2024-03-20 NOTE — HEALTH RISK ASSESSMENT
[] : managing finances [HRA Reviewed] : Health risk assessment reviewed [Some assistance needed] : feeding [Full assistance needed] : managing finances [No falls in past year] : Patient reported no falls in the past year [Yes] : The patient does have visual impairment [TimeGetUpGo] : 0

## 2024-03-20 NOTE — PHYSICAL EXAM
[No Acute Distress] : no acute distress [Normal Sclera/Conjunctiva] : normal sclera/conjunctiva [Supple] : the neck was supple [Normal Outer Ear/Nose] : the ears and nose were normal in appearance [No Respiratory Distress] : no respiratory distress [No Accessory Muscle Use] : no accessory muscle use [Clear to Auscultation] : lungs were clear to auscultation bilaterally [No Edema] : there was no peripheral edema [Normal Bowel Sounds] : normal bowel sounds [Soft] : abdomen soft [Non Tender] : non-tender [No Gross Sensory Deficits] : no gross sensory deficits [Normal Affect] : the affect was normal [Normal Mood] : the mood was normal [de-identified] : comfortable pleasant elderly lady laying in bed [de-identified] : hearing aides [de-identified] : irregularly irregular, 3/6 murmur [de-identified] : hernia [de-identified] : sales in place draining clear yellow urine [de-identified] : scalp lesion at back of head larger than previously with some opening, Stage 2 sacral pressure injury almost healed 2x1x0.1

## 2024-03-22 ENCOUNTER — APPOINTMENT (OUTPATIENT)
Dept: HOME HEALTH SERVICES | Facility: HOME HEALTH | Age: 89
End: 2024-03-22

## 2024-03-22 VITALS
SYSTOLIC BLOOD PRESSURE: 138 MMHG | OXYGEN SATURATION: 96 % | DIASTOLIC BLOOD PRESSURE: 72 MMHG | RESPIRATION RATE: 17 BRPM | TEMPERATURE: 97.2 F | HEART RATE: 67 BPM

## 2024-03-29 RX ORDER — LOSARTAN POTASSIUM 25 MG/1
25 TABLET, FILM COATED ORAL
Qty: 180 | Refills: 3 | Status: ACTIVE | COMMUNITY
Start: 2023-11-30 | End: 1900-01-01

## 2024-04-03 NOTE — PRE-OP CHECKLIST - AICD PRESENT
Derick Richey   presents for treatment reports on amoxicillin for tooth implant, Liliya Marroquin made aware per Dr. Kvng soriano to treat today. Pt tolerated treatment well with no complications.      Derick Richey is aware of future appt on 4/5 at 1030.     AVSNo (Declined by Derick Richey) d/c from unit stable      no

## 2024-04-04 LAB
APPEARANCE: CLEAR
BACTERIA: NEGATIVE /HPF
BILIRUBIN URINE: NEGATIVE
BLOOD URINE: NEGATIVE
CAST: 4 /LPF
COLOR: YELLOW
EPITHELIAL CELLS: 2 /HPF
GLUCOSE QUALITATIVE U: NEGATIVE MG/DL
KETONES URINE: NEGATIVE MG/DL
LEUKOCYTE ESTERASE URINE: ABNORMAL
MICROSCOPIC-UA: NORMAL
NITRITE URINE: NEGATIVE
PH URINE: 7.5
PROTEIN URINE: NEGATIVE MG/DL
RED BLOOD CELLS URINE: 4 /HPF
SPECIFIC GRAVITY URINE: 1.01
UROBILINOGEN URINE: 0.2 MG/DL
WHITE BLOOD CELLS URINE: 6 /HPF

## 2024-04-06 LAB
BACTERIA UR CULT: ABNORMAL
URINE CYTOLOGY: NORMAL

## 2024-04-08 ENCOUNTER — APPOINTMENT (OUTPATIENT)
Dept: UROLOGY | Facility: CLINIC | Age: 89
End: 2024-04-08
Payer: MEDICARE

## 2024-04-08 DIAGNOSIS — R14.0 ABDOMINAL DISTENSION (GASEOUS): ICD-10-CM

## 2024-04-08 PROCEDURE — 99443: CPT

## 2024-04-15 RX ORDER — CHLORHEXIDINE GLUCONATE 4 %
325 (65 FE) LIQUID (ML) TOPICAL DAILY
Qty: 90 | Refills: 3 | Status: ACTIVE | COMMUNITY
Start: 2022-02-07 | End: 1900-01-01

## 2024-04-16 NOTE — HISTORY OF PRESENT ILLNESS
[FreeTextEntry1] : Claudio Mcdowell in her daughter Tabatha Deluna gave permission for an audio video telehealth visit.  They were in their home in [facility].  I was in my office in [facility].  Claudio Mcdowell is currently 89 years of age.  Her daughter Tabatha Deluna is devoted to her care.  She is very concerned about her mother and has been so for many years.  She becomes very anxious at times and that is concerning her mother.  Claudio Mcdowell lives with her daughter.  Claudio Mcdowell has been bedbound for several years.  The patient had developed a sacral decubitus ulcer while in a nursing facility.  A Sales catheter was placed because of fecal incontinence and concern that urine mixed with the feces would contaminate the sacral decubitus ulcer.  Once the patient left the nursing facility and will be with her daughter.  The sacral decubitus ulcer has finally healed.  I have discussed removing the Sales catheter with the daughter.  However as the patient has fecal incontinence there is concern about the urine mixing with the feces and being more likely to cause skin problems.  For now we will leave the Sales catheter in.  The patient has had clinically symptomatic urinary tract infections.  He clinically significant infections usually start with increased urination around the Sales catheter due to bladder spasms.  Bladder spasms have been occurring for staff.  The patient is positioned properly in in bed and the personal care attendant leaves for the day.  We have managed to prevent this with the administration of sublingual hyoscyamine.  We do periodic surveillance urine analysis and urine culture tests at the time the Sales catheter is changed by visiting nurse.  He also performed a times of increased spasms or purulence of the urine or change in mental status,  On October 11, 2022 visiting nurse using an order I have previously placed at the request of the daughter sent urine for urine cytology which proved to be negative for malignant cells, urine culture which grew Greater than 100,000 and E. coli that was sensitive to all antibiotics tested.  Urinalysis looked quite good for urine taken from a Sales catheter that was used for chronic Sales catheter drainage.  Ileukocyte esterase was large but nitrites were negative.  There were 2 epithelial cells per high-power field.  There were only 10 white blood cells per high-power field and only 2 red blood cells per high-power field on microscopic exam there were no bacteria seen and there were no hyaline casts.  Specific gravity was 1.010 which shows good hydration and this bedbound woman cared for diligently by her daughter.  Blood by dipstick was trace.  There was no protein glucose or ketones in the urine.  There is no bilirubin and no urobilinogen.  An important portion of the visit was my review with the daughter about bladder spasms and urination around the catheter.  Urine appearance and urine analysis have been clear.  The urine was clear again today.  The patient has significant short-term memory deficit.  She does have some useful short-term memory.  Her long-term memory remains very much intact.  She is very pleasant and to make satisfactory conversation.  She does admit to sometimes not remembering something.  Her complexion was good and there was no pallor.  Facial movements were symmetric.  Cranial nerves were intact.  I was not able to assess the olfactory nerve as this was a telehealth visit.    11/04/2022: Ms. MCDOWELL is a 89 year old female presenting today for a telehealth visit. She was accompanied by her daughter who helped with the visit. They gave permission for an audio only telehealth conference. The patient was located in her home in [place], [place]. I was located in my office on [place], [place]. Today her daughter reports the pt experienced rare urinary leaking around the catheter due to bladder spasms. She also reports her systolic pressure was around 140 last week. I offered Gemtesa to manage the bladder spasms and the daughter was cautious about more medications. I informed her if the urinary leaking are only once every 2 weeks or so, she does not have to medicate. The daughter was agreeable to monitor the occurrence of urinary leaking over the next 4 weeks and assess the discomfort it causes the pt. She denies any other urinary issues.  03/01/2023: Ms. MCDOWELL is a 89 year old female presenting today for a telehealth visit. She was accompanied by her daughter who helped with the visit. They gave permission for an audio only telehealth conference. The patient was located in her home in [place], [place]. I was located in my office on [place], [place]. The pt has chronic sales catheter drainage. She provided a urine specimen from the catheter on 2/27/2023. UA was slightly turbid with large LEC and 59 WBC/HPF. Culture grew >100,000 CFU/ml E.Coli. The sensitivity report is not yet back. The pt's daughter was concerned as when the patient was constipated a few weeks ago she was having very watery BM which grew messy and were around the catheter area. She was given a Fleet's enema last month which helped. Currently she is having BM, paste-like in appearance. Her daughter does not think that her stomach is distended. She has recently had the catheter changed and is not voiding around it. Urine has been very clear. She does not seem to have pain in the urinary passageway or bladder. Denies fevers. Denies gross hematuria. Pt had covid around Thanksgiving, only had the first two vaccines from two years ago.   03/13/2023: Ms. MCDOWELL is a 89 year old female presenting today for a telehealth visit. She was accompanied by her daughter who helped with the visit. They gave permission for an audio only telehealth conference. The patient was located in her home in [place], [place]. I was located in my office on [place], [place]. The patient obtained an US prior to today's visit 3/9/23 which revealed: Comparison is made with the exam of 2/28/22. Transabdominal ultrasound of the abdomen was performed with color flow imaging. The examination is limited in evaluation. The visualized aorta and inferior vena cava show no abnormality. The pancreas is obscured by overlying bowel gas. The liver measures 12.4 cm with homogeneous echotexture. No intrinsic mass and no intra-or extrahepatic ductal dilatation. There is hepatopedal flow of the main portal vein. No gallstones, pericholecystic fluid, wall thickening or positive sonographic Melendez sign. The common bile duct measures 0.3 cm. The right kidney measures 9.2 x 5.6 x 3.0 cm with a nonobstructing 1.2 cm stone in the upper pole. Multiple additional subcentimeter calcifications are seen. These were not seen on previous exam. No hydronephrosis or renal mass. The left kidney measures 9.3 x 3.9 x 3.5 cm. There is a 2.1 x 2.4 x 2.0 cm cyst in the medial mid pole, not seen on previous study. No hydronephrosis or renal calcification. The spleen measures 8.2 cm and show no abnormality. There is a small right pleural effusion, stable. IMPRESSIONS: Multiple nonobstructing stones of the right kidney with no hydronephrosis. 2.4 cm cyst of the midpole left kidney. Small right pleural effusion, stable. The daughter has answered the phone and reports the patient's urine does not get dark. She is not aware of the amount of water she gives her mother. I requested she measure's this from now on as the pt has stones and needs to increase water consumption. Her daughter reports this week the pt has experienced more spasms than normal and believes this may be due to severe constipation. She provided the pt with an Enema to aide in this situation. She is very concerned but I informed her the Enema may have stimulated the spasms and we should give her a few days to clear up and re-evaluate.   03/22/2023: Ms. CLAUDIO MCDOWELL presents today for an audio visual telehealth visit for which she gave permission for. The patient was located at home at [address] [place] [address] and I was located at my office in [place], [place]. She is accompanied by her daughter whom most of the visit was conducted with. Her daughter has been keeping track of her mother's water consumption. She is averaging about 58 oz of water in a 24 hr period. Additionally, she is drinking juice and ensure nutrition drinks. She does not consume any caffeine. She is on furosemide 40 mg. I said hello to the patient at the end of the visit and she is looking well. She reports feeling no pain at the current moment.   3/29/2023:  Patient Claudio Mcdowell and daughter Tabatha Deluna were home in [place], [place] and I was in my office in [facility].  Patient and daughter gave permission for a [email or web address] telehealth visit.  They preferred this to [employer].  The patient looks good today.  Her complexion was good there is no pallor.  Smile was symmetric her affect was normal she appeared happy she could make conversation it was appropriate.  She said that she currently had no pain.  When I asked questions that after 3/2 how she had been recently the sometimes she hesitated and deferred to her daughter for cancer compatible with her impaired short-term memory.  Long-term memory remains good she recognizes me once know so I am does ask questions about things like her bladder as she was worried about it her blood tests.  I assured her the results were satisfactory.  04/18/2023: Ms. MCDOWELL is a 89 year old female presenting today for an audio and visual telehealth visit for which she gave verbal permission. We utilized [employer] telemetry doc platform. The patient was located in her home at [address] [place] [address]. I was located in my office on [place], [place]. She was accompanied by her daughter who helped to serve as a historian. She reports discomfort in her eyes. She describes the discomfort as a feeling of sand. She opened her eyes for me, and they do not look red. Pupils look normal. She reports the right eyes bothers her significantly more than the left. I advise that the patient tries lubricating drops and it if does not feel better she will notify her visiting nurse who is due to come next week for a sales catheter change. She reports episode of bladder spasms. She denies gross hematuria. She describes the urine as barely yellow. I reviewed and discussed her  latest pertinent lab on 04/12/2023 which shows "alkaline phosphate normal at 89. Creatinine was normal at 0.62 mg/dL. WBC normal at 5.01. RBC normal at 3.87".   05/09/2023: Ms. MCDOWELL presents today for a follow up audio only telehealth visit for which they gave permission for. She was accompanied by her daughter who helped to serve as a historian. The patient was located at home [address] [place] [address] and I was located in my office in [place], [place]. The patient obtained lab work 5/2/23 prior to today's visit. The UA revealed microscopic hematuria, 5/HPF RBC, 27/HPF WBC. The patient demonstrated great hydration as the specific gravity is 1.006. The Sales catheter was changed May 2, 2023 with no clear urine in the bag. While changing the diaper the aide reports blood in the diaper. The daughter states last week her mother experienced spasms but as of two days ago she is now fine. However, she states the patient is more fatigued. She denies the pt having a temperature. I assured her because her mother is post menopausal any abrasion to the labia can cause a fissure.   06/02/2023: Ms. MCDOWELL is a 89 year old female presenting today for an audio and visual telehealth visit for which she gave verbal permission. We utilized [employer] teams telemetry [employer] platform. The patient was located in her home at [address] [place] [address]. I was located in my office on [place], [place]. She was accompanied by her daughter Tabatha. She reports that her mother complains of onset dysuria that dissipated on its own and has not recurred since. She reports right sided back pain that is aggravated when she lays on the right side. She provided urine specimen. She notes the urine was very clear. I reviewed and discussed the patient's pertinent lab works. Her latest Urinalysis on 05/30/2023 that shows "60 WBC/HPF. Trace Blood Urine. Specific Gravity Urine 1.007". Urine Culture on the same date shows ">100,000 CFU/ml Escherichia coli and <10,000 CFU/ml Normal Urogenital ángel present". The patient takes Mirtazapine for anxiety at low dose. The daughter reports the patient is experiencing increasing anxiety and is thinking of having the prescriber increasing the dosage.  Her BP runs around 120/60. Her Hemoglobin hematocrit looks good. gaze is conjugated. facial expression looks symmetric. Urine was faint yellow and clear.   06/06/2023: Ms. MCDOWELL presents today for a follow up audio only telehealth visit for which they gave permission for. The patient was located at home [address] [place] [address] and I was located in my office in [place], [place]. She was accompanied by her daughter Tabatha. The daughter states pt is experiencing episodes of a dry cough. An X Ray  of 6/5/23 revealed Mild peribronchial thickening suggesting bronchitis in the right clinical setting with no focal pneumonia or congestive heart failure. COPD with chronic lung changes. The mental status is the same as usual but her BP has elevated some. I assured her a little elevation is not as dangerous. The pt continues to experience some urinary frequency and it is a little more concentrated.   10/12/2023: Ms. MCDOWELL presents today for a follow up audio only telehealth visit for which they gave permission for. The patient was located at home [address] [place] [address] and I was located in my office in [place], [place]. The 10/2/23 UA showed proteinuria 30 mg/dL, moderate blood, large Leukocyte Esterase, 16/HPF of WBC. UA showed improvement as patient has chronic sales drainage. Her daughter states patient is okay considering coming home from the hospital. Most pain is in her back/shoulders and some of her back area is red/raw.  Recently her BP has increased to 180/100 and was prescribed Losartan 50mg twice daily. Urine is a clear color, denies cloudy and dark appearance.  11/01/2023: Ms. MCDOWELL presents today for a follow up audio only telehealth visit for which they gave permission for. The patient was located at home [address] [place] [address] and I was located in my office in [place], [place]. The 10/13/23 CMP revealed creatinine at 0.58 and low ALT at 8 U/L. Patient was admitted into [facility] on 10/27/23 for sepsis/UTI. Her daughter states she may be released tomorrow. Daughter states during the day the patient's urine was normal, but she did have chills and a fever.   11/03/2023: Ms. MCDOWELL is a 89 year old female presenting today for an audio only telehealth visit for which she gave verbal permission. The patient was located in her home at [address] [place] [address]. I was located in my office on [place], [place]. She was accompanied by her daughter. She says her mother was supposed to be released today. Initially she was able to void. Because of constipation she was given a couple of enemas. She had a large evacuation and since then she has not been able to void again. The daughter says her latest PVR was 340 cc. She is waiting for her mother to be rescanned again.   11/07/2023: Ms. CLAUDIO MCDOWELL presents today for a follow up audio only telephone visit for which she gave permission for. The pt was located at home, [address] [place] [address], and I was located in my office in [place], [place]. The visit was conducted with the patient's daughter. The patient came home from the hospital on Sunday. The catheter was put back in without another attempt at a void trial. The patient does have issues with constipation. She is able to swallow liquids. Her daughter has had to crush some of her pills to put them in a liquid, however the pt finds it to taste bitter and has some discomfort swallowing the chunks of crushed pills. The hospital discharged her with a 7 day supply of Cefuroxime. Was started yesterday so she has had two doses.   11/20/2023: Ms. MCDOWELL presents today for a follow up audio only telehealth visit for which they gave permission for. The patient was located at home [address] [place] [address] and I was located in my office in [facility]. Daughter states patient is okay. She notes giving pt suppositories and 7 hours later she had mixed bowel movements. However, she did not have a bowel movement since Saturday. Patient is given pedialyte and water frequently. Today's temperature was 95 as before it was 96-97. Pt did not drink any liquids shortly after taking temperature. Daughter denies giving pt solace but she usually gives Miralax as she has done so today. Toady's urine color is clear as it  has been clear for some time.  Daughter states her stomach was distended. Bp has stabilized since being in the hospital but last night and today it  was higher than normal being being 168/88 before medication.   11/29/2023: Ms. CLAUDIO MCDOWELL presents today for a follow up audio only telephone visit for which she gave permission for. The pt was located at home, [address] [place] [address], and I was located in my office in [place], [place]. Visit was conducted with her daughter, Andree. She was informed yesterday by her house call physicians of the results of her urine culture (emailed from core lab showing >100,000 CFU/ml Pseudomonas aeruginosa susceptible only to amikacin, Ciprofloxacin, imipenem, levofloxacin, meropenum. Resistant to Ceftazidime, Pip/tazo. Intermediate for aztrenam, cefepime. They discussed that patient is having increasing oxygen requirements, although SPO2 ok on supplemental O2 (prior to this patient needed only occasional O2 suppl. every few days). 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 The want to treat with ciprofloxacin, 7 day course. Prescription sent to pharmacy. Her daughter inquired today on if she should be treated. She states last week her mother seemed very not herself, however today she seems better in terms of mental status and oxygenation. She has been giving her senakot and miralax.   12/21/2023: Ms. MCDOWELL presents today for a follow up audio-only telehealth visit for which they gave permission for. The patient was located at home [address] [place] [address] and I was located in my office in [place], [place]. The patient obtained lab work 12/4/23 prior to today's visit. Urinalysis is improved and showed trace proteinuria, small Leukocyte Esterase, 6/HPF of WBC and 7/LPF of Cast. Urine culture shows no significant growth, <10,000 CFU/mL Normal Urogenital Ángel, which is very good. Her daughter expresses concern as recently patient is experiencing a surplus of looser and larger amounts of bowel movements that is escaping to the vaginal area, thus causing infections. Urine is normal in color, denies fever. Patient is occasionally more fatigued than usual. Denies distended appearance of the abdomen. Admits to only 1 spasm around the catheter this month. Next sales change will be in 2 weeks.  Towards the end of visit, patient was shown on Facetime, appearing well oriented x 3, normal pigmentation, lips are moist, and smile is symmetrical. I am very impressed by how well the patient looks and speaks. It was more than just pleasantries, she inquired about personal questions. Overall, I am very pleased.   12/27/2023: Ms. CLAUDIO MCDOWELL presents today for a follow up audio only telephone visit for which she gave permission for. The pt was located at home, [address] [place] [address], and I was located in my office in [place], [place]. She is accompanied by her daughter. Patient provided a surveillance urine specimen on 12/22/2023. She has chronic sales catheter drainage. UA revealed small LEC, positive for nitrites, 6 WBC/HPF, no RBC seen, moderate bacteria/HPF, granular casts present, and yeast like cells present. Urine culture grew >100,000 CFU/ml Enterococcus faecalis and 50,000 - 99,000 CFU/mL Streptococcus mitis/oralis group. Urine cytology was negative for high grade urothelial carcinoma. Few mature squamous cells, rare benign urothelial cells and abundant fungal organisms morphologically consistent with Candida species present. Patient's daughter reports that her urine looks clear and a pale yellow color. She feels her mom still has a lot of anxiety and some confusion. She does report that her catheter bag sits in feces due to the patient's position. They tried to tilt her pelvis to avoid this but states it's easier said than done. She states there is no feces in the catheter or in the bag. BM are not loose, she describes them as pasty. Has about 2-3 BM a day.   01/05/2024: Ms. MCDOWELL is a 89 year old female presenting today for an audio only telehealth visit for which she gave verbal permission. The patient was located in her home at [address] [place] [address]. I was located in my office on [place], [place]. She was accompanied by her daughter who helped with the visit. She reports that a nurse came for a catheter change, and only succeeded on the third attempt. She says it's the same nurse who has been coming for 5 months now, and usually she encounters no difficulties. Now that the catheter has been changed, the patient is able to void.   01/17/2024: Ms. CLAUDIO MCDOWELL presents today for a follow up audio only telephone visit for which she gave permission for. The pt was located at home, [address] [place] [address], and I was located in my office in [place], [place]. She is accompanied by her daughter, Tabatha Deluna. Patient provided a urine specimen on 1/5/2024 for surveillance. UA revealed trace blood by urine and moderate LEC. This is excellent for her as she has chronic sales catheter drainage. Urine culture grew 50,000-99,000 CFU/ml Pseudomonas aeruginosa. Urine cytology was negative for high grade urothelial carcinoma. Few mature squamous cells, rare benign urothelial cells and abundant fungal organisms morphologically consistent with Candida species present. The patient's daughter reports that her mother is "different". She reports that she is hearing things a lot now. She states that usually the things are negative. For example, she will say "Why is Ac saying my oxygen isn't working properly?". It is clear what she is saying but fairly random. She is not seeing anything; it is strictly auditory.   01/22/2024: Ms. CLAUDIO MCDOWELL presents today for a follow up audio only telephone visit for which she gave permission for. The pt was located at home, [address] [place] [address], and I was located in my office in [place], [place]. Visit was conducted with the patient's daughter, Tabatha Deluna. She informs me that she did not realize she was out of hyoscyamine sulfate for bladder spasms. She has been getting bladder spasms after the aide leaves and is embarrassed by this as she thinks shes wetting the bed. Her daughter reports that her mother asks in the morning "why am I bothering taking my medications, I am going to die anyway".   02/05/2024: Ms. CLAUDIO MCDOWELL presents today for an audio visual telehealth visit for which she gave permission for. The patient was located at home at [address] [place] [address] and I was located at my office in [place], [place]. Pt's daughter states she was not expecting my call today. I informed her that she was on my schedule and that if she would like we can talk. She informs me that hyoscyamine does not seem to be working for her mother and that some days she has bladder spasms and some she doesn't. Pt is currently not having diarrhea. She is due for a catheter change tomorrow. Patient has not started any medications for the psychological issues she has been having.   02/09/2024: Ms. MCDOWELL is a 89 year old female presenting today for an audio only telehealth visit for which she gave verbal permission. The patient was located in her home at [address] [place] [address]. I was located in my office on [place], [place]. She was accompanied by her daughter who helps with the visit.   The patient has been with a chronic sales catheter since July 2021 when she fractured her foot. She reports today for a follow up.   I reviewed and discussed the patient's pertinent lab works. Urine Culture shows 10,000 - 49,000 CFU/mL Pseudomonas aeruginosa 10,000 - 49,000 CFU/mL Citrobacter freundii complex  I explain to the patient that patients with chronic sales catheter develop bacteria in the urine, that may come from the skin, and it is not significant clinically unless infection symptoms ensue. In addition, the patient reports she was not taking antibiotics when the sample was collected, and the colonies were less than 100 000.   03/13/2024: Ms. CLAUDIO MCDOWELL presents today for a follow up audio only telephone visit for which she gave permission for. The pt was located at home, [address] [place] [address], and I was located in my office in [place], [place]. Visit was conducted with her daughter Tabatha Deluna. Patient provided a urine specimen on 3/4/2024. Patient has a chronic indwelling sales. UA revealed trace blood, large LE, and 9 WBC/HPF. This is quite good considering her sales. Culture grew 50,000 - 99,000 CFU/mL Escherichia coli & >100,000 CFU/ml Citrobacter freundii complex. She does report that after they gave the specimen her mother had a week of stool getting in the vulvar area and she is worried this might travel to the bladder. There is no evidence of that currently. She states the urine is clear and yellow. There has been no drainage around the catheter. She has not had any increased bladder spasms. Patient's daughter reports that her mother is not feeling well today. She has a runny nose and a cough. Home care is arranging for a nasal swab.   04/08/2024: Ms. CLAUDIO MCDOWELL presents today for a follow up audio only telephone visit for which she gave permission for. The pt was located at home, [address] [place] [address], and I was located in my office in [place], [place]. Visit was conducted with her daughter Tabatha Deluna. Patient provided a urine specimen on 4/3/2024. UA was completely normal other than moderate LE and 6 WBC/HPF. For having a sales catheter, this is an excellent UA. Urine culture grew >100,000 CFU/ml Citrobacter freundii complex. Urine cytology was negative for high grade urothelial carcinoma. Specimen consists of many lymphoid cells, mature squamous epithelial cells and benign urothelial cells. Patient's daughter reports that  got covid, however she is recovering. She is very tired and still has a cough. She does report that the urine is clear and yellow, however the pt has had many accidents where stool gets near/in the catheter. Her skin remains red but not broken down. Continues use of triple paste.

## 2024-04-16 NOTE — ADDENDUM
[FreeTextEntry1] : This note was authored by Senia Corbin working as a scribe for Dr.Gary Sexton. I, Dr. Indio Sexton have reviewed the content of this note and confirm it is true and accurate. I personally performed the history and physical examination and made all the decisions 04/08/2024

## 2024-04-16 NOTE — ASSESSMENT
[FreeTextEntry1] : 10/12/2023: Ms. MCDOWELL presents today for a follow up audio only telehealth visit for which they gave permission for. The patient was located at home [address] [place] [address] and I was located in my office in [place], [place]. The 10/2/23 UA showed proteinuria 30 mg/dL, moderate blood, large Leukocyte Esterase, 16/HPF of WBC. UA showed improvement as patient has chronic sales drainage. Her daughter states patient is okay considering coming home from the hospital. Most pain is in her back/shoulders and some of her back area is red/raw.  Recently her BP has increased to 180/100 and was prescribed Losartan 50mg twice daily. Urine is a clear color, denies cloudy and dark appearance.  Reviewed and discussed laboratory work of 10/2/23 which She obtained prior to today's visit as requested. Pt will go to her nearest [facility] lab for blood work and a urine sample which will be sent for urinalysis, urine culture, and urine cytology. Pt will schedule a telehealth visit after lab work to discuss results.   11/01/2023: Ms. MCDOWELL presents today for a follow up audio only telehealth visit for which they gave permission for. The patient was located at home [address] [place] [address] and I was located in my office in [place], [place]. The 10/13/23 CMP revealed creatinine at 0.58 and low ALT at 8 U/L. Patient was admitted into [facility] on 10/27/23 for sepsis/UTI. Her daughter states she may be released tomorrow. Daughter states during the day the patient's urine was normal, but she did have chills and a fever.   As the patient recently had a course of antibiotics, we suggested patient have catheter removed in hospital, as we will to observe bladder emptying and skin while the catheter is removed. If abnormalities arise, we will place the catheter back in.  If the daughter is to proceed with the plan, we will not discharge patient until a PVR is taken and until pt is able to void on her own the next morning. Hospitalist should weigh the diaper before and after and I will calculate it. If stool is present, then we will not be able to do so.  The daughter will have the Hospitalist contact me about patient.  Hospitalist has called at 4:06 pm. She states the urination looks contaminated and culture showed E-coli.  They will bladder scan her every hour.  Hospitalist will call tomorrow with update on patient and inform me on her fluid level.  Advised daughter to obtain a thermometer to observe temperature.    11/03/2023: Ms. MCDOWELL is a 89 year old female presenting today for an audio only telehealth visit for which she gave verbal permission. The patient was located in her home at [address] [place] [address]. I was located in my office on [place], [place]. She was accompanied by her daughter. She says her mother was supposed to be released today. Initially she was able to void. Because of constipation she was given a couple of enemas. She had a large evacuation and since then she has not been able to void again. The daughter says her latest PVR was 340 cc. She is waiting for her mother to be rescanned again.    Plan: If she is unable to void, she will be discharged with a sales catheter.   11/07/2023: Ms. CLAUDIO MCDOWELL presents today for a follow up audio only telephone visit for which she gave permission for. The pt was located at home, [address] [place] [address], and I was located in my office in [place], [place]. The visit was conducted with the patient's daughter. The patient came home from the hospital on Sunday. The catheter was put back in without another attempt at a void trial. The patient does have issues with constipation. She is able to swallow liquids. Her daughter has had to crush some of her pills to put them in a liquid, however the pt finds it to taste bitter and has some discomfort swallowing the chunks of crushed pills. The hospital discharged her with a 7 day supply of Cefuroxime. Was started yesterday so she has had two doses.    We discussed the possibility of a doing a trial of void. At this time I am recommending her mother settle back in to being home and we can readdress this possibility and how the patient and her daughter would like to proceed at the time of her next visit. Next catheter change will be on 12/5 should they choose to proceed with the catheter.   I looked it up on [employer] and Cefuroxime is available in the [place] as a liquid suspension. Given that this would be easier for the patient, I went to prescribe it for her which she could take rather than the pills. [employer] did not allow me to electronically prescribe it in a liquid suspension so I called the patient's pharmacy to give a verbal prescription for 200 ml of it for 20 ml BID for 5 days. I left a VM for the pharmacy as there was no answer. I requested they call me back if they could accept this prescription or if they could not. The pharmacy called me back shortly after and they told me that it is not currently offered in a liquid suspension, however he called the [facility] next door and they are willing to compound it for 40$. I saw this as acceptable and thanked him for going the extra mile and calling next door. He will contact the patient's daughter to inform her.   Patient and her daughter will have a telehealth visit in 2 weeks for reassessment, sooner if clinically indicated.   11/20/2023: Ms. MCDOWELL presents today for a follow up audio only telehealth visit for which they gave permission for. The patient was located at home [address] [place] [address] and I was located in my office in [facility]. Daughter states patient is okay. She notes giving pt suppositories and 7 hours later she had mixed bowel movements. However, she did not have a bowel movement since Saturday. Patient is given pedialyte and water frequently. Today's temperature was 95 as before it was 96-97. Pt did not drink any liquids shortly after taking temperature. Daughter denies giving pt solace but she usually gives Miralax as she has done so today. Senady's urine color is clear as it  has been clear for some time.  Daughter states her stomach was distended. Bp has stabilized since being in the hospital but last night and today it  was higher than normal being being 168/88 before medication.   Advised daughter to give patient Miralax tonight if there is no bowel movements.  Advised her to take Blood pressure tonight.  Pt will schedule a telehealth visit  11/29/2023: Ms. CLAUDIO MCDOWELL presents today for a follow up audio only telephone visit for which she gave permission for. The pt was located at home, [address] [place] [address], and I was located in my office in [place], [place]. Visit was conducted with her daughter, Andree. She was informed yesterday by her house call physicians of the results of her urine culture (emailed from core lab showing >100,000 CFU/ml Pseudomonas aeruginosa susceptible only to amikacin, Ciprofloxacin, imipenem, levofloxacin, meropenum. Resistant to Ceftazidime, Pip/tazo. Intermediate for aztrenam, cefepime. They discussed that patient is having increasing oxygen requirements, although SPO2 ok on supplemental O2 (prior to this patient needed only occasional O2 suppl. every few days). 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 The want to treat with ciprofloxacin, 7 day course. Prescription sent to pharmacy. Her daughter inquired today on if she should be treated. She states last week her mother seemed very not herself, however today she seems better in terms of mental status and oxygenation. She has been giving her senakot and miralax.   I explained that given her O2 issues and confusion over the last few days, I advised to treat with Cipro despite her daughter feeling she is better today. If there are any problems with crushing the pills for her or if she is not reacting to it well, she was advised to call right away. I posed the option of giving it as an oral solution if need be.    12/21/2023: Ms. MCDOWELL presents today for a follow up audio-visual telehealth visit for which they gave permission for. The patient was located at home [address] [place] [address] and I was located in my office in [place], [place]. The patient obtained lab work 12/4/23 prior to today's visit. Urinalysis is improved and showed trace proteinuria, small Leukocyte Esterase, 6/HPF of WBC and 7/LPF of Cast. Urine culture shows no significant growth, <10,000 CFU/mL Normal Urogenital Khadijah, which is very good. Her daughter expresses concern as recently patient is experiencing a surplus of looser and larger amounts of bowel movements that is escaping to the vaginal area, thus causing infections. Urine is normal in color, denies fever. Patient is occasionally more fatigued than usual. Denies distended appearance of the abdomen. Admits to only 1 spasm around the catheter this month. Next sales change will be in 2 weeks.  Towards the end of visit, patient was shown on Facetime, appearing well oriented x 3, normal pigmentation, lips are moist, and smile is symmetrical. I am very impressed by how well the patient looks and speaks. It was more than just pleasantries, she inquired about personal questions. Overall, I am very pleased.   Reviewed and discussed laboratory work of 12/4/23 which She obtained prior to today's visit as requested. Advised daughter to have care team to place support under the pelvis, so pelvis is tilted up at a higher level of the anus.   As long as stool is pasty and firm, we advised Andree to give patient MiraLAX. If stool is loose and unfirm, she should decrease the amount of MiraLAX.  Pt will provide a urine sample which will be sent for urinalysis, urine culture, and urine cytology. Pt will schedule a telehealth visit in 2 weeks for reassessment.   12/27/2023: Ms. CLAUDIO MCDOWELL presents today for a follow up audio only telephone visit for which she gave permission for. The pt was located at home, [address] [place] [address], and I was located in my office in [place], [place]. She is accompanied by her daughter. Patient provided a surveillance urine specimen on 12/22/2023. She has chronic sales catheter drainage. UA revealed small LEC, positive for nitrites, 6 WBC/HPF, no RBC seen, moderate bacteria/HPF, granular casts present, and yeast like cells present. Urine culture grew >100,000 CFU/ml Enterococcus faecalis and 50,000 - 99,000 CFU/mL Streptococcus mitis/oralis group. Urine cytology was negative for high grade urothelial carcinoma. Few mature squamous cells, rare benign urothelial cells and abundant fungal organisms morphologically consistent with Candida species present. Patient's daughter reports that her urine looks clear and a pale yellow color. She feels her mom still has a lot of anxiety and some confusion. She does report that her catheter bag sits in feces due to the patient's position. They tried to tilt her pelvis to avoid this but states it's easier said than done. She states there is no feces in the catheter or in the bag. BM are not loose, she describes them as pasty. Has about 2-3 BM a day.    Reviewed and discussed lab work of 12/22/2023 in detail with the pt.  Future urine orders were put in including fungal urine culture. Pt's daughter was advised to continue to try and alter the patient's position to avoid the catheter sitting in feces.  Patient should have a telehealth visit in 3 months, sooner if clinically indicated.    01/05/2024: Ms. MCDOWELL is a 89 year old female presenting today for an audio only telehealth visit for which she gave verbal permission. The patient was located in her home at [address] [place] [address]. I was located in my office on [place], [place]. She was accompanied by her daughter who helped with the visit. She reports that a nurse came for a catheter change, and only succeeded on the third attempt. She says it's the same nurse who has been coming for 5 months now, and usually she encounters no difficulties. Now that the catheter has been changed, the patient is able to void.   Plan: Pt's daughter will keep us updated. If the patient experiences fever or chill, she will call ER and inform the office.   01/17/2024: Ms. CLAUDIO MCDOWELL presents today for a follow up audio only telephone visit for which she gave permission for. The pt was located at home, [address] [place] [address], and I was located in my office in [place], [place]. She is accompanied by her daughter, Tabatha Deluna. Patient provided a urine specimen on 1/5/2024 for surveillance. UA revealed trace blood by urine and moderate LEC. This is excellent for her as she has chronic sales catheter drainage. Urine culture grew 50,000-99,000 CFU/ml Pseudomonas aeruginosa. Urine cytology was negative for high grade urothelial carcinoma. Few mature squamous cells, rare benign urothelial cells and abundant fungal organisms morphologically consistent with Candida species present. The patient's daughter reports that her mother is "different". She reports that she is hearing things a lot now. She states that usually the things are negative. For example, she will say "Why is Ac saying my oxygen isn't working properly?". It is clear what she is saying but fairly random. She is not seeing anything; it is strictly auditory.   Reviewed and discussed lab work of 1/5/2024 in detail with the pt's daughter. She was informed that given she has chronic sales catheter drainage, this specimen was excellent. If she had more WBC/HPF, I would be concerned, however she is in very good shape from a urologic standpoint. Her daughter was advised to speak with her mother's PCP about hearing things and her AMS as it is not urologic in origin or related to pending sepsis. I provided her the name of a neurologist, Dr.Li-Fen Tripathi if she would like to go for neuro consultation.  I once again re-iterated that at this time, given her skin breakdown, I recommend continued sales catheter drainage. Her daughter brought up an external urine collection apparatus once again, however I do not recommend we try that at least until her skin is in good condition. I also explained that if her mother were to go into urinary retention, this sort of device would not drain the urine from her.  Pt's daughter was advised to give her hyoscyamine for bladder spasms.  Patient will have a telehealth visit in 1-2 months, sooner if clinically indicated.    01/22/2024: Ms. CLAUDIO MCDOWELL presents today for a follow up audio only telephone visit for which she gave permission for. The pt was located at home, [address] [place] [address], and I was located in my office in [place], [place]. Visit was conducted with the patient's daughter, Tabatha Deluna. She informs me that she did not realize she was out of hyoscyamine sulfate for bladder spasms. She has been getting bladder spasms after the aide leaves and is embarrassed by this as she thinks shes wetting the bed. Her daughter reports that her mother asks in the morning "why am I bothering taking my medications, I am going to die anyway".   Renewed hyoscyamine sulfate 0.125 mg sublingual tablets, give one tablet under the tongue as needed. I recommend she gives it about 15 minutes before the aide comes. I suggest she give it for 10 days in a row to see if we can calm things down and then stop. If it returns, we can try again for another month or two. I did speak with the patient at the end of the visit. She seemed to know who I was and understood me. I spoke to her about the bladder spasms she is experiencing. She was on board with trying hyoscyamine and thanked me for speaking with her.  I recommend the patient's daughter speak with her PCP about potential anti-depressant medication.   Patient will have a telephone visit in 2 weeks for reassessment, sooner if clinically indicated.    02/05/2024: Ms. CLAUDIO MCDOWELL presents today for an audio visual telehealth visit for which she gave permission for. The patient was located at home at [address] [place] [address] and I was located at my office in [place], [place]. Pt's daughter states she was not expecting my call today. I informed her that she was on my schedule and that if she would like we can talk. She informs me that hyoscyamine does not seem to be working for her mother and that some days she has bladder spasms and some she doesn't. Pt is currently not having diarrhea. She is due for a catheter change tomorrow. Patient has not started any medications for the psychological issues she has been having.    Pt will discontinue use of hyoscyamine. I prescribed the patient Trospium 20 mg, one tablet once daily at bedtime. I informed the patient there may be constipation as a side effect.   Orders for UA, urine culture, and urine cytology were put in for her daughter to bring the patient's sample to her nearest  lab.  Patient will have a telehealth visit this Friday after the catheter change.     02/09/2024: Ms. MCDOWELL is a 89 year old female presenting today for an audio only telehealth visit for which she gave verbal permission. The patient was located in her home at [address] [place] [address]. I was located in my office on [place], [place]. She was accompanied by her daughter who helps with the visit.   The patient has been with a chronic sales catheter since July 2021 when she fractured her foot. She reports today for a follow up.   I reviewed and discussed the patient's pertinent lab works. Urine Culture shows 10,000 - 49,000 CFU/mL Pseudomonas aeruginosa 10,000 - 49,000 CFU/mL Citrobacter freundii complex  I explain to the patient that patients with chronic sales catheter develop bacteria in the urine, that may come from the skin, and it is not significant clinically unless infection symptoms ensue. In addition, the patient reports she was not taking antibiotics when the sample was collected, and the colonies were less than 100 000.   Pt will start Trospium 20 mg.  She will provide urine for UA, Urine Cytology and Urine Culture. She will have a follow up in one month; earlier if needed.    03/13/2024: Ms. CLAUDIO MCDOWELL presents today for a follow up audio only telephone visit for which she gave permission for. The pt was located at home, [address] [place] [address], and I was located in my office in [place], [place]. Visit was conducted with her daughter Tabatha Deluna. Patient provided a urine specimen on 3/4/2024. Patient has a chronic indwelling sales. UA revealed trace blood, large LE, and 9 WBC/HPF. This is quite good considering her sales. Culture grew 50,000 - 99,000 CFU/mL Escherichia coli & >100,000 CFU/ml Citrobacter freundii complex. She does report that after they gave the specimen her mother had a week of stool getting in the vulvar area and she is worried this might travel to the bladder. There is no evidence of that currently. She states the urine is clear and yellow. There has been no drainage around the catheter. She has not had any increased bladder spasms. Patient's daughter reports that her mother is not feeling well today. She has a runny nose and a cough. Home care is arranging for a nasal swab.   Reviewed and discussed lab work of 3/4/2024 in detail with the pt's daughter.  If the patient's daughter notices a change in her urine, she will call promptly.    04/08/2024: Ms. CLAUDIO MCDOWELL presents today for a follow up audio only telephone visit for which she gave permission for. The pt was located at home, [address] [place] [address], and I was located in my office in [place], [place]. Visit was conducted with her daughter Tabatha Deluna. Patient provided a urine specimen on 4/3/2024. UA was completely normal other than moderate LE and 6 WBC/HPF. For having a sales catheter, this is an excellent UA. Urine culture grew >100,000 CFU/ml Citrobacter freundii complex. Urine cytology was negative for high grade urothelial carcinoma. Specimen consists of many lymphoid cells, mature squamous epithelial cells and benign urothelial cells. Patient's daughter reports that  got covid, however she is recovering. She is very tired and still has a cough. She does report that the urine is clear and yellow, however the pt has had many accidents where stool gets near/in the catheter. Her skin remains red but not broken down. Continues use of triple paste.    Reviewed and discussed lab work of 4/3/2024 in detail with the pt. Pt's daughter reassured that this is a very good specimen considering she has a sales catheter.    Will provide another urine specimen at time of next catheter change.   Will have a telehealth visit after next urine specimen, sooner if clinically indicated.     Duration of telephone visit was 22 minutes.

## 2024-05-05 LAB
APPEARANCE: CLEAR
BACTERIA UR CULT: NORMAL
BACTERIA: NEGATIVE /HPF
BILIRUBIN URINE: NEGATIVE
BLOOD URINE: NEGATIVE
CAST: 2 /LPF
COLOR: YELLOW
EPITHELIAL CELLS: 1 /HPF
GLUCOSE QUALITATIVE U: NEGATIVE MG/DL
HYALINE CASTS: PRESENT
KETONES URINE: NEGATIVE MG/DL
LEUKOCYTE ESTERASE URINE: ABNORMAL
MICROSCOPIC-UA: NORMAL
NITRITE URINE: NEGATIVE
PH URINE: 7
PROTEIN URINE: NEGATIVE MG/DL
RED BLOOD CELLS URINE: 0 /HPF
REVIEW: NORMAL
SPECIFIC GRAVITY URINE: 1.01
UROBILINOGEN URINE: 0.2 MG/DL
WHITE BLOOD CELLS URINE: 2 /HPF

## 2024-05-06 LAB — URINE CYTOLOGY: NORMAL

## 2024-05-08 ENCOUNTER — APPOINTMENT (OUTPATIENT)
Dept: UROLOGY | Facility: CLINIC | Age: 89
End: 2024-05-08
Payer: MEDICAID

## 2024-05-08 DIAGNOSIS — R19.5 OTHER FECAL ABNORMALITIES: ICD-10-CM

## 2024-05-08 PROCEDURE — 99443: CPT

## 2024-05-09 PROBLEM — R19.5 LOOSE STOOLS: Status: ACTIVE | Noted: 2023-01-30

## 2024-05-09 NOTE — HISTORY OF PRESENT ILLNESS
[FreeTextEntry1] : Claudio Mcdowell in her daughter Tabatha Deluna gave permission for an audio video telehealth visit.  They were in their home in [facility].  I was in my office in [facility].  Claudio Mcdowell is currently 89 years of age.  Her daughter Tabatha Deluna is devoted to her care.  She is very concerned about her mother and has been so for many years.  She becomes very anxious at times and that is concerning her mother.  Claudio Mcdowell lives with her daughter.  Claudio Mcdowell has been bedbound for several years.  The patient had developed a sacral decubitus ulcer while in a nursing facility.  A Sales catheter was placed because of fecal incontinence and concern that urine mixed with the feces would contaminate the sacral decubitus ulcer.  Once the patient left the nursing facility and will be with her daughter.  The sacral decubitus ulcer has finally healed.  I have discussed removing the Sales catheter with the daughter.  However as the patient has fecal incontinence there is concern about the urine mixing with the feces and being more likely to cause skin problems.  For now we will leave the Sales catheter in.  The patient has had clinically symptomatic urinary tract infections.  He clinically significant infections usually start with increased urination around the Sales catheter due to bladder spasms.  Bladder spasms have been occurring for staff.  The patient is positioned properly in in bed and the personal care attendant leaves for the day.  We have managed to prevent this with the administration of sublingual hyoscyamine.  We do periodic surveillance urine analysis and urine culture tests at the time the Sales catheter is changed by visiting nurse.  He also performed a times of increased spasms or purulence of the urine or change in mental status,  On October 11, 2022 visiting nurse using an order I have previously placed at the request of the daughter sent urine for urine cytology which proved to be negative for malignant cells, urine culture which grew Greater than 100,000 and E. coli that was sensitive to all antibiotics tested.  Urinalysis looked quite good for urine taken from a Sales catheter that was used for chronic Sales catheter drainage.  Ileukocyte esterase was large but nitrites were negative.  There were 2 epithelial cells per high-power field.  There were only 10 white blood cells per high-power field and only 2 red blood cells per high-power field on microscopic exam there were no bacteria seen and there were no hyaline casts.  Specific gravity was 1.010 which shows good hydration and this bedbound woman cared for diligently by her daughter.  Blood by dipstick was trace.  There was no protein glucose or ketones in the urine.  There is no bilirubin and no urobilinogen.  An important portion of the visit was my review with the daughter about bladder spasms and urination around the catheter.  Urine appearance and urine analysis have been clear.  The urine was clear again today.  The patient has significant short-term memory deficit.  She does have some useful short-term memory.  Her long-term memory remains very much intact.  She is very pleasant and to make satisfactory conversation.  She does admit to sometimes not remembering something.  Her complexion was good and there was no pallor.  Facial movements were symmetric.  Cranial nerves were intact.  I was not able to assess the olfactory nerve as this was a telehealth visit.    11/04/2022: Ms. MCDOWELL is a 89 year old female presenting today for a telehealth visit. She was accompanied by her daughter who helped with the visit. They gave permission for an audio only telehealth conference. The patient was located in her home in [place], [place]. I was located in my office on [place], [place]. Today her daughter reports the pt experienced rare urinary leaking around the catheter due to bladder spasms. She also reports her systolic pressure was around 140 last week. I offered Gemtesa to manage the bladder spasms and the daughter was cautious about more medications. I informed her if the urinary leaking are only once every 2 weeks or so, she does not have to medicate. The daughter was agreeable to monitor the occurrence of urinary leaking over the next 4 weeks and assess the discomfort it causes the pt. She denies any other urinary issues.  03/01/2023: Ms. MCDOWELL is a 89 year old female presenting today for a telehealth visit. She was accompanied by her daughter who helped with the visit. They gave permission for an audio only telehealth conference. The patient was located in her home in [place], [place]. I was located in my office on [place], [place]. The pt has chronic sales catheter drainage. She provided a urine specimen from the catheter on 2/27/2023. UA was slightly turbid with large LEC and 59 WBC/HPF. Culture grew >100,000 CFU/ml E.Coli. The sensitivity report is not yet back. The pt's daughter was concerned as when the patient was constipated a few weeks ago she was having very watery BM which grew messy and were around the catheter area. She was given a Fleet's enema last month which helped. Currently she is having BM, paste-like in appearance. Her daughter does not think that her stomach is distended. She has recently had the catheter changed and is not voiding around it. Urine has been very clear. She does not seem to have pain in the urinary passageway or bladder. Denies fevers. Denies gross hematuria. Pt had covid around Thanksgiving, only had the first two vaccines from two years ago.   03/13/2023: Ms. MCDOWELL is a 89 year old female presenting today for a telehealth visit. She was accompanied by her daughter who helped with the visit. They gave permission for an audio only telehealth conference. The patient was located in her home in [place], [place]. I was located in my office on [place], [place]. The patient obtained an US prior to today's visit 3/9/23 which revealed: Comparison is made with the exam of 2/28/22. Transabdominal ultrasound of the abdomen was performed with color flow imaging. The examination is limited in evaluation. The visualized aorta and inferior vena cava show no abnormality. The pancreas is obscured by overlying bowel gas. The liver measures 12.4 cm with homogeneous echotexture. No intrinsic mass and no intra-or extrahepatic ductal dilatation. There is hepatopedal flow of the main portal vein. No gallstones, pericholecystic fluid, wall thickening or positive sonographic Melendez sign. The common bile duct measures 0.3 cm. The right kidney measures 9.2 x 5.6 x 3.0 cm with a nonobstructing 1.2 cm stone in the upper pole. Multiple additional subcentimeter calcifications are seen. These were not seen on previous exam. No hydronephrosis or renal mass. The left kidney measures 9.3 x 3.9 x 3.5 cm. There is a 2.1 x 2.4 x 2.0 cm cyst in the medial mid pole, not seen on previous study. No hydronephrosis or renal calcification. The spleen measures 8.2 cm and show no abnormality. There is a small right pleural effusion, stable. IMPRESSIONS: Multiple nonobstructing stones of the right kidney with no hydronephrosis. 2.4 cm cyst of the midpole left kidney. Small right pleural effusion, stable. The daughter has answered the phone and reports the patient's urine does not get dark. She is not aware of the amount of water she gives her mother. I requested she measure's this from now on as the pt has stones and needs to increase water consumption. Her daughter reports this week the pt has experienced more spasms than normal and believes this may be due to severe constipation. She provided the pt with an Enema to aide in this situation. She is very concerned but I informed her the Enema may have stimulated the spasms and we should give her a few days to clear up and re-evaluate.   03/22/2023: Ms. CLAUDIO MCDOWELL presents today for an audio visual telehealth visit for which she gave permission for. The patient was located at home at [address] [place] [address] and I was located at my office in [place], [place]. She is accompanied by her daughter whom most of the visit was conducted with. Her daughter has been keeping track of her mother's water consumption. She is averaging about 58 oz of water in a 24 hr period. Additionally, she is drinking juice and ensure nutrition drinks. She does not consume any caffeine. She is on furosemide 40 mg. I said hello to the patient at the end of the visit and she is looking well. She reports feeling no pain at the current moment.   3/29/2023:  Patient Claudio Mcdowell and daughter Tabatha Deluna were home in [place], [place] and I was in my office in [facility].  Patient and daughter gave permission for a [facility] telehealth visit.  They preferred this to [employer].  The patient looks good today.  Her complexion was good there is no pallor.  Smile was symmetric her affect was normal she appeared happy she could make conversation it was appropriate.  She said that she currently had no pain.  When I asked questions that after 3/2 how she had been recently the sometimes she hesitated and deferred to her daughter for cancer compatible with her impaired short-term memory.  Long-term memory remains good she recognizes me once know so I am does ask questions about things like her bladder as she was worried about it her blood tests.  I assured her the results were satisfactory.  04/18/2023: Ms. MCDOWELL is a 89 year old female presenting today for an audio and visual telehealth visit for which she gave verbal permission. We utilized [employer] teams telemetry doc platform. The patient was located in her home at [address] [place] [address]. I was located in my office on [place], [place]. She was accompanied by her daughter who helped to serve as a historian. She reports discomfort in her eyes. She describes the discomfort as a feeling of sand. She opened her eyes for me, and they do not look red. Pupils look normal. She reports the right eyes bothers her significantly more than the left. I advise that the patient tries lubricating drops and it if does not feel better she will notify her visiting nurse who is due to come next week for a sales catheter change. She reports episode of bladder spasms. She denies gross hematuria. She describes the urine as barely yellow. I reviewed and discussed her  latest pertinent lab on 04/12/2023 which shows "alkaline phosphate normal at 89. Creatinine was normal at 0.62 mg/dL. WBC normal at 5.01. RBC normal at 3.87".   05/09/2023: Ms. MCDOWELL presents today for a follow up audio only telehealth visit for which they gave permission for. She was accompanied by her daughter who helped to serve as a historian. The patient was located at home [address] [place] [address] and I was located in my office in [place], [place]. The patient obtained lab work 5/2/23 prior to today's visit. The UA revealed microscopic hematuria, 5/HPF RBC, 27/HPF WBC. The patient demonstrated great hydration as the specific gravity is 1.006. The Sales catheter was changed May 2, 2023 with no clear urine in the bag. While changing the diaper the aide reports blood in the diaper. The daughter states last week her mother experienced spasms but as of two days ago she is now fine. However, she states the patient is more fatigued. She denies the pt having a temperature. I assured her because her mother is post menopausal any abrasion to the labia can cause a fissure.   06/02/2023: Ms. MCDOWELL is a 89 year old female presenting today for an audio and visual telehealth visit for which she gave verbal permission. We utilized [employer] teams telemetry [employer] platform. The patient was located in her home at [address] [place] [address]. I was located in my office on [place], [place]. She was accompanied by her daughter Tabatha. She reports that her mother complains of onset dysuria that dissipated on its own and has not recurred since. She reports right sided back pain that is aggravated when she lays on the right side. She provided urine specimen. She notes the urine was very clear. I reviewed and discussed the patient's pertinent lab works. Her latest Urinalysis on 05/30/2023 that shows "60 WBC/HPF. Trace Blood Urine. Specific Gravity Urine 1.007". Urine Culture on the same date shows ">100,000 CFU/ml Escherichia coli and <10,000 CFU/ml Normal Urogenital ángel present". The patient takes Mirtazapine for anxiety at low dose. The daughter reports the patient is experiencing increasing anxiety and is thinking of having the prescriber increasing the dosage.  Her BP runs around 120/60. Her Hemoglobin hematocrit looks good. gaze is conjugated. facial expression looks symmetric. Urine was faint yellow and clear.   06/06/2023: Ms. MCDOWELL presents today for a follow up audio only telehealth visit for which they gave permission for. The patient was located at home [address] [place] [address] and I was located in my office in [place], [place]. She was accompanied by her daughter Tabatha. The daughter states pt is experiencing episodes of a dry cough. An X Ray  of 6/5/23 revealed Mild peribronchial thickening suggesting bronchitis in the right clinical setting with no focal pneumonia or congestive heart failure. COPD with chronic lung changes. The mental status is the same as usual but her BP has elevated some. I assured her a little elevation is not as dangerous. The pt continues to experience some urinary frequency and it is a little more concentrated.   10/12/2023: Ms. MCDOWELL presents today for a follow up audio only telehealth visit for which they gave permission for. The patient was located at home [address] [place] [address] and I was located in my office in [place], [place]. The 10/2/23 UA showed proteinuria 30 mg/dL, moderate blood, large Leukocyte Esterase, 16/HPF of WBC. UA showed improvement as patient has chronic sales drainage. Her daughter states patient is okay considering coming home from the hospital. Most pain is in her back/shoulders and some of her back area is red/raw.  Recently her BP has increased to 180/100 and was prescribed Losartan 50mg twice daily. Urine is a clear color, denies cloudy and dark appearance.  11/01/2023: Ms. MCDOWELL presents today for a follow up audio only telehealth visit for which they gave permission for. The patient was located at home [address] [place] [address] and I was located in my office in [place], [place]. The 10/13/23 CMP revealed creatinine at 0.58 and low ALT at 8 U/L. Patient was admitted into [facility] on 10/27/23 for sepsis/UTI. Her daughter states she may be released tomorrow. Daughter states during the day the patient's urine was normal, but she did have chills and a fever.   11/03/2023: Ms. MCDOWELL is a 89 year old female presenting today for an audio only telehealth visit for which she gave verbal permission. The patient was located in her home at [address] [place] [address]. I was located in my office on [place], [place]. She was accompanied by her daughter. She says her mother was supposed to be released today. Initially she was able to void. Because of constipation she was given a couple of enemas. She had a large evacuation and since then she has not been able to void again. The daughter says her latest PVR was 340 cc. She is waiting for her mother to be rescanned again.   11/07/2023: Ms. CLAUDIO MCDOWELL presents today for a follow up audio only telephone visit for which she gave permission for. The pt was located at home, [address] [place] [address], and I was located in my office in [place], [place]. The visit was conducted with the patient's daughter. The patient came home from the hospital on Sunday. The catheter was put back in without another attempt at a void trial. The patient does have issues with constipation. She is able to swallow liquids. Her daughter has had to crush some of her pills to put them in a liquid, however the pt finds it to taste bitter and has some discomfort swallowing the chunks of crushed pills. The hospital discharged her with a 7 day supply of Cefuroxime. Was started yesterday so she has had two doses.   11/20/2023: Ms. MCDOWELL presents today for a follow up audio only telehealth visit for which they gave permission for. The patient was located at home [address] [place] [address] and I was located in my office in [facility]. Daughter states patient is okay. She notes giving pt suppositories and 7 hours later she had mixed bowel movements. However, she did not have a bowel movement since Saturday. Patient is given pedialyte and water frequently. Today's temperature was 95 as before it was 96-97. Pt did not drink any liquids shortly after taking temperature. Daughter denies giving pt solace but she usually gives Miralax as she has done so today. Toady's urine color is clear as it  has been clear for some time.  Daughter states her stomach was distended. Bp has stabilized since being in the hospital but last night and today it  was higher than normal being being 168/88 before medication.   11/29/2023: Ms. CLAUDIO MCDOWELL presents today for a follow up audio only telephone visit for which she gave permission for. The pt was located at home, [address] [place] [address], and I was located in my office in [place], [place]. Visit was conducted with her daughter, Andree. She was informed yesterday by her house call physicians of the results of her urine culture (emailed from core lab showing >100,000 CFU/ml Pseudomonas aeruginosa susceptible only to amikacin, Ciprofloxacin, imipenem, levofloxacin, meropenum. Resistant to Ceftazidime, Pip/tazo. Intermediate for aztrenam, cefepime. They discussed that patient is having increasing oxygen requirements, although SPO2 ok on supplemental O2 (prior to this patient needed only occasional O2 suppl. every few days). 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 The want to treat with ciprofloxacin, 7 day course. Prescription sent to pharmacy. Her daughter inquired today on if she should be treated. She states last week her mother seemed very not herself, however today she seems better in terms of mental status and oxygenation. She has been giving her senakot and miralax.   12/21/2023: Ms. MCDOWELL presents today for a follow up audio-only telehealth visit for which they gave permission for. The patient was located at home [address] [place] [address] and I was located in my office in [place], [place]. The patient obtained lab work 12/4/23 prior to today's visit. Urinalysis is improved and showed trace proteinuria, small Leukocyte Esterase, 6/HPF of WBC and 7/LPF of Cast. Urine culture shows no significant growth, <10,000 CFU/mL Normal Urogenital Ángel, which is very good. Her daughter expresses concern as recently patient is experiencing a surplus of looser and larger amounts of bowel movements that is escaping to the vaginal area, thus causing infections. Urine is normal in color, denies fever. Patient is occasionally more fatigued than usual. Denies distended appearance of the abdomen. Admits to only 1 spasm around the catheter this month. Next sales change will be in 2 weeks.  Towards the end of visit, patient was shown on Facetime, appearing well oriented x 3, normal pigmentation, lips are moist, and smile is symmetrical. I am very impressed by how well the patient looks and speaks. It was more than just pleasantries, she inquired about personal questions. Overall, I am very pleased.   12/27/2023: Ms. CLAUDIO MCDOWELL presents today for a follow up audio only telephone visit for which she gave permission for. The pt was located at home, [address] [place] [address], and I was located in my office in [place], [place]. She is accompanied by her daughter. Patient provided a surveillance urine specimen on 12/22/2023. She has chronic sales catheter drainage. UA revealed small LEC, positive for nitrites, 6 WBC/HPF, no RBC seen, moderate bacteria/HPF, granular casts present, and yeast like cells present. Urine culture grew >100,000 CFU/ml Enterococcus faecalis and 50,000 - 99,000 CFU/mL Streptococcus mitis/oralis group. Urine cytology was negative for high grade urothelial carcinoma. Few mature squamous cells, rare benign urothelial cells and abundant fungal organisms morphologically consistent with Candida species present. Patient's daughter reports that her urine looks clear and a pale yellow color. She feels her mom still has a lot of anxiety and some confusion. She does report that her catheter bag sits in feces due to the patient's position. They tried to tilt her pelvis to avoid this but states it's easier said than done. She states there is no feces in the catheter or in the bag. BM are not loose, she describes them as pasty. Has about 2-3 BM a day.   01/05/2024: Ms. MCDOWELL is a 89 year old female presenting today for an audio only telehealth visit for which she gave verbal permission. The patient was located in her home at [address] [place] [address]. I was located in my office on [place], [place]. She was accompanied by her daughter who helped with the visit. She reports that a nurse came for a catheter change, and only succeeded on the third attempt. She says it's the same nurse who has been coming for 5 months now, and usually she encounters no difficulties. Now that the catheter has been changed, the patient is able to void.   01/17/2024: Ms. CLAUDIO MCDOWELL presents today for a follow up audio only telephone visit for which she gave permission for. The pt was located at home, [address] [place] [address], and I was located in my office in [place], [place]. She is accompanied by her daughter, Tabatha Deluna. Patient provided a urine specimen on 1/5/2024 for surveillance. UA revealed trace blood by urine and moderate LEC. This is excellent for her as she has chronic sales catheter drainage. Urine culture grew 50,000-99,000 CFU/ml Pseudomonas aeruginosa. Urine cytology was negative for high grade urothelial carcinoma. Few mature squamous cells, rare benign urothelial cells and abundant fungal organisms morphologically consistent with Candida species present. The patient's daughter reports that her mother is "different". She reports that she is hearing things a lot now. She states that usually the things are negative. For example, she will say "Why is Ac saying my oxygen isn't working properly?". It is clear what she is saying but fairly random. She is not seeing anything; it is strictly auditory.   01/22/2024: Ms. CLAUDIO MCDOWELL presents today for a follow up audio only telephone visit for which she gave permission for. The pt was located at home, [address] [place] [address], and I was located in my office in [place], [place]. Visit was conducted with the patient's daughter, Tabatha Deluna. She informs me that she did not realize she was out of hyoscyamine sulfate for bladder spasms. She has been getting bladder spasms after the aide leaves and is embarrassed by this as she thinks shes wetting the bed. Her daughter reports that her mother asks in the morning "why am I bothering taking my medications, I am going to die anyway".   02/05/2024: Ms. CLAUDIO MCDOWELL presents today for an audio visual telehealth visit for which she gave permission for. The patient was located at home at [address] [place] [address] and I was located at my office in [place], [place]. Pt's daughter states she was not expecting my call today. I informed her that she was on my schedule and that if she would like we can talk. She informs me that hyoscyamine does not seem to be working for her mother and that some days she has bladder spasms and some she doesn't. Pt is currently not having diarrhea. She is due for a catheter change tomorrow. Patient has not started any medications for the psychological issues she has been having.   02/09/2024: Ms. MCDOWELL is a 89 year old female presenting today for an audio only telehealth visit for which she gave verbal permission. The patient was located [address] [place] [address]. I was located in my office on [place], [place]. She was accompanied by her daughter who helps with the visit.   The patient has been with a chronic sales catheter since July 2021 when she fractured her foot. She reports today for a follow up.   I reviewed and discussed the patient's pertinent lab works. Urine Culture shows 10,000 - 49,000 CFU/mL Pseudomonas aeruginosa 10,000 - 49,000 CFU/mL Citrobacter freundii complex  I explain to the patient that patients with chronic sales catheter develop bacteria in the urine, that may come from the skin, and it is not significant clinically unless infection symptoms ensue. In addition, the patient reports she was not taking antibiotics when the sample was collected, and the colonies were less than 100 000.   03/13/2024: Ms. CLAUDIO MCDOWELL presents today for a follow up audio only telephone visit for which she gave permission for. The pt was located at home, [address] [place] [address], and I was located in my office in [place], [place]. Visit was conducted with her daughter Tabatha Deluna. Patient provided a urine specimen on 3/4/2024. Patient has a chronic indwelling sales. UA revealed trace blood, large LE, and 9 WBC/HPF. This is quite good considering her sales. Culture grew 50,000 - 99,000 CFU/mL Escherichia coli & >100,000 CFU/ml Citrobacter freundii complex. She does report that after they gave the specimen her mother had a week of stool getting in the vulvar area and she is worried this might travel to the bladder. There is no evidence of that currently. She states the urine is clear and yellow. There has been no drainage around the catheter. She has not had any increased bladder spasms. Patient's daughter reports that her mother is not feeling well today. She has a runny nose and a cough. Home care is arranging for a nasal swab.   04/08/2024: Ms. CLAUDIO MCDOWELL presents today for a follow up audio only telephone visit for which she gave permission for. The pt was located at home, [address] [place] [address], and I was located in my office in [place], [place]. Visit was conducted with her daughter Tabatha Deluna. Patient provided a urine specimen on 4/3/2024. UA was completely normal other than moderate LE and 6 WBC/HPF. For having a sales catheter, this is an excellent UA. Urine culture grew >100,000 CFU/ml Citrobacter freundii complex. Urine cytology was negative for high grade urothelial carcinoma. Specimen consists of many lymphoid cells, mature squamous epithelial cells and benign urothelial cells. Patient's daughter reports that  got covid, however she is recovering. She is very tired and still has a cough. She does report that the urine is clear and yellow, however the pt has had many accidents where stool gets near/in the catheter. Her skin remains red but not broken down. Continues use of triple paste.   05/08/2024 Ms. CLAUDIO MCDOWELL presents today for a follow up audio-only telehealth visit for which they permitted for. The patient was located at home [address] [place] [address] and I was located in my office in [place], [place]. Patient has chronic indwelling Sales catheter. This urinalysis indicates urine is in very good condition without any evidence for clinically active urinary tract infection at this time. Culture showed >=3 organisms. Probable collection contamination. If I am notified of changes indicating possible clinically significant urinary tract infection, we would re-culture at that time. We will continue to collect surveillance urine specimens for urinalyses and urine cultures at time of catheter changes.  Daughter states her mental status is different.  Reports more confusion, gaps of memory. Denies hallucinations and is much less anxious. Stool is still loose and pt is more fatigued while on Risperidone.

## 2024-05-09 NOTE — ADDENDUM
[FreeTextEntry1] : This note was authored by Katlyn Avila working as a scribe for Dr. Indio Sexton. I, Dr. Indio Sexton have reviewed the content of this note and confirm it is true and accurate. I personally performed the history and physical examination and made all the decisions 05/08/2024.

## 2024-05-09 NOTE — ASSESSMENT
[FreeTextEntry1] : 10/12/2023: Ms. MCDOWELL presents today for a follow up audio only telehealth visit for which they gave permission for. The patient was located at home [address] [place] [address] and I was located in my office in [place], [place]. The 10/2/23 UA showed proteinuria 30 mg/dL, moderate blood, large Leukocyte Esterase, 16/HPF of WBC. UA showed improvement as patient has chronic sales drainage. Her daughter states patient is okay considering coming home from the hospital. Most pain is in her back/shoulders and some of her back area is red/raw.  Recently her BP has increased to 180/100 and was prescribed Losartan 50mg twice daily. Urine is a clear color, denies cloudy and dark appearance.  Reviewed and discussed laboratory work of 10/2/23 which She obtained prior to today's visit as requested. Pt will go to her nearest [facility] lab for blood work and a urine sample which will be sent for urinalysis, urine culture, and urine cytology. Pt will schedule a telehealth visit after lab work to discuss results.   11/01/2023: Ms. MCDOWELL presents today for a follow up audio only telehealth visit for which they gave permission for. The patient was located at home [address] [place] [address] and I was located in my office in [place], [place]. The 10/13/23 CMP revealed creatinine at 0.58 and low ALT at 8 U/L. Patient was admitted into [facility] on 10/27/23 for sepsis/UTI. Her daughter states she may be released tomorrow. Daughter states during the day the patient's urine was normal, but she did have chills and a fever.   As the patient recently had a course of antibiotics, we suggested patient have catheter removed in hospital, as we will to observe bladder emptying and skin while the catheter is removed. If abnormalities arise, we will place the catheter back in.  If the daughter is to proceed with the plan, we will not discharge patient until a PVR is taken and until pt is able to void on her own the next morning. Hospitalist should weigh the diaper before and after and I will calculate it. If stool is present, then we will not be able to do so.  The daughter will have the Hospitalist contact me about patient.  Hospitalist has called at 4:06 pm. She states the urination looks contaminated and culture showed E-coli.  They will bladder scan her every hour.  Hospitalist will call tomorrow with update on patient and inform me on her fluid level.  Advised daughter to obtain a thermometer to observe temperature.    11/03/2023: Ms. MCDOWELL is a 89 year old female presenting today for an audio only telehealth visit for which she gave verbal permission. The patient was located in her home at [address] [place] [address]. I was located in my office on [place], [place]. She was accompanied by her daughter. She says her mother was supposed to be released today. Initially she was able to void. Because of constipation she was given a couple of enemas. She had a large evacuation and since then she has not been able to void again. The daughter says her latest PVR was 340 cc. She is waiting for her mother to be rescanned again.    Plan: If she is unable to void, she will be discharged with a sales catheter.   11/07/2023: Ms. CLAUDIO MCDOWELL presents today for a follow up audio only telephone visit for which she gave permission for. The pt was located at home, [address] [place] [address], and I was located in my office in [place], [place]. The visit was conducted with the patient's daughter. The patient came home from the hospital on Sunday. The catheter was put back in without another attempt at a void trial. The patient does have issues with constipation. She is able to swallow liquids. Her daughter has had to crush some of her pills to put them in a liquid, however the pt finds it to taste bitter and has some discomfort swallowing the chunks of crushed pills. The hospital discharged her with a 7 day supply of Cefuroxime. Was started yesterday so she has had two doses.    We discussed the possibility of a doing a trial of void. At this time I am recommending her mother settle back in to being home and we can readdress this possibility and how the patient and her daughter would like to proceed at the time of her next visit. Next catheter change will be on 12/5 should they choose to proceed with the catheter.   I looked it up on [employer] and Cefuroxime is available in the [place] as a liquid suspension. Given that this would be easier for the patient, I went to prescribe it for her which she could take rather than the pills. [employer] did not allow me to electronically prescribe it in a liquid suspension so I called the patient's pharmacy to give a verbal prescription for 200 ml of it for 20 ml BID for 5 days. I left a VM for the pharmacy as there was no answer. I requested they call me back if they could accept this prescription or if they could not. The pharmacy called me back shortly after and they told me that it is not currently offered in a liquid suspension, however he called the [facility] next door and they are willing to compound it for 40$. I saw this as acceptable and thanked him for going the extra mile and calling next door. He will contact the patient's daughter to inform her.   Patient and her daughter will have a telehealth visit in 2 weeks for reassessment, sooner if clinically indicated.   11/20/2023: Ms. MCDOWELL presents today for a follow up audio only telehealth visit for which they gave permission for. The patient was located at home [address] [place] [address] and I was located in my office in [facility]. Daughter states patient is okay. She notes giving pt suppositories and 7 hours later she had mixed bowel movements. However, she did not have a bowel movement since Saturday. Patient is given pedialyte and water frequently. Today's temperature was 95 as before it was 96-97. Pt did not drink any liquids shortly after taking temperature. Daughter denies giving pt solace but she usually gives Miralax as she has done so today. Senady's urine color is clear as it  has been clear for some time.  Daughter states her stomach was distended. Bp has stabilized since being in the hospital but last night and today it  was higher than normal being being 168/88 before medication.   Advised daughter to give patient Miralax tonight if there is no bowel movements.  Advised her to take Blood pressure tonight.  Pt will schedule a telehealth visit  11/29/2023: Ms. CLAUDIO MCDOWELL presents today for a follow up audio only telephone visit for which she gave permission for. The pt was located at home, [address] [place] [address], and I was located in my office in [place], [place]. Visit was conducted with her daughter, Andree. She was informed yesterday by her house call physicians of the results of her urine culture (emailed from core lab showing >100,000 CFU/ml Pseudomonas aeruginosa susceptible only to amikacin, Ciprofloxacin, imipenem, levofloxacin, meropenum. Resistant to Ceftazidime, Pip/tazo. Intermediate for aztrenam, cefepime. They discussed that patient is having increasing oxygen requirements, although SPO2 ok on supplemental O2 (prior to this patient needed only occasional O2 suppl. every few days). 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 The want to treat with ciprofloxacin, 7 day course. Prescription sent to pharmacy. Her daughter inquired today on if she should be treated. She states last week her mother seemed very not herself, however today she seems better in terms of mental status and oxygenation. She has been giving her senakot and miralax.   I explained that given her O2 issues and confusion over the last few days, I advised to treat with Cipro despite her daughter feeling she is better today. If there are any problems with crushing the pills for her or if she is not reacting to it well, she was advised to call right away. I posed the option of giving it as an oral solution if need be.    12/21/2023: Ms. MCDOWELL presents today for a follow up audio-visual telehealth visit for which they gave permission for. The patient was located at home [address] [place] [address] and I was located in my office in [place], [place]. The patient obtained lab work 12/4/23 prior to today's visit. Urinalysis is improved and showed trace proteinuria, small Leukocyte Esterase, 6/HPF of WBC and 7/LPF of Cast. Urine culture shows no significant growth, <10,000 CFU/mL Normal Urogenital Khadijah, which is very good. Her daughter expresses concern as recently patient is experiencing a surplus of looser and larger amounts of bowel movements that is escaping to the vaginal area, thus causing infections. Urine is normal in color, denies fever. Patient is occasionally more fatigued than usual. Denies distended appearance of the abdomen. Admits to only 1 spasm around the catheter this month. Next sales change will be in 2 weeks.  Towards the end of visit, patient was shown on Facetime, appearing well oriented x 3, normal pigmentation, lips are moist, and smile is symmetrical. I am very impressed by how well the patient looks and speaks. It was more than just pleasantries, she inquired about personal questions. Overall, I am very pleased.   Reviewed and discussed laboratory work of 12/4/23 which She obtained prior to today's visit as requested. Advised daughter to have care team to place support under the pelvis, so pelvis is tilted up at a higher level of the anus.   As long as stool is pasty and firm, we advised Andree to give patient MiraLAX. If stool is loose and unfirm, she should decrease the amount of MiraLAX.  Pt will provide a urine sample which will be sent for urinalysis, urine culture, and urine cytology. Pt will schedule a telehealth visit in 2 weeks for reassessment.   12/27/2023: Ms. CLAUDIO MCDOWELL presents today for a follow up audio only telephone visit for which she gave permission for. The pt was located at home, [address] [place] [address], and I was located in my office in [place], [place]. She is accompanied by her daughter. Patient provided a surveillance urine specimen on 12/22/2023. She has chronic sales catheter drainage. UA revealed small LEC, positive for nitrites, 6 WBC/HPF, no RBC seen, moderate bacteria/HPF, granular casts present, and yeast like cells present. Urine culture grew >100,000 CFU/ml Enterococcus faecalis and 50,000 - 99,000 CFU/mL Streptococcus mitis/oralis group. Urine cytology was negative for high grade urothelial carcinoma. Few mature squamous cells, rare benign urothelial cells and abundant fungal organisms morphologically consistent with Candida species present. Patient's daughter reports that her urine looks clear and a pale yellow color. She feels her mom still has a lot of anxiety and some confusion. She does report that her catheter bag sits in feces due to the patient's position. They tried to tilt her pelvis to avoid this but states it's easier said than done. She states there is no feces in the catheter or in the bag. BM are not loose, she describes them as pasty. Has about 2-3 BM a day.    Reviewed and discussed lab work of 12/22/2023 in detail with the pt.  Future urine orders were put in including fungal urine culture. Pt's daughter was advised to continue to try and alter the patient's position to avoid the catheter sitting in feces.  Patient should have a telehealth visit in 3 months, sooner if clinically indicated.    01/05/2024: Ms. MCDOWELL is a 89 year old female presenting today for an audio only telehealth visit for which she gave verbal permission. The patient was located in her home at [address] [place] [address]. I was located in my office on [place], [place]. She was accompanied by her daughter who helped with the visit. She reports that a nurse came for a catheter change, and only succeeded on the third attempt. She says it's the same nurse who has been coming for 5 months now, and usually she encounters no difficulties. Now that the catheter has been changed, the patient is able to void.   Plan: Pt's daughter will keep us updated. If the patient experiences fever or chill, she will call ER and inform the office.   01/17/2024: Ms. CLAUDIO MCDOWELL presents today for a follow up audio only telephone visit for which she gave permission for. The pt was located at home, [address] [place] [address], and I was located in my office in [place], [place]. She is accompanied by her daughter, Tabatha Deluna. Patient provided a urine specimen on 1/5/2024 for surveillance. UA revealed trace blood by urine and moderate LEC. This is excellent for her as she has chronic sales catheter drainage. Urine culture grew 50,000-99,000 CFU/ml Pseudomonas aeruginosa. Urine cytology was negative for high grade urothelial carcinoma. Few mature squamous cells, rare benign urothelial cells and abundant fungal organisms morphologically consistent with Candida species present. The patient's daughter reports that her mother is "different". She reports that she is hearing things a lot now. She states that usually the things are negative. For example, she will say "Why is Ac saying my oxygen isn't working properly?". It is clear what she is saying but fairly random. She is not seeing anything; it is strictly auditory.   Reviewed and discussed lab work of 1/5/2024 in detail with the pt's daughter. She was informed that given she has chronic sales catheter drainage, this specimen was excellent. If she had more WBC/HPF, I would be concerned, however she is in very good shape from a urologic standpoint. Her daughter was advised to speak with her mother's PCP about hearing things and her AMS as it is not urologic in origin or related to pending sepsis. I provided her the name of a neurologist, Dr.Li-Fen Tripathi if she would like to go for neuro consultation.  I once again re-iterated that at this time, given her skin breakdown, I recommend continued sales catheter drainage. Her daughter brought up an external urine collection apparatus once again, however I do not recommend we try that at least until her skin is in good condition. I also explained that if her mother were to go into urinary retention, this sort of device would not drain the urine from her.  Pt's daughter was advised to give her hyoscyamine for bladder spasms.  Patient will have a telehealth visit in 1-2 months, sooner if clinically indicated.    01/22/2024: Ms. CLAUDIO MCDOWELL presents today for a follow up audio only telephone visit for which she gave permission for. The pt was located at home, [address] [place] [address], and I was located in my office in [place], [place]. Visit was conducted with the patient's daughter, Tabatha Deluna. She informs me that she did not realize she was out of hyoscyamine sulfate for bladder spasms. She has been getting bladder spasms after the aide leaves and is embarrassed by this as she thinks shes wetting the bed. Her daughter reports that her mother asks in the morning "why am I bothering taking my medications, I am going to die anyway".   Renewed hyoscyamine sulfate 0.125 mg sublingual tablets, give one tablet under the tongue as needed. I recommend she gives it about 15 minutes before the aide comes. I suggest she give it for 10 days in a row to see if we can calm things down and then stop. If it returns, we can try again for another month or two. I did speak with the patient at the end of the visit. She seemed to know who I was and understood me. I spoke to her about the bladder spasms she is experiencing. She was on board with trying hyoscyamine and thanked me for speaking with her.  I recommend the patient's daughter speak with her PCP about potential anti-depressant medication.   Patient will have a telephone visit in 2 weeks for reassessment, sooner if clinically indicated.    02/05/2024: Ms. CLAUDIO MCDOWELL presents today for an audio visual telehealth visit for which she gave permission for. The patient was located at home at [address] [place] [address] and I was located at my office in [place], [place]. Pt's daughter states she was not expecting my call today. I informed her that she was on my schedule and that if she would like we can talk. She informs me that hyoscyamine does not seem to be working for her mother and that some days she has bladder spasms and some she doesn't. Pt is currently not having diarrhea. She is due for a catheter change tomorrow. Patient has not started any medications for the psychological issues she has been having.    Pt will discontinue use of hyoscyamine. I prescribed the patient Trospium 20 mg, one tablet once daily at bedtime. I informed the patient there may be constipation as a side effect.   Orders for UA, urine culture, and urine cytology were put in for her daughter to bring the patient's sample to her nearest  lab.  Patient will have a telehealth visit this Friday after the catheter change.     02/09/2024: Ms. MCDOWELL is a 89 year old female presenting today for an audio only telehealth visit for which she gave verbal permission. The patient was located in her home at [address] [place] [address]. I was located in my office on [place], [place]. She was accompanied by her daughter who helps with the visit.   The patient has been with a chronic sales catheter since July 2021 when she fractured her foot. She reports today for a follow up.  I reviewed and discussed the patient's pertinent lab works. Urine Culture shows 10,000 - 49,000 CFU/mL Pseudomonas aeruginosa 10,000 - 49,000 CFU/mL Citrobacter freundii complex I explain to the patient that patients with chronic sales catheter develop bacteria in the urine, that may come from the skin, and it is not significant clinically unless infection symptoms ensue. In addition, the patient reports she was not taking antibiotics when the sample was collected, and the colonies were less than 100 000.   Pt will start Trospium 20 mg.  She will provide urine for UA, Urine Cytology and Urine Culture. She will have a follow up in one month; earlier if needed.    03/13/2024: Ms. CLAUDIO MCDOWELL presents today for a follow up audio only telephone visit for which she gave permission for. The pt was located at home, [address] [place] [address], and I was located in my office in [place], [place]. Visit was conducted with her daughter Tabatha Deluna. Patient provided a urine specimen on 3/4/2024. Patient has a chronic indwelling sales. UA revealed trace blood, large LE, and 9 WBC/HPF. This is quite good considering her sales. Culture grew 50,000 - 99,000 CFU/mL Escherichia coli & >100,000 CFU/ml Citrobacter freundii complex. She does report that after they gave the specimen her mother had a week of stool getting in the vulvar area and she is worried this might travel to the bladder. There is no evidence of that currently. She states the urine is clear and yellow. There has been no drainage around the catheter. She has not had any increased bladder spasms. Patient's daughter reports that her mother is not feeling well today. She has a runny nose and a cough. Home care is arranging for a nasal swab.   Reviewed and discussed lab work of 3/4/2024 in detail with the pt's daughter.  If the patient's daughter notices a change in her urine, she will call promptly.    04/08/2024: Ms. CLAUDIO MCDOWELL presents today for a follow up audio only telephone visit for which she gave permission for. The pt was located at home, [address] [place] [address], and I was located in my office in [place], [place]. Visit was conducted with her daughter Tabatha Deluna. Patient provided a urine specimen on 4/3/2024. UA was completely normal other than moderate LE and 6 WBC/HPF. For having a sales catheter, this is an excellent UA. Urine culture grew >100,000 CFU/ml Citrobacter freundii complex. Urine cytology was negative for high grade urothelial carcinoma. Specimen consists of many lymphoid cells, mature squamous epithelial cells and benign urothelial cells. Patient's daughter reports that  got covid, however she is recovering. She is very tired and still has a cough. She does report that the urine is clear and yellow, however the pt has had many accidents where stool gets near/in the catheter. Her skin remains red but not broken down. Continues use of triple paste.    Reviewed and discussed lab work of 4/3/2024 in detail with the pt. Pt's daughter reassured that this is a very good specimen considering she has a sales catheter.  Will provide another urine specimen at time of next catheter change.  Will have a telehealth visit after next urine specimen, sooner if clinically indicated.     05/08/2024 Ms. CLAUDIO MCDOWELL presents today for a follow up audio-only telehealth visit for which they permitted for. The patient was located at home [address] [place] [address] and I was located in my office in [place], [place]. Patient has chronic indwelling Sales catheter. This urinalysis indicates urine is in very good condition without any evidence for clinically active urinary tract infection at this time. Culture showed >=3 organisms. Probable collection contamination. If I am notified of changes indicating possible clinically significant urinary tract infection, we would re-culture at that time. We will continue to collect surveillance urine specimens for urinalyses and urine cultures at time of catheter changes.  Daughter states her mental status is different.  Reports more confusion, gaps of memory. Denies hallucinations and is much less anxious. Stool is still loose and pt is more fatigued while on Risperidone.   Reviewed and discussed laboratory work of 5/2/24 which She obtained prior to today's visit as requested. At the current time I will not re-culture. If I am notified of changes indicating possible clinically significant urinary tract infection, we would re-culture at that time. We will continue to collect surveillance urine specimens for urinalyses and urine cultures at time of catheter changes.  Pt will schedule a telehealth visit  Duration of call 23 Minutes.

## 2024-05-16 ENCOUNTER — APPOINTMENT (OUTPATIENT)
Dept: HOME HEALTH SERVICES | Facility: HOME HEALTH | Age: 89
End: 2024-05-16
Payer: MEDICARE

## 2024-05-16 VITALS
HEART RATE: 63 BPM | DIASTOLIC BLOOD PRESSURE: 71 MMHG | SYSTOLIC BLOOD PRESSURE: 142 MMHG | OXYGEN SATURATION: 95 % | TEMPERATURE: 97.3 F | RESPIRATION RATE: 18 BRPM

## 2024-05-16 DIAGNOSIS — I48.91 UNSPECIFIED ATRIAL FIBRILLATION: ICD-10-CM

## 2024-05-16 DIAGNOSIS — J42 UNSPECIFIED CHRONIC BRONCHITIS: ICD-10-CM

## 2024-05-16 DIAGNOSIS — E44.0 MODERATE PROTEIN-CALORIE MALNUTRITION: ICD-10-CM

## 2024-05-16 DIAGNOSIS — L98.429 NON-PRESSURE CHRONIC ULCER OF BACK WITH UNSPECIFIED SEVERITY: ICD-10-CM

## 2024-05-16 DIAGNOSIS — E55.9 VITAMIN D DEFICIENCY, UNSPECIFIED: ICD-10-CM

## 2024-05-16 DIAGNOSIS — I10 ESSENTIAL (PRIMARY) HYPERTENSION: ICD-10-CM

## 2024-05-16 DIAGNOSIS — Z71.89 OTHER SPECIFIED COUNSELING: ICD-10-CM

## 2024-05-16 DIAGNOSIS — S02.5XXA FRACTURE OF TOOTH (TRAUMATIC), INITIAL ENCOUNTER FOR CLOSED FRACTURE: ICD-10-CM

## 2024-05-16 DIAGNOSIS — L89.610 PRESSURE ULCER OF RIGHT HEEL, UNSTAGEABLE: ICD-10-CM

## 2024-05-16 DIAGNOSIS — K21.9 GASTRO-ESOPHAGEAL REFLUX DISEASE W/OUT ESOPHAGITIS: ICD-10-CM

## 2024-05-16 DIAGNOSIS — L89.153 PRESSURE ULCER OF SACRAL REGION, STAGE 3: ICD-10-CM

## 2024-05-16 DIAGNOSIS — F03.92 UNSPECIFIED DEMENTIA, UNSPECIFIED SEVERITY, WITH PSYCHOTIC DISTURBANCE: ICD-10-CM

## 2024-05-16 PROCEDURE — 99497 ADVNCD CARE PLAN 30 MIN: CPT

## 2024-05-16 PROCEDURE — 99350 HOME/RES VST EST HIGH MDM 60: CPT | Mod: 25

## 2024-05-16 RX ORDER — METOPROLOL TARTRATE 25 MG/1
25 TABLET, FILM COATED ORAL DAILY
Qty: 30 | Refills: 0 | Status: COMPLETED | COMMUNITY
Start: 2021-11-01 | End: 2024-05-16

## 2024-05-17 ENCOUNTER — LABORATORY RESULT (OUTPATIENT)
Age: 89
End: 2024-05-17

## 2024-05-17 PROBLEM — E55.9 VITAMIN D DEFICIENCY: Status: ACTIVE | Noted: 2024-03-14

## 2024-05-17 PROBLEM — K21.9 GERD WITHOUT ESOPHAGITIS: Status: ACTIVE | Noted: 2023-08-08

## 2024-05-17 PROBLEM — L89.153 PRESSURE INJURY OF SACRAL REGION, STAGE 3: Status: RESOLVED | Noted: 2021-12-30 | Resolved: 2023-04-09

## 2024-05-17 PROBLEM — L89.610 PRESSURE INJURY OF RIGHT HEEL, UNSTAGEABLE: Status: RESOLVED | Noted: 2021-12-30 | Resolved: 2022-10-13

## 2024-05-17 PROBLEM — J42 CHRONIC BRONCHITIS: Status: ACTIVE | Noted: 2022-11-28

## 2024-05-17 PROBLEM — Z71.89 ADVANCED CARE PLANNING/COUNSELING DISCUSSION: Status: ACTIVE | Noted: 2021-11-01

## 2024-05-17 PROBLEM — E44.0 MALNUTRITION OF MODERATE DEGREE: Status: ACTIVE | Noted: 2021-12-30

## 2024-05-17 PROBLEM — F03.92 DEMENTIA WITH PSYCHOSIS: Status: ACTIVE | Noted: 2024-01-19

## 2024-05-17 PROBLEM — S02.5XXA BROKEN TOOTH: Status: ACTIVE | Noted: 2024-05-17

## 2024-05-17 PROBLEM — L98.429 SACRAL ULCER, WITH UNSPECIFIED SEVERITY: Status: RESOLVED | Noted: 2021-11-05 | Resolved: 2024-05-17

## 2024-05-17 PROBLEM — I10 ESSENTIAL HYPERTENSION: Status: ACTIVE | Noted: 2023-11-30

## 2024-05-17 NOTE — REVIEW OF SYSTEMS
[Incontinence] : incontinence [Confusion] : confusion [Memory Loss] : memory loss [Anxiety] : anxiety [Negative] : Heme/Lymph [FreeTextEntry4] : as per HPI [FreeTextEntry7] : as above [FreeTextEntry8] : sales [FreeTextEntry9] : as above, somtime b/l hip or leg pain shazia with movement [de-identified] : healing sacral wound, mass at back of head

## 2024-05-17 NOTE — HEALTH RISK ASSESSMENT
[HRA Reviewed] : Health risk assessment reviewed [Some assistance needed] : feeding [Full assistance needed] : managing finances [No falls in past year] : Patient reported no falls in the past year [Yes] : The patient does have visual impairment [TimeGetUpGo] : 0

## 2024-05-17 NOTE — COUNSELING
[Continue diet as tolerated] : continue diet as tolerated based on goals of care [Non - Smoker] : non-smoker [Smoke/CO Detectors] : smoke/CO detectors [Remove clutter and unsafe carpeting to avoid falls] : remove clutter and unsafe carpeting to avoid falls [] : foot exam [Decrease hospital use] : decrease hospital use [Minimize unnecessary interventions] : minimize unnecessary interventions [Comfort Care] : comfort care [Maintain functional ability] : maintain functional ability [Patient/Caregiver not ready to engage in discussion] : patient/caregiver not ready to engage in discussion [Normal Weight - ( BMI  <25 )] : normal weight - ( BMI  <25 ) [Advanced Directives discussed: ____] : Advanced directives discussed: [unfilled] [Completed Medical Orders for Life-Sustaining Treatment] : completed medical orders for life-sustaining treatment [DNR] : Code Status: DNR [Limited] : Treatment Guidelines: Limited [DNI] : Intubation: DNI [Last Verification Date: _____] : [facility] [place] Completion/last verification date: [unfilled]

## 2024-05-17 NOTE — HISTORY OF PRESENT ILLNESS
[Family Member] : family member [FreeTextEntry1] : chf, and functional quadriplegia and bedbound status.  [FreeTextEntry2] : 89 y/o Female with PMHx of Afib on Eliquis, CLL, HTN, renal calculus, venous insufficiency with lymphedema, and dementia seen for follow up.  Interval events: Head mass growing Tooth crown was loose, crown removed, roots of tooth remain. Woke up last thursday woke up with bleeding. Evaluated by dentist who recommended gauze and pressure. Stopped bleeding within 24 hours. This weekend had soft food but minimal chewing. Tuesday started bleeding again but stabilized.   Skin: sacral wound improving Pain: intermittent, feet and legs mostly, sometimes in LLQ abdomen. Has tramadol for pain but rarely uses.  Gait/falls: Non ambulatory, no falls Appetite: good on soft due to bleeding issue from tooth issues.  BM: switching between constipation and diarrhea Urine: sales in place with clear yellow urine Sleep: well mood: improved overall, but more forgetful  Medical problems:  Dementia with psychosis - risperidone .25mg in AM helping with sxs chronic indwelling sales with frequent UTI's - following with Urology, hyoscyamine sulfate .125mg SL BID prn Afib- on eliquis 2.5mg, CLL - stable HTN - losartan 25mg daily, furosemide 20mg BID.  venous insufficiency with lymphedema - atorvastatin, furosemide 20mg BID anxiety- mirtazapine 15mg, lorazepam 0.5mg prn GERD - PPI, famotidine prn

## 2024-05-17 NOTE — PHYSICAL EXAM
[No Acute Distress] : no acute distress [Normal Sclera/Conjunctiva] : normal sclera/conjunctiva [Normal Outer Ear/Nose] : the ears and nose were normal in appearance [Supple] : the neck was supple [No Respiratory Distress] : no respiratory distress [Clear to Auscultation] : lungs were clear to auscultation bilaterally [No Accessory Muscle Use] : no accessory muscle use [No Edema] : there was no peripheral edema [Normal Bowel Sounds] : normal bowel sounds [Non Tender] : non-tender [Soft] : abdomen soft [No Gross Sensory Deficits] : no gross sensory deficits [Normal Affect] : the affect was normal [Normal Mood] : the mood was normal [de-identified] : comfortable pleasant elderly lady laying in bed [de-identified] : hearing aides [de-identified] : irregularly irregular, 3/6 murmur [de-identified] : hernia [de-identified] : sales in place draining clear yellow urine [de-identified] : Tongue like mass at back of head with white "patch", no s/s infection, Stage 2 sacral pressure injury almost healed 2x1x0.1

## 2024-05-17 NOTE — REASON FOR VISIT
[Follow-Up] : a follow-up visit [Family Member] : family member [Formal Caregiver] : formal caregiver [Pre-Visit Preparation] : pre-visit preparation was done [Intercurrent Specialty/Sub-specialty Visits] : the patient has intercurrent specialty/sub-specialty visits [FreeTextEntry1] : follow up of chronic conditions, afib, dementia [FreeTextEntry2] : chart review [FreeTextEntry3] : urology

## 2024-05-20 ENCOUNTER — NON-APPOINTMENT (OUTPATIENT)
Age: 89
End: 2024-05-20

## 2024-05-20 LAB
25(OH)D3 SERPL-MCNC: 62.5 NG/ML
ALBUMIN SERPL ELPH-MCNC: 3.8 G/DL
ALP BLD-CCNC: 74 U/L
ALT SERPL-CCNC: 11 U/L
ANION GAP SERPL CALC-SCNC: 8 MMOL/L
AST SERPL-CCNC: 20 U/L
BASOPHILS # BLD AUTO: 0.07 K/UL
BASOPHILS NFR BLD AUTO: 1.7 %
BILIRUB SERPL-MCNC: 0.5 MG/DL
BUN SERPL-MCNC: 20 MG/DL
CALCIUM SERPL-MCNC: 9 MG/DL
CHLORIDE SERPL-SCNC: 100 MMOL/L
CO2 SERPL-SCNC: 25 MMOL/L
CREAT SERPL-MCNC: 0.63 MG/DL
EGFR: 83 ML/MIN/1.73M2
EOSINOPHIL # BLD AUTO: 0.07 K/UL
EOSINOPHIL NFR BLD AUTO: 1.7 %
GLUCOSE SERPL-MCNC: 95 MG/DL
HCT VFR BLD CALC: 34.5 %
HGB BLD-MCNC: 11.4 G/DL
LYMPHOCYTES # BLD AUTO: 0.63 K/UL
LYMPHOCYTES NFR BLD AUTO: 14.8 %
MAN DIFF?: NORMAL
MCHC RBC-ENTMCNC: 31.7 PG
MCHC RBC-ENTMCNC: 33 GM/DL
MCV RBC AUTO: 95.8 FL
MONOCYTES # BLD AUTO: 1.34 K/UL
MONOCYTES NFR BLD AUTO: 31.3 %
NEUTROPHILS # BLD AUTO: 2.12 K/UL
NEUTROPHILS NFR BLD AUTO: 49.6 %
PLATELET # BLD AUTO: 68 K/UL
POTASSIUM SERPL-SCNC: 3.7 MMOL/L
PROT SERPL-MCNC: 5.7 G/DL
RBC # BLD: 3.6 M/UL
RBC # FLD: 14.6 %
SODIUM SERPL-SCNC: 133 MMOL/L
TSH SERPL-ACNC: 2.51 UIU/ML
WBC # FLD AUTO: 4.28 K/UL

## 2024-05-20 RX ORDER — ISOSORBIDE MONONITRATE 30 MG/1
30 TABLET, EXTENDED RELEASE ORAL
Qty: 90 | Refills: 0 | Status: ACTIVE | COMMUNITY
Start: 2023-11-02 | End: 1900-01-01

## 2024-05-27 ENCOUNTER — NON-APPOINTMENT (OUTPATIENT)
Age: 89
End: 2024-05-27

## 2024-05-28 ENCOUNTER — NON-APPOINTMENT (OUTPATIENT)
Age: 89
End: 2024-05-28

## 2024-05-29 ENCOUNTER — NON-APPOINTMENT (OUTPATIENT)
Age: 89
End: 2024-05-29

## 2024-05-29 ENCOUNTER — APPOINTMENT (OUTPATIENT)
Dept: UROLOGY | Facility: CLINIC | Age: 89
End: 2024-05-29

## 2024-05-29 NOTE — ADDENDUM
[FreeTextEntry1] : This note was authored by Senia Corbin working as a scribe for Dr.Gary Sexton. I, Dr. Indio Sexton have reviewed the content of this note and confirm it is true and accurate. I personally performed the history and physical examination and made all the decisions 05/29/2024  (4) no limitation

## 2024-05-29 NOTE — REVIEW OF SYSTEMS
[Feeling Poorly] : not feeling poorly [Eyesight Problems] : eyesight problems [Loss Of Hearing] : hearing loss [Chest Pain] : chest pain [Palpitations] : no palpitations [Cough] : no cough [Constipation] : no constipation [Diarrhea] : diarrhea [Dysuria] : no dysuria [Joint Pain] : joint pain [Confused] : confusion [Convulsions] : no convulsions [Anxiety] : anxiety [Hot Flashes] : no hot flashes [Easy Bleeding] : a tendency for easy bleeding

## 2024-05-29 NOTE — HISTORY OF PRESENT ILLNESS
[FreeTextEntry1] : Claudio Mcdowell in her daughter Tabatha Deluna gave permission for an audio video telehealth visit.  They were in their home in [facility].  I was in my office in [facility].  Claudio Mcdowell is currently 89 years of age.  Her daughter Tabatha Deluna is devoted to her care.  She is very concerned about her mother and has been so for many years.  She becomes very anxious at times and that is concerning her mother.  Claudio Mcdowell lives with her daughter.  Claudio Mcdowell has been bedbound for several years.  The patient had developed a sacral decubitus ulcer while in a nursing facility.  A Sales catheter was placed because of fecal incontinence and concern that urine mixed with the feces would contaminate the sacral decubitus ulcer.  Once the patient left the nursing facility and will be with her daughter.  The sacral decubitus ulcer has finally healed.  I have discussed removing the Sales catheter with the daughter.  However as the patient has fecal incontinence there is concern about the urine mixing with the feces and being more likely to cause skin problems.  For now we will leave the Sales catheter in.  The patient has had clinically symptomatic urinary tract infections.  He clinically significant infections usually start with increased urination around the Sales catheter due to bladder spasms.  Bladder spasms have been occurring for staff.  The patient is positioned properly in in bed and the personal care attendant leaves for the day.  We have managed to prevent this with the administration of sublingual hyoscyamine.  We do periodic surveillance urine analysis and urine culture tests at the time the Sales catheter is changed by visiting nurse.  He also performed a times of increased spasms or purulence of the urine or change in mental status,  On October 11, 2022 visiting nurse using an order I have previously placed at the request of the daughter sent urine for urine cytology which proved to be negative for malignant cells, urine culture which grew Greater than 100,000 and E. coli that was sensitive to all antibiotics tested.  Urinalysis looked quite good for urine taken from a Sales catheter that was used for chronic Sales catheter drainage.  Ileukocyte esterase was large but nitrites were negative.  There were 2 epithelial cells per high-power field.  There were only 10 white blood cells per high-power field and only 2 red blood cells per high-power field on microscopic exam there were no bacteria seen and there were no hyaline casts.  Specific gravity was 1.010 which shows good hydration and this bedbound woman cared for diligently by her daughter.  Blood by dipstick was trace.  There was no protein glucose or ketones in the urine.  There is no bilirubin and no urobilinogen.  An important portion of the visit was my review with the daughter about bladder spasms and urination around the catheter.  Urine appearance and urine analysis have been clear.  The urine was clear again today.  The patient has significant short-term memory deficit.  She does have some useful short-term memory.  Her long-term memory remains very much intact.  She is very pleasant and to make satisfactory conversation.  She does admit to sometimes not remembering something.  Her complexion was good and there was no pallor.  Facial movements were symmetric.  Cranial nerves were intact.  I was not able to assess the olfactory nerve as this was a telehealth visit.    11/04/2022: Ms. MCDOWELL is a 89 year old female presenting today for a telehealth visit. She was accompanied by her daughter who helped with the visit. They gave permission for an audio only telehealth conference. The patient was located in her home in [place], [place]. I was located in my office on [place], [place]. Today her daughter reports the pt experienced rare urinary leaking around the catheter due to bladder spasms. She also reports her systolic pressure was around 140 last week. I offered Gemtesa to manage the bladder spasms and the daughter was cautious about more medications. I informed her if the urinary leaking are only once every 2 weeks or so, she does not have to medicate. The daughter was agreeable to monitor the occurrence of urinary leaking over the next 4 weeks and assess the discomfort it causes the pt. She denies any other urinary issues.  03/01/2023: Ms. MCDOWELL is a 89 year old female presenting today for a telehealth visit. She was accompanied by her daughter who helped with the visit. They gave permission for an audio only telehealth conference. The patient was located in her home in [place], [place]. I was located in my office on [place], [place]. The pt has chronic sales catheter drainage. She provided a urine specimen from the catheter on 2/27/2023. UA was slightly turbid with large LEC and 59 WBC/HPF. Culture grew >100,000 CFU/ml E.Coli. The sensitivity report is not yet back. The pt's daughter was concerned as when the patient was constipated a few weeks ago she was having very watery BM which grew messy and were around the catheter area. She was given a Fleet's enema last month which helped. Currently she is having BM, paste-like in appearance. Her daughter does not think that her stomach is distended. She has recently had the catheter changed and is not voiding around it. Urine has been very clear. She does not seem to have pain in the urinary passageway or bladder. Denies fevers. Denies gross hematuria. Pt had covid around Thanksgiving, only had the first two vaccines from two years ago.   03/13/2023: Ms. MCDOWELL is a 89 year old female presenting today for a telehealth visit. She was accompanied by her daughter who helped with the visit. They gave permission for an audio only telehealth conference. The patient was located in her home in [place], [place]. I was located in my office on [place], [place]. The patient obtained an US prior to today's visit 3/9/23 which revealed: Comparison is made with the exam of 2/28/22. Transabdominal ultrasound of the abdomen was performed with color flow imaging. The examination is limited in evaluation. The visualized aorta and inferior vena cava show no abnormality. The pancreas is obscured by overlying bowel gas. The liver measures 12.4 cm with homogeneous echotexture. No intrinsic mass and no intra-or extrahepatic ductal dilatation. There is hepatopedal flow of the main portal vein. No gallstones, pericholecystic fluid, wall thickening or positive sonographic Melendez sign. The common bile duct measures 0.3 cm. The right kidney measures 9.2 x 5.6 x 3.0 cm with a nonobstructing 1.2 cm stone in the upper pole. Multiple additional subcentimeter calcifications are seen. These were not seen on previous exam. No hydronephrosis or renal mass. The left kidney measures 9.3 x 3.9 x 3.5 cm. There is a 2.1 x 2.4 x 2.0 cm cyst in the medial mid pole, not seen on previous study. No hydronephrosis or renal calcification. The spleen measures 8.2 cm and show no abnormality. There is a small right pleural effusion, stable. IMPRESSIONS: Multiple nonobstructing stones of the right kidney with no hydronephrosis. 2.4 cm cyst of the midpole left kidney. Small right pleural effusion, stable. The daughter has answered the phone and reports the patient's urine does not get dark. She is not aware of the amount of water she gives her mother. I requested she measure's this from now on as the pt has stones and needs to increase water consumption. Her daughter reports this week the pt has experienced more spasms than normal and believes this may be due to severe constipation. She provided the pt with an Enema to aide in this situation. She is very concerned but I informed her the Enema may have stimulated the spasms and we should give her a few days to clear up and re-evaluate.   03/22/2023: Ms. CLAUDIO MCDOWELL presents today for an audio visual telehealth visit for which she gave permission for. The patient was located at home at [address] [place] [address] and I was located at my office in [place], [place]. She is accompanied by her daughter whom most of the visit was conducted with. Her daughter has been keeping track of her mother's water consumption. She is averaging about 58 oz of water in a 24 hr period. Additionally, she is drinking juice and ensure nutrition drinks. She does not consume any caffeine. She is on furosemide 40 mg. I said hello to the patient at the end of the visit and she is looking well. She reports feeling no pain at the current moment.   3/29/2023:  Patient Claudio Mcdowell and daughter Tabatha Deluna were home in [place], [place] and I was in my office in [facility].  Patient and daughter gave permission for a [employer] telehealth visit.  They preferred this to [facility].  The patient looks good today.  Her complexion was good there is no pallor.  Smile was symmetric her affect was normal she appeared happy she could make conversation it was appropriate.  She said that she currently had no pain.  When I asked questions that after 3/2 how she had been recently the sometimes she hesitated and deferred to her daughter for cancer compatible with her impaired short-term memory.  Long-term memory remains good she recognizes me once know so I am does ask questions about things like her bladder as she was worried about it her blood tests.  I assured her the results were satisfactory.  04/18/2023: Ms. MCDOWELL is a 89 year old female presenting today for an audio and visual telehealth visit for which she gave verbal permission. We utilized [employer] telemetry doc platform. The patient was located in her home at [address] [place] [address]. I was located in my office on [place], [place]. She was accompanied by her daughter who helped to serve as a historian. She reports discomfort in her eyes. She describes the discomfort as a feeling of sand. She opened her eyes for me, and they do not look red. Pupils look normal. She reports the right eyes bothers her significantly more than the left. I advise that the patient tries lubricating drops and it if does not feel better she will notify her visiting nurse who is due to come next week for a sales catheter change. She reports episode of bladder spasms. She denies gross hematuria. She describes the urine as barely yellow. I reviewed and discussed her  latest pertinent lab on 04/12/2023 which shows "alkaline phosphate normal at 89. Creatinine was normal at 0.62 mg/dL. WBC normal at 5.01. RBC normal at 3.87".   05/09/2023: Ms. MCDOWELL presents today for a follow up audio only telehealth visit for which they gave permission for. She was accompanied by her daughter who helped to serve as a historian. The patient was located at home [address] [place] [address] and I was located in my office in [place], [place]. The patient obtained lab work 5/2/23 prior to today's visit. The UA revealed microscopic hematuria, 5/HPF RBC, 27/HPF WBC. The patient demonstrated great hydration as the specific gravity is 1.006. The Saels catheter was changed May 2, 2023 with no clear urine in the bag. While changing the diaper the aide reports blood in the diaper. The daughter states last week her mother experienced spasms but as of two days ago she is now fine. However, she states the patient is more fatigued. She denies the pt having a temperature. I assured her because her mother is post menopausal any abrasion to the labia can cause a fissure.   06/02/2023: Ms. MCDOWELL is a 89 year old female presenting today for an audio and visual telehealth visit for which she gave verbal permission. We utilized [employer] teams telemetry [employer] platform. The patient was located in her home at [address] [place] [address]. I was located in my office on [place], [place]. She was accompanied by her daughter Tabatha. She reports that her mother complains of onset dysuria that dissipated on its own and has not recurred since. She reports right sided back pain that is aggravated when she lays on the right side. She provided urine specimen. She notes the urine was very clear. I reviewed and discussed the patient's pertinent lab works. Her latest Urinalysis on 05/30/2023 that shows "60 WBC/HPF. Trace Blood Urine. Specific Gravity Urine 1.007". Urine Culture on the same date shows ">100,000 CFU/ml Escherichia coli and <10,000 CFU/ml Normal Urogenital ángel present". The patient takes Mirtazapine for anxiety at low dose. The daughter reports the patient is experiencing increasing anxiety and is thinking of having the prescriber increasing the dosage.  Her BP runs around 120/60. Her Hemoglobin hematocrit looks good. gaze is conjugated. facial expression looks symmetric. Urine was faint yellow and clear.   06/06/2023: Ms. MCDOWELL presents today for a follow up audio only telehealth visit for which they gave permission for. The patient was located at home [address] [place] [address] and I was located in my office in [place], [place]. She was accompanied by her daughter Tabatha. The daughter states pt is experiencing episodes of a dry cough. An X Ray  of 6/5/23 revealed Mild peribronchial thickening suggesting bronchitis in the right clinical setting with no focal pneumonia or congestive heart failure. COPD with chronic lung changes. The mental status is the same as usual but her BP has elevated some. I assured her a little elevation is not as dangerous. The pt continues to experience some urinary frequency and it is a little more concentrated.   10/12/2023: Ms. MCDOWELL presents today for a follow up audio only telehealth visit for which they gave permission for. The patient was located at home [address] [place] [address] and I was located in my office in [place], [place]. The 10/2/23 UA showed proteinuria 30 mg/dL, moderate blood, large Leukocyte Esterase, 16/HPF of WBC. UA showed improvement as patient has chronic sales drainage. Her daughter states patient is okay considering coming home from the hospital. Most pain is in her back/shoulders and some of her back area is red/raw.  Recently her BP has increased to 180/100 and was prescribed Losartan 50mg twice daily. Urine is a clear color, denies cloudy and dark appearance.  11/01/2023: Ms. MCDOWELL presents today for a follow up audio only telehealth visit for which they gave permission for. The patient was located at home [address] [place] [address] and I was located in my office in [place], [place]. The 10/13/23 CMP revealed creatinine at 0.58 and low ALT at 8 U/L. Patient was admitted into [facility] on 10/27/23 for sepsis/UTI. Her daughter states she may be released tomorrow. Daughter states during the day the patient's urine was normal, but she did have chills and a fever.   11/03/2023: Ms. MCDOWELL is a 89 year old female presenting today for an audio only telehealth visit for which she gave verbal permission. The patient was located in her home at [address] [place] [address]. I was located in my office on [place], [place]. She was accompanied by her daughter. She says her mother was supposed to be released today. Initially she was able to void. Because of constipation she was given a couple of enemas. She had a large evacuation and since then she has not been able to void again. The daughter says her latest PVR was 340 cc. She is waiting for her mother to be rescanned again.   11/07/2023: Ms. CLAUDIO MCDOWELL presents today for a follow up audio only telephone visit for which she gave permission for. The pt was located at home, [address] [place] [address], and I was located in my office in [place], [place]. The visit was conducted with the patient's daughter. The patient came home from the hospital on Sunday. The catheter was put back in without another attempt at a void trial. The patient does have issues with constipation. She is able to swallow liquids. Her daughter has had to crush some of her pills to put them in a liquid, however the pt finds it to taste bitter and has some discomfort swallowing the chunks of crushed pills. The hospital discharged her with a 7 day supply of Cefuroxime. Was started yesterday so she has had two doses.   11/20/2023: Ms. MCDOWELL presents today for a follow up audio only telehealth visit for which they gave permission for. The patient was located at home [address] [place] [address] and I was located in my office in [facility]. Daughter states patient is okay. She notes giving pt suppositories and 7 hours later she had mixed bowel movements. However, she did not have a bowel movement since Saturday. Patient is given pedialyte and water frequently. Today's temperature was 95 as before it was 96-97. Pt did not drink any liquids shortly after taking temperature. Daughter denies giving pt solace but she usually gives Miralax as she has done so today. Toady's urine color is clear as it  has been clear for some time.  Daughter states her stomach was distended. Bp has stabilized since being in the hospital but last night and today it  was higher than normal being being 168/88 before medication.   11/29/2023: Ms. CLAUDIO MCDOWELL presents today for a follow up audio only telephone visit for which she gave permission for. The pt was located at home, [address] [place] [address], and I was located in my office in [place], [place]. Visit was conducted with her daughter, Andree. She was informed yesterday by her house call physicians of the results of her urine culture (emailed from core lab showing >100,000 CFU/ml Pseudomonas aeruginosa susceptible only to amikacin, Ciprofloxacin, imipenem, levofloxacin, meropenum. Resistant to Ceftazidime, Pip/tazo. Intermediate for aztrenam, cefepime. They discussed that patient is having increasing oxygen requirements, although SPO2 ok on supplemental O2 (prior to this patient needed only occasional O2 suppl. every few days). 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 The want to treat with ciprofloxacin, 7 day course. Prescription sent to pharmacy. Her daughter inquired today on if she should be treated. She states last week her mother seemed very not herself, however today she seems better in terms of mental status and oxygenation. She has been giving her senakot and miralax.   12/21/2023: Ms. MCDOWELL presents today for a follow up audio-only telehealth visit for which they gave permission for. The patient was located at home [address] [place] [address] and I was located in my office in [place], [place]. The patient obtained lab work 12/4/23 prior to today's visit. Urinalysis is improved and showed trace proteinuria, small Leukocyte Esterase, 6/HPF of WBC and 7/LPF of Cast. Urine culture shows no significant growth, <10,000 CFU/mL Normal Urogenital Ángel, which is very good. Her daughter expresses concern as recently patient is experiencing a surplus of looser and larger amounts of bowel movements that is escaping to the vaginal area, thus causing infections. Urine is normal in color, denies fever. Patient is occasionally more fatigued than usual. Denies distended appearance of the abdomen. Admits to only 1 spasm around the catheter this month. Next sales change will be in 2 weeks.  Towards the end of visit, patient was shown on Facetime, appearing well oriented x 3, normal pigmentation, lips are moist, and smile is symmetrical. I am very impressed by how well the patient looks and speaks. It was more than just pleasantries, she inquired about personal questions. Overall, I am very pleased.   12/27/2023: Ms. CLAUDIO MCDOWELL presents today for a follow up audio only telephone visit for which she gave permission for. The pt was located at home, [address] [place] [address], and I was located in my office in [place], [place]. She is accompanied by her daughter. Patient provided a surveillance urine specimen on 12/22/2023. She has chronic sales catheter drainage. UA revealed small LEC, positive for nitrites, 6 WBC/HPF, no RBC seen, moderate bacteria/HPF, granular casts present, and yeast like cells present. Urine culture grew >100,000 CFU/ml Enterococcus faecalis and 50,000 - 99,000 CFU/mL Streptococcus mitis/oralis group. Urine cytology was negative for high grade urothelial carcinoma. Few mature squamous cells, rare benign urothelial cells and abundant fungal organisms morphologically consistent with Candida species present. Patient's daughter reports that her urine looks clear and a pale yellow color. She feels her mom still has a lot of anxiety and some confusion. She does report that her catheter bag sits in feces due to the patient's position. They tried to tilt her pelvis to avoid this but states it's easier said than done. She states there is no feces in the catheter or in the bag. BM are not loose, she describes them as pasty. Has about 2-3 BM a day.   01/05/2024: Ms. MCDOWELL is a 89 year old female presenting today for an audio only telehealth visit for which she gave verbal permission. The patient was located in her home at [address] [place] [address]. I was located in my office on [place], [place]. She was accompanied by her daughter who helped with the visit. She reports that a nurse came for a catheter change, and only succeeded on the third attempt. She says it's the same nurse who has been coming for 5 months now, and usually she encounters no difficulties. Now that the catheter has been changed, the patient is able to void.   01/17/2024: Ms. CLAUDIO MCDOWELL presents today for a follow up audio only telephone visit for which she gave permission for. The pt was located at home, [address] [place] [address], and I was located in my office in [place], [place]. She is accompanied by her daughter, Tabatha Deluna. Patient provided a urine specimen on 1/5/2024 for surveillance. UA revealed trace blood by urine and moderate LEC. This is excellent for her as she has chronic sales catheter drainage. Urine culture grew 50,000-99,000 CFU/ml Pseudomonas aeruginosa. Urine cytology was negative for high grade urothelial carcinoma. Few mature squamous cells, rare benign urothelial cells and abundant fungal organisms morphologically consistent with Candida species present. The patient's daughter reports that her mother is "different". She reports that she is hearing things a lot now. She states that usually the things are negative. For example, she will say "Why is Ac saying my oxygen isn't working properly?". It is clear what she is saying but fairly random. She is not seeing anything; it is strictly auditory.   01/22/2024: Ms. CLAUDIO MCDOWELL presents today for a follow up audio only telephone visit for which she gave permission for. The pt was located at home, [address] [place] [address], and I was located in my office in [place], [place]. Visit was conducted with the patient's daughter, Tabatha Deluna. She informs me that she did not realize she was out of hyoscyamine sulfate for bladder spasms. She has been getting bladder spasms after the aide leaves and is embarrassed by this as she thinks shes wetting the bed. Her daughter reports that her mother asks in the morning "why am I bothering taking my medications, I am going to die anyway".   02/05/2024: Ms. CLAUDIO MCDOWELL presents today for an audio visual telehealth visit for which she gave permission for. The patient was located at home at [address] [place] [address] and I was located at my office in [place], [place]. Pt's daughter states she was not expecting my call today. I informed her that she was on my schedule and that if she would like we can talk. She informs me that hyoscyamine does not seem to be working for her mother and that some days she has bladder spasms and some she doesn't. Pt is currently not having diarrhea. She is due for a catheter change tomorrow. Patient has not started any medications for the psychological issues she has been having.   02/09/2024: Ms. MCDOWELL is a 89 year old female presenting today for an audio only telehealth visit for which she gave verbal permission. The patient was located in her home at [address] [place] [address]. I was located in my office on [place], [place]. She was accompanied by her daughter who helps with the visit.   The patient has been with a chronic sales catheter since July 2021 when she fractured her foot. She reports today for a follow up.   I reviewed and discussed the patient's pertinent lab works. Urine Culture shows 10,000 - 49,000 CFU/mL Pseudomonas aeruginosa 10,000 - 49,000 CFU/mL Citrobacter freundii complex  I explain to the patient that patients with chronic sales catheter develop bacteria in the urine, that may come from the skin, and it is not significant clinically unless infection symptoms ensue. In addition, the patient reports she was not taking antibiotics when the sample was collected, and the colonies were less than 100 000.   03/13/2024: Ms. CLAUDIO MCDOWELL presents today for a follow up audio only telephone visit for which she gave permission for. The pt was located at home, [address] [place] [address], and I was located in my office in [place], [place]. Visit was conducted with her daughter Tabatha Deluna. Patient provided a urine specimen on 3/4/2024. Patient has a chronic indwelling sales. UA revealed trace blood, large LE, and 9 WBC/HPF. This is quite good considering her sales. Culture grew 50,000 - 99,000 CFU/mL Escherichia coli & >100,000 CFU/ml Citrobacter freundii complex. She does report that after they gave the specimen her mother had a week of stool getting in the vulvar area and she is worried this might travel to the bladder. There is no evidence of that currently. She states the urine is clear and yellow. There has been no drainage around the catheter. She has not had any increased bladder spasms. Patient's daughter reports that her mother is not feeling well today. She has a runny nose and a cough. Home care is arranging for a nasal swab.   04/08/2024: Ms. CLAUDIO MCDOWELL presents today for a follow up audio only telephone visit for which she gave permission for. The pt was located at home, [address] [place] [address], and I was located in my office in [place], [place]. Visit was conducted with her daughter Tabatha Deluna. Patient provided a urine specimen on 4/3/2024. UA was completely normal other than moderate LE and 6 WBC/HPF. For having a sales catheter, this is an excellent UA. Urine culture grew >100,000 CFU/ml Citrobacter freundii complex. Urine cytology was negative for high grade urothelial carcinoma. Specimen consists of many lymphoid cells, mature squamous epithelial cells and benign urothelial cells. Patient's daughter reports that  got covid, however she is recovering. She is very tired and still has a cough. She does report that the urine is clear and yellow, however the pt has had many accidents where stool gets near/in the catheter. Her skin remains red but not broken down. Continues use of triple paste.   05/08/2024 Ms. CLAUDIO MCDOWELL presents today for a follow up audio-only telehealth visit for which they permitted for. The patient was located at home [address] [place] [address] and I was located in my office in [place], [place]. Patient has chronic indwelling Sales catheter. This urinalysis indicates urine is in very good condition without any evidence for clinically active urinary tract infection at this time. Culture showed >=3 organisms. Probable collection contamination. If I am notified of changes indicating possible clinically significant urinary tract infection, we would re-culture at that time. We will continue to collect surveillance urine specimens for urinalyses and urine cultures at time of catheter changes.  Daughter states her mental status is different.  Reports more confusion, gaps of memory. Denies hallucinations and is much less anxious. Stool is still loose and pt is more fatigued while on Risperidone.

## 2024-05-29 NOTE — ASSESSMENT
[FreeTextEntry1] : 10/12/2023: Ms. MCDOWELL presents today for a follow up audio only telehealth visit for which they gave permission for. The patient was located at home [address] [place] [address] and I was located in my office in [place], [place]. The 10/2/23 UA showed proteinuria 30 mg/dL, moderate blood, large Leukocyte Esterase, 16/HPF of WBC. UA showed improvement as patient has chronic sales drainage. Her daughter states patient is okay considering coming home from the hospital. Most pain is in her back/shoulders and some of her back area is red/raw.  Recently her BP has increased to 180/100 and was prescribed Losartan 50mg twice daily. Urine is a clear color, denies cloudy and dark appearance.  Reviewed and discussed laboratory work of 10/2/23 which She obtained prior to today's visit as requested. Pt will go to her nearest [facility] lab for blood work and a urine sample which will be sent for urinalysis, urine culture, and urine cytology. Pt will schedule a telehealth visit after lab work to discuss results.   11/01/2023: Ms. MCDOWELL presents today for a follow up audio only telehealth visit for which they gave permission for. The patient was located at home [address] [place] [address] and I was located in my office in [place], [place]. The 10/13/23 CMP revealed creatinine at 0.58 and low ALT at 8 U/L. Patient was admitted into [facility] on 10/27/23 for sepsis/UTI. Her daughter states she may be released tomorrow. Daughter states during the day the patient's urine was normal, but she did have chills and a fever.   As the patient recently had a course of antibiotics, we suggested patient have catheter removed in hospital, as we will to observe bladder emptying and skin while the catheter is removed. If abnormalities arise, we will place the catheter back in.  If the daughter is to proceed with the plan, we will not discharge patient until a PVR is taken and until pt is able to void on her own the next morning. Hospitalist should weigh the diaper before and after and I will calculate it. If stool is present, then we will not be able to do so.  The daughter will have the Hospitalist contact me about patient.  Hospitalist has called at 4:06 pm. She states the urination looks contaminated and culture showed E-coli.  They will bladder scan her every hour.  Hospitalist will call tomorrow with update on patient and inform me on her fluid level.  Advised daughter to obtain a thermometer to observe temperature.    11/03/2023: Ms. MCDOWELL is a 89 year old female presenting today for an audio only telehealth visit for which she gave verbal permission. The patient was located in her home at [address] [place] [address]. I was located in my office on [place], [place]. She was accompanied by her daughter. She says her mother was supposed to be released today. Initially she was able to void. Because of constipation she was given a couple of enemas. She had a large evacuation and since then she has not been able to void again. The daughter says her latest PVR was 340 cc. She is waiting for her mother to be rescanned again.    Plan: If she is unable to void, she will be discharged with a sales catheter.   11/07/2023: Ms. CLAUDIO MCDOWELL presents today for a follow up audio only telephone visit for which she gave permission for. The pt was located at home, [address] [place] [address], and I was located in my office in [place], [place]. The visit was conducted with the patient's daughter. The patient came home from the hospital on Sunday. The catheter was put back in without another attempt at a void trial. The patient does have issues with constipation. She is able to swallow liquids. Her daughter has had to crush some of her pills to put them in a liquid, however the pt finds it to taste bitter and has some discomfort swallowing the chunks of crushed pills. The hospital discharged her with a 7 day supply of Cefuroxime. Was started yesterday so she has had two doses.    We discussed the possibility of a doing a trial of void. At this time I am recommending her mother settle back in to being home and we can readdress this possibility and how the patient and her daughter would like to proceed at the time of her next visit. Next catheter change will be on 12/5 should they choose to proceed with the catheter.   I looked it up on [employer] and Cefuroxime is available in the [place] as a liquid suspension. Given that this would be easier for the patient, I went to prescribe it for her which she could take rather than the pills. [employer] did not allow me to electronically prescribe it in a liquid suspension so I called the patient's pharmacy to give a verbal prescription for 200 ml of it for 20 ml BID for 5 days. I left a VM for the pharmacy as there was no answer. I requested they call me back if they could accept this prescription or if they could not. The pharmacy called me back shortly after and they told me that it is not currently offered in a liquid suspension, however he called the [facility] next door and they are willing to compound it for 40$. I saw this as acceptable and thanked him for going the extra mile and calling next door. He will contact the patient's daughter to inform her.   Patient and her daughter will have a telehealth visit in 2 weeks for reassessment, sooner if clinically indicated.   11/20/2023: Ms. MCDOWELL presents today for a follow up audio only telehealth visit for which they gave permission for. The patient was located at home [address] [place] [address] and I was located in my office in [facility]. Daughter states patient is okay. She notes giving pt suppositories and 7 hours later she had mixed bowel movements. However, she did not have a bowel movement since Saturday. Patient is given pedialyte and water frequently. Today's temperature was 95 as before it was 96-97. Pt did not drink any liquids shortly after taking temperature. Daughter denies giving pt solace but she usually gives Miralax as she has done so today. Senady's urine color is clear as it  has been clear for some time.  Daughter states her stomach was distended. Bp has stabilized since being in the hospital but last night and today it  was higher than normal being being 168/88 before medication.   Advised daughter to give patient Miralax tonight if there is no bowel movements.  Advised her to take Blood pressure tonight.  Pt will schedule a telehealth visit  11/29/2023: Ms. CLAUDIO MCDOWELL presents today for a follow up audio only telephone visit for which she gave permission for. The pt was located at home, [address] [place] [address], and I was located in my office in [place], [place]. Visit was conducted with her daughter, Andree. She was informed yesterday by her house call physicians of the results of her urine culture (emailed from core lab showing >100,000 CFU/ml Pseudomonas aeruginosa susceptible only to amikacin, Ciprofloxacin, imipenem, levofloxacin, meropenum. Resistant to Ceftazidime, Pip/tazo. Intermediate for aztrenam, cefepime. They discussed that patient is having increasing oxygen requirements, although SPO2 ok on supplemental O2 (prior to this patient needed only occasional O2 suppl. every few days). 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 The want to treat with ciprofloxacin, 7 day course. Prescription sent to pharmacy. Her daughter inquired today on if she should be treated. She states last week her mother seemed very not herself, however today she seems better in terms of mental status and oxygenation. She has been giving her senakot and miralax.   I explained that given her O2 issues and confusion over the last few days, I advised to treat with Cipro despite her daughter feeling she is better today. If there are any problems with crushing the pills for her or if she is not reacting to it well, she was advised to call right away. I posed the option of giving it as an oral solution if need be.    12/21/2023: Ms. MCDOWELL presents today for a follow up audio-visual telehealth visit for which they gave permission for. The patient was located at home [address] [place] [address] and I was located in my office in [place], [place]. The patient obtained lab work 12/4/23 prior to today's visit. Urinalysis is improved and showed trace proteinuria, small Leukocyte Esterase, 6/HPF of WBC and 7/LPF of Cast. Urine culture shows no significant growth, <10,000 CFU/mL Normal Urogenital Khadijah, which is very good. Her daughter expresses concern as recently patient is experiencing a surplus of looser and larger amounts of bowel movements that is escaping to the vaginal area, thus causing infections. Urine is normal in color, denies fever. Patient is occasionally more fatigued than usual. Denies distended appearance of the abdomen. Admits to only 1 spasm around the catheter this month. Next sales change will be in 2 weeks.  Towards the end of visit, patient was shown on Facetime, appearing well oriented x 3, normal pigmentation, lips are moist, and smile is symmetrical. I am very impressed by how well the patient looks and speaks. It was more than just pleasantries, she inquired about personal questions. Overall, I am very pleased.   Reviewed and discussed laboratory work of 12/4/23 which She obtained prior to today's visit as requested. Advised daughter to have care team to place support under the pelvis, so pelvis is tilted up at a higher level of the anus.   As long as stool is pasty and firm, we advised Andree to give patient MiraLAX. If stool is loose and unfirm, she should decrease the amount of MiraLAX.  Pt will provide a urine sample which will be sent for urinalysis, urine culture, and urine cytology. Pt will schedule a telehealth visit in 2 weeks for reassessment.   12/27/2023: Ms. CLAUDIO MCDOWELL presents today for a follow up audio only telephone visit for which she gave permission for. The pt was located at home, [address] [place] [address], and I was located in my office in [place], [place]. She is accompanied by her daughter. Patient provided a surveillance urine specimen on 12/22/2023. She has chronic sales catheter drainage. UA revealed small LEC, positive for nitrites, 6 WBC/HPF, no RBC seen, moderate bacteria/HPF, granular casts present, and yeast like cells present. Urine culture grew >100,000 CFU/ml Enterococcus faecalis and 50,000 - 99,000 CFU/mL Streptococcus mitis/oralis group. Urine cytology was negative for high grade urothelial carcinoma. Few mature squamous cells, rare benign urothelial cells and abundant fungal organisms morphologically consistent with Candida species present. Patient's daughter reports that her urine looks clear and a pale yellow color. She feels her mom still has a lot of anxiety and some confusion. She does report that her catheter bag sits in feces due to the patient's position. They tried to tilt her pelvis to avoid this but states it's easier said than done. She states there is no feces in the catheter or in the bag. BM are not loose, she describes them as pasty. Has about 2-3 BM a day.    Reviewed and discussed lab work of 12/22/2023 in detail with the pt.  Future urine orders were put in including fungal urine culture. Pt's daughter was advised to continue to try and alter the patient's position to avoid the catheter sitting in feces.  Patient should have a telehealth visit in 3 months, sooner if clinically indicated.    01/05/2024: Ms. MCDOWELL is a 89 year old female presenting today for an audio only telehealth visit for which she gave verbal permission. The patient was located in her home at [address] [place] [address]. I was located in my office on [place], [place]. She was accompanied by her daughter who helped with the visit. She reports that a nurse came for a catheter change, and only succeeded on the third attempt. She says it's the same nurse who has been coming for 5 months now, and usually she encounters no difficulties. Now that the catheter has been changed, the patient is able to void.   Plan: Pt's daughter will keep us updated. If the patient experiences fever or chill, she will call ER and inform the office.   01/17/2024: Ms. CLAUDIO MCDOWELL presents today for a follow up audio only telephone visit for which she gave permission for. The pt was located at home, [address] [place] [address], and I was located in my office in [place], [place]. She is accompanied by her daughter, Tabatha Deluna. Patient provided a urine specimen on 1/5/2024 for surveillance. UA revealed trace blood by urine and moderate LEC. This is excellent for her as she has chronic sales catheter drainage. Urine culture grew 50,000-99,000 CFU/ml Pseudomonas aeruginosa. Urine cytology was negative for high grade urothelial carcinoma. Few mature squamous cells, rare benign urothelial cells and abundant fungal organisms morphologically consistent with Candida species present. The patient's daughter reports that her mother is "different". She reports that she is hearing things a lot now. She states that usually the things are negative. For example, she will say "Why is Ac saying my oxygen isn't working properly?". It is clear what she is saying but fairly random. She is not seeing anything; it is strictly auditory.   Reviewed and discussed lab work of 1/5/2024 in detail with the pt's daughter. She was informed that given she has chronic sales catheter drainage, this specimen was excellent. If she had more WBC/HPF, I would be concerned, however she is in very good shape from a urologic standpoint. Her daughter was advised to speak with her mother's PCP about hearing things and her AMS as it is not urologic in origin or related to pending sepsis. I provided her the name of a neurologist, Dr.Li-Fen Tripathi if she would like to go for neuro consultation.  I once again re-iterated that at this time, given her skin breakdown, I recommend continued sales catheter drainage. Her daughter brought up an external urine collection apparatus once again, however I do not recommend we try that at least until her skin is in good condition. I also explained that if her mother were to go into urinary retention, this sort of device would not drain the urine from her.  Pt's daughter was advised to give her hyoscyamine for bladder spasms.  Patient will have a telehealth visit in 1-2 months, sooner if clinically indicated.    01/22/2024: Ms. CLAUDIO MCDOWELL presents today for a follow up audio only telephone visit for which she gave permission for. The pt was located at home, [address] [place] [address], and I was located in my office in [place], [place]. Visit was conducted with the patient's daughter, Tabatha Deluna. She informs me that she did not realize she was out of hyoscyamine sulfate for bladder spasms. She has been getting bladder spasms after the aide leaves and is embarrassed by this as she thinks shes wetting the bed. Her daughter reports that her mother asks in the morning "why am I bothering taking my medications, I am going to die anyway".   Renewed hyoscyamine sulfate 0.125 mg sublingual tablets, give one tablet under the tongue as needed. I recommend she gives it about 15 minutes before the aide comes. I suggest she give it for 10 days in a row to see if we can calm things down and then stop. If it returns, we can try again for another month or two. I did speak with the patient at the end of the visit. She seemed to know who I was and understood me. I spoke to her about the bladder spasms she is experiencing. She was on board with trying hyoscyamine and thanked me for speaking with her.  I recommend the patient's daughter speak with her PCP about potential anti-depressant medication.   Patient will have a telephone visit in 2 weeks for reassessment, sooner if clinically indicated.    02/05/2024: Ms. CLAUDIO MCDOWELL presents today for an audio visual telehealth visit for which she gave permission for. The patient was located at home at [address] [place] [address] and I was located at my office in [place], [place]. Pt's daughter states she was not expecting my call today. I informed her that she was on my schedule and that if she would like we can talk. She informs me that hyoscyamine does not seem to be working for her mother and that some days she has bladder spasms and some she doesn't. Pt is currently not having diarrhea. She is due for a catheter change tomorrow. Patient has not started any medications for the psychological issues she has been having.    Pt will discontinue use of hyoscyamine. I prescribed the patient Trospium 20 mg, one tablet once daily at bedtime. I informed the patient there may be constipation as a side effect.   Orders for UA, urine culture, and urine cytology were put in for her daughter to bring the patient's sample to her nearest  lab.  Patient will have a telehealth visit this Friday after the catheter change.     02/09/2024: Ms. MCDOWELL is a 89 year old female presenting today for an audio only telehealth visit for which she gave verbal permission. The patient was located in her home at [address] [place] [address]. I was located in my office on [place], [place]. She was accompanied by her daughter who helps with the visit.   The patient has been with a chronic sales catheter since July 2021 when she fractured her foot. She reports today for a follow up.  I reviewed and discussed the patient's pertinent lab works. Urine Culture shows 10,000 - 49,000 CFU/mL Pseudomonas aeruginosa 10,000 - 49,000 CFU/mL Citrobacter freundii complex I explain to the patient that patients with chronic sales catheter develop bacteria in the urine, that may come from the skin, and it is not significant clinically unless infection symptoms ensue. In addition, the patient reports she was not taking antibiotics when the sample was collected, and the colonies were less than 100 000.   Pt will start Trospium 20 mg.  She will provide urine for UA, Urine Cytology and Urine Culture. She will have a follow up in one month; earlier if needed.    03/13/2024: Ms. CLAUDIO MCDOWELL presents today for a follow up audio only telephone visit for which she gave permission for. The pt was located at home, [address] [place] [address], and I was located in my office in [place], [place]. Visit was conducted with her daughter Tabatha Deluna. Patient provided a urine specimen on 3/4/2024. Patient has a chronic indwelling sales. UA revealed trace blood, large LE, and 9 WBC/HPF. This is quite good considering her sales. Culture grew 50,000 - 99,000 CFU/mL Escherichia coli & >100,000 CFU/ml Citrobacter freundii complex. She does report that after they gave the specimen her mother had a week of stool getting in the vulvar area and she is worried this might travel to the bladder. There is no evidence of that currently. She states the urine is clear and yellow. There has been no drainage around the catheter. She has not had any increased bladder spasms. Patient's daughter reports that her mother is not feeling well today. She has a runny nose and a cough. Home care is arranging for a nasal swab.   Reviewed and discussed lab work of 3/4/2024 in detail with the pt's daughter.  If the patient's daughter notices a change in her urine, she will call promptly.    04/08/2024: Ms. CLAUDIO MCDOWELL presents today for a follow up audio only telephone visit for which she gave permission for. The pt was located at home, [address] [place] [address], and I was located in my office in [place], [place]. Visit was conducted with her daughter Tabatha Deluna. Patient provided a urine specimen on 4/3/2024. UA was completely normal other than moderate LE and 6 WBC/HPF. For having a sales catheter, this is an excellent UA. Urine culture grew >100,000 CFU/ml Citrobacter freundii complex. Urine cytology was negative for high grade urothelial carcinoma. Specimen consists of many lymphoid cells, mature squamous epithelial cells and benign urothelial cells. Patient's daughter reports that  got covid, however she is recovering. She is very tired and still has a cough. She does report that the urine is clear and yellow, however the pt has had many accidents where stool gets near/in the catheter. Her skin remains red but not broken down. Continues use of triple paste.    Reviewed and discussed lab work of 4/3/2024 in detail with the pt. Pt's daughter reassured that this is a very good specimen considering she has a sales catheter.  Will provide another urine specimen at time of next catheter change.  Will have a telehealth visit after next urine specimen, sooner if clinically indicated.     05/08/2024 Ms. CLAUDIO MCDOWELL presents today for a follow up audio-only telehealth visit for which they permitted for. The patient was located at home [address] [place] [address] and I was located in my office in [place], [place]. Patient has chronic indwelling Sales catheter. This urinalysis indicates urine is in very good condition without any evidence for clinically active urinary tract infection at this time. Culture showed >=3 organisms. Probable collection contamination. If I am notified of changes indicating possible clinically significant urinary tract infection, we would re-culture at that time. We will continue to collect surveillance urine specimens for urinalyses and urine cultures at time of catheter changes.  Daughter states her mental status is different.  Reports more confusion, gaps of memory. Denies hallucinations and is much less anxious. Stool is still loose and pt is more fatigued while on Risperidone.   Reviewed and discussed laboratory work of 5/2/24 which She obtained prior to today's visit as requested. At the current time I will not re-culture. If I am notified of changes indicating possible clinically significant urinary tract infection, we would re-culture at that time. We will continue to collect surveillance urine specimens for urinalyses and urine cultures at time of catheter changes. Pt will schedule a telehealth visit  05/29/2024: Ms. CLAUDIO MCDOWELL presents today for a follow up audio only telephone visit for which she gave permission for. The pt was located at home, [address] [place] [address], and I was located in my office in [place], [place]. Visit conducted with patient's daughter, Tabatha.            Duration of telephone visit was * minutes.

## 2024-05-31 DIAGNOSIS — R21 RASH AND OTHER NONSPECIFIC SKIN ERUPTION: ICD-10-CM

## 2024-05-31 RX ORDER — MUPIROCIN 20 MG/G
2 OINTMENT TOPICAL TWICE DAILY
Qty: 1 | Refills: 2 | Status: ACTIVE | COMMUNITY
Start: 2024-05-31 | End: 1900-01-01

## 2024-05-31 RX ORDER — SODIUM CHLORIDE FOR INHALATION 3 %
3 VIAL, NEBULIZER (ML) INHALATION
Qty: 1 | Refills: 0 | Status: ACTIVE | COMMUNITY
Start: 2024-03-20 | End: 1900-01-01

## 2024-05-31 RX ORDER — SILVER SULFADIAZINE 10 MG/G
1 CREAM TOPICAL
Qty: 1 | Refills: 3 | Status: ACTIVE | COMMUNITY
Start: 2024-05-31 | End: 1900-01-01

## 2024-05-31 RX ORDER — ALBUTEROL SULFATE 2.5 MG/3ML
(2.5 MG/3ML) SOLUTION RESPIRATORY (INHALATION)
Qty: 1 | Refills: 11 | Status: ACTIVE | COMMUNITY
Start: 2022-11-26 | End: 1900-01-01

## 2024-06-01 ENCOUNTER — TRANSCRIPTION ENCOUNTER (OUTPATIENT)
Age: 89
End: 2024-06-01

## 2024-06-03 ENCOUNTER — LABORATORY RESULT (OUTPATIENT)
Age: 89
End: 2024-06-03

## 2024-06-03 ENCOUNTER — NON-APPOINTMENT (OUTPATIENT)
Age: 89
End: 2024-06-03

## 2024-06-03 DIAGNOSIS — Z79.899 OTHER LONG TERM (CURRENT) DRUG THERAPY: ICD-10-CM

## 2024-06-03 RX ORDER — MIRTAZAPINE 15 MG/1
15 TABLET, FILM COATED ORAL
Qty: 90 | Refills: 3 | Status: ACTIVE | COMMUNITY
Start: 1900-01-01 | End: 1900-01-01

## 2024-06-03 RX ORDER — AZITHROMYCIN 250 MG/1
250 TABLET, FILM COATED ORAL
Qty: 1 | Refills: 0 | Status: ACTIVE | COMMUNITY
Start: 2024-06-03 | End: 1900-01-01

## 2024-06-04 ENCOUNTER — NON-APPOINTMENT (OUTPATIENT)
Age: 89
End: 2024-06-04

## 2024-06-04 LAB
APPEARANCE: CLEAR
BACTERIA: NEGATIVE /HPF
BILIRUBIN URINE: NEGATIVE
BLOOD URINE: ABNORMAL
CAST: 2 /LPF
COLOR: YELLOW
EPITHELIAL CELLS: 1 /HPF
GLUCOSE QUALITATIVE U: NEGATIVE MG/DL
KETONES URINE: NEGATIVE MG/DL
LEUKOCYTE ESTERASE URINE: ABNORMAL
MICROSCOPIC-UA: NORMAL
NITRITE URINE: NEGATIVE
PH URINE: 6
PROTEIN URINE: NORMAL MG/DL
RED BLOOD CELLS URINE: 0 /HPF
REVIEW: NORMAL
SPECIFIC GRAVITY URINE: 1.02
UROBILINOGEN URINE: 1 MG/DL
WHITE BLOOD CELLS URINE: 10 /HPF

## 2024-06-05 LAB
ANION GAP SERPL CALC-SCNC: 11 MMOL/L
BASOPHILS # BLD AUTO: 0.03 K/UL
BASOPHILS NFR BLD AUTO: 0.4 %
BUN SERPL-MCNC: 20 MG/DL
CALCIUM SERPL-MCNC: 9 MG/DL
CHLORIDE SERPL-SCNC: 101 MMOL/L
CO2 SERPL-SCNC: 23 MMOL/L
CREAT SERPL-MCNC: 0.56 MG/DL
EGFR: 86 ML/MIN/1.73M2
EOSINOPHIL # BLD AUTO: 0.07 K/UL
EOSINOPHIL NFR BLD AUTO: 1 %
GLUCOSE SERPL-MCNC: 122 MG/DL
HCT VFR BLD CALC: 32.9 %
HGB BLD-MCNC: 11 G/DL
IMM GRANULOCYTES NFR BLD AUTO: 2.5 %
INFLUENZA A RESULT: NOT DETECTED
INFLUENZA B RESULT: NOT DETECTED
LYMPHOCYTES # BLD AUTO: 0.77 K/UL
LYMPHOCYTES NFR BLD AUTO: 11.5 %
MAN DIFF?: NORMAL
MCHC RBC-ENTMCNC: 31.9 PG
MCHC RBC-ENTMCNC: 33.4 GM/DL
MCV RBC AUTO: 95.4 FL
MONOCYTES # BLD AUTO: 2.79 K/UL
MONOCYTES NFR BLD AUTO: 41.7 %
NEUTROPHILS # BLD AUTO: 2.86 K/UL
NEUTROPHILS NFR BLD AUTO: 42.9 %
PLATELET # BLD AUTO: 71 K/UL
POTASSIUM SERPL-SCNC: 5.4 MMOL/L
RBC # BLD: 3.45 M/UL
RBC # FLD: 13.8 %
RESP SYN VIRUS RESULT: NOT DETECTED
SARS-COV-2 RESULT: NOT DETECTED
SODIUM SERPL-SCNC: 134 MMOL/L
URINE CYTOLOGY: NORMAL
WBC # FLD AUTO: 6.69 K/UL

## 2024-06-05 RX ORDER — ATORVASTATIN CALCIUM 20 MG/1
20 TABLET, FILM COATED ORAL
Qty: 90 | Refills: 3 | Status: COMPLETED | COMMUNITY
Start: 2023-11-30 | End: 2024-06-05

## 2024-06-07 ENCOUNTER — APPOINTMENT (OUTPATIENT)
Dept: UROLOGY | Facility: CLINIC | Age: 89
End: 2024-06-07
Payer: MEDICARE

## 2024-06-07 ENCOUNTER — TRANSCRIPTION ENCOUNTER (OUTPATIENT)
Age: 89
End: 2024-06-07

## 2024-06-07 DIAGNOSIS — R19.7 DIARRHEA, UNSPECIFIED: ICD-10-CM

## 2024-06-07 LAB — BACTERIA UR CULT: ABNORMAL

## 2024-06-07 PROCEDURE — 99443: CPT

## 2024-06-08 RX ORDER — BENZONATATE 100 MG/1
100 CAPSULE ORAL 3 TIMES DAILY
Qty: 15 | Refills: 0 | Status: ACTIVE | COMMUNITY
Start: 2024-06-08 | End: 1900-01-01

## 2024-06-09 PROBLEM — R19.7 DIARRHEA, UNSPECIFIED TYPE: Status: ACTIVE | Noted: 2022-07-11

## 2024-06-09 NOTE — ADDENDUM
[FreeTextEntry1] : This note was authored by Katlyn Avila working as a scribe for Dr. Indio Sexton. I, Dr. Indio Sexton have reviewed the content of this note and confirm it is true and accurate. I personally performed the history and physical examination and made all the decisions 06/07/2024.

## 2024-06-09 NOTE — ASSESSMENT
[FreeTextEntry1] : 10/12/2023: Ms. MCDOWELL presents today for a follow up audio only telehealth visit for which they gave permission for. The patient was located at home [address] [place] [address] and I was located in my office in [place], [place]. The 10/2/23 UA showed proteinuria 30 mg/dL, moderate blood, large Leukocyte Esterase, 16/HPF of WBC. UA showed improvement as patient has chronic sales drainage. Her daughter states patient is okay considering coming home from the hospital. Most pain is in her back/shoulders and some of her back area is red/raw.  Recently her BP has increased to 180/100 and was prescribed Losartan 50mg twice daily. Urine is a clear color, denies cloudy and dark appearance.  Reviewed and discussed laboratory work of 10/2/23 which She obtained prior to today's visit as requested. Pt will go to her nearest [facility] lab for blood work and a urine sample which will be sent for urinalysis, urine culture, and urine cytology. Pt will schedule a telehealth visit after lab work to discuss results.   11/01/2023: Ms. MCDOWELL presents today for a follow up audio only telehealth visit for which they gave permission for. The patient was located at home [address] [place] [address] and I was located in my office in [place], [place]. The 10/13/23 CMP revealed creatinine at 0.58 and low ALT at 8 U/L. Patient was admitted into [facility] on 10/27/23 for sepsis/UTI. Her daughter states she may be released tomorrow. Daughter states during the day the patient's urine was normal, but she did have chills and a fever.   As the patient recently had a course of antibiotics, we suggested patient have catheter removed in hospital, as we will to observe bladder emptying and skin while the catheter is removed. If abnormalities arise, we will place the catheter back in.  If the daughter is to proceed with the plan, we will not discharge patient until a PVR is taken and until pt is able to void on her own the next morning. Hospitalist should weigh the diaper before and after and I will calculate it. If stool is present, then we will not be able to do so.  The daughter will have the Hospitalist contact me about patient.  Hospitalist has called at 4:06 pm. She states the urination looks contaminated and culture showed E-coli.  They will bladder scan her every hour.  Hospitalist will call tomorrow with update on patient and inform me on her fluid level.  Advised daughter to obtain a thermometer to observe temperature.    11/03/2023: Ms. MCDOWELL is a 89 year old female presenting today for an audio only telehealth visit for which she gave verbal permission. The patient was located in her home at [address] [place] [address]. I was located in my office on [place], [place]. She was accompanied by her daughter. She says her mother was supposed to be released today. Initially she was able to void. Because of constipation she was given a couple of enemas. She had a large evacuation and since then she has not been able to void again. The daughter says her latest PVR was 340 cc. She is waiting for her mother to be rescanned again.    Plan: If she is unable to void, she will be discharged with a sales catheter.   11/07/2023: Ms. CLAUDIO MCDOWELL presents today for a follow up audio only telephone visit for which she gave permission for. The pt was located at home, [address] [place] [address], and I was located in my office in [place], [place]. The visit was conducted with the patient's daughter. The patient came home from the hospital on Sunday. The catheter was put back in without another attempt at a void trial. The patient does have issues with constipation. She is able to swallow liquids. Her daughter has had to crush some of her pills to put them in a liquid, however the pt finds it to taste bitter and has some discomfort swallowing the chunks of crushed pills. The hospital discharged her with a 7 day supply of Cefuroxime. Was started yesterday so she has had two doses.    We discussed the possibility of a doing a trial of void. At this time I am recommending her mother settle back in to being home and we can readdress this possibility and how the patient and her daughter would like to proceed at the time of her next visit. Next catheter change will be on 12/5 should they choose to proceed with the catheter.   I looked it up on [employer] and Cefuroxime is available in the [place] as a liquid suspension. Given that this would be easier for the patient, I went to prescribe it for her which she could take rather than the pills. [employer] did not allow me to electronically prescribe it in a liquid suspension so I called the patient's pharmacy to give a verbal prescription for 200 ml of it for 20 ml BID for 5 days. I left a VM for the pharmacy as there was no answer. I requested they call me back if they could accept this prescription or if they could not. The pharmacy called me back shortly after and they told me that it is not currently offered in a liquid suspension, however he called the [facility] next door and they are willing to compound it for 40$. I saw this as acceptable and thanked him for going the extra mile and calling next door. He will contact the patient's daughter to inform her.   Patient and her daughter will have a telehealth visit in 2 weeks for reassessment, sooner if clinically indicated.   11/20/2023: Ms. MCDOWELL presents today for a follow up audio only telehealth visit for which they gave permission for. The patient was located at home [address] [place] [address] and I was located in my office in [facility]. Daughter states patient is okay. She notes giving pt suppositories and 7 hours later she had mixed bowel movements. However, she did not have a bowel movement since Saturday. Patient is given pedialyte and water frequently. Today's temperature was 95 as before it was 96-97. Pt did not drink any liquids shortly after taking temperature. Daughter denies giving pt solace but she usually gives Miralax as she has done so today. Senady's urine color is clear as it  has been clear for some time.  Daughter states her stomach was distended. Bp has stabilized since being in the hospital but last night and today it  was higher than normal being being 168/88 before medication.   Advised daughter to give patient Miralax tonight if there is no bowel movements.  Advised her to take Blood pressure tonight.  Pt will schedule a telehealth visit  11/29/2023: Ms. CLAUDIO MCDOWLEL presents today for a follow up audio only telephone visit for which she gave permission for. The pt was located at home, [address] [place] [address], and I was located in my office in [place], [place]. Visit was conducted with her daughter, Andree. She was informed yesterday by her house call physicians of the results of her urine culture (emailed from core lab showing >100,000 CFU/ml Pseudomonas aeruginosa susceptible only to amikacin, Ciprofloxacin, imipenem, levofloxacin, meropenum. Resistant to Ceftazidime, Pip/tazo. Intermediate for aztrenam, cefepime. They discussed that patient is having increasing oxygen requirements, although SPO2 ok on supplemental O2 (prior to this patient needed only occasional O2 suppl. every few days). 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 The want to treat with ciprofloxacin, 7 day course. Prescription sent to pharmacy. Her daughter inquired today on if she should be treated. She states last week her mother seemed very not herself, however today she seems better in terms of mental status and oxygenation. She has been giving her senakot and miralax.   I explained that given her O2 issues and confusion over the last few days, I advised to treat with Cipro despite her daughter feeling she is better today. If there are any problems with crushing the pills for her or if she is not reacting to it well, she was advised to call right away. I posed the option of giving it as an oral solution if need be.    12/21/2023: Ms. MCDOWELL presents today for a follow up audio-visual telehealth visit for which they gave permission for. The patient was located at home [address] [place] [address] and I was located in my office in [place], [place]. The patient obtained lab work 12/4/23 prior to today's visit. Urinalysis is improved and showed trace proteinuria, small Leukocyte Esterase, 6/HPF of WBC and 7/LPF of Cast. Urine culture shows no significant growth, <10,000 CFU/mL Normal Urogenital Khadijah, which is very good. Her daughter expresses concern as recently patient is experiencing a surplus of looser and larger amounts of bowel movements that is escaping to the vaginal area, thus causing infections. Urine is normal in color, denies fever. Patient is occasionally more fatigued than usual. Denies distended appearance of the abdomen. Admits to only 1 spasm around the catheter this month. Next sales change will be in 2 weeks.  Towards the end of visit, patient was shown on Facetime, appearing well oriented x 3, normal pigmentation, lips are moist, and smile is symmetrical. I am very impressed by how well the patient looks and speaks. It was more than just pleasantries, she inquired about personal questions. Overall, I am very pleased.   Reviewed and discussed laboratory work of 12/4/23 which She obtained prior to today's visit as requested. Advised daughter to have care team to place support under the pelvis, so pelvis is tilted up at a higher level of the anus.   As long as stool is pasty and firm, we advised Andree to give patient MiraLAX. If stool is loose and unfirm, she should decrease the amount of MiraLAX.  Pt will provide a urine sample which will be sent for urinalysis, urine culture, and urine cytology. Pt will schedule a telehealth visit in 2 weeks for reassessment.   12/27/2023: Ms. CLAUDIO MCDOWELL presents today for a follow up audio only telephone visit for which she gave permission for. The pt was located at home, [address] [place] [address], and I was located in my office in [place], [place]. She is accompanied by her daughter. Patient provided a surveillance urine specimen on 12/22/2023. She has chronic sales catheter drainage. UA revealed small LEC, positive for nitrites, 6 WBC/HPF, no RBC seen, moderate bacteria/HPF, granular casts present, and yeast like cells present. Urine culture grew >100,000 CFU/ml Enterococcus faecalis and 50,000 - 99,000 CFU/mL Streptococcus mitis/oralis group. Urine cytology was negative for high grade urothelial carcinoma. Few mature squamous cells, rare benign urothelial cells and abundant fungal organisms morphologically consistent with Candida species present. Patient's daughter reports that her urine looks clear and a pale yellow color. She feels her mom still has a lot of anxiety and some confusion. She does report that her catheter bag sits in feces due to the patient's position. They tried to tilt her pelvis to avoid this but states it's easier said than done. She states there is no feces in the catheter or in the bag. BM are not loose, she describes them as pasty. Has about 2-3 BM a day.    Reviewed and discussed lab work of 12/22/2023 in detail with the pt.  Future urine orders were put in including fungal urine culture. Pt's daughter was advised to continue to try and alter the patient's position to avoid the catheter sitting in feces.  Patient should have a telehealth visit in 3 months, sooner if clinically indicated.    01/05/2024: Ms. MCDOWELL is a 89 year old female presenting today for an audio only telehealth visit for which she gave verbal permission. The patient was located in her home at [address] [place] [address]. I was located in my office on [place], [place]. She was accompanied by her daughter who helped with the visit. She reports that a nurse came for a catheter change, and only succeeded on the third attempt. She says it's the same nurse who has been coming for 5 months now, and usually she encounters no difficulties. Now that the catheter has been changed, the patient is able to void.   Plan: Pt's daughter will keep us updated. If the patient experiences fever or chill, she will call ER and inform the office.   01/17/2024: Ms. CLAUDIO MCDOWELL presents today for a follow up audio only telephone visit for which she gave permission for. The pt was located at home, [address] [place] [address], and I was located in my office in [place], [place]. She is accompanied by her daughter, Tabatha Deluna. Patient provided a urine specimen on 1/5/2024 for surveillance. UA revealed trace blood by urine and moderate LEC. This is excellent for her as she has chronic sales catheter drainage. Urine culture grew 50,000-99,000 CFU/ml Pseudomonas aeruginosa. Urine cytology was negative for high grade urothelial carcinoma. Few mature squamous cells, rare benign urothelial cells and abundant fungal organisms morphologically consistent with Candida species present. The patient's daughter reports that her mother is "different". She reports that she is hearing things a lot now. She states that usually the things are negative. For example, she will say "Why is Ac saying my oxygen isn't working properly?". It is clear what she is saying but fairly random. She is not seeing anything; it is strictly auditory.   Reviewed and discussed lab work of 1/5/2024 in detail with the pt's daughter. She was informed that given she has chronic sales catheter drainage, this specimen was excellent. If she had more WBC/HPF, I would be concerned, however she is in very good shape from a urologic standpoint. Her daughter was advised to speak with her mother's PCP about hearing things and her AMS as it is not urologic in origin or related to pending sepsis. I provided her the name of a neurologist, Dr.Li-Fen Tripathi if she would like to go for neuro consultation.  I once again re-iterated that at this time, given her skin breakdown, I recommend continued sales catheter drainage. Her daughter brought up an external urine collection apparatus once again, however I do not recommend we try that at least until her skin is in good condition. I also explained that if her mother were to go into urinary retention, this sort of device would not drain the urine from her.  Pt's daughter was advised to give her hyoscyamine for bladder spasms.  Patient will have a telehealth visit in 1-2 months, sooner if clinically indicated.    01/22/2024: Ms. CLAUDIO MCDOWELL presents today for a follow up audio only telephone visit for which she gave permission for. The pt was located at home, [address] [place] [address], and I was located in my office in [place], [place]. Visit was conducted with the patient's daughter, Tabatha Deluna. She informs me that she did not realize she was out of hyoscyamine sulfate for bladder spasms. She has been getting bladder spasms after the aide leaves and is embarrassed by this as she thinks shes wetting the bed. Her daughter reports that her mother asks in the morning "why am I bothering taking my medications, I am going to die anyway".   Renewed hyoscyamine sulfate 0.125 mg sublingual tablets, give one tablet under the tongue as needed. I recommend she gives it about 15 minutes before the aide comes. I suggest she give it for 10 days in a row to see if we can calm things down and then stop. If it returns, we can try again for another month or two. I did speak with the patient at the end of the visit. She seemed to know who I was and understood me. I spoke to her about the bladder spasms she is experiencing. She was on board with trying hyoscyamine and thanked me for speaking with her.  I recommend the patient's daughter speak with her PCP about potential anti-depressant medication.   Patient will have a telephone visit in 2 weeks for reassessment, sooner if clinically indicated.    02/05/2024: Ms. CLAUDIO MCDOWELL presents today for an audio visual telehealth visit for which she gave permission for. The patient was located at home at [address] [place] [address] and I was located at my office in [place], [place]. Pt's daughter states she was not expecting my call today. I informed her that she was on my schedule and that if she would like we can talk. She informs me that hyoscyamine does not seem to be working for her mother and that some days she has bladder spasms and some she doesn't. Pt is currently not having diarrhea. She is due for a catheter change tomorrow. Patient has not started any medications for the psychological issues she has been having.    Pt will discontinue use of hyoscyamine. I prescribed the patient Trospium 20 mg, one tablet once daily at bedtime. I informed the patient there may be constipation as a side effect.   Orders for UA, urine culture, and urine cytology were put in for her daughter to bring the patient's sample to her nearest  lab.  Patient will have a telehealth visit this Friday after the catheter change.     02/09/2024: Ms. MCDOWELL is a 89 year old female presenting today for an audio only telehealth visit for which she gave verbal permission. The patient was located in her home at [address] [place] [address]. I was located in my office on [place], [place]. She was accompanied by her daughter who helps with the visit.   The patient has been with a chronic sales catheter since July 2021 when she fractured her foot. She reports today for a follow up.  I reviewed and discussed the patient's pertinent lab works. Urine Culture shows 10,000 - 49,000 CFU/mL Pseudomonas aeruginosa 10,000 - 49,000 CFU/mL Citrobacter freundii complex I explain to the patient that patients with chronic sales catheter develop bacteria in the urine, that may come from the skin, and it is not significant clinically unless infection symptoms ensue. In addition, the patient reports she was not taking antibiotics when the sample was collected, and the colonies were less than 100 000.   Pt will start Trospium 20 mg.  She will provide urine for UA, Urine Cytology and Urine Culture. She will have a follow up in one month; earlier if needed.    03/13/2024: Ms. CLAUDIO MCDOWELL presents today for a follow up audio only telephone visit for which she gave permission for. The pt was located at home, [address] [place] [address], and I was located in my office in [place], [place]. Visit was conducted with her daughter Tabatha Deluna. Patient provided a urine specimen on 3/4/2024. Patient has a chronic indwelling sales. UA revealed trace blood, large LE, and 9 WBC/HPF. This is quite good considering her sales. Culture grew 50,000 - 99,000 CFU/mL Escherichia coli & >100,000 CFU/ml Citrobacter freundii complex. She does report that after they gave the specimen her mother had a week of stool getting in the vulvar area and she is worried this might travel to the bladder. There is no evidence of that currently. She states the urine is clear and yellow. There has been no drainage around the catheter. She has not had any increased bladder spasms. Patient's daughter reports that her mother is not feeling well today. She has a runny nose and a cough. Home care is arranging for a nasal swab.   Reviewed and discussed lab work of 3/4/2024 in detail with the pt's daughter.  If the patient's daughter notices a change in her urine, she will call promptly.    04/08/2024: Ms. CLAUDIO MCDOWELL presents today for a follow up audio only telephone visit for which she gave permission for. The pt was located at home, [address] [place] [address], and I was located in my office in [place], [place]. Visit was conducted with her daughter Tabatha Deluna. Patient provided a urine specimen on 4/3/2024. UA was completely normal other than moderate LE and 6 WBC/HPF. For having a sales catheter, this is an excellent UA. Urine culture grew >100,000 CFU/ml Citrobacter freundii complex. Urine cytology was negative for high grade urothelial carcinoma. Specimen consists of many lymphoid cells, mature squamous epithelial cells and benign urothelial cells. Patient's daughter reports that  got covid, however she is recovering. She is very tired and still has a cough. She does report that the urine is clear and yellow, however the pt has had many accidents where stool gets near/in the catheter. Her skin remains red but not broken down. Continues use of triple paste.    Reviewed and discussed lab work of 4/3/2024 in detail with the pt. Pt's daughter reassured that this is a very good specimen considering she has a sales catheter.  Will provide another urine specimen at time of next catheter change.  Will have a telehealth visit after next urine specimen, sooner if clinically indicated.     05/08/2024 Ms. CLAUDIO MCDOWELL presents today for a follow up audio-only telehealth visit for which they permitted for. The patient was located at home [address] [place] [address] and I was located in my office in [place], [place]. Patient has chronic indwelling Sales catheter. This urinalysis indicates urine is in very good condition without any evidence for clinically active urinary tract infection at this time. Culture showed >=3 organisms. Probable collection contamination. If I am notified of changes indicating possible clinically significant urinary tract infection, we would re-culture at that time. We will continue to collect surveillance urine specimens for urinalyses and urine cultures at time of catheter changes.  Daughter states her mental status is different.  Reports more confusion, gaps of memory. Denies hallucinations and is much less anxious. Stool is still loose and pt is more fatigued while on Risperidone.   Reviewed and discussed laboratory work of 5/2/24 which She obtained prior to today's visit as requested. At the current time I will not re-culture. If I am notified of changes indicating possible clinically significant urinary tract infection, we would re-culture at that time. We will continue to collect surveillance urine specimens for urinalyses and urine cultures at time of catheter changes. Pt will schedule a telehealth visit.  06/7/2024: Ms. CLAUDIO MCDOWELL presents today for a follow up audio only telephone visit for which she gave permission for. The pt was located at home, [address] [place] [address], and I was located in my office in [place], [place]. Visit conducted with patient's daughter, Tabatha. 6/3/24 UA revealed small blood by dipstick, moderate Leukocyte Esterase, trace proteinuria and 10/HPF of WBC. Culture showed 50,000 - 99,000 CFU/mL Pseudomonas aeruginosa. Daughter reports have pt historian today. Patient is doing okay from urinary standpoint, urine is clear. Admits to breathing difficulties, as Internist prescribed Z-pack (Zithromax) but the daughter is hesitant of proceeding further with the prescription as 1 day before starting her cough seemed improved.  Before cough onset, there was increase in confusion// decline of mental state.    Reviewed and discussed laboratory work of 6/3/24 which She obtained prior to today's visit as requested. I do not believe we should treat at this time, instead reassess with next catheter change.   Patient to continue with respiratory treatment course and should not discontinue as this may create more clinical related issues.  Pt will schedule a telehealth visit in 1 month.   Duration of telephone visit was 25 minutes.

## 2024-06-09 NOTE — HISTORY OF PRESENT ILLNESS
[FreeTextEntry1] : Claudio Mcdowell in her daughter Tabatha Deluna gave permission for an audio video telehealth visit.  They were in their home in [facility].  I was in my office in [facility].  Claudio Mcdowell is currently 89 years of age.  Her daughter Tabatha Deluna is devoted to her care.  She is very concerned about her mother and has been so for many years.  She becomes very anxious at times and that is concerning her mother.  Claudio Mcdowell lives with her daughter.  Claudio Mcdowell has been bedbound for several years.  The patient had developed a sacral decubitus ulcer while in a nursing facility.  A Sales catheter was placed because of fecal incontinence and concern that urine mixed with the feces would contaminate the sacral decubitus ulcer.  Once the patient left the nursing facility and will be with her daughter.  The sacral decubitus ulcer has finally healed.  I have discussed removing the Sales catheter with the daughter.  However as the patient has fecal incontinence there is concern about the urine mixing with the feces and being more likely to cause skin problems.  For now we will leave the Sales catheter in.  The patient has had clinically symptomatic urinary tract infections.  He clinically significant infections usually start with increased urination around the Sales catheter due to bladder spasms.  Bladder spasms have been occurring for staff.  The patient is positioned properly in in bed and the personal care attendant leaves for the day.  We have managed to prevent this with the administration of sublingual hyoscyamine.  We do periodic surveillance urine analysis and urine culture tests at the time the Sales catheter is changed by visiting nurse.  He also performed a times of increased spasms or purulence of the urine or change in mental status,  On October 11, 2022 visiting nurse using an order I have previously placed at the request of the daughter sent urine for urine cytology which proved to be negative for malignant cells, urine culture which grew Greater than 100,000 and E. coli that was sensitive to all antibiotics tested.  Urinalysis looked quite good for urine taken from a Sales catheter that was used for chronic Sales catheter drainage.  Ileukocyte esterase was large but nitrites were negative.  There were 2 epithelial cells per high-power field.  There were only 10 white blood cells per high-power field and only 2 red blood cells per high-power field on microscopic exam there were no bacteria seen and there were no hyaline casts.  Specific gravity was 1.010 which shows good hydration and this bedbound woman cared for diligently by her daughter.  Blood by dipstick was trace.  There was no protein glucose or ketones in the urine.  There is no bilirubin and no urobilinogen.  An important portion of the visit was my review with the daughter about bladder spasms and urination around the catheter.  Urine appearance and urine analysis have been clear.  The urine was clear again today.  The patient has significant short-term memory deficit.  She does have some useful short-term memory.  Her long-term memory remains very much intact.  She is very pleasant and to make satisfactory conversation.  She does admit to sometimes not remembering something.  Her complexion was good and there was no pallor.  Facial movements were symmetric.  Cranial nerves were intact.  I was not able to assess the olfactory nerve as this was a telehealth visit.    11/04/2022: Ms. MCDOWELL is a 89 year old female presenting today for a telehealth visit. She was accompanied by her daughter who helped with the visit. They gave permission for an audio only telehealth conference. The patient was located in her home in [place], [place]. I was located in my office on [place], [place]. Today her daughter reports the pt experienced rare urinary leaking around the catheter due to bladder spasms. She also reports her systolic pressure was around 140 last week. I offered Gemtesa to manage the bladder spasms and the daughter was cautious about more medications. I informed her if the urinary leaking are only once every 2 weeks or so, she does not have to medicate. The daughter was agreeable to monitor the occurrence of urinary leaking over the next 4 weeks and assess the discomfort it causes the pt. She denies any other urinary issues.  03/01/2023: Ms. MCDOWELL is a 89 year old female presenting today for a telehealth visit. She was accompanied by her daughter who helped with the visit. They gave permission for an audio only telehealth conference. The patient was located in her home in [place], [place]. I was located in my office on [place], [place]. The pt has chronic sales catheter drainage. She provided a urine specimen from the catheter on 2/27/2023. UA was slightly turbid with large LEC and 59 WBC/HPF. Culture grew >100,000 CFU/ml E.Coli. The sensitivity report is not yet back. The pt's daughter was concerned as when the patient was constipated a few weeks ago she was having very watery BM which grew messy and were around the catheter area. She was given a Fleet's enema last month which helped. Currently she is having BM, paste-like in appearance. Her daughter does not think that her stomach is distended. She has recently had the catheter changed and is not voiding around it. Urine has been very clear. She does not seem to have pain in the urinary passageway or bladder. Denies fevers. Denies gross hematuria. Pt had covid around Thanksgiving, only had the first two vaccines from two years ago.   03/13/2023: Ms. MCDOWELL is a 89 year old female presenting today for a telehealth visit. She was accompanied by her daughter who helped with the visit. They gave permission for an audio only telehealth conference. The patient was located in her home in [place], [place]. I was located in my office on [place], [place]. The patient obtained an US prior to today's visit 3/9/23 which revealed: Comparison is made with the exam of 2/28/22. Transabdominal ultrasound of the abdomen was performed with color flow imaging. The examination is limited in evaluation. The visualized aorta and inferior vena cava show no abnormality. The pancreas is obscured by overlying bowel gas. The liver measures 12.4 cm with homogeneous echotexture. No intrinsic mass and no intra-or extrahepatic ductal dilatation. There is hepatopedal flow of the main portal vein. No gallstones, pericholecystic fluid, wall thickening or positive sonographic Melendez sign. The common bile duct measures 0.3 cm. The right kidney measures 9.2 x 5.6 x 3.0 cm with a nonobstructing 1.2 cm stone in the upper pole. Multiple additional subcentimeter calcifications are seen. These were not seen on previous exam. No hydronephrosis or renal mass. The left kidney measures 9.3 x 3.9 x 3.5 cm. There is a 2.1 x 2.4 x 2.0 cm cyst in the medial mid pole, not seen on previous study. No hydronephrosis or renal calcification. The spleen measures 8.2 cm and show no abnormality. There is a small right pleural effusion, stable. IMPRESSIONS: Multiple nonobstructing stones of the right kidney with no hydronephrosis. 2.4 cm cyst of the midpole left kidney. Small right pleural effusion, stable. The daughter has answered the phone and reports the patient's urine does not get dark. She is not aware of the amount of water she gives her mother. I requested she measure's this from now on as the pt has stones and needs to increase water consumption. Her daughter reports this week the pt has experienced more spasms than normal and believes this may be due to severe constipation. She provided the pt with an Enema to aide in this situation. She is very concerned but I informed her the Enema may have stimulated the spasms and we should give her a few days to clear up and re-evaluate.   03/22/2023: Ms. CLAUDIO MCDOWELL presents today for an audio visual telehealth visit for which she gave permission for. The patient was located at home at [address] [place] [address] and I was located at my office in [place], [place]. She is accompanied by her daughter whom most of the visit was conducted with. Her daughter has been keeping track of her mother's water consumption. She is averaging about 58 oz of water in a 24 hr period. Additionally, she is drinking juice and ensure nutrition drinks. She does not consume any caffeine. She is on furosemide 40 mg. I said hello to the patient at the end of the visit and she is looking well. She reports feeling no pain at the current moment.   3/29/2023:  Patient Claudio Mcdowell and daughter Tabatha Deulna were home in [place], [place] and I was in my office in [facility].  Patient and daughter gave permission for a [employer] telehealth visit.  They preferred this to [place].  The patient looks good today.  Her complexion was good there is no pallor.  Smile was symmetric her affect was normal she appeared happy she could make conversation it was appropriate.  She said that she currently had no pain.  When I asked questions that after 3/2 how she had been recently the sometimes she hesitated and deferred to her daughter for cancer compatible with her impaired short-term memory.  Long-term memory remains good she recognizes me once know so I am does ask questions about things like her bladder as she was worried about it her blood tests.  I assured her the results were satisfactory.  04/18/2023: Ms. MCDOWELL is a 89 year old female presenting today for an audio and visual telehealth visit for which she gave verbal permission. We utilized [employer] teams telemetry doc platform. The patient was located in her home at [address] [place] [address]. I was located in my office on [place], [place]. She was accompanied by her daughter who helped to serve as a historian. She reports discomfort in her eyes. She describes the discomfort as a feeling of sand. She opened her eyes for me, and they do not look red. Pupils look normal. She reports the right eyes bothers her significantly more than the left. I advise that the patient tries lubricating drops and it if does not feel better she will notify her visiting nurse who is due to come next week for a sales catheter change. She reports episode of bladder spasms. She denies gross hematuria. She describes the urine as barely yellow. I reviewed and discussed her  latest pertinent lab on 04/12/2023 which shows "alkaline phosphate normal at 89. Creatinine was normal at 0.62 mg/dL. WBC normal at 5.01. RBC normal at 3.87".   05/09/2023: Ms. MCDOWELL presents today for a follow up audio only telehealth visit for which they gave permission for. She was accompanied by her daughter who helped to serve as a historian. The patient was located at home [address] [place] [address] and I was located in my office in [place], [place]. The patient obtained lab work 5/2/23 prior to today's visit. The UA revealed microscopic hematuria, 5/HPF RBC, 27/HPF WBC. The patient demonstrated great hydration as the specific gravity is 1.006. The Sales catheter was changed May 2, 2023 with no clear urine in the bag. While changing the diaper the aide reports blood in the diaper. The daughter states last week her mother experienced spasms but as of two days ago she is now fine. However, she states the patient is more fatigued. She denies the pt having a temperature. I assured her because her mother is post menopausal any abrasion to the labia can cause a fissure.   06/02/2023: Ms. MCDOWELL is a 89 year old female presenting today for an audio and visual telehealth visit for which she gave verbal permission. We utilized [employer] teams telemetry [employer] platform. The patient was located in her home at [address] [place] [address]. I was located in my office on [place], [place]. She was accompanied by her daughter Tabatha. She reports that her mother complains of onset dysuria that dissipated on its own and has not recurred since. She reports right sided back pain that is aggravated when she lays on the right side. She provided urine specimen. She notes the urine was very clear. I reviewed and discussed the patient's pertinent lab works. Her latest Urinalysis on 05/30/2023 that shows "60 WBC/HPF. Trace Blood Urine. Specific Gravity Urine 1.007". Urine Culture on the same date shows ">100,000 CFU/ml Escherichia coli and <10,000 CFU/ml Normal Urogenital ángel present". The patient takes Mirtazapine for anxiety at low dose. The daughter reports the patient is experiencing increasing anxiety and is thinking of having the prescriber increasing the dosage.  Her BP runs around 120/60. Her Hemoglobin hematocrit looks good. gaze is conjugated. facial expression looks symmetric. Urine was faint yellow and clear.   06/06/2023: Ms. MCDOWELL presents today for a follow up audio only telehealth visit for which they gave permission for. The patient was located at home [address] [place] [address] and I was located in my office in [place], [place]. She was accompanied by her daughter Tabatha. The daughter states pt is experiencing episodes of a dry cough. An X Ray  of 6/5/23 revealed Mild peribronchial thickening suggesting bronchitis in the right clinical setting with no focal pneumonia or congestive heart failure. COPD with chronic lung changes. The mental status is the same as usual but her BP has elevated some. I assured her a little elevation is not as dangerous. The pt continues to experience some urinary frequency and it is a little more concentrated.   10/12/2023: Ms. MCDOWELL presents today for a follow up audio only telehealth visit for which they gave permission for. The patient was located at home [address] [place] [address] and I was located in my office in [place], [place]. The 10/2/23 UA showed proteinuria 30 mg/dL, moderate blood, large Leukocyte Esterase, 16/HPF of WBC. UA showed improvement as patient has chronic sales drainage. Her daughter states patient is okay considering coming home from the hospital. Most pain is in her back/shoulders and some of her back area is red/raw.  Recently her BP has increased to 180/100 and was prescribed Losartan 50mg twice daily. Urine is a clear color, denies cloudy and dark appearance.  11/01/2023: Ms. MCDOWELL presents today for a follow up audio only telehealth visit for which they gave permission for. The patient was located at home [address] [place] [address] and I was located in my office in [place], [place]. The 10/13/23 CMP revealed creatinine at 0.58 and low ALT at 8 U/L. Patient was admitted into [facility] on 10/27/23 for sepsis/UTI. Her daughter states she may be released tomorrow. Daughter states during the day the patient's urine was normal, but she did have chills and a fever.   11/03/2023: Ms. MCDOWELL is a 89 year old female presenting today for an audio only telehealth visit for which she gave verbal permission. The patient was located in her home at [address] [place] [address]. I was located in my office on [place], [place]. She was accompanied by her daughter. She says her mother was supposed to be released today. Initially she was able to void. Because of constipation she was given a couple of enemas. She had a large evacuation and since then she has not been able to void again. The daughter says her latest PVR was 340 cc. She is waiting for her mother to be rescanned again.   11/07/2023: Ms. CLAUDIO MCDOWELL presents today for a follow up audio only telephone visit for which she gave permission for. The pt was located at home, [address] [place] [address], and I was located in my office in [place], [place]. The visit was conducted with the patient's daughter. The patient came home from the hospital on Sunday. The catheter was put back in without another attempt at a void trial. The patient does have issues with constipation. She is able to swallow liquids. Her daughter has had to crush some of her pills to put them in a liquid, however the pt finds it to taste bitter and has some discomfort swallowing the chunks of crushed pills. The hospital discharged her with a 7 day supply of Cefuroxime. Was started yesterday so she has had two doses.   11/20/2023: Ms. MCDOWELL presents today for a follow up audio only telehealth visit for which they gave permission for. The patient was located at home [address] [place] [address] and I was located in my office in [facility]. Daughter states patient is okay. She notes giving pt suppositories and 7 hours later she had mixed bowel movements. However, she did not have a bowel movement since Saturday. Patient is given pedialyte and water frequently. Today's temperature was 95 as before it was 96-97. Pt did not drink any liquids shortly after taking temperature. Daughter denies giving pt solace but she usually gives Miralax as she has done so today. Toady's urine color is clear as it  has been clear for some time.  Daughter states her stomach was distended. Bp has stabilized since being in the hospital but last night and today it  was higher than normal being being 168/88 before medication.   11/29/2023: Ms. CLAUDIO MCDOWELL presents today for a follow up audio only telephone visit for which she gave permission for. The pt was located at home, [address] [place] [address], and I was located in my office in [place], [place]. Visit was conducted with her daughter, Andree. She was informed yesterday by her house call physicians of the results of her urine culture (emailed from core lab showing >100,000 CFU/ml Pseudomonas aeruginosa susceptible only to amikacin, Ciprofloxacin, imipenem, levofloxacin, meropenum. Resistant to Ceftazidime, Pip/tazo. Intermediate for aztrenam, cefepime. They discussed that patient is having increasing oxygen requirements, although SPO2 ok on supplemental O2 (prior to this patient needed only occasional O2 suppl. every few days). 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 The want to treat with ciprofloxacin, 7 day course. Prescription sent to pharmacy. Her daughter inquired today on if she should be treated. She states last week her mother seemed very not herself, however today she seems better in terms of mental status and oxygenation. She has been giving her senakot and miralax.   12/21/2023: Ms. MCDOWELL presents today for a follow up audio-only telehealth visit for which they gave permission for. The patient was located at home [address] [place] [address] and I was located in my office in [place]. The patient obtained lab work 12/4/23 prior to today's visit. Urinalysis is improved and showed trace proteinuria, small Leukocyte Esterase, 6/HPF of WBC and 7/LPF of Cast. Urine culture shows no significant growth, <10,000 CFU/mL Normal Urogenital Ángel, which is very good. Her daughter expresses concern as recently patient is experiencing a surplus of looser and larger amounts of bowel movements that is escaping to the vaginal area, thus causing infections. Urine is normal in color, denies fever. Patient is occasionally more fatigued than usual. Denies distended appearance of the abdomen. Admits to only 1 spasm around the catheter this month. Next sales change will be in 2 weeks.  Towards the end of visit, patient was shown on Facetime, appearing well oriented x 3, normal pigmentation, lips are moist, and smile is symmetrical. I am very impressed by how well the patient looks and speaks. It was more than just pleasantries, she inquired about personal questions. Overall, I am very pleased.   12/27/2023: Ms. CLAUDIO MCDOWELL presents today for a follow up audio only telephone visit for which she gave permission for. The pt was located at home, [address] [place] [address], and I was located in my office in [place], [place]. She is accompanied by her daughter. Patient provided a surveillance urine specimen on 12/22/2023. She has chronic sales catheter drainage. UA revealed small LEC, positive for nitrites, 6 WBC/HPF, no RBC seen, moderate bacteria/HPF, granular casts present, and yeast like cells present. Urine culture grew >100,000 CFU/ml Enterococcus faecalis and 50,000 - 99,000 CFU/mL Streptococcus mitis/oralis group. Urine cytology was negative for high grade urothelial carcinoma. Few mature squamous cells, rare benign urothelial cells and abundant fungal organisms morphologically consistent with Candida species present. Patient's daughter reports that her urine looks clear and a pale yellow color. She feels her mom still has a lot of anxiety and some confusion. She does report that her catheter bag sits in feces due to the patient's position. They tried to tilt her pelvis to avoid this but states it's easier said than done. She states there is no feces in the catheter or in the bag. BM are not loose, she describes them as pasty. Has about 2-3 BM a day.   01/05/2024: Ms. MCDOWELL is a 89 year old female presenting today for an audio only telehealth visit for which she gave verbal permission. The patient was located in her home at [address] [place] [address]. I was located in my office on [place], [place]. She was accompanied by her daughter who helped with the visit. She reports that a nurse came for a catheter change, and only succeeded on the third attempt. She says it's the same nurse who has been coming for 5 months now, and usually she encounters no difficulties. Now that the catheter has been changed, the patient is able to void.   01/17/2024: Ms. CLAUDIO MCDOWELL presents today for a follow up audio only telephone visit for which she gave permission for. The pt was located at home, [address] [place] [address], and I was located in my office in [place], [place]. She is accompanied by her daughter, Tabatha Deluna. Patient provided a urine specimen on 1/5/2024 for surveillance. UA revealed trace blood by urine and moderate LEC. This is excellent for her as she has chronic sales catheter drainage. Urine culture grew 50,000-99,000 CFU/ml Pseudomonas aeruginosa. Urine cytology was negative for high grade urothelial carcinoma. Few mature squamous cells, rare benign urothelial cells and abundant fungal organisms morphologically consistent with Candida species present. The patient's daughter reports that her mother is "different". She reports that she is hearing things a lot now. She states that usually the things are negative. For example, she will say "Why is Ac saying my oxygen isn't working properly?". It is clear what she is saying but fairly random. She is not seeing anything; it is strictly auditory.   01/22/2024: Ms. CLAUDIO MCDOWELL presents today for a follow up audio only telephone visit for which she gave permission for. The pt was located at home, [address] [place] [address], and I was located in my office in [place], [place]. Visit was conducted with the patient's daughter, Tabatha Deluna. She informs me that she did not realize she was out of hyoscyamine sulfate for bladder spasms. She has been getting bladder spasms after the aide leaves and is embarrassed by this as she thinks shes wetting the bed. Her daughter reports that her mother asks in the morning "why am I bothering taking my medications, I am going to die anyway".   02/05/2024: Ms. CLAUDIO MCDOWELL presents today for an audio visual telehealth visit for which she gave permission for. The patient was located at home at [address] [place] [address] and I was located at my office in [place], [place]. Pt's daughter states she was not expecting my call today. I informed her that she was on my schedule and that if she would like we can talk. She informs me that hyoscyamine does not seem to be working for her mother and that some days she has bladder spasms and some she doesn't. Pt is currently not having diarrhea. She is due for a catheter change tomorrow. Patient has not started any medications for the psychological issues she has been having.   02/09/2024: Ms. MCDOWELL is a 89 year old female presenting today for an audio only telehealth visit for which she gave verbal permission. The patient was located in her home at [address] [place] [address]. I was located in my office on [place], [place]. She was accompanied by her daughter who helps with the visit.   The patient has been with a chronic sales catheter since July 2021 when she fractured her foot. She reports today for a follow up.   I reviewed and discussed the patient's pertinent lab works. Urine Culture shows 10,000 - 49,000 CFU/mL Pseudomonas aeruginosa 10,000 - 49,000 CFU/mL Citrobacter freundii complex  I explain to the patient that patients with chronic sales catheter develop bacteria in the urine, that may come from the skin, and it is not significant clinically unless infection symptoms ensue. In addition, the patient reports she was not taking antibiotics when the sample was collected, and the colonies were less than 100 000.   03/13/2024: Ms. CLAUDIO MCDOWELL presents today for a follow up audio only telephone visit for which she gave permission for. The pt was located at home, [address] [place] [address], and I was located in my office in [place], [place]. Visit was conducted with her daughter Tabatha Deluna. Patient provided a urine specimen on 3/4/2024. Patient has a chronic indwelling sales. UA revealed trace blood, large LE, and 9 WBC/HPF. This is quite good considering her sales. Culture grew 50,000 - 99,000 CFU/mL Escherichia coli & >100,000 CFU/ml Citrobacter freundii complex. She does report that after they gave the specimen her mother had a week of stool getting in the vulvar area and she is worried this might travel to the bladder. There is no evidence of that currently. She states the urine is clear and yellow. There has been no drainage around the catheter. She has not had any increased bladder spasms. Patient's daughter reports that her mother is not feeling well today. She has a runny nose and a cough. Home care is arranging for a nasal swab.   04/08/2024: Ms. CLAUDIO MCDOWELL presents today for a follow up audio only telephone visit for which she gave permission for. The pt was located at home, [address] [place] [address], and I was located in my office in [place], [place]. Visit was conducted with her daughter Tabatha Deluna. Patient provided a urine specimen on 4/3/2024. UA was completely normal other than moderate LE and 6 WBC/HPF. For having a sales catheter, this is an excellent UA. Urine culture grew >100,000 CFU/ml Citrobacter freundii complex. Urine cytology was negative for high grade urothelial carcinoma. Specimen consists of many lymphoid cells, mature squamous epithelial cells and benign urothelial cells. Patient's daughter reports that  got covid, however she is recovering. She is very tired and still has a cough. She does report that the urine is clear and yellow, however the pt has had many accidents where stool gets near/in the catheter. Her skin remains red but not broken down. Continues use of triple paste.   05/08/2024 Ms. CLAUDIO MCDOWELL presents today for a follow up audio-only telehealth visit for which they permitted for. The patient was located at home [address] [place] [address] and I was located in my office in [place], [place]. Patient has chronic indwelling Sales catheter. This urinalysis indicates urine is in very good condition without any evidence for clinically active urinary tract infection at this time. Culture showed >=3 organisms. Probable collection contamination. If I am notified of changes indicating possible clinically significant urinary tract infection, we would re-culture at that time. We will continue to collect surveillance urine specimens for urinalyses and urine cultures at time of catheter changes.  Daughter states her mental status is different.  Reports more confusion, gaps of memory. Denies hallucinations and is much less anxious. Stool is still loose and pt is more fatigued while on Risperidone.   06/7/2024: Ms. CLAUDIO MCDOWELL presents today for a follow up audio only telephone visit for which she gave permission for. The pt was located at home, [address] [place] [address], and I was located in my office in [place], [place]. Visit conducted with patient's daughter, Tabatha. 6/3/24 UA revealed small blood by dipstick, moderate Leukocyte Esterase, trace proteinuria and 10/HPF of WBC. Culture showed 50,000 - 99,000 CFU/mL Pseudomonas aeruginosa. Daughter reports have pt historian today. Patient is doing okay from urinary standpoint, urine is clear. Admits to breathing difficulties, as Internist prescribed Z-pack (Zithromax) but the daughter is hesitant of proceeding further with the prescription as 1 day before starting her cough seemed improved.  Before cough onset, there was increase in confusion// decline of mental state.

## 2024-06-11 ENCOUNTER — NON-APPOINTMENT (OUTPATIENT)
Age: 89
End: 2024-06-11

## 2024-06-18 ENCOUNTER — APPOINTMENT (OUTPATIENT)
Dept: UROLOGY | Facility: CLINIC | Age: 89
End: 2024-06-18

## 2024-06-18 DIAGNOSIS — Z97.8 PRESENCE OF OTHER SPECIFIED DEVICES: ICD-10-CM

## 2024-06-18 DIAGNOSIS — Z74.01 BED CONFINEMENT STATUS: ICD-10-CM

## 2024-06-18 DIAGNOSIS — R10.9 UNSPECIFIED ABDOMINAL PAIN: ICD-10-CM

## 2024-06-18 DIAGNOSIS — Z86.19 PERSONAL HISTORY OF OTHER INFECTIOUS AND PARASITIC DISEASES: ICD-10-CM

## 2024-06-18 DIAGNOSIS — T83.511A INFECTION AND INFLAMMATORY REACTION DUE TO INDWELLING URETHRAL CATHETER, INITIAL ENCOUNTER: ICD-10-CM

## 2024-06-18 DIAGNOSIS — R35.1 NOCTURIA: ICD-10-CM

## 2024-06-18 DIAGNOSIS — N39.0 INFECTION AND INFLAMMATORY REACTION DUE TO INDWELLING URETHRAL CATHETER, INITIAL ENCOUNTER: ICD-10-CM

## 2024-06-18 PROCEDURE — 99443: CPT

## 2024-06-26 ENCOUNTER — APPOINTMENT (OUTPATIENT)
Dept: UROLOGY | Facility: CLINIC | Age: 89
End: 2024-06-26
Payer: MEDICARE

## 2024-06-26 DIAGNOSIS — U07.1 COVID-19: ICD-10-CM

## 2024-06-26 PROCEDURE — 99443: CPT

## 2024-06-27 LAB
APPEARANCE: ABNORMAL
BACTERIA: NEGATIVE /HPF
BILIRUBIN URINE: NEGATIVE
BLOOD URINE: NEGATIVE
CAST: 2 /LPF
COLOR: YELLOW
EPITHELIAL CELLS: 1 /HPF
GLUCOSE QUALITATIVE U: NEGATIVE MG/DL
KETONES URINE: NEGATIVE MG/DL
LEUKOCYTE ESTERASE URINE: ABNORMAL
MICROSCOPIC-UA: NORMAL
NITRITE URINE: NEGATIVE
PH URINE: 7
PROTEIN URINE: NORMAL MG/DL
RED BLOOD CELLS URINE: 0 /HPF
SPECIFIC GRAVITY URINE: 1.01
UROBILINOGEN URINE: 0.2 MG/DL
WHITE BLOOD CELLS URINE: 150 /HPF

## 2024-06-28 ENCOUNTER — NON-APPOINTMENT (OUTPATIENT)
Age: 89
End: 2024-06-28

## 2024-06-29 PROBLEM — T83.511A UTI (URINARY TRACT INFECTION) DUE TO URINARY INDWELLING FOLEY CATHETER: Status: RESOLVED | Noted: 2022-03-01 | Resolved: 2022-07-14

## 2024-06-29 PROBLEM — Z74.01 CONFINED TO BED: Status: ACTIVE | Noted: 2022-07-15

## 2024-06-29 PROBLEM — R10.9 ABDOMINAL PAIN, UNSPECIFIED ABDOMINAL LOCATION: Status: ACTIVE | Noted: 2024-02-16

## 2024-06-29 PROBLEM — Z97.8 FOLEY CATHETER IN PLACE: Status: ACTIVE | Noted: 2021-11-01

## 2024-06-29 PROBLEM — U07.1 COVID-19: Status: ACTIVE | Noted: 2022-12-02

## 2024-06-30 LAB — BACTERIA UR CULT: ABNORMAL

## 2024-07-01 ENCOUNTER — APPOINTMENT (OUTPATIENT)
Dept: UROLOGY | Facility: CLINIC | Age: 89
End: 2024-07-01
Payer: MEDICARE

## 2024-07-01 DIAGNOSIS — R06.89 OTHER ABNORMALITIES OF BREATHING: ICD-10-CM

## 2024-07-01 DIAGNOSIS — Z20.822 CONTACT WITH AND (SUSPECTED) EXPOSURE TO COVID-19: ICD-10-CM

## 2024-07-01 DIAGNOSIS — D64.9 ANEMIA, UNSPECIFIED: ICD-10-CM

## 2024-07-01 PROCEDURE — 99443: CPT

## 2024-07-02 PROBLEM — D64.9 ANEMIA: Status: ACTIVE | Noted: 2022-02-07

## 2024-07-02 PROBLEM — I50.9 CHF (CONGESTIVE HEART FAILURE): Status: ACTIVE | Noted: 2021-11-01

## 2024-07-02 PROBLEM — F03.90 DEMENTIA: Status: ACTIVE | Noted: 2024-01-17

## 2024-07-02 PROBLEM — J96.11 CHRONIC RESPIRATORY FAILURE WITH HYPOXIA: Status: ACTIVE | Noted: 2022-11-29

## 2024-07-02 PROBLEM — R06.89 BREATHING DIFFICULTY: Status: ACTIVE | Noted: 2023-03-22

## 2024-07-02 PROBLEM — R82.71 BACTERIA IN URINE: Status: ACTIVE | Noted: 2024-01-17

## 2024-07-02 PROBLEM — Z20.822 EXPOSURE TO COVID-19 VIRUS: Status: ACTIVE | Noted: 2023-06-06

## 2024-07-02 PROBLEM — Z97.8 CHRONIC INDWELLING FOLEY CATHETER: Status: ACTIVE | Noted: 2023-11-29

## 2024-07-02 PROBLEM — Z86.19 HISTORY OF SEPSIS: Status: ACTIVE | Noted: 2023-10-26

## 2024-07-02 PROBLEM — N32.89 BLADDER SPASMS: Status: ACTIVE | Noted: 2022-06-13

## 2024-07-02 PROBLEM — N20.0 NEPHROLITHIASIS: Status: ACTIVE | Noted: 2023-03-22

## 2024-07-05 ENCOUNTER — APPOINTMENT (OUTPATIENT)
Dept: UROLOGY | Facility: CLINIC | Age: 89
End: 2024-07-05
Payer: MEDICARE

## 2024-07-05 PROCEDURE — 99442: CPT | Mod: 93

## 2024-07-08 ENCOUNTER — APPOINTMENT (OUTPATIENT)
Dept: UROLOGY | Facility: CLINIC | Age: 89
End: 2024-07-08
Payer: MEDICARE

## 2024-07-08 ENCOUNTER — APPOINTMENT (OUTPATIENT)
Dept: UROLOGY | Facility: CLINIC | Age: 89
End: 2024-07-08

## 2024-07-08 DIAGNOSIS — D47.2 MONOCLONAL GAMMOPATHY: ICD-10-CM

## 2024-07-08 DIAGNOSIS — N20.0 CALCULUS OF KIDNEY: ICD-10-CM

## 2024-07-08 DIAGNOSIS — F03.90 UNSPECIFIED DEMENTIA W/OUT BEHAVIORAL DISTURBANCE: ICD-10-CM

## 2024-07-08 DIAGNOSIS — N32.89 OTHER SPECIFIED DISORDERS OF BLADDER: ICD-10-CM

## 2024-07-08 DIAGNOSIS — Z97.8 PRESENCE OF OTHER SPECIFIED DEVICES: ICD-10-CM

## 2024-07-08 DIAGNOSIS — R31.29 OTHER MICROSCOPIC HEMATURIA: ICD-10-CM

## 2024-07-08 DIAGNOSIS — N39.0 URINARY TRACT INFECTION, SITE NOT SPECIFIED: ICD-10-CM

## 2024-07-08 PROCEDURE — 99214 OFFICE O/P EST MOD 30 MIN: CPT

## 2024-07-12 ENCOUNTER — APPOINTMENT (OUTPATIENT)
Dept: UROLOGY | Facility: CLINIC | Age: 89
End: 2024-07-12

## 2024-07-12 DIAGNOSIS — R82.71 BACTERIURIA: ICD-10-CM

## 2024-07-12 DIAGNOSIS — M48.00 SPINAL STENOSIS, SITE UNSPECIFIED: ICD-10-CM

## 2024-07-12 DIAGNOSIS — R19.5 OTHER FECAL ABNORMALITIES: ICD-10-CM

## 2024-07-12 DIAGNOSIS — Z74.01 BED CONFINEMENT STATUS: ICD-10-CM

## 2024-07-12 PROCEDURE — 99443: CPT

## 2024-07-13 LAB — BACTERIA UR CULT: NORMAL

## 2024-07-16 ENCOUNTER — APPOINTMENT (OUTPATIENT)
Dept: HOME HEALTH SERVICES | Facility: HOME HEALTH | Age: 89
End: 2024-07-16
Payer: MEDICARE

## 2024-07-16 VITALS
RESPIRATION RATE: 18 BRPM | DIASTOLIC BLOOD PRESSURE: 64 MMHG | TEMPERATURE: 97.6 F | OXYGEN SATURATION: 97 % | HEART RATE: 73 BPM | SYSTOLIC BLOOD PRESSURE: 133 MMHG

## 2024-07-16 DIAGNOSIS — F03.92 UNSPECIFIED DEMENTIA, UNSPECIFIED SEVERITY, WITH PSYCHOTIC DISTURBANCE: ICD-10-CM

## 2024-07-16 DIAGNOSIS — I50.9 HEART FAILURE, UNSPECIFIED: ICD-10-CM

## 2024-07-16 DIAGNOSIS — L98.429 NON-PRESSURE CHRONIC ULCER OF BACK WITH UNSPECIFIED SEVERITY: ICD-10-CM

## 2024-07-16 DIAGNOSIS — Z97.8 PRESENCE OF OTHER SPECIFIED DEVICES: ICD-10-CM

## 2024-07-16 DIAGNOSIS — J96.11 CHRONIC RESPIRATORY FAILURE WITH HYPOXIA: ICD-10-CM

## 2024-07-16 DIAGNOSIS — L89.610 PRESSURE ULCER OF RIGHT HEEL, UNSTAGEABLE: ICD-10-CM

## 2024-07-16 DIAGNOSIS — E78.5 HYPERLIPIDEMIA, UNSPECIFIED: ICD-10-CM

## 2024-07-16 DIAGNOSIS — I48.91 UNSPECIFIED ATRIAL FIBRILLATION: ICD-10-CM

## 2024-07-16 DIAGNOSIS — Z86.19 PERSONAL HISTORY OF OTHER INFECTIOUS AND PARASITIC DISEASES: ICD-10-CM

## 2024-07-16 DIAGNOSIS — K21.9 GASTRO-ESOPHAGEAL REFLUX DISEASE W/OUT ESOPHAGITIS: ICD-10-CM

## 2024-07-16 DIAGNOSIS — I10 ESSENTIAL (PRIMARY) HYPERTENSION: ICD-10-CM

## 2024-07-16 DIAGNOSIS — L89.153 PRESSURE ULCER OF SACRAL REGION, STAGE 3: ICD-10-CM

## 2024-07-16 LAB
APPEARANCE: CLEAR
BACTERIA: ABNORMAL /HPF
BILIRUBIN URINE: NEGATIVE
BLOOD URINE: NEGATIVE
CAST: 2 /LPF
COLOR: YELLOW
GLUCOSE QUALITATIVE U: NEGATIVE MG/DL
KETONES URINE: NEGATIVE MG/DL
LEUKOCYTE ESTERASE URINE: ABNORMAL
MICROSCOPIC-UA: NORMAL
NITRITE URINE: NEGATIVE
PH URINE: 7
PROTEIN URINE: NEGATIVE MG/DL
RED BLOOD CELLS URINE: 0 /HPF
URINE CYTOLOGY: NORMAL
UROBILINOGEN URINE: 0.2 MG/DL
WHITE BLOOD CELLS URINE: 22 /HPF

## 2024-07-16 PROCEDURE — 99350 HOME/RES VST EST HIGH MDM 60: CPT

## 2024-07-16 RX ORDER — FOSFOMYCIN TROMETHAMINE 3 G/1
3 POWDER ORAL
Qty: 3 | Refills: 0 | Status: COMPLETED | COMMUNITY
Start: 2024-07-03 | End: 2024-07-16

## 2024-07-16 RX ORDER — CEFIXIME 400 MG/1
400 CAPSULE ORAL DAILY
Qty: 14 | Refills: 0 | Status: COMPLETED | COMMUNITY
Start: 2024-07-01 | End: 2024-07-16

## 2024-07-17 ENCOUNTER — LABORATORY RESULT (OUTPATIENT)
Age: 89
End: 2024-07-17

## 2024-07-17 PROBLEM — Z86.19 HISTORY OF SEPSIS: Status: RESOLVED | Noted: 2023-10-26 | Resolved: 2024-07-17

## 2024-07-18 LAB
ANION GAP SERPL CALC-SCNC: 12 MMOL/L
BUN SERPL-MCNC: 18 MG/DL
CALCIUM SERPL-MCNC: 9.4 MG/DL
CHLORIDE SERPL-SCNC: 100 MMOL/L
CO2 SERPL-SCNC: 24 MMOL/L
CREAT SERPL-MCNC: 0.58 MG/DL
EGFR: 84 ML/MIN/1.73M2
GLUCOSE SERPL-MCNC: 140 MG/DL
POTASSIUM SERPL-SCNC: 3.6 MMOL/L
SODIUM SERPL-SCNC: 135 MMOL/L

## 2024-07-19 LAB
BASOPHILS # BLD AUTO: 0.02 K/UL
BASOPHILS NFR BLD AUTO: 0.5 %
EOSINOPHIL # BLD AUTO: 0.04 K/UL
EOSINOPHIL NFR BLD AUTO: 1 %
HCT VFR BLD CALC: 39.2 %
HGB BLD-MCNC: 12.4 G/DL
IMM GRANULOCYTES NFR BLD AUTO: 1.9 %
LYMPHOCYTES # BLD AUTO: 0.82 K/UL
LYMPHOCYTES NFR BLD AUTO: 19.9 %
MAN DIFF?: NORMAL
MCHC RBC-ENTMCNC: 30.6 PG
MCHC RBC-ENTMCNC: 31.6 GM/DL
MCV RBC AUTO: 96.8 FL
MONOCYTES # BLD AUTO: 1.24 K/UL
MONOCYTES NFR BLD AUTO: 30 %
NEUTROPHILS # BLD AUTO: 1.93 K/UL
NEUTROPHILS NFR BLD AUTO: 46.7 %
PLATELET # BLD AUTO: 82 K/UL
RBC # BLD: 4.05 M/UL
RBC # FLD: 13.5 %
WBC # FLD AUTO: 4.13 K/UL

## 2024-07-31 ENCOUNTER — TRANSCRIPTION ENCOUNTER (OUTPATIENT)
Age: 89
End: 2024-07-31

## 2024-07-31 ENCOUNTER — NON-APPOINTMENT (OUTPATIENT)
Age: 89
End: 2024-07-31

## 2024-07-31 ENCOUNTER — APPOINTMENT (OUTPATIENT)
Dept: HOME HEALTH SERVICES | Facility: HOME HEALTH | Age: 89
End: 2024-07-31
Payer: MEDICARE

## 2024-07-31 DIAGNOSIS — I48.91 UNSPECIFIED ATRIAL FIBRILLATION: ICD-10-CM

## 2024-07-31 DIAGNOSIS — F03.92 UNSPECIFIED DEMENTIA, UNSPECIFIED SEVERITY, WITH PSYCHOTIC DISTURBANCE: ICD-10-CM

## 2024-07-31 DIAGNOSIS — I50.9 HEART FAILURE, UNSPECIFIED: ICD-10-CM

## 2024-07-31 DIAGNOSIS — R41.82 ALTERED MENTAL STATUS, UNSPECIFIED: ICD-10-CM

## 2024-07-31 PROCEDURE — 99348 HOME/RES VST EST LOW MDM 30: CPT

## 2024-08-01 ENCOUNTER — LABORATORY RESULT (OUTPATIENT)
Age: 89
End: 2024-08-01

## 2024-08-01 ENCOUNTER — NON-APPOINTMENT (OUTPATIENT)
Age: 89
End: 2024-08-01

## 2024-08-02 ENCOUNTER — NON-APPOINTMENT (OUTPATIENT)
Age: 89
End: 2024-08-02

## 2024-08-02 PROBLEM — R41.82 AMS (ALTERED MENTAL STATUS): Status: ACTIVE | Noted: 2024-07-31

## 2024-08-02 LAB
ALBUMIN SERPL ELPH-MCNC: 4.1 G/DL
ANION GAP SERPL CALC-SCNC: 13 MMOL/L
BASOPHILS # BLD AUTO: 0 K/UL
BASOPHILS NFR BLD AUTO: 0 %
BUN SERPL-MCNC: 21 MG/DL
CALCIUM SERPL-MCNC: 9.2 MG/DL
CHLORIDE SERPL-SCNC: 94 MMOL/L
CO2 SERPL-SCNC: 22 MMOL/L
CREAT SERPL-MCNC: 0.67 MG/DL
EGFR: 81 ML/MIN/1.73M2
EOSINOPHIL # BLD AUTO: 0.09 K/UL
EOSINOPHIL NFR BLD AUTO: 0.9 %
GLUCOSE SERPL-MCNC: 103 MG/DL
HCT VFR BLD CALC: 34.8 %
HGB BLD-MCNC: 11.6 G/DL
LYMPHOCYTES # BLD AUTO: 0.97 K/UL
LYMPHOCYTES NFR BLD AUTO: 9.9 %
MAN DIFF?: NORMAL
MCHC RBC-ENTMCNC: 30.9 PG
MCHC RBC-ENTMCNC: 33.3 GM/DL
MCV RBC AUTO: 92.8 FL
MONOCYTES # BLD AUTO: 3.69 K/UL
MONOCYTES NFR BLD AUTO: 37.8 %
NEUTROPHILS # BLD AUTO: 5.02 K/UL
NEUTROPHILS NFR BLD AUTO: 51.4 %
PHOSPHATE SERPL-MCNC: 3 MG/DL
PLATELET # BLD AUTO: 74 K/UL
POTASSIUM SERPL-SCNC: 3.8 MMOL/L
RBC # BLD: 3.75 M/UL
RBC # FLD: 13.3 %
SODIUM SERPL-SCNC: 130 MMOL/L
WBC # FLD AUTO: 9.77 K/UL

## 2024-08-06 ENCOUNTER — APPOINTMENT (OUTPATIENT)
Dept: UROLOGY | Facility: CLINIC | Age: 89
End: 2024-08-06

## 2024-08-06 PROCEDURE — 99442: CPT

## 2024-08-09 ENCOUNTER — APPOINTMENT (OUTPATIENT)
Dept: UROLOGY | Facility: CLINIC | Age: 89
End: 2024-08-09

## 2024-08-09 PROCEDURE — 99214 OFFICE O/P EST MOD 30 MIN: CPT

## 2024-08-10 NOTE — ASSESSMENT
[FreeTextEntry1] : 10/12/2023: Ms. MCDOWELL presents today for a follow up audio only telehealth visit for which they gave permission for. The patient was located at home [address] [place] [address] and I was located in my office in [place], [place]. The 10/2/23 UA showed proteinuria 30 mg/dL, moderate blood, large Leukocyte Esterase, 16/HPF of WBC. UA showed improvement as patient has chronic sales drainage. Her daughter states patient is okay considering coming home from the hospital. Most pain is in her back/shoulders and some of her back area is red/raw.  Recently her BP has increased to 180/100 and was prescribed Losartan 50mg twice daily. Urine is a clear color, denies cloudy and dark appearance.  Reviewed and discussed laboratory work of 10/2/23 which She obtained prior to today's visit as requested. Pt will go to her nearest [facility] lab for blood work and a urine sample which will be sent for urinalysis, urine culture, and urine cytology. Pt will schedule a telehealth visit after lab work to discuss results.   11/01/2023: Ms. MCDOWELL presents today for a follow up audio only telehealth visit for which they gave permission for. The patient was located at home [address] [place] [address] and I was located in my office in [place], [place]. The 10/13/23 CMP revealed creatinine at 0.58 and low ALT at 8 U/L. Patient was admitted into [facility] on 10/27/23 for sepsis/UTI. Her daughter states she may be released tomorrow. Daughter states during the day the patient's urine was normal, but she did have chills and a fever.   As the patient recently had a course of antibiotics, we suggested patient have catheter removed in hospital, as we will to observe bladder emptying and skin while the catheter is removed. If abnormalities arise, we will place the catheter back in.  If the daughter is to proceed with the plan, we will not discharge patient until a PVR is taken and until pt is able to void on her own the next morning. Hospitalist should weigh the diaper before and after and I will calculate it. If stool is present, then we will not be able to do so.  The daughter will have the Hospitalist contact me about patient.  Hospitalist has called at 4:06 pm. She states the urination looks contaminated and culture showed E-coli.  They will bladder scan her every hour.  Hospitalist will call tomorrow with update on patient and inform me on her fluid level.  Advised daughter to obtain a thermometer to observe temperature.    11/03/2023: Ms. MCDOWELL is a 89 year old female presenting today for an audio only telehealth visit for which she gave verbal permission. The patient was located in her home at [address] [place] [address]. I was located in my office on [place], [place]. She was accompanied by her daughter. She says her mother was supposed to be released today. Initially she was able to void. Because of constipation she was given a couple of enemas. She had a large evacuation and since then she has not been able to void again. The daughter says her latest PVR was 340 cc. She is waiting for her mother to be rescanned again.    Plan: If she is unable to void, she will be discharged with a sales catheter.   11/07/2023: Ms. CLAUDIO MCDOWELL presents today for a follow up audio only telephone visit for which she gave permission for. The pt was located at home, [address] [place] [address], and I was located in my office in [place], [place]. The visit was conducted with the patient's daughter. The patient came home from the hospital on Sunday. The catheter was put back in without another attempt at a void trial. The patient does have issues with constipation. She is able to swallow liquids. Her daughter has had to crush some of her pills to put them in a liquid, however the pt finds it to taste bitter and has some discomfort swallowing the chunks of crushed pills. The hospital discharged her with a 7 day supply of Cefuroxime. Was started yesterday so she has had two doses.    We discussed the possibility of a doing a trial of void. At this time I am recommending her mother settle back in to being home and we can readdress this possibility and how the patient and her daughter would like to proceed at the time of her next visit. Next catheter change will be on 12/5 should they choose to proceed with the catheter.   I looked it up on [employer] and Cefuroxime is available in the [place] as a liquid suspension. Given that this would be easier for the patient, I went to prescribe it for her which she could take rather than the pills. [employer] did not allow me to electronically prescribe it in a liquid suspension so I called the patient's pharmacy to give a verbal prescription for 200 ml of it for 20 ml BID for 5 days. I left a VM for the pharmacy as there was no answer. I requested they call me back if they could accept this prescription or if they could not. The pharmacy called me back shortly after and they told me that it is not currently offered in a liquid suspension, however he called the [facility] next door and they are willing to compound it for 40$. I saw this as acceptable and thanked him for going the extra mile and calling next door. He will contact the patient's daughter to inform her.   Patient and her daughter will have a telehealth visit in 2 weeks for reassessment, sooner if clinically indicated.   11/20/2023: Ms. MCDOWELL presents today for a follow up audio only telehealth visit for which they gave permission for. The patient was located at home [address] [place] [address] and I was located in my office in [facility]. Daughter states patient is okay. She notes giving pt suppositories and 7 hours later she had mixed bowel movements. However, she did not have a bowel movement since Saturday. Patient is given pedialyte and water frequently. Today's temperature was 95 as before it was 96-97. Pt did not drink any liquids shortly after taking temperature. Daughter denies giving pt solace but she usually gives Miralax as she has done so today. Senady's urine color is clear as it  has been clear for some time.  Daughter states her stomach was distended. Bp has stabilized since being in the hospital but last night and today it  was higher than normal being being 168/88 before medication.   Advised daughter to give patient Miralax tonight if there is no bowel movements.  Advised her to take Blood pressure tonight.  Pt will schedule a telehealth visit  11/29/2023: Ms. CLAUDIO MCDOWELL presents today for a follow up audio only telephone visit for which she gave permission for. The pt was located at home, [address] [place] [address], and I was located in my office in [place], [place]. Visit was conducted with her daughter, Andree. She was informed yesterday by her house call physicians of the results of her urine culture (emailed from core lab showing >100,000 CFU/ml Pseudomonas aeruginosa susceptible only to amikacin, Ciprofloxacin, imipenem, levofloxacin, meropenum. Resistant to Ceftazidime, Pip/tazo. Intermediate for aztrenam, cefepime. They discussed that patient is having increasing oxygen requirements, although SPO2 ok on supplemental O2 (prior to this patient needed only occasional O2 suppl. every few days). 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 The want to treat with ciprofloxacin, 7 day course. Prescription sent to pharmacy. Her daughter inquired today on if she should be treated. She states last week her mother seemed very not herself, however today she seems better in terms of mental status and oxygenation. She has been giving her senakot and miralax.   I explained that given her O2 issues and confusion over the last few days, I advised to treat with Cipro despite her daughter feeling she is better today. If there are any problems with crushing the pills for her or if she is not reacting to it well, she was advised to call right away. I posed the option of giving it as an oral solution if need be.    12/21/2023: Ms. MCDOWELL presents today for a follow up audio-visual telehealth visit for which they gave permission for. The patient was located at home [address] [place] [address] and I was located in my office in [place], [place]. The patient obtained lab work 12/4/23 prior to today's visit. Urinalysis is improved and showed trace proteinuria, small Leukocyte Esterase, 6/HPF of WBC and 7/LPF of Cast. Urine culture shows no significant growth, <10,000 CFU/mL Normal Urogenital Khadijah, which is very good. Her daughter expresses concern as recently patient is experiencing a surplus of looser and larger amounts of bowel movements that is escaping to the vaginal area, thus causing infections. Urine is normal in color, denies fever. Patient is occasionally more fatigued than usual. Denies distended appearance of the abdomen. Admits to only 1 spasm around the catheter this month. Next sales change will be in 2 weeks.  Towards the end of visit, patient was shown on Facetime, appearing well oriented x 3, normal pigmentation, lips are moist, and smile is symmetrical. I am very impressed by how well the patient looks and speaks. It was more than just pleasantries, she inquired about personal questions. Overall, I am very pleased.   Reviewed and discussed laboratory work of 12/4/23 which She obtained prior to today's visit as requested. Advised daughter to have care team to place support under the pelvis, so pelvis is tilted up at a higher level of the anus.   As long as stool is pasty and firm, we advised Andree to give patient MiraLAX. If stool is loose and unfirm, she should decrease the amount of MiraLAX.  Pt will provide a urine sample which will be sent for urinalysis, urine culture, and urine cytology. Pt will schedule a telehealth visit in 2 weeks for reassessment.   12/27/2023: Ms. CLAUDIO MCDOWELL presents today for a follow up audio only telephone visit for which she gave permission for. The pt was located at home, [address] [place] [address], and I was located in my office in [place], [place]. She is accompanied by her daughter. Patient provided a surveillance urine specimen on 12/22/2023. She has chronic sales catheter drainage. UA revealed small LEC, positive for nitrites, 6 WBC/HPF, no RBC seen, moderate bacteria/HPF, granular casts present, and yeast like cells present. Urine culture grew >100,000 CFU/ml Enterococcus faecalis and 50,000 - 99,000 CFU/mL Streptococcus mitis/oralis group. Urine cytology was negative for high grade urothelial carcinoma. Few mature squamous cells, rare benign urothelial cells and abundant fungal organisms morphologically consistent with Candida species present. Patient's daughter reports that her urine looks clear and a pale yellow color. She feels her mom still has a lot of anxiety and some confusion. She does report that her catheter bag sits in feces due to the patient's position. They tried to tilt her pelvis to avoid this but states it's easier said than done. She states there is no feces in the catheter or in the bag. BM are not loose, she describes them as pasty. Has about 2-3 BM a day.    Reviewed and discussed lab work of 12/22/2023 in detail with the pt.  Future urine orders were put in including fungal urine culture. Pt's daughter was advised to continue to try and alter the patient's position to avoid the catheter sitting in feces.  Patient should have a telehealth visit in 3 months, sooner if clinically indicated.    01/05/2024: Ms. MCDOWELL is a 89 year old female presenting today for an audio only telehealth visit for which she gave verbal permission. The patient was located in her home at [address] [place] [address]. I was located in my office on [place], [place]. She was accompanied by her daughter who helped with the visit. She reports that a nurse came for a catheter change, and only succeeded on the third attempt. She says it's the same nurse who has been coming for 5 months now, and usually she encounters no difficulties. Now that the catheter has been changed, the patient is able to void.   Plan: Pt's daughter will keep us updated. If the patient experiences fever or chill, she will call ER and inform the office.   01/17/2024: Ms. CLAUDIO MCDOWELL presents today for a follow up audio only telephone visit for which she gave permission for. The pt was located at home, [address] [place] [address], and I was located in my office in [place], [place]. She is accompanied by her daughter, Tabatha Deluna. Patient provided a urine specimen on 1/5/2024 for surveillance. UA revealed trace blood by urine and moderate LEC. This is excellent for her as she has chronic sales catheter drainage. Urine culture grew 50,000-99,000 CFU/ml Pseudomonas aeruginosa. Urine cytology was negative for high grade urothelial carcinoma. Few mature squamous cells, rare benign urothelial cells and abundant fungal organisms morphologically consistent with Candida species present. The patient's daughter reports that her mother is "different". She reports that she is hearing things a lot now. She states that usually the things are negative. For example, she will say "Why is Ac saying my oxygen isn't working properly?". It is clear what she is saying but fairly random. She is not seeing anything; it is strictly auditory.   Reviewed and discussed lab work of 1/5/2024 in detail with the pt's daughter. She was informed that given she has chronic sales catheter drainage, this specimen was excellent. If she had more WBC/HPF, I would be concerned, however she is in very good shape from a urologic standpoint. Her daughter was advised to speak with her mother's PCP about hearing things and her AMS as it is not urologic in origin or related to pending sepsis. I provided her the name of a neurologist, Dr.Li-Fen Tripathi if she would like to go for neuro consultation.  I once again re-iterated that at this time, given her skin breakdown, I recommend continued sales catheter drainage. Her daughter brought up an external urine collection apparatus once again, however I do not recommend we try that at least until her skin is in good condition. I also explained that if her mother were to go into urinary retention, this sort of device would not drain the urine from her.  Pt's daughter was advised to give her hyoscyamine for bladder spasms.  Patient will have a telehealth visit in 1-2 months, sooner if clinically indicated.    01/22/2024: Ms. CLAUDIO MCDOWELL presents today for a follow up audio only telephone visit for which she gave permission for. The pt was located at home, [address] [place] [address], and I was located in my office in [place], [place]. Visit was conducted with the patient's daughter, Tabatha Deluna. She informs me that she did not realize she was out of hyoscyamine sulfate for bladder spasms. She has been getting bladder spasms after the aide leaves and is embarrassed by this as she thinks shes wetting the bed. Her daughter reports that her mother asks in the morning "why am I bothering taking my medications, I am going to die anyway".   Renewed hyoscyamine sulfate 0.125 mg sublingual tablets, give one tablet under the tongue as needed. I recommend she gives it about 15 minutes before the aide comes. I suggest she give it for 10 days in a row to see if we can calm things down and then stop. If it returns, we can try again for another month or two. I did speak with the patient at the end of the visit. She seemed to know who I was and understood me. I spoke to her about the bladder spasms she is experiencing. She was on board with trying hyoscyamine and thanked me for speaking with her.  I recommend the patient's daughter speak with her PCP about potential anti-depressant medication.   Patient will have a telephone visit in 2 weeks for reassessment, sooner if clinically indicated.    02/05/2024: Ms. CLAUDIO MCDOWELL presents today for an audio visual telehealth visit for which she gave permission for. The patient was located at home at [address] [place] [address] and I was located at my office in [place], [place]. Pt's daughter states she was not expecting my call today. I informed her that she was on my schedule and that if she would like we can talk. She informs me that hyoscyamine does not seem to be working for her mother and that some days she has bladder spasms and some she doesn't. Pt is currently not having diarrhea. She is due for a catheter change tomorrow. Patient has not started any medications for the psychological issues she has been having.    Pt will discontinue use of hyoscyamine. I prescribed the patient Trospium 20 mg, one tablet once daily at bedtime. I informed the patient there may be constipation as a side effect.   Orders for UA, urine culture, and urine cytology were put in for her daughter to bring the patient's sample to her nearest  lab.  Patient will have a telehealth visit this Friday after the catheter change.     02/09/2024: Ms. MCDOWELL is a 89 year old female presenting today for an audio only telehealth visit for which she gave verbal permission. The patient was located in her home at [address] [place] [address]. I was located in my office on [place], [place]. She was accompanied by her daughter who helps with the visit.   The patient has been with a chronic sales catheter since July 2021 when she fractured her foot. She reports today for a follow up.  I reviewed and discussed the patient's pertinent lab works. Urine Culture shows 10,000 - 49,000 CFU/mL Pseudomonas aeruginosa 10,000 - 49,000 CFU/mL Citrobacter freundii complex I explain to the patient that patients with chronic sales catheter develop bacteria in the urine, that may come from the skin, and it is not significant clinically unless infection symptoms ensue. In addition, the patient reports she was not taking antibiotics when the sample was collected, and the colonies were less than 100 000.   Pt will start Trospium 20 mg.  She will provide urine for UA, Urine Cytology and Urine Culture. She will have a follow up in one month; earlier if needed.    03/13/2024: Ms. CLAUDIO MCDOWELL presents today for a follow up audio only telephone visit for which she gave permission for. The pt was located at home, [address] [place] [address], and I was located in my office in [place], [place]. Visit was conducted with her daughter Tabatha Deluna. Patient provided a urine specimen on 3/4/2024. Patient has a chronic indwelling sales. UA revealed trace blood, large LE, and 9 WBC/HPF. This is quite good considering her sales. Culture grew 50,000 - 99,000 CFU/mL Escherichia coli & >100,000 CFU/ml Citrobacter freundii complex. She does report that after they gave the specimen her mother had a week of stool getting in the vulvar area and she is worried this might travel to the bladder. There is no evidence of that currently. She states the urine is clear and yellow. There has been no drainage around the catheter. She has not had any increased bladder spasms. Patient's daughter reports that her mother is not feeling well today. She has a runny nose and a cough. Home care is arranging for a nasal swab.   Reviewed and discussed lab work of 3/4/2024 in detail with the pt's daughter.  If the patient's daughter notices a change in her urine, she will call promptly.    04/08/2024: Ms. CLAUDIO MCDOWELL presents today for a follow up audio only telephone visit for which she gave permission for. The pt was located at home, [address] [place] [address], and I was located in my office in [place], [place]. Visit was conducted with her daughter Tabatha Deluna. Patient provided a urine specimen on 4/3/2024. UA was completely normal other than moderate LE and 6 WBC/HPF. For having a sales catheter, this is an excellent UA. Urine culture grew >100,000 CFU/ml Citrobacter freundii complex. Urine cytology was negative for high grade urothelial carcinoma. Specimen consists of many lymphoid cells, mature squamous epithelial cells and benign urothelial cells. Patient's daughter reports that  got covid, however she is recovering. She is very tired and still has a cough. She does report that the urine is clear and yellow, however the pt has had many accidents where stool gets near/in the catheter. Her skin remains red but not broken down. Continues use of triple paste.    Reviewed and discussed lab work of 4/3/2024 in detail with the pt. Pt's daughter reassured that this is a very good specimen considering she has a sales catheter.  Will provide another urine specimen at time of next catheter change.  Will have a telehealth visit after next urine specimen, sooner if clinically indicated.     05/08/2024 Ms. CLAUDIO MCDOWELL presents today for a follow up audio-only telehealth visit for which they permitted for. The patient was located at home [address] [place] [address] and I was located in my office in [place], [place]. Patient has chronic indwelling Sales catheter. This urinalysis indicates urine is in very good condition without any evidence for clinically active urinary tract infection at this time. Culture showed >=3 organisms. Probable collection contamination. If I am notified of changes indicating possible clinically significant urinary tract infection, we would re-culture at that time. We will continue to collect surveillance urine specimens for urinalyses and urine cultures at time of catheter changes.  Daughter states her mental status is different.  Reports more confusion, gaps of memory. Denies hallucinations and is much less anxious. Stool is still loose and pt is more fatigued while on Risperidone.   Reviewed and discussed laboratory work of 5/2/24 which She obtained prior to today's visit as requested. At the current time I will not re-culture. If I am notified of changes indicating possible clinically significant urinary tract infection, we would re-culture at that time. We will continue to collect surveillance urine specimens for urinalyses and urine cultures at time of catheter changes. Pt will schedule a telehealth visit.  06/7/2024: Ms. CLAUDIO MCDOWELL presents today for a follow up audio only telephone visit for which she gave permission for. The pt was located at home, [address] [place] [address], and I was located in my office in [place], [place]. Visit conducted with patient's daughter, Tabatha. 6/3/24 UA revealed small blood by dipstick, moderate Leukocyte Esterase, trace proteinuria and 10/HPF of WBC. Culture showed 50,000 - 99,000 CFU/mL Pseudomonas aeruginosa. Daughter reports have pt historian today. Patient is doing okay from urinary standpoint, urine is clear. Admits to breathing difficulties, as Internist prescribed Z-pack (Zithromax) but the daughter is hesitant of proceeding further with the prescription as 1 day before starting her cough seemed improved.  Before cough onset, there was increase in confusion// decline of mental state.    Reviewed and discussed laboratory work of 6/3/24 which She obtained prior to today's visit as requested. I do not believe we should treat at this time, instead reassess with next catheter change.  Patient to continue with respiratory treatment course and should not discontinue as this may create more clinical related issues. Pt will schedule a telehealth visit in 1 month.   06/26/2024: Ms. CLAUDIO MCDOWELL presents today for a follow up audio only telephone visit. Visit was conducted with the patient's daughter, Tabatha. The pt was located at home, [address] [place] [address], and I was located in my office in [place], [place]. Her daughter informs me today that she did not need an appt but did need instructions on irrigating the catheter for the nurse. However, the catheter happened to snap today and needed to be replaced anyway. I apologized for the misunderstanding. She stated that it would be better for me to call the nurse directly to give her the instruction. She had just left the house after changing the patient's catheter. She informs me she took a urine specimen. The nurses name is Marimar and can be reached at [phone].   After I hung up with the patient's daughter, I called Marimar. She informs me she just left after changing the catheter. I informed her it would be good if we could have the catheter irrigated to avoid sediment. She uses a 16 french catheter. She was instructed to disconnect the collecting tube and irrigate with about 60 cc of water. Let it drain out passively. If it does not drain well, she can try again. She can aspirate gently if she needs. If sediment comes out, she should repeat until it is clear. I explained that sometimes if it is a lot, it may take up to a liter to irrigate. I provided her with my phone number in the case she needs help.    07/01/2024: Ms. CLAUDIO MCDOWELL presents today for a follow up audio only telephone visit. The pt was located at home, [address] [place] [address], and I was located in my office in [place], [place]. Visit conducted with patient's daughter, Tabatha. Patient's visiting nurse, Marimar, collected a urine specimen from her on 6/26/2024 when replacing the catheter after it broke. UA revealed cloudy urine, large LE, and 150 WBC/HPF, otherwise normal. Urine culture grew >100,000 CFU/ml Citrobacter freundii and 50,000 - 99,000 CFU/mL Pseudomonas aeruginosa. The patient obtained lab work /24 prior to today's visit. Culture showed >100,000 CFU/ml Citrobacter freundii. 50,000 - 99,000 CFU/mL Pseudomonas aeruginosa. BMP revealed low sodium of 134, potassium 5.4, glucose elevated at 122, creatinine wnl at 0.56. UA showed cloudy appearance, large Leukocyte Esterase, 150/HOF WBC. Patient reports today is not her best day. She sounds suppressed. Daughter reports onset cough and unsure if infection could be both respiratory and bladder. Patient obtained nebulizer prior to visit. Daughter reports urine is currently clear.   Reviewed and discussed lab work of 6/26/2024 in detail with the pt's daughter.   I prescribed Fosfomycin 3 GM oral packet, once every 3 days. If onset bowel irritability occurs patient informed to call.  Patient denies ever having a seizure. Pt will schedule a telehealth visit in 1 week.    07/05/2024: Ms. CLAUDIO MCDOWELL presents today for a follow up audio only telephone visit. The pt was located at home, [address] [place] [address], and I was located in my office in [place], [place]. Visit was conducted with her daughter, Tabatha. She informs me that she just took the first packet of Fosfomycin yesterday. There have been no problems thus far. She does report that the catheter was mispositioned last night and was not draining for a few hours. Her and the aide were able to push it in more and urine drained. The visiting nurse came this morning. She irrigated the catheter and ensured it was in good position.    Continue Fosfomycin and call if there are any problems.  They are scheduled for a telehealth visit on 7/8/2024 for reassessment.    07/08/2024: Ms. CLAUDIO MCDOWELL presents today for a follow up audio-visual telehealth visit. The pt was located at home, [address] [place] [address], and I was located in my office in [place], [place]. The visit was conducted mostly with her daughter Tabatha. The patient has been taking Fosfomycin 3 GM oral packet once every 3 days. Her daughter reports she looks a bit improved. Her urine looks good but has since the beginning. She feels Fosfomycin makes her a little sick. Had some abdominal pain over the weekend but has not complained today. She was having anxiety but her oxygen is good. Her temperature is 97.1. She is not leaking around the catheter at all. She is a bit stressed today and someone close to them passed away and her daughter had to inform her of this today. At the end of the visit I spoke to  and she informs me she is not feeling so bad.  Continue Fosfomycin (last dose this Wednesday).  Patient's visiting nurse will collect a urine specimen this Thursday. It will be sent out for UA, culture, fungus culture, and cytology.  Though the patient's daughter Tabatha is very concerned about her mother and a bit depressed and anxious over it, her mother is doing quite well. She did not seem toxic to me. She made sensible conversation. She is quite hard of hearing but her daughter would repeat things so she could hear me and would respond well. From a urinary tract standpoint, she is doing well.  Patient will have a telehealth visit after next urine specimen to review and discuss results.    07/12/2024: Ms. MCDOWELL is a 89 year old female presenting today for an audio only telehealth visit for which she gave verbal permission. The patient was located in her home at [address] [place] [address]. I was located in my office on [place], [place]. She was accompanied by her daughter who helped with the visit.  Patient is bedbound and with a sales catheter.  On 6/26/24 patient was found with 150 wbc/hpf. Cloudy urine. No bacteria.  given that patient is with indwelling catheter, I would usually not treat her with abx right away, but I saw her and she looked pale, sluggish, and not cheerful as usual.  She was started on Fosfomycin once every 3 days. After course of Fosfomycin, patient was found with only 22 wbc/hpf, and few bacteria.  Her daughter worries today about low urine output since patient started Fosfomycin. She admits patient has been experiencing nighttime diarrhea which is expected with the medication. I explain that is consistent with lower production of urine as patient loses water during nighttime diarrhea,  patient's vitals taken by her daughter have been reportedly normal BP:120 /60. Pulse in the 70s Urine output about 1.5L a day. Pt will follow up next week for video telehealth.   08/06/2024: Ms. CLAUDIO MCDOWELL presents today for a follow up audio only telephone visit for which she gave permission for. The pt was located at home, [address] [place] [address], and I was located in my office in [place], [place]. Visit was conducted with her daughter, Tabatha Deluna. At the time I called at 2:00 PM the nurse was there to change her catheter. Her daughter reports that it looks as if there is a ball in her mother's stomach. This past Friday, she showed a tremendous amount of stool in her colon on x-ray. She denies seeing any blood in the stool that has been passing. She denies any gross hematuria. Urine has looked good. Her daughter notes that last Wednesday, it appeared her mother had a TIA. She opted to not have her mom taken to the hospital, so they are not sure if that is exactly what happened.  She appears less confused than last week according to her daughter.  Patient should proceed with catheter change today. They should record how much urine they collect when it is changed. Urine will be sent for UA, culture, and cytology.  Her daughter was advised to continue managing her mother's constipation. She states she is doing an enema tomorrow.  Patient will have a telehealth visit on Friday to review and discuss urine test results.    08/09/2024: Ms. MCDOWELL is a 89 year old female presenting today via telehealth using real time 2 way audio-visual technology. We utilized [employer] telemetry doc platform. The patient, Ms. MCDOWELL, was located in her home at [address] [place] [address] at the time of the visit. The provider, BENIGNO RAYMUNDO, was located in his office on [place], [place]. Verbal Consent was given by the patient on 08/09/2024 and my scribe served as witness. The patient was accompanied by her daughter  who participated in the telehealth encounter. Patient is bedbound and with an indwelling sales catheter. She was recently treated for UTI. On 6/26/24 patient was found with 150 wbc/hpf. cloudy urine. No bacteria.  After course of abx, patient reports today for review of new labs findings. I reviewed and discussed the patient's pertinent lab works. On 8/6/24 Urine Analysis showed 10 wbc/hpf (down from 150). Urine is now clear. Specific Gravity was 1.010 indicating adequate hydration. No bacteria seen. Urine Cytology on 8/6/24 was negative for malignant cell. Urine Culture on 8/6/24  showed presence of 3 organisms, none being predominant, possibly colonizers, or probable collection contamination. No infection.   Patient will continue providing Urine specimen monthly.  She will continue to have her indwelling catheter changed monthly.   Preparation, audio-visual visit, and coordination of care took: 30 mins

## 2024-08-10 NOTE — HISTORY OF PRESENT ILLNESS
[FreeTextEntry1] : Claudio Mcdowell is currently 89 years of age.  Her daughter Tabatha Deluna is devoted to her care.  She is very concerned about her mother and has been so for many years.  She becomes very anxious at times and that is concerning her mother.  Claudio Mcdowell lives with her daughter.  Claudio Mcdowell has been bedbound for several years.  The patient had developed a sacral decubitus ulcer while in a nursing facility.  A Sales catheter was placed because of fecal incontinence and concern that urine mixed with the feces would contaminate the sacral decubitus ulcer.  Once the patient left the nursing facility and will be with her daughter.  The sacral decubitus ulcer has finally healed.  I have discussed removing the Sales catheter with the daughter.  However as the patient has fecal incontinence there is concern about the urine mixing with the feces and being more likely to cause skin problems.  For now we will leave the Sales catheter in.  The patient has had clinically symptomatic urinary tract infections.  He clinically significant infections usually start with increased urination around the Sales catheter due to bladder spasms.  Bladder spasms have been occurring for staff.  The patient is positioned properly in in bed and the personal care attendant leaves for the day.  We have managed to prevent this with the administration of sublingual hyoscyamine.  We do periodic surveillance urine analysis and urine culture tests at the time the Sales catheter is changed by visiting nurse.  He also performed a times of increased spasms or purulence of the urine or change in mental status,  On October 11, 2022 visiting nurse using an order I have previously placed at the request of the daughter sent urine for urine cytology which proved to be negative for malignant cells, urine culture which grew Greater than 100,000 and E. coli that was sensitive to all antibiotics tested.  Urinalysis looked quite good for urine taken from a Sales catheter that was used for chronic Sales catheter drainage.  Ileukocyte esterase was large but nitrites were negative.  There were 2 epithelial cells per high-power field.  There were only 10 white blood cells per high-power field and only 2 red blood cells per high-power field on microscopic exam there were no bacteria seen and there were no hyaline casts.  Specific gravity was 1.010 which shows good hydration and this bedbound woman cared for diligently by her daughter.  Blood by dipstick was trace.  There was no protein glucose or ketones in the urine.  There is no bilirubin and no urobilinogen.  An important portion of the visit was my review with the daughter about bladder spasms and urination around the catheter.  Urine appearance and urine analysis have been clear.  The urine was clear again today.  The patient has significant short-term memory deficit.  She does have some useful short-term memory.  Her long-term memory remains very much intact.  She is very pleasant and to make satisfactory conversation.  She does admit to sometimes not remembering something.  Her complexion was good and there was no pallor.  Facial movements were symmetric.  Cranial nerves were intact.  I was not able to assess the olfactory nerve as this was a telehealth visit.    11/04/2022: Ms. MCDOWELL is a 89 year old female presenting today for a telehealth visit. She was accompanied by her daughter who helped with the visit. They gave permission for an audio only telehealth conference. The patient was located in her home in [place], [place]. I was located in my office on [place], [place]. Today her daughter reports the pt experienced rare urinary leaking around the catheter due to bladder spasms. She also reports her systolic pressure was around 140 last week. I offered Gemtesa to manage the bladder spasms and the daughter was cautious about more medications. I informed her if the urinary leaking are only once every 2 weeks or so, she does not have to medicate. The daughter was agreeable to monitor the occurrence of urinary leaking over the next 4 weeks and assess the discomfort it causes the pt. She denies any other urinary issues.  03/13/2023: Ms. MCDOWELL is a 89 year old female presenting today for a telehealth visit. She was accompanied by her daughter who helped with the visit. They gave permission for an audio only telehealth conference. The patient was located in her home in [place], [place]. I was located in my office on [place], [place]. The patient obtained an US prior to today's visit 3/9/23 which revealed: Comparison is made with the exam of 2/28/22. Transabdominal ultrasound of the abdomen was performed with color flow imaging. The examination is limited in evaluation. The visualized aorta and inferior vena cava show no abnormality. The pancreas is obscured by overlying bowel gas. The liver measures 12.4 cm with homogeneous echotexture. No intrinsic mass and no intra-or extrahepatic ductal dilatation. There is hepatopedal flow of the main portal vein. No gallstones, pericholecystic fluid, wall thickening or positive sonographic Melendez sign. The common bile duct measures 0.3 cm. The right kidney measures 9.2 x 5.6 x 3.0 cm with a nonobstructing 1.2 cm stone in the upper pole. Multiple additional subcentimeter calcifications are seen. These were not seen on previous exam. No hydronephrosis or renal mass. The left kidney measures 9.3 x 3.9 x 3.5 cm. There is a 2.1 x 2.4 x 2.0 cm cyst in the medial mid pole, not seen on previous study. No hydronephrosis or renal calcification. The spleen measures 8.2 cm and show no abnormality. There is a small right pleural effusion, stable. IMPRESSIONS: Multiple nonobstructing stones of the right kidney with no hydronephrosis. 2.4 cm cyst of the midpole left kidney. Small right pleural effusion, stable. The daughter has answered the phone and reports the patient's urine does not get dark. She is not aware of the amount of water she gives her mother. I requested she measure's this from now on as the pt has stones and needs to increase water consumption. Her daughter reports this week the pt has experienced more spasms than normal and believes this may be due to severe constipation. She provided the pt with an Enema to aide in this situation. She is very concerned but I informed her the Enema may have stimulated the spasms and we should give her a few days to clear up and re-evaluate.   03/22/2023: Ms. CLAUDIO MCDOWELL presents today for an audio visual telehealth visit for which she gave permission for. The patient was located at home at [address] [place] [address] and I was located at my office in [place], [place]. She is accompanied by her daughter whom most of the visit was conducted with. Her daughter has been keeping track of her mother's water consumption. She is averaging about 58 oz of water in a 24 hr period. Additionally, she is drinking juice and ensure nutrition drinks. She does not consume any caffeine. She is on furosemide 40 mg. I said hello to the patient at the end of the visit and she is looking well. She reports feeling no pain at the current moment.   3/29/2023:  Patient Claudio Mcdowell and daughter Tabatha Deluna were home in [place], [place] and I was in my office in [facility].  Patient and daughter gave permission for a FaceTime telehealth visit.  They preferred this to [employer].  The patient looks good today.  Her complexion was good there is no pallor.  Smile was symmetric her affect was normal she appeared happy she could make conversation it was appropriate.  She said that she currently had no pain.  When I asked questions that after 3/2 how she had been recently the sometimes she hesitated and deferred to her daughter for cancer compatible with her impaired short-term memory.  Long-term memory remains good she recognizes me once know so I am does ask questions about things like her bladder as she was worried about it her blood tests.  I assured her the results were satisfactory.  04/18/2023: Ms. MCDOWELL is a 89 year old female presenting today for an audio and visual telehealth visit for which she gave verbal permission. We utilized [employer] teams telemetry [employer] platform. The patient was located in her home at [address] [place] [address]. I was located in my office on [place], [place]. She was accompanied by her daughter who helped to serve as a historian. She reports discomfort in her eyes. She describes the discomfort as a feeling of sand. She opened her eyes for me, and they do not look red. Pupils look normal. She reports the right eyes bothers her significantly more than the left. I advise that the patient tries lubricating drops and it if does not feel better she will notify her visiting nurse who is due to come next week for a sales catheter change. She reports episode of bladder spasms. She denies gross hematuria. She describes the urine as barely yellow. I reviewed and discussed her  latest pertinent lab on 04/12/2023 which shows "alkaline phosphate normal at 89. Creatinine was normal at 0.62 mg/dL. WBC normal at 5.01. RBC normal at 3.87".   05/09/2023: Ms. MCDOWELL presents today for a follow up audio only telehealth visit for which they gave permission for. She was accompanied by her daughter who helped to serve as a historian. The patient was located at home [address] [place] [address] and I was located in my office in [place], [place]. The patient obtained lab work 5/2/23 prior to today's visit. The UA revealed microscopic hematuria, 5/HPF RBC, 27/HPF WBC. The patient demonstrated great hydration as the specific gravity is 1.006. The Sales catheter was changed May 2, 2023 with no clear urine in the bag. While changing the diaper the aide reports blood in the diaper. The daughter states last week her mother experienced spasms but as of two days ago she is now fine. However, she states the patient is more fatigued. She denies the pt having a temperature. I assured her because her mother is post menopausal any abrasion to the labia can cause a fissure.   06/02/2023: Ms. MCDOWELL is a 89 year old female presenting today for an audio and visual telehealth visit for which she gave verbal permission. We utilized [employer] teams telemetry doc platform. The patient was located in her home at [address] [place] [address]. I was located in my office on [place], [place]. She was accompanied by her daughter Tabatha. She reports that her mother complains of onset dysuria that dissipated on its own and has not recurred since. She reports right sided back pain that is aggravated when she lays on the right side. She provided urine specimen. She notes the urine was very clear. I reviewed and discussed the patient's pertinent lab works. Her latest Urinalysis on 05/30/2023 that shows "60 WBC/HPF. Trace Blood Urine. Specific Gravity Urine 1.007". Urine Culture on the same date shows ">100,000 CFU/ml Escherichia coli and <10,000 CFU/ml Normal Urogenital ángel present". The patient takes Mirtazapine for anxiety at low dose. The daughter reports the patient is experiencing increasing anxiety and is thinking of having the prescriber increasing the dosage.  Her BP runs around 120/60. Her Hemoglobin hematocrit looks good. gaze is conjugated. facial expression looks symmetric. Urine was faint yellow and clear.   06/06/2023: Ms. MCDOWELL presents today for a follow up audio only telehealth visit for which they gave permission for. The patient was located at home [address] [place] [address] and I was located in my office in [place], [place]. She was accompanied by her daughter Tabatha. The daughter states pt is experiencing episodes of a dry cough. An X Ray  of 6/5/23 revealed Mild peribronchial thickening suggesting bronchitis in the right clinical setting with no focal pneumonia or congestive heart failure. COPD with chronic lung changes. The mental status is the same as usual but her BP has elevated some. I assured her a little elevation is not as dangerous. The pt continues to experience some urinary frequency and it is a little more concentrated.   10/12/2023: Ms. MCDOWELL presents today for a follow up audio only telehealth visit for which they gave permission for. The patient was located at home [address] [place] [address] and I was located in my office in [place], [place]. The 10/2/23 UA showed proteinuria 30 mg/dL, moderate blood, large Leukocyte Esterase, 16/HPF of WBC. UA showed improvement as patient has chronic sales drainage. Her daughter states patient is okay considering coming home from the hospital. Most pain is in her back/shoulders and some of her back area is red/raw.  Recently her BP has increased to 180/100 and was prescribed Losartan 50mg twice daily. Urine is a clear color, denies cloudy and dark appearance.  11/01/2023: Ms. MCDOWELL presents today for a follow up audio only telehealth visit for which they gave permission for. The patient was located at home [address] [place] [address] and I was located in my office in [place], [place]. The 10/13/23 CMP revealed creatinine at 0.58 and low ALT at 8 U/L. Patient was admitted into [facility] on 10/27/23 for sepsis/UTI. Her daughter states she may be released tomorrow. Daughter states during the day the patient's urine was normal, but she did have chills and a fever.   11/03/2023: Ms. MCDOWELL is a 89 year old female presenting today for an audio only telehealth visit for which she gave verbal permission. The patient was located in her home at [address] [place] [address]. I was located in my office on [place], [place]. She was accompanied by her daughter. She says her mother was supposed to be released today. Initially she was able to void. Because of constipation she was given a couple of enemas. She had a large evacuation and since then she has not been able to void again. The daughter says her latest PVR was 340 cc. She is waiting for her mother to be rescanned again.   11/07/2023: Ms. CLAUDIO MCDOWELL presents today for a follow up audio only telephone visit for which she gave permission for. The pt was located at home, [address] [place] [address], and I was located in my office in [place], [place]. The visit was conducted with the patient's daughter. The patient came home from the hospital on Sunday. The catheter was put back in without another attempt at a void trial. The patient does have issues with constipation. She is able to swallow liquids. Her daughter has had to crush some of her pills to put them in a liquid, however the pt finds it to taste bitter and has some discomfort swallowing the chunks of crushed pills. The hospital discharged her with a 7 day supply of Cefuroxime. Was started yesterday so she has had two doses.   11/20/2023: Ms. MCDOWELL presents today for a follow up audio only telehealth visit for which they gave permission for. The patient was located at home [address] [place] [address] and I was located in my office in [facility]. Daughter states patient is okay. She notes giving pt suppositories and 7 hours later she had mixed bowel movements. However, she did not have a bowel movement since Saturday. Patient is given pedialyte and water frequently. Today's temperature was 95 as before it was 96-97. Pt did not drink any liquids shortly after taking temperature. Daughter denies giving pt solace but she usually gives Miralax as she has done so today. Senady's urine color is clear as it  has been clear for some time.  Daughter states her stomach was distended. Bp has stabilized since being in the hospital but last night and today it  was higher than normal being being 168/88 before medication.   11/29/2023: Ms. CLAUDIO MCDOWELL presents today for a follow up audio only telephone visit for which she gave permission for. The pt was located at home, [address] [place] [address], and I was located in my office in [place], [place]. Visit was conducted with her daughter, Andree. She was informed yesterday by her house call physicians of the results of her urine culture (emailed from core lab showing >100,000 CFU/ml Pseudomonas aeruginosa susceptible only to amikacin, Ciprofloxacin, imipenem, levofloxacin, meropenum. Resistant to Ceftazidime, Pip/tazo. Intermediate for aztrenam, cefepime. They discussed that patient is having increasing oxygen requirements, although SPO2 ok on supplemental O2 (prior to this patient needed only occasional O2 suppl. every few days). 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 The want to treat with ciprofloxacin, 7 day course. Prescription sent to pharmacy. Her daughter inquired today on if she should be treated. She states last week her mother seemed very not herself, however today she seems better in terms of mental status and oxygenation. She has been giving her senakot and miralax.   12/21/2023: Ms. MCDOWELL presents today for a follow up audio-only telehealth visit for which they gave permission for. The patient was located at home [address] [place] [address] and I was located in my office in [place], [place]. The patient obtained lab work 12/4/23 prior to today's visit. Urinalysis is improved and showed trace proteinuria, small Leukocyte Esterase, 6/HPF of WBC and 7/LPF of Cast. Urine culture shows no significant growth, <10,000 CFU/mL Normal Urogenital Ángel, which is very good. Her daughter expresses concern as recently patient is experiencing a surplus of looser and larger amounts of bowel movements that is escaping to the vaginal area, thus causing infections. Urine is normal in color, denies fever. Patient is occasionally more fatigued than usual. Denies distended appearance of the abdomen. Admits to only 1 spasm around the catheter this month. Next sales change will be in 2 weeks.  Towards the end of visit, patient was shown on Facetime, appearing well oriented x 3, normal pigmentation, lips are moist, and smile is symmetrical. I am very impressed by how well the patient looks and speaks. It was more than just pleasantries, she inquired about personal questions. Overall, I am very pleased.   12/27/2023: Ms. CLAUDIO MCDOWELL presents today for a follow up audio only telephone visit for which she gave permission for. The pt was located at home, [address] [place] [address], and I was located in my office in [place], [place]. She is accompanied by her daughter. Patient provided a surveillance urine specimen on 12/22/2023. She has chronic sales catheter drainage. UA revealed small LEC, positive for nitrites, 6 WBC/HPF, no RBC seen, moderate bacteria/HPF, granular casts present, and yeast like cells present. Urine culture grew >100,000 CFU/ml Enterococcus faecalis and 50,000 - 99,000 CFU/mL Streptococcus mitis/oralis group. Urine cytology was negative for high grade urothelial carcinoma. Few mature squamous cells, rare benign urothelial cells and abundant fungal organisms morphologically consistent with Candida species present. Patient's daughter reports that her urine looks clear and a pale yellow color. She feels her mom still has a lot of anxiety and some confusion. She does report that her catheter bag sits in feces due to the patient's position. They tried to tilt her pelvis to avoid this but states it's easier said than done. She states there is no feces in the catheter or in the bag. BM are not loose, she describes them as pasty. Has about 2-3 BM a day.   01/05/2024: Ms. MCDOWELL is a 89 year old female presenting today for an audio only telehealth visit for which she gave verbal permission. The patient was located in her home at [address] [place] [address]. I was located in my office on [place], [place]. She was accompanied by her daughter who helped with the visit. She reports that a nurse came for a catheter change, and only succeeded on the third attempt. She says it's the same nurse who has been coming for 5 months now, and usually she encounters no difficulties. Now that the catheter has been changed, the patient is able to void.   01/17/2024: Ms. CLAUDIO MCDOWELL presents today for a follow up audio only telephone visit for which she gave permission for. The pt was located at home, [address] [place] [address], and I was located in my office in [place], [place]. She is accompanied by her daughter, Tabatha Deluna. Patient provided a urine specimen on 1/5/2024 for surveillance. UA revealed trace blood by urine and moderate LEC. This is excellent for her as she has chronic sales catheter drainage. Urine culture grew 50,000-99,000 CFU/ml Pseudomonas aeruginosa. Urine cytology was negative for high grade urothelial carcinoma. Few mature squamous cells, rare benign urothelial cells and abundant fungal organisms morphologically consistent with Candida species present. The patient's daughter reports that her mother is "different". She reports that she is hearing things a lot now. She states that usually the things are negative. For example, she will say "Why is Ac saying my oxygen isn't working properly?". It is clear what she is saying but fairly random. She is not seeing anything; it is strictly auditory.   01/22/2024: Ms. CLAUDIO MCDOWELL presents today for a follow up audio only telephone visit for which she gave permission for. The pt was located at home, [address] [place] [address], and I was located in my office in [place], [place]. Visit was conducted with the patient's daughter, Tabatha Deluna. She informs me that she did not realize she was out of hyoscyamine sulfate for bladder spasms. She has been getting bladder spasms after the aide leaves and is embarrassed by this as she thinks shes wetting the bed. Her daughter reports that her mother asks in the morning "why am I bothering taking my medications, I am going to die anyway".   02/05/2024: Ms. CLAUDIO MCDOWELL presents today for an audio visual telehealth visit for which she gave permission for. The patient was located at home at [address] [place] [address] and I was located at my office in [place], [place]. Pt's daughter states she was not expecting my call today. I informed her that she was on my schedule and that if she would like we can talk. She informs me that hyoscyamine does not seem to be working for her mother and that some days she has bladder spasms and some she doesn't. Pt is currently not having diarrhea. She is due for a catheter change tomorrow. Patient has not started any medications for the psychological issues she has been having.   02/09/2024: Ms. MCDOWELL is a 89 year old female presenting today for an audio only telehealth visit for which she gave verbal permission. The patient was located in her home at [address] [place] [address]. I was located in my office on [place], [place]. She was accompanied by her daughter who helps with the visit.   The patient has been with a chronic sales catheter since July 2021 when she fractured her foot. She reports today for a follow up.   I reviewed and discussed the patient's pertinent lab works. Urine Culture shows 10,000 - 49,000 CFU/mL Pseudomonas aeruginosa 10,000 - 49,000 CFU/mL Citrobacter freundii complex  I explain to the patient that patients with chronic sales catheter develop bacteria in the urine, that may come from the skin, and it is not significant clinically unless infection symptoms ensue. In addition, the patient reports she was not taking antibiotics when the sample was collected, and the colonies were less than 100 000.   03/13/2024: Ms. CLAUDIO MCDOWELL presents today for a follow up audio only telephone visit for which she gave permission for. The pt was located at home, [address] [place] [address], and I was located in my office in [place], [place]. Visit was conducted with her daughter Tabatha Deluna. Patient provided a urine specimen on 3/4/2024. Patient has a chronic indwelling sales. UA revealed trace blood, large LE, and 9 WBC/HPF. This is quite good considering her sales. Culture grew 50,000 - 99,000 CFU/mL Escherichia coli & >100,000 CFU/ml Citrobacter freundii complex. She does report that after they gave the specimen her mother had a week of stool getting in the vulvar area and she is worried this might travel to the bladder. There is no evidence of that currently. She states the urine is clear and yellow. There has been no drainage around the catheter. She has not had any increased bladder spasms. Patient's daughter reports that her mother is not feeling well today. She has a runny nose and a cough. Home care is arranging for a nasal swab.   04/08/2024: Ms. CLAUDIO MCDOWELL presents today for a follow up audio only telephone visit for which she gave permission for. The pt was located at home, [address] [place] [address], and I was located in my office in [place], [place]. Visit was conducted with her daughter Tabatha Deluna. Patient provided a urine specimen on 4/3/2024. UA was completely normal other than moderate LE and 6 WBC/HPF. For having a sales catheter, this is an excellent UA. Urine culture grew >100,000 CFU/ml Citrobacter freundii complex. Urine cytology was negative for high grade urothelial carcinoma. Specimen consists of many lymphoid cells, mature squamous epithelial cells and benign urothelial cells. Patient's daughter reports that  got covid, however she is recovering. She is very tired and still has a cough. She does report that the urine is clear and yellow, however the pt has had many accidents where stool gets near/in the catheter. Her skin remains red but not broken down. Continues use of triple paste.   05/08/2024 Ms. CLAUDIO MCDOWELL presents today for a follow up audio-only telehealth visit for which they permitted for. The patient was located at home [address] [place] [address] and I was located in my office in [place], [place]. Patient has chronic indwelling Sales catheter. This urinalysis indicates urine is in very good condition without any evidence for clinically active urinary tract infection at this time. Culture showed >=3 organisms. Probable collection contamination. If I am notified of changes indicating possible clinically significant urinary tract infection, we would re-culture at that time. We will continue to collect surveillance urine specimens for urinalyses and urine cultures at time of catheter changes.  Daughter states her mental status is different.  Reports more confusion, gaps of memory. Denies hallucinations and is much less anxious. Stool is still loose and pt is more fatigued while on Risperidone.   06/7/2024: Ms. CLAUDIO MCDOWELL presents today for a follow up audio only telephone visit for which she gave permission for. The pt was located at home, [address] [place] [address], and I was located in my office in [place], [place]. Visit conducted with patient's daughter, Tabatha. 6/3/24 UA revealed small blood by dipstick, moderate Leukocyte Esterase, trace proteinuria and 10/HPF of WBC. Culture showed 50,000 - 99,000 CFU/mL Pseudomonas aeruginosa. Daughter reports have pt historian today. Patient is doing okay from urinary standpoint, urine is clear. Admits to breathing difficulties, as Internist prescribed Z-pack (Zithromax) but the daughter is hesitant of proceeding further with the prescription as 1 day before starting her cough seemed improved.  Before cough onset, there was increase in confusion// decline of mental state.   06/26/2024: Ms. CLAUDIO MCDOWELL presents today for a follow up audio only telephone visit. Visit was conducted with the patient's daughter, Tabatha. The pt was located at home, [address] [place] [address], and I was located in my office in [place], [place]. Her daughter informs me today that she did not need an appt but did need instructions on irrigating the catheter for the nurse. However, the catheter happened to snap today and needed to be replaced anyway. I apologized for the misunderstanding. She stated that it would be better for me to call the nurse directly to give her the instruction. She had just left the house after changing the patient's catheter. She informs me she took a urine specimen. The nurses name is Marimar and can be reached at [phone].   07/01/2024: Ms. CLAUDIO MCDOWELL presents today for a follow up audio only telephone visit. The pt was located at home, [address] [place] [address], and I was located in my office in [place], [place]. Visit conducted with patient's daughter, Tabatha. Patient's visiting nurse, Marimar, collected a urine specimen from her on 6/26/2024 when replacing the catheter after it broke. UA revealed cloudy urine, large LE, and 150 WBC/HPF, otherwise normal. Urine culture grew >100,000 CFU/ml Citrobacter freundii and 50,000 - 99,000 CFU/mL Pseudomonas aeruginosa. The patient obtained lab work /24 prior to today's visit. Culture showed >100,000 CFU/ml Citrobacter freundii. 50,000 - 99,000 CFU/mL Pseudomonas aeruginosa. BMP revealed low sodium of 134, potassium 5.4, glucose elevated at 122, creatinine wnl at 0.56. UA showed cloudy appearance, large Leukocyte Esterase, 150/HOF WBC. Patient reports today is not her best day. She sounds suppressed. Daughter reports onset cough and unsure if infection could be both respiratory and bladder. Patient obtained nebulizer prior to visit. Daughter reports urine is currently clear.   07/05/2024: Ms. CLAUDIO MCDOWELL presents today for a follow up audio only telephone visit. The pt was located at home, [address] [place] [address], and I was located in my office in [place], [place]. Visit was conducted with her daughter, Tabatha. She informs me that she just took the first packet of Fosfomycin yesterday. There have been no problems thus far. She does report that the catheter was mispositioned last night and was not draining for a few hours. Her and the aide were able to push it in more and urine drained. The visiting nurse came this morning. She irrigated the catheter and ensured it was in good position.   07/08/2024: Ms. CLAUDIO MCDOWELL presents today for a follow up audio-visual telehealth visit. The pt was located at home, [address] [place] [address], and I was located in my office in [place], [place]. The visit was conducted mostly with her daughter Tabatha. The patient has been taking Fosfomycin 3 GM oral packet once every 3 days. Her daughter reports she looks a bit improved. Her urine looks good but has since the beginning. She feels Fosfomycin makes her a little sick. Had some abdominal pain over the weekend but has not complained today. She was having anxiety but her oxygen is good. Her temperature is 97.1. She is not leaking around the catheter at all. She is a bit stressed today and someone close to them passed away and her daughter had to inform her of this today. At the end of the visit I spoke to  and she informs me she is not feeling so bad.     07/12/2024: Ms. MCDOWELL is a 89 year old female presenting today for an audio only telehealth visit for which she gave verbal permission. The patient was located in her home at [address] [place] [address]. I was located in my office on [place], [place]. She was accompanied by her daughter who helped with the visit.  Patient is bedbound and with a sales catheter.  On 7/11/24 patient was found with 150 wbc/hpf. cloudy urine. No bacteria.  given that patient is with indwelling catheter, I would usually not treat her with abx right away, but I saw her and she looked pale, sluggish, and not cheerful as usual.  She was started on Fosfomycin once every 3 days. After course of Fosfomycin, patient was found with only 22 wbc/hpf, and few bacteria.  Her daughter worries today about low urine output since patient started Fosfomycin. She admits patient has been experiencing nighttime diarrhea which is expected with the medication. I explain that is consistent with lower production of urine as patient loses water during nighttime diarrhea,  patient's vitals taken by her daughter have been reportedly normal BP:120 /60. Pulse in the 70s  08/06/2024: Ms. CLAUDIO MCDOWELL presents today for a follow up audio only telephone visit for which she gave permission for. The pt was located at home, [address] [place] [address], and I was located in my office in [place], [place]. Visit was conducted with her daughter, Tabatha Deluna. At the time I called at 2:00 PM the nurse was there to change her catheter. Her daughter reports that it looks as if there is a ball in her mother's stomach. This past Friday, she showed a tremendous amount of stool in her colon on x-ray. She denies seeing any blood in the stool that has been passing. She denies any gross hematuria. Urine has looked good. Her daughter notes that last Wednesday, it appeared her mother had a TIA. She opted to not have her mom taken to the hospital, so they are not sure if that is exactly what happened.  She appears less confused than last week according to her daughter.   08/09/2024: Ms. MCDOWELL is a 89 year old female presenting today via telehealth using real time 2 way audio-visual technology. We utilized [employer] telemetry [employer] platform. The patient, Ms. MCDOWELL, was located in her home at [address] [place] [address] at the time of the visit. The provider, BENIGNO RAYMUNDO, was located in his office on [place], [place]. Verbal Consent was given by the patient on 08/09/2024 and my scribe served as witness. The patient was accompanied by her daughter  who participated in the telehealth encounter. Patient is bedbound and with an indwelling sales catheter. She was recently treated for UTI. On 6/26/24 patient was found with 150 wbc/hpf. cloudy urine. No bacteria.  After course of abx, patient reports today for review of new labs findings. I reviewed and discussed the patient's pertinent lab works. On 8/6/24 Urine Analysis showed 10 wbc/hpf (down from 150). Urine is now clear. Specific Gravity was 1.010 indicating adequate hydration. No bacteria seen. Urine Cytology on 8/6/24 was negative for malignant cell. Urine Culture on 8/6/24  showed presence of 3 organisms, none being predominant, possibly colonizers, or probable collection contamination. No infection.

## 2024-08-10 NOTE — ASSESSMENT
[FreeTextEntry1] : 10/12/2023: Ms. MCDOWELL presents today for a follow up audio only telehealth visit for which they gave permission for. The patient was located at home [address] [place] [address] and I was located in my office in [place], [place]. The 10/2/23 UA showed proteinuria 30 mg/dL, moderate blood, large Leukocyte Esterase, 16/HPF of WBC. UA showed improvement as patient has chronic sales drainage. Her daughter states patient is okay considering coming home from the hospital. Most pain is in her back/shoulders and some of her back area is red/raw.  Recently her BP has increased to 180/100 and was prescribed Losartan 50mg twice daily. Urine is a clear color, denies cloudy and dark appearance.  Reviewed and discussed laboratory work of 10/2/23 which She obtained prior to today's visit as requested. Pt will go to her nearest [facility] lab for blood work and a urine sample which will be sent for urinalysis, urine culture, and urine cytology. Pt will schedule a telehealth visit after lab work to discuss results.   11/01/2023: Ms. MCDOWELL presents today for a follow up audio only telehealth visit for which they gave permission for. The patient was located at home [address] [place] [address] and I was located in my office in [place], [place]. The 10/13/23 CMP revealed creatinine at 0.58 and low ALT at 8 U/L. Patient was admitted into [facility] on 10/27/23 for sepsis/UTI. Her daughter states she may be released tomorrow. Daughter states during the day the patient's urine was normal, but she did have chills and a fever.   As the patient recently had a course of antibiotics, we suggested patient have catheter removed in hospital, as we will to observe bladder emptying and skin while the catheter is removed. If abnormalities arise, we will place the catheter back in.  If the daughter is to proceed with the plan, we will not discharge patient until a PVR is taken and until pt is able to void on her own the next morning. Hospitalist should weigh the diaper before and after and I will calculate it. If stool is present, then we will not be able to do so.  The daughter will have the Hospitalist contact me about patient.  Hospitalist has called at 4:06 pm. She states the urination looks contaminated and culture showed E-coli.  They will bladder scan her every hour.  Hospitalist will call tomorrow with update on patient and inform me on her fluid level.  Advised daughter to obtain a thermometer to observe temperature.    11/03/2023: Ms. MCDOWELL is a 89 year old female presenting today for an audio only telehealth visit for which she gave verbal permission. The patient was located in her home at [address] [place] [address]. I was located in my office on [place], [place]. She was accompanied by her daughter. She says her mother was supposed to be released today. Initially she was able to void. Because of constipation she was given a couple of enemas. She had a large evacuation and since then she has not been able to void again. The daughter says her latest PVR was 340 cc. She is waiting for her mother to be rescanned again.    Plan: If she is unable to void, she will be discharged with a sales catheter.   11/07/2023: Ms. CLAUDIO MCDOWELL presents today for a follow up audio only telephone visit for which she gave permission for. The pt was located at home, [address] [place] [address], and I was located in my office in [place], [place]. The visit was conducted with the patient's daughter. The patient came home from the hospital on Sunday. The catheter was put back in without another attempt at a void trial. The patient does have issues with constipation. She is able to swallow liquids. Her daughter has had to crush some of her pills to put them in a liquid, however the pt finds it to taste bitter and has some discomfort swallowing the chunks of crushed pills. The hospital discharged her with a 7 day supply of Cefuroxime. Was started yesterday so she has had two doses.    We discussed the possibility of a doing a trial of void. At this time I am recommending her mother settle back in to being home and we can readdress this possibility and how the patient and her daughter would like to proceed at the time of her next visit. Next catheter change will be on 12/5 should they choose to proceed with the catheter.   I looked it up on [employer] and Cefuroxime is available in the [place] as a liquid suspension. Given that this would be easier for the patient, I went to prescribe it for her which she could take rather than the pills. [employer] did not allow me to electronically prescribe it in a liquid suspension so I called the patient's pharmacy to give a verbal prescription for 200 ml of it for 20 ml BID for 5 days. I left a VM for the pharmacy as there was no answer. I requested they call me back if they could accept this prescription or if they could not. The pharmacy called me back shortly after and they told me that it is not currently offered in a liquid suspension, however he called the [facility] next door and they are willing to compound it for 40$. I saw this as acceptable and thanked him for going the extra mile and calling next door. He will contact the patient's daughter to inform her.   Patient and her daughter will have a telehealth visit in 2 weeks for reassessment, sooner if clinically indicated.   11/20/2023: Ms. MCDOWELL presents today for a follow up audio only telehealth visit for which they gave permission for. The patient was located at home [address] [place] [address] and I was located in my office in [facility]. Daughter states patient is okay. She notes giving pt suppositories and 7 hours later she had mixed bowel movements. However, she did not have a bowel movement since Saturday. Patient is given pedialyte and water frequently. Today's temperature was 95 as before it was 96-97. Pt did not drink any liquids shortly after taking temperature. Daughter denies giving pt solace but she usually gives Miralax as she has done so today. Senady's urine color is clear as it  has been clear for some time.  Daughter states her stomach was distended. Bp has stabilized since being in the hospital but last night and today it  was higher than normal being being 168/88 before medication.   Advised daughter to give patient Miralax tonight if there is no bowel movements.  Advised her to take Blood pressure tonight.  Pt will schedule a telehealth visit  11/29/2023: Ms. CLAUDIO MCDOWELL presents today for a follow up audio only telephone visit for which she gave permission for. The pt was located at home, [address] [place] [address], and I was located in my office in [place], [place]. Visit was conducted with her daughter, Andree. She was informed yesterday by her house call physicians of the results of her urine culture (emailed from core lab showing >100,000 CFU/ml Pseudomonas aeruginosa susceptible only to amikacin, Ciprofloxacin, imipenem, levofloxacin, meropenum. Resistant to Ceftazidime, Pip/tazo. Intermediate for aztrenam, cefepime. They discussed that patient is having increasing oxygen requirements, although SPO2 ok on supplemental O2 (prior to this patient needed only occasional O2 suppl. every few days). 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 The want to treat with ciprofloxacin, 7 day course. Prescription sent to pharmacy. Her daughter inquired today on if she should be treated. She states last week her mother seemed very not herself, however today she seems better in terms of mental status and oxygenation. She has been giving her senakot and miralax.   I explained that given her O2 issues and confusion over the last few days, I advised to treat with Cipro despite her daughter feeling she is better today. If there are any problems with crushing the pills for her or if she is not reacting to it well, she was advised to call right away. I posed the option of giving it as an oral solution if need be.    12/21/2023: Ms. MCDOWELL presents today for a follow up audio-visual telehealth visit for which they gave permission for. The patient was located at home [address] [place] [address] and I was located in my office in [place], [place]. The patient obtained lab work 12/4/23 prior to today's visit. Urinalysis is improved and showed trace proteinuria, small Leukocyte Esterase, 6/HPF of WBC and 7/LPF of Cast. Urine culture shows no significant growth, <10,000 CFU/mL Normal Urogenital Khadijah, which is very good. Her daughter expresses concern as recently patient is experiencing a surplus of looser and larger amounts of bowel movements that is escaping to the vaginal area, thus causing infections. Urine is normal in color, denies fever. Patient is occasionally more fatigued than usual. Denies distended appearance of the abdomen. Admits to only 1 spasm around the catheter this month. Next sales change will be in 2 weeks.  Towards the end of visit, patient was shown on Facetime, appearing well oriented x 3, normal pigmentation, lips are moist, and smile is symmetrical. I am very impressed by how well the patient looks and speaks. It was more than just pleasantries, she inquired about personal questions. Overall, I am very pleased.   Reviewed and discussed laboratory work of 12/4/23 which She obtained prior to today's visit as requested. Advised daughter to have care team to place support under the pelvis, so pelvis is tilted up at a higher level of the anus.   As long as stool is pasty and firm, we advised Andree to give patient MiraLAX. If stool is loose and unfirm, she should decrease the amount of MiraLAX.  Pt will provide a urine sample which will be sent for urinalysis, urine culture, and urine cytology. Pt will schedule a telehealth visit in 2 weeks for reassessment.   12/27/2023: Ms. CLAUDIO MCDOWELL presents today for a follow up audio only telephone visit for which she gave permission for. The pt was located at home, [address] [place] [address], and I was located in my office in [place], [place]. She is accompanied by her daughter. Patient provided a surveillance urine specimen on 12/22/2023. She has chronic sales catheter drainage. UA revealed small LEC, positive for nitrites, 6 WBC/HPF, no RBC seen, moderate bacteria/HPF, granular casts present, and yeast like cells present. Urine culture grew >100,000 CFU/ml Enterococcus faecalis and 50,000 - 99,000 CFU/mL Streptococcus mitis/oralis group. Urine cytology was negative for high grade urothelial carcinoma. Few mature squamous cells, rare benign urothelial cells and abundant fungal organisms morphologically consistent with Candida species present. Patient's daughter reports that her urine looks clear and a pale yellow color. She feels her mom still has a lot of anxiety and some confusion. She does report that her catheter bag sits in feces due to the patient's position. They tried to tilt her pelvis to avoid this but states it's easier said than done. She states there is no feces in the catheter or in the bag. BM are not loose, she describes them as pasty. Has about 2-3 BM a day.    Reviewed and discussed lab work of 12/22/2023 in detail with the pt.  Future urine orders were put in including fungal urine culture. Pt's daughter was advised to continue to try and alter the patient's position to avoid the catheter sitting in feces.  Patient should have a telehealth visit in 3 months, sooner if clinically indicated.    01/05/2024: Ms. MCDOWELL is a 89 year old female presenting today for an audio only telehealth visit for which she gave verbal permission. The patient was located in her home at [address] [place] [address]. I was located in my office on [place], [place]. She was accompanied by her daughter who helped with the visit. She reports that a nurse came for a catheter change, and only succeeded on the third attempt. She says it's the same nurse who has been coming for 5 months now, and usually she encounters no difficulties. Now that the catheter has been changed, the patient is able to void.   Plan: Pt's daughter will keep us updated. If the patient experiences fever or chill, she will call ER and inform the office.   01/17/2024: Ms. CLAUDIO MCDOWELL presents today for a follow up audio only telephone visit for which she gave permission for. The pt was located at home, [address] [place] [address], and I was located in my office in [place], [place]. She is accompanied by her daughter, Tabatha Deluna. Patient provided a urine specimen on 1/5/2024 for surveillance. UA revealed trace blood by urine and moderate LEC. This is excellent for her as she has chronic sales catheter drainage. Urine culture grew 50,000-99,000 CFU/ml Pseudomonas aeruginosa. Urine cytology was negative for high grade urothelial carcinoma. Few mature squamous cells, rare benign urothelial cells and abundant fungal organisms morphologically consistent with Candida species present. The patient's daughter reports that her mother is "different". She reports that she is hearing things a lot now. She states that usually the things are negative. For example, she will say "Why is Ac saying my oxygen isn't working properly?". It is clear what she is saying but fairly random. She is not seeing anything; it is strictly auditory.   Reviewed and discussed lab work of 1/5/2024 in detail with the pt's daughter. She was informed that given she has chronic sales catheter drainage, this specimen was excellent. If she had more WBC/HPF, I would be concerned, however she is in very good shape from a urologic standpoint. Her daughter was advised to speak with her mother's PCP about hearing things and her AMS as it is not urologic in origin or related to pending sepsis. I provided her the name of a neurologist, Dr.Li-Fen Tripathi if she would like to go for neuro consultation.  I once again re-iterated that at this time, given her skin breakdown, I recommend continued sales catheter drainage. Her daughter brought up an external urine collection apparatus once again, however I do not recommend we try that at least until her skin is in good condition. I also explained that if her mother were to go into urinary retention, this sort of device would not drain the urine from her.  Pt's daughter was advised to give her hyoscyamine for bladder spasms.  Patient will have a telehealth visit in 1-2 months, sooner if clinically indicated.    01/22/2024: Ms. CLAUDIO MCDOWELL presents today for a follow up audio only telephone visit for which she gave permission for. The pt was located at home, [address] [place] [address], and I was located in my office in [place], [place]. Visit was conducted with the patient's daughter, Tabatha Deluna. She informs me that she did not realize she was out of hyoscyamine sulfate for bladder spasms. She has been getting bladder spasms after the aide leaves and is embarrassed by this as she thinks shes wetting the bed. Her daughter reports that her mother asks in the morning "why am I bothering taking my medications, I am going to die anyway".   Renewed hyoscyamine sulfate 0.125 mg sublingual tablets, give one tablet under the tongue as needed. I recommend she gives it about 15 minutes before the aide comes. I suggest she give it for 10 days in a row to see if we can calm things down and then stop. If it returns, we can try again for another month or two. I did speak with the patient at the end of the visit. She seemed to know who I was and understood me. I spoke to her about the bladder spasms she is experiencing. She was on board with trying hyoscyamine and thanked me for speaking with her.  I recommend the patient's daughter speak with her PCP about potential anti-depressant medication.   Patient will have a telephone visit in 2 weeks for reassessment, sooner if clinically indicated.    02/05/2024: Ms. CLAUDIO MCDOWELL presents today for an audio visual telehealth visit for which she gave permission for. The patient was located at home at [address] [place] [address] and I was located at my office in [place], [place]. Pt's daughter states she was not expecting my call today. I informed her that she was on my schedule and that if she would like we can talk. She informs me that hyoscyamine does not seem to be working for her mother and that some days she has bladder spasms and some she doesn't. Pt is currently not having diarrhea. She is due for a catheter change tomorrow. Patient has not started any medications for the psychological issues she has been having.    Pt will discontinue use of hyoscyamine. I prescribed the patient Trospium 20 mg, one tablet once daily at bedtime. I informed the patient there may be constipation as a side effect.   Orders for UA, urine culture, and urine cytology were put in for her daughter to bring the patient's sample to her nearest  lab.  Patient will have a telehealth visit this Friday after the catheter change.     02/09/2024: Ms. MCDOWELL is a 89 year old female presenting today for an audio only telehealth visit for which she gave verbal permission. The patient was located in her home at [address] [place] [address]. I was located in my office on [place], [place]. She was accompanied by her daughter who helps with the visit.   The patient has been with a chronic sales catheter since July 2021 when she fractured her foot. She reports today for a follow up.  I reviewed and discussed the patient's pertinent lab works. Urine Culture shows 10,000 - 49,000 CFU/mL Pseudomonas aeruginosa 10,000 - 49,000 CFU/mL Citrobacter freundii complex I explain to the patient that patients with chronic sales catheter develop bacteria in the urine, that may come from the skin, and it is not significant clinically unless infection symptoms ensue. In addition, the patient reports she was not taking antibiotics when the sample was collected, and the colonies were less than 100 000.   Pt will start Trospium 20 mg.  She will provide urine for UA, Urine Cytology and Urine Culture. She will have a follow up in one month; earlier if needed.    03/13/2024: Ms. CLAUDIO MCDOWELL presents today for a follow up audio only telephone visit for which she gave permission for. The pt was located at home, [address] [place] [address], and I was located in my office in [place], [place]. Visit was conducted with her daughter Tabatha Deluna. Patient provided a urine specimen on 3/4/2024. Patient has a chronic indwelling sales. UA revealed trace blood, large LE, and 9 WBC/HPF. This is quite good considering her sales. Culture grew 50,000 - 99,000 CFU/mL Escherichia coli & >100,000 CFU/ml Citrobacter freundii complex. She does report that after they gave the specimen her mother had a week of stool getting in the vulvar area and she is worried this might travel to the bladder. There is no evidence of that currently. She states the urine is clear and yellow. There has been no drainage around the catheter. She has not had any increased bladder spasms. Patient's daughter reports that her mother is not feeling well today. She has a runny nose and a cough. Home care is arranging for a nasal swab.   Reviewed and discussed lab work of 3/4/2024 in detail with the pt's daughter.  If the patient's daughter notices a change in her urine, she will call promptly.    04/08/2024: Ms. CLAUDIO MCDOWELL presents today for a follow up audio only telephone visit for which she gave permission for. The pt was located at home, [address] [place] [address], and I was located in my office in [place], [place]. Visit was conducted with her daughter Tabatha Deluna. Patient provided a urine specimen on 4/3/2024. UA was completely normal other than moderate LE and 6 WBC/HPF. For having a sales catheter, this is an excellent UA. Urine culture grew >100,000 CFU/ml Citrobacter freundii complex. Urine cytology was negative for high grade urothelial carcinoma. Specimen consists of many lymphoid cells, mature squamous epithelial cells and benign urothelial cells. Patient's daughter reports that  got covid, however she is recovering. She is very tired and still has a cough. She does report that the urine is clear and yellow, however the pt has had many accidents where stool gets near/in the catheter. Her skin remains red but not broken down. Continues use of triple paste.    Reviewed and discussed lab work of 4/3/2024 in detail with the pt. Pt's daughter reassured that this is a very good specimen considering she has a sales catheter.  Will provide another urine specimen at time of next catheter change.  Will have a telehealth visit after next urine specimen, sooner if clinically indicated.     05/08/2024 Ms. CLAUDIO MCDOWELL presents today for a follow up audio-only telehealth visit for which they permitted for. The patient was located at home [address] [place] [address] and I was located in my office in [place], [place]. Patient has chronic indwelling Sales catheter. This urinalysis indicates urine is in very good condition without any evidence for clinically active urinary tract infection at this time. Culture showed >=3 organisms. Probable collection contamination. If I am notified of changes indicating possible clinically significant urinary tract infection, we would re-culture at that time. We will continue to collect surveillance urine specimens for urinalyses and urine cultures at time of catheter changes.  Daughter states her mental status is different.  Reports more confusion, gaps of memory. Denies hallucinations and is much less anxious. Stool is still loose and pt is more fatigued while on Risperidone.   Reviewed and discussed laboratory work of 5/2/24 which She obtained prior to today's visit as requested. At the current time I will not re-culture. If I am notified of changes indicating possible clinically significant urinary tract infection, we would re-culture at that time. We will continue to collect surveillance urine specimens for urinalyses and urine cultures at time of catheter changes. Pt will schedule a telehealth visit.  06/7/2024: Ms. CLAUDIO MCDOWELL presents today for a follow up audio only telephone visit for which she gave permission for. The pt was located at home, [address] [place] [address], and I was located in my office in [place], [place]. Visit conducted with patient's daughter, Tabatha. 6/3/24 UA revealed small blood by dipstick, moderate Leukocyte Esterase, trace proteinuria and 10/HPF of WBC. Culture showed 50,000 - 99,000 CFU/mL Pseudomonas aeruginosa. Daughter reports have pt historian today. Patient is doing okay from urinary standpoint, urine is clear. Admits to breathing difficulties, as Internist prescribed Z-pack (Zithromax) but the daughter is hesitant of proceeding further with the prescription as 1 day before starting her cough seemed improved.  Before cough onset, there was increase in confusion// decline of mental state.    Reviewed and discussed laboratory work of 6/3/24 which She obtained prior to today's visit as requested. I do not believe we should treat at this time, instead reassess with next catheter change.  Patient to continue with respiratory treatment course and should not discontinue as this may create more clinical related issues. Pt will schedule a telehealth visit in 1 month.   06/26/2024: Ms. CLAUDIO MCDOWELL presents today for a follow up audio only telephone visit. Visit was conducted with the patient's daughter, Tabatha. The pt was located at home, [address] [place] [address], and I was located in my office in [place], [place]. Her daughter informs me today that she did not need an appt but did need instructions on irrigating the catheter for the nurse. However, the catheter happened to snap today and needed to be replaced anyway. I apologized for the misunderstanding. She stated that it would be better for me to call the nurse directly to give her the instruction. She had just left the house after changing the patient's catheter. She informs me she took a urine specimen. The nurses name is Marimar and can be reached at [phone].   After I hung up with the patient's daughter, I called Marimar. She informs me she just left after changing the catheter. I informed her it would be good if we could have the catheter irrigated to avoid sediment. She uses a 16 french catheter. She was instructed to disconnect the collecting tube and irrigate with about 60 cc of water. Let it drain out passively. If it does not drain well, she can try again. She can aspirate gently if she needs. If sediment comes out, she should repeat until it is clear. I explained that sometimes if it is a lot, it may take up to a liter to irrigate. I provided her with my phone number in the case she needs help.    07/01/2024: Ms. CLAUDIO MCDOWELL presents today for a follow up audio only telephone visit. The pt was located at home, [address] [place] [address], and I was located in my office in [place], [place]. Visit conducted with patient's daughter, Tabatha. Patient's visiting nurse, Marimar, collected a urine specimen from her on 6/26/2024 when replacing the catheter after it broke. UA revealed cloudy urine, large LE, and 150 WBC/HPF, otherwise normal. Urine culture grew >100,000 CFU/ml Citrobacter freundii and 50,000 - 99,000 CFU/mL Pseudomonas aeruginosa. The patient obtained lab work /24 prior to today's visit. Culture showed >100,000 CFU/ml Citrobacter freundii. 50,000 - 99,000 CFU/mL Pseudomonas aeruginosa. BMP revealed low sodium of 134, potassium 5.4, glucose elevated at 122, creatinine wnl at 0.56. UA showed cloudy appearance, large Leukocyte Esterase, 150/HOF WBC. Patient reports today is not her best day. She sounds suppressed. Daughter reports onset cough and unsure if infection could be both respiratory and bladder. Patient obtained nebulizer prior to visit. Daughter reports urine is currently clear.   Reviewed and discussed lab work of 6/26/2024 in detail with the pt's daughter.   I prescribed Fosfomycin 3 GM oral packet, once every 3 days. If onset bowel irritability occurs patient informed to call.  Patient denies ever having a seizure. Pt will schedule a telehealth visit in 1 week.    07/05/2024: Ms. CLAUDIO MCDOWELL presents today for a follow up audio only telephone visit. The pt was located at home, [address] [place] [address], and I was located in my office in [place], [place]. Visit was conducted with her daughter, Tabatha. She informs me that she just took the first packet of Fosfomycin yesterday. There have been no problems thus far. She does report that the catheter was mispositioned last night and was not draining for a few hours. Her and the aide were able to push it in more and urine drained. The visiting nurse came this morning. She irrigated the catheter and ensured it was in good position.    Continue Fosfomycin and call if there are any problems.  They are scheduled for a telehealth visit on 7/8/2024 for reassessment.    07/08/2024: Ms. CLAUDIO MCDOWELL presents today for a follow up audio-visual telehealth visit. The pt was located at home, [address] [place] [address], and I was located in my office in [place], [place]. The visit was conducted mostly with her daughter Tabatha. The patient has been taking Fosfomycin 3 GM oral packet once every 3 days. Her daughter reports she looks a bit improved. Her urine looks good but has since the beginning. She feels Fosfomycin makes her a little sick. Had some abdominal pain over the weekend but has not complained today. She was having anxiety but her oxygen is good. Her temperature is 97.1. She is not leaking around the catheter at all. She is a bit stressed today and someone close to them passed away and her daughter had to inform her of this today. At the end of the visit I spoke to  and she informs me she is not feeling so bad.  Continue Fosfomycin (last dose this Wednesday).  Patient's visiting nurse will collect a urine specimen this Thursday. It will be sent out for UA, culture, fungus culture, and cytology.  Though the patient's daughter Tabatha is very concerned about her mother and a bit depressed and anxious over it, her mother is doing quite well. She did not seem toxic to me. She made sensible conversation. She is quite hard of hearing but her daughter would repeat things so she could hear me and would respond well. From a urinary tract standpoint, she is doing well.  Patient will have a telehealth visit after next urine specimen to review and discuss results.    07/12/2024: Ms. MCDOWELL is a 89 year old female presenting today for an audio only telehealth visit for which she gave verbal permission. The patient was located in her home at [address] [place] [address]. I was located in my office on [place], [place]. She was accompanied by her daughter who helped with the visit.  Patient is bedbound and with a sales catheter.  On 6/26/24 patient was found with 150 wbc/hpf. Cloudy urine. No bacteria.  given that patient is with indwelling catheter, I would usually not treat her with abx right away, but I saw her and she looked pale, sluggish, and not cheerful as usual.  She was started on Fosfomycin once every 3 days. After course of Fosfomycin, patient was found with only 22 wbc/hpf, and few bacteria.  Her daughter worries today about low urine output since patient started Fosfomycin. She admits patient has been experiencing nighttime diarrhea which is expected with the medication. I explain that is consistent with lower production of urine as patient loses water during nighttime diarrhea,  patient's vitals taken by her daughter have been reportedly normal BP:120 /60. Pulse in the 70s Urine output about 1.5L a day. Pt will follow up next week for video telehealth.   08/06/2024: Ms. CLAUDIO MCDOWELL presents today for a follow up audio only telephone visit for which she gave permission for. The pt was located at home, [address] [place] [address], and I was located in my office in [place], [place]. Visit was conducted with her daughter, Tabatha Deluna. At the time I called at 2:00 PM the nurse was there to change her catheter. Her daughter reports that it looks as if there is a ball in her mother's stomach. This past Friday, she showed a tremendous amount of stool in her colon on x-ray. She denies seeing any blood in the stool that has been passing. She denies any gross hematuria. Urine has looked good. Her daughter notes that last Wednesday, it appeared her mother had a TIA. She opted to not have her mom taken to the hospital, so they are not sure if that is exactly what happened.  She appears less confused than last week according to her daughter.  Patient should proceed with catheter change today. They should record how much urine they collect when it is changed. Urine will be sent for UA, culture, and cytology.  Her daughter was advised to continue managing her mother's constipation. She states she is doing an enema tomorrow.  Patient will have a telehealth visit on Friday to review and discuss urine test results.    08/09/2024: Ms. MCDOWELL is a 89 year old female presenting today via telehealth using real time 2 way audio-visual technology. We utilized [employer] teams telemetry doc platform. The patient, Ms. MCDOWELL, was located in her home at [address] [place] [address] at the time of the visit. The provider, BENIGNO RAYMUNDO, was located in his office on [place], [place]. Verbal Consent was given by the patient on 08/09/2024 and my scribe served as witness. The patient was accompanied by her daughter  who participated in the telehealth encounter. Patient is bedbound and with an indwelling sales catheter. She was recently treated for UTI. On 6/26/24 patient was found with 150 wbc/hpf. cloudy urine. No bacteria.  After course of abx, patient reports today for review of new labs findings. I reviewed and discussed the patient's pertinent lab works. On 8/6/24 Urine Analysis showed 10 wbc/hpf (down from 150). Urine is now clear. Specific Gravity was 1.010 indicating adequate hydration. No bacteria seen. Urine Cytology on 8/6/24 was negative for malignant cell. Urine Culture on 8/6/24  showed presence of 3 organisms, none being predominant, possibly colonizers, or probable collection contamination. No infection.   Patient will continue providing Urine specimen monthly.  She will continue to have her indwelling catheter changed monthly.   Preparation, audio-visual visit, and coordination of care took: 30 mins

## 2024-08-10 NOTE — ASSESSMENT
[FreeTextEntry1] : 10/12/2023: Ms. MCDOWELL presents today for a follow up audio only telehealth visit for which they gave permission for. The patient was located at home [address] [place] [address] and I was located in my office in [place], [place]. The 10/2/23 UA showed proteinuria 30 mg/dL, moderate blood, large Leukocyte Esterase, 16/HPF of WBC. UA showed improvement as patient has chronic sales drainage. Her daughter states patient is okay considering coming home from the hospital. Most pain is in her back/shoulders and some of her back area is red/raw.  Recently her BP has increased to 180/100 and was prescribed Losartan 50mg twice daily. Urine is a clear color, denies cloudy and dark appearance.  Reviewed and discussed laboratory work of 10/2/23 which She obtained prior to today's visit as requested. Pt will go to her nearest [facility] lab for blood work and a urine sample which will be sent for urinalysis, urine culture, and urine cytology. Pt will schedule a telehealth visit after lab work to discuss results.   11/01/2023: Ms. MCDOWELL presents today for a follow up audio only telehealth visit for which they gave permission for. The patient was located at home [address] [place] [address] and I was located in my office in [place], [place]. The 10/13/23 CMP revealed creatinine at 0.58 and low ALT at 8 U/L. Patient was admitted into [facility] on 10/27/23 for sepsis/UTI. Her daughter states she may be released tomorrow. Daughter states during the day the patient's urine was normal, but she did have chills and a fever.   As the patient recently had a course of antibiotics, we suggested patient have catheter removed in hospital, as we will to observe bladder emptying and skin while the catheter is removed. If abnormalities arise, we will place the catheter back in.  If the daughter is to proceed with the plan, we will not discharge patient until a PVR is taken and until pt is able to void on her own the next morning. Hospitalist should weigh the diaper before and after and I will calculate it. If stool is present, then we will not be able to do so.  The daughter will have the Hospitalist contact me about patient.  Hospitalist has called at 4:06 pm. She states the urination looks contaminated and culture showed E-coli.  They will bladder scan her every hour.  Hospitalist will call tomorrow with update on patient and inform me on her fluid level.  Advised daughter to obtain a thermometer to observe temperature.    11/03/2023: Ms. MCDOWELL is a 89 year old female presenting today for an audio only telehealth visit for which she gave verbal permission. The patient was located in her home at [address] [place] [address]. I was located in my office on [place], [place]. She was accompanied by her daughter. She says her mother was supposed to be released today. Initially she was able to void. Because of constipation she was given a couple of enemas. She had a large evacuation and since then she has not been able to void again. The daughter says her latest PVR was 340 cc. She is waiting for her mother to be rescanned again.    Plan: If she is unable to void, she will be discharged with a sales catheter.   11/07/2023: Ms. CLAUDIO MCDOWELL presents today for a follow up audio only telephone visit for which she gave permission for. The pt was located at home, [address] [place] [address], and I was located in my office in [place], [place]. The visit was conducted with the patient's daughter. The patient came home from the hospital on Sunday. The catheter was put back in without another attempt at a void trial. The patient does have issues with constipation. She is able to swallow liquids. Her daughter has had to crush some of her pills to put them in a liquid, however the pt finds it to taste bitter and has some discomfort swallowing the chunks of crushed pills. The hospital discharged her with a 7 day supply of Cefuroxime. Was started yesterday so she has had two doses.    We discussed the possibility of a doing a trial of void. At this time I am recommending her mother settle back in to being home and we can readdress this possibility and how the patient and her daughter would like to proceed at the time of her next visit. Next catheter change will be on 12/5 should they choose to proceed with the catheter.   I looked it up on [employer] and Cefuroxime is available in the [place] as a liquid suspension. Given that this would be easier for the patient, I went to prescribe it for her which she could take rather than the pills. [employer] did not allow me to electronically prescribe it in a liquid suspension so I called the patient's pharmacy to give a verbal prescription for 200 ml of it for 20 ml BID for 5 days. I left a VM for the pharmacy as there was no answer. I requested they call me back if they could accept this prescription or if they could not. The pharmacy called me back shortly after and they told me that it is not currently offered in a liquid suspension, however he called the [facility] next door and they are willing to compound it for 40$. I saw this as acceptable and thanked him for going the extra mile and calling next door. He will contact the patient's daughter to inform her.   Patient and her daughter will have a telehealth visit in 2 weeks for reassessment, sooner if clinically indicated.   11/20/2023: Ms. MCDOWELL presents today for a follow up audio only telehealth visit for which they gave permission for. The patient was located at home [address] [place] [address] and I was located in my office in [facility]. Daughter states patient is okay. She notes giving pt suppositories and 7 hours later she had mixed bowel movements. However, she did not have a bowel movement since Saturday. Patient is given pedialyte and water frequently. Today's temperature was 95 as before it was 96-97. Pt did not drink any liquids shortly after taking temperature. Daughter denies giving pt solace but she usually gives Miralax as she has done so today. Senady's urine color is clear as it  has been clear for some time.  Daughter states her stomach was distended. Bp has stabilized since being in the hospital but last night and today it  was higher than normal being being 168/88 before medication.   Advised daughter to give patient Miralax tonight if there is no bowel movements.  Advised her to take Blood pressure tonight.  Pt will schedule a telehealth visit  11/29/2023: Ms. CLAUDIO MCDOWELL presents today for a follow up audio only telephone visit for which she gave permission for. The pt was located at home, [address] [place] [address], and I was located in my office in [place], [place]. Visit was conducted with her daughter, Andree. She was informed yesterday by her house call physicians of the results of her urine culture (emailed from core lab showing >100,000 CFU/ml Pseudomonas aeruginosa susceptible only to amikacin, Ciprofloxacin, imipenem, levofloxacin, meropenum. Resistant to Ceftazidime, Pip/tazo. Intermediate for aztrenam, cefepime. They discussed that patient is having increasing oxygen requirements, although SPO2 ok on supplemental O2 (prior to this patient needed only occasional O2 suppl. every few days). 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 The want to treat with ciprofloxacin, 7 day course. Prescription sent to pharmacy. Her daughter inquired today on if she should be treated. She states last week her mother seemed very not herself, however today she seems better in terms of mental status and oxygenation. She has been giving her senakot and miralax.   I explained that given her O2 issues and confusion over the last few days, I advised to treat with Cipro despite her daughter feeling she is better today. If there are any problems with crushing the pills for her or if she is not reacting to it well, she was advised to call right away. I posed the option of giving it as an oral solution if need be.    12/21/2023: Ms. MCDOWELL presents today for a follow up audio-visual telehealth visit for which they gave permission for. The patient was located at home [address] [place] [address] and I was located in my office in [place], [place]. The patient obtained lab work 12/4/23 prior to today's visit. Urinalysis is improved and showed trace proteinuria, small Leukocyte Esterase, 6/HPF of WBC and 7/LPF of Cast. Urine culture shows no significant growth, <10,000 CFU/mL Normal Urogenital Khadijah, which is very good. Her daughter expresses concern as recently patient is experiencing a surplus of looser and larger amounts of bowel movements that is escaping to the vaginal area, thus causing infections. Urine is normal in color, denies fever. Patient is occasionally more fatigued than usual. Denies distended appearance of the abdomen. Admits to only 1 spasm around the catheter this month. Next sales change will be in 2 weeks.  Towards the end of visit, patient was shown on Facetime, appearing well oriented x 3, normal pigmentation, lips are moist, and smile is symmetrical. I am very impressed by how well the patient looks and speaks. It was more than just pleasantries, she inquired about personal questions. Overall, I am very pleased.   Reviewed and discussed laboratory work of 12/4/23 which She obtained prior to today's visit as requested. Advised daughter to have care team to place support under the pelvis, so pelvis is tilted up at a higher level of the anus.   As long as stool is pasty and firm, we advised Andree to give patient MiraLAX. If stool is loose and unfirm, she should decrease the amount of MiraLAX.  Pt will provide a urine sample which will be sent for urinalysis, urine culture, and urine cytology. Pt will schedule a telehealth visit in 2 weeks for reassessment.   12/27/2023: Ms. CLAUDIO MCDOWELL presents today for a follow up audio only telephone visit for which she gave permission for. The pt was located at home, [address] [place] [address], and I was located in my office in [place], [place]. She is accompanied by her daughter. Patient provided a surveillance urine specimen on 12/22/2023. She has chronic sales catheter drainage. UA revealed small LEC, positive for nitrites, 6 WBC/HPF, no RBC seen, moderate bacteria/HPF, granular casts present, and yeast like cells present. Urine culture grew >100,000 CFU/ml Enterococcus faecalis and 50,000 - 99,000 CFU/mL Streptococcus mitis/oralis group. Urine cytology was negative for high grade urothelial carcinoma. Few mature squamous cells, rare benign urothelial cells and abundant fungal organisms morphologically consistent with Candida species present. Patient's daughter reports that her urine looks clear and a pale yellow color. She feels her mom still has a lot of anxiety and some confusion. She does report that her catheter bag sits in feces due to the patient's position. They tried to tilt her pelvis to avoid this but states it's easier said than done. She states there is no feces in the catheter or in the bag. BM are not loose, she describes them as pasty. Has about 2-3 BM a day.    Reviewed and discussed lab work of 12/22/2023 in detail with the pt.  Future urine orders were put in including fungal urine culture. Pt's daughter was advised to continue to try and alter the patient's position to avoid the catheter sitting in feces.  Patient should have a telehealth visit in 3 months, sooner if clinically indicated.    01/05/2024: Ms. MCDOWELL is a 89 year old female presenting today for an audio only telehealth visit for which she gave verbal permission. The patient was located in her home at [address] [place] [address]. I was located in my office on [place], [place]. She was accompanied by her daughter who helped with the visit. She reports that a nurse came for a catheter change, and only succeeded on the third attempt. She says it's the same nurse who has been coming for 5 months now, and usually she encounters no difficulties. Now that the catheter has been changed, the patient is able to void.   Plan: Pt's daughter will keep us updated. If the patient experiences fever or chill, she will call ER and inform the office.   01/17/2024: Ms. CLAUDIO MCDOWELL presents today for a follow up audio only telephone visit for which she gave permission for. The pt was located at home, [address] [place] [address], and I was located in my office in [place], [place]. She is accompanied by her daughter, Tabatha Deluna. Patient provided a urine specimen on 1/5/2024 for surveillance. UA revealed trace blood by urine and moderate LEC. This is excellent for her as she has chronic sales catheter drainage. Urine culture grew 50,000-99,000 CFU/ml Pseudomonas aeruginosa. Urine cytology was negative for high grade urothelial carcinoma. Few mature squamous cells, rare benign urothelial cells and abundant fungal organisms morphologically consistent with Candida species present. The patient's daughter reports that her mother is "different". She reports that she is hearing things a lot now. She states that usually the things are negative. For example, she will say "Why is Ac saying my oxygen isn't working properly?". It is clear what she is saying but fairly random. She is not seeing anything; it is strictly auditory.   Reviewed and discussed lab work of 1/5/2024 in detail with the pt's daughter. She was informed that given she has chronic sales catheter drainage, this specimen was excellent. If she had more WBC/HPF, I would be concerned, however she is in very good shape from a urologic standpoint. Her daughter was advised to speak with her mother's PCP about hearing things and her AMS as it is not urologic in origin or related to pending sepsis. I provided her the name of a neurologist, Dr.Li-Fen Tripathi if she would like to go for neuro consultation.  I once again re-iterated that at this time, given her skin breakdown, I recommend continued sales catheter drainage. Her daughter brought up an external urine collection apparatus once again, however I do not recommend we try that at least until her skin is in good condition. I also explained that if her mother were to go into urinary retention, this sort of device would not drain the urine from her.  Pt's daughter was advised to give her hyoscyamine for bladder spasms.  Patient will have a telehealth visit in 1-2 months, sooner if clinically indicated.    01/22/2024: Ms. CLAUDIO MCDOWELL presents today for a follow up audio only telephone visit for which she gave permission for. The pt was located at home, [address] [place] [address], and I was located in my office in [place], [place]. Visit was conducted with the patient's daughter, Tabatha Deluna. She informs me that she did not realize she was out of hyoscyamine sulfate for bladder spasms. She has been getting bladder spasms after the aide leaves and is embarrassed by this as she thinks shes wetting the bed. Her daughter reports that her mother asks in the morning "why am I bothering taking my medications, I am going to die anyway".   Renewed hyoscyamine sulfate 0.125 mg sublingual tablets, give one tablet under the tongue as needed. I recommend she gives it about 15 minutes before the aide comes. I suggest she give it for 10 days in a row to see if we can calm things down and then stop. If it returns, we can try again for another month or two. I did speak with the patient at the end of the visit. She seemed to know who I was and understood me. I spoke to her about the bladder spasms she is experiencing. She was on board with trying hyoscyamine and thanked me for speaking with her.  I recommend the patient's daughter speak with her PCP about potential anti-depressant medication.   Patient will have a telephone visit in 2 weeks for reassessment, sooner if clinically indicated.    02/05/2024: Ms. CLAUDIO MCDOWELL presents today for an audio visual telehealth visit for which she gave permission for. The patient was located at home at [address] [place] [address] and I was located at my office in [place], [place]. Pt's daughter states she was not expecting my call today. I informed her that she was on my schedule and that if she would like we can talk. She informs me that hyoscyamine does not seem to be working for her mother and that some days she has bladder spasms and some she doesn't. Pt is currently not having diarrhea. She is due for a catheter change tomorrow. Patient has not started any medications for the psychological issues she has been having.    Pt will discontinue use of hyoscyamine. I prescribed the patient Trospium 20 mg, one tablet once daily at bedtime. I informed the patient there may be constipation as a side effect.   Orders for UA, urine culture, and urine cytology were put in for her daughter to bring the patient's sample to her nearest  lab.  Patient will have a telehealth visit this Friday after the catheter change.     02/09/2024: Ms. MCDOWELL is a 89 year old female presenting today for an audio only telehealth visit for which she gave verbal permission. The patient was located in her home at [address] [place] [address]. I was located in my office on [place], [place]. She was accompanied by her daughter who helps with the visit.   The patient has been with a chronic sales catheter since July 2021 when she fractured her foot. She reports today for a follow up.  I reviewed and discussed the patient's pertinent lab works. Urine Culture shows 10,000 - 49,000 CFU/mL Pseudomonas aeruginosa 10,000 - 49,000 CFU/mL Citrobacter freundii complex I explain to the patient that patients with chronic sales catheter develop bacteria in the urine, that may come from the skin, and it is not significant clinically unless infection symptoms ensue. In addition, the patient reports she was not taking antibiotics when the sample was collected, and the colonies were less than 100 000.   Pt will start Trospium 20 mg.  She will provide urine for UA, Urine Cytology and Urine Culture. She will have a follow up in one month; earlier if needed.    03/13/2024: Ms. CLAUDIO MCDOWELL presents today for a follow up audio only telephone visit for which she gave permission for. The pt was located at home, [address] [place] [address], and I was located in my office in [place], [place]. Visit was conducted with her daughter Tabatha Deluna. Patient provided a urine specimen on 3/4/2024. Patient has a chronic indwelling sales. UA revealed trace blood, large LE, and 9 WBC/HPF. This is quite good considering her sales. Culture grew 50,000 - 99,000 CFU/mL Escherichia coli & >100,000 CFU/ml Citrobacter freundii complex. She does report that after they gave the specimen her mother had a week of stool getting in the vulvar area and she is worried this might travel to the bladder. There is no evidence of that currently. She states the urine is clear and yellow. There has been no drainage around the catheter. She has not had any increased bladder spasms. Patient's daughter reports that her mother is not feeling well today. She has a runny nose and a cough. Home care is arranging for a nasal swab.   Reviewed and discussed lab work of 3/4/2024 in detail with the pt's daughter.  If the patient's daughter notices a change in her urine, she will call promptly.    04/08/2024: Ms. CLAUDIO MCDOWELL presents today for a follow up audio only telephone visit for which she gave permission for. The pt was located at home, [address] [place] [address], and I was located in my office in [place], [place]. Visit was conducted with her daughter Tabatha Deluna. Patient provided a urine specimen on 4/3/2024. UA was completely normal other than moderate LE and 6 WBC/HPF. For having a sales catheter, this is an excellent UA. Urine culture grew >100,000 CFU/ml Citrobacter freundii complex. Urine cytology was negative for high grade urothelial carcinoma. Specimen consists of many lymphoid cells, mature squamous epithelial cells and benign urothelial cells. Patient's daughter reports that  got covid, however she is recovering. She is very tired and still has a cough. She does report that the urine is clear and yellow, however the pt has had many accidents where stool gets near/in the catheter. Her skin remains red but not broken down. Continues use of triple paste.    Reviewed and discussed lab work of 4/3/2024 in detail with the pt. Pt's daughter reassured that this is a very good specimen considering she has a sales catheter.  Will provide another urine specimen at time of next catheter change.  Will have a telehealth visit after next urine specimen, sooner if clinically indicated.     05/08/2024 Ms. CLAUDIO MCDOWELL presents today for a follow up audio-only telehealth visit for which they permitted for. The patient was located at home [address] [place] [address] and I was located in my office in [place], [place]. Patient has chronic indwelling Sales catheter. This urinalysis indicates urine is in very good condition without any evidence for clinically active urinary tract infection at this time. Culture showed >=3 organisms. Probable collection contamination. If I am notified of changes indicating possible clinically significant urinary tract infection, we would re-culture at that time. We will continue to collect surveillance urine specimens for urinalyses and urine cultures at time of catheter changes.  Daughter states her mental status is different.  Reports more confusion, gaps of memory. Denies hallucinations and is much less anxious. Stool is still loose and pt is more fatigued while on Risperidone.   Reviewed and discussed laboratory work of 5/2/24 which She obtained prior to today's visit as requested. At the current time I will not re-culture. If I am notified of changes indicating possible clinically significant urinary tract infection, we would re-culture at that time. We will continue to collect surveillance urine specimens for urinalyses and urine cultures at time of catheter changes. Pt will schedule a telehealth visit.  06/7/2024: Ms. CLAUDIO MCDOWELL presents today for a follow up audio only telephone visit for which she gave permission for. The pt was located at home, [address] [place] [address], and I was located in my office in [place], [place]. Visit conducted with patient's daughter, Tabatha. 6/3/24 UA revealed small blood by dipstick, moderate Leukocyte Esterase, trace proteinuria and 10/HPF of WBC. Culture showed 50,000 - 99,000 CFU/mL Pseudomonas aeruginosa. Daughter reports have pt historian today. Patient is doing okay from urinary standpoint, urine is clear. Admits to breathing difficulties, as Internist prescribed Z-pack (Zithromax) but the daughter is hesitant of proceeding further with the prescription as 1 day before starting her cough seemed improved.  Before cough onset, there was increase in confusion// decline of mental state.    Reviewed and discussed laboratory work of 6/3/24 which She obtained prior to today's visit as requested. I do not believe we should treat at this time, instead reassess with next catheter change.  Patient to continue with respiratory treatment course and should not discontinue as this may create more clinical related issues. Pt will schedule a telehealth visit in 1 month.   06/26/2024: Ms. CLAUDIO MCDOWELL presents today for a follow up audio only telephone visit. Visit was conducted with the patient's daughter, Tabatha. The pt was located at home, [address] [place] [address], and I was located in my office in [place], [place]. Her daughter informs me today that she did not need an appt but did need instructions on irrigating the catheter for the nurse. However, the catheter happened to snap today and needed to be replaced anyway. I apologized for the misunderstanding. She stated that it would be better for me to call the nurse directly to give her the instruction. She had just left the house after changing the patient's catheter. She informs me she took a urine specimen. The nurses name is Marimar and can be reached at [phone].   After I hung up with the patient's daughter, I called Marimar. She informs me she just left after changing the catheter. I informed her it would be good if we could have the catheter irrigated to avoid sediment. She uses a 16 french catheter. She was instructed to disconnect the collecting tube and irrigate with about 60 cc of water. Let it drain out passively. If it does not drain well, she can try again. She can aspirate gently if she needs. If sediment comes out, she should repeat until it is clear. I explained that sometimes if it is a lot, it may take up to a liter to irrigate. I provided her with my phone number in the case she needs help.    07/01/2024: Ms. CLAUDIO MCDOWELL presents today for a follow up audio only telephone visit. The pt was located at home, [address] [place] [address], and I was located in my office in [place], [place]. Visit conducted with patient's daughter, Tabatha. Patient's visiting nurse, Marimar, collected a urine specimen from her on 6/26/2024 when replacing the catheter after it broke. UA revealed cloudy urine, large LE, and 150 WBC/HPF, otherwise normal. Urine culture grew >100,000 CFU/ml Citrobacter freundii and 50,000 - 99,000 CFU/mL Pseudomonas aeruginosa. The patient obtained lab work /24 prior to today's visit. Culture showed >100,000 CFU/ml Citrobacter freundii. 50,000 - 99,000 CFU/mL Pseudomonas aeruginosa. BMP revealed low sodium of 134, potassium 5.4, glucose elevated at 122, creatinine wnl at 0.56. UA showed cloudy appearance, large Leukocyte Esterase, 150/HOF WBC. Patient reports today is not her best day. She sounds suppressed. Daughter reports onset cough and unsure if infection could be both respiratory and bladder. Patient obtained nebulizer prior to visit. Daughter reports urine is currently clear.   Reviewed and discussed lab work of 6/26/2024 in detail with the pt's daughter.   I prescribed Fosfomycin 3 GM oral packet, once every 3 days. If onset bowel irritability occurs patient informed to call.  Patient denies ever having a seizure. Pt will schedule a telehealth visit in 1 week.    07/05/2024: Ms. CLAUDIO MCDOWELL presents today for a follow up audio only telephone visit. The pt was located at home, [address] [place] [address], and I was located in my office in [place], [place]. Visit was conducted with her daughter, Tabatha. She informs me that she just took the first packet of Fosfomycin yesterday. There have been no problems thus far. She does report that the catheter was mispositioned last night and was not draining for a few hours. Her and the aide were able to push it in more and urine drained. The visiting nurse came this morning. She irrigated the catheter and ensured it was in good position.    Continue Fosfomycin and call if there are any problems.  They are scheduled for a telehealth visit on 7/8/2024 for reassessment.    07/08/2024: Ms. CLAUDIO MCDOWELL presents today for a follow up audio-visual telehealth visit. The pt was located at home, [address] [place] [address], and I was located in my office in [place], [place]. The visit was conducted mostly with her daughter Tabatha. The patient has been taking Fosfomycin 3 GM oral packet once every 3 days. Her daughter reports she looks a bit improved. Her urine looks good but has since the beginning. She feels Fosfomycin makes her a little sick. Had some abdominal pain over the weekend but has not complained today. She was having anxiety but her oxygen is good. Her temperature is 97.1. She is not leaking around the catheter at all. She is a bit stressed today and someone close to them passed away and her daughter had to inform her of this today. At the end of the visit I spoke to  and she informs me she is not feeling so bad.  Continue Fosfomycin (last dose this Wednesday).  Patient's visiting nurse will collect a urine specimen this Thursday. It will be sent out for UA, culture, fungus culture, and cytology.  Though the patient's daughter Tabatha is very concerned about her mother and a bit depressed and anxious over it, her mother is doing quite well. She did not seem toxic to me. She made sensible conversation. She is quite hard of hearing but her daughter would repeat things so she could hear me and would respond well. From a urinary tract standpoint, she is doing well.  Patient will have a telehealth visit after next urine specimen to review and discuss results.    07/12/2024: Ms. MCDOWELL is a 89 year old female presenting today for an audio only telehealth visit for which she gave verbal permission. The patient was located in her home at [address] [place] [address]. I was located in my office on [place], [place]. She was accompanied by her daughter who helped with the visit.  Patient is bedbound and with a sales catheter.  On 7/11/24 patient was found with 150 wbc/hpf. cloudy urine. No bacteria.  given that patient is with indwelling catheter, I would usually not treat her with abx right away, but I saw her and she looked pale, sluggish, and not cheerful as usual.  She was started on Fosfomycin once every 3 days. After course of Fosfomycin, patient was found with only 22 wbc/hpf, and few bacteria.  Her daughter worries today about low urine output since patient started Fosfomycin. She admits patient has been experiencing nighttime diarrhea which is expected with the medication. I explain that is consistent with lower production of urine as patient loses water during nighttime diarrhea,  patient's vitals taken by her daughter have been reportedly normal BP:120 /60. Pulse in the 70s Urine output about 1.5L a day. Pt will follow up next week for video telehealth.   08/06/2024: Ms. CLAUDIO MCDOWELL presents today for a follow up audio only telephone visit for which she gave permission for. The pt was located at home, [address] [place] [address], and I was located in my office in [place], [place]. Visit was conducted with her daughter, Tabatha Deluna. At the time I called at 2:00 PM the nurse was there to change her catheter. Her daughter reports that it looks as if there is a ball in her mother's stomach. This past Friday, she showed a tremendous amount of stool in her colon on x-ray. She denies seeing any blood in the stool that has been passing. She denies any gross hematuria. Urine has looked good. Her daughter notes that last Wednesday, it appeared her mother had a TIA. She opted to not have her mom taken to the hospital, so they are not sure if that is exactly what happened.  She appears less confused than last week according to her daughter.    Patient should proceed with catheter change today. They should record how much urine they collect when it is changed. Urine will be sent for UA, culture, and cytology.   Her daughter was advised to continue managing her mother's constipation. She states she is doing an enema tomorrow.   Patient will have a telehealth visit on Friday to review and discuss urine test results.    Duration of telephone visit was 12 minutes.

## 2024-08-10 NOTE — HISTORY OF PRESENT ILLNESS
[FreeTextEntry1] : Claudio Mcdowell is currently 89 years of age.  Her daughter Tabatha Deluna is devoted to her care.  She is very concerned about her mother and has been so for many years.  She becomes very anxious at times and that is concerning her mother.  Claudio Mcdowell lives with her daughter.  Claudio Mcdowell has been bedbound for several years.  The patient had developed a sacral decubitus ulcer while in a nursing facility.  A Sales catheter was placed because of fecal incontinence and concern that urine mixed with the feces would contaminate the sacral decubitus ulcer.  Once the patient left the nursing facility and will be with her daughter.  The sacral decubitus ulcer has finally healed.  I have discussed removing the Sales catheter with the daughter.  However as the patient has fecal incontinence there is concern about the urine mixing with the feces and being more likely to cause skin problems.  For now we will leave the Sales catheter in.  The patient has had clinically symptomatic urinary tract infections.  He clinically significant infections usually start with increased urination around the Sales catheter due to bladder spasms.  Bladder spasms have been occurring for staff.  The patient is positioned properly in in bed and the personal care attendant leaves for the day.  We have managed to prevent this with the administration of sublingual hyoscyamine.  We do periodic surveillance urine analysis and urine culture tests at the time the Sales catheter is changed by visiting nurse.  He also performed a times of increased spasms or purulence of the urine or change in mental status,  On October 11, 2022 visiting nurse using an order I have previously placed at the request of the daughter sent urine for urine cytology which proved to be negative for malignant cells, urine culture which grew Greater than 100,000 and E. coli that was sensitive to all antibiotics tested.  Urinalysis looked quite good for urine taken from a Sales catheter that was used for chronic Sales catheter drainage.  Ileukocyte esterase was large but nitrites were negative.  There were 2 epithelial cells per high-power field.  There were only 10 white blood cells per high-power field and only 2 red blood cells per high-power field on microscopic exam there were no bacteria seen and there were no hyaline casts.  Specific gravity was 1.010 which shows good hydration and this bedbound woman cared for diligently by her daughter.  Blood by dipstick was trace.  There was no protein glucose or ketones in the urine.  There is no bilirubin and no urobilinogen.  An important portion of the visit was my review with the daughter about bladder spasms and urination around the catheter.  Urine appearance and urine analysis have been clear.  The urine was clear again today.  The patient has significant short-term memory deficit.  She does have some useful short-term memory.  Her long-term memory remains very much intact.  She is very pleasant and to make satisfactory conversation.  She does admit to sometimes not remembering something.  Her complexion was good and there was no pallor.  Facial movements were symmetric.  Cranial nerves were intact.  I was not able to assess the olfactory nerve as this was a telehealth visit.    11/04/2022: Ms. MCDOWELL is a 89 year old female presenting today for a telehealth visit. She was accompanied by her daughter who helped with the visit. They gave permission for an audio only telehealth conference. The patient was located in her home in [place], [place]. I was located in my office on [place], [place]. Today her daughter reports the pt experienced rare urinary leaking around the catheter due to bladder spasms. She also reports her systolic pressure was around 140 last week. I offered Gemtesa to manage the bladder spasms and the daughter was cautious about more medications. I informed her if the urinary leaking are only once every 2 weeks or so, she does not have to medicate. The daughter was agreeable to monitor the occurrence of urinary leaking over the next 4 weeks and assess the discomfort it causes the pt. She denies any other urinary issues.  03/13/2023: Ms. MCDOWELL is a 89 year old female presenting today for a telehealth visit. She was accompanied by her daughter who helped with the visit. They gave permission for an audio only telehealth conference. The patient was located in her home in [place], [place]. I was located in my office on [place], [place]. The patient obtained an US prior to today's visit 3/9/23 which revealed: Comparison is made with the exam of 2/28/22. Transabdominal ultrasound of the abdomen was performed with color flow imaging. The examination is limited in evaluation. The visualized aorta and inferior vena cava show no abnormality. The pancreas is obscured by overlying bowel gas. The liver measures 12.4 cm with homogeneous echotexture. No intrinsic mass and no intra-or extrahepatic ductal dilatation. There is hepatopedal flow of the main portal vein. No gallstones, pericholecystic fluid, wall thickening or positive sonographic Melendez sign. The common bile duct measures 0.3 cm. The right kidney measures 9.2 x 5.6 x 3.0 cm with a nonobstructing 1.2 cm stone in the upper pole. Multiple additional subcentimeter calcifications are seen. These were not seen on previous exam. No hydronephrosis or renal mass. The left kidney measures 9.3 x 3.9 x 3.5 cm. There is a 2.1 x 2.4 x 2.0 cm cyst in the medial mid pole, not seen on previous study. No hydronephrosis or renal calcification. The spleen measures 8.2 cm and show no abnormality. There is a small right pleural effusion, stable. IMPRESSIONS: Multiple nonobstructing stones of the right kidney with no hydronephrosis. 2.4 cm cyst of the midpole left kidney. Small right pleural effusion, stable. The daughter has answered the phone and reports the patient's urine does not get dark. She is not aware of the amount of water she gives her mother. I requested she measure's this from now on as the pt has stones and needs to increase water consumption. Her daughter reports this week the pt has experienced more spasms than normal and believes this may be due to severe constipation. She provided the pt with an Enema to aide in this situation. She is very concerned but I informed her the Enema may have stimulated the spasms and we should give her a few days to clear up and re-evaluate.   03/22/2023: Ms. CLAUDIO MCDOWELL presents today for an audio visual telehealth visit for which she gave permission for. The patient was located at home at [address] [place] [address] and I was located at my office in [place], [place]. She is accompanied by her daughter whom most of the visit was conducted with. Her daughter has been keeping track of her mother's water consumption. She is averaging about 58 oz of water in a 24 hr period. Additionally, she is drinking juice and ensure nutrition drinks. She does not consume any caffeine. She is on furosemide 40 mg. I said hello to the patient at the end of the visit and she is looking well. She reports feeling no pain at the current moment.   3/29/2023:  Patient Claudio Mcdowell and daughter Tabatha Deluna were home in [place], [place] and I was in my office in [facility].  Patient and daughter gave permission for a FaceTime telehealth visit.  They preferred this to [employer].  The patient looks good today.  Her complexion was good there is no pallor.  Smile was symmetric her affect was normal she appeared happy she could make conversation it was appropriate.  She said that she currently had no pain.  When I asked questions that after 3/2 how she had been recently the sometimes she hesitated and deferred to her daughter for cancer compatible with her impaired short-term memory.  Long-term memory remains good she recognizes me once know so I am does ask questions about things like her bladder as she was worried about it her blood tests.  I assured her the results were satisfactory.  04/18/2023: Ms. MCDOWELL is a 89 year old female presenting today for an audio and visual telehealth visit for which she gave verbal permission. We utilized [employer] teams telemetry [employer] platform. The patient was located in her home at [address] [place] [address]. I was located in my office on [place], [place]. She was accompanied by her daughter who helped to serve as a historian. She reports discomfort in her eyes. She describes the discomfort as a feeling of sand. She opened her eyes for me, and they do not look red. Pupils look normal. She reports the right eyes bothers her significantly more than the left. I advise that the patient tries lubricating drops and it if does not feel better she will notify her visiting nurse who is due to come next week for a sales catheter change. She reports episode of bladder spasms. She denies gross hematuria. She describes the urine as barely yellow. I reviewed and discussed her  latest pertinent lab on 04/12/2023 which shows "alkaline phosphate normal at 89. Creatinine was normal at 0.62 mg/dL. WBC normal at 5.01. RBC normal at 3.87".   05/09/2023: Ms. MCDOWELL presents today for a follow up audio only telehealth visit for which they gave permission for. She was accompanied by her daughter who helped to serve as a historian. The patient was located at home [address] [place] [address] and I was located in my office in [place], [place]. The patient obtained lab work 5/2/23 prior to today's visit. The UA revealed microscopic hematuria, 5/HPF RBC, 27/HPF WBC. The patient demonstrated great hydration as the specific gravity is 1.006. The Sales catheter was changed May 2, 2023 with no clear urine in the bag. While changing the diaper the aide reports blood in the diaper. The daughter states last week her mother experienced spasms but as of two days ago she is now fine. However, she states the patient is more fatigued. She denies the pt having a temperature. I assured her because her mother is post menopausal any abrasion to the labia can cause a fissure.   06/02/2023: Ms. MCDOWELL is a 89 year old female presenting today for an audio and visual telehealth visit for which she gave verbal permission. We utilized [employer] teams telemetry doc platform. The patient was located in her home at [address] [place] [address]. I was located in my office on [place], [place]. She was accompanied by her daughter Tabatha. She reports that her mother complains of onset dysuria that dissipated on its own and has not recurred since. She reports right sided back pain that is aggravated when she lays on the right side. She provided urine specimen. She notes the urine was very clear. I reviewed and discussed the patient's pertinent lab works. Her latest Urinalysis on 05/30/2023 that shows "60 WBC/HPF. Trace Blood Urine. Specific Gravity Urine 1.007". Urine Culture on the same date shows ">100,000 CFU/ml Escherichia coli and <10,000 CFU/ml Normal Urogenital ángel present". The patient takes Mirtazapine for anxiety at low dose. The daughter reports the patient is experiencing increasing anxiety and is thinking of having the prescriber increasing the dosage.  Her BP runs around 120/60. Her Hemoglobin hematocrit looks good. gaze is conjugated. facial expression looks symmetric. Urine was faint yellow and clear.   06/06/2023: Ms. MCDOWELL presents today for a follow up audio only telehealth visit for which they gave permission for. The patient was located at home [address] [place] [address] and I was located in my office in [place], [place]. She was accompanied by her daughter Tabatha. The daughter states pt is experiencing episodes of a dry cough. An X Ray  of 6/5/23 revealed Mild peribronchial thickening suggesting bronchitis in the right clinical setting with no focal pneumonia or congestive heart failure. COPD with chronic lung changes. The mental status is the same as usual but her BP has elevated some. I assured her a little elevation is not as dangerous. The pt continues to experience some urinary frequency and it is a little more concentrated.   10/12/2023: Ms. MCDOWELL presents today for a follow up audio only telehealth visit for which they gave permission for. The patient was located at home [address] [place] [address] and I was located in my office in [place], [place]. The 10/2/23 UA showed proteinuria 30 mg/dL, moderate blood, large Leukocyte Esterase, 16/HPF of WBC. UA showed improvement as patient has chronic sales drainage. Her daughter states patient is okay considering coming home from the hospital. Most pain is in her back/shoulders and some of her back area is red/raw.  Recently her BP has increased to 180/100 and was prescribed Losartan 50mg twice daily. Urine is a clear color, denies cloudy and dark appearance.  11/01/2023: Ms. MCDOWELL presents today for a follow up audio only telehealth visit for which they gave permission for. The patient was located at home [address] [place] [address] and I was located in my office in [place], [place]. The 10/13/23 CMP revealed creatinine at 0.58 and low ALT at 8 U/L. Patient was admitted into [facility] on 10/27/23 for sepsis/UTI. Her daughter states she may be released tomorrow. Daughter states during the day the patient's urine was normal, but she did have chills and a fever.   11/03/2023: Ms. MCDOWELL is a 89 year old female presenting today for an audio only telehealth visit for which she gave verbal permission. The patient was located in her home at [address] [place] [address]. I was located in my office on [place], [place]. She was accompanied by her daughter. She says her mother was supposed to be released today. Initially she was able to void. Because of constipation she was given a couple of enemas. She had a large evacuation and since then she has not been able to void again. The daughter says her latest PVR was 340 cc. She is waiting for her mother to be rescanned again.   11/07/2023: Ms. CLAUDIO MCDOWELL presents today for a follow up audio only telephone visit for which she gave permission for. The pt was located at home, [address] [place] [address], and I was located in my office in [place], [place]. The visit was conducted with the patient's daughter. The patient came home from the hospital on Sunday. The catheter was put back in without another attempt at a void trial. The patient does have issues with constipation. She is able to swallow liquids. Her daughter has had to crush some of her pills to put them in a liquid, however the pt finds it to taste bitter and has some discomfort swallowing the chunks of crushed pills. The hospital discharged her with a 7 day supply of Cefuroxime. Was started yesterday so she has had two doses.   11/20/2023: Ms. MCDOWELL presents today for a follow up audio only telehealth visit for which they gave permission for. The patient was located at home [address] [place] [address] and I was located in my office in [facility]. Daughter states patient is okay. She notes giving pt suppositories and 7 hours later she had mixed bowel movements. However, she did not have a bowel movement since Saturday. Patient is given pedialyte and water frequently. Today's temperature was 95 as before it was 96-97. Pt did not drink any liquids shortly after taking temperature. Daughter denies giving pt solace but she usually gives Miralax as she has done so today. Senady's urine color is clear as it  has been clear for some time.  Daughter states her stomach was distended. Bp has stabilized since being in the hospital but last night and today it  was higher than normal being being 168/88 before medication.   11/29/2023: Ms. CLAUDIO MCDOWELL presents today for a follow up audio only telephone visit for which she gave permission for. The pt was located at home, [address] [place] [address], and I was located in my office in [place], [place]. Visit was conducted with her daughter, Andree. She was informed yesterday by her house call physicians of the results of her urine culture (emailed from core lab showing >100,000 CFU/ml Pseudomonas aeruginosa susceptible only to amikacin, Ciprofloxacin, imipenem, levofloxacin, meropenum. Resistant to Ceftazidime, Pip/tazo. Intermediate for aztrenam, cefepime. They discussed that patient is having increasing oxygen requirements, although SPO2 ok on supplemental O2 (prior to this patient needed only occasional O2 suppl. every few days). 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 The want to treat with ciprofloxacin, 7 day course. Prescription sent to pharmacy. Her daughter inquired today on if she should be treated. She states last week her mother seemed very not herself, however today she seems better in terms of mental status and oxygenation. She has been giving her senakot and miralax.   12/21/2023: Ms. MCDOWELL presents today for a follow up audio-only telehealth visit for which they gave permission for. The patient was located at home [address] [place] [address] and I was located in my office in [place], [place]. The patient obtained lab work 12/4/23 prior to today's visit. Urinalysis is improved and showed trace proteinuria, small Leukocyte Esterase, 6/HPF of WBC and 7/LPF of Cast. Urine culture shows no significant growth, <10,000 CFU/mL Normal Urogenital Ángel, which is very good. Her daughter expresses concern as recently patient is experiencing a surplus of looser and larger amounts of bowel movements that is escaping to the vaginal area, thus causing infections. Urine is normal in color, denies fever. Patient is occasionally more fatigued than usual. Denies distended appearance of the abdomen. Admits to only 1 spasm around the catheter this month. Next sales change will be in 2 weeks.  Towards the end of visit, patient was shown on Facetime, appearing well oriented x 3, normal pigmentation, lips are moist, and smile is symmetrical. I am very impressed by how well the patient looks and speaks. It was more than just pleasantries, she inquired about personal questions. Overall, I am very pleased.   12/27/2023: Ms. CLAUDIO MCDOWELL presents today for a follow up audio only telephone visit for which she gave permission for. The pt was located at home, [address] [place] [address], and I was located in my office in [place], [place]. She is accompanied by her daughter. Patient provided a surveillance urine specimen on 12/22/2023. She has chronic sales catheter drainage. UA revealed small LEC, positive for nitrites, 6 WBC/HPF, no RBC seen, moderate bacteria/HPF, granular casts present, and yeast like cells present. Urine culture grew >100,000 CFU/ml Enterococcus faecalis and 50,000 - 99,000 CFU/mL Streptococcus mitis/oralis group. Urine cytology was negative for high grade urothelial carcinoma. Few mature squamous cells, rare benign urothelial cells and abundant fungal organisms morphologically consistent with Candida species present. Patient's daughter reports that her urine looks clear and a pale yellow color. She feels her mom still has a lot of anxiety and some confusion. She does report that her catheter bag sits in feces due to the patient's position. They tried to tilt her pelvis to avoid this but states it's easier said than done. She states there is no feces in the catheter or in the bag. BM are not loose, she describes them as pasty. Has about 2-3 BM a day.   01/05/2024: Ms. MCDOWELL is a 89 year old female presenting today for an audio only telehealth visit for which she gave verbal permission. The patient was located in her home at [address] [place] [address]. I was located in my office on [place], [place]. She was accompanied by her daughter who helped with the visit. She reports that a nurse came for a catheter change, and only succeeded on the third attempt. She says it's the same nurse who has been coming for 5 months now, and usually she encounters no difficulties. Now that the catheter has been changed, the patient is able to void.   01/17/2024: Ms. CLAUDIO MCDOWELL presents today for a follow up audio only telephone visit for which she gave permission for. The pt was located at home, [address] [place] [address], and I was located in my office in [place], [place]. She is accompanied by her daughter, Tabatha Deluna. Patient provided a urine specimen on 1/5/2024 for surveillance. UA revealed trace blood by urine and moderate LEC. This is excellent for her as she has chronic sales catheter drainage. Urine culture grew 50,000-99,000 CFU/ml Pseudomonas aeruginosa. Urine cytology was negative for high grade urothelial carcinoma. Few mature squamous cells, rare benign urothelial cells and abundant fungal organisms morphologically consistent with Candida species present. The patient's daughter reports that her mother is "different". She reports that she is hearing things a lot now. She states that usually the things are negative. For example, she will say "Why is Ac saying my oxygen isn't working properly?". It is clear what she is saying but fairly random. She is not seeing anything; it is strictly auditory.   01/22/2024: Ms. CLAUDIO MCDOWELL presents today for a follow up audio only telephone visit for which she gave permission for. The pt was located at home, [address] [place] [address], and I was located in my office in [place], [place]. Visit was conducted with the patient's daughter, Tabatha Deluna. She informs me that she did not realize she was out of hyoscyamine sulfate for bladder spasms. She has been getting bladder spasms after the aide leaves and is embarrassed by this as she thinks shes wetting the bed. Her daughter reports that her mother asks in the morning "why am I bothering taking my medications, I am going to die anyway".   02/05/2024: Ms. CLAUDIO MCDOWELL presents today for an audio visual telehealth visit for which she gave permission for. The patient was located at home at [address] [place] [address] and I was located at my office in [place], [place]. Pt's daughter states she was not expecting my call today. I informed her that she was on my schedule and that if she would like we can talk. She informs me that hyoscyamine does not seem to be working for her mother and that some days she has bladder spasms and some she doesn't. Pt is currently not having diarrhea. She is due for a catheter change tomorrow. Patient has not started any medications for the psychological issues she has been having.   02/09/2024: Ms. MCDOWELL is a 89 year old female presenting today for an audio only telehealth visit for which she gave verbal permission. The patient was located in her home at [address] [place] [address]. I was located in my office on [place], [place]. She was accompanied by her daughter who helps with the visit.   The patient has been with a chronic sales catheter since July 2021 when she fractured her foot. She reports today for a follow up.   I reviewed and discussed the patient's pertinent lab works. Urine Culture shows 10,000 - 49,000 CFU/mL Pseudomonas aeruginosa 10,000 - 49,000 CFU/mL Citrobacter freundii complex  I explain to the patient that patients with chronic sales catheter develop bacteria in the urine, that may come from the skin, and it is not significant clinically unless infection symptoms ensue. In addition, the patient reports she was not taking antibiotics when the sample was collected, and the colonies were less than 100 000.   03/13/2024: Ms. CLAUDIO MCDOWELL presents today for a follow up audio only telephone visit for which she gave permission for. The pt was located at home, [address] [place] [address], and I was located in my office in [place], [place]. Visit was conducted with her daughter Tabatha Deluna. Patient provided a urine specimen on 3/4/2024. Patient has a chronic indwelling sales. UA revealed trace blood, large LE, and 9 WBC/HPF. This is quite good considering her sales. Culture grew 50,000 - 99,000 CFU/mL Escherichia coli & >100,000 CFU/ml Citrobacter freundii complex. She does report that after they gave the specimen her mother had a week of stool getting in the vulvar area and she is worried this might travel to the bladder. There is no evidence of that currently. She states the urine is clear and yellow. There has been no drainage around the catheter. She has not had any increased bladder spasms. Patient's daughter reports that her mother is not feeling well today. She has a runny nose and a cough. Home care is arranging for a nasal swab.   04/08/2024: Ms. CLAUDIO MCDOWELL presents today for a follow up audio only telephone visit for which she gave permission for. The pt was located at home, [address] [place] [address], and I was located in my office in [place], [place]. Visit was conducted with her daughter Tabatha Deluna. Patient provided a urine specimen on 4/3/2024. UA was completely normal other than moderate LE and 6 WBC/HPF. For having a sales catheter, this is an excellent UA. Urine culture grew >100,000 CFU/ml Citrobacter freundii complex. Urine cytology was negative for high grade urothelial carcinoma. Specimen consists of many lymphoid cells, mature squamous epithelial cells and benign urothelial cells. Patient's daughter reports that  got covid, however she is recovering. She is very tired and still has a cough. She does report that the urine is clear and yellow, however the pt has had many accidents where stool gets near/in the catheter. Her skin remains red but not broken down. Continues use of triple paste.   05/08/2024 Ms. CLAUDIO MCDOWELL presents today for a follow up audio-only telehealth visit for which they permitted for. The patient was located at home [address] [place] [address] and I was located in my office in [place], [place]. Patient has chronic indwelling Sales catheter. This urinalysis indicates urine is in very good condition without any evidence for clinically active urinary tract infection at this time. Culture showed >=3 organisms. Probable collection contamination. If I am notified of changes indicating possible clinically significant urinary tract infection, we would re-culture at that time. We will continue to collect surveillance urine specimens for urinalyses and urine cultures at time of catheter changes.  Daughter states her mental status is different.  Reports more confusion, gaps of memory. Denies hallucinations and is much less anxious. Stool is still loose and pt is more fatigued while on Risperidone.   06/7/2024: Ms. CLAUDIO MCDOWELL presents today for a follow up audio only telephone visit for which she gave permission for. The pt was located at home, [address] [place] [address], and I was located in my office in [place], [place]. Visit conducted with patient's daughter, Tabatha. 6/3/24 UA revealed small blood by dipstick, moderate Leukocyte Esterase, trace proteinuria and 10/HPF of WBC. Culture showed 50,000 - 99,000 CFU/mL Pseudomonas aeruginosa. Daughter reports have pt historian today. Patient is doing okay from urinary standpoint, urine is clear. Admits to breathing difficulties, as Internist prescribed Z-pack (Zithromax) but the daughter is hesitant of proceeding further with the prescription as 1 day before starting her cough seemed improved.  Before cough onset, there was increase in confusion// decline of mental state.   06/26/2024: Ms. CLAUDIO MCDOWELL presents today for a follow up audio only telephone visit. Visit was conducted with the patient's daughter, Tabatha. The pt was located at home, [address] [place] [address], and I was located in my office in [place], [place]. Her daughter informs me today that she did not need an appt but did need instructions on irrigating the catheter for the nurse. However, the catheter happened to snap today and needed to be replaced anyway. I apologized for the misunderstanding. She stated that it would be better for me to call the nurse directly to give her the instruction. She had just left the house after changing the patient's catheter. She informs me she took a urine specimen. The nurses name is Marimar and can be reached at [phone].   07/01/2024: Ms. CLAUDIO MCDOWELL presents today for a follow up audio only telephone visit. The pt was located at home, [address] [place] [address], and I was located in my office in [place], [place]. Visit conducted with patient's daughter, Tabatha. Patient's visiting nurse, Marimar, collected a urine specimen from her on 6/26/2024 when replacing the catheter after it broke. UA revealed cloudy urine, large LE, and 150 WBC/HPF, otherwise normal. Urine culture grew >100,000 CFU/ml Citrobacter freundii and 50,000 - 99,000 CFU/mL Pseudomonas aeruginosa. The patient obtained lab work /24 prior to today's visit. Culture showed >100,000 CFU/ml Citrobacter freundii. 50,000 - 99,000 CFU/mL Pseudomonas aeruginosa. BMP revealed low sodium of 134, potassium 5.4, glucose elevated at 122, creatinine wnl at 0.56. UA showed cloudy appearance, large Leukocyte Esterase, 150/HOF WBC. Patient reports today is not her best day. She sounds suppressed. Daughter reports onset cough and unsure if infection could be both respiratory and bladder. Patient obtained nebulizer prior to visit. Daughter reports urine is currently clear.   07/05/2024: Ms. CLAUDIO MCDOWELL presents today for a follow up audio only telephone visit. The pt was located at home, [address] [place] [address], and I was located in my office in [place], [place]. Visit was conducted with her daughter, Tabatha. She informs me that she just took the first packet of Fosfomycin yesterday. There have been no problems thus far. She does report that the catheter was mispositioned last night and was not draining for a few hours. Her and the aide were able to push it in more and urine drained. The visiting nurse came this morning. She irrigated the catheter and ensured it was in good position.   07/08/2024: Ms. CLAUDIO MCDOWELL presents today for a follow up audio-visual telehealth visit. The pt was located at home, [address] [place] [address], and I was located in my office in [place], [place]. The visit was conducted mostly with her daughter Tabatha. The patient has been taking Fosfomycin 3 GM oral packet once every 3 days. Her daughter reports she looks a bit improved. Her urine looks good but has since the beginning. She feels Fosfomycin makes her a little sick. Had some abdominal pain over the weekend but has not complained today. She was having anxiety but her oxygen is good. Her temperature is 97.1. She is not leaking around the catheter at all. She is a bit stressed today and someone close to them passed away and her daughter had to inform her of this today. At the end of the visit I spoke to  and she informs me she is not feeling so bad.     07/12/2024: Ms. MCDOWELL is a 89 year old female presenting today for an audio only telehealth visit for which she gave verbal permission. The patient was located in her home at [address] [place] [address]. I was located in my office on [place], [place]. She was accompanied by her daughter who helped with the visit.  Patient is bedbound and with a sales catheter.  On 7/11/24 patient was found with 150 wbc/hpf. cloudy urine. No bacteria.  given that patient is with indwelling catheter, I would usually not treat her with abx right away, but I saw her and she looked pale, sluggish, and not cheerful as usual.  She was started on Fosfomycin once every 3 days. After course of Fosfomycin, patient was found with only 22 wbc/hpf, and few bacteria.  Her daughter worries today about low urine output since patient started Fosfomycin. She admits patient has been experiencing nighttime diarrhea which is expected with the medication. I explain that is consistent with lower production of urine as patient loses water during nighttime diarrhea,  patient's vitals taken by her daughter have been reportedly normal BP:120 /60. Pulse in the 70s  08/06/2024: Ms. CLAUDIO MCDOWELL presents today for a follow up audio only telephone visit for which she gave permission for. The pt was located at home, [address] [place] [address], and I was located in my office in [place], [place]. Visit was conducted with her daughter, Tabatha Deluna. At the time I called at 2:00 PM the nurse was there to change her catheter. Her daughter reports that it looks as if there is a ball in her mother's stomach. This past Friday, she showed a tremendous amount of stool in her colon on x-ray. She denies seeing any blood in the stool that has been passing. She denies any gross hematuria. Urine has looked good. Her daughter notes that last Wednesday, it appeared her mother had a TIA. She opted to not have her mom taken to the hospital, so they are not sure if that is exactly what happened.  She appears less confused than last week according to her daughter.   08/09/2024: Ms. MCDOWELL is a 89 year old female presenting today via telehealth using real time 2 way audio-visual technology. We utilized [employer] telemetry [employer] platform. The patient, Ms. MCDOWELL, was located in her home at [address] [place] [address] at the time of the visit. The provider, BENIGNO RAYMUNDO, was located in his office on [place], [place]. Verbal Consent was given by the patient on 08/09/2024 and my scribe served as witness. The patient was accompanied by her daughter  who participated in the telehealth encounter. Patient is bedbound and with an indwelling sales catheter. She was recently treated for UTI. On 6/26/24 patient was found with 150 wbc/hpf. cloudy urine. No bacteria.  After course of abx, patient reports today for review of new labs findings. I reviewed and discussed the patient's pertinent lab works. On 8/6/24 Urine Analysis showed 10 wbc/hpf (down from 150). Urine is now clear. Specific Gravity was 1.010 indicating adequate hydration. No bacteria seen. Urine Cytology on 8/6/24 was negative for malignant cell. Urine Culture on 8/6/24  showed presence of 3 organisms, none being predominant, possibly colonizers, or probable collection contamination. No infection.

## 2024-08-10 NOTE — HISTORY OF PRESENT ILLNESS
[FreeTextEntry1] : Claudio Mcdowell is currently 89 years of age.  Her daughter Tabatha Deluna is devoted to her care.  She is very concerned about her mother and has been so for many years.  She becomes very anxious at times and that is concerning her mother.  Claudio Mcdowell lives with her daughter.  Claudio Mcdowell has been bedbound for several years.  The patient had developed a sacral decubitus ulcer while in a nursing facility.  A Sales catheter was placed because of fecal incontinence and concern that urine mixed with the feces would contaminate the sacral decubitus ulcer.  Once the patient left the nursing facility and will be with her daughter.  The sacral decubitus ulcer has finally healed.  I have discussed removing the Sales catheter with the daughter.  However as the patient has fecal incontinence there is concern about the urine mixing with the feces and being more likely to cause skin problems.  For now we will leave the Sales catheter in.  The patient has had clinically symptomatic urinary tract infections.  He clinically significant infections usually start with increased urination around the Sales catheter due to bladder spasms.  Bladder spasms have been occurring for staff.  The patient is positioned properly in in bed and the personal care attendant leaves for the day.  We have managed to prevent this with the administration of sublingual hyoscyamine.  We do periodic surveillance urine analysis and urine culture tests at the time the Sales catheter is changed by visiting nurse.  He also performed a times of increased spasms or purulence of the urine or change in mental status,  On October 11, 2022 visiting nurse using an order I have previously placed at the request of the daughter sent urine for urine cytology which proved to be negative for malignant cells, urine culture which grew Greater than 100,000 and E. coli that was sensitive to all antibiotics tested.  Urinalysis looked quite good for urine taken from a Sales catheter that was used for chronic Sales catheter drainage.  Ileukocyte esterase was large but nitrites were negative.  There were 2 epithelial cells per high-power field.  There were only 10 white blood cells per high-power field and only 2 red blood cells per high-power field on microscopic exam there were no bacteria seen and there were no hyaline casts.  Specific gravity was 1.010 which shows good hydration and this bedbound woman cared for diligently by her daughter.  Blood by dipstick was trace.  There was no protein glucose or ketones in the urine.  There is no bilirubin and no urobilinogen.  An important portion of the visit was my review with the daughter about bladder spasms and urination around the catheter.  Urine appearance and urine analysis have been clear.  The urine was clear again today.  The patient has significant short-term memory deficit.  She does have some useful short-term memory.  Her long-term memory remains very much intact.  She is very pleasant and to make satisfactory conversation.  She does admit to sometimes not remembering something.  Her complexion was good and there was no pallor.  Facial movements were symmetric.  Cranial nerves were intact.  I was not able to assess the olfactory nerve as this was a telehealth visit.    11/04/2022: Ms. MCDOWELL is a 89 year old female presenting today for a telehealth visit. She was accompanied by her daughter who helped with the visit. They gave permission for an audio only telehealth conference. The patient was located in her home in [place], [place]. I was located in my office on [place], [place]. Today her daughter reports the pt experienced rare urinary leaking around the catheter due to bladder spasms. She also reports her systolic pressure was around 140 last week. I offered Gemtesa to manage the bladder spasms and the daughter was cautious about more medications. I informed her if the urinary leaking are only once every 2 weeks or so, she does not have to medicate. The daughter was agreeable to monitor the occurrence of urinary leaking over the next 4 weeks and assess the discomfort it causes the pt. She denies any other urinary issues.  03/13/2023: Ms. MCDOWELL is a 89 year old female presenting today for a telehealth visit. She was accompanied by her daughter who helped with the visit. They gave permission for an audio only telehealth conference. The patient was located in her home in [place], [place]. I was located in my office on [place], [place]. The patient obtained an US prior to today's visit 3/9/23 which revealed: Comparison is made with the exam of 2/28/22. Transabdominal ultrasound of the abdomen was performed with color flow imaging. The examination is limited in evaluation. The visualized aorta and inferior vena cava show no abnormality. The pancreas is obscured by overlying bowel gas. The liver measures 12.4 cm with homogeneous echotexture. No intrinsic mass and no intra-or extrahepatic ductal dilatation. There is hepatopedal flow of the main portal vein. No gallstones, pericholecystic fluid, wall thickening or positive sonographic Melendez sign. The common bile duct measures 0.3 cm. The right kidney measures 9.2 x 5.6 x 3.0 cm with a nonobstructing 1.2 cm stone in the upper pole. Multiple additional subcentimeter calcifications are seen. These were not seen on previous exam. No hydronephrosis or renal mass. The left kidney measures 9.3 x 3.9 x 3.5 cm. There is a 2.1 x 2.4 x 2.0 cm cyst in the medial mid pole, not seen on previous study. No hydronephrosis or renal calcification. The spleen measures 8.2 cm and show no abnormality. There is a small right pleural effusion, stable. IMPRESSIONS: Multiple nonobstructing stones of the right kidney with no hydronephrosis. 2.4 cm cyst of the midpole left kidney. Small right pleural effusion, stable. The daughter has answered the phone and reports the patient's urine does not get dark. She is not aware of the amount of water she gives her mother. I requested she measure's this from now on as the pt has stones and needs to increase water consumption. Her daughter reports this week the pt has experienced more spasms than normal and believes this may be due to severe constipation. She provided the pt with an Enema to aide in this situation. She is very concerned but I informed her the Enema may have stimulated the spasms and we should give her a few days to clear up and re-evaluate.   03/22/2023: Ms. CLAUDIO MCDOWELL presents today for an audio visual telehealth visit for which she gave permission for. The patient was located at home at [address] [place] [address] and I was located at my office in [place], [place]. She is accompanied by her daughter whom most of the visit was conducted with. Her daughter has been keeping track of her mother's water consumption. She is averaging about 58 oz of water in a 24 hr period. Additionally, she is drinking juice and ensure nutrition drinks. She does not consume any caffeine. She is on furosemide 40 mg. I said hello to the patient at the end of the visit and she is looking well. She reports feeling no pain at the current moment.   3/29/2023:  Patient Claudio Mcdowell and daughter Tabatha Deluna were home in [place], [place] and I was in my office in [facility].  Patient and daughter gave permission for a FaceTime telehealth visit.  They preferred this to [employer].  The patient looks good today.  Her complexion was good there is no pallor.  Smile was symmetric her affect was normal she appeared happy she could make conversation it was appropriate.  She said that she currently had no pain.  When I asked questions that after 3/2 how she had been recently the sometimes she hesitated and deferred to her daughter for cancer compatible with her impaired short-term memory.  Long-term memory remains good she recognizes me once know so I am does ask questions about things like her bladder as she was worried about it her blood tests.  I assured her the results were satisfactory.  04/18/2023: Ms. MCDOWELL is a 89 year old female presenting today for an audio and visual telehealth visit for which she gave verbal permission. We utilized [employer] teams telemetry [employer] platform. The patient was located in her home at [address] [place] [address]. I was located in my office on [place], [place]. She was accompanied by her daughter who helped to serve as a historian. She reports discomfort in her eyes. She describes the discomfort as a feeling of sand. She opened her eyes for me, and they do not look red. Pupils look normal. She reports the right eyes bothers her significantly more than the left. I advise that the patient tries lubricating drops and it if does not feel better she will notify her visiting nurse who is due to come next week for a sales catheter change. She reports episode of bladder spasms. She denies gross hematuria. She describes the urine as barely yellow. I reviewed and discussed her  latest pertinent lab on 04/12/2023 which shows "alkaline phosphate normal at 89. Creatinine was normal at 0.62 mg/dL. WBC normal at 5.01. RBC normal at 3.87".   05/09/2023: Ms. MCDOWELL presents today for a follow up audio only telehealth visit for which they gave permission for. She was accompanied by her daughter who helped to serve as a historian. The patient was located at home [address] [place] [address] and I was located in my office in [place], [place]. The patient obtained lab work 5/2/23 prior to today's visit. The UA revealed microscopic hematuria, 5/HPF RBC, 27/HPF WBC. The patient demonstrated great hydration as the specific gravity is 1.006. The Sales catheter was changed May 2, 2023 with no clear urine in the bag. While changing the diaper the aide reports blood in the diaper. The daughter states last week her mother experienced spasms but as of two days ago she is now fine. However, she states the patient is more fatigued. She denies the pt having a temperature. I assured her because her mother is post menopausal any abrasion to the labia can cause a fissure.   06/02/2023: Ms. MCDOWELL is a 89 year old female presenting today for an audio and visual telehealth visit for which she gave verbal permission. We utilized [employer] teams telemetry doc platform. The patient was located in her home at [address] [place] [address]. I was located in my office on [place], [place]. She was accompanied by her daughter Tabatha. She reports that her mother complains of onset dysuria that dissipated on its own and has not recurred since. She reports right sided back pain that is aggravated when she lays on the right side. She provided urine specimen. She notes the urine was very clear. I reviewed and discussed the patient's pertinent lab works. Her latest Urinalysis on 05/30/2023 that shows "60 WBC/HPF. Trace Blood Urine. Specific Gravity Urine 1.007". Urine Culture on the same date shows ">100,000 CFU/ml Escherichia coli and <10,000 CFU/ml Normal Urogenital ángel present". The patient takes Mirtazapine for anxiety at low dose. The daughter reports the patient is experiencing increasing anxiety and is thinking of having the prescriber increasing the dosage.  Her BP runs around 120/60. Her Hemoglobin hematocrit looks good. gaze is conjugated. facial expression looks symmetric. Urine was faint yellow and clear.   06/06/2023: Ms. MCDOWELL presents today for a follow up audio only telehealth visit for which they gave permission for. The patient was located at home [address] [place] [address] and I was located in my office in [place], [place]. She was accompanied by her daughter Tabatha. The daughter states pt is experiencing episodes of a dry cough. An X Ray  of 6/5/23 revealed Mild peribronchial thickening suggesting bronchitis in the right clinical setting with no focal pneumonia or congestive heart failure. COPD with chronic lung changes. The mental status is the same as usual but her BP has elevated some. I assured her a little elevation is not as dangerous. The pt continues to experience some urinary frequency and it is a little more concentrated.   10/12/2023: Ms. MCDOWELL presents today for a follow up audio only telehealth visit for which they gave permission for. The patient was located at home [address] [place] [address] and I was located in my office in [place], [place]. The 10/2/23 UA showed proteinuria 30 mg/dL, moderate blood, large Leukocyte Esterase, 16/HPF of WBC. UA showed improvement as patient has chronic sales drainage. Her daughter states patient is okay considering coming home from the hospital. Most pain is in her back/shoulders and some of her back area is red/raw.  Recently her BP has increased to 180/100 and was prescribed Losartan 50mg twice daily. Urine is a clear color, denies cloudy and dark appearance.  11/01/2023: Ms. MCDOWELL presents today for a follow up audio only telehealth visit for which they gave permission for. The patient was located at home [address] [place] [address] and I was located in my office in [place], [place]. The 10/13/23 CMP revealed creatinine at 0.58 and low ALT at 8 U/L. Patient was admitted into [facility] on 10/27/23 for sepsis/UTI. Her daughter states she may be released tomorrow. Daughter states during the day the patient's urine was normal, but she did have chills and a fever.   11/03/2023: Ms. MCDOWELL is a 89 year old female presenting today for an audio only telehealth visit for which she gave verbal permission. The patient was located in her home at [address] [place] [address]. I was located in my office on [place], [place]. She was accompanied by her daughter. She says her mother was supposed to be released today. Initially she was able to void. Because of constipation she was given a couple of enemas. She had a large evacuation and since then she has not been able to void again. The daughter says her latest PVR was 340 cc. She is waiting for her mother to be rescanned again.   11/07/2023: Ms. CLAUDIO MCDOWELL presents today for a follow up audio only telephone visit for which she gave permission for. The pt was located at home, [address] [place] [address], and I was located in my office in [place], [place]. The visit was conducted with the patient's daughter. The patient came home from the hospital on Sunday. The catheter was put back in without another attempt at a void trial. The patient does have issues with constipation. She is able to swallow liquids. Her daughter has had to crush some of her pills to put them in a liquid, however the pt finds it to taste bitter and has some discomfort swallowing the chunks of crushed pills. The hospital discharged her with a 7 day supply of Cefuroxime. Was started yesterday so she has had two doses.   11/20/2023: Ms. MCDOWELL presents today for a follow up audio only telehealth visit for which they gave permission for. The patient was located at home [address] [place] [address] and I was located in my office in [facility]. Daughter states patient is okay. She notes giving pt suppositories and 7 hours later she had mixed bowel movements. However, she did not have a bowel movement since Saturday. Patient is given pedialyte and water frequently. Today's temperature was 95 as before it was 96-97. Pt did not drink any liquids shortly after taking temperature. Daughter denies giving pt solace but she usually gives Miralax as she has done so today. Senady's urine color is clear as it  has been clear for some time.  Daughter states her stomach was distended. Bp has stabilized since being in the hospital but last night and today it  was higher than normal being being 168/88 before medication.   11/29/2023: Ms. CLAUDIO MCDOWELL presents today for a follow up audio only telephone visit for which she gave permission for. The pt was located at home, [address] [place] [address], and I was located in my office in [place], [place]. Visit was conducted with her daughter, Andree. She was informed yesterday by her house call physicians of the results of her urine culture (emailed from core lab showing >100,000 CFU/ml Pseudomonas aeruginosa susceptible only to amikacin, Ciprofloxacin, imipenem, levofloxacin, meropenum. Resistant to Ceftazidime, Pip/tazo. Intermediate for aztrenam, cefepime. They discussed that patient is having increasing oxygen requirements, although SPO2 ok on supplemental O2 (prior to this patient needed only occasional O2 suppl. every few days). 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 The want to treat with ciprofloxacin, 7 day course. Prescription sent to pharmacy. Her daughter inquired today on if she should be treated. She states last week her mother seemed very not herself, however today she seems better in terms of mental status and oxygenation. She has been giving her senakot and miralax.   12/21/2023: Ms. MCDOWELL presents today for a follow up audio-only telehealth visit for which they gave permission for. The patient was located at home [address] [place] [address] and I was located in my office in [place], [place]. The patient obtained lab work 12/4/23 prior to today's visit. Urinalysis is improved and showed trace proteinuria, small Leukocyte Esterase, 6/HPF of WBC and 7/LPF of Cast. Urine culture shows no significant growth, <10,000 CFU/mL Normal Urogenital Ángel, which is very good. Her daughter expresses concern as recently patient is experiencing a surplus of looser and larger amounts of bowel movements that is escaping to the vaginal area, thus causing infections. Urine is normal in color, denies fever. Patient is occasionally more fatigued than usual. Denies distended appearance of the abdomen. Admits to only 1 spasm around the catheter this month. Next sales change will be in 2 weeks.  Towards the end of visit, patient was shown on Facetime, appearing well oriented x 3, normal pigmentation, lips are moist, and smile is symmetrical. I am very impressed by how well the patient looks and speaks. It was more than just pleasantries, she inquired about personal questions. Overall, I am very pleased.   12/27/2023: Ms. CLAUDIO MCDOWELL presents today for a follow up audio only telephone visit for which she gave permission for. The pt was located at home, [address] [place] [address], and I was located in my office in [place], [place]. She is accompanied by her daughter. Patient provided a surveillance urine specimen on 12/22/2023. She has chronic sales catheter drainage. UA revealed small LEC, positive for nitrites, 6 WBC/HPF, no RBC seen, moderate bacteria/HPF, granular casts present, and yeast like cells present. Urine culture grew >100,000 CFU/ml Enterococcus faecalis and 50,000 - 99,000 CFU/mL Streptococcus mitis/oralis group. Urine cytology was negative for high grade urothelial carcinoma. Few mature squamous cells, rare benign urothelial cells and abundant fungal organisms morphologically consistent with Candida species present. Patient's daughter reports that her urine looks clear and a pale yellow color. She feels her mom still has a lot of anxiety and some confusion. She does report that her catheter bag sits in feces due to the patient's position. They tried to tilt her pelvis to avoid this but states it's easier said than done. She states there is no feces in the catheter or in the bag. BM are not loose, she describes them as pasty. Has about 2-3 BM a day.   01/05/2024: Ms. MCDOWELL is a 89 year old female presenting today for an audio only telehealth visit for which she gave verbal permission. The patient was located in her home at [address] [place] [address]. I was located in my office on [place], [place]. She was accompanied by her daughter who helped with the visit. She reports that a nurse came for a catheter change, and only succeeded on the third attempt. She says it's the same nurse who has been coming for 5 months now, and usually she encounters no difficulties. Now that the catheter has been changed, the patient is able to void.   01/17/2024: Ms. CLAUDIO MCDOWELL presents today for a follow up audio only telephone visit for which she gave permission for. The pt was located at home, [address] [place] [address], and I was located in my office in [place], [place]. She is accompanied by her daughter, Tabatha Deluna. Patient provided a urine specimen on 1/5/2024 for surveillance. UA revealed trace blood by urine and moderate LEC. This is excellent for her as she has chronic sales catheter drainage. Urine culture grew 50,000-99,000 CFU/ml Pseudomonas aeruginosa. Urine cytology was negative for high grade urothelial carcinoma. Few mature squamous cells, rare benign urothelial cells and abundant fungal organisms morphologically consistent with Candida species present. The patient's daughter reports that her mother is "different". She reports that she is hearing things a lot now. She states that usually the things are negative. For example, she will say "Why is Ac saying my oxygen isn't working properly?". It is clear what she is saying but fairly random. She is not seeing anything; it is strictly auditory.   01/22/2024: Ms. CLAUDIO MCDOWELL presents today for a follow up audio only telephone visit for which she gave permission for. The pt was located at home, [address] [place] [address], and I was located in my office in [place], [place]. Visit was conducted with the patient's daughter, Tabatha Deluna. She informs me that she did not realize she was out of hyoscyamine sulfate for bladder spasms. She has been getting bladder spasms after the aide leaves and is embarrassed by this as she thinks shes wetting the bed. Her daughter reports that her mother asks in the morning "why am I bothering taking my medications, I am going to die anyway".   02/05/2024: Ms. CLAUDIO MCDOWELL presents today for an audio visual telehealth visit for which she gave permission for. The patient was located at home at [address] [place] [address] and I was located at my office in [place], [place]. Pt's daughter states she was not expecting my call today. I informed her that she was on my schedule and that if she would like we can talk. She informs me that hyoscyamine does not seem to be working for her mother and that some days she has bladder spasms and some she doesn't. Pt is currently not having diarrhea. She is due for a catheter change tomorrow. Patient has not started any medications for the psychological issues she has been having.   02/09/2024: Ms. MCDOWELL is a 89 year old female presenting today for an audio only telehealth visit for which she gave verbal permission. The patient was located in her home at [address] [place] [address]. I was located in my office on [place], [place]. She was accompanied by her daughter who helps with the visit.   The patient has been with a chronic sales catheter since July 2021 when she fractured her foot. She reports today for a follow up.   I reviewed and discussed the patient's pertinent lab works. Urine Culture shows 10,000 - 49,000 CFU/mL Pseudomonas aeruginosa 10,000 - 49,000 CFU/mL Citrobacter freundii complex  I explain to the patient that patients with chronic sales catheter develop bacteria in the urine, that may come from the skin, and it is not significant clinically unless infection symptoms ensue. In addition, the patient reports she was not taking antibiotics when the sample was collected, and the colonies were less than 100 000.   03/13/2024: Ms. CLAUDIO MCDOWELL presents today for a follow up audio only telephone visit for which she gave permission for. The pt was located at home, [address] [place] [address], and I was located in my office in [place], [place]. Visit was conducted with her daughter Tabatha Deluna. Patient provided a urine specimen on 3/4/2024. Patient has a chronic indwelling sales. UA revealed trace blood, large LE, and 9 WBC/HPF. This is quite good considering her sales. Culture grew 50,000 - 99,000 CFU/mL Escherichia coli & >100,000 CFU/ml Citrobacter freundii complex. She does report that after they gave the specimen her mother had a week of stool getting in the vulvar area and she is worried this might travel to the bladder. There is no evidence of that currently. She states the urine is clear and yellow. There has been no drainage around the catheter. She has not had any increased bladder spasms. Patient's daughter reports that her mother is not feeling well today. She has a runny nose and a cough. Home care is arranging for a nasal swab.   04/08/2024: Ms. CLAUDIO MCDOWELL presents today for a follow up audio only telephone visit for which she gave permission for. The pt was located at home, [address] [place] [address], and I was located in my office in [place], [place]. Visit was conducted with her daughter Tabatha Deluna. Patient provided a urine specimen on 4/3/2024. UA was completely normal other than moderate LE and 6 WBC/HPF. For having a sales catheter, this is an excellent UA. Urine culture grew >100,000 CFU/ml Citrobacter freundii complex. Urine cytology was negative for high grade urothelial carcinoma. Specimen consists of many lymphoid cells, mature squamous epithelial cells and benign urothelial cells. Patient's daughter reports that  got covid, however she is recovering. She is very tired and still has a cough. She does report that the urine is clear and yellow, however the pt has had many accidents where stool gets near/in the catheter. Her skin remains red but not broken down. Continues use of triple paste.   05/08/2024 Ms. CLAUDIO MCDOWELL presents today for a follow up audio-only telehealth visit for which they permitted for. The patient was located at home [address] [place] [address] and I was located in my office in [place], [place]. Patient has chronic indwelling Sales catheter. This urinalysis indicates urine is in very good condition without any evidence for clinically active urinary tract infection at this time. Culture showed >=3 organisms. Probable collection contamination. If I am notified of changes indicating possible clinically significant urinary tract infection, we would re-culture at that time. We will continue to collect surveillance urine specimens for urinalyses and urine cultures at time of catheter changes.  Daughter states her mental status is different.  Reports more confusion, gaps of memory. Denies hallucinations and is much less anxious. Stool is still loose and pt is more fatigued while on Risperidone.   06/7/2024: Ms. CLAUDIO MCDOWELL presents today for a follow up audio only telephone visit for which she gave permission for. The pt was located at home, [address] [place] [address], and I was located in my office in [place], [place]. Visit conducted with patient's daughter, Tabatha. 6/3/24 UA revealed small blood by dipstick, moderate Leukocyte Esterase, trace proteinuria and 10/HPF of WBC. Culture showed 50,000 - 99,000 CFU/mL Pseudomonas aeruginosa. Daughter reports have pt historian today. Patient is doing okay from urinary standpoint, urine is clear. Admits to breathing difficulties, as Internist prescribed Z-pack (Zithromax) but the daughter is hesitant of proceeding further with the prescription as 1 day before starting her cough seemed improved.  Before cough onset, there was increase in confusion// decline of mental state.   06/26/2024: Ms. CLAUDIO MCDOWELL presents today for a follow up audio only telephone visit. Visit was conducted with the patient's daughter, Tabatha. The pt was located at home, [address] [place] [address], and I was located in my office in [place], [place]. Her daughter informs me today that she did not need an appt but did need instructions on irrigating the catheter for the nurse. However, the catheter happened to snap today and needed to be replaced anyway. I apologized for the misunderstanding. She stated that it would be better for me to call the nurse directly to give her the instruction. She had just left the house after changing the patient's catheter. She informs me she took a urine specimen. The nurses name is Marimar and can be reached at [phone].   07/01/2024: Ms. CLAUDIO MCDOWELL presents today for a follow up audio only telephone visit. The pt was located at home, [address] [place] [address], and I was located in my office in [place], [place]. Visit conducted with patient's daughter, Tabatha. Patient's visiting nurse, Marimar, collected a urine specimen from her on 6/26/2024 when replacing the catheter after it broke. UA revealed cloudy urine, large LE, and 150 WBC/HPF, otherwise normal. Urine culture grew >100,000 CFU/ml Citrobacter freundii and 50,000 - 99,000 CFU/mL Pseudomonas aeruginosa. The patient obtained lab work /24 prior to today's visit. Culture showed >100,000 CFU/ml Citrobacter freundii. 50,000 - 99,000 CFU/mL Pseudomonas aeruginosa. BMP revealed low sodium of 134, potassium 5.4, glucose elevated at 122, creatinine wnl at 0.56. UA showed cloudy appearance, large Leukocyte Esterase, 150/HOF WBC. Patient reports today is not her best day. She sounds suppressed. Daughter reports onset cough and unsure if infection could be both respiratory and bladder. Patient obtained nebulizer prior to visit. Daughter reports urine is currently clear.   07/05/2024: Ms. CLAUDIO MCDOWELL presents today for a follow up audio only telephone visit. The pt was located at home, [address] [place] [address], and I was located in my office in [place], [place]. Visit was conducted with her daughter, Tabatha. She informs me that she just took the first packet of Fosfomycin yesterday. There have been no problems thus far. She does report that the catheter was mispositioned last night and was not draining for a few hours. Her and the aide were able to push it in more and urine drained. The visiting nurse came this morning. She irrigated the catheter and ensured it was in good position.   07/08/2024: Ms. CLAUDIO MCDOWELL presents today for a follow up audio-visual telehealth visit. The pt was located at home, [address] [place] [address], and I was located in my office in [place], [place]. The visit was conducted mostly with her daughter Tabatha. The patient has been taking Fosfomycin 3 GM oral packet once every 3 days. Her daughter reports she looks a bit improved. Her urine looks good but has since the beginning. She feels Fosfomycin makes her a little sick. Had some abdominal pain over the weekend but has not complained today. She was having anxiety but her oxygen is good. Her temperature is 97.1. She is not leaking around the catheter at all. She is a bit stressed today and someone close to them passed away and her daughter had to inform her of this today. At the end of the visit I spoke to  and she informs me she is not feeling so bad.     07/12/2024: Ms. MCDOWELL is a 89 year old female presenting today for an audio only telehealth visit for which she gave verbal permission. The patient was located in her home at [address] [place] [address]. I was located in my office on [place], [place]. She was accompanied by her daughter who helped with the visit.  Patient is bedbound and with a sales catheter.  On 7/11/24 patient was found with 150 wbc/hpf. cloudy urine. No bacteria.  given that patient is with indwelling catheter, I would usually not treat her with abx right away, but I saw her and she looked pale, sluggish, and not cheerful as usual.  She was started on Fosfomycin once every 3 days. After course of Fosfomycin, patient was found with only 22 wbc/hpf, and few bacteria.  Her daughter worries today about low urine output since patient started Fosfomycin. She admits patient has been experiencing nighttime diarrhea which is expected with the medication. I explain that is consistent with lower production of urine as patient loses water during nighttime diarrhea,  patient's vitals taken by her daughter have been reportedly normal BP:120 /60. Pulse in the 70s  08/06/2024: Ms. CLAUDIO MCDOWELL presents today for a follow up audio only telephone visit for which she gave permission for. The pt was located at home, [address] [place] [address], and I was located in my office in [place], [place]. Visit was conducted with her daughter, Tabatha Deluna. At the time I called at 2:00 PM the nurse was there to change her catheter. Her daughter reports that it looks as if there is a ball in her mother's stomach. This past Friday, she showed a tremendous amount of stool in her colon on x-ray. She denies seeing any blood in the stool that has been passing. She denies any gross hematuria. Urine has looked good. Her daughter notes that last Wednesday, it appeared her mother had a TIA. She opted to not have her mom taken to the hospital, so they are not sure if that is exactly what happened.  She appears less confused than last week according to her daughter.

## 2024-08-10 NOTE — ADDENDUM
[FreeTextEntry1] : This note was authored by Senia Corbin working as a scribe for Dr.Gary Sexton. I, Dr. Indio Sexton have reviewed the content of this note and confirm it is true and accurate. I personally performed the history and physical examination and made all the decisions 08/06/2024

## 2024-08-10 NOTE — ADDENDUM
[FreeTextEntry1] : This note was partly authored by Clovis Rebolledo working as a scribe for BNEIGNO Kiran. I, BENIGNO Kiran, have reviewed the content of this note and confirm it is true and accurate. I personally performed the history and physical examination and made all the decisions. 08/09/2024.

## 2024-08-10 NOTE — ASSESSMENT
[FreeTextEntry1] : 10/12/2023: Ms. MCDOWELL presents today for a follow up audio only telehealth visit for which they gave permission for. The patient was located at home [address] [place] [address] and I was located in my office in [place], [place]. The 10/2/23 UA showed proteinuria 30 mg/dL, moderate blood, large Leukocyte Esterase, 16/HPF of WBC. UA showed improvement as patient has chronic sales drainage. Her daughter states patient is okay considering coming home from the hospital. Most pain is in her back/shoulders and some of her back area is red/raw.  Recently her BP has increased to 180/100 and was prescribed Losartan 50mg twice daily. Urine is a clear color, denies cloudy and dark appearance.  Reviewed and discussed laboratory work of 10/2/23 which She obtained prior to today's visit as requested. Pt will go to her nearest [facility] lab for blood work and a urine sample which will be sent for urinalysis, urine culture, and urine cytology. Pt will schedule a telehealth visit after lab work to discuss results.   11/01/2023: Ms. MCDOWELL presents today for a follow up audio only telehealth visit for which they gave permission for. The patient was located at home [address] [place] [address] and I was located in my office in [place], [place]. The 10/13/23 CMP revealed creatinine at 0.58 and low ALT at 8 U/L. Patient was admitted into [facility] on 10/27/23 for sepsis/UTI. Her daughter states she may be released tomorrow. Daughter states during the day the patient's urine was normal, but she did have chills and a fever.   As the patient recently had a course of antibiotics, we suggested patient have catheter removed in hospital, as we will to observe bladder emptying and skin while the catheter is removed. If abnormalities arise, we will place the catheter back in.  If the daughter is to proceed with the plan, we will not discharge patient until a PVR is taken and until pt is able to void on her own the next morning. Hospitalist should weigh the diaper before and after and I will calculate it. If stool is present, then we will not be able to do so.  The daughter will have the Hospitalist contact me about patient.  Hospitalist has called at 4:06 pm. She states the urination looks contaminated and culture showed E-coli.  They will bladder scan her every hour.  Hospitalist will call tomorrow with update on patient and inform me on her fluid level.  Advised daughter to obtain a thermometer to observe temperature.    11/03/2023: Ms. MCDOWELL is a 89 year old female presenting today for an audio only telehealth visit for which she gave verbal permission. The patient was located in her home at [address] [place] [address]. I was located in my office on [place], [place]. She was accompanied by her daughter. She says her mother was supposed to be released today. Initially she was able to void. Because of constipation she was given a couple of enemas. She had a large evacuation and since then she has not been able to void again. The daughter says her latest PVR was 340 cc. She is waiting for her mother to be rescanned again.    Plan: If she is unable to void, she will be discharged with a sales catheter.   11/07/2023: Ms. CLAUDIO MCDOWELL presents today for a follow up audio only telephone visit for which she gave permission for. The pt was located at home, [address] [place] [address], and I was located in my office in [place], [place]. The visit was conducted with the patient's daughter. The patient came home from the hospital on Sunday. The catheter was put back in without another attempt at a void trial. The patient does have issues with constipation. She is able to swallow liquids. Her daughter has had to crush some of her pills to put them in a liquid, however the pt finds it to taste bitter and has some discomfort swallowing the chunks of crushed pills. The hospital discharged her with a 7 day supply of Cefuroxime. Was started yesterday so she has had two doses.    We discussed the possibility of a doing a trial of void. At this time I am recommending her mother settle back in to being home and we can readdress this possibility and how the patient and her daughter would like to proceed at the time of her next visit. Next catheter change will be on 12/5 should they choose to proceed with the catheter.   I looked it up on [employer] and Cefuroxime is available in the [place] as a liquid suspension. Given that this would be easier for the patient, I went to prescribe it for her which she could take rather than the pills. [employer] did not allow me to electronically prescribe it in a liquid suspension so I called the patient's pharmacy to give a verbal prescription for 200 ml of it for 20 ml BID for 5 days. I left a VM for the pharmacy as there was no answer. I requested they call me back if they could accept this prescription or if they could not. The pharmacy called me back shortly after and they told me that it is not currently offered in a liquid suspension, however he called the [facility] next door and they are willing to compound it for 40$. I saw this as acceptable and thanked him for going the extra mile and calling next door. He will contact the patient's daughter to inform her.   Patient and her daughter will have a telehealth visit in 2 weeks for reassessment, sooner if clinically indicated.   11/20/2023: Ms. MCDOWELL presents today for a follow up audio only telehealth visit for which they gave permission for. The patient was located at home [address] [place] [address] and I was located in my office in [facility]. Daughter states patient is okay. She notes giving pt suppositories and 7 hours later she had mixed bowel movements. However, she did not have a bowel movement since Saturday. Patient is given pedialyte and water frequently. Today's temperature was 95 as before it was 96-97. Pt did not drink any liquids shortly after taking temperature. Daughter denies giving pt solace but she usually gives Miralax as she has done so today. Senady's urine color is clear as it  has been clear for some time.  Daughter states her stomach was distended. Bp has stabilized since being in the hospital but last night and today it  was higher than normal being being 168/88 before medication.   Advised daughter to give patient Miralax tonight if there is no bowel movements.  Advised her to take Blood pressure tonight.  Pt will schedule a telehealth visit  11/29/2023: Ms. CLAUDIO MCDOWELL presents today for a follow up audio only telephone visit for which she gave permission for. The pt was located at home, [address] [place] [address], and I was located in my office in [place], [place]. Visit was conducted with her daughter, Andree. She was informed yesterday by her house call physicians of the results of her urine culture (emailed from core lab showing >100,000 CFU/ml Pseudomonas aeruginosa susceptible only to amikacin, Ciprofloxacin, imipenem, levofloxacin, meropenum. Resistant to Ceftazidime, Pip/tazo. Intermediate for aztrenam, cefepime. They discussed that patient is having increasing oxygen requirements, although SPO2 ok on supplemental O2 (prior to this patient needed only occasional O2 suppl. every few days). 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 The want to treat with ciprofloxacin, 7 day course. Prescription sent to pharmacy. Her daughter inquired today on if she should be treated. She states last week her mother seemed very not herself, however today she seems better in terms of mental status and oxygenation. She has been giving her senakot and miralax.   I explained that given her O2 issues and confusion over the last few days, I advised to treat with Cipro despite her daughter feeling she is better today. If there are any problems with crushing the pills for her or if she is not reacting to it well, she was advised to call right away. I posed the option of giving it as an oral solution if need be.    12/21/2023: Ms. MCDOWELL presents today for a follow up audio-visual telehealth visit for which they gave permission for. The patient was located at home [address] [place] [address] and I was located in my office in [place], [place]. The patient obtained lab work 12/4/23 prior to today's visit. Urinalysis is improved and showed trace proteinuria, small Leukocyte Esterase, 6/HPF of WBC and 7/LPF of Cast. Urine culture shows no significant growth, <10,000 CFU/mL Normal Urogenital Khadijah, which is very good. Her daughter expresses concern as recently patient is experiencing a surplus of looser and larger amounts of bowel movements that is escaping to the vaginal area, thus causing infections. Urine is normal in color, denies fever. Patient is occasionally more fatigued than usual. Denies distended appearance of the abdomen. Admits to only 1 spasm around the catheter this month. Next sales change will be in 2 weeks.  Towards the end of visit, patient was shown on Facetime, appearing well oriented x 3, normal pigmentation, lips are moist, and smile is symmetrical. I am very impressed by how well the patient looks and speaks. It was more than just pleasantries, she inquired about personal questions. Overall, I am very pleased.   Reviewed and discussed laboratory work of 12/4/23 which She obtained prior to today's visit as requested. Advised daughter to have care team to place support under the pelvis, so pelvis is tilted up at a higher level of the anus.   As long as stool is pasty and firm, we advised Andree to give patient MiraLAX. If stool is loose and unfirm, she should decrease the amount of MiraLAX.  Pt will provide a urine sample which will be sent for urinalysis, urine culture, and urine cytology. Pt will schedule a telehealth visit in 2 weeks for reassessment.   12/27/2023: Ms. CLAUDIO MCDOWELL presents today for a follow up audio only telephone visit for which she gave permission for. The pt was located at home, [address] [place] [address], and I was located in my office in [place], [place]. She is accompanied by her daughter. Patient provided a surveillance urine specimen on 12/22/2023. She has chronic sales catheter drainage. UA revealed small LEC, positive for nitrites, 6 WBC/HPF, no RBC seen, moderate bacteria/HPF, granular casts present, and yeast like cells present. Urine culture grew >100,000 CFU/ml Enterococcus faecalis and 50,000 - 99,000 CFU/mL Streptococcus mitis/oralis group. Urine cytology was negative for high grade urothelial carcinoma. Few mature squamous cells, rare benign urothelial cells and abundant fungal organisms morphologically consistent with Candida species present. Patient's daughter reports that her urine looks clear and a pale yellow color. She feels her mom still has a lot of anxiety and some confusion. She does report that her catheter bag sits in feces due to the patient's position. They tried to tilt her pelvis to avoid this but states it's easier said than done. She states there is no feces in the catheter or in the bag. BM are not loose, she describes them as pasty. Has about 2-3 BM a day.    Reviewed and discussed lab work of 12/22/2023 in detail with the pt.  Future urine orders were put in including fungal urine culture. Pt's daughter was advised to continue to try and alter the patient's position to avoid the catheter sitting in feces.  Patient should have a telehealth visit in 3 months, sooner if clinically indicated.    01/05/2024: Ms. MCDOWELL is a 89 year old female presenting today for an audio only telehealth visit for which she gave verbal permission. The patient was located in her home at [address] [place] [address]. I was located in my office on [place], [place]. She was accompanied by her daughter who helped with the visit. She reports that a nurse came for a catheter change, and only succeeded on the third attempt. She says it's the same nurse who has been coming for 5 months now, and usually she encounters no difficulties. Now that the catheter has been changed, the patient is able to void.   Plan: Pt's daughter will keep us updated. If the patient experiences fever or chill, she will call ER and inform the office.   01/17/2024: Ms. CLAUDIO MCDOWELL presents today for a follow up audio only telephone visit for which she gave permission for. The pt was located at home, [address] [place] [address], and I was located in my office in [place], [place]. She is accompanied by her daughter, Tabatha Deluna. Patient provided a urine specimen on 1/5/2024 for surveillance. UA revealed trace blood by urine and moderate LEC. This is excellent for her as she has chronic sales catheter drainage. Urine culture grew 50,000-99,000 CFU/ml Pseudomonas aeruginosa. Urine cytology was negative for high grade urothelial carcinoma. Few mature squamous cells, rare benign urothelial cells and abundant fungal organisms morphologically consistent with Candida species present. The patient's daughter reports that her mother is "different". She reports that she is hearing things a lot now. She states that usually the things are negative. For example, she will say "Why is Ac saying my oxygen isn't working properly?". It is clear what she is saying but fairly random. She is not seeing anything; it is strictly auditory.   Reviewed and discussed lab work of 1/5/2024 in detail with the pt's daughter. She was informed that given she has chronic sales catheter drainage, this specimen was excellent. If she had more WBC/HPF, I would be concerned, however she is in very good shape from a urologic standpoint. Her daughter was advised to speak with her mother's PCP about hearing things and her AMS as it is not urologic in origin or related to pending sepsis. I provided her the name of a neurologist, Dr.Li-Fen Tripathi if she would like to go for neuro consultation.  I once again re-iterated that at this time, given her skin breakdown, I recommend continued sales catheter drainage. Her daughter brought up an external urine collection apparatus once again, however I do not recommend we try that at least until her skin is in good condition. I also explained that if her mother were to go into urinary retention, this sort of device would not drain the urine from her.  Pt's daughter was advised to give her hyoscyamine for bladder spasms.  Patient will have a telehealth visit in 1-2 months, sooner if clinically indicated.    01/22/2024: Ms. CLAUDIO MCDOWELL presents today for a follow up audio only telephone visit for which she gave permission for. The pt was located at home, [address] [place] [address], and I was located in my office in [place], [place]. Visit was conducted with the patient's daughter, Tabatha Deluna. She informs me that she did not realize she was out of hyoscyamine sulfate for bladder spasms. She has been getting bladder spasms after the aide leaves and is embarrassed by this as she thinks shes wetting the bed. Her daughter reports that her mother asks in the morning "why am I bothering taking my medications, I am going to die anyway".   Renewed hyoscyamine sulfate 0.125 mg sublingual tablets, give one tablet under the tongue as needed. I recommend she gives it about 15 minutes before the aide comes. I suggest she give it for 10 days in a row to see if we can calm things down and then stop. If it returns, we can try again for another month or two. I did speak with the patient at the end of the visit. She seemed to know who I was and understood me. I spoke to her about the bladder spasms she is experiencing. She was on board with trying hyoscyamine and thanked me for speaking with her.  I recommend the patient's daughter speak with her PCP about potential anti-depressant medication.   Patient will have a telephone visit in 2 weeks for reassessment, sooner if clinically indicated.    02/05/2024: Ms. CLAUDIO MCDOWELL presents today for an audio visual telehealth visit for which she gave permission for. The patient was located at home at [address] [place] [address] and I was located at my office in [place], [place]. Pt's daughter states she was not expecting my call today. I informed her that she was on my schedule and that if she would like we can talk. She informs me that hyoscyamine does not seem to be working for her mother and that some days she has bladder spasms and some she doesn't. Pt is currently not having diarrhea. She is due for a catheter change tomorrow. Patient has not started any medications for the psychological issues she has been having.    Pt will discontinue use of hyoscyamine. I prescribed the patient Trospium 20 mg, one tablet once daily at bedtime. I informed the patient there may be constipation as a side effect.   Orders for UA, urine culture, and urine cytology were put in for her daughter to bring the patient's sample to her nearest  lab.  Patient will have a telehealth visit this Friday after the catheter change.     02/09/2024: Ms. MCDOWELL is a 89 year old female presenting today for an audio only telehealth visit for which she gave verbal permission. The patient was located in her home at [address] [place] [address]. I was located in my office on [place], [place]. She was accompanied by her daughter who helps with the visit.   The patient has been with a chronic sales catheter since July 2021 when she fractured her foot. She reports today for a follow up.  I reviewed and discussed the patient's pertinent lab works. Urine Culture shows 10,000 - 49,000 CFU/mL Pseudomonas aeruginosa 10,000 - 49,000 CFU/mL Citrobacter freundii complex I explain to the patient that patients with chronic sales catheter develop bacteria in the urine, that may come from the skin, and it is not significant clinically unless infection symptoms ensue. In addition, the patient reports she was not taking antibiotics when the sample was collected, and the colonies were less than 100 000.   Pt will start Trospium 20 mg.  She will provide urine for UA, Urine Cytology and Urine Culture. She will have a follow up in one month; earlier if needed.    03/13/2024: Ms. CLAUDIO MCDOWELL presents today for a follow up audio only telephone visit for which she gave permission for. The pt was located at home, [address] [place] [address], and I was located in my office in [place], [place]. Visit was conducted with her daughter Tabatha Deluna. Patient provided a urine specimen on 3/4/2024. Patient has a chronic indwelling sales. UA revealed trace blood, large LE, and 9 WBC/HPF. This is quite good considering her sales. Culture grew 50,000 - 99,000 CFU/mL Escherichia coli & >100,000 CFU/ml Citrobacter freundii complex. She does report that after they gave the specimen her mother had a week of stool getting in the vulvar area and she is worried this might travel to the bladder. There is no evidence of that currently. She states the urine is clear and yellow. There has been no drainage around the catheter. She has not had any increased bladder spasms. Patient's daughter reports that her mother is not feeling well today. She has a runny nose and a cough. Home care is arranging for a nasal swab.   Reviewed and discussed lab work of 3/4/2024 in detail with the pt's daughter.  If the patient's daughter notices a change in her urine, she will call promptly.    04/08/2024: Ms. CLAUDIO MCDOWELL presents today for a follow up audio only telephone visit for which she gave permission for. The pt was located at home, [address] [place] [address], and I was located in my office in [place], [place]. Visit was conducted with her daughter Tabatha Deluna. Patient provided a urine specimen on 4/3/2024. UA was completely normal other than moderate LE and 6 WBC/HPF. For having a sales catheter, this is an excellent UA. Urine culture grew >100,000 CFU/ml Citrobacter freundii complex. Urine cytology was negative for high grade urothelial carcinoma. Specimen consists of many lymphoid cells, mature squamous epithelial cells and benign urothelial cells. Patient's daughter reports that  got covid, however she is recovering. She is very tired and still has a cough. She does report that the urine is clear and yellow, however the pt has had many accidents where stool gets near/in the catheter. Her skin remains red but not broken down. Continues use of triple paste.    Reviewed and discussed lab work of 4/3/2024 in detail with the pt. Pt's daughter reassured that this is a very good specimen considering she has a sales catheter.  Will provide another urine specimen at time of next catheter change.  Will have a telehealth visit after next urine specimen, sooner if clinically indicated.     05/08/2024 Ms. CLAUDIO MCDOWELL presents today for a follow up audio-only telehealth visit for which they permitted for. The patient was located at home [address] [place] [address] and I was located in my office in [place], [place]. Patient has chronic indwelling Sales catheter. This urinalysis indicates urine is in very good condition without any evidence for clinically active urinary tract infection at this time. Culture showed >=3 organisms. Probable collection contamination. If I am notified of changes indicating possible clinically significant urinary tract infection, we would re-culture at that time. We will continue to collect surveillance urine specimens for urinalyses and urine cultures at time of catheter changes.  Daughter states her mental status is different.  Reports more confusion, gaps of memory. Denies hallucinations and is much less anxious. Stool is still loose and pt is more fatigued while on Risperidone.   Reviewed and discussed laboratory work of 5/2/24 which She obtained prior to today's visit as requested. At the current time I will not re-culture. If I am notified of changes indicating possible clinically significant urinary tract infection, we would re-culture at that time. We will continue to collect surveillance urine specimens for urinalyses and urine cultures at time of catheter changes. Pt will schedule a telehealth visit.  06/7/2024: Ms. CLAUDIO MCDOWELL presents today for a follow up audio only telephone visit for which she gave permission for. The pt was located at home, [address] [place] [address], and I was located in my office in [place], [place]. Visit conducted with patient's daughter, Tabatha. 6/3/24 UA revealed small blood by dipstick, moderate Leukocyte Esterase, trace proteinuria and 10/HPF of WBC. Culture showed 50,000 - 99,000 CFU/mL Pseudomonas aeruginosa. Daughter reports have pt historian today. Patient is doing okay from urinary standpoint, urine is clear. Admits to breathing difficulties, as Internist prescribed Z-pack (Zithromax) but the daughter is hesitant of proceeding further with the prescription as 1 day before starting her cough seemed improved.  Before cough onset, there was increase in confusion// decline of mental state.    Reviewed and discussed laboratory work of 6/3/24 which She obtained prior to today's visit as requested. I do not believe we should treat at this time, instead reassess with next catheter change.  Patient to continue with respiratory treatment course and should not discontinue as this may create more clinical related issues. Pt will schedule a telehealth visit in 1 month.   06/26/2024: Ms. CLAUDIO MCDOWELL presents today for a follow up audio only telephone visit. Visit was conducted with the patient's daughter, Tabatha. The pt was located at home, [address] [place] [address], and I was located in my office in [place], [place]. Her daughter informs me today that she did not need an appt but did need instructions on irrigating the catheter for the nurse. However, the catheter happened to snap today and needed to be replaced anyway. I apologized for the misunderstanding. She stated that it would be better for me to call the nurse directly to give her the instruction. She had just left the house after changing the patient's catheter. She informs me she took a urine specimen. The nurses name is Marimar and can be reached at [phone].   After I hung up with the patient's daughter, I called Marimar. She informs me she just left after changing the catheter. I informed her it would be good if we could have the catheter irrigated to avoid sediment. She uses a 16 french catheter. She was instructed to disconnect the collecting tube and irrigate with about 60 cc of water. Let it drain out passively. If it does not drain well, she can try again. She can aspirate gently if she needs. If sediment comes out, she should repeat until it is clear. I explained that sometimes if it is a lot, it may take up to a liter to irrigate. I provided her with my phone number in the case she needs help.    07/01/2024: Ms. CLAUDIO MCDOWELL presents today for a follow up audio only telephone visit. The pt was located at home, [address] [place] [address], and I was located in my office in [place], [place]. Visit conducted with patient's daughter, Tabatha. Patient's visiting nurse, Marimar, collected a urine specimen from her on 6/26/2024 when replacing the catheter after it broke. UA revealed cloudy urine, large LE, and 150 WBC/HPF, otherwise normal. Urine culture grew >100,000 CFU/ml Citrobacter freundii and 50,000 - 99,000 CFU/mL Pseudomonas aeruginosa. The patient obtained lab work /24 prior to today's visit. Culture showed >100,000 CFU/ml Citrobacter freundii. 50,000 - 99,000 CFU/mL Pseudomonas aeruginosa. BMP revealed low sodium of 134, potassium 5.4, glucose elevated at 122, creatinine wnl at 0.56. UA showed cloudy appearance, large Leukocyte Esterase, 150/HOF WBC. Patient reports today is not her best day. She sounds suppressed. Daughter reports onset cough and unsure if infection could be both respiratory and bladder. Patient obtained nebulizer prior to visit. Daughter reports urine is currently clear.   Reviewed and discussed lab work of 6/26/2024 in detail with the pt's daughter.   I prescribed Fosfomycin 3 GM oral packet, once every 3 days. If onset bowel irritability occurs patient informed to call.  Patient denies ever having a seizure. Pt will schedule a telehealth visit in 1 week.    07/05/2024: Ms. CLAUDIO MCDOWELL presents today for a follow up audio only telephone visit. The pt was located at home, [address] [place] [address], and I was located in my office in [place], [place]. Visit was conducted with her daughter, Tabatha. She informs me that she just took the first packet of Fosfomycin yesterday. There have been no problems thus far. She does report that the catheter was mispositioned last night and was not draining for a few hours. Her and the aide were able to push it in more and urine drained. The visiting nurse came this morning. She irrigated the catheter and ensured it was in good position.    Continue Fosfomycin and call if there are any problems.  They are scheduled for a telehealth visit on 7/8/2024 for reassessment.    07/08/2024: Ms. CLAUDIO MCDOWELL presents today for a follow up audio-visual telehealth visit. The pt was located at home, [address] [place] [address], and I was located in my office in [place], [place]. The visit was conducted mostly with her daughter Tabatha. The patient has been taking Fosfomycin 3 GM oral packet once every 3 days. Her daughter reports she looks a bit improved. Her urine looks good but has since the beginning. She feels Fosfomycin makes her a little sick. Had some abdominal pain over the weekend but has not complained today. She was having anxiety but her oxygen is good. Her temperature is 97.1. She is not leaking around the catheter at all. She is a bit stressed today and someone close to them passed away and her daughter had to inform her of this today. At the end of the visit I spoke to  and she informs me she is not feeling so bad.  Continue Fosfomycin (last dose this Wednesday).  Patient's visiting nurse will collect a urine specimen this Thursday. It will be sent out for UA, culture, fungus culture, and cytology.  Though the patient's daughter Tabatha is very concerned about her mother and a bit depressed and anxious over it, her mother is doing quite well. She did not seem toxic to me. She made sensible conversation. She is quite hard of hearing but her daughter would repeat things so she could hear me and would respond well. From a urinary tract standpoint, she is doing well.  Patient will have a telehealth visit after next urine specimen to review and discuss results.    07/12/2024: Ms. MCDOWELL is a 89 year old female presenting today for an audio only telehealth visit for which she gave verbal permission. The patient was located in her home at [address] [place] [address]. I was located in my office on [place], [place]. She was accompanied by her daughter who helped with the visit.  Patient is bedbound and with a sales catheter.  On 7/11/24 patient was found with 150 wbc/hpf. cloudy urine. No bacteria.  given that patient is with indwelling catheter, I would usually not treat her with abx right away, but I saw her and she looked pale, sluggish, and not cheerful as usual.  She was started on Fosfomycin once every 3 days. After course of Fosfomycin, patient was found with only 22 wbc/hpf, and few bacteria.  Her daughter worries today about low urine output since patient started Fosfomycin. She admits patient has been experiencing nighttime diarrhea which is expected with the medication. I explain that is consistent with lower production of urine as patient loses water during nighttime diarrhea,  patient's vitals taken by her daughter have been reportedly normal BP:120 /60. Pulse in the 70s Urine output about 1.5L a day. Pt will follow up next week for video telehealth.   08/06/2024: Ms. CLAUDIO MCDOWELL presents today for a follow up audio only telephone visit for which she gave permission for. The pt was located at home, [address] [place] [address], and I was located in my office in [place], [place]. Visit was conducted with her daughter, Tabatha Deluna. At the time I called at 2:00 PM the nurse was there to change her catheter. Her daughter reports that it looks as if there is a ball in her mother's stomach. This past Friday, she showed a tremendous amount of stool in her colon on x-ray. She denies seeing any blood in the stool that has been passing. She denies any gross hematuria. Urine has looked good. Her daughter notes that last Wednesday, it appeared her mother had a TIA. She opted to not have her mom taken to the hospital, so they are not sure if that is exactly what happened.  She appears less confused than last week according to her daughter.    Patient should proceed with catheter change today. They should record how much urine they collect when it is changed. Urine will be sent for UA, culture, and cytology.   Her daughter was advised to continue managing her mother's constipation. She states she is doing an enema tomorrow.   Patient will have a telehealth visit on Friday to review and discuss urine test results.    Duration of telephone visit was 12 minutes.

## 2024-08-10 NOTE — ADDENDUM
[FreeTextEntry1] : This note was partly authored by Clovis Rebolledo working as a scribe for BENIGNO Kiran. I, BENIGNO Kiran, have reviewed the content of this note and confirm it is true and accurate. I personally performed the history and physical examination and made all the decisions. 08/09/2024.

## 2024-08-10 NOTE — HISTORY OF PRESENT ILLNESS
[FreeTextEntry1] : Claudio Mcdowell is currently 89 years of age.  Her daughter Tabatha Deluna is devoted to her care.  She is very concerned about her mother and has been so for many years.  She becomes very anxious at times and that is concerning her mother.  Claudio Mcdowell lives with her daughter.  Claudio Mcdowell has been bedbound for several years.  The patient had developed a sacral decubitus ulcer while in a nursing facility.  A Sales catheter was placed because of fecal incontinence and concern that urine mixed with the feces would contaminate the sacral decubitus ulcer.  Once the patient left the nursing facility and will be with her daughter.  The sacral decubitus ulcer has finally healed.  I have discussed removing the Sales catheter with the daughter.  However as the patient has fecal incontinence there is concern about the urine mixing with the feces and being more likely to cause skin problems.  For now we will leave the Sales catheter in.  The patient has had clinically symptomatic urinary tract infections.  He clinically significant infections usually start with increased urination around the Sales catheter due to bladder spasms.  Bladder spasms have been occurring for staff.  The patient is positioned properly in in bed and the personal care attendant leaves for the day.  We have managed to prevent this with the administration of sublingual hyoscyamine.  We do periodic surveillance urine analysis and urine culture tests at the time the Sales catheter is changed by visiting nurse.  He also performed a times of increased spasms or purulence of the urine or change in mental status,  On October 11, 2022 visiting nurse using an order I have previously placed at the request of the daughter sent urine for urine cytology which proved to be negative for malignant cells, urine culture which grew Greater than 100,000 and E. coli that was sensitive to all antibiotics tested.  Urinalysis looked quite good for urine taken from a Sales catheter that was used for chronic Sales catheter drainage.  Ileukocyte esterase was large but nitrites were negative.  There were 2 epithelial cells per high-power field.  There were only 10 white blood cells per high-power field and only 2 red blood cells per high-power field on microscopic exam there were no bacteria seen and there were no hyaline casts.  Specific gravity was 1.010 which shows good hydration and this bedbound woman cared for diligently by her daughter.  Blood by dipstick was trace.  There was no protein glucose or ketones in the urine.  There is no bilirubin and no urobilinogen.  An important portion of the visit was my review with the daughter about bladder spasms and urination around the catheter.  Urine appearance and urine analysis have been clear.  The urine was clear again today.  The patient has significant short-term memory deficit.  She does have some useful short-term memory.  Her long-term memory remains very much intact.  She is very pleasant and to make satisfactory conversation.  She does admit to sometimes not remembering something.  Her complexion was good and there was no pallor.  Facial movements were symmetric.  Cranial nerves were intact.  I was not able to assess the olfactory nerve as this was a telehealth visit.    11/04/2022: Ms. MCDOWELL is a 89 year old female presenting today for a telehealth visit. She was accompanied by her daughter who helped with the visit. They gave permission for an audio only telehealth conference. The patient was located in her home in [place], [place]. I was located in my office on [place], [place]. Today her daughter reports the pt experienced rare urinary leaking around the catheter due to bladder spasms. She also reports her systolic pressure was around 140 last week. I offered Gemtesa to manage the bladder spasms and the daughter was cautious about more medications. I informed her if the urinary leaking are only once every 2 weeks or so, she does not have to medicate. The daughter was agreeable to monitor the occurrence of urinary leaking over the next 4 weeks and assess the discomfort it causes the pt. She denies any other urinary issues.  03/13/2023: Ms. MCDOWELL is a 89 year old female presenting today for a telehealth visit. She was accompanied by her daughter who helped with the visit. They gave permission for an audio only telehealth conference. The patient was located in her home in [place], [place]. I was located in my office on [place], [place]. The patient obtained an US prior to today's visit 3/9/23 which revealed: Comparison is made with the exam of 2/28/22. Transabdominal ultrasound of the abdomen was performed with color flow imaging. The examination is limited in evaluation. The visualized aorta and inferior vena cava show no abnormality. The pancreas is obscured by overlying bowel gas. The liver measures 12.4 cm with homogeneous echotexture. No intrinsic mass and no intra-or extrahepatic ductal dilatation. There is hepatopedal flow of the main portal vein. No gallstones, pericholecystic fluid, wall thickening or positive sonographic Melendez sign. The common bile duct measures 0.3 cm. The right kidney measures 9.2 x 5.6 x 3.0 cm with a nonobstructing 1.2 cm stone in the upper pole. Multiple additional subcentimeter calcifications are seen. These were not seen on previous exam. No hydronephrosis or renal mass. The left kidney measures 9.3 x 3.9 x 3.5 cm. There is a 2.1 x 2.4 x 2.0 cm cyst in the medial mid pole, not seen on previous study. No hydronephrosis or renal calcification. The spleen measures 8.2 cm and show no abnormality. There is a small right pleural effusion, stable. IMPRESSIONS: Multiple nonobstructing stones of the right kidney with no hydronephrosis. 2.4 cm cyst of the midpole left kidney. Small right pleural effusion, stable. The daughter has answered the phone and reports the patient's urine does not get dark. She is not aware of the amount of water she gives her mother. I requested she measure's this from now on as the pt has stones and needs to increase water consumption. Her daughter reports this week the pt has experienced more spasms than normal and believes this may be due to severe constipation. She provided the pt with an Enema to aide in this situation. She is very concerned but I informed her the Enema may have stimulated the spasms and we should give her a few days to clear up and re-evaluate.   03/22/2023: Ms. CLAUDIO MCDOWELL presents today for an audio visual telehealth visit for which she gave permission for. The patient was located at home at [address] [place] [address] and I was located at my office in [place], [place]. She is accompanied by her daughter whom most of the visit was conducted with. Her daughter has been keeping track of her mother's water consumption. She is averaging about 58 oz of water in a 24 hr period. Additionally, she is drinking juice and ensure nutrition drinks. She does not consume any caffeine. She is on furosemide 40 mg. I said hello to the patient at the end of the visit and she is looking well. She reports feeling no pain at the current moment.   3/29/2023:  Patient Claudio Mcdowell and daughter Tabatha Deluna were home in [place], [place] and I was in my office in [facility].  Patient and daughter gave permission for a FaceTime telehealth visit.  They preferred this to [facility].  The patient looks good today.  Her complexion was good there is no pallor.  Smile was symmetric her affect was normal she appeared happy she could make conversation it was appropriate.  She said that she currently had no pain.  When I asked questions that after 3/2 how she had been recently the sometimes she hesitated and deferred to her daughter for cancer compatible with her impaired short-term memory.  Long-term memory remains good she recognizes me once know so I am does ask questions about things like her bladder as she was worried about it her blood tests.  I assured her the results were satisfactory.  04/18/2023: Ms. MCDOWELL is a 89 year old female presenting today for an audio and visual telehealth visit for which she gave verbal permission. We utilized [employer] telemetry [employer] platform. The patient was located in her home at [address] [place] [address]. I was located in my office on [place], [place]. She was accompanied by her daughter who helped to serve as a historian. She reports discomfort in her eyes. She describes the discomfort as a feeling of sand. She opened her eyes for me, and they do not look red. Pupils look normal. She reports the right eyes bothers her significantly more than the left. I advise that the patient tries lubricating drops and it if does not feel better she will notify her visiting nurse who is due to come next week for a sales catheter change. She reports episode of bladder spasms. She denies gross hematuria. She describes the urine as barely yellow. I reviewed and discussed her  latest pertinent lab on 04/12/2023 which shows "alkaline phosphate normal at 89. Creatinine was normal at 0.62 mg/dL. WBC normal at 5.01. RBC normal at 3.87".   05/09/2023: Ms. MCDOWELL presents today for a follow up audio only telehealth visit for which they gave permission for. She was accompanied by her daughter who helped to serve as a historian. The patient was located at home [address] [place] [address] and I was located in my office in [place], [place]. The patient obtained lab work 5/2/23 prior to today's visit. The UA revealed microscopic hematuria, 5/HPF RBC, 27/HPF WBC. The patient demonstrated great hydration as the specific gravity is 1.006. The Sales catheter was changed May 2, 2023 with no clear urine in the bag. While changing the diaper the aide reports blood in the diaper. The daughter states last week her mother experienced spasms but as of two days ago she is now fine. However, she states the patient is more fatigued. She denies the pt having a temperature. I assured her because her mother is post menopausal any abrasion to the labia can cause a fissure.   06/02/2023: Ms. MCDOWELL is a 89 year old female presenting today for an audio and visual telehealth visit for which she gave verbal permission. We utilized [employer] teams telemetry doc platform. The patient was located in her home at [address] [place] [address]. I was located in my office on [place], [place]. She was accompanied by her daughter Tabatha. She reports that her mother complains of onset dysuria that dissipated on its own and has not recurred since. She reports right sided back pain that is aggravated when she lays on the right side. She provided urine specimen. She notes the urine was very clear. I reviewed and discussed the patient's pertinent lab works. Her latest Urinalysis on 05/30/2023 that shows "60 WBC/HPF. Trace Blood Urine. Specific Gravity Urine 1.007". Urine Culture on the same date shows ">100,000 CFU/ml Escherichia coli and <10,000 CFU/ml Normal Urogenital ángel present". The patient takes Mirtazapine for anxiety at low dose. The daughter reports the patient is experiencing increasing anxiety and is thinking of having the prescriber increasing the dosage.  Her BP runs around 120/60. Her Hemoglobin hematocrit looks good. gaze is conjugated. facial expression looks symmetric. Urine was faint yellow and clear.   06/06/2023: Ms. MCDOWELL presents today for a follow up audio only telehealth visit for which they gave permission for. The patient was located at home [address] [place] [address] and I was located in my office in [place], [place]. She was accompanied by her daughter Tabatha. The daughter states pt is experiencing episodes of a dry cough. An X Ray  of 6/5/23 revealed Mild peribronchial thickening suggesting bronchitis in the right clinical setting with no focal pneumonia or congestive heart failure. COPD with chronic lung changes. The mental status is the same as usual but her BP has elevated some. I assured her a little elevation is not as dangerous. The pt continues to experience some urinary frequency and it is a little more concentrated.   10/12/2023: Ms. MCDOWELL presents today for a follow up audio only telehealth visit for which they gave permission for. The patient was located at home [address] [place] [address] and I was located in my office in [place], [place]. The 10/2/23 UA showed proteinuria 30 mg/dL, moderate blood, large Leukocyte Esterase, 16/HPF of WBC. UA showed improvement as patient has chronic sales drainage. Her daughter states patient is okay considering coming home from the hospital. Most pain is in her back/shoulders and some of her back area is red/raw.  Recently her BP has increased to 180/100 and was prescribed Losartan 50mg twice daily. Urine is a clear color, denies cloudy and dark appearance.  11/01/2023: Ms. MCDOWELL presents today for a follow up audio only telehealth visit for which they gave permission for. The patient was located at home [address] [place] [address] and I was located in my office in [place], [place]. The 10/13/23 CMP revealed creatinine at 0.58 and low ALT at 8 U/L. Patient was admitted into [facility] on 10/27/23 for sepsis/UTI. Her daughter states she may be released tomorrow. Daughter states during the day the patient's urine was normal, but she did have chills and a fever.   11/03/2023: Ms. MCDOWELL is a 89 year old female presenting today for an audio only telehealth visit for which she gave verbal permission. The patient was located in her home at [address] [place] [address]. I was located in my office on [place], [place]. She was accompanied by her daughter. She says her mother was supposed to be released today. Initially she was able to void. Because of constipation she was given a couple of enemas. She had a large evacuation and since then she has not been able to void again. The daughter says her latest PVR was 340 cc. She is waiting for her mother to be rescanned again.   11/07/2023: Ms. CLAUDIO MCDOWELL presents today for a follow up audio only telephone visit for which she gave permission for. The pt was located at home, [address] [place] [address], and I was located in my office in [place], [place]. The visit was conducted with the patient's daughter. The patient came home from the hospital on Sunday. The catheter was put back in without another attempt at a void trial. The patient does have issues with constipation. She is able to swallow liquids. Her daughter has had to crush some of her pills to put them in a liquid, however the pt finds it to taste bitter and has some discomfort swallowing the chunks of crushed pills. The hospital discharged her with a 7 day supply of Cefuroxime. Was started yesterday so she has had two doses.   11/20/2023: Ms. MCDOWELL presents today for a follow up audio only telehealth visit for which they gave permission for. The patient was located at home [address] [place] [address] and I was located in my office in [facility]. Daughter states patient is okay. She notes giving pt suppositories and 7 hours later she had mixed bowel movements. However, she did not have a bowel movement since Saturday. Patient is given pedialyte and water frequently. Today's temperature was 95 as before it was 96-97. Pt did not drink any liquids shortly after taking temperature. Daughter denies giving pt solace but she usually gives Miralax as she has done so today. Senady's urine color is clear as it  has been clear for some time.  Daughter states her stomach was distended. Bp has stabilized since being in the hospital but last night and today it  was higher than normal being being 168/88 before medication.   11/29/2023: Ms. CLAUDIO MCDOWELL presents today for a follow up audio only telephone visit for which she gave permission for. The pt was located at home, [address] [place] [address], and I was located in my office in [place], [place]. Visit was conducted with her daughter, Andree. She was informed yesterday by her house call physicians of the results of her urine culture (emailed from core lab showing >100,000 CFU/ml Pseudomonas aeruginosa susceptible only to amikacin, Ciprofloxacin, imipenem, levofloxacin, meropenum. Resistant to Ceftazidime, Pip/tazo. Intermediate for aztrenam, cefepime. They discussed that patient is having increasing oxygen requirements, although SPO2 ok on supplemental O2 (prior to this patient needed only occasional O2 suppl. every few days). 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 The want to treat with ciprofloxacin, 7 day course. Prescription sent to pharmacy. Her daughter inquired today on if she should be treated. She states last week her mother seemed very not herself, however today she seems better in terms of mental status and oxygenation. She has been giving her senakot and miralax.   12/21/2023: Ms. MCDWOELL presents today for a follow up audio-only telehealth visit for which they gave permission for. The patient was located at home [address] [place] [address] and I was located in my office in [place], [place]. The patient obtained lab work 12/4/23 prior to today's visit. Urinalysis is improved and showed trace proteinuria, small Leukocyte Esterase, 6/HPF of WBC and 7/LPF of Cast. Urine culture shows no significant growth, <10,000 CFU/mL Normal Urogenital Ángel, which is very good. Her daughter expresses concern as recently patient is experiencing a surplus of looser and larger amounts of bowel movements that is escaping to the vaginal area, thus causing infections. Urine is normal in color, denies fever. Patient is occasionally more fatigued than usual. Denies distended appearance of the abdomen. Admits to only 1 spasm around the catheter this month. Next sales change will be in 2 weeks.  Towards the end of visit, patient was shown on Facetime, appearing well oriented x 3, normal pigmentation, lips are moist, and smile is symmetrical. I am very impressed by how well the patient looks and speaks. It was more than just pleasantries, she inquired about personal questions. Overall, I am very pleased.   12/27/2023: Ms. CLAUDIO MCDOWELL presents today for a follow up audio only telephone visit for which she gave permission for. The pt was located at home, [address] [place] [address], and I was located in my office in [place], [place]. She is accompanied by her daughter. Patient provided a surveillance urine specimen on 12/22/2023. She has chronic sales catheter drainage. UA revealed small LEC, positive for nitrites, 6 WBC/HPF, no RBC seen, moderate bacteria/HPF, granular casts present, and yeast like cells present. Urine culture grew >100,000 CFU/ml Enterococcus faecalis and 50,000 - 99,000 CFU/mL Streptococcus mitis/oralis group. Urine cytology was negative for high grade urothelial carcinoma. Few mature squamous cells, rare benign urothelial cells and abundant fungal organisms morphologically consistent with Candida species present. Patient's daughter reports that her urine looks clear and a pale yellow color. She feels her mom still has a lot of anxiety and some confusion. She does report that her catheter bag sits in feces due to the patient's position. They tried to tilt her pelvis to avoid this but states it's easier said than done. She states there is no feces in the catheter or in the bag. BM are not loose, she describes them as pasty. Has about 2-3 BM a day.   01/05/2024: Ms. MCDOWELL is a 89 year old female presenting today for an audio only telehealth visit for which she gave verbal permission. The patient was located in her home at [address] [place] [address]. I was located in my office on [place], [place]. She was accompanied by her daughter who helped with the visit. She reports that a nurse came for a catheter change, and only succeeded on the third attempt. She says it's the same nurse who has been coming for 5 months now, and usually she encounters no difficulties. Now that the catheter has been changed, the patient is able to void.   01/17/2024: Ms. CLAUDIO MCDOWELL presents today for a follow up audio only telephone visit for which she gave permission for. The pt was located at home, [address] [place] [address], and I was located in my office in [place], [place]. She is accompanied by her daughter, Tabatha Deluna. Patient provided a urine specimen on 1/5/2024 for surveillance. UA revealed trace blood by urine and moderate LEC. This is excellent for her as she has chronic sales catheter drainage. Urine culture grew 50,000-99,000 CFU/ml Pseudomonas aeruginosa. Urine cytology was negative for high grade urothelial carcinoma. Few mature squamous cells, rare benign urothelial cells and abundant fungal organisms morphologically consistent with Candida species present. The patient's daughter reports that her mother is "different". She reports that she is hearing things a lot now. She states that usually the things are negative. For example, she will say "Why is Ac saying my oxygen isn't working properly?". It is clear what she is saying but fairly random. She is not seeing anything; it is strictly auditory.   01/22/2024: Ms. CLAUDIO MCDOWELL presents today for a follow up audio only telephone visit for which she gave permission for. The pt was located at home, [address] [place] [address], and I was located in my office in [place], [place]. Visit was conducted with the patient's daughter, Tabatha Deluna. She informs me that she did not realize she was out of hyoscyamine sulfate for bladder spasms. She has been getting bladder spasms after the aide leaves and is embarrassed by this as she thinks shes wetting the bed. Her daughter reports that her mother asks in the morning "why am I bothering taking my medications, I am going to die anyway".   02/05/2024: Ms. CLAUDIO MCDOWELL presents today for an audio visual telehealth visit for which she gave permission for. The patient was located at home at [address] [place] [address] and I was located at my office in [place], [place]. Pt's daughter states she was not expecting my call today. I informed her that she was on my schedule and that if she would like we can talk. She informs me that hyoscyamine does not seem to be working for her mother and that some days she has bladder spasms and some she doesn't. Pt is currently not having diarrhea. She is due for a catheter change tomorrow. Patient has not started any medications for the psychological issues she has been having.   02/09/2024: Ms. MCDOWELL is a 89 year old female presenting today for an audio only telehealth visit for which she gave verbal permission. The patient was located in her home at [address] [place] [address]. I was located in my office on [place], [place]. She was accompanied by her daughter who helps with the visit.   The patient has been with a chronic sales catheter since July 2021 when she fractured her foot. She reports today for a follow up.   I reviewed and discussed the patient's pertinent lab works. Urine Culture shows 10,000 - 49,000 CFU/mL Pseudomonas aeruginosa 10,000 - 49,000 CFU/mL Citrobacter freundii complex  I explain to the patient that patients with chronic sales catheter develop bacteria in the urine, that may come from the skin, and it is not significant clinically unless infection symptoms ensue. In addition, the patient reports she was not taking antibiotics when the sample was collected, and the colonies were less than 100 000.   03/13/2024: Ms. CLAUDIO MCDOWELL presents today for a follow up audio only telephone visit for which she gave permission for. The pt was located at home, [address] [place] [address], and I was located in my office in [place], [place]. Visit was conducted with her daughter Tabatha Deluna. Patient provided a urine specimen on 3/4/2024. Patient has a chronic indwelling sales. UA revealed trace blood, large LE, and 9 WBC/HPF. This is quite good considering her sales. Culture grew 50,000 - 99,000 CFU/mL Escherichia coli & >100,000 CFU/ml Citrobacter freundii complex. She does report that after they gave the specimen her mother had a week of stool getting in the vulvar area and she is worried this might travel to the bladder. There is no evidence of that currently. She states the urine is clear and yellow. There has been no drainage around the catheter. She has not had any increased bladder spasms. Patient's daughter reports that her mother is not feeling well today. She has a runny nose and a cough. Home care is arranging for a nasal swab.   04/08/2024: Ms. CLAUDIO MCDOWELL presents today for a follow up audio only telephone visit for which she gave permission for. The pt was located at home, [address] [place] [address], and I was located in my office in [place], [place]. Visit was conducted with her daughter Tabatha Deluna. Patient provided a urine specimen on 4/3/2024. UA was completely normal other than moderate LE and 6 WBC/HPF. For having a sales catheter, this is an excellent UA. Urine culture grew >100,000 CFU/ml Citrobacter freundii complex. Urine cytology was negative for high grade urothelial carcinoma. Specimen consists of many lymphoid cells, mature squamous epithelial cells and benign urothelial cells. Patient's daughter reports that  got covid, however she is recovering. She is very tired and still has a cough. She does report that the urine is clear and yellow, however the pt has had many accidents where stool gets near/in the catheter. Her skin remains red but not broken down. Continues use of triple paste.   05/08/2024 Ms. CLAUDIO MCDOWELL presents today for a follow up audio-only telehealth visit for which they permitted for. The patient was located at home [address] [place] [address] and I was located in my office in [place], [place]. Patient has chronic indwelling Sales catheter. This urinalysis indicates urine is in very good condition without any evidence for clinically active urinary tract infection at this time. Culture showed >=3 organisms. Probable collection contamination. If I am notified of changes indicating possible clinically significant urinary tract infection, we would re-culture at that time. We will continue to collect surveillance urine specimens for urinalyses and urine cultures at time of catheter changes.  Daughter states her mental status is different.  Reports more confusion, gaps of memory. Denies hallucinations and is much less anxious. Stool is still loose and pt is more fatigued while on Risperidone.   06/7/2024: Ms. CLAUDIO MCDOWELL presents today for a follow up audio only telephone visit for which she gave permission for. The pt was located at home, [address] [place] [address], and I was located in my office in [place], [place]. Visit conducted with patient's daughter, Tabatha. 6/3/24 UA revealed small blood by dipstick, moderate Leukocyte Esterase, trace proteinuria and 10/HPF of WBC. Culture showed 50,000 - 99,000 CFU/mL Pseudomonas aeruginosa. Daughter reports have pt historian today. Patient is doing okay from urinary standpoint, urine is clear. Admits to breathing difficulties, as Internist prescribed Z-pack (Zithromax) but the daughter is hesitant of proceeding further with the prescription as 1 day before starting her cough seemed improved.  Before cough onset, there was increase in confusion// decline of mental state.   06/26/2024: Ms. CLAUDIO MCDOWELL presents today for a follow up audio only telephone visit. Visit was conducted with the patient's daughter, Tabatha. The pt was located at home, [address] [place] [address], and I was located in my office in [place], [place]. Her daughter informs me today that she did not need an appt but did need instructions on irrigating the catheter for the nurse. However, the catheter happened to snap today and needed to be replaced anyway. I apologized for the misunderstanding. She stated that it would be better for me to call the nurse directly to give her the instruction. She had just left the house after changing the patient's catheter. She informs me she took a urine specimen. The nurses name is Marimar and can be reached at [phone].   07/01/2024: Ms. CLAUDIO MCDOWELL presents today for a follow up audio only telephone visit. The pt was located at home, [address] [place] [address], and I was located in my office in [place], [place]. Visit conducted with patient's daughter, Tabatha. Patient's visiting nurse, Marimar, collected a urine specimen from her on 6/26/2024 when replacing the catheter after it broke. UA revealed cloudy urine, large LE, and 150 WBC/HPF, otherwise normal. Urine culture grew >100,000 CFU/ml Citrobacter freundii and 50,000 - 99,000 CFU/mL Pseudomonas aeruginosa. The patient obtained lab work /24 prior to today's visit. Culture showed >100,000 CFU/ml Citrobacter freundii. 50,000 - 99,000 CFU/mL Pseudomonas aeruginosa. BMP revealed low sodium of 134, potassium 5.4, glucose elevated at 122, creatinine wnl at 0.56. UA showed cloudy appearance, large Leukocyte Esterase, 150/HOF WBC. Patient reports today is not her best day. She sounds suppressed. Daughter reports onset cough and unsure if infection could be both respiratory and bladder. Patient obtained nebulizer prior to visit. Daughter reports urine is currently clear.   07/05/2024: Ms. CLAUDIO MCDOWELL presents today for a follow up audio only telephone visit. The pt was located at home, [address] [place] [address], and I was located in my office in [place], [place]. Visit was conducted with her daughter, Tabatha. She informs me that she just took the first packet of Fosfomycin yesterday. There have been no problems thus far. She does report that the catheter was mispositioned last night and was not draining for a few hours. Her and the aide were able to push it in more and urine drained. The visiting nurse came this morning. She irrigated the catheter and ensured it was in good position.   07/08/2024: Ms. CLAUDIO MCDOWELL presents today for a follow up audio-visual telehealth visit. The pt was located at home, [address] [place] [address], and I was located in my office in [place], [place]. The visit was conducted mostly with her daughter Tabatha. The patient has been taking Fosfomycin 3 GM oral packet once every 3 days. Her daughter reports she looks a bit improved. Her urine looks good but has since the beginning. She feels Fosfomycin makes her a little sick. Had some abdominal pain over the weekend but has not complained today. She was having anxiety but her oxygen is good. Her temperature is 97.1. She is not leaking around the catheter at all. She is a bit stressed today and someone close to them passed away and her daughter had to inform her of this today. At the end of the visit I spoke to  and she informs me she is not feeling so bad.     07/12/2024: Ms. MCDOWELL is a 89 year old female presenting today for an audio only telehealth visit for which she gave verbal permission. The patient was located in her home at [address] [place] [address]. I was located in my office on [place], [place]. She was accompanied by her daughter who helped with the visit.  Patient is bedbound and with a sales catheter.  On 7/11/24 patient was found with 150 wbc/hpf. cloudy urine. No bacteria.  given that patient is with indwelling catheter, I would usually not treat her with abx right away, but I saw her and she looked pale, sluggish, and not cheerful as usual.  She was started on Fosfomycin once every 3 days. After course of Fosfomycin, patient was found with only 22 wbc/hpf, and few bacteria.  Her daughter worries today about low urine output since patient started Fosfomycin. She admits patient has been experiencing nighttime diarrhea which is expected with the medication. I explain that is consistent with lower production of urine as patient loses water during nighttime diarrhea,  patient's vitals taken by her daughter have been reportedly normal BP:120 /60. Pulse in the 70s  08/06/2024: Ms. CLAUDIO MCDOWELL presents today for a follow up audio only telephone visit for which she gave permission for. The pt was located at home, [address] [place] [address], and I was located in my office in [place], [place]. Visit was conducted with her daughter, Tabatha Deluna. At the time I called at 2:00 PM the nurse was there to change her catheter. Her daughter reports that it looks as if there is a ball in her mother's stomach. This past Friday, she showed a tremendous amount of stool in her colon on x-ray. She denies seeing any blood in the stool that has been passing. She denies any gross hematuria. Urine has looked good. Her daughter notes that last Wednesday, it appeared her mother had a TIA. She opted to not have her mom taken to the hospital, so they are not sure if that is exactly what happened.  She appears less confused than last week according to her daughter.

## 2024-08-10 NOTE — REVIEW OF SYSTEMS
[Eyesight Problems] : eyesight problems [Loss Of Hearing] : hearing loss [Chest Pain] : chest pain [Diarrhea] : diarrhea [Joint Pain] : joint pain [Confused] : confusion [Anxiety] : anxiety [Easy Bleeding] : a tendency for easy bleeding [Feeling Poorly] : not feeling poorly [Palpitations] : no palpitations [Cough] : no cough [Dysuria] : no dysuria [Constipation] : no constipation [Convulsions] : no convulsions [Hot Flashes] : no hot flashes

## 2024-08-13 ENCOUNTER — TRANSCRIPTION ENCOUNTER (OUTPATIENT)
Age: 89
End: 2024-08-13

## 2024-08-15 ENCOUNTER — APPOINTMENT (OUTPATIENT)
Dept: HOME HEALTH SERVICES | Facility: HOME HEALTH | Age: 89
End: 2024-08-15

## 2024-08-15 VITALS
HEART RATE: 66 BPM | SYSTOLIC BLOOD PRESSURE: 144 MMHG | DIASTOLIC BLOOD PRESSURE: 78 MMHG | RESPIRATION RATE: 18 BRPM | TEMPERATURE: 97.6 F | OXYGEN SATURATION: 98 %

## 2024-08-15 VITALS — SYSTOLIC BLOOD PRESSURE: 148 MMHG | DIASTOLIC BLOOD PRESSURE: 70 MMHG

## 2024-08-20 NOTE — DISCHARGE NOTE NURSING/CASE MANAGEMENT/SOCIAL WORK - CASE MANAGER'S NAME
vEin Winkler RN Alert-The patient is alert, awake and responds to voice. The patient is oriented to time, place, and person. The triage nurse is able to obtain subjective information.

## 2024-08-26 RX ORDER — HYDRALAZINE HYDROCHLORIDE 25 MG/1
25 TABLET ORAL
Qty: 90 | Refills: 3 | Status: ACTIVE | COMMUNITY
Start: 2024-08-26 | End: 1900-01-01

## 2024-09-02 ENCOUNTER — LABORATORY RESULT (OUTPATIENT)
Age: 89
End: 2024-09-02

## 2024-09-03 ENCOUNTER — LABORATORY RESULT (OUTPATIENT)
Age: 89
End: 2024-09-03

## 2024-09-06 ENCOUNTER — APPOINTMENT (OUTPATIENT)
Dept: UROLOGY | Facility: CLINIC | Age: 89
End: 2024-09-06
Payer: MEDICARE

## 2024-09-06 DIAGNOSIS — Z74.01 BED CONFINEMENT STATUS: ICD-10-CM

## 2024-09-06 DIAGNOSIS — N20.0 CALCULUS OF KIDNEY: ICD-10-CM

## 2024-09-06 DIAGNOSIS — F03.90 UNSPECIFIED DEMENTIA W/OUT BEHAVIORAL DISTURBANCE: ICD-10-CM

## 2024-09-06 DIAGNOSIS — M48.00 SPINAL STENOSIS, SITE UNSPECIFIED: ICD-10-CM

## 2024-09-06 PROBLEM — R82.998 CASTS PRESENT IN URINE: Status: ACTIVE | Noted: 2024-09-06

## 2024-09-06 PROCEDURE — 99443: CPT

## 2024-09-07 NOTE — HISTORY OF PRESENT ILLNESS
[FreeTextEntry1] : Claudio Mcdowell is currently 89 years of age.  Her daughter Tabatha Deluna is devoted to her care.  She is very concerned about her mother and has been so for many years.  She becomes very anxious at times and that is concerning her mother.  Claudio Mcdowell lives with her daughter.  Claudio Mcdowell has been bedbound for several years.  The patient had developed a sacral decubitus ulcer while in a nursing facility.  A Sales catheter was placed because of fecal incontinence and concern that urine mixed with the feces would contaminate the sacral decubitus ulcer.  Once the patient left the nursing facility and will be with her daughter.  The sacral decubitus ulcer has finally healed.  I have discussed removing the Sales catheter with the daughter.  However as the patient has fecal incontinence there is concern about the urine mixing with the feces and being more likely to cause skin problems.  For now we will leave the Sales catheter in.  The patient has had clinically symptomatic urinary tract infections.  He clinically significant infections usually start with increased urination around the Sales catheter due to bladder spasms.  Bladder spasms have been occurring for staff.  The patient is positioned properly in in bed and the personal care attendant leaves for the day.  We have managed to prevent this with the administration of sublingual hyoscyamine.  We do periodic surveillance urine analysis and urine culture tests at the time the Sales catheter is changed by visiting nurse.  He also performed a times of increased spasms or purulence of the urine or change in mental status,  On October 11, 2022 visiting nurse using an order I have previously placed at the request of the daughter sent urine for urine cytology which proved to be negative for malignant cells, urine culture which grew Greater than 100,000 and E. coli that was sensitive to all antibiotics tested.  Urinalysis looked quite good for urine taken from a Sales catheter that was used for chronic Sales catheter drainage.  Ileukocyte esterase was large but nitrites were negative.  There were 2 epithelial cells per high-power field.  There were only 10 white blood cells per high-power field and only 2 red blood cells per high-power field on microscopic exam there were no bacteria seen and there were no hyaline casts.  Specific gravity was 1.010 which shows good hydration and this bedbound woman cared for diligently by her daughter.  Blood by dipstick was trace.  There was no protein glucose or ketones in the urine.  There is no bilirubin and no urobilinogen.  An important portion of the visit was my review with the daughter about bladder spasms and urination around the catheter.  Urine appearance and urine analysis have been clear.  The urine was clear again today.  The patient has significant short-term memory deficit.  She does have some useful short-term memory.  Her long-term memory remains very much intact.  She is very pleasant and to make satisfactory conversation.  She does admit to sometimes not remembering something.  Her complexion was good and there was no pallor.  Facial movements were symmetric.  Cranial nerves were intact.  I was not able to assess the olfactory nerve as this was a telehealth visit.    11/04/2022: Ms. MCDOWELL is a 89 year old female presenting today for a telehealth visit. She was accompanied by her daughter who helped with the visit. They gave permission for an audio only telehealth conference. The patient was located in her home in [place], [place]. I was located in my office on [place], [place]. Today her daughter reports the pt experienced rare urinary leaking around the catheter due to bladder spasms. She also reports her systolic pressure was around 140 last week. I offered Gemtesa to manage the bladder spasms and the daughter was cautious about more medications. I informed her if the urinary leaking are only once every 2 weeks or so, she does not have to medicate. The daughter was agreeable to monitor the occurrence of urinary leaking over the next 4 weeks and assess the discomfort it causes the pt. She denies any other urinary issues.  03/13/2023: Ms. MCDOWELL is a 89 year old female presenting today for a telehealth visit. She was accompanied by her daughter who helped with the visit. They gave permission for an audio only telehealth conference. The patient was located in her home in [place], [place]. I was located in my office on [place], [place]. The patient obtained an US prior to today's visit 3/9/23 which revealed: Comparison is made with the exam of 2/28/22. Transabdominal ultrasound of the abdomen was performed with color flow imaging. The examination is limited in evaluation. The visualized aorta and inferior vena cava show no abnormality. The pancreas is obscured by overlying bowel gas. The liver measures 12.4 cm with homogeneous echotexture. No intrinsic mass and no intra-or extrahepatic ductal dilatation. There is hepatopedal flow of the main portal vein. No gallstones, pericholecystic fluid, wall thickening or positive sonographic Melendez sign. The common bile duct measures 0.3 cm. The right kidney measures 9.2 x 5.6 x 3.0 cm with a nonobstructing 1.2 cm stone in the upper pole. Multiple additional subcentimeter calcifications are seen. These were not seen on previous exam. No hydronephrosis or renal mass. The left kidney measures 9.3 x 3.9 x 3.5 cm. There is a 2.1 x 2.4 x 2.0 cm cyst in the medial mid pole, not seen on previous study. No hydronephrosis or renal calcification. The spleen measures 8.2 cm and show no abnormality. There is a small right pleural effusion, stable. IMPRESSIONS: Multiple nonobstructing stones of the right kidney with no hydronephrosis. 2.4 cm cyst of the midpole left kidney. Small right pleural effusion, stable. The daughter has answered the phone and reports the patient's urine does not get dark. She is not aware of the amount of water she gives her mother. I requested she measure's this from now on as the pt has stones and needs to increase water consumption. Her daughter reports this week the pt has experienced more spasms than normal and believes this may be due to severe constipation. She provided the pt with an Enema to aide in this situation. She is very concerned but I informed her the Enema may have stimulated the spasms and we should give her a few days to clear up and re-evaluate.   03/22/2023: Ms. CLAUDIO MCDOWELL presents today for an audio visual telehealth visit for which she gave permission for. The patient was located at home at [address] [place] [address] and I was located at my office in [place], [place]. She is accompanied by her daughter whom most of the visit was conducted with. Her daughter has been keeping track of her mother's water consumption. She is averaging about 58 oz of water in a 24 hr period. Additionally, she is drinking juice and ensure nutrition drinks. She does not consume any caffeine. She is on furosemide 40 mg. I said hello to the patient at the end of the visit and she is looking well. She reports feeling no pain at the current moment.   3/29/2023:  Patient Claudio Mcdowell and daughter Tabatha Deluna were home in [place], [place] and I was in my office in [facility].  Patient and daughter gave permission for a FaceTime telehealth visit.  They preferred this to [employer].  The patient looks good today.  Her complexion was good there is no pallor.  Smile was symmetric her affect was normal she appeared happy she could make conversation it was appropriate.  She said that she currently had no pain.  When I asked questions that after 3/2 how she had been recently the sometimes she hesitated and deferred to her daughter for cancer compatible with her impaired short-term memory.  Long-term memory remains good she recognizes me once know so I am does ask questions about things like her bladder as she was worried about it her blood tests.  I assured her the results were satisfactory.  04/18/2023: Ms. MCDOWELL is a 89 year old female presenting today for an audio and visual telehealth visit for which she gave verbal permission. We utilized [employer] telemetry [employer] platform. The patient was located in her home at [address] [place] [address]. I was located in my office on [place], [place]. She was accompanied by her daughter who helped to serve as a historian. She reports discomfort in her eyes. She describes the discomfort as a feeling of sand. She opened her eyes for me, and they do not look red. Pupils look normal. She reports the right eyes bothers her significantly more than the left. I advise that the patient tries lubricating drops and it if does not feel better she will notify her visiting nurse who is due to come next week for a sales catheter change. She reports episode of bladder spasms. She denies gross hematuria. She describes the urine as barely yellow. I reviewed and discussed her  latest pertinent lab on 04/12/2023 which shows "alkaline phosphate normal at 89. Creatinine was normal at 0.62 mg/dL. WBC normal at 5.01. RBC normal at 3.87".   05/09/2023: Ms. MCDOWELL presents today for a follow up audio only telehealth visit for which they gave permission for. She was accompanied by her daughter who helped to serve as a historian. The patient was located at home [address] [place] [address] and I was located in my office in [place], [place]. The patient obtained lab work 5/2/23 prior to today's visit. The UA revealed microscopic hematuria, 5/HPF RBC, 27/HPF WBC. The patient demonstrated great hydration as the specific gravity is 1.006. The Sales catheter was changed May 2, 2023 with no clear urine in the bag. While changing the diaper the aide reports blood in the diaper. The daughter states last week her mother experienced spasms but as of two days ago she is now fine. However, she states the patient is more fatigued. She denies the pt having a temperature. I assured her because her mother is post menopausal any abrasion to the labia can cause a fissure.   06/02/2023: Ms. MCDOWELL is a 89 year old female presenting today for an audio and visual telehealth visit for which she gave verbal permission. We utilized [employer] telemetry doc platform. The patient was located in her home at [address] [place] [address]. I was located in my office on [place], [place]. She was accompanied by her daughter Tabatha. She reports that her mother complains of onset dysuria that dissipated on its own and has not recurred since. She reports right sided back pain that is aggravated when she lays on the right side. She provided urine specimen. She notes the urine was very clear. I reviewed and discussed the patient's pertinent lab works. Her latest Urinalysis on 05/30/2023 that shows "60 WBC/HPF. Trace Blood Urine. Specific Gravity Urine 1.007". Urine Culture on the same date shows ">100,000 CFU/ml Escherichia coli and <10,000 CFU/ml Normal Urogenital ángel present". The patient takes Mirtazapine for anxiety at low dose. The daughter reports the patient is experiencing increasing anxiety and is thinking of having the prescriber increasing the dosage.  Her BP runs around 120/60. Her Hemoglobin hematocrit looks good. gaze is conjugated. facial expression looks symmetric. Urine was faint yellow and clear.   06/06/2023: Ms. MCDOWELL presents today for a follow up audio only telehealth visit for which they gave permission for. The patient was located at home [address] [place] [address] and I was located in my office in [place], [place]. She was accompanied by her daughter Tabatha. The daughter states pt is experiencing episodes of a dry cough. An X Ray  of 6/5/23 revealed Mild peribronchial thickening suggesting bronchitis in the right clinical setting with no focal pneumonia or congestive heart failure. COPD with chronic lung changes. The mental status is the same as usual but her BP has elevated some. I assured her a little elevation is not as dangerous. The pt continues to experience some urinary frequency and it is a little more concentrated.   10/12/2023: Ms. MCDOWELL presents today for a follow up audio only telehealth visit for which they gave permission for. The patient was located at home [address] [place] [address] and I was located in my office in [place], [place]. The 10/2/23 UA showed proteinuria 30 mg/dL, moderate blood, large Leukocyte Esterase, 16/HPF of WBC. UA showed improvement as patient has chronic sales drainage. Her daughter states patient is okay considering coming home from the hospital. Most pain is in her back/shoulders and some of her back area is red/raw.  Recently her BP has increased to 180/100 and was prescribed Losartan 50mg twice daily. Urine is a clear color, denies cloudy and dark appearance.  11/01/2023: Ms. MCDOWELL presents today for a follow up audio only telehealth visit for which they gave permission for. The patient was located at home [address] [place] [address] and I was located in my office in [place], [place]. The 10/13/23 CMP revealed creatinine at 0.58 and low ALT at 8 U/L. Patient was admitted into [facility] on 10/27/23 for sepsis/UTI. Her daughter states she may be released tomorrow. Daughter states during the day the patient's urine was normal, but she did have chills and a fever.   11/03/2023: Ms. MCDOWELL is a 89 year old female presenting today for an audio only telehealth visit for which she gave verbal permission. The patient was located in her home at [address] [place] [address]. I was located in my office on [place], [place]. She was accompanied by her daughter. She says her mother was supposed to be released today. Initially she was able to void. Because of constipation she was given a couple of enemas. She had a large evacuation and since then she has not been able to void again. The daughter says her latest PVR was 340 cc. She is waiting for her mother to be rescanned again.   11/07/2023: Ms. CLAUDIO MCDOWELL presents today for a follow up audio only telephone visit for which she gave permission for. The pt was located at home, [address] [place] [address], and I was located in my office in [place], [place]. The visit was conducted with the patient's daughter. The patient came home from the hospital on Sunday. The catheter was put back in without another attempt at a void trial. The patient does have issues with constipation. She is able to swallow liquids. Her daughter has had to crush some of her pills to put them in a liquid, however the pt finds it to taste bitter and has some discomfort swallowing the chunks of crushed pills. The hospital discharged her with a 7 day supply of Cefuroxime. Was started yesterday so she has had two doses.   11/20/2023: Ms. MCDOWELL presents today for a follow up audio only telehealth visit for which they gave permission for. The patient was located at home [address] [place] [address] and I was located in my office in [facility]. Daughter states patient is okay. She notes giving pt suppositories and 7 hours later she had mixed bowel movements. However, she did not have a bowel movement since Saturday. Patient is given pedialyte and water frequently. Today's temperature was 95 as before it was 96-97. Pt did not drink any liquids shortly after taking temperature. Daughter denies giving pt solace but she usually gives Miralax as she has done so today. Senady's urine color is clear as it  has been clear for some time.  Daughter states her stomach was distended. Bp has stabilized since being in the hospital but last night and today it  was higher than normal being being 168/88 before medication.   11/29/2023: Ms. CLAUDIO MCDOWELL presents today for a follow up audio only telephone visit for which she gave permission for. The pt was located at home, [address] [place] [address], and I was located in my office in [place], [place]. Visit was conducted with her daughter, Andree. She was informed yesterday by her house call physicians of the results of her urine culture (emailed from core lab showing >100,000 CFU/ml Pseudomonas aeruginosa susceptible only to amikacin, Ciprofloxacin, imipenem, levofloxacin, meropenum. Resistant to Ceftazidime, Pip/tazo. Intermediate for aztrenam, cefepime. They discussed that patient is having increasing oxygen requirements, although SPO2 ok on supplemental O2 (prior to this patient needed only occasional O2 suppl. every few days). 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 The want to treat with ciprofloxacin, 7 day course. Prescription sent to pharmacy. Her daughter inquired today on if she should be treated. She states last week her mother seemed very not herself, however today she seems better in terms of mental status and oxygenation. She has been giving her senakot and miralax.   12/21/2023: Ms. MCDOWELL presents today for a follow up audio-only telehealth visit for which they gave permission for. The patient was located at home [address] [place] [address] and I was located in my office in [place], [place]. The patient obtained lab work 12/4/23 prior to today's visit. Urinalysis is improved and showed trace proteinuria, small Leukocyte Esterase, 6/HPF of WBC and 7/LPF of Cast. Urine culture shows no significant growth, <10,000 CFU/mL Normal Urogenital Ángel, which is very good. Her daughter expresses concern as recently patient is experiencing a surplus of looser and larger amounts of bowel movements that is escaping to the vaginal area, thus causing infections. Urine is normal in color, denies fever. Patient is occasionally more fatigued than usual. Denies distended appearance of the abdomen. Admits to only 1 spasm around the catheter this month. Next sales change will be in 2 weeks.  Towards the end of visit, patient was shown on Facetime, appearing well oriented x 3, normal pigmentation, lips are moist, and smile is symmetrical. I am very impressed by how well the patient looks and speaks. It was more than just pleasantries, she inquired about personal questions. Overall, I am very pleased.   12/27/2023: Ms. CLAUDIO MCDOWELL presents today for a follow up audio only telephone visit for which she gave permission for. The pt was located at home, [address] [place] [address], and I was located in my office in [place], [place]. She is accompanied by her daughter. Patient provided a surveillance urine specimen on 12/22/2023. She has chronic sales catheter drainage. UA revealed small LEC, positive for nitrites, 6 WBC/HPF, no RBC seen, moderate bacteria/HPF, granular casts present, and yeast like cells present. Urine culture grew >100,000 CFU/ml Enterococcus faecalis and 50,000 - 99,000 CFU/mL Streptococcus mitis/oralis group. Urine cytology was negative for high grade urothelial carcinoma. Few mature squamous cells, rare benign urothelial cells and abundant fungal organisms morphologically consistent with Candida species present. Patient's daughter reports that her urine looks clear and a pale yellow color. She feels her mom still has a lot of anxiety and some confusion. She does report that her catheter bag sits in feces due to the patient's position. They tried to tilt her pelvis to avoid this but states it's easier said than done. She states there is no feces in the catheter or in the bag. BM are not loose, she describes them as pasty. Has about 2-3 BM a day.   01/05/2024: Ms. MCDOWELL is a 89 year old female presenting today for an audio only telehealth visit for which she gave verbal permission. The patient was located in her home at [address] [place] [address]. I was located in my office on [place], [place]. She was accompanied by her daughter who helped with the visit. She reports that a nurse came for a catheter change, and only succeeded on the third attempt. She says it's the same nurse who has been coming for 5 months now, and usually she encounters no difficulties. Now that the catheter has been changed, the patient is able to void.   01/17/2024: Ms. CLAUDIO MCDOWELL presents today for a follow up audio only telephone visit for which she gave permission for. The pt was located at home, [address] [place] [address], and I was located in my office in [place], [place]. She is accompanied by her daughter, Tabatha Deluna. Patient provided a urine specimen on 1/5/2024 for surveillance. UA revealed trace blood by urine and moderate LEC. This is excellent for her as she has chronic sales catheter drainage. Urine culture grew 50,000-99,000 CFU/ml Pseudomonas aeruginosa. Urine cytology was negative for high grade urothelial carcinoma. Few mature squamous cells, rare benign urothelial cells and abundant fungal organisms morphologically consistent with Candida species present. The patient's daughter reports that her mother is "different". She reports that she is hearing things a lot now. She states that usually the things are negative. For example, she will say "Why is Ac saying my oxygen isn't working properly?". It is clear what she is saying but fairly random. She is not seeing anything; it is strictly auditory.   01/22/2024: Ms. CLAUDIO MCDOWELL presents today for a follow up audio only telephone visit for which she gave permission for. The pt was located at home, [address] [place] [address], and I was located in my office in [place], [place]. Visit was conducted with the patient's daughter, Tabatha Deluna. She informs me that she did not realize she was out of hyoscyamine sulfate for bladder spasms. She has been getting bladder spasms after the aide leaves and is embarrassed by this as she thinks shes wetting the bed. Her daughter reports that her mother asks in the morning "why am I bothering taking my medications, I am going to die anyway".   02/05/2024: Ms. CLAUDIO MCDOWELL presents today for an audio visual telehealth visit for which she gave permission for. The patient was located at home at [address] [place] [address] and I was located at my office in [place], [place]. Pt's daughter states she was not expecting my call today. I informed her that she was on my schedule and that if she would like we can talk. She informs me that hyoscyamine does not seem to be working for her mother and that some days she has bladder spasms and some she doesn't. Pt is currently not having diarrhea. She is due for a catheter change tomorrow. Patient has not started any medications for the psychological issues she has been having.   02/09/2024: Ms. MCDOWELL is a 89 year old female presenting today for an audio only telehealth visit for which she gave verbal permission. The patient was located in her home at [address] [place] [address]. I was located in my office on [place], [place]. She was accompanied by her daughter who helps with the visit.   The patient has been with a chronic sales catheter since July 2021 when she fractured her foot. She reports today for a follow up.   I reviewed and discussed the patient's pertinent lab works. Urine Culture shows 10,000 - 49,000 CFU/mL Pseudomonas aeruginosa 10,000 - 49,000 CFU/mL Citrobacter freundii complex  I explain to the patient that patients with chronic sales catheter develop bacteria in the urine, that may come from the skin, and it is not significant clinically unless infection symptoms ensue. In addition, the patient reports she was not taking antibiotics when the sample was collected, and the colonies were less than 100 000.   03/13/2024: Ms. CLAUDIO MCDOWELL presents today for a follow up audio only telephone visit for which she gave permission for. The pt was located at home, [address] [place] [address], and I was located in my office in [place], [place]. Visit was conducted with her daughter Tabatha Deluna. Patient provided a urine specimen on 3/4/2024. Patient has a chronic indwelling sales. UA revealed trace blood, large LE, and 9 WBC/HPF. This is quite good considering her sales. Culture grew 50,000 - 99,000 CFU/mL Escherichia coli & >100,000 CFU/ml Citrobacter freundii complex. She does report that after they gave the specimen her mother had a week of stool getting in the vulvar area and she is worried this might travel to the bladder. There is no evidence of that currently. She states the urine is clear and yellow. There has been no drainage around the catheter. She has not had any increased bladder spasms. Patient's daughter reports that her mother is not feeling well today. She has a runny nose and a cough. Home care is arranging for a nasal swab.   04/08/2024: Ms. CLAUDIO MCDOWELL presents today for a follow up audio only telephone visit for which she gave permission for. The pt was located at home, [address] [place] [address], and I was located in my office in [place], [place]. Visit was conducted with her daughter Tabatha Deluna. Patient provided a urine specimen on 4/3/2024. UA was completely normal other than moderate LE and 6 WBC/HPF. For having a sales catheter, this is an excellent UA. Urine culture grew >100,000 CFU/ml Citrobacter freundii complex. Urine cytology was negative for high grade urothelial carcinoma. Specimen consists of many lymphoid cells, mature squamous epithelial cells and benign urothelial cells. Patient's daughter reports that  got covid, however she is recovering. She is very tired and still has a cough. She does report that the urine is clear and yellow, however the pt has had many accidents where stool gets near/in the catheter. Her skin remains red but not broken down. Continues use of triple paste.   05/08/2024 Ms. CLAUDIO MCDOWELL presents today for a follow up audio-only telehealth visit for which they permitted for. The patient was located at home [address] [place] [address] and I was located in my office in [place], [place]. Patient has chronic indwelling Sales catheter. This urinalysis indicates urine is in very good condition without any evidence for clinically active urinary tract infection at this time. Culture showed >=3 organisms. Probable collection contamination. If I am notified of changes indicating possible clinically significant urinary tract infection, we would re-culture at that time. We will continue to collect surveillance urine specimens for urinalyses and urine cultures at time of catheter changes.  Daughter states her mental status is different.  Reports more confusion, gaps of memory. Denies hallucinations and is much less anxious. Stool is still loose and pt is more fatigued while on Risperidone.   06/7/2024: Ms. CLAUDIO MCDOWELL presents today for a follow up audio only telephone visit for which she gave permission for. The pt was located at home, [address] [place] [address], and I was located in my office in [place], [place]. Visit conducted with patient's daughter, Tabatha. 6/3/24 UA revealed small blood by dipstick, moderate Leukocyte Esterase, trace proteinuria and 10/HPF of WBC. Culture showed 50,000 - 99,000 CFU/mL Pseudomonas aeruginosa. Daughter reports have pt historian today. Patient is doing okay from urinary standpoint, urine is clear. Admits to breathing difficulties, as Internist prescribed Z-pack (Zithromax) but the daughter is hesitant of proceeding further with the prescription as 1 day before starting her cough seemed improved.  Before cough onset, there was increase in confusion// decline of mental state.   06/26/2024: Ms. CLAUDIO MCDOWELL presents today for a follow up audio only telephone visit. Visit was conducted with the patient's daughter, Tabatha. The pt was located at home, [address] [place] [address], and I was located in my office in [place], [place]. Her daughter informs me today that she did not need an appt but did need instructions on irrigating the catheter for the nurse. However, the catheter happened to snap today and needed to be replaced anyway. I apologized for the misunderstanding. She stated that it would be better for me to call the nurse directly to give her the instruction. She had just left the house after changing the patient's catheter. She informs me she took a urine specimen. The nurses name is Marimar and can be reached at [phone].   07/01/2024: Ms. CLAUDIO MCDOWELL presents today for a follow up audio only telephone visit. The pt was located at home, [address] [place] [address], and I was located in my office in [place], [place]. Visit conducted with patient's daughter, Tabatha. Patient's visiting nurse, Marimar, collected a urine specimen from her on 6/26/2024 when replacing the catheter after it broke. UA revealed cloudy urine, large LE, and 150 WBC/HPF, otherwise normal. Urine culture grew >100,000 CFU/ml Citrobacter freundii and 50,000 - 99,000 CFU/mL Pseudomonas aeruginosa. The patient obtained lab work /24 prior to today's visit. Culture showed >100,000 CFU/ml Citrobacter freundii. 50,000 - 99,000 CFU/mL Pseudomonas aeruginosa. BMP revealed low sodium of 134, potassium 5.4, glucose elevated at 122, creatinine wnl at 0.56. UA showed cloudy appearance, large Leukocyte Esterase, 150/HOF WBC. Patient reports today is not her best day. She sounds suppressed. Daughter reports onset cough and unsure if infection could be both respiratory and bladder. Patient obtained nebulizer prior to visit. Daughter reports urine is currently clear.   07/05/2024: Ms. CLAUDIO MCDOWELL presents today for a follow up audio only telephone visit. The pt was located at home, [address] [place] [address], and I was located in my office in [place], [place]. Visit was conducted with her daughter, Tabatha. She informs me that she just took the first packet of Fosfomycin yesterday. There have been no problems thus far. She does report that the catheter was mispositioned last night and was not draining for a few hours. Her and the aide were able to push it in more and urine drained. The visiting nurse came this morning. She irrigated the catheter and ensured it was in good position.   07/08/2024: Ms. CLAUDIO MCDOWELL presents today for a follow up audio-visual telehealth visit. The pt was located at home, [address] [place] [address], and I was located in my office in [place], [place]. The visit was conducted mostly with her daughter Tabatha. The patient has been taking Fosfomycin 3 GM oral packet once every 3 days. Her daughter reports she looks a bit improved. Her urine looks good but has since the beginning. She feels Fosfomycin makes her a little sick. Had some abdominal pain over the weekend but has not complained today. She was having anxiety but her oxygen is good. Her temperature is 97.1. She is not leaking around the catheter at all. She is a bit stressed today and someone close to them passed away and her daughter had to inform her of this today. At the end of the visit I spoke to  and she informs me she is not feeling so bad.     07/12/2024: Ms. MCDOWELL is a 89 year old female presenting today for an audio only telehealth visit for which she gave verbal permission. The patient was located in her home at [address] [place] [address]. I was located in my office on [place], [place]. She was accompanied by her daughter who helped with the visit.  Patient is bedbound and with a sales catheter.  On 7/11/24 patient was found with 150 wbc/hpf. cloudy urine. No bacteria.  given that patient is with indwelling catheter, I would usually not treat her with abx right away, but I saw her and she looked pale, sluggish, and not cheerful as usual.  She was started on Fosfomycin once every 3 days. After course of Fosfomycin, patient was found with only 22 wbc/hpf, and few bacteria.  Her daughter worries today about low urine output since patient started Fosfomycin. She admits patient has been experiencing nighttime diarrhea which is expected with the medication. I explain that is consistent with lower production of urine as patient loses water during nighttime diarrhea,  patient's vitals taken by her daughter have been reportedly normal BP:120 /60. Pulse in the 70s  08/06/2024: Ms. CLAUDIO MCDOWELL presents today for a follow up audio only telephone visit for which she gave permission for. The pt was located at home, [address] [place] [address], and I was located in my office in [place], [place]. Visit was conducted with her daughter, Tabatha Deluna. At the time I called at 2:00 PM the nurse was there to change her catheter. Her daughter reports that it looks as if there is a ball in her mother's stomach. This past Friday, she showed a tremendous amount of stool in her colon on x-ray. She denies seeing any blood in the stool that has been passing. She denies any gross hematuria. Urine has looked good. Her daughter notes that last Wednesday, it appeared her mother had a TIA. She opted to not have her mom taken to the hospital, so they are not sure if that is exactly what happened.  She appears less confused than last week according to her daughter.   08/09/2024: Ms. MCDOWELL is a 89 year old female presenting today via telehealth using real time 2 way audio-visual technology. We utilized [employer] telemetry [employer] platform. The patient, Ms. MCDOWELL, was located in her home at [address] [place] [address] at the time of the visit. The provider, BENIGNO RAYMUNDO, was located in his office on [place], [place]. Verbal Consent was given by the patient on 08/09/2024 and my scribe served as witness. The patient was accompanied by her daughter  who participated in the telehealth encounter. Patient is bedbound and with an indwelling sales catheter. She was recently treated for UTI. On 6/26/24 patient was found with 150 wbc/hpf. cloudy urine. No bacteria.  After course of abx, patient reports today for review of new labs findings. I reviewed and discussed the patient's pertinent lab works. On 8/6/24 Urine Analysis showed 10 wbc/hpf (down from 150). Urine is now clear. Specific Gravity was 1.010 indicating adequate hydration. No bacteria seen. Urine Cytology on 8/6/24 was negative for malignant cell. Urine Culture on 8/6/24  showed presence of 3 organisms, none being predominant, possibly colonizers, or probable collection contamination. No infection.

## 2024-09-07 NOTE — ASSESSMENT
[FreeTextEntry1] : 11/03/2023: Ms. MCDOWELL is a 89 year old female presenting today for an audio only telehealth visit for which she gave verbal permission. The patient was located in her home at [address] [place] [address]. I was located in my office on [place], [place]. She was accompanied by her daughter. She says her mother was supposed to be released today. Initially she was able to void. Because of constipation she was given a couple of enemas. She had a large evacuation and since then she has not been able to void again. The daughter says her latest PVR was 340 cc. She is waiting for her mother to be rescanned again.    Plan: If she is unable to void, she will be discharged with a sales catheter.   11/07/2023: Ms. CLAUDIO MCDOWELL presents today for a follow up audio only telephone visit for which she gave permission for. The pt was located at home, [address] [place] [address], and I was located in my office in [place], [place]. The visit was conducted with the patient's daughter. The patient came home from the hospital on Sunday. The catheter was put back in without another attempt at a void trial. The patient does have issues with constipation. She is able to swallow liquids. Her daughter has had to crush some of her pills to put them in a liquid, however the pt finds it to taste bitter and has some discomfort swallowing the chunks of crushed pills. The hospital discharged her with a 7 day supply of Cefuroxime. Was started yesterday so she has had two doses.    We discussed the possibility of a doing a trial of void. At this time I am recommending her mother settle back in to being home and we can readdress this possibility and how the patient and her daughter would like to proceed at the time of her next visit. Next catheter change will be on 12/5 should they choose to proceed with the catheter.   I looked it up on [employer] and Cefuroxime is available in the [place] as a liquid suspension. Given that this would be easier for the patient, I went to prescribe it for her which she could take rather than the pills. [employer] did not allow me to electronically prescribe it in a liquid suspension so I called the patient's pharmacy to give a verbal prescription for 200 ml of it for 20 ml BID for 5 days. I left a VM for the pharmacy as there was no answer. I requested they call me back if they could accept this prescription or if they could not. The pharmacy called me back shortly after and they told me that it is not currently offered in a liquid suspension, however he called the [facility] door and they are willing to compound it for 40$. I saw this as acceptable and thanked him for going the extra mile and calling next door. He will contact the patient's daughter to inform her.   Patient and her daughter will have a telehealth visit in 2 weeks for reassessment, sooner if clinically indicated.   11/20/2023: Ms. MCDOWELL presents today for a follow up audio only telehealth visit for which they gave permission for. The patient was located at home [address] [place] [address] and I was located in my office in [facility]. Daughter states patient is okay. She notes giving pt suppositories and 7 hours later she had mixed bowel movements. However, she did not have a bowel movement since Saturday. Patient is given pedialyte and water frequently. Today's temperature was 95 as before it was 96-97. Pt did not drink any liquids shortly after taking temperature. Daughter denies giving pt solace but she usually gives Miralax as she has done so today. Senady's urine color is clear as it  has been clear for some time.  Daughter states her stomach was distended. Bp has stabilized since being in the hospital but last night and today it  was higher than normal being being 168/88 before medication.   Advised daughter to give patient Miralax tonight if there is no bowel movements.  Advised her to take Blood pressure tonight.  Pt will schedule a telehealth visit  11/29/2023: Ms. CLAUDIO MCDOWELL presents today for a follow up audio only telephone visit for which she gave permission for. The pt was located at home, [address] [place] [address], and I was located in my office in [place], [place]. Visit was conducted with her daughter, Andree. She was informed yesterday by her house call physicians of the results of her urine culture (emailed from core lab showing >100,000 CFU/ml Pseudomonas aeruginosa susceptible only to amikacin, Ciprofloxacin, imipenem, levofloxacin, meropenum. Resistant to Ceftazidime, Pip/tazo. Intermediate for aztrenam, cefepime. They discussed that patient is having increasing oxygen requirements, although SPO2 ok on supplemental O2 (prior to this patient needed only occasional O2 suppl. every few days). 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 The want to treat with ciprofloxacin, 7 day course. Prescription sent to pharmacy. Her daughter inquired today on if she should be treated. She states last week her mother seemed very not herself, however today she seems better in terms of mental status and oxygenation. She has been giving her senakot and miralax.   I explained that given her O2 issues and confusion over the last few days, I advised to treat with Cipro despite her daughter feeling she is better today. If there are any problems with crushing the pills for her or if she is not reacting to it well, she was advised to call right away. I posed the option of giving it as an oral solution if need be.    12/21/2023: Ms. MCDOWELL presents today for a follow up audio-visual telehealth visit for which they gave permission for. The patient was located at home [address] [place] [address] and I was located in my office in [place], [place]. The patient obtained lab work 12/4/23 prior to today's visit. Urinalysis is improved and showed trace proteinuria, small Leukocyte Esterase, 6/HPF of WBC and 7/LPF of Cast. Urine culture shows no significant growth, <10,000 CFU/mL Normal Urogenital Khadijah, which is very good. Her daughter expresses concern as recently patient is experiencing a surplus of looser and larger amounts of bowel movements that is escaping to the vaginal area, thus causing infections. Urine is normal in color, denies fever. Patient is occasionally more fatigued than usual. Denies distended appearance of the abdomen. Admits to only 1 spasm around the catheter this month. Next sales change will be in 2 weeks.  Towards the end of visit, patient was shown on Facetime, appearing well oriented x 3, normal pigmentation, lips are moist, and smile is symmetrical. I am very impressed by how well the patient looks and speaks. It was more than just pleasantries, she inquired about personal questions. Overall, I am very pleased.   Reviewed and discussed laboratory work of 12/4/23 which She obtained prior to today's visit as requested. Advised daughter to have care team to place support under the pelvis, so pelvis is tilted up at a higher level of the anus.   As long as stool is pasty and firm, we advised Andree to give patient MiraLAX. If stool is loose and unfirm, she should decrease the amount of MiraLAX.  Pt will provide a urine sample which will be sent for urinalysis, urine culture, and urine cytology. Pt will schedule a telehealth visit in 2 weeks for reassessment.   12/27/2023: Ms. CLAUDIO MCDOWELL presents today for a follow up audio only telephone visit for which she gave permission for. The pt was located at home, [address] [place] [address], and I was located in my office in [place], [place]. She is accompanied by her daughter. Patient provided a surveillance urine specimen on 12/22/2023. She has chronic sales catheter drainage. UA revealed small LEC, positive for nitrites, 6 WBC/HPF, no RBC seen, moderate bacteria/HPF, granular casts present, and yeast like cells present. Urine culture grew >100,000 CFU/ml Enterococcus faecalis and 50,000 - 99,000 CFU/mL Streptococcus mitis/oralis group. Urine cytology was negative for high grade urothelial carcinoma. Few mature squamous cells, rare benign urothelial cells and abundant fungal organisms morphologically consistent with Candida species present. Patient's daughter reports that her urine looks clear and a pale yellow color. She feels her mom still has a lot of anxiety and some confusion. She does report that her catheter bag sits in feces due to the patient's position. They tried to tilt her pelvis to avoid this but states it's easier said than done. She states there is no feces in the catheter or in the bag. BM are not loose, she describes them as pasty. Has about 2-3 BM a day.    Reviewed and discussed lab work of 12/22/2023 in detail with the pt.  Future urine orders were put in including fungal urine culture. Pt's daughter was advised to continue to try and alter the patient's position to avoid the catheter sitting in feces.  Patient should have a telehealth visit in 3 months, sooner if clinically indicated.    01/05/2024: Ms. MCDOWELL is a 89 year old female presenting today for an audio only telehealth visit for which she gave verbal permission. The patient was located in her home at [address] [place] [address]. I was located in my office on [place], [place]. She was accompanied by her daughter who helped with the visit. She reports that a nurse came for a catheter change, and only succeeded on the third attempt. She says it's the same nurse who has been coming for 5 months now, and usually she encounters no difficulties. Now that the catheter has been changed, the patient is able to void.   Plan: Pt's daughter will keep us updated. If the patient experiences fever or chill, she will call ER and inform the office.   01/17/2024: Ms. CLAUDIO MCDOWELL presents today for a follow up audio only telephone visit for which she gave permission for. The pt was located at home, [address] [place] [address], and I was located in my office in [place], [place]. She is accompanied by her daughter, Tabatha Deluna. Patient provided a urine specimen on 1/5/2024 for surveillance. UA revealed trace blood by urine and moderate LEC. This is excellent for her as she has chronic sales catheter drainage. Urine culture grew 50,000-99,000 CFU/ml Pseudomonas aeruginosa. Urine cytology was negative for high grade urothelial carcinoma. Few mature squamous cells, rare benign urothelial cells and abundant fungal organisms morphologically consistent with Candida species present. The patient's daughter reports that her mother is "different". She reports that she is hearing things a lot now. She states that usually the things are negative. For example, she will say "Why is Ac saying my oxygen isn't working properly?". It is clear what she is saying but fairly random. She is not seeing anything; it is strictly auditory.   Reviewed and discussed lab work of 1/5/2024 in detail with the pt's daughter. She was informed that given she has chronic sales catheter drainage, this specimen was excellent. If she had more WBC/HPF, I would be concerned, however she is in very good shape from a urologic standpoint. Her daughter was advised to speak with her mother's PCP about hearing things and her AMS as it is not urologic in origin or related to pending sepsis. I provided her the name of a neurologist, Dr.Li-Fen Tripathi if she would like to go for neuro consultation.  I once again re-iterated that at this time, given her skin breakdown, I recommend continued sales catheter drainage. Her daughter brought up an external urine collection apparatus once again, however I do not recommend we try that at least until her skin is in good condition. I also explained that if her mother were to go into urinary retention, this sort of device would not drain the urine from her.  Pt's daughter was advised to give her hyoscyamine for bladder spasms.  Patient will have a telehealth visit in 1-2 months, sooner if clinically indicated.    01/22/2024: Ms. CLAUDIO MCDOWELL presents today for a follow up audio only telephone visit for which she gave permission for. The pt was located at home, [address] [place] [address], and I was located in my office in [place], [place]. Visit was conducted with the patient's daughter, Tabatha Deluna. She informs me that she did not realize she was out of hyoscyamine sulfate for bladder spasms. She has been getting bladder spasms after the aide leaves and is embarrassed by this as she thinks shes wetting the bed. Her daughter reports that her mother asks in the morning "why am I bothering taking my medications, I am going to die anyway".   Renewed hyoscyamine sulfate 0.125 mg sublingual tablets, give one tablet under the tongue as needed. I recommend she gives it about 15 minutes before the aide comes. I suggest she give it for 10 days in a row to see if we can calm things down and then stop. If it returns, we can try again for another month or two. I did speak with the patient at the end of the visit. She seemed to know who I was and understood me. I spoke to her about the bladder spasms she is experiencing. She was on board with trying hyoscyamine and thanked me for speaking with her.  I recommend the patient's daughter speak with her PCP about potential anti-depressant medication.   Patient will have a telephone visit in 2 weeks for reassessment, sooner if clinically indicated.    02/05/2024: Ms. CLAUDIO MCDOWELL presents today for an audio visual telehealth visit for which she gave permission for. The patient was located at home at [address] [place] [address] and I was located at my office in [place], [place]. Pt's daughter states she was not expecting my call today. I informed her that she was on my schedule and that if she would like we can talk. She informs me that hyoscyamine does not seem to be working for her mother and that some days she has bladder spasms and some she doesn't. Pt is currently not having diarrhea. She is due for a catheter change tomorrow. Patient has not started any medications for the psychological issues she has been having.    Pt will discontinue use of hyoscyamine. I prescribed the patient Trospium 20 mg, one tablet once daily at bedtime. I informed the patient there may be constipation as a side effect.   Orders for UA, urine culture, and urine cytology were put in for her daughter to bring the patient's sample to her nearest  lab.  Patient will have a telehealth visit this Friday after the catheter change.     02/09/2024: Ms. MCDOWELL is a 89 year old female presenting today for an audio only telehealth visit for which she gave verbal permission. The patient was located in her home at [address] [place] [address]. I was located in my office on [place], [place]. She was accompanied by her daughter who helps with the visit.   The patient has been with a chronic sales catheter since July 2021 when she fractured her foot. She reports today for a follow up.  I reviewed and discussed the patient's pertinent lab works. Urine Culture shows 10,000 - 49,000 CFU/mL Pseudomonas aeruginosa 10,000 - 49,000 CFU/mL Citrobacter freundii complex I explain to the patient that patients with chronic sales catheter develop bacteria in the urine, that may come from the skin, and it is not significant clinically unless infection symptoms ensue. In addition, the patient reports she was not taking antibiotics when the sample was collected, and the colonies were less than 100 000.   Pt will start Trospium 20 mg.  She will provide urine for UA, Urine Cytology and Urine Culture. She will have a follow up in one month; earlier if needed.    03/13/2024: Ms. CLAUDIO MCDOWELL presents today for a follow up audio only telephone visit for which she gave permission for. The pt was located at home, [address] [place] [address], and I was located in my office in [place], [place]. Visit was conducted with her daughter Tabatha Deluna. Patient provided a urine specimen on 3/4/2024. Patient has a chronic indwelling sales. UA revealed trace blood, large LE, and 9 WBC/HPF. This is quite good considering her sales. Culture grew 50,000 - 99,000 CFU/mL Escherichia coli & >100,000 CFU/ml Citrobacter freundii complex. She does report that after they gave the specimen her mother had a week of stool getting in the vulvar area and she is worried this might travel to the bladder. There is no evidence of that currently. She states the urine is clear and yellow. There has been no drainage around the catheter. She has not had any increased bladder spasms. Patient's daughter reports that her mother is not feeling well today. She has a runny nose and a cough. Home care is arranging for a nasal swab.   Reviewed and discussed lab work of 3/4/2024 in detail with the pt's daughter.  If the patient's daughter notices a change in her urine, she will call promptly.    04/08/2024: Ms. CLAUDIO MCDOWELL presents today for a follow up audio only telephone visit for which she gave permission for. The pt was located at home, [address] [place] [address], and I was located in my office in [place], [place]. Visit was conducted with her daughter Tabatha Deluna. Patient provided a urine specimen on 4/3/2024. UA was completely normal other than moderate LE and 6 WBC/HPF. For having a sales catheter, this is an excellent UA. Urine culture grew >100,000 CFU/ml Citrobacter freundii complex. Urine cytology was negative for high grade urothelial carcinoma. Specimen consists of many lymphoid cells, mature squamous epithelial cells and benign urothelial cells. Patient's daughter reports that  got covid, however she is recovering. She is very tired and still has a cough. She does report that the urine is clear and yellow, however the pt has had many accidents where stool gets near/in the catheter. Her skin remains red but not broken down. Continues use of triple paste.    Reviewed and discussed lab work of 4/3/2024 in detail with the pt. Pt's daughter reassured that this is a very good specimen considering she has a sales catheter.  Will provide another urine specimen at time of next catheter change.  Will have a telehealth visit after next urine specimen, sooner if clinically indicated.     05/08/2024 Ms. CLAUDIO MCDOWELL presents today for a follow up audio-only telehealth visit for which they permitted for. The patient was located at home [address] [place] [address] and I was located in my office in [place], [place]. Patient has chronic indwelling Sales catheter. This urinalysis indicates urine is in very good condition without any evidence for clinically active urinary tract infection at this time. Culture showed >=3 organisms. Probable collection contamination. If I am notified of changes indicating possible clinically significant urinary tract infection, we would re-culture at that time. We will continue to collect surveillance urine specimens for urinalyses and urine cultures at time of catheter changes.  Daughter states her mental status is different.  Reports more confusion, gaps of memory. Denies hallucinations and is much less anxious. Stool is still loose and pt is more fatigued while on Risperidone.   Reviewed and discussed laboratory work of 5/2/24 which She obtained prior to today's visit as requested. At the current time I will not re-culture. If I am notified of changes indicating possible clinically significant urinary tract infection, we would re-culture at that time. We will continue to collect surveillance urine specimens for urinalyses and urine cultures at time of catheter changes. Pt will schedule a telehealth visit.  06/7/2024: Ms. CLAUDIO MCDOWELL presents today for a follow up audio only telephone visit for which she gave permission for. The pt was located at home, [address] [place] [address], and I was located in my office in [place], [place]. Visit conducted with patient's daughter, Tabatha. 6/3/24 UA revealed small blood by dipstick, moderate Leukocyte Esterase, trace proteinuria and 10/HPF of WBC. Culture showed 50,000 - 99,000 CFU/mL Pseudomonas aeruginosa. Daughter reports have pt historian today. Patient is doing okay from urinary standpoint, urine is clear. Admits to breathing difficulties, as Internist prescribed Z-pack (Zithromax) but the daughter is hesitant of proceeding further with the prescription as 1 day before starting her cough seemed improved.  Before cough onset, there was increase in confusion// decline of mental state.    Reviewed and discussed laboratory work of 6/3/24 which She obtained prior to today's visit as requested. I do not believe we should treat at this time, instead reassess with next catheter change.  Patient to continue with respiratory treatment course and should not discontinue as this may create more clinical related issues. Pt will schedule a telehealth visit in 1 month.   06/26/2024: Ms. CLAUDIO MCDOWELL presents today for a follow up audio only telephone visit. Visit was conducted with the patient's daughter, Tabatha. The pt was located at home, [address] [place] [address], and I was located in my office in [place], [place]. Her daughter informs me today that she did not need an appt but did need instructions on irrigating the catheter for the nurse. However, the catheter happened to snap today and needed to be replaced anyway. I apologized for the misunderstanding. She stated that it would be better for me to call the nurse directly to give her the instruction. She had just left the house after changing the patient's catheter. She informs me she took a urine specimen. The nurses name is Marimar and can be reached at [phone].   After I hung up with the patient's daughter, I called Marimar. She informs me she just left after changing the catheter. I informed her it would be good if we could have the catheter irrigated to avoid sediment. She uses a 16 french catheter. She was instructed to disconnect the collecting tube and irrigate with about 60 cc of water. Let it drain out passively. If it does not drain well, she can try again. She can aspirate gently if she needs. If sediment comes out, she should repeat until it is clear. I explained that sometimes if it is a lot, it may take up to a liter to irrigate. I provided her with my phone number in the case she needs help.    07/01/2024: Ms. CLAUDIO MCDOWELL presents today for a follow up audio only telephone visit. The pt was located at home, [address] [place] [address], and I was located in my office in [place], [place]. Visit conducted with patient's daughter, Tabatha. Patient's visiting nurse, Marimar, collected a urine specimen from her on 6/26/2024 when replacing the catheter after it broke. UA revealed cloudy urine, large LE, and 150 WBC/HPF, otherwise normal. Urine culture grew >100,000 CFU/ml Citrobacter freundii and 50,000 - 99,000 CFU/mL Pseudomonas aeruginosa. The patient obtained lab work /24 prior to today's visit. Culture showed >100,000 CFU/ml Citrobacter freundii. 50,000 - 99,000 CFU/mL Pseudomonas aeruginosa. BMP revealed low sodium of 134, potassium 5.4, glucose elevated at 122, creatinine wnl at 0.56. UA showed cloudy appearance, large Leukocyte Esterase, 150/HOF WBC. Patient reports today is not her best day. She sounds suppressed. Daughter reports onset cough and unsure if infection could be both respiratory and bladder. Patient obtained nebulizer prior to visit. Daughter reports urine is currently clear.   Reviewed and discussed lab work of 6/26/2024 in detail with the pt's daughter.   I prescribed Fosfomycin 3 GM oral packet, once every 3 days. If onset bowel irritability occurs patient informed to call.  Patient denies ever having a seizure. Pt will schedule a telehealth visit in 1 week.    07/05/2024: Ms. CLAUDIO MCDOWELL presents today for a follow up audio only telephone visit. The pt was located at home, [address] [place] [address], and I was located in my office in [place], [place]. Visit was conducted with her daughter, Tabatha. She informs me that she just took the first packet of Fosfomycin yesterday. There have been no problems thus far. She does report that the catheter was mispositioned last night and was not draining for a few hours. Her and the aide were able to push it in more and urine drained. The visiting nurse came this morning. She irrigated the catheter and ensured it was in good position.    Continue Fosfomycin and call if there are any problems.  They are scheduled for a telehealth visit on 7/8/2024 for reassessment.    07/08/2024: Ms. CLAUDIO MCDOWELL presents today for a follow up audio-visual telehealth visit. The pt was located at home, [address] [place] [address], and I was located in my office in [place], [place]. The visit was conducted mostly with her daughter Tabatha. The patient has been taking Fosfomycin 3 GM oral packet once every 3 days. Her daughter reports she looks a bit improved. Her urine looks good but has since the beginning. She feels Fosfomycin makes her a little sick. Had some abdominal pain over the weekend but has not complained today. She was having anxiety but her oxygen is good. Her temperature is 97.1. She is not leaking around the catheter at all. She is a bit stressed today and someone close to them passed away and her daughter had to inform her of this today. At the end of the visit I spoke to  and she informs me she is not feeling so bad.  Continue Fosfomycin (last dose this Wednesday).  Patient's visiting nurse will collect a urine specimen this Thursday. It will be sent out for UA, culture, fungus culture, and cytology.  Though the patient's daughter Tabatha is very concerned about her mother and a bit depressed and anxious over it, her mother is doing quite well. She did not seem toxic to me. She made sensible conversation. She is quite hard of hearing but her daughter would repeat things so she could hear me and would respond well. From a urinary tract standpoint, she is doing well.  Patient will have a telehealth visit after next urine specimen to review and discuss results.    07/12/2024: Ms. MCDOWELL is a 89 year old female presenting today for an audio only telehealth visit for which she gave verbal permission. The patient was located in her home at [address] [place] [address]. I was located in my office on [place], [place]. She was accompanied by her daughter who helped with the visit.  Patient is bedbound and with a sales catheter.  On 6/26/24 patient was found with 150 wbc/hpf. Cloudy urine. No bacteria.  given that patient is with indwelling catheter, I would usually not treat her with abx right away, but I saw her and she looked pale, sluggish, and not cheerful as usual.  She was started on Fosfomycin once every 3 days. After course of Fosfomycin, patient was found with only 22 wbc/hpf, and few bacteria.  Her daughter worries today about low urine output since patient started Fosfomycin. She admits patient has been experiencing nighttime diarrhea which is expected with the medication. I explain that is consistent with lower production of urine as patient loses water during nighttime diarrhea,  patient's vitals taken by her daughter have been reportedly normal BP:120 /60. Pulse in the 70s Urine output about 1.5L a day. Pt will follow up next week for video telehealth.   08/06/2024: Ms. CLAUDIO MCDOWELL presents today for a follow up audio only telephone visit for which she gave permission for. The pt was located at home, [address] [place] [address], and I was located in my office in [place], [place]. Visit was conducted with her daughter, Tabatha Deluna. At the time I called at 2:00 PM the nurse was there to change her catheter. Her daughter reports that it looks as if there is a ball in her mother's stomach. This past Friday, she showed a tremendous amount of stool in her colon on x-ray. She denies seeing any blood in the stool that has been passing. She denies any gross hematuria. Urine has looked good. Her daughter notes that last Wednesday, it appeared her mother had a TIA. She opted to not have her mom taken to the hospital, so they are not sure if that is exactly what happened.  She appears less confused than last week according to her daughter.  Patient should proceed with catheter change today. They should record how much urine they collect when it is changed. Urine will be sent for UA, culture, and cytology.  Her daughter was advised to continue managing her mother's constipation. She states she is doing an enema tomorrow.  Patient will have a telehealth visit on Friday to review and discuss urine test results.    08/09/2024: Ms. MCDOWELL is a 89 year old female presenting today via telehealth using real time 2 way audio-visual technology. We utilized [employer] teams telemetry doc platform. The patient, Ms. MCDOWELL, was located in her home at [address] [place] [address] at the time of the visit. The provider, BENIGNO RAYMUNDO, was located in his office on [place], [place]. Verbal Consent was given by the patient on 08/09/2024 and my scribe served as witness. The patient was accompanied by her daughter  who participated in the telehealth encounter. Patient is bedbound and with an indwelling sales catheter. She was recently treated for UTI. On 6/26/24 patient was found with 150 wbc/hpf. cloudy urine. No bacteria.  After course of abx, patient reports today for review of new labs findings. I reviewed and discussed the patient's pertinent lab works. On 8/6/24 Urine Analysis showed 10 wbc/hpf (down from 150). Urine is now clear. Specific Gravity was 1.010 indicating adequate hydration. No bacteria seen. Urine Cytology on 8/6/24 was negative for malignant cell. Urine Culture on 8/6/24  showed presence of 3 organisms, none being predominant, possibly colonizers, or probable collection contamination. No infection.  Patient will continue providing Urine specimen monthly.  She will continue to have her indwelling catheter changed monthly.   09/06/2024: Ms. MCDOWELL is a 89 year old female presenting today for an audio only telehealth visit for which she gave verbal permission. The patient was located in her home at [address] [place] [address]. I was located in my office on [place], [place]. Pt accompanied by daughter Tabatha Deluna. As per daughter, patient's blood pressure has been running higher and her medication dosage has been increased. She is also been with increasing labored breathing. Urinary-wise, daughter and aide have not noticed any cloudy, malodorous urine in the tubing. Patient is not with significant complaints. No gross hematuria, no bladder spasms, and no fever.  I reviewed and discussed the patient's pertinent lab works on 9/3/24 -UA revealed 61 wbc/hpf, few bacteria, and 34 cats, She was also found with Large Leukocyte Esterase. -Preliminary Urine Culture showed 2 organisms ">100,000 CFU/ml Citrobacter freundii complex 50,000 - 99,000 CFU/mL Pseudomonas aeruginosa  Explained to patient's daughter elevated cast (in her case 34) can be indicative of renal trauma/damage. I recommend we wait for next Urinalysis. Only if casts remains elevated, with decreased renal function I would recommend further work-up (such as a renal ultrasound). At this time, there's no cause for alarms with a Creatinine of 0.67 and EGFR 81 on 8/2/24  Since patient is not symptomatic, I do not recommend a course of abx presently to limit developing antibiotics resistance among other side effects.   Pt may provide blood specimen for Renal Panel and Cystacin-C to further investigate renal function.   Pt's daughter is concerned about patient's increasing breathing problems. She inquired about possibility of obtaining more lab works than simply renal panel to investigate. I explained that she may want to consult with PCP as those tests are outside my expertise and responsibilities. Re-assured patient that her lab works that we reviewed (UA, and renal panel) do not indicate any breathing dysfunctions.    Duration of call: 30mins

## 2024-09-07 NOTE — HISTORY OF PRESENT ILLNESS
[FreeTextEntry1] : Claudio Mcdowell is currently 89 years of age.  Her daughter Tabatha Deluna is devoted to her care.  She is very concerned about her mother and has been so for many years.  She becomes very anxious at times and that is concerning her mother.  Claudio Mcdowell lives with her daughter.  Claudio Mcdowell has been bedbound for several years.  The patient had developed a sacral decubitus ulcer while in a nursing facility.  A Sales catheter was placed because of fecal incontinence and concern that urine mixed with the feces would contaminate the sacral decubitus ulcer.  Once the patient left the nursing facility and will be with her daughter.  The sacral decubitus ulcer has finally healed.  I have discussed removing the Sales catheter with the daughter.  However as the patient has fecal incontinence there is concern about the urine mixing with the feces and being more likely to cause skin problems.  For now we will leave the Sales catheter in.  The patient has had clinically symptomatic urinary tract infections.  He clinically significant infections usually start with increased urination around the Sales catheter due to bladder spasms.  Bladder spasms have been occurring for staff.  The patient is positioned properly in in bed and the personal care attendant leaves for the day.  We have managed to prevent this with the administration of sublingual hyoscyamine.  We do periodic surveillance urine analysis and urine culture tests at the time the Sales catheter is changed by visiting nurse.  He also performed a times of increased spasms or purulence of the urine or change in mental status,  On October 11, 2022 visiting nurse using an order I have previously placed at the request of the daughter sent urine for urine cytology which proved to be negative for malignant cells, urine culture which grew Greater than 100,000 and E. coli that was sensitive to all antibiotics tested.  Urinalysis looked quite good for urine taken from a Sales catheter that was used for chronic Sales catheter drainage.  Ileukocyte esterase was large but nitrites were negative.  There were 2 epithelial cells per high-power field.  There were only 10 white blood cells per high-power field and only 2 red blood cells per high-power field on microscopic exam there were no bacteria seen and there were no hyaline casts.  Specific gravity was 1.010 which shows good hydration and this bedbound woman cared for diligently by her daughter.  Blood by dipstick was trace.  There was no protein glucose or ketones in the urine.  There is no bilirubin and no urobilinogen.  An important portion of the visit was my review with the daughter about bladder spasms and urination around the catheter.  Urine appearance and urine analysis have been clear.  The urine was clear again today.  The patient has significant short-term memory deficit.  She does have some useful short-term memory.  Her long-term memory remains very much intact.  She is very pleasant and to make satisfactory conversation.  She does admit to sometimes not remembering something.  Her complexion was good and there was no pallor.  Facial movements were symmetric.  Cranial nerves were intact.  I was not able to assess the olfactory nerve as this was a telehealth visit.    11/04/2022: Ms. MCDOWELL is a 89 year old female presenting today for a telehealth visit. She was accompanied by her daughter who helped with the visit. They gave permission for an audio only telehealth conference. The patient was located in her home in [place], [place]. I was located in my office on [place], [place]. Today her daughter reports the pt experienced rare urinary leaking around the catheter due to bladder spasms. She also reports her systolic pressure was around 140 last week. I offered Gemtesa to manage the bladder spasms and the daughter was cautious about more medications. I informed her if the urinary leaking are only once every 2 weeks or so, she does not have to medicate. The daughter was agreeable to monitor the occurrence of urinary leaking over the next 4 weeks and assess the discomfort it causes the pt. She denies any other urinary issues.  03/13/2023: Ms. MCDOWELL is a 89 year old female presenting today for a telehealth visit. She was accompanied by her daughter who helped with the visit. They gave permission for an audio only telehealth conference. The patient was located in her home in [place], [place]. I was located in my office on [place], [place]. The patient obtained an US prior to today's visit 3/9/23 which revealed: Comparison is made with the exam of 2/28/22. Transabdominal ultrasound of the abdomen was performed with color flow imaging. The examination is limited in evaluation. The visualized aorta and inferior vena cava show no abnormality. The pancreas is obscured by overlying bowel gas. The liver measures 12.4 cm with homogeneous echotexture. No intrinsic mass and no intra-or extrahepatic ductal dilatation. There is hepatopedal flow of the main portal vein. No gallstones, pericholecystic fluid, wall thickening or positive sonographic Melendez sign. The common bile duct measures 0.3 cm. The right kidney measures 9.2 x 5.6 x 3.0 cm with a nonobstructing 1.2 cm stone in the upper pole. Multiple additional subcentimeter calcifications are seen. These were not seen on previous exam. No hydronephrosis or renal mass. The left kidney measures 9.3 x 3.9 x 3.5 cm. There is a 2.1 x 2.4 x 2.0 cm cyst in the medial mid pole, not seen on previous study. No hydronephrosis or renal calcification. The spleen measures 8.2 cm and show no abnormality. There is a small right pleural effusion, stable. IMPRESSIONS: Multiple nonobstructing stones of the right kidney with no hydronephrosis. 2.4 cm cyst of the midpole left kidney. Small right pleural effusion, stable. The daughter has answered the phone and reports the patient's urine does not get dark. She is not aware of the amount of water she gives her mother. I requested she measure's this from now on as the pt has stones and needs to increase water consumption. Her daughter reports this week the pt has experienced more spasms than normal and believes this may be due to severe constipation. She provided the pt with an Enema to aide in this situation. She is very concerned but I informed her the Enema may have stimulated the spasms and we should give her a few days to clear up and re-evaluate.   03/22/2023: Ms. CLAUDIO MCDOWELL presents today for an audio visual telehealth visit for which she gave permission for. The patient was located at home at [address] [place] [address] and I was located at my office in [place], [place]. She is accompanied by her daughter whom most of the visit was conducted with. Her daughter has been keeping track of her mother's water consumption. She is averaging about 58 oz of water in a 24 hr period. Additionally, she is drinking juice and ensure nutrition drinks. She does not consume any caffeine. She is on furosemide 40 mg. I said hello to the patient at the end of the visit and she is looking well. She reports feeling no pain at the current moment.   3/29/2023:  Patient Claudio Mcdowell and daughter Tabatha Deluna were home in [place], [place] and I was in my office in [facility].  Patient and daughter gave permission for a FaceTime telehealth visit.  They preferred this to [employer].  The patient looks good today.  Her complexion was good there is no pallor.  Smile was symmetric her affect was normal she appeared happy she could make conversation it was appropriate.  She said that she currently had no pain.  When I asked questions that after 3/2 how she had been recently the sometimes she hesitated and deferred to her daughter for cancer compatible with her impaired short-term memory.  Long-term memory remains good she recognizes me once know so I am does ask questions about things like her bladder as she was worried about it her blood tests.  I assured her the results were satisfactory.  04/18/2023: Ms. MCDOWELL is a 89 year old female presenting today for an audio and visual telehealth visit for which she gave verbal permission. We utilized [employer] teams telemetry [employer] platform. The patient was located in her home at [address] [place] [address]. I was located in my office on [place], [place]. She was accompanied by her daughter who helped to serve as a historian. She reports discomfort in her eyes. She describes the discomfort as a feeling of sand. She opened her eyes for me, and they do not look red. Pupils look normal. She reports the right eyes bothers her significantly more than the left. I advise that the patient tries lubricating drops and it if does not feel better she will notify her visiting nurse who is due to come next week for a sales catheter change. She reports episode of bladder spasms. She denies gross hematuria. She describes the urine as barely yellow. I reviewed and discussed her  latest pertinent lab on 04/12/2023 which shows "alkaline phosphate normal at 89. Creatinine was normal at 0.62 mg/dL. WBC normal at 5.01. RBC normal at 3.87".   05/09/2023: Ms. MCDOWELL presents today for a follow up audio only telehealth visit for which they gave permission for. She was accompanied by her daughter who helped to serve as a historian. The patient was located at home [address] [place] [address] and I was located in my office in [place], [place]. The patient obtained lab work 5/2/23 prior to today's visit. The UA revealed microscopic hematuria, 5/HPF RBC, 27/HPF WBC. The patient demonstrated great hydration as the specific gravity is 1.006. The Sales catheter was changed May 2, 2023 with no clear urine in the bag. While changing the diaper the aide reports blood in the diaper. The daughter states last week her mother experienced spasms but as of two days ago she is now fine. However, she states the patient is more fatigued. She denies the pt having a temperature. I assured her because her mother is post menopausal any abrasion to the labia can cause a fissure.   06/02/2023: Ms. MCDOWELL is a 89 year old female presenting today for an audio and visual telehealth visit for which she gave verbal permission. We utilized [employer] teams telemetry doc platform. The patient was located in her home at [address] [place] [address]. I was located in my office on [place], [place]. She was accompanied by her daughter Tabatha. She reports that her mother complains of onset dysuria that dissipated on its own and has not recurred since. She reports right sided back pain that is aggravated when she lays on the right side. She provided urine specimen. She notes the urine was very clear. I reviewed and discussed the patient's pertinent lab works. Her latest Urinalysis on 05/30/2023 that shows "60 WBC/HPF. Trace Blood Urine. Specific Gravity Urine 1.007". Urine Culture on the same date shows ">100,000 CFU/ml Escherichia coli and <10,000 CFU/ml Normal Urogenital ángel present". The patient takes Mirtazapine for anxiety at low dose. The daughter reports the patient is experiencing increasing anxiety and is thinking of having the prescriber increasing the dosage.  Her BP runs around 120/60. Her Hemoglobin hematocrit looks good. gaze is conjugated. facial expression looks symmetric. Urine was faint yellow and clear.   06/06/2023: Ms. MCDOWELL presents today for a follow up audio only telehealth visit for which they gave permission for. The patient was located at home [address] [place] [address] and I was located in my office in [place], [place]. She was accompanied by her daughter Tabatha. The daughter states pt is experiencing episodes of a dry cough. An X Ray  of 6/5/23 revealed Mild peribronchial thickening suggesting bronchitis in the right clinical setting with no focal pneumonia or congestive heart failure. COPD with chronic lung changes. The mental status is the same as usual but her BP has elevated some. I assured her a little elevation is not as dangerous. The pt continues to experience some urinary frequency and it is a little more concentrated.   10/12/2023: Ms. MCDOWELL presents today for a follow up audio only telehealth visit for which they gave permission for. The patient was located at home [address] [place] [address] and I was located in my office in [place], [place]. The 10/2/23 UA showed proteinuria 30 mg/dL, moderate blood, large Leukocyte Esterase, 16/HPF of WBC. UA showed improvement as patient has chronic sales drainage. Her daughter states patient is okay considering coming home from the hospital. Most pain is in her back/shoulders and some of her back area is red/raw.  Recently her BP has increased to 180/100 and was prescribed Losartan 50mg twice daily. Urine is a clear color, denies cloudy and dark appearance.  11/01/2023: Ms. MCDOWELL presents today for a follow up audio only telehealth visit for which they gave permission for. The patient was located at home [address] [place] [address] and I was located in my office in [place], [place]. The 10/13/23 CMP revealed creatinine at 0.58 and low ALT at 8 U/L. Patient was admitted into [facility] on 10/27/23 for sepsis/UTI. Her daughter states she may be released tomorrow. Daughter states during the day the patient's urine was normal, but she did have chills and a fever.   11/03/2023: Ms. MCDOWELL is a 89 year old female presenting today for an audio only telehealth visit for which she gave verbal permission. The patient was located in her home at [address] [place] [address]. I was located in my office on [place], [place]. She was accompanied by her daughter. She says her mother was supposed to be released today. Initially she was able to void. Because of constipation she was given a couple of enemas. She had a large evacuation and since then she has not been able to void again. The daughter says her latest PVR was 340 cc. She is waiting for her mother to be rescanned again.   11/07/2023: Ms. CLAUDIO MCDOWELL presents today for a follow up audio only telephone visit for which she gave permission for. The pt was located at home, [address] [place] [address], and I was located in my office in [place], [place]. The visit was conducted with the patient's daughter. The patient came home from the hospital on Sunday. The catheter was put back in without another attempt at a void trial. The patient does have issues with constipation. She is able to swallow liquids. Her daughter has had to crush some of her pills to put them in a liquid, however the pt finds it to taste bitter and has some discomfort swallowing the chunks of crushed pills. The hospital discharged her with a 7 day supply of Cefuroxime. Was started yesterday so she has had two doses.   11/20/2023: Ms. MCDOWELL presents today for a follow up audio only telehealth visit for which they gave permission for. The patient was located at home [address] [place] [address] and I was located in my office in [facility]. Daughter states patient is okay. She notes giving pt suppositories and 7 hours later she had mixed bowel movements. However, she did not have a bowel movement since Saturday. Patient is given pedialyte and water frequently. Today's temperature was 95 as before it was 96-97. Pt did not drink any liquids shortly after taking temperature. Daughter denies giving pt solace but she usually gives Miralax as she has done so today. Senady's urine color is clear as it  has been clear for some time.  Daughter states her stomach was distended. Bp has stabilized since being in the hospital but last night and today it  was higher than normal being being 168/88 before medication.   11/29/2023: Ms. CLAUDIO MCDOWELL presents today for a follow up audio only telephone visit for which she gave permission for. The pt was located at home, [address] [place] [address], and I was located in my office in [place], [place]. Visit was conducted with her daughter, Andree. She was informed yesterday by her house call physicians of the results of her urine culture (emailed from core lab showing >100,000 CFU/ml Pseudomonas aeruginosa susceptible only to amikacin, Ciprofloxacin, imipenem, levofloxacin, meropenum. Resistant to Ceftazidime, Pip/tazo. Intermediate for aztrenam, cefepime. They discussed that patient is having increasing oxygen requirements, although SPO2 ok on supplemental O2 (prior to this patient needed only occasional O2 suppl. every few days). 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 The want to treat with ciprofloxacin, 7 day course. Prescription sent to pharmacy. Her daughter inquired today on if she should be treated. She states last week her mother seemed very not herself, however today she seems better in terms of mental status and oxygenation. She has been giving her senakot and miralax.   12/21/2023: Ms. MCDOWELL presents today for a follow up audio-only telehealth visit for which they gave permission for. The patient was located at home [address] [place] [address] and I was located in my office in [place], [place]. The patient obtained lab work 12/4/23 prior to today's visit. Urinalysis is improved and showed trace proteinuria, small Leukocyte Esterase, 6/HPF of WBC and 7/LPF of Cast. Urine culture shows no significant growth, <10,000 CFU/mL Normal Urogenital Ángel, which is very good. Her daughter expresses concern as recently patient is experiencing a surplus of looser and larger amounts of bowel movements that is escaping to the vaginal area, thus causing infections. Urine is normal in color, denies fever. Patient is occasionally more fatigued than usual. Denies distended appearance of the abdomen. Admits to only 1 spasm around the catheter this month. Next sales change will be in 2 weeks.  Towards the end of visit, patient was shown on Facetime, appearing well oriented x 3, normal pigmentation, lips are moist, and smile is symmetrical. I am very impressed by how well the patient looks and speaks. It was more than just pleasantries, she inquired about personal questions. Overall, I am very pleased.   12/27/2023: Ms. CLAUDIO MCDOWELL presents today for a follow up audio only telephone visit for which she gave permission for. The pt was located at home, [address] [place] [address], and I was located in my office in [place], [place]. She is accompanied by her daughter. Patient provided a surveillance urine specimen on 12/22/2023. She has chronic sales catheter drainage. UA revealed small LEC, positive for nitrites, 6 WBC/HPF, no RBC seen, moderate bacteria/HPF, granular casts present, and yeast like cells present. Urine culture grew >100,000 CFU/ml Enterococcus faecalis and 50,000 - 99,000 CFU/mL Streptococcus mitis/oralis group. Urine cytology was negative for high grade urothelial carcinoma. Few mature squamous cells, rare benign urothelial cells and abundant fungal organisms morphologically consistent with Candida species present. Patient's daughter reports that her urine looks clear and a pale yellow color. She feels her mom still has a lot of anxiety and some confusion. She does report that her catheter bag sits in feces due to the patient's position. They tried to tilt her pelvis to avoid this but states it's easier said than done. She states there is no feces in the catheter or in the bag. BM are not loose, she describes them as pasty. Has about 2-3 BM a day.   01/05/2024: Ms. MCDOWELL is a 89 year old female presenting today for an audio only telehealth visit for which she gave verbal permission. The patient was located in her home at [address] [place] [address]. I was located in my office on [place], [place]. She was accompanied by her daughter who helped with the visit. She reports that a nurse came for a catheter change, and only succeeded on the third attempt. She says it's the same nurse who has been coming for 5 months now, and usually she encounters no difficulties. Now that the catheter has been changed, the patient is able to void.   01/17/2024: Ms. CLAUDIO MCDOWELL presents today for a follow up audio only telephone visit for which she gave permission for. The pt was located at home, [address] [place] [address], and I was located in my office in [place], [place]. She is accompanied by her daughter, Tabatha Deluna. Patient provided a urine specimen on 1/5/2024 for surveillance. UA revealed trace blood by urine and moderate LEC. This is excellent for her as she has chronic sales catheter drainage. Urine culture grew 50,000-99,000 CFU/ml Pseudomonas aeruginosa. Urine cytology was negative for high grade urothelial carcinoma. Few mature squamous cells, rare benign urothelial cells and abundant fungal organisms morphologically consistent with Candida species present. The patient's daughter reports that her mother is "different". She reports that she is hearing things a lot now. She states that usually the things are negative. For example, she will say "Why is Ac saying my oxygen isn't working properly?". It is clear what she is saying but fairly random. She is not seeing anything; it is strictly auditory.   01/22/2024: Ms. CLAUDIO MCDOWELL presents today for a follow up audio only telephone visit for which she gave permission for. The pt was located at home, [address] [place] [address], and I was located in my office in [place], [place]. Visit was conducted with the patient's daughter, Tabatha Deluna. She informs me that she did not realize she was out of hyoscyamine sulfate for bladder spasms. She has been getting bladder spasms after the aide leaves and is embarrassed by this as she thinks shes wetting the bed. Her daughter reports that her mother asks in the morning "why am I bothering taking my medications, I am going to die anyway".   02/05/2024: Ms. CLAUDIO MCDOWELL presents today for an audio visual telehealth visit for which she gave permission for. The patient was located at home at [address] [place] [address] and I was located at my office in [place], [place]. Pt's daughter states she was not expecting my call today. I informed her that she was on my schedule and that if she would like we can talk. She informs me that hyoscyamine does not seem to be working for her mother and that some days she has bladder spasms and some she doesn't. Pt is currently not having diarrhea. She is due for a catheter change tomorrow. Patient has not started any medications for the psychological issues she has been having.   02/09/2024: Ms. MCDOWELL is a 89 year old female presenting today for an audio only telehealth visit for which she gave verbal permission. The patient was located in her home at [address] [place] [address]. I was located in my office on [place], [place]. She was accompanied by her daughter who helps with the visit.   The patient has been with a chronic sales catheter since July 2021 when she fractured her foot. She reports today for a follow up.   I reviewed and discussed the patient's pertinent lab works. Urine Culture shows 10,000 - 49,000 CFU/mL Pseudomonas aeruginosa 10,000 - 49,000 CFU/mL Citrobacter freundii complex  I explain to the patient that patients with chronic sales catheter develop bacteria in the urine, that may come from the skin, and it is not significant clinically unless infection symptoms ensue. In addition, the patient reports she was not taking antibiotics when the sample was collected, and the colonies were less than 100 000.   03/13/2024: Ms. CLAUDIO MCDOWELL presents today for a follow up audio only telephone visit for which she gave permission for. The pt was located at home, [address] [place] [address], and I was located in my office in [place], [place]. Visit was conducted with her daughter Tabatha Deluna. Patient provided a urine specimen on 3/4/2024. Patient has a chronic indwelling sales. UA revealed trace blood, large LE, and 9 WBC/HPF. This is quite good considering her sales. Culture grew 50,000 - 99,000 CFU/mL Escherichia coli & >100,000 CFU/ml Citrobacter freundii complex. She does report that after they gave the specimen her mother had a week of stool getting in the vulvar area and she is worried this might travel to the bladder. There is no evidence of that currently. She states the urine is clear and yellow. There has been no drainage around the catheter. She has not had any increased bladder spasms. Patient's daughter reports that her mother is not feeling well today. She has a runny nose and a cough. Home care is arranging for a nasal swab.   04/08/2024: Ms. CLAUDIO MCDOWELL presents today for a follow up audio only telephone visit for which she gave permission for. The pt was located at home, [address] [place] [address], and I was located in my office in [place], [place]. Visit was conducted with her daughter Tabatha Deluna. Patient provided a urine specimen on 4/3/2024. UA was completely normal other than moderate LE and 6 WBC/HPF. For having a sales catheter, this is an excellent UA. Urine culture grew >100,000 CFU/ml Citrobacter freundii complex. Urine cytology was negative for high grade urothelial carcinoma. Specimen consists of many lymphoid cells, mature squamous epithelial cells and benign urothelial cells. Patient's daughter reports that  got covid, however she is recovering. She is very tired and still has a cough. She does report that the urine is clear and yellow, however the pt has had many accidents where stool gets near/in the catheter. Her skin remains red but not broken down. Continues use of triple paste.   05/08/2024 Ms. CLAUDIO CMDOWELL presents today for a follow up audio-only telehealth visit for which they permitted for. The patient was located at home [address] [place] [address] and I was located in my office in [place], [place]. Patient has chronic indwelling Sales catheter. This urinalysis indicates urine is in very good condition without any evidence for clinically active urinary tract infection at this time. Culture showed >=3 organisms. Probable collection contamination. If I am notified of changes indicating possible clinically significant urinary tract infection, we would re-culture at that time. We will continue to collect surveillance urine specimens for urinalyses and urine cultures at time of catheter changes.  Daughter states her mental status is different.  Reports more confusion, gaps of memory. Denies hallucinations and is much less anxious. Stool is still loose and pt is more fatigued while on Risperidone.   06/7/2024: Ms. CLAUDIO MCDOWELL presents today for a follow up audio only telephone visit for which she gave permission for. The pt was located at home, [address] [place] [address], and I was located in my office in [place], [place]. Visit conducted with patient's daughter, Tabatha. 6/3/24 UA revealed small blood by dipstick, moderate Leukocyte Esterase, trace proteinuria and 10/HPF of WBC. Culture showed 50,000 - 99,000 CFU/mL Pseudomonas aeruginosa. Daughter reports have pt historian today. Patient is doing okay from urinary standpoint, urine is clear. Admits to breathing difficulties, as Internist prescribed Z-pack (Zithromax) but the daughter is hesitant of proceeding further with the prescription as 1 day before starting her cough seemed improved.  Before cough onset, there was increase in confusion// decline of mental state.   06/26/2024: Ms. CLAUDIO MCDOWELL presents today for a follow up audio only telephone visit. Visit was conducted with the patient's daughter, Tabatha. The pt was located at home, [address] [place] [address], and I was located in my office in [place], [place]. Her daughter informs me today that she did not need an appt but did need instructions on irrigating the catheter for the nurse. However, the catheter happened to snap today and needed to be replaced anyway. I apologized for the misunderstanding. She stated that it would be better for me to call the nurse directly to give her the instruction. She had just left the house after changing the patient's catheter. She informs me she took a urine specimen. The nurses name is Marimar and can be reached at [phone].   07/01/2024: Ms. CLAUDIO MCDOWELL presents today for a follow up audio only telephone visit. The pt was located at home, [address] [place] [address], and I was located in my office in [place], [place]. Visit conducted with patient's daughter, Tabatha. Patient's visiting nurse, Marimar, collected a urine specimen from her on 6/26/2024 when replacing the catheter after it broke. UA revealed cloudy urine, large LE, and 150 WBC/HPF, otherwise normal. Urine culture grew >100,000 CFU/ml Citrobacter freundii and 50,000 - 99,000 CFU/mL Pseudomonas aeruginosa. The patient obtained lab work /24 prior to today's visit. Culture showed >100,000 CFU/ml Citrobacter freundii. 50,000 - 99,000 CFU/mL Pseudomonas aeruginosa. BMP revealed low sodium of 134, potassium 5.4, glucose elevated at 122, creatinine wnl at 0.56. UA showed cloudy appearance, large Leukocyte Esterase, 150/HOF WBC. Patient reports today is not her best day. She sounds suppressed. Daughter reports onset cough and unsure if infection could be both respiratory and bladder. Patient obtained nebulizer prior to visit. Daughter reports urine is currently clear.   07/05/2024: Ms. CLAUDIO MCDOWELL presents today for a follow up audio only telephone visit. The pt was located at home, [address] [place] [address], and I was located in my office in [place], [place]. Visit was conducted with her daughter, Tabatha. She informs me that she just took the first packet of Fosfomycin yesterday. There have been no problems thus far. She does report that the catheter was mispositioned last night and was not draining for a few hours. Her and the aide were able to push it in more and urine drained. The visiting nurse came this morning. She irrigated the catheter and ensured it was in good position.   07/08/2024: Ms. CLAUDIO MCDOWELL presents today for a follow up audio-visual telehealth visit. The pt was located at home, [address] [place] [address], and I was located in my office in [place], [place]. The visit was conducted mostly with her daughter Tabatha. The patient has been taking Fosfomycin 3 GM oral packet once every 3 days. Her daughter reports she looks a bit improved. Her urine looks good but has since the beginning. She feels Fosfomycin makes her a little sick. Had some abdominal pain over the weekend but has not complained today. She was having anxiety but her oxygen is good. Her temperature is 97.1. She is not leaking around the catheter at all. She is a bit stressed today and someone close to them passed away and her daughter had to inform her of this today. At the end of the visit I spoke to  and she informs me she is not feeling so bad.     07/12/2024: Ms. MCDOWELL is a 89 year old female presenting today for an audio only telehealth visit for which she gave verbal permission. The patient was located in her home at [address] [place] [address]. I was located in my office on [place], [place]. She was accompanied by her daughter who helped with the visit.  Patient is bedbound and with a sales catheter.  On 7/11/24 patient was found with 150 wbc/hpf. cloudy urine. No bacteria.  given that patient is with indwelling catheter, I would usually not treat her with abx right away, but I saw her and she looked pale, sluggish, and not cheerful as usual.  She was started on Fosfomycin once every 3 days. After course of Fosfomycin, patient was found with only 22 wbc/hpf, and few bacteria.  Her daughter worries today about low urine output since patient started Fosfomycin. She admits patient has been experiencing nighttime diarrhea which is expected with the medication. I explain that is consistent with lower production of urine as patient loses water during nighttime diarrhea,  patient's vitals taken by her daughter have been reportedly normal BP:120 /60. Pulse in the 70s  08/06/2024: Ms. CLAUDIO MCDOWELL presents today for a follow up audio only telephone visit for which she gave permission for. The pt was located at home, [address] [place] [address], and I was located in my office in [place], [place]. Visit was conducted with her daughter, Tabatha Deluna. At the time I called at 2:00 PM the nurse was there to change her catheter. Her daughter reports that it looks as if there is a ball in her mother's stomach. This past Friday, she showed a tremendous amount of stool in her colon on x-ray. She denies seeing any blood in the stool that has been passing. She denies any gross hematuria. Urine has looked good. Her daughter notes that last Wednesday, it appeared her mother had a TIA. She opted to not have her mom taken to the hospital, so they are not sure if that is exactly what happened.  She appears less confused than last week according to her daughter.   08/09/2024: Ms. MCDOWELL is a 89 year old female presenting today via telehealth using real time 2 way audio-visual technology. We utilized [employer] telemetry [employer] platform. The patient, Ms. MCDOWELL, was located in her home at [address] [place] [address] at the time of the visit. The provider, BENIGNO RAYMUNDO, was located in his office on [place], [place]. Verbal Consent was given by the patient on 08/09/2024 and my scribe served as witness. The patient was accompanied by her daughter  who participated in the telehealth encounter. Patient is bedbound and with an indwelling sales catheter. She was recently treated for UTI. On 6/26/24 patient was found with 150 wbc/hpf. cloudy urine. No bacteria.  After course of abx, patient reports today for review of new labs findings. I reviewed and discussed the patient's pertinent lab works. On 8/6/24 Urine Analysis showed 10 wbc/hpf (down from 150). Urine is now clear. Specific Gravity was 1.010 indicating adequate hydration. No bacteria seen. Urine Cytology on 8/6/24 was negative for malignant cell. Urine Culture on 8/6/24  showed presence of 3 organisms, none being predominant, possibly colonizers, or probable collection contamination. No infection.

## 2024-09-07 NOTE — ADDENDUM
[FreeTextEntry1] : This note was partly authored by Clovis Rebolledo working as a scribe for BENIGNO Kiran. I, BENIGNO Kiran, have reviewed the content of this note and confirm it is true and accurate. I personally performed the history and physical examination and made all the decisions. 09/06/2024.

## 2024-09-07 NOTE — ASSESSMENT
[FreeTextEntry1] : 11/03/2023: Ms. MCDOWELL is a 89 year old female presenting today for an audio only telehealth visit for which she gave verbal permission. The patient was located in her home at [address] [place] [address]. I was located in my office on [place], [place]. She was accompanied by her daughter. She says her mother was supposed to be released today. Initially she was able to void. Because of constipation she was given a couple of enemas. She had a large evacuation and since then she has not been able to void again. The daughter says her latest PVR was 340 cc. She is waiting for her mother to be rescanned again.    Plan: If she is unable to void, she will be discharged with a sales catheter.   11/07/2023: Ms. CLAUDIO MCDOWELL presents today for a follow up audio only telephone visit for which she gave permission for. The pt was located at home, [address] [place] [address], and I was located in my office in [place], [place]. The visit was conducted with the patient's daughter. The patient came home from the hospital on Sunday. The catheter was put back in without another attempt at a void trial. The patient does have issues with constipation. She is able to swallow liquids. Her daughter has had to crush some of her pills to put them in a liquid, however the pt finds it to taste bitter and has some discomfort swallowing the chunks of crushed pills. The hospital discharged her with a 7 day supply of Cefuroxime. Was started yesterday so she has had two doses.    We discussed the possibility of a doing a trial of void. At this time I am recommending her mother settle back in to being home and we can readdress this possibility and how the patient and her daughter would like to proceed at the time of her next visit. Next catheter change will be on 12/5 should they choose to proceed with the catheter.   I looked it up on [employer] and Cefuroxime is available in the [place] as a liquid suspension. Given that this would be easier for the patient, I went to prescribe it for her which she could take rather than the pills. [employer] did not allow me to electronically prescribe it in a liquid suspension so I called the patient's pharmacy to give a verbal prescription for 200 ml of it for 20 ml BID for 5 days. I left a VM for the pharmacy as there was no answer. I requested they call me back if they could accept this prescription or if they could not. The pharmacy called me back shortly after and they told me that it is not currently offered in a liquid suspension, however he called the [facility] next door and they are willing to compound it for 40$. I saw this as acceptable and thanked him for going the extra mile and calling next door. He will contact the patient's daughter to inform her.   Patient and her daughter will have a telehealth visit in 2 weeks for reassessment, sooner if clinically indicated.   11/20/2023: Ms. MCDOWELL presents today for a follow up audio only telehealth visit for which they gave permission for. The patient was located at home [address] [place] [address] and I was located in my office in [facility]. Daughter states patient is okay. She notes giving pt suppositories and 7 hours later she had mixed bowel movements. However, she did not have a bowel movement since Saturday. Patient is given pedialyte and water frequently. Today's temperature was 95 as before it was 96-97. Pt did not drink any liquids shortly after taking temperature. Daughter denies giving pt solace but she usually gives Miralax as she has done so today. Senady's urine color is clear as it  has been clear for some time.  Daughter states her stomach was distended. Bp has stabilized since being in the hospital but last night and today it  was higher than normal being being 168/88 before medication.   Advised daughter to give patient Miralax tonight if there is no bowel movements.  Advised her to take Blood pressure tonight.  Pt will schedule a telehealth visit  11/29/2023: Ms. CLAUDIO MCDOWELL presents today for a follow up audio only telephone visit for which she gave permission for. The pt was located at home, [address] [place] [address], and I was located in my office in [place], [place]. Visit was conducted with her daughter, Andree. She was informed yesterday by her house call physicians of the results of her urine culture (emailed from core lab showing >100,000 CFU/ml Pseudomonas aeruginosa susceptible only to amikacin, Ciprofloxacin, imipenem, levofloxacin, meropenum. Resistant to Ceftazidime, Pip/tazo. Intermediate for aztrenam, cefepime. They discussed that patient is having increasing oxygen requirements, although SPO2 ok on supplemental O2 (prior to this patient needed only occasional O2 suppl. every few days). 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 The want to treat with ciprofloxacin, 7 day course. Prescription sent to pharmacy. Her daughter inquired today on if she should be treated. She states last week her mother seemed very not herself, however today she seems better in terms of mental status and oxygenation. She has been giving her senakot and miralax.   I explained that given her O2 issues and confusion over the last few days, I advised to treat with Cipro despite her daughter feeling she is better today. If there are any problems with crushing the pills for her or if she is not reacting to it well, she was advised to call right away. I posed the option of giving it as an oral solution if need be.    12/21/2023: Ms. MCDOWELL presents today for a follow up audio-visual telehealth visit for which they gave permission for. The patient was located at home [address] [place] [address] and I was located in my office in [place], [place]. The patient obtained lab work 12/4/23 prior to today's visit. Urinalysis is improved and showed trace proteinuria, small Leukocyte Esterase, 6/HPF of WBC and 7/LPF of Cast. Urine culture shows no significant growth, <10,000 CFU/mL Normal Urogenital Khadijah, which is very good. Her daughter expresses concern as recently patient is experiencing a surplus of looser and larger amounts of bowel movements that is escaping to the vaginal area, thus causing infections. Urine is normal in color, denies fever. Patient is occasionally more fatigued than usual. Denies distended appearance of the abdomen. Admits to only 1 spasm around the catheter this month. Next sales change will be in 2 weeks.  Towards the end of visit, patient was shown on Facetime, appearing well oriented x 3, normal pigmentation, lips are moist, and smile is symmetrical. I am very impressed by how well the patient looks and speaks. It was more than just pleasantries, she inquired about personal questions. Overall, I am very pleased.   Reviewed and discussed laboratory work of 12/4/23 which She obtained prior to today's visit as requested. Advised daughter to have care team to place support under the pelvis, so pelvis is tilted up at a higher level of the anus.   As long as stool is pasty and firm, we advised Andree to give patient MiraLAX. If stool is loose and unfirm, she should decrease the amount of MiraLAX.  Pt will provide a urine sample which will be sent for urinalysis, urine culture, and urine cytology. Pt will schedule a telehealth visit in 2 weeks for reassessment.   12/27/2023: Ms. CLAUDIO MCDOWELL presents today for a follow up audio only telephone visit for which she gave permission for. The pt was located at home, [address] [place] [address], and I was located in my office in [place], [place]. She is accompanied by her daughter. Patient provided a surveillance urine specimen on 12/22/2023. She has chronic sales catheter drainage. UA revealed small LEC, positive for nitrites, 6 WBC/HPF, no RBC seen, moderate bacteria/HPF, granular casts present, and yeast like cells present. Urine culture grew >100,000 CFU/ml Enterococcus faecalis and 50,000 - 99,000 CFU/mL Streptococcus mitis/oralis group. Urine cytology was negative for high grade urothelial carcinoma. Few mature squamous cells, rare benign urothelial cells and abundant fungal organisms morphologically consistent with Candida species present. Patient's daughter reports that her urine looks clear and a pale yellow color. She feels her mom still has a lot of anxiety and some confusion. She does report that her catheter bag sits in feces due to the patient's position. They tried to tilt her pelvis to avoid this but states it's easier said than done. She states there is no feces in the catheter or in the bag. BM are not loose, she describes them as pasty. Has about 2-3 BM a day.    Reviewed and discussed lab work of 12/22/2023 in detail with the pt.  Future urine orders were put in including fungal urine culture. Pt's daughter was advised to continue to try and alter the patient's position to avoid the catheter sitting in feces.  Patient should have a telehealth visit in 3 months, sooner if clinically indicated.    01/05/2024: Ms. MCDOWELL is a 89 year old female presenting today for an audio only telehealth visit for which she gave verbal permission. The patient was located in her home at [address] [place] [address]. I was located in my office on [place], [place]. She was accompanied by her daughter who helped with the visit. She reports that a nurse came for a catheter change, and only succeeded on the third attempt. She says it's the same nurse who has been coming for 5 months now, and usually she encounters no difficulties. Now that the catheter has been changed, the patient is able to void.   Plan: Pt's daughter will keep us updated. If the patient experiences fever or chill, she will call ER and inform the office.   01/17/2024: Ms. CLAUDIO MCDOWELL presents today for a follow up audio only telephone visit for which she gave permission for. The pt was located at home, [address] [place] [address], and I was located in my office in [place], [place]. She is accompanied by her daughter, Tabatha Deluna. Patient provided a urine specimen on 1/5/2024 for surveillance. UA revealed trace blood by urine and moderate LEC. This is excellent for her as she has chronic sales catheter drainage. Urine culture grew 50,000-99,000 CFU/ml Pseudomonas aeruginosa. Urine cytology was negative for high grade urothelial carcinoma. Few mature squamous cells, rare benign urothelial cells and abundant fungal organisms morphologically consistent with Candida species present. The patient's daughter reports that her mother is "different". She reports that she is hearing things a lot now. She states that usually the things are negative. For example, she will say "Why is Ac saying my oxygen isn't working properly?". It is clear what she is saying but fairly random. She is not seeing anything; it is strictly auditory.   Reviewed and discussed lab work of 1/5/2024 in detail with the pt's daughter. She was informed that given she has chronic sales catheter drainage, this specimen was excellent. If she had more WBC/HPF, I would be concerned, however she is in very good shape from a urologic standpoint. Her daughter was advised to speak with her mother's PCP about hearing things and her AMS as it is not urologic in origin or related to pending sepsis. I provided her the name of a neurologist, Dr.Li-Fen Tripathi if she would like to go for neuro consultation.  I once again re-iterated that at this time, given her skin breakdown, I recommend continued sales catheter drainage. Her daughter brought up an external urine collection apparatus once again, however I do not recommend we try that at least until her skin is in good condition. I also explained that if her mother were to go into urinary retention, this sort of device would not drain the urine from her.  Pt's daughter was advised to give her hyoscyamine for bladder spasms.  Patient will have a telehealth visit in 1-2 months, sooner if clinically indicated.    01/22/2024: Ms. CLAUDIO MCDOWELL presents today for a follow up audio only telephone visit for which she gave permission for. The pt was located at home, [address] [place] [address], and I was located in my office in [place], [place]. Visit was conducted with the patient's daughter, Tabatha Deluna. She informs me that she did not realize she was out of hyoscyamine sulfate for bladder spasms. She has been getting bladder spasms after the aide leaves and is embarrassed by this as she thinks shes wetting the bed. Her daughter reports that her mother asks in the morning "why am I bothering taking my medications, I am going to die anyway".   Renewed hyoscyamine sulfate 0.125 mg sublingual tablets, give one tablet under the tongue as needed. I recommend she gives it about 15 minutes before the aide comes. I suggest she give it for 10 days in a row to see if we can calm things down and then stop. If it returns, we can try again for another month or two. I did speak with the patient at the end of the visit. She seemed to know who I was and understood me. I spoke to her about the bladder spasms she is experiencing. She was on board with trying hyoscyamine and thanked me for speaking with her.  I recommend the patient's daughter speak with her PCP about potential anti-depressant medication.   Patient will have a telephone visit in 2 weeks for reassessment, sooner if clinically indicated.    02/05/2024: Ms. CLAUDIO MCDOWELL presents today for an audio visual telehealth visit for which she gave permission for. The patient was located at home at [address] [place] [address] and I was located at my office in [place], [place]. Pt's daughter states she was not expecting my call today. I informed her that she was on my schedule and that if she would like we can talk. She informs me that hyoscyamine does not seem to be working for her mother and that some days she has bladder spasms and some she doesn't. Pt is currently not having diarrhea. She is due for a catheter change tomorrow. Patient has not started any medications for the psychological issues she has been having.    Pt will discontinue use of hyoscyamine. I prescribed the patient Trospium 20 mg, one tablet once daily at bedtime. I informed the patient there may be constipation as a side effect.   Orders for UA, urine culture, and urine cytology were put in for her daughter to bring the patient's sample to her nearest  lab.  Patient will have a telehealth visit this Friday after the catheter change.     02/09/2024: Ms. MCDOWELL is a 89 year old female presenting today for an audio only telehealth visit for which she gave verbal permission. The patient was located in her home at [address] [place] [address]. I was located in my office on [place], [place]. She was accompanied by her daughter who helps with the visit.   The patient has been with a chronic sales catheter since July 2021 when she fractured her foot. She reports today for a follow up.  I reviewed and discussed the patient's pertinent lab works. Urine Culture shows 10,000 - 49,000 CFU/mL Pseudomonas aeruginosa 10,000 - 49,000 CFU/mL Citrobacter freundii complex I explain to the patient that patients with chronic sales catheter develop bacteria in the urine, that may come from the skin, and it is not significant clinically unless infection symptoms ensue. In addition, the patient reports she was not taking antibiotics when the sample was collected, and the colonies were less than 100 000.   Pt will start Trospium 20 mg.  She will provide urine for UA, Urine Cytology and Urine Culture. She will have a follow up in one month; earlier if needed.    03/13/2024: Ms. CLAUDIO MCDOWELL presents today for a follow up audio only telephone visit for which she gave permission for. The pt was located at home, [address] [place] [address], and I was located in my office in [place], [place]. Visit was conducted with her daughter Tabatha Deluna. Patient provided a urine specimen on 3/4/2024. Patient has a chronic indwelling sales. UA revealed trace blood, large LE, and 9 WBC/HPF. This is quite good considering her sales. Culture grew 50,000 - 99,000 CFU/mL Escherichia coli & >100,000 CFU/ml Citrobacter freundii complex. She does report that after they gave the specimen her mother had a week of stool getting in the vulvar area and she is worried this might travel to the bladder. There is no evidence of that currently. She states the urine is clear and yellow. There has been no drainage around the catheter. She has not had any increased bladder spasms. Patient's daughter reports that her mother is not feeling well today. She has a runny nose and a cough. Home care is arranging for a nasal swab.   Reviewed and discussed lab work of 3/4/2024 in detail with the pt's daughter.  If the patient's daughter notices a change in her urine, she will call promptly.    04/08/2024: Ms. CLAUDIO MCDOWELL presents today for a follow up audio only telephone visit for which she gave permission for. The pt was located at home, [address] [place] [address], and I was located in my office in [place], [place]. Visit was conducted with her daughter Tabatha Deluna. Patient provided a urine specimen on 4/3/2024. UA was completely normal other than moderate LE and 6 WBC/HPF. For having a sales catheter, this is an excellent UA. Urine culture grew >100,000 CFU/ml Citrobacter freundii complex. Urine cytology was negative for high grade urothelial carcinoma. Specimen consists of many lymphoid cells, mature squamous epithelial cells and benign urothelial cells. Patient's daughter reports that  got covid, however she is recovering. She is very tired and still has a cough. She does report that the urine is clear and yellow, however the pt has had many accidents where stool gets near/in the catheter. Her skin remains red but not broken down. Continues use of triple paste.    Reviewed and discussed lab work of 4/3/2024 in detail with the pt. Pt's daughter reassured that this is a very good specimen considering she has a sales catheter.  Will provide another urine specimen at time of next catheter change.  Will have a telehealth visit after next urine specimen, sooner if clinically indicated.     05/08/2024 Ms. CLAUDIO MCDOWELL presents today for a follow up audio-only telehealth visit for which they permitted for. The patient was located at home [address] [place] [address] and I was located in my office in [place], [place]. Patient has chronic indwelling Sales catheter. This urinalysis indicates urine is in very good condition without any evidence for clinically active urinary tract infection at this time. Culture showed >=3 organisms. Probable collection contamination. If I am notified of changes indicating possible clinically significant urinary tract infection, we would re-culture at that time. We will continue to collect surveillance urine specimens for urinalyses and urine cultures at time of catheter changes.  Daughter states her mental status is different.  Reports more confusion, gaps of memory. Denies hallucinations and is much less anxious. Stool is still loose and pt is more fatigued while on Risperidone.   Reviewed and discussed laboratory work of 5/2/24 which She obtained prior to today's visit as requested. At the current time I will not re-culture. If I am notified of changes indicating possible clinically significant urinary tract infection, we would re-culture at that time. We will continue to collect surveillance urine specimens for urinalyses and urine cultures at time of catheter changes. Pt will schedule a telehealth visit.  06/7/2024: Ms. CLAUDIO MCDOWELL presents today for a follow up audio only telephone visit for which she gave permission for. The pt was located at home, [address] [place] [address], and I was located in my office in [place], [place]. Visit conducted with patient's daughter, Tabatha. 6/3/24 UA revealed small blood by dipstick, moderate Leukocyte Esterase, trace proteinuria and 10/HPF of WBC. Culture showed 50,000 - 99,000 CFU/mL Pseudomonas aeruginosa. Daughter reports have pt historian today. Patient is doing okay from urinary standpoint, urine is clear. Admits to breathing difficulties, as Internist prescribed Z-pack (Zithromax) but the daughter is hesitant of proceeding further with the prescription as 1 day before starting her cough seemed improved.  Before cough onset, there was increase in confusion// decline of mental state.    Reviewed and discussed laboratory work of 6/3/24 which She obtained prior to today's visit as requested. I do not believe we should treat at this time, instead reassess with next catheter change.  Patient to continue with respiratory treatment course and should not discontinue as this may create more clinical related issues. Pt will schedule a telehealth visit in 1 month.   06/26/2024: Ms. CLAUDIO MCDOWELL presents today for a follow up audio only telephone visit. Visit was conducted with the patient's daughter, Tabatha. The pt was located at home, [address] [place] [address], and I was located in my office in [place], [place]. Her daughter informs me today that she did not need an appt but did need instructions on irrigating the catheter for the nurse. However, the catheter happened to snap today and needed to be replaced anyway. I apologized for the misunderstanding. She stated that it would be better for me to call the nurse directly to give her the instruction. She had just left the house after changing the patient's catheter. She informs me she took a urine specimen. The nurses name is Marimar and can be reached at [phone].   After I hung up with the patient's daughter, I called Marimar. She informs me she just left after changing the catheter. I informed her it would be good if we could have the catheter irrigated to avoid sediment. She uses a 16 french catheter. She was instructed to disconnect the collecting tube and irrigate with about 60 cc of water. Let it drain out passively. If it does not drain well, she can try again. She can aspirate gently if she needs. If sediment comes out, she should repeat until it is clear. I explained that sometimes if it is a lot, it may take up to a liter to irrigate. I provided her with my phone number in the case she needs help.    07/01/2024: Ms. CLAUDIO MCDOWELL presents today for a follow up audio only telephone visit. The pt was located at home, [address] [place] [address], and I was located in my office in [place], [place]. Visit conducted with patient's daughter, Tabatha. Patient's visiting nurse, Marimar, collected a urine specimen from her on 6/26/2024 when replacing the catheter after it broke. UA revealed cloudy urine, large LE, and 150 WBC/HPF, otherwise normal. Urine culture grew >100,000 CFU/ml Citrobacter freundii and 50,000 - 99,000 CFU/mL Pseudomonas aeruginosa. The patient obtained lab work /24 prior to today's visit. Culture showed >100,000 CFU/ml Citrobacter freundii. 50,000 - 99,000 CFU/mL Pseudomonas aeruginosa. BMP revealed low sodium of 134, potassium 5.4, glucose elevated at 122, creatinine wnl at 0.56. UA showed cloudy appearance, large Leukocyte Esterase, 150/HOF WBC. Patient reports today is not her best day. She sounds suppressed. Daughter reports onset cough and unsure if infection could be both respiratory and bladder. Patient obtained nebulizer prior to visit. Daughter reports urine is currently clear.   Reviewed and discussed lab work of 6/26/2024 in detail with the pt's daughter.   I prescribed Fosfomycin 3 GM oral packet, once every 3 days. If onset bowel irritability occurs patient informed to call.  Patient denies ever having a seizure. Pt will schedule a telehealth visit in 1 week.    07/05/2024: Ms. CLAUDIO MCDOWELL presents today for a follow up audio only telephone visit. The pt was located at home, [address] [place] [address], and I was located in my office in [place], [place]. Visit was conducted with her daughter, Tabatha. She informs me that she just took the first packet of Fosfomycin yesterday. There have been no problems thus far. She does report that the catheter was mispositioned last night and was not draining for a few hours. Her and the aide were able to push it in more and urine drained. The visiting nurse came this morning. She irrigated the catheter and ensured it was in good position.    Continue Fosfomycin and call if there are any problems.  They are scheduled for a telehealth visit on 7/8/2024 for reassessment.    07/08/2024: Ms. CLAUDIO MCDOWELL presents today for a follow up audio-visual telehealth visit. The pt was located at home, [address] [place] [address], and I was located in my office in [place], [place]. The visit was conducted mostly with her daughter Tabatha. The patient has been taking Fosfomycin 3 GM oral packet once every 3 days. Her daughter reports she looks a bit improved. Her urine looks good but has since the beginning. She feels Fosfomycin makes her a little sick. Had some abdominal pain over the weekend but has not complained today. She was having anxiety but her oxygen is good. Her temperature is 97.1. She is not leaking around the catheter at all. She is a bit stressed today and someone close to them passed away and her daughter had to inform her of this today. At the end of the visit I spoke to  and she informs me she is not feeling so bad.  Continue Fosfomycin (last dose this Wednesday).  Patient's visiting nurse will collect a urine specimen this Thursday. It will be sent out for UA, culture, fungus culture, and cytology.  Though the patient's daughter Tabatha is very concerned about her mother and a bit depressed and anxious over it, her mother is doing quite well. She did not seem toxic to me. She made sensible conversation. She is quite hard of hearing but her daughter would repeat things so she could hear me and would respond well. From a urinary tract standpoint, she is doing well.  Patient will have a telehealth visit after next urine specimen to review and discuss results.    07/12/2024: Ms. MCDOWELL is a 89 year old female presenting today for an audio only telehealth visit for which she gave verbal permission. The patient was located in her home at [address] [place] [address]. I was located in my office on [place], [place]. She was accompanied by her daughter who helped with the visit.  Patient is bedbound and with a sales catheter.  On 6/26/24 patient was found with 150 wbc/hpf. Cloudy urine. No bacteria.  given that patient is with indwelling catheter, I would usually not treat her with abx right away, but I saw her and she looked pale, sluggish, and not cheerful as usual.  She was started on Fosfomycin once every 3 days. After course of Fosfomycin, patient was found with only 22 wbc/hpf, and few bacteria.  Her daughter worries today about low urine output since patient started Fosfomycin. She admits patient has been experiencing nighttime diarrhea which is expected with the medication. I explain that is consistent with lower production of urine as patient loses water during nighttime diarrhea,  patient's vitals taken by her daughter have been reportedly normal BP:120 /60. Pulse in the 70s Urine output about 1.5L a day. Pt will follow up next week for video telehealth.   08/06/2024: Ms. CLAUDIO MCDOWELL presents today for a follow up audio only telephone visit for which she gave permission for. The pt was located at home, [address] [place] [address], and I was located in my office in [place], [place]. Visit was conducted with her daughter, Tabatha Deluna. At the time I called at 2:00 PM the nurse was there to change her catheter. Her daughter reports that it looks as if there is a ball in her mother's stomach. This past Friday, she showed a tremendous amount of stool in her colon on x-ray. She denies seeing any blood in the stool that has been passing. She denies any gross hematuria. Urine has looked good. Her daughter notes that last Wednesday, it appeared her mother had a TIA. She opted to not have her mom taken to the hospital, so they are not sure if that is exactly what happened.  She appears less confused than last week according to her daughter.  Patient should proceed with catheter change today. They should record how much urine they collect when it is changed. Urine will be sent for UA, culture, and cytology.  Her daughter was advised to continue managing her mother's constipation. She states she is doing an enema tomorrow.  Patient will have a telehealth visit on Friday to review and discuss urine test results.    08/09/2024: Ms. MCDOWELL is a 89 year old female presenting today via telehealth using real time 2 way audio-visual technology. We utilized [employer] teams telemetry doc platform. The patient, Ms. MCDOWELL, was located in her home at [address] [place] [address] at the time of the visit. The provider, BENIGNO RAYMUNDO, was located in his office on [place], [place]. Verbal Consent was given by the patient on 08/09/2024 and my scribe served as witness. The patient was accompanied by her daughter  who participated in the telehealth encounter. Patient is bedbound and with an indwelling sales catheter. She was recently treated for UTI. On 6/26/24 patient was found with 150 wbc/hpf. cloudy urine. No bacteria.  After course of abx, patient reports today for review of new labs findings. I reviewed and discussed the patient's pertinent lab works. On 8/6/24 Urine Analysis showed 10 wbc/hpf (down from 150). Urine is now clear. Specific Gravity was 1.010 indicating adequate hydration. No bacteria seen. Urine Cytology on 8/6/24 was negative for malignant cell. Urine Culture on 8/6/24  showed presence of 3 organisms, none being predominant, possibly colonizers, or probable collection contamination. No infection.  Patient will continue providing Urine specimen monthly.  She will continue to have her indwelling catheter changed monthly.   09/06/2024: Ms. MCDOWELL is a 89 year old female presenting today for an audio only telehealth visit for which she gave verbal permission. The patient was located in her home at [address] [place] [address]. I was located in my office on [place], [place]. Pt accompanied by daughter Tabatha Deluna. As per daughter, patient's blood pressure has been running higher and her medication dosage has been increased. She is also been with increasing labored breathing. Urinary-wise, daughter and aide have not noticed any cloudy, malodorous urine in the tubing. Patient is not with significant complaints. No gross hematuria, no bladder spasms, and no fever.  I reviewed and discussed the patient's pertinent lab works on 9/3/24 -UA revealed 61 wbc/hpf, few bacteria, and 34 cats, She was also found with Large Leukocyte Esterase. -Preliminary Urine Culture showed 2 organisms ">100,000 CFU/ml Citrobacter freundii complex 50,000 - 99,000 CFU/mL Pseudomonas aeruginosa  Explained to patient's daughter elevated cast (in her case 34) can be indicative of renal trauma/damage. I recommend we wait for next Urinalysis. Only if casts remains elevated, with decreased renal function I would recommend further work-up (such as a renal ultrasound). At this time, there's no cause for alarms with a Creatinine of 0.67 and EGFR 81 on 8/2/24  Since patient is not symptomatic, I do not recommend a course of abx presently to limit developing antibiotics resistance among other side effects.   Pt may provide blood specimen for Renal Panel and Cystacin-C to further investigate renal function.   Pt's daughter is concerned about patient's increasing breathing problems. She inquired about possibility of obtaining more lab works than simply renal panel to investigate. I explained that she may want to consult with PCP as those tests are outside my expertise and responsibilities. Re-assured patient that her lab works that we reviewed (UA, and renal panel) do not indicate any breathing dysfunctions.    Duration of call: 30mins

## 2024-09-09 ENCOUNTER — APPOINTMENT (OUTPATIENT)
Dept: UROLOGY | Facility: CLINIC | Age: 89
End: 2024-09-09

## 2024-09-09 ENCOUNTER — NON-APPOINTMENT (OUTPATIENT)
Age: 89
End: 2024-09-09

## 2024-09-10 ENCOUNTER — LABORATORY RESULT (OUTPATIENT)
Age: 89
End: 2024-09-10

## 2024-09-13 ENCOUNTER — APPOINTMENT (OUTPATIENT)
Dept: UROLOGY | Facility: CLINIC | Age: 89
End: 2024-09-13
Payer: MEDICARE

## 2024-09-13 ENCOUNTER — APPOINTMENT (OUTPATIENT)
Dept: HOME HEALTH SERVICES | Facility: HOME HEALTH | Age: 89
End: 2024-09-13

## 2024-09-13 DIAGNOSIS — R82.998 OTHER ABNORMAL FINDINGS IN URINE: ICD-10-CM

## 2024-09-13 DIAGNOSIS — Z97.8 PRESENCE OF OTHER SPECIFIED DEVICES: ICD-10-CM

## 2024-09-13 DIAGNOSIS — N32.89 OTHER SPECIFIED DISORDERS OF BLADDER: ICD-10-CM

## 2024-09-13 DIAGNOSIS — N39.0 URINARY TRACT INFECTION, SITE NOT SPECIFIED: ICD-10-CM

## 2024-09-13 DIAGNOSIS — R82.71 BACTERIURIA: ICD-10-CM

## 2024-09-13 PROCEDURE — 99443: CPT

## 2024-09-13 NOTE — HISTORY OF PRESENT ILLNESS
[FreeTextEntry1] : Claudio Mcdowell is currently 89 years of age.  Her daughter Tabatha Deluna is devoted to her care.  She is very concerned about her mother and has been so for many years.  She becomes very anxious at times and that is concerning her mother.  Claudio Mcdowell lives with her daughter.  Claudio Mcdowell has been bedbound for several years.  The patient had developed a sacral decubitus ulcer while in a nursing facility.  A Sales catheter was placed because of fecal incontinence and concern that urine mixed with the feces would contaminate the sacral decubitus ulcer.  Once the patient left the nursing facility and will be with her daughter.  The sacral decubitus ulcer has finally healed.  I have discussed removing the Sales catheter with the daughter.  However as the patient has fecal incontinence there is concern about the urine mixing with the feces and being more likely to cause skin problems.  For now we will leave the Sales catheter in.  The patient has had clinically symptomatic urinary tract infections.  He clinically significant infections usually start with increased urination around the Sales catheter due to bladder spasms.  Bladder spasms have been occurring for staff.  The patient is positioned properly in in bed and the personal care attendant leaves for the day.  We have managed to prevent this with the administration of sublingual hyoscyamine.  We do periodic surveillance urine analysis and urine culture tests at the time the Sales catheter is changed by visiting nurse.  He also performed a times of increased spasms or purulence of the urine or change in mental status,  On October 11, 2022 visiting nurse using an order I have previously placed at the request of the daughter sent urine for urine cytology which proved to be negative for malignant cells, urine culture which grew Greater than 100,000 and E. coli that was sensitive to all antibiotics tested.  Urinalysis looked quite good for urine taken from a Sales catheter that was used for chronic Sales catheter drainage.  Ileukocyte esterase was large but nitrites were negative.  There were 2 epithelial cells per high-power field.  There were only 10 white blood cells per high-power field and only 2 red blood cells per high-power field on microscopic exam there were no bacteria seen and there were no hyaline casts.  Specific gravity was 1.010 which shows good hydration and this bedbound woman cared for diligently by her daughter.  Blood by dipstick was trace.  There was no protein glucose or ketones in the urine.  There is no bilirubin and no urobilinogen.  An important portion of the visit was my review with the daughter about bladder spasms and urination around the catheter.  Urine appearance and urine analysis have been clear.  The urine was clear again today.  The patient has significant short-term memory deficit.  She does have some useful short-term memory.  Her long-term memory remains very much intact.  She is very pleasant and to make satisfactory conversation.  She does admit to sometimes not remembering something.  Her complexion was good and there was no pallor.  Facial movements were symmetric.  Cranial nerves were intact.  I was not able to assess the olfactory nerve as this was a telehealth visit.    11/04/2022: Ms. MCDOWELL is a 89 year old female presenting today for a telehealth visit. She was accompanied by her daughter who helped with the visit. They gave permission for an audio only telehealth conference. The patient was located in her home in [place], [place]. I was located in my office on [place], [place]. Today her daughter reports the pt experienced rare urinary leaking around the catheter due to bladder spasms. She also reports her systolic pressure was around 140 last week. I offered Gemtesa to manage the bladder spasms and the daughter was cautious about more medications. I informed her if the urinary leaking are only once every 2 weeks or so, she does not have to medicate. The daughter was agreeable to monitor the occurrence of urinary leaking over the next 4 weeks and assess the discomfort it causes the pt. She denies any other urinary issues.  03/13/2023: Ms. MDCOWELL is a 89 year old female presenting today for a telehealth visit. She was accompanied by her daughter who helped with the visit. They gave permission for an audio only telehealth conference. The patient was located in her home in [place], [place]. I was located in my office on [place], [place]. The patient obtained an US prior to today's visit 3/9/23 which revealed: Comparison is made with the exam of 2/28/22. Transabdominal ultrasound of the abdomen was performed with color flow imaging. The examination is limited in evaluation. The visualized aorta and inferior vena cava show no abnormality. The pancreas is obscured by overlying bowel gas. The liver measures 12.4 cm with homogeneous echotexture. No intrinsic mass and no intra-or extrahepatic ductal dilatation. There is hepatopedal flow of the main portal vein. No gallstones, pericholecystic fluid, wall thickening or positive sonographic Melendez sign. The common bile duct measures 0.3 cm. The right kidney measures 9.2 x 5.6 x 3.0 cm with a nonobstructing 1.2 cm stone in the upper pole. Multiple additional subcentimeter calcifications are seen. These were not seen on previous exam. No hydronephrosis or renal mass. The left kidney measures 9.3 x 3.9 x 3.5 cm. There is a 2.1 x 2.4 x 2.0 cm cyst in the medial mid pole, not seen on previous study. No hydronephrosis or renal calcification. The spleen measures 8.2 cm and show no abnormality. There is a small right pleural effusion, stable. IMPRESSIONS: Multiple nonobstructing stones of the right kidney with no hydronephrosis. 2.4 cm cyst of the midpole left kidney. Small right pleural effusion, stable. The daughter has answered the phone and reports the patient's urine does not get dark. She is not aware of the amount of water she gives her mother. I requested she measure's this from now on as the pt has stones and needs to increase water consumption. Her daughter reports this week the pt has experienced more spasms than normal and believes this may be due to severe constipation. She provided the pt with an Enema to aide in this situation. She is very concerned but I informed her the Enema may have stimulated the spasms and we should give her a few days to clear up and re-evaluate.   03/22/2023: Ms. CLAUDIO MCDOWELL presents today for an audio visual telehealth visit for which she gave permission for. The patient was located at home at [address] [place] [address] and I was located at my office in [place], [place]. She is accompanied by her daughter whom most of the visit was conducted with. Her daughter has been keeping track of her mother's water consumption. She is averaging about 58 oz of water in a 24 hr period. Additionally, she is drinking juice and ensure nutrition drinks. She does not consume any caffeine. She is on furosemide 40 mg. I said hello to the patient at the end of the visit and she is looking well. She reports feeling no pain at the current moment.   3/29/2023:  Patient Claudio Mcdowell and daughter Tabatha Deluna were home in [place], [place] and I was in my office in [facility].  Patient and daughter gave permission for a FaceTime telehealth visit.  They preferred this to [employer].  The patient looks good today.  Her complexion was good there is no pallor.  Smile was symmetric her affect was normal she appeared happy she could make conversation it was appropriate.  She said that she currently had no pain.  When I asked questions that after 3/2 how she had been recently the sometimes she hesitated and deferred to her daughter for cancer compatible with her impaired short-term memory.  Long-term memory remains good she recognizes me once know so I am does ask questions about things like her bladder as she was worried about it her blood tests.  I assured her the results were satisfactory.  04/18/2023: Ms. MCDOWELL is a 89 year old female presenting today for an audio and visual telehealth visit for which she gave verbal permission. We utilized [employer] teams telemetry [employer] platform. The patient was located in her home at [address] [place] [address]. I was located in my office on [place], [place]. She was accompanied by her daughter who helped to serve as a historian. She reports discomfort in her eyes. She describes the discomfort as a feeling of sand. She opened her eyes for me, and they do not look red. Pupils look normal. She reports the right eyes bothers her significantly more than the left. I advise that the patient tries lubricating drops and it if does not feel better she will notify her visiting nurse who is due to come next week for a sales catheter change. She reports episode of bladder spasms. She denies gross hematuria. She describes the urine as barely yellow. I reviewed and discussed her  latest pertinent lab on 04/12/2023 which shows "alkaline phosphate normal at 89. Creatinine was normal at 0.62 mg/dL. WBC normal at 5.01. RBC normal at 3.87".   05/09/2023: Ms. MCDOWELL presents today for a follow up audio only telehealth visit for which they gave permission for. She was accompanied by her daughter who helped to serve as a historian. The patient was located at home [address] [place] [address] and I was located in my office in [place], [place]. The patient obtained lab work 5/2/23 prior to today's visit. The UA revealed microscopic hematuria, 5/HPF RBC, 27/HPF WBC. The patient demonstrated great hydration as the specific gravity is 1.006. The Sales catheter was changed May 2, 2023 with no clear urine in the bag. While changing the diaper the aide reports blood in the diaper. The daughter states last week her mother experienced spasms but as of two days ago she is now fine. However, she states the patient is more fatigued. She denies the pt having a temperature. I assured her because her mother is post menopausal any abrasion to the labia can cause a fissure.   06/02/2023: Ms. MCDOWELL is a 89 year old female presenting today for an audio and visual telehealth visit for which she gave verbal permission. We utilized [employer] teams telemetry doc platform. The patient was located in her home at [address] [place] [address]. I was located in my office on [place], [place]. She was accompanied by her daughter Tabatha. She reports that her mother complains of onset dysuria that dissipated on its own and has not recurred since. She reports right sided back pain that is aggravated when she lays on the right side. She provided urine specimen. She notes the urine was very clear. I reviewed and discussed the patient's pertinent lab works. Her latest Urinalysis on 05/30/2023 that shows "60 WBC/HPF. Trace Blood Urine. Specific Gravity Urine 1.007". Urine Culture on the same date shows ">100,000 CFU/ml Escherichia coli and <10,000 CFU/ml Normal Urogenital ángel present". The patient takes Mirtazapine for anxiety at low dose. The daughter reports the patient is experiencing increasing anxiety and is thinking of having the prescriber increasing the dosage.  Her BP runs around 120/60. Her Hemoglobin hematocrit looks good. gaze is conjugated. facial expression looks symmetric. Urine was faint yellow and clear.   06/06/2023: Ms. MCDOWELL presents today for a follow up audio only telehealth visit for which they gave permission for. The patient was located at home [address] [place] [address] and I was located in my office in [place], [place]. She was accompanied by her daughter Tabatha. The daughter states pt is experiencing episodes of a dry cough. An X Ray  of 6/5/23 revealed Mild peribronchial thickening suggesting bronchitis in the right clinical setting with no focal pneumonia or congestive heart failure. COPD with chronic lung changes. The mental status is the same as usual but her BP has elevated some. I assured her a little elevation is not as dangerous. The pt continues to experience some urinary frequency and it is a little more concentrated.   10/12/2023: Ms. MCDOWELL presents today for a follow up audio only telehealth visit for which they gave permission for. The patient was located at home [address] [place] [address] and I was located in my office in [place], [place]. The 10/2/23 UA showed proteinuria 30 mg/dL, moderate blood, large Leukocyte Esterase, 16/HPF of WBC. UA showed improvement as patient has chronic sales drainage. Her daughter states patient is okay considering coming home from the hospital. Most pain is in her back/shoulders and some of her back area is red/raw.  Recently her BP has increased to 180/100 and was prescribed Losartan 50mg twice daily. Urine is a clear color, denies cloudy and dark appearance.  11/01/2023: Ms. MCDOWELL presents today for a follow up audio only telehealth visit for which they gave permission for. The patient was located at home [address] [place] [address] and I was located in my office in [place], [place]. The 10/13/23 CMP revealed creatinine at 0.58 and low ALT at 8 U/L. Patient was admitted into [facility] on 10/27/23 for sepsis/UTI. Her daughter states she may be released tomorrow. Daughter states during the day the patient's urine was normal, but she did have chills and a fever.   11/03/2023: Ms. MCDOWELL is a 89 year old female presenting today for an audio only telehealth visit for which she gave verbal permission. The patient was located in her home at [address] [place] [address]. I was located in my office on [place], [place]. She was accompanied by her daughter. She says her mother was supposed to be released today. Initially she was able to void. Because of constipation she was given a couple of enemas. She had a large evacuation and since then she has not been able to void again. The daughter says her latest PVR was 340 cc. She is waiting for her mother to be rescanned again.   11/07/2023: Ms. CLAUDIO MCDOWELL presents today for a follow up audio only telephone visit for which she gave permission for. The pt was located at home, [address] [place] [address], and I was located in my office in [place], [place]. The visit was conducted with the patient's daughter. The patient came home from the hospital on Sunday. The catheter was put back in without another attempt at a void trial. The patient does have issues with constipation. She is able to swallow liquids. Her daughter has had to crush some of her pills to put them in a liquid, however the pt finds it to taste bitter and has some discomfort swallowing the chunks of crushed pills. The hospital discharged her with a 7 day supply of Cefuroxime. Was started yesterday so she has had two doses.   11/20/2023: Ms. MCDOWELL presents today for a follow up audio only telehealth visit for which they gave permission for. The patient was located at home [address] [place] [address] and I was located in my office in [facility]. Daughter states patient is okay. She notes giving pt suppositories and 7 hours later she had mixed bowel movements. However, she did not have a bowel movement since Saturday. Patient is given pedialyte and water frequently. Today's temperature was 95 as before it was 96-97. Pt did not drink any liquids shortly after taking temperature. Daughter denies giving pt solace but she usually gives Miralax as she has done so today. Senady's urine color is clear as it  has been clear for some time.  Daughter states her stomach was distended. Bp has stabilized since being in the hospital but last night and today it  was higher than normal being being 168/88 before medication.   11/29/2023: Ms. CLAUDIO MCDOWELL presents today for a follow up audio only telephone visit for which she gave permission for. The pt was located at home, [address] [place] [address], and I was located in my office in [place], [place]. Visit was conducted with her daughter, Andree. She was informed yesterday by her house call physicians of the results of her urine culture (emailed from core lab showing >100,000 CFU/ml Pseudomonas aeruginosa susceptible only to amikacin, Ciprofloxacin, imipenem, levofloxacin, meropenum. Resistant to Ceftazidime, Pip/tazo. Intermediate for aztrenam, cefepime. They discussed that patient is having increasing oxygen requirements, although SPO2 ok on supplemental O2 (prior to this patient needed only occasional O2 suppl. every few days). 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 The want to treat with ciprofloxacin, 7 day course. Prescription sent to pharmacy. Her daughter inquired today on if she should be treated. She states last week her mother seemed very not herself, however today she seems better in terms of mental status and oxygenation. She has been giving her senakot and miralax.   12/21/2023: Ms. MCDOWELL presents today for a follow up audio-only telehealth visit for which they gave permission for. The patient was located at home [address] [place] [address] and I was located in my office in [place], [place]. The patient obtained lab work 12/4/23 prior to today's visit. Urinalysis is improved and showed trace proteinuria, small Leukocyte Esterase, 6/HPF of WBC and 7/LPF of Cast. Urine culture shows no significant growth, <10,000 CFU/mL Normal Urogenital Ángel, which is very good. Her daughter expresses concern as recently patient is experiencing a surplus of looser and larger amounts of bowel movements that is escaping to the vaginal area, thus causing infections. Urine is normal in color, denies fever. Patient is occasionally more fatigued than usual. Denies distended appearance of the abdomen. Admits to only 1 spasm around the catheter this month. Next sales change will be in 2 weeks.  Towards the end of visit, patient was shown on Facetime, appearing well oriented x 3, normal pigmentation, lips are moist, and smile is symmetrical. I am very impressed by how well the patient looks and speaks. It was more than just pleasantries, she inquired about personal questions. Overall, I am very pleased.   12/27/2023: Ms. CLAUDIO MCDOWELL presents today for a follow up audio only telephone visit for which she gave permission for. The pt was located at home, [address] [place] [address], and I was located in my office in [place], [place]. She is accompanied by her daughter. Patient provided a surveillance urine specimen on 12/22/2023. She has chronic sales catheter drainage. UA revealed small LEC, positive for nitrites, 6 WBC/HPF, no RBC seen, moderate bacteria/HPF, granular casts present, and yeast like cells present. Urine culture grew >100,000 CFU/ml Enterococcus faecalis and 50,000 - 99,000 CFU/mL Streptococcus mitis/oralis group. Urine cytology was negative for high grade urothelial carcinoma. Few mature squamous cells, rare benign urothelial cells and abundant fungal organisms morphologically consistent with Candida species present. Patient's daughter reports that her urine looks clear and a pale yellow color. She feels her mom still has a lot of anxiety and some confusion. She does report that her catheter bag sits in feces due to the patient's position. They tried to tilt her pelvis to avoid this but states it's easier said than done. She states there is no feces in the catheter or in the bag. BM are not loose, she describes them as pasty. Has about 2-3 BM a day.   01/05/2024: Ms. MCDOWELL is a 89 year old female presenting today for an audio only telehealth visit for which she gave verbal permission. The patient was located in her home at [address] [place] [address]. I was located in my office on [place], [place]. She was accompanied by her daughter who helped with the visit. She reports that a nurse came for a catheter change, and only succeeded on the third attempt. She says it's the same nurse who has been coming for 5 months now, and usually she encounters no difficulties. Now that the catheter has been changed, the patient is able to void.   01/17/2024: Ms. CLAUDIO MCDOWELL presents today for a follow up audio only telephone visit for which she gave permission for. The pt was located at home, [address] [place] [address], and I was located in my office in [place], [place]. She is accompanied by her daughter, Tabatha Deluna. Patient provided a urine specimen on 1/5/2024 for surveillance. UA revealed trace blood by urine and moderate LEC. This is excellent for her as she has chronic sales catheter drainage. Urine culture grew 50,000-99,000 CFU/ml Pseudomonas aeruginosa. Urine cytology was negative for high grade urothelial carcinoma. Few mature squamous cells, rare benign urothelial cells and abundant fungal organisms morphologically consistent with Candida species present. The patient's daughter reports that her mother is "different". She reports that she is hearing things a lot now. She states that usually the things are negative. For example, she will say "Why is Ac saying my oxygen isn't working properly?". It is clear what she is saying but fairly random. She is not seeing anything; it is strictly auditory.   01/22/2024: Ms. CLAUDIO MCDOWELL presents today for a follow up audio only telephone visit for which she gave permission for. The pt was located at home, [address] [place] [address], and I was located in my office in [place], [place]. Visit was conducted with the patient's daughter, Tabatha Deluna. She informs me that she did not realize she was out of hyoscyamine sulfate for bladder spasms. She has been getting bladder spasms after the aide leaves and is embarrassed by this as she thinks shes wetting the bed. Her daughter reports that her mother asks in the morning "why am I bothering taking my medications, I am going to die anyway".   02/05/2024: Ms. CLAUDIO MCDOWELL presents today for an audio visual telehealth visit for which she gave permission for. The patient was located at home at [address] [place] [address] and I was located at my office in [place], [place]. Pt's daughter states she was not expecting my call today. I informed her that she was on my schedule and that if she would like we can talk. She informs me that hyoscyamine does not seem to be working for her mother and that some days she has bladder spasms and some she doesn't. Pt is currently not having diarrhea. She is due for a catheter change tomorrow. Patient has not started any medications for the psychological issues she has been having.   02/09/2024: Ms. MCDOWELL is a 89 year old female presenting today for an audio only telehealth visit for which she gave verbal permission. The patient was located in her home at [address] [place] [address]. I was located in my office on [place], [place]. She was accompanied by her daughter who helps with the visit.   The patient has been with a chronic sales catheter since July 2021 when she fractured her foot. She reports today for a follow up.   I reviewed and discussed the patient's pertinent lab works. Urine Culture shows 10,000 - 49,000 CFU/mL Pseudomonas aeruginosa 10,000 - 49,000 CFU/mL Citrobacter freundii complex  I explain to the patient that patients with chronic sales catheter develop bacteria in the urine, that may come from the skin, and it is not significant clinically unless infection symptoms ensue. In addition, the patient reports she was not taking antibiotics when the sample was collected, and the colonies were less than 100 000.   03/13/2024: Ms. CLAUDIO MCDOWELL presents today for a follow up audio only telephone visit for which she gave permission for. The pt was located at home, [address] [place] [address], and I was located in my office in [place], [place]. Visit was conducted with her daughter Tabatha Deluna. Patient provided a urine specimen on 3/4/2024. Patient has a chronic indwelling sales. UA revealed trace blood, large LE, and 9 WBC/HPF. This is quite good considering her sales. Culture grew 50,000 - 99,000 CFU/mL Escherichia coli & >100,000 CFU/ml Citrobacter freundii complex. She does report that after they gave the specimen her mother had a week of stool getting in the vulvar area and she is worried this might travel to the bladder. There is no evidence of that currently. She states the urine is clear and yellow. There has been no drainage around the catheter. She has not had any increased bladder spasms. Patient's daughter reports that her mother is not feeling well today. She has a runny nose and a cough. Home care is arranging for a nasal swab.   04/08/2024: Ms. CLAUDIO MCDOWELL presents today for a follow up audio only telephone visit for which she gave permission for. The pt was located at home, [address] [place] [address], and I was located in my office in [place], [place]. Visit was conducted with her daughter Tabatha Deluna. Patient provided a urine specimen on 4/3/2024. UA was completely normal other than moderate LE and 6 WBC/HPF. For having a sales catheter, this is an excellent UA. Urine culture grew >100,000 CFU/ml Citrobacter freundii complex. Urine cytology was negative for high grade urothelial carcinoma. Specimen consists of many lymphoid cells, mature squamous epithelial cells and benign urothelial cells. Patient's daughter reports that  got covid, however she is recovering. She is very tired and still has a cough. She does report that the urine is clear and yellow, however the pt has had many accidents where stool gets near/in the catheter. Her skin remains red but not broken down. Continues use of triple paste.   05/08/2024 Ms. CLAUDIO MCDOWELL presents today for a follow up audio-only telehealth visit for which they permitted for. The patient was located at home [address] [place] [address] and I was located in my office in [place], [place]. Patient has chronic indwelling Sales catheter. This urinalysis indicates urine is in very good condition without any evidence for clinically active urinary tract infection at this time. Culture showed >=3 organisms. Probable collection contamination. If I am notified of changes indicating possible clinically significant urinary tract infection, we would re-culture at that time. We will continue to collect surveillance urine specimens for urinalyses and urine cultures at time of catheter changes.  Daughter states her mental status is different.  Reports more confusion, gaps of memory. Denies hallucinations and is much less anxious. Stool is still loose and pt is more fatigued while on Risperidone.   06/7/2024: Ms. CLAUDIO MCDOWELL presents today for a follow up audio only telephone visit for which she gave permission for. The pt was located at home, [address] [place] [address], and I was located in my office in [place], [place]. Visit conducted with patient's daughter, Tabatha. 6/3/24 UA revealed small blood by dipstick, moderate Leukocyte Esterase, trace proteinuria and 10/HPF of WBC. Culture showed 50,000 - 99,000 CFU/mL Pseudomonas aeruginosa. Daughter reports have pt historian today. Patient is doing okay from urinary standpoint, urine is clear. Admits to breathing difficulties, as Internist prescribed Z-pack (Zithromax) but the daughter is hesitant of proceeding further with the prescription as 1 day before starting her cough seemed improved.  Before cough onset, there was increase in confusion// decline of mental state.   06/26/2024: Ms. CLAUDIO MCDOWELL presents today for a follow up audio only telephone visit. Visit was conducted with the patient's daughter, Tabatha. The pt was located at home, [address] [place] [address], and I was located in my office in [place], [place]. Her daughter informs me today that she did not need an appt but did need instructions on irrigating the catheter for the nurse. However, the catheter happened to snap today and needed to be replaced anyway. I apologized for the misunderstanding. She stated that it would be better for me to call the nurse directly to give her the instruction. She had just left the house after changing the patient's catheter. She informs me she took a urine specimen. The nurses name is Marimar and can be reached at [phone].   07/01/2024: Ms. CLAUDIO MCDOWELL presents today for a follow up audio only telephone visit. The pt was located at home, [address] [place] [address], and I was located in my office in [place], [place]. Visit conducted with patient's daughter, Tabatha. Patient's visiting nurse, Marimar, collected a urine specimen from her on 6/26/2024 when replacing the catheter after it broke. UA revealed cloudy urine, large LE, and 150 WBC/HPF, otherwise normal. Urine culture grew >100,000 CFU/ml Citrobacter freundii and 50,000 - 99,000 CFU/mL Pseudomonas aeruginosa. The patient obtained lab work /24 prior to today's visit. Culture showed >100,000 CFU/ml Citrobacter freundii. 50,000 - 99,000 CFU/mL Pseudomonas aeruginosa. BMP revealed low sodium of 134, potassium 5.4, glucose elevated at 122, creatinine wnl at 0.56. UA showed cloudy appearance, large Leukocyte Esterase, 150/HOF WBC. Patient reports today is not her best day. She sounds suppressed. Daughter reports onset cough and unsure if infection could be both respiratory and bladder. Patient obtained nebulizer prior to visit. Daughter reports urine is currently clear.   07/05/2024: Ms. CLAUDIO MCDOWELL presents today for a follow up audio only telephone visit. The pt was located at home, [address] [place] [address], and I was located in my office in [place], [place]. Visit was conducted with her daughter, Tabatha. She informs me that she just took the first packet of Fosfomycin yesterday. There have been no problems thus far. She does report that the catheter was mispositioned last night and was not draining for a few hours. Her and the aide were able to push it in more and urine drained. The visiting nurse came this morning. She irrigated the catheter and ensured it was in good position.   07/08/2024: Ms. CLAUDIO MCDOWELL presents today for a follow up audio-visual telehealth visit. The pt was located at home, [address] [place] [address], and I was located in my office in [place], [place]. The visit was conducted mostly with her daughter Tabatha. The patient has been taking Fosfomycin 3 GM oral packet once every 3 days. Her daughter reports she looks a bit improved. Her urine looks good but has since the beginning. She feels Fosfomycin makes her a little sick. Had some abdominal pain over the weekend but has not complained today. She was having anxiety but her oxygen is good. Her temperature is 97.1. She is not leaking around the catheter at all. She is a bit stressed today and someone close to them passed away and her daughter had to inform her of this today. At the end of the visit I spoke to  and she informs me she is not feeling so bad.     07/12/2024: Ms. MCDOWELL is a 89 year old female presenting today for an audio only telehealth visit for which she gave verbal permission. The patient was located in her home at [address] [place] [address]. I was located in my office on [place], [place]. She was accompanied by her daughter who helped with the visit.  Patient is bedbound and with a sales catheter.  On 7/11/24 patient was found with 150 wbc/hpf. cloudy urine. No bacteria.  given that patient is with indwelling catheter, I would usually not treat her with abx right away, but I saw her and she looked pale, sluggish, and not cheerful as usual.  She was started on Fosfomycin once every 3 days. After course of Fosfomycin, patient was found with only 22 wbc/hpf, and few bacteria.  Her daughter worries today about low urine output since patient started Fosfomycin. She admits patient has been experiencing nighttime diarrhea which is expected with the medication. I explain that is consistent with lower production of urine as patient loses water during nighttime diarrhea,  patient's vitals taken by her daughter have been reportedly normal BP:120 /60. Pulse in the 70s  08/06/2024: Ms. CLAUDIO MCDOWELL presents today for a follow up audio only telephone visit for which she gave permission for. The pt was located at home, [address] [place] [address], and I was located in my office in [place], [place]. Visit was conducted with her daughter, Tabatha Deluna. At the time I called at 2:00 PM the nurse was there to change her catheter. Her daughter reports that it looks as if there is a ball in her mother's stomach. This past Friday, she showed a tremendous amount of stool in her colon on x-ray. She denies seeing any blood in the stool that has been passing. She denies any gross hematuria. Urine has looked good. Her daughter notes that last Wednesday, it appeared her mother had a TIA. She opted to not have her mom taken to the hospital, so they are not sure if that is exactly what happened.  She appears less confused than last week according to her daughter.   08/09/2024: Ms. MCDOWELL is a 89 year old female presenting today via telehealth using real time 2 way audio-visual technology. We utilized [employer] telemetry [employer] platform. The patient, Ms. MCDOWELL, was located in her home at [address] [place] [address] at the time of the visit. The provider, BENIGNO RAYMUNDO, was located in his office on [place], [place]. Verbal Consent was given by the patient on 08/09/2024 and my scribe served as witness. The patient was accompanied by her daughter  who participated in the telehealth encounter. Patient is bedbound and with an indwelling sales catheter. She was recently treated for UTI. On 6/26/24 patient was found with 150 wbc/hpf. cloudy urine. No bacteria.  After course of abx, patient reports today for review of new labs findings. I reviewed and discussed the patient's pertinent lab works. On 8/6/24 Urine Analysis showed 10 wbc/hpf (down from 150). Urine is now clear. Specific Gravity was 1.010 indicating adequate hydration. No bacteria seen. Urine Cytology on 8/6/24 was negative for malignant cell. Urine Culture on 8/6/24  showed presence of 3 organisms, none being predominant, possibly colonizers, or probable collection contamination. No infection.

## 2024-09-13 NOTE — ASSESSMENT
[FreeTextEntry1] : 11/03/2023: Ms. MCDOWELL is a 89 year old female presenting today for an audio only telehealth visit for which she gave verbal permission. The patient was located in her home at [address] [place] [address]. I was located in my office on [place], [place]. She was accompanied by her daughter. She says her mother was supposed to be released today. Initially she was able to void. Because of constipation she was given a couple of enemas. She had a large evacuation and since then she has not been able to void again. The daughter says her latest PVR was 340 cc. She is waiting for her mother to be rescanned again.    Plan: If she is unable to void, she will be discharged with a sales catheter.   11/07/2023: Ms. CLAUDIO MCDOWELL presents today for a follow up audio only telephone visit for which she gave permission for. The pt was located at home, [address] [place] [address], and I was located in my office in [place], [place]. The visit was conducted with the patient's daughter. The patient came home from the hospital on Sunday. The catheter was put back in without another attempt at a void trial. The patient does have issues with constipation. She is able to swallow liquids. Her daughter has had to crush some of her pills to put them in a liquid, however the pt finds it to taste bitter and has some discomfort swallowing the chunks of crushed pills. The hospital discharged her with a 7 day supply of Cefuroxime. Was started yesterday so she has had two doses.    We discussed the possibility of a doing a trial of void. At this time I am recommending her mother settle back in to being home and we can readdress this possibility and how the patient and her daughter would like to proceed at the time of her next visit. Next catheter change will be on 12/5 should they choose to proceed with the catheter.   I looked it up on [employer] and Cefuroxime is available in the [place] as a liquid suspension. Given that this would be easier for the patient, I went to prescribe it for her which she could take rather than the pills. [employer] did not allow me to electronically prescribe it in a liquid suspension so I called the patient's pharmacy to give a verbal prescription for 200 ml of it for 20 ml BID for 5 days. I left a VM for the pharmacy as there was no answer. I requested they call me back if they could accept this prescription or if they could not. The pharmacy called me back shortly after and they told me that it is not currently offered in a liquid suspension, however he called the [facility] next door and they are willing to compound it for 40$. I saw this as acceptable and thanked him for going the extra mile and calling next door. He will contact the patient's daughter to inform her.   Patient and her daughter will have a telehealth visit in 2 weeks for reassessment, sooner if clinically indicated.   11/20/2023: Ms. MCDOWELL presents today for a follow up audio only telehealth visit for which they gave permission for. The patient was located at home [address] [place] [address] and I was located in my office in [facility]. Daughter states patient is okay. She notes giving pt suppositories and 7 hours later she had mixed bowel movements. However, she did not have a bowel movement since Saturday. Patient is given pedialyte and water frequently. Today's temperature was 95 as before it was 96-97. Pt did not drink any liquids shortly after taking temperature. Daughter denies giving pt solace but she usually gives Miralax as she has done so today. Senady's urine color is clear as it  has been clear for some time.  Daughter states her stomach was distended. Bp has stabilized since being in the hospital but last night and today it  was higher than normal being being 168/88 before medication.   Advised daughter to give patient Miralax tonight if there is no bowel movements.  Advised her to take Blood pressure tonight.  Pt will schedule a telehealth visit  11/29/2023: Ms. CLAUDIO MCDOWELL presents today for a follow up audio only telephone visit for which she gave permission for. The pt was located at home, [address] [place] [address], and I was located in my office in [place], [place]. Visit was conducted with her daughter, Andree. She was informed yesterday by her house call physicians of the results of her urine culture (emailed from core lab showing >100,000 CFU/ml Pseudomonas aeruginosa susceptible only to amikacin, Ciprofloxacin, imipenem, levofloxacin, meropenum. Resistant to Ceftazidime, Pip/tazo. Intermediate for aztrenam, cefepime. They discussed that patient is having increasing oxygen requirements, although SPO2 ok on supplemental O2 (prior to this patient needed only occasional O2 suppl. every few days). 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 The want to treat with ciprofloxacin, 7 day course. Prescription sent to pharmacy. Her daughter inquired today on if she should be treated. She states last week her mother seemed very not herself, however today she seems better in terms of mental status and oxygenation. She has been giving her senakot and miralax.   I explained that given her O2 issues and confusion over the last few days, I advised to treat with Cipro despite her daughter feeling she is better today. If there are any problems with crushing the pills for her or if she is not reacting to it well, she was advised to call right away. I posed the option of giving it as an oral solution if need be.    12/21/2023: Ms. MCDOWELL presents today for a follow up audio-visual telehealth visit for which they gave permission for. The patient was located at home [address] [place] [address] and I was located in my office in [place], [place]. The patient obtained lab work 12/4/23 prior to today's visit. Urinalysis is improved and showed trace proteinuria, small Leukocyte Esterase, 6/HPF of WBC and 7/LPF of Cast. Urine culture shows no significant growth, <10,000 CFU/mL Normal Urogenital Khadijah, which is very good. Her daughter expresses concern as recently patient is experiencing a surplus of looser and larger amounts of bowel movements that is escaping to the vaginal area, thus causing infections. Urine is normal in color, denies fever. Patient is occasionally more fatigued than usual. Denies distended appearance of the abdomen. Admits to only 1 spasm around the catheter this month. Next sales change will be in 2 weeks.  Towards the end of visit, patient was shown on Facetime, appearing well oriented x 3, normal pigmentation, lips are moist, and smile is symmetrical. I am very impressed by how well the patient looks and speaks. It was more than just pleasantries, she inquired about personal questions. Overall, I am very pleased.   Reviewed and discussed laboratory work of 12/4/23 which She obtained prior to today's visit as requested. Advised daughter to have care team to place support under the pelvis, so pelvis is tilted up at a higher level of the anus.   As long as stool is pasty and firm, we advised Andree to give patient MiraLAX. If stool is loose and unfirm, she should decrease the amount of MiraLAX.  Pt will provide a urine sample which will be sent for urinalysis, urine culture, and urine cytology. Pt will schedule a telehealth visit in 2 weeks for reassessment.   12/27/2023: Ms. CLAUDIO MCDOWELL presents today for a follow up audio only telephone visit for which she gave permission for. The pt was located at home, [address] [place] [address], and I was located in my office in [place], [place]. She is accompanied by her daughter. Patient provided a surveillance urine specimen on 12/22/2023. She has chronic sales catheter drainage. UA revealed small LEC, positive for nitrites, 6 WBC/HPF, no RBC seen, moderate bacteria/HPF, granular casts present, and yeast like cells present. Urine culture grew >100,000 CFU/ml Enterococcus faecalis and 50,000 - 99,000 CFU/mL Streptococcus mitis/oralis group. Urine cytology was negative for high grade urothelial carcinoma. Few mature squamous cells, rare benign urothelial cells and abundant fungal organisms morphologically consistent with Candida species present. Patient's daughter reports that her urine looks clear and a pale yellow color. She feels her mom still has a lot of anxiety and some confusion. She does report that her catheter bag sits in feces due to the patient's position. They tried to tilt her pelvis to avoid this but states it's easier said than done. She states there is no feces in the catheter or in the bag. BM are not loose, she describes them as pasty. Has about 2-3 BM a day.    Reviewed and discussed lab work of 12/22/2023 in detail with the pt.  Future urine orders were put in including fungal urine culture. Pt's daughter was advised to continue to try and alter the patient's position to avoid the catheter sitting in feces.  Patient should have a telehealth visit in 3 months, sooner if clinically indicated.    01/05/2024: Ms. MCDOWELL is a 89 year old female presenting today for an audio only telehealth visit for which she gave verbal permission. The patient was located in her home at [address] [place] [address]. I was located in my office on [place], [place]. She was accompanied by her daughter who helped with the visit. She reports that a nurse came for a catheter change, and only succeeded on the third attempt. She says it's the same nurse who has been coming for 5 months now, and usually she encounters no difficulties. Now that the catheter has been changed, the patient is able to void.   Plan: Pt's daughter will keep us updated. If the patient experiences fever or chill, she will call ER and inform the office.   01/17/2024: Ms. CLAUDIO MCDOWELL presents today for a follow up audio only telephone visit for which she gave permission for. The pt was located at home, [address] [place] [address], and I was located in my office in [place], [place]. She is accompanied by her daughter, Tabatha Deluna. Patient provided a urine specimen on 1/5/2024 for surveillance. UA revealed trace blood by urine and moderate LEC. This is excellent for her as she has chronic sales catheter drainage. Urine culture grew 50,000-99,000 CFU/ml Pseudomonas aeruginosa. Urine cytology was negative for high grade urothelial carcinoma. Few mature squamous cells, rare benign urothelial cells and abundant fungal organisms morphologically consistent with Candida species present. The patient's daughter reports that her mother is "different". She reports that she is hearing things a lot now. She states that usually the things are negative. For example, she will say "Why is Ac saying my oxygen isn't working properly?". It is clear what she is saying but fairly random. She is not seeing anything; it is strictly auditory.   Reviewed and discussed lab work of 1/5/2024 in detail with the pt's daughter. She was informed that given she has chronic sales catheter drainage, this specimen was excellent. If she had more WBC/HPF, I would be concerned, however she is in very good shape from a urologic standpoint. Her daughter was advised to speak with her mother's PCP about hearing things and her AMS as it is not urologic in origin or related to pending sepsis. I provided her the name of a neurologist, Dr.Li-Fen Tripathi if she would like to go for neuro consultation.  I once again re-iterated that at this time, given her skin breakdown, I recommend continued sales catheter drainage. Her daughter brought up an external urine collection apparatus once again, however I do not recommend we try that at least until her skin is in good condition. I also explained that if her mother were to go into urinary retention, this sort of device would not drain the urine from her.  Pt's daughter was advised to give her hyoscyamine for bladder spasms.  Patient will have a telehealth visit in 1-2 months, sooner if clinically indicated.    01/22/2024: Ms. CLAUDIO MCDOWELL presents today for a follow up audio only telephone visit for which she gave permission for. The pt was located at home, [address] [place] [address], and I was located in my office in [place], [place]. Visit was conducted with the patient's daughter, Tabatha Deluna. She informs me that she did not realize she was out of hyoscyamine sulfate for bladder spasms. She has been getting bladder spasms after the aide leaves and is embarrassed by this as she thinks shes wetting the bed. Her daughter reports that her mother asks in the morning "why am I bothering taking my medications, I am going to die anyway".   Renewed hyoscyamine sulfate 0.125 mg sublingual tablets, give one tablet under the tongue as needed. I recommend she gives it about 15 minutes before the aide comes. I suggest she give it for 10 days in a row to see if we can calm things down and then stop. If it returns, we can try again for another month or two. I did speak with the patient at the end of the visit. She seemed to know who I was and understood me. I spoke to her about the bladder spasms she is experiencing. She was on board with trying hyoscyamine and thanked me for speaking with her.  I recommend the patient's daughter speak with her PCP about potential anti-depressant medication.   Patient will have a telephone visit in 2 weeks for reassessment, sooner if clinically indicated.    02/05/2024: Ms. CLAUDIO MCDOWELL presents today for an audio visual telehealth visit for which she gave permission for. The patient was located at home at [address] [place] [address] and I was located at my office in [place], [place]. Pt's daughter states she was not expecting my call today. I informed her that she was on my schedule and that if she would like we can talk. She informs me that hyoscyamine does not seem to be working for her mother and that some days she has bladder spasms and some she doesn't. Pt is currently not having diarrhea. She is due for a catheter change tomorrow. Patient has not started any medications for the psychological issues she has been having.    Pt will discontinue use of hyoscyamine. I prescribed the patient Trospium 20 mg, one tablet once daily at bedtime. I informed the patient there may be constipation as a side effect.   Orders for UA, urine culture, and urine cytology were put in for her daughter to bring the patient's sample to her nearest  lab.  Patient will have a telehealth visit this Friday after the catheter change.     02/09/2024: Ms. MCDOWELL is a 89 year old female presenting today for an audio only telehealth visit for which she gave verbal permission. The patient was located in her home at [address] [place] [address]. I was located in my office on [place], [place]. She was accompanied by her daughter who helps with the visit.   The patient has been with a chronic sales catheter since July 2021 when she fractured her foot. She reports today for a follow up.  I reviewed and discussed the patient's pertinent lab works. Urine Culture shows 10,000 - 49,000 CFU/mL Pseudomonas aeruginosa 10,000 - 49,000 CFU/mL Citrobacter freundii complex I explain to the patient that patients with chronic sales catheter develop bacteria in the urine, that may come from the skin, and it is not significant clinically unless infection symptoms ensue. In addition, the patient reports she was not taking antibiotics when the sample was collected, and the colonies were less than 100 000.   Pt will start Trospium 20 mg.  She will provide urine for UA, Urine Cytology and Urine Culture. She will have a follow up in one month; earlier if needed.    03/13/2024: Ms. CLAUDIO MCDOWELL presents today for a follow up audio only telephone visit for which she gave permission for. The pt was located at home, [address] [place] [address], and I was located in my office in [place], [place]. Visit was conducted with her daughter Tabatha Deluna. Patient provided a urine specimen on 3/4/2024. Patient has a chronic indwelling sales. UA revealed trace blood, large LE, and 9 WBC/HPF. This is quite good considering her sales. Culture grew 50,000 - 99,000 CFU/mL Escherichia coli & >100,000 CFU/ml Citrobacter freundii complex. She does report that after they gave the specimen her mother had a week of stool getting in the vulvar area and she is worried this might travel to the bladder. There is no evidence of that currently. She states the urine is clear and yellow. There has been no drainage around the catheter. She has not had any increased bladder spasms. Patient's daughter reports that her mother is not feeling well today. She has a runny nose and a cough. Home care is arranging for a nasal swab.   Reviewed and discussed lab work of 3/4/2024 in detail with the pt's daughter.  If the patient's daughter notices a change in her urine, she will call promptly.    04/08/2024: Ms. CLAUDIO MCDOWELL presents today for a follow up audio only telephone visit for which she gave permission for. The pt was located at home, [address] [place] [address], and I was located in my office in [place], [place]. Visit was conducted with her daughter Tabatha Deluna. Patient provided a urine specimen on 4/3/2024. UA was completely normal other than moderate LE and 6 WBC/HPF. For having a sales catheter, this is an excellent UA. Urine culture grew >100,000 CFU/ml Citrobacter freundii complex. Urine cytology was negative for high grade urothelial carcinoma. Specimen consists of many lymphoid cells, mature squamous epithelial cells and benign urothelial cells. Patient's daughter reports that  got covid, however she is recovering. She is very tired and still has a cough. She does report that the urine is clear and yellow, however the pt has had many accidents where stool gets near/in the catheter. Her skin remains red but not broken down. Continues use of triple paste.    Reviewed and discussed lab work of 4/3/2024 in detail with the pt. Pt's daughter reassured that this is a very good specimen considering she has a sales catheter.  Will provide another urine specimen at time of next catheter change.  Will have a telehealth visit after next urine specimen, sooner if clinically indicated.     05/08/2024 Ms. CLAUDIO MCDOWELL presents today for a follow up audio-only telehealth visit for which they permitted for. The patient was located at home [address] [place] [address] and I was located in my office in [place], [place]. Patient has chronic indwelling Sales catheter. This urinalysis indicates urine is in very good condition without any evidence for clinically active urinary tract infection at this time. Culture showed >=3 organisms. Probable collection contamination. If I am notified of changes indicating possible clinically significant urinary tract infection, we would re-culture at that time. We will continue to collect surveillance urine specimens for urinalyses and urine cultures at time of catheter changes.  Daughter states her mental status is different.  Reports more confusion, gaps of memory. Denies hallucinations and is much less anxious. Stool is still loose and pt is more fatigued while on Risperidone.   Reviewed and discussed laboratory work of 5/2/24 which She obtained prior to today's visit as requested. At the current time I will not re-culture. If I am notified of changes indicating possible clinically significant urinary tract infection, we would re-culture at that time. We will continue to collect surveillance urine specimens for urinalyses and urine cultures at time of catheter changes. Pt will schedule a telehealth visit.  06/7/2024: Ms. CLAUDIO MCDOWELL presents today for a follow up audio only telephone visit for which she gave permission for. The pt was located at home, [address] [place] [address], and I was located in my office in [place], [place]. Visit conducted with patient's daughter, Tabatha. 6/3/24 UA revealed small blood by dipstick, moderate Leukocyte Esterase, trace proteinuria and 10/HPF of WBC. Culture showed 50,000 - 99,000 CFU/mL Pseudomonas aeruginosa. Daughter reports have pt historian today. Patient is doing okay from urinary standpoint, urine is clear. Admits to breathing difficulties, as Internist prescribed Z-pack (Zithromax) but the daughter is hesitant of proceeding further with the prescription as 1 day before starting her cough seemed improved.  Before cough onset, there was increase in confusion// decline of mental state.    Reviewed and discussed laboratory work of 6/3/24 which She obtained prior to today's visit as requested. I do not believe we should treat at this time, instead reassess with next catheter change.  Patient to continue with respiratory treatment course and should not discontinue as this may create more clinical related issues. Pt will schedule a telehealth visit in 1 month.   06/26/2024: Ms. CLAUDIO MCDOWELL presents today for a follow up audio only telephone visit. Visit was conducted with the patient's daughter, Tabatha. The pt was located at home, [address] [place] [address], and I was located in my office in [place], [place]. Her daughter informs me today that she did not need an appt but did need instructions on irrigating the catheter for the nurse. However, the catheter happened to snap today and needed to be replaced anyway. I apologized for the misunderstanding. She stated that it would be better for me to call the nurse directly to give her the instruction. She had just left the house after changing the patient's catheter. She informs me she took a urine specimen. The nurses name is Marimar and can be reached at [phone].   After I hung up with the patient's daughter, I called Marimar. She informs me she just left after changing the catheter. I informed her it would be good if we could have the catheter irrigated to avoid sediment. She uses a 16 french catheter. She was instructed to disconnect the collecting tube and irrigate with about 60 cc of water. Let it drain out passively. If it does not drain well, she can try again. She can aspirate gently if she needs. If sediment comes out, she should repeat until it is clear. I explained that sometimes if it is a lot, it may take up to a liter to irrigate. I provided her with my phone number in the case she needs help.    07/01/2024: Ms. CLAUDIO MCDOWELL presents today for a follow up audio only telephone visit. The pt was located at home, [address] [place] [address], and I was located in my office in [place], [place]. Visit conducted with patient's daughter, Tabatha. Patient's visiting nurse, Marimar, collected a urine specimen from her on 6/26/2024 when replacing the catheter after it broke. UA revealed cloudy urine, large LE, and 150 WBC/HPF, otherwise normal. Urine culture grew >100,000 CFU/ml Citrobacter freundii and 50,000 - 99,000 CFU/mL Pseudomonas aeruginosa. The patient obtained lab work /24 prior to today's visit. Culture showed >100,000 CFU/ml Citrobacter freundii. 50,000 - 99,000 CFU/mL Pseudomonas aeruginosa. BMP revealed low sodium of 134, potassium 5.4, glucose elevated at 122, creatinine wnl at 0.56. UA showed cloudy appearance, large Leukocyte Esterase, 150/HOF WBC. Patient reports today is not her best day. She sounds suppressed. Daughter reports onset cough and unsure if infection could be both respiratory and bladder. Patient obtained nebulizer prior to visit. Daughter reports urine is currently clear.   Reviewed and discussed lab work of 6/26/2024 in detail with the pt's daughter.   I prescribed Fosfomycin 3 GM oral packet, once every 3 days. If onset bowel irritability occurs patient informed to call.  Patient denies ever having a seizure. Pt will schedule a telehealth visit in 1 week.    07/05/2024: Ms. CLAUDIO MCDOWELL presents today for a follow up audio only telephone visit. The pt was located at home, [address] [place] [address], and I was located in my office in [place], [place]. Visit was conducted with her daughter, Tabatha. She informs me that she just took the first packet of Fosfomycin yesterday. There have been no problems thus far. She does report that the catheter was mispositioned last night and was not draining for a few hours. Her and the aide were able to push it in more and urine drained. The visiting nurse came this morning. She irrigated the catheter and ensured it was in good position.    Continue Fosfomycin and call if there are any problems.  They are scheduled for a telehealth visit on 7/8/2024 for reassessment.    07/08/2024: Ms. CLAUDIO MCDOWELL presents today for a follow up audio-visual telehealth visit. The pt was located at home, [address] [place] [address], and I was located in my office in [place], [place]. The visit was conducted mostly with her daughter Tabatha. The patient has been taking Fosfomycin 3 GM oral packet once every 3 days. Her daughter reports she looks a bit improved. Her urine looks good but has since the beginning. She feels Fosfomycin makes her a little sick. Had some abdominal pain over the weekend but has not complained today. She was having anxiety but her oxygen is good. Her temperature is 97.1. She is not leaking around the catheter at all. She is a bit stressed today and someone close to them passed away and her daughter had to inform her of this today. At the end of the visit I spoke to  and she informs me she is not feeling so bad.  Continue Fosfomycin (last dose this Wednesday).  Patient's visiting nurse will collect a urine specimen this Thursday. It will be sent out for UA, culture, fungus culture, and cytology.  Though the patient's daughter Tabatha is very concerned about her mother and a bit depressed and anxious over it, her mother is doing quite well. She did not seem toxic to me. She made sensible conversation. She is quite hard of hearing but her daughter would repeat things so she could hear me and would respond well. From a urinary tract standpoint, she is doing well.  Patient will have a telehealth visit after next urine specimen to review and discuss results.    07/12/2024: Ms. MCDOWELL is a 89 year old female presenting today for an audio only telehealth visit for which she gave verbal permission. The patient was located in her home at [address] [place] [address]. I was located in my office on [place], [place]. She was accompanied by her daughter who helped with the visit.  Patient is bedbound and with a sales catheter.  On 6/26/24 patient was found with 150 wbc/hpf. Cloudy urine. No bacteria.  given that patient is with indwelling catheter, I would usually not treat her with abx right away, but I saw her and she looked pale, sluggish, and not cheerful as usual.  She was started on Fosfomycin once every 3 days. After course of Fosfomycin, patient was found with only 22 wbc/hpf, and few bacteria.  Her daughter worries today about low urine output since patient started Fosfomycin. She admits patient has been experiencing nighttime diarrhea which is expected with the medication. I explain that is consistent with lower production of urine as patient loses water during nighttime diarrhea,  patient's vitals taken by her daughter have been reportedly normal BP:120 /60. Pulse in the 70s Urine output about 1.5L a day. Pt will follow up next week for video telehealth.   08/06/2024:  Visit was conducted with her daughter, Tabatha Deluna. At the time I called at 2:00 PM the nurse was there to change her catheter. Her daughter reports that it looks as if there is a ball in her mother's stomach. This past Friday, she showed a tremendous amount of stool in her colon on x-ray. She denies seeing any blood in the stool that has been passing. She denies any gross hematuria. Urine has looked good. Her daughter notes that last Wednesday, it appeared her mother had a TIA. She opted to not have her mom taken to the hospital, so they are not sure if that is exactly what happened.  She appears less confused than last week according to her daughter.  Patient should proceed with catheter change today. They should record how much urine they collect when it is changed. Urine will be sent for UA, culture, and cytology.  Her daughter was advised to continue managing her mother's constipation. She states she is doing an enema tomorrow.  Patient will have a telehealth visit on Friday to review and discuss urine test results.    08/09/2024: Ms. MCDOWELL is a 89 year old female presenting today via telehealth using real time 2 way audio-visual technology. We utilized [employer] teams telemetry doc platform. The patient, Ms. MCDOWELL, was located in her home at [address] [place] [address] at the time of the visit. The provider, BENIGNO RAYMUNDO, was located in his office on [place], [place]. Verbal Consent was given by the patient on 08/09/2024 and my scribe served as witness. The patient was accompanied by her daughter  who participated in the telehealth encounter. Patient is bedbound and with an indwelling sales catheter. She was recently treated for UTI. On 6/26/24 patient was found with 150 wbc/hpf. cloudy urine. No bacteria.  After course of abx, patient reports today for review of new labs findings. I reviewed and discussed the patient's pertinent lab works. On 8/6/24 Urine Analysis showed 10 wbc/hpf (down from 150). Urine is now clear. Specific Gravity was 1.010 indicating adequate hydration. No bacteria seen. Urine Cytology on 8/6/24 was negative for malignant cell. Urine Culture on 8/6/24  showed presence of 3 organisms, none being predominant, possibly colonizers, or probable collection contamination. No infection.  Patient will continue providing Urine specimen monthly.  She will continue to have her indwelling catheter changed monthly.   09/06/2024: Ms. MCDOWELL is a 89 year old female presenting today for an audio only telehealth visit for which she gave verbal permission. The patient was located in her home at [address] [place] [address]. I was located in my office on [place], [place]. Pt accompanied by daughter Tabatha Deluna. As per daughter, patient's blood pressure has been running higher and her medication dosage has been increased. She is also been with increasing labored breathing. Urinary-wise, daughter and aide have not noticed any cloudy, malodorous urine in the tubing. Patient is not with significant complaints. No gross hematuria, no bladder spasms, and no fever.  I reviewed and discussed the patient's pertinent lab works on 9/3/24 -UA revealed 61 wbc/hpf, few bacteria, and 34 cats, She was also found with Large Leukocyte Esterase. -Preliminary Urine Culture showed 2 organisms ">100,000 CFU/ml Citrobacter freundii complex 50,000 - 99,000 CFU/mL Pseudomonas aeruginosa  Explained to patient's daughter elevated cast (in her case 34) can be indicative of renal trauma/damage. I recommend we wait for next Urinalysis. Only if casts remains elevated, with decreased renal function I would recommend further work-up (such as a renal ultrasound). At this time, there's no cause for alarms with a Creatinine of 0.67 and EGFR 81 on 8/2/24  Since patient is not symptomatic, I do not recommend a course of abx presently to limit developing antibiotics resistance among other side effects.   Pt may provide blood specimen for Renal Panel and Cystacin-C to further investigate renal function.   Pt's daughter is concerned about patient's increasing breathing problems. She inquired about possibility of obtaining more lab works than simply renal panel to investigate. I explained that she may want to consult with PCP as those tests are outside my expertise and responsibilities. Re-assured patient that her lab works that we reviewed (UA, and renal panel) do not indicate any breathing dysfunctions. Duration of call: 30mins 9/13/2024: Patient recently reviewed laboratory test results with a nurse from the home care team.  Paraspinal lower extremity findings including blood pressure and O2 saturation findings explained to the patient and reassured her.  The daughter gave her consent to have a audio only telehealth visit.  I was in my office in [place], [place].,  She was at her home in [facility].  I reviewed the utility of Cystatin C and suggested that at the next visit 1 be drawn.  I wrote orders for blood work that can be either used by the home team or copied at least so they know that I would be interested in the Cystatin C level.  Creatinine does overestimate how good the renal function is because of the patient's low muscle mass.  I did reassure the patient's daughter that even allowing for the overestimation her mom was doing very well in terms of kidney function.  It is known that she has renal calculi Cystatin C level for what I think would be more accurate estimate of renal function as of utility.  I reviewed all this in detail. 30 min telephone visit.

## 2024-09-13 NOTE — HISTORY OF PRESENT ILLNESS
[FreeTextEntry1] : Claudio Mcdowell is currently 89 years of age.  Her daughter Tabatha Deluna is devoted to her care.  She is very concerned about her mother and has been so for many years.  She becomes very anxious at times and that is concerning her mother.  Claudio Mcdowell lives with her daughter.  Claudio Mcdowell has been bedbound for several years.  The patient had developed a sacral decubitus ulcer while in a nursing facility.  A Sales catheter was placed because of fecal incontinence and concern that urine mixed with the feces would contaminate the sacral decubitus ulcer.  Once the patient left the nursing facility and will be with her daughter.  The sacral decubitus ulcer has finally healed.  I have discussed removing the Sales catheter with the daughter.  However as the patient has fecal incontinence there is concern about the urine mixing with the feces and being more likely to cause skin problems.  For now we will leave the Sales catheter in.  The patient has had clinically symptomatic urinary tract infections.  He clinically significant infections usually start with increased urination around the Sales catheter due to bladder spasms.  Bladder spasms have been occurring for staff.  The patient is positioned properly in in bed and the personal care attendant leaves for the day.  We have managed to prevent this with the administration of sublingual hyoscyamine.  We do periodic surveillance urine analysis and urine culture tests at the time the Sales catheter is changed by visiting nurse.  He also performed a times of increased spasms or purulence of the urine or change in mental status,  On October 11, 2022 visiting nurse using an order I have previously placed at the request of the daughter sent urine for urine cytology which proved to be negative for malignant cells, urine culture which grew Greater than 100,000 and E. coli that was sensitive to all antibiotics tested.  Urinalysis looked quite good for urine taken from a Sales catheter that was used for chronic Sales catheter drainage.  Ileukocyte esterase was large but nitrites were negative.  There were 2 epithelial cells per high-power field.  There were only 10 white blood cells per high-power field and only 2 red blood cells per high-power field on microscopic exam there were no bacteria seen and there were no hyaline casts.  Specific gravity was 1.010 which shows good hydration and this bedbound woman cared for diligently by her daughter.  Blood by dipstick was trace.  There was no protein glucose or ketones in the urine.  There is no bilirubin and no urobilinogen.  An important portion of the visit was my review with the daughter about bladder spasms and urination around the catheter.  Urine appearance and urine analysis have been clear.  The urine was clear again today.  The patient has significant short-term memory deficit.  She does have some useful short-term memory.  Her long-term memory remains very much intact.  She is very pleasant and to make satisfactory conversation.  She does admit to sometimes not remembering something.  Her complexion was good and there was no pallor.  Facial movements were symmetric.  Cranial nerves were intact.  I was not able to assess the olfactory nerve as this was a telehealth visit.    11/04/2022: Ms. MCDOWELL is a 89 year old female presenting today for a telehealth visit. She was accompanied by her daughter who helped with the visit. They gave permission for an audio only telehealth conference. The patient was located in her home in [place], [place]. I was located in my office on [place], [place]. Today her daughter reports the pt experienced rare urinary leaking around the catheter due to bladder spasms. She also reports her systolic pressure was around 140 last week. I offered Gemtesa to manage the bladder spasms and the daughter was cautious about more medications. I informed her if the urinary leaking are only once every 2 weeks or so, she does not have to medicate. The daughter was agreeable to monitor the occurrence of urinary leaking over the next 4 weeks and assess the discomfort it causes the pt. She denies any other urinary issues.  03/13/2023: Ms. MCDOWELL is a 89 year old female presenting today for a telehealth visit. She was accompanied by her daughter who helped with the visit. They gave permission for an audio only telehealth conference. The patient was located in her home in [place], [place]. I was located in my office on [place], [place]. The patient obtained an US prior to today's visit 3/9/23 which revealed: Comparison is made with the exam of 2/28/22. Transabdominal ultrasound of the abdomen was performed with color flow imaging. The examination is limited in evaluation. The visualized aorta and inferior vena cava show no abnormality. The pancreas is obscured by overlying bowel gas. The liver measures 12.4 cm with homogeneous echotexture. No intrinsic mass and no intra-or extrahepatic ductal dilatation. There is hepatopedal flow of the main portal vein. No gallstones, pericholecystic fluid, wall thickening or positive sonographic Melendez sign. The common bile duct measures 0.3 cm. The right kidney measures 9.2 x 5.6 x 3.0 cm with a nonobstructing 1.2 cm stone in the upper pole. Multiple additional subcentimeter calcifications are seen. These were not seen on previous exam. No hydronephrosis or renal mass. The left kidney measures 9.3 x 3.9 x 3.5 cm. There is a 2.1 x 2.4 x 2.0 cm cyst in the medial mid pole, not seen on previous study. No hydronephrosis or renal calcification. The spleen measures 8.2 cm and show no abnormality. There is a small right pleural effusion, stable. IMPRESSIONS: Multiple nonobstructing stones of the right kidney with no hydronephrosis. 2.4 cm cyst of the midpole left kidney. Small right pleural effusion, stable. The daughter has answered the phone and reports the patient's urine does not get dark. She is not aware of the amount of water she gives her mother. I requested she measure's this from now on as the pt has stones and needs to increase water consumption. Her daughter reports this week the pt has experienced more spasms than normal and believes this may be due to severe constipation. She provided the pt with an Enema to aide in this situation. She is very concerned but I informed her the Enema may have stimulated the spasms and we should give her a few days to clear up and re-evaluate.   03/22/2023: Ms. CLAUDIO MCDOWELL presents today for an audio visual telehealth visit for which she gave permission for. The patient was located at home at [address] [place] [address] and I was located at my office in [place], [place]. She is accompanied by her daughter whom most of the visit was conducted with. Her daughter has been keeping track of her mother's water consumption. She is averaging about 58 oz of water in a 24 hr period. Additionally, she is drinking juice and ensure nutrition drinks. She does not consume any caffeine. She is on furosemide 40 mg. I said hello to the patient at the end of the visit and she is looking well. She reports feeling no pain at the current moment.   3/29/2023:  Patient Claudio Mcdowell and daughter Tabatha Deluna were home in [place], [place] and I was in my office in [facility].  Patient and daughter gave permission for a FaceTime telehealth visit.  They preferred this to [employer].  The patient looks good today.  Her complexion was good there is no pallor.  Smile was symmetric her affect was normal she appeared happy she could make conversation it was appropriate.  She said that she currently had no pain.  When I asked questions that after 3/2 how she had been recently the sometimes she hesitated and deferred to her daughter for cancer compatible with her impaired short-term memory.  Long-term memory remains good she recognizes me once know so I am does ask questions about things like her bladder as she was worried about it her blood tests.  I assured her the results were satisfactory.  04/18/2023: Ms. MCDOWELL is a 89 year old female presenting today for an audio and visual telehealth visit for which she gave verbal permission. We utilized [employer] teams telemetry "[employer] platform. The patient was located in her home at [address] [place] [address]. I was located in my office on [place], [place]. She was accompanied by her daughter who helped to serve as a historian. She reports discomfort in her eyes. She describes the discomfort as a feeling of sand. She opened her eyes for me, and they do not look red. Pupils look normal. She reports the right eyes bothers her significantly more than the left. I advise that the patient tries lubricating drops and it if does not feel better she will notify her visiting nurse who is due to come next week for a sales catheter change. She reports episode of bladder spasms. She denies gross hematuria. She describes the urine as barely yellow. I reviewed and discussed her  latest pertinent lab on 04/12/2023 which shows "alkaline phosphate normal at 89. Creatinine was normal at 0.62 mg/dL. WBC normal at 5.01. RBC normal at 3.87".   05/09/2023: Ms. MCDOWELL presents today for a follow up audio only telehealth visit for which they gave permission for. She was accompanied by her daughter who helped to serve as a historian. The patient was located at home [address] [place] [address] and I was located in my office in [place], [place]. The patient obtained lab work 5/2/23 prior to today's visit. The UA revealed microscopic hematuria, 5/HPF RBC, 27/HPF WBC. The patient demonstrated great hydration as the specific gravity is 1.006. The Sales catheter was changed May 2, 2023 with no clear urine in the bag. While changing the diaper the aide reports blood in the diaper. The daughter states last week her mother experienced spasms but as of two days ago she is now fine. However, she states the patient is more fatigued. She denies the pt having a temperature. I assured her because her mother is post menopausal any abrasion to the labia can cause a fissure.   06/02/2023: Ms. MCDOWELL is a 89 year old female presenting today for an audio and visual telehealth visit for which she gave verbal permission. We utilized [employer] telemetry doc platform. The patient was located in her home at [address] [place] [address]. I was located in my office on [place], [place]. She was accompanied by her daughter Tabatha. She reports that her mother complains of onset dysuria that dissipated on its own and has not recurred since. She reports right sided back pain that is aggravated when she lays on the right side. She provided urine specimen. She notes the urine was very clear. I reviewed and discussed the patient's pertinent lab works. Her latest Urinalysis on 05/30/2023 that shows "60 WBC/HPF. Trace Blood Urine. Specific Gravity Urine 1.007". Urine Culture on the same date shows ">100,000 CFU/ml Escherichia coli and <10,000 CFU/ml Normal Urogenital ángel present". The patient takes Mirtazapine for anxiety at low dose. The daughter reports the patient is experiencing increasing anxiety and is thinking of having the prescriber increasing the dosage.  Her BP runs around 120/60. Her Hemoglobin hematocrit looks good. gaze is conjugated. facial expression looks symmetric. Urine was faint yellow and clear.   06/06/2023: Ms. MCDOWELL presents today for a follow up audio only telehealth visit for which they gave permission for. The patient was located at home [address] [place] [address] and I was located in my office in [place], [place]. She was accompanied by her daughter Tabatha. The daughter states pt is experiencing episodes of a dry cough. An X Ray  of 6/5/23 revealed Mild peribronchial thickening suggesting bronchitis in the right clinical setting with no focal pneumonia or congestive heart failure. COPD with chronic lung changes. The mental status is the same as usual but her BP has elevated some. I assured her a little elevation is not as dangerous. The pt continues to experience some urinary frequency and it is a little more concentrated.   10/12/2023: Ms. MCDOWELL presents today for a follow up audio only telehealth visit for which they gave permission for. The patient was located at home [address] [place] [address] and I was located in my office in [place], [place]. The 10/2/23 UA showed proteinuria 30 mg/dL, moderate blood, large Leukocyte Esterase, 16/HPF of WBC. UA showed improvement as patient has chronic sales drainage. Her daughter states patient is okay considering coming home from the hospital. Most pain is in her back/shoulders and some of her back area is red/raw.  Recently her BP has increased to 180/100 and was prescribed Losartan 50mg twice daily. Urine is a clear color, denies cloudy and dark appearance.  11/01/2023: Ms. MCDOWELL presents today for a follow up audio only telehealth visit for which they gave permission for. The patient was located at home [address] [place] [address] and I was located in my office in [place], [place]. The 10/13/23 CMP revealed creatinine at 0.58 and low ALT at 8 U/L. Patient was admitted into [facility] on 10/27/23 for sepsis/UTI. Her daughter states she may be released tomorrow. Daughter states during the day the patient's urine was normal, but she did have chills and a fever.   11/03/2023: Ms. MCDOWELL is a 89 year old female presenting today for an audio only telehealth visit for which she gave verbal permission. The patient was located in her home at [address] [place] [address]. I was located in my office on [place], [place]. She was accompanied by her daughter. She says her mother was supposed to be released today. Initially she was able to void. Because of constipation she was given a couple of enemas. She had a large evacuation and since then she has not been able to void again. The daughter says her latest PVR was 340 cc. She is waiting for her mother to be rescanned again.   11/07/2023: Ms. CLAUDIO MCDOWELL presents today for a follow up audio only telephone visit for which she gave permission for. The pt was located at home, [address] [place] [address], and I was located in my office in [place], [place]. The visit was conducted with the patient's daughter. The patient came home from the hospital on Sunday. The catheter was put back in without another attempt at a void trial. The patient does have issues with constipation. She is able to swallow liquids. Her daughter has had to crush some of her pills to put them in a liquid, however the pt finds it to taste bitter and has some discomfort swallowing the chunks of crushed pills. The hospital discharged her with a 7 day supply of Cefuroxime. Was started yesterday so she has had two doses.   11/20/2023: Ms. MCDOWELL presents today for a follow up audio only telehealth visit for which they gave permission for. The patient was located at home [address] [place] [address] and I was located in my office in [facility]. Daughter states patient is okay. She notes giving pt suppositories and 7 hours later she had mixed bowel movements. However, she did not have a bowel movement since Saturday. Patient is given pedialyte and water frequently. Today's temperature was 95 as before it was 96-97. Pt did not drink any liquids shortly after taking temperature. Daughter denies giving pt solace but she usually gives Miralax as she has done so today. Senady's urine color is clear as it  has been clear for some time.  Daughter states her stomach was distended. Bp has stabilized since being in the hospital but last night and today it  was higher than normal being being 168/88 before medication.   11/29/2023: Ms. CLAUDIO MCDOWELL presents today for a follow up audio only telephone visit for which she gave permission for. The pt was located at home, [address] [place] [address], and I was located in my office in [place], [place]. Visit was conducted with her daughter, Andree. She was informed yesterday by her house call physicians of the results of her urine culture (emailed from core lab showing >100,000 CFU/ml Pseudomonas aeruginosa susceptible only to amikacin, Ciprofloxacin, imipenem, levofloxacin, meropenum. Resistant to Ceftazidime, Pip/tazo. Intermediate for aztrenam, cefepime. They discussed that patient is having increasing oxygen requirements, although SPO2 ok on supplemental O2 (prior to this patient needed only occasional O2 suppl. every few days). 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 The want to treat with ciprofloxacin, 7 day course. Prescription sent to pharmacy. Her daughter inquired today on if she should be treated. She states last week her mother seemed very not herself, however today she seems better in terms of mental status and oxygenation. She has been giving her senakot and miralax.   12/21/2023: Ms. MCDOWELL presents today for a follow up audio-only telehealth visit for which they gave permission for. The patient was located at home [address] [place] [address] and I was located in my office in [place], [place]. The patient obtained lab work 12/4/23 prior to today's visit. Urinalysis is improved and showed trace proteinuria, small Leukocyte Esterase, 6/HPF of WBC and 7/LPF of Cast. Urine culture shows no significant growth, <10,000 CFU/mL Normal Urogenital Ángel, which is very good. Her daughter expresses concern as recently patient is experiencing a surplus of looser and larger amounts of bowel movements that is escaping to the vaginal area, thus causing infections. Urine is normal in color, denies fever. Patient is occasionally more fatigued than usual. Denies distended appearance of the abdomen. Admits to only 1 spasm around the catheter this month. Next sales change will be in 2 weeks.  Towards the end of visit, patient was shown on Facetime, appearing well oriented x 3, normal pigmentation, lips are moist, and smile is symmetrical. I am very impressed by how well the patient looks and speaks. It was more than just pleasantries, she inquired about personal questions. Overall, I am very pleased.   12/27/2023: Ms. CLAUDIO MCDOWELL presents today for a follow up audio only telephone visit for which she gave permission for. The pt was located at home, [address] [place] [address], and I was located in my office in [place], [place]. She is accompanied by her daughter. Patient provided a surveillance urine specimen on 12/22/2023. She has chronic sales catheter drainage. UA revealed small LEC, positive for nitrites, 6 WBC/HPF, no RBC seen, moderate bacteria/HPF, granular casts present, and yeast like cells present. Urine culture grew >100,000 CFU/ml Enterococcus faecalis and 50,000 - 99,000 CFU/mL Streptococcus mitis/oralis group. Urine cytology was negative for high grade urothelial carcinoma. Few mature squamous cells, rare benign urothelial cells and abundant fungal organisms morphologically consistent with Candida species present. Patient's daughter reports that her urine looks clear and a pale yellow color. She feels her mom still has a lot of anxiety and some confusion. She does report that her catheter bag sits in feces due to the patient's position. They tried to tilt her pelvis to avoid this but states it's easier said than done. She states there is no feces in the catheter or in the bag. BM are not loose, she describes them as pasty. Has about 2-3 BM a day.   01/05/2024: Ms. MCDOWELL is a 89 year old female presenting today for an audio only telehealth visit for which she gave verbal permission. The patient was located in her home at [address] [place] [address]. I was located in my office on [place], [place]. She was accompanied by her daughter who helped with the visit. She reports that a nurse came for a catheter change, and only succeeded on the third attempt. She says it's the same nurse who has been coming for 5 months now, and usually she encounters no difficulties. Now that the catheter has been changed, the patient is able to void.   01/17/2024: Ms. CLAUDIO MCDOWELL presents today for a follow up audio only telephone visit for which she gave permission for. The pt was located at home, [address] [place] [address], and I was located in my office in [place], [place]. She is accompanied by her daughter, Tabatha Deluna. Patient provided a urine specimen on 1/5/2024 for surveillance. UA revealed trace blood by urine and moderate LEC. This is excellent for her as she has chronic sales catheter drainage. Urine culture grew 50,000-99,000 CFU/ml Pseudomonas aeruginosa. Urine cytology was negative for high grade urothelial carcinoma. Few mature squamous cells, rare benign urothelial cells and abundant fungal organisms morphologically consistent with Candida species present. The patient's daughter reports that her mother is "different". She reports that she is hearing things a lot now. She states that usually the things are negative. For example, she will say "Why is Ac saying my oxygen isn't working properly?". It is clear what she is saying but fairly random. She is not seeing anything; it is strictly auditory.   01/22/2024: Ms. CLAUDIO MCDOWELL presents today for a follow up audio only telephone visit for which she gave permission for. The pt was located at home, [address] [place] [address], and I was located in my office in [place], [place]. Visit was conducted with the patient's daughter, Tabatha Deluna. She informs me that she did not realize she was out of hyoscyamine sulfate for bladder spasms. She has been getting bladder spasms after the aide leaves and is embarrassed by this as she thinks shes wetting the bed. Her daughter reports that her mother asks in the morning "why am I bothering taking my medications, I am going to die anyway".   02/05/2024: Ms. CLAUDIO MCDOWELL presents today for an audio visual telehealth visit for which she gave permission for. The patient was located at home at [address] [place] [address] and I was located at my office in [place], [place]. Pt's daughter states she was not expecting my call today. I informed her that she was on my schedule and that if she would like we can talk. She informs me that hyoscyamine does not seem to be working for her mother and that some days she has bladder spasms and some she doesn't. Pt is currently not having diarrhea. She is due for a catheter change tomorrow. Patient has not started any medications for the psychological issues she has been having.   02/09/2024: Ms. MCDOWELL is a 89 year old female presenting today for an audio only telehealth visit for which she gave verbal permission. The patient was located in her home at [address] [place] [address]. I was located in my office on [place], [place]. She was accompanied by her daughter who helps with the visit.   The patient has been with a chronic sales catheter since July 2021 when she fractured her foot. She reports today for a follow up.   I reviewed and discussed the patient's pertinent lab works. Urine Culture shows 10,000 - 49,000 CFU/mL Pseudomonas aeruginosa 10,000 - 49,000 CFU/mL Citrobacter freundii complex  I explain to the patient that patients with chronic sales catheter develop bacteria in the urine, that may come from the skin, and it is not significant clinically unless infection symptoms ensue. In addition, the patient reports she was not taking antibiotics when the sample was collected, and the colonies were less than 100 000.   03/13/2024: Ms. CLAUDIO MCDOWELL presents today for a follow up audio only telephone visit for which she gave permission for. The pt was located at home, [address] [place] [address], and I was located in my office in [place], [place]. Visit was conducted with her daughter Tabatha Deluna. Patient provided a urine specimen on 3/4/2024. Patient has a chronic indwelling sales. UA revealed trace blood, large LE, and 9 WBC/HPF. This is quite good considering her sales. Culture grew 50,000 - 99,000 CFU/mL Escherichia coli & >100,000 CFU/ml Citrobacter freundii complex. She does report that after they gave the specimen her mother had a week of stool getting in the vulvar area and she is worried this might travel to the bladder. There is no evidence of that currently. She states the urine is clear and yellow. There has been no drainage around the catheter. She has not had any increased bladder spasms. Patient's daughter reports that her mother is not feeling well today. She has a runny nose and a cough. Home care is arranging for a nasal swab.   04/08/2024: Ms. CLAUDIO MCDOWELL presents today for a follow up audio only telephone visit for which she gave permission for. The pt was located at home, [address] [place] [address], and I was located in my office in [place], [place]. Visit was conducted with her daughter Tabatha Deluna. Patient provided a urine specimen on 4/3/2024. UA was completely normal other than moderate LE and 6 WBC/HPF. For having a sales catheter, this is an excellent UA. Urine culture grew >100,000 CFU/ml Citrobacter freundii complex. Urine cytology was negative for high grade urothelial carcinoma. Specimen consists of many lymphoid cells, mature squamous epithelial cells and benign urothelial cells. Patient's daughter reports that  got covid, however she is recovering. She is very tired and still has a cough. She does report that the urine is clear and yellow, however the pt has had many accidents where stool gets near/in the catheter. Her skin remains red but not broken down. Continues use of triple paste.   05/08/2024 Ms. CLAUDIO MCDOWELL presents today for a follow up audio-only telehealth visit for which they permitted for. The patient was located at home [address] [place] [address] and I was located in my office in [place], [place]. Patient has chronic indwelling Sales catheter. This urinalysis indicates urine is in very good condition without any evidence for clinically active urinary tract infection at this time. Culture showed >=3 organisms. Probable collection contamination. If I am notified of changes indicating possible clinically significant urinary tract infection, we would re-culture at that time. We will continue to collect surveillance urine specimens for urinalyses and urine cultures at time of catheter changes.  Daughter states her mental status is different.  Reports more confusion, gaps of memory. Denies hallucinations and is much less anxious. Stool is still loose and pt is more fatigued while on Risperidone.   06/7/2024: Ms. CLAUDIO MCDOWELL presents today for a follow up audio only telephone visit for which she gave permission for. The pt was located at home, [address] [place] [address], and I was located in my office in [place], [place]. Visit conducted with patient's daughter, Tabatha. 6/3/24 UA revealed small blood by dipstick, moderate Leukocyte Esterase, trace proteinuria and 10/HPF of WBC. Culture showed 50,000 - 99,000 CFU/mL Pseudomonas aeruginosa. Daughter reports have pt historian today. Patient is doing okay from urinary standpoint, urine is clear. Admits to breathing difficulties, as Internist prescribed Z-pack (Zithromax) but the daughter is hesitant of proceeding further with the prescription as 1 day before starting her cough seemed improved.  Before cough onset, there was increase in confusion// decline of mental state.   06/26/2024: Ms. CLAUDIO MCDOWELL presents today for a follow up audio only telephone visit. Visit was conducted with the patient's daughter, Tabatha. The pt was located at home, [address] [place] [address], and I was located in my office in [place], [place]. Her daughter informs me today that she did not need an appt but did need instructions on irrigating the catheter for the nurse. However, the catheter happened to snap today and needed to be replaced anyway. I apologized for the misunderstanding. She stated that it would be better for me to call the nurse directly to give her the instruction. She had just left the house after changing the patient's catheter. She informs me she took a urine specimen. The nurses name is Marimar and can be reached at [phone].   07/01/2024: Ms. CLAUDIO MCDOWELL presents today for a follow up audio only telephone visit. The pt was located at home, [address] [place] [address], and I was located in my office in [place], [place]. Visit conducted with patient's daughter, Tabatha. Patient's visiting nurse, Marimar, collected a urine specimen from her on 6/26/2024 when replacing the catheter after it broke. UA revealed cloudy urine, large LE, and 150 WBC/HPF, otherwise normal. Urine culture grew >100,000 CFU/ml Citrobacter freundii and 50,000 - 99,000 CFU/mL Pseudomonas aeruginosa. The patient obtained lab work /24 prior to today's visit. Culture showed >100,000 CFU/ml Citrobacter freundii. 50,000 - 99,000 CFU/mL Pseudomonas aeruginosa. BMP revealed low sodium of 134, potassium 5.4, glucose elevated at 122, creatinine wnl at 0.56. UA showed cloudy appearance, large Leukocyte Esterase, 150/HOF WBC. Patient reports today is not her best day. She sounds suppressed. Daughter reports onset cough and unsure if infection could be both respiratory and bladder. Patient obtained nebulizer prior to visit. Daughter reports urine is currently clear.   07/05/2024: Ms. CLAUDIO MCDOWELL presents today for a follow up audio only telephone visit. The pt was located at home, [address] [place] [address], and I was located in my office in [place], [place]. Visit was conducted with her daughter, Tabatha. She informs me that she just took the first packet of Fosfomycin yesterday. There have been no problems thus far. She does report that the catheter was mispositioned last night and was not draining for a few hours. Her and the aide were able to push it in more and urine drained. The visiting nurse came this morning. She irrigated the catheter and ensured it was in good position.   07/08/2024: Ms. CLAUDIO MCDOWELL presents today for a follow up audio-visual telehealth visit. The pt was located at home, [address] [place] [address], and I was located in my office in [place], [place]. The visit was conducted mostly with her daughter Tabatha. The patient has been taking Fosfomycin 3 GM oral packet once every 3 days. Her daughter reports she looks a bit improved. Her urine looks good but has since the beginning. She feels Fosfomycin makes her a little sick. Had some abdominal pain over the weekend but has not complained today. She was having anxiety but her oxygen is good. Her temperature is 97.1. She is not leaking around the catheter at all. She is a bit stressed today and someone close to them passed away and her daughter had to inform her of this today. At the end of the visit I spoke to  and she informs me she is not feeling so bad.     07/12/2024: Ms. MCDOWELL is a 89 year old female presenting today for an audio only telehealth visit for which she gave verbal permission. The patient was located in her home at [address] [place] [address]. I was located in my office on [place], [place]. She was accompanied by her daughter who helped with the visit.  Patient is bedbound and with a sales catheter.  On 7/11/24 patient was found with 150 wbc/hpf. cloudy urine. No bacteria.  given that patient is with indwelling catheter, I would usually not treat her with abx right away, but I saw her and she looked pale, sluggish, and not cheerful as usual.  She was started on Fosfomycin once every 3 days. After course of Fosfomycin, patient was found with only 22 wbc/hpf, and few bacteria.  Her daughter worries today about low urine output since patient started Fosfomycin. She admits patient has been experiencing nighttime diarrhea which is expected with the medication. I explain that is consistent with lower production of urine as patient loses water during nighttime diarrhea,  patient's vitals taken by her daughter have been reportedly normal BP:120 /60. Pulse in the 70s  08/06/2024: Ms. CLAUDIO MCDOWELL presents today for a follow up audio only telephone visit for which she gave permission for. The pt was located at home, [address] [place] [address], and I was located in my office in [place], [place]. Visit was conducted with her daughter, Tabatha Deluna. At the time I called at 2:00 PM the nurse was there to change her catheter. Her daughter reports that it looks as if there is a ball in her mother's stomach. This past Friday, she showed a tremendous amount of stool in her colon on x-ray. She denies seeing any blood in the stool that has been passing. She denies any gross hematuria. Urine has looked good. Her daughter notes that last Wednesday, it appeared her mother had a TIA. She opted to not have her mom taken to the hospital, so they are not sure if that is exactly what happened.  She appears less confused than last week according to her daughter.   08/09/2024: Ms. MCDOWELL is a 89 year old female presenting today via telehealth using real time 2 way audio-visual technology. We utilized [employer] telemetry "[employer] platform. The patient, Ms. MCDOWELL, was located in her home at [address] [place] [address] at the time of the visit. The provider, BENIGNO RAYMUNDO, was located in his office on [place], [place]. Verbal Consent was given by the patient on 08/09/2024 and my scribe served as witness. The patient was accompanied by her daughter  who participated in the telehealth encounter. Patient is bedbound and with an indwelling sales catheter. She was recently treated for UTI. On 6/26/24 patient was found with 150 wbc/hpf. cloudy urine. No bacteria.  After course of abx, patient reports today for review of new labs findings. I reviewed and discussed the patient's pertinent lab works. On 8/6/24 Urine Analysis showed 10 wbc/hpf (down from 150). Urine is now clear. Specific Gravity was 1.010 indicating adequate hydration. No bacteria seen. Urine Cytology on 8/6/24 was negative for malignant cell. Urine Culture on 8/6/24  showed presence of 3 organisms, none being predominant, possibly colonizers, or probable collection contamination. No infection.

## 2024-09-13 NOTE — ASSESSMENT
[FreeTextEntry1] : 11/03/2023: Ms. MCDOWELL is a 89 year old female presenting today for an audio only telehealth visit for which she gave verbal permission. The patient was located in her home at [address] [place] [address]. I was located in my office on [place], [place]. She was accompanied by her daughter. She says her mother was supposed to be released today. Initially she was able to void. Because of constipation she was given a couple of enemas. She had a large evacuation and since then she has not been able to void again. The daughter says her latest PVR was 340 cc. She is waiting for her mother to be rescanned again.    Plan: If she is unable to void, she will be discharged with a sales catheter.   11/07/2023: Ms. CLAUDIO MCDOWELL presents today for a follow up audio only telephone visit for which she gave permission for. The pt was located at home, [address] [place] [address], and I was located in my office in [place], [place]. The visit was conducted with the patient's daughter. The patient came home from the hospital on Sunday. The catheter was put back in without another attempt at a void trial. The patient does have issues with constipation. She is able to swallow liquids. Her daughter has had to crush some of her pills to put them in a liquid, however the pt finds it to taste bitter and has some discomfort swallowing the chunks of crushed pills. The hospital discharged her with a 7 day supply of Cefuroxime. Was started yesterday so she has had two doses.    We discussed the possibility of a doing a trial of void. At this time I am recommending her mother settle back in to being home and we can readdress this possibility and how the patient and her daughter would like to proceed at the time of her next visit. Next catheter change will be on 12/5 should they choose to proceed with the catheter.   I looked it up on [employer] and Cefuroxime is available in the [place] as a liquid suspension. Given that this would be easier for the patient, I went to prescribe it for her which she could take rather than the pills. [employer] did not allow me to electronically prescribe it in a liquid suspension so I called the patient's pharmacy to give a verbal prescription for 200 ml of it for 20 ml BID for 5 days. I left a VM for the pharmacy as there was no answer. I requested they call me back if they could accept this prescription or if they could not. The pharmacy called me back shortly after and they told me that it is not currently offered in a liquid suspension, however he called the [facility] next door and they are willing to compound it for 40$. I saw this as acceptable and thanked him for going the extra mile and calling next door. He will contact the patient's daughter to inform her.   Patient and her daughter will have a telehealth visit in 2 weeks for reassessment, sooner if clinically indicated.   11/20/2023: Ms. MCDOWELL presents today for a follow up audio only telehealth visit for which they gave permission for. The patient was located at home [address] [place] [address] and I was located in my office in [facility]. Daughter states patient is okay. She notes giving pt suppositories and 7 hours later she had mixed bowel movements. However, she did not have a bowel movement since Saturday. Patient is given pedialyte and water frequently. Today's temperature was 95 as before it was 96-97. Pt did not drink any liquids shortly after taking temperature. Daughter denies giving pt solace but she usually gives Miralax as she has done so today. Senady's urine color is clear as it  has been clear for some time.  Daughter states her stomach was distended. Bp has stabilized since being in the hospital but last night and today it  was higher than normal being being 168/88 before medication.   Advised daughter to give patient Miralax tonight if there is no bowel movements.  Advised her to take Blood pressure tonight.  Pt will schedule a telehealth visit  11/29/2023: Ms. CLAUDIO MCDOWELL presents today for a follow up audio only telephone visit for which she gave permission for. The pt was located at home, [address] [place] [address], and I was located in my office in [place], [place]. Visit was conducted with her daughter, Andree. She was informed yesterday by her house call physicians of the results of her urine culture (emailed from core lab showing >100,000 CFU/ml Pseudomonas aeruginosa susceptible only to amikacin, Ciprofloxacin, imipenem, levofloxacin, meropenum. Resistant to Ceftazidime, Pip/tazo. Intermediate for aztrenam, cefepime. They discussed that patient is having increasing oxygen requirements, although SPO2 ok on supplemental O2 (prior to this patient needed only occasional O2 suppl. every few days). 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 The want to treat with ciprofloxacin, 7 day course. Prescription sent to pharmacy. Her daughter inquired today on if she should be treated. She states last week her mother seemed very not herself, however today she seems better in terms of mental status and oxygenation. She has been giving her senakot and miralax.   I explained that given her O2 issues and confusion over the last few days, I advised to treat with Cipro despite her daughter feeling she is better today. If there are any problems with crushing the pills for her or if she is not reacting to it well, she was advised to call right away. I posed the option of giving it as an oral solution if need be.    12/21/2023: Ms. MCDOWELL presents today for a follow up audio-visual telehealth visit for which they gave permission for. The patient was located at home [address] [place] [address] and I was located in my office in [place], [place]. The patient obtained lab work 12/4/23 prior to today's visit. Urinalysis is improved and showed trace proteinuria, small Leukocyte Esterase, 6/HPF of WBC and 7/LPF of Cast. Urine culture shows no significant growth, <10,000 CFU/mL Normal Urogenital Khadijah, which is very good. Her daughter expresses concern as recently patient is experiencing a surplus of looser and larger amounts of bowel movements that is escaping to the vaginal area, thus causing infections. Urine is normal in color, denies fever. Patient is occasionally more fatigued than usual. Denies distended appearance of the abdomen. Admits to only 1 spasm around the catheter this month. Next sales change will be in 2 weeks.  Towards the end of visit, patient was shown on Facetime, appearing well oriented x 3, normal pigmentation, lips are moist, and smile is symmetrical. I am very impressed by how well the patient looks and speaks. It was more than just pleasantries, she inquired about personal questions. Overall, I am very pleased.   Reviewed and discussed laboratory work of 12/4/23 which She obtained prior to today's visit as requested. Advised daughter to have care team to place support under the pelvis, so pelvis is tilted up at a higher level of the anus.   As long as stool is pasty and firm, we advised Andree to give patient MiraLAX. If stool is loose and unfirm, she should decrease the amount of MiraLAX.  Pt will provide a urine sample which will be sent for urinalysis, urine culture, and urine cytology. Pt will schedule a telehealth visit in 2 weeks for reassessment.   12/27/2023: Ms. CLAUDIO MCDOWELL presents today for a follow up audio only telephone visit for which she gave permission for. The pt was located at home, [address] [place] [address], and I was located in my office in [place], [place]. She is accompanied by her daughter. Patient provided a surveillance urine specimen on 12/22/2023. She has chronic sales catheter drainage. UA revealed small LEC, positive for nitrites, 6 WBC/HPF, no RBC seen, moderate bacteria/HPF, granular casts present, and yeast like cells present. Urine culture grew >100,000 CFU/ml Enterococcus faecalis and 50,000 - 99,000 CFU/mL Streptococcus mitis/oralis group. Urine cytology was negative for high grade urothelial carcinoma. Few mature squamous cells, rare benign urothelial cells and abundant fungal organisms morphologically consistent with Candida species present. Patient's daughter reports that her urine looks clear and a pale yellow color. She feels her mom still has a lot of anxiety and some confusion. She does report that her catheter bag sits in feces due to the patient's position. They tried to tilt her pelvis to avoid this but states it's easier said than done. She states there is no feces in the catheter or in the bag. BM are not loose, she describes them as pasty. Has about 2-3 BM a day.    Reviewed and discussed lab work of 12/22/2023 in detail with the pt.  Future urine orders were put in including fungal urine culture. Pt's daughter was advised to continue to try and alter the patient's position to avoid the catheter sitting in feces.  Patient should have a telehealth visit in 3 months, sooner if clinically indicated.    01/05/2024: Ms. MCDOWELL is a 89 year old female presenting today for an audio only telehealth visit for which she gave verbal permission. The patient was located in her home at [address] [place] [address]. I was located in my office on [place], [place]. She was accompanied by her daughter who helped with the visit. She reports that a nurse came for a catheter change, and only succeeded on the third attempt. She says it's the same nurse who has been coming for 5 months now, and usually she encounters no difficulties. Now that the catheter has been changed, the patient is able to void.   Plan: Pt's daughter will keep us updated. If the patient experiences fever or chill, she will call ER and inform the office.   01/17/2024: Ms. CLAUDIO MCDOWELL presents today for a follow up audio only telephone visit for which she gave permission for. The pt was located at home, [address] [place] [address], and I was located in my office in [place], [place]. She is accompanied by her daughter, Tabatha Deluna. Patient provided a urine specimen on 1/5/2024 for surveillance. UA revealed trace blood by urine and moderate LEC. This is excellent for her as she has chronic sales catheter drainage. Urine culture grew 50,000-99,000 CFU/ml Pseudomonas aeruginosa. Urine cytology was negative for high grade urothelial carcinoma. Few mature squamous cells, rare benign urothelial cells and abundant fungal organisms morphologically consistent with Candida species present. The patient's daughter reports that her mother is "different". She reports that she is hearing things a lot now. She states that usually the things are negative. For example, she will say "Why is Ac saying my oxygen isn't working properly?". It is clear what she is saying but fairly random. She is not seeing anything; it is strictly auditory.   Reviewed and discussed lab work of 1/5/2024 in detail with the pt's daughter. She was informed that given she has chronic sales catheter drainage, this specimen was excellent. If she had more WBC/HPF, I would be concerned, however she is in very good shape from a urologic standpoint. Her daughter was advised to speak with her mother's PCP about hearing things and her AMS as it is not urologic in origin or related to pending sepsis. I provided her the name of a neurologist, Dr.Li-Fen Tripathi if she would like to go for neuro consultation.  I once again re-iterated that at this time, given her skin breakdown, I recommend continued sales catheter drainage. Her daughter brought up an external urine collection apparatus once again, however I do not recommend we try that at least until her skin is in good condition. I also explained that if her mother were to go into urinary retention, this sort of device would not drain the urine from her.  Pt's daughter was advised to give her hyoscyamine for bladder spasms.  Patient will have a telehealth visit in 1-2 months, sooner if clinically indicated.    01/22/2024: Ms. CLAUDIO MCDOWELL presents today for a follow up audio only telephone visit for which she gave permission for. The pt was located at home, [address] [place] [address], and I was located in my office in [place], [place]. Visit was conducted with the patient's daughter, Tabatha Deluna. She informs me that she did not realize she was out of hyoscyamine sulfate for bladder spasms. She has been getting bladder spasms after the aide leaves and is embarrassed by this as she thinks shes wetting the bed. Her daughter reports that her mother asks in the morning "why am I bothering taking my medications, I am going to die anyway".   Renewed hyoscyamine sulfate 0.125 mg sublingual tablets, give one tablet under the tongue as needed. I recommend she gives it about 15 minutes before the aide comes. I suggest she give it for 10 days in a row to see if we can calm things down and then stop. If it returns, we can try again for another month or two. I did speak with the patient at the end of the visit. She seemed to know who I was and understood me. I spoke to her about the bladder spasms she is experiencing. She was on board with trying hyoscyamine and thanked me for speaking with her.  I recommend the patient's daughter speak with her PCP about potential anti-depressant medication.   Patient will have a telephone visit in 2 weeks for reassessment, sooner if clinically indicated.    02/05/2024: Ms. CLAUDIO MCDOWELL presents today for an audio visual telehealth visit for which she gave permission for. The patient was located at home at [address] [place] [address] and I was located at my office in [place], [place]. Pt's daughter states she was not expecting my call today. I informed her that she was on my schedule and that if she would like we can talk. She informs me that hyoscyamine does not seem to be working for her mother and that some days she has bladder spasms and some she doesn't. Pt is currently not having diarrhea. She is due for a catheter change tomorrow. Patient has not started any medications for the psychological issues she has been having.    Pt will discontinue use of hyoscyamine. I prescribed the patient Trospium 20 mg, one tablet once daily at bedtime. I informed the patient there may be constipation as a side effect.   Orders for UA, urine culture, and urine cytology were put in for her daughter to bring the patient's sample to her nearest  lab.  Patient will have a telehealth visit this Friday after the catheter change.     02/09/2024: Ms. MCDOWELL is a 89 year old female presenting today for an audio only telehealth visit for which she gave verbal permission. The patient was located in her home at [address] [place] [address]. I was located in my office on [place], [place]. She was accompanied by her daughter who helps with the visit.   The patient has been with a chronic sales catheter since July 2021 when she fractured her foot. She reports today for a follow up.  I reviewed and discussed the patient's pertinent lab works. Urine Culture shows 10,000 - 49,000 CFU/mL Pseudomonas aeruginosa 10,000 - 49,000 CFU/mL Citrobacter freundii complex I explain to the patient that patients with chronic sales catheter develop bacteria in the urine, that may come from the skin, and it is not significant clinically unless infection symptoms ensue. In addition, the patient reports she was not taking antibiotics when the sample was collected, and the colonies were less than 100 000.   Pt will start Trospium 20 mg.  She will provide urine for UA, Urine Cytology and Urine Culture. She will have a follow up in one month; earlier if needed.    03/13/2024: Ms. CLAUDIO MCDOWELL presents today for a follow up audio only telephone visit for which she gave permission for. The pt was located at home, [address] [place] [address], and I was located in my office in [place], [place]. Visit was conducted with her daughter Tabatha Deluna. Patient provided a urine specimen on 3/4/2024. Patient has a chronic indwelling sales. UA revealed trace blood, large LE, and 9 WBC/HPF. This is quite good considering her sales. Culture grew 50,000 - 99,000 CFU/mL Escherichia coli & >100,000 CFU/ml Citrobacter freundii complex. She does report that after they gave the specimen her mother had a week of stool getting in the vulvar area and she is worried this might travel to the bladder. There is no evidence of that currently. She states the urine is clear and yellow. There has been no drainage around the catheter. She has not had any increased bladder spasms. Patient's daughter reports that her mother is not feeling well today. She has a runny nose and a cough. Home care is arranging for a nasal swab.   Reviewed and discussed lab work of 3/4/2024 in detail with the pt's daughter.  If the patient's daughter notices a change in her urine, she will call promptly.    04/08/2024: Ms. CLAUDIO MCDOWELL presents today for a follow up audio only telephone visit for which she gave permission for. The pt was located at home, [address] [place] [address], and I was located in my office in [place], [place]. Visit was conducted with her daughter Tabatha Deluna. Patient provided a urine specimen on 4/3/2024. UA was completely normal other than moderate LE and 6 WBC/HPF. For having a sales catheter, this is an excellent UA. Urine culture grew >100,000 CFU/ml Citrobacter freundii complex. Urine cytology was negative for high grade urothelial carcinoma. Specimen consists of many lymphoid cells, mature squamous epithelial cells and benign urothelial cells. Patient's daughter reports that  got covid, however she is recovering. She is very tired and still has a cough. She does report that the urine is clear and yellow, however the pt has had many accidents where stool gets near/in the catheter. Her skin remains red but not broken down. Continues use of triple paste.    Reviewed and discussed lab work of 4/3/2024 in detail with the pt. Pt's daughter reassured that this is a very good specimen considering she has a sales catheter.  Will provide another urine specimen at time of next catheter change.  Will have a telehealth visit after next urine specimen, sooner if clinically indicated.     05/08/2024 Ms. CLAUDIO MCDOWELL presents today for a follow up audio-only telehealth visit for which they permitted for. The patient was located at home [address] [place] [address] and I was located in my office in [place], [place]. Patient has chronic indwelling Sales catheter. This urinalysis indicates urine is in very good condition without any evidence for clinically active urinary tract infection at this time. Culture showed >=3 organisms. Probable collection contamination. If I am notified of changes indicating possible clinically significant urinary tract infection, we would re-culture at that time. We will continue to collect surveillance urine specimens for urinalyses and urine cultures at time of catheter changes.  Daughter states her mental status is different.  Reports more confusion, gaps of memory. Denies hallucinations and is much less anxious. Stool is still loose and pt is more fatigued while on Risperidone.   Reviewed and discussed laboratory work of 5/2/24 which She obtained prior to today's visit as requested. At the current time I will not re-culture. If I am notified of changes indicating possible clinically significant urinary tract infection, we would re-culture at that time. We will continue to collect surveillance urine specimens for urinalyses and urine cultures at time of catheter changes. Pt will schedule a telehealth visit.  06/7/2024: Ms. CLAUDIO MCDOWELL presents today for a follow up audio only telephone visit for which she gave permission for. The pt was located at home, [address] [place] [address], and I was located in my office in [place], [place]. Visit conducted with patient's daughter, Tabatha. 6/3/24 UA revealed small blood by dipstick, moderate Leukocyte Esterase, trace proteinuria and 10/HPF of WBC. Culture showed 50,000 - 99,000 CFU/mL Pseudomonas aeruginosa. Daughter reports have pt historian today. Patient is doing okay from urinary standpoint, urine is clear. Admits to breathing difficulties, as Internist prescribed Z-pack (Zithromax) but the daughter is hesitant of proceeding further with the prescription as 1 day before starting her cough seemed improved.  Before cough onset, there was increase in confusion// decline of mental state.    Reviewed and discussed laboratory work of 6/3/24 which She obtained prior to today's visit as requested. I do not believe we should treat at this time, instead reassess with next catheter change.  Patient to continue with respiratory treatment course and should not discontinue as this may create more clinical related issues. Pt will schedule a telehealth visit in 1 month.   06/26/2024: Ms. CLAUDIO MCDOWELL presents today for a follow up audio only telephone visit. Visit was conducted with the patient's daughter, Tabatha. The pt was located at home, [address] [place] [address], and I was located in my office in [place], [place]. Her daughter informs me today that she did not need an appt but did need instructions on irrigating the catheter for the nurse. However, the catheter happened to snap today and needed to be replaced anyway. I apologized for the misunderstanding. She stated that it would be better for me to call the nurse directly to give her the instruction. She had just left the house after changing the patient's catheter. She informs me she took a urine specimen. The nurses name is Marimar and can be reached at [phone].   After I hung up with the patient's daughter, I called Marimar. She informs me she just left after changing the catheter. I informed her it would be good if we could have the catheter irrigated to avoid sediment. She uses a 16 french catheter. She was instructed to disconnect the collecting tube and irrigate with about 60 cc of water. Let it drain out passively. If it does not drain well, she can try again. She can aspirate gently if she needs. If sediment comes out, she should repeat until it is clear. I explained that sometimes if it is a lot, it may take up to a liter to irrigate. I provided her with my phone number in the case she needs help.    07/01/2024: Ms. CLAUDIO MCDOWELL presents today for a follow up audio only telephone visit. The pt was located at home, [address] [place] [address], and I was located in my office in [place], [place]. Visit conducted with patient's daughter, Tabatha. Patient's visiting nurse, Marimar, collected a urine specimen from her on 6/26/2024 when replacing the catheter after it broke. UA revealed cloudy urine, large LE, and 150 WBC/HPF, otherwise normal. Urine culture grew >100,000 CFU/ml Citrobacter freundii and 50,000 - 99,000 CFU/mL Pseudomonas aeruginosa. The patient obtained lab work /24 prior to today's visit. Culture showed >100,000 CFU/ml Citrobacter freundii. 50,000 - 99,000 CFU/mL Pseudomonas aeruginosa. BMP revealed low sodium of 134, potassium 5.4, glucose elevated at 122, creatinine wnl at 0.56. UA showed cloudy appearance, large Leukocyte Esterase, 150/HOF WBC. Patient reports today is not her best day. She sounds suppressed. Daughter reports onset cough and unsure if infection could be both respiratory and bladder. Patient obtained nebulizer prior to visit. Daughter reports urine is currently clear.   Reviewed and discussed lab work of 6/26/2024 in detail with the pt's daughter.   I prescribed Fosfomycin 3 GM oral packet, once every 3 days. If onset bowel irritability occurs patient informed to call.  Patient denies ever having a seizure. Pt will schedule a telehealth visit in 1 week.    07/05/2024: Ms. CLAUDIO MCDOWELL presents today for a follow up audio only telephone visit. The pt was located at home, [address] [place] [address], and I was located in my office in [place], [place]. Visit was conducted with her daughter, Tabatha. She informs me that she just took the first packet of Fosfomycin yesterday. There have been no problems thus far. She does report that the catheter was mispositioned last night and was not draining for a few hours. Her and the aide were able to push it in more and urine drained. The visiting nurse came this morning. She irrigated the catheter and ensured it was in good position.    Continue Fosfomycin and call if there are any problems.  They are scheduled for a telehealth visit on 7/8/2024 for reassessment.    07/08/2024: Ms. CLAUDIO MCDOWELL presents today for a follow up audio-visual telehealth visit. The pt was located at home, [address] [place] [address], and I was located in my office in [place], [place]. The visit was conducted mostly with her daughter Tabatha. The patient has been taking Fosfomycin 3 GM oral packet once every 3 days. Her daughter reports she looks a bit improved. Her urine looks good but has since the beginning. She feels Fosfomycin makes her a little sick. Had some abdominal pain over the weekend but has not complained today. She was having anxiety but her oxygen is good. Her temperature is 97.1. She is not leaking around the catheter at all. She is a bit stressed today and someone close to them passed away and her daughter had to inform her of this today. At the end of the visit I spoke to  and she informs me she is not feeling so bad.  Continue Fosfomycin (last dose this Wednesday).  Patient's visiting nurse will collect a urine specimen this Thursday. It will be sent out for UA, culture, fungus culture, and cytology.  Though the patient's daughter Tabatha is very concerned about her mother and a bit depressed and anxious over it, her mother is doing quite well. She did not seem toxic to me. She made sensible conversation. She is quite hard of hearing but her daughter would repeat things so she could hear me and would respond well. From a urinary tract standpoint, she is doing well.  Patient will have a telehealth visit after next urine specimen to review and discuss results.    07/12/2024: Ms. MCDOWELL is a 89 year old female presenting today for an audio only telehealth visit for which she gave verbal permission. The patient was located in her home at [address] [place] [address]. I was located in my office on [place], [place]. She was accompanied by her daughter who helped with the visit.  Patient is bedbound and with a sales catheter.  On 6/26/24 patient was found with 150 wbc/hpf. Cloudy urine. No bacteria.  given that patient is with indwelling catheter, I would usually not treat her with abx right away, but I saw her and she looked pale, sluggish, and not cheerful as usual.  She was started on Fosfomycin once every 3 days. After course of Fosfomycin, patient was found with only 22 wbc/hpf, and few bacteria.  Her daughter worries today about low urine output since patient started Fosfomycin. She admits patient has been experiencing nighttime diarrhea which is expected with the medication. I explain that is consistent with lower production of urine as patient loses water during nighttime diarrhea,  patient's vitals taken by her daughter have been reportedly normal BP:120 /60. Pulse in the 70s Urine output about 1.5L a day. Pt will follow up next week for video telehealth.   08/06/2024:  Visit was conducted with her daughter, Tabatha Deluna. At the time I called at 2:00 PM the nurse was there to change her catheter. Her daughter reports that it looks as if there is a ball in her mother's stomach. This past Friday, she showed a tremendous amount of stool in her colon on x-ray. She denies seeing any blood in the stool that has been passing. She denies any gross hematuria. Urine has looked good. Her daughter notes that last Wednesday, it appeared her mother had a TIA. She opted to not have her mom taken to the hospital, so they are not sure if that is exactly what happened.  She appears less confused than last week according to her daughter.  Patient should proceed with catheter change today. They should record how much urine they collect when it is changed. Urine will be sent for UA, culture, and cytology.  Her daughter was advised to continue managing her mother's constipation. She states she is doing an enema tomorrow.  Patient will have a telehealth visit on Friday to review and discuss urine test results.    08/09/2024: Ms. MCDOWELL is a 89 year old female presenting today via telehealth using real time 2 way audio-visual technology. We utilized [employer] teams telemetry doc platform. The patient, Ms. MCDOWELL, was located in her home at [address] [place] [address] at the time of the visit. The provider, BENIGNO RAYMUNDO, was located in his office on [place], [place]. Verbal Consent was given by the patient on 08/09/2024 and my scribe served as witness. The patient was accompanied by her daughter  who participated in the telehealth encounter. Patient is bedbound and with an indwelling sales catheter. She was recently treated for UTI. On 6/26/24 patient was found with 150 wbc/hpf. cloudy urine. No bacteria.  After course of abx, patient reports today for review of new labs findings. I reviewed and discussed the patient's pertinent lab works. On 8/6/24 Urine Analysis showed 10 wbc/hpf (down from 150). Urine is now clear. Specific Gravity was 1.010 indicating adequate hydration. No bacteria seen. Urine Cytology on 8/6/24 was negative for malignant cell. Urine Culture on 8/6/24  showed presence of 3 organisms, none being predominant, possibly colonizers, or probable collection contamination. No infection.  Patient will continue providing Urine specimen monthly.  She will continue to have her indwelling catheter changed monthly.   09/06/2024: Ms. MCDOWELL is a 89 year old female presenting today for an audio only telehealth visit for which she gave verbal permission. The patient was located in her home at [address] [place] [address]. I was located in my office on [place], [place]. Pt accompanied by daughter Tabatha Deluna. As per daughter, patient's blood pressure has been running higher and her medication dosage has been increased. She is also been with increasing labored breathing. Urinary-wise, daughter and aide have not noticed any cloudy, malodorous urine in the tubing. Patient is not with significant complaints. No gross hematuria, no bladder spasms, and no fever.  I reviewed and discussed the patient's pertinent lab works on 9/3/24 -UA revealed 61 wbc/hpf, few bacteria, and 34 cats, She was also found with Large Leukocyte Esterase. -Preliminary Urine Culture showed 2 organisms ">100,000 CFU/ml Citrobacter freundii complex 50,000 - 99,000 CFU/mL Pseudomonas aeruginosa  Explained to patient's daughter elevated cast (in her case 34) can be indicative of renal trauma/damage. I recommend we wait for next Urinalysis. Only if casts remains elevated, with decreased renal function I would recommend further work-up (such as a renal ultrasound). At this time, there's no cause for alarms with a Creatinine of 0.67 and EGFR 81 on 8/2/24  Since patient is not symptomatic, I do not recommend a course of abx presently to limit developing antibiotics resistance among other side effects.   Pt may provide blood specimen for Renal Panel and Cystacin-C to further investigate renal function.   Pt's daughter is concerned about patient's increasing breathing problems. She inquired about possibility of obtaining more lab works than simply renal panel to investigate. I explained that she may want to consult with PCP as those tests are outside my expertise and responsibilities. Re-assured patient that her lab works that we reviewed (UA, and renal panel) do not indicate any breathing dysfunctions. Duration of call: 30mins 9/13/2024: Patient recently reviewed laboratory test results with a nurse from the home care team.  Paraspinal lower extremity findings including blood pressure and O2 saturation findings explained to the patient and reassured her.  The daughter gave her consent to have a audio only telehealth visit.  I was in my office in [place], [place].,  She was at her home in [facility].  I reviewed the utility of Cystatin C and suggested that at the next visit 1 be drawn.  I wrote orders for blood work that can be either used by the home team or copied at least so they know that I would be interested in the Cystatin C level.  Creatinine does overestimate how good the renal function is because of the patient's low muscle mass.  I did reassure the patient's daughter that even allowing for the overestimation her mom was doing very well in terms of kidney function.  It is known that she has renal calculi Cystatin C level for what I think would be more accurate estimate of renal function as of utility.  I reviewed all this in detail. 30 min telephone visit.

## 2024-09-13 NOTE — ADDENDUM
[FreeTextEntry1] : This note was partly authored by Clovis Rebolledo working as a scribe for BENIGNO Kiran. I, BENIGNO Kiran, have reviewed the content of this note and confirm it is true and accurate. I personally performed the history and physical examination and made all the decisions. 09/13/2024.

## 2024-09-14 ENCOUNTER — TRANSCRIPTION ENCOUNTER (OUTPATIENT)
Age: 89
End: 2024-09-14

## 2024-09-24 ENCOUNTER — APPOINTMENT (OUTPATIENT)
Dept: HOME HEALTH SERVICES | Facility: HOME HEALTH | Age: 89
End: 2024-09-24

## 2024-09-24 PROCEDURE — 90662 IIV NO PRSV INCREASED AG IM: CPT

## 2024-09-24 PROCEDURE — G0008: CPT

## 2024-09-24 PROCEDURE — 99350 HOME/RES VST EST HIGH MDM 60: CPT

## 2024-09-24 NOTE — HISTORY OF PRESENT ILLNESS
[Family Member] : family member [FreeTextEntry1] : chf, and functional quadriplegia and bedbound status.  [FreeTextEntry2] : 89 y/o Female with PMHx of Afib on Eliquis, CLL, HTN, renal calculus, venous insufficiency with lymphedema, and dementia seen for follow up.  Interval events: Head mass still growing Pt following with uro, having frequent UTI's  Pt seen laying in hospital bed New cough Skin: sacral wound resolved Pain: intermittent, feet and legs mostly, sometimes in LLQ abdomen. Has tramadol for pain but rarely uses.  Gait/falls: Non ambulatory, no falls Appetite: good on soft due to bleeding issue from tooth issues.  BM: switching between constipation and diarrhea Urine: sales in place with clear yellow urine Sleep: well mood: improved overall, but more forgetful  Medical problems:  Dementia with psychosis - risperidone .25mg in PM only right now.  chronic indwelling sales with frequent UTI's - following with Urology, Afib- on eliquis 2.5mg, CLL - stable HTN - losartan 25mg daily, furosemide 20mg BID, Isosorbide ER 30mg daily venous insufficiency with lymphedema - furosemide 20mg BID anxiety- mirtazapine 15mg, lorazepam 0.5mg prn GERD - PPI, famotidine prn HCM - melatonin prn

## 2024-09-24 NOTE — REVIEW OF SYSTEMS
[Incontinence] : incontinence [Confusion] : confusion [Memory Loss] : memory loss [Anxiety] : anxiety [Negative] : Heme/Lymph [FreeTextEntry4] : as per HPI [FreeTextEntry7] : as above [FreeTextEntry8] : sales [FreeTextEntry9] : as above, somtime b/l hip or leg pain shazia with movement [de-identified] : healing sacral wound, mass at back of head

## 2024-09-24 NOTE — COUNSELING
[Normal Weight - ( BMI  <25 )] : normal weight - ( BMI  <25 ) [Continue diet as tolerated] : continue diet as tolerated based on goals of care [Non - Smoker] : non-smoker [Smoke/CO Detectors] : smoke/CO detectors [Remove clutter and unsafe carpeting to avoid falls] : remove clutter and unsafe carpeting to avoid falls [] : foot exam [Decrease hospital use] : decrease hospital use [Minimize unnecessary interventions] : minimize unnecessary interventions [Comfort Care] : comfort care [Maintain functional ability] : maintain functional ability [Advanced Directives discussed: ____] : Advanced directives discussed: [unfilled] [Completed Medical Orders for Life-Sustaining Treatment] : completed medical orders for life-sustaining treatment [DNR] : Code Status: DNR [Limited] : Treatment Guidelines: Limited [DNI] : Intubation: DNI [[facility]: _____] [facility]last verification date: [unfilled]

## 2024-09-24 NOTE — PHYSICAL EXAM
[No Acute Distress] : no acute distress [Normal Sclera/Conjunctiva] : normal sclera/conjunctiva [Normal Outer Ear/Nose] : the ears and nose were normal in appearance [Supple] : the neck was supple [No Respiratory Distress] : no respiratory distress [Clear to Auscultation] : lungs were clear to auscultation bilaterally [No Accessory Muscle Use] : no accessory muscle use [No Edema] : there was no peripheral edema [Normal Bowel Sounds] : normal bowel sounds [Non Tender] : non-tender [Soft] : abdomen soft [No Gross Sensory Deficits] : no gross sensory deficits [Normal Affect] : the affect was normal [Normal Mood] : the mood was normal [de-identified] : comfortable pleasant elderly lady laying in bed [de-identified] : hearing aides [de-identified] : irregularly irregular, 3/6 murmur [de-identified] : hernia [de-identified] : sales in place draining clear yellow urine [de-identified] : Tongue like mass at back of head, no s/s infection

## 2024-09-26 ENCOUNTER — NON-APPOINTMENT (OUTPATIENT)
Age: 89
End: 2024-09-26

## 2024-09-27 DIAGNOSIS — R25.3 FASCICULATION: ICD-10-CM

## 2024-09-27 RX ORDER — MAGNESIUM OXIDE 241.3 MG/1000MG
400 TABLET ORAL TWICE DAILY
Qty: 10 | Refills: 0 | Status: ACTIVE | COMMUNITY
Start: 2024-09-27 | End: 1900-01-01

## 2024-10-01 ENCOUNTER — LABORATORY RESULT (OUTPATIENT)
Age: 89
End: 2024-10-01

## 2024-10-02 ENCOUNTER — NON-APPOINTMENT (OUTPATIENT)
Age: 89
End: 2024-10-02

## 2024-10-04 ENCOUNTER — NON-APPOINTMENT (OUTPATIENT)
Age: 89
End: 2024-10-04

## 2024-10-08 ENCOUNTER — APPOINTMENT (OUTPATIENT)
Dept: UROLOGY | Facility: CLINIC | Age: 89
End: 2024-10-08
Payer: MEDICARE

## 2024-10-08 DIAGNOSIS — N39.0 URINARY TRACT INFECTION, SITE NOT SPECIFIED: ICD-10-CM

## 2024-10-08 DIAGNOSIS — R41.82 ALTERED MENTAL STATUS, UNSPECIFIED: ICD-10-CM

## 2024-10-08 DIAGNOSIS — R82.71 BACTERIURIA: ICD-10-CM

## 2024-10-08 DIAGNOSIS — Z97.8 PRESENCE OF OTHER SPECIFIED DEVICES: ICD-10-CM

## 2024-10-08 DIAGNOSIS — R31.29 OTHER MICROSCOPIC HEMATURIA: ICD-10-CM

## 2024-10-08 DIAGNOSIS — N32.89 OTHER SPECIFIED DISORDERS OF BLADDER: ICD-10-CM

## 2024-10-08 DIAGNOSIS — R82.998 OTHER ABNORMAL FINDINGS IN URINE: ICD-10-CM

## 2024-10-08 DIAGNOSIS — N20.0 CALCULUS OF KIDNEY: ICD-10-CM

## 2024-10-08 DIAGNOSIS — F03.90 UNSPECIFIED DEMENTIA W/OUT BEHAVIORAL DISTURBANCE: ICD-10-CM

## 2024-10-08 DIAGNOSIS — Z74.01 BED CONFINEMENT STATUS: ICD-10-CM

## 2024-10-08 DIAGNOSIS — D47.2 MONOCLONAL GAMMOPATHY: ICD-10-CM

## 2024-10-08 PROCEDURE — 99443: CPT

## 2024-10-17 NOTE — PHYSICAL THERAPY INITIAL EVALUATION ADULT - MANUAL MUSCLE TESTING RESULTS, REHAB EVAL
Arline Rivera MD        polyethylene glycol (GLYCOLAX) packet 17 g  17 g Oral Daily PRN Arline Rivera MD        acetaminophen (TYLENOL) tablet 650 mg  650 mg Oral Q6H PRN Arline Rivera MD        Or    acetaminophen (TYLENOL) suppository 650 mg  650 mg Rectal Q6H PRN Arline Rivera MD         Current Outpatient Medications   Medication Sig Dispense Refill    potassium chloride (KLOR-CON) 10 MEQ extended release tablet TAKE (1) TABLET BY MOUTH ON MONDAY, WEDNESDAY AND FRIDAY. 12 tablet 0    albuterol sulfate HFA (PROVENTIL;VENTOLIN;PROAIR) 108 (90 Base) MCG/ACT inhaler SHAKE WELL & INHALE 2 PUFFS EVERY 4-6 HOURS AS NEEDED FOR COUGH/WHEEZING/SHORTNESS OF BREATH. 18 g 1    furosemide (LASIX) 40 MG tablet TAKE ONE TABLET BY MOUTH DAILY. 90 tablet 0    fluticasone (FLONASE) 50 MCG/ACT nasal spray INHALE 2 SPRAYS IN EACH NOSTRIL ONCE A DAY AS NEEDED. 1 each 5    pantoprazole (PROTONIX) 40 MG tablet TAKE ONE TABLET BY MOUTH DAILY. 90 tablet 1    carvedilol (COREG) 3.125 MG tablet TAKE 1 TABLET BY MOUTH TWICE DAILY WITH FOOD 180 tablet 1    lisinopril (PRINIVIL;ZESTRIL) 20 MG tablet TAKE 1 TABLET BY MOUTH TWICE DAILY. 180 tablet 1    spironolactone (ALDACTONE) 25 MG tablet TAKE 1 TABLET BY MOUTH TWICE DAILY. 180 tablet 1    metFORMIN (GLUCOPHAGE) 500 MG tablet Take 2 tablets by mouth 2 times daily (with meals) 360 tablet 3    atorvastatin (LIPITOR) 40 MG tablet TAKE ONE TABLET BY MOUTH DAILY. 90 tablet 3    empagliflozin (JARDIANCE) 25 MG tablet Take 1 tablet by mouth daily 90 tablet 3    clopidogrel (PLAVIX) 75 MG tablet Take 1 tablet by mouth daily      albuterol (PROVENTIL) (2.5 MG/3ML) 0.083% nebulizer solution Take 3 mLs by nebulization every 4 hours as needed for Wheezing or Shortness of Breath 120 each 5    Lancets MISC Check glucose once daily      Budeson-Glycopyrrol-Formoterol (BREZTRI AEROSPHERE) 160-9-4.8 MCG/ACT AERO Inhale into the lungs      ferrous sulfate (IRON 325) 325 (65 Fe) MG tablet Take by  oxygen.  Differential diagnosis includes CHF, pleural effusion, pneumothorax, less likely ACS, less likely pulmonary embolism given the patient's clinical signs and symptoms.  Will obtain labs and provide Lasix and reassess.    EKG: Initial EKG interpreted by me. Shows sinus tachycardia rate of 106, nonspecific intraventricular block, otherwise no acute ST elevations or depressions.    Screenings:                         Clinical Management Tools:   Not Applicable    Social Determinants of health affecting management: None    ED Course     Patient was given the following medications:  Medications   magnesium sulfate 2000 mg in 50 mL IVPB premix (2,000 mg IntraVENous New Bag 10/16/24 2300)   sodium chloride flush 0.9 % injection 5-40 mL (has no administration in time range)   sodium chloride flush 0.9 % injection 5-40 mL (has no administration in time range)   0.9 % sodium chloride infusion (has no administration in time range)   potassium chloride (KLOR-CON M) extended release tablet 40 mEq (has no administration in time range)     Or   potassium bicarb-citric acid (EFFER-K) effervescent tablet 40 mEq (has no administration in time range)     Or   potassium chloride 10 mEq/100 mL IVPB (Peripheral Line) (has no administration in time range)   magnesium sulfate 2000 mg in 50 mL IVPB premix (has no administration in time range)   enoxaparin (LOVENOX) injection 40 mg (has no administration in time range)   ondansetron (ZOFRAN-ODT) disintegrating tablet 4 mg (has no administration in time range)     Or   ondansetron (ZOFRAN) injection 4 mg (has no administration in time range)   polyethylene glycol (GLYCOLAX) packet 17 g (has no administration in time range)   acetaminophen (TYLENOL) tablet 650 mg (has no administration in time range)     Or   acetaminophen (TYLENOL) suppository 650 mg (has no administration in time range)   potassium bicarb-citric acid (EFFER-K) effervescent tablet 40 mEq (40 mEq Oral Given 10/16/24  BUE/ BLE grossly 3+/5 throughout

## 2024-10-22 ENCOUNTER — APPOINTMENT (OUTPATIENT)
Dept: HOME HEALTH SERVICES | Facility: HOME HEALTH | Age: 89
End: 2024-10-22

## 2024-10-22 VITALS
SYSTOLIC BLOOD PRESSURE: 125 MMHG | DIASTOLIC BLOOD PRESSURE: 73 MMHG | RESPIRATION RATE: 18 BRPM | TEMPERATURE: 98.3 F | HEART RATE: 75 BPM | OXYGEN SATURATION: 97 %

## 2024-10-22 DIAGNOSIS — J96.11 CHRONIC RESPIRATORY FAILURE WITH HYPOXIA: ICD-10-CM

## 2024-10-22 PROCEDURE — 99350 HOME/RES VST EST HIGH MDM 60: CPT

## 2024-10-27 PROBLEM — Z86.2 HISTORY OF THROMBOCYTOPENIA: Status: RESOLVED | Noted: 2022-10-13 | Resolved: 2024-10-27

## 2024-10-27 NOTE — PATIENT PROFILE ADULT. - MEDICATIONS BROUGHT TO HOSPITAL, PROFILE
EAMON Gaines: US without evidence of dvt or bakers cyst, knee xray without evidence of fracture or effusion. Pt provided with knee immobilizer, cane and ACE bandage, Discussed with pt she needs to follow up with an orthopedic doctor to have an MRI performed to evaluate for soft tissue injury. Pt is ambulatory at time of discharge. She will return to the ER with any worsening or concerning symptoms. no

## 2024-10-29 ENCOUNTER — LABORATORY RESULT (OUTPATIENT)
Age: 89
End: 2024-10-29

## 2024-11-01 ENCOUNTER — APPOINTMENT (OUTPATIENT)
Dept: UROLOGY | Facility: CLINIC | Age: 89
End: 2024-11-01

## 2024-11-05 ENCOUNTER — APPOINTMENT (OUTPATIENT)
Dept: UROLOGY | Facility: CLINIC | Age: 89
End: 2024-11-05
Payer: MEDICARE

## 2024-11-05 DIAGNOSIS — N20.0 CALCULUS OF KIDNEY: ICD-10-CM

## 2024-11-05 DIAGNOSIS — R31.29 OTHER MICROSCOPIC HEMATURIA: ICD-10-CM

## 2024-11-05 DIAGNOSIS — Z97.8 PRESENCE OF OTHER SPECIFIED DEVICES: ICD-10-CM

## 2024-11-05 DIAGNOSIS — L98.429 NON-PRESSURE CHRONIC ULCER OF BACK WITH UNSPECIFIED SEVERITY: ICD-10-CM

## 2024-11-05 DIAGNOSIS — N39.0 URINARY TRACT INFECTION, SITE NOT SPECIFIED: ICD-10-CM

## 2024-11-05 DIAGNOSIS — I50.9 HEART FAILURE, UNSPECIFIED: ICD-10-CM

## 2024-11-05 DIAGNOSIS — F03.90 UNSPECIFIED DEMENTIA W/OUT BEHAVIORAL DISTURBANCE: ICD-10-CM

## 2024-11-05 DIAGNOSIS — N95.2 POSTMENOPAUSAL ATROPHIC VAGINITIS: ICD-10-CM

## 2024-11-05 DIAGNOSIS — Z74.01 BED CONFINEMENT STATUS: ICD-10-CM

## 2024-11-05 DIAGNOSIS — D47.2 MONOCLONAL GAMMOPATHY: ICD-10-CM

## 2024-11-05 DIAGNOSIS — R19.5 OTHER FECAL ABNORMALITIES: ICD-10-CM

## 2024-11-05 DIAGNOSIS — R82.71 BACTERIURIA: ICD-10-CM

## 2024-11-05 DIAGNOSIS — N32.89 OTHER SPECIFIED DISORDERS OF BLADDER: ICD-10-CM

## 2024-11-05 DIAGNOSIS — M54.50 LOW BACK PAIN, UNSPECIFIED: ICD-10-CM

## 2024-11-05 PROCEDURE — 99215 OFFICE O/P EST HI 40 MIN: CPT

## 2024-11-09 ENCOUNTER — NON-APPOINTMENT (OUTPATIENT)
Age: 89
End: 2024-11-09

## 2024-11-09 ENCOUNTER — TRANSCRIPTION ENCOUNTER (OUTPATIENT)
Age: 89
End: 2024-11-09

## 2024-11-11 ENCOUNTER — NON-APPOINTMENT (OUTPATIENT)
Age: 89
End: 2024-11-11

## 2024-11-13 ENCOUNTER — LABORATORY RESULT (OUTPATIENT)
Age: 89
End: 2024-11-13

## 2024-11-21 NOTE — ED ADULT NURSE NOTE - NSFALLRSKHARMRISK_ED_ALL_ED
yes
Render Risk Assessment In Note?: no
Detail Level: Generalized
Comment: history of abscess right lateral knee, was hospitalized 2023

## 2024-11-29 ENCOUNTER — RX RENEWAL (OUTPATIENT)
Age: 89
End: 2024-11-29

## 2024-12-02 ENCOUNTER — LABORATORY RESULT (OUTPATIENT)
Age: 88
End: 2024-12-02

## 2024-12-06 ENCOUNTER — APPOINTMENT (OUTPATIENT)
Dept: UROLOGY | Facility: CLINIC | Age: 88
End: 2024-12-06
Payer: MEDICARE

## 2024-12-06 DIAGNOSIS — N20.0 CALCULUS OF KIDNEY: ICD-10-CM

## 2024-12-06 DIAGNOSIS — R82.71 BACTERIURIA: ICD-10-CM

## 2024-12-06 DIAGNOSIS — N39.0 URINARY TRACT INFECTION, SITE NOT SPECIFIED: ICD-10-CM

## 2024-12-06 DIAGNOSIS — J42 UNSPECIFIED CHRONIC BRONCHITIS: ICD-10-CM

## 2024-12-06 DIAGNOSIS — Z97.8 PRESENCE OF OTHER SPECIFIED DEVICES: ICD-10-CM

## 2024-12-06 DIAGNOSIS — R19.5 OTHER FECAL ABNORMALITIES: ICD-10-CM

## 2024-12-06 DIAGNOSIS — Z74.01 BED CONFINEMENT STATUS: ICD-10-CM

## 2024-12-06 DIAGNOSIS — I48.91 UNSPECIFIED ATRIAL FIBRILLATION: ICD-10-CM

## 2024-12-06 DIAGNOSIS — R19.7 DIARRHEA, UNSPECIFIED: ICD-10-CM

## 2024-12-06 DIAGNOSIS — N32.89 OTHER SPECIFIED DISORDERS OF BLADDER: ICD-10-CM

## 2024-12-06 DIAGNOSIS — D47.2 MONOCLONAL GAMMOPATHY: ICD-10-CM

## 2024-12-06 DIAGNOSIS — N95.2 POSTMENOPAUSAL ATROPHIC VAGINITIS: ICD-10-CM

## 2024-12-06 DIAGNOSIS — E44.0 MODERATE PROTEIN-CALORIE MALNUTRITION: ICD-10-CM

## 2024-12-06 DIAGNOSIS — J96.11 CHRONIC RESPIRATORY FAILURE WITH HYPOXIA: ICD-10-CM

## 2024-12-06 DIAGNOSIS — R41.82 ALTERED MENTAL STATUS, UNSPECIFIED: ICD-10-CM

## 2024-12-06 DIAGNOSIS — R31.29 OTHER MICROSCOPIC HEMATURIA: ICD-10-CM

## 2024-12-06 DIAGNOSIS — R82.998 OTHER ABNORMAL FINDINGS IN URINE: ICD-10-CM

## 2024-12-06 DIAGNOSIS — F03.90 UNSPECIFIED DEMENTIA W/OUT BEHAVIORAL DISTURBANCE: ICD-10-CM

## 2024-12-06 PROCEDURE — 99443: CPT

## 2024-12-10 DIAGNOSIS — Z00.00 ENCOUNTER FOR GENERAL ADULT MEDICAL EXAMINATION W/OUT ABNORMAL FINDINGS: ICD-10-CM

## 2024-12-11 ENCOUNTER — NON-APPOINTMENT (OUTPATIENT)
Age: 88
End: 2024-12-11

## 2024-12-11 DIAGNOSIS — R41.0 DISORIENTATION, UNSPECIFIED: ICD-10-CM

## 2024-12-11 LAB
APPEARANCE: ABNORMAL
BACTERIA: ABNORMAL /HPF
BILIRUBIN URINE: NEGATIVE
BLOOD URINE: NEGATIVE
CAST: 2 /LPF
COLOR: YELLOW
EPITHELIAL CELLS: 3 /HPF
GLUCOSE QUALITATIVE U: NEGATIVE MG/DL
KETONES URINE: NEGATIVE MG/DL
LEUKOCYTE ESTERASE URINE: ABNORMAL
MICROSCOPIC-UA: NORMAL
NITRITE URINE: POSITIVE
PH URINE: 6.5
PROTEIN URINE: NEGATIVE MG/DL
RED BLOOD CELLS URINE: 1 /HPF
SPECIFIC GRAVITY URINE: 1
UROBILINOGEN URINE: 0.2 MG/DL
WHITE BLOOD CELLS URINE: 24 /HPF

## 2024-12-12 ENCOUNTER — LABORATORY RESULT (OUTPATIENT)
Age: 88
End: 2024-12-12

## 2024-12-12 LAB — BACTERIA UR CULT: NORMAL

## 2024-12-13 ENCOUNTER — LABORATORY RESULT (OUTPATIENT)
Age: 88
End: 2024-12-13

## 2024-12-16 ENCOUNTER — LABORATORY RESULT (OUTPATIENT)
Age: 88
End: 2024-12-16

## 2024-12-16 ENCOUNTER — APPOINTMENT (OUTPATIENT)
Dept: HOME HEALTH SERVICES | Facility: HOME HEALTH | Age: 88
End: 2024-12-16
Payer: MEDICARE

## 2024-12-16 ENCOUNTER — APPOINTMENT (OUTPATIENT)
Dept: UROLOGY | Facility: CLINIC | Age: 88
End: 2024-12-16

## 2024-12-16 VITALS
DIASTOLIC BLOOD PRESSURE: 86 MMHG | TEMPERATURE: 72 F | SYSTOLIC BLOOD PRESSURE: 142 MMHG | HEART RATE: 67 BPM | RESPIRATION RATE: 18 BRPM | OXYGEN SATURATION: 97 %

## 2024-12-16 DIAGNOSIS — H10.33 UNSPECIFIED ACUTE CONJUNCTIVITIS, BILATERAL: ICD-10-CM

## 2024-12-16 DIAGNOSIS — I48.91 UNSPECIFIED ATRIAL FIBRILLATION: ICD-10-CM

## 2024-12-16 DIAGNOSIS — N39.0 INFECTION AND INFLAMMATORY REACTION DUE TO INDWELLING URETHRAL CATHETER, INITIAL ENCOUNTER: ICD-10-CM

## 2024-12-16 DIAGNOSIS — I50.9 HEART FAILURE, UNSPECIFIED: ICD-10-CM

## 2024-12-16 DIAGNOSIS — Z86.59 PERSONAL HISTORY OF OTHER MENTAL AND BEHAVIORAL DISORDERS: ICD-10-CM

## 2024-12-16 DIAGNOSIS — F03.92 UNSPECIFIED DEMENTIA, UNSPECIFIED SEVERITY, WITH PSYCHOTIC DISTURBANCE: ICD-10-CM

## 2024-12-16 DIAGNOSIS — T83.511A INFECTION AND INFLAMMATORY REACTION DUE TO INDWELLING URETHRAL CATHETER, INITIAL ENCOUNTER: ICD-10-CM

## 2024-12-16 DIAGNOSIS — L89.153 PRESSURE ULCER OF SACRAL REGION, STAGE 3: ICD-10-CM

## 2024-12-16 DIAGNOSIS — L98.0 PYOGENIC GRANULOMA: ICD-10-CM

## 2024-12-16 PROCEDURE — 99215 OFFICE O/P EST HI 40 MIN: CPT

## 2024-12-16 PROCEDURE — 99350 HOME/RES VST EST HIGH MDM 60: CPT

## 2024-12-19 PROBLEM — Z86.59 HISTORY OF DEMENTIA: Status: RESOLVED | Noted: 2024-01-17 | Resolved: 2024-12-19

## 2024-12-19 PROBLEM — H10.33 ACUTE BACTERIAL CONJUNCTIVITIS OF BOTH EYES: Status: RESOLVED | Noted: 2023-06-23 | Resolved: 2024-12-19

## 2025-01-01 ENCOUNTER — NON-APPOINTMENT (OUTPATIENT)
Age: 89
End: 2025-01-01

## 2025-01-01 ENCOUNTER — RX RENEWAL (OUTPATIENT)
Age: 89
End: 2025-01-01

## 2025-01-01 DIAGNOSIS — I20.9 ANGINA PECTORIS, UNSPECIFIED: ICD-10-CM

## 2025-01-01 RX ORDER — NITROGLYCERIN 0.4 MG/1
0.4 TABLET SUBLINGUAL
Qty: 30 | Refills: 0 | Status: ACTIVE | COMMUNITY
Start: 2025-01-01 | End: 1900-01-01

## 2025-01-01 RX ORDER — METOPROLOL TARTRATE 25 MG/1
25 TABLET ORAL
Qty: 10 | Refills: 0 | Status: ACTIVE | COMMUNITY
Start: 2025-01-01 | End: 1900-01-01

## 2025-01-02 ENCOUNTER — RX RENEWAL (OUTPATIENT)
Age: 89
End: 2025-01-02

## 2025-01-06 RX ORDER — ACETAMINOPHEN EXTRA STRENGTH 500 MG/1
500 TABLET ORAL
Qty: 180 | Refills: 5 | Status: ACTIVE | COMMUNITY
Start: 2025-01-02 | End: 1900-01-01

## 2025-01-07 ENCOUNTER — NON-APPOINTMENT (OUTPATIENT)
Age: 89
End: 2025-01-07

## 2025-01-08 ENCOUNTER — NON-APPOINTMENT (OUTPATIENT)
Age: 89
End: 2025-01-08

## 2025-01-08 ENCOUNTER — LABORATORY RESULT (OUTPATIENT)
Age: 89
End: 2025-01-08

## 2025-01-08 ENCOUNTER — APPOINTMENT (OUTPATIENT)
Dept: HOME HEALTH SERVICES | Facility: HOME HEALTH | Age: 89
End: 2025-01-08
Payer: MEDICARE

## 2025-01-08 VITALS
SYSTOLIC BLOOD PRESSURE: 141 MMHG | DIASTOLIC BLOOD PRESSURE: 74 MMHG | RESPIRATION RATE: 17 BRPM | HEART RATE: 74 BPM | OXYGEN SATURATION: 96 % | TEMPERATURE: 97.3 F

## 2025-01-08 DIAGNOSIS — F03.92 UNSPECIFIED DEMENTIA, UNSPECIFIED SEVERITY, WITH PSYCHOTIC DISTURBANCE: ICD-10-CM

## 2025-01-08 DIAGNOSIS — D68.69 OTHER THROMBOPHILIA: ICD-10-CM

## 2025-01-08 DIAGNOSIS — I48.91 UNSPECIFIED ATRIAL FIBRILLATION: ICD-10-CM

## 2025-01-08 DIAGNOSIS — I50.9 HEART FAILURE, UNSPECIFIED: ICD-10-CM

## 2025-01-08 DIAGNOSIS — J96.11 CHRONIC RESPIRATORY FAILURE WITH HYPOXIA: ICD-10-CM

## 2025-01-08 PROCEDURE — 99349 HOME/RES VST EST MOD MDM 40: CPT

## 2025-01-09 ENCOUNTER — LABORATORY RESULT (OUTPATIENT)
Age: 89
End: 2025-01-09

## 2025-01-10 ENCOUNTER — LABORATORY RESULT (OUTPATIENT)
Age: 89
End: 2025-01-10

## 2025-01-13 ENCOUNTER — LABORATORY RESULT (OUTPATIENT)
Age: 89
End: 2025-01-13

## 2025-01-14 ENCOUNTER — LABORATORY RESULT (OUTPATIENT)
Age: 89
End: 2025-01-14

## 2025-01-18 ENCOUNTER — TRANSCRIPTION ENCOUNTER (OUTPATIENT)
Age: 89
End: 2025-01-18

## 2025-01-22 ENCOUNTER — APPOINTMENT (OUTPATIENT)
Dept: UROLOGY | Facility: CLINIC | Age: 89
End: 2025-01-22

## 2025-01-22 ENCOUNTER — TRANSCRIPTION ENCOUNTER (OUTPATIENT)
Age: 89
End: 2025-01-22

## 2025-01-22 ENCOUNTER — NON-APPOINTMENT (OUTPATIENT)
Age: 89
End: 2025-01-22

## 2025-01-23 ENCOUNTER — NON-APPOINTMENT (OUTPATIENT)
Age: 89
End: 2025-01-23

## 2025-01-23 ENCOUNTER — APPOINTMENT (OUTPATIENT)
Dept: AFTER HOURS CARE | Facility: EMERGENCY ROOM | Age: 89
End: 2025-01-23

## 2025-01-23 DIAGNOSIS — R53.83 OTHER MALAISE: ICD-10-CM

## 2025-01-23 DIAGNOSIS — R05.9 COUGH, UNSPECIFIED: ICD-10-CM

## 2025-01-23 DIAGNOSIS — R53.81 OTHER MALAISE: ICD-10-CM

## 2025-01-23 PROCEDURE — 99215 OFFICE O/P EST HI 40 MIN: CPT | Mod: 2W

## 2025-01-23 RX ORDER — CIPROFLOXACIN 500 MG/5ML
500 MG/5ML KIT ORAL DAILY
Qty: 50 | Refills: 0 | Status: ACTIVE | COMMUNITY
Start: 2025-01-22 | End: 1900-01-01

## 2025-01-23 RX ORDER — CIPROFLOXACIN HYDROCHLORIDE 250 MG/1
250 TABLET, FILM COATED ORAL TWICE DAILY
Qty: 14 | Refills: 0 | Status: ACTIVE | COMMUNITY
Start: 2025-01-23 | End: 1900-01-01

## 2025-01-24 ENCOUNTER — LABORATORY RESULT (OUTPATIENT)
Age: 89
End: 2025-01-24

## 2025-01-24 ENCOUNTER — NON-APPOINTMENT (OUTPATIENT)
Age: 89
End: 2025-01-24

## 2025-01-24 ENCOUNTER — APPOINTMENT (OUTPATIENT)
Dept: HOME HEALTH SERVICES | Facility: HOME HEALTH | Age: 89
End: 2025-01-24
Payer: MEDICARE

## 2025-01-24 ENCOUNTER — TRANSCRIPTION ENCOUNTER (OUTPATIENT)
Age: 89
End: 2025-01-24

## 2025-01-24 DIAGNOSIS — Z79.899 OTHER LONG TERM (CURRENT) DRUG THERAPY: ICD-10-CM

## 2025-01-24 PROCEDURE — 99347 HOME/RES VST EST SF MDM 20: CPT | Mod: 2W

## 2025-01-25 ENCOUNTER — NON-APPOINTMENT (OUTPATIENT)
Age: 89
End: 2025-01-25

## 2025-01-25 ENCOUNTER — TRANSCRIPTION ENCOUNTER (OUTPATIENT)
Age: 89
End: 2025-01-25

## 2025-01-27 ENCOUNTER — NON-APPOINTMENT (OUTPATIENT)
Age: 89
End: 2025-01-27

## 2025-01-27 ENCOUNTER — APPOINTMENT (OUTPATIENT)
Dept: UROLOGY | Facility: CLINIC | Age: 89
End: 2025-01-27
Payer: MEDICARE

## 2025-01-27 DIAGNOSIS — M54.50 LOW BACK PAIN, UNSPECIFIED: ICD-10-CM

## 2025-01-27 DIAGNOSIS — J98.8 OTHER SPECIFIED RESPIRATORY DISORDERS: ICD-10-CM

## 2025-01-27 DIAGNOSIS — R31.29 OTHER MICROSCOPIC HEMATURIA: ICD-10-CM

## 2025-01-27 PROBLEM — Z79.899 OTHER LONG TERM (CURRENT) DRUG THERAPY: Noted: 2023-06-06

## 2025-01-27 PROCEDURE — 99215 OFFICE O/P EST HI 40 MIN: CPT | Mod: 2W

## 2025-01-29 ENCOUNTER — TRANSCRIPTION ENCOUNTER (OUTPATIENT)
Age: 89
End: 2025-01-29

## 2025-01-30 ENCOUNTER — NON-APPOINTMENT (OUTPATIENT)
Age: 89
End: 2025-01-30

## 2025-01-30 ENCOUNTER — APPOINTMENT (OUTPATIENT)
Dept: UROLOGY | Facility: CLINIC | Age: 89
End: 2025-01-30
Payer: MEDICARE

## 2025-01-30 DIAGNOSIS — R41.0 DISORIENTATION, UNSPECIFIED: ICD-10-CM

## 2025-01-30 DIAGNOSIS — R41.82 ALTERED MENTAL STATUS, UNSPECIFIED: ICD-10-CM

## 2025-01-30 DIAGNOSIS — Z74.01 BED CONFINEMENT STATUS: ICD-10-CM

## 2025-01-30 DIAGNOSIS — N39.0 URINARY TRACT INFECTION, SITE NOT SPECIFIED: ICD-10-CM

## 2025-01-30 DIAGNOSIS — M48.00 SPINAL STENOSIS, SITE UNSPECIFIED: ICD-10-CM

## 2025-01-30 DIAGNOSIS — L98.429 NON-PRESSURE CHRONIC ULCER OF BACK WITH UNSPECIFIED SEVERITY: ICD-10-CM

## 2025-01-30 DIAGNOSIS — Z86.19 PERSONAL HISTORY OF OTHER INFECTIOUS AND PARASITIC DISEASES: ICD-10-CM

## 2025-01-30 DIAGNOSIS — Z97.8 PRESENCE OF OTHER SPECIFIED DEVICES: ICD-10-CM

## 2025-01-30 PROCEDURE — 99215 OFFICE O/P EST HI 40 MIN: CPT | Mod: 2W

## 2025-01-31 ENCOUNTER — APPOINTMENT (OUTPATIENT)
Dept: UROLOGY | Facility: CLINIC | Age: 89
End: 2025-01-31

## 2025-01-31 DIAGNOSIS — N20.0 CALCULUS OF KIDNEY: ICD-10-CM

## 2025-01-31 DIAGNOSIS — F05 UNSPECIFIED DEMENTIA W/OUT BEHAVIORAL DISTURBANCE: ICD-10-CM

## 2025-01-31 DIAGNOSIS — F03.90 UNSPECIFIED DEMENTIA W/OUT BEHAVIORAL DISTURBANCE: ICD-10-CM

## 2025-01-31 DIAGNOSIS — R82.71 BACTERIURIA: ICD-10-CM

## 2025-01-31 DIAGNOSIS — D47.2 MONOCLONAL GAMMOPATHY: ICD-10-CM

## 2025-01-31 DIAGNOSIS — Z97.8 PRESENCE OF OTHER SPECIFIED DEVICES: ICD-10-CM

## 2025-02-06 ENCOUNTER — APPOINTMENT (OUTPATIENT)
Dept: HOME HEALTH SERVICES | Facility: HOME HEALTH | Age: 89
End: 2025-02-06

## 2025-02-07 ENCOUNTER — RESULT REVIEW (OUTPATIENT)
Age: 89
End: 2025-02-07

## 2025-02-11 ENCOUNTER — NON-APPOINTMENT (OUTPATIENT)
Age: 89
End: 2025-02-11

## 2025-02-11 ENCOUNTER — APPOINTMENT (OUTPATIENT)
Dept: UROLOGY | Facility: CLINIC | Age: 89
End: 2025-02-11
Payer: MEDICARE

## 2025-02-11 DIAGNOSIS — F03.90 UNSPECIFIED DEMENTIA W/OUT BEHAVIORAL DISTURBANCE: ICD-10-CM

## 2025-02-11 DIAGNOSIS — F05 UNSPECIFIED DEMENTIA W/OUT BEHAVIORAL DISTURBANCE: ICD-10-CM

## 2025-02-11 DIAGNOSIS — M48.00 SPINAL STENOSIS, SITE UNSPECIFIED: ICD-10-CM

## 2025-02-11 DIAGNOSIS — Z97.8 PRESENCE OF OTHER SPECIFIED DEVICES: ICD-10-CM

## 2025-02-11 DIAGNOSIS — N20.0 CALCULUS OF KIDNEY: ICD-10-CM

## 2025-02-11 DIAGNOSIS — R31.29 OTHER MICROSCOPIC HEMATURIA: ICD-10-CM

## 2025-02-11 DIAGNOSIS — N39.0 URINARY TRACT INFECTION, SITE NOT SPECIFIED: ICD-10-CM

## 2025-02-11 DIAGNOSIS — D47.2 MONOCLONAL GAMMOPATHY: ICD-10-CM

## 2025-02-11 PROCEDURE — 99215 OFFICE O/P EST HI 40 MIN: CPT | Mod: 93

## 2025-02-14 ENCOUNTER — NON-APPOINTMENT (OUTPATIENT)
Age: 89
End: 2025-02-14

## 2025-02-18 ENCOUNTER — NON-APPOINTMENT (OUTPATIENT)
Age: 89
End: 2025-02-18

## 2025-02-20 ENCOUNTER — RX RENEWAL (OUTPATIENT)
Age: 89
End: 2025-02-20

## 2025-02-21 ENCOUNTER — RX RENEWAL (OUTPATIENT)
Age: 89
End: 2025-02-21

## 2025-02-25 ENCOUNTER — NON-APPOINTMENT (OUTPATIENT)
Age: 89
End: 2025-02-25

## 2025-02-25 RX ORDER — AMOXICILLIN AND CLAVULANATE POTASSIUM 600; 42.9 MG/5ML; MG/5ML
600-42.9 FOR SUSPENSION ORAL
Qty: 2 | Refills: 0 | Status: ACTIVE | COMMUNITY
Start: 2025-02-25 | End: 1900-01-01

## 2025-02-26 ENCOUNTER — LABORATORY RESULT (OUTPATIENT)
Age: 89
End: 2025-02-26

## 2025-02-26 ENCOUNTER — TRANSCRIPTION ENCOUNTER (OUTPATIENT)
Age: 89
End: 2025-02-26

## 2025-02-26 ENCOUNTER — APPOINTMENT (OUTPATIENT)
Dept: UROLOGY | Facility: CLINIC | Age: 89
End: 2025-02-26
Payer: MEDICARE

## 2025-02-26 DIAGNOSIS — N95.2 POSTMENOPAUSAL ATROPHIC VAGINITIS: ICD-10-CM

## 2025-02-26 DIAGNOSIS — N39.0 URINARY TRACT INFECTION, SITE NOT SPECIFIED: ICD-10-CM

## 2025-02-26 DIAGNOSIS — I48.91 UNSPECIFIED ATRIAL FIBRILLATION: ICD-10-CM

## 2025-02-26 DIAGNOSIS — Z74.01 BED CONFINEMENT STATUS: ICD-10-CM

## 2025-02-26 DIAGNOSIS — R31.29 OTHER MICROSCOPIC HEMATURIA: ICD-10-CM

## 2025-02-26 DIAGNOSIS — M48.00 SPINAL STENOSIS, SITE UNSPECIFIED: ICD-10-CM

## 2025-02-26 DIAGNOSIS — L89.153 PRESSURE ULCER OF SACRAL REGION, STAGE 3: ICD-10-CM

## 2025-02-26 DIAGNOSIS — L89.610 PRESSURE ULCER OF RIGHT HEEL, UNSTAGEABLE: ICD-10-CM

## 2025-02-26 DIAGNOSIS — Z23 ENCOUNTER FOR IMMUNIZATION: ICD-10-CM

## 2025-02-26 DIAGNOSIS — L98.429 NON-PRESSURE CHRONIC ULCER OF BACK WITH UNSPECIFIED SEVERITY: ICD-10-CM

## 2025-02-26 PROCEDURE — 99215 OFFICE O/P EST HI 40 MIN: CPT | Mod: 2W

## 2025-02-26 PROCEDURE — G2212 PROLONG OUTPT/OFFICE VIS: CPT | Mod: 2W

## 2025-02-27 ENCOUNTER — APPOINTMENT (OUTPATIENT)
Dept: UROLOGY | Facility: CLINIC | Age: 89
End: 2025-02-27

## 2025-02-27 ENCOUNTER — NON-APPOINTMENT (OUTPATIENT)
Age: 89
End: 2025-02-27

## 2025-02-28 ENCOUNTER — NON-APPOINTMENT (OUTPATIENT)
Age: 89
End: 2025-02-28

## 2025-02-28 ENCOUNTER — APPOINTMENT (OUTPATIENT)
Dept: AFTER HOURS CARE | Facility: EMERGENCY ROOM | Age: 89
End: 2025-02-28

## 2025-02-28 PROCEDURE — 99214 OFFICE O/P EST MOD 30 MIN: CPT | Mod: 2W

## 2025-03-03 ENCOUNTER — NON-APPOINTMENT (OUTPATIENT)
Age: 89
End: 2025-03-03

## 2025-03-04 ENCOUNTER — NON-APPOINTMENT (OUTPATIENT)
Age: 89
End: 2025-03-04

## 2025-03-04 DIAGNOSIS — H10.33 UNSPECIFIED ACUTE CONJUNCTIVITIS, BILATERAL: ICD-10-CM

## 2025-03-04 DIAGNOSIS — B30.9 VIRAL CONJUNCTIVITIS, UNSPECIFIED: ICD-10-CM

## 2025-03-05 ENCOUNTER — TRANSCRIPTION ENCOUNTER (OUTPATIENT)
Age: 89
End: 2025-03-05

## 2025-03-05 ENCOUNTER — APPOINTMENT (OUTPATIENT)
Dept: AFTER HOURS CARE | Facility: EMERGENCY ROOM | Age: 89
End: 2025-03-05

## 2025-03-05 ENCOUNTER — NON-APPOINTMENT (OUTPATIENT)
Age: 89
End: 2025-03-05

## 2025-03-05 PROCEDURE — 99215 OFFICE O/P EST HI 40 MIN: CPT | Mod: 2W

## 2025-03-05 RX ORDER — CIPROFLOXACIN 3 MG/ML
0.3 SOLUTION OPHTHALMIC TWICE DAILY
Qty: 6 | Refills: 0 | Status: ACTIVE | COMMUNITY
Start: 2025-03-05 | End: 1900-01-01

## 2025-03-05 RX ORDER — CIPROFLOXACIN OTIC SOLUTION, 0.2% 0.5 MG/.25ML
0.2 SOLUTION/ DROPS AURICULAR (OTIC) DAILY
Qty: 1 | Refills: 0 | Status: DISCONTINUED | COMMUNITY
Start: 2025-03-04 | End: 2025-03-05

## 2025-03-06 ENCOUNTER — NON-APPOINTMENT (OUTPATIENT)
Age: 89
End: 2025-03-06

## 2025-03-06 DIAGNOSIS — F05 UNSPECIFIED DEMENTIA W/OUT BEHAVIORAL DISTURBANCE: ICD-10-CM

## 2025-03-06 DIAGNOSIS — N20.0 CALCULUS OF KIDNEY: ICD-10-CM

## 2025-03-06 DIAGNOSIS — E55.9 VITAMIN D DEFICIENCY, UNSPECIFIED: ICD-10-CM

## 2025-03-06 DIAGNOSIS — F03.90 UNSPECIFIED DEMENTIA W/OUT BEHAVIORAL DISTURBANCE: ICD-10-CM

## 2025-03-06 DIAGNOSIS — R06.89 OTHER ABNORMALITIES OF BREATHING: ICD-10-CM

## 2025-03-06 DIAGNOSIS — R41.0 DISORIENTATION, UNSPECIFIED: ICD-10-CM

## 2025-03-06 DIAGNOSIS — Z86.19 PERSONAL HISTORY OF OTHER INFECTIOUS AND PARASITIC DISEASES: ICD-10-CM

## 2025-03-06 DIAGNOSIS — D47.2 MONOCLONAL GAMMOPATHY: ICD-10-CM

## 2025-03-06 DIAGNOSIS — Z97.8 PRESENCE OF OTHER SPECIFIED DEVICES: ICD-10-CM

## 2025-03-06 DIAGNOSIS — R82.71 BACTERIURIA: ICD-10-CM

## 2025-03-06 DIAGNOSIS — F03.92 UNSPECIFIED DEMENTIA, UNSPECIFIED SEVERITY, WITH PSYCHOTIC DISTURBANCE: ICD-10-CM

## 2025-03-06 DIAGNOSIS — J96.11 CHRONIC RESPIRATORY FAILURE WITH HYPOXIA: ICD-10-CM

## 2025-03-06 DIAGNOSIS — R41.82 ALTERED MENTAL STATUS, UNSPECIFIED: ICD-10-CM

## 2025-03-07 ENCOUNTER — LABORATORY RESULT (OUTPATIENT)
Age: 89
End: 2025-03-07

## 2025-03-10 ENCOUNTER — NON-APPOINTMENT (OUTPATIENT)
Age: 89
End: 2025-03-10

## 2025-03-10 ENCOUNTER — APPOINTMENT (OUTPATIENT)
Dept: AFTER HOURS CARE | Facility: EMERGENCY ROOM | Age: 89
End: 2025-03-10
Payer: MEDICARE

## 2025-03-10 PROCEDURE — 99214 OFFICE O/P EST MOD 30 MIN: CPT | Mod: 2W

## 2025-03-11 ENCOUNTER — TRANSCRIPTION ENCOUNTER (OUTPATIENT)
Age: 89
End: 2025-03-11

## 2025-03-12 ENCOUNTER — APPOINTMENT (OUTPATIENT)
Dept: HOME HEALTH SERVICES | Facility: HOME HEALTH | Age: 89
End: 2025-03-12

## 2025-03-12 LAB
AMORPHOUS PRECEPITATE CRYSTALS: PRESENT
APPEARANCE: CLEAR
BACTERIA: ABNORMAL /HPF
BILIRUBIN URINE: NEGATIVE
BLOOD URINE: ABNORMAL
CALCIUM OXALATE CRYSTALS: PRESENT
CAST: NORMAL /LPF
COLOR: YELLOW
EPITHELIAL CELLS: 2 /HPF
GLUCOSE QUALITATIVE U: NEGATIVE
KETONES URINE: NEGATIVE
LEUKOCYTE ESTERASE URINE: NEGATIVE
MICROSCOPIC-UA: NORMAL
MUCUS: PRESENT
NITRITE URINE: POSITIVE
PH URINE: 6
PROTEIN URINE: ABNORMAL
RED BLOOD CELLS URINE: 3 /HPF
REVIEW: NORMAL
SPECIFIC GRAVITY URINE: >=1.03
URINE CYTOLOGY: NORMAL
UROBILINOGEN URINE: NORMAL
WBC CLUMPS: PRESENT
WHITE BLOOD CELLS URINE: 67 /HPF
YEAST-LIKE CELLS: PRESENT

## 2025-03-12 PROCEDURE — 99497 ADVNCD CARE PLAN 30 MIN: CPT

## 2025-03-12 PROCEDURE — 99350 HOME/RES VST EST HIGH MDM 60: CPT | Mod: 25

## 2025-03-12 RX ORDER — ACETAMINOPHEN 650 MG/20.3ML
650 SOLUTION ORAL
Qty: 1 | Refills: 11 | Status: ACTIVE | COMMUNITY
Start: 2025-03-10 | End: 1900-01-01

## 2025-03-14 ENCOUNTER — APPOINTMENT (OUTPATIENT)
Dept: UROLOGY | Facility: CLINIC | Age: 89
End: 2025-03-14

## 2025-03-14 LAB — BACTERIA UR CULT: ABNORMAL

## 2025-03-17 PROBLEM — I20.9 ANGINA PECTORIS: Status: ACTIVE | Noted: 2025-01-01

## 2025-03-26 ENCOUNTER — RX RENEWAL (OUTPATIENT)
Age: 89
End: 2025-03-26

## 2025-03-27 NOTE — PROGRESS NOTE ADULT - ASSESSMENT
03/27/25 1401   Referral Data   Referral Source Physician   Referral Reason Psych   County Information   [place]   Readmission Root Cause   30 Day Readmission No   Patient Information   Mental Status Alert   Primary Caregiver Family   Support System Immediate family   Church/Cultural Requests [employer]   Legal Information   Tx Plan Signed Yes   Current Status: 201   Legal Issues Pt denies.   Health Care Proxy Appointed No   Activities of Daily Living Prior to Admission   Functional Status Independent   Assistive Device No device needed   Living Arrangement Lives alone;House   Ambulation Independent   Access to Firearms   Access to Firearms No   Income Information   Income Source Other (Comment)  (Pt is a student.)   Means of Transportation   Means of Transport to Appts: Family transport        87 yo F with PMH of spinal stenosis, HTN, chronic compression fracture of spine, CLL, HTN, lymphedema, monoclonal gammopathy, paroxysmal a-fib, vaginal prolapse, venous insufficiency, p/w mechanical fall with L fem shaft fracture s/p IMN     *L femoral shaft fracture s/p mechanical fall s/p L Femur IMN 1/15.   - Ortho f/u appreciated  - Pain control  - PT/NWB LLE  - pain control  - stable for discharge to [facility]    # Acute on chronic anemia. Acute blood loss anemia 2/2 post op.   - HH stable for discharge    # Urinary retention- resolved  - straight cath prn.   - pain control    # Constipation  - colace, senna, miralax. suppository.  - XR abd no obstruction    #Hypotension- resolved  - BB w parameter  - monitor    #Chronic AFIB - [id number] Score: 5  -continue Atenolol and coumadin  -Hold for INR >3  -AC services consult appreciated    #LE edema + ulcers  -U/S neg for DVT  -wound care recs appreciated    #Hyponatremia  - monitor    #Spinal stenosis / HTN / monoclonal gammopathy  -Outpatient management     #Dispo- medically stable for discharge. Daughter lost her appeal, but now not responding to phone calls to cooperate with discharge transition/disposition.  Medically stable for [facility] 87 yo F with PMH of spinal stenosis, HTN, chronic compression fracture of spine, CLL, HTN, lymphedema, monoclonal gammopathy, paroxysmal a-fib, vaginal prolapse, venous insufficiency, p/w mechanical fall with L fem shaft fracture s/p IMN     *L femoral shaft fracture s/p mechanical fall s/p L Femur IMN 1/15.   - Ortho f/u appreciated -discussed with ortho and cleared for Discharge  - Pain control  - PT/NWB LLE  - pain control  - stable for discharge to [facility]    # Acute on chronic anemia. Acute blood loss anemia 2/2 post op.   - HH stable for discharge    # Urinary retention- resolved  - straight cath prn.   - pain control    # Constipation  - colace, senna, miralax. suppository.  - XR abd no obstruction    #Hypotension- resolved  - BB w parameter  - monitor    #Chronic AFIB - [id number] Score: 5  -continue Atenolol and coumadin  -Hold for INR >3  -AC services consult appreciated    #LE edema + ulcers  -U/S neg for DVT  -wound care recs appreciated    #Hyponatremia  - monitor    #Spinal stenosis / HTN / monoclonal gammopathy  -Outpatient management     #Dispo- medically stable for discharge. Patient was discharged on Monday. Daughter lost her appeal.  Medically stable for [facility]

## 2025-05-05 NOTE — ADDENDUM
[FreeTextEntry1] : I, Isa Vincentin, acted solely as a scribe for Dr. Indio Sexton on this date 11/23/2021.\par \par All medical record entries made by the Scribe were at my, Dr. Indio Sexton, direction and personally dictated by me on 11/23/2021. I have reviewed the chart and agree that the record accurately reflects my personal performance of the history, physical exam, assessment and plan.  I have also personally directed, reviewed and agreed with the chart.  Chest wall pain

## 2025-05-21 NOTE — H&P ADULT - HISTORY OF PRESENT ILLNESS
Patient is a 89 y/o female with pmhx of HTN, Afib on Eliquis, chronic indwelling catheter who was BIBEMS to ED with persistent lower abdominal pain, dark urine and fever (Tmax 100.1F). Patient has history of multidrug resistant UTIs and follows with urology Dr. Sexton. Family states patient just had her chronic sales replaced yesterday. Upon arrival patient was found to be febrile and hypotensive. Received a total of 4LF IVFs, however, remained hypotensive and was consequently started on levophed. Labs significant for leukocytosis 21k with left shift, bilirubin 1.5, and elevated LFTs. UA suggestive of infection. Underwent RUQ ultrasound and CT abd pelvis which was negative for acute cholecystis, but revealed a right nonobstructing intrarenal calculi. Patient was admitted to ICU for further management of septic shock secondary to UTI.  Detail Level: Detailed Patient is a 89 y/o female with pmhx of HTN, Afib on Eliquis, chronic indwelling catheter who was BIBEMS to ED with persistent lower abdominal pain, dark urine and fever (Tmax 100.1F) x 2 days. 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 Family states patient just had her chronic sales replaced yesterday. Upon arrival patient was found to be febrile and hypotensive. Received a total of 4LF IVFs, however, remained hypotensive and was consequently started on levophed. Labs significant for leukocytosis 21k with left shift, bilirubin 1.5, and elevated LFTs. UA suggestive of infection. Underwent RUQ ultrasound and CT abd pelvis which was negative for acute cholecystis, but revealed a right nonobstructing intrarenal calculi. Patient was admitted to ICU for further management of septic shock secondary to UTI.